# Patient Record
Sex: FEMALE | Race: WHITE | NOT HISPANIC OR LATINO
[De-identification: names, ages, dates, MRNs, and addresses within clinical notes are randomized per-mention and may not be internally consistent; named-entity substitution may affect disease eponyms.]

---

## 2020-02-17 PROBLEM — Z00.00 ENCOUNTER FOR PREVENTIVE HEALTH EXAMINATION: Status: ACTIVE | Noted: 2020-02-17

## 2020-02-18 ENCOUNTER — APPOINTMENT (OUTPATIENT)
Dept: CARDIOLOGY | Facility: CLINIC | Age: 60
End: 2020-02-18
Payer: COMMERCIAL

## 2020-02-18 ENCOUNTER — NON-APPOINTMENT (OUTPATIENT)
Age: 60
End: 2020-02-18

## 2020-02-18 VITALS
SYSTOLIC BLOOD PRESSURE: 128 MMHG | DIASTOLIC BLOOD PRESSURE: 82 MMHG | WEIGHT: 195 LBS | OXYGEN SATURATION: 99 % | BODY MASS INDEX: 34.55 KG/M2 | HEIGHT: 63 IN | RESPIRATION RATE: 17 BRPM | HEART RATE: 79 BPM

## 2020-02-18 DIAGNOSIS — R42 DIZZINESS AND GIDDINESS: ICD-10-CM

## 2020-02-18 DIAGNOSIS — Z82.49 FAMILY HISTORY OF ISCHEMIC HEART DISEASE AND OTHER DISEASES OF THE CIRCULATORY SYSTEM: ICD-10-CM

## 2020-02-18 DIAGNOSIS — Z78.9 OTHER SPECIFIED HEALTH STATUS: ICD-10-CM

## 2020-02-18 DIAGNOSIS — H93.19 TINNITUS, UNSPECIFIED EAR: ICD-10-CM

## 2020-02-18 PROCEDURE — 99204 OFFICE O/P NEW MOD 45 MIN: CPT

## 2020-02-18 NOTE — HISTORY OF PRESENT ILLNESS
[FreeTextEntry1] : Pleasant 60-year-old female seen because of vertigo episode with some complaints of shortness of breath and chest discomfort.\par \par The patient exercises fairly regularly for approximately 20 minutes. She somewhat distant with heavy exertion such as going up her stairs. There is no exercise-induced chest discomfort. The patient has occasional heartburn which she relates to big meals and spicy food and never related to exertion. The patient had an episode of vertigo many years ago. The patient also suffers from tinnitus. Recently the patient had a severe episode of vertigo. It lasted about 6 hours. The room was spinning around her. It was worse with head movement. There was nausea and vomiting. She did not seek medical care. She was advised to followup cardiologist. There is been no recurrence.

## 2020-02-18 NOTE — PHYSICAL EXAM
[General Appearance - Well Developed] : well developed [Normal Appearance] : normal appearance [Well Groomed] : well groomed [General Appearance - Well Nourished] : well nourished [No Deformities] : no deformities [Normal Conjunctiva] : the conjunctiva exhibited no abnormalities [General Appearance - In No Acute Distress] : no acute distress [Eyelids - No Xanthelasma] : the eyelids demonstrated no xanthelasmas [Normal Oral Mucosa] : normal oral mucosa [No Oral Pallor] : no oral pallor [No Oral Cyanosis] : no oral cyanosis [Normal Jugular Venous V Waves Present] : normal jugular venous V waves present [Normal Jugular Venous A Waves Present] : normal jugular venous A waves present [No Jugular Venous Becker A Waves] : no jugular venous becker A waves [Heart Rate And Rhythm] : heart rate and rhythm were normal [Heart Sounds] : normal S1 and S2 [Murmurs] : no murmurs present [Respiration, Rhythm And Depth] : normal respiratory rhythm and effort [Exaggerated Use Of Accessory Muscles For Inspiration] : no accessory muscle use [Auscultation Breath Sounds / Voice Sounds] : lungs were clear to auscultation bilaterally [Abdomen Soft] : soft [Abdomen Tenderness] : non-tender [Abdomen Mass (___ Cm)] : no abdominal mass palpated [Abnormal Walk] : normal gait [Gait - Sufficient For Exercise Testing] : the gait was sufficient for exercise testing [Nail Clubbing] : no clubbing of the fingernails [Cyanosis, Localized] : no localized cyanosis [Petechial Hemorrhages (___cm)] : no petechial hemorrhages [Skin Color & Pigmentation] : normal skin color and pigmentation [No Venous Stasis] : no venous stasis [Skin Lesions] : no skin lesions [] : no rash [No Skin Ulcers] : no skin ulcer [No Xanthoma] : no  xanthoma was observed [Affect] : the affect was normal [Oriented To Time, Place, And Person] : oriented to person, place, and time [No Anxiety] : not feeling anxious [Mood] : the mood was normal

## 2020-02-18 NOTE — ASSESSMENT
[FreeTextEntry1] : There is a high complexity to the medical decision making in this case.  There is an extensive number of diagnostic or management options regarding the chief complaint.  These include CT Brain, MRI Brain, Carotid Duplex, Tilt table . The chief complaint is an illness which causes threat to life.\par \par All of the data entered by staff today into the electronic health record  has been reviewed.  I am in agreement with all of the data.\par \par Vertigo:  b/l carotid duplex for carotid dz.\par \par SOB:  stress test for ischemic eval.\par \par CP:  echo to evaluate for pericardial dz.\par \par Neuro:  advised neuro consultation.\par

## 2020-02-18 NOTE — REASON FOR VISIT
Detail Level: Detailed Hemostasis: Electrocautery Consent: Written consent was obtained and risks were reviewed including but not limited to scarring, infection, bleeding, scabbing, incomplete removal, nerve damage and allergy to anesthesia. Number Of Curettages: 3 Bill As?: Malignant Destruction Notification Instructions: Patient will be notified of biopsy results. However, patient instructed to call the office if not contacted within 2 weeks. Bill For Surgical Tray: no Wound Care: Petrolatum Size Of Lesion In Cm (Optional): 0.7 Lab: [id number] Destruction Type: electrodesiccation Biopsy Type: H and E Billing Type: Third-Party Bill Anesthesia Type: 1% lidocaine with epinephrine Size Of Lesion After Curettage: 0.9 Lab Facility: [id number] Anesthesia Volume In Cc: 0.5 Post-Care Instructions: I reviewed with the patient in detail post-care instructions. Patient is to keep the biopsy site dry overnight, and then apply bacitracin twice daily until healed. Patient may apply hydrogen peroxide soaks to remove any crusting. [Initial Evaluation] : an initial evaluation of [Chest Pain] : chest pain [Dizziness] : dizziness [Dyspnea] : dyspnea

## 2020-05-13 ENCOUNTER — APPOINTMENT (OUTPATIENT)
Dept: CARDIOLOGY | Facility: CLINIC | Age: 60
End: 2020-05-13

## 2020-05-19 ENCOUNTER — APPOINTMENT (OUTPATIENT)
Dept: CARDIOLOGY | Facility: CLINIC | Age: 60
End: 2020-05-19

## 2020-08-05 ENCOUNTER — APPOINTMENT (OUTPATIENT)
Dept: CARDIOLOGY | Facility: CLINIC | Age: 60
End: 2020-08-05
Payer: COMMERCIAL

## 2020-08-05 PROCEDURE — 93306 TTE W/DOPPLER COMPLETE: CPT

## 2020-08-05 PROCEDURE — 93880 EXTRACRANIAL BILAT STUDY: CPT

## 2020-08-11 ENCOUNTER — APPOINTMENT (OUTPATIENT)
Dept: CARDIOLOGY | Facility: CLINIC | Age: 60
End: 2020-08-11
Payer: COMMERCIAL

## 2020-08-11 VITALS
SYSTOLIC BLOOD PRESSURE: 114 MMHG | OXYGEN SATURATION: 95 % | HEART RATE: 76 BPM | WEIGHT: 203 LBS | DIASTOLIC BLOOD PRESSURE: 76 MMHG | HEIGHT: 63 IN | BODY MASS INDEX: 35.97 KG/M2 | TEMPERATURE: 97.7 F

## 2020-08-11 DIAGNOSIS — I34.0 NONRHEUMATIC MITRAL (VALVE) INSUFFICIENCY: ICD-10-CM

## 2020-08-11 PROCEDURE — 99213 OFFICE O/P EST LOW 20 MIN: CPT

## 2020-08-11 NOTE — PHYSICAL EXAM
[General Appearance - Well Developed] : well developed [Normal Appearance] : normal appearance [General Appearance - Well Nourished] : well nourished [Well Groomed] : well groomed [General Appearance - In No Acute Distress] : no acute distress [No Deformities] : no deformities [Normal Oral Mucosa] : normal oral mucosa [Eyelids - No Xanthelasma] : the eyelids demonstrated no xanthelasmas [Normal Conjunctiva] : the conjunctiva exhibited no abnormalities [No Oral Cyanosis] : no oral cyanosis [No Oral Pallor] : no oral pallor [Normal Jugular Venous A Waves Present] : normal jugular venous A waves present [Normal Jugular Venous V Waves Present] : normal jugular venous V waves present [No Jugular Venous Becker A Waves] : no jugular venous becker A waves [] : no respiratory distress [Respiration, Rhythm And Depth] : normal respiratory rhythm and effort [Heart Rate And Rhythm] : heart rate and rhythm were normal [Exaggerated Use Of Accessory Muscles For Inspiration] : no accessory muscle use [Auscultation Breath Sounds / Voice Sounds] : lungs were clear to auscultation bilaterally [Heart Sounds] : normal S1 and S2 [Murmurs] : no murmurs present

## 2020-08-11 NOTE — HISTORY OF PRESENT ILLNESS
[FreeTextEntry1] : The patient is exercising on a recumbent bike for up to 30 minutes without exertional chest discomfort or shortness of breath.\par \par Shortness of breath: resolved\par \par Hypertension: Well-controlled.\par \par Mitral insufficiency: Asymptomatic.

## 2021-07-27 ENCOUNTER — APPOINTMENT (OUTPATIENT)
Dept: RADIOLOGY | Facility: CLINIC | Age: 61
End: 2021-07-27
Payer: COMMERCIAL

## 2021-07-27 ENCOUNTER — RESULT REVIEW (OUTPATIENT)
Age: 61
End: 2021-07-27

## 2021-07-27 PROCEDURE — 72114 X-RAY EXAM L-S SPINE BENDING: CPT

## 2021-08-12 ENCOUNTER — NON-APPOINTMENT (OUTPATIENT)
Age: 61
End: 2021-08-12

## 2021-08-12 ENCOUNTER — APPOINTMENT (OUTPATIENT)
Dept: CARDIOLOGY | Facility: CLINIC | Age: 61
End: 2021-08-12
Payer: COMMERCIAL

## 2021-08-12 VITALS
SYSTOLIC BLOOD PRESSURE: 114 MMHG | DIASTOLIC BLOOD PRESSURE: 72 MMHG | BODY MASS INDEX: 35.44 KG/M2 | TEMPERATURE: 97.5 F | WEIGHT: 200 LBS | HEIGHT: 63 IN | OXYGEN SATURATION: 96 % | HEART RATE: 84 BPM

## 2021-08-12 DIAGNOSIS — E78.00 PURE HYPERCHOLESTEROLEMIA, UNSPECIFIED: ICD-10-CM

## 2021-08-12 PROCEDURE — 93000 ELECTROCARDIOGRAM COMPLETE: CPT

## 2021-08-12 PROCEDURE — 99213 OFFICE O/P EST LOW 20 MIN: CPT

## 2021-08-12 NOTE — HISTORY OF PRESENT ILLNESS
[FreeTextEntry1] : Shortness of breath: None recently.  The patient exercises regularly.  She has a good functional status without exertional symptoms.\par \par Hyperlipidemia: Continue statin therapy.

## 2022-08-11 ENCOUNTER — NON-APPOINTMENT (OUTPATIENT)
Age: 62
End: 2022-08-11

## 2022-08-11 ENCOUNTER — APPOINTMENT (OUTPATIENT)
Dept: CARDIOLOGY | Facility: CLINIC | Age: 62
End: 2022-08-11

## 2022-08-11 VITALS
HEIGHT: 63 IN | SYSTOLIC BLOOD PRESSURE: 110 MMHG | BODY MASS INDEX: 37.56 KG/M2 | WEIGHT: 212 LBS | OXYGEN SATURATION: 98 % | HEART RATE: 75 BPM | TEMPERATURE: 97.3 F | DIASTOLIC BLOOD PRESSURE: 72 MMHG

## 2022-08-11 PROCEDURE — 99214 OFFICE O/P EST MOD 30 MIN: CPT | Mod: 25

## 2022-08-11 PROCEDURE — 93000 ELECTROCARDIOGRAM COMPLETE: CPT

## 2022-08-11 RX ORDER — BUPROPION HYDROCHLORIDE 150 MG/1
150 TABLET, FILM COATED, EXTENDED RELEASE ORAL DAILY
Refills: 0 | Status: DISCONTINUED | COMMUNITY
End: 2022-08-11

## 2022-08-11 NOTE — HISTORY OF PRESENT ILLNESS
[FreeTextEntry1] : Chest discomfort: Unfortunate the patient has developed chest discomfort syndrome.  For the last 3-4 nights she has had a substernal chest discomfort describes a heavy or tight like sensation.  It occurs when she is lying down.  Is unrelated to exertion.  She believes the maximum duration has been about 30 minutes.  It seems to be getting better.  The patient saw another doctor today and had her antacid regimen increased.  The patient's father had coronary disease at young age.  The patient's father was a cigarette smoker.  The patient is a former cigarette smoker.\par \par I have recommended immediate hospitalization to rule out myocardial infarction.  The patient does not wish to go to the hospital.\par \par Baby aspirin daily has been started.\par \par Exercise stress testing has been arranged to rule out ischemia.  Transthoracic echocardiography has been arranged to rule out pericardial disease.\par \par Shortness of breath: None recently.\par \par Hypertension: Well-controlled.

## 2022-08-16 ENCOUNTER — APPOINTMENT (OUTPATIENT)
Dept: CARDIOLOGY | Facility: CLINIC | Age: 62
End: 2022-08-16

## 2022-08-16 DIAGNOSIS — R06.00 DYSPNEA, UNSPECIFIED: ICD-10-CM

## 2022-08-16 PROCEDURE — 93015 CV STRESS TEST SUPVJ I&R: CPT

## 2022-09-02 ENCOUNTER — APPOINTMENT (OUTPATIENT)
Dept: CARDIOLOGY | Facility: CLINIC | Age: 62
End: 2022-09-02

## 2022-09-02 PROCEDURE — 93306 TTE W/DOPPLER COMPLETE: CPT

## 2022-09-07 ENCOUNTER — APPOINTMENT (OUTPATIENT)
Dept: CARDIOLOGY | Facility: CLINIC | Age: 62
End: 2022-09-07

## 2022-09-07 VITALS
SYSTOLIC BLOOD PRESSURE: 110 MMHG | HEART RATE: 81 BPM | DIASTOLIC BLOOD PRESSURE: 74 MMHG | BODY MASS INDEX: 37.21 KG/M2 | WEIGHT: 210 LBS | OXYGEN SATURATION: 97 % | TEMPERATURE: 97 F | HEIGHT: 63 IN

## 2022-09-07 DIAGNOSIS — I10 ESSENTIAL (PRIMARY) HYPERTENSION: ICD-10-CM

## 2022-09-07 DIAGNOSIS — R07.9 CHEST PAIN, UNSPECIFIED: ICD-10-CM

## 2022-09-07 PROCEDURE — 99214 OFFICE O/P EST MOD 30 MIN: CPT

## 2022-09-07 NOTE — HISTORY OF PRESENT ILLNESS
[FreeTextEntry1] : Chest discomfort: No recurrence.  The patient has a significantly abnormal family history.  Overall elected to treat with aspirin 81 mg daily.  The patient states she is taking statin therapy.  Overall feel that CT coronary angiography is the best option given her recent chest discomfort and abnormal family history of premature coronary artery disease.  Basic metabolic panel has been arranged for 1 week prior to CT coronary angiography.\par \par Hypertension: Well-controlled.\par \par Stress testing, echo, ECG reviewed.

## 2023-02-24 ENCOUNTER — INPATIENT (INPATIENT)
Facility: HOSPITAL | Age: 63
LOS: 19 days | Discharge: HOME | DRG: 834 | End: 2023-03-16
Attending: INTERNAL MEDICINE | Admitting: INTERNAL MEDICINE
Payer: COMMERCIAL

## 2023-02-24 VITALS
SYSTOLIC BLOOD PRESSURE: 185 MMHG | OXYGEN SATURATION: 97 % | HEART RATE: 74 BPM | RESPIRATION RATE: 20 BRPM | HEIGHT: 63 IN | WEIGHT: 198.42 LBS | TEMPERATURE: 98 F | DIASTOLIC BLOOD PRESSURE: 84 MMHG

## 2023-02-24 DIAGNOSIS — Z71.1 PERSON WITH FEARED HEALTH COMPLAINT IN WHOM NO DIAGNOSIS IS MADE: ICD-10-CM

## 2023-02-24 LAB
ALBUMIN SERPL ELPH-MCNC: 3.8 G/DL — SIGNIFICANT CHANGE UP (ref 3.3–5)
ALP SERPL-CCNC: 93 U/L — SIGNIFICANT CHANGE UP (ref 40–120)
ALT FLD-CCNC: 29 U/L — SIGNIFICANT CHANGE UP (ref 10–45)
ANION GAP SERPL CALC-SCNC: 11 MMOL/L — SIGNIFICANT CHANGE UP (ref 5–17)
APTT BLD: 24.4 SEC — LOW (ref 27.5–35.5)
AST SERPL-CCNC: 31 U/L — SIGNIFICANT CHANGE UP (ref 10–40)
BASOPHILS # BLD AUTO: 0.16 K/UL — SIGNIFICANT CHANGE UP (ref 0–0.2)
BASOPHILS NFR BLD AUTO: 1 % — SIGNIFICANT CHANGE UP (ref 0–2)
BILIRUB SERPL-MCNC: 0.3 MG/DL — SIGNIFICANT CHANGE UP (ref 0.2–1.2)
BLD GP AB SCN SERPL QL: NEGATIVE — SIGNIFICANT CHANGE UP
BUN SERPL-MCNC: 13 MG/DL — SIGNIFICANT CHANGE UP (ref 7–23)
CALCIUM SERPL-MCNC: 8.9 MG/DL — SIGNIFICANT CHANGE UP (ref 8.4–10.5)
CHLORIDE SERPL-SCNC: 107 MMOL/L — SIGNIFICANT CHANGE UP (ref 96–108)
CO2 SERPL-SCNC: 22 MMOL/L — SIGNIFICANT CHANGE UP (ref 22–31)
CREAT SERPL-MCNC: 0.85 MG/DL — SIGNIFICANT CHANGE UP (ref 0.5–1.3)
CRP SERPL-MCNC: 86 MG/L — HIGH (ref 0–4)
D DIMER BLD IA.RAPID-MCNC: 767 NG/ML DDU — HIGH
EGFR: 76 ML/MIN/1.73M2 — SIGNIFICANT CHANGE UP
EOSINOPHIL # BLD AUTO: 0 K/UL — SIGNIFICANT CHANGE UP (ref 0–0.5)
EOSINOPHIL NFR BLD AUTO: 0 % — SIGNIFICANT CHANGE UP (ref 0–6)
FIBRINOGEN PPP-MCNC: 495 MG/DL — HIGH (ref 200–445)
GLUCOSE SERPL-MCNC: 117 MG/DL — HIGH (ref 70–99)
HCT VFR BLD CALC: 40.9 % — SIGNIFICANT CHANGE UP (ref 34.5–45)
HGB BLD-MCNC: 14.1 G/DL — SIGNIFICANT CHANGE UP (ref 11.5–15.5)
INR BLD: 1.06 RATIO — SIGNIFICANT CHANGE UP (ref 0.88–1.16)
LDH SERPL L TO P-CCNC: 547 U/L — HIGH (ref 50–242)
LG PLATELETS BLD QL AUTO: SLIGHT — SIGNIFICANT CHANGE UP
LYMPHOCYTES # BLD AUTO: 50 % — HIGH (ref 13–44)
LYMPHOCYTES # BLD AUTO: 8.12 K/UL — HIGH (ref 1–3.3)
LYMPHOCYTES # SPEC AUTO: 32 % — HIGH (ref 0–0)
MAGNESIUM SERPL-MCNC: 2.1 MG/DL — SIGNIFICANT CHANGE UP (ref 1.6–2.6)
MANUAL SMEAR VERIFICATION: SIGNIFICANT CHANGE UP
MCHC RBC-ENTMCNC: 30.5 PG — SIGNIFICANT CHANGE UP (ref 27–34)
MCHC RBC-ENTMCNC: 34.5 GM/DL — SIGNIFICANT CHANGE UP (ref 32–36)
MCV RBC AUTO: 88.5 FL — SIGNIFICANT CHANGE UP (ref 80–100)
MONOCYTES # BLD AUTO: 0.97 K/UL — HIGH (ref 0–0.9)
MONOCYTES NFR BLD AUTO: 6 % — SIGNIFICANT CHANGE UP (ref 2–14)
NEUTROPHILS # BLD AUTO: 1.79 K/UL — LOW (ref 1.8–7.4)
NEUTROPHILS NFR BLD AUTO: 9 % — LOW (ref 43–77)
NEUTS BAND # BLD: 2 % — SIGNIFICANT CHANGE UP (ref 0–8)
NRBC # BLD: 0 /100 — SIGNIFICANT CHANGE UP (ref 0–0)
PHOSPHATE SERPL-MCNC: 2.9 MG/DL — SIGNIFICANT CHANGE UP (ref 2.5–4.5)
PLAT MORPH BLD: NORMAL — SIGNIFICANT CHANGE UP
PLATELET # BLD AUTO: 41 K/UL — LOW (ref 150–400)
POLYCHROMASIA BLD QL SMEAR: SLIGHT — SIGNIFICANT CHANGE UP
POTASSIUM SERPL-MCNC: 4 MMOL/L — SIGNIFICANT CHANGE UP (ref 3.5–5.3)
POTASSIUM SERPL-SCNC: 4 MMOL/L — SIGNIFICANT CHANGE UP (ref 3.5–5.3)
PROT SERPL-MCNC: 7.3 G/DL — SIGNIFICANT CHANGE UP (ref 6–8.3)
PROTHROM AB SERPL-ACNC: 12.2 SEC — SIGNIFICANT CHANGE UP (ref 10.5–13.4)
RBC # BLD: 4.62 M/UL — SIGNIFICANT CHANGE UP (ref 3.8–5.2)
RBC # BLD: 4.62 M/UL — SIGNIFICANT CHANGE UP (ref 3.8–5.2)
RBC # FLD: 14.3 % — SIGNIFICANT CHANGE UP (ref 10.3–14.5)
RBC BLD AUTO: SIGNIFICANT CHANGE UP
RETICS #: 32.3 K/UL — SIGNIFICANT CHANGE UP (ref 25–125)
RETICS/RBC NFR: 0.7 % — SIGNIFICANT CHANGE UP (ref 0.5–2.5)
RH IG SCN BLD-IMP: POSITIVE — SIGNIFICANT CHANGE UP
SODIUM SERPL-SCNC: 140 MMOL/L — SIGNIFICANT CHANGE UP (ref 135–145)
URATE SERPL-MCNC: 5.5 MG/DL — SIGNIFICANT CHANGE UP (ref 2.5–7)
WBC # BLD: 16.24 K/UL — HIGH (ref 3.8–10.5)
WBC # FLD AUTO: 16.24 K/UL — HIGH (ref 3.8–10.5)

## 2023-02-24 PROCEDURE — 99223 1ST HOSP IP/OBS HIGH 75: CPT

## 2023-02-24 PROCEDURE — 88189 FLOWCYTOMETRY/READ 16 & >: CPT

## 2023-02-24 PROCEDURE — 99285 EMERGENCY DEPT VISIT HI MDM: CPT

## 2023-02-24 PROCEDURE — 88291 CYTO/MOLECULAR REPORT: CPT | Mod: 59

## 2023-02-24 PROCEDURE — 71046 X-RAY EXAM CHEST 2 VIEWS: CPT | Mod: 26

## 2023-02-24 PROCEDURE — G0452: CPT | Mod: 26

## 2023-02-24 RX ORDER — ACETAMINOPHEN 500 MG
1000 TABLET ORAL ONCE
Refills: 0 | Status: COMPLETED | OUTPATIENT
Start: 2023-02-24 | End: 2023-02-24

## 2023-02-24 RX ORDER — ALLOPURINOL 300 MG
100 TABLET ORAL DAILY
Refills: 0 | Status: DISCONTINUED | OUTPATIENT
Start: 2023-02-24 | End: 2023-03-05

## 2023-02-24 RX ORDER — SIMVASTATIN 20 MG/1
40 TABLET, FILM COATED ORAL AT BEDTIME
Refills: 0 | Status: DISCONTINUED | OUTPATIENT
Start: 2023-02-24 | End: 2023-03-16

## 2023-02-24 RX ORDER — LOSARTAN POTASSIUM 100 MG/1
100 TABLET, FILM COATED ORAL DAILY
Refills: 0 | Status: DISCONTINUED | OUTPATIENT
Start: 2023-02-24 | End: 2023-03-16

## 2023-02-24 RX ORDER — BRIMONIDINE TARTRATE 2 MG/MG
1 SOLUTION/ DROPS OPHTHALMIC THREE TIMES A DAY
Refills: 0 | Status: DISCONTINUED | OUTPATIENT
Start: 2023-02-24 | End: 2023-03-04

## 2023-02-24 RX ORDER — ESCITALOPRAM OXALATE 10 MG/1
10 TABLET, FILM COATED ORAL DAILY
Refills: 0 | Status: DISCONTINUED | OUTPATIENT
Start: 2023-02-24 | End: 2023-03-16

## 2023-02-24 RX ORDER — SODIUM CHLORIDE 9 MG/ML
1000 INJECTION INTRAMUSCULAR; INTRAVENOUS; SUBCUTANEOUS
Refills: 0 | Status: DISCONTINUED | OUTPATIENT
Start: 2023-02-24 | End: 2023-03-16

## 2023-02-24 RX ORDER — PANTOPRAZOLE SODIUM 20 MG/1
40 TABLET, DELAYED RELEASE ORAL
Refills: 0 | Status: DISCONTINUED | OUTPATIENT
Start: 2023-02-24 | End: 2023-03-16

## 2023-02-24 RX ORDER — SODIUM CHLORIDE 9 MG/ML
1000 INJECTION INTRAMUSCULAR; INTRAVENOUS; SUBCUTANEOUS ONCE
Refills: 0 | Status: COMPLETED | OUTPATIENT
Start: 2023-02-24 | End: 2023-02-24

## 2023-02-24 RX ADMIN — SODIUM CHLORIDE 1000 MILLILITER(S): 9 INJECTION INTRAMUSCULAR; INTRAVENOUS; SUBCUTANEOUS at 16:00

## 2023-02-24 RX ADMIN — SODIUM CHLORIDE 75 MILLILITER(S): 9 INJECTION INTRAMUSCULAR; INTRAVENOUS; SUBCUTANEOUS at 21:12

## 2023-02-24 RX ADMIN — Medication 1000 MILLIGRAM(S): at 21:55

## 2023-02-24 RX ADMIN — Medication 400 MILLIGRAM(S): at 21:11

## 2023-02-24 NOTE — ED ADULT NURSE REASSESSMENT NOTE - NS ED NURSE REASSESS COMMENT FT1
Report received from JAYLYN Flores. Pt does not appear to be in any acute distress. Pt resting comfortably. Pt A&Ox4. VS documented. Pt reports pain 8/10. ACP Berry [id number] contacted. ACP to place order for pain medications.

## 2023-02-24 NOTE — H&P ADULT - NSHPLABSRESULTS_GEN_ALL_CORE
Lab Results:  CBC  CBC Full  -  ( 24 Feb 2023 16:16 )  WBC Count : 16.24 K/uL  RBC Count : 4.62 M/uL  Hemoglobin : 14.1 g/dL  Hematocrit : 40.9 %  Platelet Count - Automated : 41 K/uL  Mean Cell Volume : 88.5 fl  Mean Cell Hemoglobin : 30.5 pg  Mean Cell Hemoglobin Concentration : 34.5 gm/dL  Auto Neutrophil # : 1.79 K/uL  Auto Lymphocyte # : 8.12 K/uL  Auto Monocyte # : 0.97 K/uL  Auto Eosinophil # : 0.00 K/uL  Auto Basophil # : 0.16 K/uL  Auto Neutrophil % : 9.0 %  Auto Lymphocyte % : 50.0 %  Auto Monocyte % : 6.0 %  Auto Eosinophil % : 0.0 %  Auto Basophil % : 1.0 %    .		Differential:	[] Automated		[] Manual  Chemistry                        14.1   16.24 )-----------( 41       ( 24 Feb 2023 16:16 )             40.9     02-24    140  |  107  |  13  ----------------------------<  117<H>  4.0   |  22  |  0.85    Ca    8.9      24 Feb 2023 16:16  Phos  2.9     02-24  Mg     2.1     02-24    TPro  7.3  /  Alb  3.8  /  TBili  0.3  /  DBili  x   /  AST  31  /  ALT  29  /  AlkPhos  93  02-24    LIVER FUNCTIONS - ( 24 Feb 2023 16:16 )  Alb: 3.8 g/dL / Pro: 7.3 g/dL / ALK PHOS: 93 U/L / ALT: 29 U/L / AST: 31 U/L / GGT: x           PT/INR - ( 24 Feb 2023 16:16 )   PT: 12.2 sec;   INR: 1.06 ratio         PTT - ( 24 Feb 2023 16:16 )  PTT:24.4 sec          MICROBIOLOGY/CULTURES:      RADIOLOGY RESULTS: reviewed

## 2023-02-24 NOTE — ED ADULT NURSE NOTE - OBJECTIVE STATEMENT
62 yo female with pmh HTN, HLD presents to ED by EMS transfer from [place] for "abnormal labs and r/o Leukemia." Pt reports she woke up today with L sided CP/abdominal pain a/w SIMMONS, bringing her to [place]. Pt denies palpitations, h/a, dizziness, generalized weakness/fatigue, n/v/d, urinary symptoms, bowel changes, fevers, chills, body aches. Pt a&ox3, breathing spontaneous and unlabored, able to move all extremities and follow commands, skin warm dry and appropriate color. Pt safety measures in place and comfort provided.

## 2023-02-24 NOTE — CONSULT NOTE ADULT - ASSESSMENT
62 yo with HTN and HTN who was transferred from [facility] for concern of leukemia.     #r/o Leukemia  - Please send the following Stat Labs: CBC with differential, TLS (CMP, Magnesium, Phosphorus, Uric Acid), DIC Panel (PT/INR, PTT, d-dimer, Fibrinogen)  - Check an HIV, Acute Hepatitis Panel, Hepatitis B Core Antibody total and G6PD  - Flow cytometry/molecular sent  - Labs reviewed: White Count 16K with 32 lymphocytes No signs of tumor lysis  - Peripheral Blood Smear Reviewed: notable for blast cells, a few with granules in cytoplasm, overall appearance of myeloblasts  - Although white count elevated, no signs of leukostasis currently. Does not need emergent leukophoresis.  - No role for hydrea given WBC ~16K  - Patient will need a bone marrow biopsy tomorrow.  - Will need MUGA, line placement, and echo in anticipation of chemotherapy   - IVF hydration at 75 cc/hr,  - Trend tumor lysis labs q 12 hrs  - Start allopurinol (Please give Rasburicase 3 mg IV x1 if UA >7)  - Transfuse to keep Hg>7 and plts >10 or 15 if febrile    Case d/w Dr. Bowles.    Miya Winslow M.D.  Hematology and Medical Oncology Fellow  Pager: [phone]  For weekends and evenings (5 pm - 8 am), please page Heme/Onc fellow on call.   64 yo with HTN and HTN who was transferred from [facility] for concern of leukemia.     #r/o Leukemia  - Please send the following Stat Labs: CBC with differential, TLS (CMP, Magnesium, Phosphorus, Uric Acid), DIC Panel (PT/INR, PTT, d-dimer, Fibrinogen)  - Check an HIV, Acute Hepatitis Panel, Hepatitis B Core Antibody total and G6PD  - Flow cytometry/molecular sent  - Labs reviewed: White Count 16K with 32 lymphocytes No signs of tumor lysis  - Peripheral Blood Smear Reviewed: notable for blast cells, a few with granules in cytoplasm, overall appearance of myeloblasts  - Although white count elevated, no signs of leukostasis currently. Does not need emergent leukophoresis.  - No role for hydrea given WBC ~16K  - Patient will need a bone marrow biopsy.  - Will need MUGA, line placement, and echo in anticipation of chemotherapy   - IVF hydration at 75 cc/hr,  - Trend tumor lysis labs q 12 hrs  - Start allopurinol (Please give Rasburicase 3 mg IV x1 if UA >7)  - Transfuse to keep Hg>7 and plts >10 or 15 if febrile    Case d/w Dr. Bowles.    Miya Winslow M.D.  Hematology and Medical Oncology Fellow  Pager: [phone]  For weekends and evenings (5 pm - 8 am), please page Heme/Onc fellow on call.

## 2023-02-24 NOTE — ED PROVIDER NOTE - ATTENDING APP SHARED VISIT CONTRIBUTION OF CARE
I, Jose Burnham, performed a history and physical exam of the patient and discussed their management with the resident and/or advanced care provider. I reviewed the resident and/or advanced care provider's note and agree with the documented findings and plan of care. I was present and available for all procedures.    62 y/o with PMHx of HTN, HLD presents to the ED BIBA transferred from [facility] for new diagnosis of leukemia. Patient states that she woke up today with left sided chest pain and the feeling of shortness of breath. She states that she has never had these symptoms before. The shortness of breath was worse when she exerted herself. She states that she went to the emergency department and was told that her labs were consistent with leukemia. She has never had this diagnosis before. She denies any headache, fever, chills, cough, n/v/d.    Well appearing and in NAD, head normal appearing atraumatic, trachea midline, no respiratory distress, lungs cta bilaterally, rrr no murmurs, soft NT ND abdomen, no visible extremity deformities, Alert and oriented, non focal neuro exam, skin warm and dry, normal affect and mood,   No leg swelling or JVD    Concerning for chest pain with transfer for acute leukemia which is new otherwise no other symptoms of leukostasis or hyperviscosity syndrome possibly blast crisis, will work-up with chest x-ray EKG screening blood work troponin hematology evaluation and admission for further management of acute leukemia discussed with patient agreeable with plan, unlikely ACS PE pneumothorax dissection AAA pneumonia

## 2023-02-24 NOTE — ED ADULT NURSE NOTE - NS TRANSFER PATIENT BELONGINGS
Clothing Cimzia Counseling:  I discussed with the patient the risks of Cimzia including but not limited to immunosuppression, allergic reactions and infections.  The patient understands that monitoring is required including a PPD at baseline and must alert us or the primary physician if symptoms of infection or other concerning signs are noted.

## 2023-02-24 NOTE — ED PROVIDER NOTE - OBJECTIVE STATEMENT
64 y/o with PMHx of HTN, HLD presents to the ED BIBA transferred from [facility] for new diagnosis of leukemia. Patient states that she woke up today with left sided chest pain and the feeling of shortness of breath. She states that she has never had these symptoms before. The shortness of breath was worse when she exerted herself. She states that she went to the emergency department and was told that her labs were consistent with leukemia. She has never had this diagnosis before. She denies any headache, fever, chills, cough, n/v/d.

## 2023-02-24 NOTE — ED PROVIDER NOTE - PROGRESS NOTE DETAILS
ap- pt signed out to me pending results, of heme consult, recommending admission to medicine, pt w/ AML, currently no cp, w/ generalized weakness

## 2023-02-24 NOTE — ED PROVIDER NOTE - NS ED ATTENDING STATEMENT MOD
This was a shared visit with the SOLOMON. I reviewed and verified the documentation and independently performed the documented:

## 2023-02-24 NOTE — PATIENT PROFILE ADULT - NSTRANSFERBELONGINGSRESP_GEN_A_NUR
Billing Type: [employer] Lab Facility:  [facility] Notes (To The Dermatopathologist): Size: 0.4cm R/O: DN Detail Level: Detailed Lab: [id number]  [facility] Body Location Override (Optional - Billing Will Still Be Based On Selected Body Map Location If Applicable): Right Shoulder Hemostasis: Electrocautery Anesthesia Volume In Cc: 0.3 Consent was obtained from the patient. The risks and benefits to therapy were discussed in detail. Specifically, the risks of infection, scarring, bleeding, prolonged wound healing, incomplete removal, allergy to anesthesia, nerve injury and recurrence were addressed. Prior to the procedure, the treatment site was clearly identified and confirmed by the patient. All components of Universal Protocol/PAUSE Rule completed. Medical Necessity Information: It is in your best interest to select a reason for this procedure from the list below. All of these items fulfill various CMS LCD requirements except the new and changing color options. X Size Of Lesion In Cm (Optional): 0 Render Post-Care Instructions In Note?: no Was A Bandage Applied: Yes Notification Instructions: Patient will be notified of biopsy results. However, patient instructed to call the office if not contacted within 2 weeks. Size Of Margin In Cm (Margins Are Not Added To Billing Dimensions): - Biopsy Method: Personna blade Medical Necessity Clause: This procedure was medically necessary because the lesion that was treated was: Wound Care: Bacitracin Anesthesia Type: 1% lidocaine with epinephrine Size Of Lesion In Cm (Required): 0.4 Post-Care Instructions: I reviewed with the patient in detail post-care instructions. Patient is to keep the biopsy site dry overnight, and then apply bacitracin twice daily until healed. Patient may apply hydrogen peroxide soaks to remove any crusting. Billing Type: Third-Party Bill Body Location Override (Optional - Billing Will Still Be Based On Selected Body Map Location If Applicable): Right Posterior Shoulder Lab: [id number] Lab Facility: [id number] yes

## 2023-02-24 NOTE — H&P ADULT - HISTORY OF PRESENT ILLNESS
62 y/o with PMHx of HTN, HLD presents to the ED BIBA transferred from [facility] for new diagnosis of leukemia. Patient states that she woke up today with left sided chest pain and the feeling of shortness of breath. She states that she has never had these symptoms before. The shortness of breath was worse when she exerted herself. She states that she went to the emergency department and was told that her labs were consistent with leukemia. She has never had this diagnosis before. She denies any headache, fever, chills, cough, n/v/d.

## 2023-02-24 NOTE — H&P ADULT - NSHPPHYSICALEXAM_GEN_ALL_CORE
General: WN/WD NAD  PERRLA  Neurology: A&Ox3, nonfocal, JACKSON x 4  Respiratory: CTA B/L  CV: RRR, S1S2, no murmurs, rubs or gallops  Abdominal: Soft, NT, ND +BS, Last BM  Extremities: No edema, + peripheral pulses  Skin Normal

## 2023-02-24 NOTE — ED PROVIDER NOTE - PHYSICAL EXAMINATION
CONSTITUTIONAL: Patient is awake, alert and oriented x 3. Patient is well appearing and in no acute distress.  HEAD: NCAT  ENT: Airway patent, Nasal mucosa clear.   NECK: Supple,   LUNGS: CTA B/L, no wheezes, rhonci or rales  HEART: RRR.+S1S2   ABDOMEN: Soft, non-tender to palpation throughout all four quadrants,   MSK: , FROM upper and lower ext b/l,   SKIN: No rash or lesions  NEURO: No focal deficits,

## 2023-02-24 NOTE — CONSULT NOTE ADULT - ATTENDING COMMENTS
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  PMHx: HLD and HTN    ROS: notable for mild pain just under her left rib cage Primary: Not yet assigned    Peripheral blood smear c/w AML.  ROS: notable for mild pain just under her left rib cage.    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  PMHx: HLD and HTN    Plan:  Please admit to Leukemia service.  Stephanie is stable at this time.  I discussed with her and her  the Dx of AML but that she requires DNA analysis for a more informed diagnosis and treatment plan.    If her labs are stable overnight.  Discharge to the early discharge unit while molecular studies are pending is reasonable.      Over 60 minutes were spent in direct, non-resident teaching, patient care and care coordination.

## 2023-02-24 NOTE — H&P ADULT - ASSESSMENT
64 y/o with PMHx of HTN, HLD presents to the ED BIBA transferred from [facility] for new diagnosis of leukemia. Patient states that she woke up today with left sided chest pain and the feeling of shortness of breath. She states that she has never had these symptoms before. The shortness of breath was worse when she exerted herself. She states that she went to the emergency department and was told that her labs were consistent with leukemia. She has never had this diagnosis before. She denies any headache, fever, chills, cough, n/v/d.    1 ro Leukemia  - monitor cbc  - fu heme recs  - management as per heme   - cw allopurinol  - cw ivf     2 HTN  - cw losartan  - DASH diet    3 HLD  - cw statin     Venodynes

## 2023-02-25 DIAGNOSIS — I10 ESSENTIAL (PRIMARY) HYPERTENSION: ICD-10-CM

## 2023-02-25 DIAGNOSIS — C91.90 LYMPHOID LEUKEMIA, UNSPECIFIED NOT HAVING ACHIEVED REMISSION: ICD-10-CM

## 2023-02-25 DIAGNOSIS — E78.5 HYPERLIPIDEMIA, UNSPECIFIED: ICD-10-CM

## 2023-02-25 DIAGNOSIS — Z29.9 ENCOUNTER FOR PROPHYLACTIC MEASURES, UNSPECIFIED: ICD-10-CM

## 2023-02-25 DIAGNOSIS — B99.9 UNSPECIFIED INFECTIOUS DISEASE: ICD-10-CM

## 2023-02-25 DIAGNOSIS — C91.00 ACUTE LYMPHOBLASTIC LEUKEMIA NOT HAVING ACHIEVED REMISSION: ICD-10-CM

## 2023-02-25 LAB
ALBUMIN SERPL ELPH-MCNC: 3.3 G/DL — SIGNIFICANT CHANGE UP (ref 3.3–5)
ALP SERPL-CCNC: 75 U/L — SIGNIFICANT CHANGE UP (ref 40–120)
ALT FLD-CCNC: 22 U/L — SIGNIFICANT CHANGE UP (ref 10–45)
ANION GAP SERPL CALC-SCNC: 9 MMOL/L — SIGNIFICANT CHANGE UP (ref 5–17)
AST SERPL-CCNC: 22 U/L — SIGNIFICANT CHANGE UP (ref 10–40)
BILIRUB SERPL-MCNC: 0.4 MG/DL — SIGNIFICANT CHANGE UP (ref 0.2–1.2)
BUN SERPL-MCNC: 11 MG/DL — SIGNIFICANT CHANGE UP (ref 7–23)
CALCIUM SERPL-MCNC: 8.6 MG/DL — SIGNIFICANT CHANGE UP (ref 8.4–10.5)
CHLORIDE SERPL-SCNC: 109 MMOL/L — HIGH (ref 96–108)
CO2 SERPL-SCNC: 23 MMOL/L — SIGNIFICANT CHANGE UP (ref 22–31)
CREAT SERPL-MCNC: 0.8 MG/DL — SIGNIFICANT CHANGE UP (ref 0.5–1.3)
EGFR: 82 ML/MIN/1.73M2 — SIGNIFICANT CHANGE UP
ERYTHROCYTE [SEDIMENTATION RATE] IN BLOOD: 28 MM/HR — HIGH (ref 0–20)
GLUCOSE SERPL-MCNC: 99 MG/DL — SIGNIFICANT CHANGE UP (ref 70–99)
HAPTOGLOB SERPL-MCNC: 217 MG/DL — HIGH (ref 34–200)
HAV IGM SER-ACNC: SIGNIFICANT CHANGE UP
HBV CORE AB SER-ACNC: SIGNIFICANT CHANGE UP
HBV CORE IGM SER-ACNC: SIGNIFICANT CHANGE UP
HBV SURFACE AG SER-ACNC: SIGNIFICANT CHANGE UP
HCT VFR BLD CALC: 35.7 % — SIGNIFICANT CHANGE UP (ref 34.5–45)
HCV AB S/CO SERPL IA: 0.06 S/CO — SIGNIFICANT CHANGE UP (ref 0–0.99)
HCV AB SERPL-IMP: SIGNIFICANT CHANGE UP
HGB BLD-MCNC: 12.1 G/DL — SIGNIFICANT CHANGE UP (ref 11.5–15.5)
HIV 1+2 AB+HIV1 P24 AG SERPL QL IA: SIGNIFICANT CHANGE UP
MCHC RBC-ENTMCNC: 30.6 PG — SIGNIFICANT CHANGE UP (ref 27–34)
MCHC RBC-ENTMCNC: 33.9 GM/DL — SIGNIFICANT CHANGE UP (ref 32–36)
MCV RBC AUTO: 90.4 FL — SIGNIFICANT CHANGE UP (ref 80–100)
NRBC # BLD: 0 /100 WBCS — SIGNIFICANT CHANGE UP (ref 0–0)
PLATELET # BLD AUTO: 25 K/UL — LOW (ref 150–400)
POTASSIUM SERPL-MCNC: 4 MMOL/L — SIGNIFICANT CHANGE UP (ref 3.5–5.3)
POTASSIUM SERPL-SCNC: 4 MMOL/L — SIGNIFICANT CHANGE UP (ref 3.5–5.3)
PROT SERPL-MCNC: 6.3 G/DL — SIGNIFICANT CHANGE UP (ref 6–8.3)
RBC # BLD: 3.95 M/UL — SIGNIFICANT CHANGE UP (ref 3.8–5.2)
RBC # FLD: 14.4 % — SIGNIFICANT CHANGE UP (ref 10.3–14.5)
SODIUM SERPL-SCNC: 141 MMOL/L — SIGNIFICANT CHANGE UP (ref 135–145)
WBC # BLD: 12.09 K/UL — HIGH (ref 3.8–10.5)
WBC # FLD AUTO: 12.09 K/UL — HIGH (ref 3.8–10.5)

## 2023-02-25 PROCEDURE — 99223 1ST HOSP IP/OBS HIGH 75: CPT

## 2023-02-25 RX ORDER — GABAPENTIN 400 MG/1
300 CAPSULE ORAL
Refills: 0 | Status: DISCONTINUED | OUTPATIENT
Start: 2023-02-25 | End: 2023-03-02

## 2023-02-25 RX ADMIN — SODIUM CHLORIDE 75 MILLILITER(S): 9 INJECTION INTRAMUSCULAR; INTRAVENOUS; SUBCUTANEOUS at 22:37

## 2023-02-25 RX ADMIN — SIMVASTATIN 40 MILLIGRAM(S): 20 TABLET, FILM COATED ORAL at 20:02

## 2023-02-25 RX ADMIN — Medication 100 MILLIGRAM(S): at 09:49

## 2023-02-25 RX ADMIN — BRIMONIDINE TARTRATE 1 DROP(S): 2 SOLUTION/ DROPS OPHTHALMIC at 22:37

## 2023-02-25 RX ADMIN — BRIMONIDINE TARTRATE 1 DROP(S): 2 SOLUTION/ DROPS OPHTHALMIC at 09:13

## 2023-02-25 RX ADMIN — GABAPENTIN 300 MILLIGRAM(S): 400 CAPSULE ORAL at 17:56

## 2023-02-25 RX ADMIN — ESCITALOPRAM OXALATE 10 MILLIGRAM(S): 10 TABLET, FILM COATED ORAL at 09:50

## 2023-02-25 RX ADMIN — PANTOPRAZOLE SODIUM 40 MILLIGRAM(S): 20 TABLET, DELAYED RELEASE ORAL at 09:13

## 2023-02-25 RX ADMIN — LOSARTAN POTASSIUM 100 MILLIGRAM(S): 100 TABLET, FILM COATED ORAL at 09:14

## 2023-02-25 NOTE — PROGRESS NOTE ADULT - SUBJECTIVE AND OBJECTIVE BOX
Patient is a 63y old  Female who presents with a chief complaint of ro leukemia (25 Feb 2023 10:44)    Date of servie : 02-25-23 @ 12:14  INTERVAL HPI/OVERNIGHT EVENTS:  T(C): 36.8 (02-25-23 @ 09:12), Max: 36.9 (02-25-23 @ 01:22)  HR: 68 (02-25-23 @ 09:12) (65 - 74)  BP: 159/71 (02-25-23 @ 09:12) (141/73 - 185/84)  RR: 16 (02-25-23 @ 09:12) (16 - 20)  SpO2: 97% (02-25-23 @ 09:12) (94% - 97%)  Wt(kg): --  I&O's Summary    24 Feb 2023 07:01  -  25 Feb 2023 07:00  --------------------------------------------------------  IN: 855 mL / OUT: 450 mL / NET: 405 mL        LABS:                        12.1   12.09 )-----------( 25       ( 25 Feb 2023 07:19 )             35.7     02-25    141  |  109<H>  |  11  ----------------------------<  99  4.0   |  23  |  0.80    Ca    8.6      25 Feb 2023 07:17  Phos  2.9     02-24  Mg     2.1     02-24    TPro  6.3  /  Alb  3.3  /  TBili  0.4  /  DBili  x   /  AST  22  /  ALT  22  /  AlkPhos  75  02-25    PT/INR - ( 24 Feb 2023 16:16 )   PT: 12.2 sec;   INR: 1.06 ratio         PTT - ( 24 Feb 2023 16:16 )  PTT:24.4 sec    CAPILLARY BLOOD GLUCOSE                MEDICATIONS  (STANDING):  allopurinol 100 milliGRAM(s) Oral daily  brimonidine 0.2% Ophthalmic Solution 1 Drop(s) Both EYES three times a day  escitalopram 10 milliGRAM(s) Oral daily  losartan 100 milliGRAM(s) Oral daily  pantoprazole    Tablet 40 milliGRAM(s) Oral before breakfast  simvastatin 40 milliGRAM(s) Oral at bedtime  sodium chloride 0.9%. 1000 milliLiter(s) (75 mL/Hr) IV Continuous <Continuous>    MEDICATIONS  (PRN):          PHYSICAL EXAM:  GENERAL: NAD, well-groomed, well-developed  HEAD:  Atraumatic, Normocephalic  CHEST/LUNG: Clear to percussion bilaterally; No rales, rhonchi, wheezing, or rubs  HEART: Regular rate and rhythm; No murmurs, rubs, or gallops  ABDOMEN: Soft, Nontender, Nondistended; Bowel sounds present  EXTREMITIES:  2+ Peripheral Pulses, No clubbing, cyanosis, or edema  LYMPH: No lymphadenopathy noted  SKIN: No rashes or lesions    Care Discussed with Consultants/Other Providers [ ] YES  [ ] NO

## 2023-02-25 NOTE — PROGRESS NOTE ADULT - PROBLEM SELECTOR PLAN 1
R/o leukemia  Monitor CBC, CMP, TLS labs BID  On allopurinol  Hepatitis Panel Neg. Hep B Core neg. HIV negative.  Follow up G6PD.  Transfuse PRN. maintain Hgb >7, plts >15.  Gabapentin for splenic/side pain.  Plan for BMbx on 2/27.  2/25 - CTA from [facility] (-) for PE. Left sided pain likely due to splenomegaly.

## 2023-02-25 NOTE — PROGRESS NOTE ADULT - ASSESSMENT
62 y/o with PMHx of HTN, HLD presents to the ED BIBA transferred from [facility] for new diagnosis of leukemia. Patient is thrombocytopenic due to disease.

## 2023-02-25 NOTE — PROGRESS NOTE ADULT - SUBJECTIVE AND OBJECTIVE BOX
Diagnosis    Protocol/Chemo Regimen:  Day:      Pt endorsed:    Review of Systems:      Pain scale:                                        Location:    Diet:     Allergies    sulfa drugs (Unknown)    Intolerances        ANTIMICROBIALS      HEME/ONC MEDICATIONS      STANDING MEDICATIONS  allopurinol 100 milliGRAM(s) Oral daily  brimonidine 0.2% Ophthalmic Solution 1 Drop(s) Both EYES three times a day  escitalopram 10 milliGRAM(s) Oral daily  losartan 100 milliGRAM(s) Oral daily  pantoprazole    Tablet 40 milliGRAM(s) Oral before breakfast  simvastatin 40 milliGRAM(s) Oral at bedtime  sodium chloride 0.9%. 1000 milliLiter(s) IV Continuous <Continuous>      PRN MEDICATIONS        Vital Signs Last 24 Hrs  T(C): 36.8 (25 Feb 2023 09:12), Max: 36.9 (25 Feb 2023 01:22)  T(F): 98.3 (25 Feb 2023 09:12), Max: 98.4 (25 Feb 2023 01:22)  HR: 68 (25 Feb 2023 09:12) (65 - 74)  BP: 159/71 (25 Feb 2023 09:12) (141/73 - 185/84)  BP(mean): --  RR: 16 (25 Feb 2023 09:12) (16 - 20)  SpO2: 97% (25 Feb 2023 09:12) (94% - 97%)    Parameters below as of 25 Feb 2023 09:12  Patient On (Oxygen Delivery Method): room air        PHYSICAL EXAM  General: adult in NAD  HEENT: clear oropharynx, anicteric sclera, pink conjunctiva  Neck: supple  CV: normal S1/S2 RRR  Lungs: positive air movement b/l ant lungs,clear to auscultation, no wheezes, no rales  Abdomen: soft non-tender non-distended, no hepatosplenomegaly  Ext: no clubbing cyanosis or edema  Skin: no rashes and no petechiae  Neuro: alert and oriented X 3, no focal deficits  Central Line: normal    LABS:    Blood Cultures:                           12.1   12.09 )-----------( 25       ( 25 Feb 2023 07:19 )             35.7         Mean Cell Volume : 90.4 fl  Mean Cell Hemoglobin : 30.6 pg  Mean Cell Hemoglobin Concentration : 33.9 gm/dL  Auto Neutrophil # : x  Auto Lymphocyte # : x  Auto Monocyte # : x  Auto Eosinophil # : x  Auto Basophil # : x  Auto Neutrophil % : x  Auto Lymphocyte % : x  Auto Monocyte % : x  Auto Eosinophil % : x  Auto Basophil % : x      02-25    141  |  109<H>  |  11  ----------------------------<  99  4.0   |  23  |  0.80    Ca    8.6      25 Feb 2023 07:17  Phos  2.9     02-24  Mg     2.1     02-24    TPro  6.3  /  Alb  3.3  /  TBili  0.4  /  DBili  x   /  AST  22  /  ALT  22  /  AlkPhos  75  02-25      Mg 2.1  Phos 2.9      PT/INR - ( 24 Feb 2023 16:16 )   PT: 12.2 sec;   INR: 1.06 ratio         PTT - ( 24 Feb 2023 16:16 )  PTT:24.4 sec      Uric Acid 5.5        RADIOLOGY & ADDITIONAL STUDIES:         Diagnosis: r/o leukemia    Protocol/Chemo Regimen: TBD  Day: NA     Pt endorsed: left side pain when deep breathing    Review of Systems: denies headache, chest pain, nausea, vomiting      Pain scale: mild left side    Diet: regular    Allergies: sulfa drugs (Unknown)      ANTIMICROBIALS      HEME/ONC MEDICATIONS      STANDING MEDICATIONS  allopurinol 100 milliGRAM(s) Oral daily  brimonidine 0.2% Ophthalmic Solution 1 Drop(s) Both EYES three times a day  escitalopram 10 milliGRAM(s) Oral daily  losartan 100 milliGRAM(s) Oral daily  pantoprazole    Tablet 40 milliGRAM(s) Oral before breakfast  simvastatin 40 milliGRAM(s) Oral at bedtime  sodium chloride 0.9%. 1000 milliLiter(s) IV Continuous <Continuous>      PRN MEDICATIONS      Vital Signs Last 24 Hrs  T(C): 36.8 (25 Feb 2023 09:12), Max: 36.9 (25 Feb 2023 01:22)  T(F): 98.3 (25 Feb 2023 09:12), Max: 98.4 (25 Feb 2023 01:22)  HR: 68 (25 Feb 2023 09:12) (65 - 74)  BP: 159/71 (25 Feb 2023 09:12) (141/73 - 185/84)  RR: 16 (25 Feb 2023 09:12) (16 - 20)  SpO2: 97% (25 Feb 2023 09:12) (94% - 97%)    Parameters below as of 25 Feb 2023 09:12  Patient On (Oxygen Delivery Method): room air      PHYSICAL EXAM  General: adult in NAD  HEENT: clear oropharynx, anicteric sclera, pink conjunctiva  CV: normal S1/S2 RRR  Lungs: positive air movement b/l ant lungs,clear to auscultation, no wheezes, no rales  Abdomen: soft, tender on plaption of spleen, non-distended, splenomegaly  Ext: no clubbing cyanosis or edema  Skin: no rashes and no petechiae  Neuro: alert and oriented X 3, no focal deficits  PIV CDI      LABS:                      12.1   12.09 )-----------( 25       ( 25 Feb 2023 07:19 )             35.7     Complete Blood Count in AM (02.25.23 @ 07:19)    Nucleated RBC: 0 /100 WBCs    WBC Count: 12.09 K/uL    RBC Count: 3.95 M/uL    Hemoglobin: 12.1 g/dL    Hematocrit: 35.7 %    Mean Cell Volume: 90.4 fl    Mean Cell Hemoglobin: 30.6 pg    Mean Cell Hemoglobin Conc: 33.9 gm/dL    Red Cell Distrib Width: 14.4 %    Platelet Count - Automated: 25 K/uL      02-25    141  |  109<H>  |  11  ----------------------------<  99  4.0   |  23  |  0.80    Ca    8.6      25 Feb 2023 07:17  Phos  2.9     02-24  Mg     2.1     02-24    TPro  6.3  /  Alb  3.3  /  TBili  0.4  /  DBili  x   /  AST  22  /  ALT  22  /  AlkPhos  75  02-25      Mg 2.1  Phos 2.9      PT/INR - ( 24 Feb 2023 16:16 )   PT: 12.2 sec;   INR: 1.06 ratio         PTT - ( 24 Feb 2023 16:16 )  PTT:24.4 sec      Uric Acid 5.5    Acute Hepatitis Panel (02.25.23 @ 07:16)    Hepatitis C Virus Interpretation: Nonreact: Hepatitis C AB  S/CO Ratio                        Interpretation  < 1.00                                   Non-Reactive  1.00 - 4.99                         Weakly-Reactive  >= 5.00                                Reactive  Note: HCV antibody testing is performed on the Abbott  system.    Hepatitis C Virus S/CO Ratio: 0.06 S/CO    Hepatitis B Core IgM Antibody: Nonreact    Hepatitis B Surface Antigen: Nonreact    Hepatitis A IgM Antibody: Nonreact    Hepatitis B Core Antibody, Total (02.25.23 @ 07:16)    Hepatitis B Core Antibody, Total: Nonreact    HIV-1/2 Antigen/Antibody Screen by CMIA (02.25.23 @ 07:16)    HIV-1/2 Combo Result: Nonreact:    CULTURES:  None recent    RADIOLOGY & ADDITIONAL STUDIES:  None recent

## 2023-02-25 NOTE — PROGRESS NOTE ADULT - ASSESSMENT
64 y/o with PMHx of HTN, HLD presents to the ED BIBA transferred from [facility] for new diagnosis of leukemia. Patient states that she woke up today with left sided chest pain and the feeling of shortness of breath. She states that she has never had these symptoms before. The shortness of breath was worse when she exerted herself. She states that she went to the emergency department and was told that her labs were consistent with leukemia. She has never had this diagnosis before. She denies any headache, fever, chills, cough, n/v/d.    1 ro Leukemia  - monitor cbc  - fu heme recs  - management as per heme   - cw allopurinol  - cw ivf     2 HTN  - cw losartan  - DASH diet    3 HLD  - cw statin     Venodynes

## 2023-02-25 NOTE — PROGRESS NOTE ADULT - NS ATTEND AMEND GEN_ALL_CORE FT
Primary: Goldberg    Peripheral blood smear c/w AML.  ROS: notable for mild pain just under her left rib cage.    Assessment: 63 year old with circulating blasts consistent with acute leukemia .  PMHx: HLD and HTN    Plan:  Please admit to Leukemia service.  Stephanie is stable at this time.  I discussed with her and her  the possible diagnosis of ALL given findings on peripheral flow cytometry. Labs are very stable at this time. Await philadelphia chromosome for further plan.     Over 60 minutes were spent in direct, non-resident teaching, patient care and care coordination.

## 2023-02-26 LAB
ALBUMIN SERPL ELPH-MCNC: 3.4 G/DL — SIGNIFICANT CHANGE UP (ref 3.3–5)
ALP SERPL-CCNC: 76 U/L — SIGNIFICANT CHANGE UP (ref 40–120)
ALT FLD-CCNC: 22 U/L — SIGNIFICANT CHANGE UP (ref 10–45)
ANION GAP SERPL CALC-SCNC: 8 MMOL/L — SIGNIFICANT CHANGE UP (ref 5–17)
AST SERPL-CCNC: 22 U/L — SIGNIFICANT CHANGE UP (ref 10–40)
BASOPHILS # BLD AUTO: 0 K/UL — SIGNIFICANT CHANGE UP (ref 0–0.2)
BASOPHILS NFR BLD AUTO: 0 % — SIGNIFICANT CHANGE UP (ref 0–2)
BILIRUB SERPL-MCNC: 0.3 MG/DL — SIGNIFICANT CHANGE UP (ref 0.2–1.2)
BUN SERPL-MCNC: 11 MG/DL — SIGNIFICANT CHANGE UP (ref 7–23)
CALCIUM SERPL-MCNC: 8.3 MG/DL — LOW (ref 8.4–10.5)
CHLORIDE SERPL-SCNC: 107 MMOL/L — SIGNIFICANT CHANGE UP (ref 96–108)
CO2 SERPL-SCNC: 24 MMOL/L — SIGNIFICANT CHANGE UP (ref 22–31)
CREAT SERPL-MCNC: 0.83 MG/DL — SIGNIFICANT CHANGE UP (ref 0.5–1.3)
EGFR: 78 ML/MIN/1.73M2 — SIGNIFICANT CHANGE UP
EOSINOPHIL # BLD AUTO: 0.26 K/UL — SIGNIFICANT CHANGE UP (ref 0–0.5)
EOSINOPHIL NFR BLD AUTO: 2 % — SIGNIFICANT CHANGE UP (ref 0–6)
GLUCOSE SERPL-MCNC: 96 MG/DL — SIGNIFICANT CHANGE UP (ref 70–99)
HCT VFR BLD CALC: 34.8 % — SIGNIFICANT CHANGE UP (ref 34.5–45)
HGB BLD-MCNC: 11.2 G/DL — LOW (ref 11.5–15.5)
LDH SERPL L TO P-CCNC: 392 U/L — HIGH (ref 50–242)
LYMPHOCYTES # BLD AUTO: 45 % — HIGH (ref 13–44)
LYMPHOCYTES # BLD AUTO: 5.9 K/UL — HIGH (ref 1–3.3)
LYMPHOCYTES # SPEC AUTO: 45 % — HIGH (ref 0–0)
MAGNESIUM SERPL-MCNC: 2.1 MG/DL — SIGNIFICANT CHANGE UP (ref 1.6–2.6)
MANUAL SMEAR VERIFICATION: SIGNIFICANT CHANGE UP
MCHC RBC-ENTMCNC: 29.3 PG — SIGNIFICANT CHANGE UP (ref 27–34)
MCHC RBC-ENTMCNC: 32.2 GM/DL — SIGNIFICANT CHANGE UP (ref 32–36)
MCV RBC AUTO: 91.1 FL — SIGNIFICANT CHANGE UP (ref 80–100)
MONOCYTES # BLD AUTO: 0 K/UL — SIGNIFICANT CHANGE UP (ref 0–0.9)
MONOCYTES NFR BLD AUTO: 0 % — LOW (ref 2–14)
NEUTROPHILS # BLD AUTO: 1.05 K/UL — LOW (ref 1.8–7.4)
NEUTROPHILS NFR BLD AUTO: 8 % — LOW (ref 43–77)
NRBC # BLD: 0 /100 — SIGNIFICANT CHANGE UP (ref 0–0)
PHOSPHATE SERPL-MCNC: 2.6 MG/DL — SIGNIFICANT CHANGE UP (ref 2.5–4.5)
PLAT MORPH BLD: NORMAL — SIGNIFICANT CHANGE UP
PLATELET # BLD AUTO: 25 K/UL — LOW (ref 150–400)
POTASSIUM SERPL-MCNC: 4 MMOL/L — SIGNIFICANT CHANGE UP (ref 3.5–5.3)
POTASSIUM SERPL-SCNC: 4 MMOL/L — SIGNIFICANT CHANGE UP (ref 3.5–5.3)
PROT SERPL-MCNC: 6.2 G/DL — SIGNIFICANT CHANGE UP (ref 6–8.3)
RBC # BLD: 3.82 M/UL — SIGNIFICANT CHANGE UP (ref 3.8–5.2)
RBC # FLD: 14.4 % — SIGNIFICANT CHANGE UP (ref 10.3–14.5)
RBC BLD AUTO: SIGNIFICANT CHANGE UP
SODIUM SERPL-SCNC: 139 MMOL/L — SIGNIFICANT CHANGE UP (ref 135–145)
URATE SERPL-MCNC: 4.9 MG/DL — SIGNIFICANT CHANGE UP (ref 2.5–7)
WBC # BLD: 13.11 K/UL — HIGH (ref 3.8–10.5)
WBC # FLD AUTO: 13.11 K/UL — HIGH (ref 3.8–10.5)

## 2023-02-26 PROCEDURE — 99233 SBSQ HOSP IP/OBS HIGH 50: CPT

## 2023-02-26 RX ORDER — ACETAMINOPHEN 500 MG
650 TABLET ORAL EVERY 6 HOURS
Refills: 0 | Status: DISCONTINUED | OUTPATIENT
Start: 2023-02-26 | End: 2023-03-16

## 2023-02-26 RX ADMIN — SODIUM CHLORIDE 75 MILLILITER(S): 9 INJECTION INTRAMUSCULAR; INTRAVENOUS; SUBCUTANEOUS at 05:52

## 2023-02-26 RX ADMIN — GABAPENTIN 300 MILLIGRAM(S): 400 CAPSULE ORAL at 17:40

## 2023-02-26 RX ADMIN — Medication 100 MILLIGRAM(S): at 08:23

## 2023-02-26 RX ADMIN — Medication 650 MILLIGRAM(S): at 06:08

## 2023-02-26 RX ADMIN — PANTOPRAZOLE SODIUM 40 MILLIGRAM(S): 20 TABLET, DELAYED RELEASE ORAL at 06:08

## 2023-02-26 RX ADMIN — GABAPENTIN 300 MILLIGRAM(S): 400 CAPSULE ORAL at 05:48

## 2023-02-26 RX ADMIN — SIMVASTATIN 40 MILLIGRAM(S): 20 TABLET, FILM COATED ORAL at 20:56

## 2023-02-26 RX ADMIN — Medication 650 MILLIGRAM(S): at 07:55

## 2023-02-26 RX ADMIN — LOSARTAN POTASSIUM 100 MILLIGRAM(S): 100 TABLET, FILM COATED ORAL at 05:48

## 2023-02-26 RX ADMIN — BRIMONIDINE TARTRATE 1 DROP(S): 2 SOLUTION/ DROPS OPHTHALMIC at 05:51

## 2023-02-26 RX ADMIN — ESCITALOPRAM OXALATE 10 MILLIGRAM(S): 10 TABLET, FILM COATED ORAL at 08:23

## 2023-02-26 NOTE — PROGRESS NOTE ADULT - SUBJECTIVE AND OBJECTIVE BOX
Diagnosis: r/o leukemia    Protocol/Chemo Regimen: TBD  Day: NA     Pt endorsed: left side pain when deep breathing    Review of Systems: denies headache, chest pain, nausea, vomiting      Pain scale: mild left side    Diet: regular    Allergies: sulfa drugs (Unknown)      ANTIMICROBIALS      HEME/ONC MEDICATIONS      STANDING MEDICATIONS  allopurinol 100 milliGRAM(s) Oral daily  brimonidine 0.2% Ophthalmic Solution 1 Drop(s) Both EYES three times a day  escitalopram 10 milliGRAM(s) Oral daily  losartan 100 milliGRAM(s) Oral daily  pantoprazole    Tablet 40 milliGRAM(s) Oral before breakfast  simvastatin 40 milliGRAM(s) Oral at bedtime  sodium chloride 0.9%. 1000 milliLiter(s) IV Continuous <Continuous>      PRN MEDICATIONS      Vital Signs Last 24 Hrs  T(C): 36.8 (25 Feb 2023 09:12), Max: 36.9 (25 Feb 2023 01:22)  T(F): 98.3 (25 Feb 2023 09:12), Max: 98.4 (25 Feb 2023 01:22)  HR: 68 (25 Feb 2023 09:12) (65 - 74)  BP: 159/71 (25 Feb 2023 09:12) (141/73 - 185/84)  RR: 16 (25 Feb 2023 09:12) (16 - 20)  SpO2: 97% (25 Feb 2023 09:12) (94% - 97%)    Parameters below as of 25 Feb 2023 09:12  Patient On (Oxygen Delivery Method): room air      PHYSICAL EXAM  General: adult in NAD  HEENT: clear oropharynx, anicteric sclera, pink conjunctiva  CV: normal S1/S2 RRR  Lungs: positive air movement b/l ant lungs,clear to auscultation, no wheezes, no rales  Abdomen: soft, tender on plaption of spleen, non-distended, splenomegaly  Ext: no clubbing cyanosis or edema  Skin: no rashes and no petechiae  Neuro: alert and oriented X 3, no focal deficits  PIV CDI      LABS:                      12.1   12.09 )-----------( 25       ( 25 Feb 2023 07:19 )             35.7     Complete Blood Count in AM (02.25.23 @ 07:19)    Nucleated RBC: 0 /100 WBCs    WBC Count: 12.09 K/uL    RBC Count: 3.95 M/uL    Hemoglobin: 12.1 g/dL    Hematocrit: 35.7 %    Mean Cell Volume: 90.4 fl    Mean Cell Hemoglobin: 30.6 pg    Mean Cell Hemoglobin Conc: 33.9 gm/dL    Red Cell Distrib Width: 14.4 %    Platelet Count - Automated: 25 K/uL      02-25    141  |  109<H>  |  11  ----------------------------<  99  4.0   |  23  |  0.80    Ca    8.6      25 Feb 2023 07:17  Phos  2.9     02-24  Mg     2.1     02-24    TPro  6.3  /  Alb  3.3  /  TBili  0.4  /  DBili  x   /  AST  22  /  ALT  22  /  AlkPhos  75  02-25      Mg 2.1  Phos 2.9      PT/INR - ( 24 Feb 2023 16:16 )   PT: 12.2 sec;   INR: 1.06 ratio         PTT - ( 24 Feb 2023 16:16 )  PTT:24.4 sec      Uric Acid 5.5    Acute Hepatitis Panel (02.25.23 @ 07:16)    Hepatitis C Virus Interpretation: Nonreact: Hepatitis C AB  S/CO Ratio                        Interpretation  < 1.00                                   Non-Reactive  1.00 - 4.99                         Weakly-Reactive  >= 5.00                                Reactive  Note: HCV antibody testing is performed on the Abbott  system.    Hepatitis C Virus S/CO Ratio: 0.06 S/CO    Hepatitis B Core IgM Antibody: Nonreact    Hepatitis B Surface Antigen: Nonreact    Hepatitis A IgM Antibody: Nonreact    Hepatitis B Core Antibody, Total (02.25.23 @ 07:16)    Hepatitis B Core Antibody, Total: Nonreact    HIV-1/2 Antigen/Antibody Screen by CMIA (02.25.23 @ 07:16)    HIV-1/2 Combo Result: Nonreact:    CULTURES:  None recent    RADIOLOGY & ADDITIONAL STUDIES:  None recent       Diagnosis: r/o leukemia    Protocol/Chemo Regimen: TBD  Day: NA     Pt endorsed: "doing okay today." Left sided pain improved on gabapentin.    Review of Systems: denies headache, chest pain, nausea, vomiting    Pain scale: denies    Diet: regular    Allergies: sulfa drugs (Unknown)      ANTIMICROBIALS      HEME/ONC MEDICATIONS    MEDICATIONS  (STANDING):  allopurinol 100 milliGRAM(s) Oral daily  brimonidine 0.2% Ophthalmic Solution 1 Drop(s) Both EYES three times a day  escitalopram 10 milliGRAM(s) Oral daily  gabapentin 300 milliGRAM(s) Oral two times a day  losartan 100 milliGRAM(s) Oral daily  pantoprazole    Tablet 40 milliGRAM(s) Oral before breakfast  simvastatin 40 milliGRAM(s) Oral at bedtime  sodium chloride 0.9%. 1000 milliLiter(s) (20 mL/Hr) IV Continuous <Continuous>    MEDICATIONS  (PRN):  acetaminophen     Tablet .. 650 milliGRAM(s) Oral every 6 hours PRN Temp greater or equal to 38C (100.4F), Mild Pain (1 - 3)      Vital Signs Last 24 Hrs  T(C): 36.5 (26 Feb 2023 09:00), Max: 36.9 (25 Feb 2023 13:25)  T(F): 97.7 (26 Feb 2023 09:00), Max: 98.4 (25 Feb 2023 13:25)  HR: 60 (26 Feb 2023 09:00) (60 - 78)  BP: 144/73 (26 Feb 2023 09:00) (135/71 - 188/86)  BP(mean): --  RR: 18 (26 Feb 2023 09:00) (16 - 18)  SpO2: 93% (26 Feb 2023 09:00) (92% - 95%)    Parameters below as of 26 Feb 2023 04:55  Patient On (Oxygen Delivery Method): room air      PHYSICAL EXAM  General: adult in NAD  HEENT: clear oropharynx, anicteric sclera, pink conjunctiva  CV: normal S1/S2 RRR  Lungs: clear to auscultation bilaterally, no wheezes  Abdomen: soft, tender on plaption of spleen, non-distended, splenomegaly  Ext: no clubbing cyanosis or edema  Skin: no rashes and no petechiae  Neuro: alert and oriented X 3, no focal deficits  PIV CDI      LABS:                                   11.2   13.11 )-----------( 25       ( 26 Feb 2023 06:36 )             34.8     CBC Full  -  ( 26 Feb 2023 06:36 )  WBC Count : 13.11 K/uL  RBC Count : 3.82 M/uL  Hemoglobin : 11.2 g/dL  Hematocrit : 34.8 %  Platelet Count - Automated : 25 K/uL  Mean Cell Volume : 91.1 fl  Mean Cell Hemoglobin : 29.3 pg  Mean Cell Hemoglobin Concentration : 32.2 gm/dL  Auto Neutrophil # : 1.05 K/uL  Auto Lymphocyte # : 5.90 K/uL  Auto Monocyte # : 0.00 K/uL  Auto Eosinophil # : 0.26 K/uL  Auto Basophil # : 0.00 K/uL  Auto Neutrophil % : 8.0 %  Auto Lymphocyte % : 45.0 %  Auto Monocyte % : 0.0 %  Auto Eosinophil % : 2.0 %  Auto Basophil % : 0.0 %    02-26    139  |  107  |  11  ----------------------------<  96  4.0   |  24  |  0.83    Ca    8.3<L>      26 Feb 2023 06:36  Phos  2.6     02-26  Mg     2.1     02-26    TPro  6.2  /  Alb  3.4  /  TBili  0.3  /  DBili  x   /  AST  22  /  ALT  22  /  AlkPhos  76  02-26    Lactate Dehydrogenase, Serum: 392 U/L (02.26.23 @ 06:36)    Uric Acid, Serum: 4.9 mg/dL (02.26.23 @ 06:36)      Acute Hepatitis Panel (02.25.23 @ 07:16)    Hepatitis C Virus Interpretation: Nonreact: Hepatitis C AB  S/CO Ratio                        Interpretation  < 1.00                                   Non-Reactive  1.00 - 4.99                         Weakly-Reactive  >= 5.00                                Reactive  Note: HCV antibody testing is performed on the Abbott  system.    Hepatitis C Virus S/CO Ratio: 0.06 S/CO    Hepatitis B Core IgM Antibody: Nonreact    Hepatitis B Surface Antigen: Nonreact    Hepatitis A IgM Antibody: Nonreact    Hepatitis B Core Antibody, Total (02.25.23 @ 07:16)    Hepatitis B Core Antibody, Total: Nonreact    HIV-1/2 Antigen/Antibody Screen by CMIA (02.25.23 @ 07:16)    HIV-1/2 Combo Result: Nonreact:    CULTURES:  None recent    RADIOLOGY & ADDITIONAL STUDIES:  None recent

## 2023-02-26 NOTE — PROGRESS NOTE ADULT - ASSESSMENT
62 y/o with PMHx of HTN, HLD presents to the ED BIBA transferred from [facility] for new diagnosis of leukemia. Patient is thrombocytopenic due to disease.   62 y/o with PMHx of HTN, HLD presents to the ED BIBA transferred from [facility] for new diagnosis of leukemia. Patient is thrombocytopenic and anemic due to disease.

## 2023-02-26 NOTE — PROGRESS NOTE ADULT - NS ATTEND AMEND GEN_ALL_CORE FT
Primary: Goldberg    Peripheral blood smear c/w AML.  ROS: notable for mild pain just under her left rib cage.    Assessment: 63 year old with circulating blasts consistent with acute leukemia .  PMHx: HLD and HTN    Plan:  Please admit to Leukemia service.  Stephanie is stable at this time.  I discussed with her and her  the possible diagnosis of ALL given findings on peripheral flow cytometry. Labs are very stable at this time. Await philadelphia chromosome for further plan.     Over 60 minutes were spent in direct, non-resident teaching, patient care and care coordination. Primary: Goldberg    Peripheral blood smear c/w AML.  ROS: notable for mild pain just under her left rib cage.    Assessment: 63 year old with circulating blasts consistent with acute leukemia .  PMHx: HLD and HTN    Plan:  Please admit to Leukemia service.  Stephanie is stable at this time.  I discussed with her and her  the possible diagnosis of ALL given findings on peripheral flow cytometry. Labs are very stable at this time. Await philadelphia chromosome for further plan. Plan for bone marrow biopsy in AM 2/27 - possible early discharge? May be difficult with where she lives out in [place]    Over 60 minutes were spent in direct, non-resident teaching, patient care and care coordination.

## 2023-02-26 NOTE — PROGRESS NOTE ADULT - PROBLEM SELECTOR PLAN 1
R/o leukemia  Monitor CBC, CMP, TLS labs BID  On allopurinol  Hepatitis Panel Neg. Hep B Core neg. HIV negative.  Follow up G6PD.  Transfuse PRN. maintain Hgb >7, plts >15.  Gabapentin for splenic/side pain.  Plan for BMbx on 2/27.  2/25 - CTA from [facility] (-) for PE. Left sided pain likely due to splenomegaly. Possible diagnosis of ALL given findings on peripheral flow cytometry.  Monitor CBC, CMP, TLS labs BID  On allopurinol  Hepatitis Panel Neg. Hep B Core neg. HIV negative.  Follow up G6PD.  Transfuse PRN. maintain Hgb >7, plts >15.  Gabapentin for splenic/side pain.  Plan for BMbx on 2/27.   Follow up BCR-ABL.   2/25 - CTA from [facility] (-) for PE. Left sided pain likely due to splenomegaly.

## 2023-02-27 LAB
ALBUMIN SERPL ELPH-MCNC: 3.4 G/DL — SIGNIFICANT CHANGE UP (ref 3.3–5)
ALP SERPL-CCNC: 93 U/L — SIGNIFICANT CHANGE UP (ref 40–120)
ALT FLD-CCNC: 26 U/L — SIGNIFICANT CHANGE UP (ref 10–45)
ANION GAP SERPL CALC-SCNC: 11 MMOL/L — SIGNIFICANT CHANGE UP (ref 5–17)
APTT BLD: 28.4 SEC — SIGNIFICANT CHANGE UP (ref 27.5–35.5)
AST SERPL-CCNC: 29 U/L — SIGNIFICANT CHANGE UP (ref 10–40)
BILIRUB SERPL-MCNC: 0.4 MG/DL — SIGNIFICANT CHANGE UP (ref 0.2–1.2)
BUN SERPL-MCNC: 15 MG/DL — SIGNIFICANT CHANGE UP (ref 7–23)
CALCIUM SERPL-MCNC: 8.6 MG/DL — SIGNIFICANT CHANGE UP (ref 8.4–10.5)
CHLORIDE SERPL-SCNC: 106 MMOL/L — SIGNIFICANT CHANGE UP (ref 96–108)
CHROM ANALY INTERPHASE BLD FISH-IMP: SIGNIFICANT CHANGE UP
CO2 SERPL-SCNC: 21 MMOL/L — LOW (ref 22–31)
CREAT SERPL-MCNC: 0.89 MG/DL — SIGNIFICANT CHANGE UP (ref 0.5–1.3)
EGFR: 72 ML/MIN/1.73M2 — SIGNIFICANT CHANGE UP
GLUCOSE SERPL-MCNC: 95 MG/DL — SIGNIFICANT CHANGE UP (ref 70–99)
HCT VFR BLD CALC: 30.8 % — LOW (ref 34.5–45)
HGB BLD-MCNC: 10.6 G/DL — LOW (ref 11.5–15.5)
INR BLD: 1.17 RATIO — HIGH (ref 0.88–1.16)
LDH SERPL L TO P-CCNC: 377 U/L — HIGH (ref 50–242)
MAGNESIUM SERPL-MCNC: 2.2 MG/DL — SIGNIFICANT CHANGE UP (ref 1.6–2.6)
MANUAL DIF COMMENT BLD-IMP: SIGNIFICANT CHANGE UP
MCHC RBC-ENTMCNC: 30.4 PG — SIGNIFICANT CHANGE UP (ref 27–34)
MCHC RBC-ENTMCNC: 34.4 GM/DL — SIGNIFICANT CHANGE UP (ref 32–36)
MCV RBC AUTO: 88.3 FL — SIGNIFICANT CHANGE UP (ref 80–100)
PHOSPHATE SERPL-MCNC: 3.5 MG/DL — SIGNIFICANT CHANGE UP (ref 2.5–4.5)
PLATELET # BLD AUTO: 40 K/UL — LOW (ref 150–400)
POTASSIUM SERPL-MCNC: 4.2 MMOL/L — SIGNIFICANT CHANGE UP (ref 3.5–5.3)
POTASSIUM SERPL-SCNC: 4.2 MMOL/L — SIGNIFICANT CHANGE UP (ref 3.5–5.3)
PROT SERPL-MCNC: 6.6 G/DL — SIGNIFICANT CHANGE UP (ref 6–8.3)
PROTHROM AB SERPL-ACNC: 13.6 SEC — HIGH (ref 10.5–13.4)
RBC # BLD: 3.49 M/UL — LOW (ref 3.8–5.2)
RBC # FLD: 14 % — SIGNIFICANT CHANGE UP (ref 10.3–14.5)
SODIUM SERPL-SCNC: 138 MMOL/L — SIGNIFICANT CHANGE UP (ref 135–145)
TM INTERPRETATION: SIGNIFICANT CHANGE UP
URATE SERPL-MCNC: 5.1 MG/DL — SIGNIFICANT CHANGE UP (ref 2.5–7)
WBC # BLD: 14.91 K/UL — HIGH (ref 3.8–10.5)
WBC # FLD AUTO: 14.91 K/UL — HIGH (ref 3.8–10.5)

## 2023-02-27 PROCEDURE — 99233 SBSQ HOSP IP/OBS HIGH 50: CPT | Mod: GC

## 2023-02-27 PROCEDURE — 93306 TTE W/DOPPLER COMPLETE: CPT | Mod: 26

## 2023-02-27 RX ADMIN — PANTOPRAZOLE SODIUM 40 MILLIGRAM(S): 20 TABLET, DELAYED RELEASE ORAL at 06:02

## 2023-02-27 RX ADMIN — Medication 650 MILLIGRAM(S): at 02:36

## 2023-02-27 RX ADMIN — GABAPENTIN 300 MILLIGRAM(S): 400 CAPSULE ORAL at 06:02

## 2023-02-27 RX ADMIN — BRIMONIDINE TARTRATE 1 DROP(S): 2 SOLUTION/ DROPS OPHTHALMIC at 14:03

## 2023-02-27 RX ADMIN — SODIUM CHLORIDE 20 MILLILITER(S): 9 INJECTION INTRAMUSCULAR; INTRAVENOUS; SUBCUTANEOUS at 20:40

## 2023-02-27 RX ADMIN — LOSARTAN POTASSIUM 100 MILLIGRAM(S): 100 TABLET, FILM COATED ORAL at 06:02

## 2023-02-27 RX ADMIN — SIMVASTATIN 40 MILLIGRAM(S): 20 TABLET, FILM COATED ORAL at 20:33

## 2023-02-27 RX ADMIN — GABAPENTIN 300 MILLIGRAM(S): 400 CAPSULE ORAL at 17:35

## 2023-02-27 RX ADMIN — ESCITALOPRAM OXALATE 10 MILLIGRAM(S): 10 TABLET, FILM COATED ORAL at 14:02

## 2023-02-27 RX ADMIN — BRIMONIDINE TARTRATE 1 DROP(S): 2 SOLUTION/ DROPS OPHTHALMIC at 06:02

## 2023-02-27 RX ADMIN — Medication 100 MILLIGRAM(S): at 14:03

## 2023-02-27 RX ADMIN — Medication 650 MILLIGRAM(S): at 03:06

## 2023-02-27 NOTE — CONSULT NOTE ADULT - SUBJECTIVE AND OBJECTIVE BOX
HEMATOLOGY ONCOLOGY CONSULT     Patient is a 63y old  Female who presents with a chief complaint of     HPI:     62 yo female with a hx of HTN and HLD who was transferred from [place] for further evaluation of Leukemia. Patient initially presented for chest pain and shortness of breath. She was found to have on blasts on a differential.       ROS:  Negative except for:    PAST MEDICAL & SURGICAL HISTORY:  HTN (hypertension)      HLD (hyperlipidemia)          SOCIAL HISTORY:    FAMILY HISTORY:      MEDICATIONS  (STANDING):    MEDICATIONS  (PRN):      Allergies    sulfa drugs (Unknown)    Intolerances        Vital Signs Last 24 Hrs  T(C): 36.8 (24 Feb 2023 18:04), Max: 36.8 (24 Feb 2023 15:17)  T(F): 98.2 (24 Feb 2023 18:04), Max: 98.3 (24 Feb 2023 15:17)  HR: 72 (24 Feb 2023 18:04) (72 - 74)  BP: 154/72 (24 Feb 2023 18:04) (154/72 - 185/84)  BP(mean): --  RR: 16 (24 Feb 2023 18:04) (16 - 20)  SpO2: 97% (24 Feb 2023 18:04) (97% - 97%)    Parameters below as of 24 Feb 2023 18:04  Patient On (Oxygen Delivery Method): room air        PHYSICAL EXAM  General: adult in NAD  HEENT: clear oropharynx, anicteric sclera, pink conjunctiva  Neck: supple  CV: normal S1/S2 with no murmur rubs or gallops  Lungs: positive air movement b/l ant lungs,clear to auscultation, no wheezes, no rales  Abdomen: soft non-tender non-distended, no hepatosplenomegaly  Ext: no clubbing cyanosis or edema  Skin: no rashes and no petechiae  Neuro: alert and oriented X 4, no focal deficits      LABS:                          14.1   16.24 )-----------( 41       ( 24 Feb 2023 16:16 )             40.9         Mean Cell Volume : 88.5 fl  Mean Cell Hemoglobin : 30.5 pg  Mean Cell Hemoglobin Concentration : 34.5 gm/dL  Auto Neutrophil # : 1.79 K/uL  Auto Lymphocyte # : 8.12 K/uL  Auto Monocyte # : 0.97 K/uL  Auto Eosinophil # : 0.00 K/uL  Auto Basophil # : 0.16 K/uL  Auto Neutrophil % : 9.0 %  Auto Lymphocyte % : 50.0 %  Auto Monocyte % : 6.0 %  Auto Eosinophil % : 0.0 %  Auto Basophil % : 1.0 %      02-24    140  |  107  |  13  ----------------------------<  117<H>  4.0   |  22  |  0.85    Ca    8.9      24 Feb 2023 16:16  Phos  2.9     02-24  Mg     2.1     02-24    TPro  7.3  /  Alb  3.8  /  TBili  0.3  /  DBili  x   /  AST  31  /  ALT  29  /  AlkPhos  93  02-24      PT/INR - ( 24 Feb 2023 16:16 )   PT: 12.2 sec;   INR: 1.06 ratio         PTT - ( 24 Feb 2023 16:16 )  PTT:24.4 sec    Reticulocyte Percent: 0.7 % (02-24 @ 16:16)              BLOOD SMEAR INTERPRETATION:       RADIOLOGY & ADDITIONAL STUDIES:      
Interventional Radiology    Evaluate for Procedure:     HPI: 63y Female with PMHx of HTN and HLD is a transfer from [facility] for evaluation of leukemia. Patient woke up with left sided chest pain and the feeling of shortness of breath. Labs at OSH consistent with leukemia. Hematology/oncology attempted bone marrow biopsy but failed. IR is consulted for bone marrow biopsy.    Allergies: sulfa drugs (Unknown)    Medications (Abx/Cardiac/Anticoagulation/Blood Products)  losartan: 100 milliGRAM(s) Oral (02-27 @ 06:02)    Data:    T(C): 36.5  HR: 62  BP: 161/71  RR: 17  SpO2: 94%    -WBC 13.11 / HgB 11.2 / Hct 34.8 / Plt 25  -Na 138 / Cl 106 / BUN 15 / Glucose 95  -K 4.2 / CO2 21 / Cr 0.89  -ALT 26 / Alk Phos 93 / T.Bili 0.4  -INR 1.06 / PTT 24.4      Radiology:     Assessment/Plan: 63y Female with PMHx of HTN and HLD is a transfer from [facility] for evaluation of leukemia. Patient woke up with left sided chest pain and the feeling of shortness of breath. Labs at OSH consistent with leukemia. Hematology/oncology here attempted bone marrow biopsy but failed. IR is consulted for bone marrow biopsy.    -- IR will plan to perform bone marrow biopsy tomorrow, 2/28  -- NPO at midnight   -- hold a.m. anticoagulation  -- COVID test within 5 days of procedure  -- please complete IR pre-procedure note  -- please place IR procedure request order under Dr. Frost    --  Angel Crawley MD, PGY-2  Available on [employer] Teams    - Non-emergent consults: Place IR consult order in [place]  - Emergent issues (pager): [facility] [phone]; [facility] [phone]; [phone]  - Scheduling questions: [facility] [phone]; [facility] [phone]  - Clinic/outpatient booking: [facility] [phone]; [facility] [phone]

## 2023-02-27 NOTE — PROGRESS NOTE ADULT - NS ATTEND AMEND GEN_ALL_CORE FT
Primary: Goldberg    Peripheral blood smear c/w AML.  ROS: notable for mild pain just under her left rib cage.    Assessment: 63 year old with circulating blasts consistent with acute leukemia .  PMHx: HLD and HTN    Plan:  Please admit to Leukemia service.  Stephanie is stable at this time.  I discussed with her and her  the possible diagnosis of ALL given findings on peripheral flow cytometry. Labs are very stable at this time. Await philadelphia chromosome for further plan. Plan for bone marrow biopsy in AM 2/27 - possible early discharge? May be difficult with where she lives out in [place]    Over 60 minutes were spent in direct, non-resident teaching, patient care and care coordination. 63 year old with HLD and HTN, transferred b/c of circulating blasts consistent with acute leukemia.    PB flow cytometry showing B-ALL  FISH negative for Ph chromosome  Plan for BMbx  Echo done today  will need PICC line  Transfuse for plts <10, Hgb <7  supportive care

## 2023-02-27 NOTE — PROGRESS NOTE ADULT - PROBLEM SELECTOR PLAN 1
Possible diagnosis of ALL given findings on peripheral flow cytometry.  Monitor CBC, CMP, TLS labs BID  On allopurinol  Hepatitis Panel Neg. Hep B Core neg. HIV negative.  Follow up G6PD.  Transfuse PRN. maintain Hgb >7, plts >15.  Gabapentin for splenic/side pain.  Plan for BMbx on 2/27.   Follow up BCR-ABL.   2/25 - CTA from [facility] (-) for PE. Left sided pain likely due to splenomegaly. Possible diagnosis of ALL given findings on peripheral flow cytometry.  Monitor CBC, CMP, TLS labs BID, fu g6pd, echo done 2/27 EF 55%, HLA sent 2/28  On allopurinol  Hepatitis Panel Neg. Hep B Core neg. HIV negative.  Transfuse PRN. maintain Hgb >7, plts >15.  Gabapentin for splenic/side pain.  Plan for BMbx in IR due to body habitus. Consult placed.   Follow up BCR-ABL.   2/25 - CTA from [facility] (-) for PE. Left sided pain likely due to splenomegaly.  Plan for PICC line at bedside and LP 2/28 plt ordered.

## 2023-02-27 NOTE — PROGRESS NOTE ADULT - ASSESSMENT
62 y/o with PMHx of HTN, HLD presents to the ED BIBA transferred from [facility] for new diagnosis of leukemia. Patient is thrombocytopenic and anemic due to disease.

## 2023-02-27 NOTE — PROGRESS NOTE ADULT - SUBJECTIVE AND OBJECTIVE BOX
Diagnosis: r/o leukemia    Protocol/Chemo Regimen: TBD    Day: NA     Pt endorsed: left side pain when deep breathing    Review of Systems: denies headache, chest pain, nausea, vomiting      Pain scale: mild left side    Diet: regular    Allergies    sulfa drugs (Unknown)    Intolerances      STANDING MEDICATIONS  allopurinol 100 milliGRAM(s) Oral daily  brimonidine 0.2% Ophthalmic Solution 1 Drop(s) Both EYES three times a day  escitalopram 10 milliGRAM(s) Oral daily  gabapentin 300 milliGRAM(s) Oral two times a day  losartan 100 milliGRAM(s) Oral daily  pantoprazole    Tablet 40 milliGRAM(s) Oral before breakfast  simvastatin 40 milliGRAM(s) Oral at bedtime  sodium chloride 0.9%. 1000 milliLiter(s) IV Continuous <Continuous>      PRN MEDICATIONS  acetaminophen     Tablet .. 650 milliGRAM(s) Oral every 6 hours PRN        Vital Signs Last 24 Hrs  T(C): 36.8 (27 Feb 2023 05:35), Max: 37 (27 Feb 2023 01:05)  T(F): 98.3 (27 Feb 2023 05:35), Max: 98.6 (27 Feb 2023 01:05)  HR: 62 (27 Feb 2023 05:35) (57 - 75)  BP: 154/79 (27 Feb 2023 05:35) (144/73 - 184/85)  BP(mean): --  RR: 17 (27 Feb 2023 05:35) (16 - 18)  SpO2: 94% (27 Feb 2023 05:35) (92% - 94%)    Parameters below as of 27 Feb 2023 05:35  Patient On (Oxygen Delivery Method): room air      PHYSICAL EXAM  General: adult in NAD  HEENT: clear oropharynx, anicteric sclera, pink conjunctiva  CV: normal S1/S2 RRR  Lungs: positive air movement b/l ant lungs,clear to auscultation, no wheezes, no rales  Abdomen: soft, tender on plaption of spleen, non-distended, splenomegaly  Ext: no clubbing cyanosis or edema  Skin: no rashes and no petechiae  Neuro: alert and oriented X 3, no focal deficits  PIV CDI        LABS:                             Diagnosis: r/o leukemia    Protocol/Chemo Regimen: TBD    Day: NA     Pt endorsed: left side pain when deep breathing    Review of Systems: denies headache, chest pain, nausea, vomiting      Pain scale: mild left side    Diet: regular    Allergies    sulfa drugs (Unknown)    Intolerances      STANDING MEDICATIONS  allopurinol 100 milliGRAM(s) Oral daily  brimonidine 0.2% Ophthalmic Solution 1 Drop(s) Both EYES three times a day  escitalopram 10 milliGRAM(s) Oral daily  gabapentin 300 milliGRAM(s) Oral two times a day  losartan 100 milliGRAM(s) Oral daily  pantoprazole    Tablet 40 milliGRAM(s) Oral before breakfast  simvastatin 40 milliGRAM(s) Oral at bedtime  sodium chloride 0.9%. 1000 milliLiter(s) IV Continuous <Continuous>      PRN MEDICATIONS  acetaminophen     Tablet .. 650 milliGRAM(s) Oral every 6 hours PRN        Vital Signs Last 24 Hrs  T(C): 36.8 (27 Feb 2023 05:35), Max: 37 (27 Feb 2023 01:05)  T(F): 98.3 (27 Feb 2023 05:35), Max: 98.6 (27 Feb 2023 01:05)  HR: 62 (27 Feb 2023 05:35) (57 - 75)  BP: 154/79 (27 Feb 2023 05:35) (144/73 - 184/85)  BP(mean): --  RR: 17 (27 Feb 2023 05:35) (16 - 18)  SpO2: 94% (27 Feb 2023 05:35) (92% - 94%)    Parameters below as of 27 Feb 2023 05:35  Patient On (Oxygen Delivery Method): room air      PHYSICAL EXAM  General: adult in NAD  HEENT: clear oropharynx, anicteric sclera, pink conjunctiva  CV: normal S1/S2 RRR  Lungs: positive air movement b/l ant lungs,clear to auscultation, no wheezes, no rales  Abdomen: soft, tender on plaption of spleen, non-distended, splenomegaly  Ext: no clubbing cyanosis or edema  Skin: no rashes and no petechiae  Neuro: alert and oriented X 3, no focal deficits  PIV CDI    LABS:    Blood Cultures:                           11.2   13.11 )-----------( 25       ( 26 Feb 2023 06:36 )             34.8         Mean Cell Volume : 91.1 fl  Mean Cell Hemoglobin : 29.3 pg  Mean Cell Hemoglobin Concentration : 32.2 gm/dL  Auto Neutrophil # : 1.05 K/uL  Auto Lymphocyte # : 5.90 K/uL  Auto Monocyte # : 0.00 K/uL  Auto Eosinophil # : 0.26 K/uL  Auto Basophil # : 0.00 K/uL  Auto Neutrophil % : 8.0 %  Auto Lymphocyte % : 45.0 %  Auto Monocyte % : 0.0 %  Auto Eosinophil % : 2.0 %  Auto Basophil % : 0.0 %      02-27    138  |  106  |  15  ----------------------------<  95  4.2   |  21<L>  |  0.89    Ca    8.6      27 Feb 2023 07:42  Phos  3.5     02-27  Mg     2.2     02-27    TPro  6.6  /  Alb  3.4  /  TBili  0.4  /  DBili  x   /  AST  29  /  ALT  26  /  AlkPhos  93  02-27    Uric Acid 5.1

## 2023-02-28 ENCOUNTER — RESULT REVIEW (OUTPATIENT)
Age: 63
End: 2023-02-28

## 2023-02-28 ENCOUNTER — TRANSCRIPTION ENCOUNTER (OUTPATIENT)
Age: 63
End: 2023-02-28

## 2023-02-28 LAB
ALBUMIN SERPL ELPH-MCNC: 3.2 G/DL — LOW (ref 3.3–5)
ALP SERPL-CCNC: 115 U/L — SIGNIFICANT CHANGE UP (ref 40–120)
ALT FLD-CCNC: 31 U/L — SIGNIFICANT CHANGE UP (ref 10–45)
ANION GAP SERPL CALC-SCNC: 11 MMOL/L — SIGNIFICANT CHANGE UP (ref 5–17)
APPEARANCE CSF: CLEAR — SIGNIFICANT CHANGE UP
AST SERPL-CCNC: 35 U/L — SIGNIFICANT CHANGE UP (ref 10–40)
BASOPHILS # BLD AUTO: 0.13 K/UL — SIGNIFICANT CHANGE UP (ref 0–0.2)
BASOPHILS NFR BLD AUTO: 0.9 % — SIGNIFICANT CHANGE UP (ref 0–2)
BILIRUB SERPL-MCNC: 0.5 MG/DL — SIGNIFICANT CHANGE UP (ref 0.2–1.2)
BUN SERPL-MCNC: 15 MG/DL — SIGNIFICANT CHANGE UP (ref 7–23)
CALCIUM SERPL-MCNC: 8.3 MG/DL — LOW (ref 8.4–10.5)
CHLORIDE SERPL-SCNC: 107 MMOL/L — SIGNIFICANT CHANGE UP (ref 96–108)
CO2 SERPL-SCNC: 23 MMOL/L — SIGNIFICANT CHANGE UP (ref 22–31)
COLOR CSF: SIGNIFICANT CHANGE UP
CREAT SERPL-MCNC: 0.93 MG/DL — SIGNIFICANT CHANGE UP (ref 0.5–1.3)
EGFR: 68 ML/MIN/1.73M2 — SIGNIFICANT CHANGE UP
EOSINOPHIL # BLD AUTO: 0.13 K/UL — SIGNIFICANT CHANGE UP (ref 0–0.5)
EOSINOPHIL NFR BLD AUTO: 0.9 % — SIGNIFICANT CHANGE UP (ref 0–6)
GLUCOSE CSF-MCNC: 46 MG/DL — SIGNIFICANT CHANGE UP (ref 40–70)
GLUCOSE SERPL-MCNC: 96 MG/DL — SIGNIFICANT CHANGE UP (ref 70–99)
HCT VFR BLD CALC: 30.2 % — LOW (ref 34.5–45)
HGB BLD-MCNC: 10.4 G/DL — LOW (ref 11.5–15.5)
HLX FLT3 FINAL REPORT: SIGNIFICANT CHANGE UP
LDH CSF L TO P-CCNC: 23 U/L — SIGNIFICANT CHANGE UP
LDH FLD-CCNC: 23 U/L — SIGNIFICANT CHANGE UP
LDH SERPL L TO P-CCNC: 389 U/L — HIGH (ref 50–242)
LYMPHOCYTES # BLD AUTO: 18.3 % — SIGNIFICANT CHANGE UP (ref 13–44)
LYMPHOCYTES # BLD AUTO: 2.73 K/UL — SIGNIFICANT CHANGE UP (ref 1–3.3)
LYMPHOCYTES # CSF: 100 % — HIGH (ref 40–80)
LYMPHOCYTES # SPEC AUTO: 75.6 % — HIGH (ref 0–0)
MAGNESIUM SERPL-MCNC: 2 MG/DL — SIGNIFICANT CHANGE UP (ref 1.6–2.6)
MANUAL SMEAR VERIFICATION: SIGNIFICANT CHANGE UP
MCHC RBC-ENTMCNC: 29.9 PG — SIGNIFICANT CHANGE UP (ref 27–34)
MCHC RBC-ENTMCNC: 34.4 GM/DL — SIGNIFICANT CHANGE UP (ref 32–36)
MCV RBC AUTO: 86.8 FL — SIGNIFICANT CHANGE UP (ref 80–100)
MONOCYTES # BLD AUTO: 0.25 K/UL — SIGNIFICANT CHANGE UP (ref 0–0.9)
MONOCYTES NFR BLD AUTO: 1.7 % — LOW (ref 2–14)
NEUTROPHILS # BLD AUTO: 0.39 K/UL — LOW (ref 1.8–7.4)
NEUTROPHILS # CSF: 0 % — SIGNIFICANT CHANGE UP (ref 0–6)
NEUTROPHILS NFR BLD AUTO: 2.6 % — LOW (ref 43–77)
NRBC # BLD: 0 /100 WBCS — SIGNIFICANT CHANGE UP (ref 0–0)
NRBC NFR CSF: 1 /UL — SIGNIFICANT CHANGE UP (ref 0–5)
PHOSPHATE SERPL-MCNC: 3.5 MG/DL — SIGNIFICANT CHANGE UP (ref 2.5–4.5)
PLAT MORPH BLD: NORMAL — SIGNIFICANT CHANGE UP
PLATELET # BLD AUTO: 33 K/UL — LOW (ref 150–400)
PLATELET # BLD AUTO: 49 K/UL — LOW (ref 150–400)
POTASSIUM SERPL-MCNC: 3.9 MMOL/L — SIGNIFICANT CHANGE UP (ref 3.5–5.3)
POTASSIUM SERPL-SCNC: 3.9 MMOL/L — SIGNIFICANT CHANGE UP (ref 3.5–5.3)
PROT CSF-MCNC: 28 MG/DL — SIGNIFICANT CHANGE UP (ref 15–45)
PROT SERPL-MCNC: 6.3 G/DL — SIGNIFICANT CHANGE UP (ref 6–8.3)
RBC # BLD: 3.48 M/UL — LOW (ref 3.8–5.2)
RBC # CSF: 1 /UL — HIGH (ref 0–0)
RBC # FLD: 14.2 % — SIGNIFICANT CHANGE UP (ref 10.3–14.5)
RBC BLD AUTO: SIGNIFICANT CHANGE UP
SODIUM SERPL-SCNC: 141 MMOL/L — SIGNIFICANT CHANGE UP (ref 135–145)
TUBE TYPE: SIGNIFICANT CHANGE UP
URATE SERPL-MCNC: 5.2 MG/DL — SIGNIFICANT CHANGE UP (ref 2.5–7)
WBC # BLD: 11.28 K/UL — HIGH (ref 3.8–10.5)
WBC # FLD AUTO: 11.28 K/UL — HIGH (ref 3.8–10.5)

## 2023-02-28 PROCEDURE — 88305 TISSUE EXAM BY PATHOLOGIST: CPT | Mod: 26

## 2023-02-28 PROCEDURE — 38222 DX BONE MARROW BX & ASPIR: CPT | Mod: LT

## 2023-02-28 PROCEDURE — 71045 X-RAY EXAM CHEST 1 VIEW: CPT | Mod: 26

## 2023-02-28 PROCEDURE — G0452: CPT | Mod: 26

## 2023-02-28 PROCEDURE — 88291 CYTO/MOLECULAR REPORT: CPT | Mod: 59

## 2023-02-28 PROCEDURE — 88189 FLOWCYTOMETRY/READ 16 & >: CPT

## 2023-02-28 PROCEDURE — 85097 BONE MARROW INTERPRETATION: CPT

## 2023-02-28 PROCEDURE — 77012 CT SCAN FOR NEEDLE BIOPSY: CPT | Mod: 26

## 2023-02-28 PROCEDURE — 88313 SPECIAL STAINS GROUP 2: CPT | Mod: 26

## 2023-02-28 PROCEDURE — 99233 SBSQ HOSP IP/OBS HIGH 50: CPT

## 2023-02-28 PROCEDURE — 62329 THER SPI PNXR CSF FLUOR/CT: CPT

## 2023-02-28 PROCEDURE — 88108 CYTOPATH CONCENTRATE TECH: CPT | Mod: 26,59

## 2023-02-28 RX ORDER — CASPOFUNGIN ACETATE 7 MG/ML
INJECTION, POWDER, LYOPHILIZED, FOR SOLUTION INTRAVENOUS
Refills: 0 | Status: DISCONTINUED | OUTPATIENT
Start: 2023-02-28 | End: 2023-03-14

## 2023-02-28 RX ORDER — CASPOFUNGIN ACETATE 7 MG/ML
50 INJECTION, POWDER, LYOPHILIZED, FOR SOLUTION INTRAVENOUS EVERY 24 HOURS
Refills: 0 | Status: DISCONTINUED | OUTPATIENT
Start: 2023-03-01 | End: 2023-03-14

## 2023-02-28 RX ORDER — CASPOFUNGIN ACETATE 7 MG/ML
70 INJECTION, POWDER, LYOPHILIZED, FOR SOLUTION INTRAVENOUS ONCE
Refills: 0 | Status: COMPLETED | OUTPATIENT
Start: 2023-02-28 | End: 2023-02-28

## 2023-02-28 RX ORDER — MIDAZOLAM HYDROCHLORIDE 1 MG/ML
1 INJECTION, SOLUTION INTRAMUSCULAR; INTRAVENOUS ONCE
Refills: 0 | Status: DISCONTINUED | OUTPATIENT
Start: 2023-02-28 | End: 2023-02-28

## 2023-02-28 RX ORDER — ACYCLOVIR SODIUM 500 MG
400 VIAL (EA) INTRAVENOUS
Refills: 0 | Status: DISCONTINUED | OUTPATIENT
Start: 2023-02-28 | End: 2023-03-16

## 2023-02-28 RX ORDER — FENTANYL CITRATE 50 UG/ML
50 INJECTION INTRAVENOUS ONCE
Refills: 0 | Status: DISCONTINUED | OUTPATIENT
Start: 2023-02-28 | End: 2023-02-28

## 2023-02-28 RX ORDER — ONDANSETRON 8 MG/1
8 TABLET, FILM COATED ORAL EVERY 8 HOURS
Refills: 0 | Status: DISCONTINUED | OUTPATIENT
Start: 2023-02-28 | End: 2023-03-14

## 2023-02-28 RX ORDER — METHOTREXATE 2.5 MG/1
15 TABLET ORAL ONCE
Refills: 0 | Status: DISCONTINUED | OUTPATIENT
Start: 2023-02-28 | End: 2023-03-16

## 2023-02-28 RX ADMIN — PANTOPRAZOLE SODIUM 40 MILLIGRAM(S): 20 TABLET, DELAYED RELEASE ORAL at 15:26

## 2023-02-28 RX ADMIN — BRIMONIDINE TARTRATE 1 DROP(S): 2 SOLUTION/ DROPS OPHTHALMIC at 15:26

## 2023-02-28 RX ADMIN — GABAPENTIN 300 MILLIGRAM(S): 400 CAPSULE ORAL at 17:44

## 2023-02-28 RX ADMIN — Medication 100 MILLIGRAM(S): at 15:25

## 2023-02-28 RX ADMIN — ESCITALOPRAM OXALATE 10 MILLIGRAM(S): 10 TABLET, FILM COATED ORAL at 15:26

## 2023-02-28 RX ADMIN — LOSARTAN POTASSIUM 100 MILLIGRAM(S): 100 TABLET, FILM COATED ORAL at 15:26

## 2023-02-28 RX ADMIN — FENTANYL CITRATE 50 MICROGRAM(S): 50 INJECTION INTRAVENOUS at 13:20

## 2023-02-28 RX ADMIN — Medication 400 MILLIGRAM(S): at 17:43

## 2023-02-28 RX ADMIN — BRIMONIDINE TARTRATE 1 DROP(S): 2 SOLUTION/ DROPS OPHTHALMIC at 06:14

## 2023-02-28 RX ADMIN — Medication 650 MILLIGRAM(S): at 19:17

## 2023-02-28 RX ADMIN — MIDAZOLAM HYDROCHLORIDE 1 MILLIGRAM(S): 1 INJECTION, SOLUTION INTRAMUSCULAR; INTRAVENOUS at 13:20

## 2023-02-28 NOTE — PRE PROCEDURE NOTE - GENERAL PROCEDURE NAME
CT guided Bone marrow biopsy
bone marrow biopsy
Fluoroscopically guided lumbar puncture with intrathecal chemotherapy

## 2023-02-28 NOTE — PROGRESS NOTE ADULT - PROBLEM SELECTOR PLAN 1
Possible diagnosis of ALL given findings on peripheral flow cytometry.  Monitor CBC, CMP, TLS labs BID, fu g6pd, echo done 2/27 EF 55%, HLA sent 2/28  On allopurinol  Hepatitis Panel Neg. Hep B Core neg. HIV negative.  Transfuse PRN. maintain Hgb >7, plts >15.  Gabapentin for splenic/side pain.  Plan for BMbx in IR due to body habitus. Consult placed.   Follow up BCR-ABL.   2/25 - CTA from [facility] (-) for PE. Left sided pain likely due to splenomegaly.  Plan for PICC line at bedside and LP 2/28 plt ordered. New  diagnosis of ALL given findings on peripheral flow cytometry.  Monitor CBC, CMP, TLS labs BID, fu g6pd, echo done 2/27 EF 55%, HLA sent 2/28  On allopurinol  Hepatitis Panel Neg. Hep B Core neg. HIV negative.  Transfuse PRN. maintain Hgb >7, plts >15.  Gabapentin for splenic/side pain.  2/28 s/p BMbx in IR due to body habitus, fu result. Also sent to [facility]  2/28 s/p LP with IT MTX, fu flow   Follow up BCR-ABL.   2/25 - CTA from [facility] (-) for PE. Left sided pain likely due to splenomegaly.  Plan for PICC line at bedside and LP 2/28 plt ordered.

## 2023-02-28 NOTE — PRE PROCEDURE NOTE - PRE PROCEDURE EVALUATION
Interventional Radiology Pre-Procedure Note    Diagnosis/Indication: Patient is a 63y old  Female who presents with a chief complaint of ro leukemia (28 Feb 2023 08:04) here for bone marrow biopsy.      PAST MEDICAL & SURGICAL HISTORY:  HTN (hypertension)      HLD (hyperlipidemia)           Allergies: sulfa drugs (Unknown)      LABS:                        x      x     )-----------( 49       ( 28 Feb 2023 09:21 )             x        02-28    141  |  107  |  15  ----------------------------<  96  3.9   |  23  |  0.93    Ca    8.3<L>      28 Feb 2023 04:19  Phos  3.5     02-28  Mg     2.0     02-28    TPro  6.3  /  Alb  3.2<L>  /  TBili  0.5  /  DBili  x   /  AST  35  /  ALT  31  /  AlkPhos  115  02-28    PT/INR - ( 27 Feb 2023 22:32 )   PT: 13.6 sec;   INR: 1.17 ratio         PTT - ( 27 Feb 2023 22:32 )  PTT:28.4 sec    Procedure/ risks/ benefits were explained, informed consent obtained from patient, verbalizes understanding. 
Neuroradiology pre-op note    HPI: 63y Female with PMHx of HTN, HLD presents to the ED BIBA transferred from [facility] for new diagnosis of leukemia. Patient is thrombocytopenic and anemic due to disease. Pt with acute lymphocytic leukemia.       Diagnosis: acute lymphoblastic leukemia  Procedure: fluoroscopically guided lumbar puncture with intrathecal chemotherapy                              x      x     )-----------( 49       ( 28 Feb 2023 09:21 )             x        02-28    141  |  107  |  15  ----------------------------<  96  3.9   |  23  |  0.93    Ca    8.3<L>      28 Feb 2023 04:19  Phos  3.5     02-28  Mg     2.0     02-28    TPro  6.3  /  Alb  3.2<L>  /  TBili  0.5  /  DBili  x   /  AST  35  /  ALT  31  /  AlkPhos  115  02-28    PT/INR - ( 27 Feb 2023 22:32 )   PT: 13.6 sec;   INR: 1.17 ratio    PTT - ( 27 Feb 2023 22:32 )  PTT:28.4 sec    Complete Blood Count + Automated Diff (02.28.23 @ 04:19)    WBC Count: 11.28 K/uL    RBC Count: 3.48 M/uL    Hemoglobin: 10.4 g/dL    Hematocrit: 30.2 %    Mean Cell Volume: 86.8 fl    Mean Cell Hemoglobin: 29.9 pg    Mean Cell Hemoglobin Conc: 34.4 gm/dL    Red Cell Distrib Width: 14.2 %    Platelet Count - Automated: 33 K/uL    Nucleated RBC: 0 /100 WBCs        Assessment & Plan:  63yFemale with ALL    -Will plan for fluoroscopically guided lumbar puncture with IT chemo  -Informed consent obtained. After risks, benefits, alternatives discussion pt verbalized understanding and signed consent. Risks including bleeding, infection, nerve damage, and/or headaches were discussed.      MIHAELA Ma  Available on Microsoft Teams  Spectralink [id number] Ffk [id number]  
Interventional Radiology    HPI: 63 year old Female with a PMH of HTN and HLD is a transfer from [facility] for evaluation of leukemia. Patient woke up with left sided chest pain and the feeling of shortness of breath. Labs at OSH consistent with leukemia. Hematology/oncology here attempted bone marrow biopsy but failed. IR is consulted for bone marrow biopsy.    Allergies: sulfa drugs (Unknown)    Medications (Abx/Cardiac/Anticoagulation/Blood Products)    losartan: 100 milliGRAM(s) Oral (02-27 @ 06:02)    Data:  160  99.8  T(C): 36.4  HR: 61  BP: 166/76  RR: 18  SpO2: 96%    Exam  General: No acute distress  Chest: Non labored breathing  Abdomen: Non-distended  Extremities: No swelling, warm    -WBC -- / HgB -- / Hct -- / Plt 49  -Na 141 / Cl 107 / BUN 15 / Glucose 96  -K 3.9 / CO2 23 / Cr 0.93  -ALT 31 / Alk Phos 115 / T.Bili 0.5  -INR1.17    Plan: 63 year old Female presents for CT guided Bone marrow biopsy  -Risks/Benefits/alternatives explained with the patient and/or healthcare proxy and witnessed informed consent obtained.

## 2023-02-28 NOTE — DISCHARGE NOTE PROVIDER - NSDCFUSCHEDAPPT_GEN_ALL_CORE_FT
[facility]  [facility]  Scheduled Appointment: 03/17/2023    Maurice Brady  [facility]  [facility]  Scheduled Appointment: 03/17/2023    [facility]  [facility]  Scheduled Appointment: 03/17/2023    [facility]  [facility]  Scheduled Appointment: 03/20/2023    [facility]  [facility]  Scheduled Appointment: 03/20/2023

## 2023-02-28 NOTE — PROGRESS NOTE ADULT - ASSESSMENT
62 y/o with PMHx of HTN, HLD presents to the ED BIBA transferred from [facility] for new diagnosis of leukemia. Patient is thrombocytopenic and anemic due to disease.   64 y/o with PMHx of HTN, HLD presents to the ED BIBA transferred from [facility] for new diagnosis of leukemia, peripheral blood flow+ B ALL. Patient has pancytopenia  due to disease.

## 2023-02-28 NOTE — DISCHARGE NOTE PROVIDER - HOSPITAL COURSE
62 y/o with PMHx of HTN, HLD presents to the ED BIBA transferred from [facility] for new diagnosis of leukemia. Patient states that she woke up today with left sided chest pain and the feeling of shortness of breath. She states that she has never had these symptoms before. The shortness of breath was worse when she exerted herself. She states that she went to the emergency department and was told that her labs were consistent with leukemia. She has never had this diagnosis before. She denies any headache, fever, chills, cough, n/v/d.  Peripheral blood(PB) flow cytometry on 2/24 reveals B-lymphoblasts (65% of cells), positive for TdT, HLA-DR, CD38, CD34, CD19, CD10, CD20 (partial), CD22 (dim), CD58, CD9, cytoplasmic CD79a, dim to negative CD45, negative myeloperoxidase, CRLF2, CD2, CD3 (surfaceand cytoplasmic), CD7, CD15, CD33, CD56, , , consistent with B-lymphoblastic leukemia/lymphoma.  Please see interpretation.  PB FISH negative for Ph chromosome, FLT 3(-). Pt s/p IR  BMbx  on 2/28 (unable to get at bedside  on 2/27), reveals______  Pt is s/p bedside  PICC line on 23/28, CXR confirmed the PICC Placement_____  Pt had LP with IT chemo on 2/28, flow_______       62 y/o with PMHx of HTN, HLD presents to the ED BIBA transferred from [facility] for new diagnosis of leukemia. Patient states that she woke up today with left sided chest pain and the feeling of shortness of breath. She states that she has never had these symptoms before. The shortness of breath was worse when she exerted herself. She states that she went to the emergency department and was told that her labs were consistent with leukemia. She has never had this diagnosis before. She denies any headache, fever, chills, cough, n/v/d.  Peripheral blood(PB) flow cytometry on 2/24 reveals B-lymphoblasts (65% of cells), positive for TdT, HLA-DR, CD38, CD34, CD19, CD10, CD20 (partial), CD22 (dim), CD58, CD9, cytoplasmic CD79a, dim to negative CD45, negative myeloperoxidase, CRLF2, CD2, CD3 (surfaceand cytoplasmic), CD7, CD15, CD33, CD56, , , consistent with B-lymphoblastic leukemia/lymphoma.  Please see interpretation.  PB FISH negative for Ph chromosome, FLT 3(-). Pt s/p IR BMbx on 2/28 (unable at bedside on 2/27), reveals______. BCR/ABL PCR _____. Pt is s/p bedside PICC line on 23/28, CXR confirmed the PICC Placement.   Pt had LP with IT chemo on 2/28, flow_______   62 y/o with PMHx of HTN, HLD presents to the ED BIBA transferred from [facility] for new diagnosis of leukemia. Patient states that she woke up today with left sided chest pain and the feeling of shortness of breath. She states that she has never had these symptoms before. The shortness of breath was worse when she exerted herself. She states that she went to the emergency department and was told that her labs were consistent with leukemia. She has never had this diagnosis before. She denies any headache, fever, chills, cough, n/v/d.  Her PB flow cytometry showing B-ALL, FISH negative for Ph chromosome. PCR negative (although p190 positive 0.001% from marrow)  BMbx done 2/28 with IR (was unable to get at bedside) -B-lymphoblastic leukemia/lymphoma.  Echo done EF 55%, PICC line placed. Pt  started HyperCVAD  on 3/3(IV Cyclophosphamide (150 mg/m2 every 12 h on days 1–3), Dexamethasone 20 mg per day on days 1–4 and 11–14, Vincristine 2 mg IV flat dose on days 1 and 8, Daunorubicin 25 mg/m2/day IV continuous infusion over 48 hours, starting on day 4). Informed consent obtained   LP with IT MTX done on 2/28 reveals negative for malignant cells. Pt has transaminitis -US done at [place] with fatty liver.  CT A/P -Splenomegaly with a small age indeterminant splenic infarct. Normal liver, no abdominal or pelvic lymphadenopathy. CTA done at [place] w/ shotty LAD  Pt developed neutropenic fever, s/p pancx and CXR Ms. Kaur is a 62 y/o with PMHx of HTN, HLD presents to the ED BIBA transferred from [facility] for new diagnosis of leukemia, peripheral blood flow cytometry c/w B-ALL. Official bone marrow biopsy 2/28 c/w B-cell ALL, [place] chromosome (-).  Chemotherapy  with reduced dose HyperCVAD started on 3/3/23. Hospital course c/b neutropenic fever, transaminitis, rash 3/4 improved with topical steroid and atarax PRN,  and LUE cellulitis.    Her PB flow cytometry showing B-ALL, FISH negative for Ph chromosome. PCR negative (although p190 positive 0.001% from marrow). BMbx done 2/28 with IR (was unable to get at bedside) -B-lymphoblastic leukemia/lymphoma. Echo done EF 55%, PICC line placed. Pt  started reduced dose HyperCVAD  on 3/3/23: (IV Cyclophosphamide (150 mg/m2 every 12 h on days 1–3), Dexamethasone 20 mg per day on days 1–4 and 11–14, Vincristine 2 mg IV flat dose on days 1 and 8, Daunorubicin 25 mg/m2/day IV continuous infusion over 48 hours, starting on day 4). Informed consent obtained. LP with IT MTX done on 2/28 reveals negative for malignant cells. Pt has transaminitis -US done at [place] with fatty liver. CT A/P -Splenomegaly with a small age indeterminant splenic infarct. Normal liver, no abdominal or pelvic lymphadenopathy. CTA done at [place] w/ shotty LAD  Pt developed neutropenic fever, s/p pancx and CXR Ms. Kaur is a 64 y/o with PMHx of HTN, HLD presents to the ED BIBA transferred from [facility] for new diagnosis of leukemia, peripheral blood flow cytometry c/w B-ALL. Official bone marrow biopsy 2/28 c/w B-cell ALL, [place] chromosome (-).  Chemotherapy  with reduced dose HyperCVAD started on 3/3/23. Hospital course c/b neutropenic fever, transaminitis, rash 3/4 improved with topical steroid and atarax PRN,  and LUE cellulitis.    Her PB flow cytometry showing B-ALL, FISH negative for Ph chromosome. PCR negative (although p190 positive 0.001% from marrow). BMbx done 2/28 with IR (was unable to get at bedside) -B-lymphoblastic leukemia/lymphoma. Echo done EF 55%, PICC line placed. Pt  started reduced dose HyperCVAD  on 3/3/23: (IV Cyclophosphamide (150 mg/m2 every 12 h on days 1–3), Dexamethasone 20 mg per day on days 1–4 and 11–14, Vincristine 2 mg IV flat dose on days 1 and 8, Daunorubicin 25 mg/m2/day IV continuous infusion over 48 hours, starting on day 4). Informed consent obtained. LP with IT MTX done on 2/28 reveals negative for malignant cells. Pt has transaminitis -US done at [place] with fatty liver. CT A/P -Splenomegaly with a small age indeterminant splenic infarct. Normal liver, no abdominal or pelvic lymphadenopathy. CTA done at [place] w/ shotty LAD  Pt developed neutropenic fever, s/p pancx, NGTD, and CX, continue on cefepime, acyclovir and caspo Ms. Kaur is a 62 y/o with PMHx of HTN, HLD presents to the ED BIBA transferred from [facility] for new diagnosis of leukemia, peripheral blood flow cytometry c/w B-ALL. Official bone marrow biopsy 2/28 c/w B-cell ALL, [place] chromosome (-).  Chemotherapy  with reduced dose HyperCVAD started on 3/3/23. Hospital course c/b neutropenic fever, transaminitis, rash 3/4 improved with topical steroid and atarax PRN,  and LUE cellulitis.    Her PB flow cytometry showing B-ALL, FISH negative for Ph chromosome. PCR negative (although p190 positive 0.001% from marrow). BMbx done 2/28 with IR (was unable to get at bedside) -B-lymphoblastic leukemia/lymphoma. Echo done EF 55%, PICC line placed. Pt  started reduced dose HyperCVAD  on 3/3/23: (IV Cyclophosphamide (150 mg/m2 every 12 h on days 1–3), Dexamethasone 20 mg per day on days 1–4 and 11–14, Vincristine 2 mg IV flat dose on days 1 and 8, Daunorubicin 25 mg/m2/day IV continuous infusion over 48 hours, starting on day 4). Informed consent obtained. LP with IT MTX done on 2/28 reveals negative for malignant cells. Pt has transaminitis -US done at [place] with fatty liver. CT A/P -Splenomegaly with a small age indeterminant splenic infarct. Normal liver, no abdominal or pelvic lymphadenopathy. CTA done at [place] w/ shotty LAD  Pt developed neutropenic fever, s/p pancx revealed negative,  and  continue on cefepime, acyclovir and caspofungin.   Pt develop severe headache possible from Zofran used, HCT performed on 3/14 reveals___________   Plan to give 2A of chemo with MTX and  Cytarabine when count recovered and BM bx after 2A. Ms. Kaur is a 64 y/o with PMHx of HTN, HLD presents to the ED BIBA transferred from [facility] for new diagnosis of leukemia, peripheral blood flow cytometry c/w B-ALL. Official bone marrow biopsy 2/28 c/w B-cell ALL, [place] chromosome (-).  Chemotherapy  with reduced dose HyperCVAD started on 3/3/23. Hospital course c/b neutropenic fever, transaminitis, rash 3/4 improved with topical steroid and atarax PRN,  and LUE cellulitis.    Her PB flow cytometry showing B-ALL, FISH negative for Ph chromosome. PCR negative (although p190 positive 0.001% from marrow). BMbx done 2/28 with IR (was unable to get at bedside) -B-lymphoblastic leukemia/lymphoma. Echo done EF 55%, PICC line placed. Pt  started reduced dose HyperCVAD  on 3/3/23: (IV Cyclophosphamide (150 mg/m2 every 12 h on days 1–3), Dexamethasone 20 mg per day on days 1–4 and 11–14, Vincristine 2 mg IV flat dose on days 1 and 8, Daunorubicin 25 mg/m2/day IV continuous infusion over 48 hours, starting on day 4). Informed consent obtained. LP with IT MTX done on 2/28 reveals negative for malignant cells. Pt has transaminitis -US done at [place] with fatty liver. CT A/P -Splenomegaly with a small age indeterminant splenic infarct. Normal liver, no abdominal or pelvic lymphadenopathy. CTA done at [place] w/ shotty LAD  Pt developed neutropenic fever, s/p pancx revealed negative,  and  continue on cefepime, acyclovir and caspofungin.   Pt develop severe headache possible from Zofran used, HCT performed on 3/14 reveals No evidence of acute hemorrhage, mass or mass effect.   Plan to give 2A of chemo with MTX and  Cytarabine when count recovered and BM bx after 2A. Ms. Kaur is a 62 y/o with PMHx of HTN, HLD presents to the ED BIBA transferred from [facility] for new diagnosis of leukemia, peripheral blood flow cytometry c/w B-ALL. Official bone marrow biopsy 2/28 c/w B-cell ALL, [place] chromosome (-).  Chemotherapy  with reduced dose HyperCVAD started on 3/3/23. Hospital course c/b neutropenic fever, transaminitis, rash 3/4 improved with topical steroid and atarax PRN,  and LUE cellulitis.  Her PB flow cytometry showing B-ALL, FISH negative for Ph chromosome. PCR negative (although p190 positive 0.001% from marrow). BMbx done 2/28 with IR (was unable to get at bedside) -B-lymphoblastic leukemia/lymphoma. Echo done EF 55%, PICC line placed. Pt  started reduced dose HyperCVAD  on 3/3/23: (IV Cyclophosphamide (150 mg/m2 every 12 h on days 1–3), Dexamethasone 20 mg per day on days 1–4 and 11–14, Vincristine 2 mg IV flat dose on days 1 and 8, Daunorubicin 25 mg/m2/day IV continuous infusion over 48 hours, starting on day 4). Informed consent obtained. LP with IT MTX done on 2/28 reveals negative for malignant cells. Pt has transaminitis -US done at [place] with fatty liver. CT A/P -Splenomegaly with a small age indeterminant splenic infarct. Normal liver, no abdominal or pelvic lymphadenopathy. CTA done at [place] w/ shotty LAD  Pt developed neutropenic fever, s/p pancx revealed negative,  and  continue on cefepime, acyclovir and caspofungin.   Pt develop severe headache possible from Zofran used, HCT performed on 3/14 reveals No evidence of acute hemorrhage, mass or mass effect.   Plan to give 1B of chemo with MTX and  Cytarabine when count recovered and BM bx after !B chemo.    Pt is stable for discharge home with outpatient FU.

## 2023-02-28 NOTE — PROGRESS NOTE ADULT - PROBLEM SELECTOR PLAN 2
Patient is not neutropenic, afebrile  If spikes, pan culture and CXR. Patient is neutropenic, afebrile  2/28 added Levaquin, Caspofungin and acyclovir PPX  If fever, pan culture & CXR and switch Levaquin to Cefepime   2/28 mild cellulitis left elbow post PIV infiltration, warm compress and monitor closely

## 2023-02-28 NOTE — DISCHARGE NOTE PROVIDER - CARE PROVIDER_API CALL
Goldberg, Bradley H (MD)  Hematology; Internal Medicine; Medical Oncology  [address] [place] [address]  Phone: [phone]  Fax: [phone]  Follow Up Time:

## 2023-02-28 NOTE — PRE-ANESTHESIA EVALUATION ADULT - WEIGHT IN LBS
The ABCs of the Annual Wellness Visit  Subsequent Medicare Wellness Visit    Chief Complaint   Patient presents with   • Hypertension   • Medicare Wellness-subsequent      Subjective    History of Present Illness:  Kimberly Dennis is a 67 y.o. female who presents for a Subsequent Medicare Wellness Visit.    Left knee pain - patient would like to see Dr. Paz regarding possible knee replacement in the future.   hypertension- patient without Has, dizziness, chest pain. Patient feels wells.   afib- doing well on eliquis.     The following portions of the patient's history were reviewed and   updated as appropriate: allergies, current medications, past family history, past medical history, past social history, past surgical history and problem list.    Compared to one year ago, the patient feels her physical   health is worse.    Compared to one year ago, the patient feels her mental   health is the same.    Recent Hospitalizations:  This patient has had a [facility] admission record on file within the last 365 days.    Current Medical Providers:  Patient Care Team:  Milan Coppola Jr., MD as PCP - General (Internal Medicine)  Mae Layne MD as Consulting Physician (Obstetrics and Gynecology)  Mae Layne MD as Consulting Physician (Obstetrics and Gynecology)  Mae Layne MD as Consulting Physician (Obstetrics and Gynecology)    Outpatient Medications Prior to Visit   Medication Sig Dispense Refill   • apixaban (Eliquis) 5 MG tablet tablet Take 1 tablet by mouth 2 (two) times a day. 60 tablet 5   • cetirizine (zyrTEC) 10 MG tablet Take 10 mg by mouth Daily.     • Cholecalciferol 125 MCG (5000 UT) tablet Vitamin D3 5,000 unit oral tablet take 1 tablet by oral route daily   Active     • diphenhydrAMINE-APAP, sleep, (TYLENOL PM EXTRA STRENGTH PO) Take  by mouth As Needed.     • hydroCHLOROthiazide (HYDRODIURIL) 25 MG tablet Take 1 tablet by mouth Daily. 90 tablet 2   • levothyroxine (SYNTHROID,  "LEVOTHROID) 75 MCG tablet Take 1 tablet by mouth Daily. 90 tablet 2   • losartan (COZAAR) 25 MG tablet Take 1 tablet by mouth once daily for 90 days 90 tablet 2   • metoprolol succinate XL (TOPROL-XL) 50 MG 24 hr tablet Take 1 tablet by mouth Daily. 90 tablet 3   • montelukast (Singulair) 10 MG tablet Take 1 tablet by mouth Every Night. 90 tablet 3   • apixaban (ELIQUIS) 5 MG tablet tablet Take 1 tablet by mouth 2 (Two) Times a Day. [id number] X 3 42 tablet 0   • fluticasone (Flonase) 50 MCG/ACT nasal spray 2 sprays into the nostril(s) as directed by provider Daily. 16 g 3     No facility-administered medications prior to visit.       No opioid medication identified on active medication list. I have reviewed chart for other potential  high risk medication/s and harmful drug interactions in the elderly.        Aspirin is not on active medication list.  Aspirin use is contraindicated for this patient due to: current use of Eliquis.  .    Patient Active Problem List   Diagnosis   • Anemia   • Arthritis   • Paroxysmal atrial fibrillation (HCC)   • History of total knee arthroplasty, right   • Essential hypertension   • Mitral valve prolapse   • Seasonal allergic rhinitis   • Tear of medial cartilage or meniscus of knee, current   • Thoracic outlet syndrome     Advance Care Planning  Advance Directive is on file.           Objective    Vitals:    02/15/22 1131   BP: 131/76   Pulse: 61   Temp: 97.6 °F (36.4 °C)   TempSrc: Temporal   SpO2: 96%   Weight: 119 kg (261 lb 9.6 oz)   Height: 165.1 cm (65\")         BMI Readings from Last 1 Encounters:   02/15/22 43.53 kg/m²   BMI is above normal parameters. Recommendations include: none (medical contraindication)    Does the patient have evidence of cognitive impairment? No    Physical Exam    Appearance: No acute distress, well-nourished  Head: normocephalic, atraumatic  Eyes: extraocular movements intact, no scleral icterus, no conjunctival injection  Ears, Nose, and Throat: " external ears normal, nares patent, moist mucous membranes  Cardiovascular: regular rate and rhythm. no murmurs, rales, or rhonchi. no edema  Respiratory: breathing comfortably, symmetric chest rise, clear to auscultation bilaterally. No wheezes, rales, or rhonchi.  Neuro: alert and oriented to time, place, and person. Normal gait  Psych: normal mood and affect          HEALTH RISK ASSESSMENT    Smoking Status:  Social History     Tobacco Use   Smoking Status Never Smoker   Smokeless Tobacco Never Used     Alcohol Consumption:  Social History     Substance and Sexual Activity   Alcohol Use Never     Fall Risk Screen:    STEADI Fall Risk Assessment was completed, and patient is at LOW risk for falls.Assessment completed on:2/15/2022    Depression Screening:  PHQ-2/PHQ-9 Depression Screening 2/15/2022   Little interest or pleasure in doing things 0   Feeling down, depressed, or hopeless 0   Total Score 0       Health Habits and Functional and Cognitive Screening:  Functional & Cognitive Status 2/15/2022   Do you have difficulty preparing food and eating? No   Do you have difficulty bathing yourself, getting dressed or grooming yourself? No   Do you have difficulty using the toilet? No   Do you have difficulty moving around from place to place? No   Do you have trouble with steps or getting out of a bed or a chair? No   Current Diet Well Balanced Diet   Dental Exam Not up to date   Eye Exam Up to date   Exercise (times per week) 7 times per week   Current Exercises Include Walking   Do you need help using the phone?  No   Are you deaf or do you have serious difficulty hearing?  No   Do you need help with transportation? No   Do you need help shopping? No   Do you need help preparing meals?  No   Do you need help with housework?  No   Do you need help with laundry? No   Do you need help taking your medications? No   Do you need help managing money? No   Do you ever drive or ride in a car without wearing a seat belt? No    Have you felt unusual stress, anger or loneliness in the last month? No   Who do you live with? Spouse   If you need help, do you have trouble finding someone available to you? No   Have you been bothered in the last four weeks by sexual problems? No   Do you have difficulty concentrating, remembering or making decisions? No       Age-appropriate Screening Schedule:  Refer to the list below for future screening recommendations based on patient's age, sex and/or medical conditions. Orders for these recommended tests are listed in the plan section. The patient has been provided with a written plan.    Health Maintenance   Topic Date Due   • TDAP/TD VACCINES (1 - Tdap) Never done   • ZOSTER VACCINE (1 of 2) Never done   • LIPID PANEL  10/19/2022   • MAMMOGRAM  06/08/2023   • DXA SCAN  02/02/2024   • INFLUENZA VACCINE  Completed              Assessment/Plan   CMS Preventative Services Quick Reference  Risk Factors Identified During Encounter  Immunizations Discussed/Encouraged (specific Immunizations; none  Inactivity/Sedentary  Obesity/Overweight   The above risks/problems have been discussed with the patient.  Follow up actions/plans if indicated are seen below in the Assessment/Plan Section.  Pertinent information has been shared with the patient in the After Visit Summary.    Diagnoses and all orders for this visit:    1. Annual physical exam (Primary)    2. Paroxysmal atrial fibrillation (HCC)  Comments:  HR controlled. cont eliquis.     3. Essential hypertension  Comments:  well controlled. cont regimen.     4. Chronic pain of left knee  -     Ambulatory Referral to Orthopedic Surgery        Follow Up:   Return in about 3 months (around 5/15/2022) for Recheck.     An After Visit Summary and PPPS were made available to the patient.                    220

## 2023-02-28 NOTE — DISCHARGE NOTE PROVIDER - DETAILS OF MALNUTRITION DIAGNOSIS/DIAGNOSES
This patient has been assessed with a concern for Malnutrition and was treated during this hospitalization for the following Nutrition diagnosis/diagnoses:     -  03/07/2023: Morbid obesity (BMI > 40)

## 2023-02-28 NOTE — PROGRESS NOTE ADULT - NS ATTEND AMEND GEN_ALL_CORE FT
63 year old with HLD and HTN, transferred b/c of circulating blasts consistent with acute leukemia.    PB flow cytometry showing B-ALL  FISH negative for Ph chromosome  Plan for BMbx  Echo done today  will need PICC line  Transfuse for plts <10, Hgb <7  supportive care 63 year old with HLD and HTN, transferred b/c of circulating blasts consistent with acute leukemia.    PB flow cytometry showing B-ALL  FISH negative for Ph chromosome  Plan for BMbx today with IR -was unable to get at bedside yesterday  Echo done EF 55%  will need PICC line  Transfuse for plts <10, Hgb <7  supportive care

## 2023-02-28 NOTE — DISCHARGE NOTE PROVIDER - NSDCMRMEDTOKEN_GEN_ALL_CORE_FT
brimonidine 0.2% ophthalmic solution: 1 drop(s) to each affected eye 3 times a day  escitalopram 10 mg oral tablet: 1 tab(s) orally once a day  losartan 100 mg oral tablet: 1 tab(s) orally once a day  omeprazole 20 mg oral delayed release capsule: 1 cap(s) orally once a day  simvastatin 40 mg oral tablet: 1 tab(s) orally once a day (at bedtime)   acyclovir 400 mg oral tablet: 1 tab(s) orally 2 times a day, check with outpatient provider regarding when to stop  brimonidine 0.2% ophthalmic solution: 1 drop(s) to each affected eye 3 times a day  dexamethasone 4 mg oral tablet: 5 tab(s) orally once a day   escitalopram 10 mg oral tablet: 1 tab(s) orally once a day  fluconazole 200 mg oral tablet: 1 tab(s) orally once a day, check with outpatient provider regarding when to stop  gabapentin 300 mg oral capsule: 1 cap(s) orally every 8 hours  levoFLOXacin 500 mg oral tablet: 1 tab(s) orally every 24 hours, check with outpatient provider regarding when to stop  losartan 100 mg oral tablet: 1 tab(s) orally once a day  metoclopramide 10 mg oral tablet: 1 tab(s) orally every 6 hours, As Needed -for nausea   Nivestym 480 mcg/1.6 mL injectable solution: 480 microgram(s) subcutaneously once a day, check with outpatient provider when to stop   omeprazole 20 mg oral delayed release capsule: 1 cap(s) orally once a day  polyethylene glycol 3350 oral powder for reconstitution: 17 gram(s) orally 2 times a day, As needed, Constipation  simvastatin 40 mg oral tablet: 1 tab(s) orally once a day (at bedtime)  sucralfate 1 g/10 mL oral suspension: 10 milliliter(s) orally 4 times a day   acyclovir 400 mg oral tablet: 1 tab(s) orally 2 times a day, check with outpatient provider regarding when to stop  brimonidine 0.2% ophthalmic solution: 1 drop(s) to each affected eye 3 times a day  Carafate 1 g oral tablet: 1 tab(s) orally 4 times a day (before meals and at bedtime)   dexamethasone 4 mg oral tablet: 5 tab(s) orally once a day   escitalopram 10 mg oral tablet: 1 tab(s) orally once a day  fluconazole 200 mg oral tablet: 1 tab(s) orally once a day, check with outpatient provider regarding when to stop  gabapentin 300 mg oral capsule: 1 cap(s) orally every 8 hours  levoFLOXacin 500 mg oral tablet: 1 tab(s) orally every 24 hours, check with outpatient provider regarding when to stop  losartan 100 mg oral tablet: 1 tab(s) orally once a day  metoclopramide 10 mg oral tablet: 1 tab(s) orally every 6 hours, As Needed -for nausea   Nivestym 480 mcg/1.6 mL injectable solution: 480 microgram(s) subcutaneously once a day, await prior autherization from insurance, next dose on 3/17 at [facility]   omeprazole 20 mg oral delayed release capsule: 1 cap(s) orally once a day  polyethylene glycol 3350 oral powder for reconstitution: 17 gram(s) orally 2 times a day, As needed, Constipation  simvastatin 40 mg oral tablet: 1 tab(s) orally once a day (at bedtime)

## 2023-02-28 NOTE — PROCEDURE NOTE - PROCEDURE FINDINGS AND DETAILS
Limited CT images of the pelvis revealed good window for access of the left iliac bone for biopsy. Aspirate and core biopsy obtained and sent for analysis. No complications. Hemostasis with manual compression.

## 2023-02-28 NOTE — PROGRESS NOTE ADULT - SUBJECTIVE AND OBJECTIVE BOX
ANTIMICROBIALS      HEME/ONC MEDICATIONS      STANDING MEDICATIONS  allopurinol 100 milliGRAM(s) Oral daily  brimonidine 0.2% Ophthalmic Solution 1 Drop(s) Both EYES three times a day  escitalopram 10 milliGRAM(s) Oral daily  gabapentin 300 milliGRAM(s) Oral two times a day  losartan 100 milliGRAM(s) Oral daily  pantoprazole    Tablet 40 milliGRAM(s) Oral before breakfast  simvastatin 40 milliGRAM(s) Oral at bedtime  sodium chloride 0.9%. 1000 milliLiter(s) IV Continuous <Continuous>      PRN MEDICATIONS  acetaminophen     Tablet .. 650 milliGRAM(s) Oral every 6 hours PRN        Vital Signs Last 24 Hrs  T(C): 37.3 (28 Feb 2023 06:25), Max: 37.3 (28 Feb 2023 06:25)  T(F): 99.2 (28 Feb 2023 06:25), Max: 99.2 (28 Feb 2023 06:25)  HR: 60 (28 Feb 2023 06:25) (52 - 66)  BP: 164/71 (28 Feb 2023 06:25) (156/84 - 171/74)  BP(mean): --  RR: 20 (28 Feb 2023 06:25) (16 - 20)  SpO2: 95% (28 Feb 2023 06:25) (92% - 96%)    Parameters below as of 28 Feb 2023 06:25  Patient On (Oxygen Delivery Method): room air              LABS:                        10.4   11.28 )-----------( 33       ( 28 Feb 2023 04:19 )             30.2         Mean Cell Volume : 86.8 fl  Mean Cell Hemoglobin : 29.9 pg  Mean Cell Hemoglobin Concentration : 34.4 gm/dL  Auto Neutrophil # : x  Auto Lymphocyte # : x  Auto Monocyte # : x  Auto Eosinophil # : x  Auto Basophil # : x  Auto Neutrophil % : x  Auto Lymphocyte % : x  Auto Monocyte % : x  Auto Eosinophil % : x  Auto Basophil % : x      02-28    141  |  107  |  15  ----------------------------<  96  3.9   |  23  |  0.93    Ca    8.3<L>      28 Feb 2023 04:19  Phos  3.5     02-28  Mg     2.0     02-28    TPro  6.3  /  Alb  3.2<L>  /  TBili  0.5  /  DBili  x   /  AST  35  /  ALT  31  /  AlkPhos  115  02-28      Mg 2.0  Phos 3.5      PT/INR - ( 27 Feb 2023 22:32 )   PT: 13.6 sec;   INR: 1.17 ratio         PTT - ( 27 Feb 2023 22:32 )  PTT:28.4 sec      Uric Acid 5.2        RECENT CULTURES:      RADIOLOGY & ADDITIONAL STUDIES:         Diagnosis: B ALL     Protocol/Chemo Regimen: TBA    Day: NA     Pt endorsed: left elbow pain s/p PIV infiltration     Review of Systems: Patient denied nausea, vomiting, odynophagia, chest pain, cough, dyspnea, abdominal pain, constipation, diarrhea, rash, fatigue, headache      Pain scale: not graded  Left elbow    Diet: DASH/TLC     Allergies: sulfa drugs (Unknown)      STANDING MEDICATIONS  allopurinol 100 milliGRAM(s) Oral daily  brimonidine 0.2% Ophthalmic Solution 1 Drop(s) Both EYES three times a day  escitalopram 10 milliGRAM(s) Oral daily  gabapentin 300 milliGRAM(s) Oral two times a day  losartan 100 milliGRAM(s) Oral daily  pantoprazole    Tablet 40 milliGRAM(s) Oral before breakfast  simvastatin 40 milliGRAM(s) Oral at bedtime  sodium chloride 0.9%. 1000 milliLiter(s) IV Continuous <Continuous>      PRN MEDICATIONS  acetaminophen     Tablet .. 650 milliGRAM(s) Oral every 6 hours PRN      Vital Signs Last 24 Hrs  T(C): 37.3 (28 Feb 2023 06:25), Max: 37.3 (28 Feb 2023 06:25)  T(F): 99.2 (28 Feb 2023 06:25), Max: 99.2 (28 Feb 2023 06:25)  HR: 60 (28 Feb 2023 06:25) (52 - 66)  BP: 164/71 (28 Feb 2023 06:25) (156/84 - 171/74)  BP(mean): --  RR: 20 (28 Feb 2023 06:25) (16 - 20)  SpO2: 95% (28 Feb 2023 06:25) (92% - 96%)    Parameters below as of 28 Feb 2023 06:25  Patient On (Oxygen Delivery Method): room air      PHYSICAL EXAM  General: adult in NAD  HEENT: clear oropharynx, anicteric sclera. black dot left back of hard palate   CV: normal S1/S2 RRR  Lungs: positive air movement b/l ant lungs,clear to auscultation, no wheezes, no rales  Abdomen: soft, tender on plaption of spleen, non-distended, splenomegaly  Ext: no LE edema; left elbow with swelling erythema, mild tender to palpation   Skin: no rashes  Neuro: alert and oriented X 3, no focal deficits  PIV CDI      LABS:                          x      x     )-----------( 49       ( 28 Feb 2023 09:21 )             x            Mean Cell Volume : 86.8 fl  Mean Cell Hemoglobin : 29.9 pg  Mean Cell Hemoglobin Concentration : 34.4 gm/dL  Auto Neutrophil # : x  Auto Lymphocyte # : x  Auto Monocyte # : x  Auto Eosinophil # : x  Auto Basophil # : x  Auto Neutrophil % : x  Auto Lymphocyte % : x  Auto Monocyte % : x  Auto Eosinophil % : x  Auto Basophil % : x    Complete Blood Count + Automated Diff (02.28.23 @ 04:19)    WBC Count: 11.28 K/uL    RBC Count: 3.48 M/uL    Hemoglobin: 10.4 g/dL    Hematocrit: 30.2 %    Mean Cell Volume: 86.8 fl    Mean Cell Hemoglobin: 29.9 pg    Mean Cell Hemoglobin Conc: 34.4 gm/dL    Red Cell Distrib Width: 14.2 %    Platelet Count - Automated: 33 K/uL    Nucleated RBC: 0 /100 WBCs        02-28    141  |  107  |  15  ----------------------------<  96  3.9   |  23  |  0.93    Ca    8.3<L>      28 Feb 2023 04:19  Phos  3.5     02-28  Mg     2.0     02-28    TPro  6.3  /  Alb  3.2<L>  /  TBili  0.5  /  DBili  x   /  AST  35  /  ALT  31  /  AlkPhos  115  02-28      PT/INR - ( 27 Feb 2023 22:32 )   PT: 13.6 sec;   INR: 1.17 ratio         PTT - ( 27 Feb 2023 22:32 )  PTT:28.4 sec      Uric Acid 5.2      Flow Cytometry (02.24.23 @ 17:00)    TM Interpretation:   Flow Cytometry Final Report  ________________________________________________________________________  Specimen: Peripheral Blood  Date Collected: 02/24/2023 17:00  Date Received: 02/24/2023 18:26  Date Processed: 02/24/2023 18:43  Date Reported: 02/27/2023 10:18  Accession #: [id number]  ________________________________________________________________________  CLINICAL DATA: Clinical History of Breast Cancer; Rule out Leukemia.    ________________________________________________________________________  CD45/Side Scatter Differential  Granulocytes: 14 % ;    Lymphocytes: 21 % ;    Monocytes: 2 % ;    Dim CD45 and/or blast gate: 59 %  ;    Erythroid/debris: 0 %  ________________________________________________________________________  DIAGNOSIS:  Peripheral blood:       - B-lymphoblasts (65% of cells), positive for TdT, HLA-DR, CD38, CD34, CD19, CD10, CD20  (partial), CD22 (dim), CD58, CD9, cytoplasmic CD79a, dim to negative CD45, negative  myeloperoxidase, CRLF2, CD2, CD3 (surfaceand cytoplasmic), CD7, CD15, CD33, CD56, , ,  consistent with B-lymphoblastic leukemia/lymphoma.  Please see interpretation.    INTERPRETATION:  MORPHOLOGY: Increased blasts with high N/C ratio and cleaved nuclei. Pancytopenia.    IMMUNOPHENOTYPE: _  B-lymphoblasts (65% of cells), positive for TdT, HLA-DR, CD38, CD34, CD19, CD10, CD20 (partial),  CD22 (dim), CD58, CD9, cytoplasmic CD79a, dim to negative CD45, negative myeloperoxidase, CRLF2,  CD2, CD3 (surface and cytoplasmic), CD7, CD15, CD33, CD56, , .  Lymphocytes (21% of cells): Heterogeneous population of T-cells (with normal CD4 to CD8 ratio),  natural killer cells, and polytypic B-cells. The lymphocyte immunophenotypic findings show no  diagnostic abnormalities.    FLT3 ITD MUTATION ANALYSIS (02.24.23 @ 17:00)    HLX FLT3 Final Report:   FLT3 ITD and TKD Mutation Analysis Final Report    Accession Number: [id number]  Date Collected: 02/24/2023 17:00  Date Received: 02/27/2023 8:35  Date Reported: 02/28/2023 9:59    Specimen: .Blood  Indication: AML  Test performed: FLT3-ITD and TKD Mutation Analysis    _________________________________________________________    RESULTS:    FLT3-ITD mutation: NEGATIVE  FLT3-TKD mutation: NEGATIVE          RADIOLOGY & ADDITIONAL STUDIES:    < from: Xray Chest 2 Views PA/Lat (02.24.23 @ 16:38) >  IMPRESSION:  Unchanged trace left pleural effusion, with associated compressive   atelectasis of the left lung base.

## 2023-02-28 NOTE — DISCHARGE NOTE PROVIDER - NSDCFUADDAPPT_GEN_ALL_CORE_FT
To [facility] on Fridat 3/17 at 7:15am for blood work, at 8am to see NP Sruthi Keyes and Zarxio injection afterwards     The rest of the [facility] per DENNIS Keyes

## 2023-02-28 NOTE — PROCEDURE NOTE - ADDITIONAL PROCEDURE DETAILS
s/p fluoroscopically guided lumbar puncture at the L2-L3 level using 22 gauge X 5 inch spinal needle. drained 5 mL of clear CSF that was hand delivered to the laboratory for analysis. Methotrexate 15 mg was intrathecally administered through the spinal needle. hemostasis secure and pt tolerated the procedure well.    Pt was sent to IR for bone marrow biopsy immediately following the LP and case was d/w IR charge JAYLYN Valverde. Pt should remain flat X 1 hour following the lumbar puncture.

## 2023-02-28 NOTE — DISCHARGE NOTE PROVIDER - NSDCCPCAREPLAN_GEN_ALL_CORE_FT
PRINCIPAL DISCHARGE DIAGNOSIS  Diagnosis: ALL (acute lymphocytic leukemia)  Assessment and Plan of Treatment: Notify provider  or report to ER for fever greater or equal to 100.4, persistent nausea,  uncontoled vomiting, watery diarrhea, or bleeding.

## 2023-03-01 DIAGNOSIS — R74.01 ELEVATION OF LEVELS OF LIVER TRANSAMINASE LEVELS: ICD-10-CM

## 2023-03-01 LAB
ALBUMIN SERPL ELPH-MCNC: 3.4 G/DL — SIGNIFICANT CHANGE UP (ref 3.3–5)
ALP SERPL-CCNC: 236 U/L — HIGH (ref 40–120)
ALT FLD-CCNC: 106 U/L — HIGH (ref 10–45)
ANION GAP SERPL CALC-SCNC: 13 MMOL/L — SIGNIFICANT CHANGE UP (ref 5–17)
AST SERPL-CCNC: 124 U/L — HIGH (ref 10–40)
BASOPHILS # BLD AUTO: 0 K/UL — SIGNIFICANT CHANGE UP (ref 0–0.2)
BASOPHILS NFR BLD AUTO: 0 % — SIGNIFICANT CHANGE UP (ref 0–2)
BILIRUB SERPL-MCNC: 0.9 MG/DL — SIGNIFICANT CHANGE UP (ref 0.2–1.2)
BLASTS # FLD: 60.8 % — HIGH (ref 0–0)
BUN SERPL-MCNC: 14 MG/DL — SIGNIFICANT CHANGE UP (ref 7–23)
CALCIUM SERPL-MCNC: 8.8 MG/DL — SIGNIFICANT CHANGE UP (ref 8.4–10.5)
CHLORIDE SERPL-SCNC: 103 MMOL/L — SIGNIFICANT CHANGE UP (ref 96–108)
CO2 SERPL-SCNC: 23 MMOL/L — SIGNIFICANT CHANGE UP (ref 22–31)
CREAT SERPL-MCNC: 0.91 MG/DL — SIGNIFICANT CHANGE UP (ref 0.5–1.3)
EGFR: 70 ML/MIN/1.73M2 — SIGNIFICANT CHANGE UP
EOSINOPHIL # BLD AUTO: 0.05 K/UL — SIGNIFICANT CHANGE UP (ref 0–0.5)
EOSINOPHIL NFR BLD AUTO: 0.8 % — SIGNIFICANT CHANGE UP (ref 0–6)
G6PD RBC-CCNC: 11.3 U/G HGB — SIGNIFICANT CHANGE UP (ref 7–20.5)
G6PD RBC-CCNC: 11.4 U/G HGB — SIGNIFICANT CHANGE UP (ref 7–20.5)
GLUCOSE SERPL-MCNC: 94 MG/DL — SIGNIFICANT CHANGE UP (ref 70–99)
HCT VFR BLD CALC: 32.3 % — LOW (ref 34.5–45)
HEMATOPATHOLOGY REPORT: SIGNIFICANT CHANGE UP
HGB BLD-MCNC: 10.6 G/DL — LOW (ref 11.5–15.5)
LDH SERPL L TO P-CCNC: 472 U/L — HIGH (ref 50–242)
LYMPHOCYTES # BLD AUTO: 1.63 K/UL — SIGNIFICANT CHANGE UP (ref 1–3.3)
LYMPHOCYTES # BLD AUTO: 26.7 % — SIGNIFICANT CHANGE UP (ref 13–44)
MAGNESIUM SERPL-MCNC: 2.1 MG/DL — SIGNIFICANT CHANGE UP (ref 1.6–2.6)
MANUAL SMEAR VERIFICATION: SIGNIFICANT CHANGE UP
MCHC RBC-ENTMCNC: 29.3 PG — SIGNIFICANT CHANGE UP (ref 27–34)
MCHC RBC-ENTMCNC: 32.8 GM/DL — SIGNIFICANT CHANGE UP (ref 32–36)
MCV RBC AUTO: 89.2 FL — SIGNIFICANT CHANGE UP (ref 80–100)
MONOCYTES # BLD AUTO: 0.05 K/UL — SIGNIFICANT CHANGE UP (ref 0–0.9)
MONOCYTES NFR BLD AUTO: 0.8 % — LOW (ref 2–14)
MRSA PCR RESULT.: SIGNIFICANT CHANGE UP
NEUTROPHILS # BLD AUTO: 0.66 K/UL — LOW (ref 1.8–7.4)
NEUTROPHILS NFR BLD AUTO: 9.2 % — LOW (ref 43–77)
NEUTS BAND # BLD: 1.7 % — SIGNIFICANT CHANGE UP (ref 0–8)
NRBC # BLD: 2 /100 — HIGH (ref 0–0)
PHOSPHATE SERPL-MCNC: 3.5 MG/DL — SIGNIFICANT CHANGE UP (ref 2.5–4.5)
PLAT MORPH BLD: NORMAL — SIGNIFICANT CHANGE UP
PLATELET # BLD AUTO: 33 K/UL — LOW (ref 150–400)
POTASSIUM SERPL-MCNC: 4 MMOL/L — SIGNIFICANT CHANGE UP (ref 3.5–5.3)
POTASSIUM SERPL-SCNC: 4 MMOL/L — SIGNIFICANT CHANGE UP (ref 3.5–5.3)
PROT SERPL-MCNC: 6.6 G/DL — SIGNIFICANT CHANGE UP (ref 6–8.3)
RBC # BLD: 3.62 M/UL — LOW (ref 3.8–5.2)
RBC # FLD: 14 % — SIGNIFICANT CHANGE UP (ref 10.3–14.5)
RBC BLD AUTO: SIGNIFICANT CHANGE UP
S AUREUS DNA NOSE QL NAA+PROBE: SIGNIFICANT CHANGE UP
SARS-COV-2 RNA SPEC QL NAA+PROBE: SIGNIFICANT CHANGE UP
SODIUM SERPL-SCNC: 139 MMOL/L — SIGNIFICANT CHANGE UP (ref 135–145)
TM INTERPRETATION: SIGNIFICANT CHANGE UP
URATE SERPL-MCNC: 4.8 MG/DL — SIGNIFICANT CHANGE UP (ref 2.5–7)
WBC # BLD: 6.1 K/UL — SIGNIFICANT CHANGE UP (ref 3.8–10.5)
WBC # FLD AUTO: 6.1 K/UL — SIGNIFICANT CHANGE UP (ref 3.8–10.5)

## 2023-03-01 PROCEDURE — 99233 SBSQ HOSP IP/OBS HIGH 50: CPT

## 2023-03-01 PROCEDURE — 74177 CT ABD & PELVIS W/CONTRAST: CPT | Mod: 26

## 2023-03-01 PROCEDURE — 99231 SBSQ HOSP IP/OBS SF/LOW 25: CPT

## 2023-03-01 RX ORDER — CHLORHEXIDINE GLUCONATE 213 G/1000ML
1 SOLUTION TOPICAL
Refills: 0 | Status: DISCONTINUED | OUTPATIENT
Start: 2023-03-01 | End: 2023-03-14

## 2023-03-01 RX ADMIN — ESCITALOPRAM OXALATE 10 MILLIGRAM(S): 10 TABLET, FILM COATED ORAL at 11:57

## 2023-03-01 RX ADMIN — BRIMONIDINE TARTRATE 1 DROP(S): 2 SOLUTION/ DROPS OPHTHALMIC at 05:38

## 2023-03-01 RX ADMIN — GABAPENTIN 300 MILLIGRAM(S): 400 CAPSULE ORAL at 05:37

## 2023-03-01 RX ADMIN — Medication 100 MILLIGRAM(S): at 11:57

## 2023-03-01 RX ADMIN — Medication 400 MILLIGRAM(S): at 05:37

## 2023-03-01 RX ADMIN — CASPOFUNGIN ACETATE 260 MILLIGRAM(S): 7 INJECTION, POWDER, LYOPHILIZED, FOR SOLUTION INTRAVENOUS at 12:00

## 2023-03-01 RX ADMIN — SIMVASTATIN 40 MILLIGRAM(S): 20 TABLET, FILM COATED ORAL at 21:23

## 2023-03-01 RX ADMIN — Medication 650 MILLIGRAM(S): at 06:48

## 2023-03-01 RX ADMIN — ONDANSETRON 8 MILLIGRAM(S): 8 TABLET, FILM COATED ORAL at 12:44

## 2023-03-01 RX ADMIN — PANTOPRAZOLE SODIUM 40 MILLIGRAM(S): 20 TABLET, DELAYED RELEASE ORAL at 05:38

## 2023-03-01 RX ADMIN — GABAPENTIN 300 MILLIGRAM(S): 400 CAPSULE ORAL at 17:03

## 2023-03-01 RX ADMIN — Medication 650 MILLIGRAM(S): at 06:18

## 2023-03-01 RX ADMIN — LOSARTAN POTASSIUM 100 MILLIGRAM(S): 100 TABLET, FILM COATED ORAL at 05:37

## 2023-03-01 RX ADMIN — Medication 650 MILLIGRAM(S): at 18:27

## 2023-03-01 RX ADMIN — CHLORHEXIDINE GLUCONATE 1 APPLICATION(S): 213 SOLUTION TOPICAL at 13:01

## 2023-03-01 RX ADMIN — Medication 400 MILLIGRAM(S): at 17:03

## 2023-03-01 RX ADMIN — Medication 650 MILLIGRAM(S): at 19:02

## 2023-03-01 RX ADMIN — SODIUM CHLORIDE 20 MILLILITER(S): 9 INJECTION INTRAMUSCULAR; INTRAVENOUS; SUBCUTANEOUS at 21:23

## 2023-03-01 NOTE — PROGRESS NOTE ADULT - NS ATTEND AMEND GEN_ALL_CORE FT
63 year old with HLD and HTN, transferred b/c of circulating blasts consistent with acute leukemia.    PB flow cytometry showing B-ALL  FISH negative for Ph chromosome  Plan for BMbx today with IR -was unable to get at bedside yesterday  Echo done EF 55%  will need PICC line  Transfuse for plts <10, Hgb <7  supportive care 63 year old with HLD and HTN, transferred b/c of circulating blasts consistent with acute leukemia.    PB flow cytometry showing B-ALL  FISH negative for Ph chromosome  BMbx done 2/28 with IR (was unable to get at bedside) -f/u results  Echo done EF 55%  PICC line placed  Transaminitis today -US done at [facility]. Will check CT A/P -can also assess for LAD. CTA done at [facility] w/ shotty LAD  Transfuse for plts <10, Hgb <7  supportive care

## 2023-03-01 NOTE — PROGRESS NOTE ADULT - SUBJECTIVE AND OBJECTIVE BOX
pt seen 3/1/2023 at TIME- 11:19 AM     Neuroradiology Follow-Up Note    This is a 63y Female s/p fluoroscopically guided lumbar puncture with administration of chemotherapy (methotrexate 15 mg) on 2/28/2023 in Neuroradiology.     S: Patient seen and examined @ bedside. No complaints offered. Pt denies headaches or back pain at the LP procedure site.    Medication:   acyclovir   Oral Tab/Cap: (03-01)  levoFLOXacin  Tablet: (02-28)  losartan: (03-01)    Vitals:   T(F): 99, Max: 99 (00:29)  HR: 64  BP: 147/78  RR: 18  SpO2: 98%    Physical Exam:  General: Nontoxic, in NAD, Alert and awake  Lower Back (procedure site): no bleeding or hematoma, + scab. bandaid was already removed, site is non-tender      LABS:  WBC 6.10 / Hgb 10.6 / Hct 32.3 / Plt 33  Na -- / K -- / CO2 -- / Cl -- / BUN -- / Cr -- / Glucose --  ALT -- / AST -- / Alk Phos -- / Tbili --  Ptt -- / Pt -- / INR --                            10.6   6.10  )-----------( 33       ( 01 Mar 2023 07:05 )             32.3     03-01    139  |  103  |  14  ----------------------------<  94  4.0   |  23  |  0.91    Ca    8.8      01 Mar 2023 06:59  Phos  3.5     03-01  Mg     2.1     03-01    TPro  6.6  /  Alb  3.4  /  TBili  0.9  /  DBili  x   /  AST  124<H>  /  ALT  106<H>  /  AlkPhos  236<H>  03-01    PT/INR - ( 27 Feb 2023 22:32 )   PT: 13.6 sec;   INR: 1.17 ratio    PTT - ( 27 Feb 2023 22:32 )  PTT:28.4 sec    Assessment/Plan:  63y Female with acute lymphoblastic leukemia s/p fluoroscopically guided lumbar puncture with administration of chemotherapy (methotrexate 15 mg) on 2/28/2023 in Neuroradiology.    -continue global management per primary team, Hem/Onc team  -CSF results to be f/u by Hem/Onc team  -Neuroradiology will sign off. Please re-consult PRN.     Please call Neuroradiology at extension [phone] or [phone] and request the procedural Neuroradiology attending or PA with any questions, concerns, or issues regarding above.      MIHAELA Ma  Available on [employer] Teams  Spectralink [phone]  Ext [phone]

## 2023-03-01 NOTE — PROGRESS NOTE ADULT - PROBLEM SELECTOR PLAN 2
Patient is neutropenic, afebrile  2/28 added Levaquin, Caspofungin and acyclovir PPX  If fever, pan culture & CXR and switch Levaquin to Cefepime   2/28 mild cellulitis left elbow post PIV infiltration, warm compress and monitor closely

## 2023-03-01 NOTE — PROGRESS NOTE ADULT - ASSESSMENT
64 y/o with PMHx of HTN, HLD presents to the ED BIBA transferred from [facility] for new diagnosis of leukemia, peripheral blood flow+ B ALL. Patient has pancytopenia  due to disease.

## 2023-03-01 NOTE — PROGRESS NOTE ADULT - SUBJECTIVE AND OBJECTIVE BOX
Interventional Radiology Follow-Up Note.     Patient seen and examined @ bedside around 7am.    This is a 63y Female s/p left iliac bone marrow biopsy on 2/28/23 in Interventional Radiology with Dr. Frost.     No complaint offered.      Medication:   acyclovir   Oral Tab/Cap: (03-01)  levoFLOXacin  Tablet: (02-28)  losartan: (03-01)    Vitals:   T(F): 98.7, Max: 99 (07:50)  HR: 63  BP: 154/74  RR: 18  SpO2: 94%    Physical Exam:  General: Nontoxic, in NAD.  Back: biopsy dressing c/d/i. Soft with no evidence of hematoma noted. No ttp      Aspartate Aminotransferase (AST/SGOT): 35 U/L (02-28-23 @ 04:19)  Alanine Aminotransferase (ALT/SGPT): 31 U/L (02-28-23 @ 04:19)  Aspartate Aminotransferase (AST/SGOT): 29 U/L (02-27-23 @ 07:42)  Alanine Aminotransferase (ALT/SGPT): 26 U/L (02-27-23 @ 07:42)        LABS:  Na: 141 (02-28 @ 04:19), 138 (02-27 @ 07:42)  K: 3.9 (02-28 @ 04:19), 4.2 (02-27 @ 07:42)  Cl: 107 (02-28 @ 04:19), 106 (02-27 @ 07:42)  CO2: 23 (02-28 @ 04:19), 21 (02-27 @ 07:42)  BUN: 15 (02-28 @ 04:19), 15 (02-27 @ 07:42)  Cr: 0.93 (02-28 @ 04:19), 0.89 (02-27 @ 07:42)  Glu: 96(02-28 @ 04:19), 95(02-27 @ 07:42)    Hgb: 10.4 (02-28 @ 04:19), 10.6 (02-27 @ 22:31)  Hct: 30.2 (02-28 @ 04:19), 30.8 (02-27 @ 22:31)  WBC: 11.28 (02-28 @ 04:19), 14.91 (02-27 @ 22:31)  Plt: 49 (02-28 @ 09:21), 33 (02-28 @ 04:19), 40 (02-27 @ 22:31)    INR: 1.17 02-27-23 @ 22:32  PTT: 28.4 02-27-23 @ 22:32          LIVER FUNCTIONS - ( 28 Feb 2023 04:19 )  Alb: 3.2 g/dL / Pro: 6.3 g/dL / ALK PHOS: 115 U/L / ALT: 31 U/L / AST: 35 U/L / GGT: x                    Assessment/Plan:  63y Female with PMHx of HTN, HLD presents to the ED BIBA transferred from [facility] for new diagnosis of leukemia. Patient is thrombocytopenic and anemic due to disease. Pt with acute lymphocytic leukemia.   Pt most recently s/p left iliac bone marrow biopsy on 2/28/23 in Interventional Radiology.      - f/u cytopathology  - IR will sign off.     Please call IR at  [phone] with any questions, concerns, or issues regarding above.    Also available on Teams.

## 2023-03-01 NOTE — PROGRESS NOTE ADULT - PROBLEM SELECTOR PLAN 1
New  diagnosis of ALL given findings on peripheral flow cytometry.  Monitor CBC, CMP, TLS labs BID, fu g6pd, echo done 2/27 EF 55%, HLA sent 2/28  On allopurinol  Hepatitis Panel Neg. Hep B Core neg. HIV negative.  Transfuse PRN. maintain Hgb >7, plts >15.  Gabapentin for splenic/side pain.  2/28 s/p BMbx in IR due to body habitus, fu result. Also sent to [facility]  2/28 s/p LP with IT MTX, fu flow   Follow up BCR-ABL.   2/25 - CTA from [facility] (-) for PE. Left sided pain likely due to splenomegaly.  Plan for PICC line at bedside and LP 2/28 plt ordered. New  diagnosis of ALL given findings on peripheral flow cytometry.  Monitor CBC, CMP, TLS labs BID, fu g6pd, echo done 2/27 EF 55%, HLA sent 2/28  On allopurinol  Hepatitis Panel Neg. Hep B Core neg. HIV negative.  Transfuse PRN. maintain Hgb >7, plts >15.  Gabapentin for splenic/side pain.  2/28 s/p BMbx in IR due to body habitus, fu result. Also sent to [facility]  2/28 s/p LP with IT MTX, fu flow   Follow up BCR-ABL.   2/25 - CTA from [facility] (-) for PE. Left sided pain likely due to splenomegaly.   PICC line at bedside 2/28

## 2023-03-01 NOTE — PROGRESS NOTE ADULT - SUBJECTIVE AND OBJECTIVE BOX
Diagnosis: B ALL     Protocol/Chemo Regimen: TBA    Day: NA     Pt endorsed: left elbow pain s/p PIV infiltration     Review of Systems: Patient denied nausea, vomiting, odynophagia, chest pain, cough, dyspnea, abdominal pain, constipation, diarrhea, rash, fatigue, headache    Pain scale: not graded  Left elbow    Diet: DASH/TLC     Allergies    sulfa drugs (Unknown)      ANTIMICROBIALS  acyclovir   Oral Tab/Cap 400 milliGRAM(s) Oral two times a day  caspofungin IVPB      caspofungin IVPB 50 milliGRAM(s) IV Intermittent every 24 hours  levoFLOXacin  Tablet 500 milliGRAM(s) Oral every 24 hours      HEME/ONC MEDICATIONS  methotrexate PF IntraThecal (eMAR) 15 milliGRAM(s) IntraThecal once      STANDING MEDICATIONS  allopurinol 100 milliGRAM(s) Oral daily  brimonidine 0.2% Ophthalmic Solution 1 Drop(s) Both EYES three times a day  escitalopram 10 milliGRAM(s) Oral daily  gabapentin 300 milliGRAM(s) Oral two times a day  losartan 100 milliGRAM(s) Oral daily  pantoprazole    Tablet 40 milliGRAM(s) Oral before breakfast  simvastatin 40 milliGRAM(s) Oral at bedtime  sodium chloride 0.9%. 1000 milliLiter(s) IV Continuous <Continuous>      PRN MEDICATIONS  acetaminophen     Tablet .. 650 milliGRAM(s) Oral every 6 hours PRN  ondansetron Injectable 8 milliGRAM(s) IV Push every 8 hours PRN        Vital Signs Last 24 Hrs  T(C): 37.1 (01 Mar 2023 06:00), Max: 37.2 (01 Mar 2023 00:29)  T(F): 98.7 (01 Mar 2023 06:00), Max: 99 (01 Mar 2023 00:29)  HR: 63 (01 Mar 2023 06:00) (54 - 69)  BP: 154/74 (01 Mar 2023 06:00) (149/75 - 169/85)  BP(mean): 94 (28 Feb 2023 14:40) (86 - 95)  RR: 18 (01 Mar 2023 06:00) (16 - 20)  SpO2: 94% (01 Mar 2023 06:00) (93% - 100%)    Parameters below as of 01 Mar 2023 06:00  Patient On (Oxygen Delivery Method): room air    PHYSICAL EXAM  General: adult in NAD  HEENT: clear oropharynx, anicteric sclera. black dot left back of hard palate   CV: normal S1/S2 RRR  Lungs: positive air movement b/l ant lungs,clear to auscultation, no wheezes, no rales  Abdomen: soft, tender on plaption of spleen, non-distended, splenomegaly  Ext: no LE edema; left elbow with swelling erythema, mild tender to palpation   Skin: no rashes  Neuro: alert and oriented X 3, no focal deficits  PIV CDI      LABS:                        10.6   6.10  )-----------( 33       ( 01 Mar 2023 07:05 )             32.3         Mean Cell Volume : 89.2 fl  Mean Cell Hemoglobin : 29.3 pg  Mean Cell Hemoglobin Concentration : 32.8 gm/dL  Auto Neutrophil # : x  Auto Lymphocyte # : x  Auto Monocyte # : x  Auto Eosinophil # : x  Auto Basophil # : x  Auto Neutrophil % : x  Auto Lymphocyte % : x  Auto Monocyte % : x  Auto Eosinophil % : x  Auto Basophil % : x      03-01    139  |  103  |  14  ----------------------------<  94  4.0   |  23  |  0.91    Ca    8.8      01 Mar 2023 06:59  Phos  3.5     03-01  Mg     2.1     03-01    TPro  6.6  /  Alb  3.4  /  TBili  0.9  /  DBili  x   /  AST  124<H>  /  ALT  106<H>  /  AlkPhos  236<H>  03-01  PT/INR - ( 27 Feb 2023 22:32 )   PT: 13.6 sec;   INR: 1.17 ratio    PTT - ( 27 Feb 2023 22:32 )  PTT:28.4 sec    Uric Acid 4.8                 Diagnosis: B ALL     Protocol/Chemo Regimen: TBA    Day: NA     Pt endorsed: No complaints    Review of Systems: Patient denied nausea, vomiting, odynophagia, chest pain, cough, dyspnea, abdominal pain, constipation, diarrhea, rash, fatigue, headache    Pain scale: not graded  Left elbow    Diet: DASH/TLC     Allergies    sulfa drugs (Unknown)      ANTIMICROBIALS  acyclovir   Oral Tab/Cap 400 milliGRAM(s) Oral two times a day  caspofungin IVPB      caspofungin IVPB 50 milliGRAM(s) IV Intermittent every 24 hours  levoFLOXacin  Tablet 500 milliGRAM(s) Oral every 24 hours      HEME/ONC MEDICATIONS  methotrexate PF IntraThecal (eMAR) 15 milliGRAM(s) IntraThecal once      STANDING MEDICATIONS  allopurinol 100 milliGRAM(s) Oral daily  brimonidine 0.2% Ophthalmic Solution 1 Drop(s) Both EYES three times a day  escitalopram 10 milliGRAM(s) Oral daily  gabapentin 300 milliGRAM(s) Oral two times a day  losartan 100 milliGRAM(s) Oral daily  pantoprazole    Tablet 40 milliGRAM(s) Oral before breakfast  simvastatin 40 milliGRAM(s) Oral at bedtime  sodium chloride 0.9%. 1000 milliLiter(s) IV Continuous <Continuous>      PRN MEDICATIONS  acetaminophen     Tablet .. 650 milliGRAM(s) Oral every 6 hours PRN  ondansetron Injectable 8 milliGRAM(s) IV Push every 8 hours PRN        Vital Signs Last 24 Hrs  T(C): 37.1 (01 Mar 2023 06:00), Max: 37.2 (01 Mar 2023 00:29)  T(F): 98.7 (01 Mar 2023 06:00), Max: 99 (01 Mar 2023 00:29)  HR: 63 (01 Mar 2023 06:00) (54 - 69)  BP: 154/74 (01 Mar 2023 06:00) (149/75 - 169/85)  BP(mean): 94 (28 Feb 2023 14:40) (86 - 95)  RR: 18 (01 Mar 2023 06:00) (16 - 20)  SpO2: 94% (01 Mar 2023 06:00) (93% - 100%)    Parameters below as of 01 Mar 2023 06:00  Patient On (Oxygen Delivery Method): room air    PHYSICAL EXAM  General: adult in NAD  HEENT: clear oropharynx, anicteric sclera. black dot left back of hard palate   CV: normal S1/S2 RRR  Lungs: positive air movement b/l ant lungs,clear to auscultation, no wheezes, no rales  Abdomen: soft, tender on plaption of spleen, non-distended, splenomegaly  Ext: no LE edema; left elbow with swelling erythema, mild tender to palpation   Skin: no rashes  Neuro: alert and oriented X 3, no focal deficits  PIV CDI      LABS:                        10.6   6.10  )-----------( 33       ( 01 Mar 2023 07:05 )             32.3         Mean Cell Volume : 89.2 fl  Mean Cell Hemoglobin : 29.3 pg  Mean Cell Hemoglobin Concentration : 32.8 gm/dL  Auto Neutrophil # : x  Auto Lymphocyte # : x  Auto Monocyte # : x  Auto Eosinophil # : x  Auto Basophil # : x  Auto Neutrophil % : x  Auto Lymphocyte % : x  Auto Monocyte % : x  Auto Eosinophil % : x  Auto Basophil % : x      03-01    139  |  103  |  14  ----------------------------<  94  4.0   |  23  |  0.91    Ca    8.8      01 Mar 2023 06:59  Phos  3.5     03-01  Mg     2.1     03-01    TPro  6.6  /  Alb  3.4  /  TBili  0.9  /  DBili  x   /  AST  124<H>  /  ALT  106<H>  /  AlkPhos  236<H>  03-01  PT/INR - ( 27 Feb 2023 22:32 )   PT: 13.6 sec;   INR: 1.17 ratio    PTT - ( 27 Feb 2023 22:32 )  PTT:28.4 sec    Uric Acid 4.8

## 2023-03-02 LAB
ALBUMIN SERPL ELPH-MCNC: 3.3 G/DL — SIGNIFICANT CHANGE UP (ref 3.3–5)
ALP SERPL-CCNC: 248 U/L — HIGH (ref 40–120)
ALT FLD-CCNC: 134 U/L — HIGH (ref 10–45)
ANION GAP SERPL CALC-SCNC: 9 MMOL/L — SIGNIFICANT CHANGE UP (ref 5–17)
AST SERPL-CCNC: 115 U/L — HIGH (ref 10–40)
BASOPHILS # BLD AUTO: 0 K/UL — SIGNIFICANT CHANGE UP (ref 0–0.2)
BASOPHILS NFR BLD AUTO: 0 % — SIGNIFICANT CHANGE UP (ref 0–2)
BILIRUB SERPL-MCNC: 0.6 MG/DL — SIGNIFICANT CHANGE UP (ref 0.2–1.2)
BLASTS # FLD: 61.2 % — HIGH (ref 0–0)
BUN SERPL-MCNC: 16 MG/DL — SIGNIFICANT CHANGE UP (ref 7–23)
CALCIUM SERPL-MCNC: 9 MG/DL — SIGNIFICANT CHANGE UP (ref 8.4–10.5)
CHLORIDE SERPL-SCNC: 103 MMOL/L — SIGNIFICANT CHANGE UP (ref 96–108)
CO2 SERPL-SCNC: 26 MMOL/L — SIGNIFICANT CHANGE UP (ref 22–31)
CREAT SERPL-MCNC: 1.13 MG/DL — SIGNIFICANT CHANGE UP (ref 0.5–1.3)
EGFR: 54 ML/MIN/1.73M2 — LOW
EOSINOPHIL # BLD AUTO: 0.05 K/UL — SIGNIFICANT CHANGE UP (ref 0–0.5)
EOSINOPHIL NFR BLD AUTO: 0.9 % — SIGNIFICANT CHANGE UP (ref 0–6)
GIANT PLATELETS BLD QL SMEAR: PRESENT — SIGNIFICANT CHANGE UP
GLUCOSE SERPL-MCNC: 96 MG/DL — SIGNIFICANT CHANGE UP (ref 70–99)
HCT VFR BLD CALC: 30.4 % — LOW (ref 34.5–45)
HGB BLD-MCNC: 10.4 G/DL — LOW (ref 11.5–15.5)
LDH SERPL L TO P-CCNC: 426 U/L — HIGH (ref 50–242)
LYMPHOCYTES # BLD AUTO: 1.42 K/UL — SIGNIFICANT CHANGE UP (ref 1–3.3)
LYMPHOCYTES # BLD AUTO: 24.1 % — SIGNIFICANT CHANGE UP (ref 13–44)
MAGNESIUM SERPL-MCNC: 2 MG/DL — SIGNIFICANT CHANGE UP (ref 1.6–2.6)
MANUAL SMEAR VERIFICATION: SIGNIFICANT CHANGE UP
MCHC RBC-ENTMCNC: 30.7 PG — SIGNIFICANT CHANGE UP (ref 27–34)
MCHC RBC-ENTMCNC: 34.2 GM/DL — SIGNIFICANT CHANGE UP (ref 32–36)
MCV RBC AUTO: 89.7 FL — SIGNIFICANT CHANGE UP (ref 80–100)
METAMYELOCYTES # FLD: 0.9 % — HIGH (ref 0–0)
MONOCYTES # BLD AUTO: 0 K/UL — SIGNIFICANT CHANGE UP (ref 0–0.9)
MONOCYTES NFR BLD AUTO: 0 % — LOW (ref 2–14)
NEUTROPHILS # BLD AUTO: 0.76 K/UL — LOW (ref 1.8–7.4)
NEUTROPHILS NFR BLD AUTO: 12.9 % — LOW (ref 43–77)
NRBC # BLD: 2 /100 — HIGH (ref 0–0)
PHOSPHATE SERPL-MCNC: 3.9 MG/DL — SIGNIFICANT CHANGE UP (ref 2.5–4.5)
PLAT MORPH BLD: NORMAL — SIGNIFICANT CHANGE UP
PLATELET # BLD AUTO: 21 K/UL — LOW (ref 150–400)
POTASSIUM SERPL-MCNC: 4 MMOL/L — SIGNIFICANT CHANGE UP (ref 3.5–5.3)
POTASSIUM SERPL-SCNC: 4 MMOL/L — SIGNIFICANT CHANGE UP (ref 3.5–5.3)
PROT SERPL-MCNC: 6.5 G/DL — SIGNIFICANT CHANGE UP (ref 6–8.3)
RBC # BLD: 3.39 M/UL — LOW (ref 3.8–5.2)
RBC # FLD: 14.3 % — SIGNIFICANT CHANGE UP (ref 10.3–14.5)
RBC BLD AUTO: SIGNIFICANT CHANGE UP
SODIUM SERPL-SCNC: 138 MMOL/L — SIGNIFICANT CHANGE UP (ref 135–145)
URATE SERPL-MCNC: 5.8 MG/DL — SIGNIFICANT CHANGE UP (ref 2.5–7)
WBC # BLD: 5.89 K/UL — SIGNIFICANT CHANGE UP (ref 3.8–10.5)
WBC # FLD AUTO: 5.89 K/UL — SIGNIFICANT CHANGE UP (ref 3.8–10.5)

## 2023-03-02 PROCEDURE — G0452: CPT | Mod: 26

## 2023-03-02 PROCEDURE — 99233 SBSQ HOSP IP/OBS HIGH 50: CPT

## 2023-03-02 RX ORDER — GABAPENTIN 400 MG/1
300 CAPSULE ORAL EVERY 8 HOURS
Refills: 0 | Status: DISCONTINUED | OUTPATIENT
Start: 2023-03-02 | End: 2023-03-16

## 2023-03-02 RX ORDER — CEFAZOLIN SODIUM 1 G
2000 VIAL (EA) INJECTION EVERY 8 HOURS
Refills: 0 | Status: DISCONTINUED | OUTPATIENT
Start: 2023-03-02 | End: 2023-03-03

## 2023-03-02 RX ADMIN — CHLORHEXIDINE GLUCONATE 1 APPLICATION(S): 213 SOLUTION TOPICAL at 10:39

## 2023-03-02 RX ADMIN — SIMVASTATIN 40 MILLIGRAM(S): 20 TABLET, FILM COATED ORAL at 20:04

## 2023-03-02 RX ADMIN — ESCITALOPRAM OXALATE 10 MILLIGRAM(S): 10 TABLET, FILM COATED ORAL at 11:56

## 2023-03-02 RX ADMIN — Medication 400 MILLIGRAM(S): at 05:42

## 2023-03-02 RX ADMIN — SODIUM CHLORIDE 20 MILLILITER(S): 9 INJECTION INTRAMUSCULAR; INTRAVENOUS; SUBCUTANEOUS at 22:06

## 2023-03-02 RX ADMIN — ONDANSETRON 8 MILLIGRAM(S): 8 TABLET, FILM COATED ORAL at 16:49

## 2023-03-02 RX ADMIN — CASPOFUNGIN ACETATE 260 MILLIGRAM(S): 7 INJECTION, POWDER, LYOPHILIZED, FOR SOLUTION INTRAVENOUS at 11:55

## 2023-03-02 RX ADMIN — GABAPENTIN 300 MILLIGRAM(S): 400 CAPSULE ORAL at 05:42

## 2023-03-02 RX ADMIN — Medication 400 MILLIGRAM(S): at 16:58

## 2023-03-02 RX ADMIN — GABAPENTIN 300 MILLIGRAM(S): 400 CAPSULE ORAL at 22:05

## 2023-03-02 RX ADMIN — ONDANSETRON 8 MILLIGRAM(S): 8 TABLET, FILM COATED ORAL at 05:46

## 2023-03-02 RX ADMIN — LOSARTAN POTASSIUM 100 MILLIGRAM(S): 100 TABLET, FILM COATED ORAL at 05:42

## 2023-03-02 RX ADMIN — Medication 650 MILLIGRAM(S): at 17:30

## 2023-03-02 RX ADMIN — GABAPENTIN 300 MILLIGRAM(S): 400 CAPSULE ORAL at 13:59

## 2023-03-02 RX ADMIN — BRIMONIDINE TARTRATE 1 DROP(S): 2 SOLUTION/ DROPS OPHTHALMIC at 05:43

## 2023-03-02 RX ADMIN — Medication 650 MILLIGRAM(S): at 10:40

## 2023-03-02 RX ADMIN — Medication 100 MILLIGRAM(S): at 12:29

## 2023-03-02 RX ADMIN — PANTOPRAZOLE SODIUM 40 MILLIGRAM(S): 20 TABLET, DELAYED RELEASE ORAL at 05:42

## 2023-03-02 RX ADMIN — Medication 650 MILLIGRAM(S): at 16:51

## 2023-03-02 RX ADMIN — Medication 650 MILLIGRAM(S): at 11:40

## 2023-03-02 NOTE — PROGRESS NOTE ADULT - ASSESSMENT
64 y/o with PMHx of HTN, HLD presents to the ED BIBA transferred from [facility] for new diagnosis of leukemia, peripheral blood flow+ B ALL. Patient has pancytopenia  due to disease.

## 2023-03-02 NOTE — PROGRESS NOTE ADULT - PROBLEM SELECTOR PLAN 6
Monitor closely   FU CT abd/pelvis Monitor closely   3/1 CT abd/pelvis: Splenomegaly with a small age indeterminant splenic infarct. No abdominal or pelvic lymphadenopathy. Trace left pleural effusion

## 2023-03-02 NOTE — PROGRESS NOTE ADULT - NS ATTEND AMEND GEN_ALL_CORE FT
63 year old with HLD and HTN, transferred b/c of circulating blasts consistent with acute leukemia.    PB flow cytometry showing B-ALL  FISH negative for Ph chromosome  BMbx done 2/28 with IR (was unable to get at bedside) -f/u results  Echo done EF 55%  PICC line placed  Transaminitis today -US done at [place]. Will check CT A/P -can also assess for LAD. CTA done at [place] w/ shotty LAD  Transfuse for plts <10, Hgb <7  supportive care 63 year old with HLD and HTN, transferred b/c of circulating blasts consistent with acute leukemia.    PB flow cytometry showing B-ALL  FISH negative for Ph chromosome. Awaiting PCR  BMbx done 2/28 with IR (was unable to get at bedside) -f/u results  Echo done EF 55%  PICC line placed  Transaminitis -US done at [place] with fatty liver  CT A/P -Splenomegaly with a small age indeterminant splenic infarct. Normal liver, no abdominal or pelvic lymphadenopathy. CTA done at [place] w/ shotty LAD  Transfuse for plts <10, Hgb <7  supportive care

## 2023-03-02 NOTE — PROGRESS NOTE ADULT - SUBJECTIVE AND OBJECTIVE BOX
Diagnosis: B ALL     Protocol/Chemo Regimen: TBA    Day: NA     Pt endorsed: No complaints    Review of Systems: Denies nausea, vomiting, diarrhea, chest pain, SOB     Pain scale: not graded  Left elbow    Diet: DASH/TLC     Allergies: sulfa drugs (Unknown)      ----------------------           Diagnosis: B ALL     Protocol/Chemo Regimen: TBA    Day: N/A     Pt endorsed: No acute complaints    Review of Systems: Denies nausea, vomiting, diarrhea, chest pain, SOB     Pain scale: not graded  Left elbow    Diet: DASH/TLC     Allergies: sulfa drugs (Unknown)    ANTIMICROBIALS  acyclovir   Oral Tab/Cap 400 milliGRAM(s) Oral two times a day      caspofungin IVPB 50 milliGRAM(s) IV Intermittent every 24 hours  levoFLOXacin  Tablet 500 milliGRAM(s) Oral every 24 hours    HEME/ONC MEDICATIONS  methotrexate PF IntraThecal (eMAR) 15 milliGRAM(s) IntraThecal once    STANDING MEDICATIONS  allopurinol 100 milliGRAM(s) Oral daily  brimonidine 0.2% Ophthalmic Solution 1 Drop(s) Both EYES three times a day  chlorhexidine 4% Liquid 1 Application(s) Topical <User Schedule>  escitalopram 10 milliGRAM(s) Oral daily  gabapentin 300 milliGRAM(s) Oral every 8 hours  losartan 100 milliGRAM(s) Oral daily  pantoprazole    Tablet 40 milliGRAM(s) Oral before breakfast  simvastatin 40 milliGRAM(s) Oral at bedtime  sodium chloride 0.9%. 1000 milliLiter(s) IV Continuous <Continuous>    PRN MEDICATIONS  acetaminophen     Tablet .. 650 milliGRAM(s) Oral every 6 hours PRN  ondansetron Injectable 8 milliGRAM(s) IV Push every 8 hours PRN    Vital Signs Last 24 Hrs  T(C): 37.2 (02 Mar 2023 09:30), Max: 37.2 (02 Mar 2023 09:30)  T(F): 98.9 (02 Mar 2023 09:30), Max: 98.9 (02 Mar 2023 09:30)  HR: 79 (02 Mar 2023 09:30) (64 - 79)  BP: 105/66 (02 Mar 2023 09:30) (105/66 - 174/81)  BP(mean): --  RR: 19 (02 Mar 2023 09:30) (18 - 20)  SpO2: 92% (02 Mar 2023 09:30) (92% - 94%)    Parameters below as of 02 Mar 2023 09:30  Patient On (Oxygen Delivery Method): room air    PHYSICAL EXAM  General: adult in NAD  HEENT: clear oropharynx, no erythema, no ulcers  Neck: supple  CV: normal S1, S2, RRR  Lungs: clear to auscultation, no wheezes, no rales  Abdomen: soft, nontender, nondistended, normal BS  Ext: no edema  Skin: no rash  Neuro: alert and oriented x 3  Central line: normal     LABS:                        10.4   5.89  )-----------( 21       ( 02 Mar 2023 06:59 )             30.4     Mean Cell Volume : 89.7 fl  Mean Cell Hemoglobin : 30.7 pg  Mean Cell Hemoglobin Concentration : 34.2 gm/dL  Auto Neutrophil # : 0.76 K/uL  Auto Lymphocyte # : 1.42 K/uL  Auto Monocyte # : 0.00 K/uL  Auto Eosinophil # : 0.05 K/uL  Auto Basophil # : 0.00 K/uL  Auto Neutrophil % : 12.9 %  Auto Lymphocyte % : 24.1 %  Auto Monocyte % : 0.0 %  Auto Eosinophil % : 0.9 %  Auto Basophil % : 0.0 %    03-02    138  |  103  |  16  ----------------------------<  96  4.0   |  26  |  1.13    Ca    9.0      02 Mar 2023 06:59  Phos  3.9     03-02  Mg     2.0     03-02    TPro  6.5  /  Alb  3.3  /  TBili  0.6  /  DBili  x   /  AST  115<H>  /  ALT  134<H>  /  AlkPhos  248<H>  03-02    Mg 2.0  Phos 3.9      Uric Acid 5.8

## 2023-03-02 NOTE — CHART NOTE - NSCHARTNOTEFT_GEN_A_CORE
Notified by RN, Pt with increased pain, erythema and warmth to L antecubital fossa concerning for cellulitis. Pt seen and examined at bedside. Pt reports recent PIV that was placed ~ 2days ago and removed due to infiltration. Pt reports pain has not worsened however has been bothersome last 2 days with worsening erythema. No complaints in regards to range of motion. Pt otherwise denies systemic sx, denies headache, chest pain, sob, n/v/d, weakness, dizziness, fever.    Physical Exam:   General: NAD, appears comfortable  Skin: Warm, poorly demarcated area of erythema noted to LEFT antecubital fossa approx. 10cm length x 7cm width, appropriately tender to palpation. No open skin lesions or drainage noted    PLAN:  Concern for LUE Cellulitis  >VS hemodynamically stable  >Start Ancef 2gm q8 hours  >C/w PO Levquin for neutropenia ppx  >If Pt develops systemic sx, consider broadening to Vanco and Cefepime  >Area of erythema marked with pen to ensure not worsening  >Keep elevated, warm compresses PRN  >Consider imaging if pain considerably worsens or system sx develop to r/o abscess  >Will endorse to AM team, attending to follow    Shamika Mathews PA-C  Department of Medicine Notified by RN, Pt with increased pain, erythema and warmth to L antecubital fossa concerning for cellulitis. Pt seen and examined at bedside. Pt reports recent PIV that was placed ~ 2days ago and removed due to infiltration. Pt reports pain has not worsened however has been bothersome last 2 days with worsening erythema. No complaints in regards to range of motion. Pt otherwise denies systemic sx, denies headache, chest pain, sob, n/v/d, weakness, dizziness, fever.    Physical Exam:   General: NAD, appears comfortable  Skin: Warm, poorly demarcated area of erythema noted to LEFT antecubital fossa approx. 10cm length x 7cm width, appropriately tender to palpation. No open skin lesions or drainage noted    PLAN:  Concern for LUE Cellulitis  >VS hemodynamically stable  >Start Ancef 2gm q8 hours  >C/w PO Levquin for neutropenia   >MRSA PCR neg from 3/1  >If Pt develops systemic sx, consider broadening to Vanco and Cefepime  >Area of erythema marked with pen to ensure not worsening  >Keep elevated, warm compresses PRN  >Consider imaging if pain considerably worsens or system sx develop to r/o abscess  >Will endorse to AM team, attending to follow    Shamika Mathews PA-C  Department of Medicine

## 2023-03-02 NOTE — PROGRESS NOTE ADULT - PROBLEM SELECTOR PLAN 1
New  diagnosis of ALL given findings on peripheral flow cytometry.  Monitor CBC, CMP, TLS labs BID, fu g6pd, echo done 2/27 EF 55%, HLA sent 2/28  On allopurinol  Hepatitis Panel Neg. Hep B Core neg. HIV negative.  Transfuse PRN. maintain Hgb >7, plts >15.  Gabapentin for splenic/side pain.  2/28 s/p BMbx in IR due to body habitus, fu result. Also sent to [facility]  2/28 s/p LP with IT MTX, fu flow   Follow up BCR-ABL.   2/25 - CTA from [facility] (-) for PE. Left sided pain likely due to splenomegaly.  PICC line at bedside 2/28 New  diagnosis of ALL given findings on peripheral flow cytometry.  Monitor CBC, CMP, TLS labs BID, fu g6pd, echo done 2/27 EF 55%, HLA sent 2/28  On allopurinol  Hepatitis Panel Neg. Hep B Core neg. HIV negative.  Transfuse PRN. maintain Hgb >7, plts >15.  Gabapentin for splenic/side pain. Increased dose to 300mg PO TID on 3/2   2/28 s/p BMbx in IR due to body habitus, fu result. Also sent to [facility]  2/28 s/p LP with IT MTX, fu flow   Follow up BCR-ABL.   2/25 - CTA from [facility] (-) for PE. Left sided pain likely due to splenomegaly.  PICC line at bedside 2/28  BCR/ABL PCR sent 3/2

## 2023-03-03 LAB
ALBUMIN SERPL ELPH-MCNC: 3.1 G/DL — LOW (ref 3.3–5)
ALP SERPL-CCNC: 225 U/L — HIGH (ref 40–120)
ALT FLD-CCNC: 100 U/L — HIGH (ref 10–45)
ANION GAP SERPL CALC-SCNC: 12 MMOL/L — SIGNIFICANT CHANGE UP (ref 5–17)
APPEARANCE UR: CLEAR — SIGNIFICANT CHANGE UP
APTT BLD: 28.9 SEC — SIGNIFICANT CHANGE UP (ref 27.5–35.5)
AST SERPL-CCNC: 70 U/L — HIGH (ref 10–40)
BACTERIA # UR AUTO: NEGATIVE — SIGNIFICANT CHANGE UP
BASOPHILS # BLD AUTO: 0 K/UL — SIGNIFICANT CHANGE UP (ref 0–0.2)
BASOPHILS NFR BLD AUTO: 0 % — SIGNIFICANT CHANGE UP (ref 0–2)
BCR/ABL BY RT - PCR QUANTITATIVE: SIGNIFICANT CHANGE UP
BCR/ABL BY RT - PCR QUANTITATIVE: SIGNIFICANT CHANGE UP
BILIRUB SERPL-MCNC: 0.7 MG/DL — SIGNIFICANT CHANGE UP (ref 0.2–1.2)
BILIRUB UR-MCNC: NEGATIVE — SIGNIFICANT CHANGE UP
BLASTS # FLD: 54.2 % — HIGH (ref 0–0)
BLD GP AB SCN SERPL QL: NEGATIVE — SIGNIFICANT CHANGE UP
BUN SERPL-MCNC: 14 MG/DL — SIGNIFICANT CHANGE UP (ref 7–23)
CALCIUM SERPL-MCNC: 8.6 MG/DL — SIGNIFICANT CHANGE UP (ref 8.4–10.5)
CHLORIDE SERPL-SCNC: 101 MMOL/L — SIGNIFICANT CHANGE UP (ref 96–108)
CO2 SERPL-SCNC: 22 MMOL/L — SIGNIFICANT CHANGE UP (ref 22–31)
COLOR SPEC: YELLOW — SIGNIFICANT CHANGE UP
CREAT SERPL-MCNC: 1.12 MG/DL — SIGNIFICANT CHANGE UP (ref 0.5–1.3)
DIFF PNL FLD: NEGATIVE — SIGNIFICANT CHANGE UP
EGFR: 55 ML/MIN/1.73M2 — LOW
EOSINOPHIL # BLD AUTO: 0.04 K/UL — SIGNIFICANT CHANGE UP (ref 0–0.5)
EOSINOPHIL NFR BLD AUTO: 0.9 % — SIGNIFICANT CHANGE UP (ref 0–6)
EPI CELLS # UR: 2 /HPF — SIGNIFICANT CHANGE UP
GLUCOSE BLDC GLUCOMTR-MCNC: 145 MG/DL — HIGH (ref 70–99)
GLUCOSE SERPL-MCNC: 101 MG/DL — HIGH (ref 70–99)
GLUCOSE UR QL: NEGATIVE — SIGNIFICANT CHANGE UP
HCT VFR BLD CALC: 28.6 % — LOW (ref 34.5–45)
HGB BLD-MCNC: 9.6 G/DL — LOW (ref 11.5–15.5)
HYALINE CASTS # UR AUTO: 1 /LPF — SIGNIFICANT CHANGE UP (ref 0–2)
INR BLD: 1.34 RATIO — HIGH (ref 0.88–1.16)
KETONES UR-MCNC: NEGATIVE — SIGNIFICANT CHANGE UP
LDH SERPL L TO P-CCNC: 332 U/L — HIGH (ref 50–242)
LEUKOCYTE ESTERASE UR-ACNC: NEGATIVE — SIGNIFICANT CHANGE UP
LYMPHOCYTES # BLD AUTO: 1.67 K/UL — SIGNIFICANT CHANGE UP (ref 1–3.3)
LYMPHOCYTES # BLD AUTO: 33.9 % — SIGNIFICANT CHANGE UP (ref 13–44)
MAGNESIUM SERPL-MCNC: 1.9 MG/DL — SIGNIFICANT CHANGE UP (ref 1.6–2.6)
MANUAL SMEAR VERIFICATION: SIGNIFICANT CHANGE UP
MCHC RBC-ENTMCNC: 30 PG — SIGNIFICANT CHANGE UP (ref 27–34)
MCHC RBC-ENTMCNC: 33.6 GM/DL — SIGNIFICANT CHANGE UP (ref 32–36)
MCV RBC AUTO: 89.4 FL — SIGNIFICANT CHANGE UP (ref 80–100)
MONOCYTES # BLD AUTO: 0 K/UL — SIGNIFICANT CHANGE UP (ref 0–0.9)
MONOCYTES NFR BLD AUTO: 0 % — LOW (ref 2–14)
NEUTROPHILS # BLD AUTO: 0.54 K/UL — LOW (ref 1.8–7.4)
NEUTROPHILS NFR BLD AUTO: 11 % — LOW (ref 43–77)
NITRITE UR-MCNC: NEGATIVE — SIGNIFICANT CHANGE UP
PH UR: 6 — SIGNIFICANT CHANGE UP (ref 5–8)
PHOSPHATE SERPL-MCNC: 4 MG/DL — SIGNIFICANT CHANGE UP (ref 2.5–4.5)
PLAT MORPH BLD: NORMAL — SIGNIFICANT CHANGE UP
PLATELET # BLD AUTO: 15 K/UL — CRITICAL LOW (ref 150–400)
POTASSIUM SERPL-MCNC: 3.9 MMOL/L — SIGNIFICANT CHANGE UP (ref 3.5–5.3)
POTASSIUM SERPL-SCNC: 3.9 MMOL/L — SIGNIFICANT CHANGE UP (ref 3.5–5.3)
PROT SERPL-MCNC: 6.3 G/DL — SIGNIFICANT CHANGE UP (ref 6–8.3)
PROT UR-MCNC: ABNORMAL
PROTHROM AB SERPL-ACNC: 15.5 SEC — HIGH (ref 10.5–13.4)
RBC # BLD: 3.2 M/UL — LOW (ref 3.8–5.2)
RBC # FLD: 14.6 % — HIGH (ref 10.3–14.5)
RBC BLD AUTO: SIGNIFICANT CHANGE UP
RBC CASTS # UR COMP ASSIST: 8 /HPF — HIGH (ref 0–4)
RH IG SCN BLD-IMP: POSITIVE — SIGNIFICANT CHANGE UP
SODIUM SERPL-SCNC: 135 MMOL/L — SIGNIFICANT CHANGE UP (ref 135–145)
SP GR SPEC: 1.03 — HIGH (ref 1.01–1.02)
URATE SERPL-MCNC: 5.3 MG/DL — SIGNIFICANT CHANGE UP (ref 2.5–7)
UROBILINOGEN FLD QL: ABNORMAL
WBC # BLD: 4.94 K/UL — SIGNIFICANT CHANGE UP (ref 3.8–10.5)
WBC # FLD AUTO: 4.94 K/UL — SIGNIFICANT CHANGE UP (ref 3.8–10.5)
WBC UR QL: 4 /HPF — SIGNIFICANT CHANGE UP (ref 0–5)

## 2023-03-03 PROCEDURE — 99233 SBSQ HOSP IP/OBS HIGH 50: CPT | Mod: GC

## 2023-03-03 RX ORDER — CEFEPIME 1 G/1
2000 INJECTION, POWDER, FOR SOLUTION INTRAMUSCULAR; INTRAVENOUS EVERY 8 HOURS
Refills: 0 | Status: DISCONTINUED | OUTPATIENT
Start: 2023-03-03 | End: 2023-03-15

## 2023-03-03 RX ORDER — CYCLOPHOSPHAMIDE 100 MG
300 VIAL (EA) INTRAVENOUS EVERY 12 HOURS
Refills: 0 | Status: COMPLETED | OUTPATIENT
Start: 2023-03-03 | End: 2023-03-06

## 2023-03-03 RX ORDER — DEXAMETHASONE 0.5 MG/5ML
20 ELIXIR ORAL EVERY 24 HOURS
Refills: 0 | Status: COMPLETED | OUTPATIENT
Start: 2023-03-03 | End: 2023-03-06

## 2023-03-03 RX ORDER — SOD SULF/SODIUM/NAHCO3/KCL/PEG
250 SOLUTION, RECONSTITUTED, ORAL ORAL ONCE
Refills: 0 | Status: COMPLETED | OUTPATIENT
Start: 2023-03-03 | End: 2023-03-03

## 2023-03-03 RX ORDER — POLYETHYLENE GLYCOL 3350 17 G/17G
17 POWDER, FOR SOLUTION ORAL
Refills: 0 | Status: DISCONTINUED | OUTPATIENT
Start: 2023-03-03 | End: 2023-03-16

## 2023-03-03 RX ORDER — DIPHENHYDRAMINE HYDROCHLORIDE AND LIDOCAINE HYDROCHLORIDE AND ALUMINUM HYDROXIDE AND MAGNESIUM HYDRO
10 KIT
Refills: 0 | Status: DISCONTINUED | OUTPATIENT
Start: 2023-03-03 | End: 2023-03-16

## 2023-03-03 RX ORDER — VANCOMYCIN HCL 1 G
1000 VIAL (EA) INTRAVENOUS ONCE
Refills: 0 | Status: COMPLETED | OUTPATIENT
Start: 2023-03-03 | End: 2023-03-03

## 2023-03-03 RX ORDER — FOSAPREPITANT DIMEGLUMINE 150 MG/5ML
150 INJECTION, POWDER, LYOPHILIZED, FOR SOLUTION INTRAVENOUS ONCE
Refills: 0 | Status: COMPLETED | OUTPATIENT
Start: 2023-03-03 | End: 2023-03-03

## 2023-03-03 RX ORDER — VINCRISTINE SULFATE 1 MG/ML
2 VIAL (ML) INTRAVENOUS ONCE
Refills: 0 | Status: COMPLETED | OUTPATIENT
Start: 2023-03-03 | End: 2023-03-04

## 2023-03-03 RX ORDER — METOCLOPRAMIDE HCL 10 MG
10 TABLET ORAL EVERY 6 HOURS
Refills: 0 | Status: DISCONTINUED | OUTPATIENT
Start: 2023-03-03 | End: 2023-03-16

## 2023-03-03 RX ORDER — LACTULOSE 10 G/15ML
20 SOLUTION ORAL ONCE
Refills: 0 | Status: COMPLETED | OUTPATIENT
Start: 2023-03-03 | End: 2023-03-03

## 2023-03-03 RX ORDER — ONDANSETRON 8 MG/1
8 TABLET, FILM COATED ORAL EVERY 8 HOURS
Refills: 0 | Status: COMPLETED | OUTPATIENT
Start: 2023-03-03 | End: 2023-03-08

## 2023-03-03 RX ADMIN — Medication 650 MILLIGRAM(S): at 13:34

## 2023-03-03 RX ADMIN — Medication 100 MILLIGRAM(S): at 11:47

## 2023-03-03 RX ADMIN — Medication 400 MILLIGRAM(S): at 06:49

## 2023-03-03 RX ADMIN — Medication 20 MILLIGRAM(S): at 17:01

## 2023-03-03 RX ADMIN — CHLORHEXIDINE GLUCONATE 1 APPLICATION(S): 213 SOLUTION TOPICAL at 07:56

## 2023-03-03 RX ADMIN — LACTULOSE 20 GRAM(S): 10 SOLUTION ORAL at 17:48

## 2023-03-03 RX ADMIN — Medication 650 MILLIGRAM(S): at 00:50

## 2023-03-03 RX ADMIN — CASPOFUNGIN ACETATE 260 MILLIGRAM(S): 7 INJECTION, POWDER, LYOPHILIZED, FOR SOLUTION INTRAVENOUS at 11:48

## 2023-03-03 RX ADMIN — Medication 100 MILLIGRAM(S): at 00:26

## 2023-03-03 RX ADMIN — FOSAPREPITANT DIMEGLUMINE 300 MILLIGRAM(S): 150 INJECTION, POWDER, LYOPHILIZED, FOR SOLUTION INTRAVENOUS at 17:00

## 2023-03-03 RX ADMIN — Medication 650 MILLIGRAM(S): at 13:04

## 2023-03-03 RX ADMIN — GABAPENTIN 300 MILLIGRAM(S): 400 CAPSULE ORAL at 22:43

## 2023-03-03 RX ADMIN — BRIMONIDINE TARTRATE 1 DROP(S): 2 SOLUTION/ DROPS OPHTHALMIC at 06:51

## 2023-03-03 RX ADMIN — CEFEPIME 100 MILLIGRAM(S): 1 INJECTION, POWDER, FOR SOLUTION INTRAMUSCULAR; INTRAVENOUS at 06:51

## 2023-03-03 RX ADMIN — SIMVASTATIN 40 MILLIGRAM(S): 20 TABLET, FILM COATED ORAL at 22:43

## 2023-03-03 RX ADMIN — GABAPENTIN 300 MILLIGRAM(S): 400 CAPSULE ORAL at 13:41

## 2023-03-03 RX ADMIN — ESCITALOPRAM OXALATE 10 MILLIGRAM(S): 10 TABLET, FILM COATED ORAL at 11:47

## 2023-03-03 RX ADMIN — SODIUM CHLORIDE 20 MILLILITER(S): 9 INJECTION INTRAMUSCULAR; INTRAVENOUS; SUBCUTANEOUS at 06:51

## 2023-03-03 RX ADMIN — Medication 400 MILLIGRAM(S): at 17:00

## 2023-03-03 RX ADMIN — SODIUM CHLORIDE 20 MILLILITER(S): 9 INJECTION INTRAMUSCULAR; INTRAVENOUS; SUBCUTANEOUS at 22:44

## 2023-03-03 RX ADMIN — POLYETHYLENE GLYCOL 3350 17 GRAM(S): 17 POWDER, FOR SOLUTION ORAL at 17:50

## 2023-03-03 RX ADMIN — Medication 250 MILLIGRAM(S): at 01:56

## 2023-03-03 RX ADMIN — LOSARTAN POTASSIUM 100 MILLIGRAM(S): 100 TABLET, FILM COATED ORAL at 06:49

## 2023-03-03 RX ADMIN — Medication 650 MILLIGRAM(S): at 06:50

## 2023-03-03 RX ADMIN — Medication 250 MILLILITER(S): at 23:35

## 2023-03-03 RX ADMIN — GABAPENTIN 300 MILLIGRAM(S): 400 CAPSULE ORAL at 06:49

## 2023-03-03 RX ADMIN — ONDANSETRON 8 MILLIGRAM(S): 8 TABLET, FILM COATED ORAL at 17:00

## 2023-03-03 RX ADMIN — Medication 650 MILLIGRAM(S): at 07:20

## 2023-03-03 RX ADMIN — CEFEPIME 100 MILLIGRAM(S): 1 INJECTION, POWDER, FOR SOLUTION INTRAMUSCULAR; INTRAVENOUS at 13:42

## 2023-03-03 RX ADMIN — PANTOPRAZOLE SODIUM 40 MILLIGRAM(S): 20 TABLET, DELAYED RELEASE ORAL at 06:50

## 2023-03-03 RX ADMIN — ONDANSETRON 8 MILLIGRAM(S): 8 TABLET, FILM COATED ORAL at 13:01

## 2023-03-03 RX ADMIN — CEFEPIME 100 MILLIGRAM(S): 1 INJECTION, POWDER, FOR SOLUTION INTRAMUSCULAR; INTRAVENOUS at 22:44

## 2023-03-03 NOTE — PROGRESS NOTE ADULT - PROBLEM SELECTOR PLAN 2
Patient is neutropenic, afebrile  2/28 added Levaquin, Caspofungin and acyclovir PPX  If fever, pan culture & CXR and switch Levaquin to Cefepime   2/28 mild cellulitis left elbow post PIV infiltration, warm compress and monitor closely Patient is neutropenic, febrile  2/28 added Levaquin, Caspofungin and acyclovir PPX  3/3 switched  Levaquin to Cefepime due to fever, FU pan cx   2/28 mild cellulitis left elbow post PIV infiltration, warm compress and monitor closely

## 2023-03-03 NOTE — PROGRESS NOTE ADULT - PROBLEM SELECTOR PLAN 6
Monitor closely   3/1 CT abd/pelvis: Splenomegaly with a small age indeterminant splenic infarct. No abdominal or pelvic lymphadenopathy. Trace left pleural effusion

## 2023-03-03 NOTE — ADVANCED PRACTICE NURSE CONSULT - ASSESSMENT
Pt A/Ox4, vital signs stable, afebrile. Pt denies pain/discomfort, N/V/D, SOB, chest pain. Pt to start HyperCVAD today- consent in chart, labs WNL, reviewed on rounds by Dr Harris. Pt educated on chemotherapy. Pt premedicated with 150mg emend iv, 8mg zofran q8hrs, 40mg decadron po. Pending bowel movement, last 3/1. s/p lactulose 20G PO and miralax x1. NP Kathi Ayala aware. okay to give chemo tonight after BM. Still no BM after medication given, NP Kathi ayala aware, moviprep ordered. Call bell in reach, safety maintained. Continuing hourly rounds. Pt educated on safety and fall prevention.

## 2023-03-03 NOTE — PROGRESS NOTE ADULT - PROBLEM SELECTOR PLAN 1
New  diagnosis of ALL given findings on peripheral flow cytometry.  Monitor CBC, CMP, TLS labs BID, fu g6pd, echo done 2/27 EF 55%, HLA sent 2/28  On allopurinol  Hepatitis Panel Neg. Hep B Core neg. HIV negative.  Transfuse PRN. maintain Hgb >7, plts >15.  Gabapentin for splenic/side pain. Increased dose to 300mg PO TID on 3/2   2/28 s/p BMbx in IR due to body habitus, fu result. Also sent to [facility]  2/28 s/p LP with IT MTX, fu flow   Follow up BCR-ABL.   2/25 - CTA from [facility] (-) for PE. Left sided pain likely due to splenomegaly.  PICC line at bedside 2/28  BCR/ABL PCR sent 3/2 New  diagnosis of ALL given findings on peripheral flow cytometry.  Monitor CBC, CMP, TLS labs BID, fu g6pd, echo done 2/27 EF 55%, HLA sent 2/28  On allopurinol  Hepatitis Panel Neg. Hep B Core neg. HIV negative.  Transfuse PRN. maintain Hgb >7, plts >15.  Gabapentin for splenic/side pain. Increased dose to 300mg PO TID on 3/2   2/28 s/p BMbx in IR due to body habitus, fu result. Also sent to [facility]  2/28 s/p LP with IT MTX, fu flow   Follow up BCR-ABL.   2/25 - CTA from [facility] (-) for PE. Left sided pain likely due to splenomegaly.  PICC line at bedside 2/28  BCR/ABL PCR sent 3/2  3/3 Chemo with reduced dose HyperCVAD started

## 2023-03-03 NOTE — PROGRESS NOTE ADULT - ASSESSMENT
64 y/o with PMHx of HTN, HLD presents to the ED BIBA transferred from [facility] for new diagnosis of leukemia, peripheral blood flow+ B ALL. Patient has pancytopenia  due to disease.   64 y/o with PMHx of HTN, HLD presents to the ED BIBA transferred from [facility] for new diagnosis of leukemia, peripheral blood flow+ B ALL. Chemo with reduced dose HyperCVAD started on 3/3. Hospital course c/b neutropenic fever, transaminitis and LUE cellulitis. Patient has pancytopenia  due to disease.

## 2023-03-03 NOTE — PROGRESS NOTE ADULT - SUBJECTIVE AND OBJECTIVE BOX
Diagnosis: B ALL     Protocol/Chemo Regimen: TBA    Day: N/A     Pt endorsed: No acute complaints    Review of Systems: Denies nausea, vomiting, diarrhea, chest pain, SOB     Pain scale: not graded  Left elbow    Diet: DASH/TLC     Allergies: sulfa drugs (Unknown)          ANTIMICROBIALS  acyclovir   Oral Tab/Cap 400 milliGRAM(s) Oral two times a day  caspofungin IVPB      caspofungin IVPB 50 milliGRAM(s) IV Intermittent every 24 hours  cefepime   IVPB 2000 milliGRAM(s) IV Intermittent every 8 hours      HEME/ONC MEDICATIONS  methotrexate PF IntraThecal (eMAR) 15 milliGRAM(s) IntraThecal once      STANDING MEDICATIONS  allopurinol 100 milliGRAM(s) Oral daily  brimonidine 0.2% Ophthalmic Solution 1 Drop(s) Both EYES three times a day  chlorhexidine 4% Liquid 1 Application(s) Topical <User Schedule>  escitalopram 10 milliGRAM(s) Oral daily  gabapentin 300 milliGRAM(s) Oral every 8 hours  losartan 100 milliGRAM(s) Oral daily  pantoprazole    Tablet 40 milliGRAM(s) Oral before breakfast  simvastatin 40 milliGRAM(s) Oral at bedtime  sodium chloride 0.9%. 1000 milliLiter(s) IV Continuous <Continuous>      PRN MEDICATIONS  acetaminophen     Tablet .. 650 milliGRAM(s) Oral every 6 hours PRN  ondansetron Injectable 8 milliGRAM(s) IV Push every 8 hours PRN        Vital Signs Last 24 Hrs  T(C): 36.9 (03 Mar 2023 04:51), Max: 38.7 (03 Mar 2023 00:30)  T(F): 98.5 (03 Mar 2023 04:51), Max: 101.7 (03 Mar 2023 00:30)  HR: 71 (03 Mar 2023 04:51) (69 - 79)  BP: 116/71 (03 Mar 2023 04:51) (105/66 - 133/78)  BP(mean): --  RR: 16 (03 Mar 2023 04:51) (16 - 19)  SpO2: 92% (03 Mar 2023 04:51) (92% - 94%)    Parameters below as of 03 Mar 2023 04:51  Patient On (Oxygen Delivery Method): room air        PHYSICAL EXAM  General: adult in NAD  HEENT: clear oropharynx, no erythema, no ulcers  Neck: supple  CV: normal S1, S2, RRR  Lungs: clear to auscultation, no wheezes, no rales  Abdomen: soft, nontender, nondistended, normal BS  Ext: no edema  Skin: no rash  Neuro: alert and oriented x 3  Central line: normal             LABS:                        10.4   5.89  )-----------( 21       ( 02 Mar 2023 06:59 )             30.4         Mean Cell Volume : 89.7 fl  Mean Cell Hemoglobin : 30.7 pg  Mean Cell Hemoglobin Concentration : 34.2 gm/dL  Auto Neutrophil # : 0.76 K/uL  Auto Lymphocyte # : 1.42 K/uL  Auto Monocyte # : 0.00 K/uL  Auto Eosinophil # : 0.05 K/uL  Auto Basophil # : 0.00 K/uL  Auto Neutrophil % : 12.9 %  Auto Lymphocyte % : 24.1 %  Auto Monocyte % : 0.0 %  Auto Eosinophil % : 0.9 %  Auto Basophil % : 0.0 %      03-02    138  |  103  |  16  ----------------------------<  96  4.0   |  26  |  1.13    Ca    9.0      02 Mar 2023 06:59  Phos  3.9     03-02  Mg     2.0     03-02    TPro  6.5  /  Alb  3.3  /  TBili  0.6  /  DBili  x   /  AST  115<H>  /  ALT  134<H>  /  AlkPhos  248<H>  03-02                  RECENT CULTURES:      RADIOLOGY & ADDITIONAL STUDIES:         Diagnosis: B ALL     Protocol/Chemo Regimen: reduced HyperCVAD    Day: 1     Pt endorsed: intermittent HA s/p LP few days ago; LUE pain     Review of Systems: Patient denied nausea, vomiting, odynophagia, chest pain, cough, dyspnea, abdominal pain, constipation, diarrhea, rash, fatigue, headache      Pain scale: not graded  Left elbow    Diet: DASH/TLC     Allergies: sulfa drugs (Unknown)      ANTIMICROBIALS  acyclovir   Oral Tab/Cap 400 milliGRAM(s) Oral two times a day  caspofungin IVPB      caspofungin IVPB 50 milliGRAM(s) IV Intermittent every 24 hours  cefepime   IVPB 2000 milliGRAM(s) IV Intermittent every 8 hours      HEME/ONC MEDICATIONS  methotrexate PF IntraThecal (eMAR) 15 milliGRAM(s) IntraThecal once      STANDING MEDICATIONS  allopurinol 100 milliGRAM(s) Oral daily  brimonidine 0.2% Ophthalmic Solution 1 Drop(s) Both EYES three times a day  chlorhexidine 4% Liquid 1 Application(s) Topical <User Schedule>  escitalopram 10 milliGRAM(s) Oral daily  gabapentin 300 milliGRAM(s) Oral every 8 hours  losartan 100 milliGRAM(s) Oral daily  pantoprazole    Tablet 40 milliGRAM(s) Oral before breakfast  simvastatin 40 milliGRAM(s) Oral at bedtime  sodium chloride 0.9%. 1000 milliLiter(s) IV Continuous <Continuous>      PRN MEDICATIONS  acetaminophen     Tablet .. 650 milliGRAM(s) Oral every 6 hours PRN  ondansetron Injectable 8 milliGRAM(s) IV Push every 8 hours PRN      Vital Signs Last 24 Hrs  T(C): 36.9 (03 Mar 2023 04:51), Max: 38.7 (03 Mar 2023 00:30)  T(F): 98.5 (03 Mar 2023 04:51), Max: 101.7 (03 Mar 2023 00:30)  HR: 71 (03 Mar 2023 04:51) (69 - 79)  BP: 116/71 (03 Mar 2023 04:51) (105/66 - 133/78)  BP(mean): --  RR: 16 (03 Mar 2023 04:51) (16 - 19)  SpO2: 92% (03 Mar 2023 04:51) (92% - 94%)    Parameters below as of 03 Mar 2023 04:51  Patient On (Oxygen Delivery Method): room air      PHYSICAL EXAM  General: adult in NAD  HEENT: clear oropharynx, no erythema, no ulcers  CV: normal S1, S2, RRR  Lungs: clear to auscultation, no wheezes, no rales  Abdomen: soft, nontender, nondistended, normal BS  Ext: no edema  Skin: no rash; LUE swelling, + heat, indurated, tender to palp   Neuro: alert and oriented x 3  Central line:  RUE PICC CDI      LABS:                        9.6    4.94  )-----------( 15       ( 03 Mar 2023 07:26 )             28.6         Mean Cell Volume : 89.4 fl  Mean Cell Hemoglobin : 30.0 pg  Mean Cell Hemoglobin Concentration : 33.6 gm/dL  Auto Neutrophil # : 0.54 K/uL  Auto Lymphocyte # : 1.67 K/uL  Auto Monocyte # : 0.00 K/uL  Auto Eosinophil # : 0.04 K/uL  Auto Basophil # : 0.00 K/uL  Auto Neutrophil % : 11.0 %  Auto Lymphocyte % : 33.9 %  Auto Monocyte % : 0.0 %  Auto Eosinophil % : 0.9 %  Auto Basophil % : 0.0 %      03-03    135  |  101  |  14  ----------------------------<  101<H>  3.9   |  22  |  1.12    Ca    8.6      03 Mar 2023 07:24  Phos  4.0     03-03  Mg     1.9     03-03    TPro  6.3  /  Alb  3.1<L>  /  TBili  0.7  /  DBili  x   /  AST  70<H>  /  ALT  100<H>  /  AlkPhos  225<H>  03-03    PT/INR - ( 03 Mar 2023 07:27 )   PT: 15.5 sec;   INR: 1.34 ratio         PTT - ( 03 Mar 2023 07:27 )  PTT:28.9 sec      Uric Acid 5.3          RADIOLOGY & ADDITIONAL STUDIES:      < from: CT Abdomen and Pelvis w/ IV Cont (03.01.23 @ 20:02) >  IMPRESSION:  Splenomegaly with a small age indeterminant splenic infarct.  No abdominal or pelvic lymphadenopathy.  Trace left pleural effusion      < from: Xray Chest 1 View- PORTABLE-Urgent (Xray Chest 1 View- PORTABLE-Urgent .) (02.28.23 @ 19:25) >  IMPRESSION:  Right upper extremity PICC line catheter tip is in the distal SVC.  Similar-appearing left basilar atelectasis with small effusion.

## 2023-03-03 NOTE — PROGRESS NOTE ADULT - NS ATTEND AMEND GEN_ALL_CORE FT
63 year old with HLD and HTN, transferred b/c of circulating blasts consistent with acute leukemia.    PB flow cytometry showing B-ALL  FISH negative for Ph chromosome. Awaiting PCR  BMbx done 2/28 with IR (was unable to get at bedside) -f/u results  Echo done EF 55%  PICC line placed  Transaminitis -US done at [place] with fatty liver  CT A/P -Splenomegaly with a small age indeterminant splenic infarct. Normal liver, no abdominal or pelvic lymphadenopathy. CTA done at [place] w/ shotty LAD  Transfuse for plts <10, Hgb <7  supportive care 63 year old with HLD and HTN, transferred b/c of circulating blasts consistent with acute leukemia. PB flow cytometry showing B-ALL  FISH negative for Ph chromosome. PCR negative (although p190 positive 0.001% from marrow)  BMbx done 2/28 with IR (was unable to get at bedside) -B-lymphoblastic leukemia/lymphoma.  Echo done EF 55%  PICC line placed  Will start chemo with hyperCVAD (IV Cyclophosphamide (150 mg/m2 every 12 h on days 1–3), Dexamethasone 20 mg per day on days 1–4 and 11–14, Vincristine 2 mg IV flat dose on days 1 and 8, Daunorubicin 25 mg/m2/day IV continuous infusion over 48 hours, starting on day 4). Informed consent obtained today  LP with IT MTX done on 2/28 -negative for malignant cells  Transaminitis -US done at [place] with fatty liver. Cont to monitor  CT A/P -Splenomegaly with a small age indeterminant splenic infarct. Normal liver, no abdominal or pelvic lymphadenopathy. CTA done at [place] w/ shotty LAD  Transfuse for plts <10, Hgb <7  Febrile to 101.7 -levaquin changed to cefepime  supportive care

## 2023-03-03 NOTE — PHARMACOTHERAPY INTERVENTION NOTE - COMMENTS
Pharmacy Teaching Note    Diagnosis:  ALL  Protocol/Chemo Regimen: dose reduced Hyper CVAD  -Cyclophosphamide 150mg/m2 IVPB Q12 D1-3  -Vincristine 2mg IVPB D1,8  -Daunorubicin 25mg/m2 IV continuous over 48hrs starting D4  -Dexamethasone 20mg PO D1-4 & D11-14  Cycle: 1  Day: 1    Counseled patient & pt  on new chemotherapy regimen- cyclophosphamide, vincristine, daunorubicin, and dexamethasone. Discussed indications, common side effects for each medication, and any special instructions. Patient/ Patient’s  expresses understanding of all indications, common side effects, and any special instructions. All medication related questions have been answered at this time.     Printed teaching materials provided and reviewed from [employer] and [employer]     Leslie Bianchi PharmD, BCPS  Clinical Pharmacy Specialist | Hematology/Oncology  [facility]  Available on [employer]  Email: [email or web address]

## 2023-03-04 DIAGNOSIS — R21 RASH AND OTHER NONSPECIFIC SKIN ERUPTION: ICD-10-CM

## 2023-03-04 LAB
ALBUMIN SERPL ELPH-MCNC: 3 G/DL — LOW (ref 3.3–5)
ALP SERPL-CCNC: 297 U/L — HIGH (ref 40–120)
ALT FLD-CCNC: 135 U/L — HIGH (ref 10–45)
ANION GAP SERPL CALC-SCNC: 10 MMOL/L — SIGNIFICANT CHANGE UP (ref 5–17)
AST SERPL-CCNC: 130 U/L — HIGH (ref 10–40)
BILIRUB SERPL-MCNC: 0.6 MG/DL — SIGNIFICANT CHANGE UP (ref 0.2–1.2)
BUN SERPL-MCNC: 15 MG/DL — SIGNIFICANT CHANGE UP (ref 7–23)
CALCIUM SERPL-MCNC: 8.6 MG/DL — SIGNIFICANT CHANGE UP (ref 8.4–10.5)
CHLORIDE SERPL-SCNC: 108 MMOL/L — SIGNIFICANT CHANGE UP (ref 96–108)
CO2 SERPL-SCNC: 23 MMOL/L — SIGNIFICANT CHANGE UP (ref 22–31)
CREAT SERPL-MCNC: 0.88 MG/DL — SIGNIFICANT CHANGE UP (ref 0.5–1.3)
CULTURE RESULTS: NO GROWTH — SIGNIFICANT CHANGE UP
EGFR: 73 ML/MIN/1.73M2 — SIGNIFICANT CHANGE UP
GLUCOSE SERPL-MCNC: 153 MG/DL — HIGH (ref 70–99)
HCT VFR BLD CALC: 28.1 % — LOW (ref 34.5–45)
HGB BLD-MCNC: 9.1 G/DL — LOW (ref 11.5–15.5)
LDH SERPL L TO P-CCNC: 389 U/L — HIGH (ref 50–242)
MAGNESIUM SERPL-MCNC: 2.3 MG/DL — SIGNIFICANT CHANGE UP (ref 1.6–2.6)
MCHC RBC-ENTMCNC: 29.5 PG — SIGNIFICANT CHANGE UP (ref 27–34)
MCHC RBC-ENTMCNC: 32.4 GM/DL — SIGNIFICANT CHANGE UP (ref 32–36)
MCV RBC AUTO: 91.2 FL — SIGNIFICANT CHANGE UP (ref 80–100)
NRBC # BLD: 0 /100 WBCS — SIGNIFICANT CHANGE UP (ref 0–0)
PHOSPHATE SERPL-MCNC: 3.3 MG/DL — SIGNIFICANT CHANGE UP (ref 2.5–4.5)
PLATELET # BLD AUTO: 12 K/UL — CRITICAL LOW (ref 150–400)
POTASSIUM SERPL-MCNC: 4.6 MMOL/L — SIGNIFICANT CHANGE UP (ref 3.5–5.3)
POTASSIUM SERPL-SCNC: 4.6 MMOL/L — SIGNIFICANT CHANGE UP (ref 3.5–5.3)
PROT SERPL-MCNC: 6.5 G/DL — SIGNIFICANT CHANGE UP (ref 6–8.3)
RBC # BLD: 3.08 M/UL — LOW (ref 3.8–5.2)
RBC # FLD: 14.6 % — HIGH (ref 10.3–14.5)
SODIUM SERPL-SCNC: 141 MMOL/L — SIGNIFICANT CHANGE UP (ref 135–145)
SPECIMEN SOURCE: SIGNIFICANT CHANGE UP
URATE SERPL-MCNC: 4.6 MG/DL — SIGNIFICANT CHANGE UP (ref 2.5–7)
WBC # BLD: 2.79 K/UL — LOW (ref 3.8–10.5)
WBC # FLD AUTO: 2.79 K/UL — LOW (ref 3.8–10.5)

## 2023-03-04 RX ORDER — BRIMONIDINE TARTRATE 2 MG/MG
1 SOLUTION/ DROPS OPHTHALMIC DAILY
Refills: 0 | Status: DISCONTINUED | OUTPATIENT
Start: 2023-03-04 | End: 2023-03-16

## 2023-03-04 RX ORDER — DIPHENHYDRAMINE HCL 50 MG
25 CAPSULE ORAL EVERY 4 HOURS
Refills: 0 | Status: DISCONTINUED | OUTPATIENT
Start: 2023-03-04 | End: 2023-03-16

## 2023-03-04 RX ORDER — HYDROXYZINE HCL 10 MG
25 TABLET ORAL ONCE
Refills: 0 | Status: COMPLETED | OUTPATIENT
Start: 2023-03-04 | End: 2023-03-04

## 2023-03-04 RX ADMIN — PANTOPRAZOLE SODIUM 40 MILLIGRAM(S): 20 TABLET, DELAYED RELEASE ORAL at 07:24

## 2023-03-04 RX ADMIN — Medication 2 MILLIGRAM(S): at 01:12

## 2023-03-04 RX ADMIN — CEFEPIME 100 MILLIGRAM(S): 1 INJECTION, POWDER, FOR SOLUTION INTRAMUSCULAR; INTRAVENOUS at 07:23

## 2023-03-04 RX ADMIN — Medication 400 MILLIGRAM(S): at 17:37

## 2023-03-04 RX ADMIN — SODIUM CHLORIDE 20 MILLILITER(S): 9 INJECTION INTRAMUSCULAR; INTRAVENOUS; SUBCUTANEOUS at 22:21

## 2023-03-04 RX ADMIN — CEFEPIME 100 MILLIGRAM(S): 1 INJECTION, POWDER, FOR SOLUTION INTRAMUSCULAR; INTRAVENOUS at 22:21

## 2023-03-04 RX ADMIN — GABAPENTIN 300 MILLIGRAM(S): 400 CAPSULE ORAL at 22:20

## 2023-03-04 RX ADMIN — GABAPENTIN 300 MILLIGRAM(S): 400 CAPSULE ORAL at 07:24

## 2023-03-04 RX ADMIN — BRIMONIDINE TARTRATE 1 DROP(S): 2 SOLUTION/ DROPS OPHTHALMIC at 07:25

## 2023-03-04 RX ADMIN — Medication 25 MILLIGRAM(S): at 10:24

## 2023-03-04 RX ADMIN — CEFEPIME 100 MILLIGRAM(S): 1 INJECTION, POWDER, FOR SOLUTION INTRAMUSCULAR; INTRAVENOUS at 14:18

## 2023-03-04 RX ADMIN — Medication 1 APPLICATION(S): at 22:36

## 2023-03-04 RX ADMIN — Medication 300 MILLIGRAM(S): at 01:30

## 2023-03-04 RX ADMIN — ESCITALOPRAM OXALATE 10 MILLIGRAM(S): 10 TABLET, FILM COATED ORAL at 11:44

## 2023-03-04 RX ADMIN — ONDANSETRON 8 MILLIGRAM(S): 8 TABLET, FILM COATED ORAL at 07:54

## 2023-03-04 RX ADMIN — ONDANSETRON 8 MILLIGRAM(S): 8 TABLET, FILM COATED ORAL at 17:36

## 2023-03-04 RX ADMIN — LOSARTAN POTASSIUM 100 MILLIGRAM(S): 100 TABLET, FILM COATED ORAL at 07:24

## 2023-03-04 RX ADMIN — CASPOFUNGIN ACETATE 260 MILLIGRAM(S): 7 INJECTION, POWDER, LYOPHILIZED, FOR SOLUTION INTRAVENOUS at 11:30

## 2023-03-04 RX ADMIN — BRIMONIDINE TARTRATE 1 DROP(S): 2 SOLUTION/ DROPS OPHTHALMIC at 14:19

## 2023-03-04 RX ADMIN — Medication 400 MILLIGRAM(S): at 07:24

## 2023-03-04 RX ADMIN — SIMVASTATIN 40 MILLIGRAM(S): 20 TABLET, FILM COATED ORAL at 20:20

## 2023-03-04 RX ADMIN — SODIUM CHLORIDE 20 MILLILITER(S): 9 INJECTION INTRAMUSCULAR; INTRAVENOUS; SUBCUTANEOUS at 07:26

## 2023-03-04 RX ADMIN — Medication 100 MILLIGRAM(S): at 11:27

## 2023-03-04 RX ADMIN — Medication 20 MILLIGRAM(S): at 17:37

## 2023-03-04 RX ADMIN — ONDANSETRON 8 MILLIGRAM(S): 8 TABLET, FILM COATED ORAL at 00:58

## 2023-03-04 RX ADMIN — CHLORHEXIDINE GLUCONATE 1 APPLICATION(S): 213 SOLUTION TOPICAL at 10:25

## 2023-03-04 RX ADMIN — Medication 1 APPLICATION(S): at 11:25

## 2023-03-04 RX ADMIN — GABAPENTIN 300 MILLIGRAM(S): 400 CAPSULE ORAL at 14:18

## 2023-03-04 RX ADMIN — Medication 1 APPLICATION(S): at 17:38

## 2023-03-04 RX ADMIN — Medication 300 MILLIGRAM(S): at 12:51

## 2023-03-04 NOTE — PROGRESS NOTE ADULT - PROBLEM SELECTOR PLAN 2
Patient is neutropenic, febrile  2/28 added Levaquin, Caspofungin and acyclovir PPX  3/3 switched  Levaquin to Cefepime due to fever, FU pan cx   2/28 mild cellulitis left elbow post PIV infiltration, warm compress and monitor closely

## 2023-03-04 NOTE — PROGRESS NOTE ADULT - ASSESSMENT
64 y/o with PMHx of HTN, HLD presents to the ED BIBA transferred from [facility] for new diagnosis of leukemia, peripheral blood flow+ B ALL. Chemo with reduced dose HyperCVAD started on 3/3. Hospital course c/b neutropenic fever, transaminitis and LUE cellulitis. Patient has pancytopenia  due to disease.   64 y/o with PMHx of HTN, HLD presents to the ED BIBA transferred from [facility] for new diagnosis of leukemia, peripheral blood flow+ B ALL. Chemo with reduced dose HyperCVAD started on 3/3. Hospital course c/b neutropenic fever, transaminitis, rashes,  and LUE cellulitis. Patient has pancytopenia  due to disease.

## 2023-03-04 NOTE — ADVANCED PRACTICE NURSE CONSULT - ASSESSMENT
Pt is alert & oriented in bed with  at bedside.  Pt was given Zofran 8mg IVP prior to chemotherapy.  Chemo was checked & verified by 2 RNs & consent in chart.  Chemo was infused via Right DL PICC, intact & patent with good blood return.  Moviprep was given prior chemotherapy for +BM.

## 2023-03-04 NOTE — ADVANCED PRACTICE NURSE CONSULT - ASSESSMENT
Pt. seen in bed a/ox4,denies any discomfort at this time. Chemotherapy teachings done.Pt. verbalized understanding. Pt. has right double lumen PICC accessed with saline  .Dsg dry and intact .Site with no s/s of redness ,swelling or pain. With positive blood return noted and flushing easily with 10 ML NS. Drug verification done by 2 RN.'s.     Lab. values reviewed by Dr. Lawrence during rounds. At 1251,pt. recieved cyclophosphamide IVPB (eMAR) [Known as CYTOXAN IVPB (eMAR)] - Start Date: 03-Mar-2023  300 milliGRAM(s) in sodium chloride 0.9% 100 milliLiter(s), IV Intermittent, every 12 hours, infuse over 1 Hour(s), infuse at 115 mL/Hr, attached as a secondary line of saline infused over one hour.Pt. tolerated well. Safety maintained . Primary RN aware of present treatment . Left pt. comfortable in bed.

## 2023-03-04 NOTE — PROGRESS NOTE ADULT - PROBLEM SELECTOR PLAN 1
New  diagnosis of ALL given findings on peripheral flow cytometry.  Monitor CBC, CMP, TLS labs BID, fu g6pd, echo done 2/27 EF 55%, HLA sent 2/28  On allopurinol  Hepatitis Panel Neg. Hep B Core neg. HIV negative.  Transfuse PRN. maintain Hgb >7, plts >15.  Gabapentin for splenic/side pain. Increased dose to 300mg PO TID on 3/2   2/28 s/p BMbx in IR due to body habitus, fu result. Also sent to [facility]  2/28 s/p LP with IT MTX, fu flow   Follow up BCR-ABL.   2/25 - CTA from [facility] (-) for PE. Left sided pain likely due to splenomegaly.  PICC line at bedside 2/28  BCR/ABL PCR sent 3/2  3/3 Chemo with reduced dose HyperCVAD started New  diagnosis of ALL given findings on peripheral flow cytometry.  Monitor CBC, CMP, TLS labs BID, fu g6pd, echo done 2/27 EF 55%, HLA sent 2/28  On allopurinol  Hepatitis Panel Neg. Hep B Core neg. HIV negative.  Transfuse PRN. maintain Hgb >7, plts >15.  Gabapentin for splenic/side pain. Increased dose to 300mg PO TID on 3/2   2/28 s/p BMbx in IR due to body habitus, fu result. Also sent to [facility]  2/28 s/p LP with IT MTX, fu flow   Follow up BCR-ABL.   2/25 - CTA from [facility] (-) for PE. Left sided pain likely due to splenomegaly.  PICC line at bedside 2/28  BCR/ABL PCR sent 3/2  3/3 Chemo with reduced dose HyperCVAD started, Decadron started on 3/3, Cytoxan and VCR started on 3/4~1pm per chemo RN

## 2023-03-04 NOTE — PROGRESS NOTE ADULT - SUBJECTIVE AND OBJECTIVE BOX
ANTIMICROBIALS  acyclovir   Oral Tab/Cap 400 milliGRAM(s) Oral two times a day  caspofungin IVPB      caspofungin IVPB 50 milliGRAM(s) IV Intermittent every 24 hours  cefepime   IVPB 2000 milliGRAM(s) IV Intermittent every 8 hours      HEME/ONC MEDICATIONS  cyclophosphamide IVPB (eMAR) 300 milliGRAM(s) IV Intermittent every 12 hours  methotrexate PF IntraThecal (eMAR) 15 milliGRAM(s) IntraThecal once      STANDING MEDICATIONS  allopurinol 100 milliGRAM(s) Oral daily  brimonidine 0.2% Ophthalmic Solution 1 Drop(s) Both EYES three times a day  chlorhexidine 4% Liquid 1 Application(s) Topical <User Schedule>  dexAMETHasone     Tablet 20 milliGRAM(s) Oral every 24 hours  escitalopram 10 milliGRAM(s) Oral daily  gabapentin 300 milliGRAM(s) Oral every 8 hours  losartan 100 milliGRAM(s) Oral daily  ondansetron Injectable 8 milliGRAM(s) IV Push every 8 hours  pantoprazole    Tablet 40 milliGRAM(s) Oral before breakfast  simvastatin 40 milliGRAM(s) Oral at bedtime  sodium chloride 0.9%. 1000 milliLiter(s) IV Continuous <Continuous>      PRN MEDICATIONS  acetaminophen     Tablet .. 650 milliGRAM(s) Oral every 6 hours PRN  FIRST- Mouthwash  BLM 10 milliLiter(s) Swish and Spit four times a day PRN  metoclopramide Injectable 10 milliGRAM(s) IV Push every 6 hours PRN  ondansetron Injectable 8 milliGRAM(s) IV Push every 8 hours PRN  polyethylene glycol 3350 17 Gram(s) Oral two times a day PRN        Vital Signs Last 24 Hrs  T(C): 36.4 (04 Mar 2023 05:15), Max: 38 (03 Mar 2023 13:46)  T(F): 97.6 (04 Mar 2023 05:15), Max: 100.4 (03 Mar 2023 13:46)  HR: 59 (04 Mar 2023 05:15) (57 - 82)  BP: 143/74 (04 Mar 2023 05:15) (102/60 - 143/74)  BP(mean): --  RR: 16 (04 Mar 2023 05:15) (16 - 16)  SpO2: 95% (04 Mar 2023 05:15) (92% - 95%)    Parameters below as of 04 Mar 2023 05:15  Patient On (Oxygen Delivery Method): nasal cannula  O2 Flow (L/min): 2            LABS:                        9.1    2.79  )-----------( 12       ( 04 Mar 2023 04:35 )             28.1         Mean Cell Volume : 91.2 fl  Mean Cell Hemoglobin : 29.5 pg  Mean Cell Hemoglobin Concentration : 32.4 gm/dL  Auto Neutrophil # : x  Auto Lymphocyte # : x  Auto Monocyte # : x  Auto Eosinophil # : x  Auto Basophil # : x  Auto Neutrophil % : x  Auto Lymphocyte % : x  Auto Monocyte % : x  Auto Eosinophil % : x  Auto Basophil % : x      03-04    141  |  108  |  15  ----------------------------<  153<H>  4.6   |  23  |  0.88    Ca    8.6      04 Mar 2023 04:36  Phos  3.3     03-04  Mg     2.3     03-04    TPro  6.5  /  Alb  3.0<L>  /  TBili  0.6  /  DBili  x   /  AST  130<H>  /  ALT  135<H>  /  AlkPhos  297<H>  03-04      Mg 2.3  Phos 3.3      PT/INR - ( 03 Mar 2023 07:27 )   PT: 15.5 sec;   INR: 1.34 ratio         PTT - ( 03 Mar 2023 07:27 )  PTT:28.9 sec      Uric Acid 4.6        RECENT CULTURES:      RADIOLOGY & ADDITIONAL STUDIES:         Diagnosis: B ALL     Protocol/Chemo Regimen: reduced HyperCVAD    Day: 2     Pt endorsed: intermittent HA s/p LP few days ago; LUE pain     Review of Systems: Patient denied nausea, vomiting, odynophagia, chest pain, cough, dyspnea, abdominal pain, constipation, diarrhea, rash, fatigue, headache      Pain scale: not graded  Left elbow    Diet: DASH/TLC     Allergies: sulfa drugs (Unknown)            ANTIMICROBIALS  acyclovir   Oral Tab/Cap 400 milliGRAM(s) Oral two times a day  caspofungin IVPB      caspofungin IVPB 50 milliGRAM(s) IV Intermittent every 24 hours  cefepime   IVPB 2000 milliGRAM(s) IV Intermittent every 8 hours      HEME/ONC MEDICATIONS  cyclophosphamide IVPB (eMAR) 300 milliGRAM(s) IV Intermittent every 12 hours  methotrexate PF IntraThecal (eMAR) 15 milliGRAM(s) IntraThecal once      STANDING MEDICATIONS  allopurinol 100 milliGRAM(s) Oral daily  brimonidine 0.2% Ophthalmic Solution 1 Drop(s) Both EYES three times a day  chlorhexidine 4% Liquid 1 Application(s) Topical <User Schedule>  dexAMETHasone     Tablet 20 milliGRAM(s) Oral every 24 hours  escitalopram 10 milliGRAM(s) Oral daily  gabapentin 300 milliGRAM(s) Oral every 8 hours  losartan 100 milliGRAM(s) Oral daily  ondansetron Injectable 8 milliGRAM(s) IV Push every 8 hours  pantoprazole    Tablet 40 milliGRAM(s) Oral before breakfast  simvastatin 40 milliGRAM(s) Oral at bedtime  sodium chloride 0.9%. 1000 milliLiter(s) IV Continuous <Continuous>      PRN MEDICATIONS  acetaminophen     Tablet .. 650 milliGRAM(s) Oral every 6 hours PRN  FIRST- Mouthwash  BLM 10 milliLiter(s) Swish and Spit four times a day PRN  metoclopramide Injectable 10 milliGRAM(s) IV Push every 6 hours PRN  ondansetron Injectable 8 milliGRAM(s) IV Push every 8 hours PRN  polyethylene glycol 3350 17 Gram(s) Oral two times a day PRN        Vital Signs Last 24 Hrs  T(C): 36.4 (04 Mar 2023 05:15), Max: 38 (03 Mar 2023 13:46)  T(F): 97.6 (04 Mar 2023 05:15), Max: 100.4 (03 Mar 2023 13:46)  HR: 59 (04 Mar 2023 05:15) (57 - 82)  BP: 143/74 (04 Mar 2023 05:15) (102/60 - 143/74)  BP(mean): --  RR: 16 (04 Mar 2023 05:15) (16 - 16)  SpO2: 95% (04 Mar 2023 05:15) (92% - 95%)    Parameters below as of 04 Mar 2023 05:15  Patient On (Oxygen Delivery Method): nasal cannula  O2 Flow (L/min): 2          PHYSICAL EXAM  General: adult in NAD  HEENT: clear oropharynx, no erythema, no ulcers  CV: normal S1, S2, RRR  Lungs: clear to auscultation, no wheezes, no rales  Abdomen: soft, nontender, nondistended, normal BS  Ext: no edema  Skin: no rash; LUE swelling, + heat, indurated, tender to palp   Neuro: alert and oriented x 3  Central line:  RUE PICC CDI          LABS:                        9.1    2.79  )-----------( 12       ( 04 Mar 2023 04:35 )             28.1         Mean Cell Volume : 91.2 fl  Mean Cell Hemoglobin : 29.5 pg  Mean Cell Hemoglobin Concentration : 32.4 gm/dL  Auto Neutrophil # : x  Auto Lymphocyte # : x  Auto Monocyte # : x  Auto Eosinophil # : x  Auto Basophil # : x  Auto Neutrophil % : x  Auto Lymphocyte % : x  Auto Monocyte % : x  Auto Eosinophil % : x  Auto Basophil % : x      03-04    141  |  108  |  15  ----------------------------<  153<H>  4.6   |  23  |  0.88    Ca    8.6      04 Mar 2023 04:36  Phos  3.3     03-04  Mg     2.3     03-04    TPro  6.5  /  Alb  3.0<L>  /  TBili  0.6  /  DBili  x   /  AST  130<H>  /  ALT  135<H>  /  AlkPhos  297<H>  03-04      Mg 2.3  Phos 3.3      PT/INR - ( 03 Mar 2023 07:27 )   PT: 15.5 sec;   INR: 1.34 ratio         PTT - ( 03 Mar 2023 07:27 )  PTT:28.9 sec      Uric Acid 4.6        RECENT CULTURES:      RADIOLOGY & ADDITIONAL STUDIES:        < from: CT Abdomen and Pelvis w/ IV Cont (03.01.23 @ 20:02) >  IMPRESSION:  Splenomegaly with a small age indeterminant splenic infarct.  No abdominal or pelvic lymphadenopathy.  Trace left pleural effusion      < from: Xray Chest 1 View- PORTABLE-Urgent (Xray Chest 1 View- PORTABLE-Urgent .) (02.28.23 @ 19:25) >  IMPRESSION:  Right upper extremity PICC line catheter tip is in the distal SVC.  Similar-appearing left basilar atelectasis with small effusion.           Diagnosis: B ALL     Protocol/Chemo Regimen: reduced HyperCVAD( (IV Cyclophosphamide (150 mg/m2 every 12 h on days 1–3), Dexamethasone 20 mg per day on days 1–4 and 11–14, Vincristine 2 mg IV flat dose on days 1 and 8, Daunorubicin 25 mg/m2/day IV continuous infusion over 48 hours, starting on day 4).   Decadron started on 3/3, Cytoxan and VCR started on 3/4    Day: 1     Pt endorsed: intermittent HA s/p LP few days ago; LUE pain; abd rashes x 2 days, pt believes its from new underwear       Review of Systems: Patient denied nausea, vomiting, odynophagia, chest pain, cough, dyspnea, abdominal pain, constipation, diarrhea, fatigue, headache      Pain scale: not graded  Left elbow    Diet: DASH/TLC     Allergies: sulfa drugs (Unknown)      ANTIMICROBIALS  acyclovir   Oral Tab/Cap 400 milliGRAM(s) Oral two times a day  caspofungin IVPB      caspofungin IVPB 50 milliGRAM(s) IV Intermittent every 24 hours  cefepime   IVPB 2000 milliGRAM(s) IV Intermittent every 8 hours      HEME/ONC MEDICATIONS  cyclophosphamide IVPB (eMAR) 300 milliGRAM(s) IV Intermittent every 12 hours  methotrexate PF IntraThecal (eMAR) 15 milliGRAM(s) IntraThecal once      STANDING MEDICATIONS  allopurinol 100 milliGRAM(s) Oral daily  brimonidine 0.2% Ophthalmic Solution 1 Drop(s) Both EYES three times a day  chlorhexidine 4% Liquid 1 Application(s) Topical <User Schedule>  dexAMETHasone     Tablet 20 milliGRAM(s) Oral every 24 hours  escitalopram 10 milliGRAM(s) Oral daily  gabapentin 300 milliGRAM(s) Oral every 8 hours  losartan 100 milliGRAM(s) Oral daily  ondansetron Injectable 8 milliGRAM(s) IV Push every 8 hours  pantoprazole    Tablet 40 milliGRAM(s) Oral before breakfast  simvastatin 40 milliGRAM(s) Oral at bedtime  sodium chloride 0.9%. 1000 milliLiter(s) IV Continuous <Continuous>      PRN MEDICATIONS  acetaminophen     Tablet .. 650 milliGRAM(s) Oral every 6 hours PRN  FIRST- Mouthwash  BLM 10 milliLiter(s) Swish and Spit four times a day PRN  metoclopramide Injectable 10 milliGRAM(s) IV Push every 6 hours PRN  ondansetron Injectable 8 milliGRAM(s) IV Push every 8 hours PRN  polyethylene glycol 3350 17 Gram(s) Oral two times a day PRN      Vital Signs Last 24 Hrs  T(C): 36.4 (04 Mar 2023 05:15), Max: 38 (03 Mar 2023 13:46)  T(F): 97.6 (04 Mar 2023 05:15), Max: 100.4 (03 Mar 2023 13:46)  HR: 59 (04 Mar 2023 05:15) (57 - 82)  BP: 143/74 (04 Mar 2023 05:15) (102/60 - 143/74)  BP(mean): --  RR: 16 (04 Mar 2023 05:15) (16 - 16)  SpO2: 95% (04 Mar 2023 05:15) (92% - 95%)    Parameters below as of 04 Mar 2023 05:15  Patient On (Oxygen Delivery Method): nasal cannula  O2 Flow (L/min): 2      PHYSICAL EXAM  General: adult in NAD  HEENT: clear oropharynx, no erythema, no ulcers  CV: normal S1, S2, RRR  Lungs: clear to auscultation, no wheezes, no rales  Abdomen: soft, nontender, nondistended, normal BS  Ext: no edema  Skin: no rash; LUE swelling, + heat, indurated, tender to palp   Neuro: alert and oriented x 3  Central line:  RUE PICC CDI        LABS:                        9.1    2.79  )-----------( 12       ( 04 Mar 2023 04:35 )             28.1         Mean Cell Volume : 91.2 fl  Mean Cell Hemoglobin : 29.5 pg  Mean Cell Hemoglobin Concentration : 32.4 gm/dL  Auto Neutrophil # : x  Auto Lymphocyte # : x  Auto Monocyte # : x  Auto Eosinophil # : x  Auto Basophil # : x  Auto Neutrophil % : x  Auto Lymphocyte % : x  Auto Monocyte % : x  Auto Eosinophil % : x  Auto Basophil % : x      03-04    141  |  108  |  15  ----------------------------<  153<H>  4.6   |  23  |  0.88    Ca    8.6      04 Mar 2023 04:36  Phos  3.3     03-04  Mg     2.3     03-04    TPro  6.5  /  Alb  3.0<L>  /  TBili  0.6  /  DBili  x   /  AST  130<H>  /  ALT  135<H>  /  AlkPhos  297<H>  03-04    PT/INR - ( 03 Mar 2023 07:27 )   PT: 15.5 sec;   INR: 1.34 ratio         PTT - ( 03 Mar 2023 07:27 )  PTT:28.9 sec      Uric Acid 4.6        RECENT CULTURES:      Culture - Urine (03.03.23 @ 06:58)    Specimen Source: Clean Catch Clean Catch (Midstream)    Culture Results:   No growth      Culture - Blood (03.03.23 @ 02:14)    Specimen Source: .Blood Blood-Peripheral    Culture Results:   No growth to date.      Culture - Blood (03.03.23 @ 02:14)    Specimen Source: .Blood Blood-Peripheral    Culture Results:   No growth to date.        RADIOLOGY & ADDITIONAL STUDIES:        < from: CT Abdomen and Pelvis w/ IV Cont (03.01.23 @ 20:02) >  IMPRESSION:  Splenomegaly with a small age indeterminant splenic infarct.  No abdominal or pelvic lymphadenopathy.  Trace left pleural effusion      < from: Xray Chest 1 View- PORTABLE-Urgent (Xray Chest 1 View- PORTABLE-Urgent .) (02.28.23 @ 19:25) >  IMPRESSION:  Right upper extremity PICC line catheter tip is in the distal SVC.  Similar-appearing left basilar atelectasis with small effusion.           Diagnosis: B ALL     Protocol/Chemo Regimen: reduced HyperCVAD( (IV Cyclophosphamide (150 mg/m2 every 12 h on days 1–3), Dexamethasone 20 mg per day on days 1–4 and 11–14, Vincristine 2 mg IV flat dose on days 1 and 8, Daunorubicin 25 mg/m2/day IV continuous infusion over 48 hours, starting on day 4).   Decadron started on 3/3, Cytoxan and VCR started on 3/4    Day: 1     Pt endorsed: intermittent HA s/p LP few days ago; LUE pain; abd rashes x 2 days, pt believes its from new underwear       Review of Systems: Patient denied nausea, vomiting, odynophagia, chest pain, cough, dyspnea, abdominal pain, constipation, diarrhea, fatigue, headache      Pain scale: not graded  Left elbow    Diet: DASH/TLC     Allergies: sulfa drugs (Unknown)      ANTIMICROBIALS  acyclovir   Oral Tab/Cap 400 milliGRAM(s) Oral two times a day  caspofungin IVPB      caspofungin IVPB 50 milliGRAM(s) IV Intermittent every 24 hours  cefepime   IVPB 2000 milliGRAM(s) IV Intermittent every 8 hours      HEME/ONC MEDICATIONS  cyclophosphamide IVPB (eMAR) 300 milliGRAM(s) IV Intermittent every 12 hours  methotrexate PF IntraThecal (eMAR) 15 milliGRAM(s) IntraThecal once      STANDING MEDICATIONS  allopurinol 100 milliGRAM(s) Oral daily  brimonidine 0.2% Ophthalmic Solution 1 Drop(s) Both EYES three times a day  chlorhexidine 4% Liquid 1 Application(s) Topical <User Schedule>  dexAMETHasone     Tablet 20 milliGRAM(s) Oral every 24 hours  escitalopram 10 milliGRAM(s) Oral daily  gabapentin 300 milliGRAM(s) Oral every 8 hours  losartan 100 milliGRAM(s) Oral daily  ondansetron Injectable 8 milliGRAM(s) IV Push every 8 hours  pantoprazole    Tablet 40 milliGRAM(s) Oral before breakfast  simvastatin 40 milliGRAM(s) Oral at bedtime  sodium chloride 0.9%. 1000 milliLiter(s) IV Continuous <Continuous>      PRN MEDICATIONS  acetaminophen     Tablet .. 650 milliGRAM(s) Oral every 6 hours PRN  FIRST- Mouthwash  BLM 10 milliLiter(s) Swish and Spit four times a day PRN  metoclopramide Injectable 10 milliGRAM(s) IV Push every 6 hours PRN  ondansetron Injectable 8 milliGRAM(s) IV Push every 8 hours PRN  polyethylene glycol 3350 17 Gram(s) Oral two times a day PRN      Vital Signs Last 24 Hrs  T(C): 36.4 (04 Mar 2023 05:15), Max: 38 (03 Mar 2023 13:46)  T(F): 97.6 (04 Mar 2023 05:15), Max: 100.4 (03 Mar 2023 13:46)  HR: 59 (04 Mar 2023 05:15) (57 - 82)  BP: 143/74 (04 Mar 2023 05:15) (102/60 - 143/74)  BP(mean): --  RR: 16 (04 Mar 2023 05:15) (16 - 16)  SpO2: 95% (04 Mar 2023 05:15) (92% - 95%)    Parameters below as of 04 Mar 2023 05:15  Patient On (Oxygen Delivery Method): nasal cannula  O2 Flow (L/min): 2      PHYSICAL EXAM  General: adult in NAD  HEENT: clear oropharynx, no erythema, no ulcers  CV: normal S1, S2, RRR  Lungs: clear to auscultation, no wheezes, no rales  Abdomen: soft, nontender, nondistended, normal BS  Ext: no edema  Skin: diffuse MP rashes on abd, erythematous; LUE swelling, + heat, indurated, tender to palp   Neuro: alert and oriented x 3  Central line:  RUE PICC CDI        LABS:                        9.1    2.79  )-----------( 12       ( 04 Mar 2023 04:35 )             28.1         Mean Cell Volume : 91.2 fl  Mean Cell Hemoglobin : 29.5 pg  Mean Cell Hemoglobin Concentration : 32.4 gm/dL  Auto Neutrophil # : x  Auto Lymphocyte # : x  Auto Monocyte # : x  Auto Eosinophil # : x  Auto Basophil # : x  Auto Neutrophil % : x  Auto Lymphocyte % : x  Auto Monocyte % : x  Auto Eosinophil % : x  Auto Basophil % : x      03-04    141  |  108  |  15  ----------------------------<  153<H>  4.6   |  23  |  0.88    Ca    8.6      04 Mar 2023 04:36  Phos  3.3     03-04  Mg     2.3     03-04    TPro  6.5  /  Alb  3.0<L>  /  TBili  0.6  /  DBili  x   /  AST  130<H>  /  ALT  135<H>  /  AlkPhos  297<H>  03-04    PT/INR - ( 03 Mar 2023 07:27 )   PT: 15.5 sec;   INR: 1.34 ratio         PTT - ( 03 Mar 2023 07:27 )  PTT:28.9 sec      Uric Acid 4.6        RECENT CULTURES:      Culture - Urine (03.03.23 @ 06:58)    Specimen Source: Clean Catch Clean Catch (Midstream)    Culture Results:   No growth      Culture - Blood (03.03.23 @ 02:14)    Specimen Source: .Blood Blood-Peripheral    Culture Results:   No growth to date.      Culture - Blood (03.03.23 @ 02:14)    Specimen Source: .Blood Blood-Peripheral    Culture Results:   No growth to date.        RADIOLOGY & ADDITIONAL STUDIES:        < from: CT Abdomen and Pelvis w/ IV Cont (03.01.23 @ 20:02) >  IMPRESSION:  Splenomegaly with a small age indeterminant splenic infarct.  No abdominal or pelvic lymphadenopathy.  Trace left pleural effusion      < from: Xray Chest 1 View- PORTABLE-Urgent (Xray Chest 1 View- PORTABLE-Urgent .) (02.28.23 @ 19:25) >  IMPRESSION:  Right upper extremity PICC line catheter tip is in the distal SVC.  Similar-appearing left basilar atelectasis with small effusion.

## 2023-03-04 NOTE — PROGRESS NOTE ADULT - NS ATTEND AMEND GEN_ALL_CORE FT
63 year old with HLD and HTN, transferred b/c of circulating blasts consistent with acute leukemia. PB flow cytometry showing B-ALL  FISH negative for Ph chromosome. PCR negative (although p190 positive 0.001% from marrow)  BMbx done 2/28 with IR (was unable to get at bedside) -B-lymphoblastic leukemia/lymphoma.  Echo done EF 55%  PICC line placed  Will start chemo with hyperCVAD (IV Cyclophosphamide (150 mg/m2 every 12 h on days 1–3), Dexamethasone 20 mg per day on days 1–4 and 11–14, Vincristine 2 mg IV flat dose on days 1 and 8, Daunorubicin 25 mg/m2/day IV continuous infusion over 48 hours, starting on day 4). Informed consent obtained today  LP with IT MTX done on 2/28 -negative for malignant cells  Transaminitis -US done at [place] with fatty liver. Cont to monitor  CT A/P -Splenomegaly with a small age indeterminant splenic infarct. Normal liver, no abdominal or pelvic lymphadenopathy. CTA done at [place] w/ shotty LAD  Transfuse for plts <10, Hgb <7  Febrile to 101.7 -levaquin changed to cefepime  supportive care 63 year old with HLD and HTN, transferred b/c of circulating blasts consistent with acute leukemia. PB flow cytometry showing B-ALL  FISH negative for Ph chromosome. PCR negative (although p190 positive 0.001% from marrow)  BMbx done 2/28 with IR (was unable to get at bedside) -B-lymphoblastic leukemia/lymphoma.  Echo done EF 55%  PICC line placed  3/3: started HyperCVAD (IV Cyclophosphamide (150 mg/m2 every 12 h on days 1–3), Dexamethasone 20 mg per day on days 1–4 and 11–14, Vincristine 2 mg IV flat dose on days 1 and 8, Daunorubicin 25 mg/m2/day IV continuous infusion over 48 hours, starting on day 4). Informed consent obtained   Today is C1D2  LP with IT MTX done on 2/28 -negative for malignant cells  Transaminitis -US done at [place] with fatty liver. Cont to monitor  CT A/P -Splenomegaly with a small age indeterminant splenic infarct. Normal liver, no abdominal or pelvic lymphadenopathy. CTA done at [place] w/ shotty LAD  Transfuse for plts <10, Hgb <7  Febrile to 101.7 -levaquin changed to cefepime  3/4: Abd rash, improving, will continue to monitor  supportive care

## 2023-03-05 LAB
ALBUMIN SERPL ELPH-MCNC: 3 G/DL — LOW (ref 3.3–5)
ALBUMIN SERPL ELPH-MCNC: 3.1 G/DL — LOW (ref 3.3–5)
ALP SERPL-CCNC: 219 U/L — HIGH (ref 40–120)
ALP SERPL-CCNC: 224 U/L — HIGH (ref 40–120)
ALT FLD-CCNC: 74 U/L — HIGH (ref 10–45)
ALT FLD-CCNC: 87 U/L — HIGH (ref 10–45)
ANION GAP SERPL CALC-SCNC: 10 MMOL/L — SIGNIFICANT CHANGE UP (ref 5–17)
ANION GAP SERPL CALC-SCNC: 10 MMOL/L — SIGNIFICANT CHANGE UP (ref 5–17)
APTT BLD: 25 SEC — LOW (ref 27.5–35.5)
AST SERPL-CCNC: 38 U/L — SIGNIFICANT CHANGE UP (ref 10–40)
AST SERPL-CCNC: 51 U/L — HIGH (ref 10–40)
BILIRUB SERPL-MCNC: 0.4 MG/DL — SIGNIFICANT CHANGE UP (ref 0.2–1.2)
BILIRUB SERPL-MCNC: 0.4 MG/DL — SIGNIFICANT CHANGE UP (ref 0.2–1.2)
BUN SERPL-MCNC: 22 MG/DL — SIGNIFICANT CHANGE UP (ref 7–23)
BUN SERPL-MCNC: 26 MG/DL — HIGH (ref 7–23)
CALCIUM SERPL-MCNC: 8.3 MG/DL — LOW (ref 8.4–10.5)
CALCIUM SERPL-MCNC: 8.6 MG/DL — SIGNIFICANT CHANGE UP (ref 8.4–10.5)
CHLORIDE SERPL-SCNC: 104 MMOL/L — SIGNIFICANT CHANGE UP (ref 96–108)
CHLORIDE SERPL-SCNC: 106 MMOL/L — SIGNIFICANT CHANGE UP (ref 96–108)
CO2 SERPL-SCNC: 22 MMOL/L — SIGNIFICANT CHANGE UP (ref 22–31)
CO2 SERPL-SCNC: 23 MMOL/L — SIGNIFICANT CHANGE UP (ref 22–31)
CREAT SERPL-MCNC: 0.82 MG/DL — SIGNIFICANT CHANGE UP (ref 0.5–1.3)
CREAT SERPL-MCNC: 0.9 MG/DL — SIGNIFICANT CHANGE UP (ref 0.5–1.3)
D DIMER BLD IA.RAPID-MCNC: 2150 NG/ML DDU — HIGH
EGFR: 71 ML/MIN/1.73M2 — SIGNIFICANT CHANGE UP
EGFR: 79 ML/MIN/1.73M2 — SIGNIFICANT CHANGE UP
FIBRINOGEN PPP-MCNC: 530 MG/DL — HIGH (ref 200–445)
GLUCOSE SERPL-MCNC: 128 MG/DL — HIGH (ref 70–99)
GLUCOSE SERPL-MCNC: 135 MG/DL — HIGH (ref 70–99)
HCT VFR BLD CALC: 26.8 % — LOW (ref 34.5–45)
HCT VFR BLD CALC: 26.9 % — LOW (ref 34.5–45)
HGB BLD-MCNC: 9 G/DL — LOW (ref 11.5–15.5)
HGB BLD-MCNC: 9.1 G/DL — LOW (ref 11.5–15.5)
INR BLD: 1.09 RATIO — SIGNIFICANT CHANGE UP (ref 0.88–1.16)
LDH SERPL L TO P-CCNC: 463 U/L — HIGH (ref 50–242)
LDH SERPL L TO P-CCNC: 608 U/L — HIGH (ref 50–242)
MAGNESIUM SERPL-MCNC: 2.4 MG/DL — SIGNIFICANT CHANGE UP (ref 1.6–2.6)
MAGNESIUM SERPL-MCNC: 2.5 MG/DL — SIGNIFICANT CHANGE UP (ref 1.6–2.6)
MCHC RBC-ENTMCNC: 29.7 PG — SIGNIFICANT CHANGE UP (ref 27–34)
MCHC RBC-ENTMCNC: 30.3 PG — SIGNIFICANT CHANGE UP (ref 27–34)
MCHC RBC-ENTMCNC: 33.5 GM/DL — SIGNIFICANT CHANGE UP (ref 32–36)
MCHC RBC-ENTMCNC: 34 GM/DL — SIGNIFICANT CHANGE UP (ref 32–36)
MCV RBC AUTO: 88.8 FL — SIGNIFICANT CHANGE UP (ref 80–100)
MCV RBC AUTO: 89.3 FL — SIGNIFICANT CHANGE UP (ref 80–100)
NRBC # BLD: 0 /100 WBCS — SIGNIFICANT CHANGE UP (ref 0–0)
NRBC # BLD: 0 /100 WBCS — SIGNIFICANT CHANGE UP (ref 0–0)
PHOSPHATE SERPL-MCNC: 3.5 MG/DL — SIGNIFICANT CHANGE UP (ref 2.5–4.5)
PHOSPHATE SERPL-MCNC: 3.8 MG/DL — SIGNIFICANT CHANGE UP (ref 2.5–4.5)
PLATELET # BLD AUTO: 20 K/UL — CRITICAL LOW (ref 150–400)
PLATELET # BLD AUTO: 23 K/UL — LOW (ref 150–400)
POTASSIUM SERPL-MCNC: 4.3 MMOL/L — SIGNIFICANT CHANGE UP (ref 3.5–5.3)
POTASSIUM SERPL-MCNC: 4.5 MMOL/L — SIGNIFICANT CHANGE UP (ref 3.5–5.3)
POTASSIUM SERPL-SCNC: 4.3 MMOL/L — SIGNIFICANT CHANGE UP (ref 3.5–5.3)
POTASSIUM SERPL-SCNC: 4.5 MMOL/L — SIGNIFICANT CHANGE UP (ref 3.5–5.3)
PROT SERPL-MCNC: 6.1 G/DL — SIGNIFICANT CHANGE UP (ref 6–8.3)
PROT SERPL-MCNC: 6.3 G/DL — SIGNIFICANT CHANGE UP (ref 6–8.3)
PROTHROM AB SERPL-ACNC: 12.5 SEC — SIGNIFICANT CHANGE UP (ref 10.5–13.4)
RBC # BLD: 3 M/UL — LOW (ref 3.8–5.2)
RBC # BLD: 3.03 M/UL — LOW (ref 3.8–5.2)
RBC # FLD: 14.2 % — SIGNIFICANT CHANGE UP (ref 10.3–14.5)
RBC # FLD: 14.3 % — SIGNIFICANT CHANGE UP (ref 10.3–14.5)
SODIUM SERPL-SCNC: 136 MMOL/L — SIGNIFICANT CHANGE UP (ref 135–145)
SODIUM SERPL-SCNC: 139 MMOL/L — SIGNIFICANT CHANGE UP (ref 135–145)
URATE SERPL-MCNC: 5 MG/DL — SIGNIFICANT CHANGE UP (ref 2.5–7)
URATE SERPL-MCNC: 6.7 MG/DL — SIGNIFICANT CHANGE UP (ref 2.5–7)
WBC # BLD: 1.11 K/UL — LOW (ref 3.8–10.5)
WBC # BLD: 1.85 K/UL — LOW (ref 3.8–10.5)
WBC # FLD AUTO: 1.11 K/UL — LOW (ref 3.8–10.5)
WBC # FLD AUTO: 1.85 K/UL — LOW (ref 3.8–10.5)

## 2023-03-05 RX ORDER — ALLOPURINOL 300 MG
300 TABLET ORAL DAILY
Refills: 0 | Status: DISCONTINUED | OUTPATIENT
Start: 2023-03-05 | End: 2023-03-10

## 2023-03-05 RX ADMIN — Medication 30 MILLILITER(S): at 20:42

## 2023-03-05 RX ADMIN — GABAPENTIN 300 MILLIGRAM(S): 400 CAPSULE ORAL at 14:20

## 2023-03-05 RX ADMIN — SODIUM CHLORIDE 20 MILLILITER(S): 9 INJECTION INTRAMUSCULAR; INTRAVENOUS; SUBCUTANEOUS at 05:55

## 2023-03-05 RX ADMIN — GABAPENTIN 300 MILLIGRAM(S): 400 CAPSULE ORAL at 22:16

## 2023-03-05 RX ADMIN — ONDANSETRON 8 MILLIGRAM(S): 8 TABLET, FILM COATED ORAL at 15:01

## 2023-03-05 RX ADMIN — ESCITALOPRAM OXALATE 10 MILLIGRAM(S): 10 TABLET, FILM COATED ORAL at 12:29

## 2023-03-05 RX ADMIN — GABAPENTIN 300 MILLIGRAM(S): 400 CAPSULE ORAL at 05:54

## 2023-03-05 RX ADMIN — Medication 20 MILLIGRAM(S): at 16:49

## 2023-03-05 RX ADMIN — Medication 300 MILLIGRAM(S): at 01:01

## 2023-03-05 RX ADMIN — Medication 300 MILLIGRAM(S): at 12:32

## 2023-03-05 RX ADMIN — ONDANSETRON 8 MILLIGRAM(S): 8 TABLET, FILM COATED ORAL at 00:27

## 2023-03-05 RX ADMIN — Medication 400 MILLIGRAM(S): at 05:54

## 2023-03-05 RX ADMIN — CASPOFUNGIN ACETATE 260 MILLIGRAM(S): 7 INJECTION, POWDER, LYOPHILIZED, FOR SOLUTION INTRAVENOUS at 12:32

## 2023-03-05 RX ADMIN — LOSARTAN POTASSIUM 100 MILLIGRAM(S): 100 TABLET, FILM COATED ORAL at 05:54

## 2023-03-05 RX ADMIN — CEFEPIME 100 MILLIGRAM(S): 1 INJECTION, POWDER, FOR SOLUTION INTRAMUSCULAR; INTRAVENOUS at 14:20

## 2023-03-05 RX ADMIN — CEFEPIME 100 MILLIGRAM(S): 1 INJECTION, POWDER, FOR SOLUTION INTRAMUSCULAR; INTRAVENOUS at 22:15

## 2023-03-05 RX ADMIN — BRIMONIDINE TARTRATE 1 DROP(S): 2 SOLUTION/ DROPS OPHTHALMIC at 12:28

## 2023-03-05 RX ADMIN — Medication 400 MILLIGRAM(S): at 17:09

## 2023-03-05 RX ADMIN — CEFEPIME 100 MILLIGRAM(S): 1 INJECTION, POWDER, FOR SOLUTION INTRAMUSCULAR; INTRAVENOUS at 05:56

## 2023-03-05 RX ADMIN — SIMVASTATIN 40 MILLIGRAM(S): 20 TABLET, FILM COATED ORAL at 20:32

## 2023-03-05 RX ADMIN — ONDANSETRON 8 MILLIGRAM(S): 8 TABLET, FILM COATED ORAL at 09:07

## 2023-03-05 RX ADMIN — Medication 650 MILLIGRAM(S): at 01:20

## 2023-03-05 RX ADMIN — SODIUM CHLORIDE 20 MILLILITER(S): 9 INJECTION INTRAMUSCULAR; INTRAVENOUS; SUBCUTANEOUS at 20:35

## 2023-03-05 RX ADMIN — Medication 1 APPLICATION(S): at 17:10

## 2023-03-05 RX ADMIN — CHLORHEXIDINE GLUCONATE 1 APPLICATION(S): 213 SOLUTION TOPICAL at 09:07

## 2023-03-05 RX ADMIN — Medication 300 MILLIGRAM(S): at 12:51

## 2023-03-05 RX ADMIN — PANTOPRAZOLE SODIUM 40 MILLIGRAM(S): 20 TABLET, DELAYED RELEASE ORAL at 06:54

## 2023-03-05 NOTE — PROGRESS NOTE ADULT - NS ATTEND AMEND GEN_ALL_CORE FT
63 year old with HLD and HTN, transferred b/c of circulating blasts consistent with acute leukemia. PB flow cytometry showing B-ALL  FISH negative for Ph chromosome. PCR negative (although p190 positive 0.001% from marrow)  BMbx done 2/28 with IR (was unable to get at bedside) -B-lymphoblastic leukemia/lymphoma.  Echo done EF 55%  PICC line placed  3/3: started HyperCVAD (IV Cyclophosphamide (150 mg/m2 every 12 h on days 1–3), Dexamethasone 20 mg per day on days 1–4 and 11–14, Vincristine 2 mg IV flat dose on days 1 and 8, Daunorubicin 25 mg/m2/day IV continuous infusion over 48 hours, starting on day 4). Informed consent obtained   Today is C1D2  LP with IT MTX done on 2/28 -negative for malignant cells  Transaminitis -US done at [place] with fatty liver. Cont to monitor  CT A/P -Splenomegaly with a small age indeterminant splenic infarct. Normal liver, no abdominal or pelvic lymphadenopathy. CTA done at [place] w/ shotty LAD  Transfuse for plts <10, Hgb <7  Febrile to 101.7 -levaquin changed to cefepime  3/4: Abd rash, improving, will continue to monitor  supportive care 63 year old with HLD and HTN, transferred b/c of circulating blasts consistent with acute leukemia. PB flow cytometry showing B-ALL  FISH negative for Ph chromosome. PCR negative (although p190 positive 0.001% from marrow)  BMbx done 2/28 with IR (was unable to get at bedside) -B-lymphoblastic leukemia/lymphoma.  Echo done EF 55%  PICC line placed  3/3: started HyperCVAD (IV Cyclophosphamide (150 mg/m2 every 12 h on days 1–3), Dexamethasone 20 mg per day on days 1–4 and 11–14, Vincristine 2 mg IV flat dose on days 1 and 8, Daunorubicin 25 mg/m2/day IV continuous infusion over 48 hours, starting on day 4). Informed consent obtained   Today is C1D3  LP with IT MTX done on 2/28 -negative for malignant cells  Transaminitis -US done at [place] with fatty liver. Cont to monitor  CT A/P -Splenomegaly with a small age indeterminant splenic infarct. Normal liver, no abdominal or pelvic lymphadenopathy. CTA done at [place] w/ shotty LAD  Transfuse for plts <10, Hgb <7  Febrile to 101.7 -levaquin changed to cefepime  3/5: Abd rash improving, will continue to monitor  supportive care

## 2023-03-05 NOTE — PROGRESS NOTE ADULT - SUBJECTIVE AND OBJECTIVE BOX
Diagnosis: B ALL     Protocol/Chemo Regimen: reduced HyperCVAD( (IV Cyclophosphamide (150 mg/m2 every 12 h on days 1–3), Dexamethasone 20 mg per day on days 1–4 and 11–14, Vincristine 2 mg IV flat dose on days 1 and 8, Daunorubicin 25 mg/m2/day IV continuous infusion over 48 hours, starting on day 4).   Decadron started on 3/3, Cytoxan and VCR started on 3/4    Day: 2     Pt endorsed:       Review of Systems:       Pain scale: not graded  Left elbow    Diet: DASH/TLC     Allergies: sulfa drugs (Unknown)    MEDICATIONS  (STANDING):  acyclovir   Oral Tab/Cap 400 milliGRAM(s) Oral two times a day  allopurinol 300 milliGRAM(s) Oral daily  brimonidine 0.2% Ophthalmic Solution 1 Drop(s) Both EYES daily  caspofungin IVPB      caspofungin IVPB 50 milliGRAM(s) IV Intermittent every 24 hours  cefepime   IVPB 2000 milliGRAM(s) IV Intermittent every 8 hours  chlorhexidine 4% Liquid 1 Application(s) Topical <User Schedule>  cyclophosphamide IVPB (eMAR) 300 milliGRAM(s) IV Intermittent every 12 hours  dexAMETHasone     Tablet 20 milliGRAM(s) Oral every 24 hours  escitalopram 10 milliGRAM(s) Oral daily  gabapentin 300 milliGRAM(s) Oral every 8 hours  losartan 100 milliGRAM(s) Oral daily  methotrexate PF IntraThecal (eMAR) 15 milliGRAM(s) IntraThecal once  ondansetron Injectable 8 milliGRAM(s) IV Push every 8 hours  pantoprazole    Tablet 40 milliGRAM(s) Oral before breakfast  simvastatin 40 milliGRAM(s) Oral at bedtime  sodium chloride 0.9%. 1000 milliLiter(s) (20 mL/Hr) IV Continuous <Continuous>  triamcinolone 0.1% Ointment 1 Application(s) Topical two times a day    MEDICATIONS  (PRN):  acetaminophen     Tablet .. 650 milliGRAM(s) Oral every 6 hours PRN Temp greater or equal to 38C (100.4F), Mild Pain (1 - 3)  diphenhydrAMINE 25 milliGRAM(s) Oral every 4 hours PRN Rash and/or Itching  FIRST- Mouthwash  BLM 10 milliLiter(s) Swish and Spit four times a day PRN Mouth Care  metoclopramide Injectable 10 milliGRAM(s) IV Push every 6 hours PRN nausea/vomitting  ondansetron Injectable 8 milliGRAM(s) IV Push every 8 hours PRN Nausea and/or Vomiting  polyethylene glycol 3350 17 Gram(s) Oral two times a day PRN Constipation          Vital Signs Last 24 Hrs  T(C): 36.4 (05 Mar 2023 13:41), Max: 36.7 (04 Mar 2023 21:20)  T(F): 97.5 (05 Mar 2023 13:41), Max: 98.1 (04 Mar 2023 21:20)  HR: 52 (05 Mar 2023 13:41) (52 - 62)  BP: 143/73 (05 Mar 2023 13:41) (121/71 - 161/72)  BP(mean): --  RR: 18 (05 Mar 2023 13:41) (16 - 18)  SpO2: 96% (05 Mar 2023 13:41) (94% - 96%)    Parameters below as of 05 Mar 2023 13:41  Patient On (Oxygen Delivery Method): nasal cannula  O2 Flow (L/min): 2      PHYSICAL EXAM  General: adult in NAD  HEENT: clear oropharynx, no erythema, no ulcers  CV: normal S1, S2, RRR  Lungs: clear to auscultation, no wheezes, no rales  Abdomen: soft, nontender, nondistended, normal BS  Ext: no edema  Skin: diffuse MP rashes on abd, erythematous; LUE swelling, + heat, indurated, tender to palp   Neuro: alert and oriented x 3  Central line:  RUE PICC CDI        LABS:                                       9.1    1.85  )-----------( 23       ( 05 Mar 2023 06:37 )             26.8     05 Mar 2023 06:37    139    |  106    |  22     ----------------------------<  135    4.5     |  23     |  0.82     Ca    8.6        05 Mar 2023 06:37  Phos  3.8       05 Mar 2023 06:37  Mg     2.5       05 Mar 2023 06:37    TPro  6.1    /  Alb  3.0    /  TBili  0.4    /  DBili  x      /  AST  51     /  ALT  87     /  AlkPhos  224    05 Mar 2023 06:37    PT/INR - ( 05 Mar 2023 06:37 )   PT: 12.5 sec;   INR: 1.09 ratio         PTT - ( 05 Mar 2023 06:37 )  PTT:25.0 sec  CAPILLARY BLOOD GLUCOSE        LIVER FUNCTIONS - ( 05 Mar 2023 06:37 )  Alb: 3.0 g/dL / Pro: 6.1 g/dL / ALK PHOS: 224 U/L / ALT: 87 U/L / AST: 51 U/L / GGT: x                   RECENT CULTURES:      Culture - Urine (03.03.23 @ 06:58)    Specimen Source: Clean Catch Clean Catch (Midstream)    Culture Results:   No growth      Culture - Blood (03.03.23 @ 02:14)    Specimen Source: .Blood Blood-Peripheral    Culture Results:   No growth to date.      Culture - Blood (03.03.23 @ 02:14)    Specimen Source: .Blood Blood-Peripheral    Culture Results:   No growth to date.        RADIOLOGY & ADDITIONAL STUDIES:        < from: CT Abdomen and Pelvis w/ IV Cont (03.01.23 @ 20:02) >  IMPRESSION:  Splenomegaly with a small age indeterminant splenic infarct.  No abdominal or pelvic lymphadenopathy.  Trace left pleural effusion      < from: Xray Chest 1 View- PORTABLE-Urgent (Xray Chest 1 View- PORTABLE-Urgent .) (02.28.23 @ 19:25) >  IMPRESSION:  Right upper extremity PICC line catheter tip is in the distal SVC.  Similar-appearing left basilar atelectasis with small effusion.           Diagnosis: B ALL     Protocol/Chemo Regimen: reduced HyperCVAD( (IV Cyclophosphamide (150 mg/m2 every 12 h on days 1–3), Dexamethasone 20 mg per day on days 1–4 and 11–14, Vincristine 2 mg IV flat dose on days 1 and 8, Daunorubicin 25 mg/m2/day IV continuous infusion over 48 hours, starting on day 4).   Decadron started on 3/3, Cytoxan and VCR started on 3/4    Day: 2     Pt endorsed: No overnight events, afebrile, +truncal rash improving, +BM's today    Review of Systems: Denies cough, SOB, abdominal pain, HA or dizziness      Pain scale: not graded  Left elbow    Diet: DASH/TLC     Allergies: sulfa drugs (Unknown)    MEDICATIONS  (STANDING):  acyclovir   Oral Tab/Cap 400 milliGRAM(s) Oral two times a day  allopurinol 300 milliGRAM(s) Oral daily  brimonidine 0.2% Ophthalmic Solution 1 Drop(s) Both EYES daily  caspofungin IVPB      caspofungin IVPB 50 milliGRAM(s) IV Intermittent every 24 hours  cefepime   IVPB 2000 milliGRAM(s) IV Intermittent every 8 hours  chlorhexidine 4% Liquid 1 Application(s) Topical <User Schedule>  cyclophosphamide IVPB (eMAR) 300 milliGRAM(s) IV Intermittent every 12 hours  dexAMETHasone     Tablet 20 milliGRAM(s) Oral every 24 hours  escitalopram 10 milliGRAM(s) Oral daily  gabapentin 300 milliGRAM(s) Oral every 8 hours  losartan 100 milliGRAM(s) Oral daily  methotrexate PF IntraThecal (eMAR) 15 milliGRAM(s) IntraThecal once  ondansetron Injectable 8 milliGRAM(s) IV Push every 8 hours  pantoprazole    Tablet 40 milliGRAM(s) Oral before breakfast  simvastatin 40 milliGRAM(s) Oral at bedtime  sodium chloride 0.9%. 1000 milliLiter(s) (20 mL/Hr) IV Continuous <Continuous>  triamcinolone 0.1% Ointment 1 Application(s) Topical two times a day    MEDICATIONS  (PRN):  acetaminophen     Tablet .. 650 milliGRAM(s) Oral every 6 hours PRN Temp greater or equal to 38C (100.4F), Mild Pain (1 - 3)  diphenhydrAMINE 25 milliGRAM(s) Oral every 4 hours PRN Rash and/or Itching  FIRST- Mouthwash  BLM 10 milliLiter(s) Swish and Spit four times a day PRN Mouth Care  metoclopramide Injectable 10 milliGRAM(s) IV Push every 6 hours PRN nausea/vomitting  ondansetron Injectable 8 milliGRAM(s) IV Push every 8 hours PRN Nausea and/or Vomiting  polyethylene glycol 3350 17 Gram(s) Oral two times a day PRN Constipation      Vital Signs Last 24 Hrs  T(C): 36.4 (05 Mar 2023 13:41), Max: 36.7 (04 Mar 2023 21:20)  T(F): 97.5 (05 Mar 2023 13:41), Max: 98.1 (04 Mar 2023 21:20)  HR: 52 (05 Mar 2023 13:41) (52 - 62)  BP: 143/73 (05 Mar 2023 13:41) (121/71 - 161/72)  BP(mean): --  RR: 18 (05 Mar 2023 13:41) (16 - 18)  SpO2: 96% (05 Mar 2023 13:41) (94% - 96%)    Parameters below as of 05 Mar 2023 13:41  Patient On (Oxygen Delivery Method): nasal cannula  O2 Flow (L/min): 2      PHYSICAL EXAM  General: Sitting up in chair NAD  HEENT: Sclerae anicteric, clear oropharynx, no erythema, no ulcers  CV: normal S1, S2, reg  Lungs: clear to auscultation b/l, decreased at bases,  no wheezes, no rales  Abdomen: +BS, soft, nontender, nondistended  Ext: no edema  Skin: diffuse MP rashes on abd, erythematous (improving; LUE swelling, + heat, indurated, tender to palp (improving)   Neuro: alert and oriented x 3  Central line:  RUE PICC CDI        LABS:                        9.1    1.85  )-----------( 23       ( 05 Mar 2023 06:37 )             26.8     05 Mar 2023 06:37    139    |  106    |  22     ----------------------------<  135    4.5     |  23     |  0.82     Ca    8.6        05 Mar 2023 06:37  Phos  3.8       05 Mar 2023 06:37  Mg     2.5       05 Mar 2023 06:37    TPro  6.1    /  Alb  3.0    /  TBili  0.4    /  DBili  x      /  AST  51     /  ALT  87     /  AlkPhos  224    05 Mar 2023 06:37    PT/INR - ( 05 Mar 2023 06:37 )   PT: 12.5 sec;   INR: 1.09 ratio    PTT - ( 05 Mar 2023 06:37 )  PTT:25.0 sec      LIVER FUNCTIONS - ( 05 Mar 2023 06:37 )  Alb: 3.0 g/dL / Pro: 6.1 g/dL / ALK PHOS: 224 U/L / ALT: 87 U/L / AST: 51 U/L / GGT: x             RECENT CULTURES:      Culture - Urine (03.03.23 @ 06:58)    Specimen Source: Clean Catch Clean Catch (Midstream)    Culture Results:   No growth      Culture - Blood (03.03.23 @ 02:14)    Specimen Source: .Blood Blood-Peripheral    Culture Results:   No growth to date.      Culture - Blood (03.03.23 @ 02:14)    Specimen Source: .Blood Blood-Peripheral    Culture Results:   No growth to date.        RADIOLOGY & ADDITIONAL STUDIES:        < from: CT Abdomen and Pelvis w/ IV Cont (03.01.23 @ 20:02) >  IMPRESSION:  Splenomegaly with a small age indeterminant splenic infarct.  No abdominal or pelvic lymphadenopathy.  Trace left pleural effusion      < from: Xray Chest 1 View- PORTABLE-Urgent (Xray Chest 1 View- PORTABLE-Urgent .) (02.28.23 @ 19:25) >  IMPRESSION:  Right upper extremity PICC line catheter tip is in the distal SVC.  Similar-appearing left basilar atelectasis with small effusion.           Diagnosis: B ALL     Protocol/Chemo Regimen: reduced HyperCVAD( (IV Cyclophosphamide (150 mg/m2 every 12 h on days 1–3), Dexamethasone 20 mg per day on days 1–4 and 11–14, Vincristine 2 mg IV flat dose on days 1 and 8, Daunorubicin 25 mg/m2/day IV continuous infusion over 48 hours, starting on day 4).   Decadron started on 3/3, Cytoxan and VCR started on 3/4    Day: 3     Pt endorsed: No overnight events, afebrile, +truncal rash improving, +BM's today    Review of Systems: Denies cough, SOB, abdominal pain, HA or dizziness      Pain scale: not graded  Left elbow    Diet: DASH/TLC     Allergies: sulfa drugs (Unknown)    MEDICATIONS  (STANDING):  acyclovir   Oral Tab/Cap 400 milliGRAM(s) Oral two times a day  allopurinol 300 milliGRAM(s) Oral daily  brimonidine 0.2% Ophthalmic Solution 1 Drop(s) Both EYES daily  caspofungin IVPB      caspofungin IVPB 50 milliGRAM(s) IV Intermittent every 24 hours  cefepime   IVPB 2000 milliGRAM(s) IV Intermittent every 8 hours  chlorhexidine 4% Liquid 1 Application(s) Topical <User Schedule>  cyclophosphamide IVPB (eMAR) 300 milliGRAM(s) IV Intermittent every 12 hours  dexAMETHasone     Tablet 20 milliGRAM(s) Oral every 24 hours  escitalopram 10 milliGRAM(s) Oral daily  gabapentin 300 milliGRAM(s) Oral every 8 hours  losartan 100 milliGRAM(s) Oral daily  methotrexate PF IntraThecal (eMAR) 15 milliGRAM(s) IntraThecal once  ondansetron Injectable 8 milliGRAM(s) IV Push every 8 hours  pantoprazole    Tablet 40 milliGRAM(s) Oral before breakfast  simvastatin 40 milliGRAM(s) Oral at bedtime  sodium chloride 0.9%. 1000 milliLiter(s) (20 mL/Hr) IV Continuous <Continuous>  triamcinolone 0.1% Ointment 1 Application(s) Topical two times a day    MEDICATIONS  (PRN):  acetaminophen     Tablet .. 650 milliGRAM(s) Oral every 6 hours PRN Temp greater or equal to 38C (100.4F), Mild Pain (1 - 3)  diphenhydrAMINE 25 milliGRAM(s) Oral every 4 hours PRN Rash and/or Itching  FIRST- Mouthwash  BLM 10 milliLiter(s) Swish and Spit four times a day PRN Mouth Care  metoclopramide Injectable 10 milliGRAM(s) IV Push every 6 hours PRN nausea/vomitting  ondansetron Injectable 8 milliGRAM(s) IV Push every 8 hours PRN Nausea and/or Vomiting  polyethylene glycol 3350 17 Gram(s) Oral two times a day PRN Constipation      Vital Signs Last 24 Hrs  T(C): 36.4 (05 Mar 2023 13:41), Max: 36.7 (04 Mar 2023 21:20)  T(F): 97.5 (05 Mar 2023 13:41), Max: 98.1 (04 Mar 2023 21:20)  HR: 52 (05 Mar 2023 13:41) (52 - 62)  BP: 143/73 (05 Mar 2023 13:41) (121/71 - 161/72)  BP(mean): --  RR: 18 (05 Mar 2023 13:41) (16 - 18)  SpO2: 96% (05 Mar 2023 13:41) (94% - 96%)    Parameters below as of 05 Mar 2023 13:41  Patient On (Oxygen Delivery Method): nasal cannula  O2 Flow (L/min): 2      PHYSICAL EXAM  General: Sitting up in chair NAD  HEENT: Sclerae anicteric, clear oropharynx, no erythema, no ulcers  CV: normal S1, S2, reg  Lungs: clear to auscultation b/l, decreased at bases,  no wheezes, no rales  Abdomen: +BS, soft, nontender, nondistended  Ext: no edema  Skin: diffuse MP rashes on abd, erythematous (improving; LUE swelling, + heat, indurated, tender to palp (improving)   Neuro: alert and oriented x 3  Central line:  RUE PICC CDI        LABS:                        9.1    1.85  )-----------( 23       ( 05 Mar 2023 06:37 )             26.8     05 Mar 2023 06:37    139    |  106    |  22     ----------------------------<  135    4.5     |  23     |  0.82     Ca    8.6        05 Mar 2023 06:37  Phos  3.8       05 Mar 2023 06:37  Mg     2.5       05 Mar 2023 06:37    TPro  6.1    /  Alb  3.0    /  TBili  0.4    /  DBili  x      /  AST  51     /  ALT  87     /  AlkPhos  224    05 Mar 2023 06:37    PT/INR - ( 05 Mar 2023 06:37 )   PT: 12.5 sec;   INR: 1.09 ratio    PTT - ( 05 Mar 2023 06:37 )  PTT:25.0 sec      LIVER FUNCTIONS - ( 05 Mar 2023 06:37 )  Alb: 3.0 g/dL / Pro: 6.1 g/dL / ALK PHOS: 224 U/L / ALT: 87 U/L / AST: 51 U/L / GGT: x             RECENT CULTURES:      Culture - Urine (03.03.23 @ 06:58)    Specimen Source: Clean Catch Clean Catch (Midstream)    Culture Results:   No growth      Culture - Blood (03.03.23 @ 02:14)    Specimen Source: .Blood Blood-Peripheral    Culture Results:   No growth to date.      Culture - Blood (03.03.23 @ 02:14)    Specimen Source: .Blood Blood-Peripheral    Culture Results:   No growth to date.        RADIOLOGY & ADDITIONAL STUDIES:        < from: CT Abdomen and Pelvis w/ IV Cont (03.01.23 @ 20:02) >  IMPRESSION:  Splenomegaly with a small age indeterminant splenic infarct.  No abdominal or pelvic lymphadenopathy.  Trace left pleural effusion      < from: Xray Chest 1 View- PORTABLE-Urgent (Xray Chest 1 View- PORTABLE-Urgent .) (02.28.23 @ 19:25) >  IMPRESSION:  Right upper extremity PICC line catheter tip is in the distal SVC.  Similar-appearing left basilar atelectasis with small effusion.

## 2023-03-05 NOTE — PROGRESS NOTE ADULT - ASSESSMENT
64 y/o with PMHx of HTN, HLD presents to the ED BIBA transferred from [facility] for new diagnosis of leukemia, peripheral blood flow+ B ALL. Chemo with reduced dose HyperCVAD started on 3/3. Hospital course c/b neutropenic fever, transaminitis, rashes,  and LUE cellulitis. Patient has pancytopenia  due to disease.

## 2023-03-05 NOTE — PROGRESS NOTE ADULT - PROBLEM SELECTOR PLAN 1
New  diagnosis of ALL given findings on peripheral flow cytometry.  Monitor CBC, CMP, TLS labs BID, fu g6pd, echo done 2/27 EF 55%, HLA sent 2/28  On allopurinol  Hepatitis Panel Neg. Hep B Core neg. HIV negative.  Transfuse PRN. maintain Hgb >7, plts >15.  Gabapentin for splenic/side pain. Increased dose to 300mg PO TID on 3/2   2/28 s/p BMbx in IR due to body habitus, fu result. Also sent to [facility]  2/28 s/p LP with IT MTX, fu flow   Follow up BCR-ABL.   2/25 - CTA from [facility] (-) for PE. Left sided pain likely due to splenomegaly.  PICC line at bedside 2/28  BCR/ABL PCR sent 3/2  3/3 Chemo with reduced dose HyperCVAD started, Decadron started on 3/3, Cytoxan and VCR started on 3/4~1pm per chemo RN

## 2023-03-05 NOTE — ADVANCED PRACTICE NURSE CONSULT - ASSESSMENT
Pt A/Ox4, VSS, afebrile. Chemotherapy teaching provided, patient verbalized understanding. Labs reviewed by Dr Lawrence on rounds. R DL PICC easily flushed with positive blood return, dressing C/D/I changed on 2/28, site asymptomatic of bleeding, swelling, redness. EF=55%. 2 certified RN verification completed. S/p zofran 8mg IVP q8hrs as antiemetic. Day 2 at 1251, cyclophosphamide IVPB connected NS line as secondary infusion via alaris pump infusing over 1 hour. Pt tolerating well. Primary RN made aware. Safety maintained.

## 2023-03-06 LAB
ALBUMIN SERPL ELPH-MCNC: 2.9 G/DL — LOW (ref 3.3–5)
ALBUMIN SERPL ELPH-MCNC: 3 G/DL — LOW (ref 3.3–5)
ALP SERPL-CCNC: 173 U/L — HIGH (ref 40–120)
ALP SERPL-CCNC: 187 U/L — HIGH (ref 40–120)
ALT FLD-CCNC: 56 U/L — HIGH (ref 10–45)
ALT FLD-CCNC: 63 U/L — HIGH (ref 10–45)
ANION GAP SERPL CALC-SCNC: 9 MMOL/L — SIGNIFICANT CHANGE UP (ref 5–17)
ANION GAP SERPL CALC-SCNC: 9 MMOL/L — SIGNIFICANT CHANGE UP (ref 5–17)
APTT BLD: 24.7 SEC — LOW (ref 27.5–35.5)
AST SERPL-CCNC: 23 U/L — SIGNIFICANT CHANGE UP (ref 10–40)
AST SERPL-CCNC: 25 U/L — SIGNIFICANT CHANGE UP (ref 10–40)
BASOPHILS # BLD AUTO: 0 K/UL — SIGNIFICANT CHANGE UP (ref 0–0.2)
BASOPHILS NFR BLD AUTO: 0 % — SIGNIFICANT CHANGE UP (ref 0–2)
BILIRUB SERPL-MCNC: 0.4 MG/DL — SIGNIFICANT CHANGE UP (ref 0.2–1.2)
BILIRUB SERPL-MCNC: 0.4 MG/DL — SIGNIFICANT CHANGE UP (ref 0.2–1.2)
BLASTS # FLD: 1.4 % — HIGH (ref 0–0)
BLD GP AB SCN SERPL QL: NEGATIVE — SIGNIFICANT CHANGE UP
BUN SERPL-MCNC: 21 MG/DL — SIGNIFICANT CHANGE UP (ref 7–23)
BUN SERPL-MCNC: 23 MG/DL — SIGNIFICANT CHANGE UP (ref 7–23)
CALCIUM SERPL-MCNC: 8.4 MG/DL — SIGNIFICANT CHANGE UP (ref 8.4–10.5)
CALCIUM SERPL-MCNC: 8.4 MG/DL — SIGNIFICANT CHANGE UP (ref 8.4–10.5)
CHLORIDE SERPL-SCNC: 104 MMOL/L — SIGNIFICANT CHANGE UP (ref 96–108)
CHLORIDE SERPL-SCNC: 105 MMOL/L — SIGNIFICANT CHANGE UP (ref 96–108)
CO2 SERPL-SCNC: 24 MMOL/L — SIGNIFICANT CHANGE UP (ref 22–31)
CO2 SERPL-SCNC: 25 MMOL/L — SIGNIFICANT CHANGE UP (ref 22–31)
CREAT SERPL-MCNC: 0.74 MG/DL — SIGNIFICANT CHANGE UP (ref 0.5–1.3)
CREAT SERPL-MCNC: 0.77 MG/DL — SIGNIFICANT CHANGE UP (ref 0.5–1.3)
D DIMER BLD IA.RAPID-MCNC: 1180 NG/ML DDU — HIGH
EGFR: 86 ML/MIN/1.73M2 — SIGNIFICANT CHANGE UP
EGFR: 90 ML/MIN/1.73M2 — SIGNIFICANT CHANGE UP
EOSINOPHIL # BLD AUTO: 0 K/UL — SIGNIFICANT CHANGE UP (ref 0–0.5)
EOSINOPHIL NFR BLD AUTO: 0 % — SIGNIFICANT CHANGE UP (ref 0–6)
FIBRINOGEN PPP-MCNC: 437 MG/DL — SIGNIFICANT CHANGE UP (ref 200–445)
GLUCOSE SERPL-MCNC: 125 MG/DL — HIGH (ref 70–99)
GLUCOSE SERPL-MCNC: 97 MG/DL — SIGNIFICANT CHANGE UP (ref 70–99)
HCT VFR BLD CALC: 24.9 % — LOW (ref 34.5–45)
HCT VFR BLD CALC: 25.3 % — LOW (ref 34.5–45)
HGB BLD-MCNC: 8.6 G/DL — LOW (ref 11.5–15.5)
HGB BLD-MCNC: 8.6 G/DL — LOW (ref 11.5–15.5)
INR BLD: 1.05 RATIO — SIGNIFICANT CHANGE UP (ref 0.88–1.16)
LDH SERPL L TO P-CCNC: 299 U/L — HIGH (ref 50–242)
LDH SERPL L TO P-CCNC: 343 U/L — HIGH (ref 50–242)
LYMPHOCYTES # BLD AUTO: 0.58 K/UL — LOW (ref 1–3.3)
LYMPHOCYTES # BLD AUTO: 70.4 % — HIGH (ref 13–44)
MAGNESIUM SERPL-MCNC: 2.6 MG/DL — SIGNIFICANT CHANGE UP (ref 1.6–2.6)
MAGNESIUM SERPL-MCNC: 2.6 MG/DL — SIGNIFICANT CHANGE UP (ref 1.6–2.6)
MANUAL SMEAR VERIFICATION: SIGNIFICANT CHANGE UP
MCHC RBC-ENTMCNC: 29.6 PG — SIGNIFICANT CHANGE UP (ref 27–34)
MCHC RBC-ENTMCNC: 30.2 PG — SIGNIFICANT CHANGE UP (ref 27–34)
MCHC RBC-ENTMCNC: 34 GM/DL — SIGNIFICANT CHANGE UP (ref 32–36)
MCHC RBC-ENTMCNC: 34.5 GM/DL — SIGNIFICANT CHANGE UP (ref 32–36)
MCV RBC AUTO: 86.9 FL — SIGNIFICANT CHANGE UP (ref 80–100)
MCV RBC AUTO: 87.4 FL — SIGNIFICANT CHANGE UP (ref 80–100)
MONOCYTES # BLD AUTO: 0.01 K/UL — SIGNIFICANT CHANGE UP (ref 0–0.9)
MONOCYTES NFR BLD AUTO: 1.4 % — LOW (ref 2–14)
NEUTROPHILS # BLD AUTO: 0.22 K/UL — LOW (ref 1.8–7.4)
NEUTROPHILS NFR BLD AUTO: 26.8 % — LOW (ref 43–77)
NRBC # BLD: 0 /100 WBCS — SIGNIFICANT CHANGE UP (ref 0–0)
NRBC # BLD: 3 /100 — HIGH (ref 0–0)
ONKOSIGHT MYELOID SEQUENCE: (no result)
PHOSPHATE SERPL-MCNC: 3.1 MG/DL — SIGNIFICANT CHANGE UP (ref 2.5–4.5)
PHOSPHATE SERPL-MCNC: 4 MG/DL — SIGNIFICANT CHANGE UP (ref 2.5–4.5)
PLAT MORPH BLD: NORMAL — SIGNIFICANT CHANGE UP
PLATELET # BLD AUTO: 12 K/UL — CRITICAL LOW (ref 150–400)
PLATELET # BLD AUTO: 15 K/UL — CRITICAL LOW (ref 150–400)
POTASSIUM SERPL-MCNC: 4.3 MMOL/L — SIGNIFICANT CHANGE UP (ref 3.5–5.3)
POTASSIUM SERPL-MCNC: 4.5 MMOL/L — SIGNIFICANT CHANGE UP (ref 3.5–5.3)
POTASSIUM SERPL-SCNC: 4.3 MMOL/L — SIGNIFICANT CHANGE UP (ref 3.5–5.3)
POTASSIUM SERPL-SCNC: 4.5 MMOL/L — SIGNIFICANT CHANGE UP (ref 3.5–5.3)
PROT SERPL-MCNC: 5.6 G/DL — LOW (ref 6–8.3)
PROT SERPL-MCNC: 6 G/DL — SIGNIFICANT CHANGE UP (ref 6–8.3)
PROTHROM AB SERPL-ACNC: 12.1 SEC — SIGNIFICANT CHANGE UP (ref 10.5–13.4)
RBC # BLD: 2.85 M/UL — LOW (ref 3.8–5.2)
RBC # BLD: 2.91 M/UL — LOW (ref 3.8–5.2)
RBC # FLD: 14 % — SIGNIFICANT CHANGE UP (ref 10.3–14.5)
RBC # FLD: 14.1 % — SIGNIFICANT CHANGE UP (ref 10.3–14.5)
RBC BLD AUTO: SIGNIFICANT CHANGE UP
RH IG SCN BLD-IMP: POSITIVE — SIGNIFICANT CHANGE UP
SODIUM SERPL-SCNC: 138 MMOL/L — SIGNIFICANT CHANGE UP (ref 135–145)
SODIUM SERPL-SCNC: 138 MMOL/L — SIGNIFICANT CHANGE UP (ref 135–145)
URATE SERPL-MCNC: 3.7 MG/DL — SIGNIFICANT CHANGE UP (ref 2.5–7)
URATE SERPL-MCNC: 4.7 MG/DL — SIGNIFICANT CHANGE UP (ref 2.5–7)
WBC # BLD: 0.71 K/UL — CRITICAL LOW (ref 3.8–10.5)
WBC # BLD: 0.82 K/UL — CRITICAL LOW (ref 3.8–10.5)
WBC # FLD AUTO: 0.71 K/UL — CRITICAL LOW (ref 3.8–10.5)
WBC # FLD AUTO: 0.82 K/UL — CRITICAL LOW (ref 3.8–10.5)

## 2023-03-06 PROCEDURE — 99233 SBSQ HOSP IP/OBS HIGH 50: CPT | Mod: GC

## 2023-03-06 RX ADMIN — ONDANSETRON 8 MILLIGRAM(S): 8 TABLET, FILM COATED ORAL at 01:03

## 2023-03-06 RX ADMIN — CASPOFUNGIN ACETATE 260 MILLIGRAM(S): 7 INJECTION, POWDER, LYOPHILIZED, FOR SOLUTION INTRAVENOUS at 13:09

## 2023-03-06 RX ADMIN — CEFEPIME 100 MILLIGRAM(S): 1 INJECTION, POWDER, FOR SOLUTION INTRAMUSCULAR; INTRAVENOUS at 05:48

## 2023-03-06 RX ADMIN — ESCITALOPRAM OXALATE 10 MILLIGRAM(S): 10 TABLET, FILM COATED ORAL at 12:55

## 2023-03-06 RX ADMIN — Medication 400 MILLIGRAM(S): at 18:16

## 2023-03-06 RX ADMIN — Medication 300 MILLIGRAM(S): at 12:42

## 2023-03-06 RX ADMIN — LOSARTAN POTASSIUM 100 MILLIGRAM(S): 100 TABLET, FILM COATED ORAL at 05:50

## 2023-03-06 RX ADMIN — GABAPENTIN 300 MILLIGRAM(S): 400 CAPSULE ORAL at 21:00

## 2023-03-06 RX ADMIN — ONDANSETRON 8 MILLIGRAM(S): 8 TABLET, FILM COATED ORAL at 16:14

## 2023-03-06 RX ADMIN — Medication 300 MILLIGRAM(S): at 01:00

## 2023-03-06 RX ADMIN — Medication 1 APPLICATION(S): at 18:16

## 2023-03-06 RX ADMIN — Medication 1 APPLICATION(S): at 05:51

## 2023-03-06 RX ADMIN — BRIMONIDINE TARTRATE 1 DROP(S): 2 SOLUTION/ DROPS OPHTHALMIC at 12:56

## 2023-03-06 RX ADMIN — CEFEPIME 100 MILLIGRAM(S): 1 INJECTION, POWDER, FOR SOLUTION INTRAMUSCULAR; INTRAVENOUS at 21:01

## 2023-03-06 RX ADMIN — Medication 300 MILLIGRAM(S): at 12:54

## 2023-03-06 RX ADMIN — Medication 400 MILLIGRAM(S): at 05:50

## 2023-03-06 RX ADMIN — PANTOPRAZOLE SODIUM 40 MILLIGRAM(S): 20 TABLET, DELAYED RELEASE ORAL at 05:50

## 2023-03-06 RX ADMIN — CHLORHEXIDINE GLUCONATE 1 APPLICATION(S): 213 SOLUTION TOPICAL at 08:44

## 2023-03-06 RX ADMIN — SIMVASTATIN 40 MILLIGRAM(S): 20 TABLET, FILM COATED ORAL at 20:52

## 2023-03-06 RX ADMIN — SODIUM CHLORIDE 20 MILLILITER(S): 9 INJECTION INTRAMUSCULAR; INTRAVENOUS; SUBCUTANEOUS at 20:52

## 2023-03-06 RX ADMIN — GABAPENTIN 300 MILLIGRAM(S): 400 CAPSULE ORAL at 13:57

## 2023-03-06 RX ADMIN — Medication 10 MILLIGRAM(S): at 14:21

## 2023-03-06 RX ADMIN — ONDANSETRON 8 MILLIGRAM(S): 8 TABLET, FILM COATED ORAL at 08:42

## 2023-03-06 RX ADMIN — CEFEPIME 100 MILLIGRAM(S): 1 INJECTION, POWDER, FOR SOLUTION INTRAMUSCULAR; INTRAVENOUS at 13:55

## 2023-03-06 RX ADMIN — Medication 20 MILLIGRAM(S): at 18:16

## 2023-03-06 RX ADMIN — GABAPENTIN 300 MILLIGRAM(S): 400 CAPSULE ORAL at 05:48

## 2023-03-06 NOTE — PROGRESS NOTE ADULT - PROBLEM SELECTOR PLAN 1
New  diagnosis of ALL given findings on peripheral flow cytometry.  Monitor CBC, CMP, TLS labs BID, fu g6pd, echo done 2/27 EF 55%, HLA sent 2/28  On allopurinol  Hepatitis Panel Neg. Hep B Core neg. HIV negative.  Transfuse PRN. maintain Hgb >7, plts >15.  Gabapentin for splenic/side pain. Increased dose to 300mg PO TID on 3/2   2/28 s/p BMbx in IR due to body habitus, fu result. Also sent to [facility]  2/28 s/p LP with IT MTX, fu flow   Follow up BCR-ABL.   2/25 - CTA from [facility] (-) for PE. Left sided pain likely due to splenomegaly.  PICC line at bedside 2/28  BCR/ABL PCR sent 3/2  3/3 Chemo with reduced dose HyperCVAD started, Decadron started on 3/3, Cytoxan and VCR started on 3/4~1pm per chemo RN New  diagnosis of ALL given findings on peripheral flow cytometry. BCR/ABL - NEG  Monitor CBC, CMP, TLS labs BID, g6pd -11.3, echo done 2/27 EF 55%, HLA sent 2/28  On allopurinol  Hepatitis Panel Neg. Hep B Core neg. HIV negative.  Transfuse PRN. maintain Hgb >7, plts >15.  Gabapentin for splenic/side pain. Increased dose to 300mg PO TID on 3/2   2/28 s/p BMbx in IR due to body habitus, fu result. Also sent to [facility]  2/28 s/p LP with IT MTX, flow - NEG for malignant cells  2/25 - CTA from [facility] (-) for PE. Left sided pain likely due to splenomegaly.  PICC line at bedside 2/28  BCR/ABL PCR sent 3/2 - NEG  3/4 Chemo with reduced dose HyperCVAD started, Decadron started on 3/3, Cytoxan and VCR started on 3/4~1am per chemo RN

## 2023-03-06 NOTE — ADVANCED PRACTICE NURSE CONSULT - ASSESSMENT
Pt A/Ox4, VSS, afebrile. Chemotherapy teaching provided, patient verbalized understanding. Labs reviewed by Dr Harris on rounds. R DL PICC easily flushed with positive blood return, dressing C/D/I changed on 2/28, site asymptomatic of bleeding, swelling, redness. EF=55%, from TTE on 2/27. 2 certified RN verification completed. S/p zofran 8mg IVP q8hrs as antiemetic. Day 3 at 1242, cyclophosphamide IVPB connected to NS line via alaris pump infusing over 1 hour through purple lumen of R DL PICC. Pt tolerating well. Primary RN made aware. Safety maintained.

## 2023-03-06 NOTE — PROGRESS NOTE ADULT - ASSESSMENT
62 y/o with PMHx of HTN, HLD presents to the ED BIBA transferred from [facility] for new diagnosis of leukemia, peripheral blood flow+ B ALL. Chemo with reduced dose HyperCVAD started on 3/3. Hospital course c/b neutropenic fever, transaminitis, rashes,  and LUE cellulitis. Patient has pancytopenia  due to disease.

## 2023-03-06 NOTE — PROGRESS NOTE ADULT - NS ATTEND AMEND GEN_ALL_CORE FT
63 year old with HLD and HTN, transferred b/c of circulating blasts consistent with acute leukemia. PB flow cytometry showing B-ALL  FISH negative for Ph chromosome. PCR negative (although p190 positive 0.001% from marrow)  BMbx done 2/28 with IR (was unable to get at bedside) -B-lymphoblastic leukemia/lymphoma.  Echo done EF 55%  PICC line placed  3/3: started HyperCVAD (IV Cyclophosphamide (150 mg/m2 every 12 h on days 1–3), Dexamethasone 20 mg per day on days 1–4 and 11–14, Vincristine 2 mg IV flat dose on days 1 and 8, Daunorubicin 25 mg/m2/day IV continuous infusion over 48 hours, starting on day 4). Informed consent obtained   Today is C1D3  LP with IT MTX done on 2/28 -negative for malignant cells  Transaminitis -US done at [place] with fatty liver. Cont to monitor  CT A/P -Splenomegaly with a small age indeterminant splenic infarct. Normal liver, no abdominal or pelvic lymphadenopathy. CTA done at [place] w/ shotty LAD  Transfuse for plts <10, Hgb <7  Febrile to 101.7 -levaquin changed to cefepime  3/5: Abd rash improving, will continue to monitor  supportive care 63 year old with HLD and HTN, transferred b/c of circulating blasts consistent with acute leukemia. PB flow cytometry showing B-ALL  FISH negative for Ph chromosome. PCR negative (although p190 positive 0.001% from marrow)  BMbx done 2/28 with IR (was unable to get at bedside) -B-lymphoblastic leukemia/lymphoma.  Echo done EF 55%  PICC line placed  3/3: started mini HyperCVAD -Today is C1D3  LP with IT MTX done on 2/28 -negative for malignant cells  Transaminitis -US done at [place] with fatty liver. Cont to monitor  CT A/P -Splenomegaly with a small age indeterminant splenic infarct. Normal liver, no abdominal or pelvic lymphadenopathy. CTA done at [place] w/ shotty LAD  Transfuse for plts <10, Hgb <7  Febrile to 101.7 -levaquin changed to cefepime  supportive care 63 year old with HLD and HTN, transferred b/c of circulating blasts consistent with acute leukemia. PB flow cytometry showing B-ALL  FISH negative for Ph chromosome. PCR negative (although p190 positive 0.001% from marrow)  BMbx done 2/28 with IR (was unable to get at bedside) -B-lymphoblastic leukemia/lymphoma.  Echo done EF 55%  PICC line placed  3/4: started mini HyperCVAD -Today is C1D3  LP with IT MTX done on 2/28 -negative for malignant cells  Transaminitis -US done at [place] with fatty liver. Cont to monitor  CT A/P -Splenomegaly with a small age indeterminant splenic infarct. Normal liver, no abdominal or pelvic lymphadenopathy. CTA done at [place] w/ shotty LAD  Transfuse for plts <10, Hgb <7  Febrile to 101.7 -levaquin changed to cefepime  supportive care

## 2023-03-06 NOTE — ADVANCED PRACTICE NURSE CONSULT - ASSESSMENT
Pt A/Ox4, VSS, afebrile. Chemotherapy teaching provided, patient verbalized understanding. R DL PICC flushed with positive blood return. Chemo verification completed by 2 staff nurses. zofran 8mg IVP q8hrs. Cyclophosphamide 300mg infusing over 1 hour. Pt tolerating well at this time, no c/o discomfort noted. Safety maintained.

## 2023-03-06 NOTE — PROGRESS NOTE ADULT - SUBJECTIVE AND OBJECTIVE BOX
Diagnosis: B ALL     Protocol/Chemo Regimen: reduced HyperCVAD( (IV Cyclophosphamide (150 mg/m2 every 12 h on days 1–3), Dexamethasone 20 mg per day on days 1–4 and 11–14, Vincristine 2 mg IV flat dose on days 1 and 8, Daunorubicin 25 mg/m2/day IV continuous infusion over 48 hours, starting on day 4).   Decadron started on 3/3, Cytoxan and VCR started on 3/4    Day: 4     Pt endorsed:     Review of Systems:       Pain scale: not graded  Left elbow    Diet: DASH/TLC     Allergies: sulfa drugs (Unknown)    MEDICATIONS  (STANDING):  acyclovir   Oral Tab/Cap 400 milliGRAM(s) Oral two times a day  allopurinol 300 milliGRAM(s) Oral daily  brimonidine 0.2% Ophthalmic Solution 1 Drop(s) Both EYES daily  caspofungin IVPB      caspofungin IVPB 50 milliGRAM(s) IV Intermittent every 24 hours  cefepime   IVPB 2000 milliGRAM(s) IV Intermittent every 8 hours  chlorhexidine 4% Liquid 1 Application(s) Topical <User Schedule>  cyclophosphamide IVPB (eMAR) 300 milliGRAM(s) IV Intermittent every 12 hours  dexAMETHasone     Tablet 20 milliGRAM(s) Oral every 24 hours  escitalopram 10 milliGRAM(s) Oral daily  gabapentin 300 milliGRAM(s) Oral every 8 hours  losartan 100 milliGRAM(s) Oral daily  methotrexate PF IntraThecal (eMAR) 15 milliGRAM(s) IntraThecal once  ondansetron Injectable 8 milliGRAM(s) IV Push every 8 hours  pantoprazole    Tablet 40 milliGRAM(s) Oral before breakfast  simvastatin 40 milliGRAM(s) Oral at bedtime  sodium chloride 0.9%. 1000 milliLiter(s) (20 mL/Hr) IV Continuous <Continuous>  triamcinolone 0.1% Ointment 1 Application(s) Topical two times a day    MEDICATIONS  (PRN):  acetaminophen     Tablet .. 650 milliGRAM(s) Oral every 6 hours PRN Temp greater or equal to 38C (100.4F), Mild Pain (1 - 3)  diphenhydrAMINE 25 milliGRAM(s) Oral every 4 hours PRN Rash and/or Itching  FIRST- Mouthwash  BLM 10 milliLiter(s) Swish and Spit four times a day PRN Mouth Care  metoclopramide Injectable 10 milliGRAM(s) IV Push every 6 hours PRN nausea/vomitting  ondansetron Injectable 8 milliGRAM(s) IV Push every 8 hours PRN Nausea and/or Vomiting  polyethylene glycol 3350 17 Gram(s) Oral two times a day PRN Constipation      Vital Signs Last 24 Hrs  T(C): 36.4 (06 Mar 2023 05:30), Max: 36.4 (05 Mar 2023 13:41)  T(F): 97.6 (06 Mar 2023 05:30), Max: 97.6 (06 Mar 2023 05:30)  HR: 50 (06 Mar 2023 05:30) (50 - 56)  BP: 151/77 (06 Mar 2023 05:30) (121/71 - 154/71)  BP(mean): --  RR: 18 (06 Mar 2023 05:30) (18 - 18)  SpO2: 95% (06 Mar 2023 05:30) (94% - 96%)    Parameters below as of 06 Mar 2023 05:30  Patient On (Oxygen Delivery Method): nasal cannula  O2 Flow (L/min): 2    I&O's Summary    05 Mar 2023 07:01  -  06 Mar 2023 07:00  --------------------------------------------------------  IN: 2416 mL / OUT: 0 mL / NET: 2416 mL      PHYSICAL EXAM  General: Sitting up in chair NAD  HEENT: Sclerae anicteric, clear oropharynx, no erythema, no ulcers  CV: normal S1, S2, reg  Lungs: clear to auscultation b/l, decreased at bases,  no wheezes, no rales  Abdomen: +BS, soft, nontender, nondistended  Ext: no edema  Skin: diffuse MP rashes on abd, erythematous (improving; LUE swelling, + heat, indurated, tender to palp (improving)   Neuro: alert and oriented x 3  Central line:  RUE PICC CDI        LABS:             ----------      RECENT CULTURES:      Culture - Urine (03.03.23 @ 06:58)    Specimen Source: Clean Catch Clean Catch (Midstream)    Culture Results:   No growth      Culture - Blood (03.03.23 @ 02:14)    Specimen Source: .Blood Blood-Peripheral    Culture Results:   No growth to date.      Culture - Blood (03.03.23 @ 02:14)    Specimen Source: .Blood Blood-Peripheral    Culture Results:   No growth to date.        RADIOLOGY & ADDITIONAL STUDIES:          < from: CT Abdomen and Pelvis w/ IV Cont (03.01.23 @ 20:02) >  IMPRESSION:  Splenomegaly with a small age indeterminant splenic infarct.  No abdominal or pelvic lymphadenopathy.  Trace left pleural effusion      < from: Xray Chest 1 View- PORTABLE-Urgent (Xray Chest 1 View- PORTABLE-Urgent .) (02.28.23 @ 19:25) >  IMPRESSION:  Right upper extremity PICC line catheter tip is in the distal SVC.  Similar-appearing left basilar atelectasis with small effusion.           Diagnosis: B ALL     Protocol/Chemo Regimen: reduced HyperCVAD( (IV Cyclophosphamide (150 mg/m2 every 12 h on days 1–3), Dexamethasone 20 mg per day on days 0–4 and 11–14, Vincristine 2 mg IV flat dose on days 1 and 8, Daunorubicin 25 mg/m2/day IV continuous infusion over 24 hours, starting on day 4.   Decadron started on 3/3, Cytoxan and VCR started on 3/4    Day: 3     Pt endorsed: No overnight events, afebrile, +OOB walking    Review of Systems: Denies SIMMONS, abdominal pain, HA or dizziness      Pain scale: not graded  Left elbow    Diet: DASH/TLC     Allergies: sulfa drugs (Unknown)    MEDICATIONS  (STANDING):  acyclovir   Oral Tab/Cap 400 milliGRAM(s) Oral two times a day  allopurinol 300 milliGRAM(s) Oral daily  brimonidine 0.2% Ophthalmic Solution 1 Drop(s) Both EYES daily  caspofungin IVPB      caspofungin IVPB 50 milliGRAM(s) IV Intermittent every 24 hours  cefepime   IVPB 2000 milliGRAM(s) IV Intermittent every 8 hours  chlorhexidine 4% Liquid 1 Application(s) Topical <User Schedule>  cyclophosphamide IVPB (eMAR) 300 milliGRAM(s) IV Intermittent every 12 hours  dexAMETHasone     Tablet 20 milliGRAM(s) Oral every 24 hours  escitalopram 10 milliGRAM(s) Oral daily  gabapentin 300 milliGRAM(s) Oral every 8 hours  losartan 100 milliGRAM(s) Oral daily  methotrexate PF IntraThecal (eMAR) 15 milliGRAM(s) IntraThecal once  ondansetron Injectable 8 milliGRAM(s) IV Push every 8 hours  pantoprazole    Tablet 40 milliGRAM(s) Oral before breakfast  simvastatin 40 milliGRAM(s) Oral at bedtime  sodium chloride 0.9%. 1000 milliLiter(s) (20 mL/Hr) IV Continuous <Continuous>  triamcinolone 0.1% Ointment 1 Application(s) Topical two times a day    MEDICATIONS  (PRN):  acetaminophen     Tablet .. 650 milliGRAM(s) Oral every 6 hours PRN Temp greater or equal to 38C (100.4F), Mild Pain (1 - 3)  diphenhydrAMINE 25 milliGRAM(s) Oral every 4 hours PRN Rash and/or Itching  FIRST- Mouthwash  BLM 10 milliLiter(s) Swish and Spit four times a day PRN Mouth Care  metoclopramide Injectable 10 milliGRAM(s) IV Push every 6 hours PRN nausea/vomitting  ondansetron Injectable 8 milliGRAM(s) IV Push every 8 hours PRN Nausea and/or Vomiting  polyethylene glycol 3350 17 Gram(s) Oral two times a day PRN Constipation      Vital Signs Last 24 Hrs  T(C): 36.4 (06 Mar 2023 05:30), Max: 36.4 (05 Mar 2023 13:41)  T(F): 97.6 (06 Mar 2023 05:30), Max: 97.6 (06 Mar 2023 05:30)  HR: 50 (06 Mar 2023 05:30) (50 - 56)  BP: 151/77 (06 Mar 2023 05:30) (121/71 - 154/71)  BP(mean): --  RR: 18 (06 Mar 2023 05:30) (18 - 18)  SpO2: 95% (06 Mar 2023 05:30) (94% - 96%)    I&O's Summary    05 Mar 2023 07:01  -  06 Mar 2023 07:00  --------------------------------------------------------  IN: 2416 mL / OUT: 0 mL / NET: 2416 mL      PHYSICAL EXAM  General: Sitting up in chair NAD  HEENT: Sclerae anicteric, clear oropharynx, no erythema, no ulcers  CV: normal S1, S2, reg  Lungs: clear to auscultation b/l, decreased at bases,  no wheezes, no rales  Abdomen: +BS, soft, nontender, nondistended  Ext: no edema  Skin: diffuse MP rashes on abd, erythematous (improving; LUE swelling, + heat, indurated, tender to palp (improving)   Neuro: alert and oriented x 3  Central line:  RUE PICC CDI        LABS:                        8.6    0.82  )-----------( 15       ( 06 Mar 2023 06:55 )             25.3     06 Mar 2023 06:55    138    |  104    |  23     ----------------------------<  125    4.5     |  25     |  0.77     Ca    8.4        06 Mar 2023 06:55  Phos  4.0       06 Mar 2023 06:55  Mg     2.6       06 Mar 2023 06:55    TPro  6.0    /  Alb  3.0    /  TBili  0.4    /  DBili  x      /  AST  23     /  ALT  63     /  AlkPhos  187    06 Mar 2023 06:55    PT/INR - ( 06 Mar 2023 06:55 )   PT: 12.1 sec;   INR: 1.05 ratio    PTT - ( 06 Mar 2023 06:55 )  PTT:24.7 sec      LIVER FUNCTIONS - ( 06 Mar 2023 06:55 )  Alb: 3.0 g/dL / Pro: 6.0 g/dL / ALK PHOS: 187 U/L / ALT: 63 U/L / AST: 23 U/L / GGT: x               RECENT CULTURES:      Culture - Urine (03.03.23 @ 06:58)    Specimen Source: Clean Catch Clean Catch (Midstream)    Culture Results:   No growth      Culture - Blood (03.03.23 @ 02:14)    Specimen Source: .Blood Blood-Peripheral    Culture Results:   No growth to date.      Culture - Blood (03.03.23 @ 02:14)    Specimen Source: .Blood Blood-Peripheral    Culture Results:   No growth to date.        RADIOLOGY & ADDITIONAL STUDIES:          < from: CT Abdomen and Pelvis w/ IV Cont (03.01.23 @ 20:02) >  IMPRESSION:  Splenomegaly with a small age indeterminant splenic infarct.  No abdominal or pelvic lymphadenopathy.  Trace left pleural effusion      < from: Xray Chest 1 View- PORTABLE-Urgent (Xray Chest 1 View- PORTABLE-Urgent .) (02.28.23 @ 19:25) >  IMPRESSION:  Right upper extremity PICC line catheter tip is in the distal SVC.  Similar-appearing left basilar atelectasis with small effusion.

## 2023-03-06 NOTE — PROGRESS NOTE ADULT - PROBLEM SELECTOR PLAN 2
Patient is neutropenic, febrile  2/28 added Levaquin, Caspofungin and acyclovir PPX  3/3 switched  Levaquin to Cefepime due to fever, FU pan cx   2/28 mild cellulitis left elbow post PIV infiltration, warm compress and monitor closely Patient is neutropenic, febrile  2/28 added Levaquin, Caspofungin and acyclovir PPX  3/3 switched  Levaquin to Cefepime due to fever, FU pan cx   2/28 mild cellulitis left elbow post PIV infiltration, warm compress and monitor closely - improving

## 2023-03-07 LAB
ALBUMIN SERPL ELPH-MCNC: 3.1 G/DL — LOW (ref 3.3–5)
ALBUMIN SERPL ELPH-MCNC: 3.2 G/DL — LOW (ref 3.3–5)
ALP SERPL-CCNC: 160 U/L — HIGH (ref 40–120)
ALP SERPL-CCNC: 164 U/L — HIGH (ref 40–120)
ALT FLD-CCNC: 50 U/L — HIGH (ref 10–45)
ALT FLD-CCNC: 54 U/L — HIGH (ref 10–45)
ANION GAP SERPL CALC-SCNC: 8 MMOL/L — SIGNIFICANT CHANGE UP (ref 5–17)
ANION GAP SERPL CALC-SCNC: 8 MMOL/L — SIGNIFICANT CHANGE UP (ref 5–17)
APTT BLD: 24.5 SEC — LOW (ref 27.5–35.5)
AST SERPL-CCNC: 20 U/L — SIGNIFICANT CHANGE UP (ref 10–40)
AST SERPL-CCNC: 25 U/L — SIGNIFICANT CHANGE UP (ref 10–40)
BASOPHILS # BLD AUTO: 0 K/UL — SIGNIFICANT CHANGE UP (ref 0–0.2)
BASOPHILS NFR BLD AUTO: 0 % — SIGNIFICANT CHANGE UP (ref 0–2)
BILIRUB SERPL-MCNC: 0.5 MG/DL — SIGNIFICANT CHANGE UP (ref 0.2–1.2)
BILIRUB SERPL-MCNC: 0.6 MG/DL — SIGNIFICANT CHANGE UP (ref 0.2–1.2)
BLASTS # FLD: 1.8 % — HIGH (ref 0–0)
BUN SERPL-MCNC: 20 MG/DL — SIGNIFICANT CHANGE UP (ref 7–23)
BUN SERPL-MCNC: 20 MG/DL — SIGNIFICANT CHANGE UP (ref 7–23)
CALCIUM SERPL-MCNC: 8.6 MG/DL — SIGNIFICANT CHANGE UP (ref 8.4–10.5)
CALCIUM SERPL-MCNC: 9.2 MG/DL — SIGNIFICANT CHANGE UP (ref 8.4–10.5)
CHLORIDE SERPL-SCNC: 102 MMOL/L — SIGNIFICANT CHANGE UP (ref 96–108)
CHLORIDE SERPL-SCNC: 104 MMOL/L — SIGNIFICANT CHANGE UP (ref 96–108)
CO2 SERPL-SCNC: 26 MMOL/L — SIGNIFICANT CHANGE UP (ref 22–31)
CO2 SERPL-SCNC: 27 MMOL/L — SIGNIFICANT CHANGE UP (ref 22–31)
CREAT SERPL-MCNC: 0.7 MG/DL — SIGNIFICANT CHANGE UP (ref 0.5–1.3)
CREAT SERPL-MCNC: 0.79 MG/DL — SIGNIFICANT CHANGE UP (ref 0.5–1.3)
D DIMER BLD IA.RAPID-MCNC: 1007 NG/ML DDU — HIGH
EGFR: 83 ML/MIN/1.73M2 — SIGNIFICANT CHANGE UP
EGFR: 96 ML/MIN/1.73M2 — SIGNIFICANT CHANGE UP
EOSINOPHIL # BLD AUTO: 0 K/UL — SIGNIFICANT CHANGE UP (ref 0–0.5)
EOSINOPHIL NFR BLD AUTO: 0 % — SIGNIFICANT CHANGE UP (ref 0–6)
FIBRINOGEN PPP-MCNC: 328 MG/DL — SIGNIFICANT CHANGE UP (ref 200–445)
GLUCOSE SERPL-MCNC: 114 MG/DL — HIGH (ref 70–99)
GLUCOSE SERPL-MCNC: 95 MG/DL — SIGNIFICANT CHANGE UP (ref 70–99)
HCT VFR BLD CALC: 25.5 % — LOW (ref 34.5–45)
HCT VFR BLD CALC: 25.7 % — LOW (ref 34.5–45)
HGB BLD-MCNC: 8.6 G/DL — LOW (ref 11.5–15.5)
HGB BLD-MCNC: 8.8 G/DL — LOW (ref 11.5–15.5)
LDH SERPL L TO P-CCNC: 259 U/L — HIGH (ref 50–242)
LDH SERPL L TO P-CCNC: 263 U/L — HIGH (ref 50–242)
LYMPHOCYTES # BLD AUTO: 0.42 K/UL — LOW (ref 1–3.3)
LYMPHOCYTES # BLD AUTO: 76.8 % — HIGH (ref 13–44)
MAGNESIUM SERPL-MCNC: 2.3 MG/DL — SIGNIFICANT CHANGE UP (ref 1.6–2.6)
MAGNESIUM SERPL-MCNC: 2.4 MG/DL — SIGNIFICANT CHANGE UP (ref 1.6–2.6)
MANUAL SMEAR VERIFICATION: SIGNIFICANT CHANGE UP
MCHC RBC-ENTMCNC: 29.4 PG — SIGNIFICANT CHANGE UP (ref 27–34)
MCHC RBC-ENTMCNC: 29.9 PG — SIGNIFICANT CHANGE UP (ref 27–34)
MCHC RBC-ENTMCNC: 33.7 GM/DL — SIGNIFICANT CHANGE UP (ref 32–36)
MCHC RBC-ENTMCNC: 34.2 GM/DL — SIGNIFICANT CHANGE UP (ref 32–36)
MCV RBC AUTO: 87 FL — SIGNIFICANT CHANGE UP (ref 80–100)
MCV RBC AUTO: 87.4 FL — SIGNIFICANT CHANGE UP (ref 80–100)
MONOCYTES # BLD AUTO: 0 K/UL — SIGNIFICANT CHANGE UP (ref 0–0.9)
MONOCYTES NFR BLD AUTO: 0 % — LOW (ref 2–14)
NEUTROPHILS # BLD AUTO: 0.12 K/UL — LOW (ref 1.8–7.4)
NEUTROPHILS NFR BLD AUTO: 21.4 % — LOW (ref 43–77)
NRBC # BLD: 0 /100 WBCS — SIGNIFICANT CHANGE UP (ref 0–0)
PHOSPHATE SERPL-MCNC: 3.6 MG/DL — SIGNIFICANT CHANGE UP (ref 2.5–4.5)
PHOSPHATE SERPL-MCNC: 4.4 MG/DL — SIGNIFICANT CHANGE UP (ref 2.5–4.5)
PLAT MORPH BLD: NORMAL — SIGNIFICANT CHANGE UP
PLATELET # BLD AUTO: 38 K/UL — LOW (ref 150–400)
PLATELET # BLD AUTO: 9 K/UL — CRITICAL LOW (ref 150–400)
POTASSIUM SERPL-MCNC: 4.1 MMOL/L — SIGNIFICANT CHANGE UP (ref 3.5–5.3)
POTASSIUM SERPL-MCNC: 4.5 MMOL/L — SIGNIFICANT CHANGE UP (ref 3.5–5.3)
POTASSIUM SERPL-SCNC: 4.1 MMOL/L — SIGNIFICANT CHANGE UP (ref 3.5–5.3)
POTASSIUM SERPL-SCNC: 4.5 MMOL/L — SIGNIFICANT CHANGE UP (ref 3.5–5.3)
PROT SERPL-MCNC: 5.9 G/DL — LOW (ref 6–8.3)
PROT SERPL-MCNC: 6 G/DL — SIGNIFICANT CHANGE UP (ref 6–8.3)
RBC # BLD: 2.93 M/UL — LOW (ref 3.8–5.2)
RBC # BLD: 2.94 M/UL — LOW (ref 3.8–5.2)
RBC # FLD: 13.8 % — SIGNIFICANT CHANGE UP (ref 10.3–14.5)
RBC # FLD: 13.9 % — SIGNIFICANT CHANGE UP (ref 10.3–14.5)
RBC BLD AUTO: SIGNIFICANT CHANGE UP
SMUDGE CELLS # BLD: PRESENT — SIGNIFICANT CHANGE UP
SODIUM SERPL-SCNC: 137 MMOL/L — SIGNIFICANT CHANGE UP (ref 135–145)
SODIUM SERPL-SCNC: 138 MMOL/L — SIGNIFICANT CHANGE UP (ref 135–145)
URATE SERPL-MCNC: 3.6 MG/DL — SIGNIFICANT CHANGE UP (ref 2.5–7)
WBC # BLD: 0.52 K/UL — CRITICAL LOW (ref 3.8–10.5)
WBC # BLD: 0.55 K/UL — CRITICAL LOW (ref 3.8–10.5)
WBC # FLD AUTO: 0.52 K/UL — CRITICAL LOW (ref 3.8–10.5)
WBC # FLD AUTO: 0.55 K/UL — CRITICAL LOW (ref 3.8–10.5)

## 2023-03-07 PROCEDURE — 99233 SBSQ HOSP IP/OBS HIGH 50: CPT

## 2023-03-07 RX ORDER — DAUNORUBICIN HYDROCHLORIDE 5 MG/ML
50 INJECTION INTRAVENOUS ONCE
Refills: 0 | Status: COMPLETED | OUTPATIENT
Start: 2023-03-07 | End: 2023-03-07

## 2023-03-07 RX ORDER — SALIVA SUBSTITUTE COMB NO.11 351 MG
5 POWDER IN PACKET (EA) MUCOUS MEMBRANE
Refills: 0 | Status: DISCONTINUED | OUTPATIENT
Start: 2023-03-07 | End: 2023-03-16

## 2023-03-07 RX ADMIN — DAUNORUBICIN HYDROCHLORIDE 50 MILLIGRAM(S): 5 INJECTION INTRAVENOUS at 15:20

## 2023-03-07 RX ADMIN — CEFEPIME 100 MILLIGRAM(S): 1 INJECTION, POWDER, FOR SOLUTION INTRAMUSCULAR; INTRAVENOUS at 21:51

## 2023-03-07 RX ADMIN — Medication 400 MILLIGRAM(S): at 17:54

## 2023-03-07 RX ADMIN — Medication 400 MILLIGRAM(S): at 06:40

## 2023-03-07 RX ADMIN — GABAPENTIN 300 MILLIGRAM(S): 400 CAPSULE ORAL at 06:40

## 2023-03-07 RX ADMIN — Medication 1 APPLICATION(S): at 17:55

## 2023-03-07 RX ADMIN — Medication 5 MILLILITER(S): at 23:59

## 2023-03-07 RX ADMIN — ESCITALOPRAM OXALATE 10 MILLIGRAM(S): 10 TABLET, FILM COATED ORAL at 12:02

## 2023-03-07 RX ADMIN — CASPOFUNGIN ACETATE 260 MILLIGRAM(S): 7 INJECTION, POWDER, LYOPHILIZED, FOR SOLUTION INTRAVENOUS at 10:55

## 2023-03-07 RX ADMIN — CEFEPIME 100 MILLIGRAM(S): 1 INJECTION, POWDER, FOR SOLUTION INTRAMUSCULAR; INTRAVENOUS at 06:39

## 2023-03-07 RX ADMIN — ONDANSETRON 8 MILLIGRAM(S): 8 TABLET, FILM COATED ORAL at 00:11

## 2023-03-07 RX ADMIN — Medication 5 MILLILITER(S): at 21:51

## 2023-03-07 RX ADMIN — SIMVASTATIN 40 MILLIGRAM(S): 20 TABLET, FILM COATED ORAL at 21:51

## 2023-03-07 RX ADMIN — LOSARTAN POTASSIUM 100 MILLIGRAM(S): 100 TABLET, FILM COATED ORAL at 06:40

## 2023-03-07 RX ADMIN — ONDANSETRON 8 MILLIGRAM(S): 8 TABLET, FILM COATED ORAL at 08:32

## 2023-03-07 RX ADMIN — GABAPENTIN 300 MILLIGRAM(S): 400 CAPSULE ORAL at 23:58

## 2023-03-07 RX ADMIN — ONDANSETRON 8 MILLIGRAM(S): 8 TABLET, FILM COATED ORAL at 15:18

## 2023-03-07 RX ADMIN — GABAPENTIN 300 MILLIGRAM(S): 400 CAPSULE ORAL at 13:49

## 2023-03-07 RX ADMIN — Medication 300 MILLIGRAM(S): at 10:55

## 2023-03-07 RX ADMIN — Medication 10 MILLIGRAM(S): at 18:50

## 2023-03-07 RX ADMIN — PANTOPRAZOLE SODIUM 40 MILLIGRAM(S): 20 TABLET, DELAYED RELEASE ORAL at 06:40

## 2023-03-07 RX ADMIN — Medication 1 APPLICATION(S): at 06:40

## 2023-03-07 RX ADMIN — BRIMONIDINE TARTRATE 1 DROP(S): 2 SOLUTION/ DROPS OPHTHALMIC at 10:56

## 2023-03-07 RX ADMIN — ONDANSETRON 8 MILLIGRAM(S): 8 TABLET, FILM COATED ORAL at 23:59

## 2023-03-07 RX ADMIN — SODIUM CHLORIDE 20 MILLILITER(S): 9 INJECTION INTRAMUSCULAR; INTRAVENOUS; SUBCUTANEOUS at 22:05

## 2023-03-07 RX ADMIN — CHLORHEXIDINE GLUCONATE 1 APPLICATION(S): 213 SOLUTION TOPICAL at 08:34

## 2023-03-07 RX ADMIN — CEFEPIME 100 MILLIGRAM(S): 1 INJECTION, POWDER, FOR SOLUTION INTRAMUSCULAR; INTRAVENOUS at 13:49

## 2023-03-07 NOTE — DIETITIAN INITIAL EVALUATION ADULT - CALCULATED TO (CAL/KG)
Mom called service and message from service received and I called mom back.  Renny vomited once last night and once this morning.  Also with a few episodes of diarrhea.  Urine output is a little down, but is urinating - last urine output was ~ 3 hours ago.  No fever.  Is alert and active.  Brother with similar symptoms.  IMP:  Probable viral GE - hydration status appears OK  PLAN:  GE discussion.  Pedialyte over next several hours - advance back to formula / diet as tolerated.  Discussed signs of significant dehydration.  To call / to ER if deterioration!  
[phone]

## 2023-03-07 NOTE — PROGRESS NOTE ADULT - PROBLEM SELECTOR PLAN 1
New  diagnosis of ALL given findings on peripheral flow cytometry. BCR/ABL - NEG  Monitor CBC, CMP, TLS labs BID, g6pd -11.3, echo done 2/27 EF 55%, HLA sent 2/28  On allopurinol  Hepatitis Panel Neg. Hep B Core neg. HIV negative.  Transfuse PRN. maintain Hgb >7, plts >15.  Gabapentin for splenic/side pain. Increased dose to 300mg PO TID on 3/2   2/28 s/p BMbx in IR due to body habitus, fu result. [facility]  2/28 s/p LP with IT MTX, flow - NEG for malignant cells  2/25 - CTA from [facility] (-) for PE. Left sided pain likely due to splenomegaly.  PICC line at bedside 2/28  BCR/ABL PCR sent 3/2 - NEG  3/4 Chemo with reduced dose HyperCVAD started, Decadron started on 3/3, Cytoxan and VCR started on 3/4~1am per chemo RN

## 2023-03-07 NOTE — DIETITIAN INITIAL EVALUATION ADULT - PERTINENT LABORATORY DATA
03-07    138  |  104  |  20  ----------------------------<  114<H>  4.5   |  26  |  0.70    Ca    8.6      07 Mar 2023 07:03  Phos  4.4     03-07  Mg     2.4     03-07    TPro  5.9<L>  /  Alb  3.1<L>  /  TBili  0.5  /  DBili  x   /  AST  20  /  ALT  50<H>  /  AlkPhos  164<H>  03-07

## 2023-03-07 NOTE — PROGRESS NOTE ADULT - NS ATTEND AMEND GEN_ALL_CORE FT
63 year old with HLD and HTN, transferred b/c of circulating blasts consistent with acute leukemia. PB flow cytometry showing B-ALL  FISH negative for Ph chromosome. PCR negative (although p190 positive 0.001% from marrow)  BMbx done 2/28 with IR (was unable to get at bedside) -B-lymphoblastic leukemia/lymphoma.  Echo done EF 55%  PICC line placed  3/4: started mini HyperCVAD -Today is C1D3  LP with IT MTX done on 2/28 -negative for malignant cells  Transaminitis -US done at [place] with fatty liver. Cont to monitor  CT A/P -Splenomegaly with a small age indeterminant splenic infarct. Normal liver, no abdominal or pelvic lymphadenopathy. CTA done at [place] w/ shotty LAD  Transfuse for plts <10, Hgb <7  Febrile to 101.7 -levaquin changed to cefepime  supportive care 63 year old with HLD and HTN, transferred b/c of circulating blasts consistent with acute leukemia. PB flow cytometry showing B-ALL  FISH negative for Ph chromosome. PCR negative (although p190 positive 0.001% from marrow)  BMbx done 2/28 with IR (was unable to get at bedside) -B-lymphoblastic leukemia/lymphoma.  Echo done EF 55%  PICC line placed  3/4: started mini HyperCVAD -Today is C1D4  LP with IT MTX done on 2/28 -negative for malignant cells  Transaminitis -US done at [place] with fatty liver. Cont to monitor  CT A/P -Splenomegaly with a small age indeterminant splenic infarct. Normal liver, no abdominal or pelvic lymphadenopathy. CTA done at [place] w/ shotty LAD  Transfuse for plts <10, Hgb <7  Febrile to 101.7 -levaquin changed to cefepime  supportive care

## 2023-03-07 NOTE — DIETITIAN INITIAL EVALUATION ADULT - PERTINENT MEDS FT
MEDICATIONS  (STANDING):  acyclovir   Oral Tab/Cap 400 milliGRAM(s) Oral two times a day  allopurinol 300 milliGRAM(s) Oral daily  brimonidine 0.2% Ophthalmic Solution 1 Drop(s) Both EYES daily  caspofungin IVPB      caspofungin IVPB 50 milliGRAM(s) IV Intermittent every 24 hours  cefepime   IVPB 2000 milliGRAM(s) IV Intermittent every 8 hours  chlorhexidine 4% Liquid 1 Application(s) Topical <User Schedule>  DAUNOrubicin IVPB (eMAR) 50 milliGRAM(s) IV Intermittent once  escitalopram 10 milliGRAM(s) Oral daily  gabapentin 300 milliGRAM(s) Oral every 8 hours  losartan 100 milliGRAM(s) Oral daily  methotrexate PF IntraThecal (eMAR) 15 milliGRAM(s) IntraThecal once  ondansetron Injectable 8 milliGRAM(s) IV Push every 8 hours  pantoprazole    Tablet 40 milliGRAM(s) Oral before breakfast  simvastatin 40 milliGRAM(s) Oral at bedtime  sodium chloride 0.9%. 1000 milliLiter(s) (20 mL/Hr) IV Continuous <Continuous>  triamcinolone 0.1% Ointment 1 Application(s) Topical two times a day    MEDICATIONS  (PRN):  acetaminophen     Tablet .. 650 milliGRAM(s) Oral every 6 hours PRN Temp greater or equal to 38C (100.4F), Mild Pain (1 - 3)  diphenhydrAMINE 25 milliGRAM(s) Oral every 4 hours PRN Rash and/or Itching  FIRST- Mouthwash  BLM 10 milliLiter(s) Swish and Spit four times a day PRN Mouth Care  metoclopramide Injectable 10 milliGRAM(s) IV Push every 6 hours PRN nausea/vomitting  ondansetron Injectable 8 milliGRAM(s) IV Push every 8 hours PRN Nausea and/or Vomiting  polyethylene glycol 3350 17 Gram(s) Oral two times a day PRN Constipation

## 2023-03-07 NOTE — DIETITIAN INITIAL EVALUATION ADULT - REASON
Nutrition Focused Physical Exam deferred at this time, pt denies changes in weight. Pt appears well developed with no overt signs of muscle or fat depletion.

## 2023-03-07 NOTE — DIETITIAN INITIAL EVALUATION ADULT - ORAL INTAKE PTA/DIET HISTORY
Pt reports good appetite/PO intake PTA. Reports consuming 3 meals a day.   - NKFA/intolerances reported.   - No therapeutic restrictions reported.  - Micronutrient/Other supplementation: Vitamin D3  - Protein-energy supplementation: Ensure shakes prn   - No hx of chewing/swallowing difficulties.

## 2023-03-07 NOTE — DIETITIAN INITIAL EVALUATION ADULT - REASON FOR ADMISSION
Per chart, "64 y/o with PMHx of HTN, HLD presents to the ED BIBA transferred from [facility] for new diagnosis of leukemia, peripheral blood flow+ B ALL. Chemo with reduced dose HyperCVAD started on 3/3. Hospital course c/b neutropenic fever, transaminitis, rashes,  and LUE cellulitis. Patient has pancytopenia  due to disease."

## 2023-03-07 NOTE — DIETITIAN INITIAL EVALUATION ADULT - ADD RECOMMEND
1) New BMI>40 notification sent.   2) Recommend Ensure Shake 1x/day (350 edison, 20g protein) to optimize PO intake.   3) Encourage adequate intake of meals and supplements to optimize PO intake.   4) Monitor and encourage PO intake. Encourage use of daily menus. Honor dietary preferences as expressed as able.   5) Monitor PO intake/tolerance, weights, labs, hydration status, bowels, and skin integrity.

## 2023-03-07 NOTE — PROGRESS NOTE ADULT - SUBJECTIVE AND OBJECTIVE BOX
Diagnosis: B ALL     Protocol/Chemo Regimen: reduced HyperCVAD( (IV Cyclophosphamide (150 mg/m2 every 12 h on days 1–3), Dexamethasone 20 mg per day on days 0–4 and 11–14, Vincristine 2 mg IV flat dose on days 1 and 8, Daunorubicin 25 mg/m2/day IV continuous infusion over 24 hours, starting on day 4.   Decadron started on 3/3, Cytoxan and VCR started on 3/4    Day: 5     Pt endorsed: No overnight events, afebrile, +OOB walking    Review of Systems: Denies SIMMONS, abdominal pain, HA or dizziness      Pain scale: not graded  Left elbow    Diet: DASH/TLC     Allergies    sulfa drugs (Unknown)    Intolerances      ANTIMICROBIALS  acyclovir   Oral Tab/Cap 400 milliGRAM(s) Oral two times a day  caspofungin IVPB      caspofungin IVPB 50 milliGRAM(s) IV Intermittent every 24 hours  cefepime   IVPB 2000 milliGRAM(s) IV Intermittent every 8 hours      HEME/ONC MEDICATIONS  methotrexate PF IntraThecal (eMAR) 15 milliGRAM(s) IntraThecal once      STANDING MEDICATIONS  allopurinol 300 milliGRAM(s) Oral daily  brimonidine 0.2% Ophthalmic Solution 1 Drop(s) Both EYES daily  chlorhexidine 4% Liquid 1 Application(s) Topical <User Schedule>  escitalopram 10 milliGRAM(s) Oral daily  gabapentin 300 milliGRAM(s) Oral every 8 hours  losartan 100 milliGRAM(s) Oral daily  ondansetron Injectable 8 milliGRAM(s) IV Push every 8 hours  pantoprazole    Tablet 40 milliGRAM(s) Oral before breakfast  simvastatin 40 milliGRAM(s) Oral at bedtime  sodium chloride 0.9%. 1000 milliLiter(s) IV Continuous <Continuous>  triamcinolone 0.1% Ointment 1 Application(s) Topical two times a day      PRN MEDICATIONS  acetaminophen     Tablet .. 650 milliGRAM(s) Oral every 6 hours PRN  diphenhydrAMINE 25 milliGRAM(s) Oral every 4 hours PRN  FIRST- Mouthwash  BLM 10 milliLiter(s) Swish and Spit four times a day PRN  metoclopramide Injectable 10 milliGRAM(s) IV Push every 6 hours PRN  ondansetron Injectable 8 milliGRAM(s) IV Push every 8 hours PRN  polyethylene glycol 3350 17 Gram(s) Oral two times a day PRN        Vital Signs Last 24 Hrs  T(C): 36.5 (07 Mar 2023 05:40), Max: 36.5 (07 Mar 2023 05:40)  T(F): 97.7 (07 Mar 2023 05:40), Max: 97.7 (07 Mar 2023 05:40)  HR: 49 (07 Mar 2023 05:40) (48 - 54)  BP: 154/69 (07 Mar 2023 05:40) (124/70 - 154/69)  BP(mean): --  RR: 18 (07 Mar 2023 05:40) (18 - 18)  SpO2: 97% (07 Mar 2023 05:40) (95% - 97%)    Parameters below as of 07 Mar 2023 05:40  Patient On (Oxygen Delivery Method): room air    PHYSICAL EXAM  General: Sitting up in chair NAD  HEENT: Sclerae anicteric, clear oropharynx, no erythema, no ulcers  CV: normal S1, S2, reg  Lungs: clear to auscultation b/l, decreased at bases,  no wheezes, no rales  Abdomen: +BS, soft, nontender, nondistended  Ext: no edema  Skin: diffuse MP rashes on abd, erythematous (improving; LUE swelling, + heat, indurated, tender to palp (improving)   Neuro: alert and oriented x 3  Central line:  RUE PICC CDI      LABS:                        8.6    0.55  )-----------( 9        ( 07 Mar 2023 07:01 )             25.5         Mean Cell Volume : 87.0 fl  Mean Cell Hemoglobin : 29.4 pg  Mean Cell Hemoglobin Concentration : 33.7 gm/dL  Auto Neutrophil # : x  Auto Lymphocyte # : x  Auto Monocyte # : x  Auto Eosinophil # : x  Auto Basophil # : x  Auto Neutrophil % : x  Auto Lymphocyte % : x  Auto Monocyte % : x  Auto Eosinophil % : x  Auto Basophil % : x      03-07    138  |  104  |  20  ----------------------------<  114<H>  4.5   |  26  |  0.70    Ca    8.6      07 Mar 2023 07:03  Phos  4.4     03-07  Mg     2.4     03-07    TPro  5.9<L>  /  Alb  3.1<L>  /  TBili  0.5  /  DBili  x   /  AST  20  /  ALT  50<H>  /  AlkPhos  164<H>  03-07  PT/INR - ( 06 Mar 2023 06:55 )   PT: 12.1 sec;   INR: 1.05 ratio    PTT - ( 07 Mar 2023 07:03 )  PTT:24.5 sec    Uric Acid 3.6                 Diagnosis: B ALL     Protocol/Chemo Regimen: reduced HyperCVAD( (IV Cyclophosphamide (150 mg/m2 every 12 h on days 1–3), Dexamethasone 20 mg per day on days 0–4 and 11–14, Vincristine 2 mg IV flat dose on days 1 and 8, Daunorubicin 25 mg/m2/day IV continuous infusion over 24 hours, starting on day 4.   Decadron started on 3/3, Cytoxan and VCR started on 3/4    Day: 4     Pt endorsed: No overnight events, afebrile, +OOB walking    Review of Systems: Denies SIMMONS, abdominal pain, HA or dizziness      Pain scale: not graded  Left elbow    Diet: DASH/TLC     Allergies    sulfa drugs (Unknown)    Intolerances      ANTIMICROBIALS  acyclovir   Oral Tab/Cap 400 milliGRAM(s) Oral two times a day  caspofungin IVPB      caspofungin IVPB 50 milliGRAM(s) IV Intermittent every 24 hours  cefepime   IVPB 2000 milliGRAM(s) IV Intermittent every 8 hours      HEME/ONC MEDICATIONS  methotrexate PF IntraThecal (eMAR) 15 milliGRAM(s) IntraThecal once      STANDING MEDICATIONS  allopurinol 300 milliGRAM(s) Oral daily  brimonidine 0.2% Ophthalmic Solution 1 Drop(s) Both EYES daily  chlorhexidine 4% Liquid 1 Application(s) Topical <User Schedule>  escitalopram 10 milliGRAM(s) Oral daily  gabapentin 300 milliGRAM(s) Oral every 8 hours  losartan 100 milliGRAM(s) Oral daily  ondansetron Injectable 8 milliGRAM(s) IV Push every 8 hours  pantoprazole    Tablet 40 milliGRAM(s) Oral before breakfast  simvastatin 40 milliGRAM(s) Oral at bedtime  sodium chloride 0.9%. 1000 milliLiter(s) IV Continuous <Continuous>  triamcinolone 0.1% Ointment 1 Application(s) Topical two times a day      PRN MEDICATIONS  acetaminophen     Tablet .. 650 milliGRAM(s) Oral every 6 hours PRN  diphenhydrAMINE 25 milliGRAM(s) Oral every 4 hours PRN  FIRST- Mouthwash  BLM 10 milliLiter(s) Swish and Spit four times a day PRN  metoclopramide Injectable 10 milliGRAM(s) IV Push every 6 hours PRN  ondansetron Injectable 8 milliGRAM(s) IV Push every 8 hours PRN  polyethylene glycol 3350 17 Gram(s) Oral two times a day PRN        Vital Signs Last 24 Hrs  T(C): 36.5 (07 Mar 2023 05:40), Max: 36.5 (07 Mar 2023 05:40)  T(F): 97.7 (07 Mar 2023 05:40), Max: 97.7 (07 Mar 2023 05:40)  HR: 49 (07 Mar 2023 05:40) (48 - 54)  BP: 154/69 (07 Mar 2023 05:40) (124/70 - 154/69)  BP(mean): --  RR: 18 (07 Mar 2023 05:40) (18 - 18)  SpO2: 97% (07 Mar 2023 05:40) (95% - 97%)    Parameters below as of 07 Mar 2023 05:40  Patient On (Oxygen Delivery Method): room air    PHYSICAL EXAM  General: Sitting up in chair NAD  HEENT: Sclerae anicteric, clear oropharynx, no erythema, no ulcers  CV: normal S1, S2, reg  Lungs: clear to auscultation b/l, decreased at bases,  no wheezes, no rales  Abdomen: +BS, soft, nontender, nondistended  Ext: no edema  Skin: diffuse MP rashes on abd, erythematous (improving; LUE swelling, + heat, indurated, tender to palp (improving)   Neuro: alert and oriented x 3  Central line:  RUE PICC CDI      LABS:                        8.6    0.55  )-----------( 9        ( 07 Mar 2023 07:01 )             25.5         Mean Cell Volume : 87.0 fl  Mean Cell Hemoglobin : 29.4 pg  Mean Cell Hemoglobin Concentration : 33.7 gm/dL  Auto Neutrophil # : x  Auto Lymphocyte # : x  Auto Monocyte # : x  Auto Eosinophil # : x  Auto Basophil # : x  Auto Neutrophil % : x  Auto Lymphocyte % : x  Auto Monocyte % : x  Auto Eosinophil % : x  Auto Basophil % : x      03-07    138  |  104  |  20  ----------------------------<  114<H>  4.5   |  26  |  0.70    Ca    8.6      07 Mar 2023 07:03  Phos  4.4     03-07  Mg     2.4     03-07    TPro  5.9<L>  /  Alb  3.1<L>  /  TBili  0.5  /  DBili  x   /  AST  20  /  ALT  50<H>  /  AlkPhos  164<H>  03-07  PT/INR - ( 06 Mar 2023 06:55 )   PT: 12.1 sec;   INR: 1.05 ratio    PTT - ( 07 Mar 2023 07:03 )  PTT:24.5 sec    Uric Acid 3.6

## 2023-03-07 NOTE — DIETITIAN INITIAL EVALUATION ADULT - NSFNSGIIOFT_GEN_A_CORE
- Pt confirms intermittent nausea, ordered for Reglan. Denies vomiting, diarrhea, or constipation.   - Last BM: 3/4; ordered for Miralax

## 2023-03-07 NOTE — DIETITIAN INITIAL EVALUATION ADULT - ENERGY INTAKE
- Pt reports fair appetite/PO intake since admission, ~50-75% of meals consumed.   - Pt is amenable to receiving oral nutrition supplements, would like to trial Ensure shake in the vanilla flavor.   - Pt made aware of menu ordering procedures in house, no food preferences at this time.

## 2023-03-07 NOTE — DIETITIAN INITIAL EVALUATION ADULT - PHYSCIAL ASSESSMENT
Weight Hx Per:  - Source: patient   - UBW: 200-220 pounds   - Reported weight changes: Pt reports weight tends to fluctuate. No unintentional weight loss reported.     Weight Hx Per [facility]:  - 95.3 kg (9/17/2022)  - 100.1 kg (2/23/2023)    Current Admission Weights:  - Dosing weight: 220.1 pounds (100.2 kg)  - Daily weight: 101.3 kg 3/7/23    Weight Change:  - n/a    **  Will continue to monitor weight trends as available/able.   IBW: 110 pounds   %IBW: 200%/obese

## 2023-03-07 NOTE — ADVANCED PRACTICE NURSE CONSULT - ASSESSMENT
Patient A&Ox4, VSS within range of baseline, denies N/V/D, no signs of distress no reports of pain. Lab results reviewed with Dr Harris, AST 50 and Alkaline phos 164, ok to give chemotherapy. Patient with RDL PICC, no s/s of tenderness or redness at insertion site, dressing c/d/i, flushing easily with 10cc NS, with positive blood return. Transfused 1 unit of platelets. Patient educated on chemo regime, verbalized understanding. 2 RN verification completed at bedside prior to start. At 1520 started day 4 DAUNOrubicin IVPB (eMAR)  - 50 milliGRAM(s) in sodium chloride 0.9% 500 milliLiter(s), IV, CIV infuse over 24 Hour(s), infusing at 23mL/Hr,   Administration Instructions: protect from light * vesicant, in brown bag. Attached to lowest port of primary NSS line of RDL PICC into purple lumen. Primary RN aware of treatment plan. Call bell within reach. Safety maintained, nursing care on-going.   Patient A&Ox4, VSS within range of baseline, denies N/V/D, no signs of distress no reports of pain. Lab results reviewed with Dr Harris, AST 50 and Alkaline phos 164, ok to give chemotherapy. EF 55% 2/27/23. Patient with RDL PICC, no s/s of tenderness or redness at insertion site, dressing c/d/i, flushing easily with 10cc NS, with positive blood return. Transfused 1 unit of platelets. Patient educated on chemo regime, verbalized understanding. 2 RN verification completed at bedside prior to start. At 1520 started day 4 DAUNOrubicin IVPB (eMAR)  - 50 milliGRAM(s) in sodium chloride 0.9% 500 milliLiter(s), IV, CIV infuse over 24 Hour(s), infusing at 23mL/Hr,   Administration Instructions: protect from light * vesicant, in brown bag. Attached to lowest port of primary NSS line of RDL PICC into purple lumen. Primary RN aware of treatment plan. Call bell within reach. Safety maintained, nursing care on-going.

## 2023-03-07 NOTE — DIETITIAN INITIAL EVALUATION ADULT - PERSON TAUGHT/METHOD
Emphasized the importance of adequate kcal and protein intake, provided recommendations to optimize nutritional intake in case of decreased appetite, recommended small frequent meals by consuming nutrient-dense snacks between meals, to start with protein, and sips of supplement throughout the day./verbal instruction/teach back - (Patient repeats in own words)/patient instructed Emphasized the importance of adequate kcal and protein intake, provided recommendations to optimize nutritional intake in case of decreased appetite, recommended small frequent meals by consuming nutrient-dense snacks between meals, to start with protein, and sips of supplement throughout the day.  RD gave pt "Be Well Bag" and discussed items in bag including nutrient dense snacks as needed; discussed packet on "Eating Tips: Before, During and After Cancer Treatment" to address symptom management; nausea./verbal instruction/teach back - (Patient repeats in own words)/patient instructed

## 2023-03-07 NOTE — PROGRESS NOTE ADULT - PROBLEM SELECTOR PLAN 2
Patient is neutropenic, febrile  2/28 added Levaquin, Caspofungin and acyclovir PPX  3/3 switched  Levaquin to Cefepime due to fever, FU pan cx   2/28 mild cellulitis left elbow post PIV infiltration, warm compress and monitor closely - improving Patient is neutropenic, afebrile  3/3 switched  Levaquin to Cefepime due to fever, Caspofungin and acyclovir PPX  2/28 mild cellulitis left elbow post PIV infiltration, warm compress and monitor closely - improving  Pan cx (-) 3/3

## 2023-03-07 NOTE — DIETITIAN NUTRITION RISK NOTIFICATION - TREATMENT: THE FOLLOWING DIET HAS BEEN RECOMMENDED
Diet, Regular (02-28-23 @ 14:15) [Active]  Diet, Clear Liquid (02-28-23 @ 14:07) [Available for Activation]

## 2023-03-07 NOTE — DIETITIAN INITIAL EVALUATION ADULT - ETIOLOGY
decreased ability to consume adequate protein-energy in setting of increased physiological demand for nutrients  Inadequate PO intake in setting of intermittent nausea

## 2023-03-07 NOTE — PROGRESS NOTE ADULT - ASSESSMENT
64 y/o with PMHx of HTN, HLD presents to the ED BIBA transferred from [facility] for new diagnosis of leukemia, peripheral blood flow+ B ALL. Chemo with reduced dose HyperCVAD started on 3/3. Hospital course c/b neutropenic fever, transaminitis, rashes,  and LUE cellulitis. Patient has pancytopenia  due to disease.

## 2023-03-07 NOTE — DIETITIAN INITIAL EVALUATION ADULT - REASON INDICATOR FOR ASSESSMENT
Nutrition assessment warranted for: length of stay, New diagnosis of ALL  Information obtained from: electronic medical record and patient  Chart reviewed, events noted.

## 2023-03-08 LAB
ALBUMIN SERPL ELPH-MCNC: 2.7 G/DL — LOW (ref 3.3–5)
ALBUMIN SERPL ELPH-MCNC: 3 G/DL — LOW (ref 3.3–5)
ALP SERPL-CCNC: 122 U/L — HIGH (ref 40–120)
ALP SERPL-CCNC: 137 U/L — HIGH (ref 40–120)
ALT FLD-CCNC: 38 U/L — SIGNIFICANT CHANGE UP (ref 10–45)
ALT FLD-CCNC: 47 U/L — HIGH (ref 10–45)
ANION GAP SERPL CALC-SCNC: 7 MMOL/L — SIGNIFICANT CHANGE UP (ref 5–17)
ANION GAP SERPL CALC-SCNC: 7 MMOL/L — SIGNIFICANT CHANGE UP (ref 5–17)
APTT BLD: 25.8 SEC — LOW (ref 27.5–35.5)
AST SERPL-CCNC: 21 U/L — SIGNIFICANT CHANGE UP (ref 10–40)
AST SERPL-CCNC: 21 U/L — SIGNIFICANT CHANGE UP (ref 10–40)
BASOPHILS # BLD AUTO: 0 K/UL — SIGNIFICANT CHANGE UP (ref 0–0.2)
BASOPHILS NFR BLD AUTO: 0 % — SIGNIFICANT CHANGE UP (ref 0–2)
BILIRUB SERPL-MCNC: 0.4 MG/DL — SIGNIFICANT CHANGE UP (ref 0.2–1.2)
BILIRUB SERPL-MCNC: 0.5 MG/DL — SIGNIFICANT CHANGE UP (ref 0.2–1.2)
BUN SERPL-MCNC: 19 MG/DL — SIGNIFICANT CHANGE UP (ref 7–23)
BUN SERPL-MCNC: 21 MG/DL — SIGNIFICANT CHANGE UP (ref 7–23)
CALCIUM SERPL-MCNC: 8.4 MG/DL — SIGNIFICANT CHANGE UP (ref 8.4–10.5)
CALCIUM SERPL-MCNC: 8.5 MG/DL — SIGNIFICANT CHANGE UP (ref 8.4–10.5)
CHLORIDE SERPL-SCNC: 104 MMOL/L — SIGNIFICANT CHANGE UP (ref 96–108)
CHLORIDE SERPL-SCNC: 105 MMOL/L — SIGNIFICANT CHANGE UP (ref 96–108)
CO2 SERPL-SCNC: 27 MMOL/L — SIGNIFICANT CHANGE UP (ref 22–31)
CO2 SERPL-SCNC: 28 MMOL/L — SIGNIFICANT CHANGE UP (ref 22–31)
CREAT SERPL-MCNC: 0.82 MG/DL — SIGNIFICANT CHANGE UP (ref 0.5–1.3)
CREAT SERPL-MCNC: 0.91 MG/DL — SIGNIFICANT CHANGE UP (ref 0.5–1.3)
CULTURE RESULTS: SIGNIFICANT CHANGE UP
CULTURE RESULTS: SIGNIFICANT CHANGE UP
D DIMER BLD IA.RAPID-MCNC: 1035 NG/ML DDU — HIGH
EGFR: 70 ML/MIN/1.73M2 — SIGNIFICANT CHANGE UP
EGFR: 79 ML/MIN/1.73M2 — SIGNIFICANT CHANGE UP
EOSINOPHIL # BLD AUTO: 0.01 K/UL — SIGNIFICANT CHANGE UP (ref 0–0.5)
EOSINOPHIL NFR BLD AUTO: 1.2 % — SIGNIFICANT CHANGE UP (ref 0–6)
FIBRINOGEN PPP-MCNC: 272 MG/DL — SIGNIFICANT CHANGE UP (ref 200–445)
GLUCOSE SERPL-MCNC: 102 MG/DL — HIGH (ref 70–99)
GLUCOSE SERPL-MCNC: 80 MG/DL — SIGNIFICANT CHANGE UP (ref 70–99)
HCT VFR BLD CALC: 22.6 % — LOW (ref 34.5–45)
HCT VFR BLD CALC: 25 % — LOW (ref 34.5–45)
HGB BLD-MCNC: 7.5 G/DL — LOW (ref 11.5–15.5)
HGB BLD-MCNC: 8.3 G/DL — LOW (ref 11.5–15.5)
INR BLD: 1.08 RATIO — SIGNIFICANT CHANGE UP (ref 0.88–1.16)
LDH SERPL L TO P-CCNC: 185 U/L — SIGNIFICANT CHANGE UP (ref 50–242)
LDH SERPL L TO P-CCNC: 213 U/L — SIGNIFICANT CHANGE UP (ref 50–242)
LYMPHOCYTES # BLD AUTO: 0.48 K/UL — LOW (ref 1–3.3)
LYMPHOCYTES # BLD AUTO: 85.4 % — HIGH (ref 13–44)
MAGNESIUM SERPL-MCNC: 1.9 MG/DL — SIGNIFICANT CHANGE UP (ref 1.6–2.6)
MAGNESIUM SERPL-MCNC: 2.1 MG/DL — SIGNIFICANT CHANGE UP (ref 1.6–2.6)
MANUAL SMEAR VERIFICATION: SIGNIFICANT CHANGE UP
MCHC RBC-ENTMCNC: 29.7 PG — SIGNIFICANT CHANGE UP (ref 27–34)
MCHC RBC-ENTMCNC: 29.9 PG — SIGNIFICANT CHANGE UP (ref 27–34)
MCHC RBC-ENTMCNC: 33.2 GM/DL — SIGNIFICANT CHANGE UP (ref 32–36)
MCHC RBC-ENTMCNC: 33.2 GM/DL — SIGNIFICANT CHANGE UP (ref 32–36)
MCV RBC AUTO: 89.6 FL — SIGNIFICANT CHANGE UP (ref 80–100)
MCV RBC AUTO: 90 FL — SIGNIFICANT CHANGE UP (ref 80–100)
MONOCYTES # BLD AUTO: 0.03 K/UL — SIGNIFICANT CHANGE UP (ref 0–0.9)
MONOCYTES NFR BLD AUTO: 4.9 % — SIGNIFICANT CHANGE UP (ref 2–14)
NEUTROPHILS # BLD AUTO: 0.05 K/UL — LOW (ref 1.8–7.4)
NEUTROPHILS NFR BLD AUTO: 8.5 % — LOW (ref 43–77)
NRBC # BLD: 0 /100 WBCS — SIGNIFICANT CHANGE UP (ref 0–0)
PHOSPHATE SERPL-MCNC: 3.9 MG/DL — SIGNIFICANT CHANGE UP (ref 2.5–4.5)
PHOSPHATE SERPL-MCNC: 4.5 MG/DL — SIGNIFICANT CHANGE UP (ref 2.5–4.5)
PLAT MORPH BLD: NORMAL — SIGNIFICANT CHANGE UP
PLATELET # BLD AUTO: 22 K/UL — LOW (ref 150–400)
PLATELET # BLD AUTO: 26 K/UL — LOW (ref 150–400)
POTASSIUM SERPL-MCNC: 3.9 MMOL/L — SIGNIFICANT CHANGE UP (ref 3.5–5.3)
POTASSIUM SERPL-MCNC: 3.9 MMOL/L — SIGNIFICANT CHANGE UP (ref 3.5–5.3)
POTASSIUM SERPL-SCNC: 3.9 MMOL/L — SIGNIFICANT CHANGE UP (ref 3.5–5.3)
POTASSIUM SERPL-SCNC: 3.9 MMOL/L — SIGNIFICANT CHANGE UP (ref 3.5–5.3)
PROT SERPL-MCNC: 4.9 G/DL — LOW (ref 6–8.3)
PROT SERPL-MCNC: 5.5 G/DL — LOW (ref 6–8.3)
PROTHROM AB SERPL-ACNC: 12.5 SEC — SIGNIFICANT CHANGE UP (ref 10.5–13.4)
RBC # BLD: 2.51 M/UL — LOW (ref 3.8–5.2)
RBC # BLD: 2.79 M/UL — LOW (ref 3.8–5.2)
RBC # FLD: 13.8 % — SIGNIFICANT CHANGE UP (ref 10.3–14.5)
RBC # FLD: 13.9 % — SIGNIFICANT CHANGE UP (ref 10.3–14.5)
RBC BLD AUTO: SIGNIFICANT CHANGE UP
SODIUM SERPL-SCNC: 139 MMOL/L — SIGNIFICANT CHANGE UP (ref 135–145)
SODIUM SERPL-SCNC: 139 MMOL/L — SIGNIFICANT CHANGE UP (ref 135–145)
SPECIMEN SOURCE: SIGNIFICANT CHANGE UP
SPECIMEN SOURCE: SIGNIFICANT CHANGE UP
URATE SERPL-MCNC: 2.9 MG/DL — SIGNIFICANT CHANGE UP (ref 2.5–7)
URATE SERPL-MCNC: 3.4 MG/DL — SIGNIFICANT CHANGE UP (ref 2.5–7)
WBC # BLD: 0.4 K/UL — CRITICAL LOW (ref 3.8–10.5)
WBC # BLD: 0.56 K/UL — CRITICAL LOW (ref 3.8–10.5)
WBC # FLD AUTO: 0.4 K/UL — CRITICAL LOW (ref 3.8–10.5)
WBC # FLD AUTO: 0.56 K/UL — CRITICAL LOW (ref 3.8–10.5)

## 2023-03-08 PROCEDURE — 99233 SBSQ HOSP IP/OBS HIGH 50: CPT | Mod: GC

## 2023-03-08 RX ADMIN — Medication 400 MILLIGRAM(S): at 17:19

## 2023-03-08 RX ADMIN — Medication 400 MILLIGRAM(S): at 05:19

## 2023-03-08 RX ADMIN — Medication 5 MILLILITER(S): at 17:19

## 2023-03-08 RX ADMIN — Medication 1 APPLICATION(S): at 05:20

## 2023-03-08 RX ADMIN — CEFEPIME 100 MILLIGRAM(S): 1 INJECTION, POWDER, FOR SOLUTION INTRAMUSCULAR; INTRAVENOUS at 13:44

## 2023-03-08 RX ADMIN — CHLORHEXIDINE GLUCONATE 1 APPLICATION(S): 213 SOLUTION TOPICAL at 09:11

## 2023-03-08 RX ADMIN — GABAPENTIN 300 MILLIGRAM(S): 400 CAPSULE ORAL at 13:43

## 2023-03-08 RX ADMIN — GABAPENTIN 300 MILLIGRAM(S): 400 CAPSULE ORAL at 21:33

## 2023-03-08 RX ADMIN — CEFEPIME 100 MILLIGRAM(S): 1 INJECTION, POWDER, FOR SOLUTION INTRAMUSCULAR; INTRAVENOUS at 21:30

## 2023-03-08 RX ADMIN — Medication 10 MILLIGRAM(S): at 10:20

## 2023-03-08 RX ADMIN — PANTOPRAZOLE SODIUM 40 MILLIGRAM(S): 20 TABLET, DELAYED RELEASE ORAL at 05:19

## 2023-03-08 RX ADMIN — ONDANSETRON 8 MILLIGRAM(S): 8 TABLET, FILM COATED ORAL at 21:34

## 2023-03-08 RX ADMIN — CASPOFUNGIN ACETATE 260 MILLIGRAM(S): 7 INJECTION, POWDER, LYOPHILIZED, FOR SOLUTION INTRAVENOUS at 11:43

## 2023-03-08 RX ADMIN — ESCITALOPRAM OXALATE 10 MILLIGRAM(S): 10 TABLET, FILM COATED ORAL at 11:43

## 2023-03-08 RX ADMIN — ONDANSETRON 8 MILLIGRAM(S): 8 TABLET, FILM COATED ORAL at 09:09

## 2023-03-08 RX ADMIN — BRIMONIDINE TARTRATE 1 DROP(S): 2 SOLUTION/ DROPS OPHTHALMIC at 11:44

## 2023-03-08 RX ADMIN — SIMVASTATIN 40 MILLIGRAM(S): 20 TABLET, FILM COATED ORAL at 21:33

## 2023-03-08 RX ADMIN — CEFEPIME 100 MILLIGRAM(S): 1 INJECTION, POWDER, FOR SOLUTION INTRAMUSCULAR; INTRAVENOUS at 05:14

## 2023-03-08 RX ADMIN — Medication 5 MILLILITER(S): at 09:08

## 2023-03-08 RX ADMIN — Medication 650 MILLIGRAM(S): at 19:57

## 2023-03-08 RX ADMIN — Medication 650 MILLIGRAM(S): at 20:57

## 2023-03-08 RX ADMIN — Medication 5 MILLILITER(S): at 11:45

## 2023-03-08 RX ADMIN — LOSARTAN POTASSIUM 100 MILLIGRAM(S): 100 TABLET, FILM COATED ORAL at 05:20

## 2023-03-08 RX ADMIN — Medication 5 MILLILITER(S): at 20:39

## 2023-03-08 RX ADMIN — POLYETHYLENE GLYCOL 3350 17 GRAM(S): 17 POWDER, FOR SOLUTION ORAL at 13:43

## 2023-03-08 RX ADMIN — GABAPENTIN 300 MILLIGRAM(S): 400 CAPSULE ORAL at 05:14

## 2023-03-08 RX ADMIN — Medication 300 MILLIGRAM(S): at 11:43

## 2023-03-08 NOTE — PROGRESS NOTE ADULT - PROBLEM SELECTOR PLAN 2
Patient is neutropenic, afebrile  3/3 switched  Levaquin to Cefepime due to fever, Caspofungin and acyclovir PPX  2/28 mild cellulitis left elbow post PIV infiltration, warm compress and monitor closely - improving  Pan cx (-) 3/3

## 2023-03-08 NOTE — PROGRESS NOTE ADULT - PROBLEM SELECTOR PLAN 7
3/4: Abd rashx2 days( pt believes its from her underwear), mild itch , improving, will continue to monitor  Topical steroid and atarax PRN

## 2023-03-08 NOTE — PROGRESS NOTE ADULT - PROBLEM SELECTOR PLAN 1
New  diagnosis of ALL given findings on peripheral flow cytometry. BCR/ABL - NEG  Monitor CBC, CMP, TLS labs BID, g6pd -11.3, echo done 2/27 EF 55%, HLA sent 2/28  On allopurinol  Hepatitis Panel Neg. Hep B Core neg. HIV negative.  Transfuse PRN. maintain Hgb >7, plts >15.  Gabapentin for splenic/side pain. Increased dose to 300mg PO TID on 3/2   2/28 s/p BMbx in IR due to body habitus, fu result. [facility]  2/28 s/p LP with IT MTX, flow - NEG for malignant cells  2/25 - CTA from [facility] (-) for PE. Left sided pain likely due to splenomegaly.  PICC line at bedside 2/28  BCR/ABL PCR sent 3/2 - NEG  3/4 Chemo with reduced dose HyperCVAD started, Decadron started on 3/3, Cytoxan and VCR started on 3/4~1am per chemo RN

## 2023-03-08 NOTE — PROGRESS NOTE ADULT - ASSESSMENT
64 y/o with PMHx of HTN, HLD presents to the ED BIBA transferred from [facility] for new diagnosis of leukemia, peripheral blood flow+ B ALL. Chemo with reduced dose HyperCVAD started on 3/3. Hospital course c/b neutropenic fever, transaminitis, rashes,  and LUE cellulitis. Patient has pancytopenia  due to disease.   64 y/o with PMHx of HTN, HLD presents to the ED BIBA transferred from [facility] for new diagnosis of leukemia, peripheral blood flow+ B ALL. Chemo with reduced dose HyperCVAD started on 3/3. Hospital course c/b neutropenic fever, transaminitis, rash 3/4: mild itch , improed,   Topical steroid and atarax PRN,  and LUE cellulitis. Patient has pancytopenia  due to disease.

## 2023-03-08 NOTE — PROGRESS NOTE ADULT - SUBJECTIVE AND OBJECTIVE BOX
Diagnosis: B ALL     Protocol/Chemo Regimen: reduced HyperCVAD( (IV Cyclophosphamide (150 mg/m2 every 12 h on days 1–3), Dexamethasone 20 mg per day on days 0–4 and 11–14, Vincristine 2 mg IV flat dose on days 1 and 8, Daunorubicin 25 mg/m2/day IV continuous infusion over 24 hours, starting on day 4.   Decadron started on 3/3, Cytoxan and VCR started on 3/4    Day: 6     Pt endorsed: No overnight events, afebrile, +OOB walking    Review of Systems: Denies SIMMONS, abdominal pain, HA or dizziness      Pain scale: not graded  Left elbow    Diet: DASH/TLC    Allergies    sulfa drugs (Unknown)    Intolerances        ANTIMICROBIALS  acyclovir   Oral Tab/Cap 400 milliGRAM(s) Oral two times a day  caspofungin IVPB      caspofungin IVPB 50 milliGRAM(s) IV Intermittent every 24 hours  cefepime   IVPB 2000 milliGRAM(s) IV Intermittent every 8 hours      HEME/ONC MEDICATIONS  methotrexate PF IntraThecal (eMAR) 15 milliGRAM(s) IntraThecal once      STANDING MEDICATIONS  allopurinol 300 milliGRAM(s) Oral daily  Biotene Dry Mouth Oral Rinse 5 milliLiter(s) Swish and Spit five times a day  brimonidine 0.2% Ophthalmic Solution 1 Drop(s) Both EYES daily  chlorhexidine 4% Liquid 1 Application(s) Topical <User Schedule>  escitalopram 10 milliGRAM(s) Oral daily  gabapentin 300 milliGRAM(s) Oral every 8 hours  losartan 100 milliGRAM(s) Oral daily  pantoprazole    Tablet 40 milliGRAM(s) Oral before breakfast  simvastatin 40 milliGRAM(s) Oral at bedtime  sodium chloride 0.9%. 1000 milliLiter(s) IV Continuous <Continuous>  triamcinolone 0.1% Ointment 1 Application(s) Topical two times a day      PRN MEDICATIONS  acetaminophen     Tablet .. 650 milliGRAM(s) Oral every 6 hours PRN  diphenhydrAMINE 25 milliGRAM(s) Oral every 4 hours PRN  FIRST- Mouthwash  BLM 10 milliLiter(s) Swish and Spit four times a day PRN  metoclopramide Injectable 10 milliGRAM(s) IV Push every 6 hours PRN  ondansetron Injectable 8 milliGRAM(s) IV Push every 8 hours PRN  polyethylene glycol 3350 17 Gram(s) Oral two times a day PRN        Vital Signs Last 24 Hrs  T(C): 36.4 (08 Mar 2023 05:31), Max: 36.5 (07 Mar 2023 12:45)  T(F): 97.6 (08 Mar 2023 05:31), Max: 97.7 (07 Mar 2023 12:45)  HR: 55 (08 Mar 2023 05:31) (45 - 59)  BP: 120/74 (08 Mar 2023 05:31) (120/74 - 169/82)  BP(mean): --  RR: 16 (08 Mar 2023 05:31) (16 - 18)  SpO2: 94% (08 Mar 2023 05:31) (94% - 98%)    Parameters below as of 08 Mar 2023 05:31  Patient On (Oxygen Delivery Method): room air    PHYSICAL EXAM  General: Sitting up in chair NAD  HEENT: Sclerae anicteric, clear oropharynx, no erythema, no ulcers  CV: normal S1, S2, reg  Lungs: clear to auscultation b/l, decreased at bases,  no wheezes, no rales  Abdomen: +BS, soft, nontender, nondistended  Ext: no edema  Skin: diffuse MP rashes on abd, erythematous (improving; LUE swelling, + heat, indurated, tender to palp (improving)   Neuro: alert and oriented x 3  Central line:  RUE PICC CDI      LABS:    Blood Cultures:                           8.3    0.56  )-----------( 26       ( 08 Mar 2023 07:12 )             25.0         Mean Cell Volume : 89.6 fl  Mean Cell Hemoglobin : 29.7 pg  Mean Cell Hemoglobin Concentration : 33.2 gm/dL  Auto Neutrophil # : 0.05 K/uL  Auto Lymphocyte # : 0.48 K/uL  Auto Monocyte # : 0.03 K/uL  Auto Eosinophil # : 0.01 K/uL  Auto Basophil # : 0.00 K/uL  Auto Neutrophil % : 8.5 %  Auto Lymphocyte % : 85.4 %  Auto Monocyte % : 4.9 %  Auto Eosinophil % : 1.2 %  Auto Basophil % : 0.0 %      03-08    139  |  104  |  19  ----------------------------<  80  3.9   |  28  |  0.82    Ca    8.5      08 Mar 2023 07:11  Phos  3.9     03-08  Mg     2.1     03-08    TPro  5.5<L>  /  Alb  3.0<L>  /  TBili  0.5  /  DBili  x   /  AST  21  /  ALT  47<H>  /  AlkPhos  137<H>  03-08      Mg 2.1  Phos 3.9  Mg 2.3  Phos 3.6      PT/INR - ( 08 Mar 2023 07:12 )   PT: 12.5 sec;   INR: 1.08 ratio         PTT - ( 08 Mar 2023 07:12 )  PTT:25.8 sec      Uric Acid 3.4      Uric Acid --                   Diagnosis: B ALL     Protocol/Chemo Regimen: reduced HyperCVAD( (IV Cyclophosphamide (150 mg/m2 every 12 h on days 1–3), Dexamethasone 20 mg per day on days 0–4 and 11–14, Vincristine 2 mg IV flat dose on days 1 and 8, Daunorubicin 25 mg/m2/day IV continuous infusion over 24 hours, starting on day 4.   Decadron started on 3/3, Cytoxan and VCR started on 3/4    Day: 6     Pt endorsed: No overnight events, afebrile, +OOB walking    Review of Systems: Denies SIMMONS, abdominal pain, HA or dizziness      Pain scale: not graded  Left elbow    Diet: DASH/TLC    Allergies    sulfa drugs (Unknown)    Intolerances        ANTIMICROBIALS  acyclovir   Oral Tab/Cap 400 milliGRAM(s) Oral two times a day  caspofungin IVPB      caspofungin IVPB 50 milliGRAM(s) IV Intermittent every 24 hours  cefepime   IVPB 2000 milliGRAM(s) IV Intermittent every 8 hours      HEME/ONC MEDICATIONS  methotrexate PF IntraThecal (eMAR) 15 milliGRAM(s) IntraThecal once      STANDING MEDICATIONS  allopurinol 300 milliGRAM(s) Oral daily  Biotene Dry Mouth Oral Rinse 5 milliLiter(s) Swish and Spit five times a day  brimonidine 0.2% Ophthalmic Solution 1 Drop(s) Both EYES daily  chlorhexidine 4% Liquid 1 Application(s) Topical <User Schedule>  escitalopram 10 milliGRAM(s) Oral daily  gabapentin 300 milliGRAM(s) Oral every 8 hours  losartan 100 milliGRAM(s) Oral daily  pantoprazole    Tablet 40 milliGRAM(s) Oral before breakfast  simvastatin 40 milliGRAM(s) Oral at bedtime  sodium chloride 0.9%. 1000 milliLiter(s) IV Continuous <Continuous>  triamcinolone 0.1% Ointment 1 Application(s) Topical two times a day      PRN MEDICATIONS  acetaminophen     Tablet .. 650 milliGRAM(s) Oral every 6 hours PRN  diphenhydrAMINE 25 milliGRAM(s) Oral every 4 hours PRN  FIRST- Mouthwash  BLM 10 milliLiter(s) Swish and Spit four times a day PRN  metoclopramide Injectable 10 milliGRAM(s) IV Push every 6 hours PRN  ondansetron Injectable 8 milliGRAM(s) IV Push every 8 hours PRN  polyethylene glycol 3350 17 Gram(s) Oral two times a day PRN        Vital Signs Last 24 Hrs  T(C): 36.4 (08 Mar 2023 05:31), Max: 36.5 (07 Mar 2023 12:45)  T(F): 97.6 (08 Mar 2023 05:31), Max: 97.7 (07 Mar 2023 12:45)  HR: 55 (08 Mar 2023 05:31) (45 - 59)  BP: 120/74 (08 Mar 2023 05:31) (120/74 - 169/82)  BP(mean): --  RR: 16 (08 Mar 2023 05:31) (16 - 18)  SpO2: 94% (08 Mar 2023 05:31) (94% - 98%)    Parameters below as of 08 Mar 2023 05:31  Patient On (Oxygen Delivery Method): room air    PHYSICAL EXAM  General: Sitting up in chair NAD  HEENT: Sclerae anicteric, clear oropharynx, no erythema, no ulcers  CV: normal S1, S2, reg  Lungs: clear to auscultation b/l, decreased at bases,  no wheezes, no rales  Abdomen: +BS, soft, nontender, nondistended  Ext: no edema  Skin: diffuse MP rashes on abd, nearly resolved.   Neuro: alert and oriented x 3  Central line:  RUE PICC CDI      LABS:                            8.3    0.56  )-----------( 26       ( 08 Mar 2023 07:12 )             25.0         Mean Cell Volume : 89.6 fl  Mean Cell Hemoglobin : 29.7 pg  Mean Cell Hemoglobin Concentration : 33.2 gm/dL  Auto Neutrophil # : 0.05 K/uL  Auto Lymphocyte # : 0.48 K/uL  Auto Monocyte # : 0.03 K/uL  Auto Eosinophil # : 0.01 K/uL  Auto Basophil # : 0.00 K/uL  Auto Neutrophil % : 8.5 %  Auto Lymphocyte % : 85.4 %  Auto Monocyte % : 4.9 %  Auto Eosinophil % : 1.2 %  Auto Basophil % : 0.0 %      03-08    139  |  104  |  19  ----------------------------<  80  3.9   |  28  |  0.82    Ca    8.5      08 Mar 2023 07:11  Phos  3.9     03-08  Mg     2.1     03-08    TPro  5.5<L>  /  Alb  3.0<L>  /  TBili  0.5  /  DBili  x   /  AST  21  /  ALT  47<H>  /  AlkPhos  137<H>  03-08  PT/INR - ( 08 Mar 2023 07:12 )   PT: 12.5 sec;   INR: 1.08 ratio    PTT - ( 08 Mar 2023 07:12 )  PTT:25.8 sec    Uric Acid 3.4                       Diagnosis: B ALL     Protocol/Chemo Regimen: reduced HyperCVAD( (IV Cyclophosphamide (150 mg/m2 every 12 h on days 1–3), Dexamethasone 20 mg per day on days 0–4 and 11–14, Vincristine 2 mg IV flat dose on days 1 and 8, Daunorubicin 25 mg/m2/day IV continuous infusion over 24 hours, starting on day 4.   Decadron started on 3/3, Cytoxan and VCR started on 3/4    Day: 5     Pt endorsed: No overnight events, afebrile, +OOB walking    Review of Systems: Denies SIMMONS, abdominal pain, HA or dizziness      Pain scale: not graded  Left elbow    Diet: DASH/TLC    Allergies    sulfa drugs (Unknown)    Intolerances        ANTIMICROBIALS  acyclovir   Oral Tab/Cap 400 milliGRAM(s) Oral two times a day  caspofungin IVPB      caspofungin IVPB 50 milliGRAM(s) IV Intermittent every 24 hours  cefepime   IVPB 2000 milliGRAM(s) IV Intermittent every 8 hours      HEME/ONC MEDICATIONS  methotrexate PF IntraThecal (eMAR) 15 milliGRAM(s) IntraThecal once      STANDING MEDICATIONS  allopurinol 300 milliGRAM(s) Oral daily  Biotene Dry Mouth Oral Rinse 5 milliLiter(s) Swish and Spit five times a day  brimonidine 0.2% Ophthalmic Solution 1 Drop(s) Both EYES daily  chlorhexidine 4% Liquid 1 Application(s) Topical <User Schedule>  escitalopram 10 milliGRAM(s) Oral daily  gabapentin 300 milliGRAM(s) Oral every 8 hours  losartan 100 milliGRAM(s) Oral daily  pantoprazole    Tablet 40 milliGRAM(s) Oral before breakfast  simvastatin 40 milliGRAM(s) Oral at bedtime  sodium chloride 0.9%. 1000 milliLiter(s) IV Continuous <Continuous>  triamcinolone 0.1% Ointment 1 Application(s) Topical two times a day      PRN MEDICATIONS  acetaminophen     Tablet .. 650 milliGRAM(s) Oral every 6 hours PRN  diphenhydrAMINE 25 milliGRAM(s) Oral every 4 hours PRN  FIRST- Mouthwash  BLM 10 milliLiter(s) Swish and Spit four times a day PRN  metoclopramide Injectable 10 milliGRAM(s) IV Push every 6 hours PRN  ondansetron Injectable 8 milliGRAM(s) IV Push every 8 hours PRN  polyethylene glycol 3350 17 Gram(s) Oral two times a day PRN        Vital Signs Last 24 Hrs  T(C): 36.4 (08 Mar 2023 05:31), Max: 36.5 (07 Mar 2023 12:45)  T(F): 97.6 (08 Mar 2023 05:31), Max: 97.7 (07 Mar 2023 12:45)  HR: 55 (08 Mar 2023 05:31) (45 - 59)  BP: 120/74 (08 Mar 2023 05:31) (120/74 - 169/82)  BP(mean): --  RR: 16 (08 Mar 2023 05:31) (16 - 18)  SpO2: 94% (08 Mar 2023 05:31) (94% - 98%)    Parameters below as of 08 Mar 2023 05:31  Patient On (Oxygen Delivery Method): room air    PHYSICAL EXAM  General: Sitting up in chair NAD  HEENT: Sclerae anicteric, clear oropharynx, no erythema, no ulcers  CV: normal S1, S2, reg  Lungs: clear to auscultation b/l, decreased at bases,  no wheezes, no rales  Abdomen: +BS, soft, nontender, nondistended  Ext: no edema  Skin: diffuse MP rashes on abd, nearly resolved.   Neuro: alert and oriented x 3  Central line:  RUE PICC CDI      LABS:                            8.3    0.56  )-----------( 26       ( 08 Mar 2023 07:12 )             25.0         Mean Cell Volume : 89.6 fl  Mean Cell Hemoglobin : 29.7 pg  Mean Cell Hemoglobin Concentration : 33.2 gm/dL  Auto Neutrophil # : 0.05 K/uL  Auto Lymphocyte # : 0.48 K/uL  Auto Monocyte # : 0.03 K/uL  Auto Eosinophil # : 0.01 K/uL  Auto Basophil # : 0.00 K/uL  Auto Neutrophil % : 8.5 %  Auto Lymphocyte % : 85.4 %  Auto Monocyte % : 4.9 %  Auto Eosinophil % : 1.2 %  Auto Basophil % : 0.0 %      03-08    139  |  104  |  19  ----------------------------<  80  3.9   |  28  |  0.82    Ca    8.5      08 Mar 2023 07:11  Phos  3.9     03-08  Mg     2.1     03-08    TPro  5.5<L>  /  Alb  3.0<L>  /  TBili  0.5  /  DBili  x   /  AST  21  /  ALT  47<H>  /  AlkPhos  137<H>  03-08  PT/INR - ( 08 Mar 2023 07:12 )   PT: 12.5 sec;   INR: 1.08 ratio    PTT - ( 08 Mar 2023 07:12 )  PTT:25.8 sec    Uric Acid 3.4

## 2023-03-08 NOTE — PROGRESS NOTE ADULT - NS ATTEND AMEND GEN_ALL_CORE FT
63 year old with HLD and HTN, transferred b/c of circulating blasts consistent with acute leukemia. PB flow cytometry showing B-ALL  FISH negative for Ph chromosome. PCR negative (although p190 positive 0.001% from marrow)  BMbx done 2/28 with IR (was unable to get at bedside) -B-lymphoblastic leukemia/lymphoma.  Echo done EF 55%  PICC line placed  3/4: started mini HyperCVAD -Today is C1D4  LP with IT MTX done on 2/28 -negative for malignant cells  Transaminitis -US done at [place] with fatty liver. Cont to monitor  CT A/P -Splenomegaly with a small age indeterminant splenic infarct. Normal liver, no abdominal or pelvic lymphadenopathy. CTA done at [place] w/ shotty LAD  Transfuse for plts <10, Hgb <7  Febrile to 101.7 -levaquin changed to cefepime  supportive care 63 year old with HLD and HTN, transferred b/c of circulating blasts consistent with acute leukemia. PB flow cytometry showing B-ALL  FISH negative for Ph chromosome. PCR negative (although p190 positive 0.001% from marrow)  BMbx done 2/28 with IR (was unable to get at bedside) -B-lymphoblastic leukemia/lymphoma.  Echo done EF 55%  PICC line placed  3/4: started mini HyperCVAD -Today is C1D5  LP with IT MTX done on 2/28 -negative for malignant cells  Transaminitis -US done at [place] with fatty liver. Cont to monitor  CT A/P -Splenomegaly with a small age indeterminant splenic infarct. Normal liver, no abdominal or pelvic lymphadenopathy. CTA done at [place] w/ shotty LAD  Transfuse for plts <10, Hgb <7  Febrile to 101.7 -levaquin changed to cefepime  supportive care 63 year old with HLD and HTN, transferred b/c of circulating blasts consistent with acute leukemia. PB flow cytometry showing B-ALL  FISH negative for Ph chromosome. PCR negative (although p190 positive 0.001% from marrow)  BMbx done 2/28 with IR (was unable to get at bedside) -B-lymphoblastic leukemia/lymphoma.  Echo done EF 55%  PICC line placed  3/4: started mini HyperCVAD -Today is C1D5  LP with IT MTX done on 2/28 -negative for malignant cells  Transaminitis -US done at [place] with fatty liver. Cont to monitor  CT A/P -Splenomegaly with a small age indeterminant splenic infarct. Normal liver, no abdominal or pelvic lymphadenopathy. CTA done at [place] w/ shotty LAD  Transfuse for plts <10, Hgb <7  Febrile to 101.7 on 3/3 -levaquin changed to cefepime  supportive care

## 2023-03-09 LAB
ALBUMIN SERPL ELPH-MCNC: 2.9 G/DL — LOW (ref 3.3–5)
ALP SERPL-CCNC: 125 U/L — HIGH (ref 40–120)
ALT FLD-CCNC: 41 U/L — SIGNIFICANT CHANGE UP (ref 10–45)
ANION GAP SERPL CALC-SCNC: 8 MMOL/L — SIGNIFICANT CHANGE UP (ref 5–17)
AST SERPL-CCNC: 20 U/L — SIGNIFICANT CHANGE UP (ref 10–40)
BILIRUB SERPL-MCNC: 0.6 MG/DL — SIGNIFICANT CHANGE UP (ref 0.2–1.2)
BUN SERPL-MCNC: 19 MG/DL — SIGNIFICANT CHANGE UP (ref 7–23)
CALCIUM SERPL-MCNC: 8.3 MG/DL — LOW (ref 8.4–10.5)
CHLORIDE SERPL-SCNC: 104 MMOL/L — SIGNIFICANT CHANGE UP (ref 96–108)
CHROM ANALY INTERPHASE BLD FISH-IMP: SIGNIFICANT CHANGE UP
CO2 SERPL-SCNC: 27 MMOL/L — SIGNIFICANT CHANGE UP (ref 22–31)
CREAT SERPL-MCNC: 0.81 MG/DL — SIGNIFICANT CHANGE UP (ref 0.5–1.3)
D DIMER BLD IA.RAPID-MCNC: 1009 NG/ML DDU — HIGH
EGFR: 81 ML/MIN/1.73M2 — SIGNIFICANT CHANGE UP
FIBRINOGEN PPP-MCNC: 303 MG/DL — SIGNIFICANT CHANGE UP (ref 200–445)
GLUCOSE SERPL-MCNC: 100 MG/DL — HIGH (ref 70–99)
HCT VFR BLD CALC: 24.7 % — LOW (ref 34.5–45)
HGB BLD-MCNC: 8.2 G/DL — LOW (ref 11.5–15.5)
INR BLD: 1.06 RATIO — SIGNIFICANT CHANGE UP (ref 0.88–1.16)
LDH SERPL L TO P-CCNC: 187 U/L — SIGNIFICANT CHANGE UP (ref 50–242)
MAGNESIUM SERPL-MCNC: 2 MG/DL — SIGNIFICANT CHANGE UP (ref 1.6–2.6)
MCHC RBC-ENTMCNC: 29.7 PG — SIGNIFICANT CHANGE UP (ref 27–34)
MCHC RBC-ENTMCNC: 33.2 GM/DL — SIGNIFICANT CHANGE UP (ref 32–36)
MCV RBC AUTO: 89.5 FL — SIGNIFICANT CHANGE UP (ref 80–100)
NRBC # BLD: 0 /100 WBCS — SIGNIFICANT CHANGE UP (ref 0–0)
PHOSPHATE SERPL-MCNC: 4.8 MG/DL — HIGH (ref 2.5–4.5)
PLATELET # BLD AUTO: 19 K/UL — CRITICAL LOW (ref 150–400)
POTASSIUM SERPL-MCNC: 3.9 MMOL/L — SIGNIFICANT CHANGE UP (ref 3.5–5.3)
POTASSIUM SERPL-SCNC: 3.9 MMOL/L — SIGNIFICANT CHANGE UP (ref 3.5–5.3)
PROT SERPL-MCNC: 5.5 G/DL — LOW (ref 6–8.3)
PROTHROM AB SERPL-ACNC: 12.2 SEC — SIGNIFICANT CHANGE UP (ref 10.5–13.4)
RBC # BLD: 2.76 M/UL — LOW (ref 3.8–5.2)
RBC # FLD: 13.8 % — SIGNIFICANT CHANGE UP (ref 10.3–14.5)
SODIUM SERPL-SCNC: 139 MMOL/L — SIGNIFICANT CHANGE UP (ref 135–145)
URATE SERPL-MCNC: 3.2 MG/DL — SIGNIFICANT CHANGE UP (ref 2.5–7)
WBC # BLD: 0.32 K/UL — CRITICAL LOW (ref 3.8–10.5)
WBC # FLD AUTO: 0.32 K/UL — CRITICAL LOW (ref 3.8–10.5)

## 2023-03-09 PROCEDURE — 99233 SBSQ HOSP IP/OBS HIGH 50: CPT | Mod: GC

## 2023-03-09 RX ADMIN — Medication 650 MILLIGRAM(S): at 23:00

## 2023-03-09 RX ADMIN — Medication 30 MILLILITER(S): at 22:37

## 2023-03-09 RX ADMIN — GABAPENTIN 300 MILLIGRAM(S): 400 CAPSULE ORAL at 13:02

## 2023-03-09 RX ADMIN — GABAPENTIN 300 MILLIGRAM(S): 400 CAPSULE ORAL at 21:32

## 2023-03-09 RX ADMIN — Medication 1 APPLICATION(S): at 05:21

## 2023-03-09 RX ADMIN — GABAPENTIN 300 MILLIGRAM(S): 400 CAPSULE ORAL at 05:16

## 2023-03-09 RX ADMIN — Medication 650 MILLIGRAM(S): at 22:40

## 2023-03-09 RX ADMIN — CHLORHEXIDINE GLUCONATE 1 APPLICATION(S): 213 SOLUTION TOPICAL at 07:53

## 2023-03-09 RX ADMIN — Medication 650 MILLIGRAM(S): at 13:02

## 2023-03-09 RX ADMIN — PANTOPRAZOLE SODIUM 40 MILLIGRAM(S): 20 TABLET, DELAYED RELEASE ORAL at 05:18

## 2023-03-09 RX ADMIN — Medication 5 MILLILITER(S): at 17:57

## 2023-03-09 RX ADMIN — POLYETHYLENE GLYCOL 3350 17 GRAM(S): 17 POWDER, FOR SOLUTION ORAL at 18:00

## 2023-03-09 RX ADMIN — CASPOFUNGIN ACETATE 260 MILLIGRAM(S): 7 INJECTION, POWDER, LYOPHILIZED, FOR SOLUTION INTRAVENOUS at 13:01

## 2023-03-09 RX ADMIN — Medication 5 MILLILITER(S): at 07:53

## 2023-03-09 RX ADMIN — Medication 400 MILLIGRAM(S): at 05:16

## 2023-03-09 RX ADMIN — BRIMONIDINE TARTRATE 1 DROP(S): 2 SOLUTION/ DROPS OPHTHALMIC at 13:02

## 2023-03-09 RX ADMIN — ESCITALOPRAM OXALATE 10 MILLIGRAM(S): 10 TABLET, FILM COATED ORAL at 13:02

## 2023-03-09 RX ADMIN — Medication 300 MILLIGRAM(S): at 13:03

## 2023-03-09 RX ADMIN — SIMVASTATIN 40 MILLIGRAM(S): 20 TABLET, FILM COATED ORAL at 21:31

## 2023-03-09 RX ADMIN — CEFEPIME 100 MILLIGRAM(S): 1 INJECTION, POWDER, FOR SOLUTION INTRAMUSCULAR; INTRAVENOUS at 13:02

## 2023-03-09 RX ADMIN — Medication 5 MILLILITER(S): at 20:22

## 2023-03-09 RX ADMIN — CEFEPIME 100 MILLIGRAM(S): 1 INJECTION, POWDER, FOR SOLUTION INTRAMUSCULAR; INTRAVENOUS at 05:19

## 2023-03-09 RX ADMIN — Medication 400 MILLIGRAM(S): at 17:56

## 2023-03-09 RX ADMIN — Medication 650 MILLIGRAM(S): at 14:00

## 2023-03-09 RX ADMIN — LOSARTAN POTASSIUM 100 MILLIGRAM(S): 100 TABLET, FILM COATED ORAL at 05:16

## 2023-03-09 RX ADMIN — Medication 5 MILLILITER(S): at 13:04

## 2023-03-09 RX ADMIN — CEFEPIME 100 MILLIGRAM(S): 1 INJECTION, POWDER, FOR SOLUTION INTRAMUSCULAR; INTRAVENOUS at 21:32

## 2023-03-09 RX ADMIN — Medication 1 APPLICATION(S): at 17:58

## 2023-03-09 NOTE — PROGRESS NOTE ADULT - NS ATTEND AMEND GEN_ALL_CORE FT
63 year old with HLD and HTN, transferred b/c of circulating blasts consistent with acute leukemia. PB flow cytometry showing B-ALL  FISH negative for Ph chromosome. PCR negative (although p190 positive 0.001% from marrow)  BMbx done 2/28 with IR (was unable to get at bedside) -B-lymphoblastic leukemia/lymphoma.  Echo done EF 55%  PICC line placed  3/4: started mini HyperCVAD -Today is C1D5  LP with IT MTX done on 2/28 -negative for malignant cells  Transaminitis -US done at [place] with fatty liver. Cont to monitor  CT A/P -Splenomegaly with a small age indeterminant splenic infarct. Normal liver, no abdominal or pelvic lymphadenopathy. CTA done at [place] w/ shotty LAD  Transfuse for plts <10, Hgb <7  Febrile to 101.7 on 3/3 -levaquin changed to cefepime  supportive care 63 year old with HLD and HTN, transferred b/c of circulating blasts consistent with acute leukemia. PB flow cytometry showing B-ALL  FISH negative for Ph chromosome. PCR negative (although p190 positive 0.001% from marrow)  BMbx done 2/28 with IR (was unable to get at bedside) -B-lymphoblastic leukemia/lymphoma.  Echo done EF 55%  PICC line placed  3/4: started mini HyperCVAD -Today is C1D6  LP with IT MTX done on 2/28 -negative for malignant cells  Transaminitis -US done at [place] with fatty liver. Cont to monitor  CT A/P -Splenomegaly with a small age indeterminant splenic infarct. Normal liver, no abdominal or pelvic lymphadenopathy. CTA done at [place] w/ shotty LAD  Transfuse for plts <10, Hgb <7  Febrile to 101.7 on 3/3 -levaquin changed to cefepime  supportive care

## 2023-03-09 NOTE — PROGRESS NOTE ADULT - SUBJECTIVE AND OBJECTIVE BOX
Diagnosis: B ALL     Protocol/Chemo Regimen: reduced HyperCVAD( (IV Cyclophosphamide (150 mg/m2 every 12 h on days 1–3), Dexamethasone 20 mg per day on days 0–4 and 11–14, Vincristine 2 mg IV flat dose on days 1 and 8, Daunorubicin 25 mg/m2/day IV continuous infusion over 24 hours, starting on day 4.   Decadron started on 3/3, Cytoxan and VCR started on 3/4    Day: 7     Pt endorsed: No overnight events, afebrile, +OOB walking    Review of Systems: Denies SIMMONS, abdominal pain, HA or dizziness      Pain scale: not graded  Left elbow    Diet: DASH/TLC    Allergies    sulfa drugs (Unknown)    Intolerances    ----------------------------------                       Diagnosis: B ALL     Protocol/Chemo Regimen: reduced HyperCVAD( (IV Cyclophosphamide (150 mg/m2 every 12 h on days 1–3), Dexamethasone 20 mg per day on days 0–4 and 11–14, Vincristine 2 mg IV flat dose on days 1 and 8, Daunorubicin 25 mg/m2/day IV continuous infusion over 24 hours, starting on day 4.   Decadron started on 3/3, Cytoxan and VCR started on 3/4    Day: 7     Pt endorsed: No overnight events, afebrile, + generalized weakness     Review of Systems: Denies SIMMONS, abdominal pain, nausea, vomiting, diarrhea HA or dizziness      Pain scale: not graded  Left elbow    Diet: DASH/TLC    Allergies    sulfa drugs (Unknown)    Intolerances        ANTIMICROBIALS  acyclovir   Oral Tab/Cap 400 milliGRAM(s) Oral two times a day  caspofungin IVPB      caspofungin IVPB 50 milliGRAM(s) IV Intermittent every 24 hours  cefepime   IVPB 2000 milliGRAM(s) IV Intermittent every 8 hours      HEME/ONC MEDICATIONS  methotrexate PF IntraThecal (eMAR) 15 milliGRAM(s) IntraThecal once      STANDING MEDICATIONS  allopurinol 300 milliGRAM(s) Oral daily  Biotene Dry Mouth Oral Rinse 5 milliLiter(s) Swish and Spit five times a day  brimonidine 0.2% Ophthalmic Solution 1 Drop(s) Both EYES daily  chlorhexidine 4% Liquid 1 Application(s) Topical <User Schedule>  escitalopram 10 milliGRAM(s) Oral daily  gabapentin 300 milliGRAM(s) Oral every 8 hours  losartan 100 milliGRAM(s) Oral daily  pantoprazole    Tablet 40 milliGRAM(s) Oral before breakfast  simvastatin 40 milliGRAM(s) Oral at bedtime  sodium chloride 0.9%. 1000 milliLiter(s) IV Continuous <Continuous>  triamcinolone 0.1% Ointment 1 Application(s) Topical two times a day      PRN MEDICATIONS  acetaminophen     Tablet .. 650 milliGRAM(s) Oral every 6 hours PRN  diphenhydrAMINE 25 milliGRAM(s) Oral every 4 hours PRN  FIRST- Mouthwash  BLM 10 milliLiter(s) Swish and Spit four times a day PRN  metoclopramide Injectable 10 milliGRAM(s) IV Push every 6 hours PRN  ondansetron Injectable 8 milliGRAM(s) IV Push every 8 hours PRN  polyethylene glycol 3350 17 Gram(s) Oral two times a day PRN        Vital Signs Last 24 Hrs  T(C): 36.3 (09 Mar 2023 09:35), Max: 36.9 (08 Mar 2023 21:14)  T(F): 97.4 (09 Mar 2023 09:35), Max: 98.5 (08 Mar 2023 21:14)  HR: 76 (09 Mar 2023 09:35) (56 - 76)  BP: 103/62 (09 Mar 2023 09:35) (103/62 - 122/69)  BP(mean): --  RR: 18 (09 Mar 2023 09:35) (16 - 18)  SpO2: 97% (09 Mar 2023 09:35) (95% - 97%)    Parameters below as of 09 Mar 2023 09:35  Patient On (Oxygen Delivery Method): room air        PHYSICAL EXAM  General: adult in NAD  HEENT: clear oropharynx, anicteric sclera, pink conjunctiva  Neck: supple  CV: normal S1/S2 RRR  Lungs: positive air movement b/l ant lungs,clear to auscultation, no wheezes, no rales  Abdomen: soft non-tender non-distended, no hepatosplenomegaly  Ext: no clubbing cyanosis or edema  Skin: no rashes and no petechiae  Neuro: alert and oriented X 3, no focal deficits  Central Line: normal    LABS:                        8.2    0.32  )-----------( 19       ( 09 Mar 2023 06:49 )             24.7         Mean Cell Volume : 89.5 fl  Mean Cell Hemoglobin : 29.7 pg  Mean Cell Hemoglobin Concentration : 33.2 gm/dL  Auto Neutrophil # : x  Auto Lymphocyte # : x  Auto Monocyte # : x  Auto Eosinophil # : x  Auto Basophil # : x  Auto Neutrophil % : x  Auto Lymphocyte % : x  Auto Monocyte % : x  Auto Eosinophil % : x  Auto Basophil % : x      03-09    139  |  104  |  19  ----------------------------<  100<H>  3.9   |  27  |  0.81    Ca    8.3<L>      09 Mar 2023 06:49  Phos  4.8     03-09  Mg     2.0     03-09    TPro  5.5<L>  /  Alb  2.9<L>  /  TBili  0.6  /  DBili  x   /  AST  20  /  ALT  41  /  AlkPhos  125<H>  03-09      Mg 2.0  Phos 4.8  Mg 1.9  Phos 4.5      PT/INR - ( 09 Mar 2023 06:49 )   PT: 12.2 sec;   INR: 1.06 ratio         PTT - ( 08 Mar 2023 07:12 )  PTT:25.8 sec      Uric Acid 3.2      Uric Acid 2.9                         Diagnosis: B ALL     Protocol/Chemo Regimen: reduced HyperCVAD( (IV Cyclophosphamide (150 mg/m2 every 12 h on days 1–3), Dexamethasone 20 mg per day on days 0–4 and 11–14, Vincristine 2 mg IV flat dose on days 1 and 8, Daunorubicin 25 mg/m2/day IV continuous infusion over 24 hours, starting on day 4.   Decadron started on 3/3, Cytoxan and VCR started on 3/4    Day: 7     Pt endorsed: No overnight events, afebrile, + generalized weakness     Review of Systems: Denies SIMMONS, abdominal pain, nausea, vomiting, diarrhea HA or dizziness      Pain scale: not graded  Left elbow    Diet: DASH/TLC    Allergies    sulfa drugs (Unknown)    Intolerances        ANTIMICROBIALS  acyclovir   Oral Tab/Cap 400 milliGRAM(s) Oral two times a day  caspofungin IVPB      caspofungin IVPB 50 milliGRAM(s) IV Intermittent every 24 hours  cefepime   IVPB 2000 milliGRAM(s) IV Intermittent every 8 hours      HEME/ONC MEDICATIONS  methotrexate PF IntraThecal (eMAR) 15 milliGRAM(s) IntraThecal once      STANDING MEDICATIONS  allopurinol 300 milliGRAM(s) Oral daily  Biotene Dry Mouth Oral Rinse 5 milliLiter(s) Swish and Spit five times a day  brimonidine 0.2% Ophthalmic Solution 1 Drop(s) Both EYES daily  chlorhexidine 4% Liquid 1 Application(s) Topical <User Schedule>  escitalopram 10 milliGRAM(s) Oral daily  gabapentin 300 milliGRAM(s) Oral every 8 hours  losartan 100 milliGRAM(s) Oral daily  pantoprazole    Tablet 40 milliGRAM(s) Oral before breakfast  simvastatin 40 milliGRAM(s) Oral at bedtime  sodium chloride 0.9%. 1000 milliLiter(s) IV Continuous <Continuous>  triamcinolone 0.1% Ointment 1 Application(s) Topical two times a day      PRN MEDICATIONS  acetaminophen     Tablet .. 650 milliGRAM(s) Oral every 6 hours PRN  diphenhydrAMINE 25 milliGRAM(s) Oral every 4 hours PRN  FIRST- Mouthwash  BLM 10 milliLiter(s) Swish and Spit four times a day PRN  metoclopramide Injectable 10 milliGRAM(s) IV Push every 6 hours PRN  ondansetron Injectable 8 milliGRAM(s) IV Push every 8 hours PRN  polyethylene glycol 3350 17 Gram(s) Oral two times a day PRN        Vital Signs Last 24 Hrs  T(C): 36.3 (09 Mar 2023 09:35), Max: 36.9 (08 Mar 2023 21:14)  T(F): 97.4 (09 Mar 2023 09:35), Max: 98.5 (08 Mar 2023 21:14)  HR: 76 (09 Mar 2023 09:35) (56 - 76)  BP: 103/62 (09 Mar 2023 09:35) (103/62 - 122/69)  BP(mean): --  RR: 18 (09 Mar 2023 09:35) (16 - 18)  SpO2: 97% (09 Mar 2023 09:35) (95% - 97%)    Parameters below as of 09 Mar 2023 09:35  Patient On (Oxygen Delivery Method): room air        PHYSICAL EXAM  General:  NAD  HEENT: clear oropharynx, anicteric sclera  Neck: supple  CV: normal S1/S2 RRR  Lungs: positive air movement b/l ant lungs,clear to auscultation, no wheezes, no rales  Abdomen: soft non-tender non-distended, +BS  Ext: no  edema  Skin: no rashes   Neuro: alert and oriented X 3, no focal deficits  Central Line: normal    LABS:                        8.2    0.32  )-----------( 19       ( 09 Mar 2023 06:49 )             24.7         Mean Cell Volume : 89.5 fl  Mean Cell Hemoglobin : 29.7 pg  Mean Cell Hemoglobin Concentration : 33.2 gm/dL  Auto Neutrophil # : x  Auto Lymphocyte # : x  Auto Monocyte # : x  Auto Eosinophil # : x  Auto Basophil # : x  Auto Neutrophil % : x  Auto Lymphocyte % : x  Auto Monocyte % : x  Auto Eosinophil % : x  Auto Basophil % : x      03-09    139  |  104  |  19  ----------------------------<  100<H>  3.9   |  27  |  0.81    Ca    8.3<L>      09 Mar 2023 06:49  Phos  4.8     03-09  Mg     2.0     03-09    TPro  5.5<L>  /  Alb  2.9<L>  /  TBili  0.6  /  DBili  x   /  AST  20  /  ALT  41  /  AlkPhos  125<H>  03-09      Mg 2.0  Phos 4.8  Mg 1.9  Phos 4.5      PT/INR - ( 09 Mar 2023 06:49 )   PT: 12.2 sec;   INR: 1.06 ratio         PTT - ( 08 Mar 2023 07:12 )  PTT:25.8 sec      Uric Acid 3.2      Uric Acid 2.9                         Diagnosis: B ALL     Protocol/Chemo Regimen: reduced HyperCVAD( (IV Cyclophosphamide (150 mg/m2 every 12 h on days 1–3), Dexamethasone 20 mg per day on days 0–4 and 11–14, Vincristine 2 mg IV flat dose on days 1 and 8, Daunorubicin 25 mg/m2/day IV continuous infusion over 24 hours, starting on day 4.   Decadron started on 3/3, Cytoxan and VCR started on 3/4    Day: 6     Pt endorsed: No overnight events, afebrile, + generalized weakness     Review of Systems: Denies SIMMONS, abdominal pain, nausea, vomiting, diarrhea HA or dizziness      Pain scale: not graded  Left elbow    Diet: DASH/TLC    Allergies    sulfa drugs (Unknown)    Intolerances        ANTIMICROBIALS  acyclovir   Oral Tab/Cap 400 milliGRAM(s) Oral two times a day  caspofungin IVPB      caspofungin IVPB 50 milliGRAM(s) IV Intermittent every 24 hours  cefepime   IVPB 2000 milliGRAM(s) IV Intermittent every 8 hours      HEME/ONC MEDICATIONS  methotrexate PF IntraThecal (eMAR) 15 milliGRAM(s) IntraThecal once      STANDING MEDICATIONS  allopurinol 300 milliGRAM(s) Oral daily  Biotene Dry Mouth Oral Rinse 5 milliLiter(s) Swish and Spit five times a day  brimonidine 0.2% Ophthalmic Solution 1 Drop(s) Both EYES daily  chlorhexidine 4% Liquid 1 Application(s) Topical <User Schedule>  escitalopram 10 milliGRAM(s) Oral daily  gabapentin 300 milliGRAM(s) Oral every 8 hours  losartan 100 milliGRAM(s) Oral daily  pantoprazole    Tablet 40 milliGRAM(s) Oral before breakfast  simvastatin 40 milliGRAM(s) Oral at bedtime  sodium chloride 0.9%. 1000 milliLiter(s) IV Continuous <Continuous>  triamcinolone 0.1% Ointment 1 Application(s) Topical two times a day      PRN MEDICATIONS  acetaminophen     Tablet .. 650 milliGRAM(s) Oral every 6 hours PRN  diphenhydrAMINE 25 milliGRAM(s) Oral every 4 hours PRN  FIRST- Mouthwash  BLM 10 milliLiter(s) Swish and Spit four times a day PRN  metoclopramide Injectable 10 milliGRAM(s) IV Push every 6 hours PRN  ondansetron Injectable 8 milliGRAM(s) IV Push every 8 hours PRN  polyethylene glycol 3350 17 Gram(s) Oral two times a day PRN        Vital Signs Last 24 Hrs  T(C): 36.3 (09 Mar 2023 09:35), Max: 36.9 (08 Mar 2023 21:14)  T(F): 97.4 (09 Mar 2023 09:35), Max: 98.5 (08 Mar 2023 21:14)  HR: 76 (09 Mar 2023 09:35) (56 - 76)  BP: 103/62 (09 Mar 2023 09:35) (103/62 - 122/69)  BP(mean): --  RR: 18 (09 Mar 2023 09:35) (16 - 18)  SpO2: 97% (09 Mar 2023 09:35) (95% - 97%)    Parameters below as of 09 Mar 2023 09:35  Patient On (Oxygen Delivery Method): room air        PHYSICAL EXAM  General:  NAD  HEENT: clear oropharynx, anicteric sclera  Neck: supple  CV: normal S1/S2 RRR  Lungs: positive air movement b/l ant lungs,clear to auscultation, no wheezes, no rales  Abdomen: soft non-tender non-distended, +BS  Ext: no  edema  Skin: no rashes   Neuro: alert and oriented X 3, no focal deficits  Central Line: normal    LABS:                        8.2    0.32  )-----------( 19       ( 09 Mar 2023 06:49 )             24.7         Mean Cell Volume : 89.5 fl  Mean Cell Hemoglobin : 29.7 pg  Mean Cell Hemoglobin Concentration : 33.2 gm/dL  Auto Neutrophil # : x  Auto Lymphocyte # : x  Auto Monocyte # : x  Auto Eosinophil # : x  Auto Basophil # : x  Auto Neutrophil % : x  Auto Lymphocyte % : x  Auto Monocyte % : x  Auto Eosinophil % : x  Auto Basophil % : x      03-09    139  |  104  |  19  ----------------------------<  100<H>  3.9   |  27  |  0.81    Ca    8.3<L>      09 Mar 2023 06:49  Phos  4.8     03-09  Mg     2.0     03-09    TPro  5.5<L>  /  Alb  2.9<L>  /  TBili  0.6  /  DBili  x   /  AST  20  /  ALT  41  /  AlkPhos  125<H>  03-09      Mg 2.0  Phos 4.8  Mg 1.9  Phos 4.5      PT/INR - ( 09 Mar 2023 06:49 )   PT: 12.2 sec;   INR: 1.06 ratio         PTT - ( 08 Mar 2023 07:12 )  PTT:25.8 sec      Uric Acid 3.2      Uric Acid 2.9

## 2023-03-09 NOTE — PROGRESS NOTE ADULT - ASSESSMENT
64 y/o with PMHx of HTN, HLD presents to the ED BIBA transferred from [facility] for new diagnosis of leukemia, peripheral blood flow+ B ALL. Chemo with reduced dose HyperCVAD started on 3/3. Hospital course c/b neutropenic fever, transaminitis, rash 3/4: mild itch , improed,   Topical steroid and atarax PRN,  and LUE cellulitis. Patient has pancytopenia  due to disease.   64 y/o with PMHx of HTN, HLD presents to the ED BIBA transferred from [facility] for new diagnosis of leukemia, peripheral blood flow+ B ALL. Chemo with reduced dose HyperCVAD started on 3/3. Hospital course c/b neutropenic fever, transaminitis, rash 3/4: mild itch now improved with topical steroid and atarax PRN,  and LUE cellulitis. Patient has pancytopenia  due to disease.

## 2023-03-09 NOTE — PROGRESS NOTE ADULT - PROBLEM SELECTOR PLAN 1
New  diagnosis of ALL given findings on peripheral flow cytometry. BCR/ABL - NEG  Monitor CBC, CMP, TLS labs BID, g6pd -11.3, echo done 2/27 EF 55%, HLA sent 2/28  On allopurinol  Hepatitis Panel Neg. Hep B Core neg. HIV negative.  Transfuse PRN. maintain Hgb >7, plts >15.  Gabapentin for splenic/side pain. Increased dose to 300mg PO TID on 3/2   2/28 s/p BMbx in IR due to body habitus, fu result. [facility]  2/28 s/p LP with IT MTX, flow - NEG for malignant cells  2/25 - CTA from [facility] (-) for PE. Left sided pain likely due to splenomegaly.  PICC line at bedside 2/28  BCR/ABL PCR sent 3/2 - NEG  3/4 Chemo with reduced dose HyperCVAD started, Decadron started on 3/3, Cytoxan and VCR started on 3/4~1am per chemo RN

## 2023-03-09 NOTE — PROGRESS NOTE ADULT - PROBLEM SELECTOR PLAN 6
Monitor closely   3/1 CT abd/pelvis: Splenomegaly with a small age indeterminant splenic infarct. No abdominal or pelvic lymphadenopathy. Trace left pleural effusion Monitor closely   3/1 CT abd/pelvis: Splenomegaly with a small age indeterminant splenic infarct. No abdominal or pelvic lymphadenopathy. Trace left pleural effusion  3/9-Resolved

## 2023-03-10 LAB
ALBUMIN SERPL ELPH-MCNC: 2.8 G/DL — LOW (ref 3.3–5)
ALP SERPL-CCNC: 107 U/L — SIGNIFICANT CHANGE UP (ref 40–120)
ALT FLD-CCNC: 32 U/L — SIGNIFICANT CHANGE UP (ref 10–45)
ANION GAP SERPL CALC-SCNC: 5 MMOL/L — SIGNIFICANT CHANGE UP (ref 5–17)
APTT BLD: 26.1 SEC — LOW (ref 27.5–35.5)
AST SERPL-CCNC: 17 U/L — SIGNIFICANT CHANGE UP (ref 10–40)
BILIRUB SERPL-MCNC: 0.6 MG/DL — SIGNIFICANT CHANGE UP (ref 0.2–1.2)
BLD GP AB SCN SERPL QL: NEGATIVE — SIGNIFICANT CHANGE UP
BUN SERPL-MCNC: 12 MG/DL — SIGNIFICANT CHANGE UP (ref 7–23)
CALCIUM SERPL-MCNC: 8.1 MG/DL — LOW (ref 8.4–10.5)
CHLORIDE SERPL-SCNC: 105 MMOL/L — SIGNIFICANT CHANGE UP (ref 96–108)
CO2 SERPL-SCNC: 29 MMOL/L — SIGNIFICANT CHANGE UP (ref 22–31)
CREAT SERPL-MCNC: 0.71 MG/DL — SIGNIFICANT CHANGE UP (ref 0.5–1.3)
D DIMER BLD IA.RAPID-MCNC: 683 NG/ML DDU — HIGH
EGFR: 94 ML/MIN/1.73M2 — SIGNIFICANT CHANGE UP
FIBRINOGEN PPP-MCNC: 295 MG/DL — SIGNIFICANT CHANGE UP (ref 200–445)
GLUCOSE SERPL-MCNC: 96 MG/DL — SIGNIFICANT CHANGE UP (ref 70–99)
HCT VFR BLD CALC: 22.2 % — LOW (ref 34.5–45)
HGB BLD-MCNC: 7.3 G/DL — LOW (ref 11.5–15.5)
INR BLD: 1.09 RATIO — SIGNIFICANT CHANGE UP (ref 0.88–1.16)
LDH SERPL L TO P-CCNC: 175 U/L — SIGNIFICANT CHANGE UP (ref 50–242)
MAGNESIUM SERPL-MCNC: 2 MG/DL — SIGNIFICANT CHANGE UP (ref 1.6–2.6)
MCHC RBC-ENTMCNC: 29.7 PG — SIGNIFICANT CHANGE UP (ref 27–34)
MCHC RBC-ENTMCNC: 32.9 GM/DL — SIGNIFICANT CHANGE UP (ref 32–36)
MCV RBC AUTO: 90.2 FL — SIGNIFICANT CHANGE UP (ref 80–100)
NRBC # BLD: 0 /100 WBCS — SIGNIFICANT CHANGE UP (ref 0–0)
PHOSPHATE SERPL-MCNC: 3.2 MG/DL — SIGNIFICANT CHANGE UP (ref 2.5–4.5)
PLATELET # BLD AUTO: 12 K/UL — CRITICAL LOW (ref 150–400)
POTASSIUM SERPL-MCNC: 4.1 MMOL/L — SIGNIFICANT CHANGE UP (ref 3.5–5.3)
POTASSIUM SERPL-SCNC: 4.1 MMOL/L — SIGNIFICANT CHANGE UP (ref 3.5–5.3)
PROT SERPL-MCNC: 5.2 G/DL — LOW (ref 6–8.3)
PROTHROM AB SERPL-ACNC: 12.6 SEC — SIGNIFICANT CHANGE UP (ref 10.5–13.4)
RBC # BLD: 2.46 M/UL — LOW (ref 3.8–5.2)
RBC # FLD: 13.7 % — SIGNIFICANT CHANGE UP (ref 10.3–14.5)
RH IG SCN BLD-IMP: POSITIVE — SIGNIFICANT CHANGE UP
SODIUM SERPL-SCNC: 139 MMOL/L — SIGNIFICANT CHANGE UP (ref 135–145)
URATE SERPL-MCNC: 2.8 MG/DL — SIGNIFICANT CHANGE UP (ref 2.5–7)
WBC # BLD: 0.24 K/UL — CRITICAL LOW (ref 3.8–10.5)
WBC # FLD AUTO: 0.24 K/UL — CRITICAL LOW (ref 3.8–10.5)

## 2023-03-10 PROCEDURE — 99233 SBSQ HOSP IP/OBS HIGH 50: CPT | Mod: GC

## 2023-03-10 RX ADMIN — ESCITALOPRAM OXALATE 10 MILLIGRAM(S): 10 TABLET, FILM COATED ORAL at 13:26

## 2023-03-10 RX ADMIN — LOSARTAN POTASSIUM 100 MILLIGRAM(S): 100 TABLET, FILM COATED ORAL at 05:02

## 2023-03-10 RX ADMIN — Medication 650 MILLIGRAM(S): at 15:08

## 2023-03-10 RX ADMIN — SIMVASTATIN 40 MILLIGRAM(S): 20 TABLET, FILM COATED ORAL at 20:29

## 2023-03-10 RX ADMIN — CEFEPIME 100 MILLIGRAM(S): 1 INJECTION, POWDER, FOR SOLUTION INTRAMUSCULAR; INTRAVENOUS at 21:17

## 2023-03-10 RX ADMIN — GABAPENTIN 300 MILLIGRAM(S): 400 CAPSULE ORAL at 21:18

## 2023-03-10 RX ADMIN — CASPOFUNGIN ACETATE 260 MILLIGRAM(S): 7 INJECTION, POWDER, LYOPHILIZED, FOR SOLUTION INTRAVENOUS at 14:28

## 2023-03-10 RX ADMIN — Medication 30 MILLILITER(S): at 21:14

## 2023-03-10 RX ADMIN — Medication 5 MILLILITER(S): at 09:58

## 2023-03-10 RX ADMIN — PANTOPRAZOLE SODIUM 40 MILLIGRAM(S): 20 TABLET, DELAYED RELEASE ORAL at 05:02

## 2023-03-10 RX ADMIN — GABAPENTIN 300 MILLIGRAM(S): 400 CAPSULE ORAL at 13:26

## 2023-03-10 RX ADMIN — Medication 1 APPLICATION(S): at 17:11

## 2023-03-10 RX ADMIN — Medication 400 MILLIGRAM(S): at 05:02

## 2023-03-10 RX ADMIN — CHLORHEXIDINE GLUCONATE 1 APPLICATION(S): 213 SOLUTION TOPICAL at 09:58

## 2023-03-10 RX ADMIN — Medication 400 MILLIGRAM(S): at 17:11

## 2023-03-10 RX ADMIN — Medication 650 MILLIGRAM(S): at 14:38

## 2023-03-10 RX ADMIN — Medication 300 MILLIGRAM(S): at 13:25

## 2023-03-10 RX ADMIN — GABAPENTIN 300 MILLIGRAM(S): 400 CAPSULE ORAL at 05:02

## 2023-03-10 RX ADMIN — Medication 650 MILLIGRAM(S): at 05:27

## 2023-03-10 RX ADMIN — CEFEPIME 100 MILLIGRAM(S): 1 INJECTION, POWDER, FOR SOLUTION INTRAMUSCULAR; INTRAVENOUS at 13:27

## 2023-03-10 RX ADMIN — Medication 5 MILLILITER(S): at 14:45

## 2023-03-10 RX ADMIN — Medication 5 MILLILITER(S): at 23:39

## 2023-03-10 RX ADMIN — Medication 650 MILLIGRAM(S): at 22:40

## 2023-03-10 RX ADMIN — CEFEPIME 100 MILLIGRAM(S): 1 INJECTION, POWDER, FOR SOLUTION INTRAMUSCULAR; INTRAVENOUS at 05:03

## 2023-03-10 RX ADMIN — Medication 650 MILLIGRAM(S): at 23:38

## 2023-03-10 RX ADMIN — Medication 1 APPLICATION(S): at 05:05

## 2023-03-10 NOTE — PROGRESS NOTE ADULT - SUBJECTIVE AND OBJECTIVE BOX
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   Decadron started on 3/3, Cytoxan and VCR started on 3/4    Day: 7     Pt endorsed: No overnight events, afebrile, + generalized weakness     Review of Systems: Denies SIMMONS, abdominal pain, nausea, vomiting, diarrhea HA or dizziness    Pain scale: not graded  Left elbow    Diet: DASH/TLC    Allergies: sulfa drugs (Unknown)    ANTIMICROBIALS  acyclovir   Oral Tab/Cap 400 milliGRAM(s) Oral two times a day  caspofungin IVPB 50 milliGRAM(s) IV Intermittent every 24 hours  cefepime   IVPB 2000 milliGRAM(s) IV Intermittent every 8 hours      STANDING MEDICATIONS  allopurinol 300 milliGRAM(s) Oral daily  Biotene Dry Mouth Oral Rinse 5 milliLiter(s) Swish and Spit five times a day  brimonidine 0.2% Ophthalmic Solution 1 Drop(s) Both EYES daily  chlorhexidine 4% Liquid 1 Application(s) Topical <User Schedule>  escitalopram 10 milliGRAM(s) Oral daily  gabapentin 300 milliGRAM(s) Oral every 8 hours  losartan 100 milliGRAM(s) Oral daily  pantoprazole    Tablet 40 milliGRAM(s) Oral before breakfast  simvastatin 40 milliGRAM(s) Oral at bedtime  sodium chloride 0.9%. 1000 milliLiter(s) IV Continuous <Continuous>  triamcinolone 0.1% Ointment 1 Application(s) Topical two times a day      PRN MEDICATIONS  acetaminophen     Tablet .. 650 milliGRAM(s) Oral every 6 hours PRN  diphenhydrAMINE 25 milliGRAM(s) Oral every 4 hours PRN  FIRST- Mouthwash  BLM 10 milliLiter(s) Swish and Spit four times a day PRN  metoclopramide Injectable 10 milliGRAM(s) IV Push every 6 hours PRN  ondansetron Injectable 8 milliGRAM(s) IV Push every 8 hours PRN  polyethylene glycol 3350 17 Gram(s) Oral two times a day PRN      Vital Signs Last 24 Hrs  T(C): 36.5 (10 Mar 2023 04:40), Max: 37.1 (09 Mar 2023 17:07)  T(F): 97.7 (10 Mar 2023 04:40), Max: 98.8 (09 Mar 2023 17:07)  HR: 61 (10 Mar 2023 04:40) (61 - 76)  BP: 129/65 (10 Mar 2023 04:40) (103/62 - 146/81)  RR: 18 (10 Mar 2023 04:40) (16 - 18)  SpO2: 95% (10 Mar 2023 04:40) (95% - 97%)    Parameters below as of 10 Mar 2023 04:40  Patient On (Oxygen Delivery Method): room air    PHYSICAL EXAM  General:  NAD  HEENT: clear oropharynx, anicteric sclera  Neck: supple  CV: normal S1/S2 RRR  Lungs: positive air movement b/l ant lungs,clear to auscultation, no wheezes, no rales  Abdomen: soft non-tender non-distended, +BS  Ext: no  edema  Skin: no rashes   Neuro: alert and oriented X 3, no focal deficits  Central Line: PICC    Cultures:  Culture - Urine (03.03.23 @ 06:58)    Specimen Source: Clean Catch Clean Catch (Midstream)    Culture Results:   No growth    Culture - Blood (03.03.23 @ 02:14)    Specimen Source: .Blood Blood-Peripheral    Culture Results:   No Growth Final    Culture - Blood (03.03.23 @ 02:14)    Specimen Source: .Blood Blood-Peripheral    Culture Results:   No Growth Final    LABS:                  RADIOLOGY & ADDITIONAL STUDIES:  from: CT Abdomen and Pelvis w/ IV Cont (03.01.23 @ 20:02)   IMPRESSION:  Splenomegaly with a small age indeterminant splenic infarct.  No abdominal or pelvic lymphadenopathy.  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   Decadron started on 3/3, Cytoxan and VCR started on 3/4    Day: 7     Pt endorsed: no complaints. afebrile    Review of Systems: Denies SIMMONS, abdominal pain, nausea, vomiting, diarrhea HA or dizziness    Pain scale: not graded  Left elbow    Diet: DASH/TLC    Allergies: sulfa drugs (Unknown)    ANTIMICROBIALS  acyclovir   Oral Tab/Cap 400 milliGRAM(s) Oral two times a day  caspofungin IVPB 50 milliGRAM(s) IV Intermittent every 24 hours  cefepime   IVPB 2000 milliGRAM(s) IV Intermittent every 8 hours      STANDING MEDICATIONS  allopurinol 300 milliGRAM(s) Oral daily  Biotene Dry Mouth Oral Rinse 5 milliLiter(s) Swish and Spit five times a day  brimonidine 0.2% Ophthalmic Solution 1 Drop(s) Both EYES daily  chlorhexidine 4% Liquid 1 Application(s) Topical <User Schedule>  escitalopram 10 milliGRAM(s) Oral daily  gabapentin 300 milliGRAM(s) Oral every 8 hours  losartan 100 milliGRAM(s) Oral daily  pantoprazole    Tablet 40 milliGRAM(s) Oral before breakfast  simvastatin 40 milliGRAM(s) Oral at bedtime  sodium chloride 0.9%. 1000 milliLiter(s) IV Continuous <Continuous>  triamcinolone 0.1% Ointment 1 Application(s) Topical two times a day      PRN MEDICATIONS  acetaminophen     Tablet .. 650 milliGRAM(s) Oral every 6 hours PRN  diphenhydrAMINE 25 milliGRAM(s) Oral every 4 hours PRN  FIRST- Mouthwash  BLM 10 milliLiter(s) Swish and Spit four times a day PRN  metoclopramide Injectable 10 milliGRAM(s) IV Push every 6 hours PRN  ondansetron Injectable 8 milliGRAM(s) IV Push every 8 hours PRN  polyethylene glycol 3350 17 Gram(s) Oral two times a day PRN      Vital Signs Last 24 Hrs  T(C): 36.5 (10 Mar 2023 04:40), Max: 37.1 (09 Mar 2023 17:07)  T(F): 97.7 (10 Mar 2023 04:40), Max: 98.8 (09 Mar 2023 17:07)  HR: 61 (10 Mar 2023 04:40) (61 - 76)  BP: 129/65 (10 Mar 2023 04:40) (103/62 - 146/81)  RR: 18 (10 Mar 2023 04:40) (16 - 18)  SpO2: 95% (10 Mar 2023 04:40) (95% - 97%)    Parameters below as of 10 Mar 2023 04:40  Patient On (Oxygen Delivery Method): room air    PHYSICAL EXAM  General:  NAD  HEENT: clear oropharynx, anicteric sclera  Neck: supple  CV: normal S1/S2 RRR  Lungs: positive air movement b/l ant lungs,clear to auscultation, no wheezes, no rales  Abdomen: soft non-tender non-distended, +BS  Ext: no  edema  Skin: no rashes   Neuro: alert and oriented X 3, no focal deficits  Central Line: PICC    Cultures:  Culture - Urine (03.03.23 @ 06:58)    Specimen Source: Clean Catch Clean Catch (Midstream)    Culture Results:   No growth    Culture - Blood (03.03.23 @ 02:14)    Specimen Source: .Blood Blood-Peripheral    Culture Results:   No Growth Final    Culture - Blood (03.03.23 @ 02:14)    Specimen Source: .Blood Blood-Peripheral    Culture Results:   No Growth Final    LABS:                  RADIOLOGY & ADDITIONAL STUDIES:  from: CT Abdomen and Pelvis w/ IV Cont (03.01.23 @ 20:02)   IMPRESSION:  Splenomegaly with a small age indeterminant splenic infarct.  No abdominal or pelvic lymphadenopathy.  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   Decadron started on 3/3, Cytoxan and VCR started on 3/4    Day: 7     Pt endorsed: no complaints. afebrile    Review of Systems: Denies SIMMONS, abdominal pain, nausea, vomiting, diarrhea HA or dizziness    Pain scale: not graded  Left elbow    Diet: DASH/TLC    Allergies: sulfa drugs (Unknown)    ANTIMICROBIALS  acyclovir   Oral Tab/Cap 400 milliGRAM(s) Oral two times a day  caspofungin IVPB 50 milliGRAM(s) IV Intermittent every 24 hours  cefepime   IVPB 2000 milliGRAM(s) IV Intermittent every 8 hours      STANDING MEDICATIONS  allopurinol 300 milliGRAM(s) Oral daily  Biotene Dry Mouth Oral Rinse 5 milliLiter(s) Swish and Spit five times a day  brimonidine 0.2% Ophthalmic Solution 1 Drop(s) Both EYES daily  chlorhexidine 4% Liquid 1 Application(s) Topical <User Schedule>  escitalopram 10 milliGRAM(s) Oral daily  gabapentin 300 milliGRAM(s) Oral every 8 hours  losartan 100 milliGRAM(s) Oral daily  pantoprazole    Tablet 40 milliGRAM(s) Oral before breakfast  simvastatin 40 milliGRAM(s) Oral at bedtime  sodium chloride 0.9%. 1000 milliLiter(s) IV Continuous <Continuous>  triamcinolone 0.1% Ointment 1 Application(s) Topical two times a day      PRN MEDICATIONS  acetaminophen     Tablet .. 650 milliGRAM(s) Oral every 6 hours PRN  diphenhydrAMINE 25 milliGRAM(s) Oral every 4 hours PRN  FIRST- Mouthwash  BLM 10 milliLiter(s) Swish and Spit four times a day PRN  metoclopramide Injectable 10 milliGRAM(s) IV Push every 6 hours PRN  ondansetron Injectable 8 milliGRAM(s) IV Push every 8 hours PRN  polyethylene glycol 3350 17 Gram(s) Oral two times a day PRN      Vital Signs Last 24 Hrs  T(C): 36.5 (10 Mar 2023 04:40), Max: 37.1 (09 Mar 2023 17:07)  T(F): 97.7 (10 Mar 2023 04:40), Max: 98.8 (09 Mar 2023 17:07)  HR: 61 (10 Mar 2023 04:40) (61 - 76)  BP: 129/65 (10 Mar 2023 04:40) (103/62 - 146/81)  RR: 18 (10 Mar 2023 04:40) (16 - 18)  SpO2: 95% (10 Mar 2023 04:40) (95% - 97%)    Parameters below as of 10 Mar 2023 04:40  Patient On (Oxygen Delivery Method): room air    PHYSICAL EXAM  General:  NAD  HEENT: clear oropharynx, anicteric sclera  Neck: supple  CV: normal S1/S2 RRR  Lungs: positive air movement b/l ant lungs,clear to auscultation, no wheezes, no rales  Abdomen: soft non-tender non-distended, +BS  Ext: no  edema  Skin: no rashes   Neuro: alert and oriented X 3, no focal deficits  Central Line: PICC    Cultures:  Culture - Urine (03.03.23 @ 06:58)    Specimen Source: Clean Catch Clean Catch (Midstream)    Culture Results:   No growth    Culture - Blood (03.03.23 @ 02:14)    Specimen Source: .Blood Blood-Peripheral    Culture Results:   No Growth Final    Culture - Blood (03.03.23 @ 02:14)    Specimen Source: .Blood Blood-Peripheral    Culture Results:   No Growth Final    LABS:                      7.3    0.24  )-----------( 12       ( 10 Mar 2023 06:56 )             22.2     10 Mar 2023 06:56    139    |  105    |  12     ----------------------------<  96     4.1     |  29     |  0.71     Ca    8.1        10 Mar 2023 06:56  Phos  3.2       10 Mar 2023 06:56  Mg     2.0       10 Mar 2023 06:56    TPro  5.2    /  Alb  2.8    /  TBili  0.6    /  DBili  x      /  AST  17     /  ALT  32     /  AlkPhos  107    10 Mar 2023 06:56    PT/INR - ( 10 Mar 2023 06:56 )   PT: 12.6 sec;   INR: 1.09 ratio    PTT - ( 10 Mar 2023 06:56 )  PTT:26.1 sec    LIVER FUNCTIONS - ( 10 Mar 2023 06:56 )  Alb: 2.8 g/dL / Pro: 5.2 g/dL / ALK PHOS: 107 U/L / ALT: 32 U/L / AST: 17 U/L / GGT: x           RADIOLOGY & ADDITIONAL STUDIES:  from: CT Abdomen and Pelvis w/ IV Cont (03.01.23 @ 20:02)   IMPRESSION:  Splenomegaly with a small age indeterminant splenic infarct.  No abdominal or pelvic lymphadenopathy.  Trace left pleural effusion

## 2023-03-10 NOTE — PROGRESS NOTE ADULT - ASSESSMENT
64 y/o with PMHx of HTN, HLD presents to the ED BIBA transferred from [facility] for new diagnosis of leukemia, peripheral blood flow+ B ALL. Chemo with reduced dose HyperCVAD started on 3/3. Hospital course c/b neutropenic fever, transaminitis, rash 3/4: mild itch now improved with topical steroid and atarax PRN,  and LUE cellulitis. Patient has pancytopenia  due to disease.   Ms. Kaur is a 62 y/o with PMHx of HTN, HLD presents to the ED BIBA transferred from [facility] for new diagnosis of leukemia, peripheral blood flow+ B ALL. Chemo with reduced dose HyperCVAD started on 3/3. Hospital course c/b neutropenic fever, transaminitis, rash 3/4: mild itch now improved with topical steroid and atarax PRN,  and LUE cellulitis. Patient has pancytopenia  due to disease.   Ms. Kaur is a 62 y/o with PMHx of HTN, HLD presents to the ED BIBA transferred from [facility] for new diagnosis of leukemia, peripheral blood flow cytometry c/w B-ALL. Official bone marrow biopsy 2/28 c/w B-cell ALL, [place] chromosome (-).  Chemotherapy  with reduced dose HyperCVAD started on 3/3/23. Hospital course c/b neutropenic fever, transaminitis, rash 3/4 improved with topical steroid and atarax PRN,  and LUE cellulitis. Patient has pancytopenia  due to disease and treatment.

## 2023-03-10 NOTE — PROGRESS NOTE ADULT - PROBLEM SELECTOR PLAN 6
Monitor closely   3/1 CT abd/pelvis: Splenomegaly with a small age indeterminant splenic infarct. No abdominal or pelvic lymphadenopathy. Trace left pleural effusion  3/9-Resolved

## 2023-03-10 NOTE — PROGRESS NOTE ADULT - PROBLEM SELECTOR PLAN 1
New  diagnosis of ALL given findings on peripheral flow cytometry. BCR/ABL - NEG  Monitor CBC, CMP, TLS labs BID, g6pd -11.3, echo done 2/27 EF 55%, HLA sent 2/28  On allopurinol  Hepatitis Panel Neg. Hep B Core neg. HIV negative.  Transfuse PRN. maintain Hgb >7, plts >15.  Gabapentin for splenic/side pain. Increased dose to 300mg PO TID on 3/2   2/28 s/p BMbx in IR due to body habitus, fu result. [facility]  2/28 s/p LP with IT MTX, flow - NEG for malignant cells  2/25 - CTA from [facility] (-) for PE. Left sided pain likely due to splenomegaly.  PICC line at bedside 2/28  BCR/ABL PCR sent 3/2 - NEG  3/4 Chemo with reduced dose HyperCVAD started, Decadron started on 3/3, Cytoxan and VCR started on 3/4~1am per chemo RN 2/28 s/p BMbx in IR due to body habitus, c/w B-ALL, [place] chromosome (-)  BCR/ABL PCR sent 3/2 - NEG  Also sent to [facility]  Hepatitis Panel Neg. Hep B Core neg. HIV negative.  Transfuse PRN. maintain Hgb >7, plts >15.  Gabapentin for splenic/side pain. Increased dose to 300mg PO TID on 3/2   2/28 s/p LP with IT MTX, flow - NEG for malignant cells  2/25 - CTA from [facility] (-) for PE. Left sided pain likely due to splenomegaly.  PICC line at bedside 2/28  3/4 Chemo with reduced dose HyperCVAD started, Decadron started on 3/3, Cytoxan and VCR started on 3/4~1am per chemo RN  due for 2/28 s/p BMbx in IR due to body habitus, c/w B-ALL, [place] chromosome (-)  BCR/ABL PCR sent 3/2 - NEG  Also sent to [facility]  Hepatitis Panel Neg. Hep B Core neg. HIV negative.  Transfuse PRN. maintain Hgb >7, plts >15.  Gabapentin for splenic/side pain. Increased dose to 300mg PO TID on 3/2   2/28 s/p LP with IT MTX, flow - NEG for malignant cells  2/25 - CTA from [facility] (-) for PE. Left sided pain likely due to splenomegaly.  PICC line at bedside 2/28  3/4 Chemo with reduced dose HyperCVAD started, Decadron started on 3/3, Cytoxan and VCR started on 3/4~1am per chemo RN  due for Cycle 1B IV MTX/Cytarabine due 3/18??

## 2023-03-10 NOTE — PROGRESS NOTE ADULT - NS ATTEND AMEND GEN_ALL_CORE FT
63 year old with HLD and HTN, transferred b/c of circulating blasts consistent with acute leukemia. PB flow cytometry showing B-ALL  FISH negative for Ph chromosome. PCR negative (although p190 positive 0.001% from marrow)  BMbx done 2/28 with IR (was unable to get at bedside) -B-lymphoblastic leukemia/lymphoma.  Echo done EF 55%  PICC line placed  3/4: started mini HyperCVAD -Today is C1D6  LP with IT MTX done on 2/28 -negative for malignant cells  Transaminitis -US done at [place] with fatty liver. Cont to monitor  CT A/P -Splenomegaly with a small age indeterminant splenic infarct. Normal liver, no abdominal or pelvic lymphadenopathy. CTA done at [place] w/ shotty LAD  Transfuse for plts <10, Hgb <7  Febrile to 101.7 on 3/3 -levaquin changed to cefepime  supportive care 63 year old with HLD and HTN, transferred b/c of circulating blasts consistent with acute leukemia. PB flow cytometry showing B-ALL  FISH negative for Ph chromosome. PCR negative (although p190 positive 0.001% from marrow)  BMbx done 2/28 with IR (was unable to get at bedside) -B-lymphoblastic leukemia/lymphoma.  Echo done EF 55%  PICC line placed  3/4: started mini HyperCVAD -Today is C1D7  LP with IT MTX done on 2/28 -negative for malignant cells  Transaminitis -US done at [place] with fatty liver. Cont to monitor  CT A/P -Splenomegaly with a small age indeterminant splenic infarct. Normal liver, no abdominal or pelvic lymphadenopathy. CTA done at [place] w/ shotty LAD  Transfuse for plts <10, Hgb <7  Febrile to 101.7 on 3/3 -levaquin changed to cefepime  supportive care 63 year old with HLD and HTN, transferred b/c of circulating blasts consistent with acute leukemia. PB flow cytometry showing B-ALL  FISH negative for Ph chromosome. PCR negative (although p190 positive 0.001% from marrow)  BMbx done 2/28 with IR (was unable to get at bedside) -B-lymphoblastic leukemia/lymphoma.  Echo done EF 55%  PICC line placed  3/4: started mini HyperCVAD -Today is C1A D7  LP with IT MTX done on 2/28 -negative for malignant cells  Transaminitis -US done at [place] with fatty liver. Cont to monitor  CT A/P -Splenomegaly with a small age indeterminant splenic infarct. Normal liver, no abdominal or pelvic lymphadenopathy. CTA done at [place] w/ shotty LAD  Transfuse for plts <10, Hgb <7  Febrile to 101.7 on 3/3 -levaquin changed to cefepime  supportive care

## 2023-03-11 LAB
ALBUMIN SERPL ELPH-MCNC: 3.1 G/DL — LOW (ref 3.3–5)
ALP SERPL-CCNC: 109 U/L — SIGNIFICANT CHANGE UP (ref 40–120)
ALT FLD-CCNC: 30 U/L — SIGNIFICANT CHANGE UP (ref 10–45)
ANION GAP SERPL CALC-SCNC: 6 MMOL/L — SIGNIFICANT CHANGE UP (ref 5–17)
APTT BLD: 26.7 SEC — LOW (ref 27.5–35.5)
AST SERPL-CCNC: 17 U/L — SIGNIFICANT CHANGE UP (ref 10–40)
BILIRUB SERPL-MCNC: 0.5 MG/DL — SIGNIFICANT CHANGE UP (ref 0.2–1.2)
BUN SERPL-MCNC: 14 MG/DL — SIGNIFICANT CHANGE UP (ref 7–23)
CALCIUM SERPL-MCNC: 8.7 MG/DL — SIGNIFICANT CHANGE UP (ref 8.4–10.5)
CHLORIDE SERPL-SCNC: 102 MMOL/L — SIGNIFICANT CHANGE UP (ref 96–108)
CO2 SERPL-SCNC: 29 MMOL/L — SIGNIFICANT CHANGE UP (ref 22–31)
CREAT SERPL-MCNC: 0.77 MG/DL — SIGNIFICANT CHANGE UP (ref 0.5–1.3)
D DIMER BLD IA.RAPID-MCNC: 528 NG/ML DDU — HIGH
EGFR: 86 ML/MIN/1.73M2 — SIGNIFICANT CHANGE UP
FIBRINOGEN PPP-MCNC: 329 MG/DL — SIGNIFICANT CHANGE UP (ref 200–445)
GLUCOSE SERPL-MCNC: 101 MG/DL — HIGH (ref 70–99)
HCT VFR BLD CALC: 21.5 % — LOW (ref 34.5–45)
HGB BLD-MCNC: 7.2 G/DL — LOW (ref 11.5–15.5)
INR BLD: 1.05 RATIO — SIGNIFICANT CHANGE UP (ref 0.88–1.16)
LDH SERPL L TO P-CCNC: 187 U/L — SIGNIFICANT CHANGE UP (ref 50–242)
MAGNESIUM SERPL-MCNC: 2.1 MG/DL — SIGNIFICANT CHANGE UP (ref 1.6–2.6)
MCHC RBC-ENTMCNC: 30 PG — SIGNIFICANT CHANGE UP (ref 27–34)
MCHC RBC-ENTMCNC: 33.5 GM/DL — SIGNIFICANT CHANGE UP (ref 32–36)
MCV RBC AUTO: 89.6 FL — SIGNIFICANT CHANGE UP (ref 80–100)
NRBC # BLD: 0 /100 WBCS — SIGNIFICANT CHANGE UP (ref 0–0)
PHOSPHATE SERPL-MCNC: 2.9 MG/DL — SIGNIFICANT CHANGE UP (ref 2.5–4.5)
PLATELET # BLD AUTO: 11 K/UL — CRITICAL LOW (ref 150–400)
POTASSIUM SERPL-MCNC: 4.2 MMOL/L — SIGNIFICANT CHANGE UP (ref 3.5–5.3)
POTASSIUM SERPL-SCNC: 4.2 MMOL/L — SIGNIFICANT CHANGE UP (ref 3.5–5.3)
PROT SERPL-MCNC: 5.8 G/DL — LOW (ref 6–8.3)
PROTHROM AB SERPL-ACNC: 12.1 SEC — SIGNIFICANT CHANGE UP (ref 10.5–13.4)
RBC # BLD: 2.4 M/UL — LOW (ref 3.8–5.2)
RBC # FLD: 13.5 % — SIGNIFICANT CHANGE UP (ref 10.3–14.5)
SODIUM SERPL-SCNC: 137 MMOL/L — SIGNIFICANT CHANGE UP (ref 135–145)
URATE SERPL-MCNC: 2.3 MG/DL — LOW (ref 2.5–7)
WBC # BLD: 0.21 K/UL — CRITICAL LOW (ref 3.8–10.5)
WBC # FLD AUTO: 0.21 K/UL — CRITICAL LOW (ref 3.8–10.5)

## 2023-03-11 PROCEDURE — 99233 SBSQ HOSP IP/OBS HIGH 50: CPT

## 2023-03-11 RX ORDER — PANTOPRAZOLE SODIUM 20 MG/1
40 TABLET, DELAYED RELEASE ORAL ONCE
Refills: 0 | Status: COMPLETED | OUTPATIENT
Start: 2023-03-11 | End: 2023-03-11

## 2023-03-11 RX ORDER — VINCRISTINE SULFATE 1 MG/ML
2 VIAL (ML) INTRAVENOUS ONCE
Refills: 0 | Status: COMPLETED | OUTPATIENT
Start: 2023-03-11 | End: 2023-03-11

## 2023-03-11 RX ORDER — SUCRALFATE 1 G
1 TABLET ORAL EVERY 6 HOURS
Refills: 0 | Status: DISCONTINUED | OUTPATIENT
Start: 2023-03-11 | End: 2023-03-15

## 2023-03-11 RX ADMIN — Medication 5 MILLILITER(S): at 11:14

## 2023-03-11 RX ADMIN — Medication 1 GRAM(S): at 17:45

## 2023-03-11 RX ADMIN — GABAPENTIN 300 MILLIGRAM(S): 400 CAPSULE ORAL at 13:31

## 2023-03-11 RX ADMIN — Medication 650 MILLIGRAM(S): at 15:16

## 2023-03-11 RX ADMIN — GABAPENTIN 300 MILLIGRAM(S): 400 CAPSULE ORAL at 06:05

## 2023-03-11 RX ADMIN — LOSARTAN POTASSIUM 100 MILLIGRAM(S): 100 TABLET, FILM COATED ORAL at 06:04

## 2023-03-11 RX ADMIN — CEFEPIME 100 MILLIGRAM(S): 1 INJECTION, POWDER, FOR SOLUTION INTRAMUSCULAR; INTRAVENOUS at 06:03

## 2023-03-11 RX ADMIN — Medication 5 MILLILITER(S): at 20:19

## 2023-03-11 RX ADMIN — Medication 1 APPLICATION(S): at 06:03

## 2023-03-11 RX ADMIN — CHLORHEXIDINE GLUCONATE 1 APPLICATION(S): 213 SOLUTION TOPICAL at 08:35

## 2023-03-11 RX ADMIN — PANTOPRAZOLE SODIUM 40 MILLIGRAM(S): 20 TABLET, DELAYED RELEASE ORAL at 15:43

## 2023-03-11 RX ADMIN — Medication 400 MILLIGRAM(S): at 17:45

## 2023-03-11 RX ADMIN — CEFEPIME 100 MILLIGRAM(S): 1 INJECTION, POWDER, FOR SOLUTION INTRAMUSCULAR; INTRAVENOUS at 21:59

## 2023-03-11 RX ADMIN — GABAPENTIN 300 MILLIGRAM(S): 400 CAPSULE ORAL at 21:59

## 2023-03-11 RX ADMIN — Medication 5 MILLILITER(S): at 08:35

## 2023-03-11 RX ADMIN — Medication 2 MILLIGRAM(S): at 11:35

## 2023-03-11 RX ADMIN — SIMVASTATIN 40 MILLIGRAM(S): 20 TABLET, FILM COATED ORAL at 21:59

## 2023-03-11 RX ADMIN — Medication 650 MILLIGRAM(S): at 16:21

## 2023-03-11 RX ADMIN — ONDANSETRON 8 MILLIGRAM(S): 8 TABLET, FILM COATED ORAL at 00:27

## 2023-03-11 RX ADMIN — PANTOPRAZOLE SODIUM 40 MILLIGRAM(S): 20 TABLET, DELAYED RELEASE ORAL at 06:05

## 2023-03-11 RX ADMIN — CASPOFUNGIN ACETATE 260 MILLIGRAM(S): 7 INJECTION, POWDER, LYOPHILIZED, FOR SOLUTION INTRAVENOUS at 11:14

## 2023-03-11 RX ADMIN — SODIUM CHLORIDE 20 MILLILITER(S): 9 INJECTION INTRAMUSCULAR; INTRAVENOUS; SUBCUTANEOUS at 08:36

## 2023-03-11 RX ADMIN — ESCITALOPRAM OXALATE 10 MILLIGRAM(S): 10 TABLET, FILM COATED ORAL at 11:13

## 2023-03-11 RX ADMIN — Medication 30 MILLILITER(S): at 15:18

## 2023-03-11 RX ADMIN — Medication 5 MILLILITER(S): at 16:21

## 2023-03-11 RX ADMIN — BRIMONIDINE TARTRATE 1 DROP(S): 2 SOLUTION/ DROPS OPHTHALMIC at 11:13

## 2023-03-11 RX ADMIN — CEFEPIME 100 MILLIGRAM(S): 1 INJECTION, POWDER, FOR SOLUTION INTRAMUSCULAR; INTRAVENOUS at 13:31

## 2023-03-11 RX ADMIN — Medication 400 MILLIGRAM(S): at 06:04

## 2023-03-11 NOTE — ADVANCED PRACTICE NURSE CONSULT - RECOMMEDATIONS
Nursing care on-going. 
Vincristine 2mg IV was given at 0112 over 5mins, followed by Cytoxan 300mg IV ay 0130 over 1hr.  Pt tolerated well without reaction.
will continue to follow.
Nursing care on-going.

## 2023-03-11 NOTE — ADVANCED PRACTICE NURSE CONSULT - ASSESSMENT
Patient A&Ox4, VSS within range of baseline, denies N/V/D, no signs of distress no reports of pain. Lab results reviewed by Dr Curry and team during rounds. Patient with RDL PICC, no s/s of tenderness or redness at insertion site, dressing c/d/i, flushing easily with 10cc NS, with positive blood return. Last BM 3/10/23  Patient educated on chemo regime, verbalized understanding. 2 RN verification completed at bedside prior to start. Day 8 started at 1135 vinCRIStine IVPB (eMAR)  - 2 milliGRAM(s) in sodium chloride 0.9% 50 milliLiter(s), IV Intermittent, once, infuse over 5 Minute(s), infuse at 624 mL/Hr, Attached to primary NS line as a secondary infusion into red lumen of R PICC via Alaris pump. Patient tolerated well. Primary RN aware of treatment plan. Call bell within reach. Safety maintained, nursing care on-going.

## 2023-03-11 NOTE — PROGRESS NOTE ADULT - PROBLEM SELECTOR PLAN 1
2/28 s/p BMbx in IR due to body habitus, c/w B-ALL, [place] chromosome (-)  BCR/ABL PCR sent 3/2 - NEG  Also sent to [facility]  Hepatitis Panel Neg. Hep B Core neg. HIV negative.  Transfuse PRN. maintain Hgb >7, plts >15.  Gabapentin for splenic/side pain. Increased dose to 300mg PO TID on 3/2   2/28 s/p LP with IT MTX, flow - NEG for malignant cells  2/25 - CTA from [facility] (-) for PE. Left sided pain likely due to splenomegaly.  PICC line at bedside 2/28  3/4 Chemo with reduced dose HyperCVAD started, Decadron started on 3/3, Cytoxan and VCR started on 3/4~1am per chemo RN  due for Cycle 1B IV MTX/Cytarabine due 3/18?? 2/28 s/p BMbx in IR due to body habitus, c/w B-ALL, [place] chromosome (-)  BCR/ABL PCR sent 3/2 - NEG  Also sent to [facility]  Hepatitis Panel Neg. Hep B Core neg. HIV negative.  Transfuse PRN. maintain Hgb >7, plts >15.  Gabapentin for splenic/side pain. Increased dose to 300mg PO TID on 3/2   2/28 s/p LP with IT MTX, flow - NEG for malignant cells  2/25 - CTA from [facility] (-) for PE. Left sided pain likely due to splenomegaly.  PICC line at bedside 2/28  3/4 Chemo with reduced dose HyperCVAD started, Decadron started on 3/3, Cytoxan and VCR started on 3/4~1am per chemo RN  3/11 due for VCR today, normal bowel movements, no neuropathy  due for Cycle 1B IV MTX/Cytarabine due 3/18??

## 2023-03-11 NOTE — PROGRESS NOTE ADULT - SUBJECTIVE AND OBJECTIVE BOX
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   Decadron started on 3/3, Cytoxan and VCR started on 3/4    Day: 8     Pt endorsed: no complaints. afebrile    Review of Systems: Denies SIMMONS, abdominal pain, nausea, vomiting, diarrhea HA or dizziness    Pain scale: not graded  Left elbow    Diet: DASH/TLC    Allergies: sulfa drugs (Unknown)    ANTIMICROBIALS  acyclovir   Oral Tab/Cap 400 milliGRAM(s) Oral two times a day   caspofungin IVPB 50 milliGRAM(s) IV Intermittent every 24 hours  cefepime   IVPB 2000 milliGRAM(s) IV Intermittent every 8 hours      HEME/ONC MEDICATIONS  methotrexate PF IntraThecal (eMAR) 15 milliGRAM(s) IntraThecal once  vinCRIStine IVPB (eMAR) 2 milliGRAM(s) IV Intermittent once      STANDING MEDICATIONS  Biotene Dry Mouth Oral Rinse 5 milliLiter(s) Swish and Spit five times a day  brimonidine 0.2% Ophthalmic Solution 1 Drop(s) Both EYES daily  chlorhexidine 4% Liquid 1 Application(s) Topical <User Schedule>  escitalopram 10 milliGRAM(s) Oral daily  gabapentin 300 milliGRAM(s) Oral every 8 hours  losartan 100 milliGRAM(s) Oral daily  pantoprazole    Tablet 40 milliGRAM(s) Oral before breakfast  simvastatin 40 milliGRAM(s) Oral at bedtime  sodium chloride 0.9%. 1000 milliLiter(s) IV Continuous <Continuous>  triamcinolone 0.1% Ointment 1 Application(s) Topical two times a day      PRN MEDICATIONS  acetaminophen     Tablet .. 650 milliGRAM(s) Oral every 6 hours PRN  aluminum hydroxide/magnesium hydroxide/simethicone Suspension 30 milliLiter(s) Oral every 4 hours PRN  diphenhydrAMINE 25 milliGRAM(s) Oral every 4 hours PRN  FIRST- Mouthwash  BLM 10 milliLiter(s) Swish and Spit four times a day PRN  metoclopramide Injectable 10 milliGRAM(s) IV Push every 6 hours PRN  ondansetron Injectable 8 milliGRAM(s) IV Push every 8 hours PRN  polyethylene glycol 3350 17 Gram(s) Oral two times a day PRN        Vital Signs Last 24 Hrs  T(C): 36.6 (11 Mar 2023 04:56), Max: 36.7 (10 Mar 2023 13:30)  T(F): 97.9 (11 Mar 2023 04:56), Max: 98.1 (10 Mar 2023 13:30)  HR: 62 (11 Mar 2023 04:56) (62 - 75)  BP: 118/68 (11 Mar 2023 04:56) (113/70 - 156/75)  BP(mean): --  RR: 18 (11 Mar 2023 04:56) (18 - 18)  SpO2: 93% (11 Mar 2023 04:56) (93% - 98%)    Parameters below as of 11 Mar 2023 04:56  Patient On (Oxygen Delivery Method): room air        PHYSICAL EXAM  General: adult in NAD  HEENT: clear oropharynx, anicteric sclera, pink conjunctiva  Neck: supple  CV: normal S1/S2 RRR  Lungs: positive air movement b/l ant lungs,clear to auscultation, no wheezes, no rales  Abdomen: soft non-tender non-distended, no hepatosplenomegaly  Ext: no clubbing cyanosis or edema  Skin: no rashes and no petechiae  Neuro: alert and oriented X 4, no focal deficits  Central Line: normal    LABS:        Blood Cultures:         RADIOLOGY & ADDITIONAL STUDIES:         Diagnosis: B- ALL, [place]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   Decadron started on 3/3, Cytoxan and VCR started on 3/4    Day: 8     Pt endorsed: no complaints. afebrile    Review of Systems: Denies SIMMONS, abdominal pain, nausea, vomiting, diarrhea HA or dizziness    Pain scale: not graded  Left elbow    Diet: DASH/TLC    Allergies: sulfa drugs (Unknown)    ANTIMICROBIALS  acyclovir   Oral Tab/Cap 400 milliGRAM(s) Oral two times a day   caspofungin IVPB 50 milliGRAM(s) IV Intermittent every 24 hours  cefepime   IVPB 2000 milliGRAM(s) IV Intermittent every 8 hours      HEME/ONC MEDICATIONS  methotrexate PF IntraThecal (eMAR) 15 milliGRAM(s) IntraThecal once  vinCRIStine IVPB (eMAR) 2 milliGRAM(s) IV Intermittent once      STANDING MEDICATIONS  Biotene Dry Mouth Oral Rinse 5 milliLiter(s) Swish and Spit five times a day  brimonidine 0.2% Ophthalmic Solution 1 Drop(s) Both EYES daily  chlorhexidine 4% Liquid 1 Application(s) Topical <User Schedule>  escitalopram 10 milliGRAM(s) Oral daily  gabapentin 300 milliGRAM(s) Oral every 8 hours  losartan 100 milliGRAM(s) Oral daily  pantoprazole    Tablet 40 milliGRAM(s) Oral before breakfast  simvastatin 40 milliGRAM(s) Oral at bedtime  sodium chloride 0.9%. 1000 milliLiter(s) IV Continuous <Continuous>  triamcinolone 0.1% Ointment 1 Application(s) Topical two times a day      PRN MEDICATIONS  acetaminophen     Tablet .. 650 milliGRAM(s) Oral every 6 hours PRN  aluminum hydroxide/magnesium hydroxide/simethicone Suspension 30 milliLiter(s) Oral every 4 hours PRN  diphenhydrAMINE 25 milliGRAM(s) Oral every 4 hours PRN  FIRST- Mouthwash  BLM 10 milliLiter(s) Swish and Spit four times a day PRN  metoclopramide Injectable 10 milliGRAM(s) IV Push every 6 hours PRN  ondansetron Injectable 8 milliGRAM(s) IV Push every 8 hours PRN  polyethylene glycol 3350 17 Gram(s) Oral two times a day PRN      Vital Signs Last 24 Hrs  T(C): 36.6 (11 Mar 2023 04:56), Max: 36.7 (10 Mar 2023 13:30)  T(F): 97.9 (11 Mar 2023 04:56), Max: 98.1 (10 Mar 2023 13:30)  HR: 62 (11 Mar 2023 04:56) (62 - 75)  BP: 118/68 (11 Mar 2023 04:56) (113/70 - 156/75)  RR: 18 (11 Mar 2023 04:56) (18 - 18)  SpO2: 93% (11 Mar 2023 04:56) (93% - 98%)    Parameters below as of 11 Mar 2023 04:56  Patient On (Oxygen Delivery Method): room air    PHYSICAL EXAM  General:  NAD  HEENT: clear oropharynx, anicteric sclera  Neck: supple  CV: normal S1/S2 RRR  Lungs: positive air movement b/l ant lungs,clear to auscultation, no wheezes, no rales  Abdomen: soft non-tender non-distended, +BS  Ext: no  edema  Skin: no rashes   Neuro: alert and oriented X 3, no focal deficits  Central Line: PICC    Cultures:  Culture - Urine (03.03.23 @ 06:58)    Specimen Source: Clean Catch Clean Catch (Midstream)    Culture Results:   No growth    Culture - Blood (03.03.23 @ 02:14)    Specimen Source: .Blood Blood-Peripheral    Culture Results:   No Growth Final    Culture - Blood (03.03.23 @ 02:14)    Specimen Source: .Blood Blood-Peripheral    Culture Results:   No Growth Final    LABS:                               7.2    0.21  )-----------( 11       ( 11 Mar 2023 07:12 )             21.5     11 Mar 2023 07:12    137    |  102    |  14     ----------------------------<  101    4.2     |  29     |  0.77     Ca    8.7        11 Mar 2023 07:12  Phos  2.9       11 Mar 2023 07:12  Mg     2.1       11 Mar 2023 07:12    TPro  5.8    /  Alb  3.1    /  TBili  0.5    /  DBili  x      /  AST  17     /  ALT  30     /  AlkPhos  109    11 Mar 2023 07:12    PT/INR - ( 11 Mar 2023 07:12 )   PT: 12.1 sec;   INR: 1.05 ratio    PTT - ( 11 Mar 2023 07:12 )  PTT:26.7 sec    LIVER FUNCTIONS - ( 11 Mar 2023 07:12 )  Alb: 3.1 g/dL / Pro: 5.8 g/dL / ALK PHOS: 109 U/L / ALT: 30 U/L / AST: 17 U/L / GGT: x             RADIOLOGY & ADDITIONAL STUDIES:  from: CT Abdomen and Pelvis w/ IV Cont (03.01.23 @ 20:02)   FINDINGS:  LOWER CHEST: Trace left pleural effusion with left lower lobe and   lingular subsegmental atelectasis. Small fat-containing left [place]   hernia. Right lateral chest wall lymph nodes measure up to 7 mm in short   axis.    LIVER: Within normal limits.  BILE DUCTS: Normal caliber.  GALLBLADDER: Within normal limits.  SPLEEN: Splenomegaly with a craniocaudad length of 15.5 cm. Peripheral   linear hypoattenuation in the inferior spleen compatible with   age-indeterminate infarct..  PANCREAS: Within normal limits.  ADRENALS: Within normal limits.  KIDNEYS/URETERS: Within normal limits.    BLADDER: Within normal limits.  REPRODUCTIVE ORGANS: Uterus and adnexa within normal limits.    BOWEL: No bowel obstruction. Sigmoid diverticulosis.  PERITONEUM: No ascites.  VESSELS: Atherosclerotic changes.  RETROPERITONEUM/LYMPH NODES: No lymphadenopathy.  ABDOMINAL WALL: Tiny fat-containing umbilical hernia..  BONES: Degenerative changes.    IMPRESSION:  Splenomegaly with a small age indeterminant splenic infarct.  No abdominal or pelvic lymphadenopathy.  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   Decadron started on 3/3, Cytoxan and VCR started on 3/4    Day: 8     Pt endorsed: no complaints. afebrile    Review of Systems: Denies SIMMONS, abdominal pain, nausea, vomiting, diarrhea HA or dizziness    Pain scale: not graded  Left elbow    Diet: DASH/TLC    Allergies: sulfa drugs (Unknown)    ANTIMICROBIALS  acyclovir   Oral Tab/Cap 400 milliGRAM(s) Oral two times a day   caspofungin IVPB 50 milliGRAM(s) IV Intermittent every 24 hours  cefepime   IVPB 2000 milliGRAM(s) IV Intermittent every 8 hours      HEME/ONC MEDICATIONS  methotrexate PF IntraThecal (eMAR) 15 milliGRAM(s) IntraThecal once  vinCRIStine IVPB (eMAR) 2 milliGRAM(s) IV Intermittent once      STANDING MEDICATIONS  Biotene Dry Mouth Oral Rinse 5 milliLiter(s) Swish and Spit five times a day  brimonidine 0.2% Ophthalmic Solution 1 Drop(s) Both EYES daily  chlorhexidine 4% Liquid 1 Application(s) Topical <User Schedule>  escitalopram 10 milliGRAM(s) Oral daily  gabapentin 300 milliGRAM(s) Oral every 8 hours  losartan 100 milliGRAM(s) Oral daily  pantoprazole    Tablet 40 milliGRAM(s) Oral before breakfast  simvastatin 40 milliGRAM(s) Oral at bedtime  sodium chloride 0.9%. 1000 milliLiter(s) IV Continuous <Continuous>  triamcinolone 0.1% Ointment 1 Application(s) Topical two times a day      PRN MEDICATIONS  acetaminophen     Tablet .. 650 milliGRAM(s) Oral every 6 hours PRN  aluminum hydroxide/magnesium hydroxide/simethicone Suspension 30 milliLiter(s) Oral every 4 hours PRN  diphenhydrAMINE 25 milliGRAM(s) Oral every 4 hours PRN  FIRST- Mouthwash  BLM 10 milliLiter(s) Swish and Spit four times a day PRN  metoclopramide Injectable 10 milliGRAM(s) IV Push every 6 hours PRN  ondansetron Injectable 8 milliGRAM(s) IV Push every 8 hours PRN  polyethylene glycol 3350 17 Gram(s) Oral two times a day PRN      Vital Signs Last 24 Hrs  T(C): 36.6 (11 Mar 2023 04:56), Max: 36.7 (10 Mar 2023 13:30)  T(F): 97.9 (11 Mar 2023 04:56), Max: 98.1 (10 Mar 2023 13:30)  HR: 62 (11 Mar 2023 04:56) (62 - 75)  BP: 118/68 (11 Mar 2023 04:56) (113/70 - 156/75)  RR: 18 (11 Mar 2023 04:56) (18 - 18)  SpO2: 93% (11 Mar 2023 04:56) (93% - 98%)    Parameters below as of 11 Mar 2023 04:56  Patient On (Oxygen Delivery Method): room air    PHYSICAL EXAM  General:  NAD  HEENT: clear oropharynx, anicteric sclera  Neck: supple  CV: normal S1/S2 RRR  Lungs: positive air movement b/l ant lungs,clear to auscultation, no wheezes, no rales  Abdomen: soft non-tender non-distended, +BS  Ext: no  edema  Skin: no rashes   Neuro: alert and oriented X 3  Central Line: PICC    Cultures:  Culture - Urine (03.03.23 @ 06:58)    Specimen Source: Clean Catch Clean Catch (Midstream)    Culture Results:   No growth    Culture - Blood (03.03.23 @ 02:14)    Specimen Source: .Blood Blood-Peripheral    Culture Results:   No Growth Final    Culture - Blood (03.03.23 @ 02:14)    Specimen Source: .Blood Blood-Peripheral    Culture Results:   No Growth Final    LABS:                               7.2    0.21  )-----------( 11       ( 11 Mar 2023 07:12 )             21.5     11 Mar 2023 07:12    137    |  102    |  14     ----------------------------<  101    4.2     |  29     |  0.77     Ca    8.7        11 Mar 2023 07:12  Phos  2.9       11 Mar 2023 07:12  Mg     2.1       11 Mar 2023 07:12    TPro  5.8    /  Alb  3.1    /  TBili  0.5    /  DBili  x      /  AST  17     /  ALT  30     /  AlkPhos  109    11 Mar 2023 07:12    PT/INR - ( 11 Mar 2023 07:12 )   PT: 12.1 sec;   INR: 1.05 ratio    PTT - ( 11 Mar 2023 07:12 )  PTT:26.7 sec    LIVER FUNCTIONS - ( 11 Mar 2023 07:12 )  Alb: 3.1 g/dL / Pro: 5.8 g/dL / ALK PHOS: 109 U/L / ALT: 30 U/L / AST: 17 U/L / GGT: x             RADIOLOGY & ADDITIONAL STUDIES:  from: CT Abdomen and Pelvis w/ IV Cont (03.01.23 @ 20:02)   FINDINGS:  LOWER CHEST: Trace left pleural effusion with left lower lobe and   lingular subsegmental atelectasis. Small fat-containing left [place]   hernia. Right lateral chest wall lymph nodes measure up to 7 mm in short   axis.    LIVER: Within normal limits.  BILE DUCTS: Normal caliber.  GALLBLADDER: Within normal limits.  SPLEEN: Splenomegaly with a craniocaudad length of 15.5 cm. Peripheral   linear hypoattenuation in the inferior spleen compatible with   age-indeterminate infarct..  PANCREAS: Within normal limits.  ADRENALS: Within normal limits.  KIDNEYS/URETERS: Within normal limits.    BLADDER: Within normal limits.  REPRODUCTIVE ORGANS: Uterus and adnexa within normal limits.    BOWEL: No bowel obstruction. Sigmoid diverticulosis.  PERITONEUM: No ascites.  VESSELS: Atherosclerotic changes.  RETROPERITONEUM/LYMPH NODES: No lymphadenopathy.  ABDOMINAL WALL: Tiny fat-containing umbilical hernia..  BONES: Degenerative changes.    IMPRESSION:  Splenomegaly with a small age indeterminant splenic infarct.  No abdominal or pelvic lymphadenopathy.  Trace left pleural effusion

## 2023-03-11 NOTE — PROGRESS NOTE ADULT - NS ATTEND AMEND GEN_ALL_CORE FT
63 year old with HLD and HTN, transferred b/c of circulating blasts consistent with acute leukemia. PB flow cytometry showing B-ALL  FISH negative for Ph chromosome. PCR negative (although p190 positive 0.001% from marrow)  BMbx done 2/28 with IR (was unable to get at bedside) -B-lymphoblastic leukemia/lymphoma.  Echo done EF 55%  PICC line placed  3/4: started mini HyperCVAD -Today is C1A D7  LP with IT MTX done on 2/28 -negative for malignant cells  Transaminitis -US done at [place] with fatty liver. Cont to monitor  CT A/P -Splenomegaly with a small age indeterminant splenic infarct. Normal liver, no abdominal or pelvic lymphadenopathy. CTA done at [place] w/ shotty LAD  Transfuse for plts <10, Hgb <7  Febrile to 101.7 on 3/3 -levaquin changed to cefepime  supportive care 63 year old with HLD and HTN, transferred b/c of circulating blasts consistent with acute leukemia. PB flow cytometry showing B-ALL  FISH negative for Ph chromosome. PCR negative (although p190 positive 0.001% from marrow)  BMbx done 2/28 with IR (was unable to get at bedside) -B-lymphoblastic leukemia/lymphoma.  Echo done EF 55%  PICC line placed  3/4: started mini HyperCVAD -Today is C1A D8  LP with IT MTX done on 2/28 -negative for malignant cells  Transaminitis -US done at [place] with fatty liver. Cont to monitor  CT A/P -Splenomegaly with a small age indeterminant splenic infarct. Normal liver, no abdominal or pelvic lymphadenopathy. CTA done at [place] w/ shotty LAD  Receiving transfusions for plts <10, Hgb <7  Febrile to 101.7 on 3/3 -levaquin changed to cefepime; on caspofungin, acyclovir  supportive care

## 2023-03-11 NOTE — ADVANCED PRACTICE NURSE CONSULT - REASON FOR CONSULT
Cyclophosphamide 300mg
ALL, HyperCVAD Day 1
Chemo Notes : Day 1/3 Hyper CVAD ,cytoxan B ALL
Induction chemotherapy - Pt New B-cell ALL   Vincristine 2mg IV over 5 mins. & Cytoxan 300mg IV over 1 hr.
B-ALL, Day 8 Vincristine. Consent in chart 
Chemotherapy note: Day 2/3 Hyper CVAD, cytoxan for B-ALL
Chemotherapy note: HyperCVAD, cytoxan Day 3
Day 3, Daunorubicin CIV infusion, day 4, consent in chart.

## 2023-03-11 NOTE — PROGRESS NOTE ADULT - ASSESSMENT
Ms. Kaur is a 64 y/o with PMHx of HTN, HLD presents to the ED BIBA transferred from [facility] for new diagnosis of leukemia, peripheral blood flow cytometry c/w B-ALL. Official bone marrow biopsy 2/28 c/w B-cell ALL, [place] chromosome (-).  Chemotherapy  with reduced dose HyperCVAD started on 3/3/23. Hospital course c/b neutropenic fever, transaminitis, rash 3/4 improved with topical steroid and atarax PRN,  and LUE cellulitis. Patient has pancytopenia  due to disease and treatment.

## 2023-03-12 LAB
ALBUMIN SERPL ELPH-MCNC: 3.4 G/DL — SIGNIFICANT CHANGE UP (ref 3.3–5)
ALP SERPL-CCNC: 102 U/L — SIGNIFICANT CHANGE UP (ref 40–120)
ALT FLD-CCNC: 24 U/L — SIGNIFICANT CHANGE UP (ref 10–45)
ANION GAP SERPL CALC-SCNC: 10 MMOL/L — SIGNIFICANT CHANGE UP (ref 5–17)
APTT BLD: 27.1 SEC — LOW (ref 27.5–35.5)
AST SERPL-CCNC: 16 U/L — SIGNIFICANT CHANGE UP (ref 10–40)
BILIRUB SERPL-MCNC: 0.5 MG/DL — SIGNIFICANT CHANGE UP (ref 0.2–1.2)
BUN SERPL-MCNC: 13 MG/DL — SIGNIFICANT CHANGE UP (ref 7–23)
CALCIUM SERPL-MCNC: 9 MG/DL — SIGNIFICANT CHANGE UP (ref 8.4–10.5)
CHLORIDE SERPL-SCNC: 105 MMOL/L — SIGNIFICANT CHANGE UP (ref 96–108)
CO2 SERPL-SCNC: 26 MMOL/L — SIGNIFICANT CHANGE UP (ref 22–31)
CREAT SERPL-MCNC: 0.77 MG/DL — SIGNIFICANT CHANGE UP (ref 0.5–1.3)
D DIMER BLD IA.RAPID-MCNC: 473 NG/ML DDU — HIGH
EGFR: 86 ML/MIN/1.73M2 — SIGNIFICANT CHANGE UP
FIBRINOGEN PPP-MCNC: 366 MG/DL — SIGNIFICANT CHANGE UP (ref 200–445)
GLUCOSE SERPL-MCNC: 99 MG/DL — SIGNIFICANT CHANGE UP (ref 70–99)
HCT VFR BLD CALC: 20.8 % — CRITICAL LOW (ref 34.5–45)
HGB BLD-MCNC: 7 G/DL — CRITICAL LOW (ref 11.5–15.5)
INR BLD: 1.06 RATIO — SIGNIFICANT CHANGE UP (ref 0.88–1.16)
LDH SERPL L TO P-CCNC: 204 U/L — SIGNIFICANT CHANGE UP (ref 50–242)
MAGNESIUM SERPL-MCNC: 2.2 MG/DL — SIGNIFICANT CHANGE UP (ref 1.6–2.6)
MCHC RBC-ENTMCNC: 29.4 PG — SIGNIFICANT CHANGE UP (ref 27–34)
MCHC RBC-ENTMCNC: 33.7 GM/DL — SIGNIFICANT CHANGE UP (ref 32–36)
MCV RBC AUTO: 87.4 FL — SIGNIFICANT CHANGE UP (ref 80–100)
NRBC # BLD: 0 /100 WBCS — SIGNIFICANT CHANGE UP (ref 0–0)
PHOSPHATE SERPL-MCNC: 2.8 MG/DL — SIGNIFICANT CHANGE UP (ref 2.5–4.5)
PLATELET # BLD AUTO: 25 K/UL — LOW (ref 150–400)
PLATELET # BLD AUTO: 9 K/UL — CRITICAL LOW (ref 150–400)
POTASSIUM SERPL-MCNC: 4.2 MMOL/L — SIGNIFICANT CHANGE UP (ref 3.5–5.3)
POTASSIUM SERPL-SCNC: 4.2 MMOL/L — SIGNIFICANT CHANGE UP (ref 3.5–5.3)
PROT SERPL-MCNC: 5.9 G/DL — LOW (ref 6–8.3)
PROTHROM AB SERPL-ACNC: 12.2 SEC — SIGNIFICANT CHANGE UP (ref 10.5–13.4)
RBC # BLD: 2.38 M/UL — LOW (ref 3.8–5.2)
RBC # FLD: 13.3 % — SIGNIFICANT CHANGE UP (ref 10.3–14.5)
SODIUM SERPL-SCNC: 141 MMOL/L — SIGNIFICANT CHANGE UP (ref 135–145)
URATE SERPL-MCNC: 2.7 MG/DL — SIGNIFICANT CHANGE UP (ref 2.5–7)
WBC # BLD: 0.28 K/UL — CRITICAL LOW (ref 3.8–10.5)
WBC # FLD AUTO: 0.28 K/UL — CRITICAL LOW (ref 3.8–10.5)

## 2023-03-12 PROCEDURE — 99233 SBSQ HOSP IP/OBS HIGH 50: CPT

## 2023-03-12 RX ORDER — ACETAMINOPHEN 500 MG
650 TABLET ORAL ONCE
Refills: 0 | Status: COMPLETED | OUTPATIENT
Start: 2023-03-12 | End: 2023-03-12

## 2023-03-12 RX ORDER — OXYCODONE HYDROCHLORIDE 5 MG/1
5 TABLET ORAL ONCE
Refills: 0 | Status: DISCONTINUED | OUTPATIENT
Start: 2023-03-12 | End: 2023-03-12

## 2023-03-12 RX ADMIN — ESCITALOPRAM OXALATE 10 MILLIGRAM(S): 10 TABLET, FILM COATED ORAL at 12:21

## 2023-03-12 RX ADMIN — Medication 1 GRAM(S): at 05:53

## 2023-03-12 RX ADMIN — Medication 650 MILLIGRAM(S): at 14:05

## 2023-03-12 RX ADMIN — CHLORHEXIDINE GLUCONATE 1 APPLICATION(S): 213 SOLUTION TOPICAL at 09:40

## 2023-03-12 RX ADMIN — CASPOFUNGIN ACETATE 260 MILLIGRAM(S): 7 INJECTION, POWDER, LYOPHILIZED, FOR SOLUTION INTRAVENOUS at 12:22

## 2023-03-12 RX ADMIN — LOSARTAN POTASSIUM 100 MILLIGRAM(S): 100 TABLET, FILM COATED ORAL at 05:53

## 2023-03-12 RX ADMIN — Medication 650 MILLIGRAM(S): at 01:45

## 2023-03-12 RX ADMIN — Medication 1 GRAM(S): at 23:39

## 2023-03-12 RX ADMIN — CEFEPIME 100 MILLIGRAM(S): 1 INJECTION, POWDER, FOR SOLUTION INTRAMUSCULAR; INTRAVENOUS at 15:07

## 2023-03-12 RX ADMIN — Medication 5 MILLILITER(S): at 23:39

## 2023-03-12 RX ADMIN — Medication 5 MILLILITER(S): at 09:40

## 2023-03-12 RX ADMIN — OXYCODONE HYDROCHLORIDE 5 MILLIGRAM(S): 5 TABLET ORAL at 21:26

## 2023-03-12 RX ADMIN — Medication 1 GRAM(S): at 12:22

## 2023-03-12 RX ADMIN — GABAPENTIN 300 MILLIGRAM(S): 400 CAPSULE ORAL at 05:54

## 2023-03-12 RX ADMIN — Medication 650 MILLIGRAM(S): at 14:35

## 2023-03-12 RX ADMIN — Medication 1 GRAM(S): at 00:13

## 2023-03-12 RX ADMIN — Medication 400 MILLIGRAM(S): at 05:53

## 2023-03-12 RX ADMIN — PANTOPRAZOLE SODIUM 40 MILLIGRAM(S): 20 TABLET, DELAYED RELEASE ORAL at 06:03

## 2023-03-12 RX ADMIN — OXYCODONE HYDROCHLORIDE 5 MILLIGRAM(S): 5 TABLET ORAL at 22:20

## 2023-03-12 RX ADMIN — Medication 5 MILLILITER(S): at 15:08

## 2023-03-12 RX ADMIN — Medication 650 MILLIGRAM(S): at 19:00

## 2023-03-12 RX ADMIN — Medication 5 MILLILITER(S): at 20:58

## 2023-03-12 RX ADMIN — ONDANSETRON 8 MILLIGRAM(S): 8 TABLET, FILM COATED ORAL at 18:09

## 2023-03-12 RX ADMIN — GABAPENTIN 300 MILLIGRAM(S): 400 CAPSULE ORAL at 14:33

## 2023-03-12 RX ADMIN — Medication 400 MILLIGRAM(S): at 17:10

## 2023-03-12 RX ADMIN — GABAPENTIN 300 MILLIGRAM(S): 400 CAPSULE ORAL at 21:02

## 2023-03-12 RX ADMIN — Medication 650 MILLIGRAM(S): at 19:51

## 2023-03-12 RX ADMIN — Medication 1 GRAM(S): at 17:10

## 2023-03-12 RX ADMIN — CEFEPIME 100 MILLIGRAM(S): 1 INJECTION, POWDER, FOR SOLUTION INTRAMUSCULAR; INTRAVENOUS at 21:02

## 2023-03-12 RX ADMIN — Medication 5 MILLILITER(S): at 00:14

## 2023-03-12 RX ADMIN — Medication 650 MILLIGRAM(S): at 03:00

## 2023-03-12 RX ADMIN — SIMVASTATIN 40 MILLIGRAM(S): 20 TABLET, FILM COATED ORAL at 20:58

## 2023-03-12 RX ADMIN — CEFEPIME 100 MILLIGRAM(S): 1 INJECTION, POWDER, FOR SOLUTION INTRAMUSCULAR; INTRAVENOUS at 05:54

## 2023-03-12 NOTE — PROGRESS NOTE ADULT - SUBJECTIVE AND OBJECTIVE BOX
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   Decadron started on 3/3, Cytoxan and VCR started on 3/4    Day: 9     Pt endorsed:    Review of Systems:     Pain scale:     Diet: DASH/TLC    Allergies: sulfa drugs (Unknown)    ANTIMICROBIALS  acyclovir   Oral Tab/Cap 400 milliGRAM(s) Oral two times a day     caspofungin IVPB 50 milliGRAM(s) IV Intermittent every 24 hours  cefepime   IVPB 2000 milliGRAM(s) IV Intermittent every 8 hours      HEME/ONC MEDICATIONS  methotrexate PF IntraThecal (eMAR) 15 milliGRAM(s) IntraThecal once      STANDING MEDICATIONS  Biotene Dry Mouth Oral Rinse 5 milliLiter(s) Swish and Spit five times a day  brimonidine 0.2% Ophthalmic Solution 1 Drop(s) Both EYES daily  chlorhexidine 4% Liquid 1 Application(s) Topical <User Schedule>  escitalopram 10 milliGRAM(s) Oral daily  gabapentin 300 milliGRAM(s) Oral every 8 hours  losartan 100 milliGRAM(s) Oral daily  pantoprazole    Tablet 40 milliGRAM(s) Oral before breakfast  simvastatin 40 milliGRAM(s) Oral at bedtime  sodium chloride 0.9%. 1000 milliLiter(s) IV Continuous <Continuous>  sucralfate suspension 1 Gram(s) Oral every 6 hours      PRN MEDICATIONS  acetaminophen     Tablet .. 650 milliGRAM(s) Oral every 6 hours PRN  aluminum hydroxide/magnesium hydroxide/simethicone Suspension 30 milliLiter(s) Oral every 4 hours PRN  diphenhydrAMINE 25 milliGRAM(s) Oral every 4 hours PRN  FIRST- Mouthwash  BLM 10 milliLiter(s) Swish and Spit four times a day PRN  metoclopramide Injectable 10 milliGRAM(s) IV Push every 6 hours PRN  ondansetron Injectable 8 milliGRAM(s) IV Push every 8 hours PRN  polyethylene glycol 3350 17 Gram(s) Oral two times a day PRN      Vital Signs Last 24 Hrs  T(C): 36.4 (12 Mar 2023 05:46), Max: 37 (11 Mar 2023 21:04)  T(F): 97.6 (12 Mar 2023 05:46), Max: 98.6 (11 Mar 2023 21:04)  HR: 89 (12 Mar 2023 05:46) (63 - 89)  BP: 144/73 (12 Mar 2023 05:46) (108/67 - 144/73)  RR: 18 (12 Mar 2023 05:46) (18 - 18)  SpO2: 95% (12 Mar 2023 05:46) (94% - 98%)    Parameters below as of 12 Mar 2023 05:46  Patient On (Oxygen Delivery Method): room air        PHYSICAL EXAM  General:  NAD  HEENT: clear oropharynx, anicteric sclera  Neck: supple  CV: normal S1/S2 RRR  Lungs: positive air movement b/l ant lungs,clear to auscultation, no wheezes, no rales  Abdomen: soft non-tender non-distended, +BS  Ext: no  edema  Skin: no rashes   Neuro: alert and oriented X 3  Central Line: PICC    Cultures:  Culture - Urine (03.03.23 @ 06:58)    Specimen Source: Clean Catch Clean Catch (Midstream)    Culture Results:   No growth    Culture - Blood (03.03.23 @ 02:14)    Specimen Source: .Blood Blood-Peripheral    Culture Results:   No Growth Final    Culture - Blood (03.03.23 @ 02:14)    Specimen Source: .Blood Blood-Peripheral    Culture Results:   No Growth Final    LABS:                                         RADIOLOGY & ADDITIONAL STUDIES:  from: CT Abdomen and Pelvis w/ IV Cont (03.01.23 @ 20:02)   FINDINGS:  LOWER CHEST: Trace left pleural effusion with left lower lobe and   lingular subsegmental atelectasis. Small fat-containing left Bochdalek   hernia. Right lateral chest wall lymph nodes measure up to 7 mm in short   axis.    LIVER: Within normal limits.  BILE DUCTS: Normal caliber.  GALLBLADDER: Within normal limits.  SPLEEN: Splenomegaly with a craniocaudad length of 15.5 cm. Peripheral   linear hypoattenuation in the inferior spleen compatible with   age-indeterminate infarct..  PANCREAS: Within normal limits.  ADRENALS: Within normal limits.  KIDNEYS/URETERS: Within normal limits.    BLADDER: Within normal limits.  REPRODUCTIVE ORGANS: Uterus and adnexa within normal limits.    BOWEL: No bowel obstruction. Sigmoid diverticulosis.  PERITONEUM: No ascites.  VESSELS: Atherosclerotic changes.  RETROPERITONEUM/LYMPH NODES: No lymphadenopathy.  ABDOMINAL WALL: Tiny fat-containing umbilical hernia..  BONES: Degenerative changes.    IMPRESSION:  Splenomegaly with a small age indeterminant splenic infarct.  No abdominal or pelvic lymphadenopathy.  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   Decadron started on 3/3, Cytoxan and VCR started on 3/4    Day: 9     Pt endorsed: reflux burning resolved    Review of Systems: denies chest pain, sob, n/v/d    Pain scale: 0    Diet: DASH/TLC    Allergies: sulfa drugs (Unknown)    ANTIMICROBIALS  acyclovir   Oral Tab/Cap 400 milliGRAM(s) Oral two times a day     caspofungin IVPB 50 milliGRAM(s) IV Intermittent every 24 hours  cefepime   IVPB 2000 milliGRAM(s) IV Intermittent every 8 hours      HEME/ONC MEDICATIONS  methotrexate PF IntraThecal (eMAR) 15 milliGRAM(s) IntraThecal once      STANDING MEDICATIONS  Biotene Dry Mouth Oral Rinse 5 milliLiter(s) Swish and Spit five times a day  brimonidine 0.2% Ophthalmic Solution 1 Drop(s) Both EYES daily  chlorhexidine 4% Liquid 1 Application(s) Topical <User Schedule>  escitalopram 10 milliGRAM(s) Oral daily  gabapentin 300 milliGRAM(s) Oral every 8 hours  losartan 100 milliGRAM(s) Oral daily  pantoprazole    Tablet 40 milliGRAM(s) Oral before breakfast  simvastatin 40 milliGRAM(s) Oral at bedtime  sodium chloride 0.9%. 1000 milliLiter(s) IV Continuous <Continuous>  sucralfate suspension 1 Gram(s) Oral every 6 hours      PRN MEDICATIONS  acetaminophen     Tablet .. 650 milliGRAM(s) Oral every 6 hours PRN  aluminum hydroxide/magnesium hydroxide/simethicone Suspension 30 milliLiter(s) Oral every 4 hours PRN  diphenhydrAMINE 25 milliGRAM(s) Oral every 4 hours PRN  FIRST- Mouthwash  BLM 10 milliLiter(s) Swish and Spit four times a day PRN  metoclopramide Injectable 10 milliGRAM(s) IV Push every 6 hours PRN  ondansetron Injectable 8 milliGRAM(s) IV Push every 8 hours PRN  polyethylene glycol 3350 17 Gram(s) Oral two times a day PRN      Vital Signs Last 24 Hrs  T(C): 36.4 (12 Mar 2023 05:46), Max: 37 (11 Mar 2023 21:04)  T(F): 97.6 (12 Mar 2023 05:46), Max: 98.6 (11 Mar 2023 21:04)  HR: 89 (12 Mar 2023 05:46) (63 - 89)  BP: 144/73 (12 Mar 2023 05:46) (108/67 - 144/73)  RR: 18 (12 Mar 2023 05:46) (18 - 18)  SpO2: 95% (12 Mar 2023 05:46) (94% - 98%)    Parameters below as of 12 Mar 2023 05:46  Patient On (Oxygen Delivery Method): room air    PHYSICAL EXAM  General:  NAD  HEENT: clear oropharynx, anicteric sclera  Neck: supple  CV: normal S1/S2 RRR  Lungs: positive air movement b/l ant lungs,clear to auscultation, no wheezes, no rales  Abdomen: soft non-tender non-distended, +BS  Ext: no  edema  Skin: no rashes   Neuro: alert and oriented X 3  Central Line: PICC    Cultures:  Culture - Urine (03.03.23 @ 06:58)    Specimen Source: Clean Catch Clean Catch (Midstream)    Culture Results:   No growth    Culture - Blood (03.03.23 @ 02:14)    Specimen Source: .Blood Blood-Peripheral    Culture Results:   No Growth Final    Culture - Blood (03.03.23 @ 02:14)    Specimen Source: .Blood Blood-Peripheral    Culture Results:   No Growth Final    LABS:                      7.0    0.28  )-----------( 9        ( 12 Mar 2023 06:58 )             20.8     12 Mar 2023 07:00    141    |  105    |  13     ----------------------------<  99     4.2     |  26     |  0.77     Ca    9.0        12 Mar 2023 07:00  Phos  2.8       12 Mar 2023 07:00  Mg     2.2       12 Mar 2023 07:00    TPro  5.9    /  Alb  3.4    /  TBili  0.5    /  DBili  x      /  AST  16     /  ALT  24     /  AlkPhos  102    12 Mar 2023 07:00    PT/INR - ( 12 Mar 2023 07:00 )   PT: 12.2 sec;   INR: 1.06 ratio    PTT - ( 12 Mar 2023 07:00 )  PTT:27.1 sec    LIVER FUNCTIONS - ( 12 Mar 2023 07:00 )  Alb: 3.4 g/dL / Pro: 5.9 g/dL / ALK PHOS: 102 U/L / ALT: 24 U/L / AST: 16 U/L / GGT: x           RADIOLOGY & ADDITIONAL STUDIES:  from: CT Abdomen and Pelvis w/ IV Cont (03.01.23 @ 20:02)   FINDINGS:  LOWER CHEST: Trace left pleural effusion with left lower lobe and   lingular subsegmental atelectasis. Small fat-containing left Bochdalek   hernia. Right lateral chest wall lymph nodes measure up to 7 mm in short   axis.    LIVER: Within normal limits.  BILE DUCTS: Normal caliber.  GALLBLADDER: Within normal limits.  SPLEEN: Splenomegaly with a craniocaudad length of 15.5 cm. Peripheral   linear hypoattenuation in the inferior spleen compatible with   age-indeterminate infarct..  PANCREAS: Within normal limits.  ADRENALS: Within normal limits.  KIDNEYS/URETERS: Within normal limits.    BLADDER: Within normal limits.  REPRODUCTIVE ORGANS: Uterus and adnexa within normal limits.    BOWEL: No bowel obstruction. Sigmoid diverticulosis.  PERITONEUM: No ascites.  VESSELS: Atherosclerotic changes.  RETROPERITONEUM/LYMPH NODES: No lymphadenopathy.  ABDOMINAL WALL: Tiny fat-containing umbilical hernia..  BONES: Degenerative changes.    IMPRESSION:  Splenomegaly with a small age indeterminant splenic infarct.  No abdominal or pelvic lymphadenopathy.  Trace left pleural effusion

## 2023-03-12 NOTE — CHART NOTE - NSCHARTNOTEFT_GEN_A_CORE
Medicine PA Episodic Note    Patient is a 63y old  Female who presents with a chief complaint of ro leukemia (12 Mar 2023 07:24)    Notified by RN of pt. having HA. Pt. seen and examined at bedside, resting, AOx3, in NAD. Upon questioning, pt. states that she's been having the headache since her last Tylenol dose. Pt. states the headache hasn't been relieved w/ Tylenol. Pt. describes it as a throbbing headache occasionally radiating to the back of her neck. Pt. rates it as a 6/10 on pain scale. Pt. states she's likely had headaches like this in the past. Additionally c/o slight numbness/tingling in lower extremities which pt. states is not new and is attributing it to chemotherapy. Pt. denies lightheadedness, dizziness, blurry vision, vision changes, NVD or any other acute symptoms.     Vital Signs Last 24 Hrs  T(C): 36.6 (12 Mar 2023 18:16), Max: 36.7 (12 Mar 2023 01:25)  T(F): 97.9 (12 Mar 2023 18:16), Max: 98 (12 Mar 2023 01:25)  HR: 80 (12 Mar 2023 18:16) (71 - 89)  BP: 115/63 (12 Mar 2023 18:16) (106/64 - 144/73)  BP(mean): --  RR: 18 (12 Mar 2023 18:16) (18 - 18)  SpO2: 95% (12 Mar 2023 18:16) (95% - 97%)    Parameters below as of 12 Mar 2023 18:16  Patient On (Oxygen Delivery Method): room air          Labs:                          x      x     )-----------( 25       ( 12 Mar 2023 11:49 )             x        03-12    141  |  105  |  13  ----------------------------<  99  4.2   |  26  |  0.77    Ca    9.0      12 Mar 2023 07:00  Phos  2.8     03-12  Mg     2.2     03-12    TPro  5.9<L>  /  Alb  3.4  /  TBili  0.5  /  DBili  x   /  AST  16  /  ALT  24  /  AlkPhos  102  03-12        Radiology:    Physical Exam:  General: WN/WD NAD  Neurology: A&Ox3, nonfocal, JACKSON x 4, PERRLA, EOMI, cranial nerves intact  Head:  Normocephalic, atraumatic  MSK: No edema, + peripheral pulses, FROM all 4 extremity, + sensation in extremities, + b/l  strength    Assessment & Plan:  HPI:  64 y/o with PMHx of HTN, HLD presents to the ED BIBA transferred from [facility] for new diagnosis of leukemia. Patient states that she woke up today with left sided chest pain and the feeling of shortness of breath. She states that she has never had these symptoms before. The shortness of breath was worse when she exerted herself. She states that she went to the emergency department and was told that her labs were consistent with leukemia. She has never had this diagnosis before. She denies any headache, fever, chills, cough, n/v/d. (24 Feb 2023 21:16)    Now w/ headache.     Plan:  #Headache  - C/W Tylenol PRN  - Likely tension headache. No visual changes, no fever, no photophobia   - Consider CTH if pt.'s symptoms persist or worsen  - Oxycodone 5 mg IR x 1 dose  - Continue Tylenol PRN for headache  - Fall precaution      Follow up with Attending in AM.    Jason Finney PA-C  Department of Medicine  [facility] [id number] Notify of normal xray

## 2023-03-12 NOTE — PROGRESS NOTE ADULT - NS ATTEND AMEND GEN_ALL_CORE FT
63 year old with HLD and HTN, transferred b/c of circulating blasts consistent with acute leukemia. PB flow cytometry showing B-ALL  FISH negative for Ph chromosome. PCR negative (although p190 positive 0.001% from marrow)  BMbx done 2/28 with IR (was unable to get at bedside) -B-lymphoblastic leukemia/lymphoma.  Echo done EF 55%  PICC line placed  3/4: started mini HyperCVAD -Today is C1A D8  LP with IT MTX done on 2/28 -negative for malignant cells  Transaminitis -US done at [facility] with fatty liver. Cont to monitor  CT A/P -Splenomegaly with a small age indeterminant splenic infarct. Normal liver, no abdominal or pelvic lymphadenopathy. CTA done at [facility] w/ shotty LAD  Receiving transfusions for plts <10, Hgb <7  Febrile to 101.7 on 3/3 -levaquin changed to cefepime; on caspofungin, acyclovir  supportive care 63 year old with HLD and HTN, transferred b/c of circulating blasts consistent with acute leukemia. PB flow cytometry showing B-ALL  FISH negative for Ph chromosome. PCR negative (although p190 positive 0.001% from marrow)  BMbx done 2/28 with IR (was unable to get at bedside) -B-lymphoblastic leukemia/lymphoma.  Echo done EF 55%  PICC line placed  3/4: started mini HyperCVAD -Today is C1A D9  LP with IT MTX done on 2/28 -negative for malignant cells  Transaminitis -US done at [place] with fatty liver. Cont to monitor  CT A/P -Splenomegaly with a small age indeterminant splenic infarct. Normal liver, no abdominal or pelvic lymphadenopathy. CTA done at [place] w/ shotty LAD  Receiving transfusions for plts <10, Hgb <7  Febrile to 101.7 on 3/3 -levaquin changed to cefepime; on caspofungin, acyclovir  supportive care

## 2023-03-12 NOTE — PROGRESS NOTE ADULT - PROBLEM SELECTOR PLAN 6
Holding pharmacologic DVT ppx in the setting of thrombocytopenia  encourage OOB and ambulation  + carafate to GI regimen for GERD

## 2023-03-12 NOTE — PROGRESS NOTE ADULT - ASSESSMENT
Ms. Kaur is a 64 y/o with PMHx of HTN, HLD presents to the ED BIBA transferred from [facility] for new diagnosis of leukemia, peripheral blood flow cytometry c/w B-ALL. Official bone marrow biopsy 2/28 c/w B-cell ALL, [place] chromosome (-).  Chemotherapy  with reduced dose HyperCVAD started on 3/3/23. Hospital course c/b neutropenic fever, transaminitis, rash 3/4 improved with topical steroid and atarax PRN,  and LUE cellulitis. Patient has pancytopenia  due to disease and treatment.

## 2023-03-12 NOTE — PROGRESS NOTE ADULT - PROBLEM SELECTOR PLAN 1
2/28 s/p BMbx in IR due to body habitus, c/w B-ALL, [place] chromosome (-)  BCR/ABL PCR sent 3/2 - NEG  Also sent to [facility]  Hepatitis Panel Neg. Hep B Core neg. HIV negative.  Transfuse PRN. maintain Hgb >7, plts >15.  Gabapentin for splenic/side pain. Increased dose to 300mg PO TID on 3/2   2/28 s/p LP with IT MTX, flow - NEG for malignant cells  2/25 - CTA from [facility] (-) for PE. Left sided pain likely due to splenomegaly.  PICC line at bedside 2/28  3/4 Chemo with reduced dose HyperCVAD started, Decadron started on 3/3, Cytoxan and VCR started on 3/4~1am per chemo RN  3/11 s/p D8 VCR, normal bowel movements, no neuropathy  - need to discuss timing of next  bone marrow biopsy / cycle 1B IV methotrexate/cytarabine

## 2023-03-13 LAB
ALBUMIN SERPL ELPH-MCNC: 3.4 G/DL — SIGNIFICANT CHANGE UP (ref 3.3–5)
ALP SERPL-CCNC: 95 U/L — SIGNIFICANT CHANGE UP (ref 40–120)
ALT FLD-CCNC: 22 U/L — SIGNIFICANT CHANGE UP (ref 10–45)
ANION GAP SERPL CALC-SCNC: 8 MMOL/L — SIGNIFICANT CHANGE UP (ref 5–17)
APTT BLD: 26.5 SEC — LOW (ref 27.5–35.5)
AST SERPL-CCNC: 18 U/L — SIGNIFICANT CHANGE UP (ref 10–40)
BILIRUB SERPL-MCNC: 0.5 MG/DL — SIGNIFICANT CHANGE UP (ref 0.2–1.2)
BLD GP AB SCN SERPL QL: NEGATIVE — SIGNIFICANT CHANGE UP
BUN SERPL-MCNC: 14 MG/DL — SIGNIFICANT CHANGE UP (ref 7–23)
CALCIUM SERPL-MCNC: 8.7 MG/DL — SIGNIFICANT CHANGE UP (ref 8.4–10.5)
CHLORIDE SERPL-SCNC: 103 MMOL/L — SIGNIFICANT CHANGE UP (ref 96–108)
CHROM ANALY OVERALL INTERP SPEC-IMP: SIGNIFICANT CHANGE UP
CO2 SERPL-SCNC: 27 MMOL/L — SIGNIFICANT CHANGE UP (ref 22–31)
CREAT SERPL-MCNC: 0.74 MG/DL — SIGNIFICANT CHANGE UP (ref 0.5–1.3)
D DIMER BLD IA.RAPID-MCNC: 390 NG/ML DDU — HIGH
EGFR: 90 ML/MIN/1.73M2 — SIGNIFICANT CHANGE UP
FIBRINOGEN PPP-MCNC: 366 MG/DL — SIGNIFICANT CHANGE UP (ref 200–445)
GLUCOSE SERPL-MCNC: 108 MG/DL — HIGH (ref 70–99)
HCT VFR BLD CALC: 18.3 % — CRITICAL LOW (ref 34.5–45)
HGB BLD-MCNC: 6.1 G/DL — CRITICAL LOW (ref 11.5–15.5)
INR BLD: 1.07 RATIO — SIGNIFICANT CHANGE UP (ref 0.88–1.16)
LDH SERPL L TO P-CCNC: 205 U/L — SIGNIFICANT CHANGE UP (ref 50–242)
MAGNESIUM SERPL-MCNC: 2.1 MG/DL — SIGNIFICANT CHANGE UP (ref 1.6–2.6)
MCHC RBC-ENTMCNC: 29.6 PG — SIGNIFICANT CHANGE UP (ref 27–34)
MCHC RBC-ENTMCNC: 33.3 GM/DL — SIGNIFICANT CHANGE UP (ref 32–36)
MCV RBC AUTO: 88.8 FL — SIGNIFICANT CHANGE UP (ref 80–100)
NRBC # BLD: 0 /100 WBCS — SIGNIFICANT CHANGE UP (ref 0–0)
PHOSPHATE SERPL-MCNC: 2.9 MG/DL — SIGNIFICANT CHANGE UP (ref 2.5–4.5)
PLATELET # BLD AUTO: 20 K/UL — CRITICAL LOW (ref 150–400)
POTASSIUM SERPL-MCNC: 3.9 MMOL/L — SIGNIFICANT CHANGE UP (ref 3.5–5.3)
POTASSIUM SERPL-SCNC: 3.9 MMOL/L — SIGNIFICANT CHANGE UP (ref 3.5–5.3)
PROT SERPL-MCNC: 5.9 G/DL — LOW (ref 6–8.3)
PROTHROM AB SERPL-ACNC: 12.3 SEC — SIGNIFICANT CHANGE UP (ref 10.5–13.4)
RBC # BLD: 2.06 M/UL — LOW (ref 3.8–5.2)
RBC # FLD: 13.1 % — SIGNIFICANT CHANGE UP (ref 10.3–14.5)
RH IG SCN BLD-IMP: POSITIVE — SIGNIFICANT CHANGE UP
SODIUM SERPL-SCNC: 138 MMOL/L — SIGNIFICANT CHANGE UP (ref 135–145)
URATE SERPL-MCNC: 2.3 MG/DL — LOW (ref 2.5–7)
WBC # BLD: 0.21 K/UL — CRITICAL LOW (ref 3.8–10.5)
WBC # FLD AUTO: 0.21 K/UL — CRITICAL LOW (ref 3.8–10.5)

## 2023-03-13 PROCEDURE — 99233 SBSQ HOSP IP/OBS HIGH 50: CPT

## 2023-03-13 RX ORDER — SENNA PLUS 8.6 MG/1
2 TABLET ORAL ONCE
Refills: 0 | Status: COMPLETED | OUTPATIENT
Start: 2023-03-13 | End: 2023-03-13

## 2023-03-13 RX ORDER — DEXAMETHASONE 0.5 MG/5ML
20 ELIXIR ORAL EVERY 24 HOURS
Refills: 0 | Status: DISCONTINUED | OUTPATIENT
Start: 2023-03-14 | End: 2023-03-16

## 2023-03-13 RX ORDER — FILGRASTIM 480MCG/1.6
480 VIAL (ML) INJECTION DAILY
Refills: 0 | Status: DISCONTINUED | OUTPATIENT
Start: 2023-03-13 | End: 2023-03-16

## 2023-03-13 RX ADMIN — SIMVASTATIN 40 MILLIGRAM(S): 20 TABLET, FILM COATED ORAL at 20:50

## 2023-03-13 RX ADMIN — Medication 1 GRAM(S): at 11:08

## 2023-03-13 RX ADMIN — ONDANSETRON 8 MILLIGRAM(S): 8 TABLET, FILM COATED ORAL at 05:23

## 2023-03-13 RX ADMIN — Medication 5 MILLILITER(S): at 17:58

## 2023-03-13 RX ADMIN — CHLORHEXIDINE GLUCONATE 1 APPLICATION(S): 213 SOLUTION TOPICAL at 08:49

## 2023-03-13 RX ADMIN — Medication 1 GRAM(S): at 23:23

## 2023-03-13 RX ADMIN — GABAPENTIN 300 MILLIGRAM(S): 400 CAPSULE ORAL at 05:22

## 2023-03-13 RX ADMIN — Medication 400 MILLIGRAM(S): at 05:22

## 2023-03-13 RX ADMIN — ONDANSETRON 8 MILLIGRAM(S): 8 TABLET, FILM COATED ORAL at 18:56

## 2023-03-13 RX ADMIN — Medication 5 MILLILITER(S): at 20:50

## 2023-03-13 RX ADMIN — GABAPENTIN 300 MILLIGRAM(S): 400 CAPSULE ORAL at 13:14

## 2023-03-13 RX ADMIN — Medication 480 MICROGRAM(S): at 11:07

## 2023-03-13 RX ADMIN — Medication 650 MILLIGRAM(S): at 21:40

## 2023-03-13 RX ADMIN — Medication 5 MILLILITER(S): at 08:48

## 2023-03-13 RX ADMIN — SENNA PLUS 2 TABLET(S): 8.6 TABLET ORAL at 21:34

## 2023-03-13 RX ADMIN — Medication 1 GRAM(S): at 05:22

## 2023-03-13 RX ADMIN — CASPOFUNGIN ACETATE 260 MILLIGRAM(S): 7 INJECTION, POWDER, LYOPHILIZED, FOR SOLUTION INTRAVENOUS at 11:46

## 2023-03-13 RX ADMIN — Medication 650 MILLIGRAM(S): at 06:15

## 2023-03-13 RX ADMIN — ESCITALOPRAM OXALATE 10 MILLIGRAM(S): 10 TABLET, FILM COATED ORAL at 11:08

## 2023-03-13 RX ADMIN — CEFEPIME 100 MILLIGRAM(S): 1 INJECTION, POWDER, FOR SOLUTION INTRAMUSCULAR; INTRAVENOUS at 13:14

## 2023-03-13 RX ADMIN — LOSARTAN POTASSIUM 100 MILLIGRAM(S): 100 TABLET, FILM COATED ORAL at 05:22

## 2023-03-13 RX ADMIN — Medication 650 MILLIGRAM(S): at 05:21

## 2023-03-13 RX ADMIN — Medication 5 MILLILITER(S): at 11:06

## 2023-03-13 RX ADMIN — GABAPENTIN 300 MILLIGRAM(S): 400 CAPSULE ORAL at 21:36

## 2023-03-13 RX ADMIN — CEFEPIME 100 MILLIGRAM(S): 1 INJECTION, POWDER, FOR SOLUTION INTRAMUSCULAR; INTRAVENOUS at 05:22

## 2023-03-13 RX ADMIN — Medication 650 MILLIGRAM(S): at 13:45

## 2023-03-13 RX ADMIN — PANTOPRAZOLE SODIUM 40 MILLIGRAM(S): 20 TABLET, DELAYED RELEASE ORAL at 06:39

## 2023-03-13 RX ADMIN — BRIMONIDINE TARTRATE 1 DROP(S): 2 SOLUTION/ DROPS OPHTHALMIC at 11:07

## 2023-03-13 RX ADMIN — Medication 650 MILLIGRAM(S): at 13:14

## 2023-03-13 RX ADMIN — Medication 1 GRAM(S): at 17:58

## 2023-03-13 RX ADMIN — Medication 400 MILLIGRAM(S): at 17:58

## 2023-03-13 RX ADMIN — Medication 650 MILLIGRAM(S): at 20:54

## 2023-03-13 RX ADMIN — Medication 5 MILLILITER(S): at 23:24

## 2023-03-13 RX ADMIN — CEFEPIME 100 MILLIGRAM(S): 1 INJECTION, POWDER, FOR SOLUTION INTRAMUSCULAR; INTRAVENOUS at 21:36
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
## 2023-03-13 NOTE — PROGRESS NOTE ADULT - ASSESSMENT
Ms. Kaur is a 64 y/o with PMHx of HTN, HLD presents to the ED BIBA transferred from [facility] for new diagnosis of leukemia, peripheral blood flow cytometry c/w B-ALL. Official bone marrow biopsy 2/28 c/w B-cell ALL, [place] chromosome (-).  Chemotherapy  with reduced dose HyperCVAD started on 3/3/23. Hospital course c/b neutropenic fever, transaminitis, rash 3/4 improved with topical steroid and atarax PRN,  and LUE cellulitis. Patient has pancytopenia  due to disease and treatment.

## 2023-03-13 NOTE — PROGRESS NOTE ADULT - PROBLEM SELECTOR PLAN 5
Monitor closely   3/1 CT abd/pelvis: Splenomegaly with a small age indeterminant splenic infarct. No abdominal or pelvic lymphadenopathy. Trace left pleural effusion  3/9-Resolved Monitor closely-improving   3/1 CT abd/pelvis: Splenomegaly with a small age indeterminant splenic infarct. No abdominal or pelvic lymphadenopathy. Trace left pleural effusion  3/9-Resolved

## 2023-03-13 NOTE — PROGRESS NOTE ADULT - SUBJECTIVE AND OBJECTIVE BOX
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   Decadron started on 3/3, Cytoxan and VCR started on 3/4    Day: 10     Pt endorsed:     Review of Systems: denies chest pain, sob, n/v/d    Pain scale: 0    Diet: DASH/TLC  Allergies    sulfa drugs (Unknown)    Intolerances    ANTIMICROBIALS  acyclovir   Oral Tab/Cap 400 milliGRAM(s) Oral two times a day  caspofungin IVPB 50 milliGRAM(s) IV Intermittent every 24 hours  caspofungin IVPB      cefepime   IVPB 2000 milliGRAM(s) IV Intermittent every 8 hours      HEME/ONC MEDICATIONS  methotrexate PF IntraThecal (eMAR) 15 milliGRAM(s) IntraThecal once      STANDING MEDICATIONS  Biotene Dry Mouth Oral Rinse 5 milliLiter(s) Swish and Spit five times a day  brimonidine 0.2% Ophthalmic Solution 1 Drop(s) Both EYES daily  chlorhexidine 4% Liquid 1 Application(s) Topical <User Schedule>  escitalopram 10 milliGRAM(s) Oral daily  gabapentin 300 milliGRAM(s) Oral every 8 hours  losartan 100 milliGRAM(s) Oral daily  pantoprazole    Tablet 40 milliGRAM(s) Oral before breakfast  simvastatin 40 milliGRAM(s) Oral at bedtime  sodium chloride 0.9%. 1000 milliLiter(s) IV Continuous <Continuous>  sucralfate suspension 1 Gram(s) Oral every 6 hours      PRN MEDICATIONS  acetaminophen     Tablet .. 650 milliGRAM(s) Oral every 6 hours PRN  aluminum hydroxide/magnesium hydroxide/simethicone Suspension 30 milliLiter(s) Oral every 4 hours PRN  diphenhydrAMINE 25 milliGRAM(s) Oral every 4 hours PRN  FIRST- Mouthwash  BLM 10 milliLiter(s) Swish and Spit four times a day PRN  metoclopramide Injectable 10 milliGRAM(s) IV Push every 6 hours PRN  ondansetron Injectable 8 milliGRAM(s) IV Push every 8 hours PRN  polyethylene glycol 3350 17 Gram(s) Oral two times a day PRN        Vital Signs Last 24 Hrs  T(C): 36.7 (13 Mar 2023 05:08), Max: 36.9 (13 Mar 2023 01:06)  T(F): 98.1 (13 Mar 2023 05:08), Max: 98.4 (13 Mar 2023 01:06)  HR: 78 (13 Mar 2023 05:08) (71 - 80)  BP: 129/74 (13 Mar 2023 05:08) (106/64 - 129/75)  BP(mean): --  RR: 18 (13 Mar 2023 05:08) (18 - 18)  SpO2: 95% (13 Mar 2023 05:08) (95% - 97%)    Parameters below as of 13 Mar 2023 05:08  Patient On (Oxygen Delivery Method): room air        PHYSICAL EXAM  General: NAD  HEENT: PERRLA, EOMOI, clear oropharynx, anicteric sclera, pink conjunctiva  Neck: supple  CV: (+) S1/S2 RRR  Lungs: clear to auscultation, no wheezes or rales  Abdomen: soft, non-tender, non-distended (+) BS  Ext: no clubbing, cyanosis or edema  Skin: no rashes and no petechiae  Neuro: alert and oriented X 3, no focal deficits  Central Line: PICC c/d/i       LABS:                        6.1    0.21  )-----------( 20       ( 13 Mar 2023 06:43 )             18.3         Mean Cell Volume : 88.8 fl  Mean Cell Hemoglobin : 29.6 pg  Mean Cell Hemoglobin Concentration : 33.3 gm/dL  Auto Neutrophil # : x  Auto Lymphocyte # : x  Auto Monocyte # : x  Auto Eosinophil # : x  Auto Basophil # : x  Auto Neutrophil % : x  Auto Lymphocyte % : x  Auto Monocyte % : x  Auto Eosinophil % : x  Auto Basophil % : x      03-13    138  |  103  |  14  ----------------------------<  108<H>  3.9   |  27  |  0.74    Ca    8.7      13 Mar 2023 06:43  Phos  2.9     03-13  Mg     2.1     03-13    TPro  5.9<L>  /  Alb  3.4  /  TBili  0.5  /  DBili  x   /  AST  18  /  ALT  22  /  AlkPhos  95  03-13      Mg 2.1  Phos 2.9      PT/INR - ( 13 Mar 2023 06:44 )   PT: 12.3 sec;   INR: 1.07 ratio    PTT - ( 13 Mar 2023 06:44 )  PTT:26.5 sec      Uric Acid 2.3               Diagnosis: B- ALL, [place]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   Decadron started on 3/3, Cytoxan and VCR started on 3/4    Day: 10     Pt endorsed: headache and nausea overnight both relieved with meds cont to have mild headache this am     Review of Systems: denies chest pain, sob, v/d, no visual change     Pain scale: 2/10 headache     Diet: DASH/TLC  Allergies    sulfa drugs (Unknown)    Intolerances    ANTIMICROBIALS  acyclovir   Oral Tab/Cap 400 milliGRAM(s) Oral two times a day  caspofungin IVPB 50 milliGRAM(s) IV Intermittent every 24 hours  caspofungin IVPB      cefepime   IVPB 2000 milliGRAM(s) IV Intermittent every 8 hours      HEME/ONC MEDICATIONS  methotrexate PF IntraThecal (eMAR) 15 milliGRAM(s) IntraThecal once      STANDING MEDICATIONS  Biotene Dry Mouth Oral Rinse 5 milliLiter(s) Swish and Spit five times a day  brimonidine 0.2% Ophthalmic Solution 1 Drop(s) Both EYES daily  chlorhexidine 4% Liquid 1 Application(s) Topical <User Schedule>  escitalopram 10 milliGRAM(s) Oral daily  gabapentin 300 milliGRAM(s) Oral every 8 hours  losartan 100 milliGRAM(s) Oral daily  pantoprazole    Tablet 40 milliGRAM(s) Oral before breakfast  simvastatin 40 milliGRAM(s) Oral at bedtime  sodium chloride 0.9%. 1000 milliLiter(s) IV Continuous <Continuous>  sucralfate suspension 1 Gram(s) Oral every 6 hours      PRN MEDICATIONS  acetaminophen     Tablet .. 650 milliGRAM(s) Oral every 6 hours PRN  aluminum hydroxide/magnesium hydroxide/simethicone Suspension 30 milliLiter(s) Oral every 4 hours PRN  diphenhydrAMINE 25 milliGRAM(s) Oral every 4 hours PRN  FIRST- Mouthwash  BLM 10 milliLiter(s) Swish and Spit four times a day PRN  metoclopramide Injectable 10 milliGRAM(s) IV Push every 6 hours PRN  ondansetron Injectable 8 milliGRAM(s) IV Push every 8 hours PRN  polyethylene glycol 3350 17 Gram(s) Oral two times a day PRN        Vital Signs Last 24 Hrs  T(C): 36.7 (13 Mar 2023 05:08), Max: 36.9 (13 Mar 2023 01:06)  T(F): 98.1 (13 Mar 2023 05:08), Max: 98.4 (13 Mar 2023 01:06)  HR: 78 (13 Mar 2023 05:08) (71 - 80)  BP: 129/74 (13 Mar 2023 05:08) (106/64 - 129/75)  BP(mean): --  RR: 18 (13 Mar 2023 05:08) (18 - 18)  SpO2: 95% (13 Mar 2023 05:08) (95% - 97%)    Parameters below as of 13 Mar 2023 05:08  Patient On (Oxygen Delivery Method): room air        PHYSICAL EXAM  General: NAD  HEENT: PERRLA, EOMOI, clear oropharynx, anicteric sclera, pink conjunctiva  Neck: supple  CV: (+) S1/S2 RRR  Lungs: clear to auscultation, no wheezes or rales  Abdomen: soft, non-tender, non-distended (+) BS  Ext: no clubbing, cyanosis or edema  Skin: no rashes and no petechiae  Neuro: alert and oriented X 3, no focal deficits  Central Line: PICC c/d/i       LABS:                        6.1    0.21  )-----------( 20       ( 13 Mar 2023 06:43 )             18.3         Mean Cell Volume : 88.8 fl  Mean Cell Hemoglobin : 29.6 pg  Mean Cell Hemoglobin Concentration : 33.3 gm/dL  Auto Neutrophil # : x  Auto Lymphocyte # : x  Auto Monocyte # : x  Auto Eosinophil # : x  Auto Basophil # : x  Auto Neutrophil % : x  Auto Lymphocyte % : x  Auto Monocyte % : x  Auto Eosinophil % : x  Auto Basophil % : x      03-13    138  |  103  |  14  ----------------------------<  108<H>  3.9   |  27  |  0.74    Ca    8.7      13 Mar 2023 06:43  Phos  2.9     03-13  Mg     2.1     03-13    TPro  5.9<L>  /  Alb  3.4  /  TBili  0.5  /  DBili  x   /  AST  18  /  ALT  22  /  AlkPhos  95  03-13      Mg 2.1  Phos 2.9      PT/INR - ( 13 Mar 2023 06:44 )   PT: 12.3 sec;   INR: 1.07 ratio    PTT - ( 13 Mar 2023 06:44 )  PTT:26.5 sec      Uric Acid 2.3

## 2023-03-13 NOTE — PROVIDER CONTACT NOTE (CRITICAL VALUE NOTIFICATION) - RECOMMENDATIONS
1 unit Platelets
1 unit PRBC transfused over 3 hours
Notify NP. One unit platelet transfusion.
Transfuse pt if febrile

## 2023-03-13 NOTE — PROGRESS NOTE ADULT - NS ATTEND AMEND GEN_ALL_CORE FT
63 year old with HLD and HTN, transferred b/c of circulating blasts consistent with acute leukemia. PB flow cytometry showing B-ALL  FISH negative for Ph chromosome. PCR negative (although p190 positive 0.001% from marrow)  BMbx done 2/28 with IR (was unable to get at bedside) -B-lymphoblastic leukemia/lymphoma.  Echo done EF 55%  PICC line placed  3/4: started mini HyperCVAD -Today is C1A D9  LP with IT MTX done on 2/28 -negative for malignant cells  Transaminitis -US done at [place] with fatty liver. Cont to monitor  CT A/P -Splenomegaly with a small age indeterminant splenic infarct. Normal liver, no abdominal or pelvic lymphadenopathy. CTA done at [place] w/ shotty LAD  Receiving transfusions for plts <10, Hgb <7  Febrile to 101.7 on 3/3 -levaquin changed to cefepime; on caspofungin, acyclovir  supportive care 63 year old with HLD and HTN, transferred b/c of circulating blasts consistent with acute leukemia. PB flow cytometry showing B-ALL  FISH negative for Ph chromosome. PCR negative (although p190 positive 0.001% from marrow)  BMbx done 2/28 with IR (was unable to get at bedside) -B-lymphoblastic leukemia/lymphoma.    Heme:  Started mini HyperCVAD on 3/4. Today is cycle 1A D10.   LP with IT MTX done on 2/28 -negative for malignant cells  PICC in place  Receiving transfusions for plts <10, Hgb <7    ID:  Febrile to 101.7 on 3/3 -levaquin changed to cefepime;   On caspofungin and acyclovir for neutropenic ppx.     Cardio:  Echo done EF 55%.    GI:  Transaminitis -US done at [place] with fatty liver. Cont to monitor  CT A/P -Splenomegaly with a small age indeterminant splenic infarct. Normal liver, no abdominal or pelvic lymphadenopathy. CTA done at [place] w/ shotty LAD

## 2023-03-14 LAB
ALBUMIN SERPL ELPH-MCNC: 3.4 G/DL — SIGNIFICANT CHANGE UP (ref 3.3–5)
ALP SERPL-CCNC: 91 U/L — SIGNIFICANT CHANGE UP (ref 40–120)
ALT FLD-CCNC: 19 U/L — SIGNIFICANT CHANGE UP (ref 10–45)
ANION GAP SERPL CALC-SCNC: 8 MMOL/L — SIGNIFICANT CHANGE UP (ref 5–17)
APTT BLD: 27.1 SEC — LOW (ref 27.5–35.5)
AST SERPL-CCNC: 14 U/L — SIGNIFICANT CHANGE UP (ref 10–40)
BILIRUB SERPL-MCNC: 0.8 MG/DL — SIGNIFICANT CHANGE UP (ref 0.2–1.2)
BUN SERPL-MCNC: 12 MG/DL — SIGNIFICANT CHANGE UP (ref 7–23)
CALCIUM SERPL-MCNC: 8.6 MG/DL — SIGNIFICANT CHANGE UP (ref 8.4–10.5)
CHLORIDE SERPL-SCNC: 106 MMOL/L — SIGNIFICANT CHANGE UP (ref 96–108)
CO2 SERPL-SCNC: 26 MMOL/L — SIGNIFICANT CHANGE UP (ref 22–31)
CREAT SERPL-MCNC: 0.72 MG/DL — SIGNIFICANT CHANGE UP (ref 0.5–1.3)
D DIMER BLD IA.RAPID-MCNC: 314 NG/ML DDU — HIGH
EGFR: 93 ML/MIN/1.73M2 — SIGNIFICANT CHANGE UP
FIBRINOGEN PPP-MCNC: 366 MG/DL — SIGNIFICANT CHANGE UP (ref 200–445)
GLUCOSE SERPL-MCNC: 98 MG/DL — SIGNIFICANT CHANGE UP (ref 70–99)
HCT VFR BLD CALC: 18.4 % — CRITICAL LOW (ref 34.5–45)
HGB BLD-MCNC: 6.3 G/DL — CRITICAL LOW (ref 11.5–15.5)
INR BLD: 1.07 RATIO — SIGNIFICANT CHANGE UP (ref 0.88–1.16)
LDH SERPL L TO P-CCNC: 186 U/L — SIGNIFICANT CHANGE UP (ref 50–242)
MAGNESIUM SERPL-MCNC: 2 MG/DL — SIGNIFICANT CHANGE UP (ref 1.6–2.6)
MCHC RBC-ENTMCNC: 29.7 PG — SIGNIFICANT CHANGE UP (ref 27–34)
MCHC RBC-ENTMCNC: 34.2 GM/DL — SIGNIFICANT CHANGE UP (ref 32–36)
MCV RBC AUTO: 86.8 FL — SIGNIFICANT CHANGE UP (ref 80–100)
NRBC # BLD: 0 /100 WBCS — SIGNIFICANT CHANGE UP (ref 0–0)
PHOSPHATE SERPL-MCNC: 2.9 MG/DL — SIGNIFICANT CHANGE UP (ref 2.5–4.5)
PLATELET # BLD AUTO: 18 K/UL — CRITICAL LOW (ref 150–400)
POTASSIUM SERPL-MCNC: 3.8 MMOL/L — SIGNIFICANT CHANGE UP (ref 3.5–5.3)
POTASSIUM SERPL-SCNC: 3.8 MMOL/L — SIGNIFICANT CHANGE UP (ref 3.5–5.3)
PROT SERPL-MCNC: 5.8 G/DL — LOW (ref 6–8.3)
PROTHROM AB SERPL-ACNC: 12.3 SEC — SIGNIFICANT CHANGE UP (ref 10.5–13.4)
RBC # BLD: 2.12 M/UL — LOW (ref 3.8–5.2)
RBC # FLD: 13.2 % — SIGNIFICANT CHANGE UP (ref 10.3–14.5)
SODIUM SERPL-SCNC: 140 MMOL/L — SIGNIFICANT CHANGE UP (ref 135–145)
URATE SERPL-MCNC: 2.1 MG/DL — LOW (ref 2.5–7)
WBC # BLD: 0.16 K/UL — CRITICAL LOW (ref 3.8–10.5)
WBC # FLD AUTO: 0.16 K/UL — CRITICAL LOW (ref 3.8–10.5)

## 2023-03-14 PROCEDURE — 70450 CT HEAD/BRAIN W/O DYE: CPT | Mod: 26

## 2023-03-14 PROCEDURE — 99233 SBSQ HOSP IP/OBS HIGH 50: CPT

## 2023-03-14 RX ORDER — FLUCONAZOLE 150 MG/1
200 TABLET ORAL DAILY
Refills: 0 | Status: DISCONTINUED | OUTPATIENT
Start: 2023-03-14 | End: 2023-03-16

## 2023-03-14 RX ORDER — OXYCODONE HYDROCHLORIDE 5 MG/1
5 TABLET ORAL ONCE
Refills: 0 | Status: DISCONTINUED | OUTPATIENT
Start: 2023-03-14 | End: 2023-03-14

## 2023-03-14 RX ORDER — CHLORHEXIDINE GLUCONATE 213 G/1000ML
1 SOLUTION TOPICAL DAILY
Refills: 0 | Status: DISCONTINUED | OUTPATIENT
Start: 2023-03-14 | End: 2023-03-16

## 2023-03-14 RX ORDER — LACTULOSE 10 G/15ML
10 SOLUTION ORAL EVERY 4 HOURS
Refills: 0 | Status: DISCONTINUED | OUTPATIENT
Start: 2023-03-14 | End: 2023-03-15

## 2023-03-14 RX ADMIN — Medication 480 MICROGRAM(S): at 12:19

## 2023-03-14 RX ADMIN — ONDANSETRON 8 MILLIGRAM(S): 8 TABLET, FILM COATED ORAL at 05:45

## 2023-03-14 RX ADMIN — Medication 400 MILLIGRAM(S): at 05:45

## 2023-03-14 RX ADMIN — Medication 650 MILLIGRAM(S): at 08:58

## 2023-03-14 RX ADMIN — FLUCONAZOLE 200 MILLIGRAM(S): 150 TABLET ORAL at 12:17

## 2023-03-14 RX ADMIN — LOSARTAN POTASSIUM 100 MILLIGRAM(S): 100 TABLET, FILM COATED ORAL at 05:45

## 2023-03-14 RX ADMIN — Medication 400 MILLIGRAM(S): at 17:24

## 2023-03-14 RX ADMIN — LACTULOSE 10 GRAM(S): 10 SOLUTION ORAL at 20:08

## 2023-03-14 RX ADMIN — Medication 1 GRAM(S): at 05:45

## 2023-03-14 RX ADMIN — Medication 1 GRAM(S): at 12:28

## 2023-03-14 RX ADMIN — ESCITALOPRAM OXALATE 10 MILLIGRAM(S): 10 TABLET, FILM COATED ORAL at 12:28

## 2023-03-14 RX ADMIN — Medication 20 MILLIGRAM(S): at 12:17

## 2023-03-14 RX ADMIN — GABAPENTIN 300 MILLIGRAM(S): 400 CAPSULE ORAL at 22:19

## 2023-03-14 RX ADMIN — LACTULOSE 10 GRAM(S): 10 SOLUTION ORAL at 15:34

## 2023-03-14 RX ADMIN — PANTOPRAZOLE SODIUM 40 MILLIGRAM(S): 20 TABLET, DELAYED RELEASE ORAL at 06:47

## 2023-03-14 RX ADMIN — CEFEPIME 100 MILLIGRAM(S): 1 INJECTION, POWDER, FOR SOLUTION INTRAMUSCULAR; INTRAVENOUS at 05:46

## 2023-03-14 RX ADMIN — GABAPENTIN 300 MILLIGRAM(S): 400 CAPSULE ORAL at 15:35

## 2023-03-14 RX ADMIN — Medication 1 GRAM(S): at 22:19

## 2023-03-14 RX ADMIN — Medication 650 MILLIGRAM(S): at 01:56

## 2023-03-14 RX ADMIN — CEFEPIME 100 MILLIGRAM(S): 1 INJECTION, POWDER, FOR SOLUTION INTRAMUSCULAR; INTRAVENOUS at 22:20

## 2023-03-14 RX ADMIN — CHLORHEXIDINE GLUCONATE 1 APPLICATION(S): 213 SOLUTION TOPICAL at 15:36

## 2023-03-14 RX ADMIN — GABAPENTIN 300 MILLIGRAM(S): 400 CAPSULE ORAL at 05:45

## 2023-03-14 RX ADMIN — OXYCODONE HYDROCHLORIDE 5 MILLIGRAM(S): 5 TABLET ORAL at 06:46

## 2023-03-14 RX ADMIN — CEFEPIME 100 MILLIGRAM(S): 1 INJECTION, POWDER, FOR SOLUTION INTRAMUSCULAR; INTRAVENOUS at 15:35

## 2023-03-14 RX ADMIN — SIMVASTATIN 40 MILLIGRAM(S): 20 TABLET, FILM COATED ORAL at 20:13

## 2023-03-14 RX ADMIN — OXYCODONE HYDROCHLORIDE 5 MILLIGRAM(S): 5 TABLET ORAL at 07:46

## 2023-03-14 RX ADMIN — Medication 5 MILLILITER(S): at 08:42

## 2023-03-14 RX ADMIN — CHLORHEXIDINE GLUCONATE 1 APPLICATION(S): 213 SOLUTION TOPICAL at 12:19

## 2023-03-14 RX ADMIN — Medication 5 MILLILITER(S): at 12:17

## 2023-03-14 RX ADMIN — Medication 650 MILLIGRAM(S): at 09:40

## 2023-03-14 RX ADMIN — Medication 1 GRAM(S): at 17:24

## 2023-03-14 RX ADMIN — Medication 650 MILLIGRAM(S): at 03:00

## 2023-03-14 RX ADMIN — Medication 5 MILLILITER(S): at 15:35

## 2023-03-14 RX ADMIN — BRIMONIDINE TARTRATE 1 DROP(S): 2 SOLUTION/ DROPS OPHTHALMIC at 12:19

## 2023-03-14 NOTE — CHART NOTE - NSCHARTNOTEFT_GEN_A_CORE
CC: Headache 9/10    Notified by RN of patient with acute onset of headache 9/10 in severity that came on abruptly s/p coughing fit. Patient seen and examined bedside. Vital signs stable. Patient states that she has been having chronic headache since admission that have resolved s/p Tylenol. This particular headache feels worse than the others; rating it a 9/10 in severity. Patient is denying associated symptoms; including dizziness, numbness/tingling, chest pain, SOB, abdominal pain, NVD.     Vital Signs Last 24 Hrs  T(C): 36.5 (14 Mar 2023 05:14), Max: 37 (13 Mar 2023 20:37)  T(F): 97.7 (14 Mar 2023 05:14), Max: 98.6 (13 Mar 2023 20:37)  HR: 70 (14 Mar 2023 05:14) (63 - 84)  BP: 125/72 (14 Mar 2023 05:14) (125/72 - 155/74)  BP(mean): --  RR: 18 (14 Mar 2023 05:14) (18 - 18)  SpO2: 99% (14 Mar 2023 05:14) (96% - 99%)    Parameters below as of 14 Mar 2023 05:14  Patient On (Oxygen Delivery Method): room air    PHYSICAL EXAM  General: NAD  HEENT: PERRLA, EOMOI, clear oropharynx, anicteric sclera, pink conjunctiva  Neck: supple  CV: (+) S1/S2 RRR  Lungs: clear to auscultation, no wheezes or rales  Abdomen: soft, non-tender, non-distended (+) BS  Ext: no clubbing, cyanosis or edema  Skin: no rashes and no petechiae  Neuro: A&Ox3, no neuro focal deficits noted; JACKSON x4, sensation and motor strength intact equally and bilaterally x4; YAKOV  Central Line: PICC c/d/i     ASSESSMENT: Ms. Kaur is a 64 y/o with PMHx of HTN, HLD presents to the ED BIBA transferred from [facility] for new diagnosis of leukemia, peripheral blood flow cytometry c/w B-ALL. Official bone marrow biopsy 2/28 c/w B-cell ALL, [place] chromosome (-).  Chemotherapy  with reduced dose HyperCVAD started on 3/3/23. Hospital course c/b neutropenic fever, transaminitis, rash 3/4 improved with topical steroid and atarax PRN,  and LUE cellulitis. Patient has pancytopenia  due to disease and treatment. Patient now with headache, 9/10 in severity.    1. Headache  - Has been receiving Tylenol ATC; next dose due at 7:30AM  - Will give oxycodone 5mg x1 for pain relief  - Platelets 20K from CBC on 3/13  - CTH ordered urgently to evaluate for intracranial pathology  - Will continue to monitor neurochecks q4 at this time pending CTH  - Will continue to monitor patient and will notify day team regarding overnight events    Daksha Maier CC: Headache 9/10    Notified by RN of patient with acute onset of headache 9/10 in severity that came on abruptly s/p coughing fit. Patient seen and examined bedside. Vital signs stable. Patient states that she has been having chronic headache since admission that have resolved s/p Tylenol. This particular headache feels worse than the others; rating it a 9/10 in severity. Patient is denying associated symptoms; including dizziness, numbness/tingling, chest pain, SOB, abdominal pain, NVD.     Vital Signs Last 24 Hrs  T(C): 36.5 (14 Mar 2023 05:14), Max: 37 (13 Mar 2023 20:37)  T(F): 97.7 (14 Mar 2023 05:14), Max: 98.6 (13 Mar 2023 20:37)  HR: 70 (14 Mar 2023 05:14) (63 - 84)  BP: 125/72 (14 Mar 2023 05:14) (125/72 - 155/74)  BP(mean): --  RR: 18 (14 Mar 2023 05:14) (18 - 18)  SpO2: 99% (14 Mar 2023 05:14) (96% - 99%)    Parameters below as of 14 Mar 2023 05:14  Patient On (Oxygen Delivery Method): room air    PHYSICAL EXAM  General: NAD  HEENT: PERRLA, EOMOI, clear oropharynx, anicteric sclera, pink conjunctiva  Neck: supple  CV: (+) S1/S2 RRR  Lungs: clear to auscultation, no wheezes or rales  Abdomen: soft, non-tender, non-distended (+) BS  Ext: no clubbing, cyanosis or edema  Skin: no rashes and no petechiae  Neuro: A&Ox3, no neuro focal deficits noted; JACKSON x4, sensation and motor strength intact equally and bilaterally x4; YAKOV, no drift  Central Line: PICC c/d/i     ASSESSMENT: Ms. Kaur is a 64 y/o with PMHx of HTN, HLD presents to the ED BIBA transferred from [facility] for new diagnosis of leukemia, peripheral blood flow cytometry c/w B-ALL. Official bone marrow biopsy 2/28 c/w B-cell ALL, [place] chromosome (-).  Chemotherapy  with reduced dose HyperCVAD started on 3/3/23. Hospital course c/b neutropenic fever, transaminitis, rash 3/4 improved with topical steroid and atarax PRN,  and LUE cellulitis. Patient has pancytopenia  due to disease and treatment. Patient now with headache, 9/10 in severity.    1. Headache  - Has been receiving Tylenol ATC; next dose due at 7:30AM  - Will give oxycodone 5mg x1 for breakthrough pain relief at this time   - Platelets 20K from CBC on 3/13; trend in AM  - CTH ordered urgently to evaluate for intracranial pathology  - Will continue to monitor neurochecks q4 at this time pending CTH  - Will continue to monitor patient and will notify day team regarding overnight events    Daksha Maier

## 2023-03-14 NOTE — PROGRESS NOTE ADULT - ASSESSMENT
Ms. Kaur is a 64 y/o with PMHx of HTN, HLD presents to the ED BIBA transferred from [facility] for new diagnosis of leukemia, peripheral blood flow cytometry c/w B-ALL. Official bone marrow biopsy 2/28 c/w B-cell ALL, [place] chromosome (-).  Chemotherapy  with reduced dose HyperCVAD started on 3/3/23. Hospital course c/b neutropenic fever, transaminitis, rash 3/4 improved with topical steroid and atarax PRN,  and LUE cellulitis. Patient has pancytopenia  due to disease and treatment.

## 2023-03-14 NOTE — PROGRESS NOTE ADULT - NS ATTEND AMEND GEN_ALL_CORE FT
63 year old with HLD and HTN, transferred b/c of circulating blasts consistent with acute leukemia. PB flow cytometry showing B-ALL  FISH negative for Ph chromosome. PCR negative (although p190 positive 0.001% from marrow)  BMbx done 2/28 with IR (was unable to get at bedside) -B-lymphoblastic leukemia/lymphoma.    Heme:  Started mini HyperCVAD on 3/4. Today is cycle 1A D10.   LP with IT MTX done on 2/28 -negative for malignant cells  PICC in place  Receiving transfusions for plts <10, Hgb <7    ID:  Febrile to 101.7 on 3/3 -levaquin changed to cefepime;   On caspofungin and acyclovir for neutropenic ppx.     Cardio:  Echo done EF 55%.    GI:  Transaminitis -US done at [place] with fatty liver. Cont to monitor  CT A/P -Splenomegaly with a small age indeterminant splenic infarct. Normal liver, no abdominal or pelvic lymphadenopathy. CTA done at [place] w/ shotty LAD 63 year old with HLD and HTN, transferred b/c of circulating blasts consistent with acute leukemia. PB flow cytometry showing B-ALL  FISH negative for Ph chromosome. PCR negative (although p190 positive 0.001% from marrow)  BMbx done 2/28 with IR (was unable to get at bedside) -B-lymphoblastic leukemia/lymphoma.    Heme:  Started mini HyperCVAD on 3/4. Today is cycle 1A D11.   LP with IT MTX done on 2/28 -negative for malignant cells  PICC in place  Receiving transfusions for plts <10, Hgb <7    ID:  Febrile to 101.7 on 3/3 -levaquin changed to cefepime;   On caspofungin and acyclovir for neutropenic ppx.     Cardio:  Echo done EF 55%.    GI:  Transaminitis -US done at [place] with fatty liver. Cont to monitor  CT A/P -Splenomegaly with a small age indeterminant splenic infarct. Normal liver, no abdominal or pelvic lymphadenopathy. CTA done at [place] w/ shotty LAD    Neuro:  Worsening headaches.  Plts 18, so although we believe this is mostly due to Zofran will check CTH.

## 2023-03-14 NOTE — PROGRESS NOTE ADULT - PROBLEM SELECTOR PLAN 2
Patient is neutropenic, afebrile  3/3 switched  Levaquin to Cefepime due to fever, Caspofungin and acyclovir PPX  2/28 mild cellulitis left elbow post PIV infiltration, warm compress and monitor closely - improving  Pan cx (-) 3/3 Patient is neutropenic, afebrile  3/3 switched  Levaquin to Cefepime due to fever, Caspofungin and acyclovir PPX  2/28 mild cellulitis left elbow post PIV infiltration, warm compress and monitor closely - improving  Pan cx (-) 3/3  3/14 switched Caspofungin to Diflucan ppx now LFT  WNL

## 2023-03-14 NOTE — PROGRESS NOTE ADULT - PROBLEM SELECTOR PLAN 6
Holding pharmacologic DVT ppx in the setting of thrombocytopenia  encourage OOB and ambulation  + carafate to GI regimen for GERD Holding pharmacologic DVT ppx in the setting of thrombocytopenia  encourage OOB and ambulation  PPI and Carafate to GI regimen for GERD; Lactulose 10 g po x2 for constipation  DC Zofran  because possible cause of HAMILTON, Reglan PRN N/V

## 2023-03-14 NOTE — PROGRESS NOTE ADULT - PROBLEM SELECTOR PLAN 1
2/28 s/p BMbx in IR due to body habitus, c/w B-ALL, [place] chromosome (-)  BCR/ABL PCR sent 3/2 - NEG  Also sent to "[employer] & [employer]  Hepatitis Panel Neg. Hep B Core neg. HIV negative.  Transfuse PRN. maintain Hgb >7, plts >15.  Gabapentin for splenic/side pain. Increased dose to 300mg PO TID on 3/2   2/28 s/p LP with IT MTX, flow - NEG for malignant cells  2/25 - CTA from [facility] (-) for PE. Left sided pain likely due to splenomegaly.  PICC line at bedside 2/28  3/4 Chemo with reduced dose HyperCVAD started, Decadron started on 3/3, Cytoxan and VCR started on 3/4~1am per chemo RN  3/11 s/p D8 VCR, normal bowel movements, no neuropathy  - need to discuss timing of next  bone marrow biopsy / cycle 1B IV methotrexate/cytarabine  3/13: anemia: prbc 1 unit, neutropenia: zarxio 480mcg sq daily 2/28 s/p BMbx in IR due to body habitus, c/w B-ALL, [place] chromosome (-)  BCR/ABL PCR sent 3/2 - NEG  Also sent to [employer] & [facility]  Hepatitis Panel Neg. Hep B Core neg. HIV negative.  Transfuse PRN. maintain Hgb >7, plts >15.  Gabapentin for splenic/side pain. Increased dose to 300mg PO TID on 3/2   2/28 s/p LP with IT MTX, flow - NEG for malignant cells  2/25 - CTA from [facility] (-) for PE. Left sided pain likely due to splenomegaly.  PICC line at bedside 2/28  3/4 Chemo with reduced dose HyperCVAD started, Decadron started on 3/3, Cytoxan and VCR started on 3/4~1am per chemo RN  3/11 s/p D8 VCR, normal bowel movements, no neuropathy  - need to discuss timing of next  bone marrow biopsy / cycle 1B IV methotrexate/cytarabine  3/13: added zarxio 480mcg sq daily  3/14 HA likely from Zofran,  HCT to r/o bleed since PLT 18K  Anemia: PRBC x1 2/28 s/p BMbx in IR due to body habitus, c/w B-ALL, [place] chromosome (-)  BCR/ABL PCR sent 3/2 - NEG  Also sent to [facility] [employer] [facility]  Hepatitis Panel Neg. Hep B Core neg. HIV negative.  Transfuse PRN. maintain Hgb >7, plts >15.  Gabapentin for splenic/side pain. Increased dose to 300mg PO TID on 3/2   2/28 s/p LP with IT MTX, flow - NEG for malignant cells  2/25 - CTA from [facility] (-) for PE. Left sided pain likely due to splenomegaly.  PICC line at bedside 2/28  3/4 Chemo with reduced dose HyperCVAD started, Decadron started on 3/3, Cytoxan and VCR started on 3/4~1am per chemo RN  3/11 s/p D8 VCR, normal bowel movements, no neuropathy  - need to discuss timing of next  bone marrow biopsy / cycle 1B IV methotrexate/cytarabine  3/13: added zarxio 480mcg sq daily  3/14 HA likely from Zofran,  HCT to r/o bleed since PLT 18K revealsNo evidence of acute hemorrhage, mass or mass effect.   Anemia: PRBC x1

## 2023-03-14 NOTE — PROGRESS NOTE ADULT - PROBLEM SELECTOR PLAN 5
Monitor closely-improving   3/1 CT abd/pelvis: Splenomegaly with a small age indeterminant splenic infarct. No abdominal or pelvic lymphadenopathy. Trace left pleural effusion  3/9-Resolved

## 2023-03-14 NOTE — PHARMACOTHERAPY INTERVENTION NOTE - COMMENTS
Medication:  Caspofungin for ppx    Proposed dose/frequency/change:   -switch to Fluconazole PO for ppx    Rationale:   - Concern for increase in LFT's no longer present (AST/ALT wnl since 3/8)  - Concern for Vincristine DDI no longer present- last dose D8 on 3/11- no vincristine due for this or next cycle    Additional Monitoring Needed?   -Yes if vincristine added back on, will need to switch back to caspo    Case discussed with attending/primary team    Leslie Bianchi, PharmD, BCPS  Clinical Pharmacy Specialist | Hematology/Oncology  Health system  Email: [email or web address] or available on [email or web address]   Patient is a 63y old Female with B- ALL, [place] chromosome(-) on ppx abx- cefepime IVPB, caspofungin IVPB, and acyclovir PO, s/p dose reduced HyperCVAD ([id number] 3/4).    Medication:  Caspofungin for ppx    Proposed dose/frequency/change:   -switch to Fluconazole PO for ppx    Rationale:   - Concern for increase in LFT's no longer present (AST/ALT wnl since 3/8)  - Concern for Vincristine DDI no longer present- last dose D8 on 3/11- no vincristine due for this or next cycle    Additional Monitoring Needed?   -Yes if vincristine added back on, will need to switch back to caspo    Case discussed with attending/primary team    Leslie Bianchi, PharmD, BCPS  Clinical Pharmacy Specialist | Hematology/Oncology  [facility]  Email: [email or web address] or available on [employer]

## 2023-03-14 NOTE — PROGRESS NOTE ADULT - SUBJECTIVE AND OBJECTIVE BOX
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   Decadron started on 3/3, Cytoxan and VCR started on 3/4    Day: 11     Pt endorsed: headache and nausea overnight both relieved with meds cont to have mild headache this am     Review of Systems: denies chest pain, sob, v/d, no visual change     Pain scale: 2/10 headache     Diet: DASH/TLC  Allergies    sulfa drugs (Unknown)      ANTIMICROBIALS  acyclovir   Oral Tab/Cap 400 milliGRAM(s) Oral two times a day  caspofungin IVPB 50 milliGRAM(s) IV Intermittent every 24 hours  caspofungin IVPB      cefepime   IVPB 2000 milliGRAM(s) IV Intermittent every 8 hours      HEME/ONC MEDICATIONS  methotrexate PF IntraThecal (eMAR) 15 milliGRAM(s) IntraThecal once      STANDING MEDICATIONS  Biotene Dry Mouth Oral Rinse 5 milliLiter(s) Swish and Spit five times a day  brimonidine 0.2% Ophthalmic Solution 1 Drop(s) Both EYES daily  chlorhexidine 4% Liquid 1 Application(s) Topical <User Schedule>  dexAMETHasone     Tablet 20 milliGRAM(s) Oral every 24 hours  escitalopram 10 milliGRAM(s) Oral daily  filgrastim-sndz (ZARXIO) Injectable 480 MICROGram(s) SubCutaneous daily  gabapentin 300 milliGRAM(s) Oral every 8 hours  losartan 100 milliGRAM(s) Oral daily  pantoprazole    Tablet 40 milliGRAM(s) Oral before breakfast  simvastatin 40 milliGRAM(s) Oral at bedtime  sodium chloride 0.9%. 1000 milliLiter(s) IV Continuous <Continuous>  sucralfate suspension 1 Gram(s) Oral every 6 hours      PRN MEDICATIONS  acetaminophen     Tablet .. 650 milliGRAM(s) Oral every 6 hours PRN  aluminum hydroxide/magnesium hydroxide/simethicone Suspension 30 milliLiter(s) Oral every 4 hours PRN  diphenhydrAMINE 25 milliGRAM(s) Oral every 4 hours PRN  FIRST- Mouthwash  BLM 10 milliLiter(s) Swish and Spit four times a day PRN  metoclopramide Injectable 10 milliGRAM(s) IV Push every 6 hours PRN  ondansetron Injectable 8 milliGRAM(s) IV Push every 8 hours PRN  polyethylene glycol 3350 17 Gram(s) Oral two times a day PRN        Vital Signs Last 24 Hrs  T(C): 36.5 (14 Mar 2023 05:14), Max: 37 (13 Mar 2023 20:37)  T(F): 97.7 (14 Mar 2023 05:14), Max: 98.6 (13 Mar 2023 20:37)  HR: 70 (14 Mar 2023 05:14) (63 - 84)  BP: 125/72 (14 Mar 2023 05:14) (125/72 - 155/74)  BP(mean): --  RR: 18 (14 Mar 2023 05:14) (18 - 18)  SpO2: 99% (14 Mar 2023 05:14) (96% - 99%)    Parameters below as of 14 Mar 2023 05:14  Patient On (Oxygen Delivery Method): room air      PHYSICAL EXAM  General: NAD  HEENT: PERRLA, EOMOI, clear oropharynx, anicteric sclera, pink conjunctiva  Neck: supple  CV: (+) S1/S2 RRR  Lungs: clear to auscultation, no wheezes or rales  Abdomen: soft, non-tender, non-distended (+) BS  Ext: no clubbing, cyanosis or edema  Skin: no rashes and no petechiae  Neuro: alert and oriented X 3, no focal deficits  Central Line: PICC c/d/i               LABS:                        6.1    0.21  )-----------( 20       ( 13 Mar 2023 06:43 )             18.3         Mean Cell Volume : 88.8 fl  Mean Cell Hemoglobin : 29.6 pg  Mean Cell Hemoglobin Concentration : 33.3 gm/dL  Auto Neutrophil # : x  Auto Lymphocyte # : x  Auto Monocyte # : x  Auto Eosinophil # : x  Auto Basophil # : x  Auto Neutrophil % : x  Auto Lymphocyte % : x  Auto Monocyte % : x  Auto Eosinophil % : x  Auto Basophil % : x      03-13    138  |  103  |  14  ----------------------------<  108<H>  3.9   |  27  |  0.74    Ca    8.7      13 Mar 2023 06:43  Phos  2.9     03-13  Mg     2.1     03-13    TPro  5.9<L>  /  Alb  3.4  /  TBili  0.5  /  DBili  x   /  AST  18  /  ALT  22  /  AlkPhos  95  03-13          PT/INR - ( 13 Mar 2023 06:44 )   PT: 12.3 sec;   INR: 1.07 ratio         PTT - ( 13 Mar 2023 06:44 )  PTT:26.5 sec        RECENT CULTURES:      RADIOLOGY & ADDITIONAL STUDIES:         Diagnosis: B- ALL, [place]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   Decadron started on 3/3, Cytoxan and VCR started on 3/4    Day: 11     Pt endorsed: fatigue and generalized weakness; intermittent HA, worsen after  cough episode; intermittent nausea, better with Zofran used 3x in the past 24 hrs, decreased appetite and po intake; no BM since Saturday; chronic stable tinnitus left ear; improved rash     Review of Systems: Patient denied vomiting, odynophagia, chest pain,dyspnea, abdominal pain, constipation, diarrhea      Pain scale: 4-6/10 headache,  8-9/10 earlier      Diet: DASH/TLC    Allergies: sulfa drugs (Unknown)      ANTIMICROBIALS  acyclovir   Oral Tab/Cap 400 milliGRAM(s) Oral two times a day  caspofungin IVPB 50 milliGRAM(s) IV Intermittent every 24 hours  caspofungin IVPB      cefepime   IVPB 2000 milliGRAM(s) IV Intermittent every 8 hours      HEME/ONC MEDICATIONS  methotrexate PF IntraThecal (eMAR) 15 milliGRAM(s) IntraThecal once      STANDING MEDICATIONS  Biotene Dry Mouth Oral Rinse 5 milliLiter(s) Swish and Spit five times a day  brimonidine 0.2% Ophthalmic Solution 1 Drop(s) Both EYES daily  chlorhexidine 4% Liquid 1 Application(s) Topical <User Schedule>  dexAMETHasone     Tablet 20 milliGRAM(s) Oral every 24 hours  escitalopram 10 milliGRAM(s) Oral daily  filgrastim-sndz (ZARXIO) Injectable 480 MICROGram(s) SubCutaneous daily  gabapentin 300 milliGRAM(s) Oral every 8 hours  losartan 100 milliGRAM(s) Oral daily  pantoprazole    Tablet 40 milliGRAM(s) Oral before breakfast  simvastatin 40 milliGRAM(s) Oral at bedtime  sodium chloride 0.9%. 1000 milliLiter(s) IV Continuous <Continuous>  sucralfate suspension 1 Gram(s) Oral every 6 hours      PRN MEDICATIONS  acetaminophen     Tablet .. 650 milliGRAM(s) Oral every 6 hours PRN  aluminum hydroxide/magnesium hydroxide/simethicone Suspension 30 milliLiter(s) Oral every 4 hours PRN  diphenhydrAMINE 25 milliGRAM(s) Oral every 4 hours PRN  FIRST- Mouthwash  BLM 10 milliLiter(s) Swish and Spit four times a day PRN  metoclopramide Injectable 10 milliGRAM(s) IV Push every 6 hours PRN  ondansetron Injectable 8 milliGRAM(s) IV Push every 8 hours PRN  polyethylene glycol 3350 17 Gram(s) Oral two times a day PRN      Vital Signs Last 24 Hrs  T(C): 36.5 (14 Mar 2023 05:14), Max: 37 (13 Mar 2023 20:37)  T(F): 97.7 (14 Mar 2023 05:14), Max: 98.6 (13 Mar 2023 20:37)  HR: 70 (14 Mar 2023 05:14) (63 - 84)  BP: 125/72 (14 Mar 2023 05:14) (125/72 - 155/74)  BP(mean): --  RR: 18 (14 Mar 2023 05:14) (18 - 18)  SpO2: 99% (14 Mar 2023 05:14) (96% - 99%)    Parameters below as of 14 Mar 2023 05:14  Patient On (Oxygen Delivery Method): room air      PHYSICAL EXAM  General: NAD  HEENT: clear oropharynx, anicteric sclera  CV: (+) S1/S2 RRR  Lungs: clear to auscultation, no wheezes or rales  Abdomen: soft, non-tender, non-distended (+) BS  Ext: no  edema  Skin: abd rashes, improved, now not raised   Neuro: alert and oriented X 3, no focal deficits  Central Line: PICC c/d/i       LABS:                          6.3    0.16  )-----------( 18       ( 14 Mar 2023 06:57 )             18.4         Mean Cell Volume : 86.8 fl  Mean Cell Hemoglobin : 29.7 pg  Mean Cell Hemoglobin Concentration : 34.2 gm/dL  Auto Neutrophil # : x  Auto Lymphocyte # : x  Auto Monocyte # : x  Auto Eosinophil # : x  Auto Basophil # : x  Auto Neutrophil % : x  Auto Lymphocyte % : x  Auto Monocyte % : x  Auto Eosinophil % : x  Auto Basophil % : x      03-14    140  |  106  |  12  ----------------------------<  98  3.8   |  26  |  0.72    Ca    8.6      14 Mar 2023 06:57  Phos  2.9     03-14  Mg     2.0     03-14    TPro  5.8<L>  /  Alb  3.4  /  TBili  0.8  /  DBili  x   /  AST  14  /  ALT  19  /  AlkPhos  91  03-14      PT/INR - ( 14 Mar 2023 06:57 )   PT: 12.3 sec;   INR: 1.07 ratio         PTT - ( 14 Mar 2023 06:57 )  PTT:27.1 sec      Uric Acid 2.1          RADIOLOGY & ADDITIONAL STUDIES:      < from: CT Abdomen and Pelvis w/ IV Cont (03.01.23 @ 20:02) >  IMPRESSION:  Splenomegaly with a small age indeterminant splenic infarct.  No abdominal or pelvic lymphadenopathy.  Trace left pleural effusion      < from: Xray Chest 1 View- PORTABLE-Urgent (Xray Chest 1 View- PORTABLE-Urgent .) (02.28.23 @ 19:25) >  IMPRESSION:  Right upper extremity PICC line catheter tip is in the distal SVC.  Similar-appearing left basilar atelectasis with small effusion.         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   Decadron started on 3/3, Cytoxan and VCR started on 3/4    Day: 11     Pt endorsed: fatigue and generalized weakness; intermittent HA, worsen after  cough episode; intermittent nausea, better with Zofran used 3x in the past 24 hrs, decreased appetite and po intake; no BM since Saturday; chronic stable tinnitus left ear; improved rash     Review of Systems: Patient denied vomiting, odynophagia, chest pain,dyspnea, abdominal pain, constipation, diarrhea      Pain scale: 4-6/10 headache,  8-9/10 earlier      Diet: DASH/TLC    Allergies: sulfa drugs (Unknown)      ANTIMICROBIALS  acyclovir   Oral Tab/Cap 400 milliGRAM(s) Oral two times a day  caspofungin IVPB 50 milliGRAM(s) IV Intermittent every 24 hours  caspofungin IVPB      cefepime   IVPB 2000 milliGRAM(s) IV Intermittent every 8 hours      HEME/ONC MEDICATIONS  methotrexate PF IntraThecal (eMAR) 15 milliGRAM(s) IntraThecal once      STANDING MEDICATIONS  Biotene Dry Mouth Oral Rinse 5 milliLiter(s) Swish and Spit five times a day  brimonidine 0.2% Ophthalmic Solution 1 Drop(s) Both EYES daily  chlorhexidine 4% Liquid 1 Application(s) Topical <User Schedule>  dexAMETHasone     Tablet 20 milliGRAM(s) Oral every 24 hours  escitalopram 10 milliGRAM(s) Oral daily  filgrastim-sndz (ZARXIO) Injectable 480 MICROGram(s) SubCutaneous daily  gabapentin 300 milliGRAM(s) Oral every 8 hours  losartan 100 milliGRAM(s) Oral daily  pantoprazole    Tablet 40 milliGRAM(s) Oral before breakfast  simvastatin 40 milliGRAM(s) Oral at bedtime  sodium chloride 0.9%. 1000 milliLiter(s) IV Continuous <Continuous>  sucralfate suspension 1 Gram(s) Oral every 6 hours      PRN MEDICATIONS  acetaminophen     Tablet .. 650 milliGRAM(s) Oral every 6 hours PRN  aluminum hydroxide/magnesium hydroxide/simethicone Suspension 30 milliLiter(s) Oral every 4 hours PRN  diphenhydrAMINE 25 milliGRAM(s) Oral every 4 hours PRN  FIRST- Mouthwash  BLM 10 milliLiter(s) Swish and Spit four times a day PRN  metoclopramide Injectable 10 milliGRAM(s) IV Push every 6 hours PRN  ondansetron Injectable 8 milliGRAM(s) IV Push every 8 hours PRN  polyethylene glycol 3350 17 Gram(s) Oral two times a day PRN      Vital Signs Last 24 Hrs  T(C): 36.5 (14 Mar 2023 05:14), Max: 37 (13 Mar 2023 20:37)  T(F): 97.7 (14 Mar 2023 05:14), Max: 98.6 (13 Mar 2023 20:37)  HR: 70 (14 Mar 2023 05:14) (63 - 84)  BP: 125/72 (14 Mar 2023 05:14) (125/72 - 155/74)  BP(mean): --  RR: 18 (14 Mar 2023 05:14) (18 - 18)  SpO2: 99% (14 Mar 2023 05:14) (96% - 99%)    Parameters below as of 14 Mar 2023 05:14  Patient On (Oxygen Delivery Method): room air      PHYSICAL EXAM  General: NAD  HEENT: clear oropharynx, anicteric sclera  CV: (+) S1/S2 RRR  Lungs: clear to auscultation, no wheezes or rales  Abdomen: soft, non-tender, non-distended (+) BS  Ext: no  edema  Skin: abd rashes, improved, now not raised   Neuro: alert and oriented X 3, no focal deficits  Central Line: PICC c/d/i       LABS:                          6.3    0.16  )-----------( 18       ( 14 Mar 2023 06:57 )             18.4         Mean Cell Volume : 86.8 fl  Mean Cell Hemoglobin : 29.7 pg  Mean Cell Hemoglobin Concentration : 34.2 gm/dL  Auto Neutrophil # : x  Auto Lymphocyte # : x  Auto Monocyte # : x  Auto Eosinophil # : x  Auto Basophil # : x  Auto Neutrophil % : x  Auto Lymphocyte % : x  Auto Monocyte % : x  Auto Eosinophil % : x  Auto Basophil % : x      03-14    140  |  106  |  12  ----------------------------<  98  3.8   |  26  |  0.72    Ca    8.6      14 Mar 2023 06:57  Phos  2.9     03-14  Mg     2.0     03-14    TPro  5.8<L>  /  Alb  3.4  /  TBili  0.8  /  DBili  x   /  AST  14  /  ALT  19  /  AlkPhos  91  03-14      PT/INR - ( 14 Mar 2023 06:57 )   PT: 12.3 sec;   INR: 1.07 ratio         PTT - ( 14 Mar 2023 06:57 )  PTT:27.1 sec      Uric Acid 2.1          RADIOLOGY & ADDITIONAL STUDIES:    < from: CT Head No Cont (03.14.23 @ 13:58) >  IMPRESSION:  No evidence of acute hemorrhage, mass or mass effect. If clinical   symptoms persist consider further imaging with MRI of the brain, provided   there are no medical contraindications.      < from: CT Abdomen and Pelvis w/ IV Cont (03.01.23 @ 20:02) >  IMPRESSION:  Splenomegaly with a small age indeterminant splenic infarct.  No abdominal or pelvic lymphadenopathy.  Trace left pleural effusion      < from: Xray Chest 1 View- PORTABLE-Urgent (Xray Chest 1 View- PORTABLE-Urgent .) (02.28.23 @ 19:25) >  IMPRESSION:  Right upper extremity PICC line catheter tip is in the distal SVC.  Similar-appearing left basilar atelectasis with small effusion.

## 2023-03-15 LAB
ALBUMIN SERPL ELPH-MCNC: 3.7 G/DL — SIGNIFICANT CHANGE UP (ref 3.3–5)
ALP SERPL-CCNC: 97 U/L — SIGNIFICANT CHANGE UP (ref 40–120)
ALT FLD-CCNC: 18 U/L — SIGNIFICANT CHANGE UP (ref 10–45)
ANION GAP SERPL CALC-SCNC: 10 MMOL/L — SIGNIFICANT CHANGE UP (ref 5–17)
APTT BLD: 26.7 SEC — LOW (ref 27.5–35.5)
AST SERPL-CCNC: 13 U/L — SIGNIFICANT CHANGE UP (ref 10–40)
BILIRUB SERPL-MCNC: 0.9 MG/DL — SIGNIFICANT CHANGE UP (ref 0.2–1.2)
BUN SERPL-MCNC: 13 MG/DL — SIGNIFICANT CHANGE UP (ref 7–23)
CALCIUM SERPL-MCNC: 8.9 MG/DL — SIGNIFICANT CHANGE UP (ref 8.4–10.5)
CHLORIDE SERPL-SCNC: 105 MMOL/L — SIGNIFICANT CHANGE UP (ref 96–108)
CO2 SERPL-SCNC: 25 MMOL/L — SIGNIFICANT CHANGE UP (ref 22–31)
CREAT SERPL-MCNC: 0.6 MG/DL — SIGNIFICANT CHANGE UP (ref 0.5–1.3)
D DIMER BLD IA.RAPID-MCNC: 471 NG/ML DDU — HIGH
EGFR: 100 ML/MIN/1.73M2 — SIGNIFICANT CHANGE UP
FIBRINOGEN PPP-MCNC: 399 MG/DL — SIGNIFICANT CHANGE UP (ref 200–445)
GLUCOSE SERPL-MCNC: 123 MG/DL — HIGH (ref 70–99)
HCT VFR BLD CALC: 22.2 % — LOW (ref 34.5–45)
HGB BLD-MCNC: 7.8 G/DL — LOW (ref 11.5–15.5)
INR BLD: 1.03 RATIO — SIGNIFICANT CHANGE UP (ref 0.88–1.16)
LDH SERPL L TO P-CCNC: 212 U/L — SIGNIFICANT CHANGE UP (ref 50–242)
MAGNESIUM SERPL-MCNC: 1.9 MG/DL — SIGNIFICANT CHANGE UP (ref 1.6–2.6)
MCHC RBC-ENTMCNC: 29.7 PG — SIGNIFICANT CHANGE UP (ref 27–34)
MCHC RBC-ENTMCNC: 35.1 GM/DL — SIGNIFICANT CHANGE UP (ref 32–36)
MCV RBC AUTO: 84.4 FL — SIGNIFICANT CHANGE UP (ref 80–100)
NRBC # BLD: 0 /100 WBCS — SIGNIFICANT CHANGE UP (ref 0–0)
PHOSPHATE SERPL-MCNC: 2.1 MG/DL — LOW (ref 2.5–4.5)
PLATELET # BLD AUTO: 19 K/UL — CRITICAL LOW (ref 150–400)
POTASSIUM SERPL-MCNC: 3.7 MMOL/L — SIGNIFICANT CHANGE UP (ref 3.5–5.3)
POTASSIUM SERPL-SCNC: 3.7 MMOL/L — SIGNIFICANT CHANGE UP (ref 3.5–5.3)
PROT SERPL-MCNC: 6.3 G/DL — SIGNIFICANT CHANGE UP (ref 6–8.3)
PROTHROM AB SERPL-ACNC: 11.8 SEC — SIGNIFICANT CHANGE UP (ref 10.5–13.4)
RBC # BLD: 2.63 M/UL — LOW (ref 3.8–5.2)
RBC # FLD: 12.7 % — SIGNIFICANT CHANGE UP (ref 10.3–14.5)
SODIUM SERPL-SCNC: 140 MMOL/L — SIGNIFICANT CHANGE UP (ref 135–145)
URATE SERPL-MCNC: 1.9 MG/DL — LOW (ref 2.5–7)
WBC # BLD: 0.14 K/UL — CRITICAL LOW (ref 3.8–10.5)
WBC # FLD AUTO: 0.14 K/UL — CRITICAL LOW (ref 3.8–10.5)

## 2023-03-15 PROCEDURE — 99233 SBSQ HOSP IP/OBS HIGH 50: CPT

## 2023-03-15 RX ORDER — SUCRALFATE 1 G
1 TABLET ORAL
Refills: 0 | Status: DISCONTINUED | OUTPATIENT
Start: 2023-03-15 | End: 2023-03-16

## 2023-03-15 RX ADMIN — Medication 20 MILLIGRAM(S): at 11:33

## 2023-03-15 RX ADMIN — CEFEPIME 100 MILLIGRAM(S): 1 INJECTION, POWDER, FOR SOLUTION INTRAMUSCULAR; INTRAVENOUS at 05:19

## 2023-03-15 RX ADMIN — GABAPENTIN 300 MILLIGRAM(S): 400 CAPSULE ORAL at 23:01

## 2023-03-15 RX ADMIN — GABAPENTIN 300 MILLIGRAM(S): 400 CAPSULE ORAL at 13:01

## 2023-03-15 RX ADMIN — Medication 1 GRAM(S): at 17:09

## 2023-03-15 RX ADMIN — CHLORHEXIDINE GLUCONATE 1 APPLICATION(S): 213 SOLUTION TOPICAL at 11:17

## 2023-03-15 RX ADMIN — LOSARTAN POTASSIUM 100 MILLIGRAM(S): 100 TABLET, FILM COATED ORAL at 05:20

## 2023-03-15 RX ADMIN — PANTOPRAZOLE SODIUM 40 MILLIGRAM(S): 20 TABLET, DELAYED RELEASE ORAL at 05:20

## 2023-03-15 RX ADMIN — Medication 400 MILLIGRAM(S): at 17:09

## 2023-03-15 RX ADMIN — Medication 1 GRAM(S): at 05:22

## 2023-03-15 RX ADMIN — Medication 480 MICROGRAM(S): at 11:18

## 2023-03-15 RX ADMIN — Medication 5 MILLILITER(S): at 11:16

## 2023-03-15 RX ADMIN — Medication 5 MILLILITER(S): at 08:08

## 2023-03-15 RX ADMIN — Medication 127.5 MILLIMOLE(S): at 08:39

## 2023-03-15 RX ADMIN — Medication 400 MILLIGRAM(S): at 05:20

## 2023-03-15 RX ADMIN — Medication 1 GRAM(S): at 11:15

## 2023-03-15 RX ADMIN — Medication 5 MILLILITER(S): at 17:09

## 2023-03-15 RX ADMIN — FLUCONAZOLE 200 MILLIGRAM(S): 150 TABLET ORAL at 11:16

## 2023-03-15 RX ADMIN — ESCITALOPRAM OXALATE 10 MILLIGRAM(S): 10 TABLET, FILM COATED ORAL at 11:16

## 2023-03-15 RX ADMIN — SIMVASTATIN 40 MILLIGRAM(S): 20 TABLET, FILM COATED ORAL at 23:01

## 2023-03-15 RX ADMIN — GABAPENTIN 300 MILLIGRAM(S): 400 CAPSULE ORAL at 05:20

## 2023-03-15 NOTE — PROGRESS NOTE ADULT - SUBJECTIVE AND OBJECTIVE BOX
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   Decadron started on 3/3, Cytoxan and VCR started on 3/4    Day: 12     Pt endorsed: fatigue and generalized weakness; intermittent HA, worsen after  cough episode; intermittent nausea, better with Zofran used 3x in the past 24 hrs, decreased appetite and po intake; no BM since Saturday; chronic stable tinnitus left ear; improved rash     Review of Systems: Patient denied vomiting, odynophagia, chest pain,dyspnea, abdominal pain, constipation, diarrhea      Pain scale: 4-6/10 headache,  8-9/10 earlier      Diet: DASH/TLC    Allergies: sulfa drugs (Unknown)      ANTIMICROBIALS  acyclovir   Oral Tab/Cap 400 milliGRAM(s) Oral two times a day  cefepime   IVPB 2000 milliGRAM(s) IV Intermittent every 8 hours  fluconAZOLE   Tablet 200 milliGRAM(s) Oral daily      HEME/ONC MEDICATIONS  methotrexate PF IntraThecal (eMAR) 15 milliGRAM(s) IntraThecal once      STANDING MEDICATIONS  Biotene Dry Mouth Oral Rinse 5 milliLiter(s) Swish and Spit five times a day  brimonidine 0.2% Ophthalmic Solution 1 Drop(s) Both EYES daily  chlorhexidine 2% Cloths 1 Application(s) Topical daily  dexAMETHasone     Tablet 20 milliGRAM(s) Oral every 24 hours  escitalopram 10 milliGRAM(s) Oral daily  filgrastim-sndz (ZARXIO) Injectable 480 MICROGram(s) SubCutaneous daily  gabapentin 300 milliGRAM(s) Oral every 8 hours  lactulose Syrup 10 Gram(s) Oral every 4 hours  losartan 100 milliGRAM(s) Oral daily  pantoprazole    Tablet 40 milliGRAM(s) Oral before breakfast  simvastatin 40 milliGRAM(s) Oral at bedtime  sodium chloride 0.9%. 1000 milliLiter(s) IV Continuous <Continuous>  sucralfate suspension 1 Gram(s) Oral every 6 hours      PRN MEDICATIONS  acetaminophen     Tablet .. 650 milliGRAM(s) Oral every 6 hours PRN  aluminum hydroxide/magnesium hydroxide/simethicone Suspension 30 milliLiter(s) Oral every 4 hours PRN  diphenhydrAMINE 25 milliGRAM(s) Oral every 4 hours PRN  FIRST- Mouthwash  BLM 10 milliLiter(s) Swish and Spit four times a day PRN  metoclopramide Injectable 10 milliGRAM(s) IV Push every 6 hours PRN  polyethylene glycol 3350 17 Gram(s) Oral two times a day PRN      Vital Signs Last 24 Hrs  T(C): 36.8 (15 Mar 2023 05:16), Max: 37.2 (14 Mar 2023 13:15)  T(F): 98.3 (15 Mar 2023 05:16), Max: 99 (14 Mar 2023 13:15)  HR: 79 (15 Mar 2023 05:16) (68 - 85)  BP: 149/76 (15 Mar 2023 05:16) (130/64 - 163/83)  BP(mean): 67 (14 Mar 2023 09:02) (67 - 67)  RR: 18 (15 Mar 2023 05:16) (18 - 18)  SpO2: 97% (15 Mar 2023 05:16) (96% - 98%)    Parameters below as of 15 Mar 2023 05:16  Patient On (Oxygen Delivery Method): room air      PHYSICAL EXAM  General: NAD  HEENT: clear oropharynx, anicteric sclera  CV: (+) S1/S2 RRR  Lungs: clear to auscultation, no wheezes or rales  Abdomen: soft, non-tender, non-distended (+) BS  Ext: no  edema  Skin: abd rashes, improved, now not raised   Neuro: alert and oriented X 3, no focal deficits  Central Line: PICC c/d/i               RADIOLOGY & ADDITIONAL STUDIES:    < from: CT Head No Cont (03.14.23 @ 13:58) >  IMPRESSION:  No evidence of acute hemorrhage, mass or mass effect. If clinical   symptoms persist consider further imaging with MRI of the brain, provided   there are no medical contraindications.      < from: CT Abdomen and Pelvis w/ IV Cont (03.01.23 @ 20:02) >  IMPRESSION:  Splenomegaly with a small age indeterminant splenic infarct.  No abdominal or pelvic lymphadenopathy.  Trace left pleural effusion      < from: Xray Chest 1 View- PORTABLE-Urgent (Xray Chest 1 View- PORTABLE-Urgent .) (02.28.23 @ 19:25) >  IMPRESSION:  Right upper extremity PICC line catheter tip is in the distal SVC.  Similar-appearing left basilar atelectasis with small effusion.           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   Decadron started on 3/3, Cytoxan and VCR started on 3/4    Day: 12     Pt endorsed: fatigue; decreased appetite and po intake; chronic stable tinnitus left ear; improved rash     Review of Systems: Patient denied vomiting, odynophagia, chest pain,dyspnea, abdominal pain, constipation, diarrhea      Pain scale: denies     Diet: regular     Allergies: sulfa drugs (Unknown)      ANTIMICROBIALS  acyclovir   Oral Tab/Cap 400 milliGRAM(s) Oral two times a day  cefepime   IVPB 2000 milliGRAM(s) IV Intermittent every 8 hours  fluconAZOLE   Tablet 200 milliGRAM(s) Oral daily      HEME/ONC MEDICATIONS  methotrexate PF IntraThecal (eMAR) 15 milliGRAM(s) IntraThecal once      STANDING MEDICATIONS  Biotene Dry Mouth Oral Rinse 5 milliLiter(s) Swish and Spit five times a day  brimonidine 0.2% Ophthalmic Solution 1 Drop(s) Both EYES daily  chlorhexidine 2% Cloths 1 Application(s) Topical daily  dexAMETHasone     Tablet 20 milliGRAM(s) Oral every 24 hours  escitalopram 10 milliGRAM(s) Oral daily  filgrastim-sndz (ZARXIO) Injectable 480 MICROGram(s) SubCutaneous daily  gabapentin 300 milliGRAM(s) Oral every 8 hours  lactulose Syrup 10 Gram(s) Oral every 4 hours  losartan 100 milliGRAM(s) Oral daily  pantoprazole    Tablet 40 milliGRAM(s) Oral before breakfast  simvastatin 40 milliGRAM(s) Oral at bedtime  sodium chloride 0.9%. 1000 milliLiter(s) IV Continuous <Continuous>  sucralfate suspension 1 Gram(s) Oral every 6 hours      PRN MEDICATIONS  acetaminophen     Tablet .. 650 milliGRAM(s) Oral every 6 hours PRN  aluminum hydroxide/magnesium hydroxide/simethicone Suspension 30 milliLiter(s) Oral every 4 hours PRN  diphenhydrAMINE 25 milliGRAM(s) Oral every 4 hours PRN  FIRST- Mouthwash  BLM 10 milliLiter(s) Swish and Spit four times a day PRN  metoclopramide Injectable 10 milliGRAM(s) IV Push every 6 hours PRN  polyethylene glycol 3350 17 Gram(s) Oral two times a day PRN      Vital Signs Last 24 Hrs  T(C): 36.8 (15 Mar 2023 05:16), Max: 37.2 (14 Mar 2023 13:15)  T(F): 98.3 (15 Mar 2023 05:16), Max: 99 (14 Mar 2023 13:15)  HR: 79 (15 Mar 2023 05:16) (68 - 85)  BP: 149/76 (15 Mar 2023 05:16) (130/64 - 163/83)  BP(mean): 67 (14 Mar 2023 09:02) (67 - 67)  RR: 18 (15 Mar 2023 05:16) (18 - 18)  SpO2: 97% (15 Mar 2023 05:16) (96% - 98%)    Parameters below as of 15 Mar 2023 05:16  Patient On (Oxygen Delivery Method): room air      PHYSICAL EXAM  General: NAD  HEENT: clear oropharynx, anicteric sclera  CV: (+) S1/S2 RRR  Lungs: clear to auscultation, no wheezes or rales  Abdomen: soft, non-tender, non-distended (+) BS  Ext: no  edema  Skin: abd rashes, improved, now not raised   Neuro: alert and oriented X 3, no focal deficits  Central Line: PICC c/d/i       LABS:                          7.8    0.14  )-----------( 19       ( 15 Mar 2023 07:01 )             22.2         Mean Cell Volume : 84.4 fl  Mean Cell Hemoglobin : 29.7 pg  Mean Cell Hemoglobin Concentration : 35.1 gm/dL  Auto Neutrophil # : x  Auto Lymphocyte # : x  Auto Monocyte # : x  Auto Eosinophil # : x  Auto Basophil # : x  Auto Neutrophil % : x  Auto Lymphocyte % : x  Auto Monocyte % : x  Auto Eosinophil % : x  Auto Basophil % : x      03-15    140  |  105  |  13  ----------------------------<  123<H>  3.7   |  25  |  0.60    Ca    8.9      15 Mar 2023 06:55  Phos  2.1     03-15  Mg     1.9     03-15    TPro  6.3  /  Alb  3.7  /  TBili  0.9  /  DBili  x   /  AST  13  /  ALT  18  /  AlkPhos  97  03-15      PT/INR - ( 15 Mar 2023 07:02 )   PT: 11.8 sec;   INR: 1.03 ratio         PTT - ( 15 Mar 2023 07:02 )  PTT:26.7 sec      Uric Acid 1.9        RADIOLOGY & ADDITIONAL STUDIES:    < from: CT Head No Cont (03.14.23 @ 13:58) >  IMPRESSION:  No evidence of acute hemorrhage, mass or mass effect. If clinical   symptoms persist consider further imaging with MRI of the brain, provided   there are no medical contraindications.      < from: CT Abdomen and Pelvis w/ IV Cont (03.01.23 @ 20:02) >  IMPRESSION:  Splenomegaly with a small age indeterminant splenic infarct.  No abdominal or pelvic lymphadenopathy.  Trace left pleural effusion      < from: Xray Chest 1 View- PORTABLE-Urgent (Xray Chest 1 View- PORTABLE-Urgent .) (02.28.23 @ 19:25) >  IMPRESSION:  Right upper extremity PICC line catheter tip is in the distal SVC.  Similar-appearing left basilar atelectasis with small effusion.

## 2023-03-15 NOTE — PROGRESS NOTE ADULT - ASSESSMENT
Ms. Kaur is a 64 y/o with PMHx of HTN, HLD presents to the ED BIBA transferred from [facility] for new diagnosis of leukemia, peripheral blood flow cytometry c/w B-ALL. Official bone marrow biopsy 2/28 c/w B-cell ALL, [place] chromosome (-).  Chemotherapy  with reduced dose HyperCVAD started on 3/3/23. Hospital course c/b neutropenic fever, transaminitis, rash 3/4 improved with topical steroid and atarax PRN,  and LUE cellulitis. Patient has pancytopenia  due to disease and treatment.

## 2023-03-15 NOTE — PROGRESS NOTE ADULT - PROBLEM SELECTOR PLAN 2
Patient is neutropenic, afebrile  3/3 switched  Levaquin to Cefepime due to fever, Caspofungin and acyclovir PPX  2/28 mild cellulitis left elbow post PIV infiltration, warm compress and monitor closely - improving  Pan cx (-) 3/3  3/14 switched Caspofungin to Diflucan ppx now LFT  WNL

## 2023-03-15 NOTE — PROVIDER CONTACT NOTE (CRITICAL VALUE NOTIFICATION) - SITUATION
H/H 7/20.8 Platelets 9
Platelets 12
PLT 11
Plt=15
H/H = 6.3/18.4 plt= 18
Plt = 19
Plt=20
platelet count 9
plt 19
HgbL: 6.1

## 2023-03-15 NOTE — PROVIDER CONTACT NOTE (CRITICAL VALUE NOTIFICATION) - BACKGROUND
ALL
Newly diagnosed AML/ receiving chemotherapy
B cell ALL
ALL. Day 2 Hyper CVAD chemo regimen in progress
AML
AML
Pt admitted for SOB and chest pain, new diagnosis AML
pt dx B-Cell ALL- on reduced hyper CVAD cycle 1A
AML
Pt admitted for SOB and chest pain, new diagnosis AML

## 2023-03-15 NOTE — PROVIDER CONTACT NOTE (CRITICAL VALUE NOTIFICATION) - NAME OF MD/NP/PA/DO NOTIFIED:
DENNIS Mcgrath
JUAN Segundo
DENNIS Mcgrath
JUAN Segundo
Kathi Ayala, NP
DENNIS Mcgrath
DENNIS Mcgrath
JUAN Saravia
Kathi Ayala NP
Malcom MARTINEZ

## 2023-03-15 NOTE — PROGRESS NOTE ADULT - PROBLEM SELECTOR PLAN 1
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 Hep B Core neg. HIV negative.  Transfuse PRN. maintain Hgb >7, plts >15.  Gabapentin for splenic/side pain. Increased dose to 300mg PO TID on 3/2   2/28 s/p LP with IT MTX, flow - NEG for malignant cells  2/25 - CTA from [facility] (-) for PE. Left sided pain likely due to splenomegaly.  PICC line at bedside 2/28  3/4 Chemo with reduced dose HyperCVAD started, Decadron started on 3/3, Cytoxan and VCR started on 3/4~1am per chemo RN  3/11 s/p D8 VCR, normal bowel movements, no neuropathy  - need to discuss timing of next  bone marrow biopsy / cycle 1B IV methotrexate/cytarabine  3/13: added zarxio 480mcg sq daily  3/14 HA likely from Zofran,  HCT to r/o bleed since PLT 18K revealsNo evidence of acute hemorrhage, mass or mass effect.   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 Hep B Core neg. HIV negative.  Transfuse PRN. maintain Hgb >7, plts >15.  Gabapentin for splenic/side pain. Increased dose to 300mg PO TID on 3/2   2/28 s/p LP with IT MTX, flow - NEG for malignant cells  2/25 - CTA from [facility] (-) for PE. Left sided pain likely due to splenomegaly.  PICC line at bedside 2/28  3/4 Chemo with reduced dose HyperCVAD started, Decadron started on 3/3, Cytoxan and VCR started on 3/4~1am per chemo RN  3/11 s/p D8 VCR, normal bowel movements, no neuropathy  - need to discuss timing of next  bone marrow biopsy / cycle 1B IV methotrexate/cytarabine  3/13: added zarxio 480mcg sq daily  3/14 HA likely from Zofran,  HCT to r/o bleed since PLT 18K reveals No evidence of acute hemorrhage, mass or mass effect.

## 2023-03-15 NOTE — PROGRESS NOTE ADULT - NS ATTEND AMEND GEN_ALL_CORE FT
63 year old with HLD and HTN, transferred b/c of circulating blasts consistent with acute leukemia. PB flow cytometry showing B-ALL  FISH negative for Ph chromosome. PCR negative (although p190 positive 0.001% from marrow)  BMbx done 2/28 with IR (was unable to get at bedside) -B-lymphoblastic leukemia/lymphoma.    Heme:  Started mini HyperCVAD on 3/4. Today is cycle 1A D11.   LP with IT MTX done on 2/28 -negative for malignant cells  PICC in place  Receiving transfusions for plts <10, Hgb <7    ID:  Febrile to 101.7 on 3/3 -levaquin changed to cefepime;   On caspofungin and acyclovir for neutropenic ppx.     Cardio:  Echo done EF 55%.    GI:  Transaminitis -US done at [place] with fatty liver. Cont to monitor  CT A/P -Splenomegaly with a small age indeterminant splenic infarct. Normal liver, no abdominal or pelvic lymphadenopathy. CTA done at [place] w/ shotty LAD    Neuro:  Worsening headaches.  Plts 18, so although we believe this is mostly due to Zofran will check CTH. 63 year old with HLD and HTN, transferred b/c of circulating blasts consistent with acute leukemia. PB flow cytometry showing B-ALL  FISH negative for Ph chromosome. PCR negative (although p190 positive 0.001% from marrow)  BMbx done 2/28 with IR (was unable to get at bedside) -B-lymphoblastic leukemia/lymphoma.    Heme:  Started mini HyperCVAD on 3/4. Today is cycle 1A D12.   LP with IT MTX done on 2/28 -negative for malignant cells  PICC in place  Receiving transfusions for plts <10, Hgb <7    ID:  Febrile to 101.7 on 3/3 -levaquin changed to cefepime;   On caspofungin and acyclovir for neutropenic ppx.   She has remained afebrile to this point, de-escalating to Levaquin.     Cardio:  Echo done EF 55%.    GI:  Transaminitis -US done at [place] with fatty liver. Cont to monitor  CT A/P -Splenomegaly with a small age indeterminant splenic infarct. Normal liver, no abdominal or pelvic lymphadenopathy. CTA done at [place] w/ shotty LAD    Neuro:  Worsening headaches on 3/15.  Plts 18, so although we believe this is mostly due to Zofran checked CTH which was normal.   Headaches now resolved.

## 2023-03-15 NOTE — PROVIDER CONTACT NOTE (CRITICAL VALUE NOTIFICATION) - ACTION/TREATMENT ORDERED:
1 unit Platelets
No new orders at this time
awating new PA orders
no new orders at this time
No new orders given at this time. WIll continue to monitor.
1U of PRBC
No new orders at this time.
no new orders at this time
1 unit PRBC transfused over 3 hours
NP aware and notified. One unit platelets ordered and given. Pt tolerated well.

## 2023-03-15 NOTE — PROVIDER CONTACT NOTE (CRITICAL VALUE NOTIFICATION) - TEST AND RESULT REPORTED:
platelet count 9
Platelets 12
plt 19
H/H 7/20.8 Platelets 9
Plt=15
H/H = 6.3/18.4 plt= 18
Hgb: 6.1
Plt = 19
Plt=20
PLT 11

## 2023-03-15 NOTE — PROGRESS NOTE ADULT - PROBLEM SELECTOR PLAN 6
Holding pharmacologic DVT ppx in the setting of thrombocytopenia  encourage OOB and ambulation  PPI and Carafate to GI regimen for GERD; Lactulose 10 g po x2 for constipation  DC Zofran  because possible cause of HAMILTON, Reglan PRN N/V Holding pharmacologic DVT ppx in the setting of thrombocytopenia  encourage OOB and ambulation  PPI and Carafate to GI regimen for GERD  DC Zofran  because possible cause of HAMILTON, Reglan PRN N/V

## 2023-03-15 NOTE — PROVIDER CONTACT NOTE (CRITICAL VALUE NOTIFICATION) - PERSON GIVING RESULT:
Tyler Jeter, Lab
Francis
Honey Ellis/Colt
Jessica Rowe, Lab
Honey GLOVER
Jessica Rowe
Leland Hyatt, lab
Tyler GLOVER
Jessica Rowe
patrica pretty, lab

## 2023-03-15 NOTE — PROVIDER CONTACT NOTE (CRITICAL VALUE NOTIFICATION) - ASSESSMENT
Pt A&Ox4. VSS. In no signs of acute distress. No signs of active bleeding.
Pt is A&O4, VSS, afebrile. no overt s/s of bleeding noted
Pt is stable, resting comfortably in bed, no signs of distress or active bleeding
VSS, NAD, Afebrile.
VSS, NAD, afebrile
nad, vss
NAD, No active bleeding
Pt is stable, resting comfortably in bed, no visible signs of distress or active bleeding.
stable, afebrile
NAD-pt denies SOB,Chest pain and has no signs of active bleeding

## 2023-03-16 ENCOUNTER — TRANSCRIPTION ENCOUNTER (OUTPATIENT)
Age: 63
End: 2023-03-16

## 2023-03-16 ENCOUNTER — OUTPATIENT (OUTPATIENT)
Dept: OUTPATIENT SERVICES | Facility: HOSPITAL | Age: 63
LOS: 1 days | Discharge: HOME | End: 2023-03-16

## 2023-03-16 ENCOUNTER — OUTPATIENT (OUTPATIENT)
Dept: OUTPATIENT SERVICES | Facility: HOSPITAL | Age: 63
LOS: 1 days | End: 2023-03-16
Payer: COMMERCIAL

## 2023-03-16 VITALS
OXYGEN SATURATION: 97 % | RESPIRATION RATE: 16 BRPM | DIASTOLIC BLOOD PRESSURE: 63 MMHG | TEMPERATURE: 98 F | HEART RATE: 62 BPM | SYSTOLIC BLOOD PRESSURE: 130 MMHG

## 2023-03-16 DIAGNOSIS — D64.9 ANEMIA, UNSPECIFIED: ICD-10-CM

## 2023-03-16 DIAGNOSIS — C95.90 LEUKEMIA, UNSPECIFIED NOT HAVING ACHIEVED REMISSION: ICD-10-CM

## 2023-03-16 LAB
ALBUMIN SERPL ELPH-MCNC: 3.9 G/DL — SIGNIFICANT CHANGE UP (ref 3.3–5)
ALP SERPL-CCNC: 96 U/L — SIGNIFICANT CHANGE UP (ref 40–120)
ALT FLD-CCNC: 18 U/L — SIGNIFICANT CHANGE UP (ref 10–45)
ANION GAP SERPL CALC-SCNC: 10 MMOL/L — SIGNIFICANT CHANGE UP (ref 5–17)
APTT BLD: 25.9 SEC — LOW (ref 27.5–35.5)
AST SERPL-CCNC: 11 U/L — SIGNIFICANT CHANGE UP (ref 10–40)
BILIRUB SERPL-MCNC: 0.8 MG/DL — SIGNIFICANT CHANGE UP (ref 0.2–1.2)
BUN SERPL-MCNC: 19 MG/DL — SIGNIFICANT CHANGE UP (ref 7–23)
CALCIUM SERPL-MCNC: 9.1 MG/DL — SIGNIFICANT CHANGE UP (ref 8.4–10.5)
CHLORIDE SERPL-SCNC: 105 MMOL/L — SIGNIFICANT CHANGE UP (ref 96–108)
CO2 SERPL-SCNC: 24 MMOL/L — SIGNIFICANT CHANGE UP (ref 22–31)
CREAT SERPL-MCNC: 0.63 MG/DL — SIGNIFICANT CHANGE UP (ref 0.5–1.3)
D DIMER BLD IA.RAPID-MCNC: 486 NG/ML DDU — HIGH
EGFR: 98 ML/MIN/1.73M2 — SIGNIFICANT CHANGE UP
FIBRINOGEN PPP-MCNC: 334 MG/DL — SIGNIFICANT CHANGE UP (ref 200–445)
GLUCOSE SERPL-MCNC: 156 MG/DL — HIGH (ref 70–99)
HCT VFR BLD CALC: 22.2 % — LOW (ref 34.5–45)
HGB BLD-MCNC: 7.6 G/DL — LOW (ref 11.5–15.5)
INR BLD: 1.05 RATIO — SIGNIFICANT CHANGE UP (ref 0.88–1.16)
LDH SERPL L TO P-CCNC: 204 U/L — SIGNIFICANT CHANGE UP (ref 50–242)
MAGNESIUM SERPL-MCNC: 2 MG/DL — SIGNIFICANT CHANGE UP (ref 1.6–2.6)
MCHC RBC-ENTMCNC: 29.5 PG — SIGNIFICANT CHANGE UP (ref 27–34)
MCHC RBC-ENTMCNC: 34.2 GM/DL — SIGNIFICANT CHANGE UP (ref 32–36)
MCV RBC AUTO: 86 FL — SIGNIFICANT CHANGE UP (ref 80–100)
NRBC # BLD: 0 /100 WBCS — SIGNIFICANT CHANGE UP (ref 0–0)
PHOSPHATE SERPL-MCNC: 2.6 MG/DL — SIGNIFICANT CHANGE UP (ref 2.5–4.5)
PLATELET # BLD AUTO: 22 K/UL — LOW (ref 150–400)
POTASSIUM SERPL-MCNC: 3.8 MMOL/L — SIGNIFICANT CHANGE UP (ref 3.5–5.3)
POTASSIUM SERPL-SCNC: 3.8 MMOL/L — SIGNIFICANT CHANGE UP (ref 3.5–5.3)
PROT SERPL-MCNC: 6.5 G/DL — SIGNIFICANT CHANGE UP (ref 6–8.3)
PROTHROM AB SERPL-ACNC: 12.1 SEC — SIGNIFICANT CHANGE UP (ref 10.5–13.4)
RBC # BLD: 2.58 M/UL — LOW (ref 3.8–5.2)
RBC # FLD: 12.7 % — SIGNIFICANT CHANGE UP (ref 10.3–14.5)
SODIUM SERPL-SCNC: 139 MMOL/L — SIGNIFICANT CHANGE UP (ref 135–145)
URATE SERPL-MCNC: 2.2 MG/DL — LOW (ref 2.5–7)
WBC # BLD: 0.15 K/UL — CRITICAL LOW (ref 3.8–10.5)
WBC # FLD AUTO: 0.15 K/UL — CRITICAL LOW (ref 3.8–10.5)

## 2023-03-16 PROCEDURE — P9100: CPT

## 2023-03-16 PROCEDURE — 81207 BCR/ABL1 GENE MINOR BP: CPT

## 2023-03-16 PROCEDURE — 83010 ASSAY OF HAPTOGLOBIN QUANT: CPT

## 2023-03-16 PROCEDURE — 86850 RBC ANTIBODY SCREEN: CPT

## 2023-03-16 PROCEDURE — 36569 INSJ PICC 5 YR+ W/O IMAGING: CPT

## 2023-03-16 PROCEDURE — 99285 EMERGENCY DEPT VISIT HI MDM: CPT

## 2023-03-16 PROCEDURE — 81246 FLT3 GENE ANALYSIS: CPT

## 2023-03-16 PROCEDURE — 84484 ASSAY OF TROPONIN QUANT: CPT

## 2023-03-16 PROCEDURE — 80053 COMPREHEN METABOLIC PANEL: CPT

## 2023-03-16 PROCEDURE — 85027 COMPLETE CBC AUTOMATED: CPT

## 2023-03-16 PROCEDURE — P9040: CPT

## 2023-03-16 PROCEDURE — 83735 ASSAY OF MAGNESIUM: CPT

## 2023-03-16 PROCEDURE — 85730 THROMBOPLASTIN TIME PARTIAL: CPT

## 2023-03-16 PROCEDURE — 38222 DX BONE MARROW BX & ASPIR: CPT

## 2023-03-16 PROCEDURE — 85045 AUTOMATED RETICULOCYTE COUNT: CPT

## 2023-03-16 PROCEDURE — 71046 X-RAY EXAM CHEST 2 VIEWS: CPT

## 2023-03-16 PROCEDURE — 82330 ASSAY OF CALCIUM: CPT

## 2023-03-16 PROCEDURE — 87040 BLOOD CULTURE FOR BACTERIA: CPT

## 2023-03-16 PROCEDURE — 77012 CT SCAN FOR NEEDLE BIOPSY: CPT

## 2023-03-16 PROCEDURE — 62329 THER SPI PNXR CSF FLUOR/CT: CPT

## 2023-03-16 PROCEDURE — 82955 ASSAY OF G6PD ENZYME: CPT

## 2023-03-16 PROCEDURE — 86901 BLOOD TYPING SEROLOGIC RH(D): CPT

## 2023-03-16 PROCEDURE — 82947 ASSAY GLUCOSE BLOOD QUANT: CPT

## 2023-03-16 PROCEDURE — 36415 COLL VENOUS BLD VENIPUNCTURE: CPT

## 2023-03-16 PROCEDURE — 87389 HIV-1 AG W/HIV-1&-2 AB AG IA: CPT

## 2023-03-16 PROCEDURE — 88275 CYTOGENETICS 100-300: CPT

## 2023-03-16 PROCEDURE — 85014 HEMATOCRIT: CPT

## 2023-03-16 PROCEDURE — 84550 ASSAY OF BLOOD/URIC ACID: CPT

## 2023-03-16 PROCEDURE — 85049 AUTOMATED PLATELET COUNT: CPT

## 2023-03-16 PROCEDURE — 84157 ASSAY OF PROTEIN OTHER: CPT

## 2023-03-16 PROCEDURE — 85097 BONE MARROW INTERPRETATION: CPT

## 2023-03-16 PROCEDURE — 83605 ASSAY OF LACTIC ACID: CPT

## 2023-03-16 PROCEDURE — 86923 COMPATIBILITY TEST ELECTRIC: CPT

## 2023-03-16 PROCEDURE — 82945 GLUCOSE OTHER FLUID: CPT

## 2023-03-16 PROCEDURE — 82803 BLOOD GASES ANY COMBINATION: CPT

## 2023-03-16 PROCEDURE — U0003: CPT

## 2023-03-16 PROCEDURE — 81378 HLA I & II TYPING HR: CPT

## 2023-03-16 PROCEDURE — U0005: CPT

## 2023-03-16 PROCEDURE — 85384 FIBRINOGEN ACTIVITY: CPT

## 2023-03-16 PROCEDURE — 81001 URINALYSIS AUTO W/SCOPE: CPT

## 2023-03-16 PROCEDURE — 88264 CHROMOSOME ANALYSIS 20-25: CPT

## 2023-03-16 PROCEDURE — 84132 ASSAY OF SERUM POTASSIUM: CPT

## 2023-03-16 PROCEDURE — 81245 FLT3 GENE: CPT

## 2023-03-16 PROCEDURE — 85652 RBC SED RATE AUTOMATED: CPT

## 2023-03-16 PROCEDURE — C1751: CPT

## 2023-03-16 PROCEDURE — 88237 TISSUE CULTURE BONE MARROW: CPT

## 2023-03-16 PROCEDURE — 87205 SMEAR GRAM STAIN: CPT

## 2023-03-16 PROCEDURE — 83615 LACTATE (LD) (LDH) ENZYME: CPT

## 2023-03-16 PROCEDURE — 36430 TRANSFUSION BLD/BLD COMPNT: CPT

## 2023-03-16 PROCEDURE — 80074 ACUTE HEPATITIS PANEL: CPT

## 2023-03-16 PROCEDURE — 85018 HEMOGLOBIN: CPT

## 2023-03-16 PROCEDURE — C1830: CPT

## 2023-03-16 PROCEDURE — 87640 STAPH A DNA AMP PROBE: CPT

## 2023-03-16 PROCEDURE — 89051 BODY FLUID CELL COUNT: CPT

## 2023-03-16 PROCEDURE — 93306 TTE W/DOPPLER COMPLETE: CPT

## 2023-03-16 PROCEDURE — 86704 HEP B CORE ANTIBODY TOTAL: CPT

## 2023-03-16 PROCEDURE — 81450 HL NEO GSAP 5-50DNA/DNA&RNA: CPT

## 2023-03-16 PROCEDURE — 82962 GLUCOSE BLOOD TEST: CPT

## 2023-03-16 PROCEDURE — 88305 TISSUE EXAM BY PATHOLOGIST: CPT

## 2023-03-16 PROCEDURE — 86900 BLOOD TYPING SEROLOGIC ABO: CPT

## 2023-03-16 PROCEDURE — 99239 HOSP IP/OBS DSCHRG MGMT >30: CPT | Mod: GC

## 2023-03-16 PROCEDURE — 88185 FLOWCYTOMETRY/TC ADD-ON: CPT

## 2023-03-16 PROCEDURE — 87086 URINE CULTURE/COLONY COUNT: CPT

## 2023-03-16 PROCEDURE — 81206 BCR/ABL1 GENE MAJOR BP: CPT

## 2023-03-16 PROCEDURE — 88313 SPECIAL STAINS GROUP 2: CPT

## 2023-03-16 PROCEDURE — 84100 ASSAY OF PHOSPHORUS: CPT

## 2023-03-16 PROCEDURE — 85025 COMPLETE CBC W/AUTO DIFF WBC: CPT

## 2023-03-16 PROCEDURE — 84295 ASSAY OF SERUM SODIUM: CPT

## 2023-03-16 PROCEDURE — 88271 CYTOGENETICS DNA PROBE: CPT

## 2023-03-16 PROCEDURE — 70450 CT HEAD/BRAIN W/O DYE: CPT

## 2023-03-16 PROCEDURE — 82435 ASSAY OF BLOOD CHLORIDE: CPT

## 2023-03-16 PROCEDURE — 87641 MR-STAPH DNA AMP PROBE: CPT

## 2023-03-16 PROCEDURE — 81382 HLA II TYPING 1 LOC HR: CPT

## 2023-03-16 PROCEDURE — 88285 CHROMOSOME COUNT ADDITIONAL: CPT

## 2023-03-16 PROCEDURE — 71045 X-RAY EXAM CHEST 1 VIEW: CPT

## 2023-03-16 PROCEDURE — 85610 PROTHROMBIN TIME: CPT

## 2023-03-16 PROCEDURE — P9037: CPT

## 2023-03-16 PROCEDURE — 86140 C-REACTIVE PROTEIN: CPT

## 2023-03-16 PROCEDURE — 74177 CT ABD & PELVIS W/CONTRAST: CPT

## 2023-03-16 PROCEDURE — 85379 FIBRIN DEGRADATION QUANT: CPT

## 2023-03-16 RX ORDER — FILGRASTIM 480MCG/1.6
480 VIAL (ML) INJECTION
Qty: 8 | Refills: 0
Start: 2023-03-16 | End: 2023-03-20

## 2023-03-16 RX ORDER — FUROSEMIDE 40 MG
20 TABLET ORAL ONCE
Refills: 0 | Status: COMPLETED | OUTPATIENT
Start: 2023-03-16 | End: 2023-03-16

## 2023-03-16 RX ORDER — GABAPENTIN 400 MG/1
1 CAPSULE ORAL
Qty: 90 | Refills: 0
Start: 2023-03-16 | End: 2023-04-14

## 2023-03-16 RX ORDER — DEXAMETHASONE 0.5 MG/5ML
5 ELIXIR ORAL
Qty: 5 | Refills: 0
Start: 2023-03-16 | End: 2023-03-16

## 2023-03-16 RX ORDER — FLUCONAZOLE 150 MG/1
1 TABLET ORAL
Qty: 30 | Refills: 0
Start: 2023-03-16 | End: 2023-04-14

## 2023-03-16 RX ORDER — SUCRALFATE 1 G
10 TABLET ORAL
Qty: 1200 | Refills: 0
Start: 2023-03-16 | End: 2023-04-14

## 2023-03-16 RX ORDER — ACYCLOVIR SODIUM 500 MG
1 VIAL (EA) INTRAVENOUS
Qty: 60 | Refills: 0
Start: 2023-03-16 | End: 2023-04-14

## 2023-03-16 RX ORDER — METOCLOPRAMIDE HCL 10 MG
1 TABLET ORAL
Qty: 120 | Refills: 0
Start: 2023-03-16 | End: 2023-04-14

## 2023-03-16 RX ORDER — POLYETHYLENE GLYCOL 3350 17 G/17G
17 POWDER, FOR SOLUTION ORAL
Qty: 0 | Refills: 0 | DISCHARGE
Start: 2023-03-16

## 2023-03-16 RX ORDER — FILGRASTIM 480MCG/1.6
480 VIAL (ML) INJECTION
Qty: 4 | Refills: 0
Start: 2023-03-16 | End: 2023-03-20

## 2023-03-16 RX ORDER — FILGRASTIM 480MCG/1.6
480 VIAL (ML) INJECTION
Qty: 5 | Refills: 0
Start: 2023-03-16 | End: 2023-03-20

## 2023-03-16 RX ORDER — LEVOFLOXACIN 5 MG/ML
1 INJECTION, SOLUTION INTRAVENOUS
Qty: 30 | Refills: 0
Start: 2023-03-16 | End: 2023-04-14

## 2023-03-16 RX ORDER — SUCRALFATE 1 G
1 TABLET ORAL
Qty: 120 | Refills: 0
Start: 2023-03-16 | End: 2023-04-14

## 2023-03-16 RX ADMIN — PANTOPRAZOLE SODIUM 40 MILLIGRAM(S): 20 TABLET, DELAYED RELEASE ORAL at 06:04

## 2023-03-16 RX ADMIN — Medication 400 MILLIGRAM(S): at 06:04

## 2023-03-16 RX ADMIN — Medication 1 GRAM(S): at 12:33

## 2023-03-16 RX ADMIN — CHLORHEXIDINE GLUCONATE 1 APPLICATION(S): 213 SOLUTION TOPICAL at 12:36

## 2023-03-16 RX ADMIN — Medication 1 GRAM(S): at 17:37

## 2023-03-16 RX ADMIN — Medication 480 MICROGRAM(S): at 15:27

## 2023-03-16 RX ADMIN — Medication 5 MILLILITER(S): at 08:49

## 2023-03-16 RX ADMIN — Medication 20 MILLIGRAM(S): at 16:17

## 2023-03-16 RX ADMIN — SIMVASTATIN 40 MILLIGRAM(S): 20 TABLET, FILM COATED ORAL at 19:54

## 2023-03-16 RX ADMIN — ESCITALOPRAM OXALATE 10 MILLIGRAM(S): 10 TABLET, FILM COATED ORAL at 12:34

## 2023-03-16 RX ADMIN — Medication 650 MILLIGRAM(S): at 13:12

## 2023-03-16 RX ADMIN — Medication 5 MILLILITER(S): at 12:32

## 2023-03-16 RX ADMIN — GABAPENTIN 300 MILLIGRAM(S): 400 CAPSULE ORAL at 16:18

## 2023-03-16 RX ADMIN — Medication 650 MILLIGRAM(S): at 12:42

## 2023-03-16 RX ADMIN — GABAPENTIN 300 MILLIGRAM(S): 400 CAPSULE ORAL at 08:39

## 2023-03-16 RX ADMIN — GABAPENTIN 300 MILLIGRAM(S): 400 CAPSULE ORAL at 22:00

## 2023-03-16 RX ADMIN — Medication 20 MILLIGRAM(S): at 12:33

## 2023-03-16 RX ADMIN — Medication 5 MILLILITER(S): at 16:17

## 2023-03-16 RX ADMIN — Medication 5 MILLILITER(S): at 19:53

## 2023-03-16 RX ADMIN — LOSARTAN POTASSIUM 100 MILLIGRAM(S): 100 TABLET, FILM COATED ORAL at 06:04

## 2023-03-16 RX ADMIN — Medication 400 MILLIGRAM(S): at 17:37

## 2023-03-16 RX ADMIN — Medication 1 GRAM(S): at 06:04

## 2023-03-16 RX ADMIN — FLUCONAZOLE 200 MILLIGRAM(S): 150 TABLET ORAL at 12:34

## 2023-03-16 NOTE — PROGRESS NOTE ADULT - PROBLEM SELECTOR PROBLEM 5
Prophylactic measure
Transaminitis
Prophylactic measure
Transaminitis
Transaminitis
Prophylactic measure
Transaminitis
Transaminitis
Prophylactic measure

## 2023-03-16 NOTE — PROGRESS NOTE ADULT - PROBLEM SELECTOR PLAN 3
continue losartan

## 2023-03-16 NOTE — PROGRESS NOTE ADULT - PROBLEM SELECTOR PLAN 1
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 Hep B Core neg. HIV negative.  Transfuse PRN. maintain Hgb >7, plts >15.  Gabapentin for splenic/side pain. Increased dose to 300mg PO TID on 3/2   2/28 s/p LP with IT MTX, flow - NEG for malignant cells  2/25 - CTA from [facility] (-) for PE. Left sided pain likely due to splenomegaly.  PICC line at bedside 2/28  3/4 Chemo with reduced dose HyperCVAD started, Decadron started on 3/3, Cytoxan and VCR started on 3/4~1am per chemo RN  3/11 s/p D8 VCR, normal bowel movements, no neuropathy  - need to discuss timing of next  bone marrow biopsy / cycle 1B IV methotrexate/cytarabine  3/13: added zarxio 480mcg sq daily  3/14 HA likely from Zofran,  HCT to r/o bleed since PLT 18K reveals No evidence of acute hemorrhage, mass or mass effect. 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 Hep B Core neg. HIV negative.  Transfuse PRN. maintain Hgb >7, plts >15.  Gabapentin for splenic/side pain. Increased dose to 300mg PO TID on 3/2   2/28 s/p LP with IT MTX, flow - NEG for malignant cells  2/25 - CTA from [facility] (-) for PE. Left sided pain likely due to splenomegaly.  PICC line at bedside 2/28  3/4 Chemo with reduced dose HyperCVAD started, Decadron started on 3/3, Cytoxan and VCR started on 3/4~1am per chemo RN  3/11 s/p D8 VCR, normal bowel movements, no neuropathy  - need to discuss timing of next  bone marrow biopsy / cycle 1B IV methotrexate/cytarabine  3/13: added zarxio 480mcg sq daily  3/14 HA likely from Zofran,  HCT to r/o bleed since PLT 18K reveals No evidence of acute hemorrhage, mass or mass effect.  Anemia: PRBC x2 with Lasix 20 mg ivP prior to 2nd PRBC  Plan DC home today if [employer] is approved

## 2023-03-16 NOTE — PROGRESS NOTE ADULT - PROBLEM SELECTOR PLAN 4
continue simvastatin

## 2023-03-16 NOTE — PROGRESS NOTE ADULT - PROBLEM SELECTOR PROBLEM 6
Transaminitis
Prophylactic measure
Prophylactic measure
Transaminitis
Transaminitis
Prophylactic measure
Transaminitis
Prophylactic measure
Prophylactic measure
Transaminitis

## 2023-03-16 NOTE — PROGRESS NOTE ADULT - PROBLEM SELECTOR PLAN 6
Holding pharmacologic DVT ppx in the setting of thrombocytopenia  encourage OOB and ambulation  PPI and Carafate to GI regimen for GERD  DC Zofran  because possible cause of HAMILTON, Reglan PRN N/V

## 2023-03-16 NOTE — PROGRESS NOTE ADULT - SUBJECTIVE AND OBJECTIVE BOX
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   Decadron started on 3/3, Cytoxan and VCR started on 3/4    Day: 13     Pt endorsed: fatigue; decreased appetite and po intake; chronic stable tinnitus left ear; improved rash     Review of Systems: Patient denied vomiting, odynophagia, chest pain,dyspnea, abdominal pain, constipation, diarrhea      Pain scale: denies     Diet: regular     Allergies: sulfa drugs (Unknown)      ANTIMICROBIALS  acyclovir   Oral Tab/Cap 400 milliGRAM(s) Oral two times a day  fluconAZOLE   Tablet 200 milliGRAM(s) Oral daily  levoFLOXacin  Tablet 500 milliGRAM(s) Oral every 24 hours      HEME/ONC MEDICATIONS  methotrexate PF IntraThecal (eMAR) 15 milliGRAM(s) IntraThecal once      STANDING MEDICATIONS  Biotene Dry Mouth Oral Rinse 5 milliLiter(s) Swish and Spit five times a day  brimonidine 0.2% Ophthalmic Solution 1 Drop(s) Both EYES daily  chlorhexidine 2% Cloths 1 Application(s) Topical daily  dexAMETHasone     Tablet 20 milliGRAM(s) Oral every 24 hours  escitalopram 10 milliGRAM(s) Oral daily  filgrastim-sndz (ZARXIO) Injectable 480 MICROGram(s) SubCutaneous daily  gabapentin 300 milliGRAM(s) Oral every 8 hours  losartan 100 milliGRAM(s) Oral daily  pantoprazole    Tablet 40 milliGRAM(s) Oral before breakfast  simvastatin 40 milliGRAM(s) Oral at bedtime  sodium chloride 0.9%. 1000 milliLiter(s) IV Continuous <Continuous>  sucralfate suspension 1 Gram(s) Oral four times a day      PRN MEDICATIONS  acetaminophen     Tablet .. 650 milliGRAM(s) Oral every 6 hours PRN  aluminum hydroxide/magnesium hydroxide/simethicone Suspension 30 milliLiter(s) Oral every 4 hours PRN  diphenhydrAMINE 25 milliGRAM(s) Oral every 4 hours PRN  FIRST- Mouthwash  BLM 10 milliLiter(s) Swish and Spit four times a day PRN  metoclopramide Injectable 10 milliGRAM(s) IV Push every 6 hours PRN  polyethylene glycol 3350 17 Gram(s) Oral two times a day PRN      Vital Signs Last 24 Hrs  T(C): 36.5 (16 Mar 2023 05:04), Max: 37.1 (15 Mar 2023 21:03)  T(F): 97.7 (16 Mar 2023 05:04), Max: 98.7 (15 Mar 2023 21:03)  HR: 77 (16 Mar 2023 05:04) (60 - 86)  BP: 152/73 (16 Mar 2023 05:04) (95/60 - 152/73)  BP(mean): --  RR: 18 (16 Mar 2023 05:04) (18 - 18)  SpO2: 96% (16 Mar 2023 05:04) (96% - 98%)    Parameters below as of 16 Mar 2023 05:04  Patient On (Oxygen Delivery Method): room air      PHYSICAL EXAM  General: NAD  HEENT: clear oropharynx, anicteric sclera  CV: (+) S1/S2 RRR  Lungs: clear to auscultation, no wheezes or rales  Abdomen: soft, non-tender, non-distended (+) BS  Ext: no  edema  Skin: abd rashes, improved, now not raised   Neuro: alert and oriented X 3, no focal deficits  Central Line: PICC c/d/i               RADIOLOGY & ADDITIONAL STUDIES:    < from: CT Head No Cont (03.14.23 @ 13:58) >  IMPRESSION:  No evidence of acute hemorrhage, mass or mass effect. If clinical   symptoms persist consider further imaging with MRI of the brain, provided   there are no medical contraindications.      < from: CT Abdomen and Pelvis w/ IV Cont (03.01.23 @ 20:02) >  IMPRESSION:  Splenomegaly with a small age indeterminant splenic infarct.  No abdominal or pelvic lymphadenopathy.  Trace left pleural effusion      < from: Xray Chest 1 View- PORTABLE-Urgent (Xray Chest 1 View- PORTABLE-Urgent .) (02.28.23 @ 19:25) >  IMPRESSION:  Right upper extremity PICC line catheter tip is in the distal SVC.  Similar-appearing left basilar atelectasis with small effusion.     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   Decadron started on 3/3, Cytoxan and VCR started on 3/4    Day: 13     Pt endorsed:   last BM on 3/14(multiple BMs)    Review of Systems: Patient denied vomiting, odynophagia, chest pain, dyspnea, abdominal pain, constipation, diarrhea, fatigue, HA      Pain scale: denies     Diet: regular     Allergies: sulfa drugs (Unknown)      ANTIMICROBIALS  acyclovir   Oral Tab/Cap 400 milliGRAM(s) Oral two times a day  fluconAZOLE   Tablet 200 milliGRAM(s) Oral daily  levoFLOXacin  Tablet 500 milliGRAM(s) Oral every 24 hours      HEME/ONC MEDICATIONS  methotrexate PF IntraThecal (eMAR) 15 milliGRAM(s) IntraThecal once      STANDING MEDICATIONS  Biotene Dry Mouth Oral Rinse 5 milliLiter(s) Swish and Spit five times a day  brimonidine 0.2% Ophthalmic Solution 1 Drop(s) Both EYES daily  chlorhexidine 2% Cloths 1 Application(s) Topical daily  dexAMETHasone     Tablet 20 milliGRAM(s) Oral every 24 hours  escitalopram 10 milliGRAM(s) Oral daily  filgrastim-sndz (ZARXIO) Injectable 480 MICROGram(s) SubCutaneous daily  gabapentin 300 milliGRAM(s) Oral every 8 hours  losartan 100 milliGRAM(s) Oral daily  pantoprazole    Tablet 40 milliGRAM(s) Oral before breakfast  simvastatin 40 milliGRAM(s) Oral at bedtime  sodium chloride 0.9%. 1000 milliLiter(s) IV Continuous <Continuous>  sucralfate suspension 1 Gram(s) Oral four times a day      PRN MEDICATIONS  acetaminophen     Tablet .. 650 milliGRAM(s) Oral every 6 hours PRN  aluminum hydroxide/magnesium hydroxide/simethicone Suspension 30 milliLiter(s) Oral every 4 hours PRN  diphenhydrAMINE 25 milliGRAM(s) Oral every 4 hours PRN  FIRST- Mouthwash  BLM 10 milliLiter(s) Swish and Spit four times a day PRN  metoclopramide Injectable 10 milliGRAM(s) IV Push every 6 hours PRN  polyethylene glycol 3350 17 Gram(s) Oral two times a day PRN      Vital Signs Last 24 Hrs  T(C): 36.5 (16 Mar 2023 05:04), Max: 37.1 (15 Mar 2023 21:03)  T(F): 97.7 (16 Mar 2023 05:04), Max: 98.7 (15 Mar 2023 21:03)  HR: 77 (16 Mar 2023 05:04) (60 - 86)  BP: 152/73 (16 Mar 2023 05:04) (95/60 - 152/73)  BP(mean): --  RR: 18 (16 Mar 2023 05:04) (18 - 18)  SpO2: 96% (16 Mar 2023 05:04) (96% - 98%)    Parameters below as of 16 Mar 2023 05:04  Patient On (Oxygen Delivery Method): room air      PHYSICAL EXAM  General: NAD  HEENT: clear oropharynx, anicteric sclera  CV: (+) S1/S2 RRR  Lungs: clear to auscultation, no wheezes or rales  Abdomen: soft, non-tender, non-distended (+) BS  Ext: no  edema  Skin: abd rashes, improved, now not raised   Neuro: alert and oriented X 3, no focal deficits  Central Line: PICC c/d/i       LABS:                          7.6    0.15  )-----------( 22       ( 16 Mar 2023 06:56 )             22.2         Mean Cell Volume : 86.0 fl  Mean Cell Hemoglobin : 29.5 pg  Mean Cell Hemoglobin Concentration : 34.2 gm/dL  Auto Neutrophil # : x  Auto Lymphocyte # : x  Auto Monocyte # : x  Auto Eosinophil # : x  Auto Basophil # : x  Auto Neutrophil % : x  Auto Lymphocyte % : x  Auto Monocyte % : x  Auto Eosinophil % : x  Auto Basophil % : x      03-16    139  |  105  |  19  ----------------------------<  156<H>  3.8   |  24  |  0.63    Ca    9.1      16 Mar 2023 06:56  Phos  2.6     03-16  Mg     2.0     03-16    TPro  6.5  /  Alb  3.9  /  TBili  0.8  /  DBili  x   /  AST  11  /  ALT  18  /  AlkPhos  96  03-16      PT/INR - ( 16 Mar 2023 06:56 )   PT: 12.1 sec;   INR: 1.05 ratio         PTT - ( 16 Mar 2023 06:56 )  PTT:25.9 sec      Uric Acid 2.2          RADIOLOGY & ADDITIONAL STUDIES:    < from: CT Head No Cont (03.14.23 @ 13:58) >  IMPRESSION:  No evidence of acute hemorrhage, mass or mass effect. If clinical   symptoms persist consider further imaging with MRI of the brain, provided   there are no medical contraindications.      < from: CT Abdomen and Pelvis w/ IV Cont (03.01.23 @ 20:02) >  IMPRESSION:  Splenomegaly with a small age indeterminant splenic infarct.  No abdominal or pelvic lymphadenopathy.  Trace left pleural effusion      < from: Xray Chest 1 View- PORTABLE-Urgent (Xray Chest 1 View- PORTABLE-Urgent .) (02.28.23 @ 19:25) >  IMPRESSION:  Right upper extremity PICC line catheter tip is in the distal SVC.  Similar-appearing left basilar atelectasis with small effusion.     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   Decadron started on 3/3, Cytoxan and VCR started on 3/4    Day: 13     Pt endorsed:   last BM on 3/14(multiple BMs) after Lactulose    Review of Systems: Patient denied vomiting, odynophagia, chest pain, dyspnea, abdominal pain, constipation, diarrhea, fatigue, HA      Pain scale: denies     Diet: regular     Allergies: sulfa drugs (Unknown)      ANTIMICROBIALS  acyclovir   Oral Tab/Cap 400 milliGRAM(s) Oral two times a day  fluconAZOLE   Tablet 200 milliGRAM(s) Oral daily  levoFLOXacin  Tablet 500 milliGRAM(s) Oral every 24 hours      HEME/ONC MEDICATIONS  methotrexate PF IntraThecal (eMAR) 15 milliGRAM(s) IntraThecal once      STANDING MEDICATIONS  Biotene Dry Mouth Oral Rinse 5 milliLiter(s) Swish and Spit five times a day  brimonidine 0.2% Ophthalmic Solution 1 Drop(s) Both EYES daily  chlorhexidine 2% Cloths 1 Application(s) Topical daily  dexAMETHasone     Tablet 20 milliGRAM(s) Oral every 24 hours  escitalopram 10 milliGRAM(s) Oral daily  filgrastim-sndz (ZARXIO) Injectable 480 MICROGram(s) SubCutaneous daily  gabapentin 300 milliGRAM(s) Oral every 8 hours  losartan 100 milliGRAM(s) Oral daily  pantoprazole    Tablet 40 milliGRAM(s) Oral before breakfast  simvastatin 40 milliGRAM(s) Oral at bedtime  sodium chloride 0.9%. 1000 milliLiter(s) IV Continuous <Continuous>  sucralfate suspension 1 Gram(s) Oral four times a day      PRN MEDICATIONS  acetaminophen     Tablet .. 650 milliGRAM(s) Oral every 6 hours PRN  aluminum hydroxide/magnesium hydroxide/simethicone Suspension 30 milliLiter(s) Oral every 4 hours PRN  diphenhydrAMINE 25 milliGRAM(s) Oral every 4 hours PRN  FIRST- Mouthwash  BLM 10 milliLiter(s) Swish and Spit four times a day PRN  metoclopramide Injectable 10 milliGRAM(s) IV Push every 6 hours PRN  polyethylene glycol 3350 17 Gram(s) Oral two times a day PRN      Vital Signs Last 24 Hrs  T(C): 36.5 (16 Mar 2023 05:04), Max: 37.1 (15 Mar 2023 21:03)  T(F): 97.7 (16 Mar 2023 05:04), Max: 98.7 (15 Mar 2023 21:03)  HR: 77 (16 Mar 2023 05:04) (60 - 86)  BP: 152/73 (16 Mar 2023 05:04) (95/60 - 152/73)  BP(mean): --  RR: 18 (16 Mar 2023 05:04) (18 - 18)  SpO2: 96% (16 Mar 2023 05:04) (96% - 98%)    Parameters below as of 16 Mar 2023 05:04  Patient On (Oxygen Delivery Method): room air      PHYSICAL EXAM  General: NAD  HEENT: clear oropharynx, anicteric sclera  CV: (+) S1/S2 RRR  Lungs: clear to auscultation, no wheezes or rales  Abdomen: soft, non-tender, non-distended (+) BS  Ext: no  edema  Skin: abd rashes, improved, now not raised   Neuro: alert and oriented X 3, no focal deficits  Central Line: PICC c/d/i       LABS:                          7.6    0.15  )-----------( 22       ( 16 Mar 2023 06:56 )             22.2         Mean Cell Volume : 86.0 fl  Mean Cell Hemoglobin : 29.5 pg  Mean Cell Hemoglobin Concentration : 34.2 gm/dL  Auto Neutrophil # : x  Auto Lymphocyte # : x  Auto Monocyte # : x  Auto Eosinophil # : x  Auto Basophil # : x  Auto Neutrophil % : x  Auto Lymphocyte % : x  Auto Monocyte % : x  Auto Eosinophil % : x  Auto Basophil % : x      03-16    139  |  105  |  19  ----------------------------<  156<H>  3.8   |  24  |  0.63    Ca    9.1      16 Mar 2023 06:56  Phos  2.6     03-16  Mg     2.0     03-16    TPro  6.5  /  Alb  3.9  /  TBili  0.8  /  DBili  x   /  AST  11  /  ALT  18  /  AlkPhos  96  03-16      PT/INR - ( 16 Mar 2023 06:56 )   PT: 12.1 sec;   INR: 1.05 ratio         PTT - ( 16 Mar 2023 06:56 )  PTT:25.9 sec      Uric Acid 2.2          RADIOLOGY & ADDITIONAL STUDIES:    < from: CT Head No Cont (03.14.23 @ 13:58) >  IMPRESSION:  No evidence of acute hemorrhage, mass or mass effect. If clinical   symptoms persist consider further imaging with MRI of the brain, provided   there are no medical contraindications.      < from: CT Abdomen and Pelvis w/ IV Cont (03.01.23 @ 20:02) >  IMPRESSION:  Splenomegaly with a small age indeterminant splenic infarct.  No abdominal or pelvic lymphadenopathy.  Trace left pleural effusion      < from: Xray Chest 1 View- PORTABLE-Urgent (Xray Chest 1 View- PORTABLE-Urgent .) (02.28.23 @ 19:25) >  IMPRESSION:  Right upper extremity PICC line catheter tip is in the distal SVC.  Similar-appearing left basilar atelectasis with small effusion.

## 2023-03-16 NOTE — DISCHARGE NOTE NURSING/CASE MANAGEMENT/SOCIAL WORK - PATIENT PORTAL LINK FT
You can access the FollowMyHealth Patient Portal offered by [facility] by registering at the following website: [email or web address]. By joining [employer]’s FollowMyHealth portal, you will also be able to view your health information using other applications (apps) compatible with our system.

## 2023-03-16 NOTE — PROGRESS NOTE ADULT - NS ATTEND AMEND GEN_ALL_CORE FT
63 year old with HLD and HTN, transferred b/c of circulating blasts consistent with acute leukemia. PB flow cytometry showing B-ALL  FISH negative for Ph chromosome. PCR negative (although p190 positive 0.001% from marrow)  BMbx done 2/28 with IR (was unable to get at bedside) -B-lymphoblastic leukemia/lymphoma.    Heme:  Started mini HyperCVAD on 3/4. Today is cycle 1A D12.   LP with IT MTX done on 2/28 -negative for malignant cells  PICC in place  Receiving transfusions for plts <10, Hgb <7    ID:  Febrile to 101.7 on 3/3 -levaquin changed to cefepime;   On caspofungin and acyclovir for neutropenic ppx.   She has remained afebrile to this point, de-escalating to Levaquin.     Cardio:  Echo done EF 55%.    GI:  Transaminitis -US done at [place] with fatty liver. Cont to monitor  CT A/P -Splenomegaly with a small age indeterminant splenic infarct. Normal liver, no abdominal or pelvic lymphadenopathy. CTA done at [place] w/ shotty LAD    Neuro:  Worsening headaches on 3/15.  Plts 18, so although we believe this is mostly due to Zofran checked CTH which was normal.   Headaches now resolved. 63 year old with HLD and HTN, transferred b/c of circulating blasts consistent with acute leukemia. PB flow cytometry showing B-ALL  FISH negative for Ph chromosome. PCR negative (although p190 positive 0.001% from marrow)  BMbx done 2/28 with IR (was unable to get at bedside) -B-lymphoblastic leukemia/lymphoma.    Heme:  Started mini HyperCVAD on 3/4. Today is cycle 1A D13.   LP with IT MTX done on 2/28 -negative for malignant cells  PICC in place  Receiving transfusions for plts <10, Hgb <7    ID:  Febrile to 101.7 on 3/3 -levaquin changed to cefepime;   On caspofungin and acyclovir for neutropenic ppx.   She has remained afebrile to this point, de-escalated to Levaquin.     Cardio:  Echo done EF 55%.    GI:  Transaminitis -US done at [place] with fatty liver. Cont to monitor  CT A/P -Splenomegaly with a small age indeterminant splenic infarct. Normal liver, no abdominal or pelvic lymphadenopathy. CTA done at [place] w/ shotty LAD    Neuro:  Worsening headaches on 3/15.  Plts 18, so although we believe this is mostly due to Zofran checked CTH which was normal.   Headaches now resolved.    Were planning for possible early discharge. Will discuss further with outpatient team regarding availability.

## 2023-03-16 NOTE — PROGRESS NOTE ADULT - PROBLEM SELECTOR PROBLEM 4
HLD (hyperlipidemia)

## 2023-03-16 NOTE — PROGRESS NOTE ADULT - PROVIDER SPECIALTY LIST ADULT
Heme/Onc
Neuroradiology
Heme/Onc
Hospitalist
Heme/Onc
Intervent Radiology
Heme/Onc

## 2023-03-16 NOTE — PROGRESS NOTE ADULT - ASSESSMENT
Ms. Kaur is a 64 y/o with PMHx of HTN, HLD presents to the ED BIBA transferred from [facility] for new diagnosis of leukemia, peripheral blood flow cytometry c/w B-ALL. Official bone marrow biopsy 2/28 c/w B-cell ALL, [place] chromosome (-).  Chemotherapy  with reduced dose HyperCVAD started on 3/3/23. Hospital course c/b neutropenic fever, transaminitis, rash 3/4 improved with topical steroid and atarax PRN,  and LUE cellulitis. Patient has pancytopenia  due to disease and treatment.

## 2023-03-16 NOTE — PROGRESS NOTE ADULT - NUTRITIONAL ASSESSMENT
This patient has been assessed with a concern for Malnutrition and has been determined to have a diagnosis/diagnoses of Morbid obesity (BMI > 40).    This patient is being managed with:   Diet Regular-  Supplement Feeding Modality:  Oral  Ensure Enlive Cans or Servings Per Day:  1       Frequency:  Daily  Entered: Mar  7 2023  2:56PM    Diet Regular-  Entered: Feb 28 2023  2:15PM    The following pending diet order is being considered for treatment of Morbid obesity (BMI > 40):null

## 2023-03-16 NOTE — PROGRESS NOTE ADULT - PROBLEM SELECTOR PROBLEM 3
HTN (hypertension)

## 2023-03-17 ENCOUNTER — RESULT REVIEW (OUTPATIENT)
Age: 63
End: 2023-03-17

## 2023-03-17 ENCOUNTER — NON-APPOINTMENT (OUTPATIENT)
Age: 63
End: 2023-03-17

## 2023-03-17 ENCOUNTER — APPOINTMENT (OUTPATIENT)
Age: 63
End: 2023-03-17
Payer: COMMERCIAL

## 2023-03-17 ENCOUNTER — APPOINTMENT (OUTPATIENT)
Dept: INFUSION THERAPY | Facility: HOSPITAL | Age: 63
End: 2023-03-17

## 2023-03-17 ENCOUNTER — APPOINTMENT (OUTPATIENT)
Dept: HEMATOLOGY ONCOLOGY | Facility: CLINIC | Age: 63
End: 2023-03-17

## 2023-03-17 VITALS
OXYGEN SATURATION: 98 % | RESPIRATION RATE: 18 BRPM | DIASTOLIC BLOOD PRESSURE: 81 MMHG | TEMPERATURE: 97.8 F | HEART RATE: 55 BPM | SYSTOLIC BLOOD PRESSURE: 146 MMHG

## 2023-03-17 LAB
HCT VFR BLD CALC: 27.3 % — LOW (ref 34.5–45)
HGB BLD-MCNC: 10 G/DL — LOW (ref 11.5–15.5)
MCHC RBC-ENTMCNC: 29.9 PG — SIGNIFICANT CHANGE UP (ref 27–34)
MCHC RBC-ENTMCNC: 36.6 G/DL — HIGH (ref 32–36)
MCV RBC AUTO: 81.7 FL — SIGNIFICANT CHANGE UP (ref 80–100)
NRBC # BLD: 0 /100 WBCS — SIGNIFICANT CHANGE UP (ref 0–0)
PLATELET # BLD AUTO: 24 K/UL — LOW (ref 150–400)
RBC # BLD: 3.34 M/UL — LOW (ref 3.8–5.2)
RBC # FLD: 12.9 % — SIGNIFICANT CHANGE UP (ref 10.3–14.5)
SARS-COV-2 RNA SPEC QL NAA+PROBE: SIGNIFICANT CHANGE UP
WBC # BLD: 0.2 K/UL — CRITICAL LOW (ref 3.8–10.5)
WBC # FLD AUTO: 0.2 K/UL — CRITICAL LOW (ref 3.8–10.5)

## 2023-03-17 PROCEDURE — 99215 OFFICE O/P EST HI 40 MIN: CPT

## 2023-03-17 NOTE — RESULTS/DATA
[FreeTextEntry1] : Final Diagnosis\par 1, 2. Bone marrow biopsy and bone marrow aspirate\par - B-lymphoblastic leukemia/lymphoma.\par \par Ancillary studies\par Special stains:  Bone marrow aspirate iron stain:   No spicules are present\par to evaluate for iron stores and there are insufficient nRBC to evaluate\par for ring sideroblasts.\par Flow cytometry:  B-lymphoblasts (88% of cells), positive for Tdt, HLA-DR,\par CD38, CD34, CD19, CD10, dim CD22, CD20, CD58, CD9, cytoplasmic CD22,\par cytoplasmic CD79a; negative , CD20, CD33, CD13, CD15, CRLF2)\par consistent with B-lymphoblastic leukemia/lymphoma.\par \par Microscopic description:\par 1. Biopsy:  Sections of biopsy and clot show hypercellularity (90 to\par 95%) with diffuse immature infiltrate, diminished myeloid and erythroid\par elements, decreased megakaryocytes, and presence of iron stores.\par \par 2. Aspirate:  Aparticulate aspirate smear with predominantly blasts, rare\par mature myeloid and erythroid cells, and megakaryocytes nearly not seen.\par \par Bone Marrow Aspirate Differential: (100 Cells).\par Type  % Normal*\par Blast  91% 0-3\par Neutrophil and\par Precursors   1% 33-63\par Eosinophil  0% 1-5\par Basophil  0% 0-1\par Pronormoblast  0% 0-2\par Normoblast  2% 15-25\par Monocyte  0% 0-2\par Lymphocyte  6% 10-15\par Plasma cell  0% 0-1\par *Adult Range\par Comment\par Iron stain (examined to evaluate for iron stores; see microscopic\par description) and Giemsa stain (shows appropriate staining pattern) are\par performed and evaluated on block(s): 1A, B.\par

## 2023-03-17 NOTE — HISTORY OF PRESENT ILLNESS
[Day: ___] : Day: [unfilled] [Therapy: ___] : Therapy: [unfilled] [Cycle: ___] : Cycle: [unfilled] [de-identified] : Primary Oncologist: Brad Goldberg\par \par Ms Kaur is a 64yo F w/ PMHx of HTN, HLD and newly diagnosed Ph(-) B-ALL. She was noted to have leukocytosis (16.24), PB blasts, and thrombocytopenia (41k), Bmbx done 2/28 confirming Dx of B-ALL. She began induction with dose reduced  HYPER-CVAD. IT MTX given on 2/28 CSF negative for malignancy. Her hospital course was c/b febrile neutropenia pan culture negative tx w/ Cefepime. Transaminitis US w/ fatty liver and CT CT A/P w/ splenomegaly (15.5cm) with a small age indeterminant splenic infarct. Cellulites of the LUE from PIV, PICC line placed in RUE. PICC line removed prior to discharge due to insurance issues with home care, likely planned for port placement in the near furutue.  Headaches CT head done to r/o bleed negative, thought to be Zofran induced. Zofran was held and headaches resolved switched to Reglan for nausea. She is currently dose reduced HYPER-CVAD C1a D14. \par \par \par \par Review Of Systems:\par Reports she still have a rash on her abdomen, non itchy, not spreading or worsening. She has been putting hydrocortisone on it.  Denies headaches, dizziness, fevers, chills, night sweats, abd pain, n/v/d/c, chest pain, SOB, palpitations, edema, gum bleeding, epistaxis, hematuria, hematochezia, melena\par \par \par Physical Exam\par ECOG Performance Status: Status 0 - Fully active, able to carry on all pre-disease performance without restriction. \par Constitutional: well developed, well nourished, in no acute distress. \par ENT: pharynx is unremarkable, moist mucus membrane, no oral lesions. \par Neck: supple without JVD, no thyromegaly or masses appreciated. \par Pulmonary: clear to auscultation bilaterally, no dullness, no wheezing. \par Cardiac: RRR, normal S1S2, no murmurs, rubs, gallops. \par Vascular: no JVD, no calf tenderness, venous stasis changes, varices. \par Abdomen:. Mild tenderness in the LUQ. \par Skin:. Scattered morbillform rash on abdomen.\par \par \par Medications\par Acyclovir 400mg BID\par Fluconazole 200mg daily\par Levaquin 500mg daily\par Complete Dexamethasone 20mg on 3/17

## 2023-03-17 NOTE — ASSESSMENT
[FreeTextEntry1] : Ms. Kaur is a 62yo F w/ PMHx of HTN and HLD w/ newly diagnosed B-ALL, s/p induction with dose reduced HYPER-CVAD course complicated by febrile neutropenia, transaminitis, and LUE cellulites. She was discharged on 3/16/23, today she presents to the early discharge clinic HYPER-CVAD C1a D14. She remains neutropenic WBC 0.2  on prophylaxis and thrombocytopenic Plt 24k. \par \par PLAN\par \par B-ALL\par - Hyper-CVAD C1a D14 \par - Zarxio 480mg administered today \par     - C/w home Zarxio injections daily  starting tomorrow, teching done both IP and OP. \par - Dexamethasone 20mg today then no further Dex for this cycle \par - As per notes will plan to do C1b and then rpt Bmbx to assess for response \par - F/u w/ Dr. Goldberg on 3/27/23\par \par Heme\par - WBC 0.2, Hgb 10, Hct 27.3%, Platelet s 24k\par     - WIll transfuse 1unit of platelets today as patient is scheduled to RTC on 3/20\par - Goal: Platelets >20k, Hgb >8\par - Keep active T&S\par - Keep active Covid swab \par \par Prophylaxis \par - C/w Acyclovir 400mg BID, Levaquin 500mg daily, Fluconazole 200mg daily\par - Check temp at home 3x daily if fever >100.4 call clinic\par \par RTC 3x weekly (M,W,F) for labs, ACP, and possible platelets \par \par

## 2023-03-20 ENCOUNTER — RESULT REVIEW (OUTPATIENT)
Age: 63
End: 2023-03-20

## 2023-03-20 ENCOUNTER — APPOINTMENT (OUTPATIENT)
Dept: INFUSION THERAPY | Facility: HOSPITAL | Age: 63
End: 2023-03-20

## 2023-03-20 ENCOUNTER — APPOINTMENT (OUTPATIENT)
Dept: HEMATOLOGY ONCOLOGY | Facility: CLINIC | Age: 63
End: 2023-03-20

## 2023-03-20 DIAGNOSIS — Z51.89 ENCOUNTER FOR OTHER SPECIFIED AFTERCARE: ICD-10-CM

## 2023-03-20 DIAGNOSIS — C91.00 ACUTE LYMPHOBLASTIC LEUKEMIA NOT HAVING ACHIEVED REMISSION: ICD-10-CM

## 2023-03-20 LAB
ALBUMIN SERPL ELPH-MCNC: 3.8 G/DL
ALP BLD-CCNC: 92 U/L
ALT SERPL-CCNC: 17 U/L
ANION GAP SERPL CALC-SCNC: 11 MMOL/L
AST SERPL-CCNC: 9 U/L
BASOPHILS # BLD AUTO: 0.01 K/UL — SIGNIFICANT CHANGE UP (ref 0–0.2)
BASOPHILS NFR BLD AUTO: 1 % — SIGNIFICANT CHANGE UP (ref 0–2)
BILIRUB SERPL-MCNC: 1 MG/DL
BUN SERPL-MCNC: 19 MG/DL
CALCIUM SERPL-MCNC: 9.1 MG/DL
CHLORIDE SERPL-SCNC: 105 MMOL/L
CO2 SERPL-SCNC: 24 MMOL/L
CREAT SERPL-MCNC: 0.65 MG/DL
DOHLE BOD BLD QL SMEAR: PRESENT — SIGNIFICANT CHANGE UP
EGFR: 98 ML/MIN/1.73M2
EOSINOPHIL # BLD AUTO: 0 K/UL — SIGNIFICANT CHANGE UP (ref 0–0.5)
EOSINOPHIL NFR BLD AUTO: 0 % — SIGNIFICANT CHANGE UP (ref 0–6)
GLUCOSE SERPL-MCNC: 109 MG/DL
HCT VFR BLD CALC: 27.2 % — LOW (ref 34.5–45)
HGB BLD-MCNC: 9.7 G/DL — LOW (ref 11.5–15.5)
LDH SERPL-CCNC: 206 U/L
LYMPHOCYTES # BLD AUTO: 0.46 K/UL — LOW (ref 1–3.3)
LYMPHOCYTES # BLD AUTO: 40 % — SIGNIFICANT CHANGE UP (ref 13–44)
MCHC RBC-ENTMCNC: 29.8 PG — SIGNIFICANT CHANGE UP (ref 27–34)
MCHC RBC-ENTMCNC: 35.7 G/DL — SIGNIFICANT CHANGE UP (ref 32–36)
MCV RBC AUTO: 83.4 FL — SIGNIFICANT CHANGE UP (ref 80–100)
METAMYELOCYTES # FLD: 1 % — HIGH (ref 0–0)
MONOCYTES # BLD AUTO: 0 K/UL — SIGNIFICANT CHANGE UP (ref 0–0.9)
MONOCYTES NFR BLD AUTO: 0 % — LOW (ref 2–14)
NEUTROPHILS # BLD AUTO: 0.67 K/UL — LOW (ref 1.8–7.4)
NEUTROPHILS NFR BLD AUTO: 58 % — SIGNIFICANT CHANGE UP (ref 43–77)
NRBC # BLD: 3 /100 — HIGH (ref 0–0)
NRBC # BLD: SIGNIFICANT CHANGE UP /100 WBCS (ref 0–0)
PLAT MORPH BLD: NORMAL — SIGNIFICANT CHANGE UP
PLATELET # BLD AUTO: 56 K/UL — LOW (ref 150–400)
POTASSIUM SERPL-SCNC: 3.9 MMOL/L
PROT SERPL-MCNC: 6.3 G/DL
RBC # BLD: 3.26 M/UL — LOW (ref 3.8–5.2)
RBC # FLD: 12.8 % — SIGNIFICANT CHANGE UP (ref 10.3–14.5)
RBC BLD AUTO: SIGNIFICANT CHANGE UP
SODIUM SERPL-SCNC: 141 MMOL/L
TOXIC GRANULES BLD QL SMEAR: PRESENT — SIGNIFICANT CHANGE UP
WBC # BLD: 1.15 K/UL — LOW (ref 3.8–10.5)
WBC # FLD AUTO: 1.15 K/UL — LOW (ref 3.8–10.5)

## 2023-03-22 ENCOUNTER — RESULT REVIEW (OUTPATIENT)
Age: 63
End: 2023-03-22

## 2023-03-22 ENCOUNTER — APPOINTMENT (OUTPATIENT)
Dept: HEMATOLOGY ONCOLOGY | Facility: CLINIC | Age: 63
End: 2023-03-22
Payer: COMMERCIAL

## 2023-03-22 ENCOUNTER — APPOINTMENT (OUTPATIENT)
Dept: INFUSION THERAPY | Facility: HOSPITAL | Age: 63
End: 2023-03-22

## 2023-03-22 ENCOUNTER — LABORATORY RESULT (OUTPATIENT)
Age: 63
End: 2023-03-22

## 2023-03-22 VITALS
DIASTOLIC BLOOD PRESSURE: 75 MMHG | TEMPERATURE: 97.6 F | OXYGEN SATURATION: 98 % | SYSTOLIC BLOOD PRESSURE: 119 MMHG | RESPIRATION RATE: 18 BRPM | HEART RATE: 68 BPM

## 2023-03-22 PROBLEM — E78.5 HYPERLIPIDEMIA, UNSPECIFIED: Chronic | Status: ACTIVE | Noted: 2023-02-24

## 2023-03-22 PROBLEM — I10 ESSENTIAL (PRIMARY) HYPERTENSION: Chronic | Status: ACTIVE | Noted: 2023-02-24

## 2023-03-22 LAB
BASOPHILS # BLD AUTO: 0 K/UL — SIGNIFICANT CHANGE UP (ref 0–0.2)
BASOPHILS NFR BLD AUTO: 0 % — SIGNIFICANT CHANGE UP (ref 0–2)
DOHLE BOD BLD QL SMEAR: PRESENT — SIGNIFICANT CHANGE UP
EOSINOPHIL # BLD AUTO: 0 K/UL — SIGNIFICANT CHANGE UP (ref 0–0.5)
EOSINOPHIL NFR BLD AUTO: 0 % — SIGNIFICANT CHANGE UP (ref 0–6)
HCT VFR BLD CALC: 26.9 % — LOW (ref 34.5–45)
HGB BLD-MCNC: 9.2 G/DL — LOW (ref 11.5–15.5)
LYMPHOCYTES # BLD AUTO: 0.27 K/UL — LOW (ref 1–3.3)
LYMPHOCYTES # BLD AUTO: 17 % — SIGNIFICANT CHANGE UP (ref 13–44)
MCHC RBC-ENTMCNC: 30.1 PG — SIGNIFICANT CHANGE UP (ref 27–34)
MCHC RBC-ENTMCNC: 34.2 G/DL — SIGNIFICANT CHANGE UP (ref 32–36)
MCV RBC AUTO: 87.9 FL — SIGNIFICANT CHANGE UP (ref 80–100)
MONOCYTES # BLD AUTO: 0.03 K/UL — SIGNIFICANT CHANGE UP (ref 0–0.9)
MONOCYTES NFR BLD AUTO: 2 % — SIGNIFICANT CHANGE UP (ref 2–14)
NEUTROPHILS # BLD AUTO: 1.3 K/UL — LOW (ref 1.8–7.4)
NEUTROPHILS NFR BLD AUTO: 81 % — HIGH (ref 43–77)
NRBC # BLD: 0 /100 — SIGNIFICANT CHANGE UP (ref 0–0)
NRBC # BLD: SIGNIFICANT CHANGE UP /100 WBCS (ref 0–0)
PLAT MORPH BLD: NORMAL — SIGNIFICANT CHANGE UP
PLATELET # BLD AUTO: 87 K/UL — LOW (ref 150–400)
RBC # BLD: 3.06 M/UL — LOW (ref 3.8–5.2)
RBC # FLD: 13.1 % — SIGNIFICANT CHANGE UP (ref 10.3–14.5)
RBC BLD AUTO: SIGNIFICANT CHANGE UP
SARS-COV-2 RNA SPEC QL NAA+PROBE: SIGNIFICANT CHANGE UP
TOXIC GRANULES BLD QL SMEAR: PRESENT — SIGNIFICANT CHANGE UP
URATE SERPL-MCNC: 2.3 MG/DL
WBC # BLD: 1.61 K/UL — LOW (ref 3.8–10.5)
WBC # FLD AUTO: 1.61 K/UL — LOW (ref 3.8–10.5)

## 2023-03-22 PROCEDURE — 99214 OFFICE O/P EST MOD 30 MIN: CPT

## 2023-03-24 LAB
ALBUMIN SERPL ELPH-MCNC: 3.8 G/DL
ALP BLD-CCNC: 81 U/L
ALT SERPL-CCNC: 20 U/L
AST SERPL-CCNC: 14 U/L
BILIRUB DIRECT SERPL-MCNC: 0.2 MG/DL
BILIRUB INDIRECT SERPL-MCNC: 0.7 MG/DL
BILIRUB SERPL-MCNC: 0.9 MG/DL
LDH SERPL-CCNC: 192 U/L
PROT SERPL-MCNC: 6 G/DL

## 2023-03-27 ENCOUNTER — RESULT REVIEW (OUTPATIENT)
Age: 63
End: 2023-03-27

## 2023-03-27 ENCOUNTER — APPOINTMENT (OUTPATIENT)
Dept: HEMATOLOGY ONCOLOGY | Facility: CLINIC | Age: 63
End: 2023-03-27
Payer: COMMERCIAL

## 2023-03-27 ENCOUNTER — LABORATORY RESULT (OUTPATIENT)
Age: 63
End: 2023-03-27

## 2023-03-27 ENCOUNTER — APPOINTMENT (OUTPATIENT)
Dept: HEMATOLOGY ONCOLOGY | Facility: CLINIC | Age: 63
End: 2023-03-27

## 2023-03-27 ENCOUNTER — NON-APPOINTMENT (OUTPATIENT)
Age: 63
End: 2023-03-27

## 2023-03-27 VITALS
HEIGHT: 62.4 IN | SYSTOLIC BLOOD PRESSURE: 124 MMHG | HEART RATE: 78 BPM | BODY MASS INDEX: 38.57 KG/M2 | DIASTOLIC BLOOD PRESSURE: 76 MMHG | TEMPERATURE: 97.3 F | OXYGEN SATURATION: 99 % | WEIGHT: 212.28 LBS | RESPIRATION RATE: 16 BRPM

## 2023-03-27 LAB
BASOPHILS # BLD AUTO: 0 K/UL — SIGNIFICANT CHANGE UP (ref 0–0.2)
BASOPHILS NFR BLD AUTO: 0 % — SIGNIFICANT CHANGE UP (ref 0–2)
BUN SERPL-MCNC: 11 MG/DL — SIGNIFICANT CHANGE UP (ref 7–23)
CA-I BLDA-SCNC: 1.22 MMOL/L — SIGNIFICANT CHANGE UP (ref 1.12–1.3)
CHLORIDE SERPL-SCNC: 105 MMOL/L — SIGNIFICANT CHANGE UP (ref 96–108)
CO2 SERPL-SCNC: 27 MMOL/L — SIGNIFICANT CHANGE UP (ref 22–31)
CREAT SERPL-MCNC: 0.7 MG/DL — SIGNIFICANT CHANGE UP (ref 0.5–1.3)
EOSINOPHIL # BLD AUTO: 0.02 K/UL — SIGNIFICANT CHANGE UP (ref 0–0.5)
EOSINOPHIL NFR BLD AUTO: 1 % — SIGNIFICANT CHANGE UP (ref 0–6)
GLUCOSE SERPL-MCNC: 96 MG/DL — SIGNIFICANT CHANGE UP (ref 70–99)
HCT VFR BLD CALC: 26.2 % — LOW (ref 34.5–45)
HGB BLD-MCNC: 8.7 G/DL — LOW (ref 11.5–15.5)
LYMPHOCYTES # BLD AUTO: 0.16 K/UL — LOW (ref 1–3.3)
LYMPHOCYTES # BLD AUTO: 10 % — LOW (ref 13–44)
MCHC RBC-ENTMCNC: 30.2 PG — SIGNIFICANT CHANGE UP (ref 27–34)
MCHC RBC-ENTMCNC: 33.2 G/DL — SIGNIFICANT CHANGE UP (ref 32–36)
MCV RBC AUTO: 91 FL — SIGNIFICANT CHANGE UP (ref 80–100)
METAMYELOCYTES # FLD: 1 % — HIGH (ref 0–0)
MONOCYTES # BLD AUTO: 0.02 K/UL — SIGNIFICANT CHANGE UP (ref 0–0.9)
MONOCYTES NFR BLD AUTO: 1 % — LOW (ref 2–14)
NEUTROPHILS # BLD AUTO: 1.37 K/UL — LOW (ref 1.8–7.4)
NEUTROPHILS NFR BLD AUTO: 87 % — HIGH (ref 43–77)
NRBC # BLD: 0 /100 — SIGNIFICANT CHANGE UP (ref 0–0)
NRBC # BLD: SIGNIFICANT CHANGE UP /100 WBCS (ref 0–0)
PLAT MORPH BLD: NORMAL — SIGNIFICANT CHANGE UP
PLATELET # BLD AUTO: 237 K/UL — SIGNIFICANT CHANGE UP (ref 150–400)
POLYCHROMASIA BLD QL SMEAR: SLIGHT — SIGNIFICANT CHANGE UP
POTASSIUM SERPL-MCNC: 4.1 MMOL/L — SIGNIFICANT CHANGE UP (ref 3.5–5.3)
POTASSIUM SERPL-SCNC: 4.1 MMOL/L — SIGNIFICANT CHANGE UP (ref 3.5–5.3)
RBC # BLD: 2.88 M/UL — LOW (ref 3.8–5.2)
RBC # FLD: 13.9 % — SIGNIFICANT CHANGE UP (ref 10.3–14.5)
RBC BLD AUTO: SIGNIFICANT CHANGE UP
SODIUM SERPL-SCNC: 141 MMOL/L — SIGNIFICANT CHANGE UP (ref 135–145)
TOXIC GRANULES BLD QL SMEAR: PRESENT — SIGNIFICANT CHANGE UP
WBC # BLD: 1.57 K/UL — LOW (ref 3.8–10.5)
WBC # FLD AUTO: 1.57 K/UL — LOW (ref 3.8–10.5)

## 2023-03-27 PROCEDURE — 99215 OFFICE O/P EST HI 40 MIN: CPT

## 2023-03-27 RX ORDER — AMLODIPINE BESYLATE 5 MG/1
5 TABLET ORAL
Qty: 90 | Refills: 3 | Status: DISCONTINUED | COMMUNITY
End: 2023-03-27

## 2023-03-27 RX ORDER — HYDROCHLOROTHIAZIDE 25 MG/1
25 TABLET ORAL
Qty: 90 | Refills: 3 | Status: DISCONTINUED | COMMUNITY
End: 2023-03-27

## 2023-03-27 RX ORDER — OMEPRAZOLE 10 MG/1
10 CAPSULE, DELAYED RELEASE ORAL DAILY
Refills: 0 | Status: DISCONTINUED | COMMUNITY
End: 2023-03-27

## 2023-03-27 NOTE — REASON FOR VISIT
[Initial Consultation] : an initial consultation for [Acute Lymphoblastic Leukemia] : acute lymphoblastic leukemia

## 2023-03-28 VITALS
HEART RATE: 78 BPM | DIASTOLIC BLOOD PRESSURE: 76 MMHG | SYSTOLIC BLOOD PRESSURE: 124 MMHG | WEIGHT: 212.28 LBS | TEMPERATURE: 97.3 F | HEIGHT: 62.4 IN | OXYGEN SATURATION: 99 % | BODY MASS INDEX: 38.57 KG/M2 | RESPIRATION RATE: 16 BRPM

## 2023-03-28 NOTE — ASSESSMENT
[FreeTextEntry1] : 62 y/o F with Ph (-) ALL diagnosed in February 2023 and now s/p cycle 1A of miini Hyper-CVAD here for initial establishment of care as an outpatient. \par \par -Patient is doing well and counts recovering well. She is now day 24 and she is no longer neutropenic and platelets are >100k. Her hemoglobin is 8.7 today but doesn't require transfusion right now. \par -Instructed patient to stop levaquin and diflucan at this time as her counts have recovered. She is to continue with acyclovir. Will likely need to start PCP prophylaxis during next cycle. \par -Plan will be for admission for cycle 1B next week, but will treat with full dose (MTX 1g/m2 over 24 hour span on day 1, cytarabine 1g/m2 [id number] on day 2 and 3 for total 4 doses). Signed consent in the office today for cycle 1B. \par -Patient without access, had PICC line removed in the hospital. Will plan for mediport placement prior to admission to hospital. Checking PT/PTT/INR today. Also will have COVID testing prior to admission. \par -Educated patient extensively on the diagnosis of acute lymphoblastic leukemia (ALL). Instructed patient that that this is a cancer of the white blood cells, specifically the lymphocytes, which are made in the bone marrow and proliferate uncontrollably, leading to elevated lymphocyte counts, and lower normal blood cell counts. Due to this, she is at risk for infection, bleeding, symptoms and complications of anemia, and ultimately mortality.  \par -Instructed patient that therapy for this disease likely will result in prolonged neutropenia, causing transient increase in risk of infections. Also instructed can result in prolonged severe thrombocytopenia, which can transiently increase risk of bleeding as well. She is aware of the likely need for transfusional support and prophylactic antibiotics. \par -Instructed the patient that this disease is curable. \par -Educated patient as well on the Clonoseq testing as a tool to assess measurable residual disease (MRD) using NGS technology.\par -Will check repeat BMBx after counts recover after cycle 1B with Clonoseq testing. \par -Patient understands and agrees with plan. All information explained to the best of my ability. \par -RTC after cycle 1B.

## 2023-03-28 NOTE — HISTORY OF PRESENT ILLNESS
[Disease:__________________________] : Disease: [unfilled] [de-identified] : 64 y/o F transferred [place] [facility] [place] [facility] for new diagnosis of acute leukemia, s/p initial inpatient treatment and now here for establishment of outpatient care. 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 Official bone marrow biopsy 2/28/23 showed B-cell ALL, which was [place] chromosome (-).  Chemotherapy with reduced dose HyperCVAD was started on 3/3/23. Hospital course was c/b neutropenic fever, transaminitis, a rash on 3/4 which improved with topical steroid and atarax PRN,  and also LUE cellulitis.\par \par Of note, on peripheral blood BCR/ABL PCR was negative, although in her bone marrow she did have PCR for BCR/ABL p190 positive at an extremely low level of 0.001%. She had a pre-treatment echocardiogram done which showed an EF 55%, and PICC line was placed. Pt started reduced dose HyperCVAD  on 3/3/23: (IV Cyclophosphamide (150 mg/m2 every 12 h on days 1–3), Dexamethasone 20 mg per day on days 1–4 and 11–14, Vincristine 2 mg IV flat dose on days 1 and 8, Daunorubicin 25 mg/m2/day IV continuous infusion over 48 hours, starting on day 4). Informed consent obtained. LP with IT MTX done on 2/28, and CSF was subsequently found to be negative for malignant cells. Pt had a developing transaminitis, and US done at [facility] had shown a fatty liver. CT A/P done at [facility] showed splenomegaly with a small age indeterminant splenic infarct, but otherwise showed a normal liver and no abdominal or pelvic lymphadenopathy. Ultimately, during hospital stay she also developed neutropenic fever, s/p panculture which was negative,. She was treated with cefepime, acyclovir and caspofungin. Course also c/b severe headache as side effect from Zofran (CTH negative). Discharged home in stable condition. \par  [de-identified] : Since discharge patient reports just a little bit of fatigue but no nausea or vomiting and a "very good" appetite. She denied any shortness of breath. She is moving her bowels and urinating normally. She has very minimal residual rash on her abdomen, but much lighter than initial presentation. Still some mild itching in her neck area, doesn't notice any pattern with it but it just started yesterday. Denied any fevers or chills, but she has had "a lot of night sweats", but not enough to have to change the sheets or her clothes. Denied any pain symptoms at this point at all. \par \par Other than indicated in the HPI, ROS otherwise unremarkable.

## 2023-03-29 ENCOUNTER — NON-APPOINTMENT (OUTPATIENT)
Age: 63
End: 2023-03-29

## 2023-04-02 NOTE — PRE-ANESTHESIA EVALUATION ADULT - NSANTHOSAYNRD_GEN_A_CORE
No. JYOTSNA screening performed.  STOP BANG Legend: 0-2 = LOW Risk; 3-4 = INTERMEDIATE Risk; 5-8 = HIGH Risk

## 2023-04-03 ENCOUNTER — INPATIENT (INPATIENT)
Facility: HOSPITAL | Age: 63
LOS: 2 days | Discharge: HOME | DRG: 839 | End: 2023-04-06
Attending: INTERNAL MEDICINE | Admitting: INTERNAL MEDICINE
Payer: COMMERCIAL

## 2023-04-03 ENCOUNTER — RESULT REVIEW (OUTPATIENT)
Age: 63
End: 2023-04-03

## 2023-04-03 ENCOUNTER — TRANSCRIPTION ENCOUNTER (OUTPATIENT)
Age: 63
End: 2023-04-03

## 2023-04-03 VITALS
SYSTOLIC BLOOD PRESSURE: 114 MMHG | HEIGHT: 63 IN | DIASTOLIC BLOOD PRESSURE: 66 MMHG | WEIGHT: 210.1 LBS | TEMPERATURE: 99 F | OXYGEN SATURATION: 97 % | HEART RATE: 70 BPM | RESPIRATION RATE: 16 BRPM

## 2023-04-03 DIAGNOSIS — Z29.9 ENCOUNTER FOR PROPHYLACTIC MEASURES, UNSPECIFIED: ICD-10-CM

## 2023-04-03 DIAGNOSIS — C91.00 ACUTE LYMPHOBLASTIC LEUKEMIA NOT HAVING ACHIEVED REMISSION: ICD-10-CM

## 2023-04-03 DIAGNOSIS — B99.9 UNSPECIFIED INFECTIOUS DISEASE: ICD-10-CM

## 2023-04-03 DIAGNOSIS — E78.5 HYPERLIPIDEMIA, UNSPECIFIED: ICD-10-CM

## 2023-04-03 DIAGNOSIS — I10 ESSENTIAL (PRIMARY) HYPERTENSION: ICD-10-CM

## 2023-04-03 LAB
ALBUMIN SERPL ELPH-MCNC: 3.7 G/DL — SIGNIFICANT CHANGE UP (ref 3.3–5)
ALP SERPL-CCNC: 70 U/L — SIGNIFICANT CHANGE UP (ref 40–120)
ALT FLD-CCNC: 11 U/L — SIGNIFICANT CHANGE UP (ref 10–45)
ANION GAP SERPL CALC-SCNC: 9 MMOL/L — SIGNIFICANT CHANGE UP (ref 5–17)
ANISOCYTOSIS BLD QL: SLIGHT — SIGNIFICANT CHANGE UP
AST SERPL-CCNC: 13 U/L — SIGNIFICANT CHANGE UP (ref 10–40)
BASOPHILS # BLD AUTO: 0 K/UL — SIGNIFICANT CHANGE UP (ref 0–0.2)
BASOPHILS NFR BLD AUTO: 0 % — SIGNIFICANT CHANGE UP (ref 0–2)
BILIRUB SERPL-MCNC: 0.3 MG/DL — SIGNIFICANT CHANGE UP (ref 0.2–1.2)
BLD GP AB SCN SERPL QL: NEGATIVE — SIGNIFICANT CHANGE UP
BUN SERPL-MCNC: 12 MG/DL — SIGNIFICANT CHANGE UP (ref 7–23)
CALCIUM SERPL-MCNC: 9.1 MG/DL — SIGNIFICANT CHANGE UP (ref 8.4–10.5)
CHLORIDE SERPL-SCNC: 104 MMOL/L — SIGNIFICANT CHANGE UP (ref 96–108)
CO2 SERPL-SCNC: 28 MMOL/L — SIGNIFICANT CHANGE UP (ref 22–31)
CREAT SERPL-MCNC: 0.77 MG/DL — SIGNIFICANT CHANGE UP (ref 0.5–1.3)
DACRYOCYTES BLD QL SMEAR: SLIGHT — SIGNIFICANT CHANGE UP
EGFR: 86 ML/MIN/1.73M2 — SIGNIFICANT CHANGE UP
ELLIPTOCYTES BLD QL SMEAR: SLIGHT — SIGNIFICANT CHANGE UP
EOSINOPHIL # BLD AUTO: 0.02 K/UL — SIGNIFICANT CHANGE UP (ref 0–0.5)
EOSINOPHIL NFR BLD AUTO: 0.9 % — SIGNIFICANT CHANGE UP (ref 0–6)
GIANT PLATELETS BLD QL SMEAR: PRESENT — SIGNIFICANT CHANGE UP
GLUCOSE SERPL-MCNC: 97 MG/DL — SIGNIFICANT CHANGE UP (ref 70–99)
HCT VFR BLD CALC: 26.7 % — LOW (ref 34.5–45)
HGB BLD-MCNC: 8.4 G/DL — LOW (ref 11.5–15.5)
LDH SERPL L TO P-CCNC: 162 U/L — SIGNIFICANT CHANGE UP (ref 50–242)
LYMPHOCYTES # BLD AUTO: 0.32 K/UL — LOW (ref 1–3.3)
LYMPHOCYTES # BLD AUTO: 14.9 % — SIGNIFICANT CHANGE UP (ref 13–44)
MACROCYTES BLD QL: SLIGHT — SIGNIFICANT CHANGE UP
MAGNESIUM SERPL-MCNC: 2 MG/DL — SIGNIFICANT CHANGE UP (ref 1.6–2.6)
MANUAL SMEAR VERIFICATION: SIGNIFICANT CHANGE UP
MCHC RBC-ENTMCNC: 29.8 PG — SIGNIFICANT CHANGE UP (ref 27–34)
MCHC RBC-ENTMCNC: 31.5 GM/DL — LOW (ref 32–36)
MCV RBC AUTO: 94.7 FL — SIGNIFICANT CHANGE UP (ref 80–100)
MONOCYTES # BLD AUTO: 0.28 K/UL — SIGNIFICANT CHANGE UP (ref 0–0.9)
MONOCYTES NFR BLD AUTO: 13.2 % — SIGNIFICANT CHANGE UP (ref 2–14)
MYELOCYTES NFR BLD: 0.9 % — HIGH (ref 0–0)
NEUTROPHILS # BLD AUTO: 1.51 K/UL — LOW (ref 1.8–7.4)
NEUTROPHILS NFR BLD AUTO: 68.4 % — SIGNIFICANT CHANGE UP (ref 43–77)
NEUTS BAND # BLD: 1.7 % — SIGNIFICANT CHANGE UP (ref 0–8)
PH UR: 6.5 — SIGNIFICANT CHANGE UP (ref 5–8)
PH UR: 7 — SIGNIFICANT CHANGE UP (ref 5–8)
PH UR: 8 — SIGNIFICANT CHANGE UP (ref 5–8)
PH UR: 8 — SIGNIFICANT CHANGE UP (ref 5–8)
PHOSPHATE SERPL-MCNC: 4 MG/DL — SIGNIFICANT CHANGE UP (ref 2.5–4.5)
PLAT MORPH BLD: ABNORMAL
PLATELET # BLD AUTO: 231 K/UL — SIGNIFICANT CHANGE UP (ref 150–400)
POIKILOCYTOSIS BLD QL AUTO: SLIGHT — SIGNIFICANT CHANGE UP
POLYCHROMASIA BLD QL SMEAR: SLIGHT — SIGNIFICANT CHANGE UP
POTASSIUM SERPL-MCNC: 3.7 MMOL/L — SIGNIFICANT CHANGE UP (ref 3.5–5.3)
POTASSIUM SERPL-SCNC: 3.7 MMOL/L — SIGNIFICANT CHANGE UP (ref 3.5–5.3)
PROT SERPL-MCNC: 6.2 G/DL — SIGNIFICANT CHANGE UP (ref 6–8.3)
RBC # BLD: 2.82 M/UL — LOW (ref 3.8–5.2)
RBC # FLD: 19.2 % — HIGH (ref 10.3–14.5)
RBC BLD AUTO: ABNORMAL
RH IG SCN BLD-IMP: POSITIVE — SIGNIFICANT CHANGE UP
SODIUM SERPL-SCNC: 141 MMOL/L — SIGNIFICANT CHANGE UP (ref 135–145)
URATE SERPL-MCNC: 4.4 MG/DL — SIGNIFICANT CHANGE UP (ref 2.5–7)
WBC # BLD: 2.15 K/UL — LOW (ref 3.8–10.5)
WBC # FLD AUTO: 2.15 K/UL — LOW (ref 3.8–10.5)

## 2023-04-03 PROCEDURE — 36561 INSERT TUNNELED CV CATH: CPT

## 2023-04-03 PROCEDURE — 76937 US GUIDE VASCULAR ACCESS: CPT | Mod: 26

## 2023-04-03 PROCEDURE — 99221 1ST HOSP IP/OBS SF/LOW 40: CPT

## 2023-04-03 PROCEDURE — 77001 FLUOROGUIDE FOR VEIN DEVICE: CPT | Mod: 26

## 2023-04-03 RX ORDER — ENOXAPARIN SODIUM 100 MG/ML
40 INJECTION SUBCUTANEOUS EVERY 24 HOURS
Refills: 0 | Status: DISCONTINUED | OUTPATIENT
Start: 2023-04-04 | End: 2023-04-06

## 2023-04-03 RX ORDER — METOCLOPRAMIDE HCL 10 MG
1 TABLET ORAL
Qty: 28 | Refills: 0
Start: 2023-04-03 | End: 2023-04-09

## 2023-04-03 RX ORDER — SODIUM BICARBONATE 1 MEQ/ML
0.24 SYRINGE (ML) INTRAVENOUS
Qty: 150 | Refills: 0 | Status: DISCONTINUED | OUTPATIENT
Start: 2023-04-03 | End: 2023-04-06

## 2023-04-03 RX ORDER — CHLORHEXIDINE GLUCONATE 213 G/1000ML
1 SOLUTION TOPICAL
Refills: 0 | Status: DISCONTINUED | OUTPATIENT
Start: 2023-04-03 | End: 2023-04-06

## 2023-04-03 RX ORDER — LOSARTAN POTASSIUM 100 MG/1
100 TABLET, FILM COATED ORAL DAILY
Refills: 0 | Status: DISCONTINUED | OUTPATIENT
Start: 2023-04-03 | End: 2023-04-06

## 2023-04-03 RX ORDER — ESCITALOPRAM OXALATE 10 MG/1
10 TABLET, FILM COATED ORAL DAILY
Refills: 0 | Status: DISCONTINUED | OUTPATIENT
Start: 2023-04-03 | End: 2023-04-06

## 2023-04-03 RX ORDER — SUCRALFATE 1 G
1 TABLET ORAL EVERY 6 HOURS
Refills: 0 | Status: DISCONTINUED | OUTPATIENT
Start: 2023-04-03 | End: 2023-04-06

## 2023-04-03 RX ORDER — LEUCOVORIN CALCIUM 5 MG
1 TABLET ORAL
Qty: 28 | Refills: 0
Start: 2023-04-03 | End: 2023-04-09

## 2023-04-03 RX ORDER — METOCLOPRAMIDE HCL 10 MG
10 TABLET ORAL EVERY 6 HOURS
Refills: 0 | Status: DISCONTINUED | OUTPATIENT
Start: 2023-04-03 | End: 2023-04-05

## 2023-04-03 RX ORDER — SODIUM CHLORIDE 9 MG/ML
10 INJECTION INTRAMUSCULAR; INTRAVENOUS; SUBCUTANEOUS
Refills: 0 | Status: DISCONTINUED | OUTPATIENT
Start: 2023-04-03 | End: 2023-04-06

## 2023-04-03 RX ORDER — METHOTREXATE 2.5 MG/1
394 TABLET ORAL ONCE
Refills: 0 | Status: COMPLETED | OUTPATIENT
Start: 2023-04-03 | End: 2023-04-03

## 2023-04-03 RX ORDER — ACYCLOVIR SODIUM 500 MG
400 VIAL (EA) INTRAVENOUS
Refills: 0 | Status: DISCONTINUED | OUTPATIENT
Start: 2023-04-03 | End: 2023-04-06

## 2023-04-03 RX ORDER — FAMOTIDINE 10 MG/ML
20 INJECTION INTRAVENOUS
Refills: 0 | Status: DISCONTINUED | OUTPATIENT
Start: 2023-04-03 | End: 2023-04-06

## 2023-04-03 RX ORDER — METHOTREXATE 2.5 MG/1
1576 TABLET ORAL ONCE
Refills: 0 | Status: COMPLETED | OUTPATIENT
Start: 2023-04-03 | End: 2023-04-03

## 2023-04-03 RX ORDER — FOSAPREPITANT DIMEGLUMINE 150 MG/5ML
150 INJECTION, POWDER, LYOPHILIZED, FOR SOLUTION INTRAVENOUS ONCE
Refills: 0 | Status: COMPLETED | OUTPATIENT
Start: 2023-04-03 | End: 2023-04-03

## 2023-04-03 RX ADMIN — Medication 150 MEQ/KG/HR: at 11:37

## 2023-04-03 RX ADMIN — LOSARTAN POTASSIUM 100 MILLIGRAM(S): 100 TABLET, FILM COATED ORAL at 13:11

## 2023-04-03 RX ADMIN — METHOTREXATE 25.59 MILLIGRAM(S): 2.5 TABLET ORAL at 20:30

## 2023-04-03 RX ADMIN — ESCITALOPRAM OXALATE 10 MILLIGRAM(S): 10 TABLET, FILM COATED ORAL at 13:11

## 2023-04-03 RX ADMIN — CHLORHEXIDINE GLUCONATE 1 APPLICATION(S): 213 SOLUTION TOPICAL at 18:00

## 2023-04-03 RX ADMIN — Medication 1 GRAM(S): at 13:11

## 2023-04-03 RX ADMIN — FAMOTIDINE 20 MILLIGRAM(S): 10 INJECTION INTRAVENOUS at 17:58

## 2023-04-03 RX ADMIN — Medication 1 GRAM(S): at 17:57

## 2023-04-03 RX ADMIN — Medication 1 GRAM(S): at 23:43

## 2023-04-03 RX ADMIN — Medication 400 MILLIGRAM(S): at 17:58

## 2023-04-03 RX ADMIN — METHOTREXATE 57.88 MILLIGRAM(S): 2.5 TABLET ORAL at 17:50

## 2023-04-03 RX ADMIN — FOSAPREPITANT DIMEGLUMINE 300 MILLIGRAM(S): 150 INJECTION, POWDER, LYOPHILIZED, FOR SOLUTION INTRAVENOUS at 17:03

## 2023-04-03 RX ADMIN — Medication 150 MEQ/KG/HR: at 23:44

## 2023-04-03 NOTE — H&P ADULT - NSHPLABSRESULTS_GEN_ALL_CORE
CBC Full  -  ( 03 Apr 2023 12:20 )  WBC Count : 2.15 K/uL  Hemoglobin : 8.4 g/dL  Hematocrit : 26.7 %  Platelet Count - Automated : 231 K/uL  Mean Cell Volume : 94.7 fl  Mean Cell Hemoglobin : 29.8 pg  Mean Cell Hemoglobin Concentration : 31.5 gm/dL  Auto Neutrophil # : 1.51 K/uL  Auto Lymphocyte # : 0.32 K/uL  Auto Monocyte # : 0.28 K/uL  Auto Eosinophil # : 0.02 K/uL  Auto Basophil # : 0.00 K/uL  Auto Neutrophil % : 68.4 %  Auto Lymphocyte % : 14.9 %  Auto Monocyte % : 13.2 %  Auto Eosinophil % : 0.9 %  Auto Basophil % : 0.0 %      03 Apr 2023 12:20    141    |  104    |  12     ----------------------------<  97     3.7     |  28     |  0.77     Ca    9.1        03 Apr 2023 12:20  Phos  4.0       03 Apr 2023 12:20  Mg     2.0       03 Apr 2023 12:20    TPro  6.2    /  Alb  3.7    /  TBili  0.3    /  DBili  x      /  AST  13     /  ALT  11     /  AlkPhos  70     03 Apr 2023 12:20

## 2023-04-03 NOTE — H&P ADULT - PROBLEM SELECTOR PLAN 2
Patient is not neutropenic, afebrile  Continue home medication - acyclovir   Patient is currently OFF levaquin and fluconazole Patient is not neutropenic, afebrile  Continue home medication - acyclovir   Patient is currently OFF levaquin and fluconazole  If febrile, panculture

## 2023-04-03 NOTE — ADVANCED PRACTICE NURSE CONSULT - ASSESSMENT
A&Ox4, vitals stable, denies N/V/ SOB. Pt educated on chemo regimen, pt verbalizes understanding. Patient has right powerport placed and accessed today, + blood return noted, easily flushing with 10cc NS flush. Site is clean, dry, and  intact. No redness, warmth, or bleeding noted at the site. Dr. Lopez reviewed labs in the morning with the team. Urine pH: 8. Urine pH to be done q 6hrs. Patient premedicated with emend 150mg IVPB. 2 RN verification completed prior to start. Day 1, methotrexate IVPB (eMAR)  - 394 milliGRAM(s) in dextrose 5% 100 milliLiter(s) in NS attached to the lowest side arm of sodium bicarbonate attached to right mediport via alaris pump at 1750. Patient safety maintained, primary nurse aware.  IV Intermittent, once, infuse over 2 Hour(s), infuse at 57.88 mL/Hr, Stop After 1 Doses

## 2023-04-03 NOTE — H&P ADULT - PROBLEM SELECTOR PLAN 1
Admit to [address] [place] [address] accessed (placed in IR today)  Admission labs and then daily in am. Transfuse/replace lytes as needed   Plan for Cycle 1B Mini Hyper CVAD with Methotrexate and Cytarabine   Sodium bicarb infusion to alkalinize urine. Monitor urine pH  Hold simvastatin and omeprazole while receiving methotrexate Admit to [address] [place] [address] accessed (placed in IR today)  Admission labs and then daily in am. Transfuse/replace lytes as needed   Plan for Cycle 1B Mini Hyper CVAD with Methotrexate and Cytarabine   Sodium bicarb infusion to alkalinize urine. Monitor urine pH  Monitor methotrexate levels daily. Leucovorin rescue post methotrexate   Hold simvastatin and omeprazole while receiving methotrexate

## 2023-04-03 NOTE — DISCHARGE NOTE PROVIDER - CARE PROVIDER_API CALL
Goldberg, Bradley H (MD)  Hematology; Internal Medicine; Medical Oncology  [address] [place] [address]  Phone: [phone]  Fax: [phone]  Follow Up Time:

## 2023-04-03 NOTE — DISCHARGE NOTE PROVIDER - NSDCFUSCHEDAPPT_GEN_ALL_CORE_FT
[facility]  Kesha Mcleod  Scheduled Appointment: 04/08/2023     [facility]  Kesha RIVERA [facility]  Scheduled Appointment: 04/08/2023    Sruthi Keyes  [facility]  Kesha RIVERA [facility]ic  Scheduled Appointment: 04/12/2023     [facility]  Kesha RIVERA Practic  Scheduled Appointment: 04/14/2023    [facility]  Kesha RIVERA Practic  Scheduled Appointment: 04/28/2023    Nasima Jacobo  [facility]  Kesha RIVERA [facility]ic  Scheduled Appointment: 04/28/2023    Goldberg, Bradley  [facility]  Kesha RIVERA [facility]ic  Scheduled Appointment: 05/08/2023

## 2023-04-03 NOTE — DISCHARGE NOTE PROVIDER - NSDCCPCAREPLAN_GEN_ALL_CORE_FT
PRINCIPAL DISCHARGE DIAGNOSIS  Diagnosis: B-cell acute lymphoblastic leukemia  Assessment and Plan of Treatment: Notify MD or report to ER for fever greater or equal to 100.4, persistent nausea, vomiting, diarrhea, bleeding.

## 2023-04-03 NOTE — DISCHARGE NOTE PROVIDER - HOSPITAL COURSE
62 y/o F with PMHx of HTN, HLD with Ph (-) ALL diagnosed in February 2023 and now s/p cycle 1A of miini Hyper-CVAD admitted for cycle 1B mini hyperCVAD with methotrexate/cytarabine. A mediport was placed in IR prior to admission. Patient received hydration with sodium bicarbonate to alkalinize urine. She tolerated chemotherapy. Stable for discharge home with outpatient follow up.    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 A mediport was placed in IR prior to admission. Patient received hydration with sodium bicarbonate to alkalinize urine. She tolerated chemotherapy. Low dose Decadron (2mg IV x 2 days) was started for  +nausea /vomiting. Strict I/O's, daily weights were monitored. Daily CBC, CMP/electrolytes were monitored and repleted prn. Stable for discharge home with outpatient follow up.    62 y/o F with PMHx of HTN, HLD with Ph (-) ALL diagnosed in February 2023 and now s/p cycle 1A of miini Hyper-CVAD admitted for cycle 1B mini hyperCVAD with methotrexate/cytarabine. A mediport was placed in IR prior to admission. Patient received hydration with sodium bicarbonate to alkalinize urine. She tolerated chemotherapy. Low dose Decadron (2mg IV x 2 days) was started for +nausea /vomiting. Strict I/O's, daily weights were monitored. Daily CBC, CMP/electrolytes were monitored and repleted prn. Stable for discharge home with outpatient follow up.    62 y/o F with PMHx of HTN, HLD with Ph (-) ALL diagnosed in February 2023 and now s/p cycle 1A of mini Hyper-CVAD admitted for cycle 1B mini hyper-CVAD with methotrexate/cytarabine. A mediport was placed in IR prior to admission. Patient received hydration with sodium bicarbonate to alkalinize urine. She tolerated chemotherapy. Low dose Decadron (2mg IV x 2 days) was started for +nausea /vomiting. Strict I/O's, daily weights were monitored. Daily CBC, CMP/electrolytes were monitored and repleted prn. Stable for discharge home with outpatient follow up.    62 y/o F with PMHx of HTN, HLD with Ph (-) ALL diagnosed in February 2023 and now s/p cycle 1A of mini Hyper-CVAD admitted for cycle 1B mini hyper-CVAD with methotrexate/cytarabine. A mediport was placed in IR prior to admission. Patient received hydration with sodium bicarbonate to alkalinize urine. She tolerated chemotherapy. Low dose Decadron (2mg IV x 2 days) was started for +nausea /vomiting. Strict I/O's, daily weights were monitored. Daily CBC, CMP/electrolytes were monitored and repleted prn. Stable for discharge home with outpatient follow up.   At time of discharge, patient is not neutropenic however we anticipate her absolute neutrophil count will drop in the near future post chemo. She will remain on ppx in anticipation of this. Patient will be immunocompromised due to chemotherapy when her absolute neutrophil counts drop below 1000.

## 2023-04-03 NOTE — ASU PATIENT PROFILE, ADULT - ABILITY TO HEAR (WITH HEARING AID OR HEARING APPLIANCE IF NORMALLY USED):
does not wear hearing aid/Mildly to Moderately Impaired: difficulty hearing in some environments or speaker may need to increase volume or speak distinctly

## 2023-04-03 NOTE — PRE PROCEDURE NOTE - PRE PROCEDURE EVALUATION
Vascular & Interventional Radiology    HPI: 63y Female with ALL requiring chest port.    Allergies: sulfa drugs (Unknown)    Exam  Calm, resting comfortably, NAD, normal respirations.    Plan:   - 63y Female presents for chest port implantation.  - Informed consent obtained.

## 2023-04-03 NOTE — PROCEDURE NOTE - PLAN
admit to oncology Dr Goldberg  -monitor for bleeding  -head of bed >45 degrees to prevent oozing  - tip is at the SVC, catheter is OK to use.   - SQH may be resumed @ 6h post procedure if no sign of bleeding  - all other orders and diet may be resumed per primary team

## 2023-04-03 NOTE — H&P ADULT - HISTORY OF PRESENT ILLNESS
62 y/o F with PMHx of HTN, HLD with Ph (-) ALL diagnosed in February 2023 and now s/p cycle 1A of miini Hyper-CVAD admitted for cycle 1B mini hyperCVAD with methotrexate/cytarabine.      Initially transferred from [facility] for new diagnosis of leukemia. 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 Official bone marrow biopsy 2/28/23 showed B-cell ALL, which was [place] chromosome negative. Peripheral blood BCR/ABL PCR was negative, although in her bone marrow she did have PCR for BCR/ABL p190 positive at an extremely low level of 0.001%. She had a pre-treatment echocardiogram done which showed an EF 55%,     Chemotherapy with reduced dose HyperCVAD was started on 3/3/23. Hospital course was c/b neutropenic fever, transaminitis, a rash  which improved with topical steroid and atarax PRN, and also LUE cellulitis.

## 2023-04-03 NOTE — DISCHARGE NOTE PROVIDER - NSDCFUADDINST_GEN_ALL_CORE_FT
Follow up at [facility] for Fulphila on ____  Follow up at [facility] to see Dr. Goldberg on _____  [facility] at 1:40[facility].   Follow up at [facility] to see Dr. Goldberg on _____  Follow up at [facility] for Fulphila and lab check on Saturday 4/8 at 1:40PM  Follow up at [facility] to see DENNIS Keyes on Wednesday 4/12 at 8:30am Follow up at [facility] for Fulphila injection and lab check on Saturday 4/8 at 9AM. If platelets or blood are needed, they will be given.  Follow up at [facility] to see DENNIS Keyes on Wednesday 4/12 at 9:30am.

## 2023-04-03 NOTE — H&P ADULT - ASSESSMENT
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

## 2023-04-03 NOTE — DISCHARGE NOTE PROVIDER - NSDCMRMEDTOKEN_GEN_ALL_CORE_FT
acyclovir 400 mg oral tablet: 1 tab(s) orally 2 times a day, check with outpatient provider regarding when to stop  Carafate 1 g oral tablet: 1 tab(s) orally 4 times a day (before meals and at bedtime)   escitalopram 10 mg oral tablet: 1 tab(s) orally once a day  losartan 100 mg oral tablet: 1 tab(s) orally once a day  omeprazole 20 mg oral delayed release capsule: 1 cap(s) orally once a day  simvastatin 40 mg oral tablet: 1 tab(s) orally once a day (at bedtime)   acyclovir 400 mg oral tablet: 1 tab(s) orally 2 times a day, check with outpatient provider regarding when to stop  Carafate 1 g oral tablet: 1 tab(s) orally 4 times a day (before meals and at bedtime)   escitalopram 10 mg oral tablet: 1 tab(s) orally once a day  leucovorin 10 mg oral tablet: 1 tab(s) orally every 6 hours until you are told by your provider to stop  losartan 100 mg oral tablet: 1 tab(s) orally once a day  metoclopramide 10 mg oral tablet: 1 tab(s) orally every 6 hours as needed for  nausea  omeprazole 20 mg oral delayed release capsule: 1 cap(s) orally once a day  simvastatin 40 mg oral tablet: 1 tab(s) orally once a day (at bedtime)   acyclovir 400 mg oral tablet: 1 tab(s) orally 2 times a day, check with outpatient provider regarding when to stop  Carafate 1 g oral tablet: 1 tab(s) orally 4 times a day (before meals and at bedtime)   escitalopram 10 mg oral tablet: 1 tab(s) orally once a day  fluconazole 200 mg oral tablet: 1 tab(s) orally once a day  leucovorin 10 mg oral tablet: 1 tab(s) orally every 6 hours until you are told by your provider to stop  levoFLOXacin 500 mg oral tablet: 1 tab(s) orally every 24 hours  losartan 100 mg oral tablet: 1 tab(s) orally once a day  metoclopramide 10 mg oral tablet: 1 tab(s) orally every 6 hours as needed for  nausea  omeprazole 20 mg oral delayed release capsule: 1 cap(s) orally once a day  prednisoLONE acetate 1% ophthalmic suspension: 2 drop(s) to each affected eye every 6 hours  simvastatin 40 mg oral tablet: 1 tab(s) orally once a day (at bedtime)   acyclovir 400 mg oral tablet: 1 tab(s) orally 2 times a day, check with outpatient provider regarding when to stop  Carafate 1 g oral tablet: 1 tab(s) orally 4 times a day (before meals and at bedtime)   escitalopram 10 mg oral tablet: 1 tab(s) orally once a day  fluconazole 200 mg oral tablet: 1 tab(s) orally once a day  leucovorin 10 mg oral tablet: 1 tab(s) orally every 6 hours until you are told by your provider to stop  levoFLOXacin 500 mg oral tablet: 1 tab(s) orally every 24 hours  losartan 100 mg oral tablet: 1 tab(s) orally once a day  metoclopramide 10 mg oral tablet: 1 tab(s) orally every 6 hours as needed for  nausea  omeprazole 20 mg oral delayed release capsule: 1 cap(s) orally once a day  prednisoLONE acetate 1% ophthalmic suspension: 2 drop(s) to each affected eye every 6 hours please continue for 2 days after discharge   acyclovir 400 mg oral tablet: 1 tab(s) orally 2 times a day, check with outpatient provider regarding when to stop  Carafate 1 g oral tablet: 1 tab(s) orally 4 times a day (before meals and at bedtime)   escitalopram 10 mg oral tablet: 1 tab(s) orally once a day  fluconazole 200 mg oral tablet: 1 tab(s) orally once a day  leucovorin 10 mg oral tablet: 1 tab(s) orally every 6 hours until you are told by your provider to stop  levoFLOXacin 500 mg oral tablet: 1 tab(s) orally every 24 hours  losartan 100 mg oral tablet: 1 tab(s) orally once a day  metoclopramide 10 mg oral tablet: 1 tab(s) orally every 6 hours as needed for  nausea  omeprazole 20 mg oral delayed release capsule: 1 cap(s) orally once a day  prednisoLONE acetate 1% ophthalmic suspension: 2 drop(s) to each affected eye every 6 hours please continue through the morning of 4/7

## 2023-04-04 PROBLEM — C91.00 ACUTE LYMPHOBLASTIC LEUKEMIA NOT HAVING ACHIEVED REMISSION: Chronic | Status: ACTIVE | Noted: 2023-04-03

## 2023-04-04 LAB
ALBUMIN SERPL ELPH-MCNC: 3.3 G/DL — SIGNIFICANT CHANGE UP (ref 3.3–5)
ALP SERPL-CCNC: 63 U/L — SIGNIFICANT CHANGE UP (ref 40–120)
ALT FLD-CCNC: 9 U/L — LOW (ref 10–45)
ANION GAP SERPL CALC-SCNC: 8 MMOL/L — SIGNIFICANT CHANGE UP (ref 5–17)
AST SERPL-CCNC: 14 U/L — SIGNIFICANT CHANGE UP (ref 10–40)
BASOPHILS # BLD AUTO: 0.04 K/UL — SIGNIFICANT CHANGE UP (ref 0–0.2)
BASOPHILS NFR BLD AUTO: 2.6 % — HIGH (ref 0–2)
BILIRUB SERPL-MCNC: 0.5 MG/DL — SIGNIFICANT CHANGE UP (ref 0.2–1.2)
BLASTS # FLD: 1.7 % — HIGH (ref 0–0)
BUN SERPL-MCNC: 9 MG/DL — SIGNIFICANT CHANGE UP (ref 7–23)
CALCIUM SERPL-MCNC: 8.4 MG/DL — SIGNIFICANT CHANGE UP (ref 8.4–10.5)
CHLORIDE SERPL-SCNC: 101 MMOL/L — SIGNIFICANT CHANGE UP (ref 96–108)
CO2 SERPL-SCNC: 33 MMOL/L — HIGH (ref 22–31)
CREAT SERPL-MCNC: 0.81 MG/DL — SIGNIFICANT CHANGE UP (ref 0.5–1.3)
EGFR: 81 ML/MIN/1.73M2 — SIGNIFICANT CHANGE UP
EOSINOPHIL # BLD AUTO: 0.04 K/UL — SIGNIFICANT CHANGE UP (ref 0–0.5)
EOSINOPHIL NFR BLD AUTO: 2.6 % — SIGNIFICANT CHANGE UP (ref 0–6)
GIANT PLATELETS BLD QL SMEAR: PRESENT — SIGNIFICANT CHANGE UP
GLUCOSE SERPL-MCNC: 102 MG/DL — HIGH (ref 70–99)
HCT VFR BLD CALC: 25 % — LOW (ref 34.5–45)
HGB BLD-MCNC: 7.9 G/DL — LOW (ref 11.5–15.5)
LDH SERPL L TO P-CCNC: 162 U/L — SIGNIFICANT CHANGE UP (ref 50–242)
LYMPHOCYTES # BLD AUTO: 0.27 K/UL — LOW (ref 1–3.3)
LYMPHOCYTES # BLD AUTO: 18.3 % — SIGNIFICANT CHANGE UP (ref 13–44)
MAGNESIUM SERPL-MCNC: 2 MG/DL — SIGNIFICANT CHANGE UP (ref 1.6–2.6)
MANUAL SMEAR VERIFICATION: SIGNIFICANT CHANGE UP
MCHC RBC-ENTMCNC: 30 PG — SIGNIFICANT CHANGE UP (ref 27–34)
MCHC RBC-ENTMCNC: 31.6 GM/DL — LOW (ref 32–36)
MCV RBC AUTO: 95.1 FL — SIGNIFICANT CHANGE UP (ref 80–100)
MONOCYTES # BLD AUTO: 0.3 K/UL — SIGNIFICANT CHANGE UP (ref 0–0.9)
MONOCYTES NFR BLD AUTO: 20 % — HIGH (ref 2–14)
NEUTROPHILS # BLD AUTO: 0.82 K/UL — LOW (ref 1.8–7.4)
NEUTROPHILS NFR BLD AUTO: 53.1 % — SIGNIFICANT CHANGE UP (ref 43–77)
NEUTS BAND # BLD: 1.7 % — SIGNIFICANT CHANGE UP (ref 0–8)
PH UR: 8.5 — HIGH (ref 5–8)
PHOSPHATE SERPL-MCNC: 3.7 MG/DL — SIGNIFICANT CHANGE UP (ref 2.5–4.5)
PLAT MORPH BLD: ABNORMAL
PLATELET # BLD AUTO: 213 K/UL — SIGNIFICANT CHANGE UP (ref 150–400)
POTASSIUM SERPL-MCNC: 3.5 MMOL/L — SIGNIFICANT CHANGE UP (ref 3.5–5.3)
POTASSIUM SERPL-SCNC: 3.5 MMOL/L — SIGNIFICANT CHANGE UP (ref 3.5–5.3)
PROT SERPL-MCNC: 5.8 G/DL — LOW (ref 6–8.3)
RBC # BLD: 2.63 M/UL — LOW (ref 3.8–5.2)
RBC # FLD: 19.5 % — HIGH (ref 10.3–14.5)
RBC BLD AUTO: SIGNIFICANT CHANGE UP
SODIUM SERPL-SCNC: 142 MMOL/L — SIGNIFICANT CHANGE UP (ref 135–145)
URATE SERPL-MCNC: 4.6 MG/DL — SIGNIFICANT CHANGE UP (ref 2.5–7)
WBC # BLD: 1.5 K/UL — LOW (ref 3.8–10.5)
WBC # FLD AUTO: 1.5 K/UL — LOW (ref 3.8–10.5)

## 2023-04-04 PROCEDURE — 99233 SBSQ HOSP IP/OBS HIGH 50: CPT

## 2023-04-04 RX ORDER — LEUCOVORIN CALCIUM 5 MG
15 TABLET ORAL EVERY 6 HOURS
Refills: 0 | Status: DISCONTINUED | OUTPATIENT
Start: 2023-04-05 | End: 2023-04-06

## 2023-04-04 RX ORDER — METOCLOPRAMIDE HCL 10 MG
10 TABLET ORAL ONCE
Refills: 0 | Status: COMPLETED | OUTPATIENT
Start: 2023-04-04 | End: 2023-04-04

## 2023-04-04 RX ORDER — CYTARABINE 100 MG
1970 VIAL (EA) INJECTION EVERY 12 HOURS
Refills: 0 | Status: COMPLETED | OUTPATIENT
Start: 2023-04-04 | End: 2023-04-06

## 2023-04-04 RX ORDER — LEUCOVORIN CALCIUM 5 MG
50 TABLET ORAL ONCE
Refills: 0 | Status: COMPLETED | OUTPATIENT
Start: 2023-04-05 | End: 2023-04-05

## 2023-04-04 RX ORDER — PREDNISOLONE SODIUM PHOSPHATE 1 %
2 DROPS OPHTHALMIC (EYE) EVERY 6 HOURS
Refills: 0 | Status: DISCONTINUED | OUTPATIENT
Start: 2023-04-04 | End: 2023-04-06

## 2023-04-04 RX ORDER — ACETAMINOPHEN 500 MG
650 TABLET ORAL EVERY 6 HOURS
Refills: 0 | Status: DISCONTINUED | OUTPATIENT
Start: 2023-04-04 | End: 2023-04-06

## 2023-04-04 RX ORDER — LEUCOVORIN CALCIUM 5 MG
50 TABLET ORAL ONCE
Refills: 0 | Status: DISCONTINUED | OUTPATIENT
Start: 2023-04-04 | End: 2023-04-04

## 2023-04-04 RX ORDER — FLUCONAZOLE 150 MG/1
200 TABLET ORAL DAILY
Refills: 0 | Status: DISCONTINUED | OUTPATIENT
Start: 2023-04-04 | End: 2023-04-06

## 2023-04-04 RX ADMIN — Medication 1 GRAM(S): at 11:50

## 2023-04-04 RX ADMIN — Medication 650 MILLIGRAM(S): at 11:19

## 2023-04-04 RX ADMIN — ESCITALOPRAM OXALATE 10 MILLIGRAM(S): 10 TABLET, FILM COATED ORAL at 11:49

## 2023-04-04 RX ADMIN — CHLORHEXIDINE GLUCONATE 1 APPLICATION(S): 213 SOLUTION TOPICAL at 05:39

## 2023-04-04 RX ADMIN — LOSARTAN POTASSIUM 100 MILLIGRAM(S): 100 TABLET, FILM COATED ORAL at 05:38

## 2023-04-04 RX ADMIN — FAMOTIDINE 20 MILLIGRAM(S): 10 INJECTION INTRAVENOUS at 05:38

## 2023-04-04 RX ADMIN — Medication 400 MILLIGRAM(S): at 17:06

## 2023-04-04 RX ADMIN — ENOXAPARIN SODIUM 40 MILLIGRAM(S): 100 INJECTION SUBCUTANEOUS at 05:39

## 2023-04-04 RX ADMIN — Medication 650 MILLIGRAM(S): at 12:01

## 2023-04-04 RX ADMIN — Medication 150 MEQ/KG/HR: at 21:13

## 2023-04-04 RX ADMIN — FLUCONAZOLE 200 MILLIGRAM(S): 150 TABLET ORAL at 11:49

## 2023-04-04 RX ADMIN — FAMOTIDINE 20 MILLIGRAM(S): 10 INJECTION INTRAVENOUS at 17:07

## 2023-04-04 RX ADMIN — Medication 2 DROP(S): at 17:08

## 2023-04-04 RX ADMIN — Medication 1970 MILLIGRAM(S): at 19:42

## 2023-04-04 RX ADMIN — Medication 150 MEQ/KG/HR: at 01:46

## 2023-04-04 RX ADMIN — Medication 2 DROP(S): at 11:48

## 2023-04-04 RX ADMIN — Medication 10 MILLIGRAM(S): at 08:19

## 2023-04-04 RX ADMIN — Medication 400 MILLIGRAM(S): at 05:38

## 2023-04-04 RX ADMIN — Medication 10 MILLIGRAM(S): at 21:36

## 2023-04-04 RX ADMIN — Medication 150 MEQ/KG/HR: at 08:31

## 2023-04-04 RX ADMIN — Medication 10 MILLIGRAM(S): at 15:15

## 2023-04-04 RX ADMIN — Medication 1 GRAM(S): at 05:39

## 2023-04-04 RX ADMIN — Medication 1 GRAM(S): at 17:07

## 2023-04-04 NOTE — ADVANCED PRACTICE NURSE CONSULT - ASSESSMENT
Pt A&Ox4, vital signs stable, afebrile. Pt denies pain, N/V/D, SOB. Right ACW Mediport dressing clean, dry, intact. Lumens easily flushed, with positive blood return noted. No redness, swelling or pain noted at site. Pt received Reglan 10mg IVP PRN, for nausea. 2 certified RN verification completed. Pt educated on chemotherapy, verbalized understanding. Nystagmus check done, negative to both eyes. Pt able to provide his signature. Today is Day 1/4, at 1945. Cytarabine 1000mg/m2= 1970mg IV over 2hrs connected to lowest port of normal saline line via alaris pump at 136mL/hr. Call bell in reach, Pt safety maintained. Continuing hourly rounds. Pt educated on safety and fall prevention.

## 2023-04-04 NOTE — PROGRESS NOTE ADULT - PROBLEM SELECTOR PLAN 1
Admit to [address] [place] [address] accessed (placed in IR today)  Admission labs and then daily in am. Transfuse/replace lytes as needed   Plan for Cycle 1B Mini Hyper CVAD with Methotrexate and Cytarabine   Sodium bicarb infusion to alkalinize urine. Monitor urine pH  Monitor methotrexate levels daily. Leucovorin rescue post methotrexate   Hold simvastatin and omeprazole while receiving methotrexate Admit to [address] [place] [address] accessed (placed in IR today)  Admission labs and then daily in am. Transfuse/replace lytes as needed   Cycle 1B Mini Hyper CVAD with Methotrexate and Cytarabine   Sodium bicarb infusion to alkalinize urine. Monitor urine pH  Monitor methotrexate levels daily. Leucovorin rescue post methotrexate   Hold simvastatin and omeprazole while receiving methotrexate

## 2023-04-04 NOTE — PROGRESS NOTE ADULT - PROBLEM SELECTOR PLAN 2
Patient is not neutropenic, afebrile  Continue home medication - acyclovir   Patient is currently OFF levaquin and fluconazole  If febrile, panculture Patient is neutropenic, afebrile  Continue home medication - acyclovir   Add levaquin and fluconazole  If febrile, panculture and change levaquin to cefepime.

## 2023-04-04 NOTE — PROGRESS NOTE ADULT - SUBJECTIVE AND OBJECTIVE BOX
Diagnosis: B-ALL    Protocol/Chemo Regimen: Cycle 1B mini hyperCVAD with MTX/cytarabine    Day: 2    Pt endorsed: No complaints    Review of Systems:  Denies n/v/d chest pain, headache, weakness.       Pain scale: 0    Diet: regular    Allergies    sulfa drugs (Unknown)  Zofran (Headache)    Intolerances    ANTIMICROBIALS  acyclovir   Oral Tab/Cap 400 milliGRAM(s) Oral two times a day      HEME/ONC MEDICATIONS  cytarabine IVPB (eMAR) 1970 milliGRAM(s) IV Intermittent every 12 hours  enoxaparin Injectable 40 milliGRAM(s) SubCutaneous every 24 hours      STANDING MEDICATIONS  chlorhexidine 4% Liquid 1 Application(s) Topical <User Schedule>  escitalopram 10 milliGRAM(s) Oral daily  famotidine    Tablet 20 milliGRAM(s) Oral two times a day  leucovorin IVPB (eMAR) 50 milliGRAM(s) IV Intermittent once  losartan 100 milliGRAM(s) Oral daily  prednisoLONE acetate 1% Suspension 2 Drop(s) Both EYES every 6 hours  sodium bicarbonate  Infusion 0.236 mEq/kG/Hr IV Continuous <Continuous>  sucralfate suspension 1 Gram(s) Oral every 6 hours      PRN MEDICATIONS  metoclopramide Injectable 10 milliGRAM(s) IV Push every 6 hours PRN  sodium chloride 0.9% lock flush 10 milliLiter(s) IV Push every 1 hour PRN        Vital Signs Last 24 Hrs  T(C): 36.8 (04 Apr 2023 05:21), Max: 36.9 (03 Apr 2023 21:40)  T(F): 98.2 (04 Apr 2023 05:21), Max: 98.4 (03 Apr 2023 21:40)  HR: 60 (04 Apr 2023 05:21) (58 - 74)  BP: 126/64 (04 Apr 2023 05:21) (113/61 - 159/78)  BP(mean): 85 (03 Apr 2023 11:52) (85 - 85)  RR: 18 (04 Apr 2023 05:21) (17 - 18)  SpO2: 95% (04 Apr 2023 05:21) (93% - 97%)    Parameters below as of 04 Apr 2023 05:21  Patient On (Oxygen Delivery Method): room air        PHYSICAL EXAM  General: NAD  HEENT: clear oropharynx,   CV: (+) S1/S2 RRR  Lungs: positive air movement b/l ant lungs, clear to auscultation, no wheezes, no rales  Abdomen: soft, non-tender, non-distended  Ext: no edema  Skin: no rashes and no petechiae  Neuro: alert and oriented X 3, no focal deficits  Central Line: [facility]        LABS:                        7.9    1.50  )-----------( 213      ( 04 Apr 2023 07:13 )             25.0         Mean Cell Volume : 95.1 fl  Mean Cell Hemoglobin : 30.0 pg  Mean Cell Hemoglobin Concentration : 31.6 gm/dL  Auto Neutrophil # : x  Auto Lymphocyte # : x  Auto Monocyte # : x  Auto Eosinophil # : x  Auto Basophil # : x  Auto Neutrophil % : x  Auto Lymphocyte % : x  Auto Monocyte % : x  Auto Eosinophil % : x  Auto Basophil % : x      04-03    141  |  104  |  12  ----------------------------<  97  3.7   |  28  |  0.77    Ca    9.1      03 Apr 2023 12:20  Phos  4.0     04-03  Mg     2.0     04-03    TPro  6.2  /  Alb  3.7  /  TBili  0.3  /  DBili  x   /  AST  13  /  ALT  11  /  AlkPhos  70  04-03    Uric Acid 4.4           Diagnosis: B-ALL    Protocol/Chemo Regimen: Cycle 1B mini hyperCVAD with MTX/cytarabine    Day: 2    Pt endorsed: No complaints, nausea improved.     Review of Systems:  Denies n/v/d chest pain, headache, weakness.       Pain scale: 0    Diet: regular    Allergies    sulfa drugs (Unknown)  Zofran (Headache)    Intolerances    ANTIMICROBIALS  acyclovir   Oral Tab/Cap 400 milliGRAM(s) Oral two times a day      HEME/ONC MEDICATIONS  cytarabine IVPB (eMAR) 1970 milliGRAM(s) IV Intermittent every 12 hours  enoxaparin Injectable 40 milliGRAM(s) SubCutaneous every 24 hours      STANDING MEDICATIONS  chlorhexidine 4% Liquid 1 Application(s) Topical <User Schedule>  escitalopram 10 milliGRAM(s) Oral daily  famotidine    Tablet 20 milliGRAM(s) Oral two times a day  leucovorin IVPB (eMAR) 50 milliGRAM(s) IV Intermittent once  losartan 100 milliGRAM(s) Oral daily  prednisoLONE acetate 1% Suspension 2 Drop(s) Both EYES every 6 hours  sodium bicarbonate  Infusion 0.236 mEq/kG/Hr IV Continuous <Continuous>  sucralfate suspension 1 Gram(s) Oral every 6 hours      PRN MEDICATIONS  metoclopramide Injectable 10 milliGRAM(s) IV Push every 6 hours PRN  sodium chloride 0.9% lock flush 10 milliLiter(s) IV Push every 1 hour PRN        Vital Signs Last 24 Hrs  T(C): 36.8 (04 Apr 2023 05:21), Max: 36.9 (03 Apr 2023 21:40)  T(F): 98.2 (04 Apr 2023 05:21), Max: 98.4 (03 Apr 2023 21:40)  HR: 60 (04 Apr 2023 05:21) (58 - 74)  BP: 126/64 (04 Apr 2023 05:21) (113/61 - 159/78)  BP(mean): 85 (03 Apr 2023 11:52) (85 - 85)  RR: 18 (04 Apr 2023 05:21) (17 - 18)  SpO2: 95% (04 Apr 2023 05:21) (93% - 97%)    Parameters below as of 04 Apr 2023 05:21  Patient On (Oxygen Delivery Method): room air        PHYSICAL EXAM  General: NAD  HEENT: clear oropharynx,   CV: (+) S1/S2 RRR  Lungs: CTA  Ext: no edema  Skin: no rashes and no petechiae  Neuro: alert and oriented X 3, no focal deficits  Central Line: mediport    LABS:                              7.9    1.50  )-----------( 213      ( 04 Apr 2023 07:13 )             25.0         Mean Cell Volume : 95.1 fl  Mean Cell Hemoglobin : 30.0 pg  Mean Cell Hemoglobin Concentration : 31.6 gm/dL  Auto Neutrophil # : 0.82 K/uL  Auto Lymphocyte # : 0.27 K/uL  Auto Monocyte # : 0.30 K/uL  Auto Eosinophil # : 0.04 K/uL  Auto Basophil # : 0.04 K/uL  Auto Neutrophil % : 53.1 %  Auto Lymphocyte % : 18.3 %  Auto Monocyte % : 20.0 %  Auto Eosinophil % : 2.6 %  Auto Basophil % : 2.6 %      04-04    142  |  101  |  9   ----------------------------<  102<H>  3.5   |  33<H>  |  0.81    Ca    8.4      04 Apr 2023 07:13  Phos  3.7     04-04  Mg     2.0     04-04    TPro  5.8<L>  /  Alb  3.3  /  TBili  0.5  /  DBili  x   /  AST  14  /  ALT  9<L>  /  AlkPhos  63  04-04    Uric Acid 4.4

## 2023-04-04 NOTE — ADVANCED PRACTICE NURSE CONSULT - ASSESSMENT
A&Ox4, vitals stable, denies N/V/ SOB. Pt educated on chemo regimen, Pt verbalizes understanding. Patient has right powerport placed and accessed today, Positive blood return noted, easily flushing with 10cc NS flush. Site is clean, dry and  intact. No redness, warmth, or bleeding noted at the site. Urine pH: 8. Urine pH to be done q 6hrs.  2 RN's verification completed prior to start. Day 1, methotrexate IVPB (eMAR)  - 1576 milliGRAM(s) in dextrose 5% 560 milliLiter(s) in NS attached to the lowest side arm of sodium bicarbonate attached to right mediport via alaris pump at 2000. Infusing @ 26ml/Hr. To infuse over 22hrs. Patient safety maintained.

## 2023-04-04 NOTE — PROGRESS NOTE ADULT - NS ATTEND AMEND GEN_ALL_CORE FT
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     Heme:  Started mini HyperCVAD on 3/4. Today is cycle 1B D2.   Receiving transfusions for plts <10, Hgb <7    ID:  - Neutropenic ppx: levaquin and fluconazole  - VZV ppx: acyclovir for neutropenic ppx.     DVT ppx:  - On enoxaparin 40 mg subq once a day, monitor while on chemo due to possible plt drops

## 2023-04-05 LAB
ALBUMIN SERPL ELPH-MCNC: 3.1 G/DL — LOW (ref 3.3–5)
ALP SERPL-CCNC: 63 U/L — SIGNIFICANT CHANGE UP (ref 40–120)
ALT FLD-CCNC: 8 U/L — LOW (ref 10–45)
ANION GAP SERPL CALC-SCNC: 8 MMOL/L — SIGNIFICANT CHANGE UP (ref 5–17)
AST SERPL-CCNC: 15 U/L — SIGNIFICANT CHANGE UP (ref 10–40)
BASOPHILS # BLD AUTO: 0.02 K/UL — SIGNIFICANT CHANGE UP (ref 0–0.2)
BASOPHILS NFR BLD AUTO: 1.3 % — SIGNIFICANT CHANGE UP (ref 0–2)
BILIRUB SERPL-MCNC: 0.4 MG/DL — SIGNIFICANT CHANGE UP (ref 0.2–1.2)
BUN SERPL-MCNC: 5 MG/DL — LOW (ref 7–23)
CALCIUM SERPL-MCNC: 8.6 MG/DL — SIGNIFICANT CHANGE UP (ref 8.4–10.5)
CHLORIDE SERPL-SCNC: 101 MMOL/L — SIGNIFICANT CHANGE UP (ref 96–108)
CO2 SERPL-SCNC: 30 MMOL/L — SIGNIFICANT CHANGE UP (ref 22–31)
CREAT SERPL-MCNC: 0.7 MG/DL — SIGNIFICANT CHANGE UP (ref 0.5–1.3)
EGFR: 96 ML/MIN/1.73M2 — SIGNIFICANT CHANGE UP
EOSINOPHIL # BLD AUTO: 0 K/UL — SIGNIFICANT CHANGE UP (ref 0–0.5)
EOSINOPHIL NFR BLD AUTO: 0 % — SIGNIFICANT CHANGE UP (ref 0–6)
GLUCOSE SERPL-MCNC: 121 MG/DL — HIGH (ref 70–99)
HCT VFR BLD CALC: 25.3 % — LOW (ref 34.5–45)
HGB BLD-MCNC: 8 G/DL — LOW (ref 11.5–15.5)
IMM GRANULOCYTES NFR BLD AUTO: 1.3 % — HIGH (ref 0–0.9)
LDH SERPL L TO P-CCNC: 173 U/L — SIGNIFICANT CHANGE UP (ref 50–242)
LYMPHOCYTES # BLD AUTO: 0.24 K/UL — LOW (ref 1–3.3)
LYMPHOCYTES # BLD AUTO: 16 % — SIGNIFICANT CHANGE UP (ref 13–44)
MAGNESIUM SERPL-MCNC: 2 MG/DL — SIGNIFICANT CHANGE UP (ref 1.6–2.6)
MCHC RBC-ENTMCNC: 30.2 PG — SIGNIFICANT CHANGE UP (ref 27–34)
MCHC RBC-ENTMCNC: 31.6 GM/DL — LOW (ref 32–36)
MCV RBC AUTO: 95.5 FL — SIGNIFICANT CHANGE UP (ref 80–100)
MONOCYTES # BLD AUTO: 0.13 K/UL — SIGNIFICANT CHANGE UP (ref 0–0.9)
MONOCYTES NFR BLD AUTO: 8.7 % — SIGNIFICANT CHANGE UP (ref 2–14)
MTX SERPL-SCNC: 0.88 UMOL/L — SIGNIFICANT CHANGE UP (ref 0.5–5)
NEUTROPHILS # BLD AUTO: 1.09 K/UL — LOW (ref 1.8–7.4)
NEUTROPHILS NFR BLD AUTO: 72.7 % — SIGNIFICANT CHANGE UP (ref 43–77)
NRBC # BLD: 0 /100 WBCS — SIGNIFICANT CHANGE UP (ref 0–0)
PH UR: 8.5 — HIGH (ref 5–8)
PH UR: 9 — HIGH (ref 5–8)
PHOSPHATE SERPL-MCNC: 2.9 MG/DL — SIGNIFICANT CHANGE UP (ref 2.5–4.5)
PLATELET # BLD AUTO: 207 K/UL — SIGNIFICANT CHANGE UP (ref 150–400)
POTASSIUM SERPL-MCNC: 3.3 MMOL/L — LOW (ref 3.5–5.3)
POTASSIUM SERPL-SCNC: 3.3 MMOL/L — LOW (ref 3.5–5.3)
PROT SERPL-MCNC: 5.6 G/DL — LOW (ref 6–8.3)
RBC # BLD: 2.65 M/UL — LOW (ref 3.8–5.2)
RBC # FLD: 19 % — HIGH (ref 10.3–14.5)
SODIUM SERPL-SCNC: 139 MMOL/L — SIGNIFICANT CHANGE UP (ref 135–145)
URATE SERPL-MCNC: 5 MG/DL — SIGNIFICANT CHANGE UP (ref 2.5–7)
WBC # BLD: 1.5 K/UL — LOW (ref 3.8–10.5)
WBC # FLD AUTO: 1.5 K/UL — LOW (ref 3.8–10.5)

## 2023-04-05 PROCEDURE — 99233 SBSQ HOSP IP/OBS HIGH 50: CPT

## 2023-04-05 RX ORDER — POTASSIUM CHLORIDE 20 MEQ
20 PACKET (EA) ORAL
Refills: 0 | Status: COMPLETED | OUTPATIENT
Start: 2023-04-05 | End: 2023-04-05

## 2023-04-05 RX ORDER — POTASSIUM CHLORIDE 20 MEQ
40 PACKET (EA) ORAL EVERY 4 HOURS
Refills: 0 | Status: DISCONTINUED | OUTPATIENT
Start: 2023-04-05 | End: 2023-04-05

## 2023-04-05 RX ORDER — PROCHLORPERAZINE MALEATE 5 MG
10 TABLET ORAL EVERY 6 HOURS
Refills: 0 | Status: DISCONTINUED | OUTPATIENT
Start: 2023-04-05 | End: 2023-04-06

## 2023-04-05 RX ORDER — PREDNISOLONE SODIUM PHOSPHATE 1 %
2 DROPS OPHTHALMIC (EYE)
Qty: 0 | Refills: 0 | DISCHARGE
Start: 2023-04-05

## 2023-04-05 RX ORDER — LEVOFLOXACIN 5 MG/ML
1 INJECTION, SOLUTION INTRAVENOUS
Qty: 15 | Refills: 0
Start: 2023-04-05 | End: 2023-04-19

## 2023-04-05 RX ORDER — DEXAMETHASONE 0.5 MG/5ML
2 ELIXIR ORAL DAILY
Refills: 0 | Status: COMPLETED | OUTPATIENT
Start: 2023-04-05 | End: 2023-04-06

## 2023-04-05 RX ORDER — LEVOFLOXACIN 5 MG/ML
1 INJECTION, SOLUTION INTRAVENOUS
Qty: 0 | Refills: 0 | DISCHARGE
Start: 2023-04-05

## 2023-04-05 RX ORDER — FLUCONAZOLE 150 MG/1
1 TABLET ORAL
Qty: 30 | Refills: 0
Start: 2023-04-05 | End: 2023-05-04

## 2023-04-05 RX ORDER — FLUCONAZOLE 150 MG/1
1 TABLET ORAL
Qty: 0 | Refills: 0 | DISCHARGE
Start: 2023-04-05

## 2023-04-05 RX ADMIN — FAMOTIDINE 20 MILLIGRAM(S): 10 INJECTION INTRAVENOUS at 17:14

## 2023-04-05 RX ADMIN — CHLORHEXIDINE GLUCONATE 1 APPLICATION(S): 213 SOLUTION TOPICAL at 05:40

## 2023-04-05 RX ADMIN — Medication 0.25 MILLIGRAM(S): at 00:24

## 2023-04-05 RX ADMIN — Medication 2 DROP(S): at 12:54

## 2023-04-05 RX ADMIN — Medication 10 MILLIGRAM(S): at 11:13

## 2023-04-05 RX ADMIN — Medication 1970 MILLIGRAM(S): at 07:21

## 2023-04-05 RX ADMIN — Medication 50 MILLIEQUIVALENT(S): at 14:25

## 2023-04-05 RX ADMIN — LOSARTAN POTASSIUM 100 MILLIGRAM(S): 100 TABLET, FILM COATED ORAL at 05:39

## 2023-04-05 RX ADMIN — ESCITALOPRAM OXALATE 10 MILLIGRAM(S): 10 TABLET, FILM COATED ORAL at 16:18

## 2023-04-05 RX ADMIN — Medication 2 DROP(S): at 05:39

## 2023-04-05 RX ADMIN — Medication 400 MILLIGRAM(S): at 05:39

## 2023-04-05 RX ADMIN — Medication 1 GRAM(S): at 05:39

## 2023-04-05 RX ADMIN — Medication 1 GRAM(S): at 17:13

## 2023-04-05 RX ADMIN — Medication 1970 MILLIGRAM(S): at 18:49

## 2023-04-05 RX ADMIN — Medication 15 MILLIGRAM(S): at 18:48

## 2023-04-05 RX ADMIN — Medication 50 MILLIGRAM(S): at 06:37

## 2023-04-05 RX ADMIN — Medication 15 MILLIGRAM(S): at 11:42

## 2023-04-05 RX ADMIN — Medication 2 DROP(S): at 17:13

## 2023-04-05 RX ADMIN — FAMOTIDINE 20 MILLIGRAM(S): 10 INJECTION INTRAVENOUS at 05:39

## 2023-04-05 RX ADMIN — Medication 10 MILLIGRAM(S): at 14:24

## 2023-04-05 RX ADMIN — FLUCONAZOLE 200 MILLIGRAM(S): 150 TABLET ORAL at 16:18

## 2023-04-05 RX ADMIN — Medication 150 MEQ/KG/HR: at 05:38

## 2023-04-05 RX ADMIN — ENOXAPARIN SODIUM 40 MILLIGRAM(S): 100 INJECTION SUBCUTANEOUS at 05:39

## 2023-04-05 RX ADMIN — Medication 50 MILLIEQUIVALENT(S): at 21:05

## 2023-04-05 RX ADMIN — Medication 400 MILLIGRAM(S): at 17:13

## 2023-04-05 RX ADMIN — Medication 2 MILLIGRAM(S): at 11:37

## 2023-04-05 RX ADMIN — Medication 50 MILLIEQUIVALENT(S): at 16:18

## 2023-04-05 NOTE — ADVANCED PRACTICE NURSE CONSULT - ASSESSMENT
Pt A/Ox4, vital signs stable, afebrile. Labs reviewed on rounds by Dr Lopez. EF = 55% 2/27 echo. Right ACW mediport easily flushed with positive blood return, dressing C/D/I.  2 certified RN verification completed. Pt educated on chemotherapy, verbalized understanding. Completed pt nystagmus and writing check verification- negative. Day 2/3, at 1849 Pt received cytarabine 1000mg/m2= 1970 mg IV over 2hrs connected to lowest port of normal saline via alaris pump. Call bell in reach, safety maintained. Continuing hourly rounds. Pt educated on safety and fall prevention. Primary RN aware.

## 2023-04-05 NOTE — ADVANCED PRACTICE NURSE CONSULT - ASSESSMENT
Pt A/Ox4, vital signs stable, afebrile. Labs reviewed on rounds by Dr Lopez. EF = 55% 2/27 echo. Right ACW mediport easily flushed with positive blood return, dressing C/D/I.  2 certified RN verification completed. Pt educated on chemotherapy, verbalized understanding. Completed pt nystagmus and writing check verification- negative. Day 2/3, at 0721 Pt received cytarabine 1000mg/m2= 1970 mg IV over 2hrs connected to lowest port of normal saline via alaris pump. Call bell in reach, safety maintained. Continuing hourly rounds. Pt educated on safety and fall prevention. Primary RN aware.

## 2023-04-05 NOTE — PROGRESS NOTE ADULT - PROBLEM SELECTOR PLAN 1
Admit to [address] [place] [address] accessed (placed in IR today)  Admission labs and then daily in am. Transfuse/replace lytes as needed   Cycle 1B Mini Hyper CVAD with Methotrexate and Cytarabine   Sodium bicarb infusion to alkalinize urine. Monitor urine pH  Monitor methotrexate levels daily. Leucovorin rescue post methotrexate   Hold simvastatin and omeprazole while receiving methotrexate [place] accessed (placed in IR today)  Admission labs and then daily in am. Transfuse/replace lytes as needed   Cycle 1B Mini Hyper CVAD with Methotrexate and Cytarabine   Sodium bicarb infusion to alkalinize urine. Monitor urine pH  Monitor methotrexate levels daily. Leucovorin rescue post methotrexate   Hold simvastatin and omeprazole while receiving methotrexate  4/5 Replete K+ 20meq IV x 3doses (unable to currently take po's), Started Decadron 2mg IV daily x 2 days. Continue ativan prn for n/v, switched off Reglan to compazine prn

## 2023-04-05 NOTE — ADVANCED PRACTICE NURSE CONSULT - RECOMMEDATIONS
Next dose tonight 4/6 @ 6801
Will continue to monitor.
Next dose tonight 4/5 @ 0131
Next methotrexate to be hung at 1950 over 22 hours
Will continue to monitor.

## 2023-04-05 NOTE — PROGRESS NOTE ADULT - SUBJECTIVE AND OBJECTIVE BOX
Diagnosis: B-ALL    Protocol/Chemo Regimen: Cycle 1B mini hyperCVAD with MTX/cytarabine    Day: 3    Pt endorsed:     Review of Systems:     Pain scale: 0    Diet: regular    Allergies    sulfa drugs (Unknown)  Zofran (Headache)    Intolerances    MEDICATIONS  (STANDING):  acyclovir   Oral Tab/Cap 400 milliGRAM(s) Oral two times a day  chlorhexidine 4% Liquid 1 Application(s) Topical <User Schedule>  cytarabine IVPB (eMAR) 1970 milliGRAM(s) IV Intermittent every 12 hours  enoxaparin Injectable 40 milliGRAM(s) SubCutaneous every 24 hours  escitalopram 10 milliGRAM(s) Oral daily  famotidine    Tablet 20 milliGRAM(s) Oral two times a day  fluconAZOLE   Tablet 200 milliGRAM(s) Oral daily  leucovorin IVPB (eMAR) 15 milliGRAM(s) IV Intermittent every 6 hours  levoFLOXacin  Tablet 500 milliGRAM(s) Oral every 24 hours  losartan 100 milliGRAM(s) Oral daily  prednisoLONE acetate 1% Suspension 2 Drop(s) Both EYES every 6 hours  sodium bicarbonate  Infusion 0.236 mEq/kG/Hr (150 mL/Hr) IV Continuous <Continuous>  sucralfate suspension 1 Gram(s) Oral every 6 hours    MEDICATIONS  (PRN):  acetaminophen     Tablet .. 650 milliGRAM(s) Oral every 6 hours PRN Mild Pain (1 - 3)  metoclopramide Injectable 10 milliGRAM(s) IV Push every 6 hours PRN Nausea and/or Vomiting  sodium chloride 0.9% lock flush 10 milliLiter(s) IV Push every 1 hour PRN Pre/post blood products, medications, blood draw, and to maintain line patency      Vital Signs Last 24 Hrs  T(C): 36.5 (05 Apr 2023 05:30), Max: 37 (04 Apr 2023 09:50)  T(F): 97.7 (05 Apr 2023 05:30), Max: 98.6 (04 Apr 2023 09:50)  HR: 69 (05 Apr 2023 05:30) (60 - 73)  BP: 133/73 (05 Apr 2023 05:30) (113/70 - 152/80)  BP(mean): --  RR: 18 (05 Apr 2023 05:30) (18 - 18)  SpO2: 93% (05 Apr 2023 05:30) (92% - 96%)    Parameters below as of 05 Apr 2023 05:30  Patient On (Oxygen Delivery Method): room air    I&O's Summary    04 Apr 2023 07:01  -  05 Apr 2023 07:00  --------------------------------------------------------  IN: 5064 mL / OUT: 3100 mL / NET: 1964 mL      weight - (96.6kg)    PHYSICAL EXAM  General: NAD  HEENT: clear oropharynx,   CV: (+) S1/S2 RRR  Lungs: CTA  Ext: no edema  Skin: no rashes and no petechiae  Neuro: alert and oriented X 3, no focal deficits  Central Line: mediport    LABS:      --------             Diagnosis: B-ALL    Protocol/Chemo Regimen: Cycle 1B mini hyperCVAD with MTX/cytarabine    Day: 3    Pt endorsed: +N/V overnight and this am, feels fatigued, +more nausea with Reglan    Review of Systems: Denies HA or dizziness, abdominal pain, CP, SOB    Pain scale: 0    Diet: regular    Allergies    sulfa drugs (Unknown)  Zofran (Headache)    Intolerances    MEDICATIONS  (STANDING):  acyclovir   Oral Tab/Cap 400 milliGRAM(s) Oral two times a day  chlorhexidine 4% Liquid 1 Application(s) Topical <User Schedule>  cytarabine IVPB (eMAR) 1970 milliGRAM(s) IV Intermittent every 12 hours  dexAMETHasone  Injectable 2 milliGRAM(s) IV Push daily  enoxaparin Injectable 40 milliGRAM(s) SubCutaneous every 24 hours  escitalopram 10 milliGRAM(s) Oral daily  famotidine    Tablet 20 milliGRAM(s) Oral two times a day  fluconAZOLE   Tablet 200 milliGRAM(s) Oral daily  leucovorin IVPB (eMAR) 15 milliGRAM(s) IV Intermittent every 6 hours  levoFLOXacin  Tablet 500 milliGRAM(s) Oral every 24 hours  losartan 100 milliGRAM(s) Oral daily  potassium chloride  20 mEq/100 mL IVPB 20 milliEquivalent(s) IV Intermittent every 2 hours  prednisoLONE acetate 1% Suspension 2 Drop(s) Both EYES every 6 hours  sodium bicarbonate  Infusion 0.236 mEq/kG/Hr (150 mL/Hr) IV Continuous <Continuous>  sucralfate suspension 1 Gram(s) Oral every 6 hours    MEDICATIONS  (PRN):  acetaminophen     Tablet .. 650 milliGRAM(s) Oral every 6 hours PRN Mild Pain (1 - 3)  prochlorperazine   Injectable 10 milliGRAM(s) IV Push every 6 hours PRN nausea/vomiting  sodium chloride 0.9% lock flush 10 milliLiter(s) IV Push every 1 hour PRN Pre/post blood products, medications, blood draw, and to maintain line patency        Vital Signs Last 24 Hrs  T(C): 36.5 (05 Apr 2023 05:30), Max: 37 (04 Apr 2023 09:50)  T(F): 97.7 (05 Apr 2023 05:30), Max: 98.6 (04 Apr 2023 09:50)  HR: 69 (05 Apr 2023 05:30) (60 - 73)  BP: 133/73 (05 Apr 2023 05:30) (113/70 - 152/80)  BP(mean): --  RR: 18 (05 Apr 2023 05:30) (18 - 18)  SpO2: 93% (05 Apr 2023 05:30) (92% - 96%)    Parameters below as of 05 Apr 2023 05:30  Patient On (Oxygen Delivery Method): room air    I&O's Summary    04 Apr 2023 07:01  -  05 Apr 2023 07:00  --------------------------------------------------------  IN: 5064 mL / OUT: 3100 mL / NET: 1964 mL      weight - (96.6kg)    PHYSICAL EXAM  General: NAD  HEENT: clear oropharynx,   CV: (+) S1/S2 RRR  Lungs: CTA  Ext: no edema  Skin: no rashes and no petechiae  Neuro: alert and oriented X 3, no focal deficits  [facility]    LABS:                          8.0    1.50  )-----------( 207      ( 05 Apr 2023 06:53 )             25.3     05 Apr 2023 06:53    139    |  101    |  5      ----------------------------<  121    3.3     |  30     |  0.70     Ca    8.6        05 Apr 2023 06:53  Phos  2.9       05 Apr 2023 06:53  Mg     2.0       05 Apr 2023 06:53    TPro  5.6    /  Alb  3.1    /  TBili  0.4    /  DBili  x      /  AST  15     /  ALT  8      /  AlkPhos  63     05 Apr 2023 06:53    LIVER FUNCTIONS - ( 05 Apr 2023 06:53 )  Alb: 3.1 g/dL / Pro: 5.6 g/dL / ALK PHOS: 63 U/L / ALT: 8 U/L / AST: 15 U/L / GGT: x                          Diagnosis: B-ALL    Protocol/Chemo Regimen: Cycle 1B mini hyperCVAD with MTX/cytarabine    Day: 3    Pt endorsed: +N/V overnight and this am, feels fatigued, +more nausea with Reglan    Review of Systems: Denies HA or dizziness, abdominal pain, CP, SOB    Pain scale: 0    Diet: regular    Allergies    sulfa drugs (Unknown)  Zofran (Headache)    Intolerances    MEDICATIONS  (STANDING):  acyclovir   Oral Tab/Cap 400 milliGRAM(s) Oral two times a day  chlorhexidine 4% Liquid 1 Application(s) Topical <User Schedule>  cytarabine IVPB (eMAR) 1970 milliGRAM(s) IV Intermittent every 12 hours  dexAMETHasone  Injectable 2 milliGRAM(s) IV Push daily  enoxaparin Injectable 40 milliGRAM(s) SubCutaneous every 24 hours  escitalopram 10 milliGRAM(s) Oral daily  famotidine    Tablet 20 milliGRAM(s) Oral two times a day  fluconAZOLE   Tablet 200 milliGRAM(s) Oral daily  leucovorin IVPB (eMAR) 15 milliGRAM(s) IV Intermittent every 6 hours  levoFLOXacin  Tablet 500 milliGRAM(s) Oral every 24 hours  losartan 100 milliGRAM(s) Oral daily  potassium chloride  20 mEq/100 mL IVPB 20 milliEquivalent(s) IV Intermittent every 2 hours  prednisoLONE acetate 1% Suspension 2 Drop(s) Both EYES every 6 hours  sodium bicarbonate  Infusion 0.236 mEq/kG/Hr (150 mL/Hr) IV Continuous <Continuous>  sucralfate suspension 1 Gram(s) Oral every 6 hours    MEDICATIONS  (PRN):  acetaminophen     Tablet .. 650 milliGRAM(s) Oral every 6 hours PRN Mild Pain (1 - 3)  prochlorperazine   Injectable 10 milliGRAM(s) IV Push every 6 hours PRN nausea/vomiting  sodium chloride 0.9% lock flush 10 milliLiter(s) IV Push every 1 hour PRN Pre/post blood products, medications, blood draw, and to maintain line patency      Vital Signs Last 24 Hrs  T(C): 36.5 (05 Apr 2023 05:30), Max: 37 (04 Apr 2023 09:50)  T(F): 97.7 (05 Apr 2023 05:30), Max: 98.6 (04 Apr 2023 09:50)  HR: 69 (05 Apr 2023 05:30) (60 - 73)  BP: 133/73 (05 Apr 2023 05:30) (113/70 - 152/80)  BP(mean): --  RR: 18 (05 Apr 2023 05:30) (18 - 18)  SpO2: 93% (05 Apr 2023 05:30) (92% - 96%)    Parameters below as of 05 Apr 2023 05:30  Patient On (Oxygen Delivery Method): room air    I&O's Summary    04 Apr 2023 07:01  -  05 Apr 2023 07:00  --------------------------------------------------------  IN: 5064 mL / OUT: 3100 mL / NET: 1964 mL      weight - 97.7kg (96.6kg)    PHYSICAL EXAM  General: NAD  HEENT: clear oropharynx,   CV: (+) S1/S2 RRR  Lungs: CTA  Ext: no edema  Skin: no rashes and no petechiae  Neuro: alert and oriented X 3, no focal deficits  Central Line: mediport     LABS:               MTX - 0.88               8.0    1.50  )-----------( 207      ( 05 Apr 2023 06:53 )             25.3     05 Apr 2023 06:53    139    |  101    |  5      ----------------------------<  121    3.3     |  30     |  0.70     Ca    8.6        05 Apr 2023 06:53  Phos  2.9       05 Apr 2023 06:53  Mg     2.0       05 Apr 2023 06:53    TPro  5.6    /  Alb  3.1    /  TBili  0.4    /  DBili  x      /  AST  15     /  ALT  8      /  AlkPhos  63     05 Apr 2023 06:53    LIVER FUNCTIONS - ( 05 Apr 2023 06:53 )  Alb: 3.1 g/dL / Pro: 5.6 g/dL / ALK PHOS: 63 U/L / ALT: 8 U/L / AST: 15 U/L / GGT: x

## 2023-04-05 NOTE — PROGRESS NOTE ADULT - PROBLEM SELECTOR PLAN 2
Patient is neutropenic, afebrile  Continue home medication - acyclovir   Add levaquin and fluconazole  If febrile, panculture and change levaquin to cefepime.

## 2023-04-05 NOTE — PROGRESS NOTE ADULT - NS ATTEND AMEND GEN_ALL_CORE FT
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     Heme:  Started mini HyperCVAD on 3/4. Today is cycle 1B D2.   Receiving transfusions for plts <10, Hgb <7    ID:  - Neutropenic ppx: levaquin and fluconazole  - VZV ppx: acyclovir for neutropenic ppx.     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 Started mini HyperCVAD on 3/4. Today is cycle 1B D3.     Heme:  - Receiving transfusions for plts <10, Hgb <7  - Nausea, reglan as needed    ID:  - Neutropenic ppx: levaquin and fluconazole  - VZV ppx: acyclovir for neutropenic ppx.     DVT ppx:  - On enoxaparin 40 mg subq once a day, monitor while on chemo due to possible plt drops

## 2023-04-06 ENCOUNTER — TRANSCRIPTION ENCOUNTER (OUTPATIENT)
Age: 63
End: 2023-04-06

## 2023-04-06 VITALS
HEART RATE: 63 BPM | TEMPERATURE: 98 F | OXYGEN SATURATION: 96 % | SYSTOLIC BLOOD PRESSURE: 161 MMHG | DIASTOLIC BLOOD PRESSURE: 72 MMHG | RESPIRATION RATE: 18 BRPM

## 2023-04-06 LAB
ALBUMIN SERPL ELPH-MCNC: 3.4 G/DL — SIGNIFICANT CHANGE UP (ref 3.3–5)
ALP SERPL-CCNC: 65 U/L — SIGNIFICANT CHANGE UP (ref 40–120)
ALT FLD-CCNC: 14 U/L — SIGNIFICANT CHANGE UP (ref 10–45)
ANION GAP SERPL CALC-SCNC: 8 MMOL/L — SIGNIFICANT CHANGE UP (ref 5–17)
AST SERPL-CCNC: 19 U/L — SIGNIFICANT CHANGE UP (ref 10–40)
BASOPHILS # BLD AUTO: 0 K/UL — SIGNIFICANT CHANGE UP (ref 0–0.2)
BASOPHILS NFR BLD AUTO: 0 % — SIGNIFICANT CHANGE UP (ref 0–2)
BILIRUB SERPL-MCNC: 0.6 MG/DL — SIGNIFICANT CHANGE UP (ref 0.2–1.2)
BLD GP AB SCN SERPL QL: NEGATIVE — SIGNIFICANT CHANGE UP
BUN SERPL-MCNC: 8 MG/DL — SIGNIFICANT CHANGE UP (ref 7–23)
CALCIUM SERPL-MCNC: 9.1 MG/DL — SIGNIFICANT CHANGE UP (ref 8.4–10.5)
CHLORIDE SERPL-SCNC: 103 MMOL/L — SIGNIFICANT CHANGE UP (ref 96–108)
CO2 SERPL-SCNC: 29 MMOL/L — SIGNIFICANT CHANGE UP (ref 22–31)
CREAT SERPL-MCNC: 0.71 MG/DL — SIGNIFICANT CHANGE UP (ref 0.5–1.3)
EGFR: 94 ML/MIN/1.73M2 — SIGNIFICANT CHANGE UP
EOSINOPHIL # BLD AUTO: 0 K/UL — SIGNIFICANT CHANGE UP (ref 0–0.5)
EOSINOPHIL NFR BLD AUTO: 0 % — SIGNIFICANT CHANGE UP (ref 0–6)
GLUCOSE SERPL-MCNC: 113 MG/DL — HIGH (ref 70–99)
HCT VFR BLD CALC: 25.1 % — LOW (ref 34.5–45)
HGB BLD-MCNC: 7.9 G/DL — LOW (ref 11.5–15.5)
LDH SERPL L TO P-CCNC: 194 U/L — SIGNIFICANT CHANGE UP (ref 50–242)
LYMPHOCYTES # BLD AUTO: 0.21 K/UL — LOW (ref 1–3.3)
LYMPHOCYTES # BLD AUTO: 12.3 % — LOW (ref 13–44)
MAGNESIUM SERPL-MCNC: 2.2 MG/DL — SIGNIFICANT CHANGE UP (ref 1.6–2.6)
MCHC RBC-ENTMCNC: 29.7 PG — SIGNIFICANT CHANGE UP (ref 27–34)
MCHC RBC-ENTMCNC: 31.5 GM/DL — LOW (ref 32–36)
MCV RBC AUTO: 94.4 FL — SIGNIFICANT CHANGE UP (ref 80–100)
MONOCYTES # BLD AUTO: 0.02 K/UL — SIGNIFICANT CHANGE UP (ref 0–0.9)
MONOCYTES NFR BLD AUTO: 0.9 % — LOW (ref 2–14)
MTX SERPL-SCNC: 0.26 UMOL/L — LOW (ref 0.5–5)
NEUTROPHILS # BLD AUTO: 1.48 K/UL — LOW (ref 1.8–7.4)
NEUTROPHILS NFR BLD AUTO: 86.8 % — HIGH (ref 43–77)
PH UR: 8.5 — HIGH (ref 5–8)
PH UR: 8.5 — HIGH (ref 5–8)
PHOSPHATE SERPL-MCNC: 2.8 MG/DL — SIGNIFICANT CHANGE UP (ref 2.5–4.5)
PLAT MORPH BLD: NORMAL — SIGNIFICANT CHANGE UP
PLATELET # BLD AUTO: 231 K/UL — SIGNIFICANT CHANGE UP (ref 150–400)
POTASSIUM SERPL-MCNC: 3.8 MMOL/L — SIGNIFICANT CHANGE UP (ref 3.5–5.3)
POTASSIUM SERPL-SCNC: 3.8 MMOL/L — SIGNIFICANT CHANGE UP (ref 3.5–5.3)
PROT SERPL-MCNC: 6.1 G/DL — SIGNIFICANT CHANGE UP (ref 6–8.3)
RBC # BLD: 2.66 M/UL — LOW (ref 3.8–5.2)
RBC # FLD: 18.6 % — HIGH (ref 10.3–14.5)
RBC BLD AUTO: SIGNIFICANT CHANGE UP
RH IG SCN BLD-IMP: POSITIVE — SIGNIFICANT CHANGE UP
SODIUM SERPL-SCNC: 140 MMOL/L — SIGNIFICANT CHANGE UP (ref 135–145)
URATE SERPL-MCNC: 4.4 MG/DL — SIGNIFICANT CHANGE UP (ref 2.5–7)
WBC # BLD: 1.7 K/UL — LOW (ref 3.8–10.5)
WBC # FLD AUTO: 1.7 K/UL — LOW (ref 3.8–10.5)

## 2023-04-06 PROCEDURE — 86900 BLOOD TYPING SEROLOGIC ABO: CPT

## 2023-04-06 PROCEDURE — 36561 INSERT TUNNELED CV CATH: CPT

## 2023-04-06 PROCEDURE — 84550 ASSAY OF BLOOD/URIC ACID: CPT

## 2023-04-06 PROCEDURE — C1769: CPT

## 2023-04-06 PROCEDURE — 77001 FLUOROGUIDE FOR VEIN DEVICE: CPT

## 2023-04-06 PROCEDURE — 86850 RBC ANTIBODY SCREEN: CPT

## 2023-04-06 PROCEDURE — 80053 COMPREHEN METABOLIC PANEL: CPT

## 2023-04-06 PROCEDURE — C1894: CPT

## 2023-04-06 PROCEDURE — 86901 BLOOD TYPING SEROLOGIC RH(D): CPT

## 2023-04-06 PROCEDURE — 36415 COLL VENOUS BLD VENIPUNCTURE: CPT

## 2023-04-06 PROCEDURE — 83986 ASSAY PH BODY FLUID NOS: CPT

## 2023-04-06 PROCEDURE — 99238 HOSP IP/OBS DSCHRG MGMT 30/<: CPT

## 2023-04-06 PROCEDURE — 76937 US GUIDE VASCULAR ACCESS: CPT

## 2023-04-06 PROCEDURE — 36430 TRANSFUSION BLD/BLD COMPNT: CPT

## 2023-04-06 PROCEDURE — P9040: CPT

## 2023-04-06 PROCEDURE — 83735 ASSAY OF MAGNESIUM: CPT

## 2023-04-06 PROCEDURE — 80204 DRUG ASSAY METHOTREXATE: CPT

## 2023-04-06 PROCEDURE — 86923 COMPATIBILITY TEST ELECTRIC: CPT

## 2023-04-06 PROCEDURE — 85025 COMPLETE CBC W/AUTO DIFF WBC: CPT

## 2023-04-06 PROCEDURE — 83615 LACTATE (LD) (LDH) ENZYME: CPT

## 2023-04-06 PROCEDURE — 84100 ASSAY OF PHOSPHORUS: CPT

## 2023-04-06 PROCEDURE — C1887: CPT

## 2023-04-06 PROCEDURE — C1788: CPT

## 2023-04-06 RX ORDER — METOCLOPRAMIDE HCL 10 MG
1 TABLET ORAL
Qty: 120 | Refills: 0
Start: 2023-04-06 | End: 2023-05-05

## 2023-04-06 RX ORDER — SIMVASTATIN 20 MG/1
1 TABLET, FILM COATED ORAL
Qty: 0 | Refills: 0 | DISCHARGE

## 2023-04-06 RX ORDER — ACYCLOVIR SODIUM 500 MG
1 VIAL (EA) INTRAVENOUS
Qty: 60 | Refills: 1
Start: 2023-04-06 | End: 2023-06-04

## 2023-04-06 RX ORDER — LEVOFLOXACIN 5 MG/ML
1 INJECTION, SOLUTION INTRAVENOUS
Qty: 30 | Refills: 1
Start: 2023-04-06 | End: 2023-06-04

## 2023-04-06 RX ORDER — FLUCONAZOLE 150 MG/1
1 TABLET ORAL
Qty: 30 | Refills: 1
Start: 2023-04-06 | End: 2023-06-04

## 2023-04-06 RX ADMIN — Medication 15 MILLIGRAM(S): at 00:47

## 2023-04-06 RX ADMIN — Medication 15 MILLIGRAM(S): at 11:31

## 2023-04-06 RX ADMIN — Medication 400 MILLIGRAM(S): at 06:04

## 2023-04-06 RX ADMIN — ESCITALOPRAM OXALATE 10 MILLIGRAM(S): 10 TABLET, FILM COATED ORAL at 12:03

## 2023-04-06 RX ADMIN — Medication 1 GRAM(S): at 11:30

## 2023-04-06 RX ADMIN — FLUCONAZOLE 200 MILLIGRAM(S): 150 TABLET ORAL at 11:30

## 2023-04-06 RX ADMIN — FAMOTIDINE 20 MILLIGRAM(S): 10 INJECTION INTRAVENOUS at 06:04

## 2023-04-06 RX ADMIN — Medication 2 DROP(S): at 11:31

## 2023-04-06 RX ADMIN — Medication 10 MILLIGRAM(S): at 00:46

## 2023-04-06 RX ADMIN — Medication 10 MILLIGRAM(S): at 09:59

## 2023-04-06 RX ADMIN — Medication 1 GRAM(S): at 06:05

## 2023-04-06 RX ADMIN — Medication 2 DROP(S): at 00:47

## 2023-04-06 RX ADMIN — Medication 2 MILLIGRAM(S): at 06:20

## 2023-04-06 RX ADMIN — Medication 1970 MILLIGRAM(S): at 06:45

## 2023-04-06 RX ADMIN — CHLORHEXIDINE GLUCONATE 1 APPLICATION(S): 213 SOLUTION TOPICAL at 09:02

## 2023-04-06 RX ADMIN — Medication 15 MILLIGRAM(S): at 06:06

## 2023-04-06 RX ADMIN — LOSARTAN POTASSIUM 100 MILLIGRAM(S): 100 TABLET, FILM COATED ORAL at 06:04

## 2023-04-06 RX ADMIN — Medication 2 DROP(S): at 06:05

## 2023-04-06 RX ADMIN — ENOXAPARIN SODIUM 40 MILLIGRAM(S): 100 INJECTION SUBCUTANEOUS at 06:06

## 2023-04-06 RX ADMIN — Medication 150 MEQ/KG/HR: at 06:05

## 2023-04-06 NOTE — PROGRESS NOTE ADULT - PROBLEM SELECTOR PROBLEM 1
B-cell acute lymphoblastic leukemia

## 2023-04-06 NOTE — PROGRESS NOTE ADULT - NS ATTEND AMEND GEN_ALL_CORE FT
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 Started mini HyperCVAD on 3/4. Today is cycle 1B D3.     Heme:  - Receiving transfusions for plts <10, Hgb <7  - Nausea, reglan as needed    ID:  - Neutropenic ppx: levaquin and fluconazole  - VZV ppx: acyclovir for neutropenic ppx.     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 Started mini HyperCVAD on 3/4. Today is cycle 1B D4.     Heme:  - Monitor MTX level  - Keep on leucovorin until MTX clearance  - Receiving transfusions for plts <10, Hgb <7  - Nausea, reglan or prochlorperazine as needed    ID:  - Neutropenic ppx: levaquin and fluconazole  - VZV ppx: acyclovir for neutropenic ppx.     DVT ppx:  - On enoxaparin 40 mg subq once a day, monitor while on chemo due to possible plt drops

## 2023-04-06 NOTE — PROGRESS NOTE ADULT - PROBLEM SELECTOR PLAN 4
Hold simvastatin while receiving methotrexate  Restart as outpatient

## 2023-04-06 NOTE — DISCHARGE NOTE NURSING/CASE MANAGEMENT/SOCIAL WORK - PATIENT PORTAL LINK FT
You can access the FollowMyHealth Patient Portal offered by [facility] by registering at the following website: [email or web address]. By joining [facility]’s FollowMyHealth portal, you will also be able to view your health information using other applications (apps) compatible with our system.

## 2023-04-06 NOTE — ADVANCED PRACTICE NURSE CONSULT - REASON FOR CONSULT
B-ALL mini- hyper CVAD
B-ALL mini- hyper CVAD
To administer Cytarabine.
B-ALL cycle 1B mini-hyperCVAD; consent in chart
Chemotherapy noted: Methotrexate 1576mg
Chemo Notes : Today is Day 1/4.  Cytarabine 1970mg

## 2023-04-06 NOTE — PROGRESS NOTE ADULT - PROBLEM SELECTOR PLAN 3
Continue home medication - losartan

## 2023-04-06 NOTE — PROGRESS NOTE ADULT - REASON FOR ADMISSION
Chemotherapy: Mini Hyper CVAD Cycle 1B (Methotrexate and Cytarabine)

## 2023-04-06 NOTE — PROGRESS NOTE ADULT - NS ATTEST RISK PROBLEM GEN_ALL_CORE FT
B-ALL on chemotherapy with high risk complications, cytopenias, infection risk

## 2023-04-06 NOTE — PROGRESS NOTE ADULT - PROBLEM SELECTOR PLAN 2
Patient is neutropenic, afebrile  Continue home medication - acyclovir   Add levaquin and fluconazole  If febrile, panculture and change levaquin to cefepime. Patient is not neutropenic, afebrile  Continue levaquin, diflucan, and acyclovir for now in anticipation neutrophils will drop  If febrile, panculture and change levaquin to cefepime.

## 2023-04-06 NOTE — PROGRESS NOTE ADULT - PROBLEM SELECTOR PLAN 5
Lovenox for dvt prophylaxis     Contact information: [phone]
Lovenox for dvt prophylaxis     Contact information: [phone]
Lovenox for dvt prophylaxis     Contact information: [phone]

## 2023-04-06 NOTE — DISCHARGE NOTE NURSING/CASE MANAGEMENT/SOCIAL WORK - NSDCPEFALRISK_GEN_ALL_CORE
For information on Fall & Injury Prevention, visit: [email or web address] OR  [email or web address] OR  https://www.cdc.gov/steadi/patient.html

## 2023-04-06 NOTE — ADVANCED PRACTICE NURSE CONSULT - ASSESSMENT
Patient is alert,oriented x4.Educated patient about the chemotherapy.Patient verbalized understanding.Nystagmus check done- negative.Right chest wall mediport in place.Mediport site has no redness and swelling ,flushes without difficulty and has good blood return.Chemotherapy verified with second RN.Cytarabine 1970 mg IV started at via Right chest wall mediport at 0645 to be infused over 3 hours. Hand writing test done prior to administration. Patient is tolerating xhemotherapy. Patient is alert, oriented x4.Educated patient about the chemotherapy. Patient verbalized understanding. Nystagmus check done- negative.  Right chest wall mediport in place .Mediport site has no redness and swelling ,flushes without difficulty and has good blood return.  Chemotherapy verified with second RN. Cytarabine 1970 mg IV started at via Right chest wall mediport at 0645 to be infused over 3 hours. Hand writing test done prior to administration. Patient is  tolerating chemotherapy.

## 2023-04-06 NOTE — PROGRESS NOTE ADULT - SUBJECTIVE AND OBJECTIVE BOX
Diagnosis: B-ALL    Protocol/Chemo Regimen: Cycle 1B mini hyperCVAD with MTX/cytarabine    Day: 4    Pt endorsed: +N/V overnight and this am, feels fatigued, +more nausea with Reglan    Review of Systems: Denies HA or dizziness, abdominal pain, CP, SOB    Pain scale: 0    Diet: regular      Allergies  sulfa drugs (Unknown)  Zofran (Headache)        ANTIMICROBIALS  acyclovir   Oral Tab/Cap 400 milliGRAM(s) Oral two times a day  fluconAZOLE   Tablet 200 milliGRAM(s) Oral daily  levoFLOXacin  Tablet 500 milliGRAM(s) Oral every 24 hours    HEME/ONC MEDICATIONS  enoxaparin Injectable 40 milliGRAM(s) SubCutaneous every 24 hours    STANDING MEDICATIONS  chlorhexidine 4% Liquid 1 Application(s) Topical <User Schedule>  escitalopram 10 milliGRAM(s) Oral daily  famotidine    Tablet 20 milliGRAM(s) Oral two times a day  leucovorin IVPB (eMAR) 15 milliGRAM(s) IV Intermittent every 6 hours  losartan 100 milliGRAM(s) Oral daily  prednisoLONE acetate 1% Suspension 2 Drop(s) Both EYES every 6 hours  sodium bicarbonate  Infusion 0.236 mEq/kG/Hr IV Continuous <Continuous>  sucralfate suspension 1 Gram(s) Oral every 6 hours    PRN MEDICATIONS  acetaminophen     Tablet .. 650 milliGRAM(s) Oral every 6 hours PRN  prochlorperazine   Injectable 10 milliGRAM(s) IV Push every 6 hours PRN  sodium chloride 0.9% lock flush 10 milliLiter(s) IV Push every 1 hour PRN        Vital Signs Last 24 Hrs  T(C): 36.8 (06 Apr 2023 04:48), Max: 37 (05 Apr 2023 13:35)  T(F): 98.3 (06 Apr 2023 04:48), Max: 98.6 (05 Apr 2023 13:35)  HR: 67 (06 Apr 2023 04:48) (61 - 72)  BP: 155/78 (06 Apr 2023 04:48) (151/69 - 169/80)  BP(mean): --  RR: 18 (06 Apr 2023 04:48) (18 - 18)  SpO2: 96% (06 Apr 2023 04:48) (92% - 96%)    Parameters below as of 06 Apr 2023 04:48  Patient On (Oxygen Delivery Method): room air        PHYSICAL EXAM  General: NAD  HEENT: clear oropharynx,   CV: (+) S1/S2 RRR  Lungs: CTA  Ext: no edema  Skin: no rashes and no petechiae  Neuro: alert and oriented X 3, no focal deficits  Central Line: [facility]       LABS:                        8.0    1.50  )-----------( 207      ( 05 Apr 2023 06:53 )             25.3         Mean Cell Volume : 95.5 fl  Mean Cell Hemoglobin : 30.2 pg  Mean Cell Hemoglobin Concentration : 31.6 gm/dL  Auto Neutrophil # : 1.09 K/uL  Auto Lymphocyte # : 0.24 K/uL  Auto Monocyte # : 0.13 K/uL  Auto Eosinophil # : 0.00 K/uL  Auto Basophil # : 0.02 K/uL  Auto Neutrophil % : 72.7 %  Auto Lymphocyte % : 16.0 %  Auto Monocyte % : 8.7 %  Auto Eosinophil % : 0.0 %  Auto Basophil % : 1.3 %      04-05    139  |  101  |  5<L>  ----------------------------<  121<H>  3.3<L>   |  30  |  0.70    Ca    8.6      05 Apr 2023 06:53  Phos  2.9     04-05  Mg     2.0     04-05    TPro  5.6<L>  /  Alb  3.1<L>  /  TBili  0.4  /  DBili  x   /  AST  15  /  ALT  8<L>  /  AlkPhos  63  04-05             Diagnosis: B-ALL    Protocol/Chemo Regimen: Cycle 1B mini hyperCVAD with MTX/cytarabine    Day: 4    Pt endorsed: nausea still present much improved from prior    Review of Systems: Denies HA or dizziness, abdominal pain, CP, SOB, vomiting    Pain scale: 0    Diet: regular    Allergies  sulfa drugs (Unknown)  Zofran (Headache)      ANTIMICROBIALS  acyclovir   Oral Tab/Cap 400 milliGRAM(s) Oral two times a day  fluconAZOLE   Tablet 200 milliGRAM(s) Oral daily  levoFLOXacin  Tablet 500 milliGRAM(s) Oral every 24 hours    HEME/ONC MEDICATIONS  enoxaparin Injectable 40 milliGRAM(s) SubCutaneous every 24 hours    STANDING MEDICATIONS  chlorhexidine 4% Liquid 1 Application(s) Topical <User Schedule>  escitalopram 10 milliGRAM(s) Oral daily  famotidine    Tablet 20 milliGRAM(s) Oral two times a day  leucovorin IVPB (eMAR) 15 milliGRAM(s) IV Intermittent every 6 hours  losartan 100 milliGRAM(s) Oral daily  prednisoLONE acetate 1% Suspension 2 Drop(s) Both EYES every 6 hours  sodium bicarbonate  Infusion 0.236 mEq/kG/Hr IV Continuous <Continuous>  sucralfate suspension 1 Gram(s) Oral every 6 hours    PRN MEDICATIONS  acetaminophen     Tablet .. 650 milliGRAM(s) Oral every 6 hours PRN  prochlorperazine   Injectable 10 milliGRAM(s) IV Push every 6 hours PRN  sodium chloride 0.9% lock flush 10 milliLiter(s) IV Push every 1 hour PRN      Vital Signs Last 24 Hrs  T(C): 36.8 (06 Apr 2023 04:48), Max: 37 (05 Apr 2023 13:35)  T(F): 98.3 (06 Apr 2023 04:48), Max: 98.6 (05 Apr 2023 13:35)  HR: 67 (06 Apr 2023 04:48) (61 - 72)  BP: 155/78 (06 Apr 2023 04:48) (151/69 - 169/80)  BP(mean): --  RR: 18 (06 Apr 2023 04:48) (18 - 18)  SpO2: 96% (06 Apr 2023 04:48) (92% - 96%)    Parameters below as of 06 Apr 2023 04:48  Patient On (Oxygen Delivery Method): room air        PHYSICAL EXAM  General: NAD  HEENT: clear oropharynx,   CV: (+) S1/S2 RRR  Lungs: CTA bilaterally, no wheezes  Abd: BS +, non distended, non tender  Ext: no edema  Skin: no rashes and no petechiae  Neuro: alert and oriented X 3  Central Line: mediport CDI      LABS:                         7.9    1.70  )-----------( 231      ( 06 Apr 2023 07:12 )             25.1     CBC Full  -  ( 06 Apr 2023 07:12 )  WBC Count : 1.70 K/uL  RBC Count : 2.66 M/uL  Hemoglobin : 7.9 g/dL  Hematocrit : 25.1 %  Platelet Count - Automated : 231 K/uL  Mean Cell Volume : 94.4 fl  Mean Cell Hemoglobin : 29.7 pg  Mean Cell Hemoglobin Concentration : 31.5 gm/dL  Auto Neutrophil # : 1.48 K/uL  Auto Lymphocyte # : 0.21 K/uL  Auto Monocyte # : 0.02 K/uL  Auto Eosinophil # : 0.00 K/uL  Auto Basophil # : 0.00 K/uL  Auto Neutrophil % : 86.8 %  Auto Lymphocyte % : 12.3 %  Auto Monocyte % : 0.9 %  Auto Eosinophil % : 0.0 %  Auto Basophil % : 0.0 %    04-06    140  |  103  |  8   ----------------------------<  113<H>  3.8   |  29  |  0.71    Ca    9.1      06 Apr 2023 07:11  Phos  2.8     04-06  Mg     2.2     04-06    TPro  6.1  /  Alb  3.4  /  TBili  0.6  /  DBili  x   /  AST  19  /  ALT  14  /  AlkPhos  65  04-06    Methotrexate Level, Serum: 0.26    Uric Acid, Serum: 4.4 mg/dL  Lactate Dehydrogenase, Serum: 194 U/L

## 2023-04-06 NOTE — PROGRESS NOTE ADULT - PROBLEM SELECTOR PLAN 1
[facility] accessed (placed in IR today)  Admission labs and then daily in am. Transfuse/replace lytes as needed   Cycle 1B Mini Hyper CVAD with Methotrexate and Cytarabine   Sodium bicarb infusion to alkalinize urine. Monitor urine pH  Monitor methotrexate levels daily. Leucovorin rescue post methotrexate   Hold simvastatin and omeprazole while receiving methotrexate  4/5 Replete K+ 20meq IV x 3doses (unable to currently take po's), Started Decadron 2mg IV daily x 2 days. Continue ativan prn for n/v, switched off Reglan to compazine prn [facility] accessed (placed in IR today)  Admission labs and then daily in am. Transfuse/replace lytes as needed   Cycle 1B Mini Hyper CVAD with Methotrexate and Cytarabine   Sodium bicarb infusion to alkalinize urine. Monitor urine pH  Monitor methotrexate levels daily. Leucovorin rescue post methotrexate   Hold simvastatin and omeprazole while receiving methotrexate  4/5 Replete K+ 20meq IV x 3doses (unable to currently take po's), Started Decadron 2mg IV daily x 2 days. Continue ativan prn for n/v, switched off Reglan to compazine prn.  4/6 - 1 unit PRBCs to obtain hgb > 8.5 for discharge.   4/6 - Discharge today. MTX level  0.26, will be sent home on leucovorin.

## 2023-04-07 ENCOUNTER — TRANSCRIPTION ENCOUNTER (OUTPATIENT)
Age: 63
End: 2023-04-07

## 2023-04-08 ENCOUNTER — RESULT REVIEW (OUTPATIENT)
Age: 63
End: 2023-04-08

## 2023-04-08 ENCOUNTER — APPOINTMENT (OUTPATIENT)
Dept: INFUSION THERAPY | Facility: HOSPITAL | Age: 63
End: 2023-04-08

## 2023-04-08 LAB
ALBUMIN SERPL ELPH-MCNC: 3.6 G/DL — SIGNIFICANT CHANGE UP (ref 3.3–5)
ALP SERPL-CCNC: 68 U/L — SIGNIFICANT CHANGE UP (ref 40–120)
ALT FLD-CCNC: 199 U/L — HIGH (ref 10–45)
ANION GAP SERPL CALC-SCNC: 12 MMOL/L — SIGNIFICANT CHANGE UP (ref 5–17)
AST SERPL-CCNC: 188 U/L — HIGH (ref 10–40)
BILIRUB SERPL-MCNC: 0.7 MG/DL — SIGNIFICANT CHANGE UP (ref 0.2–1.2)
BUN SERPL-MCNC: 17 MG/DL — SIGNIFICANT CHANGE UP (ref 7–23)
CALCIUM SERPL-MCNC: 8.8 MG/DL — SIGNIFICANT CHANGE UP (ref 8.4–10.5)
CHLORIDE SERPL-SCNC: 105 MMOL/L — SIGNIFICANT CHANGE UP (ref 96–108)
CO2 SERPL-SCNC: 24 MMOL/L — SIGNIFICANT CHANGE UP (ref 22–31)
CREAT SERPL-MCNC: 0.84 MG/DL — SIGNIFICANT CHANGE UP (ref 0.5–1.3)
EGFR: 77 ML/MIN/1.73M2 — SIGNIFICANT CHANGE UP
GLUCOSE SERPL-MCNC: 78 MG/DL — SIGNIFICANT CHANGE UP (ref 70–99)
MTX SERPL-SCNC: <0.04 UMOL/L — LOW (ref 0.5–5)
POTASSIUM SERPL-MCNC: 3.7 MMOL/L — SIGNIFICANT CHANGE UP (ref 3.5–5.3)
POTASSIUM SERPL-SCNC: 3.7 MMOL/L — SIGNIFICANT CHANGE UP (ref 3.5–5.3)
PROT SERPL-MCNC: 5.8 G/DL — LOW (ref 6–8.3)
SODIUM SERPL-SCNC: 141 MMOL/L — SIGNIFICANT CHANGE UP (ref 135–145)

## 2023-04-12 ENCOUNTER — NON-APPOINTMENT (OUTPATIENT)
Age: 63
End: 2023-04-12

## 2023-04-12 ENCOUNTER — EMERGENCY (EMERGENCY)
Facility: HOSPITAL | Age: 63
LOS: 1 days | Discharge: HOME | End: 2023-04-12
Attending: STUDENT IN AN ORGANIZED HEALTH CARE EDUCATION/TRAINING PROGRAM
Payer: MEDICARE

## 2023-04-12 ENCOUNTER — RESULT REVIEW (OUTPATIENT)
Age: 63
End: 2023-04-12

## 2023-04-12 ENCOUNTER — APPOINTMENT (OUTPATIENT)
Dept: HEMATOLOGY ONCOLOGY | Facility: CLINIC | Age: 63
End: 2023-04-12

## 2023-04-12 ENCOUNTER — APPOINTMENT (OUTPATIENT)
Dept: HEMATOLOGY ONCOLOGY | Facility: CLINIC | Age: 63
End: 2023-04-12
Payer: MEDICARE

## 2023-04-12 VITALS
OXYGEN SATURATION: 100 % | RESPIRATION RATE: 18 BRPM | HEART RATE: 62 BPM | DIASTOLIC BLOOD PRESSURE: 68 MMHG | SYSTOLIC BLOOD PRESSURE: 129 MMHG

## 2023-04-12 VITALS
DIASTOLIC BLOOD PRESSURE: 73 MMHG | BODY MASS INDEX: 37.82 KG/M2 | HEART RATE: 79 BPM | RESPIRATION RATE: 16 BRPM | OXYGEN SATURATION: 99 % | SYSTOLIC BLOOD PRESSURE: 112 MMHG | WEIGHT: 209.44 LBS | TEMPERATURE: 97.1 F

## 2023-04-12 VITALS
TEMPERATURE: 97 F | SYSTOLIC BLOOD PRESSURE: 95 MMHG | HEIGHT: 62 IN | RESPIRATION RATE: 18 BRPM | HEART RATE: 60 BPM | OXYGEN SATURATION: 97 % | WEIGHT: 209 LBS | DIASTOLIC BLOOD PRESSURE: 60 MMHG

## 2023-04-12 LAB
ALBUMIN SERPL ELPH-MCNC: 3.7 G/DL — SIGNIFICANT CHANGE UP (ref 3.3–5)
ALP SERPL-CCNC: 70 U/L — SIGNIFICANT CHANGE UP (ref 40–120)
ALT FLD-CCNC: 61 U/L — HIGH (ref 10–45)
ANION GAP SERPL CALC-SCNC: 9 MMOL/L — SIGNIFICANT CHANGE UP (ref 5–17)
ANISOCYTOSIS BLD QL: SLIGHT — SIGNIFICANT CHANGE UP
APPEARANCE UR: CLEAR — SIGNIFICANT CHANGE UP
AST SERPL-CCNC: 21 U/L — SIGNIFICANT CHANGE UP (ref 10–40)
BASOPHILS # BLD AUTO: 0.01 K/UL — SIGNIFICANT CHANGE UP (ref 0–0.2)
BASOPHILS # BLD AUTO: 0.02 K/UL — SIGNIFICANT CHANGE UP (ref 0–0.2)
BASOPHILS NFR BLD AUTO: 1 % — SIGNIFICANT CHANGE UP (ref 0–2)
BASOPHILS NFR BLD AUTO: 4.8 % — HIGH (ref 0–2)
BILIRUB SERPL-MCNC: 0.6 MG/DL — SIGNIFICANT CHANGE UP (ref 0.2–1.2)
BILIRUB UR-MCNC: NEGATIVE — SIGNIFICANT CHANGE UP
BLD GP AB SCN SERPL QL: NEGATIVE — SIGNIFICANT CHANGE UP
BUN SERPL-MCNC: 17 MG/DL — SIGNIFICANT CHANGE UP (ref 7–23)
CALCIUM SERPL-MCNC: 9.1 MG/DL — SIGNIFICANT CHANGE UP (ref 8.4–10.5)
CHLORIDE SERPL-SCNC: 105 MMOL/L — SIGNIFICANT CHANGE UP (ref 96–108)
CO2 SERPL-SCNC: 24 MMOL/L — SIGNIFICANT CHANGE UP (ref 22–31)
COLOR SPEC: SIGNIFICANT CHANGE UP
CREAT SERPL-MCNC: 0.84 MG/DL — SIGNIFICANT CHANGE UP (ref 0.5–1.3)
DACRYOCYTES BLD QL SMEAR: SLIGHT — SIGNIFICANT CHANGE UP
DIFF PNL FLD: NEGATIVE — SIGNIFICANT CHANGE UP
EGFR: 77 ML/MIN/1.73M2 — SIGNIFICANT CHANGE UP
EOSINOPHIL # BLD AUTO: 0.02 K/UL — SIGNIFICANT CHANGE UP (ref 0–0.5)
EOSINOPHIL # BLD AUTO: 0.06 K/UL — SIGNIFICANT CHANGE UP (ref 0–0.5)
EOSINOPHIL NFR BLD AUTO: 11.3 % — HIGH (ref 0–6)
EOSINOPHIL NFR BLD AUTO: 4 % — SIGNIFICANT CHANGE UP (ref 0–6)
GAS PNL BLDV: SIGNIFICANT CHANGE UP
GLUCOSE SERPL-MCNC: 101 MG/DL — HIGH (ref 70–99)
GLUCOSE UR QL: NEGATIVE — SIGNIFICANT CHANGE UP
HCT VFR BLD CALC: 23.6 % — LOW (ref 34.5–45)
HCT VFR BLD CALC: 24.6 % — LOW (ref 34.5–45)
HGB BLD-MCNC: 7.6 G/DL — LOW (ref 11.5–15.5)
HGB BLD-MCNC: 8.1 G/DL — LOW (ref 11.5–15.5)
KETONES UR-MCNC: NEGATIVE — SIGNIFICANT CHANGE UP
LEUKOCYTE ESTERASE UR-ACNC: NEGATIVE — SIGNIFICANT CHANGE UP
LYMPHOCYTES # BLD AUTO: 0.3 K/UL — LOW (ref 1–3.3)
LYMPHOCYTES # BLD AUTO: 0.37 K/UL — LOW (ref 1–3.3)
LYMPHOCYTES # BLD AUTO: 61.3 % — HIGH (ref 13–44)
LYMPHOCYTES # BLD AUTO: 68 % — HIGH (ref 13–44)
MAGNESIUM SERPL-MCNC: 1.9 MG/DL — SIGNIFICANT CHANGE UP (ref 1.6–2.6)
MCHC RBC-ENTMCNC: 30.3 PG — SIGNIFICANT CHANGE UP (ref 27–34)
MCHC RBC-ENTMCNC: 30.6 PG — SIGNIFICANT CHANGE UP (ref 27–34)
MCHC RBC-ENTMCNC: 32.2 GM/DL — SIGNIFICANT CHANGE UP (ref 32–36)
MCHC RBC-ENTMCNC: 32.9 G/DL — SIGNIFICANT CHANGE UP (ref 32–36)
MCV RBC AUTO: 92.1 FL — SIGNIFICANT CHANGE UP (ref 80–100)
MCV RBC AUTO: 95.2 FL — SIGNIFICANT CHANGE UP (ref 80–100)
MONOCYTES # BLD AUTO: 0 K/UL — SIGNIFICANT CHANGE UP (ref 0–0.9)
MONOCYTES # BLD AUTO: 0.01 K/UL — SIGNIFICANT CHANGE UP (ref 0–0.9)
MONOCYTES NFR BLD AUTO: 0 % — LOW (ref 2–14)
MONOCYTES NFR BLD AUTO: 1 % — LOW (ref 2–14)
NEUTROPHILS # BLD AUTO: 0.11 K/UL — LOW (ref 1.8–7.4)
NEUTROPHILS # BLD AUTO: 0.14 K/UL — LOW (ref 1.8–7.4)
NEUTROPHILS NFR BLD AUTO: 22.6 % — LOW (ref 43–77)
NEUTROPHILS NFR BLD AUTO: 26 % — LOW (ref 43–77)
NITRITE UR-MCNC: NEGATIVE — SIGNIFICANT CHANGE UP
NRBC # BLD: 0 /100 — SIGNIFICANT CHANGE UP (ref 0–0)
NRBC # BLD: SIGNIFICANT CHANGE UP /100 WBCS (ref 0–0)
PH UR: 6.5 — SIGNIFICANT CHANGE UP (ref 5–8)
PHOSPHATE SERPL-MCNC: 3.9 MG/DL — SIGNIFICANT CHANGE UP (ref 2.5–4.5)
PLAT MORPH BLD: NORMAL — SIGNIFICANT CHANGE UP
PLATELET # BLD AUTO: 77 K/UL — LOW (ref 150–400)
PLATELET # BLD AUTO: 81 K/UL — LOW (ref 150–400)
POIKILOCYTOSIS BLD QL AUTO: SLIGHT — SIGNIFICANT CHANGE UP
POTASSIUM SERPL-MCNC: 4.8 MMOL/L — SIGNIFICANT CHANGE UP (ref 3.5–5.3)
POTASSIUM SERPL-SCNC: 4.8 MMOL/L — SIGNIFICANT CHANGE UP (ref 3.5–5.3)
PROT SERPL-MCNC: 6.3 G/DL — SIGNIFICANT CHANGE UP (ref 6–8.3)
PROT UR-MCNC: SIGNIFICANT CHANGE UP
RAPID RVP RESULT: SIGNIFICANT CHANGE UP
RBC # BLD: 2.48 M/UL — LOW (ref 3.8–5.2)
RBC # BLD: 2.67 M/UL — LOW (ref 3.8–5.2)
RBC # FLD: 16.5 % — HIGH (ref 10.3–14.5)
RBC # FLD: 16.5 % — HIGH (ref 10.3–14.5)
RBC BLD AUTO: ABNORMAL
RH IG SCN BLD-IMP: POSITIVE — SIGNIFICANT CHANGE UP
SARS-COV-2 RNA SPEC QL NAA+PROBE: SIGNIFICANT CHANGE UP
SODIUM SERPL-SCNC: 138 MMOL/L — SIGNIFICANT CHANGE UP (ref 135–145)
SP GR SPEC: 1.02 — SIGNIFICANT CHANGE UP (ref 1.01–1.02)
UROBILINOGEN FLD QL: NEGATIVE — SIGNIFICANT CHANGE UP
WBC # BLD: 0.49 K/UL — CRITICAL LOW (ref 3.8–10.5)
WBC # BLD: 0.54 K/UL — CRITICAL LOW (ref 3.8–10.5)
WBC # FLD AUTO: 0.49 K/UL — CRITICAL LOW (ref 3.8–10.5)
WBC # FLD AUTO: 0.54 K/UL — CRITICAL LOW (ref 3.8–10.5)

## 2023-04-12 PROCEDURE — 93005 ELECTROCARDIOGRAM TRACING: CPT

## 2023-04-12 PROCEDURE — 71045 X-RAY EXAM CHEST 1 VIEW: CPT

## 2023-04-12 PROCEDURE — 86900 BLOOD TYPING SEROLOGIC ABO: CPT

## 2023-04-12 PROCEDURE — 80053 COMPREHEN METABOLIC PANEL: CPT

## 2023-04-12 PROCEDURE — 83605 ASSAY OF LACTIC ACID: CPT

## 2023-04-12 PROCEDURE — 71045 X-RAY EXAM CHEST 1 VIEW: CPT | Mod: 26

## 2023-04-12 PROCEDURE — 0225U NFCT DS DNA&RNA 21 SARSCOV2: CPT

## 2023-04-12 PROCEDURE — 82947 ASSAY GLUCOSE BLOOD QUANT: CPT

## 2023-04-12 PROCEDURE — 84295 ASSAY OF SERUM SODIUM: CPT

## 2023-04-12 PROCEDURE — 82803 BLOOD GASES ANY COMBINATION: CPT

## 2023-04-12 PROCEDURE — 86901 BLOOD TYPING SEROLOGIC RH(D): CPT

## 2023-04-12 PROCEDURE — 81003 URINALYSIS AUTO W/O SCOPE: CPT

## 2023-04-12 PROCEDURE — 84100 ASSAY OF PHOSPHORUS: CPT

## 2023-04-12 PROCEDURE — 85025 COMPLETE CBC W/AUTO DIFF WBC: CPT

## 2023-04-12 PROCEDURE — 82435 ASSAY OF BLOOD CHLORIDE: CPT

## 2023-04-12 PROCEDURE — 87086 URINE CULTURE/COLONY COUNT: CPT

## 2023-04-12 PROCEDURE — 86850 RBC ANTIBODY SCREEN: CPT

## 2023-04-12 PROCEDURE — 82330 ASSAY OF CALCIUM: CPT

## 2023-04-12 PROCEDURE — 99285 EMERGENCY DEPT VISIT HI MDM: CPT | Mod: 25

## 2023-04-12 PROCEDURE — 99215 OFFICE O/P EST HI 40 MIN: CPT

## 2023-04-12 PROCEDURE — 85018 HEMOGLOBIN: CPT

## 2023-04-12 PROCEDURE — 83735 ASSAY OF MAGNESIUM: CPT

## 2023-04-12 PROCEDURE — 99285 EMERGENCY DEPT VISIT HI MDM: CPT | Mod: GC

## 2023-04-12 PROCEDURE — 85014 HEMATOCRIT: CPT

## 2023-04-12 PROCEDURE — 87040 BLOOD CULTURE FOR BACTERIA: CPT

## 2023-04-12 PROCEDURE — 84132 ASSAY OF SERUM POTASSIUM: CPT

## 2023-04-12 PROCEDURE — 99223 1ST HOSP IP/OBS HIGH 75: CPT

## 2023-04-12 PROCEDURE — 86923 COMPATIBILITY TEST ELECTRIC: CPT

## 2023-04-12 RX ORDER — GABAPENTIN 300 MG/1
300 CAPSULE ORAL TWICE DAILY
Qty: 60 | Refills: 3 | Status: DISCONTINUED | COMMUNITY
End: 2023-04-12

## 2023-04-12 RX ADMIN — Medication 100 UNIT(S): at 15:58

## 2023-04-12 NOTE — ED PROVIDER NOTE - ENMT, MLM
negative - no chest pain
Airway patent, Nasal mucosa clear. Mouth with normal mucosa. Throat has no vesicles, no oropharyngeal exudates and uvula is midline.

## 2023-04-12 NOTE — CONSULT NOTE ADULT - ASSESSMENT
MADDISON RAO is a 63y Female with a past medical history as described above who presented for evaluation of hypotension and presyncope. Presenting via EMS from the cancer center. Hematology consulted for pancytopenia.         # Chemotherapy-induced pancytopenia  # B-ALL  - Follows with Dr. Goldberg at [facility]. We spoke with him after rounds and he recommended transfusing 1 unit pRBC prior to discharge. Ordered this STAT.  - Can resume home prophylactic antimicrobial medications  - Can be dc from a heme/onc standpoint after blood  - Follow-up scheduled for this Friday    Johnnie Poole MD, PGY-4  Hematology/Medical Oncology Fellow  Pager: [phone]  Available on [employer] Teams  After 5pm or on weekends please contact  to page on-call fellow

## 2023-04-12 NOTE — ED PROVIDER NOTE - NSFOLLOWUPINSTRUCTIONS_ED_ALL_ED_FT
Activities as tolerated. Please encourage good oral and fluid intake.     Please see your primary care doctor within 24-48 hours for further management of your symptoms.  Please follow up with Hematology on Friday 4/14 for your routine appointment.    Please seek emergent medical management if you have any worsening signs or symptoms.

## 2023-04-12 NOTE — ED PROVIDER NOTE - CLINICAL SUMMARY MEDICAL DECISION MAKING FREE TEXT BOX
63-year-old female with past medical history of ALL on chemotherapy, presenting with presyncopal episode and hypotension. Presyncopal episode of unknown etiology, however patient currently at clinical and hemodynamic baseline.  Will evaluate for electrolyte disturbances versus hematologic changes versus infectious pathology with labs EKG, will continue to monitor vital signs, will discuss with hematology for further recommendations and disposition.  Will reassess.

## 2023-04-12 NOTE — ED PROVIDER NOTE - PATIENT PORTAL LINK FT
You can access the FollowMyHealth Patient Portal offered by [facility] by registering at the following website: [email or web address]. By joining [facility]’s FollowMyHealth portal, you will also be able to view your health information using other applications (apps) compatible with our system.

## 2023-04-12 NOTE — PHYSICAL EXAM
[Fully active, able to carry on all pre-disease performance without restriction] : Status 0 - Fully active, able to carry on all pre-disease performance without restriction [Normal] : affect appropriate [Ulcers] : no ulcers [Mucositis] : no mucositis [Thrush] : no thrush [Vesicles] : no vesicles [de-identified] : BMI 37.82 [de-identified] : dry  [de-identified] : supple [de-identified] : (+)S1S2 RRR [de-identified] : no edema [de-identified] : no pain [de-identified] : warm/dry

## 2023-04-12 NOTE — CONSULT NOTE ADULT - SUBJECTIVE AND OBJECTIVE BOX
HEMATOLOGY ONCOLOGY CONSULT     Patient is a 63y old  Female who presents with a chief complaint of lightheadedness. Was sent from [facility] for presyncope and hypotension. States this occurred after breathing deeply after a provider auscultated and asked her to take deep breaths. She is on treatment for B-ALL with Dr. Goldberg. She was sent to [facility] due to concern for infection. She received IVF and responded well. Symptoms have resolved.       ROS negative except as indicated in the HPI.    PAST MEDICAL & SURGICAL HISTORY:  HTN (hypertension)      HLD (hyperlipidemia)      B-cell acute lymphoblastic leukemia      MEDICATIONS  (STANDING):    MEDICATIONS  (PRN):      Allergies    sulfa drugs (Unknown)  Zofran (Headache)    Intolerances        Vital Signs Last 24 Hrs  T(C): 36.6 (12 Apr 2023 12:25), Max: 36.6 (12 Apr 2023 12:25)  T(F): 97.8 (12 Apr 2023 12:25), Max: 97.8 (12 Apr 2023 12:25)  HR: 56 (12 Apr 2023 12:25) (56 - 62)  BP: 138/82 (12 Apr 2023 12:25) (95/60 - 138/82)  BP(mean): 84 (12 Apr 2023 12:25) (79 - 84)  RR: 18 (12 Apr 2023 12:25) (18 - 18)  SpO2: 100% (12 Apr 2023 12:25) (97% - 100%)    Parameters below as of 12 Apr 2023 12:25  Patient On (Oxygen Delivery Method): room air        PHYSICAL EXAM  General: adult in NAD  HEENT: clear oropharynx, anicteric sclera, pink conjunctiva  Neck: supple  CV: normal S1/S2 with no murmur rubs or gallops  Lungs: positive air movement b/l ant lungs, clear to auscultation, no wheezes, no rales  Abdomen: soft non-tender non-distended, no hepatosplenomegaly  Ext: no clubbing cyanosis or edema  Skin: no rashes and no petechiae  Neuro: alert and oriented X 4, no focal deficits      LABS:                          7.6    0.49  )-----------( 77       ( 12 Apr 2023 11:20 )             23.6         Mean Cell Volume : 95.2 fl  Mean Cell Hemoglobin : 30.6 pg  Mean Cell Hemoglobin Concentration : 32.2 gm/dL  Auto Neutrophil # : 0.11 K/uL  Auto Lymphocyte # : 0.30 K/uL  Auto Monocyte # : 0.00 K/uL  Auto Eosinophil # : 0.06 K/uL  Auto Basophil # : 0.02 K/uL  Auto Neutrophil % : 22.6 %  Auto Lymphocyte % : 61.3 %  Auto Monocyte % : 0.0 %  Auto Eosinophil % : 11.3 %  Auto Basophil % : 4.8 %      04-12    138  |  105  |  17  ----------------------------<  101<H>  4.8   |  24  |  0.84    Ca    9.1      12 Apr 2023 11:20  Phos  3.9     04-12  Mg     1.9     04-12    TPro  6.3  /  Alb  3.7  /  TBili  0.6  /  DBili  x   /  AST  21  /  ALT  61<H>  /  AlkPhos  70  04-12

## 2023-04-12 NOTE — ED ADULT NURSE NOTE - OBJECTIVE STATEMENT
Pt presents to the ED via EMS due to near syncopal episode at Health system. Pt reports she started dry heaving, diaphoretic, and light headed. Pt was given 500ml of NS prior to arrival. Pt placed in room 10. PMH of HTN, HLD, and ALL. Chemo started on 3/3/23. Cardiac monitor on; SB- NSR noted at rate 62 bpm. Pulse ox- 100% on room air. 22g IV placed in RAC. Labs collected. Pt has R chest wall port that was accessed prior to arrival. Skin intact. EKG completed. Pt denies pain, no acute distress noted. Pt states "im feeling much better". Pt ambulated to the bathroom with x1 assistance. Pt denies chest pain, SOB, headache, dizziness. Chest xray pending. Support ongoing. Luann

## 2023-04-12 NOTE — ED PROVIDER NOTE - ATTENDING CONTRIBUTION TO CARE
64 y/o F with PMHx of HTN, HLD with Ph (-) ALL diagnosed in February 2023  Patient is not neutropenic, afebrile 62 y/o F with PMHx of HTN, HLD with Ph (-) ALL diagnosed in February 2023 here from [facility] for possible hypotension and presyncopal  upon arrival to the ER, pt reports she is feeling improved she states she needed  blood transfusion, pt accompanied by , pt denies any lightheadedness, no cp, no sob, no bleeding, reports she is compliant w/ her home meds,   On exam, pt is awake and alert oriented x3, has a port in the R upper chest, wall, pt w intact movement of arms/legs, plan for labs imaging and reassessment, dispo pending results. pt neutropenic, afebrile, ok to follow up outpt w/ oncology, per onc recs, no need for additional treatment at this time.

## 2023-04-12 NOTE — REASON FOR VISIT
[Acute Lymphoblastic Leukemia] : acute lymphoblastic leukemia [Follow-Up Visit] : a follow-up visit for

## 2023-04-12 NOTE — ED PROVIDER NOTE - PROGRESS NOTE DETAILS
ABIDA alanis consulted, dilia bynum pt ABIDA Storey MD  heme paged, pending eval/recs. ABIDA alanis paged, pending eval/recs. spoke to zeke, will round shortly ABIDA Storey MD  heme eval at bedside, rec dc home with f/u friday for blood tests. pt agrees with plan. hd and clinically stable for dc. case discussed w/ hem onc fellow ok to dc home w/ outpt follow up w oncology, no need for antibiotics no need to treat anemia/thrombocytopenia, pt to follow up outpt w/ cancer center later this week; pt  at desk multiple times asking for IV to be removed and port to be deaccessed, and they are requesting paper work to go home, they don't want to wait in the er any longer, pt to follow up outpt , to return for sx of anemia, or infection will continue home medications

## 2023-04-12 NOTE — REVIEW OF SYSTEMS
[Negative] : Neurological [Vision Problems] : no vision problems [Dysphagia] : no dysphagia [Nosebleeds] : no nosebleeds [Odynophagia] : no odynophagia [Chest Pain] : no chest pain [Palpitations] : no palpitations [Lower Ext Edema] : no lower extremity edema [Dysuria] : no dysuria [Insomnia] : no insomnia [Anxiety] : no anxiety [Hot Flashes] : no hot flashes

## 2023-04-12 NOTE — ED PROVIDER NOTE - OBJECTIVE STATEMENT
63-year-old female with past medical history of ALL on chemotherapy, presenting with presyncopal episode and hypotension.  Patient was at hematology office seeing NP after getting blood drawn 1 hour prior today, when she began to feel flushed and lightheaded during physical exam.  A rapid response was called, patient's blood pressure was noted to be 70s SBP, and 200 cc of IV fluid was administered via the port.  EMS noted blood pressure to improve to 90s / 60s and patient denies having any current symptoms.    Hematologist-Dr. Bradley Goldberg at monitor center    Denies LOC, chest pain, shortness of breath, fever chills.

## 2023-04-12 NOTE — ASSESSMENT
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  \par \par -She is status post  admission for cycle 1B next week, but treated with full dose (MTX 1g/m2 over 24 hour span on day 1, cytarabine 1g/m2 [id number] on day 2 and 3 for total 4 doses).\par -Patient has been doing well since discharge. CBC today showing pancytopenia. \par -Instructed patient to continue levaquin and diflucan at this time as well as acyclovir. \par -Patient developed Grade 2 transaminitis and is asymptomatic from GI perspective. Will repeat CMP today and if further increased will need to discuss continuation of Fluconazole.\par -During visit she became dizzy and diaphoretic. BP had been normal when patient initially came into exam room however on repeat she was 76/54 HR 61 Temp 97.3. At that time she began to feel nauseous and dry heave. Patient reports taking Losartan this am with breakfast. Rapid response was called and her port was accessed. 1L of NS was hung and shortly after patients BP was back up to baseline. Her symptoms resolved and EMS transported the patient to [facility] for monitoring and transfusion PRBC. Will set up for transfusion this Friday and recommend further transfusional support at that time. \par -Will check repeat BMBx after counts recover with Clonoseq testing. \par -Previously educated patient as well on the Clonoseq testing as a tool to assess measurable residual disease (MRD) using NGS technology.\par -Mediport was placed prior to most recent admission.\par -Educated patient extensively on the diagnosis of acute lymphoblastic leukemia (ALL). Instructed patient that that this is a cancer of the white blood cells, specifically the lymphocytes, which are made in the bone marrow and proliferate uncontrollably, leading to elevated lymphocyte counts, and lower normal blood cell counts. Due to this, she is at risk for infection, bleeding, symptoms and complications of anemia, and ultimately mortality.  \par -Instructed patient that therapy for this disease likely will result in prolonged neutropenia, causing transient increase in risk of infections. Also instructed can result in prolonged severe thrombocytopenia, which can transiently increase risk of bleeding as well. She is aware of the likely need for transfusional support and prophylactic antibiotics. \par -Instructed the patient that this disease is curable. \par -Will plan for readmission for cycle 2A after repeat BM bx around 5/1/23.\par -Patient understands and agrees with plan. All information explained to the best of my ability. \par -RTC after cycle 2A.

## 2023-04-12 NOTE — ED ADULT NURSE NOTE - NSICDXPASTMEDICALHX_GEN_ALL_CORE_FT
36.5 PAST MEDICAL HISTORY:  B-cell acute lymphoblastic leukemia     HLD (hyperlipidemia)     HTN (hypertension)

## 2023-04-12 NOTE — CONSULT NOTE ADULT - ATTENDING COMMENTS
63-yr-old woman with B-ALL on treatment sent from out patient clinic for presyncopal episode. Her BP was low. Patient seen in the ED. She admits that she was dehydrated. She appears well, stable and wants to leave the hospital. Her vitals aer normal; can be discharged from ER after a unit of transfusion. (Hgb 7.6). Patient will follow up as outpatient.

## 2023-04-12 NOTE — HISTORY OF PRESENT ILLNESS
[Disease:__________________________] : Disease: [unfilled] [Therapy: ___] : Therapy: [unfilled] [Cycle: ___] : Cycle: [unfilled] [Day: ___] : Day: [unfilled] [de-identified] : 64 y/o F transferred from [facility] to [facility] for new diagnosis of acute leukemia, s/p initial inpatient treatment and now here for establishment of outpatient care. 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 Official bone marrow biopsy 2/28/23 showed B-cell ALL, which was [place] chromosome (-).  Chemotherapy with reduced dose HyperCVAD was started on 3/3/23. Hospital course was c/b neutropenic fever, transaminitis, a rash on 3/4 which improved with topical steroid and atarax PRN,  and also LUE cellulitis.\par \par Of note, on peripheral blood BCR/ABL PCR was negative, although in her bone marrow she did have PCR for BCR/ABL p190 positive at an extremely low level of 0.001%. She had a pre-treatment echocardiogram done which showed an EF 55%, and PICC line was placed. Pt started reduced dose HyperCVAD  on 3/3/23: (IV Cyclophosphamide (150 mg/m2 every 12 h on days 1–3), Dexamethasone 20 mg per day on days 1–4 and 11–14, Vincristine 2 mg IV flat dose on days 1 and 8, Daunorubicin 25 mg/m2/day IV continuous infusion over 48 hours, starting on day 4). Informed consent obtained. LP with IT MTX done on 2/28, and CSF was subsequently found to be negative for malignant cells. Pt had a developing transaminitis, and US done at [facility] had shown a fatty liver. CT A/P done at [facility] showed splenomegaly with a small age indeterminant splenic infarct, but otherwise showed a normal liver and no abdominal or pelvic lymphadenopathy. Ultimately, during hospital stay she also developed neutropenic fever, s/p panculture which was negative,. She was treated with cefepime, acyclovir and caspofungin. Course also c/b severe headache as side effect from Zofran (CTH negative). Discharged home in stable condition. \par  [de-identified] : Patient presents today for follow up. She is s/p discharge from [facility] where she was admitted for cycle 1B of mini hyperCVAD, she is now D+10. Her hospital course was uncomplicated and MTX has cleared. Overall she has been feeling well and only complaint was that she thought she had B/L knee swelling around the time she was admitted. She had a poor appetite after discharge but this has improved and she is eating normally. No abdominal pain or n/v. Her fluid intake has "not been great." She is moving her bowels and urinating normally. No further rash noted. Denies any CP, palpitations or shortness of breath. Mediport site is slightly sore from placement last week but no s/s of infection. Denied any fevers or chills, as well as any pain symptoms at this point at all. \par \par

## 2023-04-12 NOTE — ED PROVIDER NOTE - NSICDXPASTMEDICALHX_GEN_ALL_CORE_FT
PAST MEDICAL HISTORY:  B-cell acute lymphoblastic leukemia     HLD (hyperlipidemia)     HTN (hypertension)

## 2023-04-13 ENCOUNTER — NON-APPOINTMENT (OUTPATIENT)
Age: 63
End: 2023-04-13

## 2023-04-13 LAB
CULTURE RESULTS: SIGNIFICANT CHANGE UP
SPECIMEN SOURCE: SIGNIFICANT CHANGE UP

## 2023-04-14 ENCOUNTER — NON-APPOINTMENT (OUTPATIENT)
Age: 63
End: 2023-04-14

## 2023-04-14 ENCOUNTER — RESULT REVIEW (OUTPATIENT)
Age: 63
End: 2023-04-14

## 2023-04-14 ENCOUNTER — APPOINTMENT (OUTPATIENT)
Dept: HEMATOLOGY ONCOLOGY | Facility: CLINIC | Age: 63
End: 2023-04-14

## 2023-04-14 ENCOUNTER — APPOINTMENT (OUTPATIENT)
Dept: INFUSION THERAPY | Facility: HOSPITAL | Age: 63
End: 2023-04-14

## 2023-04-14 LAB
BASOPHILS # BLD AUTO: 0.02 K/UL — SIGNIFICANT CHANGE UP (ref 0–0.2)
BASOPHILS NFR BLD AUTO: 3.7 % — HIGH (ref 0–2)
EOSINOPHIL # BLD AUTO: 0.03 K/UL — SIGNIFICANT CHANGE UP (ref 0–0.5)
EOSINOPHIL NFR BLD AUTO: 5.6 % — SIGNIFICANT CHANGE UP (ref 0–6)
HCT VFR BLD CALC: 21.2 % — LOW (ref 34.5–45)
HGB BLD-MCNC: 7.5 G/DL — LOW (ref 11.5–15.5)
IMM GRANULOCYTES NFR BLD AUTO: 1.9 % — HIGH (ref 0–0.9)
LYMPHOCYTES # BLD AUTO: 0.46 K/UL — LOW (ref 1–3.3)
LYMPHOCYTES # BLD AUTO: 85.2 % — HIGH (ref 13–44)
MCHC RBC-ENTMCNC: 31.1 PG — SIGNIFICANT CHANGE UP (ref 27–34)
MCHC RBC-ENTMCNC: 35.4 G/DL — SIGNIFICANT CHANGE UP (ref 32–36)
MCV RBC AUTO: 88 FL — SIGNIFICANT CHANGE UP (ref 80–100)
MONOCYTES # BLD AUTO: 0.01 K/UL — SIGNIFICANT CHANGE UP (ref 0–0.9)
MONOCYTES NFR BLD AUTO: 1.9 % — LOW (ref 2–14)
NEUTROPHILS # BLD AUTO: 0.01 K/UL — LOW (ref 1.8–7.4)
NEUTROPHILS NFR BLD AUTO: 1.7 % — LOW (ref 43–77)
NRBC # BLD: 0 /100 WBCS — SIGNIFICANT CHANGE UP (ref 0–0)
PLATELET # BLD AUTO: 28 K/UL — LOW (ref 150–400)
RBC # BLD: 2.41 M/UL — LOW (ref 3.8–5.2)
RBC # FLD: 15.7 % — HIGH (ref 10.3–14.5)
WBC # BLD: 0.54 K/UL — CRITICAL LOW (ref 3.8–10.5)
WBC # FLD AUTO: 0.54 K/UL — CRITICAL LOW (ref 3.8–10.5)

## 2023-04-15 ENCOUNTER — OUTPATIENT (OUTPATIENT)
Dept: OUTPATIENT SERVICES | Facility: HOSPITAL | Age: 63
LOS: 1 days | End: 2023-04-15

## 2023-04-15 DIAGNOSIS — C95.90 LEUKEMIA, UNSPECIFIED NOT HAVING ACHIEVED REMISSION: ICD-10-CM

## 2023-04-17 ENCOUNTER — NON-APPOINTMENT (OUTPATIENT)
Age: 63
End: 2023-04-17

## 2023-04-17 ENCOUNTER — APPOINTMENT (OUTPATIENT)
Dept: INFUSION THERAPY | Facility: HOSPITAL | Age: 63
End: 2023-04-17

## 2023-04-17 ENCOUNTER — RESULT REVIEW (OUTPATIENT)
Age: 63
End: 2023-04-17

## 2023-04-17 ENCOUNTER — APPOINTMENT (OUTPATIENT)
Dept: HEMATOLOGY ONCOLOGY | Facility: CLINIC | Age: 63
End: 2023-04-17

## 2023-04-17 LAB
CULTURE RESULTS: SIGNIFICANT CHANGE UP
CULTURE RESULTS: SIGNIFICANT CHANGE UP
HCT VFR BLD CALC: 24.1 % — LOW (ref 34.5–45)
HGB BLD-MCNC: 8.7 G/DL — LOW (ref 11.5–15.5)
MCHC RBC-ENTMCNC: 30.7 PG — SIGNIFICANT CHANGE UP (ref 27–34)
MCHC RBC-ENTMCNC: 36.1 G/DL — HIGH (ref 32–36)
MCV RBC AUTO: 85.2 FL — SIGNIFICANT CHANGE UP (ref 80–100)
NRBC # BLD: 0 /100 WBCS — SIGNIFICANT CHANGE UP (ref 0–0)
PLATELET # BLD AUTO: 33 K/UL — LOW (ref 150–400)
RBC # BLD: 2.83 M/UL — LOW (ref 3.8–5.2)
RBC # FLD: 14.3 % — SIGNIFICANT CHANGE UP (ref 10.3–14.5)
SPECIMEN SOURCE: SIGNIFICANT CHANGE UP
SPECIMEN SOURCE: SIGNIFICANT CHANGE UP
WBC # BLD: 0.34 K/UL — CRITICAL LOW (ref 3.8–10.5)
WBC # FLD AUTO: 0.34 K/UL — CRITICAL LOW (ref 3.8–10.5)

## 2023-04-19 ENCOUNTER — NON-APPOINTMENT (OUTPATIENT)
Age: 63
End: 2023-04-19

## 2023-04-19 ENCOUNTER — RESULT REVIEW (OUTPATIENT)
Age: 63
End: 2023-04-19

## 2023-04-19 ENCOUNTER — APPOINTMENT (OUTPATIENT)
Dept: HEMATOLOGY ONCOLOGY | Facility: CLINIC | Age: 63
End: 2023-04-19

## 2023-04-19 ENCOUNTER — APPOINTMENT (OUTPATIENT)
Dept: INFUSION THERAPY | Facility: HOSPITAL | Age: 63
End: 2023-04-19

## 2023-04-19 LAB
ALBUMIN SERPL ELPH-MCNC: 3.5 G/DL
ALBUMIN SERPL ELPH-MCNC: 3.9 G/DL
ALP BLD-CCNC: 70 U/L
ALP BLD-CCNC: 79 U/L
ALT SERPL-CCNC: 6 U/L
ALT SERPL-CCNC: 69 U/L
ANION GAP SERPL CALC-SCNC: 10 MMOL/L
ANION GAP SERPL CALC-SCNC: 12 MMOL/L
APTT BLD: 29.7 SEC
AST SERPL-CCNC: 10 U/L
AST SERPL-CCNC: 23 U/L
BASOPHILS # BLD AUTO: 0.01 K/UL — SIGNIFICANT CHANGE UP (ref 0–0.2)
BASOPHILS NFR BLD AUTO: 1 % — SIGNIFICANT CHANGE UP (ref 0–2)
BILIRUB SERPL-MCNC: 0.3 MG/DL
BILIRUB SERPL-MCNC: 0.6 MG/DL
BLASTS # FLD: 9 % — HIGH (ref 0–0)
BUN SERPL-MCNC: 12 MG/DL
BUN SERPL-MCNC: 16 MG/DL
CALCIUM SERPL-MCNC: 9 MG/DL
CALCIUM SERPL-MCNC: 9.5 MG/DL
CHLORIDE SERPL-SCNC: 100 MMOL/L
CHLORIDE SERPL-SCNC: 107 MMOL/L
CO2 SERPL-SCNC: 24 MMOL/L
CO2 SERPL-SCNC: 25 MMOL/L
CREAT SERPL-MCNC: 0.82 MG/DL
CREAT SERPL-MCNC: 0.85 MG/DL
EGFR: 76 ML/MIN/1.73M2
EGFR: 79 ML/MIN/1.73M2
EOSINOPHIL # BLD AUTO: 0.13 K/UL — SIGNIFICANT CHANGE UP (ref 0–0.5)
EOSINOPHIL NFR BLD AUTO: 22 % — HIGH (ref 0–6)
GLUCOSE SERPL-MCNC: 102 MG/DL
GLUCOSE SERPL-MCNC: 98 MG/DL
HCT VFR BLD CALC: 24.2 % — LOW (ref 34.5–45)
HGB BLD-MCNC: 8.7 G/DL — LOW (ref 11.5–15.5)
INR PPP: 1.14 RATIO
LDH SERPL-CCNC: 153 U/L
LDH SERPL-CCNC: 182 U/L
LYMPHOCYTES # BLD AUTO: 0.34 K/UL — LOW (ref 1–3.3)
LYMPHOCYTES # BLD AUTO: 56 % — HIGH (ref 13–44)
MCHC RBC-ENTMCNC: 30.6 PG — SIGNIFICANT CHANGE UP (ref 27–34)
MCHC RBC-ENTMCNC: 36 G/DL — SIGNIFICANT CHANGE UP (ref 32–36)
MCV RBC AUTO: 85.2 FL — SIGNIFICANT CHANGE UP (ref 80–100)
MONOCYTES # BLD AUTO: 0.06 K/UL — SIGNIFICANT CHANGE UP (ref 0–0.9)
MONOCYTES NFR BLD AUTO: 10 % — SIGNIFICANT CHANGE UP (ref 2–14)
MTX SERPL-SCNC: <0.04 UMOL/L
NEUTROPHILS # BLD AUTO: 0.01 K/UL — LOW (ref 1.8–7.4)
NEUTROPHILS NFR BLD AUTO: 2 % — LOW (ref 43–77)
NRBC # BLD: 2 /100 — HIGH (ref 0–0)
NRBC # BLD: SIGNIFICANT CHANGE UP /100 WBCS (ref 0–0)
PLAT MORPH BLD: NORMAL — SIGNIFICANT CHANGE UP
PLATELET # BLD AUTO: 279 K/UL — SIGNIFICANT CHANGE UP (ref 150–400)
POTASSIUM SERPL-SCNC: 4.2 MMOL/L
POTASSIUM SERPL-SCNC: 4.6 MMOL/L
PROT SERPL-MCNC: 6 G/DL
PROT SERPL-MCNC: 6.5 G/DL
PT BLD: 13.2 SEC
RBC # BLD: 2.84 M/UL — LOW (ref 3.8–5.2)
RBC # FLD: 14.6 % — HIGH (ref 10.3–14.5)
RBC BLD AUTO: SIGNIFICANT CHANGE UP
SODIUM SERPL-SCNC: 136 MMOL/L
SODIUM SERPL-SCNC: 143 MMOL/L
WBC # BLD: 0.6 K/UL — CRITICAL LOW (ref 3.8–10.5)
WBC # FLD AUTO: 0.6 K/UL — CRITICAL LOW (ref 3.8–10.5)

## 2023-04-21 ENCOUNTER — APPOINTMENT (OUTPATIENT)
Dept: INFUSION THERAPY | Facility: HOSPITAL | Age: 63
End: 2023-04-21

## 2023-04-24 ENCOUNTER — RESULT REVIEW (OUTPATIENT)
Age: 63
End: 2023-04-24

## 2023-04-24 ENCOUNTER — APPOINTMENT (OUTPATIENT)
Dept: HEMATOLOGY ONCOLOGY | Facility: CLINIC | Age: 63
End: 2023-04-24

## 2023-04-24 LAB
BASOPHILS # BLD AUTO: 0 K/UL — SIGNIFICANT CHANGE UP (ref 0–0.2)
BASOPHILS NFR BLD AUTO: 0 % — SIGNIFICANT CHANGE UP (ref 0–2)
BLASTS # FLD: 5 % — HIGH (ref 0–0)
EOSINOPHIL # BLD AUTO: 0 K/UL — SIGNIFICANT CHANGE UP (ref 0–0.5)
EOSINOPHIL NFR BLD AUTO: 0 % — SIGNIFICANT CHANGE UP (ref 0–6)
HCT VFR BLD CALC: 25.2 % — LOW (ref 34.5–45)
HGB BLD-MCNC: 8.4 G/DL — LOW (ref 11.5–15.5)
LYMPHOCYTES # BLD AUTO: 0.94 K/UL — LOW (ref 1–3.3)
LYMPHOCYTES # BLD AUTO: 22 % — SIGNIFICANT CHANGE UP (ref 13–44)
MCHC RBC-ENTMCNC: 30.3 PG — SIGNIFICANT CHANGE UP (ref 27–34)
MCHC RBC-ENTMCNC: 33.3 G/DL — SIGNIFICANT CHANGE UP (ref 32–36)
MCV RBC AUTO: 91 FL — SIGNIFICANT CHANGE UP (ref 80–100)
MONOCYTES # BLD AUTO: 2.22 K/UL — HIGH (ref 0–0.9)
MONOCYTES NFR BLD AUTO: 52 % — HIGH (ref 2–14)
MYELOCYTES NFR BLD: 8 % — HIGH (ref 0–0)
NEUTROPHILS # BLD AUTO: 0.55 K/UL — LOW (ref 1.8–7.4)
NEUTROPHILS NFR BLD AUTO: 13 % — LOW (ref 43–77)
NRBC # BLD: 4 /100 — HIGH (ref 0–0)
NRBC # BLD: SIGNIFICANT CHANGE UP /100 WBCS (ref 0–0)
PLAT MORPH BLD: NORMAL — SIGNIFICANT CHANGE UP
PLATELET # BLD AUTO: 559 K/UL — HIGH (ref 150–400)
RBC # BLD: 2.77 M/UL — LOW (ref 3.8–5.2)
RBC # FLD: 16 % — HIGH (ref 10.3–14.5)
RBC BLD AUTO: SIGNIFICANT CHANGE UP
WBC # BLD: 4.26 K/UL — SIGNIFICANT CHANGE UP (ref 3.8–10.5)
WBC # FLD AUTO: 4.26 K/UL — SIGNIFICANT CHANGE UP (ref 3.8–10.5)

## 2023-04-26 ENCOUNTER — NON-APPOINTMENT (OUTPATIENT)
Age: 63
End: 2023-04-26

## 2023-04-28 ENCOUNTER — APPOINTMENT (OUTPATIENT)
Dept: HEMATOLOGY ONCOLOGY | Facility: CLINIC | Age: 63
End: 2023-04-28
Payer: MEDICARE

## 2023-04-28 ENCOUNTER — RESULT REVIEW (OUTPATIENT)
Age: 63
End: 2023-04-28

## 2023-04-28 ENCOUNTER — LABORATORY RESULT (OUTPATIENT)
Age: 63
End: 2023-04-28

## 2023-04-28 ENCOUNTER — APPOINTMENT (OUTPATIENT)
Dept: HEMATOLOGY ONCOLOGY | Facility: CLINIC | Age: 63
End: 2023-04-28

## 2023-04-28 VITALS
RESPIRATION RATE: 16 BRPM | SYSTOLIC BLOOD PRESSURE: 129 MMHG | WEIGHT: 208.76 LBS | OXYGEN SATURATION: 98 % | DIASTOLIC BLOOD PRESSURE: 79 MMHG | TEMPERATURE: 97.3 F | BODY MASS INDEX: 37.69 KG/M2 | HEART RATE: 57 BPM

## 2023-04-28 LAB
ANISOCYTOSIS BLD QL: SLIGHT — SIGNIFICANT CHANGE UP
BASOPHILS # BLD AUTO: 0 K/UL — SIGNIFICANT CHANGE UP (ref 0–0.2)
BASOPHILS NFR BLD AUTO: 0 % — SIGNIFICANT CHANGE UP (ref 0–2)
EOSINOPHIL # BLD AUTO: 0 K/UL — SIGNIFICANT CHANGE UP (ref 0–0.5)
EOSINOPHIL NFR BLD AUTO: 0 % — SIGNIFICANT CHANGE UP (ref 0–6)
HCT VFR BLD CALC: 27 % — LOW (ref 34.5–45)
HGB BLD-MCNC: 8.6 G/DL — LOW (ref 11.5–15.5)
LYMPHOCYTES # BLD AUTO: 0.35 K/UL — LOW (ref 1–3.3)
LYMPHOCYTES # BLD AUTO: 6 % — LOW (ref 13–44)
MCHC RBC-ENTMCNC: 29.5 PG — SIGNIFICANT CHANGE UP (ref 27–34)
MCHC RBC-ENTMCNC: 31.9 G/DL — LOW (ref 32–36)
MCV RBC AUTO: 92.5 FL — SIGNIFICANT CHANGE UP (ref 80–100)
METAMYELOCYTES # FLD: 6 % — HIGH (ref 0–0)
MONOCYTES # BLD AUTO: 1.48 K/UL — HIGH (ref 0–0.9)
MONOCYTES NFR BLD AUTO: 25 % — HIGH (ref 2–14)
MYELOCYTES NFR BLD: 12 % — HIGH (ref 0–0)
NEUTROPHILS # BLD AUTO: 2.9 K/UL — SIGNIFICANT CHANGE UP (ref 1.8–7.4)
NEUTROPHILS NFR BLD AUTO: 48 % — SIGNIFICANT CHANGE UP (ref 43–77)
NEUTS BAND # BLD: 1 % — SIGNIFICANT CHANGE UP (ref 0–8)
NRBC # BLD: 2 /100 — HIGH (ref 0–0)
NRBC # BLD: SIGNIFICANT CHANGE UP /100 WBCS (ref 0–0)
PLAT MORPH BLD: NORMAL — SIGNIFICANT CHANGE UP
PLATELET # BLD AUTO: 579 K/UL — HIGH (ref 150–400)
POIKILOCYTOSIS BLD QL AUTO: SLIGHT — SIGNIFICANT CHANGE UP
POLYCHROMASIA BLD QL SMEAR: SLIGHT — SIGNIFICANT CHANGE UP
PROMYELOCYTES # FLD: 2 % — HIGH (ref 0–0)
RBC # BLD: 2.92 M/UL — LOW (ref 3.8–5.2)
RBC # FLD: 16.4 % — HIGH (ref 10.3–14.5)
RBC BLD AUTO: ABNORMAL
WBC # BLD: 5.91 K/UL — SIGNIFICANT CHANGE UP (ref 3.8–10.5)
WBC # FLD AUTO: 5.91 K/UL — SIGNIFICANT CHANGE UP (ref 3.8–10.5)

## 2023-04-28 PROCEDURE — 38222 DX BONE MARROW BX & ASPIR: CPT

## 2023-04-28 NOTE — PROCEDURE
[Bone Marrow Biopsy] : bone marrow biopsy [Bone Marrow Aspiration] : bone marrow aspiration  [Patient] : the patient [Patient identification verified] : patient identification verified [Prone] : prone [The left posterior iliac crest was prepped with betadine and draped, using sterile technique.] : The left posterior iliac crest was prepped with betadine and draped, using sterile technique. [Lidocaine was injected and into the periosteum overlying the site.] : Lidocaine was injected and into the periosteum overlying the site. [Aspirate] : aspirate [Cytogenetics] : cytogenetics [FISH] : FISH [Other ___] : [unfilled] [Biopsy] : biopsy [Flow Cytometry] : flow cytometry [] : The patient was instructed to remove the bandage the following AM. The patient may bathe. Acetaminophen may be taken for discomfort, as per package directions.If there are any other problems, the patient was instructed to call the office. The patient verbalized understanding, and is aware of the office contact numbers. [FreeTextEntry1] : B-ALL [FreeTextEntry2] : CBC prior to procedure.\par WBC 5.9\par Hgb 8.6\par Hct 27\par Plts 579\par BM biopsy and aspirate done.  Samples sent for Clonoseq.\par Lidocaine 1%-10 cc's given.  \par \par ANC 2900, pt advised to stop Levofloxacin and Fluconazole.  Care discussed with Dr Goldberg.

## 2023-04-28 NOTE — REASON FOR VISIT
[Bone Marrow Biopsy] : bone marrow biopsy [Bone Marrow Aspiration] : bone marrow aspiration [FreeTextEntry2] : B-ALL

## 2023-04-29 LAB
ALBUMIN SERPL ELPH-MCNC: 3.6 G/DL
ALP BLD-CCNC: 86 U/L
ALT SERPL-CCNC: 11 U/L
ANION GAP SERPL CALC-SCNC: 12 MMOL/L
AST SERPL-CCNC: 17 U/L
BILIRUB SERPL-MCNC: 0.2 MG/DL
BUN SERPL-MCNC: 9 MG/DL
CALCIUM SERPL-MCNC: 9.4 MG/DL
CHLORIDE SERPL-SCNC: 104 MMOL/L
CO2 SERPL-SCNC: 24 MMOL/L
CREAT SERPL-MCNC: 0.99 MG/DL
EGFR: 63 ML/MIN/1.73M2
GLUCOSE SERPL-MCNC: 98 MG/DL
LDH SERPL-CCNC: 268 U/L
POTASSIUM SERPL-SCNC: 4.6 MMOL/L
PROT SERPL-MCNC: 6.5 G/DL
SODIUM SERPL-SCNC: 141 MMOL/L

## 2023-05-01 ENCOUNTER — INPATIENT (INPATIENT)
Facility: HOSPITAL | Age: 63
LOS: 3 days | Discharge: HOME HEALTH | DRG: 839 | End: 2023-05-05
Attending: INTERNAL MEDICINE | Admitting: INTERNAL MEDICINE
Payer: MEDICARE

## 2023-05-01 ENCOUNTER — TRANSCRIPTION ENCOUNTER (OUTPATIENT)
Age: 63
End: 2023-05-01

## 2023-05-01 VITALS
RESPIRATION RATE: 16 BRPM | DIASTOLIC BLOOD PRESSURE: 83 MMHG | TEMPERATURE: 98 F | HEART RATE: 81 BPM | HEIGHT: 62.2 IN | OXYGEN SATURATION: 96 % | WEIGHT: 207.45 LBS | SYSTOLIC BLOOD PRESSURE: 164 MMHG

## 2023-05-01 DIAGNOSIS — C91.00 ACUTE LYMPHOBLASTIC LEUKEMIA NOT HAVING ACHIEVED REMISSION: ICD-10-CM

## 2023-05-01 DIAGNOSIS — Z29.9 ENCOUNTER FOR PROPHYLACTIC MEASURES, UNSPECIFIED: ICD-10-CM

## 2023-05-01 DIAGNOSIS — I10 ESSENTIAL (PRIMARY) HYPERTENSION: ICD-10-CM

## 2023-05-01 DIAGNOSIS — B99.9 UNSPECIFIED INFECTIOUS DISEASE: ICD-10-CM

## 2023-05-01 LAB
ALBUMIN SERPL ELPH-MCNC: 3.6 G/DL — SIGNIFICANT CHANGE UP (ref 3.3–5)
ALP SERPL-CCNC: 78 U/L — SIGNIFICANT CHANGE UP (ref 40–120)
ALT FLD-CCNC: 14 U/L — SIGNIFICANT CHANGE UP (ref 10–45)
ANION GAP SERPL CALC-SCNC: 12 MMOL/L — SIGNIFICANT CHANGE UP (ref 5–17)
ANISOCYTOSIS BLD QL: SLIGHT — SIGNIFICANT CHANGE UP
AST SERPL-CCNC: 22 U/L — SIGNIFICANT CHANGE UP (ref 10–40)
BASOPHILS # BLD AUTO: 0.06 K/UL — SIGNIFICANT CHANGE UP (ref 0–0.2)
BASOPHILS NFR BLD AUTO: 0.9 % — SIGNIFICANT CHANGE UP (ref 0–2)
BILIRUB SERPL-MCNC: 0.2 MG/DL — SIGNIFICANT CHANGE UP (ref 0.2–1.2)
BUN SERPL-MCNC: 10 MG/DL — SIGNIFICANT CHANGE UP (ref 7–23)
CALCIUM SERPL-MCNC: 9.3 MG/DL — SIGNIFICANT CHANGE UP (ref 8.4–10.5)
CHLORIDE SERPL-SCNC: 105 MMOL/L — SIGNIFICANT CHANGE UP (ref 96–108)
CO2 SERPL-SCNC: 24 MMOL/L — SIGNIFICANT CHANGE UP (ref 22–31)
CREAT SERPL-MCNC: 1.07 MG/DL — SIGNIFICANT CHANGE UP (ref 0.5–1.3)
DACRYOCYTES BLD QL SMEAR: SLIGHT — SIGNIFICANT CHANGE UP
EGFR: 58 ML/MIN/1.73M2 — LOW
ELLIPTOCYTES BLD QL SMEAR: SLIGHT — SIGNIFICANT CHANGE UP
EOSINOPHIL # BLD AUTO: 0.06 K/UL — SIGNIFICANT CHANGE UP (ref 0–0.5)
EOSINOPHIL NFR BLD AUTO: 0.9 % — SIGNIFICANT CHANGE UP (ref 0–6)
GLUCOSE SERPL-MCNC: 101 MG/DL — HIGH (ref 70–99)
HCT VFR BLD CALC: 27.6 % — LOW (ref 34.5–45)
HGB BLD-MCNC: 8.7 G/DL — LOW (ref 11.5–15.5)
INR BLD: 1.17 RATIO — HIGH (ref 0.88–1.16)
LDH SERPL L TO P-CCNC: 268 U/L — HIGH (ref 50–242)
LYMPHOCYTES # BLD AUTO: 0.47 K/UL — LOW (ref 1–3.3)
LYMPHOCYTES # BLD AUTO: 6.9 % — LOW (ref 13–44)
MAGNESIUM SERPL-MCNC: 2 MG/DL — SIGNIFICANT CHANGE UP (ref 1.6–2.6)
MANUAL SMEAR VERIFICATION: SIGNIFICANT CHANGE UP
MCHC RBC-ENTMCNC: 29.3 PG — SIGNIFICANT CHANGE UP (ref 27–34)
MCHC RBC-ENTMCNC: 31.5 GM/DL — LOW (ref 32–36)
MCV RBC AUTO: 92.9 FL — SIGNIFICANT CHANGE UP (ref 80–100)
METAMYELOCYTES # FLD: 3.5 % — HIGH (ref 0–0)
MICROCYTES BLD QL: SLIGHT — SIGNIFICANT CHANGE UP
MONOCYTES # BLD AUTO: 0.94 K/UL — HIGH (ref 0–0.9)
MONOCYTES NFR BLD AUTO: 13.9 % — SIGNIFICANT CHANGE UP (ref 2–14)
MYELOCYTES NFR BLD: 5.2 % — HIGH (ref 0–0)
NEUTROPHILS # BLD AUTO: 4.66 K/UL — SIGNIFICANT CHANGE UP (ref 1.8–7.4)
NEUTROPHILS NFR BLD AUTO: 67 % — SIGNIFICANT CHANGE UP (ref 43–77)
NEUTS BAND # BLD: 1.7 % — SIGNIFICANT CHANGE UP (ref 0–8)
NRBC # BLD: 4 /100 — HIGH (ref 0–0)
PHOSPHATE SERPL-MCNC: 3.1 MG/DL — SIGNIFICANT CHANGE UP (ref 2.5–4.5)
PLAT MORPH BLD: NORMAL — SIGNIFICANT CHANGE UP
PLATELET # BLD AUTO: 498 K/UL — HIGH (ref 150–400)
POIKILOCYTOSIS BLD QL AUTO: SLIGHT — SIGNIFICANT CHANGE UP
POLYCHROMASIA BLD QL SMEAR: SIGNIFICANT CHANGE UP
POTASSIUM SERPL-MCNC: 3.5 MMOL/L — SIGNIFICANT CHANGE UP (ref 3.5–5.3)
POTASSIUM SERPL-SCNC: 3.5 MMOL/L — SIGNIFICANT CHANGE UP (ref 3.5–5.3)
PROT SERPL-MCNC: 6.7 G/DL — SIGNIFICANT CHANGE UP (ref 6–8.3)
PROTHROM AB SERPL-ACNC: 13.6 SEC — HIGH (ref 10.5–13.4)
RBC # BLD: 2.97 M/UL — LOW (ref 3.8–5.2)
RBC # FLD: 17.8 % — HIGH (ref 10.3–14.5)
RBC BLD AUTO: ABNORMAL
SODIUM SERPL-SCNC: 141 MMOL/L — SIGNIFICANT CHANGE UP (ref 135–145)
URATE SERPL-MCNC: 6.6 MG/DL — SIGNIFICANT CHANGE UP (ref 2.5–7)
WBC # BLD: 6.78 K/UL — SIGNIFICANT CHANGE UP (ref 3.8–10.5)
WBC # FLD AUTO: 6.78 K/UL — SIGNIFICANT CHANGE UP (ref 3.8–10.5)

## 2023-05-01 PROCEDURE — 99221 1ST HOSP IP/OBS SF/LOW 40: CPT | Mod: FS

## 2023-05-01 RX ORDER — SUCRALFATE 1 G
1 TABLET ORAL
Qty: 120 | Refills: 0
Start: 2023-05-01 | End: 2023-05-30

## 2023-05-01 RX ORDER — DEXAMETHASONE 0.5 MG/5ML
40 ELIXIR ORAL EVERY 24 HOURS
Refills: 0 | Status: COMPLETED | OUTPATIENT
Start: 2023-05-01 | End: 2023-05-04

## 2023-05-01 RX ORDER — ESCITALOPRAM OXALATE 10 MG/1
10 TABLET, FILM COATED ORAL DAILY
Refills: 0 | Status: DISCONTINUED | OUTPATIENT
Start: 2023-05-01 | End: 2023-05-05

## 2023-05-01 RX ORDER — LOSARTAN POTASSIUM 100 MG/1
100 TABLET, FILM COATED ORAL DAILY
Refills: 0 | Status: DISCONTINUED | OUTPATIENT
Start: 2023-05-01 | End: 2023-05-05

## 2023-05-01 RX ORDER — SUCRALFATE 1 G
1 TABLET ORAL EVERY 6 HOURS
Refills: 0 | Status: DISCONTINUED | OUTPATIENT
Start: 2023-05-01 | End: 2023-05-05

## 2023-05-01 RX ORDER — METOCLOPRAMIDE HCL 10 MG
10 TABLET ORAL EVERY 6 HOURS
Refills: 0 | Status: DISCONTINUED | OUTPATIENT
Start: 2023-05-01 | End: 2023-05-05

## 2023-05-01 RX ORDER — PANTOPRAZOLE SODIUM 20 MG/1
40 TABLET, DELAYED RELEASE ORAL
Refills: 0 | Status: DISCONTINUED | OUTPATIENT
Start: 2023-05-01 | End: 2023-05-05

## 2023-05-01 RX ORDER — FOSAPREPITANT DIMEGLUMINE 150 MG/5ML
150 INJECTION, POWDER, LYOPHILIZED, FOR SOLUTION INTRAVENOUS ONCE
Refills: 0 | Status: COMPLETED | OUTPATIENT
Start: 2023-05-01 | End: 2023-05-01

## 2023-05-01 RX ORDER — CYCLOPHOSPHAMIDE 100 MG
585 VIAL (EA) INTRAVENOUS EVERY 12 HOURS
Refills: 0 | Status: COMPLETED | OUTPATIENT
Start: 2023-05-01 | End: 2023-05-04

## 2023-05-01 RX ORDER — MESNA 100 MG/ML
1170 INJECTION, SOLUTION INTRAVENOUS EVERY 24 HOURS
Refills: 0 | Status: COMPLETED | OUTPATIENT
Start: 2023-05-01 | End: 2023-05-03

## 2023-05-01 RX ORDER — ACYCLOVIR SODIUM 500 MG
400 VIAL (EA) INTRAVENOUS
Refills: 0 | Status: DISCONTINUED | OUTPATIENT
Start: 2023-05-01 | End: 2023-05-05

## 2023-05-01 RX ORDER — SODIUM CHLORIDE 9 MG/ML
1000 INJECTION INTRAMUSCULAR; INTRAVENOUS; SUBCUTANEOUS
Refills: 0 | Status: DISCONTINUED | OUTPATIENT
Start: 2023-05-01 | End: 2023-05-04

## 2023-05-01 RX ADMIN — FOSAPREPITANT DIMEGLUMINE 300 MILLIGRAM(S): 150 INJECTION, POWDER, LYOPHILIZED, FOR SOLUTION INTRAVENOUS at 16:40

## 2023-05-01 RX ADMIN — Medication 10 MILLIGRAM(S): at 16:07

## 2023-05-01 RX ADMIN — Medication 585 MILLIGRAM(S): at 17:13

## 2023-05-01 RX ADMIN — Medication 120 MILLIGRAM(S): at 15:57

## 2023-05-01 RX ADMIN — Medication 10 MILLIGRAM(S): at 22:16

## 2023-05-01 RX ADMIN — ESCITALOPRAM OXALATE 10 MILLIGRAM(S): 10 TABLET, FILM COATED ORAL at 11:25

## 2023-05-01 RX ADMIN — SODIUM CHLORIDE 50 MILLILITER(S): 9 INJECTION INTRAMUSCULAR; INTRAVENOUS; SUBCUTANEOUS at 09:57

## 2023-05-01 RX ADMIN — Medication 1 GRAM(S): at 11:25

## 2023-05-01 RX ADMIN — Medication 1 GRAM(S): at 17:19

## 2023-05-01 RX ADMIN — Medication 400 MILLIGRAM(S): at 17:19

## 2023-05-01 RX ADMIN — MESNA 1170 MILLIGRAM(S): 100 INJECTION, SOLUTION INTRAVENOUS at 15:57

## 2023-05-01 NOTE — H&P ADULT - ASSESSMENT
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 Now  admitted for cycle 2A (full dosing) with Cyclophosphamide, Dexamethasone, Vincristine, Doxorubicin.

## 2023-05-01 NOTE — DISCHARGE NOTE PROVIDER - NSDCMRMEDTOKEN_GEN_ALL_CORE_FT
acyclovir 400 mg oral tablet: 1 tab(s) orally 2 times a day, check with outpatient provider regarding when to stop  Carafate 1 g oral tablet: 1 tab(s) orally 4 times a day (before meals and at bedtime)  escitalopram 10 mg oral tablet: 1 tab(s) orally once a day  losartan 100 mg oral tablet: 1 tab(s) orally once a day  metoclopramide 10 mg oral tablet: 1 tab(s) orally every 6 hours as needed for  nausea  omeprazole 20 mg oral delayed release capsule: 1 cap(s) orally once a day   acyclovir 400 mg oral tablet: 1 tab(s) orally 2 times a day, check with outpatient provider regarding when to stop  Carafate 1 g oral tablet: 1 tab(s) orally 4 times a day (before meals and at bedtime)  dexAMETHasone 20 mg oral tablet: 2 tab(s) orally once a day take on 5/11-5/14  escitalopram 10 mg oral tablet: 1 tab(s) orally once a day  losartan 100 mg oral tablet: 1 tab(s) orally once a day  metoclopramide 10 mg oral tablet: 1 tab(s) orally every 6 hours as needed for  nausea  omeprazole 20 mg oral delayed release capsule: 1 cap(s) orally once a day   acyclovir 400 mg oral tablet: 1 tab(s) orally 2 times a day, check with outpatient provider regarding when to stop  Carafate 1 g oral tablet: 1 tab(s) orally 4 times a day (before meals and at bedtime)  dexAMETHasone 4 mg oral tablet: 10 tab(s) orally once a day Take from 5/11 through 5/14 with food  escitalopram 10 mg oral tablet: 1 tab(s) orally once a day  losartan 100 mg oral tablet: 1 tab(s) orally once a day  metoclopramide 10 mg oral tablet: 1 tab(s) orally every 6 hours as needed for  nausea  omeprazole 20 mg oral delayed release capsule: 1 cap(s) orally once a day   acyclovir 400 mg oral tablet: 1 tab(s) orally 2 times a day, check with outpatient provider regarding when to stop  dexAMETHasone 4 mg oral tablet: 10 tab(s) orally once a day Take from 5/11 through 5/14 with food  escitalopram 10 mg oral tablet: 1 tab(s) orally once a day  losartan 100 mg oral tablet: 1 tab(s) orally once a day  metoclopramide 10 mg oral tablet: 1 tab(s) orally every 6 hours as needed for  nausea  omeprazole 20 mg oral delayed release capsule: 1 cap(s) orally once a day

## 2023-05-01 NOTE — H&P ADULT - HISTORY OF PRESENT ILLNESS
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 (Cyclophosphamide days 1-3, Dexamethasone days 1-4 & 11-14, Vincristine day 1 and 8, Daunorubicin day 4).      Initially transferred from [facility] for new diagnosis of leukemia. 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 Official bone marrow biopsy 2/28/23 showed B-cell ALL, which was [place] chromosome negative. Peripheral blood BCR/ABL PCR was negative, although in her bone marrow she did have PCR for BCR/ABL p190 positive at an extremely low level of 0.001%. She had a pre-treatment echocardiogram done which showed an EF 55%,     Chemotherapy with reduced dose HyperCVAD was started on 3/3/23. Hospital course was c/b neutropenic fever, transaminitis, a rash which improved with topical steroid and atarax PRN, and also LUE cellulitis.     On admission, patient presents with resolving eczema rash above right cheek treated with hydrocortisone cream.  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 (Cyclophosphamide days 1-3, Dexamethasone days 1-4 & 11-14, Vincristine day 1 and 8, Daunorubicin day 4).      Initially transferred from [facility] for new diagnosis of leukemia. 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 Official bone marrow biopsy 2/28/23 showed B-cell ALL, which was [place] chromosome negative. Peripheral blood BCR/ABL PCR was negative, although in her bone marrow she did have PCR for BCR/ABL p190 positive at an extremely low level of 0.001%. She had a pre-treatment echocardiogram done which showed an EF 55%,     Chemotherapy with reduced dose HyperCVAD was started on 3/3/23. Hospital course was c/b neutropenic fever, transaminitis, a rash which improved with topical steroid and atarax PRN, and also LUE cellulitis.     On admission, patient presents with resolving eczema rash above right cheek treated with hydrocortisone cream.  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 (Cyclophosphamide days 1-3, Dexamethasone days 1-4 & 11-14, Vincristine day 1 and 8, Doxorubicin day 4).      Initially transferred from [facility] for new diagnosis of leukemia. 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 Official bone marrow biopsy 2/28/23 showed B-cell ALL, which was [place] chromosome negative. Peripheral blood BCR/ABL PCR was negative, although in her bone marrow she did have PCR for BCR/ABL p190 positive at an extremely low level of 0.001%. She had a pre-treatment echocardiogram done which showed an EF 55%,     Chemotherapy with reduced dose HyperCVAD was started on 3/3/23. Hospital course was c/b neutropenic fever, transaminitis, a rash which improved with topical steroid and atarax PRN, and also LUE cellulitis.     On admission, patient presents with resolving eczema rash above right cheek treated with hydrocortisone cream.

## 2023-05-01 NOTE — H&P ADULT - PROBLEM SELECTOR PLAN 2
Patient is not neutropenic, afebrile   Continue home medication - acyclovir   Currently off levaquin and fluconazole   If febrile, panculture

## 2023-05-01 NOTE — H&P ADULT - BP NONINVASIVE DIASTOLIC (MM HG)
PHYSICIAN NEXT STEPS:   Call the Patient      CHIEF COMPLAINT:   Chief Complaint/Protocol Used: Patient Declines Triage   Onset: A year ago         ASSESSMENT:   Â» Did not know what to do True   Â» Onset: A year ago   Â» Normal True   Â» Normal, no trouble breathing True   -------------------------------------------------------      DISPOSITION:   Disposition Recommendation: Unspecified   Patient Directed To: Unspecified   Patient Intended Action: Unspecified      CALL NOTES:   12/12/2017 at 5:07 PM by Magnolia SALAS» Patient declines triage. Patient requesting appointment. Tansferred to the agent ( MONTY )  for appointment.       DISPOSITION OVERRIDE/PROVIDER CONSULT:   Disposition Override: N/A   Override Source: Unspecified   Consulted with PCP: No   Consulted with On-Call Physician: No         CALLER CONTACT INFO:   Name: MARISA SALDANA (Self)   Phone 1: [phone] (Home) - Preferred   Phone 2: [phone] (Cell)   Phone 3: [phone] (Work)   Phone 4: [phone]         ENCOUNTER STARTED:   12/12/17 04:53:34 PM   ENCOUNTER ASSIGNED TO/CLOSED BY:   Magnolia Corona @ 12/12/17 05:13:17 PM         -------------------------------------------------------      UNDERSTANDS CARE ADVICE: Yes      AGREES WITH CARE ADVICE: Yes      WILL FOLLOW CARE ADVICE: No      -------------------------------------------------------  
83

## 2023-05-01 NOTE — DISCHARGE NOTE PROVIDER - NSDCFUSCHEDAPPT_GEN_ALL_CORE_FT
Goldberg, Bradley [facility]  Kesha Lucio  Scheduled Appointment: 05/08/2023     [facility]  Scheduled Appointment: 05/08/2023    Goldberg, Bradley  [facility]  [facility]  Scheduled Appointment: 05/08/2023    [facility]  Scheduled Appointment: 05/08/2023    [facility]  [facility]  Scheduled Appointment: 05/11/2023     [facility]  [address]  Scheduled Appointment: 05/08/2023    [facility]  [facility]  Scheduled Appointment: 05/08/2023    Goldberg, Bradley  [facility]  [facility]  Scheduled Appointment: 05/08/2023    [facility]  [facility] R[facility]  Scheduled Appointment: 05/08/2023    [facility]  [facility]  Scheduled Appointment: 05/11/2023

## 2023-05-01 NOTE — PATIENT PROFILE ADULT - NSFALLSECTIONLABEL_GEN_A_CORE
[FreeTextEntry1] : Karoline is now a 14 
.

## 2023-05-01 NOTE — H&P ADULT - PROBLEM SELECTOR PLAN 1
Admit to [address] [place] [address]   Admission labs / Daily labs   Transfuse/replace lytes as needed   Cyclophosphamide 150mg/m2 every 12 hours days 1-3  Dexamethasone 20mg per day on days 1-4 and 11-14  Vincristine 2gm IV days 1 and 8   Daunorubicin 25mg/m2/day IV continuous infusion over 24 hours on day 4   LP scheduled for Tuesday 5/2 Admit to [address] [place] [address]   Admission labs / Daily labs   Transfuse/replace lytes as needed   Cyclophosphamide 300 mg/m2 every 12 hours days 1-3 (Mesna 600mg/m2)  Dexamethasone 40mg per day on days 1-4 and 11-14   Vincristine 2gm IV days 1 and 8   Doxorubicin 50mg/m2/day IV continuous infusion over 24 hours on day 4   LP scheduled for Tuesday 5/2 Admit to [address] [place] [address]   Admission labs / Daily labs   Transfuse/replace lytes as needed   Cyclophosphamide 300 mg/m2 every 12 hours days 1-3 (Mesna 600mg/m2)  Dexamethasone 40mg per day on days 1-4 and 11-14   Vincristine 2gm IV days 4 and 11   Doxorubicin 50mg/m2/day IV continuous infusion over 24 hours on day 4   LP scheduled for Tuesday 5/2

## 2023-05-01 NOTE — DISCHARGE NOTE PROVIDER - HOSPITAL COURSE
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 (Cyclophosphamide days 1-3, Dexamethasone days 1-4 & 11-14, Vincristine day 1 and 8, Daunorubicin day 4). Patient tolerated chemotherapy without complications. Received antiemetics and IV hydration. Labs were monitored daily. Stable for discharge with outpatient follow up. Will need to receive Day 8 Lumbar puncture and IV Cytarabine. 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 (Cyclophosphamide days 1-3, Dexamethasone days 1-4 & 11-14, Vincristine day 1 and 8, Daunorubicin day 4). Patient tolerated chemotherapy without complications. Received antiemetics and IV hydration. Labs were monitored daily. Stable for discharge with outpatient follow up.   Pt had day 2 LP with IT MTX on 5/2, flow________. Pt will need to receive Day 7 or 8 Lumbar puncture and IV Cytarabine. 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 (Cyclophosphamide days 1-3, Dexamethasone days 1-4 & 11-14, Vincristine day 1 and 8, Daunorubicin day 4). Patient tolerated chemotherapy without complications. Received antiemetics and IV hydration. Labs were monitored daily. Stable for discharge with outpatient follow up.   Pt had day 2 LP with IT MTX on 5/2, flow was (-) for malignant cells. Pt will need to receive Day 7 or 8 Lumbar puncture and day 11 IV Vincristine (5/11)

## 2023-05-01 NOTE — ADVANCED PRACTICE NURSE CONSULT - ASSESSMENT
Patient A&Ox4, vitals signs stable, no complaints and denies pain, SOB, and N/V. Chemo regimen given with education, patient and spouse verbalizes understanding. Patient has right Mediport accessed from 5/1/23, clean, dry, and intact. No redness, swelling, warmth, or bleeding noted at site. Labs drawn and reviewed by Dr. Toledo and team during rounds. Patient premedicated with Emend 150mg, Reglan 10mg, and decadron 40mg. Mesna 1170 milliGRAM(s) in sodium chloride 0.9% 500 milliLiter(s) attached to split line of right double lumen mediport infusing at 23 mL/Hr, Started 1 hour prior to cyclophosphamide. Day 1, cyclophosphamide IVPB (eMAR) [Known as CYTOXAN IVPB (eMAR)] - 585 milliGRAM(s) in sodium chloride 0.9% 100 milliLiter(s) attached to lowest side arm of mesna attached to split lineof NS through right mediport, every 12 hours and infusing over 2 Hour(s), infuse at 64.63 mL/Hr. Patient safety maintained, primary RN aware of care.

## 2023-05-01 NOTE — PATIENT PROFILE ADULT - FALL HARM RISK - HARM RISK INTERVENTIONS

## 2023-05-01 NOTE — DISCHARGE NOTE PROVIDER - CARE PROVIDER_API CALL
Goldberg, Bradley H (MD)  Hematology; Internal Medicine; Medical Oncology  [address] [place] [address]  Phone: [phone]  Fax: [phone]  Follow Up Time:

## 2023-05-01 NOTE — DISCHARGE NOTE PROVIDER - NSDCFUADDINST_GEN_ALL_CORE_FT
Follow up with Dr. Goldberg at [facility] on Monday 5/8 at 3:40pm.   You are scheduled for Cytarabine in the treatment room at [facility] on Monday 5/8 at _____ and a Lumbar Puncture at [facility] at _____.  Follow up with Dr. Goldberg at [facility] on Monday 5/8 at 3:40pm.   You are scheduled for Cytarabine in the treatment room at [facility] on Monday 5/8 at 2 pm  and a Lumbar Puncture at [facility] at _____.   You are scheduled for Vincristine  in the treatment room at [facility] on Thursday 5/11 [facility] on Monday 5/8 at 3:40pm to see Dr Goldberg and for Fulphila injection   You are scheduled for Cytarabine in the treatment room at [facility] on Monday 5/8 at 2:15 pm; Dr Goldberg's office will schedule a Lumbar Puncture at [facility]   You are scheduled for Vincristine  in the treatment room at [facility] on Thursday 5/11 [facility] on Monday 5/8 at 2 pm  to see Dr Goldberg and for Fulphila injection    Dr Goldberg's office will schedule a Lumbar Puncture at [facility]   You are scheduled for Vincristine  in the treatment room at [facility] on Thursday 5/11 [facility] on Monday 5/8 at 7:30 am for lab work and go to [facility] at 9:30 for Lumbar puncture.    [facility] on Monday 5/8 at 2 pm  to see Dr Goldberg and for Fulphila injection   You are scheduled for Vincristine  in the treatment room at [facility] on Thursday 5/11

## 2023-05-01 NOTE — H&P ADULT - NSHPLABSRESULTS_GEN_ALL_CORE
CBC Full  -  ( 01 May 2023 09:49 )  WBC Count : 6.78 K/uL  Hemoglobin : 8.7 g/dL  Hematocrit : 27.6 %  Platelet Count - Automated : 498 K/uL  Mean Cell Volume : 92.9 fl  Mean Cell Hemoglobin : 29.3 pg  Mean Cell Hemoglobin Concentration : 31.5 gm/dL  Auto Neutrophil # : 4.66 K/uL  Auto Lymphocyte # : 0.47 K/uL  Auto Monocyte # : 0.94 K/uL  Auto Eosinophil # : 0.06 K/uL  Auto Basophil # : 0.06 K/uL  Auto Neutrophil % : 67.0 %  Auto Lymphocyte % : 6.9 %  Auto Monocyte % : 13.9 %  Auto Eosinophil % : 0.9 %  Auto Basophil % : 0.9 %    01 May 2023 09:49    141    |  105    |  10     ----------------------------<  101    3.5     |  24     |  1.07     Ca    9.3        01 May 2023 09:49  Phos  3.1       01 May 2023 09:49  Mg     2.0       01 May 2023 09:49    TPro  6.7    /  Alb  3.6    /  TBili  0.2    /  DBili  x      /  AST  22     /  ALT  14     /  AlkPhos  78     01 May 2023 09:49    PT/INR - ( 01 May 2023 09:49 )   PT: 13.6 sec;   INR: 1.17 ratio

## 2023-05-02 LAB
ALBUMIN SERPL ELPH-MCNC: 3.6 G/DL — SIGNIFICANT CHANGE UP (ref 3.3–5)
ALP SERPL-CCNC: 78 U/L — SIGNIFICANT CHANGE UP (ref 40–120)
ALT FLD-CCNC: 14 U/L — SIGNIFICANT CHANGE UP (ref 10–45)
ANION GAP SERPL CALC-SCNC: 11 MMOL/L — SIGNIFICANT CHANGE UP (ref 5–17)
APPEARANCE CSF: CLEAR — SIGNIFICANT CHANGE UP
AST SERPL-CCNC: 23 U/L — SIGNIFICANT CHANGE UP (ref 10–40)
BASOPHILS # BLD AUTO: 0 K/UL — SIGNIFICANT CHANGE UP (ref 0–0.2)
BASOPHILS NFR BLD AUTO: 0 % — SIGNIFICANT CHANGE UP (ref 0–2)
BILIRUB SERPL-MCNC: 0.2 MG/DL — SIGNIFICANT CHANGE UP (ref 0.2–1.2)
BUN SERPL-MCNC: 12 MG/DL — SIGNIFICANT CHANGE UP (ref 7–23)
CALCIUM SERPL-MCNC: 9.2 MG/DL — SIGNIFICANT CHANGE UP (ref 8.4–10.5)
CHLORIDE SERPL-SCNC: 108 MMOL/L — SIGNIFICANT CHANGE UP (ref 96–108)
CO2 SERPL-SCNC: 24 MMOL/L — SIGNIFICANT CHANGE UP (ref 22–31)
COLOR CSF: SIGNIFICANT CHANGE UP
CREAT SERPL-MCNC: 1.24 MG/DL — SIGNIFICANT CHANGE UP (ref 0.5–1.3)
EGFR: 48 ML/MIN/1.73M2 — LOW
EOSINOPHIL # BLD AUTO: 0 K/UL — SIGNIFICANT CHANGE UP (ref 0–0.5)
EOSINOPHIL NFR BLD AUTO: 0 % — SIGNIFICANT CHANGE UP (ref 0–6)
GIANT PLATELETS BLD QL SMEAR: PRESENT — SIGNIFICANT CHANGE UP
GLUCOSE CSF-MCNC: 82 MG/DL — HIGH (ref 40–70)
GLUCOSE SERPL-MCNC: 145 MG/DL — HIGH (ref 70–99)
HCT VFR BLD CALC: 29.1 % — LOW (ref 34.5–45)
HCV AB S/CO SERPL IA: 0.07 S/CO — SIGNIFICANT CHANGE UP (ref 0–0.99)
HCV AB SERPL-IMP: SIGNIFICANT CHANGE UP
HGB BLD-MCNC: 9.1 G/DL — LOW (ref 11.5–15.5)
LDH CSF L TO P-CCNC: 27 U/L — SIGNIFICANT CHANGE UP
LDH FLD-CCNC: 27 U/L — SIGNIFICANT CHANGE UP
LDH SERPL L TO P-CCNC: 270 U/L — HIGH (ref 50–242)
LYMPHOCYTES # BLD AUTO: 0.16 K/UL — LOW (ref 1–3.3)
LYMPHOCYTES # BLD AUTO: 1.8 % — LOW (ref 13–44)
LYMPHOCYTES # CSF: 50 % — SIGNIFICANT CHANGE UP (ref 40–80)
MAGNESIUM SERPL-MCNC: 2.2 MG/DL — SIGNIFICANT CHANGE UP (ref 1.6–2.6)
MANUAL SMEAR VERIFICATION: SIGNIFICANT CHANGE UP
MCHC RBC-ENTMCNC: 29.5 PG — SIGNIFICANT CHANGE UP (ref 27–34)
MCHC RBC-ENTMCNC: 31.3 GM/DL — LOW (ref 32–36)
MCV RBC AUTO: 94.5 FL — SIGNIFICANT CHANGE UP (ref 80–100)
METAMYELOCYTES # FLD: 2.6 % — HIGH (ref 0–0)
MONOCYTES # BLD AUTO: 0.72 K/UL — SIGNIFICANT CHANGE UP (ref 0–0.9)
MONOCYTES NFR BLD AUTO: 8 % — SIGNIFICANT CHANGE UP (ref 2–14)
MONOS+MACROS NFR CSF: 23 % — SIGNIFICANT CHANGE UP (ref 15–45)
MYELOCYTES NFR BLD: 0.9 % — HIGH (ref 0–0)
NEUTROPHILS # BLD AUTO: 7.77 K/UL — HIGH (ref 1.8–7.4)
NEUTROPHILS # CSF: 27 % — HIGH (ref 0–6)
NEUTROPHILS NFR BLD AUTO: 80.5 % — HIGH (ref 43–77)
NEUTS BAND # BLD: 6.2 % — SIGNIFICANT CHANGE UP (ref 0–8)
NRBC NFR CSF: <1 /UL — SIGNIFICANT CHANGE UP (ref 0–5)
PHOSPHATE SERPL-MCNC: 3.7 MG/DL — SIGNIFICANT CHANGE UP (ref 2.5–4.5)
PLAT MORPH BLD: ABNORMAL
PLATELET # BLD AUTO: 523 K/UL — HIGH (ref 150–400)
POTASSIUM SERPL-MCNC: 3.7 MMOL/L — SIGNIFICANT CHANGE UP (ref 3.5–5.3)
POTASSIUM SERPL-SCNC: 3.7 MMOL/L — SIGNIFICANT CHANGE UP (ref 3.5–5.3)
PROT CSF-MCNC: 37 MG/DL — SIGNIFICANT CHANGE UP (ref 15–45)
PROT SERPL-MCNC: 6.8 G/DL — SIGNIFICANT CHANGE UP (ref 6–8.3)
RBC # BLD: 3.08 M/UL — LOW (ref 3.8–5.2)
RBC # CSF: 8 /UL — HIGH (ref 0–0)
RBC # FLD: 17.8 % — HIGH (ref 10.3–14.5)
RBC BLD AUTO: SIGNIFICANT CHANGE UP
SODIUM SERPL-SCNC: 143 MMOL/L — SIGNIFICANT CHANGE UP (ref 135–145)
TUBE TYPE: SIGNIFICANT CHANGE UP
URATE SERPL-MCNC: 7.5 MG/DL — HIGH (ref 2.5–7)
WBC # BLD: 8.96 K/UL — SIGNIFICANT CHANGE UP (ref 3.8–10.5)
WBC # FLD AUTO: 8.96 K/UL — SIGNIFICANT CHANGE UP (ref 3.8–10.5)

## 2023-05-02 PROCEDURE — 62329 THER SPI PNXR CSF FLUOR/CT: CPT

## 2023-05-02 PROCEDURE — 99232 SBSQ HOSP IP/OBS MODERATE 35: CPT | Mod: FS

## 2023-05-02 RX ORDER — CHLORHEXIDINE GLUCONATE 213 G/1000ML
1 SOLUTION TOPICAL
Refills: 0 | Status: DISCONTINUED | OUTPATIENT
Start: 2023-05-02 | End: 2023-05-05

## 2023-05-02 RX ORDER — SALIVA SUBSTITUTE COMB NO.11 351 MG
15 POWDER IN PACKET (EA) MUCOUS MEMBRANE
Refills: 0 | Status: DISCONTINUED | OUTPATIENT
Start: 2023-05-02 | End: 2023-05-05

## 2023-05-02 RX ORDER — METHOTREXATE 2.5 MG/1
12 TABLET ORAL ONCE
Refills: 0 | Status: DISCONTINUED | OUTPATIENT
Start: 2023-05-02 | End: 2023-05-05

## 2023-05-02 RX ORDER — DEXAMETHASONE 0.5 MG/5ML
10 ELIXIR ORAL
Qty: 40 | Refills: 0
Start: 2023-05-02 | End: 2023-05-05

## 2023-05-02 RX ORDER — DEXAMETHASONE 0.5 MG/5ML
2 ELIXIR ORAL
Qty: 8 | Refills: 0
Start: 2023-05-02 | End: 2023-05-05

## 2023-05-02 RX ADMIN — Medication 10 MILLIGRAM(S): at 10:19

## 2023-05-02 RX ADMIN — Medication 15 MILLILITER(S): at 12:17

## 2023-05-02 RX ADMIN — LOSARTAN POTASSIUM 100 MILLIGRAM(S): 100 TABLET, FILM COATED ORAL at 06:04

## 2023-05-02 RX ADMIN — MESNA 1170 MILLIGRAM(S): 100 INJECTION, SOLUTION INTRAVENOUS at 14:38

## 2023-05-02 RX ADMIN — SODIUM CHLORIDE 50 MILLILITER(S): 9 INJECTION INTRAMUSCULAR; INTRAVENOUS; SUBCUTANEOUS at 21:05

## 2023-05-02 RX ADMIN — PANTOPRAZOLE SODIUM 40 MILLIGRAM(S): 20 TABLET, DELAYED RELEASE ORAL at 06:05

## 2023-05-02 RX ADMIN — ESCITALOPRAM OXALATE 10 MILLIGRAM(S): 10 TABLET, FILM COATED ORAL at 12:17

## 2023-05-02 RX ADMIN — Medication 1 GRAM(S): at 12:17

## 2023-05-02 RX ADMIN — Medication 120 MILLIGRAM(S): at 16:56

## 2023-05-02 RX ADMIN — Medication 10 MILLIGRAM(S): at 03:53

## 2023-05-02 RX ADMIN — Medication 1 GRAM(S): at 06:03

## 2023-05-02 RX ADMIN — Medication 400 MILLIGRAM(S): at 06:04

## 2023-05-02 RX ADMIN — Medication 585 MILLIGRAM(S): at 05:59

## 2023-05-02 RX ADMIN — Medication 15 MILLILITER(S): at 17:41

## 2023-05-02 RX ADMIN — SODIUM CHLORIDE 50 MILLILITER(S): 9 INJECTION INTRAMUSCULAR; INTRAVENOUS; SUBCUTANEOUS at 00:15

## 2023-05-02 RX ADMIN — Medication 10 MILLIGRAM(S): at 17:00

## 2023-05-02 RX ADMIN — CHLORHEXIDINE GLUCONATE 1 APPLICATION(S): 213 SOLUTION TOPICAL at 14:34

## 2023-05-02 RX ADMIN — Medication 1 GRAM(S): at 00:15

## 2023-05-02 RX ADMIN — Medication 1 GRAM(S): at 17:40

## 2023-05-02 RX ADMIN — Medication 10 MILLIGRAM(S): at 21:05

## 2023-05-02 RX ADMIN — Medication 400 MILLIGRAM(S): at 17:40

## 2023-05-02 RX ADMIN — Medication 585 MILLIGRAM(S): at 17:29

## 2023-05-02 NOTE — PROGRESS NOTE ADULT - ASSESSMENT
62 y/o F with PMHx of HTN, HLD with Ph (-) ALL diagnosed in February 2023 and now s/p cycle 1A (mini hyper CVAD) and 1B of Hyper-CVAD. Now  admitted for cycle 2A (full dosing) with Cyclophosphamide, Dexamethasone, Vincristine, Doxorubicin.  62 y/o F with PMHx of HTN, HLD with Ph (-) ALL diagnosed in February 2023 and now s/p cycle 1A (mini hyper CVAD) and 1B of Hyper-CVAD. Now  admitted for cycle 2A (full dosing) with Cyclophosphamide, Dexamethasone, Vincristine, Doxorubicin. 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

## 2023-05-02 NOTE — PROGRESS NOTE ADULT - PROBLEM SELECTOR PLAN 1
Admit to [address] [place] [address]   Admission labs / Daily labs   Transfuse/replace lytes as needed   Cyclophosphamide 300 mg/m2 every 12 hours days 1-3 (Mesna 600mg/m2)  Dexamethasone 40mg per day on days 1-4 and 11-14   Vincristine 2gm IV days 1 and 8   Doxorubicin 50mg/m2/day IV continuous infusion over 24 hours on day 4   LP scheduled for Tuesday 5/2

## 2023-05-02 NOTE — ADVANCED PRACTICE NURSE CONSULT - ASSESSMENT
Patient A&Ox4, vitals signs stable, no complaints and denies pain, SOB, and N/V.  Patient has right Mediport accessed from 5/1/23, clean, dry, and intact. No redness, swelling, warmth, or bleeding noted at site. Cyclophosphamide IVPB (eMAR) [Known as CYTOXAN IVPB (eMAR)] - 585 milligrams in sodium chloride 0.9% 100 milliLiter(s) attached to lowest side arm of mesna attached to split line of NS through right mediport, every 12 hours and infusing over 2 Hour(s), infuse at 64.63 mL/Hr. Patient safety maintained.

## 2023-05-02 NOTE — PROGRESS NOTE ADULT - PROBLEM SELECTOR PLAN 4
Lovenox on hold for LP scheduled for 5/2     Contact information: [phone] Lovenox on hold for LP scheduled for 5/2   Encourage OOB and ambulation     Contact information: [phone]

## 2023-05-02 NOTE — ADVANCED PRACTICE NURSE CONSULT - ASSESSMENT
Patient A&Ox4, vitals signs stable, no complaints and denies pain, SOB, and N/V. Chemo regimen given with education, patient and spouse verbalizes understanding. Patient has right Mediport accessed from 5/1/23, clean, dry, and intact. No redness, swelling, warmth, or bleeding noted at site. Labs drawn and reviewed by Dr. Toledo and team during rounds. Patient premedicated with Reglan 10mg and decadron 40mg. Mesna 1170 milliGRAM(s) in sodium chloride 0.9% 500 milliLiter(s) attached to split line of right double lumen mediport infusing at 23 mL/Hr, Started 1 hour prior to cyclophosphamide. Day 2 at 1729, cyclophosphamide IVPB (eMAR) [Known as CYTOXAN IVPB (eMAR)] - 585 milliGRAM(s) in sodium chloride 0.9% 100 milliLiter(s) attached to lowest side arm of mesna attached to split line of NS through right mediport, every 12 hours and infusing over 2 Hour(s), infuse at 64.63 mL/Hr. Patient safety maintained, primary RN aware of care. Patient A&Ox4, vitals signs stable, no complaints and denies pain, SOB, and N/V. Chemo regimen given with education, patient and spouse verbalizes understanding. Patient has right Mediport accessed from 5/1/23, clean, dry, and intact. No redness, swelling, warmth, or bleeding noted at site. Labs drawn and reviewed by Dr. Toledo and team during rounds. Patient premedicated with Reglan 10mg and decadron 40mg. Mesna 1170 milliGRAM(s) in sodium chloride 0.9% 500 milliLiter(s) attached to split line of right double lumen mediport infusing at 23 mL/Hr, Started 1 hour prior to cyclophosphamide. Day 2 at 1729, cyclophosphamide IVPB (eMAR) [Known as CYTOXAN IVPB (eMAR)] - 585 milliGRAM(s) in sodium chloride 0.9% 100 milliLiter(s) attached as secondary to split line of NS through right mediport, every 12 hours and infusing over 2 Hour(s), infuse at 64.63 mL/Hr. Patient safety maintained, primary RN aware of care. Patient A&Ox4, vitals signs stable, no complaints and denies pain, SOB, and N/V. Chemo regimen given with education, patient and spouse verbalizes understanding. Patient has right Mediport accessed from 5/1/23, clean, dry, and intact. No redness, swelling, warmth, or bleeding noted at site. Labs drawn and reviewed by Dr. Toledo and team during rounds. Patient premedicated with Reglan 10mg and decadron 40mg. Mesna 1170 milliGRAM(s) in sodium chloride 0.9% 500 milliLiter(s) attached to split line of right double lumen mediport infusing at 23 mL/Hr. Day 2 (3rd dose) at 1729, cyclophosphamide IVPB (eMAR) [Known as CYTOXAN IVPB (eMAR)] - 585 milliGRAM(s) in sodium chloride 0.9% 100 milliLiter(s) attached as secondary to split line of NS through right mediport, every 12 hours and infusing over 2 Hour(s), infuse at 64.63 mL/Hr. Patient safety maintained, primary RN aware of care.

## 2023-05-02 NOTE — PROGRESS NOTE ADULT - NSPROGADDITIONALINFOA_GEN_ALL_CORE
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 She is admitted for cycle IIA, with full doses.  Day 2 of cycle IIA  Feels well, no N/V/D  cont IVFs, MESNA with fractionated CTX  I and O  LP with IT MTX today  Review post cycle IB BMA/Bx  OOB as tolerated

## 2023-05-02 NOTE — PROGRESS NOTE ADULT - PROBLEM SELECTOR PLAN 2
Patient is not neutropenic, afebrile   Continue home medication - acyclovir   Currently off levaquin and fluconazole   If febrile, panculture Patient is not neutropenic, afebrile   Continue home medication - acyclovir   Currently off levaquin and fluconazole   If febrile, panculture and CXR

## 2023-05-02 NOTE — PROCEDURE NOTE - ADDITIONAL PROCEDURE DETAILS
s/p fluoroscopically guided lumbar puncture at the L2-L3 level using 22 G X 5 inch spinal needle. Drained 5 ml of clear CSF. Methotrexate 12 mg was intrathecally administered. Pt tolerated the procedure well. Hemostasis secure.     CSF specimens were hand delivered to the laboratory for analysis.

## 2023-05-02 NOTE — PHARMACOTHERAPY INTERVENTION NOTE - COMMENTS
Clinical Pharmacy Specialist- Hematology/Oncology- Progress Note    64 y/o F with PMH of Ph (-) ALL s/p cycle 1B of mini Hyper-CVAD, now admitted for HyperCVAD Cycle 2A    Antimicrobial Course:  -Acyclovir- 5/1    Last Neutropenic (ANC<1000): no occurrence   Last Febrile: no occurrence     Chemotherapy Course  dose reduced miniHyper CVAD: (Feb 2023)  -Cyclophosphamide 150mg/m2 IVPB Q12 D1-3  -Vincristine 2mg IVPB D1,8  -Daunorubicin 25mg/m2 IV continuous over 48hrs starting D4  -Dexamethasone 20mg PO D1-4 & D11-14  Cycle 2A: full dose HyperCVAD: 5/1/23  -Cyclophosphamide 300mg/m2 IVPB Q12 D1-3  -Mesna 600mg/m2 cont infusion D1-3  -Vincristine 2mg IVPB D4 & 11  -Doxorubicin 50mg/m2 IVP -D4  -Dexamethasone 40mg PO D1-4 & D11-14  -Day: 2 (5/2)    Relevant clinical information used in assessment:  -h/o of headaches with zofran    Assessment/Plan/Recommendation:  n/a    Additional Monitoring Needed?   -Yes- Continue to monitor renal function & daily counts  -DDI of vincristine- caspo or fluconazole if neutropenic for antifungal    Case discussed with attending/primary team    Leslie Bianchi, PharmD, BCPS  Clinical Pharmacy Specialist | Hematology/Oncology  [facility]  Email: [email or web address] or available on [employer]

## 2023-05-02 NOTE — PRE PROCEDURE NOTE - PRE PROCEDURE EVALUATION
Neuroradiology pre-op note    HPI: 63y Female with PMHx of HTN, HLD with Ph (-) ALL diagnosed in February 2023. Pt referred to Neuroradiology for LP with IT chemotherapy.     Diagnosis: B-cell acute lymphoblastic leukemia  Procedure: Fluoroscopically guided lumbar puncture with intrathecal chemotherapy    acyclovir   Oral Tab/Cap 400 milliGRAM(s)  losartan 100 milliGRAM(s)                            9.1    8.96  )-----------( 523      ( 02 May 2023 06:45 )             29.1     05-02    143  |  108  |  12  ----------------------------<  145<H>  3.7   |  24  |  1.24    Ca    9.2      02 May 2023 06:45  Phos  3.7     05-02  Mg     2.2     05-02    TPro  6.8  /  Alb  3.6  /  TBili  0.2  /  DBili  x   /  AST  23  /  ALT  14  /  AlkPhos  78  05-02    PT/INR - ( 01 May 2023 09:49 )   PT: 13.6 sec;   INR: 1.17 ratio        Assessment & Plan:  63yFemale with ALL    -Will plan for Fluoroscopically guided lumbar puncture with intrathecal chemotherapy  -Informed consent obtained. After risks, benefits, alternatives discussion pt verbalized understanding and signed consent. Risks including bleeding, infection, nerve damage, and/or headaches were discussed.    MIHAELA Ma  Available on [employer] Teams  Spectralink [phone]  Ext [phone]

## 2023-05-02 NOTE — PROGRESS NOTE ADULT - SUBJECTIVE AND OBJECTIVE BOX
Diagnosis    Protocol/Chemo Regimen:  Day:      Pt endorsed:    Review of Systems:      Pain scale:                                        Location:    Diet:     Allergies    sulfa drugs (Unknown)  Zofran (Headache)    Intolerances        ANTIMICROBIALS  acyclovir   Oral Tab/Cap 400 milliGRAM(s) Oral two times a day      HEME/ONC MEDICATIONS  cyclophosphamide IVPB (eMAR) 585 milliGRAM(s) IV Intermittent every 12 hours  mesna IVPB (eMAR) 1170 milliGRAM(s) IV Intermittent every 24 hours      STANDING MEDICATIONS  dexAMETHasone  IVPB 40 milliGRAM(s) IV Intermittent every 24 hours  escitalopram 10 milliGRAM(s) Oral daily  losartan 100 milliGRAM(s) Oral daily  metoclopramide Injectable 10 milliGRAM(s) IV Push every 6 hours  pantoprazole    Tablet 40 milliGRAM(s) Oral before breakfast  sodium chloride 0.9%. 1000 milliLiter(s) IV Continuous <Continuous>  sucralfate suspension 1 Gram(s) Oral every 6 hours      PRN MEDICATIONS        Vital Signs Last 24 Hrs  T(C): 36.7 (02 May 2023 06:22), Max: 36.9 (01 May 2023 13:35)  T(F): 98 (02 May 2023 06:22), Max: 98.4 (01 May 2023 13:35)  HR: 57 (02 May 2023 06:22) (55 - 81)  BP: 156/82 (02 May 2023 01:41) (153/69 - 176/85)  BP(mean): 110 (01 May 2023 09:24) (110 - 110)  RR: 18 (02 May 2023 06:22) (16 - 18)  SpO2: 95% (02 May 2023 06:22) (94% - 96%)    Parameters below as of 02 May 2023 06:22  Patient On (Oxygen Delivery Method): room air        PHYSICAL EXAM  General: adult in NAD  HEENT: clear oropharynx, anicteric sclera  Neck: supple  CV: normal S1/S2 RRR  Lungs: positive air movement b/l ant lungs,clear to auscultation, no wheezes, no rales  Abdomen: soft, + BS,  non-tender non-distended, no hepatosplenomegaly  Ext: no edema  Skin: no rash  Neuro: alert and oriented X 3, no focal deficits  Central Line: normal    LABS:                           8.7    6.78  )-----------( 498      ( 01 May 2023 09:49 )             27.6         Mean Cell Volume : 92.9 fl  Mean Cell Hemoglobin : 29.3 pg  Mean Cell Hemoglobin Concentration : 31.5 gm/dL  Auto Neutrophil # : 4.66 K/uL  Auto Lymphocyte # : 0.47 K/uL  Auto Monocyte # : 0.94 K/uL  Auto Eosinophil # : 0.06 K/uL  Auto Basophil # : 0.06 K/uL  Auto Neutrophil % : 67.0 %  Auto Lymphocyte % : 6.9 %  Auto Monocyte % : 13.9 %  Auto Eosinophil % : 0.9 %  Auto Basophil % : 0.9 %      05-01    141  |  105  |  10  ----------------------------<  101<H>  3.5   |  24  |  1.07    Ca    9.3      01 May 2023 09:49  Phos  3.1     05-01  Mg     2.0     05-01    TPro  6.7  /  Alb  3.6  /  TBili  0.2  /  DBili  x   /  AST  22  /  ALT  14  /  AlkPhos  78  05-01      Mg 2.0  Phos 3.1      PT/INR - ( 01 May 2023 09:49 )   PT: 13.6 sec;   INR: 1.17 ratio               Uric Acid 6.6        RADIOLOGY & ADDITIONAL STUDIES:         Diagnosis: B ALL ph(-)    Protocol/Chemo Regimen: 2A HYPERCVAD   Day: 2     Pt endorsed: chronic tingling of fingers     Review of Systems: Patient denied nausea, vomiting, odynophagia, chest pain, cough, dyspnea, abdominal pain, constipation, diarrhea, rash, fatigue, headache      Pain scale:       denies                                 Location: NA    Diet: regular     Allergies:     sulfa drugs (Unknown)  Zofran (Headache)      ANTIMICROBIALS  acyclovir   Oral Tab/Cap 400 milliGRAM(s) Oral two times a day      HEME/ONC MEDICATIONS  cyclophosphamide IVPB (eMAR) 585 milliGRAM(s) IV Intermittent every 12 hours  mesna IVPB (eMAR) 1170 milliGRAM(s) IV Intermittent every 24 hours      STANDING MEDICATIONS  dexAMETHasone  IVPB 40 milliGRAM(s) IV Intermittent every 24 hours  escitalopram 10 milliGRAM(s) Oral daily  losartan 100 milliGRAM(s) Oral daily  metoclopramide Injectable 10 milliGRAM(s) IV Push every 6 hours  pantoprazole    Tablet 40 milliGRAM(s) Oral before breakfast  sodium chloride 0.9%. 1000 milliLiter(s) IV Continuous <Continuous>  sucralfate suspension 1 Gram(s) Oral every 6 hours      Vital Signs Last 24 Hrs  T(C): 36.7 (02 May 2023 06:22), Max: 36.9 (01 May 2023 13:35)  T(F): 98 (02 May 2023 06:22), Max: 98.4 (01 May 2023 13:35)  HR: 57 (02 May 2023 06:22) (55 - 81)  BP: 156/82 (02 May 2023 01:41) (153/69 - 176/85)  BP(mean): 110 (01 May 2023 09:24) (110 - 110)  RR: 18 (02 May 2023 06:22) (16 - 18)  SpO2: 95% (02 May 2023 06:22) (94% - 96%)    Parameters below as of 02 May 2023 06:22  Patient On (Oxygen Delivery Method): room air      PHYSICAL EXAM  General: adult in NAD  HEENT: clear oropharynx, anicteric sclera  CV: normal S1/S2 RRR  Lungs: positive air movement b/l ant lungs,clear to auscultation, no wheezes, no rales  Abdomen: soft, + BS,  non-tender non-distended  Ext: no edema  Skin: no rash  Neuro: alert and oriented X 3, no focal deficits  Central Line: port CDI      LABS:                          9.1    8.96  )-----------( 523      ( 02 May 2023 06:45 )             29.1         Mean Cell Volume : 94.5 fl  Mean Cell Hemoglobin : 29.5 pg  Mean Cell Hemoglobin Concentration : 31.3 gm/dL  Auto Neutrophil # : 7.77 K/uL  Auto Lymphocyte # : 0.16 K/uL  Auto Monocyte # : 0.72 K/uL  Auto Eosinophil # : 0.00 K/uL  Auto Basophil # : 0.00 K/uL  Auto Neutrophil % : 80.5 %  Auto Lymphocyte % : 1.8 %  Auto Monocyte % : 8.0 %  Auto Eosinophil % : 0.0 %  Auto Basophil % : 0.0 %      05-02    143  |  108  |  12  ----------------------------<  145<H>  3.7   |  24  |  1.24    Ca    9.2      02 May 2023 06:45  Phos  3.7     05-02  Mg     2.2     05-02    TPro  6.8  /  Alb  3.6  /  TBili  0.2  /  DBili  x   /  AST  23  /  ALT  14  /  AlkPhos  78  05-02      PT/INR - ( 01 May 2023 09:49 )   PT: 13.6 sec;   INR: 1.17 ratio        Uric Acid 7.5     Diagnosis: B ALL ph(-)    Protocol/Chemo Regimen: 2A HYPERCVAD   Day: 2     Pt endorsed: chronic tingling of fingers     Review of Systems: Patient denied nausea, vomiting, odynophagia, chest pain, cough, dyspnea, abdominal pain, constipation, diarrhea, rash, fatigue, headache      Pain scale:       denies                                 Location: NA    Diet: regular     Allergies:     sulfa drugs (Unknown)  Zofran (Headache)      ANTIMICROBIALS  acyclovir   Oral Tab/Cap 400 milliGRAM(s) Oral two times a day      HEME/ONC MEDICATIONS  cyclophosphamide IVPB (eMAR) 585 milliGRAM(s) IV Intermittent every 12 hours  mesna IVPB (eMAR) 1170 milliGRAM(s) IV Intermittent every 24 hours      STANDING MEDICATIONS  dexAMETHasone  IVPB 40 milliGRAM(s) IV Intermittent every 24 hours  escitalopram 10 milliGRAM(s) Oral daily  losartan 100 milliGRAM(s) Oral daily  metoclopramide Injectable 10 milliGRAM(s) IV Push every 6 hours  pantoprazole    Tablet 40 milliGRAM(s) Oral before breakfast  sodium chloride 0.9%. 1000 milliLiter(s) IV Continuous <Continuous>  sucralfate suspension 1 Gram(s) Oral every 6 hours      Vital Signs Last 24 Hrs  T(C): 36.7 (02 May 2023 06:22), Max: 36.9 (01 May 2023 13:35)  T(F): 98 (02 May 2023 06:22), Max: 98.4 (01 May 2023 13:35)  HR: 57 (02 May 2023 06:22) (55 - 81)  BP: 156/82 (02 May 2023 01:41) (153/69 - 176/85)  BP(mean): 110 (01 May 2023 09:24) (110 - 110)  RR: 18 (02 May 2023 06:22) (16 - 18)  SpO2: 95% (02 May 2023 06:22) (94% - 96%)    Parameters below as of 02 May 2023 06:22  Patient On (Oxygen Delivery Method): room air      PHYSICAL EXAM  General: adult in NAD  HEENT: clear oropharynx, anicteric sclera  CV: normal S1/S2 RRR  Lungs: positive air movement b/l ant lungs,clear to auscultation, no wheezes, no rales  Abdomen: soft, + BS,  non-tender non-distended  Ext: no edema  Skin:  under right eye small rashes   Neuro: alert and oriented X 3, no focal deficits  Central Line: port CDI      LABS:                          9.1    8.96  )-----------( 523      ( 02 May 2023 06:45 )             29.1         Mean Cell Volume : 94.5 fl  Mean Cell Hemoglobin : 29.5 pg  Mean Cell Hemoglobin Concentration : 31.3 gm/dL  Auto Neutrophil # : 7.77 K/uL  Auto Lymphocyte # : 0.16 K/uL  Auto Monocyte # : 0.72 K/uL  Auto Eosinophil # : 0.00 K/uL  Auto Basophil # : 0.00 K/uL  Auto Neutrophil % : 80.5 %  Auto Lymphocyte % : 1.8 %  Auto Monocyte % : 8.0 %  Auto Eosinophil % : 0.0 %  Auto Basophil % : 0.0 %      05-02    143  |  108  |  12  ----------------------------<  145<H>  3.7   |  24  |  1.24    Ca    9.2      02 May 2023 06:45  Phos  3.7     05-02  Mg     2.2     05-02    TPro  6.8  /  Alb  3.6  /  TBili  0.2  /  DBili  x   /  AST  23  /  ALT  14  /  AlkPhos  78  05-02      PT/INR - ( 01 May 2023 09:49 )   PT: 13.6 sec;   INR: 1.17 ratio        Uric Acid 7.5

## 2023-05-03 LAB
ALBUMIN SERPL ELPH-MCNC: 3.3 G/DL — SIGNIFICANT CHANGE UP (ref 3.3–5)
ALP SERPL-CCNC: 71 U/L — SIGNIFICANT CHANGE UP (ref 40–120)
ALT FLD-CCNC: 18 U/L — SIGNIFICANT CHANGE UP (ref 10–45)
ANION GAP SERPL CALC-SCNC: 11 MMOL/L — SIGNIFICANT CHANGE UP (ref 5–17)
AST SERPL-CCNC: 18 U/L — SIGNIFICANT CHANGE UP (ref 10–40)
BASOPHILS # BLD AUTO: 0.03 K/UL — SIGNIFICANT CHANGE UP (ref 0–0.2)
BASOPHILS NFR BLD AUTO: 0.2 % — SIGNIFICANT CHANGE UP (ref 0–2)
BILIRUB SERPL-MCNC: 0.3 MG/DL — SIGNIFICANT CHANGE UP (ref 0.2–1.2)
BUN SERPL-MCNC: 16 MG/DL — SIGNIFICANT CHANGE UP (ref 7–23)
CALCIUM SERPL-MCNC: 9.1 MG/DL — SIGNIFICANT CHANGE UP (ref 8.4–10.5)
CHLORIDE SERPL-SCNC: 109 MMOL/L — HIGH (ref 96–108)
CO2 SERPL-SCNC: 22 MMOL/L — SIGNIFICANT CHANGE UP (ref 22–31)
CREAT SERPL-MCNC: 0.99 MG/DL — SIGNIFICANT CHANGE UP (ref 0.5–1.3)
EGFR: 63 ML/MIN/1.73M2 — SIGNIFICANT CHANGE UP
EOSINOPHIL # BLD AUTO: 0 K/UL — SIGNIFICANT CHANGE UP (ref 0–0.5)
EOSINOPHIL NFR BLD AUTO: 0 % — SIGNIFICANT CHANGE UP (ref 0–6)
GLUCOSE SERPL-MCNC: 136 MG/DL — HIGH (ref 70–99)
HCT VFR BLD CALC: 28 % — LOW (ref 34.5–45)
HGB BLD-MCNC: 8.4 G/DL — LOW (ref 11.5–15.5)
IMM GRANULOCYTES NFR BLD AUTO: 3.2 % — HIGH (ref 0–0.9)
LDH SERPL L TO P-CCNC: 254 U/L — HIGH (ref 50–242)
LYMPHOCYTES # BLD AUTO: 0.3 K/UL — LOW (ref 1–3.3)
LYMPHOCYTES # BLD AUTO: 1.8 % — LOW (ref 13–44)
MAGNESIUM SERPL-MCNC: 2.2 MG/DL — SIGNIFICANT CHANGE UP (ref 1.6–2.6)
MCHC RBC-ENTMCNC: 29.2 PG — SIGNIFICANT CHANGE UP (ref 27–34)
MCHC RBC-ENTMCNC: 30 GM/DL — LOW (ref 32–36)
MCV RBC AUTO: 97.2 FL — SIGNIFICANT CHANGE UP (ref 80–100)
MONOCYTES # BLD AUTO: 0.71 K/UL — SIGNIFICANT CHANGE UP (ref 0–0.9)
MONOCYTES NFR BLD AUTO: 4.3 % — SIGNIFICANT CHANGE UP (ref 2–14)
NEUTROPHILS # BLD AUTO: 14.97 K/UL — HIGH (ref 1.8–7.4)
NEUTROPHILS NFR BLD AUTO: 90.5 % — HIGH (ref 43–77)
NRBC # BLD: 0 /100 WBCS — SIGNIFICANT CHANGE UP (ref 0–0)
PHOSPHATE SERPL-MCNC: 3.4 MG/DL — SIGNIFICANT CHANGE UP (ref 2.5–4.5)
PLATELET # BLD AUTO: 496 K/UL — HIGH (ref 150–400)
POTASSIUM SERPL-MCNC: 3.6 MMOL/L — SIGNIFICANT CHANGE UP (ref 3.5–5.3)
POTASSIUM SERPL-SCNC: 3.6 MMOL/L — SIGNIFICANT CHANGE UP (ref 3.5–5.3)
PROT SERPL-MCNC: 6.3 G/DL — SIGNIFICANT CHANGE UP (ref 6–8.3)
RBC # BLD: 2.88 M/UL — LOW (ref 3.8–5.2)
RBC # FLD: 18.6 % — HIGH (ref 10.3–14.5)
SODIUM SERPL-SCNC: 142 MMOL/L — SIGNIFICANT CHANGE UP (ref 135–145)
TM INTERPRETATION: SIGNIFICANT CHANGE UP
URATE SERPL-MCNC: 6.5 MG/DL — SIGNIFICANT CHANGE UP (ref 2.5–7)
WBC # BLD: 16.54 K/UL — HIGH (ref 3.8–10.5)
WBC # FLD AUTO: 16.54 K/UL — HIGH (ref 3.8–10.5)

## 2023-05-03 PROCEDURE — 99231 SBSQ HOSP IP/OBS SF/LOW 25: CPT

## 2023-05-03 PROCEDURE — 99232 SBSQ HOSP IP/OBS MODERATE 35: CPT | Mod: FS

## 2023-05-03 RX ORDER — FUROSEMIDE 40 MG
20 TABLET ORAL ONCE
Refills: 0 | Status: COMPLETED | OUTPATIENT
Start: 2023-05-03 | End: 2023-05-03

## 2023-05-03 RX ADMIN — SODIUM CHLORIDE 50 MILLILITER(S): 9 INJECTION INTRAMUSCULAR; INTRAVENOUS; SUBCUTANEOUS at 22:16

## 2023-05-03 RX ADMIN — Medication 585 MILLIGRAM(S): at 05:50

## 2023-05-03 RX ADMIN — Medication 15 MILLILITER(S): at 05:54

## 2023-05-03 RX ADMIN — Medication 10 MILLIGRAM(S): at 09:02

## 2023-05-03 RX ADMIN — Medication 20 MILLIGRAM(S): at 11:23

## 2023-05-03 RX ADMIN — Medication 15 MILLILITER(S): at 11:24

## 2023-05-03 RX ADMIN — Medication 1 GRAM(S): at 17:43

## 2023-05-03 RX ADMIN — MESNA 1170 MILLIGRAM(S): 100 INJECTION, SOLUTION INTRAVENOUS at 16:24

## 2023-05-03 RX ADMIN — Medication 10 MILLIGRAM(S): at 04:11

## 2023-05-03 RX ADMIN — Medication 10 MILLIGRAM(S): at 22:16

## 2023-05-03 RX ADMIN — PANTOPRAZOLE SODIUM 40 MILLIGRAM(S): 20 TABLET, DELAYED RELEASE ORAL at 05:54

## 2023-05-03 RX ADMIN — Medication 585 MILLIGRAM(S): at 17:13

## 2023-05-03 RX ADMIN — CHLORHEXIDINE GLUCONATE 1 APPLICATION(S): 213 SOLUTION TOPICAL at 09:01

## 2023-05-03 RX ADMIN — Medication 1 GRAM(S): at 05:53

## 2023-05-03 RX ADMIN — LOSARTAN POTASSIUM 100 MILLIGRAM(S): 100 TABLET, FILM COATED ORAL at 05:54

## 2023-05-03 RX ADMIN — Medication 400 MILLIGRAM(S): at 05:54

## 2023-05-03 RX ADMIN — Medication 120 MILLIGRAM(S): at 15:17

## 2023-05-03 RX ADMIN — ESCITALOPRAM OXALATE 10 MILLIGRAM(S): 10 TABLET, FILM COATED ORAL at 11:24

## 2023-05-03 RX ADMIN — Medication 400 MILLIGRAM(S): at 17:42

## 2023-05-03 RX ADMIN — Medication 15 MILLILITER(S): at 17:43

## 2023-05-03 RX ADMIN — Medication 10 MILLIGRAM(S): at 16:08

## 2023-05-03 NOTE — PROGRESS NOTE ADULT - PROBLEM SELECTOR PLAN 4
Lovenox on hold for LP scheduled for 5/2   Encourage OOB and ambulation     Contact information: [phone]

## 2023-05-03 NOTE — PROGRESS NOTE ADULT - SUBJECTIVE AND OBJECTIVE BOX
pt seen 5/3/2023 at TIME- 9:16 AM    Neuroradiology Follow-Up Note    This is a 63y Female s/p fluoroscopically guided lumbar puncture with intrathecal chemotherapy on 5/2/2023 in Neuroradiology     < from: Xray Lumbar Puncture, Theraputic (05.02.23 @ 15:38) >  Procedure: Fluoroscopically guided lumbar puncture with intrathecal   chemotherapy.    Complications: No immediate complications.    Operators: JUAN Millard, Dr. Alisha Fontana    Procedure in detail:  The method and risks of lumbar puncture (included but not limited to   infection, bleeding, headache, and/or nerve damage) were discussed with   the patient with understanding.  Patient gave informed consent for   procedure.    Patient was placed in the prone position on the fluoroscopy table. The   lower lumbar spine was prepped and draped sterilely. The L2-L3 level was   localized fluoroscopically. Local 1 % lidocaine anesthesia was instilled   into the puncture site. Under aseptic conditions, the spinal canal was   entered at the L2-L3 level with a 22-gauge X 5 inch spinal needle using   intermittent fluoroscopic guidance. Once the stylet was removed   free-flowing CSF was seen in the needle hub. 2 tubes of 5 ml of clear CSF   was collected. Provided chemotherapy, 12 mg of methotrexate was injected.   The stylette was replaced and the needle was removed.    < end of copied text >    S: Patient seen and examined @ bedside. Denies headaches or lower back pain at procedure site.    Medication:     acyclovir   Oral Tab/Cap: (05-03)  furosemide   Injectable: (05-03)  losartan: (05-03)    Vitals:   T(F): 97.9, Max: 98.4 (10:00)  HR: 68  BP: 177/80  RR: 18  SpO2: 95%    Physical Exam:  General: Nontoxic, in NAD, awake and alert    Lower Back: Non-tender to palpation, no hematoma    LABS:  WBC 16.54 / Hgb 8.4 / Hct 28.0 / Plt 496  Na 142 / K 3.6 / CO2 22 / Cl 109 / BUN 16 / Cr 0.99 / Glucose 136  ALT 18 / AST 18 / Alk Phos 71 / Tbili 0.3  Ptt -- / Pt -- / INR --                        8.4    16.54 )-----------( 496      ( 03 May 2023 06:59 )             28.0     05-03    142  |  109<H>  |  16  ----------------------------<  136<H>  3.6   |  22  |  0.99    Ca    9.1      03 May 2023 06:59  Phos  3.4     05-03  Mg     2.2     05-03    TPro  6.3  /  Alb  3.3  /  TBili  0.3  /  DBili  x   /  AST  18  /  ALT  18  /  AlkPhos  71  05-03    Assessment/Plan:  63y Female admitted with Acute lymphoblastic leukemia (ALL) s/p fluoroscopically guided lumbar puncture with intrathecal chemotherapy on 5/2/2023 in Neuroradiology      -continue global management per primary team/ Hematology/Oncology team   -Neuroradiology will sign off. Please re-consult PRN. CSF orders to be f/u by primary team    Please call Neuroradiology at extension [phone] or [phone] and request the procedural Neuroradiology attending with any questions, concerns, or issues regarding above.      JAVIER MaC  Available on [employer] Teams  Spectralink [phone]  Ext [phone]

## 2023-05-03 NOTE — ADVANCED PRACTICE NURSE CONSULT - ASSESSMENT
Patient A&Ox4, vitals signs stable, no complaints and denies pain, SOB, and N/V. Chemo regimen given with education, patient and spouse verbalizes understanding. Patient has right Mediport accessed from 5/1/23, clean, dry, and intact. No redness, swelling, warmth, or bleeding noted at site. Labs drawn and reviewed by Dr. Toledo and team during rounds. Patient premedicated with Reglan 10mg and decadron 40mg. Mesna 1170 milliGRAM(s) in sodium chloride 0.9% 500 milliLiter(s) attached to split line of right double lumen mediport infusing at 23 mL/Hr. Day 3 (5th dose) at 1713, cyclophosphamide IVPB (eMAR) [Known as CYTOXAN IVPB (eMAR)] - 585 milliGRAM(s) in sodium chloride 0.9% 100 milliLiter(s) attached as secondary to split line of NS through right mediport, every 12 hours and infusing over 2 Hour(s), infuse at 64.63 mL/Hr. Patient safety maintained, primary RN aware of care.

## 2023-05-03 NOTE — PROGRESS NOTE ADULT - PROBLEM SELECTOR PLAN 1
Admit to [address] [place] [address]   Admission labs / Daily labs   Transfuse/replace lytes as needed   Cyclophosphamide 300 mg/m2 every 12 hours days 1-3 (Mesna 600mg/m2)  Dexamethasone 40mg per day on days 1-4 and 11-14   Vincristine 2gm IV days 1 and 8   Doxorubicin 50mg/m2/day IV continuous infusion over 24 hours on day 4   LP scheduled for Tuesday 5/2 Admit to [address] [place] [address]   Admission labs / Daily labs   Transfuse/replace lytes as needed   Cyclophosphamide 300 mg/m2 every 12 hours days 1-3 (Mesna 600mg/m2)  Dexamethasone 40mg per day on days 1-4 and 11-14   Vincristine 2gm IV days 1 and 11  Doxorubicin 50mg/m2/day IV continuous infusion over 24 hours on day 4   LP scheduled for Tuesday 5/2 Admit to [address] [place] [address]   Admission labs / Daily labs   Transfuse/replace lytes as needed   Cyclophosphamide 300 mg/m2 every 12 hours days 1-3 (Mesna 600mg/m2)  Dexamethasone 40mg per day on days 1-4 and 11-14   Vincristine 2gm IV days 1 and 11  Doxorubicin 50mg/m2/day IV continuous infusion over 24 hours on day 4   5/2 LP with MTX  insufficient cell  discharge planning on 5/5

## 2023-05-03 NOTE — PROGRESS NOTE ADULT - NSPROGADDITIONALINFOA_GEN_ALL_CORE
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 She is admitted for cycle IIA, with full doses.  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 She is admitted for cycle IIA, with full doses.  Day 3 of cycle IIA  Feels well, mild nausea, no vmiting  cont IVFs, MESNA with fractionated CTX  I and O  creat stable  LP with IT MTX today  Review post cycle IB BMA/Bx, still pending  OOB as tolerated

## 2023-05-03 NOTE — PROGRESS NOTE ADULT - SUBJECTIVE AND OBJECTIVE BOX
Diagnosis: B ALL ph(-)    Protocol/Chemo Regimen: 2A HYPERCVAD   Day: 3     Pt endorsed: chronic tingling of fingers     Review of Systems: Patient denied nausea, vomiting, odynophagia, chest pain, cough, dyspnea, abdominal pain, constipation, diarrhea, rash, fatigue, headache      Pain scale:       denies                                 Location: NA    Diet: regular     Allergies:     sulfa drugs (Unknown)  Zofran (Headache)      ANTIMICROBIALS  acyclovir   Oral Tab/Cap 400 milliGRAM(s) Oral two times a day      HEME/ONC MEDICATIONS  cyclophosphamide IVPB (eMAR) 585 milliGRAM(s) IV Intermittent every 12 hours  mesna IVPB (eMAR) 1170 milliGRAM(s) IV Intermittent every 24 hours      STANDING MEDICATIONS  dexAMETHasone  IVPB 40 milliGRAM(s) IV Intermittent every 24 hours  escitalopram 10 milliGRAM(s) Oral daily  losartan 100 milliGRAM(s) Oral daily  metoclopramide Injectable 10 milliGRAM(s) IV Push every 6 hours  pantoprazole    Tablet 40 milliGRAM(s) Oral before breakfast  sodium chloride 0.9%. 1000 milliLiter(s) IV Continuous <Continuous>  sucralfate suspension 1 Gram(s) Oral every 6 hours      Vital Signs Last 24 Hrs  T(C): 36.7 (02 May 2023 06:22), Max: 36.9 (01 May 2023 13:35)  T(F): 98 (02 May 2023 06:22), Max: 98.4 (01 May 2023 13:35)  HR: 57 (02 May 2023 06:22) (55 - 81)  BP: 156/82 (02 May 2023 01:41) (153/69 - 176/85)  BP(mean): 110 (01 May 2023 09:24) (110 - 110)  RR: 18 (02 May 2023 06:22) (16 - 18)  SpO2: 95% (02 May 2023 06:22) (94% - 96%)    Parameters below as of 02 May 2023 06:22  Patient On (Oxygen Delivery Method): room air      PHYSICAL EXAM  General: adult in NAD  HEENT: clear oropharynx, anicteric sclera  CV: normal S1/S2 RRR  Lungs: positive air movement b/l ant lungs,clear to auscultation, no wheezes, no rales  Abdomen: soft, + BS,  non-tender non-distended  Ext: no edema  Skin:  under right eye small rashes   Neuro: alert and oriented X 3, no focal deficits  Central Line: port CDI      LABS:                          9.1    8.96  )-----------( 523      ( 02 May 2023 06:45 )             29.1         Mean Cell Volume : 94.5 fl  Mean Cell Hemoglobin : 29.5 pg  Mean Cell Hemoglobin Concentration : 31.3 gm/dL  Auto Neutrophil # : 7.77 K/uL  Auto Lymphocyte # : 0.16 K/uL  Auto Monocyte # : 0.72 K/uL  Auto Eosinophil # : 0.00 K/uL  Auto Basophil # : 0.00 K/uL  Auto Neutrophil % : 80.5 %  Auto Lymphocyte % : 1.8 %  Auto Monocyte % : 8.0 %  Auto Eosinophil % : 0.0 %  Auto Basophil % : 0.0 %      05-02    143  |  108  |  12  ----------------------------<  145<H>  3.7   |  24  |  1.24    Ca    9.2      02 May 2023 06:45  Phos  3.7     05-02  Mg     2.2     05-02    TPro  6.8  /  Alb  3.6  /  TBili  0.2  /  DBili  x   /  AST  23  /  ALT  14  /  AlkPhos  78  05-02      PT/INR - ( 01 May 2023 09:49 )   PT: 13.6 sec;   INR: 1.17 ratio        Uric Acid 7.5     Diagnosis: B ALL ph(-)    Protocol/Chemo Regimen: 2A HYPERCVAD   Day: 3     Pt endorsed: chronic tingling of fingers, Intermittent nause    Review of Systems: Patient denied vomiting, odynophagia, chest pain, cough, dyspnea, abdominal pain, constipation, diarrhea, rash, fatigue, headache      Pain scale:       denies                                 Location: NA    Diet: regular     Allergies:     sulfa drugs (Unknown)  Zofran (Headache)      ANTIMICROBIALS  acyclovir   Oral Tab/Cap 400 milliGRAM(s) Oral two times a day      HEME/ONC MEDICATIONS  cyclophosphamide IVPB (eMAR) 585 milliGRAM(s) IV Intermittent every 12 hours  mesna IVPB (eMAR) 1170 milliGRAM(s) IV Intermittent every 24 hours      STANDING MEDICATIONS  dexAMETHasone  IVPB 40 milliGRAM(s) IV Intermittent every 24 hours  escitalopram 10 milliGRAM(s) Oral daily  losartan 100 milliGRAM(s) Oral daily  metoclopramide Injectable 10 milliGRAM(s) IV Push every 6 hours  pantoprazole    Tablet 40 milliGRAM(s) Oral before breakfast  sodium chloride 0.9%. 1000 milliLiter(s) IV Continuous <Continuous>  sucralfate suspension 1 Gram(s) Oral every 6 hours      Vital Signs Last 24 Hrs  T(C): 36.6 (03 May 2023 13:00), Max: 36.9 (03 May 2023 10:00)  T(F): 97.9 (03 May 2023 13:00), Max: 98.4 (03 May 2023 10:00)  HR: 68 (03 May 2023 13:00) (50 - 74)  BP: 177/80 (03 May 2023 13:00) (148/74 - 179/80)  BP(mean): --  RR: 18 (03 May 2023 13:00) (16 - 19)  SpO2: 95% (03 May 2023 13:00) (94% - 96%)    Parameters below as of 03 May 2023 13:00  Patient On (Oxygen Delivery Method): room air          PHYSICAL EXAM  General: adult in NAD  HEENT: clear oropharynx, anicteric sclera  CV: normal S1/S2 RRR  Lungs: positive air movement b/l ant lungs,clear to auscultation, no wheezes, no rales  Abdomen: soft, + BS,  non-tender non-distended  Ext: no edema  Skin:  under right eye small rashes   Neuro: alert and oriented X 3  Central Line: port CDI        LABS:                          8.4    16.54 )-----------( 496      ( 03 May 2023 06:59 )             28.0         Mean Cell Volume : 97.2 fl  Mean Cell Hemoglobin : 29.2 pg  Mean Cell Hemoglobin Concentration : 30.0 gm/dL  Auto Neutrophil # : 14.97 K/uL  Auto Lymphocyte # : 0.30 K/uL  Auto Monocyte # : 0.71 K/uL  Auto Eosinophil # : 0.00 K/uL  Auto Basophil # : 0.03 K/uL  Auto Neutrophil % : 90.5 %  Auto Lymphocyte % : 1.8 %  Auto Monocyte % : 4.3 %  Auto Eosinophil % : 0.0 %  Auto Basophil % : 0.2 %      05-03    142  |  109<H>  |  16  ----------------------------<  136<H>  3.6   |  22  |  0.99    Ca    9.1      03 May 2023 06:59  Phos  3.4     05-03  Mg     2.2     05-03    TPro  6.3  /  Alb  3.3  /  TBili  0.3  /  DBili  x   /  AST  18  /  ALT  18  /  AlkPhos  71  05-03          Uric Acid 6.5    Flow Cytometry (05.02.23 @ 16:17)   TM Interpretation:   Flow Cytometry Final Report   ________________________________________________________________________   Specimen: CSF   Date Collected: 05/02/2023 16:17   Date Received: 05/02/2023 16:44   Date Processed: 05/02/2023 16:50   Date Reported: 05/03/2023 10:45   Accession #: [id number]   ________________________________________________________________________   CLINICAL DATA: Rule Out ALL   Flow cytometric analysis attempted however insufficient cell to report.

## 2023-05-03 NOTE — ADVANCED PRACTICE NURSE CONSULT - RECOMMEDATIONS
Will continue to follow
Mesna continuous infusion to be given with cyclophosphamide and until 12 hours after the completion of the cyclophosphamide.
Will continue to monitor
Mesna continuous infusion to be given with cyclophosphamide and until 12 hours after the completion of the cyclophosphamide.
Mesna continuous infusion to be given with cyclophosphamide and until 12 hours after the completion of the cyclophosphamide.

## 2023-05-04 LAB
ALBUMIN SERPL ELPH-MCNC: 3.4 G/DL — SIGNIFICANT CHANGE UP (ref 3.3–5)
ALP SERPL-CCNC: 64 U/L — SIGNIFICANT CHANGE UP (ref 40–120)
ALT FLD-CCNC: 31 U/L — SIGNIFICANT CHANGE UP (ref 10–45)
ANION GAP SERPL CALC-SCNC: 13 MMOL/L — SIGNIFICANT CHANGE UP (ref 5–17)
APTT BLD: 24.5 SEC — LOW (ref 27.5–35.5)
AST SERPL-CCNC: 34 U/L — SIGNIFICANT CHANGE UP (ref 10–40)
BASOPHILS # BLD AUTO: 0.01 K/UL — SIGNIFICANT CHANGE UP (ref 0–0.2)
BASOPHILS NFR BLD AUTO: 0.1 % — SIGNIFICANT CHANGE UP (ref 0–2)
BILIRUB SERPL-MCNC: 0.3 MG/DL — SIGNIFICANT CHANGE UP (ref 0.2–1.2)
BLD GP AB SCN SERPL QL: NEGATIVE — SIGNIFICANT CHANGE UP
BUN SERPL-MCNC: 21 MG/DL — SIGNIFICANT CHANGE UP (ref 7–23)
CALCIUM SERPL-MCNC: 9.1 MG/DL — SIGNIFICANT CHANGE UP (ref 8.4–10.5)
CHLORIDE SERPL-SCNC: 109 MMOL/L — HIGH (ref 96–108)
CO2 SERPL-SCNC: 23 MMOL/L — SIGNIFICANT CHANGE UP (ref 22–31)
CREAT SERPL-MCNC: 0.92 MG/DL — SIGNIFICANT CHANGE UP (ref 0.5–1.3)
EGFR: 69 ML/MIN/1.73M2 — SIGNIFICANT CHANGE UP
EOSINOPHIL # BLD AUTO: 0 K/UL — SIGNIFICANT CHANGE UP (ref 0–0.5)
EOSINOPHIL NFR BLD AUTO: 0 % — SIGNIFICANT CHANGE UP (ref 0–6)
GLUCOSE SERPL-MCNC: 121 MG/DL — HIGH (ref 70–99)
HCT VFR BLD CALC: 27.6 % — LOW (ref 34.5–45)
HGB BLD-MCNC: 8.4 G/DL — LOW (ref 11.5–15.5)
IMM GRANULOCYTES NFR BLD AUTO: 1.3 % — HIGH (ref 0–0.9)
INR BLD: 1.09 RATIO — SIGNIFICANT CHANGE UP (ref 0.88–1.16)
LDH SERPL L TO P-CCNC: 265 U/L — HIGH (ref 50–242)
LYMPHOCYTES # BLD AUTO: 0.12 K/UL — LOW (ref 1–3.3)
LYMPHOCYTES # BLD AUTO: 1.1 % — LOW (ref 13–44)
MAGNESIUM SERPL-MCNC: 2.1 MG/DL — SIGNIFICANT CHANGE UP (ref 1.6–2.6)
MCHC RBC-ENTMCNC: 29.3 PG — SIGNIFICANT CHANGE UP (ref 27–34)
MCHC RBC-ENTMCNC: 30.4 GM/DL — LOW (ref 32–36)
MCV RBC AUTO: 96.2 FL — SIGNIFICANT CHANGE UP (ref 80–100)
MONOCYTES # BLD AUTO: 0.57 K/UL — SIGNIFICANT CHANGE UP (ref 0–0.9)
MONOCYTES NFR BLD AUTO: 5.1 % — SIGNIFICANT CHANGE UP (ref 2–14)
NEUTROPHILS # BLD AUTO: 10.41 K/UL — HIGH (ref 1.8–7.4)
NEUTROPHILS NFR BLD AUTO: 92.4 % — HIGH (ref 43–77)
NRBC # BLD: 0 /100 WBCS — SIGNIFICANT CHANGE UP (ref 0–0)
PHOSPHATE SERPL-MCNC: 3.6 MG/DL — SIGNIFICANT CHANGE UP (ref 2.5–4.5)
PLATELET # BLD AUTO: 418 K/UL — HIGH (ref 150–400)
POTASSIUM SERPL-MCNC: 3.5 MMOL/L — SIGNIFICANT CHANGE UP (ref 3.5–5.3)
POTASSIUM SERPL-SCNC: 3.5 MMOL/L — SIGNIFICANT CHANGE UP (ref 3.5–5.3)
PROT SERPL-MCNC: 5.9 G/DL — LOW (ref 6–8.3)
PROTHROM AB SERPL-ACNC: 12.7 SEC — SIGNIFICANT CHANGE UP (ref 10.5–13.4)
RBC # BLD: 2.87 M/UL — LOW (ref 3.8–5.2)
RBC # FLD: 18.6 % — HIGH (ref 10.3–14.5)
RH IG SCN BLD-IMP: POSITIVE — SIGNIFICANT CHANGE UP
SODIUM SERPL-SCNC: 145 MMOL/L — SIGNIFICANT CHANGE UP (ref 135–145)
URATE SERPL-MCNC: 6.9 MG/DL — SIGNIFICANT CHANGE UP (ref 2.5–7)
WBC # BLD: 11.26 K/UL — HIGH (ref 3.8–10.5)
WBC # FLD AUTO: 11.26 K/UL — HIGH (ref 3.8–10.5)

## 2023-05-04 PROCEDURE — 99232 SBSQ HOSP IP/OBS MODERATE 35: CPT | Mod: FS

## 2023-05-04 RX ORDER — DOXORUBICIN HYDROCHLORIDE 2 MG/ML
98 INJECTION, SOLUTION INTRAVENOUS ONCE
Refills: 0 | Status: DISCONTINUED | OUTPATIENT
Start: 2023-05-04 | End: 2023-05-04

## 2023-05-04 RX ORDER — ENOXAPARIN SODIUM 100 MG/ML
40 INJECTION SUBCUTANEOUS EVERY 24 HOURS
Refills: 0 | Status: DISCONTINUED | OUTPATIENT
Start: 2023-05-04 | End: 2023-05-05

## 2023-05-04 RX ORDER — HYDRALAZINE HCL 50 MG
2.5 TABLET ORAL ONCE
Refills: 0 | Status: COMPLETED | OUTPATIENT
Start: 2023-05-04 | End: 2023-05-04

## 2023-05-04 RX ORDER — VINCRISTINE SULFATE 1 MG/ML
2 VIAL (ML) INTRAVENOUS ONCE
Refills: 0 | Status: COMPLETED | OUTPATIENT
Start: 2023-05-04 | End: 2023-05-04

## 2023-05-04 RX ORDER — FUROSEMIDE 40 MG
40 TABLET ORAL ONCE
Refills: 0 | Status: COMPLETED | OUTPATIENT
Start: 2023-05-04 | End: 2023-05-04

## 2023-05-04 RX ORDER — DOXORUBICIN HYDROCHLORIDE 2 MG/ML
98 INJECTION, SOLUTION INTRAVENOUS ONCE
Refills: 0 | Status: COMPLETED | OUTPATIENT
Start: 2023-05-04 | End: 2023-05-04

## 2023-05-04 RX ORDER — SODIUM CHLORIDE 9 MG/ML
1000 INJECTION, SOLUTION INTRAVENOUS
Refills: 0 | Status: DISCONTINUED | OUTPATIENT
Start: 2023-05-04 | End: 2023-05-05

## 2023-05-04 RX ORDER — DEXAMETHASONE 0.5 MG/5ML
40 ELIXIR ORAL EVERY 24 HOURS
Refills: 0 | Status: CANCELLED | OUTPATIENT
Start: 2023-05-11 | End: 2023-05-05

## 2023-05-04 RX ADMIN — Medication 10 MILLIGRAM(S): at 15:53

## 2023-05-04 RX ADMIN — Medication 2 MILLIGRAM(S): at 15:52

## 2023-05-04 RX ADMIN — Medication 40 MILLIGRAM(S): at 10:06

## 2023-05-04 RX ADMIN — Medication 0.5 MILLIGRAM(S): at 15:40

## 2023-05-04 RX ADMIN — Medication 10 MILLIGRAM(S): at 03:47

## 2023-05-04 RX ADMIN — Medication 10 MILLIGRAM(S): at 22:56

## 2023-05-04 RX ADMIN — SODIUM CHLORIDE 50 MILLILITER(S): 9 INJECTION, SOLUTION INTRAVENOUS at 11:33

## 2023-05-04 RX ADMIN — Medication 15 MILLILITER(S): at 11:29

## 2023-05-04 RX ADMIN — PANTOPRAZOLE SODIUM 40 MILLIGRAM(S): 20 TABLET, DELAYED RELEASE ORAL at 05:37

## 2023-05-04 RX ADMIN — Medication 10 MILLIGRAM(S): at 11:30

## 2023-05-04 RX ADMIN — Medication 400 MILLIGRAM(S): at 18:00

## 2023-05-04 RX ADMIN — ENOXAPARIN SODIUM 40 MILLIGRAM(S): 100 INJECTION SUBCUTANEOUS at 17:59

## 2023-05-04 RX ADMIN — Medication 15 MILLILITER(S): at 23:03

## 2023-05-04 RX ADMIN — Medication 400 MILLIGRAM(S): at 05:37

## 2023-05-04 RX ADMIN — Medication 2.5 MILLIGRAM(S): at 08:39

## 2023-05-04 RX ADMIN — Medication 585 MILLIGRAM(S): at 05:47

## 2023-05-04 RX ADMIN — Medication 15 MILLILITER(S): at 18:00

## 2023-05-04 RX ADMIN — Medication 120 MILLIGRAM(S): at 15:05

## 2023-05-04 RX ADMIN — Medication 15 MILLILITER(S): at 05:38

## 2023-05-04 RX ADMIN — ESCITALOPRAM OXALATE 10 MILLIGRAM(S): 10 TABLET, FILM COATED ORAL at 11:30

## 2023-05-04 RX ADMIN — LOSARTAN POTASSIUM 100 MILLIGRAM(S): 100 TABLET, FILM COATED ORAL at 05:37

## 2023-05-04 RX ADMIN — CHLORHEXIDINE GLUCONATE 1 APPLICATION(S): 213 SOLUTION TOPICAL at 08:39

## 2023-05-04 RX ADMIN — DOXORUBICIN HYDROCHLORIDE 98 MILLIGRAM(S): 2 INJECTION, SOLUTION INTRAVENOUS at 16:48

## 2023-05-04 RX ADMIN — Medication 1 GRAM(S): at 23:03

## 2023-05-04 RX ADMIN — Medication 0.5 MILLIGRAM(S): at 21:47

## 2023-05-04 RX ADMIN — Medication 1 GRAM(S): at 05:37

## 2023-05-04 RX ADMIN — Medication 0.5 MILLIGRAM(S): at 10:43

## 2023-05-04 NOTE — PROGRESS NOTE ADULT - ASSESSMENT
62 y/o F with PMHx of HTN, HLD with Ph (-) ALL diagnosed in February 2023 and now s/p cycle 1A (mini hyper CVAD) and 1B of Hyper-CVAD. Now  admitted for cycle 2A (full dosing) with Cyclophosphamide, Dexamethasone, Vincristine, Doxorubicin. 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

## 2023-05-04 NOTE — PROGRESS NOTE ADULT - NSPROGADDITIONALINFOA_GEN_ALL_CORE
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 She is admitted for cycle IIA, with full doses.  Day 3 of cycle IIA  Feels well, mild nausea, no vmiting  cont IVFs, MESNA with fractionated CTX  I and O  creat stable  LP with IT MTX today  Review post cycle IB BMA/Bx, still pending  OOB as tolerated 64 yo with Ph(-) B lineage ALL post dose modified HyperCVAD. She is admitted for cycle IIA, with full doses.  Day 4 of cycle IIA  BP incr 189/91 syst range, received hydralazine  Feels well except mild nausea, no vomiting  intolerant of zofran, on dex 40 mg/d, reglan atc, ativan prn  cont IVFs, MESNA with fractionated CTX  I and O  creat stable  LP with IT MTX 5/2, flow (-), IT MTX given  Review post cycle IB BMA/Bx, still pending  monitor BP, may need incr in meds    OOB as tolerated

## 2023-05-04 NOTE — ADVANCED PRACTICE NURSE CONSULT - REASON FOR CONSULT
Chemo Note: cycle 2A for B-ALL; consent in chart
Chemo Note: cycle 2A for B-ALL; consent in chart
HyperCvad; Day 1 of Doxorubicin and Vincristine. Consent on chart
Chemo Note: cycle 2A for B-ALL; consent in chart
Cytoxan 585mg ivss
CYTOXAN IVPB 585 milligrams

## 2023-05-04 NOTE — PROGRESS NOTE ADULT - PROBLEM SELECTOR PLAN 2
Patient is not neutropenic, afebrile   Continue home medication - acyclovir   Currently off levaquin and fluconazole   If febrile, panculture and CXR

## 2023-05-04 NOTE — PROGRESS NOTE ADULT - SUBJECTIVE AND OBJECTIVE BOX
Diagnosis: B ALL ph(-)    Protocol/Chemo Regimen: 2A HYPERCVAD   Day: 4     Pt endorsed: chronic tingling of fingers, Intermittent nausea    Review of Systems: Patient denied vomiting, odynophagia, chest pain, cough, dyspnea, abdominal pain, constipation, diarrhea, rash, fatigue, headache      Pain scale:       denies                                 Location: NA    Diet: regular     Allergies:     sulfa drugs (Unknown)  Zofran (Headache)      ANTIMICROBIALS  acyclovir   Oral Tab/Cap 400 milliGRAM(s) Oral two times a day      HEME/ONC MEDICATIONS  cyclophosphamide IVPB (eMAR) 585 milliGRAM(s) IV Intermittent every 12 hours  mesna IVPB (eMAR) 1170 milliGRAM(s) IV Intermittent every 24 hours      STANDING MEDICATIONS  dexAMETHasone  IVPB 40 milliGRAM(s) IV Intermittent every 24 hours  escitalopram 10 milliGRAM(s) Oral daily  losartan 100 milliGRAM(s) Oral daily  metoclopramide Injectable 10 milliGRAM(s) IV Push every 6 hours  pantoprazole    Tablet 40 milliGRAM(s) Oral before breakfast  sodium chloride 0.9%. 1000 milliLiter(s) IV Continuous <Continuous>  sucralfate suspension 1 Gram(s) Oral every 6 hours      Vital Signs Last 24 Hrs  T(C): 36.7 (04 May 2023 09:13), Max: 36.8 (04 May 2023 01:03)  T(F): 98 (04 May 2023 09:13), Max: 98.3 (04 May 2023 01:03)  HR: 61 (04 May 2023 09:13) (59 - 69)  BP: 172/85 (04 May 2023 09:50) (172/85 - 190/84)  BP(mean): --  RR: 18 (04 May 2023 09:13) (18 - 18)  SpO2: 96% (04 May 2023 09:13) (93% - 96%)    Parameters below as of 04 May 2023 09:13  Patient On (Oxygen Delivery Method): room air            PHYSICAL EXAM  General: adult in NAD  HEENT: clear oropharynx, anicteric sclera  CV: normal S1/S2 RRR  Lungs: positive air movement b/l ant lungs,clear to auscultation, no wheezes, no rales  Abdomen: soft, + BS,  non-tender non-distended  Ext: no edema  Skin: No rash   Neuro: alert and oriented X 3  Central Line: port CDI    LABS:                          8.4    11.26 )-----------( 418      ( 04 May 2023 07:12 )             27.6         Mean Cell Volume : 96.2 fl  Mean Cell Hemoglobin : 29.3 pg  Mean Cell Hemoglobin Concentration : 30.4 gm/dL  Auto Neutrophil # : 10.41 K/uL  Auto Lymphocyte # : 0.12 K/uL  Auto Monocyte # : 0.57 K/uL  Auto Eosinophil # : 0.00 K/uL  Auto Basophil # : 0.01 K/uL  Auto Neutrophil % : 92.4 %  Auto Lymphocyte % : 1.1 %  Auto Monocyte % : 5.1 %  Auto Eosinophil % : 0.0 %  Auto Basophil % : 0.1 %      05-04    145  |  109<H>  |  21  ----------------------------<  121<H>  3.5   |  23  |  0.92    Ca    9.1      04 May 2023 07:12  Phos  3.6     05-04  Mg     2.1     05-04    TPro  5.9<L>  /  Alb  3.4  /  TBili  0.3  /  DBili  x   /  AST  34  /  ALT  31  /  AlkPhos  64  05-04            PT/INR - ( 04 May 2023 07:12 )   PT: 12.7 sec;   INR: 1.09 ratio         PTT - ( 04 May 2023 07:12 )  PTT:24.5 sec      Uric Acid 6.9        Flow Cytometry (05.02.23 @ 16:17)   TM Interpretation:   Flow Cytometry Final Report   ________________________________________________________________________   Specimen: CSF   Date Collected: 05/02/2023 16:17   Date Received: 05/02/2023 16:44   Date Processed: 05/02/2023 16:50   Date Reported: 05/03/2023 10:45   Accession #: [id number]   ________________________________________________________________________   CLINICAL DATA: Rule Out ALL   Flow cytometric analysis attempted however insufficient cell to report.

## 2023-05-04 NOTE — ADVANCED PRACTICE NURSE CONSULT - ASSESSMENT
Lab results reviewed by Dr. Toledo; Reinforced teachings to patient about her chemo regimen well understood. Patient with one right chestwall mediport with mesna x 24hours infusion inprogress. Patent. EF 55%. 2 RNs verification completed prior to start. Got her dexamethasone 40mg IV x 1 at 1500 prior to start,reglan 10mg IV as antiemetic. At 1552,Vincristine 2mg in 52ml NSS started as secondary set via NSS line into right chestwall mediport x 10minutes,tolerated. Doxorubicin 98mg in 500ml NSS started at 1648 x 24hours infusion at 22.9ml/hr via lowest port of NSS line through y-line tubing into right chestwall mediport through Alaris IV pump. Patent. primary RN aware of plan of care. Safety maintained.

## 2023-05-04 NOTE — PROGRESS NOTE ADULT - PROBLEM SELECTOR PLAN 1
Admit to [address] [place] [address]   Admission labs / Daily labs   Transfuse/replace lytes as needed   Cyclophosphamide 300 mg/m2 every 12 hours days 1-3 (Mesna 600mg/m2)  Dexamethasone 40mg per day on days 1-4 and 11-14   Vincristine 2gm IV days 1 and 11  Doxorubicin 50mg/m2/day IV continuous infusion over 24 hours on day 4   5/2 LP with MTX  insufficient cell  discharge planning on 5/5 Admit to [address] [place] [address]   Admission labs / Daily labs   Transfuse/replace lytes as needed   Cyclophosphamide 300 mg/m2 every 12 hours days 1-3 (Mesna 600mg/m2)  Dexamethasone 40mg per day on days 1-4 and 11-14   Vincristine 2gm IV days 1 and 11  Doxorubicin 50mg/m2/day IV continuous infusion over 24 hours on day 4   5/2 LP with MTX  insufficient cell (-)  discharge planning on 5/5

## 2023-05-04 NOTE — CHART NOTE - NSCHARTNOTEFT_GEN_A_CORE
Medicine PA Episodic Note    Notified by RN of pt. having SBP of 190. Pt. seen and examined at bedside, in NAD, AO x3, denies HA, lightheadedness, dizziness, blurry vision, vision changes, weakness, fatigue. VSS otherwise.      A/P:  #Elevated BP  - Patient with history of HTN on Losartan 100 mg QD  - Hydralazine PRN  - Monitor VS closely  - C/W current BP med regimen  - Will endorse to day team in AM    Jason Finney PA-C  Department of Medicine  [facility] [phone]

## 2023-05-04 NOTE — PROGRESS NOTE ADULT - PROBLEM SELECTOR PLAN 4
Lovenox on hold for LP scheduled for 5/2   Encourage OOB and ambulation     Contact information: [phone]

## 2023-05-05 ENCOUNTER — TRANSCRIPTION ENCOUNTER (OUTPATIENT)
Age: 63
End: 2023-05-05

## 2023-05-05 VITALS
HEART RATE: 72 BPM | SYSTOLIC BLOOD PRESSURE: 155 MMHG | OXYGEN SATURATION: 92 % | DIASTOLIC BLOOD PRESSURE: 86 MMHG | TEMPERATURE: 98 F | RESPIRATION RATE: 16 BRPM

## 2023-05-05 LAB
ALBUMIN SERPL ELPH-MCNC: 3.4 G/DL — SIGNIFICANT CHANGE UP (ref 3.3–5)
ALP SERPL-CCNC: 65 U/L — SIGNIFICANT CHANGE UP (ref 40–120)
ALT FLD-CCNC: 53 U/L — HIGH (ref 10–45)
ANION GAP SERPL CALC-SCNC: 11 MMOL/L — SIGNIFICANT CHANGE UP (ref 5–17)
AST SERPL-CCNC: 45 U/L — HIGH (ref 10–40)
BASOPHILS # BLD AUTO: 0 K/UL — SIGNIFICANT CHANGE UP (ref 0–0.2)
BASOPHILS NFR BLD AUTO: 0 % — SIGNIFICANT CHANGE UP (ref 0–2)
BILIRUB SERPL-MCNC: 0.5 MG/DL — SIGNIFICANT CHANGE UP (ref 0.2–1.2)
BUN SERPL-MCNC: 24 MG/DL — HIGH (ref 7–23)
CALCIUM SERPL-MCNC: 8.9 MG/DL — SIGNIFICANT CHANGE UP (ref 8.4–10.5)
CHLORIDE SERPL-SCNC: 106 MMOL/L — SIGNIFICANT CHANGE UP (ref 96–108)
CO2 SERPL-SCNC: 25 MMOL/L — SIGNIFICANT CHANGE UP (ref 22–31)
CREAT SERPL-MCNC: 0.84 MG/DL — SIGNIFICANT CHANGE UP (ref 0.5–1.3)
EGFR: 77 ML/MIN/1.73M2 — SIGNIFICANT CHANGE UP
EOSINOPHIL # BLD AUTO: 0 K/UL — SIGNIFICANT CHANGE UP (ref 0–0.5)
EOSINOPHIL NFR BLD AUTO: 0 % — SIGNIFICANT CHANGE UP (ref 0–6)
GLUCOSE SERPL-MCNC: 102 MG/DL — HIGH (ref 70–99)
HCT VFR BLD CALC: 26.8 % — LOW (ref 34.5–45)
HGB BLD-MCNC: 8.6 G/DL — LOW (ref 11.5–15.5)
IMM GRANULOCYTES NFR BLD AUTO: 1 % — HIGH (ref 0–0.9)
LDH SERPL L TO P-CCNC: 266 U/L — HIGH (ref 50–242)
LYMPHOCYTES # BLD AUTO: 0.07 K/UL — LOW (ref 1–3.3)
LYMPHOCYTES # BLD AUTO: 1.4 % — LOW (ref 13–44)
MAGNESIUM SERPL-MCNC: 2.3 MG/DL — SIGNIFICANT CHANGE UP (ref 1.6–2.6)
MCHC RBC-ENTMCNC: 30.1 PG — SIGNIFICANT CHANGE UP (ref 27–34)
MCHC RBC-ENTMCNC: 32.1 GM/DL — SIGNIFICANT CHANGE UP (ref 32–36)
MCV RBC AUTO: 93.7 FL — SIGNIFICANT CHANGE UP (ref 80–100)
MONOCYTES # BLD AUTO: 0.37 K/UL — SIGNIFICANT CHANGE UP (ref 0–0.9)
MONOCYTES NFR BLD AUTO: 7.1 % — SIGNIFICANT CHANGE UP (ref 2–14)
NEUTROPHILS # BLD AUTO: 4.69 K/UL — SIGNIFICANT CHANGE UP (ref 1.8–7.4)
NEUTROPHILS NFR BLD AUTO: 90.5 % — HIGH (ref 43–77)
NRBC # BLD: 0 /100 WBCS — SIGNIFICANT CHANGE UP (ref 0–0)
PHOSPHATE SERPL-MCNC: 4.2 MG/DL — SIGNIFICANT CHANGE UP (ref 2.5–4.5)
PLATELET # BLD AUTO: 342 K/UL — SIGNIFICANT CHANGE UP (ref 150–400)
POTASSIUM SERPL-MCNC: 3.2 MMOL/L — LOW (ref 3.5–5.3)
POTASSIUM SERPL-SCNC: 3.2 MMOL/L — LOW (ref 3.5–5.3)
PROT SERPL-MCNC: 5.9 G/DL — LOW (ref 6–8.3)
RBC # BLD: 2.86 M/UL — LOW (ref 3.8–5.2)
RBC # FLD: 17.2 % — HIGH (ref 10.3–14.5)
SODIUM SERPL-SCNC: 142 MMOL/L — SIGNIFICANT CHANGE UP (ref 135–145)
URATE SERPL-MCNC: 6.7 MG/DL — SIGNIFICANT CHANGE UP (ref 2.5–7)
WBC # BLD: 5.18 K/UL — SIGNIFICANT CHANGE UP (ref 3.8–10.5)
WBC # FLD AUTO: 5.18 K/UL — SIGNIFICANT CHANGE UP (ref 3.8–10.5)

## 2023-05-05 PROCEDURE — 88184 FLOWCYTOMETRY/ TC 1 MARKER: CPT

## 2023-05-05 PROCEDURE — 84100 ASSAY OF PHOSPHORUS: CPT

## 2023-05-05 PROCEDURE — 82945 GLUCOSE OTHER FLUID: CPT

## 2023-05-05 PROCEDURE — 88185 FLOWCYTOMETRY/TC ADD-ON: CPT

## 2023-05-05 PROCEDURE — 87205 SMEAR GRAM STAIN: CPT

## 2023-05-05 PROCEDURE — 84157 ASSAY OF PROTEIN OTHER: CPT

## 2023-05-05 PROCEDURE — 99238 HOSP IP/OBS DSCHRG MGMT 30/<: CPT

## 2023-05-05 PROCEDURE — 62329 THER SPI PNXR CSF FLUOR/CT: CPT

## 2023-05-05 PROCEDURE — 36415 COLL VENOUS BLD VENIPUNCTURE: CPT

## 2023-05-05 PROCEDURE — 85730 THROMBOPLASTIN TIME PARTIAL: CPT

## 2023-05-05 PROCEDURE — 80053 COMPREHEN METABOLIC PANEL: CPT

## 2023-05-05 PROCEDURE — 86803 HEPATITIS C AB TEST: CPT

## 2023-05-05 PROCEDURE — 83615 LACTATE (LD) (LDH) ENZYME: CPT

## 2023-05-05 PROCEDURE — 86850 RBC ANTIBODY SCREEN: CPT

## 2023-05-05 PROCEDURE — 85025 COMPLETE CBC W/AUTO DIFF WBC: CPT

## 2023-05-05 PROCEDURE — 86901 BLOOD TYPING SEROLOGIC RH(D): CPT

## 2023-05-05 PROCEDURE — 85610 PROTHROMBIN TIME: CPT

## 2023-05-05 PROCEDURE — 86900 BLOOD TYPING SEROLOGIC ABO: CPT

## 2023-05-05 PROCEDURE — 83735 ASSAY OF MAGNESIUM: CPT

## 2023-05-05 PROCEDURE — 84550 ASSAY OF BLOOD/URIC ACID: CPT

## 2023-05-05 PROCEDURE — 89051 BODY FLUID CELL COUNT: CPT

## 2023-05-05 RX ORDER — POTASSIUM CHLORIDE 20 MEQ
20 PACKET (EA) ORAL
Refills: 0 | Status: COMPLETED | OUTPATIENT
Start: 2023-05-05 | End: 2023-05-05

## 2023-05-05 RX ORDER — FUROSEMIDE 40 MG
20 TABLET ORAL EVERY 4 HOURS
Refills: 0 | Status: COMPLETED | OUTPATIENT
Start: 2023-05-05 | End: 2023-05-05

## 2023-05-05 RX ADMIN — Medication 20 MILLIGRAM(S): at 09:47

## 2023-05-05 RX ADMIN — Medication 0.5 MILLIGRAM(S): at 14:21

## 2023-05-05 RX ADMIN — Medication 15 MILLILITER(S): at 11:05

## 2023-05-05 RX ADMIN — Medication 50 MILLIEQUIVALENT(S): at 14:14

## 2023-05-05 RX ADMIN — Medication 400 MILLIGRAM(S): at 17:42

## 2023-05-05 RX ADMIN — ESCITALOPRAM OXALATE 10 MILLIGRAM(S): 10 TABLET, FILM COATED ORAL at 11:07

## 2023-05-05 RX ADMIN — Medication 10 MILLIGRAM(S): at 15:27

## 2023-05-05 RX ADMIN — Medication 20 MILLIGRAM(S): at 14:21

## 2023-05-05 RX ADMIN — Medication 15 MILLILITER(S): at 05:18

## 2023-05-05 RX ADMIN — Medication 50 MILLIEQUIVALENT(S): at 09:48

## 2023-05-05 RX ADMIN — PANTOPRAZOLE SODIUM 40 MILLIGRAM(S): 20 TABLET, DELAYED RELEASE ORAL at 05:19

## 2023-05-05 RX ADMIN — Medication 400 MILLIGRAM(S): at 05:19

## 2023-05-05 RX ADMIN — Medication 50 MILLIEQUIVALENT(S): at 11:26

## 2023-05-05 RX ADMIN — Medication 1 GRAM(S): at 11:07

## 2023-05-05 RX ADMIN — Medication 0.5 MILLIGRAM(S): at 09:46

## 2023-05-05 RX ADMIN — LOSARTAN POTASSIUM 100 MILLIGRAM(S): 100 TABLET, FILM COATED ORAL at 05:19

## 2023-05-05 RX ADMIN — CHLORHEXIDINE GLUCONATE 1 APPLICATION(S): 213 SOLUTION TOPICAL at 08:00

## 2023-05-05 RX ADMIN — Medication 10 MILLIGRAM(S): at 05:19

## 2023-05-05 RX ADMIN — SODIUM CHLORIDE 50 MILLILITER(S): 9 INJECTION, SOLUTION INTRAVENOUS at 05:26

## 2023-05-05 RX ADMIN — Medication 10 MILLIGRAM(S): at 10:30

## 2023-05-05 RX ADMIN — Medication 1 GRAM(S): at 05:18

## 2023-05-05 RX ADMIN — Medication 0.5 MILLIGRAM(S): at 03:19

## 2023-05-05 NOTE — PROGRESS NOTE ADULT - PROBLEM SELECTOR PLAN 3
Continue home medication - losartan

## 2023-05-05 NOTE — PROGRESS NOTE ADULT - PROBLEM SELECTOR PLAN 1
Admit to [address] [place] [address]   Admission labs / Daily labs   Transfuse/replace lytes as needed   Cyclophosphamide 300 mg/m2 every 12 hours days 1-3 (Mesna 600mg/m2)  Dexamethasone 40mg per day on days 1-4 and 11-14   Vincristine 2gm IV days 1 and 11  Doxorubicin 50mg/m2/day IV continuous infusion over 24 hours on day 4   5/2 LP with MTX  insufficient cell (-)  discharge planning on 5/5 [facility]   Admission labs / Daily labs   Transfuse/replace lytes as needed   Cyclophosphamide 300 mg/m2 every 12 hours days 1-3 (Mesna 600mg/m2)  Dexamethasone 40mg per day on days 1-4 and 11-14   Vincristine 2gm IV days 1 and 11  Doxorubicin 50mg/m2/day IV continuous infusion over 24 hours on day 4   5/2 LP with MTX  insufficient cell (-)  Post chemo Fulphila   Day 8 LP on Monday 5/8   discharge planning on 5/5

## 2023-05-05 NOTE — DISCHARGE NOTE NURSING/CASE MANAGEMENT/SOCIAL WORK - PATIENT PORTAL LINK FT
You can access the FollowMyHealth Patient Portal offered by [facility] by registering at the following website: [email or web address]. By joining [facility]’s FollowMyHealth portal, you will also be able to view your health information using other applications (apps) compatible with our system.

## 2023-05-05 NOTE — PROVIDER CONTACT NOTE (OTHER) - ACTION/TREATMENT ORDERED:
Discussed with JUAN Cyr. Continue to monitor and f/u with AM vitals.
Discussed with JUAN Cyr. Continue to monitor with next set of vitals

## 2023-05-05 NOTE — PROGRESS NOTE ADULT - SUBJECTIVE AND OBJECTIVE BOX
Diagnosis: B ALL ph(-)    Protocol/Chemo Regimen: 2A HYPERCVAD   Day: 5     Pt endorsed: chronic tingling of fingers, Intermittent nausea    Review of Systems: Patient denied vomiting, odynophagia, chest pain, cough, dyspnea, abdominal pain, constipation, diarrhea, rash, fatigue, headache      Pain scale:       denies                                 Location: NA    Diet: regular     Allergies:     sulfa drugs (Unknown)  Zofran (Headache)      ANTIMICROBIALS  acyclovir   Oral Tab/Cap 400 milliGRAM(s) Oral two times a day      HEME/ONC MEDICATIONS  cyclophosphamide IVPB (eMAR) 585 milliGRAM(s) IV Intermittent every 12 hours  mesna IVPB (eMAR) 1170 milliGRAM(s) IV Intermittent every 24 hours      STANDING MEDICATIONS  dexAMETHasone  IVPB 40 milliGRAM(s) IV Intermittent every 24 hours  escitalopram 10 milliGRAM(s) Oral daily  losartan 100 milliGRAM(s) Oral daily  metoclopramide Injectable 10 milliGRAM(s) IV Push every 6 hours  pantoprazole    Tablet 40 milliGRAM(s) Oral before breakfast  sodium chloride 0.9%. 1000 milliLiter(s) IV Continuous <Continuous>  sucralfate suspension 1 Gram(s) Oral every 6 hours      Vital Signs Last 24 Hrs  T(C): 37 (05 May 2023 04:55), Max: 37 (05 May 2023 04:55)  T(F): 98.6 (05 May 2023 04:55), Max: 98.6 (05 May 2023 04:55)  HR: 70 (05 May 2023 04:55) (59 - 70)  BP: 169/82 (05 May 2023 04:55) (169/82 - 190/84)  BP(mean): --  RR: 18 (05 May 2023 04:55) (18 - 18)  SpO2: 94% (05 May 2023 04:55) (94% - 96%)    Parameters below as of 05 May 2023 04:55  Patient On (Oxygen Delivery Method): room air                PHYSICAL EXAM  General: adult in NAD  HEENT: clear oropharynx, anicteric sclera  CV: normal S1/S2 RRR  Lungs: positive air movement b/l ant lungs,clear to auscultation, no wheezes, no rales  Abdomen: soft, + BS,  non-tender non-distended  Ext: no edema  Skin: No rash   Neuro: alert and oriented X 3  Central Line: port CDI    LABS:                          8.4    11.26 )-----------( 418      ( 04 May 2023 07:12 )             27.6         Mean Cell Volume : 96.2 fl  Mean Cell Hemoglobin : 29.3 pg  Mean Cell Hemoglobin Concentration : 30.4 gm/dL  Auto Neutrophil # : 10.41 K/uL  Auto Lymphocyte # : 0.12 K/uL  Auto Monocyte # : 0.57 K/uL  Auto Eosinophil # : 0.00 K/uL  Auto Basophil # : 0.01 K/uL  Auto Neutrophil % : 92.4 %  Auto Lymphocyte % : 1.1 %  Auto Monocyte % : 5.1 %  Auto Eosinophil % : 0.0 %  Auto Basophil % : 0.1 %      05-04    145  |  109<H>  |  21  ----------------------------<  121<H>  3.5   |  23  |  0.92    Ca    9.1      04 May 2023 07:12  Phos  3.6     05-04  Mg     2.1     05-04    TPro  5.9<L>  /  Alb  3.4  /  TBili  0.3  /  DBili  x   /  AST  34  /  ALT  31  /  AlkPhos  64  05-04            PT/INR - ( 04 May 2023 07:12 )   PT: 12.7 sec;   INR: 1.09 ratio         PTT - ( 04 May 2023 07:12 )  PTT:24.5 sec      Uric Acid 6.9        Flow Cytometry (05.02.23 @ 16:17)   TM Interpretation:   Flow Cytometry Final Report   ________________________________________________________________________   Specimen: CSF   Date Collected: 05/02/2023 16:17   Date Received: 05/02/2023 16:44   Date Processed: 05/02/2023 16:50   Date Reported: 05/03/2023 10:45   Accession #: [id number]   ________________________________________________________________________   CLINICAL DATA: Rule Out ALL   Flow cytometric analysis attempted however insufficient cell to report.                    Diagnosis: B ALL ph(-)    Protocol/Chemo Regimen: 2A HYPERCVAD     Day: 5     Pt endorsed: chronic tingling of fingers, Intermittent nausea    Review of Systems:  Patient denies vomiting, diarrhea, abdominal pain, SOB, chest pain and headache.    Pain scale:       denies                                 Location: NA    Diet: regular     Allergies:     sulfa drugs (Unknown)  Zofran (Headache)      ANTIMICROBIALS  acyclovir   Oral Tab/Cap 400 milliGRAM(s) Oral two times a day      HEME/ONC MEDICATIONS  cyclophosphamide IVPB (eMAR) 585 milliGRAM(s) IV Intermittent every 12 hours  mesna IVPB (eMAR) 1170 milliGRAM(s) IV Intermittent every 24 hours      STANDING MEDICATIONS  dexAMETHasone  IVPB 40 milliGRAM(s) IV Intermittent every 24 hours  escitalopram 10 milliGRAM(s) Oral daily  losartan 100 milliGRAM(s) Oral daily  metoclopramide Injectable 10 milliGRAM(s) IV Push every 6 hours  pantoprazole    Tablet 40 milliGRAM(s) Oral before breakfast  sodium chloride 0.9%. 1000 milliLiter(s) IV Continuous <Continuous>  sucralfate suspension 1 Gram(s) Oral every 6 hours      Vital Signs Last 24 Hrs  T(C): 37 (05 May 2023 04:55), Max: 37 (05 May 2023 04:55)  T(F): 98.6 (05 May 2023 04:55), Max: 98.6 (05 May 2023 04:55)  HR: 70 (05 May 2023 04:55) (59 - 70)  BP: 169/82 (05 May 2023 04:55) (169/82 - 190/84)  BP(mean): --  RR: 18 (05 May 2023 04:55) (18 - 18)  SpO2: 94% (05 May 2023 04:55) (94% - 96%)    Parameters below as of 05 May 2023 04:55  Patient On (Oxygen Delivery Method): room air                PHYSICAL EXAM  General: adult in NAD  HEENT: clear oropharynx, anicteric sclera  CV: normal S1/S2 RRR  Lungs: positive air movement b/l ant lungs,clear to auscultation, no wheezes, no rales  Abdomen: soft, + BS,  non-tender non-distended  Ext: no edema  Skin: No rash   Neuro: alert and oriented X 3  Central Line: port CDI    LABS:                          8.6    5.18  )-----------( 342      ( 05 May 2023 07:19 )             26.8         Mean Cell Volume : 93.7 fl  Mean Cell Hemoglobin : 30.1 pg  Mean Cell Hemoglobin Concentration : 32.1 gm/dL  Auto Neutrophil # : 4.69 K/uL  Auto Lymphocyte # : 0.07 K/uL  Auto Monocyte # : 0.37 K/uL  Auto Eosinophil # : 0.00 K/uL  Auto Basophil # : 0.00 K/uL  Auto Neutrophil % : 90.5 %  Auto Lymphocyte % : 1.4 %  Auto Monocyte % : 7.1 %  Auto Eosinophil % : 0.0 %  Auto Basophil % : 0.0 %      05-05    142  |  106  |  24<H>  ----------------------------<  102<H>  3.2<L>   |  25  |  0.84    Ca    8.9      05 May 2023 07:18  Phos  4.2     05-05  Mg     2.3     05-05    TPro  5.9<L>  /  Alb  3.4  /  TBili  0.5  /  DBili  x   /  AST  45<H>  /  ALT  53<H>  /  AlkPhos  65  05-05        PT/INR - ( 04 May 2023 07:12 )   PT: 12.7 sec;   INR: 1.09 ratio         PTT - ( 04 May 2023 07:12 )  PTT:24.5 sec      Uric Acid 6.7          Flow Cytometry (05.02.23 @ 16:17)   TM Interpretation:   Flow Cytometry Final Report   ________________________________________________________________________   Specimen: CSF   Date Collected: 05/02/2023 16:17   Date Received: 05/02/2023 16:44   Date Processed: 05/02/2023 16:50   Date Reported: 05/03/2023 10:45   Accession #: [id number]   ________________________________________________________________________   CLINICAL DATA: Rule Out ALL   Flow cytometric analysis attempted however insufficient cell to report.

## 2023-05-05 NOTE — PROGRESS NOTE ADULT - PROBLEM SELECTOR PROBLEM 1
B-cell acute lymphoblastic leukemia

## 2023-05-05 NOTE — PROGRESS NOTE ADULT - REASON FOR ADMISSION
Chemotherapy: HyperCVAD Cycle 2A

## 2023-05-05 NOTE — PROGRESS NOTE ADULT - NSPROGADDITIONALINFOA_GEN_ALL_CORE
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 She is admitted for cycle IIA, with full doses.  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 She is admitted for cycle IIA, with full doses.  Day 5 of cycle IIA  Syst BP  received hydralazine x 1,  also receiving lasix  Feels with better control of mild nausea, no vomiting  intolerant of zofran, on dex 40 mg/d, reglan atc, ativan prn  cont IVFs, MESNA with fractionated CTX  I and O  creat stable  LP with IT MTX 5/2, flow (-), IT MTX given  Review post cycle IB BMA/Bx, still pending  monitor BP, will need outpt monitoring and management    d/c home today for Neulasta in OPD  f/u jessica't with Dr. Goldberg 5/8  to receive decadron 40 mg po d11-14  VCR 2 mg iv in OPD d11  LP, IT aysha-c in OPD 5/8    OOB as tolerated

## 2023-05-05 NOTE — DISCHARGE NOTE NURSING/CASE MANAGEMENT/SOCIAL WORK - NSDCPEFALRISK_GEN_ALL_CORE
For information on Fall & Injury Prevention, visit: [email or web address] OR  [email or web address] OR  https://www.cdc.gov/steadi/patient.html

## 2023-05-05 NOTE — PROGRESS NOTE ADULT - ASSESSMENT
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 Now  admitted for cycle 2A (full dosing) with Cyclophosphamide, Dexamethasone, Vincristine, Doxorubicin. 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

## 2023-05-05 NOTE — PROGRESS NOTE ADULT - PROBLEM SELECTOR PLAN 4
Lovenox on hold for LP scheduled for 5/2   Encourage OOB and ambulation     Contact information: [phone] Lovenox 40 mg SQ daily   Encourage OOB and ambulation     Contact information: [phone]

## 2023-05-08 ENCOUNTER — RESULT REVIEW (OUTPATIENT)
Age: 63
End: 2023-05-08

## 2023-05-08 ENCOUNTER — APPOINTMENT (OUTPATIENT)
Dept: INFUSION THERAPY | Facility: HOSPITAL | Age: 63
End: 2023-05-08

## 2023-05-08 ENCOUNTER — APPOINTMENT (OUTPATIENT)
Dept: HEMATOLOGY ONCOLOGY | Facility: CLINIC | Age: 63
End: 2023-05-08
Payer: MEDICARE

## 2023-05-08 ENCOUNTER — APPOINTMENT (OUTPATIENT)
Dept: HEMATOLOGY ONCOLOGY | Facility: CLINIC | Age: 63
End: 2023-05-08

## 2023-05-08 ENCOUNTER — APPOINTMENT (OUTPATIENT)
Dept: RADIOLOGY | Facility: HOSPITAL | Age: 63
End: 2023-05-08

## 2023-05-08 VITALS
TEMPERATURE: 97 F | DIASTOLIC BLOOD PRESSURE: 65 MMHG | RESPIRATION RATE: 16 BRPM | HEART RATE: 96 BPM | HEIGHT: 62.4 IN | OXYGEN SATURATION: 99 % | SYSTOLIC BLOOD PRESSURE: 86 MMHG

## 2023-05-08 LAB
ANISOCYTOSIS BLD QL: SLIGHT — SIGNIFICANT CHANGE UP
BASOPHILS # BLD AUTO: 0.02 K/UL — SIGNIFICANT CHANGE UP (ref 0–0.2)
BASOPHILS NFR BLD AUTO: 1 % — SIGNIFICANT CHANGE UP (ref 0–2)
DACRYOCYTES BLD QL SMEAR: SLIGHT — SIGNIFICANT CHANGE UP
EOSINOPHIL # BLD AUTO: 0 K/UL — SIGNIFICANT CHANGE UP (ref 0–0.5)
EOSINOPHIL NFR BLD AUTO: 0 % — SIGNIFICANT CHANGE UP (ref 0–6)
HCT VFR BLD CALC: 25.4 % — LOW (ref 34.5–45)
HGB BLD-MCNC: 8.3 G/DL — LOW (ref 11.5–15.5)
LYMPHOCYTES # BLD AUTO: 0.28 K/UL — LOW (ref 1–3.3)
LYMPHOCYTES # BLD AUTO: 14 % — SIGNIFICANT CHANGE UP (ref 13–44)
MCHC RBC-ENTMCNC: 29.5 PG — SIGNIFICANT CHANGE UP (ref 27–34)
MCHC RBC-ENTMCNC: 32.7 G/DL — SIGNIFICANT CHANGE UP (ref 32–36)
MCV RBC AUTO: 90.4 FL — SIGNIFICANT CHANGE UP (ref 80–100)
MONOCYTES # BLD AUTO: 0.02 K/UL — SIGNIFICANT CHANGE UP (ref 0–0.9)
MONOCYTES NFR BLD AUTO: 1 % — LOW (ref 2–14)
NEUTROPHILS # BLD AUTO: 1.7 K/UL — LOW (ref 1.8–7.4)
NEUTROPHILS NFR BLD AUTO: 84 % — HIGH (ref 43–77)
NRBC # BLD: 0 /100 — SIGNIFICANT CHANGE UP (ref 0–0)
NRBC # BLD: SIGNIFICANT CHANGE UP /100 WBCS (ref 0–0)
PLAT MORPH BLD: NORMAL — SIGNIFICANT CHANGE UP
PLATELET # BLD AUTO: 220 K/UL — SIGNIFICANT CHANGE UP (ref 150–400)
POIKILOCYTOSIS BLD QL AUTO: SLIGHT — SIGNIFICANT CHANGE UP
POLYCHROMASIA BLD QL SMEAR: SLIGHT — SIGNIFICANT CHANGE UP
RBC # BLD: 2.81 M/UL — LOW (ref 3.8–5.2)
RBC # FLD: 16.2 % — HIGH (ref 10.3–14.5)
RBC BLD AUTO: ABNORMAL
WBC # BLD: 2.02 K/UL — LOW (ref 3.8–10.5)
WBC # FLD AUTO: 2.02 K/UL — LOW (ref 3.8–10.5)

## 2023-05-08 PROCEDURE — 99215 OFFICE O/P EST HI 40 MIN: CPT

## 2023-05-08 NOTE — HISTORY OF PRESENT ILLNESS
[Disease:__________________________] : Disease: [unfilled] [Therapy: ___] : Therapy: [unfilled] [Cycle: ___] : Cycle: [unfilled] [Day: ___] : Day: [unfilled] [de-identified] : 64 y/o F transferred from [facility] [place] [facility] to [facility] for new diagnosis of acute leukemia, s/p initial inpatient treatment and now here for establishment of outpatient care. 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  Chemotherapy with reduced dose HyperCVAD was started on 3/3/23. Hospital course was c/b neutropenic fever, transaminitis, a rash on 3/4 which improved with topical steroid and atarax PRN,  and also LUE cellulitis.\par \par Of note, on peripheral blood BCR/ABL PCR was negative, although in her bone marrow she did have PCR for BCR/ABL p190 positive at an extremely low level of 0.001%. She had a pre-treatment echocardiogram done which showed an EF 55%, and PICC line was placed. Pt started reduced dose HyperCVAD  on 3/3/23: (IV Cyclophosphamide (150 mg/m2 every 12 h on days 1–3), Dexamethasone 20 mg per day on days 1–4 and 11–14, Vincristine 2 mg IV flat dose on days 1 and 8, Daunorubicin 25 mg/m2/day IV continuous infusion over 48 hours, starting on day 4). Informed consent obtained. LP with IT MTX done on 2/28, and CSF was subsequently found to be negative for malignant cells. Pt had a developing transaminitis, and US done at [facility] had shown a fatty liver. CT A/P done at [facility] showed splenomegaly with a small age indeterminant splenic infarct, but otherwise showed a normal liver and no abdominal or pelvic lymphadenopathy. Ultimately, during hospital stay she also developed neutropenic fever, s/p panculture which was negative,. She was treated with cefepime, acyclovir and caspofungin. Course also c/b severe headache as side effect from Zofran (CTH negative). Discharged home in stable condition.\par \par Now s/p cycle 1B had BMBx on 4/28 which showed complete remission.  [de-identified] : Patient seen for follow up on 05/08/2023. When evaluating patient in the exam room patient had an RRT called overhead as she was presyncopal and was falling off the exam table. Held patient up and assistance arrived and patient stabilized on table and ultimately improved. Patient very dry appearing, she reported that she has not had much in the way of fluids over the weekend and spent most of her time in bed. She was taken to the treatment area and was started on IVF today. She has already received Fulphila injection today. She denied any fevers or cough, no diarrhea or vomiting (although she is nauseous at times). No dyspnea or chest pain / palpitations, and no new skin rash. Blood pressure was ~100/60.

## 2023-05-08 NOTE — REVIEW OF SYSTEMS
[Negative] : Allergic/Immunologic [Vision Problems] : no vision problems [Dysphagia] : no dysphagia [Nosebleeds] : no nosebleeds [Odynophagia] : no odynophagia [Chest Pain] : no chest pain [Palpitations] : no palpitations [Lower Ext Edema] : no lower extremity edema [Dysuria] : no dysuria [Insomnia] : no insomnia [Anxiety] : no anxiety [Hot Flashes] : no hot flashes

## 2023-05-08 NOTE — PHYSICAL EXAM
[Fully active, able to carry on all pre-disease performance without restriction] : Status 0 - Fully active, able to carry on all pre-disease performance without restriction [Normal] : affect appropriate [Ulcers] : no ulcers [Mucositis] : no mucositis [Thrush] : no thrush [Vesicles] : no vesicles [de-identified] : BMI 37.82 [de-identified] : Dry mucous membranes [de-identified] : supple [de-identified] : (+)S1S2 RRR [de-identified] : no edema [de-identified] : no pain [de-identified] : warm/dry

## 2023-05-08 NOTE — RESULTS/DATA
[FreeTextEntry1] : Accession:                             [id number]\par \par Collected Date/Time:                   4/28/2023 09:20 EDT\par Received Date/Time:                    4/28/2023 15:25 EDT\par \par Hematopathology Report - Auth (Verified)\par \par Specimen(s) Submitted\par 1  Bone marrow biopsy\par 2  Bone marrow aspirate\par \par Final Diagnosis\par Specimen(s) Submitted\par 1  Bone marrow biopsy\par 2  Bone marrow aspirate\par \par \par Final Diagnosis\par 1, 2. Bone marrow biopsy and bone marrow aspirate\par      - Focal hypercellularity with trilineage hematopoiesis, myeloid left\par  shift, mild megakaryocytosis, mild increase in interstitial mast cells,\par  and several lymphoid aggregates; iron stores increased (history of ALL,\par  s/p treatment)     -\par \par See note and description.\par \par Diagnostic note:\par As per chart review, the patient was diagnosed with Ph (-) ALL, in\par  February 2023, abnormal cytogenetics,  47,XX,+X,i(9)(q10)[7]/46,XX[2],\par  FISH with Three copies of ASS1 and ABL1 detected (81.5%), somatic\par  mutations of  NRAS p.[id number] (VAF: 29.63%),   NRAS p.Thm26Bpj (VAF:\par  14.54%), TP53 p.[id number] (VAF: 45.94%), now s/p cycle 1A (mini hyper\par  CVAD) and 1B of Hyper-CVAD, and admitted for cycle 2A (full dosing) with\par  Cyclophosphamide, Dexamethasone, Vincristine, Doxorubicin.\par His CBC shows normocytic anemia, thrombocytosis, myeloid left\par  shift, monocytosis, and few nRBCs.  Bone marrow shows focal\par  hypercellularity with trilineage hematopoiesis, myeloid left shift,\par  mild megakaryocytosis, mild increase in interstitial mast cells, and\par  several lymphoid aggregates; iron stores increased.  Flow cytometry and\par  immunohistochemistry do not show definitive persistent lymphoblasts.\par Comprehensive report with results of pending ancillary studies to follow.\par \par Ancillary studies\par Bone marrow aspirate iron stain: Iron stores are increased; no ring\par  sideroblasts are seen.\par \par Flow cytometry:   The immunophenotypic findings show a subset of\par  myeloblasts (1.25% of cells with normal immunophenotype), a small\par  population of probable mast cells, abnormal myeloid antigen pattern with\par  myeloid left shift, no increase in B-lymphoblasts (small subset, 0.2%\par  of cells, positive for dim CD45, CD19, CD10, bright CD38, negative CD20,\par  possible hematogones).\par \par Immunohistochemical stains   (block 1A: CD3, CD10, CD20, CD79a, PAX-5, Tdt,\par  CD34) show scattered Tdt positive cell, scattered CD34 positive dells\par  (less than 3%), scattered CD10 positive cells, scattered CD79a positive\par  cells (B cells and plasma cells), interstitial T cells (positive with\par  CD3), few interstitial B cells ((positive with CD20, PAX5) and 2 small\par  lymphoid aggregates composed of T cells and greater numbers than B-cells.\par   The findings are nondiagnostic.\par \par Cytogenetics:  Pending\par \par FISH:  Pending\par \par Microscopic description:\par 1. Biopsy:   Sections of bone marrow biopsy and bone marrow fragments\par  in clot show focal hypercellularity (60-85% cellularity), trilineage\par  hematopoiesis with maturation, myeloid left shift, normal M:E ratio,\par  increased megakaryocytes with normal morphology, mild increase in\par  interstitial mast cells, several small lymphoid aggregates (small cells),\par  and iron stores increased.\par \par \par 2. Aspirate:   Cellular spicules are present, adequate for interpretation.\par   Maturing and mature myeloid and erythroid elements are present with\par  normal M:E ratio (3.0:1). Mature myeloid elements are proportionately\par  decreased  Megakaryocytes appear increased in number with normal\par  morphology.\par

## 2023-05-08 NOTE — ASSESSMENT
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  \par \par Previously extensively educated patient on her disease process and explained rationale behind treating her with Hyper-CVAD for now. Treatment is hyper CVAD on A cycles (Cyclophosphamide 300 mg/m2 IV over 2 hours every 12 hours on days 1 to 3 (total dose per cycle: 1800 mg/m2), Vincristine (Oncovin) 2 mg IV once per day on days 4 & 11, and Doxorubicin 50 mg/m2 IV continuous infusion over 24 hours, starting on day 4). This had been dose reduced for cycle 1A, but for cycle 2A full dose, today is day 8. B cycles also full dose at this point (MTX 1g/m2 over 24 hour span on day 1, cytarabine 1g/m2 [id number] on day 2 and 3 for total 4 doses).\par \par Bone marrow biopsy results after cycle 1B noted, complete remission and awaiting Clonoseq for MRD assessment. Previously educated patient as well on the Clonoseq testing as a tool to assess measurable residual disease (MRD) using NGS technology. She has a mediport in place and doing well. \par \par Plan:\par -Presyncopal episode likely due to hypovolemia due to dehydration, giving IVF in the tx room. \par -Already improved as I was speaking to her. s/p Fulphila today. . \par -Instructed patient to continue levaquin and diflucan at this time as well as acyclovir given her impending neutropenia, until neutrophil counts recover. . \par -Hemoglobin is 8.3 today, will likely require transfusion end of this week. \par -Discussed with Dr. Lewis, given her residence is out in [place] he will see her to assist us in transfusional support and limited chemotherapy (vincristine). Due for Vincristine this week day 11. \par -Was planned for IT therapy today but can be delayed until day 11 given her overall malaise. \par -Discussed future plans extensively - after 2B will need to consider whether to pursue further Hper-CVAD given its toxicity on her, and consider transitioning to other consolidative measures (Blincyto vs. Royal vs. transplant). \par -Patient understands and agrees with plan. All information explained to the best of my ability. \par -RTC prior to cycle 2B.

## 2023-05-09 DIAGNOSIS — E86.0 DEHYDRATION: ICD-10-CM

## 2023-05-10 LAB
ALBUMIN SERPL ELPH-MCNC: 3.5 G/DL
ALP BLD-CCNC: 74 U/L
ALT SERPL-CCNC: 74 U/L
ANION GAP SERPL CALC-SCNC: 13 MMOL/L
AST SERPL-CCNC: 32 U/L
BILIRUB SERPL-MCNC: 0.9 MG/DL
BUN SERPL-MCNC: 22 MG/DL
CALCIUM SERPL-MCNC: 9.5 MG/DL
CHLORIDE SERPL-SCNC: 106 MMOL/L
CO2 SERPL-SCNC: 23 MMOL/L
CREAT SERPL-MCNC: 1.04 MG/DL
EGFR: 60 ML/MIN/1.73M2
GLUCOSE SERPL-MCNC: 143 MG/DL
LDH SERPL-CCNC: 205 U/L
POTASSIUM SERPL-SCNC: 3.2 MMOL/L
PROT SERPL-MCNC: 5.6 G/DL
SODIUM SERPL-SCNC: 143 MMOL/L

## 2023-05-11 ENCOUNTER — APPOINTMENT (OUTPATIENT)
Dept: INFUSION THERAPY | Facility: HOSPITAL | Age: 63
End: 2023-05-11

## 2023-05-11 ENCOUNTER — NON-APPOINTMENT (OUTPATIENT)
Age: 63
End: 2023-05-11

## 2023-05-11 ENCOUNTER — OUTPATIENT (OUTPATIENT)
Dept: OUTPATIENT SERVICES | Facility: HOSPITAL | Age: 63
LOS: 1 days | End: 2023-05-11
Payer: MEDICARE

## 2023-05-11 DIAGNOSIS — D64.9 ANEMIA, UNSPECIFIED: ICD-10-CM

## 2023-05-12 ENCOUNTER — APPOINTMENT (OUTPATIENT)
Age: 63
End: 2023-05-12

## 2023-05-12 ENCOUNTER — RESULT REVIEW (OUTPATIENT)
Age: 63
End: 2023-05-12

## 2023-05-12 DIAGNOSIS — Z51.11 ENCOUNTER FOR ANTINEOPLASTIC CHEMOTHERAPY: ICD-10-CM

## 2023-05-12 DIAGNOSIS — R11.2 NAUSEA WITH VOMITING, UNSPECIFIED: ICD-10-CM

## 2023-05-12 LAB
BASOPHILS # BLD AUTO: SIGNIFICANT CHANGE UP (ref 0–0.2)
BASOPHILS NFR BLD AUTO: SIGNIFICANT CHANGE UP (ref 0–2)
EOSINOPHIL # BLD AUTO: SIGNIFICANT CHANGE UP (ref 0–0.5)
EOSINOPHIL NFR BLD AUTO: SIGNIFICANT CHANGE UP (ref 0–6)
HCT VFR BLD CALC: 20.7 % — CRITICAL LOW (ref 34.5–45)
HGB BLD-MCNC: 6.9 G/DL — CRITICAL LOW (ref 11.5–15.5)
IMM GRANULOCYTES NFR BLD AUTO: SIGNIFICANT CHANGE UP (ref 0–0.9)
LYMPHOCYTES # BLD AUTO: SIGNIFICANT CHANGE UP (ref 13–44)
LYMPHOCYTES # BLD AUTO: SIGNIFICANT CHANGE UP (ref 1–3.3)
MCHC RBC-ENTMCNC: 30.1 PG — SIGNIFICANT CHANGE UP (ref 27–34)
MCHC RBC-ENTMCNC: 33.3 GM/DL — SIGNIFICANT CHANGE UP (ref 32–36)
MCV RBC AUTO: 90.4 FL — SIGNIFICANT CHANGE UP (ref 80–100)
MONOCYTES # BLD AUTO: SIGNIFICANT CHANGE UP (ref 0–0.9)
MONOCYTES NFR BLD AUTO: SIGNIFICANT CHANGE UP (ref 2–14)
NEUTROPHILS # BLD AUTO: SIGNIFICANT CHANGE UP (ref 1.8–7.4)
NEUTROPHILS NFR BLD AUTO: SIGNIFICANT CHANGE UP (ref 43–77)
NRBC # BLD: 0 /100 WBCS — SIGNIFICANT CHANGE UP (ref 0–0)
PLATELET # BLD AUTO: 24 K/UL — LOW (ref 150–400)
RBC # BLD: 2.29 M/UL — LOW (ref 3.8–5.2)
RBC # FLD: 15.1 % — HIGH (ref 10.3–14.5)
WBC # BLD: <0.1 K/UL — CRITICAL LOW (ref 3.8–10.5)
WBC # FLD AUTO: <0.1 K/UL — CRITICAL LOW (ref 3.8–10.5)

## 2023-05-15 ENCOUNTER — APPOINTMENT (OUTPATIENT)
Age: 63
End: 2023-05-15
Payer: MEDICARE

## 2023-05-15 ENCOUNTER — APPOINTMENT (OUTPATIENT)
Age: 63
End: 2023-05-15

## 2023-05-15 VITALS
RESPIRATION RATE: 16 BRPM | TEMPERATURE: 97.2 F | OXYGEN SATURATION: 98 % | HEART RATE: 85 BPM | DIASTOLIC BLOOD PRESSURE: 69 MMHG | SYSTOLIC BLOOD PRESSURE: 116 MMHG

## 2023-05-15 PROCEDURE — 99215 OFFICE O/P EST HI 40 MIN: CPT

## 2023-05-15 NOTE — HISTORY OF PRESENT ILLNESS
[Disease:__________________________] : Disease: [unfilled] [Therapy: ___] : Therapy: [unfilled] [Cycle: ___] : Cycle: [unfilled] [Day: ___] : Day: [unfilled] [de-identified] : 64 y/o F transferred from [facility] to [facility] for new diagnosis of acute leukemia, s/p initial inpatient treatment and now here for establishment of outpatient care. 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  Chemotherapy with reduced dose HyperCVAD was started on 3/3/23. Hospital course was c/b neutropenic fever, transaminitis, a rash on 3/4 which improved with topical steroid and atarax PRN,  and also LUE cellulitis.\par \par Of note, on peripheral blood BCR/ABL PCR was negative, although in her bone marrow she did have PCR for BCR/ABL p190 positive at an extremely low level of 0.001%. She had a pre-treatment echocardiogram done which showed an EF 55%, and PICC line was placed. Pt started reduced dose HyperCVAD  on 3/3/23: (IV Cyclophosphamide (150 mg/m2 every 12 h on days 1–3), Dexamethasone 20 mg per day on days 1–4 and 11–14, Vincristine 2 mg IV flat dose on days 1 and 8, Daunorubicin 25 mg/m2/day IV continuous infusion over 48 hours, starting on day 4). Informed consent obtained. LP with IT MTX done on 2/28, and CSF was subsequently found to be negative for malignant cells. Pt had a developing transaminitis, and US done at [facility] had shown a fatty liver. CT A/P done at [facility] showed splenomegaly with a small age indeterminant splenic infarct, but otherwise showed a normal liver and no abdominal or pelvic lymphadenopathy. Ultimately, during hospital stay she also developed neutropenic fever, s/p panculture which was negative,. She was treated with cefepime, acyclovir and caspofungin. Course also c/b severe headache as side effect from Zofran (CTH negative). Discharged home in stable condition.\par \par BMBx on 4/28 which showed complete remission.  [de-identified] : Accession:                             [id number]\par \par Collected Date/Time:                   4/28/2023 09:20 EDT\par Received Date/Time:                    4/28/2023 15:25 EDT\par \par Hematopathology Report - Auth (Verified)\par \par Specimen(s) Submitted\par 1  Bone marrow biopsy\par 2  Bone marrow aspirate\par \par Final Diagnosis\par Specimen(s) Submitted\par 1  Bone marrow biopsy\par 2  Bone marrow aspirate\par \par \par Final Diagnosis\par 1, 2. Bone marrow biopsy and bone marrow aspirate\par      - Focal hypercellularity with trilineage hematopoiesis, myeloid left\par  shift, mild megakaryocytosis, mild increase in interstitial mast cells,\par  and several lymphoid aggregates; iron stores increased (history of ALL,\par  s/p treatment)     -\par \par See note and description.\par \par Diagnostic note:\par As per chart review, the patient was diagnosed with Ph (-) ALL, in\par  February 2023, abnormal cytogenetics,  47,XX,+X,i(9)(q10)[7]/46,XX[2],\par  FISH with Three copies of ASS1 and ABL1 detected (81.5%), somatic\par  mutations of  NRAS p.[id number] (VAF: 29.63%),   NRAS p.[id number] (VAF:\par  14.54%), TP53 p.[id number] (VAF: 45.94%), now s/p cycle 1A (mini hyper\par  CVAD) and 1B of Hyper-CVAD, and admitted for cycle 2A (full dosing) with\par  Cyclophosphamide, Dexamethasone, Vincristine, Doxorubicin.\par His CBC shows normocytic anemia, thrombocytosis, myeloid left\par  shift, monocytosis, and few nRBCs.  Bone marrow shows focal\par  hypercellularity with trilineage hematopoiesis, myeloid left shift,\par  mild megakaryocytosis, mild increase in interstitial mast cells, and\par  several lymphoid aggregates; iron stores increased.  Flow cytometry and\par  immunohistochemistry do not show definitive persistent lymphoblasts.\par Comprehensive report with results of pending ancillary studies to follow.\par \par Ancillary studies\par Bone marrow aspirate iron stain: Iron stores are increased; no ring\par  sideroblasts are seen.\par \par Flow cytometry:   The immunophenotypic findings show a subset of\par  myeloblasts (1.25% of cells with normal immunophenotype), a small\par  population of probable mast cells, abnormal myeloid antigen pattern with\par  myeloid left shift, no increase in B-lymphoblasts (small subset, 0.2%\par  of cells, positive for dim CD45, CD19, CD10, bright CD38, negative CD20,\par  possible hematogones).\par \par Immunohistochemical stains   (block 1A: CD3, CD10, CD20, CD79a, PAX-5, Tdt,\par  CD34) show scattered Tdt positive cell, scattered CD34 positive dells\par  (less than 3%), scattered CD10 positive cells, scattered CD79a positive\par  cells (B cells and plasma cells), interstitial T cells (positive with\par  CD3), few interstitial B cells ((positive with CD20, PAX5) and 2 small\par  lymphoid aggregates composed of T cells and greater numbers than B-cells.\par   The findings are nondiagnostic.\par \par Cytogenetics:  Pending\par \par FISH:  Pending\par \par Microscopic description:\par 1. Biopsy:   Sections of bone marrow biopsy and bone marrow fragments\par  in clot show focal hypercellularity (60-85% cellularity), trilineage\par  hematopoiesis with maturation, myeloid left shift, normal M:E ratio,\par  increased megakaryocytes with normal morphology, mild increase in\par  interstitial mast cells, several small lymphoid aggregates (small cells),\par  and iron stores increased.\par \par \par 2. Aspirate:   Cellular spicules are present, adequate for interpretation.\par   Maturing and mature myeloid and erythroid elements are present with\par  normal M:E ratio (3.0:1). Mature myeloid elements are proportionately\par  decreased  Megakaryocytes appear increased in number with normal\par  morphology.\par \par \par  [de-identified] : Stephanie comes to our office today to establish care and receive 2 units PRBCs for a hgb of 6.9g found on Friday. Denies any major ongoing issues. Is somewhat unsteady on her feet, has not had any more (pre)syncopal episodes. On ppx abx. Afebrile. No bleeding or bruising.

## 2023-05-15 NOTE — RESULTS/DATA
[FreeTextEntry1] : Ms. Kaur is a pleasant 63 year-old woman with Ph- ALL s/p induction chemotherapy with hyperCVAD under the direction of Dr. Goldberg, here to establish care.

## 2023-05-15 NOTE — ASSESSMENT
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  \par \par Previously extensively educated patient on her disease process and explained rationale behind treating her with Hyper-CVAD for now. Treatment is hyper CVAD on A cycles (Cyclophosphamide 300 mg/m2 IV over 2 hours every 12 hours on days 1 to 3 (total dose per cycle: 1800 mg/m2), Vincristine (Oncovin) 2 mg IV once per day on days 4 & 11, and Doxorubicin 50 mg/m2 IV continuous infusion over 24 hours, starting on day 4). This had been dose reduced for cycle 1A, but for cycle 2A full dose, today is day 8. B cycles also full dose at this point (MTX 1g/m2 over 24 hour span on day 1, cytarabine 1g/m2 [id number] on day 2 and 3 for total 4 doses).\par \par Bone marrow biopsy results after cycle 1B noted, complete remission and awaiting Clonoseq for MRD assessment. Previously educated patient as well on the Clonoseq testing as a tool to assess measurable residual disease (MRD) using NGS technology. She has a mediport in place and doing well. \par \par Plan:\par -Presyncopal episode likely due to hypovolemia due to dehydration, giving IVF in the tx room. \par -Already improved as I was speaking to her. s/p Fulphila today. . \par -Instructed patient to continue levaquin and diflucan at this time as well as acyclovir given her impending neutropenia, until neutrophil counts recover. . \par -Hemoglobin is 8.3 today, will likely require transfusion end of this week. \par -Discussed with Dr. Lewis, given her residence is out in [place] he will see her to assist us in transfusional support and limited chemotherapy (vincristine). Due for Vincristine this week day 11. \par -Was planned for IT therapy today but can be delayed until day 11 given her overall malaise. \par -Discussed future plans extensively - after 2B will need to consider whether to \par -Patient understands and agrees with plan. All information explained to the best of my ability. \par -RTC prior to cycle 2B.

## 2023-05-15 NOTE — REVIEW OF SYSTEMS
[Vision Problems] : no vision problems [Dysphagia] : no dysphagia [Nosebleeds] : no nosebleeds [Odynophagia] : no odynophagia [Chest Pain] : no chest pain [Palpitations] : no palpitations [Lower Ext Edema] : no lower extremity edema [Dysuria] : no dysuria [Insomnia] : no insomnia [Anxiety] : no anxiety [Hot Flashes] : no hot flashes [Negative] : Psychiatric

## 2023-05-15 NOTE — PHYSICAL EXAM
[Fully active, able to carry on all pre-disease performance without restriction] : Status 0 - Fully active, able to carry on all pre-disease performance without restriction [Ulcers] : no ulcers [Mucositis] : no mucositis [Thrush] : no thrush [Vesicles] : no vesicles [Normal] : no JVD, no calf tenderness, venous stasis changes, varices [de-identified] : alopecia [de-identified] : anicteric [de-identified] : no edema

## 2023-05-15 NOTE — REASON FOR VISIT
[Follow-Up Visit] : a follow-up visit for [Acute Lymphoblastic Leukemia] : acute lymphoblastic leukemia [Spouse] : spouse

## 2023-05-16 DIAGNOSIS — C91.00 ACUTE LYMPHOBLASTIC LEUKEMIA NOT HAVING ACHIEVED REMISSION: ICD-10-CM

## 2023-05-16 DIAGNOSIS — Z51.89 ENCOUNTER FOR OTHER SPECIFIED AFTERCARE: ICD-10-CM

## 2023-05-17 ENCOUNTER — APPOINTMENT (OUTPATIENT)
Age: 63
End: 2023-05-17

## 2023-05-17 ENCOUNTER — RESULT REVIEW (OUTPATIENT)
Age: 63
End: 2023-05-17

## 2023-05-17 LAB
ANISOCYTOSIS BLD QL: SLIGHT — SIGNIFICANT CHANGE UP
BASOPHILS # BLD AUTO: 0 K/UL — SIGNIFICANT CHANGE UP (ref 0–0.2)
BASOPHILS NFR BLD AUTO: 0 % — SIGNIFICANT CHANGE UP (ref 0–2)
DACRYOCYTES BLD QL SMEAR: SLIGHT — SIGNIFICANT CHANGE UP
EOSINOPHIL # BLD AUTO: 0.15 K/UL — SIGNIFICANT CHANGE UP (ref 0–0.5)
EOSINOPHIL NFR BLD AUTO: 2 % — SIGNIFICANT CHANGE UP (ref 0–6)
HCT VFR BLD CALC: 27.4 % — LOW (ref 34.5–45)
HGB BLD-MCNC: 9.4 G/DL — LOW (ref 11.5–15.5)
LYMPHOCYTES # BLD AUTO: 0.52 K/UL — LOW (ref 1–3.3)
LYMPHOCYTES # BLD AUTO: 7 % — LOW (ref 13–44)
MCHC RBC-ENTMCNC: 28.9 PG — SIGNIFICANT CHANGE UP (ref 27–34)
MCHC RBC-ENTMCNC: 34.3 GM/DL — SIGNIFICANT CHANGE UP (ref 32–36)
MCV RBC AUTO: 84.3 FL — SIGNIFICANT CHANGE UP (ref 80–100)
MICROCYTES BLD QL: SLIGHT — SIGNIFICANT CHANGE UP
MONOCYTES # BLD AUTO: 0.82 K/UL — SIGNIFICANT CHANGE UP (ref 0–0.9)
MONOCYTES NFR BLD AUTO: 11 % — SIGNIFICANT CHANGE UP (ref 2–14)
MYELOCYTES NFR BLD: 4 % — HIGH (ref 0–0)
NEUTROPHILS # BLD AUTO: 5.68 K/UL — SIGNIFICANT CHANGE UP (ref 1.8–7.4)
NEUTROPHILS NFR BLD AUTO: 71 % — SIGNIFICANT CHANGE UP (ref 43–77)
NEUTS BAND # BLD: 5 % — SIGNIFICANT CHANGE UP (ref 0–8)
NRBC # BLD: 0 /100 — SIGNIFICANT CHANGE UP (ref 0–0)
NRBC # BLD: SIGNIFICANT CHANGE UP /100 WBCS (ref 0–0)
PLAT MORPH BLD: NORMAL — SIGNIFICANT CHANGE UP
PLATELET # BLD AUTO: 7 K/UL — CRITICAL LOW (ref 150–400)
POIKILOCYTOSIS BLD QL AUTO: SLIGHT — SIGNIFICANT CHANGE UP
RBC # BLD: 3.25 M/UL — LOW (ref 3.8–5.2)
RBC # FLD: 15.9 % — HIGH (ref 10.3–14.5)
RBC BLD AUTO: SIGNIFICANT CHANGE UP
WBC # BLD: 7.47 K/UL — SIGNIFICANT CHANGE UP (ref 3.8–10.5)
WBC # FLD AUTO: 7.47 K/UL — SIGNIFICANT CHANGE UP (ref 3.8–10.5)

## 2023-05-18 ENCOUNTER — APPOINTMENT (OUTPATIENT)
Age: 63
End: 2023-05-18

## 2023-05-22 ENCOUNTER — RESULT REVIEW (OUTPATIENT)
Age: 63
End: 2023-05-22

## 2023-05-22 ENCOUNTER — APPOINTMENT (OUTPATIENT)
Age: 63
End: 2023-05-22

## 2023-05-22 LAB
BASOPHILS # BLD AUTO: 0.03 K/UL — SIGNIFICANT CHANGE UP (ref 0–0.2)
BASOPHILS NFR BLD AUTO: 0.8 % — SIGNIFICANT CHANGE UP (ref 0–2)
EOSINOPHIL # BLD AUTO: 0 K/UL — SIGNIFICANT CHANGE UP (ref 0–0.5)
EOSINOPHIL NFR BLD AUTO: 0 % — SIGNIFICANT CHANGE UP (ref 0–6)
HCT VFR BLD CALC: 24.8 % — LOW (ref 34.5–45)
HGB BLD-MCNC: 8.2 G/DL — LOW (ref 11.5–15.5)
IMM GRANULOCYTES NFR BLD AUTO: 1.8 % — HIGH (ref 0–0.9)
LYMPHOCYTES # BLD AUTO: 0.21 K/UL — LOW (ref 1–3.3)
LYMPHOCYTES # BLD AUTO: 5.4 % — LOW (ref 13–44)
MCHC RBC-ENTMCNC: 29.6 PG — SIGNIFICANT CHANGE UP (ref 27–34)
MCHC RBC-ENTMCNC: 33.1 GM/DL — SIGNIFICANT CHANGE UP (ref 32–36)
MCV RBC AUTO: 89.5 FL — SIGNIFICANT CHANGE UP (ref 80–100)
MONOCYTES # BLD AUTO: 0.64 K/UL — SIGNIFICANT CHANGE UP (ref 0–0.9)
MONOCYTES NFR BLD AUTO: 16.3 % — HIGH (ref 2–14)
NEUTROPHILS # BLD AUTO: 2.97 K/UL — SIGNIFICANT CHANGE UP (ref 1.8–7.4)
NEUTROPHILS NFR BLD AUTO: 75.7 % — SIGNIFICANT CHANGE UP (ref 43–77)
NRBC # BLD: 0 /100 WBCS — SIGNIFICANT CHANGE UP (ref 0–0)
PLATELET # BLD AUTO: 48 K/UL — LOW (ref 150–400)
RBC # BLD: 2.77 M/UL — LOW (ref 3.8–5.2)
RBC # FLD: 17.2 % — HIGH (ref 10.3–14.5)
WBC # BLD: 3.92 K/UL — SIGNIFICANT CHANGE UP (ref 3.8–10.5)
WBC # FLD AUTO: 3.92 K/UL — SIGNIFICANT CHANGE UP (ref 3.8–10.5)

## 2023-05-23 ENCOUNTER — APPOINTMENT (OUTPATIENT)
Age: 63
End: 2023-05-23

## 2023-05-24 ENCOUNTER — NON-APPOINTMENT (OUTPATIENT)
Age: 63
End: 2023-05-24

## 2023-05-25 ENCOUNTER — APPOINTMENT (OUTPATIENT)
Age: 63
End: 2023-05-25

## 2023-05-25 ENCOUNTER — RESULT REVIEW (OUTPATIENT)
Age: 63
End: 2023-05-25

## 2023-05-25 LAB
BASOPHILS # BLD AUTO: 0.02 K/UL — SIGNIFICANT CHANGE UP (ref 0–0.2)
BASOPHILS NFR BLD AUTO: 0.8 % — SIGNIFICANT CHANGE UP (ref 0–2)
EOSINOPHIL # BLD AUTO: 0 K/UL — SIGNIFICANT CHANGE UP (ref 0–0.5)
EOSINOPHIL NFR BLD AUTO: 0 % — SIGNIFICANT CHANGE UP (ref 0–6)
HCT VFR BLD CALC: 23.4 % — LOW (ref 34.5–45)
HGB BLD-MCNC: 7.6 G/DL — LOW (ref 11.5–15.5)
IMM GRANULOCYTES NFR BLD AUTO: 1.2 % — HIGH (ref 0–0.9)
LYMPHOCYTES # BLD AUTO: 0.31 K/UL — LOW (ref 1–3.3)
LYMPHOCYTES # BLD AUTO: 12.2 % — LOW (ref 13–44)
MCHC RBC-ENTMCNC: 29.2 PG — SIGNIFICANT CHANGE UP (ref 27–34)
MCHC RBC-ENTMCNC: 32.5 GM/DL — SIGNIFICANT CHANGE UP (ref 32–36)
MCV RBC AUTO: 90 FL — SIGNIFICANT CHANGE UP (ref 80–100)
MONOCYTES # BLD AUTO: 0.36 K/UL — SIGNIFICANT CHANGE UP (ref 0–0.9)
MONOCYTES NFR BLD AUTO: 14.1 % — HIGH (ref 2–14)
NEUTROPHILS # BLD AUTO: 1.83 K/UL — SIGNIFICANT CHANGE UP (ref 1.8–7.4)
NEUTROPHILS NFR BLD AUTO: 71.7 % — SIGNIFICANT CHANGE UP (ref 43–77)
NRBC # BLD: 0 /100 WBCS — SIGNIFICANT CHANGE UP (ref 0–0)
PLATELET # BLD AUTO: 52 K/UL — LOW (ref 150–400)
RBC # BLD: 2.6 M/UL — LOW (ref 3.8–5.2)
RBC # FLD: 18.1 % — HIGH (ref 10.3–14.5)
WBC # BLD: 2.55 K/UL — LOW (ref 3.8–10.5)
WBC # FLD AUTO: 2.55 K/UL — LOW (ref 3.8–10.5)

## 2023-05-26 ENCOUNTER — APPOINTMENT (OUTPATIENT)
Age: 63
End: 2023-05-26

## 2023-05-30 ENCOUNTER — RESULT REVIEW (OUTPATIENT)
Age: 63
End: 2023-05-30

## 2023-05-30 ENCOUNTER — APPOINTMENT (OUTPATIENT)
Age: 63
End: 2023-05-30

## 2023-05-30 LAB
ANISOCYTOSIS BLD QL: SIGNIFICANT CHANGE UP
BASOPHILS # BLD AUTO: 0.02 K/UL — SIGNIFICANT CHANGE UP (ref 0–0.2)
BASOPHILS NFR BLD AUTO: 1 % — SIGNIFICANT CHANGE UP (ref 0–2)
DACRYOCYTES BLD QL SMEAR: SLIGHT — SIGNIFICANT CHANGE UP
EOSINOPHIL # BLD AUTO: 0.02 K/UL — SIGNIFICANT CHANGE UP (ref 0–0.5)
EOSINOPHIL NFR BLD AUTO: 1 % — SIGNIFICANT CHANGE UP (ref 0–6)
HCT VFR BLD CALC: 22.2 % — LOW (ref 34.5–45)
HGB BLD-MCNC: 7.3 G/DL — LOW (ref 11.5–15.5)
LYMPHOCYTES # BLD AUTO: 0.5 K/UL — LOW (ref 1–3.3)
LYMPHOCYTES # BLD AUTO: 21 % — SIGNIFICANT CHANGE UP (ref 13–44)
MACROCYTES BLD QL: SLIGHT — SIGNIFICANT CHANGE UP
MCHC RBC-ENTMCNC: 30.2 PG — SIGNIFICANT CHANGE UP (ref 27–34)
MCHC RBC-ENTMCNC: 32.9 GM/DL — SIGNIFICANT CHANGE UP (ref 32–36)
MCV RBC AUTO: 91.7 FL — SIGNIFICANT CHANGE UP (ref 80–100)
MICROCYTES BLD QL: SLIGHT — SIGNIFICANT CHANGE UP
MONOCYTES # BLD AUTO: 0.38 K/UL — SIGNIFICANT CHANGE UP (ref 0–0.9)
MONOCYTES NFR BLD AUTO: 16 % — HIGH (ref 2–14)
NEUTROPHILS # BLD AUTO: 1.46 K/UL — LOW (ref 1.8–7.4)
NEUTROPHILS NFR BLD AUTO: 61 % — SIGNIFICANT CHANGE UP (ref 43–77)
NRBC # BLD: 1 /100 — HIGH (ref 0–0)
NRBC # BLD: SIGNIFICANT CHANGE UP /100 WBCS (ref 0–0)
OVALOCYTES BLD QL SMEAR: SLIGHT — SIGNIFICANT CHANGE UP
PLAT MORPH BLD: NORMAL — SIGNIFICANT CHANGE UP
PLATELET # BLD AUTO: 48 K/UL — LOW (ref 150–400)
POIKILOCYTOSIS BLD QL AUTO: SLIGHT — SIGNIFICANT CHANGE UP
POLYCHROMASIA BLD QL SMEAR: SLIGHT — SIGNIFICANT CHANGE UP
RBC # BLD: 2.42 M/UL — LOW (ref 3.8–5.2)
RBC # FLD: 20.7 % — HIGH (ref 10.3–14.5)
RBC BLD AUTO: SIGNIFICANT CHANGE UP
WBC # BLD: 2.4 K/UL — LOW (ref 3.8–10.5)
WBC # FLD AUTO: 2.4 K/UL — LOW (ref 3.8–10.5)

## 2023-05-31 ENCOUNTER — APPOINTMENT (OUTPATIENT)
Age: 63
End: 2023-05-31

## 2023-06-05 ENCOUNTER — RESULT REVIEW (OUTPATIENT)
Age: 63
End: 2023-06-05

## 2023-06-05 ENCOUNTER — APPOINTMENT (OUTPATIENT)
Age: 63
End: 2023-06-05

## 2023-06-05 LAB
ANISOCYTOSIS BLD QL: SLIGHT — SIGNIFICANT CHANGE UP
BASOPHILS # BLD AUTO: 0.03 K/UL — SIGNIFICANT CHANGE UP (ref 0–0.2)
BASOPHILS NFR BLD AUTO: 1 % — SIGNIFICANT CHANGE UP (ref 0–2)
DACRYOCYTES BLD QL SMEAR: SLIGHT — SIGNIFICANT CHANGE UP
EOSINOPHIL # BLD AUTO: 0.08 K/UL — SIGNIFICANT CHANGE UP (ref 0–0.5)
EOSINOPHIL NFR BLD AUTO: 3 % — SIGNIFICANT CHANGE UP (ref 0–6)
HCT VFR BLD CALC: 26.6 % — LOW (ref 34.5–45)
HGB BLD-MCNC: 8.8 G/DL — LOW (ref 11.5–15.5)
LG PLATELETS BLD QL AUTO: SLIGHT — SIGNIFICANT CHANGE UP
LYMPHOCYTES # BLD AUTO: 0.48 K/UL — LOW (ref 1–3.3)
LYMPHOCYTES # BLD AUTO: 19 % — SIGNIFICANT CHANGE UP (ref 13–44)
MACROCYTES BLD QL: SLIGHT — SIGNIFICANT CHANGE UP
MCHC RBC-ENTMCNC: 31 PG — SIGNIFICANT CHANGE UP (ref 27–34)
MCHC RBC-ENTMCNC: 33.1 GM/DL — SIGNIFICANT CHANGE UP (ref 32–36)
MCV RBC AUTO: 93.7 FL — SIGNIFICANT CHANGE UP (ref 80–100)
MICROCYTES BLD QL: SLIGHT — SIGNIFICANT CHANGE UP
MONOCYTES # BLD AUTO: 0.82 K/UL — SIGNIFICANT CHANGE UP (ref 0–0.9)
MONOCYTES NFR BLD AUTO: 32 % — HIGH (ref 2–14)
NEUTROPHILS # BLD AUTO: 1.12 K/UL — LOW (ref 1.8–7.4)
NEUTROPHILS NFR BLD AUTO: 42 % — LOW (ref 43–77)
NEUTS BAND # BLD: 2 % — SIGNIFICANT CHANGE UP (ref 0–8)
NRBC # BLD: 0 /100 — SIGNIFICANT CHANGE UP (ref 0–0)
NRBC # BLD: SIGNIFICANT CHANGE UP /100 WBCS (ref 0–0)
OVALOCYTES BLD QL SMEAR: SLIGHT — SIGNIFICANT CHANGE UP
PLAT MORPH BLD: NORMAL — SIGNIFICANT CHANGE UP
PLATELET # BLD AUTO: 83 K/UL — LOW (ref 150–400)
POIKILOCYTOSIS BLD QL AUTO: SLIGHT — SIGNIFICANT CHANGE UP
POLYCHROMASIA BLD QL SMEAR: SLIGHT — SIGNIFICANT CHANGE UP
RBC # BLD: 2.84 M/UL — LOW (ref 3.8–5.2)
RBC # FLD: 23.1 % — HIGH (ref 10.3–14.5)
RBC BLD AUTO: SIGNIFICANT CHANGE UP
VARIANT LYMPHS # BLD: 1 % — SIGNIFICANT CHANGE UP (ref 0–6)
WBC # BLD: 2.55 K/UL — LOW (ref 3.8–10.5)
WBC # FLD AUTO: 2.55 K/UL — LOW (ref 3.8–10.5)

## 2023-06-05 NOTE — DIETITIAN INITIAL EVALUATION ADULT - OTHER CALCULATIONS
Pt notified of results.
The x-ray is done and does not show any fracture or please call Emiliano Mann regarding lab results and tell him  : Compression of the spine.  Go ahead and schedule the physical therapy that we ordered at your visit last week
Based on upper  pounds. Fluid needs deferred to team.

## 2023-06-07 ENCOUNTER — LABORATORY RESULT (OUTPATIENT)
Age: 63
End: 2023-06-07

## 2023-06-08 ENCOUNTER — RESULT REVIEW (OUTPATIENT)
Age: 63
End: 2023-06-08

## 2023-06-08 ENCOUNTER — APPOINTMENT (OUTPATIENT)
Age: 63
End: 2023-06-08

## 2023-06-08 LAB
BASOPHILS # BLD AUTO: 0.01 K/UL — SIGNIFICANT CHANGE UP (ref 0–0.2)
BASOPHILS NFR BLD AUTO: 0.4 % — SIGNIFICANT CHANGE UP (ref 0–2)
EOSINOPHIL # BLD AUTO: 0.1 K/UL — SIGNIFICANT CHANGE UP (ref 0–0.5)
EOSINOPHIL NFR BLD AUTO: 3.9 % — SIGNIFICANT CHANGE UP (ref 0–6)
HCT VFR BLD CALC: 27.6 % — LOW (ref 34.5–45)
HGB BLD-MCNC: 9.1 G/DL — LOW (ref 11.5–15.5)
IMM GRANULOCYTES NFR BLD AUTO: 0.8 % — SIGNIFICANT CHANGE UP (ref 0–0.9)
LYMPHOCYTES # BLD AUTO: 0.71 K/UL — LOW (ref 1–3.3)
LYMPHOCYTES # BLD AUTO: 27.7 % — SIGNIFICANT CHANGE UP (ref 13–44)
MCHC RBC-ENTMCNC: 31.7 PG — SIGNIFICANT CHANGE UP (ref 27–34)
MCHC RBC-ENTMCNC: 33 GM/DL — SIGNIFICANT CHANGE UP (ref 32–36)
MCV RBC AUTO: 96.2 FL — SIGNIFICANT CHANGE UP (ref 80–100)
MONOCYTES # BLD AUTO: 0.54 K/UL — SIGNIFICANT CHANGE UP (ref 0–0.9)
MONOCYTES NFR BLD AUTO: 21.1 % — HIGH (ref 2–14)
NEUTROPHILS # BLD AUTO: 1.18 K/UL — LOW (ref 1.8–7.4)
NEUTROPHILS NFR BLD AUTO: 46.1 % — SIGNIFICANT CHANGE UP (ref 43–77)
NRBC # BLD: 0 /100 WBCS — SIGNIFICANT CHANGE UP (ref 0–0)
PLATELET # BLD AUTO: 107 K/UL — LOW (ref 150–400)
RBC # BLD: 2.87 M/UL — LOW (ref 3.8–5.2)
RBC # FLD: 23.9 % — HIGH (ref 10.3–14.5)
WBC # BLD: 2.56 K/UL — LOW (ref 3.8–10.5)
WBC # FLD AUTO: 2.56 K/UL — LOW (ref 3.8–10.5)

## 2023-06-12 ENCOUNTER — INPATIENT (INPATIENT)
Facility: HOSPITAL | Age: 63
LOS: 3 days | Discharge: HOME | DRG: 837 | End: 2023-06-16
Attending: INTERNAL MEDICINE | Admitting: INTERNAL MEDICINE
Payer: MEDICARE

## 2023-06-12 ENCOUNTER — TRANSCRIPTION ENCOUNTER (OUTPATIENT)
Age: 63
End: 2023-06-12

## 2023-06-12 VITALS
SYSTOLIC BLOOD PRESSURE: 133 MMHG | TEMPERATURE: 98 F | HEART RATE: 86 BPM | RESPIRATION RATE: 17 BRPM | DIASTOLIC BLOOD PRESSURE: 86 MMHG | OXYGEN SATURATION: 97 %

## 2023-06-12 DIAGNOSIS — C91.00 ACUTE LYMPHOBLASTIC LEUKEMIA NOT HAVING ACHIEVED REMISSION: ICD-10-CM

## 2023-06-12 LAB
ALBUMIN SERPL ELPH-MCNC: 3.9 G/DL — SIGNIFICANT CHANGE UP (ref 3.3–5)
ALP SERPL-CCNC: 75 U/L — SIGNIFICANT CHANGE UP (ref 40–120)
ALT FLD-CCNC: 12 U/L — SIGNIFICANT CHANGE UP (ref 10–45)
ANION GAP SERPL CALC-SCNC: 13 MMOL/L — SIGNIFICANT CHANGE UP (ref 5–17)
ANISOCYTOSIS BLD QL: SLIGHT — SIGNIFICANT CHANGE UP
APTT BLD: 30.8 SEC — SIGNIFICANT CHANGE UP (ref 27.5–35.5)
AST SERPL-CCNC: 21 U/L — SIGNIFICANT CHANGE UP (ref 10–40)
BASOPHILS # BLD AUTO: 0.05 K/UL — SIGNIFICANT CHANGE UP (ref 0–0.2)
BASOPHILS NFR BLD AUTO: 1.7 % — SIGNIFICANT CHANGE UP (ref 0–2)
BILIRUB SERPL-MCNC: 0.4 MG/DL — SIGNIFICANT CHANGE UP (ref 0.2–1.2)
BUN SERPL-MCNC: 9 MG/DL — SIGNIFICANT CHANGE UP (ref 7–23)
CALCIUM SERPL-MCNC: 9.4 MG/DL — SIGNIFICANT CHANGE UP (ref 8.4–10.5)
CHLORIDE SERPL-SCNC: 102 MMOL/L — SIGNIFICANT CHANGE UP (ref 96–108)
CO2 SERPL-SCNC: 25 MMOL/L — SIGNIFICANT CHANGE UP (ref 22–31)
CREAT SERPL-MCNC: 0.91 MG/DL — SIGNIFICANT CHANGE UP (ref 0.5–1.3)
DACRYOCYTES BLD QL SMEAR: SLIGHT — SIGNIFICANT CHANGE UP
EGFR: 70 ML/MIN/1.73M2 — SIGNIFICANT CHANGE UP
EOSINOPHIL # BLD AUTO: 0.03 K/UL — SIGNIFICANT CHANGE UP (ref 0–0.5)
EOSINOPHIL NFR BLD AUTO: 0.9 % — SIGNIFICANT CHANGE UP (ref 0–6)
FIBRINOGEN PPP-MCNC: 536 MG/DL — HIGH (ref 200–445)
GLUCOSE SERPL-MCNC: 98 MG/DL — SIGNIFICANT CHANGE UP (ref 70–99)
HCT VFR BLD CALC: 27.3 % — LOW (ref 34.5–45)
HGB BLD-MCNC: 8.7 G/DL — LOW (ref 11.5–15.5)
INR BLD: 1.08 RATIO — SIGNIFICANT CHANGE UP (ref 0.88–1.16)
LDH SERPL L TO P-CCNC: 217 U/L — SIGNIFICANT CHANGE UP (ref 50–242)
LYMPHOCYTES # BLD AUTO: 0.51 K/UL — LOW (ref 1–3.3)
LYMPHOCYTES # BLD AUTO: 17.4 % — SIGNIFICANT CHANGE UP (ref 13–44)
MACROCYTES BLD QL: SLIGHT — SIGNIFICANT CHANGE UP
MAGNESIUM SERPL-MCNC: 2 MG/DL — SIGNIFICANT CHANGE UP (ref 1.6–2.6)
MANUAL SMEAR VERIFICATION: SIGNIFICANT CHANGE UP
MCHC RBC-ENTMCNC: 31.1 PG — SIGNIFICANT CHANGE UP (ref 27–34)
MCHC RBC-ENTMCNC: 31.9 GM/DL — LOW (ref 32–36)
MCV RBC AUTO: 97.5 FL — SIGNIFICANT CHANGE UP (ref 80–100)
MICROCYTES BLD QL: SLIGHT — SIGNIFICANT CHANGE UP
MONOCYTES # BLD AUTO: 0.31 K/UL — SIGNIFICANT CHANGE UP (ref 0–0.9)
MONOCYTES NFR BLD AUTO: 10.4 % — SIGNIFICANT CHANGE UP (ref 2–14)
NEUTROPHILS # BLD AUTO: 2.05 K/UL — SIGNIFICANT CHANGE UP (ref 1.8–7.4)
NEUTROPHILS NFR BLD AUTO: 69.6 % — SIGNIFICANT CHANGE UP (ref 43–77)
OVALOCYTES BLD QL SMEAR: SLIGHT — SIGNIFICANT CHANGE UP
PH UR: 6 — SIGNIFICANT CHANGE UP (ref 5–8)
PH UR: 8.5 — HIGH (ref 5–8)
PHOSPHATE SERPL-MCNC: 3.8 MG/DL — SIGNIFICANT CHANGE UP (ref 2.5–4.5)
PLAT MORPH BLD: NORMAL — SIGNIFICANT CHANGE UP
PLATELET # BLD AUTO: 111 K/UL — LOW (ref 150–400)
POIKILOCYTOSIS BLD QL AUTO: SLIGHT — SIGNIFICANT CHANGE UP
POLYCHROMASIA BLD QL SMEAR: SLIGHT — SIGNIFICANT CHANGE UP
POTASSIUM SERPL-MCNC: 3.8 MMOL/L — SIGNIFICANT CHANGE UP (ref 3.5–5.3)
POTASSIUM SERPL-SCNC: 3.8 MMOL/L — SIGNIFICANT CHANGE UP (ref 3.5–5.3)
PROT SERPL-MCNC: 6.2 G/DL — SIGNIFICANT CHANGE UP (ref 6–8.3)
PROTHROM AB SERPL-ACNC: 12.4 SEC — SIGNIFICANT CHANGE UP (ref 10.5–13.4)
RBC # BLD: 2.8 M/UL — LOW (ref 3.8–5.2)
RBC # FLD: 24.5 % — HIGH (ref 10.3–14.5)
RBC BLD AUTO: ABNORMAL
SODIUM SERPL-SCNC: 140 MMOL/L — SIGNIFICANT CHANGE UP (ref 135–145)
URATE SERPL-MCNC: 5 MG/DL — SIGNIFICANT CHANGE UP (ref 2.5–7)
WBC # BLD: 2.94 K/UL — LOW (ref 3.8–10.5)
WBC # FLD AUTO: 2.94 K/UL — LOW (ref 3.8–10.5)

## 2023-06-12 PROCEDURE — 99223 1ST HOSP IP/OBS HIGH 75: CPT | Mod: FS

## 2023-06-12 RX ORDER — CHLORHEXIDINE GLUCONATE 213 G/1000ML
1 SOLUTION TOPICAL
Refills: 0 | Status: DISCONTINUED | OUTPATIENT
Start: 2023-06-12 | End: 2023-06-16

## 2023-06-12 RX ORDER — ACETAMINOPHEN 500 MG
650 TABLET ORAL EVERY 6 HOURS
Refills: 0 | Status: DISCONTINUED | OUTPATIENT
Start: 2023-06-12 | End: 2023-06-16

## 2023-06-12 RX ORDER — METOCLOPRAMIDE HCL 10 MG
10 TABLET ORAL EVERY 6 HOURS
Refills: 0 | Status: DISCONTINUED | OUTPATIENT
Start: 2023-06-12 | End: 2023-06-14

## 2023-06-12 RX ORDER — FAMOTIDINE 10 MG/ML
20 INJECTION INTRAVENOUS DAILY
Refills: 0 | Status: DISCONTINUED | OUTPATIENT
Start: 2023-06-13 | End: 2023-06-16

## 2023-06-12 RX ORDER — FOSAPREPITANT DIMEGLUMINE 150 MG/5ML
150 INJECTION, POWDER, LYOPHILIZED, FOR SOLUTION INTRAVENOUS ONCE
Refills: 0 | Status: COMPLETED | OUTPATIENT
Start: 2023-06-12 | End: 2023-06-12

## 2023-06-12 RX ORDER — PANTOPRAZOLE SODIUM 20 MG/1
40 TABLET, DELAYED RELEASE ORAL
Refills: 0 | Status: DISCONTINUED | OUTPATIENT
Start: 2023-06-12 | End: 2023-06-12

## 2023-06-12 RX ORDER — SALIVA SUBSTITUTE COMB NO.11 351 MG
15 POWDER IN PACKET (EA) MUCOUS MEMBRANE
Refills: 0 | Status: DISCONTINUED | OUTPATIENT
Start: 2023-06-12 | End: 2023-06-16

## 2023-06-12 RX ORDER — ACYCLOVIR SODIUM 500 MG
1 VIAL (EA) INTRAVENOUS
Refills: 0 | DISCHARGE

## 2023-06-12 RX ORDER — METHOTREXATE 2.5 MG/1
1528 TABLET ORAL ONCE
Refills: 0 | Status: COMPLETED | OUTPATIENT
Start: 2023-06-12 | End: 2023-06-12

## 2023-06-12 RX ORDER — METHOTREXATE 2.5 MG/1
382 TABLET ORAL ONCE
Refills: 0 | Status: COMPLETED | OUTPATIENT
Start: 2023-06-12 | End: 2023-06-12

## 2023-06-12 RX ORDER — ESCITALOPRAM OXALATE 10 MG/1
10 TABLET, FILM COATED ORAL DAILY
Refills: 0 | Status: DISCONTINUED | OUTPATIENT
Start: 2023-06-12 | End: 2023-06-16

## 2023-06-12 RX ORDER — SODIUM CHLORIDE 9 MG/ML
10 INJECTION INTRAMUSCULAR; INTRAVENOUS; SUBCUTANEOUS
Refills: 0 | Status: DISCONTINUED | OUTPATIENT
Start: 2023-06-12 | End: 2023-06-16

## 2023-06-12 RX ORDER — SODIUM BICARBONATE 1 MEQ/ML
0.25 SYRINGE (ML) INTRAVENOUS
Qty: 150 | Refills: 0 | Status: DISCONTINUED | OUTPATIENT
Start: 2023-06-12 | End: 2023-06-14

## 2023-06-12 RX ORDER — FAMOTIDINE 10 MG/ML
1 INJECTION INTRAVENOUS
Qty: 0 | Refills: 0 | DISCHARGE
Start: 2023-06-12

## 2023-06-12 RX ORDER — ACYCLOVIR SODIUM 500 MG
400 VIAL (EA) INTRAVENOUS
Refills: 0 | Status: DISCONTINUED | OUTPATIENT
Start: 2023-06-12 | End: 2023-06-16

## 2023-06-12 RX ORDER — LOSARTAN POTASSIUM 100 MG/1
100 TABLET, FILM COATED ORAL DAILY
Refills: 0 | Status: DISCONTINUED | OUTPATIENT
Start: 2023-06-12 | End: 2023-06-16

## 2023-06-12 RX ADMIN — METHOTREXATE 382 MILLIGRAM(S): 2.5 TABLET ORAL at 17:23

## 2023-06-12 RX ADMIN — CHLORHEXIDINE GLUCONATE 1 APPLICATION(S): 213 SOLUTION TOPICAL at 12:06

## 2023-06-12 RX ADMIN — Medication 150 MEQ/KG/HR: at 21:00

## 2023-06-12 RX ADMIN — Medication 150 MEQ/KG/HR: at 11:47

## 2023-06-12 RX ADMIN — METHOTREXATE 1528 MILLIGRAM(S): 2.5 TABLET ORAL at 20:00

## 2023-06-12 RX ADMIN — Medication 15 MILLILITER(S): at 12:07

## 2023-06-12 RX ADMIN — FOSAPREPITANT DIMEGLUMINE 300 MILLIGRAM(S): 150 INJECTION, POWDER, LYOPHILIZED, FOR SOLUTION INTRAVENOUS at 16:16

## 2023-06-12 NOTE — DISCHARGE NOTE PROVIDER - NSDCMRMEDTOKEN_GEN_ALL_CORE_FT
acyclovir 400 mg oral tablet: 1 tab(s) orally 2 times a day, check with outpatient provider regarding when to stop  escitalopram 10 mg oral tablet: 1 tab(s) orally once a day  famotidine 20 mg oral tablet: 1 tab(s) orally once a day  losartan 100 mg oral tablet: 1 tab(s) orally once a day  metoclopramide 10 mg oral tablet: 1 tab(s) orally every 6 hours as needed for  nausea  omeprazole 20 mg oral delayed release capsule: 1 cap(s) orally once a day When methotrexate level is cleared   acyclovir 400 mg oral tablet: 1 tab(s) orally 2 times a day, check with outpatient provider regarding when to stop  escitalopram 10 mg oral tablet: 1 tab(s) orally once a day  famotidine 20 mg oral tablet: 1 tab(s) orally once a day When Omeprazole is on hold  fluconazole 200 mg oral tablet: 1 orally once a day stop when instruted by outpatient provider  leucovorin 10 mg oral tablet: 1 orally every 6 hours ask outpatient provider when to stop it  levoFLOXacin 500 mg oral tablet: 1 orally once a day stop when instructed by outpatient provider  losartan 100 mg oral tablet: 1 tab(s) orally once a day  metoclopramide 10 mg oral tablet: 1 tab(s) orally every 6 hours as needed for  nausea  omeprazole 20 mg oral delayed release capsule: 1 cap(s) orally once a day When methotrexate level is cleared  prednisoLONE acetate 1% ophthalmic suspension: 2 drop(s) to each affected eye every 6 hours x 2 days then stop   acyclovir 400 mg oral tablet: 1 tab(s) orally 2 times a day, check with outpatient provider regarding when to stop  escitalopram 10 mg oral tablet: 1 tab(s) orally once a day  famotidine 20 mg oral tablet: 1 tab(s) orally once a day When Omeprazole is on hold  fluconazole 200 mg oral tablet: 1 orally once a day stop when instruted by outpatient provider  leucovorin 10 mg oral tablet: 1 orally every 6 hours ask outpatient provider when to stop it  levoFLOXacin 500 mg oral tablet: 1 orally once a day stop when instructed by outpatient provider  losartan 100 mg oral tablet: 1 tab(s) orally once a day  metoclopramide 10 mg oral tablet: 1 tab(s) orally every 6 hours as needed for  nausea  omeprazole 20 mg oral delayed release capsule: 1 cap(s) orally once a day When methotrexate level is cleared  prednisoLONE acetate 1% ophthalmic suspension: 2 drop(s) to each affected eye every 6 hours x 2 days then stop  ZyPREXA 2.5 mg oral tablet: 1 tab(s) orally once a day   acyclovir 400 mg oral tablet: 1 tab(s) orally 2 times a day, check with outpatient provider regarding when to stop  escitalopram 10 mg oral tablet: 1 tab(s) orally once a day  famotidine 20 mg oral tablet: 1 tab(s) orally once a day When Omeprazole is on hold  fluconazole 200 mg oral tablet: 1 orally once a day stop when instruted by outpatient provider  leucovorin 10 mg oral tablet: 1 tab(s) orally every 6 hours ask outpatient provider when to stop it  levoFLOXacin 500 mg oral tablet: 1 orally once a day stop when instructed by outpatient provider  losartan 100 mg oral tablet: 1 tab(s) orally once a day  metoclopramide 10 mg oral tablet: 1 tab(s) orally every 6 hours as needed for  nausea  omeprazole 20 mg oral delayed release capsule: 1 cap(s) orally once a day When methotrexate level is cleared  prednisoLONE acetate 1% ophthalmic suspension: 2 drop(s) to each affected eye every 6 hours x 2 days then stop

## 2023-06-12 NOTE — DISCHARGE NOTE PROVIDER - NSDCFUADDAPPT_GEN_ALL_CORE_FT
To receive Fulphila on Saturday 6/17 at ____  To [facility] on _____ for blood work, possible Platelet transfusion and LP   To Dr. Lewis follow up on ____   To [facility] on ____ to see ____   You are scheduled to receive a  Fulphila injection on Saturday 6/17 at ____ at the [facility].  To [facility]  on _____ for blood work, and possible Platelet transfusion.   You are scheduled to receive an outpatin Lumbar Puncture at the hospital on _____   You are scheduled to see see Dr. Lewis for follow up on ____   You are scheduled to see Dr Goldberg on   You are scheduled to receive a Fulphila injection on Saturday 6/17 at 1:20pm at the [facility].  To [facility]  on Monday 6/19/23 at 12:20pm for blood work, and possible Platelet transfusion.   You are scheduled to receive an outpatient Lumbar Puncture at the hospital on 6/19/23 at   You are scheduled to see see Dr. Lewis for follow up on 6/27/23 at 1:20pm  You are scheduled to see Dr Goldberg on   You are scheduled to receive a Fulphila injection on Saturday 6/17 at 1:20pm at the [facility].  To [facility]  on Monday 6/19/23 at 12:20pm for blood work, and possible Platelet transfusion.   You are scheduled to receive an outpatient Lumbar Puncture at the hospital on 6/19/23 at   You are scheduled to see see Dr. Lewis for follow up on 6/27/23 at 1:20pm You are scheduled to receive a Fulphila injection on Saturday 6/17 at 1:20pm at the [facility].  You are scheduled for a follow up appointment at [facility] with Sruthi Keyes NP on 6/19/23 at 11:45am  To [facility]  on Monday 6/19/23 at 12:20pm for blood work, and possible Platelet transfusion.  You are scheduled to receive an outpatient Lumbar Puncture at the hospital on 6/19/23 at 3:30pm   You are scheduled to see see Dr. Lewis for follow up on 6/27/23 at 1:20pm You are scheduled to receive a Fulphila injection on Saturday 6/17 at 1:20pm at the [facility].  You are scheduled for a follow up appointment at [facility] for lab work at 11am, to see  Sruthi Keyes NP on 6/19/23 at 11:45am  To [facility]  on Monday 6/19/23 at 12:20pm for  possible Platelet transfusion.  You are scheduled to receive an outpatient Lumbar Puncture at the hospital on 6/19/23 at 3:30pm   You are scheduled to see see Dr. Lewis for follow up on 6/27/23 at 1:20pm

## 2023-06-12 NOTE — H&P ADULT - HISTORY OF PRESENT ILLNESS
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 Now admitted for cycle 2B  hyperCVAD (regular dosing) Methotrexate/Cytarabine      Initially transferred from [facility] for new diagnosis of leukemia. 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 Peripheral blood BCR/ABL PCR was negative, although in her bone marrow she did have PCR for BCR/ABL p190 positive at an extremely low level of 0.001%. She had a pre-treatment echocardiogram done which showed an EF 55%,       Chemotherapy with reduced dose HyperCVAD was started on 3/3/23. Hospital course was c/b neutropenic fever, transaminitis, a rash which improved with topical steroid and atarax PRN, and also LUE cellulitis.     On admission 64 y/o F with PMHx of HTN, HLD with Ph (-) ALL diagnosed in February 2023 and now s/p cycle 1A of miini Hyper-CVAD, 1B and 2A. Now admitted for cycle 2B  hyperCVAD (regular dosing) Methotrexate/Cytarabine      Initially transferred from [facility] for new diagnosis of leukemia. 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 Peripheral blood BCR/ABL PCR was negative, although in her bone marrow she did have PCR for BCR/ABL p190 positive at an extremely low level of 0.001%. She had a pre-treatment echocardiogram done which showed an EF 55%,       Chemotherapy with reduced dose HyperCVAD was started on 3/3/23. Hospital course was c/b neutropenic fever, transaminitis, a rash which improved with topical steroid and atarax PRN, and also LUE cellulitis.     Pt endorsed presyncopal episode at outpatient visit  due to hypovolemia/dehydration, required IVF.  Pt c/o severe fatigue after discharge home with decreased appetite/po intake and nausea x 4 days. Also c/o achiness of extremities x 3 weeks.  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 Now admitted for cycle 2B  hyperCVAD (regular dosing) Methotrexate/Cytarabine      Initially transferred from [facility] for new diagnosis of leukemia. 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 Peripheral blood BCR/ABL PCR was negative, although in her bone marrow she did have PCR for BCR/ABL p190 positive at an extremely low level of 0.001%. She had a pre-treatment echocardiogram done which showed an EF 55%,       Chemotherapy with reduced dose HyperCVAD was started on 3/3/23. Hospital course was c/b neutropenic fever, transaminitis, a rash which improved with topical steroid and atarax PRN, and also LUE cellulitis.     Pt endorsed presyncopal episode at outpatient visit  due to hypovolemia/dehydration, required IVF on 5/15.  Pt c/o severe fatigue after discharge home with decreased appetite/po intake and nausea x 4 days. Also c/o achiness of extremities x 3 weeks.

## 2023-06-12 NOTE — DISCHARGE NOTE PROVIDER - NSDCFUSCHEDAPPT_GEN_ALL_CORE_FT
[facility] CC Practic  Scheduled Appointment: 06/19/2023    [facility] CC Infusio  Scheduled Appointment: 06/20/2023    [facility]  Scheduled Appointment: 06/22/2023    [facility]  Scheduled Appointment: 06/23/2023    Collin Lewis  [facility] CC Practic  Scheduled Appointment: 06/27/2023     [facility]  [facility] CC Infusio  Scheduled Appointment: 06/17/2023    [facility] MIGUEL Practic  Scheduled Appointment: 06/19/2023    [facility]  [place] CC Infusio  Scheduled Appointment: 06/19/2023    [facility] CC Infusio  Scheduled Appointment: 06/20/2023    [facility] MIGUEL Practic  Scheduled Appointment: 06/22/2023    [facility] CC Infusio  Scheduled Appointment: 06/23/2023    Collin Lewis  [facility] MIGUEL Practic  Scheduled Appointment: 06/27/2023     [facility]  [place] CC Infusio  Scheduled Appointment: 06/17/2023    [facility] CC Practic  Scheduled Appointment: 06/19/2023    [facility]  [place] CC Infusio  Scheduled Appointment: 06/19/2023    [facility]  DIAGRAD [address] OP [address] Driv  Scheduled Appointment: 06/19/2023    [facility] CC Infusio  Scheduled Appointment: 06/20/2023    [facility] CC Practic  Scheduled Appointment: 06/22/2023    [facility] CC Infusio  Scheduled Appointment: 06/23/2023    Collin Lewis  [facility] CC Practic  Scheduled Appointment: 06/27/2023     [facility] CC Infusio  Scheduled Appointment: 06/17/2023    Goldberg, Bradley  [facility]  [facility]  Scheduled Appointment: 06/19/2023    [facility] MIGUEL Practic  Scheduled Appointment: 06/19/2023    Sruthi Keyes  [facility] CC Practic  Scheduled Appointment: 06/19/2023    [facility] CC Infusio  Scheduled Appointment: 06/19/2023    [facility]  DIAGRAD [address] OP Comm Driv  Scheduled Appointment: 06/19/2023    [facility] CC Infusio  Scheduled Appointment: 06/20/2023    [facility] CC Practic  Scheduled Appointment: 06/22/2023    [facility] CC Infusio  Scheduled Appointment: 06/23/2023    Collin Lewis  [facility] MIGUEL Practic  Scheduled Appointment: 06/27/2023     [facility] CC Infusio  Scheduled Appointment: 06/17/2023    Goldberg, Bradley  [facility]  [facility]  Scheduled Appointment: 06/19/2023    [facility] MIGUEL Practic  Scheduled Appointment: 06/19/2023    [facility] Pract[facility]  Scheduled Appointment: 06/19/2023    Sruthi Keyes  [facility] CC Practic  Scheduled Appointment: 06/19/2023    [facility] CC Infusio  Scheduled Appointment: 06/19/2023    [facility]  DIAGRAD [address] OP Comm Driv  Scheduled Appointment: 06/19/2023    [facility] CC Infusio  Scheduled Appointment: 06/20/2023    [facility] CC Practic  Scheduled Appointment: 06/22/2023    [facility] CC Infusio  Scheduled Appointment: 06/23/2023    Collin Lewis  [facility] MIGUEL Practic  Scheduled Appointment: 06/27/2023

## 2023-06-12 NOTE — DISCHARGE NOTE PROVIDER - NSDCCPCAREPLAN_GEN_ALL_CORE_FT
PRINCIPAL DISCHARGE DIAGNOSIS  Diagnosis: ALL (acute lymphocytic leukemia)  Assessment and Plan of Treatment: Notify provider  or report to ER for fever greater or equal to 100.4, persistent nausea,  uncontoled vomiting, watery diarrhea, or bleeding.       PRINCIPAL DISCHARGE DIAGNOSIS  Diagnosis: ALL (acute lymphocytic leukemia)  Assessment and Plan of Treatment: Notify provider  or report to ER for fever greater or equal to 100.4, persistent nausea,  uncontoled vomiting, watery diarrhea, or bleeding.  You will be scheduled for Fulphila injection as outpatient.  You will be scheduled for another lumbar puncture as outpatient with possible platelet transfusion

## 2023-06-12 NOTE — H&P ADULT - NSHPLABSRESULTS_GEN_ALL_CORE
LABS:                            8.7    2.94  )-----------( 111      ( 12 Jun 2023 11:07 )             27.3         Mean Cell Volume : 97.5 fl  Mean Cell Hemoglobin : 31.1 pg  Mean Cell Hemoglobin Concentration : 31.9 gm/dL  Auto Neutrophil # : x  Auto Lymphocyte # : x  Auto Monocyte # : x  Auto Eosinophil # : x  Auto Basophil # : x  Auto Neutrophil % : x  Auto Lymphocyte % : x  Auto Monocyte % : x  Auto Eosinophil % : x  Auto Basophil % : x      PT/INR - ( 12 Jun 2023 11:07 )   PT: 12.4 sec;   INR: 1.08 ratio         PTT - ( 12 Jun 2023 11:07 )  PTT:30.8 sec    6/7/23 Covid 19 PCR(-) LABS:                          8.7    2.94  )-----------( 111      ( 12 Jun 2023 11:07 )             27.3         Mean Cell Volume : 97.5 fl  Mean Cell Hemoglobin : 31.1 pg  Mean Cell Hemoglobin Concentration : 31.9 gm/dL  Auto Neutrophil # : x  Auto Lymphocyte # : x  Auto Monocyte # : x  Auto Eosinophil # : x  Auto Basophil # : x  Auto Neutrophil % : x  Auto Lymphocyte % : x  Auto Monocyte % : x  Auto Eosinophil % : x  Auto Basophil % : x      06-12    140  |  102  |  9   ----------------------------<  98  3.8   |  25  |  0.91    Ca    9.4      12 Jun 2023 11:07  Phos  3.8     06-12  Mg     2.0     06-12    TPro  6.2  /  Alb  3.9  /  TBili  0.4  /  DBili  x   /  AST  21  /  ALT  12  /  AlkPhos  75  06-12        PT/INR - ( 12 Jun 2023 11:07 )   PT: 12.4 sec;   INR: 1.08 ratio         PTT - ( 12 Jun 2023 11:07 )  PTT:30.8 sec      Uric Acid 5.0        6/7/23 Covid 19 PCR(-) LABS:                          8.7    2.94  )-----------( 111      ( 12 Jun 2023 11:07 )             27.3         Mean Cell Volume : 97.5 fl  Mean Cell Hemoglobin : 31.1 pg  Mean Cell Hemoglobin Concentration : 31.9 gm/dL  Auto Neutrophil # : x  Auto Lymphocyte # : x  Auto Monocyte # : x  Auto Eosinophil # : x  Auto Basophil # : x  Auto Neutrophil % : x  Auto Lymphocyte % : x  Auto Monocyte % : x  Auto Eosinophil % : x  Auto Basophil % : x  ANC 12.17      06-12    140  |  102  |  9   ----------------------------<  98  3.8   |  25  |  0.91    Ca    9.4      12 Jun 2023 11:07  Phos  3.8     06-12  Mg     2.0     06-12    TPro  6.2  /  Alb  3.9  /  TBili  0.4  /  DBili  x   /  AST  21  /  ALT  12  /  AlkPhos  75  06-12        PT/INR - ( 12 Jun 2023 11:07 )   PT: 12.4 sec;   INR: 1.08 ratio         PTT - ( 12 Jun 2023 11:07 )  PTT:30.8 sec      Uric Acid 5.0        6/7/23 Covid 19 PCR(-)

## 2023-06-12 NOTE — DISCHARGE NOTE PROVIDER - CARE PROVIDER_API CALL
Collin Lewis  Hematology  [address] [place] [address]  Phone: [phone]  Fax: [phone]  Follow Up Time:     Goldberg, Bradley Harris  [facility]  [address] [place] [address]  Phone: [phone]  Fax: [phone]  Follow Up Time:

## 2023-06-12 NOTE — DISCHARGE NOTE PROVIDER - HOSPITAL COURSE
64 y/o F with PMHx of HTN, HLD with Ph (-) ALL diagnosed in February 2023 and now s/p cycle 1A of miini Hyper-CVAD, 1B and 2A. Now admitted for cycle 2B  hyperCVAD (regular dosing) Methotrexate/Cytarabine. Pt has pancytopenia due to chemotherapy.    Pt's Mediport was accessed. She received chemo with MTX 1g/m2 iv on day1 and Cytarabine 1 g/m2 q12h on day2, &3  Pt underwent LP with IT chemo  on  6/13, flow reveals____  CBC with diff/lytes were monitored, transfuse/replace lytes as needed. Cerebellar toxicity/nystagmus  were monitored closely   Pre forte  eye drop was given to prevent chemical conjunctivitis. PPI to pepcid  until MTX cleared.       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 Now admitted for cycle 2B  hyperCVAD (regular dosing) Methotrexate/Cytarabine. Pt has pancytopenia due to chemotherapy.    Pt's Mediport was accessed. She received  IVF with Sodium bicarb drip to alkalize urine.    When urine PH>7, she started  chemo  with MTX  200 mg/m2 iv over 2 hrs, then 800 mg/m2 iv over 22 hours on day1; Cytarabine 1 g/m2b over 2 hours  q12h on day2, 3  Leucovorin 50 mg iv started  12 hours after completion of MTX and then 15 mg iv q6h until MTX level<0.05 um  Pt underwent LP with IT chemo  on  6/13, flow reveals____  CBC with diff/lytes were monitored, transfuse/replace lytes as needed. Cerebellar toxicity/nystagmus  were monitored closely   Pre forte  eye drop was given to prevent chemical conjunctivitis. PPI to pepcid  until MTX cleared.       64 y/o F with PMHx of HTN, HLD with Ph (-) ALL diagnosed in February 2023. Patient is s/p cycle 1A of mini Hyper-CVAD (dose reduced), and full dosing for cycles 1B and 2A.   Now admitted for cycle 2B  hyperCVAD (regular dosing) with Methotrexate/Cytarabine.  Pt's Mediport was accessed. She received  IVF with Sodium bicarb drip to alkalinize urine to receive  chemotherapy with MTX. On 6/12 patient started  chemo  with MTX  200 mg/m2 iv over 2 hrs, followed by 800 mg/m2 iv over 22 hours. Cytarabine 1 g/m2 over 2 hours q12h started on 6/13  for total 4 doses. Leucovorin 50 mg iv started  12 hours after completion of MTX and then 15 mg iv q6h. MTX levels monitored and leucovorin to continue until MTX level<0.05 um  Pt underwent LP with IT MTX on  6/13, with flow revealing _______. Will require a sumaya 7 or 8 LP as outpatient with cytarabine along with fulphila.  CBC with diff/lytes were monitored. Patient was monitored for Cerebellar toxicity/nystagmus. Predforte  eye drop was given to prevent chemical conjunctivitis.        64 y/o F with PMHx of HTN, HLD with Ph (-) ALL diagnosed in February 2023. Patient is s/p cycle 1A of mini Hyper-CVAD (dose reduced), and full dosing for cycles 1B and 2A.  Now admitted for cycle 2B  hyperCVAD (regular dosing) with Methotrexate/Cytarabine.  Pt's Mediport was accessed. She received  IVF with Sodium bicarb drip to alkalinize urine to receive chemotherapy with MTX. On 6/12 patient started  chemo  with MTX  200 mg/m2 iv over 2 hrs, followed by 800 mg/m2 iv over 22 hours. Cytarabine 1 g/m2 over 2 hours q12h started on 6/13 for total 4 doses. Leucovorin 50 mg iv started  12 hours after completion of MTX and then 15 mg iv q6h. MTX levels monitored and leucovorin to continue until MTX level<0.05 um. Pt underwent LP with IT MTX on  6/13, with flow revealing _______. Will require a sumaya 7 or 8 LP as outpatient with cytarabine along with fulphila.  CBC with diff/lytes were monitored. Patient was monitored for Cerebellar toxicity/nystagmus. Predforte  eye drop was given to prevent chemical conjunctivitis.        64 y/o F with PMHx of HTN, HLD with Ph (-) ALL diagnosed in February 2023. Patient is s/p cycle 1A of mini Hyper-CVAD (dose reduced), and full dosing for cycles 1B and 2A.  Now admitted for cycle 2B  hyperCVAD (regular dosing) with Methotrexate/Cytarabine.  Pt's Mediport was accessed. She received  IVF with Sodium bicarb drip to alkalinize urine to receive chemotherapy with MTX. On 6/12 patient started  chemo  with MTX  200 mg/m2 iv over 2 hrs, followed by 800 mg/m2 iv over 22 hours. Cytarabine 1 g/m2 over 2 hours q12h started on 6/13 for total 4 doses. Leucovorin 50 mg iv started  12 hours after completion of MTX and then 15 mg iv q6h. MTX levels monitored and leucovorin to continue until MTX level<0.05 um. Pt underwent LP with IT MTX on  6/13, with flow revealing _______. Will require a sumaya 7 or 8 LP as outpatient with cytarabine along with fulphila.  CBC with diff/lytes were monitored. Patient was monitored for Cerebellar toxicity/nystagmus. Predforte  eye drop was given to prevent chemical conjunctivitis. Course complicated by nausea and antiemetic regimen adjusted accordingly to include initiation of zyprexa.   64 y/o F with PMHx of HTN, HLD with Ph (-) ALL diagnosed in February 2023. Patient is s/p cycle 1A of mini Hyper-CVAD (dose reduced), and full dosing for cycles 1B and 2A.  Now admitted for cycle 2B  hyperCVAD (regular dosing) with Methotrexate/Cytarabine.  Pt's Mediport was accessed. She received  IVF with Sodium bicarb drip to alkalinize urine to receive chemotherapy with MTX. On 6/12 patient started  chemo  with MTX  200 mg/m2 iv over 2 hrs, followed by 800 mg/m2 iv over 22 hours. Cytarabine 1 g/m2 over 2 hours q12h started on 6/13 for total 4 doses. Leucovorin 50 mg iv started  12 hours after completion of MTX and then 15 mg iv q6h. MTX levels monitored and leucovorin to continue until MTX level<0.05 um. Pt underwent LP with IT MTX on 6/13, with flow revealing insufficient cells. Will require a sumaya 7 or 8 LP as outpatient with cytarabine along with fulphila.  CBC with diff/lytes were monitored. Patient was monitored for Cerebellar toxicity/nystagmus. Predforte  eye drop was given to prevent chemical conjunctivitis. Course complicated by nausea and antiemetic regimen adjusted accordingly to include initiation of zyprexa.   64 y/o F with PMHx of HTN, HLD with Ph (-) ALL diagnosed in February 2023. Patient is s/p cycle 1A of mini Hyper-CVAD (dose reduced), and full dosing for cycles 1B and 2A.  Now admitted for cycle 2B  hyperCVAD (regular dosing) with Methotrexate/Cytarabine.  Pt's Mediport was accessed. She received  IVF with Sodium bicarb drip to alkalinize urine to receive chemotherapy with MTX. On 6/12 patient started  chemo  with MTX  200 mg/m2 iv over 2 hrs, followed by 800 mg/m2 iv over 22 hours. Cytarabine 1 g/m2 over 2 hours q12h started on 6/13 for total 4 doses. Leucovorin 50 mg iv started  12 hours after completion of MTX and then 15 mg iv q6h. MTX levels monitored and leucovorin to continue until MTX level<0.05 um. Pt underwent LP with IT MTX on 6/13, with flow revealing insufficient cells. Will require a day 7 or 8 LP as outpatient with cytarabine along with fulphila.  CBC with diff/lytes were monitored. Patient was monitored for Cerebellar toxicity/nystagmus. Predforte  eye drop was given to prevent chemical conjunctivitis. Course complicated by nausea and antiemetic regimen adjusted accordingly to include initiation of zyprexa.

## 2023-06-12 NOTE — PATIENT PROFILE ADULT - FALL HARM RISK - HARM RISK INTERVENTIONS

## 2023-06-12 NOTE — H&P ADULT - NS ATTEND AMEND GEN_ALL_CORE FT
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 Now admitted for cycle 2B  hyperCVAD (regular dosing) Methotrexate/Cytarabine. Pt has pancytopenia due to chemotherapy.      Heme-  Today is day 1 of therapy.   IV sodium bicarbonate for urine pH >7.5  Leucovorin starting 24 hours post MTX.   Daily MTX level.   Is/Os, daily weights.   LP with IT MTX day 2.

## 2023-06-12 NOTE — DISCHARGE NOTE PROVIDER - NSDCFUADDINST_GEN_ALL_CORE_FT
You are scheduled to receive a Fulphila injection on Saturday 6/17 at 1:20pm at the [facility].  To [facility]  on Monday 6/19/23 at 12:20pm for blood work, and possible Platelet transfusion.   You are scheduled to receive an outpatient Lumbar Puncture at the hospital on 6/19/23 at   You are scheduled to see see Dr. Lewis for follow up on 6/27/23 at 1:20pm  You are scheduled to see Dr Goldberg on   You are scheduled to receive a Fulphila injection on Saturday 6/17 at 1:20pm at the [facility].  To [facility] on Monday 6/19/23 at 11:45am to see Sruthi Keyes NP, 12:20pm for blood work, and possible Platelet transfusion.   You are scheduled to receive an outpatient Lumbar Puncture at the hospital on 6/19/23 at 3:30pm.   You are scheduled to see see Dr. Lewis for follow up on 6/27/23 at 1:20pm You are scheduled to receive a Fulphila injection, possible PRBCs and platelets on Saturday 6/17 at 1:20pm at the [facility].    To [facility] on Monday 6/19/23 at 11am for labs, 11:45am to see Sruthi Keyes NP, 12:20pm for blood work, and possible Platelet transfusion.     You are scheduled to receive an outpatient Lumbar Puncture at the hospital on 6/19/23 at 3:30pm.     You are scheduled to see see Dr. Lewis for follow up on 6/27/23 at 1:20pm You are scheduled to receive a Fulphila injection, possible PRBCs and platelets on Saturday 6/17 at 1:20pm at the [facility].    To [facility] on Monday 6/19/23 at 11am for labs, 11:45am to see Sruthi Keyes NP, 12:20pm for blood work, and possible Platelet transfusion.     You are scheduled to receive an outpatient Lumbar Puncture at the hospital on 6/19/23 at 3:30pm   You are scheduled to see see Dr. Lewis for follow up on 6/27/23 at 1:20pm  You are scheduled to receive an outpatient Lumbar Puncture at the [facility] on 6/19/23 at 3:30pm.     You are scheduled to see see Dr. Lewis for follow up on 6/27/23 at 1:20pm You are scheduled to receive a lab work(to include methotrexate level), Fulphila injection, possible PRBCs and platelets on Saturday 6/17 at 1:20pm at the [facility].    To [facility] on Monday 6/19/23 at 11am for labs, 11:45am to see Sruthi Keyes NP, 12:20pm for blood work, and possible Platelet transfusion.     You are scheduled to receive an outpatient Lumbar Puncture at the hospital on 6/19/23 at 3:30pm   You are scheduled to see see Dr. Lewis for follow up on 6/27/23 at 1:20pm  You are scheduled to receive an outpatient Lumbar Puncture at the [facility] on 6/19/23 at 3:30pm.     You are scheduled to see see Dr. Lewis for follow up on 6/27/23 at 1:20pm

## 2023-06-12 NOTE — H&P ADULT - CARDIOVASCULAR
normal/regular rate and rhythm/S1 S2 present/no gallops/no rub/no murmur Labs/EKG/Imaging Studies/Medications negative

## 2023-06-12 NOTE — H&P ADULT - ASSESSMENT
64 y/o F with PMHx of HTN, HLD with Ph (-) ALL diagnosed in February 2023 and now s/p cycle 1A of miini Hyper-CVAD, 1B and 2A. Now admitted for cycle 2B  hyperCVAD (regular dosing) Methotrexate/Cytarabine    62 y/o F with PMHx of HTN, HLD with Ph (-) ALL diagnosed in February 2023 and now s/p cycle 1A of miini Hyper-CVAD, 1B and 2A. Now admitted for cycle 2B  hyperCVAD (regular dosing) Methotrexate/Cytarabine. Pt has pancytopenia due to chemotherapy.

## 2023-06-12 NOTE — H&P ADULT - PROBLEM SELECTOR PLAN 1
Admit to 7 [facility]   Admission labs / Daily labs   Monitor CBC with diff/lytes, transfuse/replace lytes as needed   Chemo with MTX 1g/m2 iv on day1  Cytarabine 1 g/m2 q12h on day2, &3  LP with IT chemo  scheduled for Tuesday 6/13 Admit to 7 [facility]   Monitor CBC with diff/lytes, transfuse/replace lytes as needed   Chemo with MTX 1g/m2 iv on day1  Cytarabine 1 g/m2 q12h on day2, &3  LP with IT chemo  scheduled  on  6/13  Monitor cerebellar toxicity/nystagmus   Pre forte to prevent chemical conjunctivitis Admit to 7 [facility]   Monitor CBC with diff/lytes, transfuse/replace lytes as needed   Chemo with MTX 1g/m2 iv on day1  Cytarabine 1 g/m2 q12h on day2, &3  LP with IT chemo  scheduled  on  6/13  Monitor cerebellar toxicity/nystagmus   Pre forte to prevent chemical conjunctivitis  PPI to pepcid  until MTX cleared

## 2023-06-13 LAB
ALBUMIN SERPL ELPH-MCNC: 3.4 G/DL — SIGNIFICANT CHANGE UP (ref 3.3–5)
ALP SERPL-CCNC: 68 U/L — SIGNIFICANT CHANGE UP (ref 40–120)
ALT FLD-CCNC: 13 U/L — SIGNIFICANT CHANGE UP (ref 10–45)
ANION GAP SERPL CALC-SCNC: 9 MMOL/L — SIGNIFICANT CHANGE UP (ref 5–17)
APPEARANCE CSF: CLEAR — SIGNIFICANT CHANGE UP
APPEARANCE SPUN FLD: COLORLESS — SIGNIFICANT CHANGE UP
AST SERPL-CCNC: 22 U/L — SIGNIFICANT CHANGE UP (ref 10–40)
BASOPHILS # BLD AUTO: 0 K/UL — SIGNIFICANT CHANGE UP (ref 0–0.2)
BASOPHILS NFR BLD AUTO: 0 % — SIGNIFICANT CHANGE UP (ref 0–2)
BILIRUB SERPL-MCNC: 0.4 MG/DL — SIGNIFICANT CHANGE UP (ref 0.2–1.2)
BLD GP AB SCN SERPL QL: NEGATIVE — SIGNIFICANT CHANGE UP
BUN SERPL-MCNC: 7 MG/DL — SIGNIFICANT CHANGE UP (ref 7–23)
CALCIUM SERPL-MCNC: 9.1 MG/DL — SIGNIFICANT CHANGE UP (ref 8.4–10.5)
CHLORIDE SERPL-SCNC: 99 MMOL/L — SIGNIFICANT CHANGE UP (ref 96–108)
CO2 SERPL-SCNC: 32 MMOL/L — HIGH (ref 22–31)
COLOR CSF: SIGNIFICANT CHANGE UP
CREAT SERPL-MCNC: 0.83 MG/DL — SIGNIFICANT CHANGE UP (ref 0.5–1.3)
EGFR: 78 ML/MIN/1.73M2 — SIGNIFICANT CHANGE UP
EOSINOPHIL # BLD AUTO: 0 K/UL — SIGNIFICANT CHANGE UP (ref 0–0.5)
EOSINOPHIL NFR BLD AUTO: 0 % — SIGNIFICANT CHANGE UP (ref 0–6)
GIANT PLATELETS BLD QL SMEAR: PRESENT — SIGNIFICANT CHANGE UP
GLUCOSE CSF-MCNC: 54 MG/DL — SIGNIFICANT CHANGE UP (ref 40–70)
GLUCOSE SERPL-MCNC: 94 MG/DL — SIGNIFICANT CHANGE UP (ref 70–99)
HCT VFR BLD CALC: 25.1 % — LOW (ref 34.5–45)
HGB BLD-MCNC: 8.3 G/DL — LOW (ref 11.5–15.5)
LDH CSF L TO P-CCNC: 25 U/L — SIGNIFICANT CHANGE UP
LDH FLD-CCNC: 25 U/L — SIGNIFICANT CHANGE UP
LYMPHOCYTES # BLD AUTO: 0.54 K/UL — LOW (ref 1–3.3)
LYMPHOCYTES # BLD AUTO: 23.5 % — SIGNIFICANT CHANGE UP (ref 13–44)
MAGNESIUM SERPL-MCNC: 2 MG/DL — SIGNIFICANT CHANGE UP (ref 1.6–2.6)
MANUAL SMEAR VERIFICATION: SIGNIFICANT CHANGE UP
MCHC RBC-ENTMCNC: 32 PG — SIGNIFICANT CHANGE UP (ref 27–34)
MCHC RBC-ENTMCNC: 33.1 GM/DL — SIGNIFICANT CHANGE UP (ref 32–36)
MCV RBC AUTO: 96.9 FL — SIGNIFICANT CHANGE UP (ref 80–100)
MONOCYTES # BLD AUTO: 0.36 K/UL — SIGNIFICANT CHANGE UP (ref 0–0.9)
MONOCYTES NFR BLD AUTO: 15.6 % — HIGH (ref 2–14)
MTX SERPL-SCNC: 24.09 UMOL/L — HIGH (ref 0.5–5)
NEUTROPHILS # BLD AUTO: 1.39 K/UL — LOW (ref 1.8–7.4)
NEUTROPHILS # CSF: SIGNIFICANT CHANGE UP (ref 0–6)
NEUTROPHILS NFR BLD AUTO: 60.9 % — SIGNIFICANT CHANGE UP (ref 43–77)
NRBC NFR CSF: <1 /UL — SIGNIFICANT CHANGE UP (ref 0–5)
PH UR: 8.5 — HIGH (ref 5–8)
PH UR: 8.5 — HIGH (ref 5–8)
PHOSPHATE SERPL-MCNC: 4.2 MG/DL — SIGNIFICANT CHANGE UP (ref 2.5–4.5)
PLAT MORPH BLD: ABNORMAL
PLATELET # BLD AUTO: 102 K/UL — LOW (ref 150–400)
POTASSIUM SERPL-MCNC: 3.4 MMOL/L — LOW (ref 3.5–5.3)
POTASSIUM SERPL-SCNC: 3.4 MMOL/L — LOW (ref 3.5–5.3)
PROT CSF-MCNC: 35 MG/DL — SIGNIFICANT CHANGE UP (ref 15–45)
PROT SERPL-MCNC: 5.5 G/DL — LOW (ref 6–8.3)
RBC # BLD: 2.59 M/UL — LOW (ref 3.8–5.2)
RBC # CSF: 7 /UL — HIGH (ref 0–0)
RBC # FLD: 23.9 % — HIGH (ref 10.3–14.5)
RBC BLD AUTO: SIGNIFICANT CHANGE UP
RH IG SCN BLD-IMP: POSITIVE — SIGNIFICANT CHANGE UP
SODIUM SERPL-SCNC: 140 MMOL/L — SIGNIFICANT CHANGE UP (ref 135–145)
TUBE TYPE: SIGNIFICANT CHANGE UP
WBC # BLD: 2.28 K/UL — LOW (ref 3.8–10.5)
WBC # FLD AUTO: 2.28 K/UL — LOW (ref 3.8–10.5)

## 2023-06-13 PROCEDURE — 99233 SBSQ HOSP IP/OBS HIGH 50: CPT | Mod: FS

## 2023-06-13 PROCEDURE — 62329 THER SPI PNXR CSF FLUOR/CT: CPT

## 2023-06-13 RX ORDER — LEUCOVORIN CALCIUM 5 MG
50 TABLET ORAL ONCE
Refills: 0 | Status: COMPLETED | OUTPATIENT
Start: 2023-06-14 | End: 2023-06-14

## 2023-06-13 RX ORDER — POTASSIUM CHLORIDE 20 MEQ
20 PACKET (EA) ORAL
Refills: 0 | Status: COMPLETED | OUTPATIENT
Start: 2023-06-13 | End: 2023-06-13

## 2023-06-13 RX ORDER — PREDNISOLONE SODIUM PHOSPHATE 1 %
2 DROPS OPHTHALMIC (EYE)
Qty: 0 | Refills: 0 | DISCHARGE
Start: 2023-06-13

## 2023-06-13 RX ORDER — PREDNISOLONE SODIUM PHOSPHATE 1 %
2 DROPS OPHTHALMIC (EYE) EVERY 6 HOURS
Refills: 0 | Status: DISCONTINUED | OUTPATIENT
Start: 2023-06-13 | End: 2023-06-16

## 2023-06-13 RX ORDER — METHOTREXATE 2.5 MG/1
12 TABLET ORAL ONCE
Refills: 0 | Status: DISCONTINUED | OUTPATIENT
Start: 2023-06-13 | End: 2023-06-16

## 2023-06-13 RX ORDER — CYTARABINE 100 MG
1910 VIAL (EA) INJECTION EVERY 12 HOURS
Refills: 0 | Status: COMPLETED | OUTPATIENT
Start: 2023-06-13 | End: 2023-06-15

## 2023-06-13 RX ORDER — LEUCOVORIN CALCIUM 5 MG
15 TABLET ORAL EVERY 6 HOURS
Refills: 0 | Status: DISCONTINUED | OUTPATIENT
Start: 2023-06-14 | End: 2023-06-16

## 2023-06-13 RX ORDER — ENOXAPARIN SODIUM 100 MG/ML
40 INJECTION SUBCUTANEOUS
Refills: 0 | Status: DISCONTINUED | OUTPATIENT
Start: 2023-06-14 | End: 2023-06-16

## 2023-06-13 RX ADMIN — Medication 10 MILLIGRAM(S): at 11:26

## 2023-06-13 RX ADMIN — FAMOTIDINE 20 MILLIGRAM(S): 10 INJECTION INTRAVENOUS at 12:29

## 2023-06-13 RX ADMIN — Medication 15 MILLILITER(S): at 23:30

## 2023-06-13 RX ADMIN — Medication 10 MILLIGRAM(S): at 05:43

## 2023-06-13 RX ADMIN — Medication 400 MILLIGRAM(S): at 05:44

## 2023-06-13 RX ADMIN — Medication 400 MILLIGRAM(S): at 17:48

## 2023-06-13 RX ADMIN — Medication 650 MILLIGRAM(S): at 01:30

## 2023-06-13 RX ADMIN — Medication 2 DROP(S): at 23:32

## 2023-06-13 RX ADMIN — CHLORHEXIDINE GLUCONATE 1 APPLICATION(S): 213 SOLUTION TOPICAL at 05:44

## 2023-06-13 RX ADMIN — Medication 15 MILLILITER(S): at 00:03

## 2023-06-13 RX ADMIN — LOSARTAN POTASSIUM 100 MILLIGRAM(S): 100 TABLET, FILM COATED ORAL at 05:44

## 2023-06-13 RX ADMIN — Medication 650 MILLIGRAM(S): at 02:15

## 2023-06-13 RX ADMIN — Medication 15 MILLILITER(S): at 12:30

## 2023-06-13 RX ADMIN — Medication 20 MILLIEQUIVALENT(S): at 11:29

## 2023-06-13 RX ADMIN — Medication 20 MILLIEQUIVALENT(S): at 08:24

## 2023-06-13 RX ADMIN — Medication 150 MEQ/KG/HR: at 20:06

## 2023-06-13 RX ADMIN — Medication 1910 MILLIGRAM(S): at 19:50

## 2023-06-13 RX ADMIN — Medication 10 MILLIGRAM(S): at 23:30

## 2023-06-13 RX ADMIN — Medication 150 MEQ/KG/HR: at 05:44

## 2023-06-13 RX ADMIN — Medication 2 DROP(S): at 17:47

## 2023-06-13 RX ADMIN — Medication 15 MILLILITER(S): at 05:43

## 2023-06-13 RX ADMIN — ESCITALOPRAM OXALATE 10 MILLIGRAM(S): 10 TABLET, FILM COATED ORAL at 12:29

## 2023-06-13 RX ADMIN — Medication 0.5 MILLIGRAM(S): at 17:52

## 2023-06-13 RX ADMIN — Medication 15 MILLILITER(S): at 17:48

## 2023-06-13 NOTE — PROGRESS NOTE ADULT - PROBLEM SELECTOR PLAN 4
Lovenox on hold for LP scheduled for 6/13  Encourage OOB and ambulation           Contact information: [phone] Lovenox on hold for LP scheduled for 6/13, resume 24 hrs post LP   Encourage OOB and ambulation           Contact information: [phone]

## 2023-06-13 NOTE — PROGRESS NOTE ADULT - NS ATTEND AMEND GEN_ALL_CORE FT
64 y/o F with PMHx of HTN, HLD with Ph (-) ALL diagnosed in February 2023 and now s/p cycle 1A of miini Hyper-CVAD, 1B and 2A. Now admitted for cycle 2B  hyperCVAD (regular dosing) Methotrexate/Cytarabine. Pt has pancytopenia due to chemotherapy.      Heme-  Today is day 2 of therapy.   IV sodium bicarbonate for urine pH >7.5  Leucovorin starting 24 hours post MTX.   Daily MTX level.   Is/Os, daily weights.   LP with IT MTX day 2.

## 2023-06-13 NOTE — PROGRESS NOTE ADULT - SUBJECTIVE AND OBJECTIVE BOX
Diagnosis: B ALL ph(-)    Protocol/Chemo Regimen: cycle 2B HyperCVAD MTX/cytarabine   Day: 2     Pt endorsed:    Review of Systems: Patient denied nausea, vomiting, odynophagia, chest pain, cough, dyspnea, abdominal pain, constipation, diarrhea, rash, fatigue, headache      Pain scale:                                        Location:    Diet:     Allergies    sulfa drugs (Unknown)  Zofran (Headache)    Intolerances        ANTIMICROBIALS  acyclovir   Oral Tab/Cap 400 milliGRAM(s) Oral two times a day      HEME/ONC MEDICATIONS      STANDING MEDICATIONS  Biotene Dry Mouth Oral Rinse 15 milliLiter(s) Swish and Spit four times a day  chlorhexidine 2% Cloths 1 Application(s) Topical <User Schedule>  escitalopram 10 milliGRAM(s) Oral daily  famotidine    Tablet 20 milliGRAM(s) Oral daily  losartan 100 milliGRAM(s) Oral daily  sodium bicarbonate  Infusion 0.248 mEq/kG/Hr IV Continuous <Continuous>      PRN MEDICATIONS  acetaminophen     Tablet .. 650 milliGRAM(s) Oral every 6 hours PRN  metoclopramide Injectable 10 milliGRAM(s) IV Push every 6 hours PRN  sodium chloride 0.9% lock flush 10 milliLiter(s) IV Push every 1 hour PRN        Vital Signs Last 24 Hrs  T(C): 36.7 (13 Jun 2023 05:34), Max: 36.7 (12 Jun 2023 13:31)  T(F): 98 (13 Jun 2023 05:34), Max: 98 (12 Jun 2023 13:31)  HR: 73 (13 Jun 2023 05:34) (60 - 86)  BP: 151/87 (13 Jun 2023 05:34) (132/66 - 154/79)  BP(mean): --  RR: 18 (13 Jun 2023 05:34) (17 - 18)  SpO2: 97% (13 Jun 2023 05:34) (97% - 99%)    Parameters below as of 13 Jun 2023 05:34  Patient On (Oxygen Delivery Method): room air        PHYSICAL EXAM  General: adult in NAD  HEENT: clear oropharynx, anicteric sclera  Neck: supple  CV: normal S1/S2 RRR  Lungs: positive air movement b/l ant lungs,clear to auscultation, no wheezes, no rales  Abdomen: soft, + BS,  non-tender non-distended  Ext: no edema  Skin: no rash  Neuro: alert and oriented X 3, no focal deficits  Central Line: port CDI    LABS:                           8.7    2.94  )-----------( 111      ( 12 Jun 2023 11:07 )             27.3         Mean Cell Volume : 97.5 fl  Mean Cell Hemoglobin : 31.1 pg  Mean Cell Hemoglobin Concentration : 31.9 gm/dL  Auto Neutrophil # : 2.05 K/uL  Auto Lymphocyte # : 0.51 K/uL  Auto Monocyte # : 0.31 K/uL  Auto Eosinophil # : 0.03 K/uL  Auto Basophil # : 0.05 K/uL  Auto Neutrophil % : 69.6 %  Auto Lymphocyte % : 17.4 %  Auto Monocyte % : 10.4 %  Auto Eosinophil % : 0.9 %  Auto Basophil % : 1.7 %      06-12    140  |  102  |  9   ----------------------------<  98  3.8   |  25  |  0.91    Ca    9.4      12 Jun 2023 11:07  Phos  3.8     06-12  Mg     2.0     06-12    TPro  6.2  /  Alb  3.9  /  TBili  0.4  /  DBili  x   /  AST  21  /  ALT  12  /  AlkPhos  75  06-12      Mg 2.0  Phos 3.8      PT/INR - ( 12 Jun 2023 11:07 )   PT: 12.4 sec;   INR: 1.08 ratio         PTT - ( 12 Jun 2023 11:07 )  PTT:30.8 sec      Uric Acid 5.0        RADIOLOGY & ADDITIONAL STUDIES:         Diagnosis: B ALL ph(-)    Protocol/Chemo Regimen: cycle 2B HyperCVAD MTX/cytarabine   Day: 2     Pt endorsed: intermittent HA, resolved after Tylenol; intermittent nausea    Review of Systems: Patient denied  vomiting, odynophagia, chest pain, cough, dyspnea, abdominal pain, constipation, diarrhea, rash, fatigue      Pain scale:    3-4/10                                     Location: head    Diet: regular     Allergies:     sulfa drugs (Unknown)  Zofran (Headache)      ANTIMICROBIALS  acyclovir   Oral Tab/Cap 400 milliGRAM(s) Oral two times a day      STANDING MEDICATIONS  Biotene Dry Mouth Oral Rinse 15 milliLiter(s) Swish and Spit four times a day  chlorhexidine 2% Cloths 1 Application(s) Topical <User Schedule>  escitalopram 10 milliGRAM(s) Oral daily  famotidine    Tablet 20 milliGRAM(s) Oral daily  losartan 100 milliGRAM(s) Oral daily  sodium bicarbonate  Infusion 0.248 mEq/kG/Hr IV Continuous <Continuous>      PRN MEDICATIONS  acetaminophen     Tablet .. 650 milliGRAM(s) Oral every 6 hours PRN  metoclopramide Injectable 10 milliGRAM(s) IV Push every 6 hours PRN  sodium chloride 0.9% lock flush 10 milliLiter(s) IV Push every 1 hour PRN      Vital Signs Last 24 Hrs  T(C): 36.7 (13 Jun 2023 05:34), Max: 36.7 (12 Jun 2023 13:31)  T(F): 98 (13 Jun 2023 05:34), Max: 98 (12 Jun 2023 13:31)  HR: 73 (13 Jun 2023 05:34) (60 - 86)  BP: 151/87 (13 Jun 2023 05:34) (132/66 - 154/79)  BP(mean): --  RR: 18 (13 Jun 2023 05:34) (17 - 18)  SpO2: 97% (13 Jun 2023 05:34) (97% - 99%)    Parameters below as of 13 Jun 2023 05:34  Patient On (Oxygen Delivery Method): room air      PHYSICAL EXAM  General: adult in NAD  HEENT: clear oropharynx, anicteric sclera  Neck: supple  CV: normal S1/S2 RRR  Lungs: positive air movement b/l ant lungs,clear to auscultation, no wheezes, no rales  Abdomen: soft, + BS,  non-tender non-distended  Ext: no edema  Skin: no rash  Neuro: alert and oriented X 3, no focal deficits  Central Line: port CDI      LABS:                        8.3    2.28  )-----------( 102      ( 13 Jun 2023 06:56 )             25.1         Mean Cell Volume : 96.9 fl  Mean Cell Hemoglobin : 32.0 pg  Mean Cell Hemoglobin Concentration : 33.1 gm/dL  Auto Neutrophil # : 1.39 K/uL  Auto Lymphocyte # : 0.54 K/uL  Auto Monocyte # : 0.36 K/uL  Auto Eosinophil # : 0.00 K/uL  Auto Basophil # : 0.00 K/uL  Auto Neutrophil % : 60.9 %  Auto Lymphocyte % : 23.5 %  Auto Monocyte % : 15.6 %  Auto Eosinophil % : 0.0 %  Auto Basophil % : 0.0 %      06-13    140  |  99  |  7   ----------------------------<  94  3.4<L>   |  32<H>  |  0.83    Ca    9.1      13 Jun 2023 06:55  Phos  4.2     06-13  Mg     2.0     06-13    TPro  5.5<L>  /  Alb  3.4  /  TBili  0.4  /  DBili  x   /  AST  22  /  ALT  13  /  AlkPhos  68  06-13      PT/INR - ( 12 Jun 2023 11:07 )   PT: 12.4 sec;   INR: 1.08 ratio         PTT - ( 12 Jun 2023 11:07 )  PTT:30.8 sec      pH Urine (06.13.23 @ 06:57)    pH Urine: 8.5    Methotrexate Level, Serum (06.13.23 @ 06:56)    Methotrexate Level, Serum: 24.09      RADIOLOGY & ADDITIONAL STUDIES:    < from: Xray Lumbar Puncture, Theraputic (06.13.23 @ 10:08) >  IMPRESSION: Successful fluoroscopically guided lumbar puncture and   intrathecal chemotherapy injection.  Fluoroscopic time  for the procedure was measured at 0.1 minutes.

## 2023-06-13 NOTE — PROCEDURE NOTE - ADDITIONAL PROCEDURE DETAILS
s/p fluoroscopically guided lumbar puncture with administration of IT chemotherapy (at the L2-L3 level using 22 gauge X 5 inch needle. drained 5 mL of clear CSF. Methotrexate 12 mg was intrathecally administered. pt tolerated the procedure well. hemostasis secure.

## 2023-06-13 NOTE — PRE PROCEDURE NOTE - PRE PROCEDURE EVALUATION
Neuroradiology pre-op note    HPI: 63y Female with ALL     Diagnosis: acute lymphoblastic leukemia  Procedure: Fluoroscopically guided lumbar puncture with administration of intrathecal chemotherapy    acyclovir   Oral Tab/Cap 400 milliGRAM(s)  losartan 100 milliGRAM(s)                            8.3    2.28  )-----------( 102      ( 13 Jun 2023 06:56 )             25.1     06-13    140  |  99  |  7   ----------------------------<  94  3.4<L>   |  32<H>  |  0.83    Ca    9.1      13 Jun 2023 06:55  Phos  4.2     06-13  Mg     2.0     06-13    TPro  5.5<L>  /  Alb  3.4  /  TBili  0.4  /  DBili  x   /  AST  22  /  ALT  13  /  AlkPhos  68  06-13    PT/INR - ( 12 Jun 2023 11:07 )   PT: 12.4 sec;   INR: 1.08 ratio    PTT - ( 12 Jun 2023 11:07 )  PTT:30.8 sec      Assessment & Plan:  63y Female with ALL    -Will plan for Fluoroscopically guided lumbar puncture with administration of intrathecal chemotherapy  -Informed consent obtained. After risks, benefits, alternatives discussion pt verbalized understanding and signed consent. Risks including bleeding, infection, nerve damage, and/or headaches were discussed.    MIHAELA Ma  Available on [employer] Teams  Spectralink [id number]  Ext [phone]

## 2023-06-13 NOTE — PROGRESS NOTE ADULT - PROBLEM SELECTOR PLAN 1
Admit to [facility]   Monitor CBC with diff/lytes, transfuse/replace lytes as needed   Chemo with MTX 1g/m2 iv on day1  Cytarabine 1 g/m2 q12h on day2, &3  LP with IT chemo  scheduled  on  6/13  Monitor cerebellar toxicity/nystagmus   Pre forte to prevent chemical conjunctivitis  PPI to pepcid  until MTX cleared Admit to [address] [place] [address]   Monitor CBC with diff/lytes, transfuse/replace lytes as needed  Sodium bicarb drip to alkalize urine.    When urine PH>7, started  chemo  with MTX  200 mg/m2 iv over 2 hrs, then 800 mg/m2 iv over 22 hours on day1  Cytarabine 1 g/m2b over 2 hours  q12h on day2, 3  Leucovorin 50 mg iv starting 12 hours after completion of MTX and then 15 mg iv q6h until MTX level<0.05 um  LP with IT chemo  scheduled  on  6/13  Monitor cerebellar toxicity/nystagmus   Pre forte to prevent chemical conjunctivitis day 2-5  PPI to Pepcid  until MTX cleared  6/13 added Ativan PRN N/V, pt has  intolerance  to Zofran

## 2023-06-13 NOTE — PROGRESS NOTE ADULT - ASSESSMENT
64 y/o F with PMHx of HTN, HLD with Ph (-) ALL diagnosed in February 2023 and now s/p cycle 1A of miini Hyper-CVAD, 1B and 2A. Now admitted for cycle 2B  hyperCVAD (regular dosing) Methotrexate/Cytarabine. Pt has pancytopenia due to chemotherapy.     64 y/o F with PMHx of HTN, HLD with Ph (-) ALL diagnosed in February 2023 and now s/p cycle 1A of miini Hyper-CVAD, 1B and 2A. Now admitted for cycle 2B  hyperCVAD (regular dosing) Methotrexate/Cytarabine. Pt had LP with chemo on 6/13. Pt has pancytopenia due to chemotherapy.

## 2023-06-14 ENCOUNTER — OUTPATIENT (OUTPATIENT)
Dept: OUTPATIENT SERVICES | Facility: HOSPITAL | Age: 63
LOS: 1 days | Discharge: HOME | End: 2023-06-14

## 2023-06-14 DIAGNOSIS — C95.90 LEUKEMIA, UNSPECIFIED NOT HAVING ACHIEVED REMISSION: ICD-10-CM

## 2023-06-14 LAB
ALBUMIN SERPL ELPH-MCNC: 3.3 G/DL — SIGNIFICANT CHANGE UP (ref 3.3–5)
ALP SERPL-CCNC: 67 U/L — SIGNIFICANT CHANGE UP (ref 40–120)
ALT FLD-CCNC: 16 U/L — SIGNIFICANT CHANGE UP (ref 10–45)
ANION GAP SERPL CALC-SCNC: 13 MMOL/L — SIGNIFICANT CHANGE UP (ref 5–17)
AST SERPL-CCNC: 26 U/L — SIGNIFICANT CHANGE UP (ref 10–40)
BASOPHILS # BLD AUTO: 0 K/UL — SIGNIFICANT CHANGE UP (ref 0–0.2)
BASOPHILS NFR BLD AUTO: 0 % — SIGNIFICANT CHANGE UP (ref 0–2)
BILIRUB SERPL-MCNC: 0.5 MG/DL — SIGNIFICANT CHANGE UP (ref 0.2–1.2)
BUN SERPL-MCNC: 5 MG/DL — LOW (ref 7–23)
CALCIUM SERPL-MCNC: 8.7 MG/DL — SIGNIFICANT CHANGE UP (ref 8.4–10.5)
CHLORIDE SERPL-SCNC: 97 MMOL/L — SIGNIFICANT CHANGE UP (ref 96–108)
CO2 SERPL-SCNC: 28 MMOL/L — SIGNIFICANT CHANGE UP (ref 22–31)
CREAT SERPL-MCNC: 0.74 MG/DL — SIGNIFICANT CHANGE UP (ref 0.5–1.3)
EGFR: 90 ML/MIN/1.73M2 — SIGNIFICANT CHANGE UP
EOSINOPHIL # BLD AUTO: 0.02 K/UL — SIGNIFICANT CHANGE UP (ref 0–0.5)
EOSINOPHIL NFR BLD AUTO: 0.9 % — SIGNIFICANT CHANGE UP (ref 0–6)
GLUCOSE SERPL-MCNC: 113 MG/DL — HIGH (ref 70–99)
HCT VFR BLD CALC: 24.7 % — LOW (ref 34.5–45)
HGB BLD-MCNC: 8.2 G/DL — LOW (ref 11.5–15.5)
LYMPHOCYTES # BLD AUTO: 0.2 K/UL — LOW (ref 1–3.3)
LYMPHOCYTES # BLD AUTO: 8.7 % — LOW (ref 13–44)
MAGNESIUM SERPL-MCNC: 1.8 MG/DL — SIGNIFICANT CHANGE UP (ref 1.6–2.6)
MANUAL SMEAR VERIFICATION: SIGNIFICANT CHANGE UP
MCHC RBC-ENTMCNC: 32.4 PG — SIGNIFICANT CHANGE UP (ref 27–34)
MCHC RBC-ENTMCNC: 33.2 GM/DL — SIGNIFICANT CHANGE UP (ref 32–36)
MCV RBC AUTO: 97.6 FL — SIGNIFICANT CHANGE UP (ref 80–100)
MONOCYTES # BLD AUTO: 0.14 K/UL — SIGNIFICANT CHANGE UP (ref 0–0.9)
MONOCYTES NFR BLD AUTO: 6.1 % — SIGNIFICANT CHANGE UP (ref 2–14)
MTX SERPL-SCNC: 2.16 UMOL/L — SIGNIFICANT CHANGE UP (ref 0.5–5)
NEUTROPHILS # BLD AUTO: 1.91 K/UL — SIGNIFICANT CHANGE UP (ref 1.8–7.4)
NEUTROPHILS NFR BLD AUTO: 84.3 % — HIGH (ref 43–77)
PH UR: 8.5 — HIGH (ref 5–8)
PH UR: 9 — HIGH (ref 5–8)
PHOSPHATE SERPL-MCNC: 3.6 MG/DL — SIGNIFICANT CHANGE UP (ref 2.5–4.5)
PLAT MORPH BLD: NORMAL — SIGNIFICANT CHANGE UP
PLATELET # BLD AUTO: 100 K/UL — LOW (ref 150–400)
POTASSIUM SERPL-MCNC: 3.3 MMOL/L — LOW (ref 3.5–5.3)
POTASSIUM SERPL-SCNC: 3.3 MMOL/L — LOW (ref 3.5–5.3)
PROT SERPL-MCNC: 5.3 G/DL — LOW (ref 6–8.3)
RBC # BLD: 2.53 M/UL — LOW (ref 3.8–5.2)
RBC # FLD: 23.9 % — HIGH (ref 10.3–14.5)
RBC BLD AUTO: SIGNIFICANT CHANGE UP
SODIUM SERPL-SCNC: 138 MMOL/L — SIGNIFICANT CHANGE UP (ref 135–145)
TM INTERPRETATION: SIGNIFICANT CHANGE UP
WBC # BLD: 2.27 K/UL — LOW (ref 3.8–10.5)
WBC # FLD AUTO: 2.27 K/UL — LOW (ref 3.8–10.5)

## 2023-06-14 PROCEDURE — 99233 SBSQ HOSP IP/OBS HIGH 50: CPT | Mod: FS

## 2023-06-14 PROCEDURE — 93010 ELECTROCARDIOGRAM REPORT: CPT

## 2023-06-14 RX ORDER — OLANZAPINE 15 MG/1
2.5 TABLET, FILM COATED ORAL DAILY
Refills: 0 | Status: DISCONTINUED | OUTPATIENT
Start: 2023-06-14 | End: 2023-06-16

## 2023-06-14 RX ORDER — METOCLOPRAMIDE HCL 10 MG
10 TABLET ORAL EVERY 6 HOURS
Refills: 0 | Status: DISCONTINUED | OUTPATIENT
Start: 2023-06-14 | End: 2023-06-16

## 2023-06-14 RX ORDER — OLANZAPINE 15 MG/1
1 TABLET, FILM COATED ORAL
Qty: 30 | Refills: 0
Start: 2023-06-14 | End: 2023-07-13

## 2023-06-14 RX ORDER — POTASSIUM CHLORIDE 20 MEQ
20 PACKET (EA) ORAL
Refills: 0 | Status: COMPLETED | OUTPATIENT
Start: 2023-06-14 | End: 2023-06-14

## 2023-06-14 RX ORDER — SODIUM BICARBONATE 1 MEQ/ML
0.17 SYRINGE (ML) INTRAVENOUS
Qty: 150 | Refills: 0 | Status: DISCONTINUED | OUTPATIENT
Start: 2023-06-14 | End: 2023-06-16

## 2023-06-14 RX ADMIN — Medication 1910 MILLIGRAM(S): at 07:55

## 2023-06-14 RX ADMIN — Medication 2 DROP(S): at 05:22

## 2023-06-14 RX ADMIN — Medication 15 MILLIGRAM(S): at 12:08

## 2023-06-14 RX ADMIN — Medication 10 MILLIGRAM(S): at 07:50

## 2023-06-14 RX ADMIN — ENOXAPARIN SODIUM 40 MILLIGRAM(S): 100 INJECTION SUBCUTANEOUS at 21:44

## 2023-06-14 RX ADMIN — OLANZAPINE 2.5 MILLIGRAM(S): 15 TABLET, FILM COATED ORAL at 12:07

## 2023-06-14 RX ADMIN — CHLORHEXIDINE GLUCONATE 1 APPLICATION(S): 213 SOLUTION TOPICAL at 05:22

## 2023-06-14 RX ADMIN — Medication 15 MILLIGRAM(S): at 17:54

## 2023-06-14 RX ADMIN — ESCITALOPRAM OXALATE 10 MILLIGRAM(S): 10 TABLET, FILM COATED ORAL at 14:24

## 2023-06-14 RX ADMIN — Medication 10 MILLIGRAM(S): at 12:07

## 2023-06-14 RX ADMIN — Medication 15 MILLILITER(S): at 05:22

## 2023-06-14 RX ADMIN — FAMOTIDINE 20 MILLIGRAM(S): 10 INJECTION INTRAVENOUS at 12:07

## 2023-06-14 RX ADMIN — Medication 150 MEQ/KG/HR: at 05:24

## 2023-06-14 RX ADMIN — Medication 0.5 MILLIGRAM(S): at 15:50

## 2023-06-14 RX ADMIN — Medication 50 MILLIGRAM(S): at 08:27

## 2023-06-14 RX ADMIN — Medication 1910 MILLIGRAM(S): at 20:20

## 2023-06-14 RX ADMIN — LOSARTAN POTASSIUM 100 MILLIGRAM(S): 100 TABLET, FILM COATED ORAL at 05:22

## 2023-06-14 RX ADMIN — Medication 2 DROP(S): at 12:07

## 2023-06-14 RX ADMIN — Medication 50 MILLIEQUIVALENT(S): at 12:08

## 2023-06-14 RX ADMIN — Medication 400 MILLIGRAM(S): at 05:22

## 2023-06-14 RX ADMIN — Medication 0.5 MILLIGRAM(S): at 00:35

## 2023-06-14 RX ADMIN — Medication 10 MILLIGRAM(S): at 17:54

## 2023-06-14 RX ADMIN — Medication 2 DROP(S): at 17:56

## 2023-06-14 RX ADMIN — Medication 50 MILLIEQUIVALENT(S): at 10:47

## 2023-06-14 RX ADMIN — Medication 15 MILLILITER(S): at 12:07

## 2023-06-14 RX ADMIN — Medication 400 MILLIGRAM(S): at 17:54

## 2023-06-14 NOTE — CHART NOTE - NSCHARTNOTEFT_GEN_A_CORE
Notified by staff, patient found in bathroom s/p fall by   on interview patient alert, denies LOC, denies head trauma  loss of balance leading to going to floor on all four limbs  denies pain    patient  brought back to bed safely  on exam no obvious deformities, cuts, slight contusion on left flank  otherwise no injuries                          8.2    2.27  )-----------( 100      ( 14 Jun 2023 07:14 )             24.7     06-14    138  |  97  |  5<L>  ----------------------------<  113<H>  3.3<L>   |  28  |  0.74    Ca    8.7      14 Jun 2023 07:14  Phos  3.6     06-14  Mg     1.8     06-14    TPro  5.3<L>  /  Alb  3.3  /  TBili  0.5  /  DBili  x   /  AST  26  /  ALT  16  /  AlkPhos  67  06-14    CAPILLARY BLOOD GLUCOSE      I&O's Summary    13 Jun 2023 07:01  -  14 Jun 2023 07:00  --------------------------------------------------------  IN: 5047 mL / OUT: 4800 mL / NET: 247 mL    14 Jun 2023 07:01  -  14 Jun 2023 19:09  --------------------------------------------------------  IN: 2018 mL / OUT: 1400 mL / NET: 618 mL      Vital Signs Last 24 Hrs  T(C): 37.1 (14 Jun 2023 17:20), Max: 37.4 (14 Jun 2023 17:15)  T(F): 98.8 (14 Jun 2023 17:20), Max: 99.4 (14 Jun 2023 17:15)  HR: 88 (14 Jun 2023 17:20) (73 - 88)  BP: 121/70 (14 Jun 2023 17:20) (121/70 - 160/79)  BP(mean): 87 (14 Jun 2023 17:20) (87 - 87)  RR: 18 (14 Jun 2023 17:20) (17 - 18)  SpO2: 94% (14 Jun 2023 17:20) (94% - 97%)    Parameters below as of 14 Jun 2023 17:20  Patient On (Oxygen Delivery Method): room air    A/P  s/p fall- no trauma   no need for CT head or radiographic imaging  check orthostatics  cont to monitor

## 2023-06-14 NOTE — PROGRESS NOTE ADULT - PROBLEM SELECTOR PLAN 1
Admitted to [address] [place] [address] accessed upon admission  Chemotherapy with hyperCVAD 2B with MTX/Cytarabine  Sodium bicarb drip to alkalinize urine prior to MTX for urine PH>7, and then initiate chemo  with MTX  200 mg/m2 IV over 2 hrs, then 800 mg/m2 iv over 22 hours on day1 (6/12)  Cytarabine 1 g/m2 over 2 hours  q12h on day 2, 3 for total 4 doses  Leucovorin 50 mg iv starting 12 hours after completion of MTX and then 15 mg iv q6h until MTX level<0.05 um  Follow MTX levels to start 6/14  LP with IT MTX completed on  6/13, follow up flow cytometry  Monitor cerebellar toxicity/nystagmus with cytarabine, handwriting checks   Pred forte eye gtts to prevent chemical conjunctivitis day 2-5 with cytarabine  PPI changed to Pepcid  until MTX cleared due to interaction  Monitor CBC with diff/lytes, transfuse/replace lytes as needed  6/13 added Ativan PRN N/V, as patient has an intolerance  to Zofran  Will need fulphila post chemotherapy as outpatient  Will need to be scheduled for a day 7 or 8 LP as outpatient with Cytarabine/ with possible platelet coverage. Admitted to [address] [place] [address] accessed upon admission  Chemotherapy with hyperCVAD 2B with MTX/Cytarabine  Sodium bicarb drip to alkalinize urine prior to MTX for urine PH>7, and then initiate chemo  with MTX  200 mg/m2 IV over 2 hrs, then 800 mg/m2 iv over 22 hours on day1 (6/12)  Cytarabine 1 g/m2 over 2 hours  q12h on day 2, 3 for total 4 doses  Leucovorin 50 mg iv starting 12 hours after completion of MTX and then 15 mg iv q6h until MTX level<0.05 um  Follow MTX levels to start 6/14  LP with IT MTX completed on  6/13, follow up flow cytometry  Monitor cerebellar toxicity/nystagmus with cytarabine, handwriting checks   Pred forte eye gtts to prevent chemical conjunctivitis day 2-5 with cytarabine  PPI changed to Pepcid  until MTX cleared due to interaction  Monitor CBC with diff/lytes, transfuse/replace lytes as needed  6/13 added Ativan PRN N/V, as patient has an intolerance  to Zofran  Will need fulphila post chemotherapy as outpatient  With intolerance to zofran, will change reglan to ATC for nausea  Will need to be scheduled for a day 7 or 8 LP as outpatient with Cytarabine/ with possible platelet coverage. Admitted to [address] [place] [address] accessed upon admission  Chemotherapy with hyperCVAD 2B with MTX/Cytarabine  Sodium bicarb drip to alkalinize urine prior to MTX for urine PH>7, and then initiate chemo  with MTX  200 mg/m2 IV over 2 hrs, then 800 mg/m2 iv over 22 hours on day1 (6/12)  Cytarabine 1 g/m2 over 2 hours  q12h on day 2, 3 for total 4 doses  Leucovorin 50 mg iv starting 12 hours after completion of MTX and then 15 mg iv q6h until MTX level<0.05 um  Follow MTX levels to start 6/14  LP with IT MTX completed on  6/13, follow up flow cytometry  Monitor cerebellar toxicity/nystagmus with cytarabine, handwriting checks   Pred forte eye gtts to prevent chemical conjunctivitis day 2-5 with cytarabine  PPI changed to Pepcid  until MTX cleared due to interaction  Monitor CBC with diff/lytes, transfuse/replace lytes as needed  6/13 added Ativan PRN N/V, as patient has an intolerance  to Zofran  6/14 zyprexa added for nausea  6/14 hypokalemia-supplemented K  Will need fulphila post chemotherapy as outpatient  6/14 With intolerance to zofran, will change reglan to ATC for nausea  Will need to be scheduled for a day 7 or 8 LP as outpatient with Cytarabine/ with possible platelet coverage.  Will transfuse 1u PRBC at 5am on 6/15 in anticipation of discharge home.

## 2023-06-14 NOTE — PROGRESS NOTE ADULT - SUBJECTIVE AND OBJECTIVE BOX
Diagnosis B-ALL pH(-)    Protocol/Chemo Regimen: cycle 2B HyperCVAD MTX/cytarabine   Day: 3     Pt endorsed:    Review of Systems:      Pain scale:                                        Location:    Diet: regular    Allergies:  sulfa drugs (Unknown)  Zofran (Headache)    ANTIMICROBIALS  acyclovir   Oral Tab/Cap 400 milliGRAM(s) Oral two times a day      HEME/ONC MEDICATIONS  cytarabine IVPB (eMAR) 1910 milliGRAM(s) IV Intermittent every 12 hours  enoxaparin Injectable 40 milliGRAM(s) SubCutaneous <User Schedule>  methotrexate PF IntraThecal (eMAR) 12 milliGRAM(s) IntraThecal once      STANDING MEDICATIONS  Biotene Dry Mouth Oral Rinse 15 milliLiter(s) Swish and Spit four times a day  chlorhexidine 2% Cloths 1 Application(s) Topical <User Schedule>  escitalopram 10 milliGRAM(s) Oral daily  famotidine    Tablet 20 milliGRAM(s) Oral daily  leucovorin IVPB (eMAR) 15 milliGRAM(s) IV Intermittent every 6 hours  leucovorin IVPB (eMAR) 50 milliGRAM(s) IV Intermittent once  losartan 100 milliGRAM(s) Oral daily  prednisoLONE acetate 1% Suspension 2 Drop(s) Both EYES every 6 hours  sodium bicarbonate  Infusion 0.248 mEq/kG/Hr IV Continuous <Continuous>      PRN MEDICATIONS  acetaminophen     Tablet .. 650 milliGRAM(s) Oral every 6 hours PRN  LORazepam   Injectable 0.5 milliGRAM(s) IV Push every 6 hours PRN  metoclopramide Injectable 10 milliGRAM(s) IV Push every 6 hours PRN  sodium chloride 0.9% lock flush 10 milliLiter(s) IV Push every 1 hour PRN        Vital Signs Last 24 Hrs  T(C): 36.8 (14 Jun 2023 06:00), Max: 36.9 (13 Jun 2023 21:12)  T(F): 98.2 (14 Jun 2023 06:00), Max: 98.5 (13 Jun 2023 21:12)  HR: 75 (14 Jun 2023 06:00) (62 - 78)  BP: 130/76 (14 Jun 2023 06:00) (126/74 - 167/76)  BP(mean): --  RR: 17 (14 Jun 2023 06:00) (17 - 18)  SpO2: 95% (14 Jun 2023 06:00) (95% - 97%)    Parameters below as of 14 Jun 2023 06:00  Patient On (Oxygen Delivery Method): room air        PHYSICAL EXAM  General: adult in NAD  HEENT: clear oropharynx, anicteric sclera, pink conjunctiva  Neck: supple  CV: normal S1/S2 RRR  Lungs: positive air movement b/l ant lungs,clear to auscultation, no wheezes, no rales  Abdomen: soft non-tender non-distended, no hepatosplenomegaly  Ext: no clubbing cyanosis or edema  Skin: no rashes and no petechiae  Neuro: alert and oriented X 3, no focal deficits  Central Line: normal    LABS:                   Diagnosis B-ALL pH(-)    Protocol/Chemo Regimen: cycle 2B HyperCVAD MTX/cytarabine   Day: 3     Pt endorsed: Some nausea without emesis.    Review of Systems:      Pain scale:                                        Location:    Diet: regular    Allergies:  sulfa drugs (Unknown)  Zofran (Headache)    ANTIMICROBIALS  acyclovir   Oral Tab/Cap 400 milliGRAM(s) Oral two times a day      HEME/ONC MEDICATIONS  cytarabine IVPB (eMAR) 1910 milliGRAM(s) IV Intermittent every 12 hours  enoxaparin Injectable 40 milliGRAM(s) SubCutaneous <User Schedule>  methotrexate PF IntraThecal (eMAR) 12 milliGRAM(s) IntraThecal once      STANDING MEDICATIONS  Biotene Dry Mouth Oral Rinse 15 milliLiter(s) Swish and Spit four times a day  chlorhexidine 2% Cloths 1 Application(s) Topical <User Schedule>  escitalopram 10 milliGRAM(s) Oral daily  famotidine    Tablet 20 milliGRAM(s) Oral daily  leucovorin IVPB (eMAR) 15 milliGRAM(s) IV Intermittent every 6 hours  leucovorin IVPB (eMAR) 50 milliGRAM(s) IV Intermittent once  losartan 100 milliGRAM(s) Oral daily  prednisoLONE acetate 1% Suspension 2 Drop(s) Both EYES every 6 hours  sodium bicarbonate  Infusion 0.248 mEq/kG/Hr IV Continuous <Continuous>      PRN MEDICATIONS  acetaminophen     Tablet .. 650 milliGRAM(s) Oral every 6 hours PRN  LORazepam   Injectable 0.5 milliGRAM(s) IV Push every 6 hours PRN  metoclopramide Injectable 10 milliGRAM(s) IV Push every 6 hours PRN  sodium chloride 0.9% lock flush 10 milliLiter(s) IV Push every 1 hour PRN        Vital Signs Last 24 Hrs  T(C): 36.8 (14 Jun 2023 06:00), Max: 36.9 (13 Jun 2023 21:12)  T(F): 98.2 (14 Jun 2023 06:00), Max: 98.5 (13 Jun 2023 21:12)  HR: 75 (14 Jun 2023 06:00) (62 - 78)  BP: 130/76 (14 Jun 2023 06:00) (126/74 - 167/76)  BP(mean): --  RR: 17 (14 Jun 2023 06:00) (17 - 18)  SpO2: 95% (14 Jun 2023 06:00) (95% - 97%)    Parameters below as of 14 Jun 2023 06:00  Patient On (Oxygen Delivery Method): room air        PHYSICAL EXAM  General: adult in NAD  HEENT: clear oropharynx, anicteric sclera, pink conjunctiva  Neck: supple  CV: normal S1/S2 RRR  Lungs: positive air movement b/l ant lungs,clear to auscultation, no wheezes, no rales  Abdomen: soft non-tender non-distended, no hepatosplenomegaly  Ext: no clubbing cyanosis or edema  Skin: no rashes and no petechiae  Neuro: alert and oriented X 3, no focal deficits  Central Line: normal    LABS:                   Diagnosis B-ALL pH(-)    Protocol/Chemo Regimen: cycle 2B HyperCVAD MTX/cytarabine   Day: 3     Pt endorsed: Some nausea without emesis. Tolerating small amounts PO    Review of Systems: Patient denied  odynophagia, chest pain, cough, dyspnea, abdominal pain, constipation, diarrhea, ella-rectal pain, rash, headache,     Pain scale:   N/A                                     Location:    Diet: regular    Allergies:  sulfa drugs (Unknown)  Zofran (Headache)    ANTIMICROBIALS  acyclovir   Oral Tab/Cap 400 milliGRAM(s) Oral two times a day      HEME/ONC MEDICATIONS  cytarabine IVPB (eMAR) 1910 milliGRAM(s) IV Intermittent every 12 hours  enoxaparin Injectable 40 milliGRAM(s) SubCutaneous <User Schedule>  methotrexate PF IntraThecal (eMAR) 12 milliGRAM(s) IntraThecal once      STANDING MEDICATIONS  Biotene Dry Mouth Oral Rinse 15 milliLiter(s) Swish and Spit four times a day  chlorhexidine 2% Cloths 1 Application(s) Topical <User Schedule>  escitalopram 10 milliGRAM(s) Oral daily  famotidine    Tablet 20 milliGRAM(s) Oral daily  leucovorin IVPB (eMAR) 15 milliGRAM(s) IV Intermittent every 6 hours  leucovorin IVPB (eMAR) 50 milliGRAM(s) IV Intermittent once  losartan 100 milliGRAM(s) Oral daily  prednisoLONE acetate 1% Suspension 2 Drop(s) Both EYES every 6 hours  sodium bicarbonate  Infusion 0.248 mEq/kG/Hr IV Continuous <Continuous>      PRN MEDICATIONS  acetaminophen     Tablet .. 650 milliGRAM(s) Oral every 6 hours PRN  LORazepam   Injectable 0.5 milliGRAM(s) IV Push every 6 hours PRN  metoclopramide Injectable 10 milliGRAM(s) IV Push every 6 hours PRN  sodium chloride 0.9% lock flush 10 milliLiter(s) IV Push every 1 hour PRN        Vital Signs Last 24 Hrs  T(C): 36.8 (14 Jun 2023 06:00), Max: 36.9 (13 Jun 2023 21:12)  T(F): 98.2 (14 Jun 2023 06:00), Max: 98.5 (13 Jun 2023 21:12)  HR: 75 (14 Jun 2023 06:00) (62 - 78)  BP: 130/76 (14 Jun 2023 06:00) (126/74 - 167/76)  BP(mean): --  RR: 17 (14 Jun 2023 06:00) (17 - 18)  SpO2: 95% (14 Jun 2023 06:00) (95% - 97%)    Parameters below as of 14 Jun 2023 06:00  Patient On (Oxygen Delivery Method): room air        PHYSICAL EXAM  General: adult in NAD  HEENT: clear oropharynx   Neck: supple  CV: normal S1/S2 RRR  Lungs: b/l ant lungs,clear to auscultation, no wheezes, no rales  Abdomen: soft non-tender non-distended   Ext: no  edema  Skin: no rashes and no petechiae  Neuro: alert and oriented   Central Line: mediport C/D/I    LABS:                   Diagnosis B-ALL pH(-)    Protocol/Chemo Regimen: cycle 2B HyperCVAD MTX/cytarabine   Day: 3     Pt endorsed: Some nausea without emesis. Tolerating small amounts PO    Review of Systems: Patient denied  odynophagia, chest pain, cough, dyspnea, abdominal pain, constipation, diarrhea, ella-rectal pain, rash, headache,     Pain scale:   N/A                                     Location:    Diet: regular    Allergies:  sulfa drugs (Unknown)  Zofran (Headache)    ANTIMICROBIALS  acyclovir   Oral Tab/Cap 400 milliGRAM(s) Oral two times a day      HEME/ONC MEDICATIONS  cytarabine IVPB (eMAR) 1910 milliGRAM(s) IV Intermittent every 12 hours  enoxaparin Injectable 40 milliGRAM(s) SubCutaneous <User Schedule>  methotrexate PF IntraThecal (eMAR) 12 milliGRAM(s) IntraThecal once      STANDING MEDICATIONS  Biotene Dry Mouth Oral Rinse 15 milliLiter(s) Swish and Spit four times a day  chlorhexidine 2% Cloths 1 Application(s) Topical <User Schedule>  escitalopram 10 milliGRAM(s) Oral daily  famotidine    Tablet 20 milliGRAM(s) Oral daily  leucovorin IVPB (eMAR) 15 milliGRAM(s) IV Intermittent every 6 hours  leucovorin IVPB (eMAR) 50 milliGRAM(s) IV Intermittent once  losartan 100 milliGRAM(s) Oral daily  prednisoLONE acetate 1% Suspension 2 Drop(s) Both EYES every 6 hours  sodium bicarbonate  Infusion 0.248 mEq/kG/Hr IV Continuous <Continuous>      PRN MEDICATIONS  acetaminophen     Tablet .. 650 milliGRAM(s) Oral every 6 hours PRN  LORazepam   Injectable 0.5 milliGRAM(s) IV Push every 6 hours PRN  metoclopramide Injectable 10 milliGRAM(s) IV Push every 6 hours PRN  sodium chloride 0.9% lock flush 10 milliLiter(s) IV Push every 1 hour PRN        Vital Signs Last 24 Hrs  T(C): 36.8 (14 Jun 2023 06:00), Max: 36.9 (13 Jun 2023 21:12)  T(F): 98.2 (14 Jun 2023 06:00), Max: 98.5 (13 Jun 2023 21:12)  HR: 75 (14 Jun 2023 06:00) (62 - 78)  BP: 130/76 (14 Jun 2023 06:00) (126/74 - 167/76)  BP(mean): --  RR: 17 (14 Jun 2023 06:00) (17 - 18)  SpO2: 95% (14 Jun 2023 06:00) (95% - 97%)    Parameters below as of 14 Jun 2023 06:00  Patient On (Oxygen Delivery Method): room air        PHYSICAL EXAM  General: adult in NAD  HEENT: clear oropharynx   Neck: supple  CV: normal S1/S2 RRR  Lungs: b/l ant lungs,clear to auscultation, no wheezes, no rales  Abdomen: soft non-tender non-distended   Ext: no  edema  Skin: no rashes and no petechiae  Neuro: alert and oriented   Central Line: mediport C/D/I    LABS:                          8.2    2.27  )-----------( 100      ( 14 Jun 2023 07:14 )             24.7         Mean Cell Volume : 97.6 fl  Mean Cell Hemoglobin : 32.4 pg  Mean Cell Hemoglobin Concentration : 33.2 gm/dL  Auto Neutrophil # : 1.91 K/uL  Auto Lymphocyte # : 0.20 K/uL  Auto Monocyte # : 0.14 K/uL  Auto Eosinophil # : 0.02 K/uL  Auto Basophil # : 0.00 K/uL  Auto Neutrophil % : 84.3 %  Auto Lymphocyte % : 8.7 %  Auto Monocyte % : 6.1 %  Auto Eosinophil % : 0.9 %  Auto Basophil % : 0.0 %      06-14    138  |  97  |  5<L>  ----------------------------<  113<H>  3.3<L>   |  28  |  0.74    Ca    8.7      14 Jun 2023 07:14  Phos  3.6     06-14  Mg     1.8     06-14    TPro  5.3<L>  /  Alb  3.3  /  TBili  0.5  /  DBili  x   /  AST  26  /  ALT  16  /  AlkPhos  67  06-14

## 2023-06-14 NOTE — PROGRESS NOTE ADULT - NS ATTEND AMEND GEN_ALL_CORE FT
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 Now admitted for cycle 2B  hyperCVAD (regular dosing) Methotrexate/Cytarabine. Pt has pancytopenia due to chemotherapy.      Heme-  Today is day 2 of therapy.   IV sodium bicarbonate for urine pH >7.5  Leucovorin starting 24 hours post MTX.   Daily MTX level.   Is/Os, daily weights.   LP with IT MTX day 2. 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 Now admitted for cycle 2B  hyperCVAD (regular dosing) Methotrexate/Cytarabine. Pt has pancytopenia due to chemotherapy.      Heme-  Today is day 3 of therapy.   IV sodium bicarbonate for urine pH >7.5  Leucovorin starting 24 hours post MTX.   Daily MTX level.   Is/Os, daily weights.   LP with IT MTX day 2.

## 2023-06-14 NOTE — PROGRESS NOTE ADULT - ASSESSMENT
64 y/o F with PMHx of HTN, HLD with Ph (-) ALL diagnosed in February 2023 and now s/p cycle 1A of mini Hyper-CVAD (dose reduced) and full dosing for coursed 1B and 2A. Now admitted for cycle 2B  hyperCVAD (regular dosing)with  Methotrexate/Cytarabine. Pt s/p LP with IT MTX on 6/13. Pt has pancytopenia due to chemotherapy.     62 y/o F with PMHx of HTN, HLD and now with  Ph (-) ALL diagnosed in February 2023. Patient is now s/p cycle 1A of mini Hyper-CVAD (dose reduced) and full dosing for courses 1B and 2A.  Now admitted for cycle 2B  hyperCVAD (regular dosing) with  Methotrexate/Cytarabine. Pt s/p LP with IT MTX on 6/13. Pt has pancytopenia due to chemotherapy.     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 Patient is now s/p cycle 1A of mini Hyper-CVAD (dose reduced) and full dosing for courses 1B and 2A.  Now admitted for cycle 2B  hyperCVAD (regular dosing) with  Methotrexate/Cytarabine. Pt s/p LP with IT MTX on 6/13. Pt has pancytopenia secondary  to chemotherapy.

## 2023-06-14 NOTE — PROGRESS NOTE ADULT - PROBLEM SELECTOR PLAN 4
Lovenox initially held for  LP scheduled for 6/13, and resumed 24 hrs post LP   Encourage OOB and ambulation           Contact information: [phone]

## 2023-06-15 LAB
ALBUMIN SERPL ELPH-MCNC: 3.1 G/DL — LOW (ref 3.3–5)
ALP SERPL-CCNC: 67 U/L — SIGNIFICANT CHANGE UP (ref 40–120)
ALT FLD-CCNC: 23 U/L — SIGNIFICANT CHANGE UP (ref 10–45)
ANION GAP SERPL CALC-SCNC: 7 MMOL/L — SIGNIFICANT CHANGE UP (ref 5–17)
AST SERPL-CCNC: 34 U/L — SIGNIFICANT CHANGE UP (ref 10–40)
BASOPHILS # BLD AUTO: 0.01 K/UL — SIGNIFICANT CHANGE UP (ref 0–0.2)
BASOPHILS NFR BLD AUTO: 0.9 % — SIGNIFICANT CHANGE UP (ref 0–2)
BILIRUB SERPL-MCNC: 0.5 MG/DL — SIGNIFICANT CHANGE UP (ref 0.2–1.2)
BLD GP AB SCN SERPL QL: NEGATIVE — SIGNIFICANT CHANGE UP
BUN SERPL-MCNC: 7 MG/DL — SIGNIFICANT CHANGE UP (ref 7–23)
CALCIUM SERPL-MCNC: 8.6 MG/DL — SIGNIFICANT CHANGE UP (ref 8.4–10.5)
CHLORIDE SERPL-SCNC: 102 MMOL/L — SIGNIFICANT CHANGE UP (ref 96–108)
CO2 SERPL-SCNC: 30 MMOL/L — SIGNIFICANT CHANGE UP (ref 22–31)
CREAT SERPL-MCNC: 0.78 MG/DL — SIGNIFICANT CHANGE UP (ref 0.5–1.3)
EGFR: 84 ML/MIN/1.73M2 — SIGNIFICANT CHANGE UP
EOSINOPHIL # BLD AUTO: 0.04 K/UL — SIGNIFICANT CHANGE UP (ref 0–0.5)
EOSINOPHIL NFR BLD AUTO: 2.6 % — SIGNIFICANT CHANGE UP (ref 0–6)
GIANT PLATELETS BLD QL SMEAR: PRESENT — SIGNIFICANT CHANGE UP
GLUCOSE SERPL-MCNC: 101 MG/DL — HIGH (ref 70–99)
HCT VFR BLD CALC: 25.3 % — LOW (ref 34.5–45)
HGB BLD-MCNC: 8.6 G/DL — LOW (ref 11.5–15.5)
LYMPHOCYTES # BLD AUTO: 0.05 K/UL — LOW (ref 1–3.3)
LYMPHOCYTES # BLD AUTO: 3.5 % — LOW (ref 13–44)
MAGNESIUM SERPL-MCNC: 2 MG/DL — SIGNIFICANT CHANGE UP (ref 1.6–2.6)
MANUAL SMEAR VERIFICATION: SIGNIFICANT CHANGE UP
MCHC RBC-ENTMCNC: 31.7 PG — SIGNIFICANT CHANGE UP (ref 27–34)
MCHC RBC-ENTMCNC: 34 GM/DL — SIGNIFICANT CHANGE UP (ref 32–36)
MCV RBC AUTO: 93.4 FL — SIGNIFICANT CHANGE UP (ref 80–100)
MONOCYTES # BLD AUTO: 0.04 K/UL — SIGNIFICANT CHANGE UP (ref 0–0.9)
MONOCYTES NFR BLD AUTO: 2.6 % — SIGNIFICANT CHANGE UP (ref 2–14)
MTX SERPL-SCNC: 0.89 UMOL/L — SIGNIFICANT CHANGE UP (ref 0.5–5)
NEUTROPHILS # BLD AUTO: 1.22 K/UL — LOW (ref 1.8–7.4)
NEUTROPHILS NFR BLD AUTO: 86 % — HIGH (ref 43–77)
NEUTS BAND # BLD: 4.4 % — SIGNIFICANT CHANGE UP (ref 0–8)
PH UR: 8.5 — HIGH (ref 5–8)
PHOSPHATE SERPL-MCNC: 3.1 MG/DL — SIGNIFICANT CHANGE UP (ref 2.5–4.5)
PLAT MORPH BLD: NORMAL — SIGNIFICANT CHANGE UP
PLATELET # BLD AUTO: 77 K/UL — LOW (ref 150–400)
POTASSIUM SERPL-MCNC: 3.4 MMOL/L — LOW (ref 3.5–5.3)
POTASSIUM SERPL-SCNC: 3.4 MMOL/L — LOW (ref 3.5–5.3)
PROT SERPL-MCNC: 5.1 G/DL — LOW (ref 6–8.3)
RBC # BLD: 2.71 M/UL — LOW (ref 3.8–5.2)
RBC # FLD: 22.1 % — HIGH (ref 10.3–14.5)
RBC BLD AUTO: SIGNIFICANT CHANGE UP
RH IG SCN BLD-IMP: POSITIVE — SIGNIFICANT CHANGE UP
SODIUM SERPL-SCNC: 139 MMOL/L — SIGNIFICANT CHANGE UP (ref 135–145)
WBC # BLD: 1.35 K/UL — LOW (ref 3.8–10.5)
WBC # FLD AUTO: 1.35 K/UL — LOW (ref 3.8–10.5)

## 2023-06-15 PROCEDURE — 99233 SBSQ HOSP IP/OBS HIGH 50: CPT | Mod: FS

## 2023-06-15 RX ORDER — LEUCOVORIN CALCIUM 5 MG
1 TABLET ORAL
Qty: 0 | Refills: 0 | DISCHARGE

## 2023-06-15 RX ORDER — LEUCOVORIN CALCIUM 5 MG
1 TABLET ORAL
Qty: 40 | Refills: 0
Start: 2023-06-15 | End: 2023-06-24

## 2023-06-15 RX ORDER — FUROSEMIDE 40 MG
20 TABLET ORAL ONCE
Refills: 0 | Status: COMPLETED | OUTPATIENT
Start: 2023-06-15 | End: 2023-06-15

## 2023-06-15 RX ORDER — POTASSIUM CHLORIDE 20 MEQ
20 PACKET (EA) ORAL
Refills: 0 | Status: COMPLETED | OUTPATIENT
Start: 2023-06-15 | End: 2023-06-15

## 2023-06-15 RX ADMIN — LOSARTAN POTASSIUM 100 MILLIGRAM(S): 100 TABLET, FILM COATED ORAL at 06:33

## 2023-06-15 RX ADMIN — Medication 20 MILLIEQUIVALENT(S): at 12:14

## 2023-06-15 RX ADMIN — Medication 2 DROP(S): at 12:21

## 2023-06-15 RX ADMIN — OLANZAPINE 2.5 MILLIGRAM(S): 15 TABLET, FILM COATED ORAL at 12:14

## 2023-06-15 RX ADMIN — Medication 15 MILLILITER(S): at 17:45

## 2023-06-15 RX ADMIN — Medication 2 DROP(S): at 17:44

## 2023-06-15 RX ADMIN — Medication 10 MILLIGRAM(S): at 07:57

## 2023-06-15 RX ADMIN — FAMOTIDINE 20 MILLIGRAM(S): 10 INJECTION INTRAVENOUS at 12:15

## 2023-06-15 RX ADMIN — Medication 10 MILLIGRAM(S): at 00:48

## 2023-06-15 RX ADMIN — Medication 2 DROP(S): at 00:47

## 2023-06-15 RX ADMIN — ENOXAPARIN SODIUM 40 MILLIGRAM(S): 100 INJECTION SUBCUTANEOUS at 22:02

## 2023-06-15 RX ADMIN — Medication 15 MILLIGRAM(S): at 18:20

## 2023-06-15 RX ADMIN — Medication 15 MILLIGRAM(S): at 00:47

## 2023-06-15 RX ADMIN — Medication 400 MILLIGRAM(S): at 17:45

## 2023-06-15 RX ADMIN — Medication 2 DROP(S): at 06:34

## 2023-06-15 RX ADMIN — Medication 15 MILLILITER(S): at 06:34

## 2023-06-15 RX ADMIN — Medication 15 MILLILITER(S): at 12:14

## 2023-06-15 RX ADMIN — Medication 20 MILLIEQUIVALENT(S): at 13:46

## 2023-06-15 RX ADMIN — Medication 15 MILLIGRAM(S): at 12:16

## 2023-06-15 RX ADMIN — Medication 10 MILLIGRAM(S): at 17:44

## 2023-06-15 RX ADMIN — Medication 400 MILLIGRAM(S): at 06:33

## 2023-06-15 RX ADMIN — Medication 10 MILLIGRAM(S): at 12:14

## 2023-06-15 RX ADMIN — CHLORHEXIDINE GLUCONATE 1 APPLICATION(S): 213 SOLUTION TOPICAL at 08:07

## 2023-06-15 RX ADMIN — Medication 15 MILLIGRAM(S): at 07:58

## 2023-06-15 RX ADMIN — ESCITALOPRAM OXALATE 10 MILLIGRAM(S): 10 TABLET, FILM COATED ORAL at 12:15

## 2023-06-15 RX ADMIN — Medication 1910 MILLIGRAM(S): at 08:33

## 2023-06-15 RX ADMIN — Medication 15 MILLILITER(S): at 00:48

## 2023-06-15 RX ADMIN — Medication 20 MILLIGRAM(S): at 12:14

## 2023-06-15 NOTE — PROGRESS NOTE ADULT - ASSESSMENT
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 Patient is now s/p cycle 1A of mini Hyper-CVAD (dose reduced) and full dosing for courses 1B and 2A.  Now admitted for cycle 2B  hyperCVAD (regular dosing) with  Methotrexate/Cytarabine. Pt s/p LP with IT MTX on 6/13. Pt has pancytopenia secondary  to chemotherapy.

## 2023-06-15 NOTE — PROGRESS NOTE ADULT - PROBLEM SELECTOR PLAN 4
Lovenox initially held for  LP scheduled for 6/13, and resumed 24 hrs post LP   Encourage OOB and ambulation           Contact information: [phone]

## 2023-06-15 NOTE — PROGRESS NOTE ADULT - SUBJECTIVE AND OBJECTIVE BOX
Diagnosis B-ALL pH(-)    Protocol/Chemo Regimen: cycle 2B HyperCVAD MTX/cytarabine     Day: 4     Pt endorsed: Some nausea without emesis. Tolerating small amounts PO    Review of Systems: Denies nausea, vomiting, diarrhea, chest pain, SOB     Pain scale:   N/A                                     Location:    Diet: regular    Allergies:  sulfa drugs (Unknown), Zofran (Headache)      -------------------------------------           Diagnosis B-ALL pH(-)    Protocol/Chemo Regimen: cycle 2B HyperCVAD MTX/cytarabine     Day: 4     Pt endorsed: no acute complaints     Review of Systems: Denies nausea, vomiting, diarrhea, chest pain, SOB     Pain scale:   N/A                                     Location:    Diet: regular    Allergies:  sulfa drugs (Unknown), Zofran (Headache)    ANTIMICROBIALS  acyclovir   Oral Tab/Cap 400 milliGRAM(s) Oral two times a day    HEME/ONC MEDICATIONS  enoxaparin Injectable 40 milliGRAM(s) SubCutaneous <User Schedule>  methotrexate PF IntraThecal (eMAR) 12 milliGRAM(s) IntraThecal once    STANDING MEDICATIONS  Biotene Dry Mouth Oral Rinse 15 milliLiter(s) Swish and Spit four times a day  chlorhexidine 2% Cloths 1 Application(s) Topical <User Schedule>  escitalopram 10 milliGRAM(s) Oral daily  famotidine    Tablet 20 milliGRAM(s) Oral daily  furosemide   Injectable 20 milliGRAM(s) IV Push once  leucovorin IVPB (eMAR) 15 milliGRAM(s) IV Intermittent every 6 hours  losartan 100 milliGRAM(s) Oral daily  metoclopramide Injectable 10 milliGRAM(s) IV Push every 6 hours  OLANZapine 2.5 milliGRAM(s) Oral daily  potassium chloride    Tablet ER 20 milliEquivalent(s) Oral every 2 hours  prednisoLONE acetate 1% Suspension 2 Drop(s) Both EYES every 6 hours  sodium bicarbonate  Infusion 0.165 mEq/kG/Hr IV Continuous <Continuous>    PRN MEDICATIONS  acetaminophen     Tablet .. 650 milliGRAM(s) Oral every 6 hours PRN  LORazepam   Injectable 0.5 milliGRAM(s) IV Push every 6 hours PRN  sodium chloride 0.9% lock flush 10 milliLiter(s) IV Push every 1 hour PRN    Vital Signs Last 24 Hrs  T(C): 37.1 (15 Bora 2023 09:06), Max: 37.4 (14 Jun 2023 17:15)  T(F): 98.8 (15 Bora 2023 09:06), Max: 99.4 (14 Jun 2023 17:15)  HR: 71 (15 Bora 2023 09:06) (71 - 88)  BP: 171/94 (15 Bora 2023 09:06) (121/70 - 171/94)  BP(mean): 87 (14 Jun 2023 17:20) (87 - 87)  RR: 18 (15 Bora 2023 09:06) (18 - 18)  SpO2: 95% (15 Bora 2023 09:06) (94% - 96%)    Parameters below as of 15 Bora 2023 09:06  Patient On (Oxygen Delivery Method): room air    PHYSICAL EXAM  General: adult in NAD  HEENT: clear oropharynx, no erythema, no ulcers  Neck: supple  CV: normal S1, S2, RRR  Lungs: clear to auscultation, no wheezes, no rales  Abdomen: soft, nontender, nondistended, normal BS  Ext: no edema  Skin: no rash  Neuro: alert and oriented x 3  Central line: normal     LABS:                        8.6    1.35  )-----------( 77       ( 15 Bora 2023 08:14 )             25.3     Mean Cell Volume : 93.4 fl  Mean Cell Hemoglobin : 31.7 pg  Mean Cell Hemoglobin Concentration : 34.0 gm/dL  Auto Neutrophil # : x  Auto Lymphocyte # : x  Auto Monocyte # : x  Auto Eosinophil # : x  Auto Basophil # : x  Auto Neutrophil % : x  Auto Lymphocyte % : x  Auto Monocyte % : x  Auto Eosinophil % : x  Auto Basophil % : x    06-15    139  |  102  |  7   ----------------------------<  101<H>  3.4<L>   |  30  |  0.78    Ca    8.6      15 Bora 2023 08:14  Phos  3.1     06-15  Mg     2.0     06-15    TPro  5.1<L>  /  Alb  3.1<L>  /  TBili  0.5  /  DBili  x   /  AST  34  /  ALT  23  /  AlkPhos  67  06-15    Mg 2.0  Phos 3.1

## 2023-06-15 NOTE — PROGRESS NOTE ADULT - PROBLEM SELECTOR PLAN 1
Admitted to [address] [place] [address] accessed upon admission  Chemotherapy with hyperCVAD 2B with MTX/Cytarabine  Sodium bicarb drip to alkalinize urine prior to MTX for urine PH>7, and then initiate chemo  with MTX  200 mg/m2 IV over 2 hrs, then 800 mg/m2 iv over 22 hours on day1 (6/12)  Cytarabine 1 g/m2 over 2 hours  q12h on day 2, 3 for total 4 doses  Leucovorin 50 mg iv starting 12 hours after completion of MTX and then 15 mg iv q6h until MTX level<0.05 um  Follow MTX levels to start 6/14  LP with IT MTX completed on  6/13, follow up flow cytometry  Monitor cerebellar toxicity/nystagmus with cytarabine, handwriting checks   Pred forte eye gtts to prevent chemical conjunctivitis day 2-5 with cytarabine  PPI changed to Pepcid  until MTX cleared due to interaction  Monitor CBC with diff/lytes, transfuse/replace lytes as needed  6/13 added Ativan PRN N/V, as patient has an intolerance  to Zofran  6/14 zyprexa added for nausea  6/14 hypokalemia-supplemented K  Will need fulphila post chemotherapy as outpatient  6/14 With intolerance to zofran, will change reglan to ATC for nausea  Will need to be scheduled for a day 7 or 8 LP as outpatient with Cytarabine/ with possible platelet coverage.  Will transfuse 1u PRBC at 5am on 6/15 in anticipation of discharge home. Admitted to [address] [place] [address] accessed upon admission  Chemotherapy with hyperCVAD 2B with MTX/Cytarabine  Sodium bicarb drip to alkalinize urine prior to MTX for urine PH>7, and then initiate chemo  with MTX  200 mg/m2 IV over 2 hrs, then 800 mg/m2 iv over 22 hours on day1 (6/12)  Cytarabine 1 g/m2 over 2 hours  q12h on day 2, 3 for total 4 doses  Leucovorin 50 mg iv starting 12 hours after completion of MTX and then 15 mg iv q6h until MTX level<0.05 um  Follow MTX levels to start 6/14  LP with IT MTX completed on  6/13, follow up flow cytometry  Monitor cerebellar toxicity/nystagmus with cytarabine, handwriting checks   Pred forte eye gtts to prevent chemical conjunctivitis day 2-5 with cytarabine  PPI changed to Pepcid  until MTX cleared due to interaction  Monitor CBC with diff/lytes, transfuse/replace lytes as needed  6/13 added Ativan PRN N/V, as patient has an intolerance  to Zofran  6/14 zyprexa added for nausea  6/14 hypokalemia-supplemented K  Will need fulphila post chemotherapy as outpatient  6/14 With intolerance to zofran, will change reglan to ATC for nausea  Will need to be scheduled for a day 7 or 8 LP as outpatient with Cytarabine/ with possible platelet coverage.  Will transfuse 1u PRBC at 5am on 6/15 in anticipation of discharge home.   6/15 Hypokalemia: replace kcl 20 meq po x 2. Lasix 20 mg IV x 1

## 2023-06-15 NOTE — ADVANCED PRACTICE NURSE CONSULT - ASSESSMENT
Called Dad and informed him of negative strep culture   Seng is beginning to feel better, Dad will plan to continue the antibiotic  No further questions   
Patient A&Ox4, VSS within range of baseline, denies N/V no signs of distress no reports of pain. Lab results reviewed by Dr Goldberg and team during round, ok to start Chemotherapy. Urine Ph 8.5. Patient with RCWP accessed by primary RN, no s/s of tenderness or redness at insertion site, dressing c/d/i, flushing easily with 10cc NS, with positive blood return. Patient educated on chemo regime, verbalized understanding. Patient premedicated with Emend 150mg IV as per order. 2 RN verification completed at bedside prior to start. At 1723 started day 1 methotrexate IVPB (eMAR)  - 382 milliGRAM(s) in dextrose 5% 100 milliLiter(s), IV Intermittent, infusing over 2 Hour(s), infusing at 57mL/Hr, Special Instructions: see chemo order: bag 1/2  Protect from light  in brown bag, attached as a secondary infusion into primary NS line into RCWP via Alaris pump. Primary RN aware of treatment plan. Call bell within reach. Safety maintained, nursing care on-going.  
Patient is alert, oriented x4.Educated patient about the chemotherapy. Patient verbalized understanding. Right chest wall Mediport in place. Mediport site has no redness abd swelling, flushes without difficulty and has good blood return. Nystagmus check done - negative. Hand writing test done prior to chemotherapy administration. Cytarabine 1910 mg IV started at 2020 on 6/14 /23 via Right chest wall Mediport and infused over 2 hours.  Patient tolerated well.
Pt A&Ox4, VSS within range of baseline, denies N/V, no signs of distress or any pain noted non reported. Patient with Right ACW mediport accessed. no s/s of tenderness or redness at insertion site, dressing c/d/i, flushing easily with 10cc NS, with positive blood return. Pt educated on chemo regime, verbalized understanding. 2 RNs verification completed at bedside prior to start. At 2000 Cytarabine 1910milliGRAM(s) in sodium chloride 250 milliLiter(s), IV. To infusing over 2 Hours, infusing at 135mL/Hr, Special Instructions: see chemo order: attached as a secondary infusion into primary NS line into Right ACW mediport  via Alaris pump. Call bell within reach. Pt's safety maintained, nursing care on-going.    
Pt A&Ox4, VSS within range of baseline, denies N/V, no signs of distress or any pain noted non reported. Patient with Right Anterior Chest wall mediport accessed 6/12. No s/s of tenderness or redness at insertion site, dressing c/d/i, flushing easily with 10cc NS, with positive blood return. Pt educated on chemo regime, verbalized understanding. Handwriting and nystagmus check completed. 2 RNs verification completed at bedside prior to start. At 0833, Cytarabine 1910milliGRAM(s) in sodium chloride 250 milliLiter(s) attached to lowest side arm of normal saline, running concurrently with 150cc sodium bicarb via right anterior chest wall port infusing at 135mL/Hr over 2 hours. Call bell within reach. Pt's safety maintained, nursing care on-going. Primary RN aware of care.  
Pt A&Ox4, VSS within range of baseline, denies N/V, no signs of distress or any pain noted non reported. Urine Ph 8.5. Patient with Right ACW mediport accessed by primary RN, no s/s of tenderness or redness at insertion site, dressing c/d/i, flushing easily with 10cc NS, with positive blood return. Pt educated on chemo regime, verbalized understanding. 2 RNs verification completed at bedside prior to start. At 2000 started day 1 methotrexate IVPB (eMAR)  -1528 milliGRAM(s) in dextrose 5% 1000 milliLiter(s), IV. To  infusing over 22 Hours, infusing at 26mL/Hr, Special Instructions: see chemo order: bag Protected from light  in brown bag, attached as a secondary infusion into primary NS line into Right ACW mediport  via Alaris pump. Call bell within reach. Pt's safety maintained, nursing care on-going.    
Pt A&Ox4, VSS within range of baseline, denies N/V, no signs of distress or any pain noted non reported. Patient with Right Anterior Chest wall mediport accessed. No s/s of tenderness or redness at insertion site, dressing c/d/i, flushing easily with 10cc NS, with positive blood return. Pt educated on chemo regime, verbalized understanding. Handwriting and nystagmus check completed. 2 RNs verification completed at bedside prior to start. At 0755, Cytarabine 1910milliGRAM(s) in sodium chloride 250 milliLiter(s) attached to lowest side arm of sodium bicarb via right anterior chest wall port infusing at 135mL/Hr over 2 hours. Call bell within reach. Pt's safety maintained, nursing care on-going. Primary RN aware of care.

## 2023-06-15 NOTE — PROGRESS NOTE ADULT - NS ATTEND AMEND GEN_ALL_CORE FT
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 Now admitted for cycle 2B  hyperCVAD (regular dosing) Methotrexate/Cytarabine. Pt has pancytopenia due to chemotherapy.      Heme-  Today is day 3 of therapy.   IV sodium bicarbonate for urine pH >7.5  Leucovorin starting 24 hours post MTX.   Daily MTX level.   Is/Os, daily weights.   LP with IT MTX day 2. 64 y/o F with PMHx of HTN, HLD with Ph (-) ALL diagnosed in February 2023 and now s/p cycle 1A of miini Hyper-CVAD, 1B and 2A. Now admitted for cycle 2B  hyperCVAD (regular dosing) Methotrexate/Cytarabine. Pt has pancytopenia due to chemotherapy.      Heme-  Today is day 4 of therapy.   IV sodium bicarbonate for urine pH >7.5  Leucovorin starting 24 hours post MTX.   Daily MTX level.   Is/Os, daily weights.   LP with IT MTX day 2.

## 2023-06-15 NOTE — ADVANCED PRACTICE NURSE CONSULT - REASON FOR CONSULT
Chemotherapy note: Cytarabine 1910mg over 2hrs.  
Chemotherapy note: Day 3 hyperCVAD
Chemotherapy note: MTX 1528mg   
To administer Cytarabine.
Chemotherapy note: Cycle 2B Cytarabine; consent in chart  
Cycle 2B HyperCVAD, MTX bag 1/2. Consent in chart.

## 2023-06-16 ENCOUNTER — OUTPATIENT (OUTPATIENT)
Dept: OUTPATIENT SERVICES | Facility: HOSPITAL | Age: 63
LOS: 1 days | End: 2023-06-16
Payer: COMMERCIAL

## 2023-06-16 ENCOUNTER — TRANSCRIPTION ENCOUNTER (OUTPATIENT)
Age: 63
End: 2023-06-16

## 2023-06-16 VITALS
SYSTOLIC BLOOD PRESSURE: 153 MMHG | OXYGEN SATURATION: 96 % | TEMPERATURE: 98 F | RESPIRATION RATE: 17 BRPM | HEART RATE: 50 BPM | DIASTOLIC BLOOD PRESSURE: 77 MMHG

## 2023-06-16 DIAGNOSIS — C95.90 LEUKEMIA, UNSPECIFIED NOT HAVING ACHIEVED REMISSION: ICD-10-CM

## 2023-06-16 LAB
ALBUMIN SERPL ELPH-MCNC: 3 G/DL — LOW (ref 3.3–5)
ALP SERPL-CCNC: 68 U/L — SIGNIFICANT CHANGE UP (ref 40–120)
ALT FLD-CCNC: 24 U/L — SIGNIFICANT CHANGE UP (ref 10–45)
ANION GAP SERPL CALC-SCNC: 6 MMOL/L — SIGNIFICANT CHANGE UP (ref 5–17)
AST SERPL-CCNC: 35 U/L — SIGNIFICANT CHANGE UP (ref 10–40)
BASOPHILS # BLD AUTO: 0.03 K/UL — SIGNIFICANT CHANGE UP (ref 0–0.2)
BASOPHILS NFR BLD AUTO: 2.6 % — HIGH (ref 0–2)
BILIRUB SERPL-MCNC: 0.7 MG/DL — SIGNIFICANT CHANGE UP (ref 0.2–1.2)
BUN SERPL-MCNC: 8 MG/DL — SIGNIFICANT CHANGE UP (ref 7–23)
CALCIUM SERPL-MCNC: 8.6 MG/DL — SIGNIFICANT CHANGE UP (ref 8.4–10.5)
CHLORIDE SERPL-SCNC: 101 MMOL/L — SIGNIFICANT CHANGE UP (ref 96–108)
CO2 SERPL-SCNC: 31 MMOL/L — SIGNIFICANT CHANGE UP (ref 22–31)
CREAT SERPL-MCNC: 0.77 MG/DL — SIGNIFICANT CHANGE UP (ref 0.5–1.3)
EGFR: 86 ML/MIN/1.73M2 — SIGNIFICANT CHANGE UP
EOSINOPHIL # BLD AUTO: 0.06 K/UL — SIGNIFICANT CHANGE UP (ref 0–0.5)
EOSINOPHIL NFR BLD AUTO: 5.2 % — SIGNIFICANT CHANGE UP (ref 0–6)
GLUCOSE SERPL-MCNC: 102 MG/DL — HIGH (ref 70–99)
HCT VFR BLD CALC: 24.9 % — LOW (ref 34.5–45)
HGB BLD-MCNC: 8.2 G/DL — LOW (ref 11.5–15.5)
LYMPHOCYTES # BLD AUTO: 0.04 K/UL — LOW (ref 1–3.3)
LYMPHOCYTES # BLD AUTO: 3.5 % — LOW (ref 13–44)
MAGNESIUM SERPL-MCNC: 1.9 MG/DL — SIGNIFICANT CHANGE UP (ref 1.6–2.6)
MANUAL SMEAR VERIFICATION: SIGNIFICANT CHANGE UP
MCHC RBC-ENTMCNC: 30.8 PG — SIGNIFICANT CHANGE UP (ref 27–34)
MCHC RBC-ENTMCNC: 32.9 GM/DL — SIGNIFICANT CHANGE UP (ref 32–36)
MCV RBC AUTO: 93.6 FL — SIGNIFICANT CHANGE UP (ref 80–100)
MONOCYTES # BLD AUTO: 0 K/UL — SIGNIFICANT CHANGE UP (ref 0–0.9)
MONOCYTES NFR BLD AUTO: 0 % — LOW (ref 2–14)
MTX SERPL-SCNC: 0.14 UMOL/L — LOW (ref 0.5–5)
NEUTROPHILS # BLD AUTO: 1.06 K/UL — LOW (ref 1.8–7.4)
NEUTROPHILS NFR BLD AUTO: 88.7 % — HIGH (ref 43–77)
PH UR: 8 — SIGNIFICANT CHANGE UP (ref 5–8)
PH UR: 9 — HIGH (ref 5–8)
PH UR: 9 — HIGH (ref 5–8)
PHOSPHATE SERPL-MCNC: 4 MG/DL — SIGNIFICANT CHANGE UP (ref 2.5–4.5)
PLAT MORPH BLD: NORMAL — SIGNIFICANT CHANGE UP
PLATELET # BLD AUTO: 73 K/UL — LOW (ref 150–400)
POTASSIUM SERPL-MCNC: 3.5 MMOL/L — SIGNIFICANT CHANGE UP (ref 3.5–5.3)
POTASSIUM SERPL-SCNC: 3.5 MMOL/L — SIGNIFICANT CHANGE UP (ref 3.5–5.3)
PROT SERPL-MCNC: 5.1 G/DL — LOW (ref 6–8.3)
RBC # BLD: 2.66 M/UL — LOW (ref 3.8–5.2)
RBC # FLD: 21.4 % — HIGH (ref 10.3–14.5)
RBC BLD AUTO: SIGNIFICANT CHANGE UP
SODIUM SERPL-SCNC: 138 MMOL/L — SIGNIFICANT CHANGE UP (ref 135–145)
WBC # BLD: 1.19 K/UL — LOW (ref 3.8–10.5)
WBC # FLD AUTO: 1.19 K/UL — LOW (ref 3.8–10.5)

## 2023-06-16 PROCEDURE — 84100 ASSAY OF PHOSPHORUS: CPT

## 2023-06-16 PROCEDURE — 80204 DRUG ASSAY METHOTREXATE: CPT

## 2023-06-16 PROCEDURE — 88185 FLOWCYTOMETRY/TC ADD-ON: CPT

## 2023-06-16 PROCEDURE — 86923 COMPATIBILITY TEST ELECTRIC: CPT

## 2023-06-16 PROCEDURE — P9040: CPT

## 2023-06-16 PROCEDURE — 82945 GLUCOSE OTHER FLUID: CPT

## 2023-06-16 PROCEDURE — 84550 ASSAY OF BLOOD/URIC ACID: CPT

## 2023-06-16 PROCEDURE — 87205 SMEAR GRAM STAIN: CPT

## 2023-06-16 PROCEDURE — 85384 FIBRINOGEN ACTIVITY: CPT

## 2023-06-16 PROCEDURE — 93005 ELECTROCARDIOGRAM TRACING: CPT

## 2023-06-16 PROCEDURE — 36415 COLL VENOUS BLD VENIPUNCTURE: CPT

## 2023-06-16 PROCEDURE — 83615 LACTATE (LD) (LDH) ENZYME: CPT

## 2023-06-16 PROCEDURE — 86850 RBC ANTIBODY SCREEN: CPT

## 2023-06-16 PROCEDURE — 85025 COMPLETE CBC W/AUTO DIFF WBC: CPT

## 2023-06-16 PROCEDURE — 99239 HOSP IP/OBS DSCHRG MGMT >30: CPT

## 2023-06-16 PROCEDURE — 85610 PROTHROMBIN TIME: CPT

## 2023-06-16 PROCEDURE — 62329 THER SPI PNXR CSF FLUOR/CT: CPT

## 2023-06-16 PROCEDURE — 88184 FLOWCYTOMETRY/ TC 1 MARKER: CPT

## 2023-06-16 PROCEDURE — 89051 BODY FLUID CELL COUNT: CPT

## 2023-06-16 PROCEDURE — 36430 TRANSFUSION BLD/BLD COMPNT: CPT

## 2023-06-16 PROCEDURE — 84157 ASSAY OF PROTEIN OTHER: CPT

## 2023-06-16 PROCEDURE — 80053 COMPREHEN METABOLIC PANEL: CPT

## 2023-06-16 PROCEDURE — 86900 BLOOD TYPING SEROLOGIC ABO: CPT

## 2023-06-16 PROCEDURE — 86901 BLOOD TYPING SEROLOGIC RH(D): CPT

## 2023-06-16 PROCEDURE — 85730 THROMBOPLASTIN TIME PARTIAL: CPT

## 2023-06-16 PROCEDURE — 83735 ASSAY OF MAGNESIUM: CPT

## 2023-06-16 PROCEDURE — 83986 ASSAY PH BODY FLUID NOS: CPT

## 2023-06-16 RX ORDER — FUROSEMIDE 40 MG
20 TABLET ORAL ONCE
Refills: 0 | Status: COMPLETED | OUTPATIENT
Start: 2023-06-16 | End: 2023-06-16

## 2023-06-16 RX ORDER — OLANZAPINE 15 MG/1
1 TABLET, FILM COATED ORAL
Qty: 30 | Refills: 0
Start: 2023-06-16 | End: 2023-07-15

## 2023-06-16 RX ORDER — POTASSIUM CHLORIDE 20 MEQ
20 PACKET (EA) ORAL ONCE
Refills: 0 | Status: COMPLETED | OUTPATIENT
Start: 2023-06-16 | End: 2023-06-16

## 2023-06-16 RX ORDER — SODIUM BICARBONATE 1 MEQ/ML
0.12 SYRINGE (ML) INTRAVENOUS
Qty: 150 | Refills: 0 | Status: DISCONTINUED | OUTPATIENT
Start: 2023-06-16 | End: 2023-06-16

## 2023-06-16 RX ADMIN — Medication 100 MEQ/KG/HR: at 05:03

## 2023-06-16 RX ADMIN — ESCITALOPRAM OXALATE 10 MILLIGRAM(S): 10 TABLET, FILM COATED ORAL at 11:40

## 2023-06-16 RX ADMIN — Medication 20 MILLIGRAM(S): at 09:53

## 2023-06-16 RX ADMIN — Medication 15 MILLIGRAM(S): at 00:30

## 2023-06-16 RX ADMIN — Medication 400 MILLIGRAM(S): at 05:02

## 2023-06-16 RX ADMIN — Medication 20 MILLIEQUIVALENT(S): at 13:14

## 2023-06-16 RX ADMIN — Medication 10 MILLIGRAM(S): at 11:40

## 2023-06-16 RX ADMIN — Medication 15 MILLILITER(S): at 00:30

## 2023-06-16 RX ADMIN — FAMOTIDINE 20 MILLIGRAM(S): 10 INJECTION INTRAVENOUS at 11:40

## 2023-06-16 RX ADMIN — Medication 10 MILLIGRAM(S): at 05:03

## 2023-06-16 RX ADMIN — Medication 2 DROP(S): at 11:41

## 2023-06-16 RX ADMIN — Medication 15 MILLILITER(S): at 05:03

## 2023-06-16 RX ADMIN — Medication 15 MILLIGRAM(S): at 13:15

## 2023-06-16 RX ADMIN — Medication 2 DROP(S): at 05:02

## 2023-06-16 RX ADMIN — Medication 15 MILLIGRAM(S): at 05:03

## 2023-06-16 RX ADMIN — OLANZAPINE 2.5 MILLIGRAM(S): 15 TABLET, FILM COATED ORAL at 11:40

## 2023-06-16 RX ADMIN — Medication 300 UNIT(S): at 15:10

## 2023-06-16 RX ADMIN — LOSARTAN POTASSIUM 100 MILLIGRAM(S): 100 TABLET, FILM COATED ORAL at 05:02

## 2023-06-16 RX ADMIN — Medication 10 MILLIGRAM(S): at 00:30

## 2023-06-16 RX ADMIN — Medication 2 DROP(S): at 00:30

## 2023-06-16 NOTE — PROGRESS NOTE ADULT - ASSESSMENT
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 Patient is now s/p cycle 1A of mini Hyper-CVAD (dose reduced) and full dosing for courses 1B and 2A.  Now admitted for cycle 2B  hyperCVAD (regular dosing) with  Methotrexate/Cytarabine. Pt s/p LP with IT MTX on 6/13. Pt has pancytopenia secondary  to chemotherapy.     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 Patient is now s/p cycle 1A of mini Hyper-CVAD (dose reduced) and full dosing for courses 1B and 2A.  Now admitted for cycle 2B  hyperCVAD (regular dosing) with  Methotrexate/Cytarabine. Pt s/p LP with IT MTX on 6/13, flow  had insufficient cell yield . Pt has pancytopenia secondary  to chemotherapy.

## 2023-06-16 NOTE — PROGRESS NOTE ADULT - PROBLEM SELECTOR PLAN 4
Lovenox initially held for  LP scheduled for 6/13, and resumed 24 hrs post LP   Encourage OOB and ambulation           Contact information: [phone]

## 2023-06-16 NOTE — PROGRESS NOTE ADULT - PROBLEM SELECTOR PLAN 2
Patient is not neutropenic, afebrile   Continue home medication - acyclovir   Currently off levaquin and fluconazole   If febrile, panculture and CXR
Patient is not neutropenic, afebrile   Continue home medication  with prophylactic acyclovir   Currently off levaquin and fluconazole. Will need to resume with discharge   If febrile, panculture and CXR

## 2023-06-16 NOTE — DISCHARGE NOTE NURSING/CASE MANAGEMENT/SOCIAL WORK - NSDCFUADDAPPT_GEN_ALL_CORE_FT
You are scheduled to receive a Fulphila injection on Saturday 6/17 at 1:20pm at the [facility].  You are scheduled for a follow up appointment at [facility] for lab work at 11am, to see  Sruthi Keyes NP on 6/19/23 at 11:45am  To [facility]  on Monday 6/19/23 at 12:20pm for  possible Platelet transfusion.  You are scheduled to receive an outpatient Lumbar Puncture at the hospital on 6/19/23 at 3:30pm   You are scheduled to see see Dr. Lewis for follow up on 6/27/23 at 1:20pm

## 2023-06-16 NOTE — PROGRESS NOTE ADULT - NS ATTEND AMEND GEN_ALL_CORE FT
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 Now admitted for cycle 2B  hyperCVAD (regular dosing) Methotrexate/Cytarabine. Pt has pancytopenia due to chemotherapy.      Heme-  Today is day 4 of therapy.   IV sodium bicarbonate for urine pH >7.5  Leucovorin starting 24 hours post MTX.   Daily MTX level.   Is/Os, daily weights.   LP with IT MTX day 2. 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 Now admitted for cycle 2B  hyperCVAD (regular dosing) Methotrexate/Cytarabine. Pt has pancytopenia due to chemotherapy.      Heme-  Today is day 5 of therapy.   IV sodium bicarbonate for urine pH >7.5  Leucovorin starting 24 hours post MTX.   Daily MTX level.   Is/Os, daily weights.   LP with IT MTX day 2.  Discharge today.

## 2023-06-16 NOTE — PROGRESS NOTE ADULT - SUBJECTIVE AND OBJECTIVE BOX
ANTIMICROBIALS  acyclovir   Oral Tab/Cap 400 milliGRAM(s) Oral two times a day      HEME/ONC MEDICATIONS  enoxaparin Injectable 40 milliGRAM(s) SubCutaneous <User Schedule>  methotrexate PF IntraThecal (eMAR) 12 milliGRAM(s) IntraThecal once      STANDING MEDICATIONS  Biotene Dry Mouth Oral Rinse 15 milliLiter(s) Swish and Spit four times a day  chlorhexidine 2% Cloths 1 Application(s) Topical <User Schedule>  escitalopram 10 milliGRAM(s) Oral daily  famotidine    Tablet 20 milliGRAM(s) Oral daily  leucovorin IVPB (eMAR) 15 milliGRAM(s) IV Intermittent every 6 hours  losartan 100 milliGRAM(s) Oral daily  metoclopramide Injectable 10 milliGRAM(s) IV Push every 6 hours  OLANZapine 2.5 milliGRAM(s) Oral daily  prednisoLONE acetate 1% Suspension 2 Drop(s) Both EYES every 6 hours  sodium bicarbonate  Infusion 0.165 mEq/kG/Hr IV Continuous <Continuous>      PRN MEDICATIONS  acetaminophen     Tablet .. 650 milliGRAM(s) Oral every 6 hours PRN  LORazepam   Injectable 0.5 milliGRAM(s) IV Push every 6 hours PRN  sodium chloride 0.9% lock flush 10 milliLiter(s) IV Push every 1 hour PRN        Vital Signs Last 24 Hrs  T(C): 36.7 (16 Jun 2023 05:22), Max: 37.3 (15 Bora 2023 13:43)  T(F): 98 (16 Jun 2023 05:22), Max: 99.1 (15 Bora 2023 13:43)  HR: 60 (16 Jun 2023 05:22) (60 - 71)  BP: 154/84 (16 Jun 2023 05:22) (123/76 - 171/94)  BP(mean): --  RR: 18 (16 Jun 2023 05:22) (18 - 18)  SpO2: 96% (16 Jun 2023 05:22) (94% - 96%)    Parameters below as of 16 Jun 2023 05:22  Patient On (Oxygen Delivery Method): room air              LABS:                        8.2    1.19  )-----------( 73       ( 16 Jun 2023 05:24 )             24.9         Mean Cell Volume : 93.6 fl  Mean Cell Hemoglobin : 30.8 pg  Mean Cell Hemoglobin Concentration : 32.9 gm/dL  Auto Neutrophil # : x  Auto Lymphocyte # : x  Auto Monocyte # : x  Auto Eosinophil # : x  Auto Basophil # : x  Auto Neutrophil % : x  Auto Lymphocyte % : x  Auto Monocyte % : x  Auto Eosinophil % : x  Auto Basophil % : x      06-16    138  |  101  |  8   ----------------------------<  102<H>  3.5   |  31  |  0.77    Ca    8.6      16 Jun 2023 05:24  Phos  4.0     06-16  Mg     1.9     06-16    TPro  5.1<L>  /  Alb  3.0<L>  /  TBili  0.7  /  DBili  x   /  AST  35  /  ALT  24  /  AlkPhos  68  06-16      Mg 1.9  Phos 4.0  Mg 2.0  Phos 3.1              RECENT CULTURES:      RADIOLOGY & ADDITIONAL STUDIES:         Diagnosis B-ALL pH(-)    Protocol/Chemo Regimen: cycle 2B HyperCVAD MTX/cytarabine     Day: 5     Pt endorsed: no acute complaints     Review of Systems: Denies nausea, vomiting, diarrhea, chest pain, SOB     Pain scale:   N/A                                     Location:    Diet: regular    Allergies:  sulfa drugs (Unknown), Zofran (Headache)      ANTIMICROBIALS  acyclovir   Oral Tab/Cap 400 milliGRAM(s) Oral two times a day      HEME/ONC MEDICATIONS  enoxaparin Injectable 40 milliGRAM(s) SubCutaneous <User Schedule>  methotrexate PF IntraThecal (eMAR) 12 milliGRAM(s) IntraThecal once      STANDING MEDICATIONS  Biotene Dry Mouth Oral Rinse 15 milliLiter(s) Swish and Spit four times a day  chlorhexidine 2% Cloths 1 Application(s) Topical <User Schedule>  escitalopram 10 milliGRAM(s) Oral daily  famotidine    Tablet 20 milliGRAM(s) Oral daily  leucovorin IVPB (eMAR) 15 milliGRAM(s) IV Intermittent every 6 hours  losartan 100 milliGRAM(s) Oral daily  metoclopramide Injectable 10 milliGRAM(s) IV Push every 6 hours  OLANZapine 2.5 milliGRAM(s) Oral daily  prednisoLONE acetate 1% Suspension 2 Drop(s) Both EYES every 6 hours  sodium bicarbonate  Infusion 0.165 mEq/kG/Hr IV Continuous <Continuous>      PRN MEDICATIONS  acetaminophen     Tablet .. 650 milliGRAM(s) Oral every 6 hours PRN  LORazepam   Injectable 0.5 milliGRAM(s) IV Push every 6 hours PRN  sodium chloride 0.9% lock flush 10 milliLiter(s) IV Push every 1 hour PRN        Vital Signs Last 24 Hrs  T(C): 36.7 (16 Jun 2023 05:22), Max: 37.3 (15 Bora 2023 13:43)  T(F): 98 (16 Jun 2023 05:22), Max: 99.1 (15 Bora 2023 13:43)  HR: 60 (16 Jun 2023 05:22) (60 - 71)  BP: 154/84 (16 Jun 2023 05:22) (123/76 - 171/94)  BP(mean): --  RR: 18 (16 Jun 2023 05:22) (18 - 18)  SpO2: 96% (16 Jun 2023 05:22) (94% - 96%)    Parameters below as of 16 Jun 2023 05:22  Patient On (Oxygen Delivery Method): room air        PHYSICAL EXAM  General: adult in NAD  HEENT: clear oropharynx, no erythema, no ulcers  Neck: supple  CV: normal S1, S2, RRR  Lungs: clear to auscultation, no wheezes, no rales  Abdomen: soft, nontender, nondistended, normal BS  Ext: no edema  Skin: no rash  Neuro: alert and oriented x 3  Central line: normal           LABS:                        8.2    1.19  )-----------( 73       ( 16 Jun 2023 05:24 )             24.9         Mean Cell Volume : 93.6 fl  Mean Cell Hemoglobin : 30.8 pg  Mean Cell Hemoglobin Concentration : 32.9 gm/dL  Auto Neutrophil # : x  Auto Lymphocyte # : x  Auto Monocyte # : x  Auto Eosinophil # : x  Auto Basophil # : x  Auto Neutrophil % : x  Auto Lymphocyte % : x  Auto Monocyte % : x  Auto Eosinophil % : x  Auto Basophil % : x      06-16    138  |  101  |  8   ----------------------------<  102<H>  3.5   |  31  |  0.77    Ca    8.6      16 Jun 2023 05:24  Phos  4.0     06-16  Mg     1.9     06-16    TPro  5.1<L>  /  Alb  3.0<L>  /  TBili  0.7  /  DBili  x   /  AST  35  /  ALT  24  /  AlkPhos  68  06-16      Mg 1.9  Phos 4.0  Mg 2.0  Phos 3.1              RECENT CULTURES:      RADIOLOGY & ADDITIONAL STUDIES:    < from: Xray Lumbar Puncture, Theraputic (06.13.23 @ 10:08) >  IMPRESSION: Successful fluoroscopically guided lumbar puncture and   intrathecal chemotherapy injection.  Fluoroscopic time  for the procedure was measured at 0.1 minutes.           Diagnosis B-ALL pH(-)    Protocol/Chemo Regimen: cycle 2B HyperCVAD MTX/cytarabine     Day: 5     Pt endorsed:  fatigue     Review of Systems: Patient denied nausea, vomiting, odynophagia, chest pain, cough, dyspnea, abdominal pain, constipation, diarrhea, rash,  headache    Pain scale:   N/A                                     Location:    Diet: regular    Allergies:  sulfa drugs (Unknown), Zofran (Headache)      ANTIMICROBIALS  acyclovir   Oral Tab/Cap 400 milliGRAM(s) Oral two times a day      HEME/ONC MEDICATIONS  enoxaparin Injectable 40 milliGRAM(s) SubCutaneous <User Schedule>  methotrexate PF IntraThecal (eMAR) 12 milliGRAM(s) IntraThecal once      STANDING MEDICATIONS  Biotene Dry Mouth Oral Rinse 15 milliLiter(s) Swish and Spit four times a day  chlorhexidine 2% Cloths 1 Application(s) Topical <User Schedule>  escitalopram 10 milliGRAM(s) Oral daily  famotidine    Tablet 20 milliGRAM(s) Oral daily  leucovorin IVPB (eMAR) 15 milliGRAM(s) IV Intermittent every 6 hours  losartan 100 milliGRAM(s) Oral daily  metoclopramide Injectable 10 milliGRAM(s) IV Push every 6 hours  OLANZapine 2.5 milliGRAM(s) Oral daily  prednisoLONE acetate 1% Suspension 2 Drop(s) Both EYES every 6 hours  sodium bicarbonate  Infusion 0.165 mEq/kG/Hr IV Continuous <Continuous>      PRN MEDICATIONS  acetaminophen     Tablet .. 650 milliGRAM(s) Oral every 6 hours PRN  LORazepam   Injectable 0.5 milliGRAM(s) IV Push every 6 hours PRN  sodium chloride 0.9% lock flush 10 milliLiter(s) IV Push every 1 hour PRN      Vital Signs Last 24 Hrs  T(C): 36.7 (16 Jun 2023 05:22), Max: 37.3 (15 Bora 2023 13:43)  T(F): 98 (16 Jun 2023 05:22), Max: 99.1 (15 Bora 2023 13:43)  HR: 60 (16 Jun 2023 05:22) (60 - 71)  BP: 154/84 (16 Jun 2023 05:22) (123/76 - 171/94)  BP(mean): --  RR: 18 (16 Jun 2023 05:22) (18 - 18)  SpO2: 96% (16 Jun 2023 05:22) (94% - 96%)    Parameters below as of 16 Jun 2023 05:22  Patient On (Oxygen Delivery Method): room air      PHYSICAL EXAM  General: adult in NAD  HEENT: clear oropharynx, no erythema, no ulcers  Neck: supple  CV: normal S1, S2, RRR  Lungs: clear to auscultation, no wheezes, no rales  Abdomen: soft, nontender, nondistended, normal BS  Ext: no edema  Skin: no rash  Neuro: alert and oriented x 3  Central line: port CDI      LABS:                        8.2    1.19  )-----------( 73       ( 16 Jun 2023 05:24 )             24.9         Mean Cell Volume : 93.6 fl  Mean Cell Hemoglobin : 30.8 pg  Mean Cell Hemoglobin Concentration : 32.9 gm/dL  Auto Neutrophil # : 1.06 K/uL  Auto Lymphocyte # : 0.04 K/uL  Auto Monocyte # : 0.00 K/uL  Auto Eosinophil # : 0.06 K/uL  Auto Basophil # : 0.03 K/uL  Auto Neutrophil % : 88.7 %  Auto Lymphocyte % : 3.5 %  pH Urine (06.16.23 @ 06:43)    pH Urine: 9.0    Auto Monocyte % : 0.0 %  Auto Eosinophil % : 5.2 %  Auto Basophil % : 2.6 %      06-16    138  |  101  |  8   ----------------------------<  102<H>  3.5   |  31  |  0.77    Ca    8.6      16 Jun 2023 05:24  Phos  4.0     06-16  Mg     1.9     06-16    TPro  5.1<L>  /  Alb  3.0<L>  /  TBili  0.7  /  DBili  x   /  AST  35  /  ALT  24  /  AlkPhos  68  06-16    Methotrexate Level, Serum (06.15.23 @ 08:14)    Methotrexate Level, Serum: 0.89      Flow Cytometry (06.13.23 @ 10:14)    TM Interpretation:   Flow Cytometry Final Report  ________________________________________________________________________  Specimen: CSF  Date Collected: 06/13/2023 10:14  Date Received: 06/13/2023 12:47  Date Processed: 06/13/2023 13:30  Date Reported: 06/14/2023 16:30  Accession #: [id number]  ________________________________________________________________________  CLINICAL DATA: Rule out Acute Lymphocytic Leukemia    ________________________________________________________________________  ________________________________________________________________________      The specimen had insufficient cell yield to report immunophenotyping by flow cytometry.        RADIOLOGY & ADDITIONAL STUDIES:    < from: Xray Lumbar Puncture, Theraputic (06.13.23 @ 10:08) >  IMPRESSION: Successful fluoroscopically guided lumbar puncture and   intrathecal chemotherapy injection.  Fluoroscopic time  for the procedure was measured at 0.1 minutes.           Diagnosis B-ALL pH(-)    Protocol/Chemo Regimen: cycle 2B HyperCVAD MTX/cytarabine     Day: 5     Pt endorsed:  fatigue     Review of Systems: Patient denied nausea, vomiting, odynophagia, chest pain, cough, dyspnea, abdominal pain, constipation, diarrhea, rash,  headache    Pain scale:   N/A                                     Location:    Diet: regular    Allergies:  sulfa drugs (Unknown), Zofran (Headache)      ANTIMICROBIALS  acyclovir   Oral Tab/Cap 400 milliGRAM(s) Oral two times a day      HEME/ONC MEDICATIONS  enoxaparin Injectable 40 milliGRAM(s) SubCutaneous <User Schedule>  methotrexate PF IntraThecal (eMAR) 12 milliGRAM(s) IntraThecal once      STANDING MEDICATIONS  Biotene Dry Mouth Oral Rinse 15 milliLiter(s) Swish and Spit four times a day  chlorhexidine 2% Cloths 1 Application(s) Topical <User Schedule>  escitalopram 10 milliGRAM(s) Oral daily  famotidine    Tablet 20 milliGRAM(s) Oral daily  leucovorin IVPB (eMAR) 15 milliGRAM(s) IV Intermittent every 6 hours  losartan 100 milliGRAM(s) Oral daily  metoclopramide Injectable 10 milliGRAM(s) IV Push every 6 hours  OLANZapine 2.5 milliGRAM(s) Oral daily  prednisoLONE acetate 1% Suspension 2 Drop(s) Both EYES every 6 hours  sodium bicarbonate  Infusion 0.165 mEq/kG/Hr IV Continuous <Continuous>      PRN MEDICATIONS  acetaminophen     Tablet .. 650 milliGRAM(s) Oral every 6 hours PRN  LORazepam   Injectable 0.5 milliGRAM(s) IV Push every 6 hours PRN  sodium chloride 0.9% lock flush 10 milliLiter(s) IV Push every 1 hour PRN      Vital Signs Last 24 Hrs  T(C): 36.7 (16 Jun 2023 05:22), Max: 37.3 (15 Bora 2023 13:43)  T(F): 98 (16 Jun 2023 05:22), Max: 99.1 (15 Bora 2023 13:43)  HR: 60 (16 Jun 2023 05:22) (60 - 71)  BP: 154/84 (16 Jun 2023 05:22) (123/76 - 171/94)  BP(mean): --  RR: 18 (16 Jun 2023 05:22) (18 - 18)  SpO2: 96% (16 Jun 2023 05:22) (94% - 96%)    Parameters below as of 16 Jun 2023 05:22  Patient On (Oxygen Delivery Method): room air      PHYSICAL EXAM  General: adult in NAD  HEENT: clear oropharynx, no erythema, no ulcers  Neck: supple  CV: normal S1, S2, RRR  Lungs: clear to auscultation, no wheezes, no rales  Abdomen: soft, nontender, nondistended, normal BS  Ext: no edema  Skin: no rash  Neuro: alert and oriented x 3  Central line: port CDI      LABS:                        8.2    1.19  )-----------( 73       ( 16 Jun 2023 05:24 )             24.9         Mean Cell Volume : 93.6 fl  Mean Cell Hemoglobin : 30.8 pg  Mean Cell Hemoglobin Concentration : 32.9 gm/dL  Auto Neutrophil # : 1.06 K/uL  Auto Lymphocyte # : 0.04 K/uL  Auto Monocyte # : 0.00 K/uL  Auto Eosinophil # : 0.06 K/uL  Auto Basophil # : 0.03 K/uL  Auto Neutrophil % : 88.7 %  Auto Lymphocyte % : 3.5 %  pH Urine (06.16.23 @ 06:43)    pH Urine: 9.0    Auto Monocyte % : 0.0 %  Auto Eosinophil % : 5.2 %  Auto Basophil % : 2.6 %      06-16    138  |  101  |  8   ----------------------------<  102<H>  3.5   |  31  |  0.77    Ca    8.6      16 Jun 2023 05:24  Phos  4.0     06-16  Mg     1.9     06-16    TPro  5.1<L>  /  Alb  3.0<L>  /  TBili  0.7  /  DBili  x   /  AST  35  /  ALT  24  /  AlkPhos  68  06-16      Methotrexate Level, Serum (06.16.23 @ 05:24)    Methotrexate Level, Serum: 0.14      Flow Cytometry (06.13.23 @ 10:14)    TM Interpretation:   Flow Cytometry Final Report  ________________________________________________________________________  Specimen: CSF  Date Collected: 06/13/2023 10:14  Date Received: 06/13/2023 12:47  Date Processed: 06/13/2023 13:30  Date Reported: 06/14/2023 16:30  Accession #: [id number]  ________________________________________________________________________  CLINICAL DATA: Rule out Acute Lymphocytic Leukemia    ________________________________________________________________________  ________________________________________________________________________      The specimen had insufficient cell yield to report immunophenotyping by flow cytometry.        RADIOLOGY & ADDITIONAL STUDIES:    < from: Xray Lumbar Puncture, Theraputic (06.13.23 @ 10:08) >  IMPRESSION: Successful fluoroscopically guided lumbar puncture and   intrathecal chemotherapy injection.  Fluoroscopic time  for the procedure was measured at 0.1 minutes.

## 2023-06-16 NOTE — DISCHARGE NOTE NURSING/CASE MANAGEMENT/SOCIAL WORK - PATIENT PORTAL LINK FT
You can access the FollowMyHealth Patient Portal offered by [facility] by registering at the following website: [email or web address]. By joining [facility]’s FollowMyHealth portal, you will also be able to view your health information using other applications (apps) compatible with our system.

## 2023-06-16 NOTE — PROGRESS NOTE ADULT - PROBLEM SELECTOR PLAN 1
Admitted to [address] [place] [address] accessed upon admission  Chemotherapy with hyperCVAD 2B with MTX/Cytarabine  Sodium bicarb drip to alkalinize urine prior to MTX for urine PH>7, and then initiate chemo  with MTX  200 mg/m2 IV over 2 hrs, then 800 mg/m2 iv over 22 hours on day1 (6/12)  Cytarabine 1 g/m2 over 2 hours  q12h on day 2, 3 for total 4 doses  Leucovorin 50 mg iv starting 12 hours after completion of MTX and then 15 mg iv q6h until MTX level<0.05 um  Follow MTX levels to start 6/14  LP with IT MTX completed on  6/13, follow up flow cytometry  Monitor cerebellar toxicity/nystagmus with cytarabine, handwriting checks   Pred forte eye gtts to prevent chemical conjunctivitis day 2-5 with cytarabine  PPI changed to Pepcid  until MTX cleared due to interaction  Monitor CBC with diff/lytes, transfuse/replace lytes as needed  6/13 added Ativan PRN N/V, as patient has an intolerance  to Zofran  6/14 zyprexa added for nausea  6/14 hypokalemia-supplemented K  Will need fulphila post chemotherapy as outpatient  6/14 With intolerance to zofran, will change reglan to ATC for nausea  Will need to be scheduled for a day 7 or 8 LP as outpatient with Cytarabine/ with possible platelet coverage.  Will transfuse 1u PRBC at 5am on 6/15 in anticipation of discharge home.   6/15 Hypokalemia: replace kcl 20 meq po x 2. Lasix 20 mg IV x 1 Admitted to [address] [place] [address] accessed upon admission  Chemotherapy with hyperCVAD 2B with MTX/Cytarabine  Sodium bicarb drip to alkalinize urine prior to MTX for urine PH>7, and then initiate chemo  with MTX  200 mg/m2 IV over 2 hrs, then 800 mg/m2 iv over 22 hours on day1 (6/12)  Cytarabine 1 g/m2 over 2 hours  q12h on day 2, 3 for total 4 doses  Leucovorin 50 mg iv starting 12 hours after completion of MTX and then 15 mg iv q6h until MTX level<0.05 um  Follow MTX levels to start 6/14  LP with IT MTX completed on  6/13, flow had insufficient cell yield   Monitor cerebellar toxicity/nystagmus with cytarabine, handwriting checks   Pred forte eye gtts to prevent chemical conjunctivitis day 2-5 with cytarabine  PPI changed to Pepcid  until MTX cleared due to interaction  Monitor CBC with diff/lytes, transfuse/replace lytes as needed  6/13 added Ativan PRN N/V, as patient has an intolerance  to Zofran  6/14 zyprexa added for nausea  6/14 hypokalemia-supplemented K  Will need fulphila post chemotherapy as outpatient  6/14 With intolerance to zofran, will change reglan to ATC for nausea  Will need to be scheduled for a day 7 or 8 LP as outpatient with Cytarabine/ with possible platelet coverage.  6/16 anemia, Hb 8.2-1u PRBC  in anticipation of discharge home.   6/16 Hypokalemia: replace kcl 20 meq po x 1. Lasix 20 mg IV x 1 for HTN & weight gain

## 2023-06-16 NOTE — PROGRESS NOTE ADULT - PROBLEM SELECTOR PLAN 3
Continue home medication - losartan Continue home medication - losartan  6/16 lasix 20 mg ivp x1 for HTN

## 2023-06-17 ENCOUNTER — RESULT REVIEW (OUTPATIENT)
Age: 63
End: 2023-06-17

## 2023-06-17 ENCOUNTER — APPOINTMENT (OUTPATIENT)
Dept: INFUSION THERAPY | Facility: HOSPITAL | Age: 63
End: 2023-06-17

## 2023-06-17 ENCOUNTER — APPOINTMENT (OUTPATIENT)
Dept: HEMATOLOGY ONCOLOGY | Facility: CLINIC | Age: 63
End: 2023-06-17

## 2023-06-17 LAB
ANISOCYTOSIS BLD QL: SLIGHT — SIGNIFICANT CHANGE UP
BASOPHILS # BLD AUTO: 0 K/UL — SIGNIFICANT CHANGE UP (ref 0–0.2)
BASOPHILS NFR BLD AUTO: 0 % — SIGNIFICANT CHANGE UP (ref 0–2)
DACRYOCYTES BLD QL SMEAR: SLIGHT — SIGNIFICANT CHANGE UP
ELLIPTOCYTES BLD QL SMEAR: SLIGHT — SIGNIFICANT CHANGE UP
EOSINOPHIL # BLD AUTO: 0.03 K/UL — SIGNIFICANT CHANGE UP (ref 0–0.5)
EOSINOPHIL NFR BLD AUTO: 2 % — SIGNIFICANT CHANGE UP (ref 0–6)
HCT VFR BLD CALC: 26.9 % — LOW (ref 34.5–45)
HGB BLD-MCNC: 9.1 G/DL — LOW (ref 11.5–15.5)
LYMPHOCYTES # BLD AUTO: 0.12 K/UL — LOW (ref 1–3.3)
LYMPHOCYTES # BLD AUTO: 9 % — LOW (ref 13–44)
MCHC RBC-ENTMCNC: 30.6 PG — SIGNIFICANT CHANGE UP (ref 27–34)
MCHC RBC-ENTMCNC: 33.8 G/DL — SIGNIFICANT CHANGE UP (ref 32–36)
MCV RBC AUTO: 90.6 FL — SIGNIFICANT CHANGE UP (ref 80–100)
MONOCYTES # BLD AUTO: 0.01 K/UL — SIGNIFICANT CHANGE UP (ref 0–0.9)
MONOCYTES NFR BLD AUTO: 1 % — LOW (ref 2–14)
MTX SERPL-SCNC: 0.12 UMOL/L — LOW (ref 0.5–5)
NEUTROPHILS # BLD AUTO: 1.16 K/UL — LOW (ref 1.8–7.4)
NEUTROPHILS NFR BLD AUTO: 88 % — HIGH (ref 43–77)
NRBC # BLD: 0 /100 — SIGNIFICANT CHANGE UP (ref 0–0)
NRBC # BLD: SIGNIFICANT CHANGE UP /100 WBCS (ref 0–0)
PLAT MORPH BLD: NORMAL — SIGNIFICANT CHANGE UP
PLATELET # BLD AUTO: 64 K/UL — LOW (ref 150–400)
POIKILOCYTOSIS BLD QL AUTO: SLIGHT — SIGNIFICANT CHANGE UP
RBC # BLD: 2.97 M/UL — LOW (ref 3.8–5.2)
RBC # FLD: 19.4 % — HIGH (ref 10.3–14.5)
RBC BLD AUTO: ABNORMAL
WBC # BLD: 1.32 K/UL — LOW (ref 3.8–10.5)
WBC # FLD AUTO: 1.32 K/UL — LOW (ref 3.8–10.5)

## 2023-06-19 ENCOUNTER — OUTPATIENT (OUTPATIENT)
Dept: OUTPATIENT SERVICES | Facility: HOSPITAL | Age: 63
LOS: 1 days | End: 2023-06-19
Payer: MEDICARE

## 2023-06-19 ENCOUNTER — RESULT REVIEW (OUTPATIENT)
Age: 63
End: 2023-06-19

## 2023-06-19 ENCOUNTER — APPOINTMENT (OUTPATIENT)
Dept: HEMATOLOGY ONCOLOGY | Facility: CLINIC | Age: 63
End: 2023-06-19
Payer: MEDICARE

## 2023-06-19 ENCOUNTER — APPOINTMENT (OUTPATIENT)
Dept: INFUSION THERAPY | Facility: HOSPITAL | Age: 63
End: 2023-06-19

## 2023-06-19 ENCOUNTER — APPOINTMENT (OUTPATIENT)
Dept: RADIOLOGY | Facility: HOSPITAL | Age: 63
End: 2023-06-19
Payer: MEDICARE

## 2023-06-19 ENCOUNTER — APPOINTMENT (OUTPATIENT)
Age: 63
End: 2023-06-19

## 2023-06-19 ENCOUNTER — APPOINTMENT (OUTPATIENT)
Dept: HEMATOLOGY ONCOLOGY | Facility: CLINIC | Age: 63
End: 2023-06-19

## 2023-06-19 VITALS
DIASTOLIC BLOOD PRESSURE: 81 MMHG | SYSTOLIC BLOOD PRESSURE: 138 MMHG | RESPIRATION RATE: 15 BRPM | HEART RATE: 71 BPM | BODY MASS INDEX: 35.82 KG/M2 | TEMPERATURE: 97 F | OXYGEN SATURATION: 99 % | WEIGHT: 198.41 LBS

## 2023-06-19 DIAGNOSIS — C91.00 ACUTE LYMPHOBLASTIC LEUKEMIA NOT HAVING ACHIEVED REMISSION: ICD-10-CM

## 2023-06-19 DIAGNOSIS — Z51.89 ENCOUNTER FOR OTHER SPECIFIED AFTERCARE: ICD-10-CM

## 2023-06-19 LAB
ALBUMIN SERPL ELPH-MCNC: 3.7 G/DL — SIGNIFICANT CHANGE UP (ref 3.3–5)
ALP SERPL-CCNC: 86 U/L — SIGNIFICANT CHANGE UP (ref 40–120)
ALT FLD-CCNC: 70 U/L — HIGH (ref 10–45)
ANION GAP SERPL CALC-SCNC: 13 MMOL/L — SIGNIFICANT CHANGE UP (ref 5–17)
ANISOCYTOSIS BLD QL: SLIGHT — SIGNIFICANT CHANGE UP
AST SERPL-CCNC: 67 U/L — HIGH (ref 10–40)
BASOPHILS # BLD AUTO: 0 K/UL — SIGNIFICANT CHANGE UP (ref 0–0.2)
BASOPHILS NFR BLD AUTO: 0 % — SIGNIFICANT CHANGE UP (ref 0–2)
BILIRUB SERPL-MCNC: 1 MG/DL — SIGNIFICANT CHANGE UP (ref 0.2–1.2)
BUN SERPL-MCNC: 15 MG/DL — SIGNIFICANT CHANGE UP (ref 7–23)
CALCIUM SERPL-MCNC: 9 MG/DL — SIGNIFICANT CHANGE UP (ref 8.4–10.5)
CHLORIDE SERPL-SCNC: 104 MMOL/L — SIGNIFICANT CHANGE UP (ref 96–108)
CO2 SERPL-SCNC: 22 MMOL/L — SIGNIFICANT CHANGE UP (ref 22–31)
CREAT SERPL-MCNC: 0.79 MG/DL — SIGNIFICANT CHANGE UP (ref 0.5–1.3)
DACRYOCYTES BLD QL SMEAR: SLIGHT — SIGNIFICANT CHANGE UP
EGFR: 83 ML/MIN/1.73M2 — SIGNIFICANT CHANGE UP
EOSINOPHIL # BLD AUTO: 0 K/UL — SIGNIFICANT CHANGE UP (ref 0–0.5)
EOSINOPHIL NFR BLD AUTO: 0 % — SIGNIFICANT CHANGE UP (ref 0–6)
GLUCOSE CSF-MCNC: 44 MG/DL — SIGNIFICANT CHANGE UP (ref 40–70)
GLUCOSE SERPL-MCNC: 91 MG/DL — SIGNIFICANT CHANGE UP (ref 70–99)
HCT VFR BLD CALC: 25.8 % — LOW (ref 34.5–45)
HGB BLD-MCNC: 8.9 G/DL — LOW (ref 11.5–15.5)
LDH CSF L TO P-CCNC: 38 U/L — SIGNIFICANT CHANGE UP
LDH FLD-CCNC: 38 U/L — SIGNIFICANT CHANGE UP
LYMPHOCYTES # BLD AUTO: 0.23 K/UL — LOW (ref 1–3.3)
LYMPHOCYTES # BLD AUTO: 11 % — LOW (ref 13–44)
MCHC RBC-ENTMCNC: 30.8 PG — SIGNIFICANT CHANGE UP (ref 27–34)
MCHC RBC-ENTMCNC: 34.5 G/DL — SIGNIFICANT CHANGE UP (ref 32–36)
MCV RBC AUTO: 89.3 FL — SIGNIFICANT CHANGE UP (ref 80–100)
MONOCYTES # BLD AUTO: 0.02 K/UL — SIGNIFICANT CHANGE UP (ref 0–0.9)
MONOCYTES NFR BLD AUTO: 1 % — LOW (ref 2–14)
MTX SERPL-SCNC: <0.04 UMOL/L — LOW (ref 0.5–5)
NEUTROPHILS # BLD AUTO: 1.84 K/UL — SIGNIFICANT CHANGE UP (ref 1.8–7.4)
NEUTROPHILS NFR BLD AUTO: 88 % — HIGH (ref 43–77)
NRBC # BLD: 0 /100 — SIGNIFICANT CHANGE UP (ref 0–0)
NRBC # BLD: SIGNIFICANT CHANGE UP /100 WBCS (ref 0–0)
PLAT MORPH BLD: NORMAL — SIGNIFICANT CHANGE UP
PLATELET # BLD AUTO: 29 K/UL — LOW (ref 150–400)
PLATELET # BLD MANUAL: 39 K/UL — LOW (ref 150–400)
POIKILOCYTOSIS BLD QL AUTO: SLIGHT — SIGNIFICANT CHANGE UP
POTASSIUM SERPL-MCNC: 3.9 MMOL/L — SIGNIFICANT CHANGE UP (ref 3.5–5.3)
POTASSIUM SERPL-SCNC: 3.9 MMOL/L — SIGNIFICANT CHANGE UP (ref 3.5–5.3)
PROT CSF-MCNC: 38 MG/DL — SIGNIFICANT CHANGE UP (ref 15–45)
PROT SERPL-MCNC: 5.8 G/DL — LOW (ref 6–8.3)
RBC # BLD: 2.89 M/UL — LOW (ref 3.8–5.2)
RBC # FLD: 18.4 % — HIGH (ref 10.3–14.5)
RBC BLD AUTO: ABNORMAL
SODIUM SERPL-SCNC: 139 MMOL/L — SIGNIFICANT CHANGE UP (ref 135–145)
WBC # BLD: 2.09 K/UL — LOW (ref 3.8–10.5)
WBC # FLD AUTO: 2.09 K/UL — LOW (ref 3.8–10.5)

## 2023-06-19 PROCEDURE — 83615 LACTATE (LD) (LDH) ENZYME: CPT

## 2023-06-19 PROCEDURE — 88108 CYTOPATH CONCENTRATE TECH: CPT | Mod: 26

## 2023-06-19 PROCEDURE — 99214 OFFICE O/P EST MOD 30 MIN: CPT

## 2023-06-19 PROCEDURE — 87205 SMEAR GRAM STAIN: CPT

## 2023-06-19 PROCEDURE — 84157 ASSAY OF PROTEIN OTHER: CPT

## 2023-06-19 PROCEDURE — 82945 GLUCOSE OTHER FLUID: CPT

## 2023-06-19 PROCEDURE — 62329 THER SPI PNXR CSF FLUOR/CT: CPT

## 2023-06-19 PROCEDURE — 88108 CYTOPATH CONCENTRATE TECH: CPT

## 2023-06-19 PROCEDURE — 88108 CYTOPATH CONCENTRATE TECH: CPT | Mod: 26,59

## 2023-06-19 PROCEDURE — 88184 FLOWCYTOMETRY/ TC 1 MARKER: CPT

## 2023-06-19 PROCEDURE — 88185 FLOWCYTOMETRY/TC ADD-ON: CPT

## 2023-06-19 RX ORDER — CYTARABINE 100 MG
100 VIAL (EA) INJECTION ONCE
Refills: 0 | Status: DISCONTINUED | OUTPATIENT
Start: 2023-06-19 | End: 2023-07-04

## 2023-06-19 NOTE — PHYSICAL EXAM
[Fully active, able to carry on all pre-disease performance without restriction] : Status 0 - Fully active, able to carry on all pre-disease performance without restriction [Normal] : pharynx is unremarkable, moist mucus membrane, no oral lesions [de-identified] : BMI 35.82 [de-identified] : supple [de-identified] : (+)S1S2 RRR [de-identified] : no edema [de-identified] : no pain [de-identified] : warm (+) red fungal rash under L breast area with notable moisture. (+) Ecchymosis above L hip s/p fall during hospitalization non-tender 2.5in x 2in

## 2023-06-19 NOTE — REVIEW OF SYSTEMS
[Muscle Pain] : muscle pain [Negative] : Constitutional [Vision Problems] : no vision problems [Dysphagia] : no dysphagia [Nosebleeds] : no nosebleeds [Odynophagia] : no odynophagia [Chest Pain] : no chest pain [Palpitations] : no palpitations [Lower Ext Edema] : no lower extremity edema [Dysuria] : no dysuria [Joint Pain] : no joint pain [Joint Stiffness] : no joint stiffness [Muscle Weakness] : no muscle weakness [Skin Rash] : skin rash [Skin Wound] : no skin wound [Insomnia] : no insomnia [Anxiety] : no anxiety [Hot Flashes] : no hot flashes [FreeTextEntry9] : per HPI [de-identified] : (+) bruise above left hip and red rash under L breast

## 2023-06-19 NOTE — RESULTS/DATA
[FreeTextEntry1] : Accession:                             [id number]\par \par Collected Date/Time:                   4/28/2023 09:20 EDT\par Received Date/Time:                    4/28/2023 15:25 EDT\par \par Hematopathology Report - Auth (Verified)\par \par Specimen(s) Submitted\par 1  Bone marrow biopsy\par 2  Bone marrow aspirate\par \par Final Diagnosis\par Specimen(s) Submitted\par 1  Bone marrow biopsy\par 2  Bone marrow aspirate\par \par \par Final Diagnosis\par 1, 2. Bone marrow biopsy and bone marrow aspirate\par      - Focal hypercellularity with trilineage hematopoiesis, myeloid left\par  shift, mild megakaryocytosis, mild increase in interstitial mast cells,\par  and several lymphoid aggregates; iron stores increased (history of ALL,\par  s/p treatment)     -\par \par See note and description.\par \par Diagnostic note:\par As per chart review, the patient was diagnosed with Ph (-) ALL, in\par  February 2023, abnormal cytogenetics,  47,XX,+X,i(9)(q10)[7]/46,XX[2],\par  FISH with Three copies of ASS1 and ABL1 detected (81.5%), somatic\par  mutations of  NRAS p.[id number] (VAF: 29.63%),   NRAS p.[id number] (VAF:\par  14.54%), TP53 p.[id number] (VAF: 45.94%), now s/p cycle 1A (mini hyper\par  CVAD) and 1B of Hyper-CVAD, and admitted for cycle 2A (full dosing) with\par  Cyclophosphamide, Dexamethasone, Vincristine, Doxorubicin.\par His CBC shows normocytic anemia, thrombocytosis, myeloid left\par  shift, monocytosis, and few nRBCs.  Bone marrow shows focal\par  hypercellularity with trilineage hematopoiesis, myeloid left shift,\par  mild megakaryocytosis, mild increase in interstitial mast cells, and\par  several lymphoid aggregates; iron stores increased.  Flow cytometry and\par  immunohistochemistry do not show definitive persistent lymphoblasts.\par Comprehensive report with results of pending ancillary studies to follow.\par \par Ancillary studies\par Bone marrow aspirate iron stain: Iron stores are increased; no ring\par  sideroblasts are seen.\par \par Flow cytometry:   The immunophenotypic findings show a subset of\par  myeloblasts (1.25% of cells with normal immunophenotype), a small\par  population of probable mast cells, abnormal myeloid antigen pattern with\par  myeloid left shift, no increase in B-lymphoblasts (small subset, 0.2%\par  of cells, positive for dim CD45, CD19, CD10, bright CD38, negative CD20,\par  possible hematogones).\par \par Immunohistochemical stains   (block 1A: CD3, CD10, CD20, CD79a, PAX-5, Tdt,\par  CD34) show scattered Tdt positive cell, scattered CD34 positive dells\par  (less than 3%), scattered CD10 positive cells, scattered CD79a positive\par  cells (B cells and plasma cells), interstitial T cells (positive with\par  CD3), few interstitial B cells ((positive with CD20, PAX5) and 2 small\par  lymphoid aggregates composed of T cells and greater numbers than B-cells.\par   The findings are nondiagnostic.\par \par Cytogenetics:  Pending\par \par FISH:  Pending\par \par Microscopic description:\par 1. Biopsy:   Sections of bone marrow biopsy and bone marrow fragments\par  in clot show focal hypercellularity (60-85% cellularity), trilineage\par  hematopoiesis with maturation, myeloid left shift, normal M:E ratio,\par  increased megakaryocytes with normal morphology, mild increase in\par  interstitial mast cells, several small lymphoid aggregates (small cells),\par  and iron stores increased.\par \par \par 2. Aspirate:   Cellular spicules are present, adequate for interpretation.\par   Maturing and mature myeloid and erythroid elements are present with\par  normal M:E ratio (3.0:1). Mature myeloid elements are proportionately\par  decreased  Megakaryocytes appear increased in number with normal\par  morphology.\par

## 2023-06-19 NOTE — ASSESSMENT
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  \par \par Previously extensively educated patient on her disease process and explained rationale behind treating her with Hyper-CVAD for now. Treatment is hyper CVAD on A cycles (Cyclophosphamide 300 mg/m2 IV over 2 hours every 12 hours on days 1 to 3 (total dose per cycle: 1800 mg/m2), Vincristine (Oncovin) 2 mg IV once per day on days 4 & 11, and Doxorubicin 50 mg/m2 IV continuous infusion over 24 hours, starting on day 4). This had been dose reduced for cycle 1A, but for cycle 2A full dose, today is day 8. B cycles also full dose at this point (MTX 1g/m2 over 24 hour span on day 1, cytarabine 1g/m2 [id number] on day 2 and 3 for total 4 doses).\par \par Bone marrow biopsy results after cycle 1B noted, complete remission and negative for MRD by Clonoseq. Previously educated patient as well on the Clonoseq testing as a tool to assess measurable residual disease (MRD) using NGS technology. She has a mediport in place and doing well. \par \par Plan:\par -CBC today shows WBC 2.09 ANC 1.84.  \par -Patient is s/p Fulphila on 6/17. Instructed patient to continue levaquin and diflucan at this time as well as acyclovir given her impending neutropenia, until neutrophil counts recover. . \par -Hemoglobin is 8.9 today, will likely require transfusion end of this week she has this set up at [facility]. \par -PLT 29K. Will give PLT today with post count for LP later today with [facility]. PLT goal >50K. \par -Discussed with Dr. Lewis, given her residence is out in [place] he will see her to assist us in transfusional support and limited chemotherapy (vincristine). \par -Discussed future plans extensively - after this cycle will need to consider whether to pursue further Hyper-CVAD given its toxicity on her, and use lower dosages or consider transitioning to other consolidative measures (Blincyto vs. Royal vs. transplant). MRD testing has been negative up until this point precluding the use of Blincyto. Patient has tolerated this latest cycle of chemo and at this time will repeat BM bx here once counts recover and repeat Clonoseq at that time. Will likely continue Hyper-CVAD with dose reduction to decrease toxicity.\par -Nystatin  powder sent to [facility] for fungal rash.  \par -Patient understands and agrees with plan. All information explained to the best of my ability. \par -RTC for BM bx after counts recover and then after cycle 3A.

## 2023-06-19 NOTE — HISTORY OF PRESENT ILLNESS
[Disease:__________________________] : Disease: [unfilled] [Therapy: ___] : Therapy: [unfilled] [Cycle: ___] : Cycle: [unfilled] [Day: ___] : Day: [unfilled] [de-identified] : 64 y/o F [place] [facility] [place] [facility] for new diagnosis of acute leukemia, s/p initial inpatient treatment and now here for establishment of outpatient care. 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  Chemotherapy with reduced dose HyperCVAD was started on 3/3/23. Hospital course was c/b neutropenic fever, transaminitis, a rash on 3/4 which improved with topical steroid and atarax PRN,  and also LUE cellulitis.\par \par Of note, on peripheral blood BCR/ABL PCR was negative, although in her bone marrow she did have PCR for BCR/ABL p190 positive at an extremely low level of 0.001%. She had a pre-treatment echocardiogram done which showed an EF 55%, and PICC line was placed. Pt started reduced dose HyperCVAD  on 3/3/23: (IV Cyclophosphamide (150 mg/m2 every 12 h on days 1–3), Dexamethasone 20 mg per day on days 1–4 and 11–14, Vincristine 2 mg IV flat dose on days 1 and 8, Daunorubicin 25 mg/m2/day IV continuous infusion over 48 hours, starting on day 4). Informed consent obtained. LP with IT MTX done on 2/28, and CSF was subsequently found to be negative for malignant cells. Pt had a developing transaminitis, and US done at [facility] had shown a fatty liver. CT A/P done at [facility] showed splenomegaly with a small age indeterminant splenic infarct, but otherwise showed a normal liver and no abdominal or pelvic lymphadenopathy. Ultimately, during hospital stay she also developed neutropenic fever, s/p panculture which was negative,. She was treated with cefepime, acyclovir and caspofungin. Course also c/b severe headache as side effect from Zofran (CTH negative). Discharged home in stable condition.\par \par Now s/p cycle 1B had BMBx on 4/28 which showed complete remission.  [de-identified] : Patient seen for follow up. Overall she is feeling better than she did after previous cycles. She states she feels like her muscles are weak almost like she has been exercising a lot. In the office today she is using a wheelchair but she does ambulate just slower than she did before she was diagnosed. while in the hospital on Thursday she had a questionable mechanical fall and has a bruise on her left lower left back above her hip. The area is not tender on palpation and appears to be healing. Her appetite improved after leaving the hospital and she has no n/v/d/c. She was here over the weekend for Fulphila, possible infusions and and MTX level. No infusions were required and her MTX level was 0.14. She is still taking Leucovorin. Repeat level to be sent today. Has a red rash under left breast  that she has been putting hydrocortisone cream on, she has had it for a little over a week. She denied any fevers or cough, no  dyspnea or chest pain / palpitations.

## 2023-06-20 DIAGNOSIS — C91.00 ACUTE LYMPHOBLASTIC LEUKEMIA NOT HAVING ACHIEVED REMISSION: ICD-10-CM

## 2023-06-20 LAB
NON-GYNECOLOGICAL CYTOLOGY STUDY: SIGNIFICANT CHANGE UP
TM INTERPRETATION: SIGNIFICANT CHANGE UP

## 2023-06-21 ENCOUNTER — APPOINTMENT (OUTPATIENT)
Age: 63
End: 2023-06-21

## 2023-06-21 ENCOUNTER — RESULT REVIEW (OUTPATIENT)
Age: 63
End: 2023-06-21

## 2023-06-21 ENCOUNTER — NON-APPOINTMENT (OUTPATIENT)
Age: 63
End: 2023-06-21

## 2023-06-21 LAB
BASOPHILS # BLD AUTO: SIGNIFICANT CHANGE UP (ref 0–0.2)
BASOPHILS NFR BLD AUTO: SIGNIFICANT CHANGE UP (ref 0–2)
EOSINOPHIL # BLD AUTO: SIGNIFICANT CHANGE UP (ref 0–0.5)
EOSINOPHIL NFR BLD AUTO: SIGNIFICANT CHANGE UP (ref 0–6)
HCT VFR BLD CALC: 23.8 % — LOW (ref 34.5–45)
HGB BLD-MCNC: 7.9 G/DL — LOW (ref 11.5–15.5)
IMM GRANULOCYTES NFR BLD AUTO: SIGNIFICANT CHANGE UP (ref 0–0.9)
LYMPHOCYTES # BLD AUTO: SIGNIFICANT CHANGE UP (ref 13–44)
LYMPHOCYTES # BLD AUTO: SIGNIFICANT CHANGE UP (ref 1–3.3)
MCHC RBC-ENTMCNC: 30.7 PG — SIGNIFICANT CHANGE UP (ref 27–34)
MCHC RBC-ENTMCNC: 33.2 GM/DL — SIGNIFICANT CHANGE UP (ref 32–36)
MCV RBC AUTO: 92.6 FL — SIGNIFICANT CHANGE UP (ref 80–100)
MONOCYTES # BLD AUTO: SIGNIFICANT CHANGE UP (ref 0–0.9)
MONOCYTES NFR BLD AUTO: SIGNIFICANT CHANGE UP (ref 2–14)
NEUTROPHILS # BLD AUTO: SIGNIFICANT CHANGE UP (ref 1.8–7.4)
NEUTROPHILS NFR BLD AUTO: SIGNIFICANT CHANGE UP (ref 43–77)
NRBC # BLD: 0 /100 WBCS — SIGNIFICANT CHANGE UP (ref 0–0)
PLATELET # BLD AUTO: 15 K/UL — CRITICAL LOW (ref 150–400)
RBC # BLD: 2.57 M/UL — LOW (ref 3.8–5.2)
RBC # FLD: 17.6 % — HIGH (ref 10.3–14.5)
WBC # BLD: 0.35 K/UL — CRITICAL LOW (ref 3.8–10.5)
WBC # FLD AUTO: 0.35 K/UL — CRITICAL LOW (ref 3.8–10.5)

## 2023-06-22 ENCOUNTER — APPOINTMENT (OUTPATIENT)
Age: 63
End: 2023-06-22

## 2023-06-23 ENCOUNTER — APPOINTMENT (OUTPATIENT)
Age: 63
End: 2023-06-23

## 2023-06-26 ENCOUNTER — RESULT REVIEW (OUTPATIENT)
Age: 63
End: 2023-06-26

## 2023-06-26 ENCOUNTER — APPOINTMENT (OUTPATIENT)
Age: 63
End: 2023-06-26

## 2023-06-26 LAB
BASOPHILS # BLD AUTO: SIGNIFICANT CHANGE UP (ref 0–0.2)
BASOPHILS NFR BLD AUTO: SIGNIFICANT CHANGE UP (ref 0–2)
EOSINOPHIL # BLD AUTO: SIGNIFICANT CHANGE UP (ref 0–0.5)
EOSINOPHIL NFR BLD AUTO: SIGNIFICANT CHANGE UP (ref 0–6)
HCT VFR BLD CALC: 24.6 % — LOW (ref 34.5–45)
HGB BLD-MCNC: 8.4 G/DL — LOW (ref 11.5–15.5)
IMM GRANULOCYTES NFR BLD AUTO: SIGNIFICANT CHANGE UP (ref 0–0.9)
LYMPHOCYTES # BLD AUTO: SIGNIFICANT CHANGE UP (ref 13–44)
LYMPHOCYTES # BLD AUTO: SIGNIFICANT CHANGE UP (ref 1–3.3)
MCHC RBC-ENTMCNC: 30.8 PG — SIGNIFICANT CHANGE UP (ref 27–34)
MCHC RBC-ENTMCNC: 34.1 GM/DL — SIGNIFICANT CHANGE UP (ref 32–36)
MCV RBC AUTO: 90.1 FL — SIGNIFICANT CHANGE UP (ref 80–100)
MONOCYTES # BLD AUTO: SIGNIFICANT CHANGE UP (ref 0–0.9)
MONOCYTES NFR BLD AUTO: SIGNIFICANT CHANGE UP (ref 2–14)
NEUTROPHILS # BLD AUTO: SIGNIFICANT CHANGE UP (ref 1.8–7.4)
NEUTROPHILS NFR BLD AUTO: SIGNIFICANT CHANGE UP (ref 43–77)
NRBC # BLD: 0 /100 WBCS — SIGNIFICANT CHANGE UP (ref 0–0)
PLATELET # BLD AUTO: 14 K/UL — CRITICAL LOW (ref 150–400)
RBC # BLD: 2.73 M/UL — LOW (ref 3.8–5.2)
RBC # FLD: 15 % — HIGH (ref 10.3–14.5)
WBC # BLD: 0.48 K/UL — CRITICAL LOW (ref 3.8–10.5)
WBC # FLD AUTO: 0.48 K/UL — CRITICAL LOW (ref 3.8–10.5)

## 2023-06-28 ENCOUNTER — APPOINTMENT (OUTPATIENT)
Age: 63
End: 2023-06-28
Payer: MEDICARE

## 2023-06-28 ENCOUNTER — APPOINTMENT (OUTPATIENT)
Age: 63
End: 2023-06-28

## 2023-06-28 VITALS
TEMPERATURE: 97.5 F | RESPIRATION RATE: 17 BRPM | SYSTOLIC BLOOD PRESSURE: 131 MMHG | HEART RATE: 96 BPM | DIASTOLIC BLOOD PRESSURE: 70 MMHG | OXYGEN SATURATION: 98 %

## 2023-06-28 VITALS — BODY MASS INDEX: 36.54 KG/M2 | WEIGHT: 202.4 LBS

## 2023-06-28 PROCEDURE — 99213 OFFICE O/P EST LOW 20 MIN: CPT

## 2023-06-29 NOTE — REVIEW OF SYSTEMS
[Vision Problems] : no vision problems [Dysphagia] : no dysphagia [Nosebleeds] : no nosebleeds [Odynophagia] : no odynophagia [Chest Pain] : no chest pain [Palpitations] : no palpitations [Lower Ext Edema] : no lower extremity edema [Dysuria] : no dysuria [Insomnia] : no insomnia [Anxiety] : no anxiety [Hot Flashes] : no hot flashes [Negative] : Allergic/Immunologic

## 2023-06-29 NOTE — ASSESSMENT
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  \par \par Previously extensively educated patient on her disease process and explained rationale behind treating her with Hyper-CVAD for now. Treatment is hyper CVAD on A cycles (Cyclophosphamide 300 mg/m2 IV over 2 hours every 12 hours on days 1 to 3 (total dose per cycle: 1800 mg/m2), Vincristine (Oncovin) 2 mg IV once per day on days 4 & 11, and Doxorubicin 50 mg/m2 IV continuous infusion over 24 hours, starting on day 4). This had been dose reduced for cycle 1A, but for cycle 2A full dose, today is day 8. B cycles also full dose at this point (MTX 1g/m2 over 24 hour span on day 1, cytarabine 1g/m2 [id number] on day 2 and 3 for total 4 doses).\par \par Bone marrow biopsy results after cycle 1B noted, complete remission and awaiting Clonoseq for MRD assessment. Previously educated patient as well on the Clonoseq testing as a tool to assess measurable residual disease (MRD) using NGS technology. She has a mediport in place and doing well. \par \par Plan:\par -Presyncopal episode likely due to hypovolemia due to dehydration, giving IVF in the tx room. \par -Already improved as I was speaking to her. s/p Fulphila today. . \par -Instructed patient to continue levaquin and diflucan at this time as well as acyclovir given her impending neutropenia, until neutrophil counts recover. . \par -Hemoglobin is 8.3 today, will likely require transfusion end of this week. \par -Discussed with Dr. Lewis, given her residence is out in [place] he will see her to assist us in transfusional support and limited chemotherapy (vincristine). Due for Vincristine this week day 11. \par -Was planned for IT therapy today but can be delayed until day 11 given her overall malaise. \par -Discussed future plans extensively - after 2B will need to consider whether to \par -Patient understands and agrees with plan. All information explained to the best of my ability. \par -RTC prior to cycle 2B.

## 2023-06-29 NOTE — PHYSICAL EXAM
[Fully active, able to carry on all pre-disease performance without restriction] : Status 0 - Fully active, able to carry on all pre-disease performance without restriction [Ulcers] : no ulcers [Mucositis] : no mucositis [Thrush] : no thrush [Vesicles] : no vesicles [Normal] : affect appropriate [de-identified] : alopecia [de-identified] : anicteric [de-identified] : no edema

## 2023-06-29 NOTE — HISTORY OF PRESENT ILLNESS
[Disease:__________________________] : Disease: [unfilled] [de-identified] : 62 y/o F [place] [facility] [place] [facility] for new diagnosis of acute leukemia, s/p initial inpatient treatment and now here for establishment of outpatient care. 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  Chemotherapy with reduced dose HyperCVAD was started on 3/3/23. Hospital course was c/b neutropenic fever, transaminitis, a rash on 3/4 which improved with topical steroid and atarax PRN,  and also LUE cellulitis.\par \par Of note, on peripheral blood BCR/ABL PCR was negative, although in her bone marrow she did have PCR for BCR/ABL p190 positive at an extremely low level of 0.001%. She had a pre-treatment echocardiogram done which showed an EF 55%, and PICC line was placed. Pt started reduced dose HyperCVAD  on 3/3/23: (IV Cyclophosphamide (150 mg/m2 every 12 h on days 1–3), Dexamethasone 20 mg per day on days 1–4 and 11–14, Vincristine 2 mg IV flat dose on days 1 and 8, Daunorubicin 25 mg/m2/day IV continuous infusion over 48 hours, starting on day 4). Informed consent obtained. LP with IT MTX done on 2/28, and CSF was subsequently found to be negative for malignant cells. Pt had a developing transaminitis, and [place] [facility] [place] a fatty [place]. CT A/P done at [facility] showed splenomegaly with a small age indeterminant splenic infarct, but otherwise showed a normal liver and no abdominal or pelvic lymphadenopathy. Ultimately, during hospital stay she also developed neutropenic fever, s/p panculture which was negative,. She was treated with cefepime, acyclovir and caspofungin. Course also c/b severe headache as side effect from Zofran (CTH negative). Discharged home in stable condition.\par \par BMBx on 4/28 which showed complete remission.  [de-identified] : Accession:                             [id number]\par \par Collected Date/Time:                   4/28/2023 09:20 EDT\par Received Date/Time:                    4/28/2023 15:25 EDT\par \par Hematopathology Report - Auth (Verified)\par \par Specimen(s) Submitted\par 1  Bone marrow biopsy\par 2  Bone marrow aspirate\par \par Final Diagnosis\par Specimen(s) Submitted\par 1  Bone marrow biopsy\par 2  Bone marrow aspirate\par \par \par Final Diagnosis\par 1, 2. Bone marrow biopsy and bone marrow aspirate\par      - Focal hypercellularity with trilineage hematopoiesis, myeloid left\par  shift, mild megakaryocytosis, mild increase in interstitial mast cells,\par  and several lymphoid aggregates; iron stores increased (history of ALL,\par  s/p treatment)     -\par \par See note and description.\par \par Diagnostic note:\par As per chart review, the patient was diagnosed with Ph (-) ALL, in\par  February 2023, abnormal cytogenetics,  47,XX,+X,i(9)(q10)[7]/46,XX[2],\par  FISH with Three copies of ASS1 and ABL1 detected (81.5%), somatic\par  mutations of  NRAS p.Thh24Ukq (VAF: 29.63%),   NRAS p.Qcd24Tbj (VAF:\par  14.54%), TP53 p.[id number] (VAF: 45.94%), now s/p cycle 1A (mini hyper\par  CVAD) and 1B of Hyper-CVAD, and admitted for cycle 2A (full dosing) with\par  Cyclophosphamide, Dexamethasone, Vincristine, Doxorubicin.\par His CBC shows normocytic anemia, thrombocytosis, myeloid left\par  shift, monocytosis, and few nRBCs.  Bone marrow shows focal\par  hypercellularity with trilineage hematopoiesis, myeloid left shift,\par  mild megakaryocytosis, mild increase in interstitial mast cells, and\par  several lymphoid aggregates; iron stores increased.  Flow cytometry and\par  immunohistochemistry do not show definitive persistent lymphoblasts.\par Comprehensive report with results of pending ancillary studies to follow.\par \par Ancillary studies\par Bone marrow aspirate iron stain: Iron stores are increased; no ring\par  sideroblasts are seen.\par \par Flow cytometry:   The immunophenotypic findings show a subset of\par  myeloblasts (1.25% of cells with normal immunophenotype), a small\par  population of probable mast cells, abnormal myeloid antigen pattern with\par  myeloid left shift, no increase in B-lymphoblasts (small subset, 0.2%\par  of cells, positive for dim CD45, CD19, CD10, bright CD38, negative CD20,\par  possible hematogones).\par \par Immunohistochemical stains   (block 1A: CD3, CD10, CD20, CD79a, PAX-5, Tdt,\par  CD34) show scattered Tdt positive cell, scattered CD34 positive dells\par  (less than 3%), scattered CD10 positive cells, scattered CD79a positive\par  cells (B cells and plasma cells), interstitial T cells (positive with\par  CD3), few interstitial B cells ((positive with CD20, PAX5) and 2 small\par  lymphoid aggregates composed of T cells and greater numbers than B-cells.\par   The findings are nondiagnostic.\par \par Cytogenetics:  Pending\par \par FISH:  Pending\par \par Microscopic description:\par 1. Biopsy:   Sections of bone marrow biopsy and bone marrow fragments\par  in clot show focal hypercellularity (60-85% cellularity), trilineage\par  hematopoiesis with maturation, myeloid left shift, normal M:E ratio,\par  increased megakaryocytes with normal morphology, mild increase in\par  interstitial mast cells, several small lymphoid aggregates (small cells),\par  and iron stores increased.\par \par \par 2. Aspirate:   Cellular spicules are present, adequate for interpretation.\par   Maturing and mature myeloid and erythroid elements are present with\par  normal M:E ratio (3.0:1). Mature myeloid elements are proportionately\par  decreased  Megakaryocytes appear increased in number with normal\par  morphology.\par \par \par  [de-identified] : Stephanie continues Hyper-CVAD with Dr. Goldberg and team. Pending repeat marrow in early July. Requiring transfusional support occasionally, otherwise tolerating treatment with expected toxicities. On prophylactic antibiotics.

## 2023-06-30 ENCOUNTER — RESULT REVIEW (OUTPATIENT)
Age: 63
End: 2023-06-30

## 2023-06-30 ENCOUNTER — APPOINTMENT (OUTPATIENT)
Age: 63
End: 2023-06-30

## 2023-06-30 LAB
ANISOCYTOSIS BLD QL: SLIGHT — SIGNIFICANT CHANGE UP
BASOPHILS # BLD AUTO: 0 K/UL — SIGNIFICANT CHANGE UP (ref 0–0.2)
BASOPHILS NFR BLD AUTO: 0 % — SIGNIFICANT CHANGE UP (ref 0–2)
DACRYOCYTES BLD QL SMEAR: SLIGHT — SIGNIFICANT CHANGE UP
EOSINOPHIL # BLD AUTO: 0 K/UL — SIGNIFICANT CHANGE UP (ref 0–0.5)
EOSINOPHIL NFR BLD AUTO: 0 % — SIGNIFICANT CHANGE UP (ref 0–6)
HCT VFR BLD CALC: 21.1 % — LOW (ref 34.5–45)
HGB BLD-MCNC: 7.2 G/DL — LOW (ref 11.5–15.5)
LYMPHOCYTES # BLD AUTO: 0.68 K/UL — LOW (ref 1–3.3)
LYMPHOCYTES # BLD AUTO: 21 % — SIGNIFICANT CHANGE UP (ref 13–44)
MACROCYTES BLD QL: SLIGHT — SIGNIFICANT CHANGE UP
MCHC RBC-ENTMCNC: 31 PG — SIGNIFICANT CHANGE UP (ref 27–34)
MCHC RBC-ENTMCNC: 34.1 GM/DL — SIGNIFICANT CHANGE UP (ref 32–36)
MCV RBC AUTO: 90.9 FL — SIGNIFICANT CHANGE UP (ref 80–100)
MICROCYTES BLD QL: SLIGHT — SIGNIFICANT CHANGE UP
MONOCYTES # BLD AUTO: 0.39 K/UL — SIGNIFICANT CHANGE UP (ref 0–0.9)
MONOCYTES NFR BLD AUTO: 12 % — SIGNIFICANT CHANGE UP (ref 2–14)
MYELOCYTES NFR BLD: 5 % — HIGH (ref 0–0)
NEUTROPHILS # BLD AUTO: 2 K/UL — SIGNIFICANT CHANGE UP (ref 1.8–7.4)
NEUTROPHILS NFR BLD AUTO: 56 % — SIGNIFICANT CHANGE UP (ref 43–77)
NEUTS BAND # BLD: 6 % — SIGNIFICANT CHANGE UP (ref 0–8)
NRBC # BLD: 0 /100 — SIGNIFICANT CHANGE UP (ref 0–0)
NRBC # BLD: SIGNIFICANT CHANGE UP /100 WBCS (ref 0–0)
PLAT MORPH BLD: NORMAL — SIGNIFICANT CHANGE UP
PLATELET # BLD AUTO: 71 K/UL — LOW (ref 150–400)
POIKILOCYTOSIS BLD QL AUTO: SLIGHT — SIGNIFICANT CHANGE UP
RBC # BLD: 2.32 M/UL — LOW (ref 3.8–5.2)
RBC # FLD: 15.8 % — HIGH (ref 10.3–14.5)
RBC BLD AUTO: SIGNIFICANT CHANGE UP
WBC # BLD: 3.22 K/UL — LOW (ref 3.8–10.5)
WBC # FLD AUTO: 3.22 K/UL — LOW (ref 3.8–10.5)

## 2023-07-03 ENCOUNTER — APPOINTMENT (OUTPATIENT)
Age: 63
End: 2023-07-03

## 2023-07-03 PROCEDURE — P9037: CPT

## 2023-07-03 PROCEDURE — 36430 TRANSFUSION BLD/BLD COMPNT: CPT

## 2023-07-03 PROCEDURE — 85027 COMPLETE CBC AUTOMATED: CPT

## 2023-07-03 PROCEDURE — P9040: CPT

## 2023-07-07 ENCOUNTER — RESULT REVIEW (OUTPATIENT)
Age: 63
End: 2023-07-07

## 2023-07-07 ENCOUNTER — LABORATORY RESULT (OUTPATIENT)
Age: 63
End: 2023-07-07

## 2023-07-07 ENCOUNTER — APPOINTMENT (OUTPATIENT)
Dept: HEMATOLOGY ONCOLOGY | Facility: CLINIC | Age: 63
End: 2023-07-07
Payer: MEDICARE

## 2023-07-07 ENCOUNTER — APPOINTMENT (OUTPATIENT)
Dept: HEMATOLOGY ONCOLOGY | Facility: CLINIC | Age: 63
End: 2023-07-07

## 2023-07-07 VITALS
HEART RATE: 85 BPM | SYSTOLIC BLOOD PRESSURE: 126 MMHG | TEMPERATURE: 97.2 F | DIASTOLIC BLOOD PRESSURE: 79 MMHG | RESPIRATION RATE: 16 BRPM | BODY MASS INDEX: 35.49 KG/M2 | WEIGHT: 195.33 LBS | HEIGHT: 62.2 IN | OXYGEN SATURATION: 98 %

## 2023-07-07 LAB
ANISOCYTOSIS BLD QL: SLIGHT — SIGNIFICANT CHANGE UP
BASOPHILS # BLD AUTO: 0 K/UL — SIGNIFICANT CHANGE UP (ref 0–0.2)
BASOPHILS NFR BLD AUTO: 0 % — SIGNIFICANT CHANGE UP (ref 0–2)
DACRYOCYTES BLD QL SMEAR: SLIGHT — SIGNIFICANT CHANGE UP
ELLIPTOCYTES BLD QL SMEAR: SLIGHT — SIGNIFICANT CHANGE UP
EOSINOPHIL # BLD AUTO: 0 K/UL — SIGNIFICANT CHANGE UP (ref 0–0.5)
EOSINOPHIL NFR BLD AUTO: 0 % — SIGNIFICANT CHANGE UP (ref 0–6)
HCT VFR BLD CALC: 26.6 % — LOW (ref 34.5–45)
HGB BLD-MCNC: 9.2 G/DL — LOW (ref 11.5–15.5)
LYMPHOCYTES # BLD AUTO: 0.87 K/UL — LOW (ref 1–3.3)
LYMPHOCYTES # BLD AUTO: 16 % — SIGNIFICANT CHANGE UP (ref 13–44)
MACROCYTES BLD QL: SLIGHT — SIGNIFICANT CHANGE UP
MCHC RBC-ENTMCNC: 30.6 PG — SIGNIFICANT CHANGE UP (ref 27–34)
MCHC RBC-ENTMCNC: 34.6 G/DL — SIGNIFICANT CHANGE UP (ref 32–36)
MCV RBC AUTO: 88.4 FL — SIGNIFICANT CHANGE UP (ref 80–100)
METAMYELOCYTES # FLD: 1 % — HIGH (ref 0–0)
MONOCYTES # BLD AUTO: 0.81 K/UL — SIGNIFICANT CHANGE UP (ref 0–0.9)
MONOCYTES NFR BLD AUTO: 15 % — HIGH (ref 2–14)
MYELOCYTES NFR BLD: 2 % — HIGH (ref 0–0)
NEUTROPHILS # BLD AUTO: 3.58 K/UL — SIGNIFICANT CHANGE UP (ref 1.8–7.4)
NEUTROPHILS NFR BLD AUTO: 65 % — SIGNIFICANT CHANGE UP (ref 43–77)
NEUTS BAND # BLD: 1 % — SIGNIFICANT CHANGE UP (ref 0–8)
NRBC # BLD: 2 /100 — HIGH (ref 0–0)
NRBC # BLD: SIGNIFICANT CHANGE UP /100 WBCS (ref 0–0)
PLAT MORPH BLD: NORMAL — SIGNIFICANT CHANGE UP
PLATELET # BLD AUTO: 180 K/UL — SIGNIFICANT CHANGE UP (ref 150–400)
POIKILOCYTOSIS BLD QL AUTO: SLIGHT — SIGNIFICANT CHANGE UP
POLYCHROMASIA BLD QL SMEAR: SLIGHT — SIGNIFICANT CHANGE UP
RBC # BLD: 3.01 M/UL — LOW (ref 3.8–5.2)
RBC # FLD: 17.9 % — HIGH (ref 10.3–14.5)
RBC BLD AUTO: ABNORMAL
WBC # BLD: 5.43 K/UL — SIGNIFICANT CHANGE UP (ref 3.8–10.5)
WBC # FLD AUTO: 5.43 K/UL — SIGNIFICANT CHANGE UP (ref 3.8–10.5)

## 2023-07-07 PROCEDURE — 38221 DX BONE MARROW BIOPSIES: CPT | Mod: LT

## 2023-07-07 NOTE — REASON FOR VISIT
[Bone Marrow Biopsy] : bone marrow biopsy [Bone Marrow Aspiration] : bone marrow aspiration [Spouse] : spouse [FreeTextEntry2] : ALL ph - s/p cycle 2A of hyperCVAD

## 2023-07-07 NOTE — PROCEDURE
[Bone Marrow Biopsy] : bone marrow biopsy [Bone Marrow Aspiration] : bone marrow aspiration  [Patient] : the patient [Verbal Consent Obtained] : verbal consent was obtained prior to the procedure [Patient identification verified] : patient identification verified [Procedure verified and consent obtained] : procedure verified and consent obtained [Laterality verified and correct site marked] : laterality verified and correct site marked [Left] : site: left [Correct positioning] : correct positioning [Prone] : prone [Superior iliac spine was identified] : the superior iliac spine was identified. [The left posterior iliac crest was prepped with betadine and draped, using sterile technique.] : The left posterior iliac crest was prepped with betadine and draped, using sterile technique. [Lidocaine was injected and into the periosteum overlying the site.] : Lidocaine was injected and into the periosteum overlying the site. [Aspirate] : aspirate [Cytogenetics] : cytogenetics [FISH] : FISH [Biopsy] : biopsy [Flow Cytometry] : flow cytometry [] : The patient was instructed to remove the bandage the following AM. The patient may bathe. Acetaminophen may be taken for discomfort, as per package directions.If there are any other problems, the patient was instructed to call the office. The patient verbalized understanding, and is aware of the office contact numbers. 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 ALL panel sent. [place] tracking #: [id number]

## 2023-07-13 ENCOUNTER — APPOINTMENT (OUTPATIENT)
Dept: HEMATOLOGY ONCOLOGY | Facility: CLINIC | Age: 63
End: 2023-07-13
Payer: MEDICARE

## 2023-07-13 ENCOUNTER — RESULT REVIEW (OUTPATIENT)
Age: 63
End: 2023-07-13

## 2023-07-13 VITALS
HEART RATE: 92 BPM | WEIGHT: 198.86 LBS | SYSTOLIC BLOOD PRESSURE: 127 MMHG | BODY MASS INDEX: 36.13 KG/M2 | OXYGEN SATURATION: 98 % | RESPIRATION RATE: 17 BRPM | TEMPERATURE: 97 F | DIASTOLIC BLOOD PRESSURE: 82 MMHG

## 2023-07-13 LAB
BASOPHILS # BLD AUTO: 0.04 K/UL — SIGNIFICANT CHANGE UP (ref 0–0.2)
BASOPHILS NFR BLD AUTO: 1 % — SIGNIFICANT CHANGE UP (ref 0–2)
EOSINOPHIL # BLD AUTO: 0.01 K/UL — SIGNIFICANT CHANGE UP (ref 0–0.5)
EOSINOPHIL NFR BLD AUTO: 0.2 % — SIGNIFICANT CHANGE UP (ref 0–6)
HCT VFR BLD CALC: 33.3 % — LOW (ref 34.5–45)
HGB BLD-MCNC: 10.6 G/DL — LOW (ref 11.5–15.5)
IMM GRANULOCYTES NFR BLD AUTO: 2.6 % — HIGH (ref 0–0.9)
LYMPHOCYTES # BLD AUTO: 1.05 K/UL — SIGNIFICANT CHANGE UP (ref 1–3.3)
LYMPHOCYTES # BLD AUTO: 25.1 % — SIGNIFICANT CHANGE UP (ref 13–44)
MCHC RBC-ENTMCNC: 30.9 PG — SIGNIFICANT CHANGE UP (ref 27–34)
MCHC RBC-ENTMCNC: 31.8 G/DL — LOW (ref 32–36)
MCV RBC AUTO: 97.1 FL — SIGNIFICANT CHANGE UP (ref 80–100)
MONOCYTES # BLD AUTO: 0.95 K/UL — HIGH (ref 0–0.9)
MONOCYTES NFR BLD AUTO: 22.7 % — HIGH (ref 2–14)
NEUTROPHILS # BLD AUTO: 2.02 K/UL — SIGNIFICANT CHANGE UP (ref 1.8–7.4)
NEUTROPHILS NFR BLD AUTO: 48.4 % — SIGNIFICANT CHANGE UP (ref 43–77)
NRBC # BLD: 0 /100 WBCS — SIGNIFICANT CHANGE UP (ref 0–0)
PLATELET # BLD AUTO: 194 K/UL — SIGNIFICANT CHANGE UP (ref 150–400)
RBC # BLD: 3.43 M/UL — LOW (ref 3.8–5.2)
RBC # FLD: 23.3 % — HIGH (ref 10.3–14.5)
WBC # BLD: 4.18 K/UL — SIGNIFICANT CHANGE UP (ref 3.8–10.5)
WBC # FLD AUTO: 4.18 K/UL — SIGNIFICANT CHANGE UP (ref 3.8–10.5)

## 2023-07-13 PROCEDURE — 99214 OFFICE O/P EST MOD 30 MIN: CPT

## 2023-07-13 RX ORDER — FLUCONAZOLE 200 MG/1
200 TABLET ORAL DAILY
Qty: 30 | Refills: 3 | Status: DISCONTINUED | COMMUNITY
Start: 2023-04-12 | End: 2023-07-13

## 2023-07-13 RX ORDER — LEVOFLOXACIN 500 MG/1
500 TABLET, FILM COATED ORAL
Qty: 30 | Refills: 3 | Status: DISCONTINUED | COMMUNITY
Start: 2023-04-12 | End: 2023-07-13

## 2023-07-14 NOTE — PHYSICAL EXAM
[Fully active, able to carry on all pre-disease performance without restriction] : Status 0 - Fully active, able to carry on all pre-disease performance without restriction [Normal] : affect appropriate [de-identified] : BMI 35.82 [de-identified] : supple [de-identified] : (+)S1S2 RRR [de-identified] : no edema [de-identified] : no pain [de-identified] : warm (+) red fungal rash under L breast area with notable moisture. (+) Ecchymosis above L hip s/p fall during hospitalization non-tender 2.5in x 2in

## 2023-07-14 NOTE — HISTORY OF PRESENT ILLNESS
[Disease:__________________________] : Disease: [unfilled] [Therapy: ___] : Therapy: [unfilled] [Cycle: ___] : Cycle: [unfilled] [Day: ___] : Day: [unfilled] [de-identified] : 62 y/o F transferred from [facility] to [facility] for new diagnosis of acute leukemia, s/p initial inpatient treatment and now here for establishment of outpatient care. 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  Chemotherapy with reduced dose HyperCVAD was started on 3/3/23. Hospital course was c/b neutropenic fever, transaminitis, a rash on 3/4 which improved with topical steroid and atarax PRN,  and also LUE cellulitis.\par \par Of note, on peripheral blood BCR/ABL PCR was negative, although in her bone marrow she did have PCR for BCR/ABL p190 positive at an extremely low level of 0.001%. She had a pre-treatment echocardiogram done which showed an EF 55%, and PICC line was placed. Pt started reduced dose HyperCVAD  on 3/3/23: (IV Cyclophosphamide (150 mg/m2 every 12 h on days 1–3), Dexamethasone 20 mg per day on days 1–4 and 11–14, Vincristine 2 mg IV flat dose on days 1 and 8, Daunorubicin 25 mg/m2/day IV continuous infusion over 48 hours, starting on day 4). Informed consent obtained. LP with IT MTX done on 2/28, and CSF was subsequently found to be negative for malignant cells. Pt had a developing transaminitis, and US done at [facility] had shown a fatty liver. CT A/P done at [facility] showed splenomegaly with a small age indeterminant splenic infarct, but otherwise showed a normal liver and no abdominal or pelvic lymphadenopathy. Ultimately, during hospital stay she also developed neutropenic fever, s/p panculture which was negative,. She was treated with cefepime, acyclovir and caspofungin. Course also c/b severe headache as side effect from Zofran (CTH negative). Discharged home in stable condition.\par \par Now s/p cycle 1B had BMBx on 4/28 which showed complete remission and Clonoseq showed MRD-.  After cycle 1A doses were escalated to full HyperCVAD. \par Now s/p cycle 2B and had BMBx - also with continued complete remission.  Awaiting Clonoseq.  [de-identified] : Patient seen for follow up on 07/13/2023. Patient is feeling really well outside of some mild muscle aches which are worse on utilization. They are mainly in her bicep/tricep area and quadriceps, and she doesn't report them as debilitating at all. Otherwise her appetite is normal and she denies any N/V/D. She denied any fevers or chills, no night sweats and no weight loss. Reviewed CBC today, counts are recovered.

## 2023-07-14 NOTE — REVIEW OF SYSTEMS
[Muscle Pain] : muscle pain [Skin Rash] : skin rash [Negative] : Allergic/Immunologic [Vision Problems] : no vision problems [Dysphagia] : no dysphagia [Nosebleeds] : no nosebleeds [Odynophagia] : no odynophagia [Chest Pain] : no chest pain [Palpitations] : no palpitations [Lower Ext Edema] : no lower extremity edema [Dysuria] : no dysuria [Joint Pain] : no joint pain [Joint Stiffness] : no joint stiffness [Muscle Weakness] : no muscle weakness [Skin Wound] : no skin wound [Insomnia] : no insomnia [Anxiety] : no anxiety [Hot Flashes] : no hot flashes [FreeTextEntry9] : per HPI [de-identified] : (+) bruise above left hip and red rash under L breast

## 2023-07-14 NOTE — RESULTS/DATA
[FreeTextEntry1] : Accession:                             [id number]\par \par Collected Date/Time:                   7/7/2023 09:00 EDT\par Received Date/Time:                    7/7/2023 14:47 EDT\par \par Hematopathology Report - Auth (Verified)\par \par Specimen(s) Submitted\par 1  Bone marrow biopsy\par 2  Bone marrow aspirate\par \par Final Diagnosis\par 1, 2. Bone marrow biopsy and bone marrow aspirate\par - Trilineage hematopoiesis with maturation, erythroid hyperplasia,\par mild increase in interstitial mast cells, and increased iron stores\par \par (history of B-ALL, s/p treatment)\par \par See note and description.\par \par Diagnostic note:\par As per chart review, the patient was diagnosed with Ph (-) ALL, in\par February 2023, abnormal cytogenetics,  47,XX,+X,i(9)(q10)[7]/46,XX[2],\par FISH with Three copies of ASS1 and ABL1 detected (81.5%), somatic\par mutations of  NRAS p.Dij19Xqt (VAF: 29.63%),   NRAS p.Rby06Wcj (VAF:\par 14.54%), TP53 p.[id number] (VAF: 45.94%), s/p cycle 1A (mini hyper CVAD)\par and 1B of Hyper-CVAD, with Clonoseq report considered negative, now s/p\par cycle 2A of Hyper-CVAD (was dose reduced with cycle 1A).\par CBC/differential shows mild normocytic anemia with mild myeloid left\par shift.  The bone marrow shows trilineage hematopoiesis with maturation,\par erythroid hyperplasia, mild increase in interstitial mast cells,\par and increased iron stores.  Flow cytometry shows no increase in B\par lymphoblasts and immunohistochemistry does not demonstrate definitive\par persistent disease.  Correlation with MRD study is necessary.\par \par Comprehensive report with results of pending ancillary studies to follow.\par \par Ancillary studies\par Bone marrow aspirate iron stain:   Iron stores are increased; no ring\par sideroblasts are seen.\par \par Flow cytometry:   CD45/side scatter shows no significant blast population\par with lymphopenia. There is no increase in CD34 or CD10/CD19 positive\par cells, and absent B-cells. Myeloid antigen pattern is normal with CD13,\par CD16 and CD11b (no significant hemodilution).  There is no increase in\par myeloblasts (0.97% myeloblasts with normal immunophenotype).\par \par Immunohistochemical stains   (block 1A: CD10, CD20, CD79a, PAX-5, Tdt, CD34)\par show scattered Tdt positive cell, scattered CD34 positive dells (less\par than 3%), scattered CD10 positive cells, scattered CD79a positive cells\par (B cells and plasma cells, few interstitial B cells ((positive with CD20,\par PAX5).\par \par Cytogenetics:   Pending\par \par Molecular: Pending\par \par Microscopic description:\par 1. Biopsy:   Sections of bone marrow biopsy and scant bone marrow fragments\par in clot show normocellularity (50-70% cellularity with less cellular\par foci), trilineage hematopoiesis with maturation, erythroid predominance,\par megakaryocytes normal in number and morphology, mild increase in\par interstitial mast cells, and iron stores increased.\par \par 2. Aspirate:   Cellular spicules are present, adequate for interpretation.\par Maturing and mature myeloid and erythroid elements are present with\par erythroid predominance (M:E ratio 1.06:1).  Megakaryocytes appear normal\par in number and morphology.\par \par

## 2023-07-14 NOTE — ASSESSMENT
[FreeTextEntry1] : 62 y/o F with Ph (-) ALL diagnosed in February 2023 and now s/p cycle 2B of Hyper-CVAD (was dose reduced with cycle 1A) here today for follow up.  \par \par Previously extensively educated patient on her disease process and explained rationale behind treating her with Hyper-CVAD for now. Treatment is hyper CVAD on A cycles (Cyclophosphamide 300 mg/m2 IV over 2 hours every 12 hours on days 1 to 3 (total dose per cycle: 1800 mg/m2), Vincristine (Oncovin) 2 mg IV once per day on days 4 & 11, and Doxorubicin 50 mg/m2 IV continuous infusion over 24 hours, starting on day 4). This had been dose reduced for cycle 1A, but for cycle 2A full dose. B cycles also full dose at this point (MTX 1g/m2 over 24 hour span on day 1, cytarabine 1g/m2 [id number] on day 2 and 3 for total 4 doses).\par \par Bone marrow biopsy results after cycle 1B noted, complete remission and negative for MRD by Clonoseq. Previously educated patient as well on the Clonoseq testing as a tool to assess measurable residual disease (MRD) using NGS technology. She has a mediport in place and doing well. \par \par Now s/p cycle 2B with BMBx showing continued CR, awaiting MRD testing. \par \par Plan:\par -CBC today showing count recovery.\par -Patient can discontinue antibacterial and antifungal ppx, but to continue PCP ppx and antiviral ppx.. \par -Discussed with Dr. Lewis, given her residence is out in [place] he will continue to see her to assist us in transfusional support and limited chemotherapy (vincristine). \par -Discussed future plans extensively - given the fact that she had clearance of MRD after cycle 1A and 1B, will attempt for cure with chemotherapy alone. Will plan for cycle 3-4 with 50% dose reduction to decrease toxicity. MRD testing has been negative up until this point precluding the use of Blincyto, but after treatment is complete will monitor Clonoseq closely and initiate Blina if any evidence of recurrence molecularly. \par -Patient understands and agrees with plan. All information explained to the best of my ability. \par -RTC after cycle 3A.

## 2023-07-17 ENCOUNTER — TRANSCRIPTION ENCOUNTER (OUTPATIENT)
Age: 63
End: 2023-07-17

## 2023-07-17 ENCOUNTER — INPATIENT (INPATIENT)
Facility: HOSPITAL | Age: 63
LOS: 3 days | Discharge: HOME | DRG: 839 | End: 2023-07-21
Attending: INTERNAL MEDICINE | Admitting: INTERNAL MEDICINE
Payer: MEDICARE

## 2023-07-17 VITALS
HEART RATE: 79 BPM | OXYGEN SATURATION: 97 % | RESPIRATION RATE: 18 BRPM | DIASTOLIC BLOOD PRESSURE: 74 MMHG | SYSTOLIC BLOOD PRESSURE: 137 MMHG | WEIGHT: 197.98 LBS | TEMPERATURE: 98 F | HEIGHT: 62.6 IN

## 2023-07-17 DIAGNOSIS — B99.9 UNSPECIFIED INFECTIOUS DISEASE: ICD-10-CM

## 2023-07-17 DIAGNOSIS — C91.00 ACUTE LYMPHOBLASTIC LEUKEMIA NOT HAVING ACHIEVED REMISSION: ICD-10-CM

## 2023-07-17 DIAGNOSIS — I10 ESSENTIAL (PRIMARY) HYPERTENSION: ICD-10-CM

## 2023-07-17 DIAGNOSIS — Z29.9 ENCOUNTER FOR PROPHYLACTIC MEASURES, UNSPECIFIED: ICD-10-CM

## 2023-07-17 LAB
ALBUMIN SERPL ELPH-MCNC: 4 G/DL — SIGNIFICANT CHANGE UP (ref 3.3–5)
ALP SERPL-CCNC: 79 U/L — SIGNIFICANT CHANGE UP (ref 40–120)
ALT FLD-CCNC: 17 U/L — SIGNIFICANT CHANGE UP (ref 10–45)
ANION GAP SERPL CALC-SCNC: 12 MMOL/L — SIGNIFICANT CHANGE UP (ref 5–17)
ANISOCYTOSIS BLD QL: SIGNIFICANT CHANGE UP
APTT BLD: 32.4 SEC — SIGNIFICANT CHANGE UP (ref 27.5–35.5)
AST SERPL-CCNC: 22 U/L — SIGNIFICANT CHANGE UP (ref 10–40)
BASOPHILS # BLD AUTO: 0.06 K/UL — SIGNIFICANT CHANGE UP (ref 0–0.2)
BASOPHILS NFR BLD AUTO: 1.7 % — SIGNIFICANT CHANGE UP (ref 0–2)
BILIRUB SERPL-MCNC: 0.3 MG/DL — SIGNIFICANT CHANGE UP (ref 0.2–1.2)
BUN SERPL-MCNC: 13 MG/DL — SIGNIFICANT CHANGE UP (ref 7–23)
CALCIUM SERPL-MCNC: 9.5 MG/DL — SIGNIFICANT CHANGE UP (ref 8.4–10.5)
CHLORIDE SERPL-SCNC: 103 MMOL/L — SIGNIFICANT CHANGE UP (ref 96–108)
CO2 SERPL-SCNC: 24 MMOL/L — SIGNIFICANT CHANGE UP (ref 22–31)
CREAT SERPL-MCNC: 0.92 MG/DL — SIGNIFICANT CHANGE UP (ref 0.5–1.3)
DACRYOCYTES BLD QL SMEAR: SLIGHT — SIGNIFICANT CHANGE UP
EGFR: 69 ML/MIN/1.73M2 — SIGNIFICANT CHANGE UP
ELLIPTOCYTES BLD QL SMEAR: SLIGHT — SIGNIFICANT CHANGE UP
EOSINOPHIL # BLD AUTO: 0 K/UL — SIGNIFICANT CHANGE UP (ref 0–0.5)
EOSINOPHIL NFR BLD AUTO: 0 % — SIGNIFICANT CHANGE UP (ref 0–6)
GLUCOSE SERPL-MCNC: 94 MG/DL — SIGNIFICANT CHANGE UP (ref 70–99)
HCT VFR BLD CALC: 32.6 % — LOW (ref 34.5–45)
HGB BLD-MCNC: 10.5 G/DL — LOW (ref 11.5–15.5)
INR BLD: 1 RATIO — SIGNIFICANT CHANGE UP (ref 0.88–1.16)
LYMPHOCYTES # BLD AUTO: 0.68 K/UL — LOW (ref 1–3.3)
LYMPHOCYTES # BLD AUTO: 19.8 % — SIGNIFICANT CHANGE UP (ref 13–44)
MAGNESIUM SERPL-MCNC: 2 MG/DL — SIGNIFICANT CHANGE UP (ref 1.6–2.6)
MANUAL SMEAR VERIFICATION: SIGNIFICANT CHANGE UP
MCHC RBC-ENTMCNC: 31.5 PG — SIGNIFICANT CHANGE UP (ref 27–34)
MCHC RBC-ENTMCNC: 32.2 GM/DL — SIGNIFICANT CHANGE UP (ref 32–36)
MCV RBC AUTO: 97.9 FL — SIGNIFICANT CHANGE UP (ref 80–100)
MICROCYTES BLD QL: SIGNIFICANT CHANGE UP
MONOCYTES # BLD AUTO: 0.95 K/UL — HIGH (ref 0–0.9)
MONOCYTES NFR BLD AUTO: 27.6 % — HIGH (ref 2–14)
NEUTROPHILS # BLD AUTO: 1.72 K/UL — LOW (ref 1.8–7.4)
NEUTROPHILS NFR BLD AUTO: 50 % — SIGNIFICANT CHANGE UP (ref 43–77)
PHOSPHATE SERPL-MCNC: 4.1 MG/DL — SIGNIFICANT CHANGE UP (ref 2.5–4.5)
PLAT MORPH BLD: NORMAL — SIGNIFICANT CHANGE UP
PLATELET # BLD AUTO: 197 K/UL — SIGNIFICANT CHANGE UP (ref 150–400)
POIKILOCYTOSIS BLD QL AUTO: SLIGHT — SIGNIFICANT CHANGE UP
POLYCHROMASIA BLD QL SMEAR: SLIGHT — SIGNIFICANT CHANGE UP
POTASSIUM SERPL-MCNC: 4.4 MMOL/L — SIGNIFICANT CHANGE UP (ref 3.5–5.3)
POTASSIUM SERPL-SCNC: 4.4 MMOL/L — SIGNIFICANT CHANGE UP (ref 3.5–5.3)
PROT SERPL-MCNC: 6.7 G/DL — SIGNIFICANT CHANGE UP (ref 6–8.3)
PROTHROM AB SERPL-ACNC: 11.6 SEC — SIGNIFICANT CHANGE UP (ref 10.5–13.4)
RBC # BLD: 3.33 M/UL — LOW (ref 3.8–5.2)
RBC # FLD: 23 % — HIGH (ref 10.3–14.5)
RBC BLD AUTO: ABNORMAL
SARS-COV-2 RNA SPEC QL NAA+PROBE: SIGNIFICANT CHANGE UP
SODIUM SERPL-SCNC: 139 MMOL/L — SIGNIFICANT CHANGE UP (ref 135–145)
VARIANT LYMPHS # BLD: 0.9 % — SIGNIFICANT CHANGE UP (ref 0–6)
WBC # BLD: 3.44 K/UL — LOW (ref 3.8–10.5)
WBC # FLD AUTO: 3.44 K/UL — LOW (ref 3.8–10.5)

## 2023-07-17 PROCEDURE — 99221 1ST HOSP IP/OBS SF/LOW 40: CPT

## 2023-07-17 RX ORDER — FOSAPREPITANT DIMEGLUMINE 150 MG/5ML
150 INJECTION, POWDER, LYOPHILIZED, FOR SOLUTION INTRAVENOUS ONCE
Refills: 0 | Status: COMPLETED | OUTPATIENT
Start: 2023-07-17 | End: 2023-07-17

## 2023-07-17 RX ORDER — DEXAMETHASONE 0.5 MG/5ML
20 ELIXIR ORAL DAILY
Refills: 0 | Status: CANCELLED | OUTPATIENT
Start: 2023-07-27 | End: 2023-07-21

## 2023-07-17 RX ORDER — ACETAMINOPHEN 500 MG
650 TABLET ORAL EVERY 6 HOURS
Refills: 0 | Status: DISCONTINUED | OUTPATIENT
Start: 2023-07-17 | End: 2023-07-21

## 2023-07-17 RX ORDER — LOSARTAN POTASSIUM 100 MG/1
100 TABLET, FILM COATED ORAL DAILY
Refills: 0 | Status: DISCONTINUED | OUTPATIENT
Start: 2023-07-17 | End: 2023-07-21

## 2023-07-17 RX ORDER — VINCRISTINE SULFATE 1 MG/ML
1 VIAL (ML) INTRAVENOUS ONCE
Refills: 0 | Status: COMPLETED | OUTPATIENT
Start: 2023-07-17 | End: 2023-07-17

## 2023-07-17 RX ORDER — ACYCLOVIR SODIUM 500 MG
400 VIAL (EA) INTRAVENOUS
Refills: 0 | Status: DISCONTINUED | OUTPATIENT
Start: 2023-07-17 | End: 2023-07-21

## 2023-07-17 RX ORDER — CYCLOPHOSPHAMIDE 100 MG
288 VIAL (EA) INTRAVENOUS EVERY 12 HOURS
Refills: 0 | Status: COMPLETED | OUTPATIENT
Start: 2023-07-17 | End: 2023-07-20

## 2023-07-17 RX ORDER — DEXAMETHASONE 0.5 MG/5ML
5 ELIXIR ORAL
Qty: 20 | Refills: 0
Start: 2023-07-17 | End: 2023-07-20

## 2023-07-17 RX ORDER — ESCITALOPRAM OXALATE 10 MG/1
10 TABLET, FILM COATED ORAL DAILY
Refills: 0 | Status: DISCONTINUED | OUTPATIENT
Start: 2023-07-17 | End: 2023-07-21

## 2023-07-17 RX ORDER — LEVOFLOXACIN 5 MG/ML
1 INJECTION, SOLUTION INTRAVENOUS
Qty: 0 | Refills: 0 | DISCHARGE

## 2023-07-17 RX ORDER — DEXAMETHASONE 0.5 MG/5ML
20 ELIXIR ORAL EVERY 24 HOURS
Refills: 0 | Status: COMPLETED | OUTPATIENT
Start: 2023-07-17 | End: 2023-07-20

## 2023-07-17 RX ORDER — SODIUM CHLORIDE 9 MG/ML
1000 INJECTION INTRAMUSCULAR; INTRAVENOUS; SUBCUTANEOUS
Refills: 0 | Status: DISCONTINUED | OUTPATIENT
Start: 2023-07-17 | End: 2023-07-21

## 2023-07-17 RX ORDER — PANTOPRAZOLE SODIUM 20 MG/1
40 TABLET, DELAYED RELEASE ORAL
Refills: 0 | Status: DISCONTINUED | OUTPATIENT
Start: 2023-07-17 | End: 2023-07-21

## 2023-07-17 RX ORDER — FLUCONAZOLE 150 MG/1
1 TABLET ORAL
Qty: 0 | Refills: 0 | DISCHARGE

## 2023-07-17 RX ADMIN — Medication 288 MILLIGRAM(S): at 14:32

## 2023-07-17 RX ADMIN — Medication 400 MILLIGRAM(S): at 17:33

## 2023-07-17 RX ADMIN — FOSAPREPITANT DIMEGLUMINE 300 MILLIGRAM(S): 150 INJECTION, POWDER, LYOPHILIZED, FOR SOLUTION INTRAVENOUS at 12:14

## 2023-07-17 RX ADMIN — Medication 1 MILLIGRAM(S): at 14:23

## 2023-07-17 RX ADMIN — SODIUM CHLORIDE 50 MILLILITER(S): 9 INJECTION INTRAMUSCULAR; INTRAVENOUS; SUBCUTANEOUS at 12:36

## 2023-07-17 RX ADMIN — Medication 110 MILLIGRAM(S): at 11:54

## 2023-07-17 NOTE — H&P ADULT - HISTORY OF PRESENT ILLNESS
62 y/o F with PMHx of HTN, HLD with Ph (-) ALL diagnosed in February 2023 and now s/p cycle 1A of miini Hyper-CVAD, 1B ,2A. and 2B. Dose reduced for cycle 1A but full dose for 2A and full dose for B cycles. Bone marrow after cycle 1B with CR and negative MRD as well as post cycle 2B.  Now   admitted for cycle 3A  hyperCVAD with 50% dose reduction to reduce toxicity.      Initially transferred from [facility] for new diagnosis of leukemia. 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 Official bone marrow biopsy 2/28/23 showed B-cell ALL, which was [place] chromosome negative. Peripheral blood BCR/ABL PCR was negative, although in her bone marrow she did have PCR for BCR/ABL p190 positive at an extremely low level of 0.001%. She had a pre-treatment echocardiogram done which showed an EF 55%,     Chemotherapy with reduced dose HyperCVAD was started on 3/3/23. Hospital course was c/b neutropenic fever, transaminitis, a rash  which improved with topical steroids. Patient had a bone marrow biopsy performed on 4/28 post cycle 1B with results showing a complete remission. Patient received cycle 1B on 4/3 and cycle 2A on 5/1. 2B on 6/12.

## 2023-07-17 NOTE — DISCHARGE NOTE PROVIDER - NSDCFUADDINST_GEN_ALL_CORE_FT
[facility]23 at 10am for fulphila, vincristine and possible platelets   LP scheduled for 7/24/23 at 2:30pm at the hospital  Follow up at [facility] on 7/26/23 at 8:30am with Sruthi Keyes NP and  Dr. Goldberg on 8/12 at 9:40am  Follow up with Dr. Lewis on 7/31, labs to be drawn on 7/27, 7/31, and 8/3

## 2023-07-17 NOTE — DISCHARGE NOTE PROVIDER - HOSPITAL COURSE
64 y/o F with PMHx of HTN, HLD with Ph (-) ALL diagnosed in February 2023 and now s/p cycle 1A of miini Hyper-CVAD, 1B ,2A. and 2B. Dose reduced for cycle 1A but full dose for 2A and full dose for B cycles. Bone marrow after cycle 1B with CR and negative MRD as well as post cycle 2B.  Now   admitted for cycle 3A  hyperCVAD with 50% dose reduction to reduce toxicity. Mediport accessed and iniated IV hydration. 62 y/o F with PMHx of HTN, HLD with Ph (-) ALL diagnosed in February 2023 and now s/p cycle 1A of miini Hyper-CVAD, 1B ,2A. and 2B. Dose reduced for cycle 1A but full dose for 2A and full dose for B cycles. Bone marrow after cycle 1B with CR and negative MRD as well as post cycle 2B.  Now   admitted for cycle 3A  hyperCVAD with 50% dose reduction to reduce toxicity. [facility] accessed and initiated IV hydration. 62 y/o F with PMHx of HTN, HLD with Ph (-) ALL diagnosed in February 2023 and now s/p cycle 1A of miini Hyper-CVAD, 1B ,2A. and 2B. Dose reduced for cycle 1A but full dose for 2A and full dose for B cycles. Bone marrow after cycle 1B with CR and negative MRD as well as post cycle 2B.  Now   admitted for cycle 3A  hyperCVAD with 50% dose reduction to reduce toxicity. Mediport accessed and initiated.  IV hydration. Hospital course complicated by asymptomatic sinus bradycardia, ventricular rate in the 40's, no intervention needed. Pt broke her right upper tooth while chewing bagel, asymptomatic. Pt to see dentist when count is stable. 64 y/o F with PMHx of HTN, HLD with Ph (-) ALL diagnosed in February 2023 and now s/p cycle 1A of miini Hyper-CVAD, 1B ,2A. and 2B. Dose reduced for cycle 1A but full dose for 2A and full dose for B cycles. Bone marrow after cycle 1B with CR and negative MRD as well as post cycle 2B.  Now   admitted for cycle 3A  hyperCVAD with 50% dose reduction to reduce toxicity. Mediport accessed and initiated.  IV hydration. Hospital course complicated by asymptomatic sinus bradycardia, ventricular rate in the 40's, TFT reveals WNL except low total T3 of 63, no intervention needed. Pt broke her right upper tooth while chewing bagel, asymptomatic. Pt to see dentist when count is stable.   Pt tolerated chemo well and stable for discharge home

## 2023-07-17 NOTE — H&P ADULT - NSHPLABSRESULTS_GEN_ALL_CORE
LABS:                          10.5   3.44  )-----------( 197      ( 17 Jul 2023 11:03 )             32.6         Mean Cell Volume : 97.9 fl  Mean Cell Hemoglobin : 31.5 pg  Mean Cell Hemoglobin Concentration : 32.2 gm/dL  Auto Neutrophil # : 1.72 K/uL  Auto Lymphocyte # : 0.68 K/uL  Auto Monocyte # : 0.95 K/uL  Auto Eosinophil # : 0.00 K/uL  Auto Basophil # : 0.06 K/uL  Auto Neutrophil % : 50.0 %  Auto Lymphocyte % : 19.8 %  Auto Monocyte % : 27.6 %  Auto Eosinophil % : 0.0 %  Auto Basophil % : 1.7 %      07-17    139  |  103  |  13  ----------------------------<  94  4.4   |  24  |  0.92    Ca    9.5      17 Jul 2023 11:03  Phos  4.1     07-17  Mg     2.0     07-17    TPro  6.7  /  Alb  4.0  /  TBili  0.3  /  DBili  x   /  AST  22  /  ALT  17  /  AlkPhos  79  07-17      PT/INR - ( 17 Jul 2023 11:03 )   PT: 11.6 sec;   INR: 1.00 ratio         PTT - ( 17 Jul 2023 11:03 )  PTT:32.4 sec

## 2023-07-17 NOTE — DISCHARGE NOTE PROVIDER - NSDCCPCAREPLAN_GEN_ALL_CORE_FT
PRINCIPAL DISCHARGE DIAGNOSIS  Diagnosis: B-cell acute lymphoblastic leukemia  Assessment and Plan of Treatment: Notify MD or report to ER for fever greater or equal to 100.4, persistent nausea, vomiting, diarrhea, bleeding.  You will require additional chemotherapy as an outpatient.  You are due for additional dosing of dexamethasone as instructed.        SECONDARY DISCHARGE DIAGNOSES  Diagnosis: HTN (hypertension)  Assessment and Plan of Treatment: Continue home medication

## 2023-07-17 NOTE — DISCHARGE NOTE PROVIDER - NSDCFUADDAPPT_GEN_ALL_CORE_FT
Follow up at [facility] on Monday 7/24/23 at 10am  for fulphila, vincristine and possible platelets   LP scheduled for 7/24/23 at 2:30pm   Follow up at [facility] on 7/26/23 at 8:30am with Christina Malilo NP Dr. Goldberg on 8/12 at 9:40am  Follow up with Dr. WATTS on 7/31, labs to be drawn on 7/27, 7/31, and 8/3 Follow up at [facility] on Monday 7/24/23 at 10am for fulphila, vincristine and possible platelets   LP scheduled for 7/24/23 at 2:30pm at the hospital  Follow up at [facility] on 7/26/23 at 8:30am with Sruthi Keyes NP and  Dr. Goldberg on 8/12 at 9:40am  Follow up with Dr. Lewis on 7/31, labs to be drawn on 7/27, 7/31, and 8/3

## 2023-07-17 NOTE — DISCHARGE NOTE PROVIDER - CARE PROVIDER_API CALL
Goldberg, Bradley Harris  Hematology  [address] [place] [address]  Phone: [phone]  Fax: [phone]  Follow Up Time:     Collin Lewis  Hematology  [address] [place] [address]  Phone: [phone]  Fax: [phone]  Follow Up Time:

## 2023-07-17 NOTE — H&P ADULT - NSHPSOCIALHISTORY_GEN_ALL_CORE
lives with   and lives with   2 sons  Retired as an asst teacher in a   (-) Tobacco for 15 years  Social ETOH

## 2023-07-17 NOTE — DISCHARGE NOTE PROVIDER - NSDCMRMEDTOKEN_GEN_ALL_CORE_FT
acyclovir 400 mg oral tablet: 1 tab(s) orally 2 times a day, check with outpatient provider regarding when to stop  escitalopram 10 mg oral tablet: 1 tab(s) orally once a day  famotidine 20 mg oral tablet: 1 tab(s) orally once a day When Omeprazole is on hold  fluconazole 200 mg oral tablet: 1 orally once a day stop when instruted by outpatient provider  leucovorin 10 mg oral tablet: 1 tab(s) orally every 6 hours ask outpatient provider when to stop it  levoFLOXacin 500 mg oral tablet: 1 orally once a day stop when instructed by outpatient provider  losartan 100 mg oral tablet: 1 tab(s) orally once a day  metoclopramide 10 mg oral tablet: 1 tab(s) orally every 6 hours as needed for  nausea  omeprazole 20 mg oral delayed release capsule: 1 cap(s) orally once a day When methotrexate level is cleared  prednisoLONE acetate 1% ophthalmic suspension: 2 drop(s) to each affected eye every 6 hours x 2 days then stop   acyclovir 400 mg oral tablet: 1 tab(s) orally 2 times a day, check with outpatient provider regarding when to stop  dexAMETHasone 4 mg oral tablet: 5 tab(s) orally once a day to be taken on 7/27/23-7/30/23  escitalopram 10 mg oral tablet: 1 tab(s) orally once a day  losartan 100 mg oral tablet: 1 tab(s) orally once a day  metoclopramide 10 mg oral tablet: 1 tab(s) orally every 6 hours as needed for  nausea  omeprazole 20 mg oral delayed release capsule: 1 cap(s) orally once a day When methotrexate level is cleared

## 2023-07-17 NOTE — ADVANCED PRACTICE NURSE CONSULT - ASSESSMENT
Pt A/Ox4, vital signs stable, afebrile. Pt denies pain/discomfort, N/V/D, SOB, chest pain. Right ACW power-port easily flushed with positive blood return, dressing C/D/I. Site had no sings of redness, swelling, warmth or drainage. Pt had a bowel movement on the morning of 7/17. Chemotherapy education provided. Pt verbalized understanding. Labs reviewed by Dr. thomas and team during rounds. OK to administer. Pt pre medicated with 20mg Dexamethosone IVPB and 150mg Emend IVPB. On Day 1 at 14:23, vinCRIStine IVPB (eMAR) 1 milliGRAM(s) in sodium chloride 0.9% 50 milliLiter(s), IV Intermittent, once, infuse over 5 Minute(s), infuse at 612 mL/Hr, was given through the single lumen mediport as a secondary infusion connected to a normal saline line via alaris pump. On day 1 at 14:32, cyclophosphamide IVPB (eMAR) [Known as CYTOXAN IVPB (eMAR)] - 288 milliGRAM(s) in sodium chloride 0.9% 100 milliLiter(s), IV Intermittent, every 12 hours, infuse over 2 Hour(s), infuse at 57.2 mL/Hr, was given through R ACW single lumen power port via secondary infusion connected to normal saline line via alaris pump. Pt tolerated well. Primary RN aware. Pt left comfortably in bed.

## 2023-07-17 NOTE — DISCHARGE NOTE PROVIDER - NSDCFUSCHEDAPPT_GEN_ALL_CORE_FT
Goldberg, Bradley  [facility] PreAdmits  Scheduled Appointment: 07/17/2023     [facility]  DIAGRAD [address] OP [address]  Scheduled Appointment: 07/24/2023    [facility]uth[facility] Keyes  [facility]  Scheduled Appointment: 07/26/2023    [facility] MIGUEL [facility]  Scheduled Appointment: 07/27/2023    Collin Lewis  [facility]  Scheduled Appointment: 07/31/2023    [facility]  Scheduled Appointment: 08/03/2023    Goldberg, Bradley  [facility]  Scheduled Appointment: 08/10/2023     [facility]  Scheduled Appointment: 07/24/2023    [facility]  DIAGRAD [address] OP [address]riv  Scheduled Appointment: 07/24/2023    Sruthi Keyes  [facility]  Scheduled Appointment: 07/26/2023    [facility]  Scheduled Appointment: 07/27/2023    Collin Lewis  [facility]  Scheduled Appointment: 07/31/2023    [facility]  Scheduled Appointment: 08/03/2023    Goldberg, Bradley  [facility]  Scheduled Appointment: 08/10/2023     [facility]  Scheduled Appointment: 07/24/2023    [facility]  DIAGRAD [address] OP Comm Driv  Scheduled Appointment: 07/24/2023    [facility]  Scheduled Appointment: 07/26/2023    Sruthi Keyes  [facility]EL [facility]  Scheduled Appointment: 07/26/2023    [facility]  Scheduled Appointment: 07/27/2023    Collin Lewis  [facility]  Scheduled Appointment: 07/31/2023    [facility]  Scheduled Appointment: 08/03/2023    Goldberg, Bradley  [facility] M[facility]EL [facility]ic  Scheduled Appointment: 08/10/2023

## 2023-07-17 NOTE — H&P ADULT - PROBLEM SELECTOR PLAN 1
Admit to 7  [facility]  Access Mediport   IV hydration, strict I/O, antiemetics   Transfuse blood products/replace lytes as needed   Chemotherapy with hyperCVAD with dose reduction as follows:  Cyclophosphamide 150 mg/m2 every 12 hours days 1-3  Dexamethasone 20mg per day on days 1-4 and 11-14   Vincristine 1mg IV days 4 and 11 (flat dose)  Doxorubicin 25mg/m2/day IV continuous infusion over 24 hours on day 4   LP scheduled for Tuesday 7/18 with IT MTX and will require another LP on day 7 or 8 with IT cytarabine which can be set up as an outpatient  Fulphila post chemotherapy as outpatient Admit to 7  [facility]  Access Mediport   IV hydration, strict I/O, antiemetics   Transfuse blood products/replace lytes as needed   Chemotherapy with mini hyperCVAD with dose reduction as follows:  Cyclophosphamide 150 mg/m2 every 12 hours days 1-3  Dexamethasone 20mg per day on days 1-4 and 11-14   Vincristine 1mg IV days 1 and 8 (flat dose)  Doxorubicin 25mg/m2/day IV continuous infusion over 24 hours on day 4   LP scheduled for Tuesday 7/18 with IT MTX and will require another LP on day 7 or 8 with IT cytarabine which can be set up as an outpatient  Fulphila post chemotherapy as outpatient

## 2023-07-17 NOTE — H&P ADULT - PROBLEM SELECTOR PLAN 2
Patient is not neutropenic, afebrile   Continue home medication - acyclovir   Currently off levaquin and fluconazole   If febrile, panculture. Patient is not neutropenic, afebrile   Continue home medication - acyclovir prophylaxis  Currently off levaquin and fluconazole   If febrile, panculture.

## 2023-07-17 NOTE — PATIENT PROFILE ADULT - FUNCTIONAL ASSESSMENT - BASIC MOBILITY 6.
4-calculated by average/Not able to assess (calculate score using [facility] averaging method)

## 2023-07-17 NOTE — H&P ADULT - ASSESSMENT
62 y/o F with PMHx of HTN, HLD with Ph (-) ALL diagnosed in February 2023 and now s/p cycle 1A of miini Hyper-CVAD, 1B ,2A. and 2B. Dose reduced for cycle 1A but full dose for 2A and full dose for B cycles. Bone marrow after cycle 1B with CR and negative MRD as well as post cycle 2B.  Now   admitted for cycle 3A  hyperCVAD with 50% dose reduction to reduce toxicity.

## 2023-07-18 LAB
ALBUMIN SERPL ELPH-MCNC: 4 G/DL — SIGNIFICANT CHANGE UP (ref 3.3–5)
ALP SERPL-CCNC: 78 U/L — SIGNIFICANT CHANGE UP (ref 40–120)
ALT FLD-CCNC: 19 U/L — SIGNIFICANT CHANGE UP (ref 10–45)
ANION GAP SERPL CALC-SCNC: 14 MMOL/L — SIGNIFICANT CHANGE UP (ref 5–17)
APPEARANCE CSF: CLEAR — SIGNIFICANT CHANGE UP
AST SERPL-CCNC: 22 U/L — SIGNIFICANT CHANGE UP (ref 10–40)
BASOPHILS # BLD AUTO: 0 K/UL — SIGNIFICANT CHANGE UP (ref 0–0.2)
BASOPHILS NFR BLD AUTO: 0 % — SIGNIFICANT CHANGE UP (ref 0–2)
BILIRUB SERPL-MCNC: 0.3 MG/DL — SIGNIFICANT CHANGE UP (ref 0.2–1.2)
BUN SERPL-MCNC: 11 MG/DL — SIGNIFICANT CHANGE UP (ref 7–23)
CALCIUM SERPL-MCNC: 10 MG/DL — SIGNIFICANT CHANGE UP (ref 8.4–10.5)
CHLORIDE SERPL-SCNC: 102 MMOL/L — SIGNIFICANT CHANGE UP (ref 96–108)
CO2 SERPL-SCNC: 21 MMOL/L — LOW (ref 22–31)
COLOR CSF: SIGNIFICANT CHANGE UP
CREAT SERPL-MCNC: 0.7 MG/DL — SIGNIFICANT CHANGE UP (ref 0.5–1.3)
EGFR: 96 ML/MIN/1.73M2 — SIGNIFICANT CHANGE UP
EOSINOPHIL # BLD AUTO: 0 K/UL — SIGNIFICANT CHANGE UP (ref 0–0.5)
EOSINOPHIL NFR BLD AUTO: 0 % — SIGNIFICANT CHANGE UP (ref 0–6)
GLUCOSE CSF-MCNC: 75 MG/DL — HIGH (ref 40–70)
GLUCOSE SERPL-MCNC: 119 MG/DL — HIGH (ref 70–99)
HCT VFR BLD CALC: 34 % — LOW (ref 34.5–45)
HGB BLD-MCNC: 11 G/DL — LOW (ref 11.5–15.5)
IMM GRANULOCYTES NFR BLD AUTO: 0.9 % — SIGNIFICANT CHANGE UP (ref 0–0.9)
LDH CSF L TO P-CCNC: 23 U/L — SIGNIFICANT CHANGE UP
LDH FLD-CCNC: 23 U/L — SIGNIFICANT CHANGE UP
LYMPHOCYTES # BLD AUTO: 0.51 K/UL — LOW (ref 1–3.3)
LYMPHOCYTES # BLD AUTO: 15.6 % — SIGNIFICANT CHANGE UP (ref 13–44)
MAGNESIUM SERPL-MCNC: 2.1 MG/DL — SIGNIFICANT CHANGE UP (ref 1.6–2.6)
MCHC RBC-ENTMCNC: 31.3 PG — SIGNIFICANT CHANGE UP (ref 27–34)
MCHC RBC-ENTMCNC: 32.4 GM/DL — SIGNIFICANT CHANGE UP (ref 32–36)
MCV RBC AUTO: 96.6 FL — SIGNIFICANT CHANGE UP (ref 80–100)
MONOCYTES # BLD AUTO: 0.16 K/UL — SIGNIFICANT CHANGE UP (ref 0–0.9)
MONOCYTES NFR BLD AUTO: 4.9 % — SIGNIFICANT CHANGE UP (ref 2–14)
NEUTROPHILS # BLD AUTO: 2.56 K/UL — SIGNIFICANT CHANGE UP (ref 1.8–7.4)
NEUTROPHILS # CSF: SIGNIFICANT CHANGE UP (ref 0–6)
NEUTROPHILS NFR BLD AUTO: 78.6 % — HIGH (ref 43–77)
NRBC # BLD: 0 /100 WBCS — SIGNIFICANT CHANGE UP (ref 0–0)
NRBC NFR CSF: <1 /UL — SIGNIFICANT CHANGE UP (ref 0–5)
PHOSPHATE SERPL-MCNC: 3.9 MG/DL — SIGNIFICANT CHANGE UP (ref 2.5–4.5)
PLATELET # BLD AUTO: 218 K/UL — SIGNIFICANT CHANGE UP (ref 150–400)
POTASSIUM SERPL-MCNC: 3.9 MMOL/L — SIGNIFICANT CHANGE UP (ref 3.5–5.3)
POTASSIUM SERPL-SCNC: 3.9 MMOL/L — SIGNIFICANT CHANGE UP (ref 3.5–5.3)
PROT CSF-MCNC: 42 MG/DL — SIGNIFICANT CHANGE UP (ref 15–45)
PROT SERPL-MCNC: 6.6 G/DL — SIGNIFICANT CHANGE UP (ref 6–8.3)
RBC # BLD: 3.52 M/UL — LOW (ref 3.8–5.2)
RBC # CSF: 0 /UL — SIGNIFICANT CHANGE UP (ref 0–0)
RBC # FLD: 21.7 % — HIGH (ref 10.3–14.5)
SODIUM SERPL-SCNC: 137 MMOL/L — SIGNIFICANT CHANGE UP (ref 135–145)
TUBE TYPE: SIGNIFICANT CHANGE UP
WBC # BLD: 3.26 K/UL — LOW (ref 3.8–10.5)
WBC # FLD AUTO: 3.26 K/UL — LOW (ref 3.8–10.5)

## 2023-07-18 PROCEDURE — 62328 DX LMBR SPI PNXR W/FLUOR/CT: CPT

## 2023-07-18 PROCEDURE — 99232 SBSQ HOSP IP/OBS MODERATE 35: CPT | Mod: FS,GC

## 2023-07-18 PROCEDURE — 62329 THER SPI PNXR CSF FLUOR/CT: CPT

## 2023-07-18 RX ORDER — METHOTREXATE 2.5 MG/1
12 TABLET ORAL ONCE
Refills: 0 | Status: DISCONTINUED | OUTPATIENT
Start: 2023-07-18 | End: 2023-07-21

## 2023-07-18 RX ORDER — CHLORHEXIDINE GLUCONATE 213 G/1000ML
1 SOLUTION TOPICAL DAILY
Refills: 0 | Status: DISCONTINUED | OUTPATIENT
Start: 2023-07-18 | End: 2023-07-21

## 2023-07-18 RX ADMIN — LOSARTAN POTASSIUM 100 MILLIGRAM(S): 100 TABLET, FILM COATED ORAL at 05:58

## 2023-07-18 RX ADMIN — Medication 400 MILLIGRAM(S): at 17:21

## 2023-07-18 RX ADMIN — ESCITALOPRAM OXALATE 10 MILLIGRAM(S): 10 TABLET, FILM COATED ORAL at 13:04

## 2023-07-18 RX ADMIN — PANTOPRAZOLE SODIUM 40 MILLIGRAM(S): 20 TABLET, DELAYED RELEASE ORAL at 05:58

## 2023-07-18 RX ADMIN — Medication 400 MILLIGRAM(S): at 05:58

## 2023-07-18 RX ADMIN — Medication 288 MILLIGRAM(S): at 14:19

## 2023-07-18 RX ADMIN — CHLORHEXIDINE GLUCONATE 1 APPLICATION(S): 213 SOLUTION TOPICAL at 17:22

## 2023-07-18 RX ADMIN — Medication 110 MILLIGRAM(S): at 13:03

## 2023-07-18 RX ADMIN — SODIUM CHLORIDE 50 MILLILITER(S): 9 INJECTION INTRAMUSCULAR; INTRAVENOUS; SUBCUTANEOUS at 05:57

## 2023-07-18 RX ADMIN — Medication 288 MILLIGRAM(S): at 02:21

## 2023-07-18 NOTE — ADVANCED PRACTICE NURSE CONSULT - ASSESSMENT
Patient alert and oriented x 4. IV Lumen flushed with normal saline with good blood return noted. Cytoxan checked by 2RN's. Cyclophosphamide 150mg/m2 =288mg IV over 2hours x 6 doses on Day 1-3 ,starting 7/17/23.Infusion in progress.

## 2023-07-18 NOTE — PROGRESS NOTE ADULT - PROBLEM SELECTOR PLAN 1
Admit to 7  [facility]  Access Mediport   IV hydration, strict I/O, antiemetics   Transfuse blood products/replace lytes as needed   Chemotherapy with hyperCVAD with dose reduction as follows:  Cyclophosphamide 150 mg/m2 every 12 hours days 1-3  Dexamethasone 20mg per day on days 1-4 and 11-14   Vincristine 1mg IV days 4 and 11 (flat dose)  Doxorubicin 25mg/m2/day IV continuous infusion over 24 hours on day 4   LP scheduled for Tuesday 7/18 with IT MTX and will require another LP on day 7 or 8 with IT cytarabine which can be set up as an outpatient  Fulphila post chemotherapy as outpatient

## 2023-07-18 NOTE — PROGRESS NOTE ADULT - NS ATTEND AMEND GEN_ALL_CORE FT
62 y/o F with PMHx of HTN, HLD with Ph (-) ALL diagnosed in February 2023 and now s/p cycle 2 of mini Hyper-CVAD. Now admitted for cycle 3A hyperCVAD (50% dose reduction)     Heme-  Today is day 2 of therapy.   cont IVF  LP with IT MTX day 2  Is/Os, daily weights  Antiemetics

## 2023-07-18 NOTE — PRE PROCEDURE NOTE - PRE PROCEDURE EVALUATION
Neuroradiology pre-op note    HPI: 63y Female with PMHx of HTN, HLD with Ph (-) ALL diagnosed in February 2023     Diagnosis: B-cell acute lymphoblastic leukemia.   Procedure: Fluoroscopically guided lumbar puncture with intrathecal chemotherapy    acyclovir   Oral Tab/Cap 400 milliGRAM(s)  losartan 100 milliGRAM(s)                          11.0   3.26  )-----------( 218      ( 18 Jul 2023 07:10 )             34.0     07-18    137  |  102  |  11  ----------------------------<  119<H>  3.9   |  21<L>  |  0.70    Ca    10.0      18 Jul 2023 07:04  Phos  3.9     07-18  Mg     2.1     07-18    TPro  6.6  /  Alb  4.0  /  TBili  0.3  /  DBili  x   /  AST  22  /  ALT  19  /  AlkPhos  78  07-18    PT/INR - ( 17 Jul 2023 11:03 )   PT: 11.6 sec;   INR: 1.00 ratio    PTT - ( 17 Jul 2023 11:03 )  PTT:32.4 sec    COVID-19 PCR: NotDetec (07-17-23 @ 10:46)      Assessment & Plan:  63yFemale with ALL    -Will plan for fluoroscopically guided lumbar puncture with intrathecal chemotherapy  -Informed consent obtained. After risks, benefits, alternatives discussion pt verbalized understanding and signed consent. Risks including bleeding, infection, nerve damage, and/or headaches were discussed.      MIHAELA Ma  Available on [employer] Teams  Spectralink [phone]  Ext [phone]

## 2023-07-18 NOTE — PROGRESS NOTE ADULT - PROBLEM SELECTOR PLAN 2
Patient is not neutropenic, afebrile   Continue home medication - acyclovir prophylaxis  Currently off levaquin and fluconazole   If febrile, panculture.

## 2023-07-18 NOTE — ADVANCED PRACTICE NURSE CONSULT - ASSESSMENT
Pt A/Ox4, . Pt denies pain/discomfort, N/V/D, SOB, chest pain. Right ACW power-port easily flushed with positive blood return, dressing C/D/I. Site had no sings of redness, swelling, warmth or drainage. Pt had a bowel movement on the morning of 7/17. Chemotherapy education provided. Pt verbalized understanding. Labs reviewed by Dr. thomas and team during rounds. OK to administer. Pt pre medicated with 20mg Dexamethosone IVPB and 150mg Emend IVPB. On Day 1 at 14:23, vinCRIStine IVPB (eMAR) 1 milliGRAM(s) in sodium chloride 0.9% 50 milliLiter(s), IV Intermittent, once, infuse over 5 Minute(s), infuse at 612 mL/Hr, was given through the single lumen mediport as a secondary infusion connected to a normal saline line via alaris pump. On day 1 at 14:32, cyclophosphamide IVPB (eMAR) [Known as CYTOXAN IVPB (eMAR)] - 288 milliGRAM(s) in sodium chloride 0.9% 100 milliLiter(s), IV Intermittent, every 12 hours, infuse over 2 Hour(s), infuse at 57.2 mL/Hr, was given through R ACW single lumen power port via secondary infusion connected to normal saline line via alaris pump. Pt tolerated well. Primary RN aware. Pt left comfortably in bed.   Pt A/Ox4, . Pt denies pain/discomfort, N/V/D, SOB, chest pain. Right ACW power-port easily flushed with positive blood return, dressing C/D/I. Site had no sign of redness, swelling, warmth or drainage. Pt had a bowel movement on the morning of 7/17. Chemotherapy education provided. Pt verbalized understanding. Labs reviewed by Dr. thomas and team during rounds. OK to administer.  On Day 2  at 14:23, vinCRIStine IVPB (eMAR) 1 milliGRAM(s) in sodium chloride 0.9% 50 milliLiter(s), IV Intermittent, once, infuse over 5 Minute(s), infuse at 612 mL/Hr, was given through the single lumen mediport as a secondary infusion connected to a normal saline line via alaris pump. On day 1 at 14:19 , cyclophosphamide IVPB (eMAR) [Known as CYTOXAN IVPB (eMAR)] - 288 milliGRAM(s) in sodium chloride 0.9% 100 milliLiter(s), IV Intermittent, every 12 hours, infuse over 2 Hour(s), infuse at 57. mL/Hr, was given through R ACW single lumen power port via secondary infusion connected to normal saline line via alaris pump. Pt tolerated well. Primary RN aware. Pt left comfortably in bed.

## 2023-07-18 NOTE — PROGRESS NOTE ADULT - SUBJECTIVE AND OBJECTIVE BOX
Diagnosis: B-ALL Ph (-)     Protocol/Chemo Regimen: [id number] HyperCVAD (50% reduction)     Day: 2    Pt endorsed:    Review of Systems: denies chest pain, shortness of breath, nausea, vomiting, diarrhea     Pain scale:     Diet:     Allergies    sulfa drugs (Unknown)  Zofran (Headache)    Intolerances        ANTIMICROBIALS  acyclovir   Oral Tab/Cap 400 milliGRAM(s) Oral two times a day      HEME/ONC MEDICATIONS  cyclophosphamide IVPB (eMAR) 288 milliGRAM(s) IV Intermittent every 12 hours      STANDING MEDICATIONS  dexAMETHasone  IVPB 20 milliGRAM(s) IV Intermittent every 24 hours  escitalopram 10 milliGRAM(s) Oral daily  losartan 100 milliGRAM(s) Oral daily  pantoprazole    Tablet 40 milliGRAM(s) Oral before breakfast  sodium chloride 0.9%. 1000 milliLiter(s) IV Continuous <Continuous>      PRN MEDICATIONS  acetaminophen     Tablet .. 650 milliGRAM(s) Oral every 6 hours PRN  LORazepam   Injectable 0.5 milliGRAM(s) IV Push every 6 hours PRN        Vital Signs Last 24 Hrs  T(C): 36.6 (18 Jul 2023 05:01), Max: 36.9 (18 Jul 2023 01:00)  T(F): 97.8 (18 Jul 2023 05:01), Max: 98.4 (18 Jul 2023 01:00)  HR: 74 (18 Jul 2023 05:01) (62 - 79)  BP: 121/70 (18 Jul 2023 05:01) (121/70 - 172/80)  BP(mean): --  RR: 18 (18 Jul 2023 05:01) (18 - 18)  SpO2: 98% (18 Jul 2023 05:01) (96% - 98%)    Parameters below as of 18 Jul 2023 05:01  Patient On (Oxygen Delivery Method): room air        PHYSICAL EXAM  General: NAD  HEENT: PERRLA, EOMOI, clear oropharynx, anicteric sclera, pink conjunctiva  Neck: supple  CV: (+) S1/S2 RRR  Lungs: clear to auscultation, no wheezes or rales  Abdomen: soft, non-tender, non-distended (+) BS  Ext: no clubbing, cyanosis or edema  Skin: no rashes and no petechiae  Neuro: alert and oriented X 3, no focal deficits  Central Line: mediport c/d/i              Diagnosis: B-ALL Ph (-)     Protocol/Chemo Regimen: [id number] HyperCVAD (50% reduction)     Day: 2    Pt endorsed: no complaints, sitting comfortable in bed    Review of Systems: denies chest pain, shortness of breath, nausea, vomiting, diarrhea     Pain scale: 0     Diet: regular     Allergies    sulfa drugs (Unknown)  Zofran (Headache)    Intolerances        ANTIMICROBIALS  acyclovir   Oral Tab/Cap 400 milliGRAM(s) Oral two times a day      HEME/ONC MEDICATIONS  cyclophosphamide IVPB (eMAR) 288 milliGRAM(s) IV Intermittent every 12 hours      STANDING MEDICATIONS  dexAMETHasone  IVPB 20 milliGRAM(s) IV Intermittent every 24 hours  escitalopram 10 milliGRAM(s) Oral daily  losartan 100 milliGRAM(s) Oral daily  pantoprazole    Tablet 40 milliGRAM(s) Oral before breakfast  sodium chloride 0.9%. 1000 milliLiter(s) IV Continuous <Continuous>      PRN MEDICATIONS  acetaminophen     Tablet .. 650 milliGRAM(s) Oral every 6 hours PRN  LORazepam   Injectable 0.5 milliGRAM(s) IV Push every 6 hours PRN        Vital Signs Last 24 Hrs  T(C): 36.6 (18 Jul 2023 05:01), Max: 36.9 (18 Jul 2023 01:00)  T(F): 97.8 (18 Jul 2023 05:01), Max: 98.4 (18 Jul 2023 01:00)  HR: 74 (18 Jul 2023 05:01) (62 - 79)  BP: 121/70 (18 Jul 2023 05:01) (121/70 - 172/80)  BP(mean): --  RR: 18 (18 Jul 2023 05:01) (18 - 18)  SpO2: 98% (18 Jul 2023 05:01) (96% - 98%)    Parameters below as of 18 Jul 2023 05:01  Patient On (Oxygen Delivery Method): room air        PHYSICAL EXAM  General: NAD  HEENT: PERRLA, EOMOI, clear oropharynx, anicteric sclera, pink conjunctiva  Neck: supple  CV: (+) S1/S2 RRR  Lungs: clear to auscultation, no wheezes or rales  Abdomen: soft, non-tender, non-distended (+) BS  Ext: no clubbing, cyanosis or edema  Skin: no rashes and no petechiae  Neuro: alert and oriented X 3, no focal deficits  Central Line: [facility] c/d/i     LABS:                          11.0   3.26  )-----------( 218      ( 18 Jul 2023 07:10 )             34.0         Mean Cell Volume : 96.6 fl  Mean Cell Hemoglobin : 31.3 pg  Mean Cell Hemoglobin Concentration : 32.4 gm/dL  Auto Neutrophil # : 2.56 K/uL  Auto Lymphocyte # : 0.51 K/uL  Auto Monocyte # : 0.16 K/uL  Auto Eosinophil # : 0.00 K/uL  Auto Basophil # : 0.00 K/uL  Auto Neutrophil % : 78.6 %  Auto Lymphocyte % : 15.6 %  Auto Monocyte % : 4.9 %  Auto Eosinophil % : 0.0 %  Auto Basophil % : 0.0 %      07-18    137  |  102  |  11  ----------------------------<  119<H>  3.9   |  21<L>  |  0.70    Ca    10.0      18 Jul 2023 07:04  Phos  3.9     07-18  Mg     2.1     07-18    TPro  6.6  /  Alb  4.0  /  TBili  0.3  /  DBili  x   /  AST  22  /  ALT  19  /  AlkPhos  78  07-18      Mg 2.1  Phos 3.9  Mg 2.0  Phos 4.1      PT/INR - ( 17 Jul 2023 11:03 )   PT: 11.6 sec;   INR: 1.00 ratio    PTT - ( 17 Jul 2023 11:03 )  PTT:32.4 sec

## 2023-07-18 NOTE — PROGRESS NOTE ADULT - ASSESSMENT
64 y/o F with PMHx of HTN, HLD with Ph (-) ALL diagnosed in February 2023 and now s/p cycle 1A of miini Hyper-CVAD, 1B ,2A. and 2B. Dose reduced for cycle 1A but full dose for 2A and full dose for B cycles. Bone marrow after cycle 1B with CR and negative MRD as well as post cycle 2B.  Now   admitted for cycle 3A  hyperCVAD with 50% dose reduction to reduce toxicity.

## 2023-07-18 NOTE — PROCEDURE NOTE - ADDITIONAL PROCEDURE DETAILS
s/p fluoroscopically guided lumbar puncture at the L2-L3 level using 22 G X 5 inch spinal needle. 5 ml of clear CSF drained and hand delivered to the lab for analysis following the procedure. methotrexate 12 mg was intrathecally administered without difficulty. Hemostasis secure.

## 2023-07-19 LAB
ALBUMIN SERPL ELPH-MCNC: 3.7 G/DL — SIGNIFICANT CHANGE UP (ref 3.3–5)
ALP SERPL-CCNC: 68 U/L — SIGNIFICANT CHANGE UP (ref 40–120)
ALT FLD-CCNC: 22 U/L — SIGNIFICANT CHANGE UP (ref 10–45)
ANION GAP SERPL CALC-SCNC: 12 MMOL/L — SIGNIFICANT CHANGE UP (ref 5–17)
AST SERPL-CCNC: 25 U/L — SIGNIFICANT CHANGE UP (ref 10–40)
BASOPHILS # BLD AUTO: 0 K/UL — SIGNIFICANT CHANGE UP (ref 0–0.2)
BASOPHILS NFR BLD AUTO: 0 % — SIGNIFICANT CHANGE UP (ref 0–2)
BILIRUB SERPL-MCNC: 0.4 MG/DL — SIGNIFICANT CHANGE UP (ref 0.2–1.2)
BUN SERPL-MCNC: 14 MG/DL — SIGNIFICANT CHANGE UP (ref 7–23)
CALCIUM SERPL-MCNC: 9.4 MG/DL — SIGNIFICANT CHANGE UP (ref 8.4–10.5)
CHLORIDE SERPL-SCNC: 105 MMOL/L — SIGNIFICANT CHANGE UP (ref 96–108)
CO2 SERPL-SCNC: 24 MMOL/L — SIGNIFICANT CHANGE UP (ref 22–31)
CREAT SERPL-MCNC: 0.7 MG/DL — SIGNIFICANT CHANGE UP (ref 0.5–1.3)
EGFR: 96 ML/MIN/1.73M2 — SIGNIFICANT CHANGE UP
EOSINOPHIL # BLD AUTO: 0 K/UL — SIGNIFICANT CHANGE UP (ref 0–0.5)
EOSINOPHIL NFR BLD AUTO: 0 % — SIGNIFICANT CHANGE UP (ref 0–6)
GLUCOSE SERPL-MCNC: 111 MG/DL — HIGH (ref 70–99)
HCT VFR BLD CALC: 32.3 % — LOW (ref 34.5–45)
HGB BLD-MCNC: 10.6 G/DL — LOW (ref 11.5–15.5)
LYMPHOCYTES # BLD AUTO: 0.05 K/UL — LOW (ref 1–3.3)
LYMPHOCYTES # BLD AUTO: 0.9 % — LOW (ref 13–44)
MAGNESIUM SERPL-MCNC: 2.1 MG/DL — SIGNIFICANT CHANGE UP (ref 1.6–2.6)
MANUAL SMEAR VERIFICATION: SIGNIFICANT CHANGE UP
MCHC RBC-ENTMCNC: 32 PG — SIGNIFICANT CHANGE UP (ref 27–34)
MCHC RBC-ENTMCNC: 32.8 GM/DL — SIGNIFICANT CHANGE UP (ref 32–36)
MCV RBC AUTO: 97.6 FL — SIGNIFICANT CHANGE UP (ref 80–100)
MONOCYTES # BLD AUTO: 0.4 K/UL — SIGNIFICANT CHANGE UP (ref 0–0.9)
MONOCYTES NFR BLD AUTO: 7.8 % — SIGNIFICANT CHANGE UP (ref 2–14)
MRSA PCR RESULT.: SIGNIFICANT CHANGE UP
NEUTROPHILS # BLD AUTO: 4.63 K/UL — SIGNIFICANT CHANGE UP (ref 1.8–7.4)
NEUTROPHILS NFR BLD AUTO: 88.7 % — HIGH (ref 43–77)
NEUTS BAND # BLD: 1.7 % — SIGNIFICANT CHANGE UP (ref 0–8)
PHOSPHATE SERPL-MCNC: 3.6 MG/DL — SIGNIFICANT CHANGE UP (ref 2.5–4.5)
PLAT MORPH BLD: NORMAL — SIGNIFICANT CHANGE UP
PLATELET # BLD AUTO: 211 K/UL — SIGNIFICANT CHANGE UP (ref 150–400)
POTASSIUM SERPL-MCNC: 3.8 MMOL/L — SIGNIFICANT CHANGE UP (ref 3.5–5.3)
POTASSIUM SERPL-SCNC: 3.8 MMOL/L — SIGNIFICANT CHANGE UP (ref 3.5–5.3)
PROT SERPL-MCNC: 6.1 G/DL — SIGNIFICANT CHANGE UP (ref 6–8.3)
RBC # BLD: 3.31 M/UL — LOW (ref 3.8–5.2)
RBC # FLD: 22 % — HIGH (ref 10.3–14.5)
RBC BLD AUTO: SIGNIFICANT CHANGE UP
S AUREUS DNA NOSE QL NAA+PROBE: SIGNIFICANT CHANGE UP
SODIUM SERPL-SCNC: 141 MMOL/L — SIGNIFICANT CHANGE UP (ref 135–145)
VARIANT LYMPHS # BLD: 0.9 % — SIGNIFICANT CHANGE UP (ref 0–6)
WBC # BLD: 5.12 K/UL — SIGNIFICANT CHANGE UP (ref 3.8–10.5)
WBC # FLD AUTO: 5.12 K/UL — SIGNIFICANT CHANGE UP (ref 3.8–10.5)

## 2023-07-19 PROCEDURE — 93010 ELECTROCARDIOGRAM REPORT: CPT

## 2023-07-19 PROCEDURE — 99232 SBSQ HOSP IP/OBS MODERATE 35: CPT | Mod: FS,GC

## 2023-07-19 RX ADMIN — Medication 400 MILLIGRAM(S): at 17:12

## 2023-07-19 RX ADMIN — Medication 110 MILLIGRAM(S): at 12:05

## 2023-07-19 RX ADMIN — Medication 288 MILLIGRAM(S): at 14:19

## 2023-07-19 RX ADMIN — Medication 288 MILLIGRAM(S): at 02:11

## 2023-07-19 RX ADMIN — Medication 400 MILLIGRAM(S): at 05:26

## 2023-07-19 RX ADMIN — ESCITALOPRAM OXALATE 10 MILLIGRAM(S): 10 TABLET, FILM COATED ORAL at 12:05

## 2023-07-19 RX ADMIN — SODIUM CHLORIDE 50 MILLILITER(S): 9 INJECTION INTRAMUSCULAR; INTRAVENOUS; SUBCUTANEOUS at 05:26

## 2023-07-19 RX ADMIN — CHLORHEXIDINE GLUCONATE 1 APPLICATION(S): 213 SOLUTION TOPICAL at 12:08

## 2023-07-19 RX ADMIN — LOSARTAN POTASSIUM 100 MILLIGRAM(S): 100 TABLET, FILM COATED ORAL at 05:26

## 2023-07-19 RX ADMIN — PANTOPRAZOLE SODIUM 40 MILLIGRAM(S): 20 TABLET, DELAYED RELEASE ORAL at 05:26

## 2023-07-19 NOTE — ADVANCED PRACTICE NURSE CONSULT - ASSESSMENT
Pt A/Ox4, . Pt denies pain/discomfort, N/V/D, SOB, chest pain. Right ACW power-port easily flushed with positive blood return, dressing C/D/I. Site had no sign of redness, swelling, warmth or drainage. Pt had a bowel movement on the morning of 7/17. Chemotherapy education provided. Pt verbalized understanding. Labs reviewed by Dr. thomas and team during rounds. OK to administer.  On Day 3  at 1419 , cyclophosphamide IVPB (eMAR) [Known as CYTOXAN IVPB (eMAR)] - 288 milliGRAM(s) in sodium chloride 0.9% 100 milliLiter(s), IV Intermittent, every 12 hours, infuse over 2 Hour(s), infuse at 57. mL/Hr, was given through R ACW single lumen power port via secondary infusion connected to normal saline line via alaris pump. Pt tolerated well. Primary RN aware. Pt left comfortably in bed.

## 2023-07-19 NOTE — PROGRESS NOTE ADULT - SUBJECTIVE AND OBJECTIVE BOX
Diagnosis: B-ALL Ph (-)     Protocol/Chemo Regimen: [id number] HyperCVAD (50% reduction)     Day: 3  Pt endorsed:   Review of Systems: denies chest pain, shortness of breath, nausea, vomiting, diarrhea     Pain scale: 0     Diet: regular     Allergies    sulfa drugs (Unknown)  Zofran (Headache)    Intolerances        ANTIMICROBIALS  acyclovir   Oral Tab/Cap 400 milliGRAM(s) Oral two times a day      HEME/ONC MEDICATIONS  cyclophosphamide IVPB (eMAR) 288 milliGRAM(s) IV Intermittent every 12 hours  methotrexate PF IntraThecal (eMAR) 12 milliGRAM(s) IntraThecal once      STANDING MEDICATIONS  chlorhexidine 4% Liquid 1 Application(s) Topical daily  dexAMETHasone  IVPB 20 milliGRAM(s) IV Intermittent every 24 hours  escitalopram 10 milliGRAM(s) Oral daily  losartan 100 milliGRAM(s) Oral daily  pantoprazole    Tablet 40 milliGRAM(s) Oral before breakfast  sodium chloride 0.9%. 1000 milliLiter(s) IV Continuous <Continuous>      PRN MEDICATIONS  acetaminophen     Tablet .. 650 milliGRAM(s) Oral every 6 hours PRN  LORazepam   Injectable 0.5 milliGRAM(s) IV Push every 6 hours PRN        Vital Signs Last 24 Hrs  T(C): 36.6 (19 Jul 2023 05:36), Max: 36.7 (19 Jul 2023 00:47)  T(F): 97.9 (19 Jul 2023 05:36), Max: 98.1 (19 Jul 2023 00:47)  HR: 75 (19 Jul 2023 05:36) (53 - 75)  BP: 154/64 (19 Jul 2023 05:36) (149/75 - 183/84)  BP(mean): --  RR: 18 (19 Jul 2023 05:36) (16 - 18)  SpO2: 98% (19 Jul 2023 05:36) (97% - 99%)    Parameters below as of 19 Jul 2023 05:36  Patient On (Oxygen Delivery Method): room air        PHYSICAL EXAM  General: NAD  HEENT: PERRLA, EOMOI, clear oropharynx, anicteric sclera, pink conjunctiva  Neck: supple  CV: (+) S1/S2 RRR  Lungs: clear to auscultation, no wheezes or rales  Abdomen: soft, non-tender, non-distended (+) BS  Ext: no clubbing, cyanosis or edema  Skin: no rashes and no petechiae  Neuro: alert and oriented X 3, no focal deficits  Central Line: mediport c/d/i            Diagnosis: B-ALL Ph (-)     Protocol/Chemo Regimen: [id number] HyperCVAD (50% reduction)     Day: 3  Pt endorsed: no complaints, no issues over night   Review of Systems: denies chest pain, shortness of breath, nausea, vomiting, diarrhea     Pain scale: 0     Diet: regular     Allergies    sulfa drugs (Unknown)  Zofran (Headache)    Intolerances        ANTIMICROBIALS  acyclovir   Oral Tab/Cap 400 milliGRAM(s) Oral two times a day      HEME/ONC MEDICATIONS  cyclophosphamide IVPB (eMAR) 288 milliGRAM(s) IV Intermittent every 12 hours  methotrexate PF IntraThecal (eMAR) 12 milliGRAM(s) IntraThecal once      STANDING MEDICATIONS  chlorhexidine 4% Liquid 1 Application(s) Topical daily  dexAMETHasone  IVPB 20 milliGRAM(s) IV Intermittent every 24 hours  escitalopram 10 milliGRAM(s) Oral daily  losartan 100 milliGRAM(s) Oral daily  pantoprazole    Tablet 40 milliGRAM(s) Oral before breakfast  sodium chloride 0.9%. 1000 milliLiter(s) IV Continuous <Continuous>      PRN MEDICATIONS  acetaminophen     Tablet .. 650 milliGRAM(s) Oral every 6 hours PRN  LORazepam   Injectable 0.5 milliGRAM(s) IV Push every 6 hours PRN        Vital Signs Last 24 Hrs  T(C): 36.6 (19 Jul 2023 05:36), Max: 36.7 (19 Jul 2023 00:47)  T(F): 97.9 (19 Jul 2023 05:36), Max: 98.1 (19 Jul 2023 00:47)  HR: 75 (19 Jul 2023 05:36) (53 - 75)  BP: 154/64 (19 Jul 2023 05:36) (149/75 - 183/84)  BP(mean): --  RR: 18 (19 Jul 2023 05:36) (16 - 18)  SpO2: 98% (19 Jul 2023 05:36) (97% - 99%)    Parameters below as of 19 Jul 2023 05:36  Patient On (Oxygen Delivery Method): room air        PHYSICAL EXAM  General: NAD  HEENT: PERRLA, EOMOI, clear oropharynx, anicteric sclera, pink conjunctiva  Neck: supple  CV: (+) S1/S2 RRR  Lungs: clear to auscultation, no wheezes or rales  Abdomen: soft, non-tender, non-distended (+) BS  Ext: no clubbing, cyanosis or edema  Skin: no rashes and no petechiae  Neuro: alert and oriented X 3, no focal deficits  Central Line: mediport c/d/i     LABS:                            10.6   5.12  )-----------( 211      ( 19 Jul 2023 08:52 )             32.3         Mean Cell Volume : 97.6 fl  Mean Cell Hemoglobin : 32.0 pg  Mean Cell Hemoglobin Concentration : 32.8 gm/dL  Auto Neutrophil # : 4.63 K/uL  Auto Lymphocyte # : 0.05 K/uL  Auto Monocyte # : 0.40 K/uL  Auto Eosinophil # : 0.00 K/uL  Auto Basophil # : 0.00 K/uL  Auto Neutrophil % : 88.7 %  Auto Lymphocyte % : 0.9 %  Auto Monocyte % : 7.8 %  Auto Eosinophil % : 0.0 %  Auto Basophil % : 0.0 %      07-19    141  |  105  |  14  ----------------------------<  111<H>  3.8   |  24  |  0.70    Ca    9.4      19 Jul 2023 08:52  Phos  3.6     07-19  Mg     2.1     07-19    TPro  6.1  /  Alb  3.7  /  TBili  0.4  /  DBili  x   /  AST  25  /  ALT  22  /  AlkPhos  68  07-19      Mg 2.1  Phos 3.6      PT/INR - ( 17 Jul 2023 11:03 )   PT: 11.6 sec;   INR: 1.00 ratio    PTT - ( 17 Jul 2023 11:03 )  PTT:32.4 sec

## 2023-07-20 LAB
ALBUMIN SERPL ELPH-MCNC: 3.7 G/DL — SIGNIFICANT CHANGE UP (ref 3.3–5)
ALP SERPL-CCNC: 61 U/L — SIGNIFICANT CHANGE UP (ref 40–120)
ALT FLD-CCNC: 30 U/L — SIGNIFICANT CHANGE UP (ref 10–45)
ANION GAP SERPL CALC-SCNC: 11 MMOL/L — SIGNIFICANT CHANGE UP (ref 5–17)
AST SERPL-CCNC: 24 U/L — SIGNIFICANT CHANGE UP (ref 10–40)
BASOPHILS # BLD AUTO: 0 K/UL — SIGNIFICANT CHANGE UP (ref 0–0.2)
BASOPHILS NFR BLD AUTO: 0 % — SIGNIFICANT CHANGE UP (ref 0–2)
BILIRUB SERPL-MCNC: 0.5 MG/DL — SIGNIFICANT CHANGE UP (ref 0.2–1.2)
BUN SERPL-MCNC: 14 MG/DL — SIGNIFICANT CHANGE UP (ref 7–23)
CALCIUM SERPL-MCNC: 9.2 MG/DL — SIGNIFICANT CHANGE UP (ref 8.4–10.5)
CHLORIDE SERPL-SCNC: 104 MMOL/L — SIGNIFICANT CHANGE UP (ref 96–108)
CO2 SERPL-SCNC: 23 MMOL/L — SIGNIFICANT CHANGE UP (ref 22–31)
CREAT SERPL-MCNC: 0.63 MG/DL — SIGNIFICANT CHANGE UP (ref 0.5–1.3)
EGFR: 98 ML/MIN/1.73M2 — SIGNIFICANT CHANGE UP
EOSINOPHIL # BLD AUTO: 0 K/UL — SIGNIFICANT CHANGE UP (ref 0–0.5)
EOSINOPHIL NFR BLD AUTO: 0 % — SIGNIFICANT CHANGE UP (ref 0–6)
GLUCOSE SERPL-MCNC: 121 MG/DL — HIGH (ref 70–99)
HCT VFR BLD CALC: 31 % — LOW (ref 34.5–45)
HGB BLD-MCNC: 10.1 G/DL — LOW (ref 11.5–15.5)
IMM GRANULOCYTES NFR BLD AUTO: 0.6 % — SIGNIFICANT CHANGE UP (ref 0–0.9)
LYMPHOCYTES # BLD AUTO: 0.31 K/UL — LOW (ref 1–3.3)
LYMPHOCYTES # BLD AUTO: 6.4 % — LOW (ref 13–44)
MAGNESIUM SERPL-MCNC: 2 MG/DL — SIGNIFICANT CHANGE UP (ref 1.6–2.6)
MCHC RBC-ENTMCNC: 31.8 PG — SIGNIFICANT CHANGE UP (ref 27–34)
MCHC RBC-ENTMCNC: 32.6 GM/DL — SIGNIFICANT CHANGE UP (ref 32–36)
MCV RBC AUTO: 97.5 FL — SIGNIFICANT CHANGE UP (ref 80–100)
MONOCYTES # BLD AUTO: 0.56 K/UL — SIGNIFICANT CHANGE UP (ref 0–0.9)
MONOCYTES NFR BLD AUTO: 11.5 % — SIGNIFICANT CHANGE UP (ref 2–14)
NEUTROPHILS # BLD AUTO: 3.98 K/UL — SIGNIFICANT CHANGE UP (ref 1.8–7.4)
NEUTROPHILS NFR BLD AUTO: 81.5 % — HIGH (ref 43–77)
NRBC # BLD: 0 /100 WBCS — SIGNIFICANT CHANGE UP (ref 0–0)
PHOSPHATE SERPL-MCNC: 2.8 MG/DL — SIGNIFICANT CHANGE UP (ref 2.5–4.5)
PLATELET # BLD AUTO: 180 K/UL — SIGNIFICANT CHANGE UP (ref 150–400)
POTASSIUM SERPL-MCNC: 3.6 MMOL/L — SIGNIFICANT CHANGE UP (ref 3.5–5.3)
POTASSIUM SERPL-SCNC: 3.6 MMOL/L — SIGNIFICANT CHANGE UP (ref 3.5–5.3)
PROT SERPL-MCNC: 5.6 G/DL — LOW (ref 6–8.3)
RBC # BLD: 3.18 M/UL — LOW (ref 3.8–5.2)
RBC # FLD: 21 % — HIGH (ref 10.3–14.5)
SODIUM SERPL-SCNC: 138 MMOL/L — SIGNIFICANT CHANGE UP (ref 135–145)
TM INTERPRETATION: SIGNIFICANT CHANGE UP
WBC # BLD: 4.88 K/UL — SIGNIFICANT CHANGE UP (ref 3.8–10.5)
WBC # FLD AUTO: 4.88 K/UL — SIGNIFICANT CHANGE UP (ref 3.8–10.5)

## 2023-07-20 PROCEDURE — 99232 SBSQ HOSP IP/OBS MODERATE 35: CPT | Mod: FS,GC

## 2023-07-20 RX ORDER — DEXAMETHASONE 0.5 MG/5ML
5 ELIXIR ORAL
Qty: 20 | Refills: 0
Start: 2023-07-20 | End: 2023-07-23

## 2023-07-20 RX ORDER — ENOXAPARIN SODIUM 100 MG/ML
40 INJECTION SUBCUTANEOUS
Refills: 0 | Status: DISCONTINUED | OUTPATIENT
Start: 2023-07-20 | End: 2023-07-21

## 2023-07-20 RX ORDER — DOXORUBICIN HYDROCHLORIDE 2 MG/ML
48 INJECTION, SOLUTION INTRAVENOUS ONCE
Refills: 0 | Status: COMPLETED | OUTPATIENT
Start: 2023-07-20 | End: 2023-07-20

## 2023-07-20 RX ORDER — POTASSIUM CHLORIDE 20 MEQ
20 PACKET (EA) ORAL ONCE
Refills: 0 | Status: COMPLETED | OUTPATIENT
Start: 2023-07-20 | End: 2023-07-20

## 2023-07-20 RX ADMIN — SODIUM CHLORIDE 50 MILLILITER(S): 9 INJECTION INTRAMUSCULAR; INTRAVENOUS; SUBCUTANEOUS at 21:41

## 2023-07-20 RX ADMIN — CHLORHEXIDINE GLUCONATE 1 APPLICATION(S): 213 SOLUTION TOPICAL at 11:07

## 2023-07-20 RX ADMIN — Medication 400 MILLIGRAM(S): at 17:20

## 2023-07-20 RX ADMIN — ENOXAPARIN SODIUM 40 MILLIGRAM(S): 100 INJECTION SUBCUTANEOUS at 21:40

## 2023-07-20 RX ADMIN — Medication 288 MILLIGRAM(S): at 02:04

## 2023-07-20 RX ADMIN — Medication 20 MILLIEQUIVALENT(S): at 11:49

## 2023-07-20 RX ADMIN — Medication 400 MILLIGRAM(S): at 06:50

## 2023-07-20 RX ADMIN — Medication 110 MILLIGRAM(S): at 11:12

## 2023-07-20 RX ADMIN — ESCITALOPRAM OXALATE 10 MILLIGRAM(S): 10 TABLET, FILM COATED ORAL at 11:45

## 2023-07-20 RX ADMIN — SODIUM CHLORIDE 50 MILLILITER(S): 9 INJECTION INTRAMUSCULAR; INTRAVENOUS; SUBCUTANEOUS at 06:50

## 2023-07-20 RX ADMIN — PANTOPRAZOLE SODIUM 40 MILLIGRAM(S): 20 TABLET, DELAYED RELEASE ORAL at 06:51

## 2023-07-20 RX ADMIN — LOSARTAN POTASSIUM 100 MILLIGRAM(S): 100 TABLET, FILM COATED ORAL at 09:03

## 2023-07-20 RX ADMIN — DOXORUBICIN HYDROCHLORIDE 48 MILLIGRAM(S): 2 INJECTION, SOLUTION INTRAVENOUS at 14:46

## 2023-07-20 NOTE — PROGRESS NOTE ADULT - ASSESSMENT
62 y/o F with PMHx of HTN, HLD with Ph (-) ALL diagnosed in February 2023 and now s/p cycle 1A of miini Hyper-CVAD, 1B ,2A. and 2B. Dose reduced for cycle 1A but full dose for 2A and full dose for B cycles. Bone marrow after cycle 1B with CR and negative MRD as well as post cycle 2B.  Now   admitted for cycle 3A  hyperCVAD with 50% dose reduction to reduce toxicity.    64 y/o F with PMHx of HTN, HLD with Ph (-) ALL diagnosed in February 2023 and now s/p cycle 1A of miini Hyper-CVAD, 1B ,2A. and 2B. Dose reduced for cycle 1A but full dose for 2A and full dose for B cycles. Bone marrow after cycle 1B with CR and negative MRD as well as post cycle 2B.  Now   admitted for cycle 3A  hyperCVAD with 50% dose reduction to reduce toxicity. anemia and leucopenia due to chemo and or disease

## 2023-07-20 NOTE — PROGRESS NOTE ADULT - PROBLEM SELECTOR PLAN 4
Lovenox on hold for LP scheduled on 7/18  Resume ppx lovenox post LP      [phone] Lovenox DVT ppx  encourage OOB and ambulation

## 2023-07-20 NOTE — ADVANCED PRACTICE NURSE CONSULT - RECOMMEDATIONS
Monitoring is on going.  
Pt left comfortably in bed.
Due for 12 hour dosing of cytoxan at 0230 tomorrow. Day 4 Doxorubicin 24hours infusion which is thursday.

## 2023-07-20 NOTE — PROGRESS NOTE ADULT - PROBLEM SELECTOR PLAN 2
Patient is not neutropenic, afebrile   Continue home medication - acyclovir prophylaxis  Currently off levaquin and fluconazole   If febrile, panculture. Patient is not neutropenic, afebrile   Continue home medication - acyclovir prophylaxis  Currently off levaquin and fluconazole   If febrile, panculture and CXR   7/20 broke right upper tooth

## 2023-07-20 NOTE — PROGRESS NOTE ADULT - NS ATTEND AMEND GEN_ALL_CORE FT
62 y/o F with PMHx of HTN, HLD with Ph (-) ALL diagnosed in February 2023 and now s/p cycle 2 of mini Hyper-CVAD. Now admitted for cycle 3A hyperCVAD (50% dose reduction)     Heme-  Today is day 3 of therapy.   cont IVF  LP with IT MTX day 2  Is/Os, daily weights  Antiemetics  d/c home at end of chemo 62 y/o F with PMHx of HTN, HLD with Ph (-) ALL diagnosed in February 2023 and now s/p cycle 2 of mini Hyper-CVAD. Now admitted for cycle 3A hyperCVAD (50% dose reduction)     Heme-  Today is day 4 of therapy.   cont IVF  LP with IT MTX day 2  Is/Os, daily weights  Antiemetics  d/c home at end of chemo

## 2023-07-20 NOTE — ADVANCED PRACTICE NURSE CONSULT - ASSESSMENT
Pt A/Ox4, . Pt denies pain/discomfort, N/V/D, SOB, chest pain. Right ACW power-port easily flushed with positive blood return, dressing C/D/I. Site had no sign of redness, swelling, warmth or drainage. Chemotherapy education provided. Pt verbalized understanding. Labs reviewed in the day by day  team.   On Day 3  at 0206 , cyclophosphamide 288mg  IVPB (eMAR) [Known as CYTOXAN IVPB (eMAR)] -  in sodium chloride 0.9% 100 milliLiter(s), IV Intermittent, every 12 hours, last dose was infuse over 2 Hour(s), infuse at 57. mL/Hr, was given through (R) ACW single lumen power port via secondary infusion connected to normal saline line via alaris pump. Pt tolerated well.

## 2023-07-20 NOTE — PROGRESS NOTE ADULT - PROBLEM SELECTOR PLAN 1
Admit to 7  [facility]  Access Mediport   IV hydration, strict I/O, antiemetics   Transfuse blood products/replace lytes as needed   Chemotherapy with mini hyperCVAD with dose reduction as follows:  Cyclophosphamide 150 mg/m2 every 12 hours days 1-3  Dexamethasone 20mg per day on days 1-4 and 11-14   Vincristine 1mg IV days 1 and 8 (flat dose)  Doxorubicin 25mg/m2/day IV continuous infusion over 24 hours on day 4   LP scheduled for Tuesday 7/18 with IT MTX and will require another LP on day 7 or 8 with IT cytarabine which can be set up as an outpatient  Fulphila post chemotherapy as outpatient Admit to 7  [facility]  Access Mediport   IV hydration, strict I/O, antiemetics   Transfuse blood products/replace lytes as needed   Chemotherapy with mini hyperCVAD with dose reduction as follows:  Cyclophosphamide 150 mg/m2 every 12 hours days 1-3  Dexamethasone 20mg per day on days 1-4 and 11-14   Vincristine 1mg IV days 1 and 8 (flat dose)  Doxorubicin 25mg/m2/day IV continuous infusion over 24 hours on day 4   LP scheduled for Tuesday 7/18 with IT MTX and will require another LP on day 7 or 8 with IT cytarabine which can be set up as an outpatient  Fulphila post chemotherapy as outpatient  Hypokalemia: KCL 20 meq po x1

## 2023-07-20 NOTE — PROGRESS NOTE ADULT - PROBLEM SELECTOR PLAN 3
Continue with home medication - losartan. Continue with home medication - losartan.  7/20 noted sinus bradycardia since yesterday, asymptomatic, check TFT

## 2023-07-20 NOTE — ADVANCED PRACTICE NURSE CONSULT - ASSESSMENT
Pt A/Ox4, vital signs stable, afebrile. Pt denies pain/discomfort, N/V/D, SOB, chest pain. Right ACW mediport easily flushed with positive blood return, dressing C/D/I. Site has no signs of redness, swelling, warmth or drainage. Chemotherapy education provided and pt verbalized understanding. TTE done on 2/27/2023 with EF=55%. Pt labs reviewed by Dr. Harris and team during rounds. OK to administer. On Day 4 at 14:46, DOXOrubicin IVPB (eMAR) [Known as ADRIAMYCIN IVPB (eMAR)] 48 milliGRAM(s) in sodium chloride 0.9% 500 milliLiter(s), IV Intermittent, once, infuse over 24 Hour(s), infuse at 23 mL/Hr, connected to lowest port of NS line infusing through single lumen mediport via alaris pump. Pt tolerating well. Primary RN aware. Pt left comfortably in bed.

## 2023-07-20 NOTE — PROGRESS NOTE ADULT - SUBJECTIVE AND OBJECTIVE BOX
Diagnosis: B-ALL Ph (-)     Protocol/Chemo Regimen: [id number] HyperCVAD (50% reduction)     Day: 4  Pt endorsed: no complaints, no issues over night   Review of Systems: denies chest pain, shortness of breath, nausea, vomiting, diarrhea     Pain scale: 0     Diet: regular     Allergies    sulfa drugs (Unknown)  Zofran (Headache)          ANTIMICROBIALS  acyclovir   Oral Tab/Cap 400 milliGRAM(s) Oral two times a day      HEME/ONC MEDICATIONS  methotrexate PF IntraThecal (eMAR) 12 milliGRAM(s) IntraThecal once      STANDING MEDICATIONS  chlorhexidine 4% Liquid 1 Application(s) Topical daily  dexAMETHasone  IVPB 20 milliGRAM(s) IV Intermittent every 24 hours  escitalopram 10 milliGRAM(s) Oral daily  losartan 100 milliGRAM(s) Oral daily  pantoprazole    Tablet 40 milliGRAM(s) Oral before breakfast  sodium chloride 0.9%. 1000 milliLiter(s) IV Continuous <Continuous>      PRN MEDICATIONS  acetaminophen     Tablet .. 650 milliGRAM(s) Oral every 6 hours PRN  LORazepam   Injectable 0.5 milliGRAM(s) IV Push every 6 hours PRN        Vital Signs Last 24 Hrs  T(C): 36.9 (20 Jul 2023 05:34), Max: 36.9 (19 Jul 2023 17:07)  T(F): 98.5 (20 Jul 2023 05:34), Max: 98.5 (20 Jul 2023 05:34)  HR: 57 (20 Jul 2023 05:34) (49 - 59)  BP: 159/78 (20 Jul 2023 05:34) (128/73 - 167/80)  BP(mean): --  RR: 18 (20 Jul 2023 05:34) (18 - 18)  SpO2: 97% (20 Jul 2023 05:34) (97% - 98%)    Parameters below as of 20 Jul 2023 05:34  Patient On (Oxygen Delivery Method): room air          PHYSICAL EXAM  General: NAD  HEENT: PERRLA, EOMOI, clear oropharynx, anicteric sclera, pink conjunctiva  Neck: supple  CV: (+) S1/S2 RRR  Lungs: clear to auscultation, no wheezes or rales  Abdomen: soft, non-tender, non-distended (+) BS  Ext: no clubbing, cyanosis or edema  Skin: no rashes and no petechiae  Neuro: alert and oriented X 3, no focal deficits  [facility]             LABS:                        10.6   5.12  )-----------( 211      ( 19 Jul 2023 08:52 )             32.3         Mean Cell Volume : 97.6 fl  Mean Cell Hemoglobin : 32.0 pg  Mean Cell Hemoglobin Concentration : 32.8 gm/dL  Auto Neutrophil # : 4.63 K/uL  Auto Lymphocyte # : 0.05 K/uL  Auto Monocyte # : 0.40 K/uL  Auto Eosinophil # : 0.00 K/uL  Auto Basophil # : 0.00 K/uL  Auto Neutrophil % : 88.7 %  Auto Lymphocyte % : 0.9 %  Auto Monocyte % : 7.8 %  Auto Eosinophil % : 0.0 %  Auto Basophil % : 0.0 %      07-19    141  |  105  |  14  ----------------------------<  111<H>  3.8   |  24  |  0.70    Ca    9.4      19 Jul 2023 08:52  Phos  3.6     07-19  Mg     2.1     07-19    TPro  6.1  /  Alb  3.7  /  TBili  0.4  /  DBili  x   /  AST  25  /  ALT  22  /  AlkPhos  68  07-19      Mg 2.1  Phos 3.6              RECENT CULTURES:      RADIOLOGY & ADDITIONAL STUDIES:         Diagnosis: B-ALL Ph (-)     Protocol/Chemo Regimen: [id number] HyperCVAD (50% reduction)     Day: 4  Pt endorsed: no complaints. broke right upper tooth while chewing on bagel this am    Review of Systems: Patient denied nausea, vomiting, odynophagia, chest pain, cough, dyspnea, abdominal pain, constipation, diarrhea, rash, fatigue, headache    Pain scale: 0     Diet: regular     Allergies:     sulfa drugs (Unknown)  Zofran (Headache)      ANTIMICROBIALS  acyclovir   Oral Tab/Cap 400 milliGRAM(s) Oral two times a day      HEME/ONC MEDICATIONS  methotrexate PF IntraThecal (eMAR) 12 milliGRAM(s) IntraThecal once      STANDING MEDICATIONS  chlorhexidine 4% Liquid 1 Application(s) Topical daily  dexAMETHasone  IVPB 20 milliGRAM(s) IV Intermittent every 24 hours  escitalopram 10 milliGRAM(s) Oral daily  losartan 100 milliGRAM(s) Oral daily  pantoprazole    Tablet 40 milliGRAM(s) Oral before breakfast  sodium chloride 0.9%. 1000 milliLiter(s) IV Continuous <Continuous>      PRN MEDICATIONS  acetaminophen     Tablet .. 650 milliGRAM(s) Oral every 6 hours PRN  LORazepam   Injectable 0.5 milliGRAM(s) IV Push every 6 hours PRN      Vital Signs Last 24 Hrs  T(C): 36.9 (20 Jul 2023 05:34), Max: 36.9 (19 Jul 2023 17:07)  T(F): 98.5 (20 Jul 2023 05:34), Max: 98.5 (20 Jul 2023 05:34)  HR: 57 (20 Jul 2023 05:34) (49 - 59)  BP: 159/78 (20 Jul 2023 05:34) (128/73 - 167/80)  BP(mean): --  RR: 18 (20 Jul 2023 05:34) (18 - 18)  SpO2: 97% (20 Jul 2023 05:34) (97% - 98%)    Parameters below as of 20 Jul 2023 05:34  Patient On (Oxygen Delivery Method): room air      PHYSICAL EXAM  General: NAD  HEENT: PERRLA, EOMOI, clear oropharynx, anicteric sclera  Neck: supple  CV: (+) S1/S2 RRR  Lungs: clear to auscultation, no wheezes or rales  Abdomen: soft, +BS, non-tender, non-distended  Ext: no  edema  Skin: no rashes   Neuro: alert and oriented X 3, no focal deficits  Central Line: mediport c/d/i       LABS:                        10.1   4.88  )-----------( 180      ( 20 Jul 2023 07:02 )             31.0         Mean Cell Volume : 97.5 fl  Mean Cell Hemoglobin : 31.8 pg  Mean Cell Hemoglobin Concentration : 32.6 gm/dL  Auto Neutrophil # : 3.98 K/uL  Auto Lymphocyte # : 0.31 K/uL  Auto Monocyte # : 0.56 K/uL  Auto Eosinophil # : 0.00 K/uL  Auto Basophil # : 0.00 K/uL  Auto Neutrophil % : 81.5 %  Auto Lymphocyte % : 6.4 %  Auto Monocyte % : 11.5 %  Auto Eosinophil % : 0.0 %  Auto Basophil % : 0.0 %      07-20    138  |  104  |  14  ----------------------------<  121<H>  3.6   |  23  |  0.63    Ca    9.2      20 Jul 2023 07:03  Phos  2.8     07-20  Mg     2.0     07-20    TPro  5.6<L>  /  Alb  3.7  /  TBili  0.5  /  DBili  x   /  AST  24  /  ALT  30  /  AlkPhos  61  07-20        RADIOLOGY & ADDITIONAL STUDIES:    < from: Xray Lumbar Puncture, Theraputic (07.18.23 @ 12:31) >  IMPRESSION:   Successful fluoroscopically guided lumbar puncture with   intrathecal chemotherapy as described above.

## 2023-07-20 NOTE — ADVANCED PRACTICE NURSE CONSULT - REASON FOR CONSULT
Chemo note: Cycle 3A, day 2/3. Vincristine/Cyclophosphamide. Consent in chart. 
Chemo note: Cycle 3A: Day 4, hyperCVAD. Consent in chart
Chemo note: Cycle 3A, day 1/3. Vincristine/Cyclophosphamide. Consent in chart. 
Chemo note: Cycle 3A, day 3/3. /Cyclophosphamide. Consent in chart. 
Chemo note: Cycle 3A, day 3/3. /Cyclophosphamide. Consent in chart. 
Cyclophosphamide Infusion.

## 2023-07-21 ENCOUNTER — TRANSCRIPTION ENCOUNTER (OUTPATIENT)
Age: 63
End: 2023-07-21

## 2023-07-21 VITALS
RESPIRATION RATE: 18 BRPM | SYSTOLIC BLOOD PRESSURE: 136 MMHG | DIASTOLIC BLOOD PRESSURE: 88 MMHG | OXYGEN SATURATION: 99 % | HEART RATE: 60 BPM | TEMPERATURE: 98 F

## 2023-07-21 LAB
ALBUMIN SERPL ELPH-MCNC: 3.6 G/DL — SIGNIFICANT CHANGE UP (ref 3.3–5)
ALP SERPL-CCNC: 58 U/L — SIGNIFICANT CHANGE UP (ref 40–120)
ALT FLD-CCNC: 33 U/L — SIGNIFICANT CHANGE UP (ref 10–45)
ANION GAP SERPL CALC-SCNC: 11 MMOL/L — SIGNIFICANT CHANGE UP (ref 5–17)
AST SERPL-CCNC: 25 U/L — SIGNIFICANT CHANGE UP (ref 10–40)
BASOPHILS # BLD AUTO: 0 K/UL — SIGNIFICANT CHANGE UP (ref 0–0.2)
BASOPHILS NFR BLD AUTO: 0 % — SIGNIFICANT CHANGE UP (ref 0–2)
BILIRUB SERPL-MCNC: 0.6 MG/DL — SIGNIFICANT CHANGE UP (ref 0.2–1.2)
BLD GP AB SCN SERPL QL: NEGATIVE — SIGNIFICANT CHANGE UP
BUN SERPL-MCNC: 14 MG/DL — SIGNIFICANT CHANGE UP (ref 7–23)
CALCIUM SERPL-MCNC: 9.5 MG/DL — SIGNIFICANT CHANGE UP (ref 8.4–10.5)
CHLORIDE SERPL-SCNC: 103 MMOL/L — SIGNIFICANT CHANGE UP (ref 96–108)
CO2 SERPL-SCNC: 25 MMOL/L — SIGNIFICANT CHANGE UP (ref 22–31)
CREAT SERPL-MCNC: 0.62 MG/DL — SIGNIFICANT CHANGE UP (ref 0.5–1.3)
EGFR: 99 ML/MIN/1.73M2 — SIGNIFICANT CHANGE UP
EOSINOPHIL # BLD AUTO: 0.06 K/UL — SIGNIFICANT CHANGE UP (ref 0–0.5)
EOSINOPHIL NFR BLD AUTO: 1.7 % — SIGNIFICANT CHANGE UP (ref 0–6)
GLUCOSE SERPL-MCNC: 109 MG/DL — HIGH (ref 70–99)
HCT VFR BLD CALC: 30.1 % — LOW (ref 34.5–45)
HGB BLD-MCNC: 10.1 G/DL — LOW (ref 11.5–15.5)
LYMPHOCYTES # BLD AUTO: 0.14 K/UL — LOW (ref 1–3.3)
LYMPHOCYTES # BLD AUTO: 4.3 % — LOW (ref 13–44)
MAGNESIUM SERPL-MCNC: 1.9 MG/DL — SIGNIFICANT CHANGE UP (ref 1.6–2.6)
MANUAL SMEAR VERIFICATION: SIGNIFICANT CHANGE UP
MCHC RBC-ENTMCNC: 31.9 PG — SIGNIFICANT CHANGE UP (ref 27–34)
MCHC RBC-ENTMCNC: 33.6 GM/DL — SIGNIFICANT CHANGE UP (ref 32–36)
MCV RBC AUTO: 95 FL — SIGNIFICANT CHANGE UP (ref 80–100)
MONOCYTES # BLD AUTO: 0.23 K/UL — SIGNIFICANT CHANGE UP (ref 0–0.9)
MONOCYTES NFR BLD AUTO: 6.9 % — SIGNIFICANT CHANGE UP (ref 2–14)
NEUTROPHILS # BLD AUTO: 2.89 K/UL — SIGNIFICANT CHANGE UP (ref 1.8–7.4)
NEUTROPHILS NFR BLD AUTO: 87.1 % — HIGH (ref 43–77)
PHOSPHATE SERPL-MCNC: 2.9 MG/DL — SIGNIFICANT CHANGE UP (ref 2.5–4.5)
PLAT MORPH BLD: NORMAL — SIGNIFICANT CHANGE UP
PLATELET # BLD AUTO: 177 K/UL — SIGNIFICANT CHANGE UP (ref 150–400)
POTASSIUM SERPL-MCNC: 3.5 MMOL/L — SIGNIFICANT CHANGE UP (ref 3.5–5.3)
POTASSIUM SERPL-SCNC: 3.5 MMOL/L — SIGNIFICANT CHANGE UP (ref 3.5–5.3)
PROT SERPL-MCNC: 5.5 G/DL — LOW (ref 6–8.3)
RBC # BLD: 3.17 M/UL — LOW (ref 3.8–5.2)
RBC # FLD: 19.6 % — HIGH (ref 10.3–14.5)
RBC BLD AUTO: SIGNIFICANT CHANGE UP
RH IG SCN BLD-IMP: POSITIVE — SIGNIFICANT CHANGE UP
SODIUM SERPL-SCNC: 139 MMOL/L — SIGNIFICANT CHANGE UP (ref 135–145)
T3 SERPL-MCNC: 63 NG/DL — LOW (ref 80–200)
T4 AB SER-ACNC: 6.2 UG/DL — SIGNIFICANT CHANGE UP (ref 4.6–12)
T4 FREE SERPL-MCNC: 1.1 NG/DL — SIGNIFICANT CHANGE UP (ref 0.9–1.8)
TSH SERPL-MCNC: 0.83 UIU/ML — SIGNIFICANT CHANGE UP (ref 0.27–4.2)
WBC # BLD: 3.32 K/UL — LOW (ref 3.8–10.5)
WBC # FLD AUTO: 3.32 K/UL — LOW (ref 3.8–10.5)

## 2023-07-21 PROCEDURE — 82945 GLUCOSE OTHER FLUID: CPT

## 2023-07-21 PROCEDURE — 88184 FLOWCYTOMETRY/ TC 1 MARKER: CPT

## 2023-07-21 PROCEDURE — 87205 SMEAR GRAM STAIN: CPT

## 2023-07-21 PROCEDURE — 84480 ASSAY TRIIODOTHYRONINE (T3): CPT

## 2023-07-21 PROCEDURE — 83735 ASSAY OF MAGNESIUM: CPT

## 2023-07-21 PROCEDURE — 84100 ASSAY OF PHOSPHORUS: CPT

## 2023-07-21 PROCEDURE — 86900 BLOOD TYPING SEROLOGIC ABO: CPT

## 2023-07-21 PROCEDURE — 84439 ASSAY OF FREE THYROXINE: CPT

## 2023-07-21 PROCEDURE — 84157 ASSAY OF PROTEIN OTHER: CPT

## 2023-07-21 PROCEDURE — 85730 THROMBOPLASTIN TIME PARTIAL: CPT

## 2023-07-21 PROCEDURE — 84436 ASSAY OF TOTAL THYROXINE: CPT

## 2023-07-21 PROCEDURE — 85025 COMPLETE CBC W/AUTO DIFF WBC: CPT

## 2023-07-21 PROCEDURE — 87641 MR-STAPH DNA AMP PROBE: CPT

## 2023-07-21 PROCEDURE — 99239 HOSP IP/OBS DSCHRG MGMT >30: CPT

## 2023-07-21 PROCEDURE — 62329 THER SPI PNXR CSF FLUOR/CT: CPT

## 2023-07-21 PROCEDURE — 80053 COMPREHEN METABOLIC PANEL: CPT

## 2023-07-21 PROCEDURE — 86850 RBC ANTIBODY SCREEN: CPT

## 2023-07-21 PROCEDURE — 85610 PROTHROMBIN TIME: CPT

## 2023-07-21 PROCEDURE — 86901 BLOOD TYPING SEROLOGIC RH(D): CPT

## 2023-07-21 PROCEDURE — 93005 ELECTROCARDIOGRAM TRACING: CPT

## 2023-07-21 PROCEDURE — 88185 FLOWCYTOMETRY/TC ADD-ON: CPT

## 2023-07-21 PROCEDURE — 83615 LACTATE (LD) (LDH) ENZYME: CPT

## 2023-07-21 PROCEDURE — 87635 SARS-COV-2 COVID-19 AMP PRB: CPT

## 2023-07-21 PROCEDURE — 89051 BODY FLUID CELL COUNT: CPT

## 2023-07-21 PROCEDURE — 84443 ASSAY THYROID STIM HORMONE: CPT

## 2023-07-21 PROCEDURE — 87640 STAPH A DNA AMP PROBE: CPT

## 2023-07-21 PROCEDURE — 36415 COLL VENOUS BLD VENIPUNCTURE: CPT

## 2023-07-21 RX ORDER — POTASSIUM CHLORIDE 20 MEQ
20 PACKET (EA) ORAL ONCE
Refills: 0 | Status: COMPLETED | OUTPATIENT
Start: 2023-07-21 | End: 2023-07-21

## 2023-07-21 RX ADMIN — CHLORHEXIDINE GLUCONATE 1 APPLICATION(S): 213 SOLUTION TOPICAL at 11:45

## 2023-07-21 RX ADMIN — SODIUM CHLORIDE 50 MILLILITER(S): 9 INJECTION INTRAMUSCULAR; INTRAVENOUS; SUBCUTANEOUS at 06:48

## 2023-07-21 RX ADMIN — LOSARTAN POTASSIUM 100 MILLIGRAM(S): 100 TABLET, FILM COATED ORAL at 06:47

## 2023-07-21 RX ADMIN — Medication 300 UNIT(S): at 14:14

## 2023-07-21 RX ADMIN — ESCITALOPRAM OXALATE 10 MILLIGRAM(S): 10 TABLET, FILM COATED ORAL at 11:44

## 2023-07-21 RX ADMIN — PANTOPRAZOLE SODIUM 40 MILLIGRAM(S): 20 TABLET, DELAYED RELEASE ORAL at 06:47

## 2023-07-21 RX ADMIN — Medication 20 MILLIEQUIVALENT(S): at 09:22

## 2023-07-21 RX ADMIN — Medication 400 MILLIGRAM(S): at 06:47

## 2023-07-21 NOTE — PROGRESS NOTE ADULT - PROBLEM SELECTOR PLAN 3
Continue with home medication - losartan.  7/20 noted sinus bradycardia since yesterday, asymptomatic, check TFT Continue with home medication - losartan.  7/20 noted sinus bradycardia since yesterday, asymptomatic, TFT sent, TSH WNL,

## 2023-07-21 NOTE — PROGRESS NOTE ADULT - NS ATTEND AMEND GEN_ALL_CORE FT
64 y/o F with PMHx of HTN, HLD with Ph (-) ALL diagnosed in February 2023 and now s/p cycle 2 of mini Hyper-CVAD. Now admitted for cycle 3A hyperCVAD (50% dose reduction)     Heme-  Today is day 4 of therapy.   cont IVF  LP with IT MTX day 2  Is/Os, daily weights  Antiemetics  d/c home at end of chemo 62 y/o F with PMHx of HTN, HLD with Ph (-) ALL diagnosed in February 2023 and now s/p cycle 2 of mini Hyper-CVAD. Now admitted for cycle 3A hyperCVAD (50% dose reduction)     Heme-  Today is day 5 of therapy.   cont IVF  LP with IT MTX day 2  Is/Os, daily weights  Antiemetics  d/c home at end of chemo

## 2023-07-21 NOTE — PROGRESS NOTE ADULT - ASSESSMENT
64 y/o F with PMHx of HTN, HLD with Ph (-) ALL diagnosed in February 2023 and now s/p cycle 1A of miini Hyper-CVAD, 1B ,2A. and 2B. Dose reduced for cycle 1A but full dose for 2A and full dose for B cycles. Bone marrow after cycle 1B with CR and negative MRD as well as post cycle 2B.  Now   admitted for cycle 3A  hyperCVAD with 50% dose reduction to reduce toxicity. anemia and leucopenia due to chemo and or disease

## 2023-07-21 NOTE — PROGRESS NOTE ADULT - PROBLEM SELECTOR PLAN 2
Patient is not neutropenic, afebrile   Continue home medication - acyclovir prophylaxis  Currently off levaquin and fluconazole   If febrile, panculture and CXR   7/20 broke right upper tooth Patient is not neutropenic, afebrile   Continue home medication - acyclovir prophylaxis  Currently off levaquin and fluconazole   If febrile, panculture and CXR   7/20 broke right upper tooth, FU with dentist as outpatient once cleared by Hem Onc provider

## 2023-07-21 NOTE — DISCHARGE NOTE NURSING/CASE MANAGEMENT/SOCIAL WORK - NSDCPEFALRISK_GEN_ALL_CORE
For information on Fall & Injury Prevention, visit: [email or web address] OR  [email or web address] OR  https://www.cdc.gov/steadi/patient.html

## 2023-07-21 NOTE — DISCHARGE NOTE NURSING/CASE MANAGEMENT/SOCIAL WORK - PATIENT PORTAL LINK FT
You can access the FollowMyHealth Patient Portal offered by [facility] by registering at the following website: [email or web address]. By joining [facility]’s FollowMyHealth portal, you will also be able to view your health information using other applications (apps) compatible with our system.

## 2023-07-21 NOTE — PROGRESS NOTE ADULT - PROBLEM SELECTOR PLAN 1
Admit to 7  [facility]  Access Mediport   IV hydration, strict I/O, antiemetics   Transfuse blood products/replace lytes as needed   Chemotherapy with mini hyperCVAD with dose reduction as follows:  Cyclophosphamide 150 mg/m2 every 12 hours days 1-3  Dexamethasone 20mg per day on days 1-4 and 11-14   Vincristine 1mg IV days 1 and 8 (flat dose)  Doxorubicin 25mg/m2/day IV continuous infusion over 24 hours on day 4   LP scheduled for Tuesday 7/18 with IT MTX and will require another LP on day 7 or 8 with IT cytarabine which can be set up as an outpatient  Fulphila post chemotherapy as outpatient  Hypokalemia: KCL 20 meq po x1

## 2023-07-21 NOTE — PROGRESS NOTE ADULT - SUBJECTIVE AND OBJECTIVE BOX
Diagnosis: B-ALL Ph (-)     Protocol/Chemo Regimen: [id number] HyperCVAD (50% reduction)     Day: 5  Pt endorsed: no complaints. broke right upper tooth while chewing on bagel this am    Review of Systems: Patient denied nausea, vomiting, odynophagia, chest pain, cough, dyspnea, abdominal pain, constipation, diarrhea, rash, fatigue, headache    Pain scale: 0     Diet: regular     Allergies:     sulfa drugs (Unknown)  Zofran (Headache)          ANTIMICROBIALS  acyclovir   Oral Tab/Cap 400 milliGRAM(s) Oral two times a day      HEME/ONC MEDICATIONS  enoxaparin Injectable 40 milliGRAM(s) SubCutaneous <User Schedule>  methotrexate PF IntraThecal (eMAR) 12 milliGRAM(s) IntraThecal once      STANDING MEDICATIONS  chlorhexidine 4% Liquid 1 Application(s) Topical daily  escitalopram 10 milliGRAM(s) Oral daily  losartan 100 milliGRAM(s) Oral daily  pantoprazole    Tablet 40 milliGRAM(s) Oral before breakfast  sodium chloride 0.9%. 1000 milliLiter(s) IV Continuous <Continuous>      PRN MEDICATIONS  acetaminophen     Tablet .. 650 milliGRAM(s) Oral every 6 hours PRN  LORazepam   Injectable 0.5 milliGRAM(s) IV Push every 6 hours PRN        Vital Signs Last 24 Hrs  T(C): 36.7 (21 Jul 2023 05:37), Max: 36.7 (20 Jul 2023 13:45)  T(F): 98.1 (21 Jul 2023 05:37), Max: 98.1 (21 Jul 2023 05:37)  HR: 53 (21 Jul 2023 05:37) (49 - 58)  BP: 157/83 (21 Jul 2023 05:37) (133/78 - 168/81)  BP(mean): --  RR: 18 (21 Jul 2023 05:37) (16 - 18)  SpO2: 97% (21 Jul 2023 05:37) (97% - 100%)    Parameters below as of 21 Jul 2023 05:37  Patient On (Oxygen Delivery Method): room air      PHYSICAL EXAM  General: NAD  HEENT: PERRLA, EOMOI, clear oropharynx, anicteric sclera  Neck: supple  CV: (+) S1/S2 RRR  Lungs: clear to auscultation, no wheezes or rales  Abdomen: soft, +BS, non-tender, non-distended  Ext: no  edema  Skin: no rashes   Neuro: alert and oriented X 3, no focal deficits  [facility]           LABS:                        10.1   3.32  )-----------( 177      ( 21 Jul 2023 07:05 )             30.1         Mean Cell Volume : 95.0 fl  Mean Cell Hemoglobin : 31.9 pg  Mean Cell Hemoglobin Concentration : 33.6 gm/dL  Auto Neutrophil # : x  Auto Lymphocyte # : x  Auto Monocyte # : x  Auto Eosinophil # : x  Auto Basophil # : x  Auto Neutrophil % : x  Auto Lymphocyte % : x  Auto Monocyte % : x  Auto Eosinophil % : x  Auto Basophil % : x      07-20    138  |  104  |  14  ----------------------------<  121<H>  3.6   |  23  |  0.63    Ca    9.2      20 Jul 2023 07:03  Phos  2.8     07-20  Mg     2.0     07-20    TPro  5.6<L>  /  Alb  3.7  /  TBili  0.5  /  DBili  x   /  AST  24  /  ALT  30  /  AlkPhos  61  07-20                  RECENT CULTURES:      RADIOLOGY & ADDITIONAL STUDIES:      < from: Xray Lumbar Puncture, Theraputic (07.18.23 @ 12:31) >  IMPRESSION:   Successful fluoroscopically guided lumbar puncture with   intrathecal chemotherapy as described above.         Diagnosis: B-ALL Ph (-)     Protocol/Chemo Regimen: [id number] HyperCVAD (50% reduction)     Day: 5  Pt endorsed: no complaints. broke right upper tooth while chewing on bagel yesterday    Review of Systems: Patient denied nausea, vomiting, odynophagia, chest pain, cough, dyspnea, abdominal pain, constipation, diarrhea, rash, fatigue, headache    Pain scale: 0     Diet: regular     Allergies:     sulfa drugs (Unknown)  Zofran (Headache)      ANTIMICROBIALS  acyclovir   Oral Tab/Cap 400 milliGRAM(s) Oral two times a day      HEME/ONC MEDICATIONS  enoxaparin Injectable 40 milliGRAM(s) SubCutaneous <User Schedule>  methotrexate PF IntraThecal (eMAR) 12 milliGRAM(s) IntraThecal once      STANDING MEDICATIONS  chlorhexidine 4% Liquid 1 Application(s) Topical daily  escitalopram 10 milliGRAM(s) Oral daily  losartan 100 milliGRAM(s) Oral daily  pantoprazole    Tablet 40 milliGRAM(s) Oral before breakfast  sodium chloride 0.9%. 1000 milliLiter(s) IV Continuous <Continuous>      PRN MEDICATIONS  acetaminophen     Tablet .. 650 milliGRAM(s) Oral every 6 hours PRN  LORazepam   Injectable 0.5 milliGRAM(s) IV Push every 6 hours PRN      Vital Signs Last 24 Hrs  T(C): 36.7 (21 Jul 2023 05:37), Max: 36.7 (20 Jul 2023 13:45)  T(F): 98.1 (21 Jul 2023 05:37), Max: 98.1 (21 Jul 2023 05:37)  HR: 53 (21 Jul 2023 05:37) (49 - 58)  BP: 157/83 (21 Jul 2023 05:37) (133/78 - 168/81)  BP(mean): --  RR: 18 (21 Jul 2023 05:37) (16 - 18)  SpO2: 97% (21 Jul 2023 05:37) (97% - 100%)    Parameters below as of 21 Jul 2023 05:37  Patient On (Oxygen Delivery Method): room air      PHYSICAL EXAM  General: NAD  HEENT: PERRLA, EOMOI, clear oropharynx, anicteric sclera  Neck: supple  CV: (+) S1/S2 RRR  Lungs: clear to auscultation, no wheezes or rales  Abdomen: soft, +BS, non-tender, non-distended  Ext: no  edema  Skin: no rashes   Neuro: alert and oriented X 3, no focal deficits  Central Line: mediport c/d/i       LABS:                        10.1   3.32  )-----------( 177      ( 21 Jul 2023 07:05 )             30.1         Mean Cell Volume : 95.0 fl  Mean Cell Hemoglobin : 31.9 pg  Mean Cell Hemoglobin Concentration : 33.6 gm/dL  Auto Neutrophil # : 2.89 K/uL  Auto Lymphocyte # : 0.14 K/uL  Auto Monocyte # : 0.23 K/uL  Auto Eosinophil # : 0.06 K/uL  Auto Basophil # : 0.00 K/uL  Auto Neutrophil % : 87.1 %  Auto Lymphocyte % : 4.3 %  Auto Monocyte % : 6.9 %  Auto Eosinophil % : 1.7 %  Auto Basophil % : 0.0 %      07-21    139  |  103  |  14  ----------------------------<  109<H>  3.5   |  25  |  0.62    Ca    9.5      21 Jul 2023 07:05  Phos  2.9     07-21  Mg     1.9     07-21    TPro  5.5<L>  /  Alb  3.6  /  TBili  0.6  /  DBili  x   /  AST  25  /  ALT  33  /  AlkPhos  58  07-21    Thyroid Stimulating Hormone, Serum (07.20.23 @ 15:44)    Thyroid Stimulating Hormone, Serum: 0.83 uIU/mL    Free Thyroxine, Serum (07.20.23 @ 15:44)    Free Thyroxine, Serum: 1.1 ng/dL    T4, Serum (07.20.23 @ 15:44)    T4, Serum: 6.2 ug/dL    Triiodothyronine, Total (T3 Total) (07.20.23 @ 15:44)    Triiodothyronine, Total (T3 Total): 63 ng/dL      RADIOLOGY & ADDITIONAL STUDIES:    < from: Xray Lumbar Puncture, Theraputic (07.18.23 @ 12:31) >  IMPRESSION:   Successful fluoroscopically guided lumbar puncture with   intrathecal chemotherapy as described above.

## 2023-07-21 NOTE — DISCHARGE NOTE NURSING/CASE MANAGEMENT/SOCIAL WORK - NSDCFUADDAPPT_GEN_ALL_CORE_FT
Follow up at [facility] on Monday 7/24/23 at 10am for fulphila, vincristine and possible platelets   LP scheduled for 7/24/23 at 2:30pm at the hospital  Follow up at [facility] on 7/26/23 at 8:30am with Sruthi Keyes NP and  Dr. Goldberg on 8/12 at 9:40am  Follow up with Dr. Lewis on 7/31, labs to be drawn on 7/27, 7/31, and 8/3

## 2023-07-24 ENCOUNTER — RESULT REVIEW (OUTPATIENT)
Age: 63
End: 2023-07-24

## 2023-07-24 ENCOUNTER — OUTPATIENT (OUTPATIENT)
Dept: OUTPATIENT SERVICES | Facility: HOSPITAL | Age: 63
LOS: 1 days | End: 2023-07-24
Payer: MEDICARE

## 2023-07-24 ENCOUNTER — APPOINTMENT (OUTPATIENT)
Dept: INFUSION THERAPY | Facility: HOSPITAL | Age: 63
End: 2023-07-24

## 2023-07-24 ENCOUNTER — APPOINTMENT (OUTPATIENT)
Dept: RADIOLOGY | Facility: HOSPITAL | Age: 63
End: 2023-07-24
Payer: MEDICARE

## 2023-07-24 DIAGNOSIS — Z51.89 ENCOUNTER FOR OTHER SPECIFIED AFTERCARE: ICD-10-CM

## 2023-07-24 DIAGNOSIS — C91.00 ACUTE LYMPHOBLASTIC LEUKEMIA NOT HAVING ACHIEVED REMISSION: ICD-10-CM

## 2023-07-24 DIAGNOSIS — C95.90 LEUKEMIA, UNSPECIFIED NOT HAVING ACHIEVED REMISSION: ICD-10-CM

## 2023-07-24 DIAGNOSIS — Z00.00 ENCOUNTER FOR GENERAL ADULT MEDICAL EXAMINATION WITHOUT ABNORMAL FINDINGS: ICD-10-CM

## 2023-07-24 LAB
BASOPHILS # BLD AUTO: 0 K/UL — SIGNIFICANT CHANGE UP (ref 0–0.2)
BASOPHILS NFR BLD AUTO: 0 % — SIGNIFICANT CHANGE UP (ref 0–2)
DACRYOCYTES BLD QL SMEAR: SLIGHT — SIGNIFICANT CHANGE UP
EOSINOPHIL # BLD AUTO: 0.23 K/UL — SIGNIFICANT CHANGE UP (ref 0–0.5)
EOSINOPHIL NFR BLD AUTO: 10.5 % — HIGH (ref 0–6)
HCT VFR BLD CALC: 28.5 % — LOW (ref 34.5–45)
HGB BLD-MCNC: 9.8 G/DL — LOW (ref 11.5–15.5)
LYMPHOCYTES # BLD AUTO: 0.16 K/UL — LOW (ref 1–3.3)
LYMPHOCYTES # BLD AUTO: 7.5 % — LOW (ref 13–44)
MCHC RBC-ENTMCNC: 32 PG — SIGNIFICANT CHANGE UP (ref 27–34)
MCHC RBC-ENTMCNC: 34.4 G/DL — SIGNIFICANT CHANGE UP (ref 32–36)
MCV RBC AUTO: 93.1 FL — SIGNIFICANT CHANGE UP (ref 80–100)
MONOCYTES # BLD AUTO: 0.14 K/UL — SIGNIFICANT CHANGE UP (ref 0–0.9)
MONOCYTES NFR BLD AUTO: 6.5 % — SIGNIFICANT CHANGE UP (ref 2–14)
NEUTROPHILS # BLD AUTO: 1.64 K/UL — LOW (ref 1.8–7.4)
NEUTROPHILS NFR BLD AUTO: 75.5 % — SIGNIFICANT CHANGE UP (ref 43–77)
NRBC # BLD: 1 /100 — HIGH (ref 0–0)
NRBC # BLD: SIGNIFICANT CHANGE UP /100 WBCS (ref 0–0)
PLAT MORPH BLD: NORMAL — SIGNIFICANT CHANGE UP
PLATELET # BLD AUTO: 145 K/UL — LOW (ref 150–400)
POIKILOCYTOSIS BLD QL AUTO: SLIGHT — SIGNIFICANT CHANGE UP
RBC # BLD: 3.06 M/UL — LOW (ref 3.8–5.2)
RBC # FLD: 18.3 % — HIGH (ref 10.3–14.5)
RBC BLD AUTO: ABNORMAL
WBC # BLD: 2.17 K/UL — LOW (ref 3.8–10.5)
WBC # FLD AUTO: 2.17 K/UL — LOW (ref 3.8–10.5)

## 2023-07-24 PROCEDURE — 88108 CYTOPATH CONCENTRATE TECH: CPT

## 2023-07-24 PROCEDURE — 87205 SMEAR GRAM STAIN: CPT

## 2023-07-24 PROCEDURE — 88108 CYTOPATH CONCENTRATE TECH: CPT | Mod: 26

## 2023-07-24 PROCEDURE — 88184 FLOWCYTOMETRY/ TC 1 MARKER: CPT

## 2023-07-24 PROCEDURE — 88185 FLOWCYTOMETRY/TC ADD-ON: CPT

## 2023-07-24 PROCEDURE — 62329 THER SPI PNXR CSF FLUOR/CT: CPT

## 2023-07-24 RX ORDER — CYTARABINE 100 MG
100 VIAL (EA) INJECTION ONCE
Refills: 0 | Status: DISCONTINUED | OUTPATIENT
Start: 2023-07-24 | End: 2023-08-07

## 2023-07-25 DIAGNOSIS — Z51.11 ENCOUNTER FOR ANTINEOPLASTIC CHEMOTHERAPY: ICD-10-CM

## 2023-07-25 DIAGNOSIS — R11.2 NAUSEA WITH VOMITING, UNSPECIFIED: ICD-10-CM

## 2023-07-25 LAB
NON-GYNECOLOGICAL CYTOLOGY STUDY: SIGNIFICANT CHANGE UP
TM INTERPRETATION: SIGNIFICANT CHANGE UP

## 2023-07-26 ENCOUNTER — RESULT REVIEW (OUTPATIENT)
Age: 63
End: 2023-07-26

## 2023-07-26 ENCOUNTER — APPOINTMENT (OUTPATIENT)
Dept: HEMATOLOGY ONCOLOGY | Facility: CLINIC | Age: 63
End: 2023-07-26
Payer: MEDICARE

## 2023-07-26 VITALS
RESPIRATION RATE: 16 BRPM | SYSTOLIC BLOOD PRESSURE: 92 MMHG | TEMPERATURE: 96.4 F | DIASTOLIC BLOOD PRESSURE: 57 MMHG | WEIGHT: 197.09 LBS | BODY MASS INDEX: 35.81 KG/M2 | HEART RATE: 91 BPM | OXYGEN SATURATION: 99 %

## 2023-07-26 LAB
BASOPHILS # BLD AUTO: 0.04 K/UL — SIGNIFICANT CHANGE UP (ref 0–0.2)
BASOPHILS NFR BLD AUTO: 1 % — SIGNIFICANT CHANGE UP (ref 0–2)
DACRYOCYTES BLD QL SMEAR: SLIGHT — SIGNIFICANT CHANGE UP
EOSINOPHIL # BLD AUTO: 0.21 K/UL — SIGNIFICANT CHANGE UP (ref 0–0.5)
EOSINOPHIL NFR BLD AUTO: 5 % — SIGNIFICANT CHANGE UP (ref 0–6)
HCT VFR BLD CALC: 28.2 % — LOW (ref 34.5–45)
HGB BLD-MCNC: 9.7 G/DL — LOW (ref 11.5–15.5)
LYMPHOCYTES # BLD AUTO: 0.17 K/UL — LOW (ref 1–3.3)
LYMPHOCYTES # BLD AUTO: 4 % — LOW (ref 13–44)
MCHC RBC-ENTMCNC: 32.7 PG — SIGNIFICANT CHANGE UP (ref 27–34)
MCHC RBC-ENTMCNC: 34.4 G/DL — SIGNIFICANT CHANGE UP (ref 32–36)
MCV RBC AUTO: 94.9 FL — SIGNIFICANT CHANGE UP (ref 80–100)
MONOCYTES # BLD AUTO: 0.17 K/UL — SIGNIFICANT CHANGE UP (ref 0–0.9)
MONOCYTES NFR BLD AUTO: 4 % — SIGNIFICANT CHANGE UP (ref 2–14)
NEUTROPHILS # BLD AUTO: 3.59 K/UL — SIGNIFICANT CHANGE UP (ref 1.8–7.4)
NEUTROPHILS NFR BLD AUTO: 86 % — HIGH (ref 43–77)
NRBC # BLD: 0 /100 — SIGNIFICANT CHANGE UP (ref 0–0)
NRBC # BLD: SIGNIFICANT CHANGE UP /100 WBCS (ref 0–0)
PLAT MORPH BLD: NORMAL — SIGNIFICANT CHANGE UP
PLATELET # BLD AUTO: 117 K/UL — LOW (ref 150–400)
POIKILOCYTOSIS BLD QL AUTO: SLIGHT — SIGNIFICANT CHANGE UP
RBC # BLD: 2.97 M/UL — LOW (ref 3.8–5.2)
RBC # FLD: 17.8 % — HIGH (ref 10.3–14.5)
RBC BLD AUTO: ABNORMAL
WBC # BLD: 4.18 K/UL — SIGNIFICANT CHANGE UP (ref 3.8–10.5)
WBC # FLD AUTO: 4.18 K/UL — SIGNIFICANT CHANGE UP (ref 3.8–10.5)

## 2023-07-26 PROCEDURE — 99214 OFFICE O/P EST MOD 30 MIN: CPT

## 2023-07-26 RX ORDER — SUCRALFATE 1 G/1
1 TABLET ORAL 4 TIMES DAILY
Refills: 0 | Status: DISCONTINUED | COMMUNITY
Start: 2023-04-12 | End: 2023-07-26

## 2023-07-26 NOTE — HISTORY OF PRESENT ILLNESS
[Disease:__________________________] : Disease: [unfilled] [Therapy: ___] : Therapy: [unfilled] [Cycle: ___] : Cycle: [unfilled] [Day: ___] : Day: [unfilled] [de-identified] : 62 y/o F transferred from [facility] to [facility] for new diagnosis of acute leukemia, s/p initial inpatient treatment and now here for establishment of outpatient care. 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  Chemotherapy with reduced dose HyperCVAD was started on 3/3/23. Hospital course was c/b neutropenic fever, transaminitis, a rash on 3/4 which improved with topical steroid and atarax PRN,  and also LUE cellulitis.\par \par Of note, on peripheral blood BCR/ABL PCR was negative, although in her bone marrow she did have PCR for BCR/ABL p190 positive at an extremely low level of 0.001%. She had a pre-treatment echocardiogram done which showed an EF 55%, and PICC line was placed. Pt started reduced dose HyperCVAD  on 3/3/23: (IV Cyclophosphamide (150 mg/m2 every 12 h on days 1–3), Dexamethasone 20 mg per day on days 1–4 and 11–14, Vincristine 2 mg IV flat dose on days 1 and 8, Daunorubicin 25 mg/m2/day IV continuous infusion over 48 hours, starting on day 4). Informed consent obtained. LP with IT MTX done on 2/28, and CSF was subsequently found to be negative for malignant cells. Pt had a developing transaminitis, and US done at [facility] had shown a fatty liver. CT A/P done at [facility] showed splenomegaly with a small age indeterminant splenic infarct, but otherwise showed a normal liver and no abdominal or pelvic lymphadenopathy. Ultimately, during hospital stay she also developed neutropenic fever, s/p panculture which was negative,. She was treated with cefepime, acyclovir and caspofungin. Course also c/b severe headache as side effect from Zofran (CTH negative). Discharged home in stable condition.\par \par Now s/p cycle 1B had BMBx on 4/28 which showed complete remission and Clonoseq showed MRD-.  After cycle 1A doses were escalated to full HyperCVAD. \par Now s/p cycle 2B and had BMBx - also with continued complete remission.  Awaiting Clonoseq.  [de-identified] : Patient seen today for follow up. Overall patient is feeling well. On Monday she received VCR and LP. She states after she left the hospital for the LP she became extremely nauseous. This has since resolved and although her appetite is not fully back to baseline she is eating and drinking well. Her BP today was lower than usual and she had taken her antihypertensive prior to coming in. She denies any dizziness, visual changes, CP, or palpitations. While she was in the hospital she cracked a tooth on a bagel and she has been rinsing with warm water and salt daily. There is no pain, redness or inflammation. She denied any fevers or chills, no night sweats. Weight appears stable.

## 2023-07-26 NOTE — ASSESSMENT
[FreeTextEntry1] : 64 y/o F with Ph (-) ALL diagnosed in February 2023 and now s/p cycle 3A of Hyper-CVAD (was dose reduced with cycle 1A) here today for follow up.  \par \par Previously extensively educated patient on her disease process and explained rationale behind treating her with Hyper-CVAD for now. Treatment is hyper CVAD on A cycles (Cyclophosphamide 300 mg/m2 IV over 2 hours every 12 hours on days 1 to 3 (total dose per cycle: 1800 mg/m2), Vincristine (Oncovin) 2 mg IV once per day on days 4 & 11, and Doxorubicin 50 mg/m2 IV continuous infusion over 24 hours, starting on day 4). This had been dose reduced for cycle 1A, but for cycle 2A full dose. B cycles also full dose at this point (MTX 1g/m2 over 24 hour span on day 1, cytarabine 1g/m2 [id number] on day 2 and 3 for total 4 doses).\par \par Bone marrow biopsy results after cycle 1B noted, complete remission and negative for MRD by Clonoseq. Previously educated patient as well on the Clonoseq testing as a tool to assess measurable residual disease (MRD) using NGS technology. She has a mediport in place and doing well. \par \par Now s/p cycle 2B with BMBx showing continued CR, awaiting MRD testing. \par \par Plan:\par -Patient tolerated cycle 3A well.\par -CBC today showing counts are relatively stable she is not neutropenic and Hgb is 9.7 and PLT 117K.\par -Patient can continue off antibacterial and antifungal ppx at this time, but to continue PCP ppx and antiviral ppx.\par -Lab work to be completed on Friday to assess counts for any impending transfusional needs and WBC count.\par -Discussed with Dr. Lewis, given her residence is out in [place] he will continue to see her to assist us in transfusional support and limited chemotherapy (vincristine). \par -Discussed future plans extensively - given the fact that she had clearance of MRD after cycle 1A and 1B, will attempt for cure with chemotherapy alone. Will plan for cycle 3-4 with 50% dose reduction to decrease toxicity. MRD testing has been negative up until this point precluding the use of Blincyto, but after treatment is complete will monitor Clonoseq closely and initiate Blina if any evidence of recurrence molecularly. \par -Given current BP and past instances with dehydration with treatment patient will check her BP and if SBP <110mmHg she will hold Cozaar for the day.\par -Patient understands and agrees with plan. All information explained to the best of my ability. \par -RTC in 2 weeks.

## 2023-07-26 NOTE — REVIEW OF SYSTEMS
[Negative] : Musculoskeletal [Vision Problems] : no vision problems [Dysphagia] : no dysphagia [Nosebleeds] : no nosebleeds [Odynophagia] : no odynophagia [Chest Pain] : no chest pain [Palpitations] : no palpitations [Lower Ext Edema] : no lower extremity edema [Dysuria] : no dysuria [Skin Rash] : no skin rash [Skin Wound] : no skin wound [Insomnia] : no insomnia [Anxiety] : no anxiety [Hot Flashes] : no hot flashes [FreeTextEntry4] : (+) cracked upper tooth [de-identified] : small area of bruising on right forearm

## 2023-07-26 NOTE — PHYSICAL EXAM
[Fully active, able to carry on all pre-disease performance without restriction] : Status 0 - Fully active, able to carry on all pre-disease performance without restriction [Normal] : affect appropriate [Ulcers] : no ulcers [Mucositis] : no mucositis [Thrush] : no thrush [Vesicles] : no vesicles [de-identified] : BMI 35.81 [de-identified] : (+) cracked upper right tooth no signs of infection [de-identified] : supple [de-identified] : (+)S1S2 RRR [de-identified] : no edema [de-identified] : no pain [de-identified] : (+) 3 small bruises on right forearm approx 0.5cm

## 2023-07-26 NOTE — RESULTS/DATA
[FreeTextEntry1] : Accession:                             [id number]\par \par Collected Date/Time:                   7/7/2023 09:00 EDT\par Received Date/Time:                    7/7/2023 14:47 EDT\par \par Hematopathology Report - Auth (Verified)\par \par Specimen(s) Submitted\par 1  Bone marrow biopsy\par 2  Bone marrow aspirate\par \par Final Diagnosis\par 1, 2. Bone marrow biopsy and bone marrow aspirate\par - Trilineage hematopoiesis with maturation, erythroid hyperplasia,\par mild increase in interstitial mast cells, and increased iron stores\par \par (history of B-ALL, s/p treatment)\par \par See note and description.\par \par Diagnostic note:\par As per chart review, the patient was diagnosed with Ph (-) ALL, in\par February 2023, abnormal cytogenetics,  47,XX,+X,i(9)(q10)[7]/46,XX[2],\par FISH with Three copies of ASS1 and ABL1 detected (81.5%), somatic\par mutations of  NRAS p.Oeo50Amy (VAF: 29.63%),   NRAS p.Epc18Jxx (VAF:\par 14.54%), TP53 p.[id number] (VAF: 45.94%), s/p cycle 1A (mini hyper CVAD)\par and 1B of Hyper-CVAD, with Clonoseq report considered negative, now s/p\par cycle 2A of Hyper-CVAD (was dose reduced with cycle 1A).\par CBC/differential shows mild normocytic anemia with mild myeloid left\par shift.  The bone marrow shows trilineage hematopoiesis with maturation,\par erythroid hyperplasia, mild increase in interstitial mast cells,\par and increased iron stores.  Flow cytometry shows no increase in B\par lymphoblasts and immunohistochemistry does not demonstrate definitive\par persistent disease.  Correlation with MRD study is necessary.\par \par Comprehensive report with results of pending ancillary studies to follow.\par \par Ancillary studies\par Bone marrow aspirate iron stain:   Iron stores are increased; no ring\par sideroblasts are seen.\par \par Flow cytometry:   CD45/side scatter shows no significant blast population\par with lymphopenia. There is no increase in CD34 or CD10/CD19 positive\par cells, and absent B-cells. Myeloid antigen pattern is normal with CD13,\par CD16 and CD11b (no significant hemodilution).  There is no increase in\par myeloblasts (0.97% myeloblasts with normal immunophenotype).\par \par Immunohistochemical stains   (block 1A: CD10, CD20, CD79a, PAX-5, Tdt, CD34)\par show scattered Tdt positive cell, scattered CD34 positive dells (less\par than 3%), scattered CD10 positive cells, scattered CD79a positive cells\par (B cells and plasma cells, few interstitial B cells ((positive with CD20,\par PAX5).\par \par Cytogenetics:   Pending\par \par Molecular: Pending\par \par Microscopic description:\par 1. Biopsy:   Sections of bone marrow biopsy and scant bone marrow fragments\par in clot show normocellularity (50-70% cellularity with less cellular\par foci), trilineage hematopoiesis with maturation, erythroid predominance,\par megakaryocytes normal in number and morphology, mild increase in\par interstitial mast cells, and iron stores increased.\par \par 2. Aspirate:   Cellular spicules are present, adequate for interpretation.\par Maturing and mature myeloid and erythroid elements are present with\par erythroid predominance (M:E ratio 1.06:1).  Megakaryocytes appear normal\par in number and morphology.\par \par

## 2023-07-28 ENCOUNTER — APPOINTMENT (OUTPATIENT)
Age: 63
End: 2023-07-28

## 2023-07-28 ENCOUNTER — RESULT REVIEW (OUTPATIENT)
Age: 63
End: 2023-07-28

## 2023-07-28 LAB
ALBUMIN SERPL ELPH-MCNC: 3.8 G/DL
ALBUMIN SERPL ELPH-MCNC: 4 G/DL — SIGNIFICANT CHANGE UP (ref 3.3–5)
ALBUMIN SERPL ELPH-MCNC: 4.2 G/DL
ALP BLD-CCNC: 70 U/L
ALP BLD-CCNC: 82 U/L
ALP SERPL-CCNC: 81 U/L — SIGNIFICANT CHANGE UP (ref 40–120)
ALT FLD-CCNC: 27 U/L — SIGNIFICANT CHANGE UP (ref 10–45)
ALT SERPL-CCNC: 16 U/L
ALT SERPL-CCNC: 29 U/L
ANION GAP SERPL CALC-SCNC: 12 MMOL/L
ANION GAP SERPL CALC-SCNC: 12 MMOL/L
ANION GAP SERPL CALC-SCNC: 16 MMOL/L — SIGNIFICANT CHANGE UP (ref 5–17)
ANISOCYTOSIS BLD QL: SLIGHT — SIGNIFICANT CHANGE UP
AST SERPL-CCNC: 14 U/L — SIGNIFICANT CHANGE UP (ref 10–40)
AST SERPL-CCNC: 21 U/L
AST SERPL-CCNC: 29 U/L
BASOPHILS # BLD AUTO: 0 K/UL — SIGNIFICANT CHANGE UP (ref 0–0.2)
BASOPHILS NFR BLD AUTO: 0 % — SIGNIFICANT CHANGE UP (ref 0–2)
BILIRUB SERPL-MCNC: 0.2 MG/DL
BILIRUB SERPL-MCNC: 0.6 MG/DL — SIGNIFICANT CHANGE UP (ref 0.2–1.2)
BILIRUB SERPL-MCNC: 0.7 MG/DL
BUN SERPL-MCNC: 11 MG/DL
BUN SERPL-MCNC: 12 MG/DL — SIGNIFICANT CHANGE UP (ref 7–23)
BUN SERPL-MCNC: 14 MG/DL
CALCIUM SERPL-MCNC: 10 MG/DL
CALCIUM SERPL-MCNC: 9.2 MG/DL
CALCIUM SERPL-MCNC: 9.5 MG/DL — SIGNIFICANT CHANGE UP (ref 8.4–10.5)
CHLORIDE SERPL-SCNC: 102 MMOL/L
CHLORIDE SERPL-SCNC: 103 MMOL/L
CHLORIDE SERPL-SCNC: 103 MMOL/L — SIGNIFICANT CHANGE UP (ref 96–108)
CO2 SERPL-SCNC: 19 MMOL/L — LOW (ref 22–31)
CO2 SERPL-SCNC: 25 MMOL/L
CO2 SERPL-SCNC: 25 MMOL/L
CREAT SERPL-MCNC: 0.71 MG/DL — SIGNIFICANT CHANGE UP (ref 0.5–1.3)
CREAT SERPL-MCNC: 0.86 MG/DL
CREAT SERPL-MCNC: 0.98 MG/DL
DACRYOCYTES BLD QL SMEAR: SLIGHT — SIGNIFICANT CHANGE UP
EGFR: 64 ML/MIN/1.73M2
EGFR: 75 ML/MIN/1.73M2
EGFR: 94 ML/MIN/1.73M2 — SIGNIFICANT CHANGE UP
EOSINOPHIL # BLD AUTO: 0 K/UL — SIGNIFICANT CHANGE UP (ref 0–0.5)
EOSINOPHIL NFR BLD AUTO: 0 % — SIGNIFICANT CHANGE UP (ref 0–6)
GLUCOSE SERPL-MCNC: 102 MG/DL
GLUCOSE SERPL-MCNC: 109 MG/DL — HIGH (ref 70–99)
GLUCOSE SERPL-MCNC: 93 MG/DL
HCT VFR BLD CALC: 27.5 % — LOW (ref 34.5–45)
HGB BLD-MCNC: 9.2 G/DL — LOW (ref 11.5–15.5)
LDH SERPL-CCNC: 141 U/L
LDH SERPL-CCNC: 230 U/L
LYMPHOCYTES # BLD AUTO: 0.11 K/UL — LOW (ref 1–3.3)
LYMPHOCYTES # BLD AUTO: 2 % — LOW (ref 13–44)
MACROCYTES BLD QL: SLIGHT — SIGNIFICANT CHANGE UP
MCHC RBC-ENTMCNC: 32.2 PG — SIGNIFICANT CHANGE UP (ref 27–34)
MCHC RBC-ENTMCNC: 33.5 GM/DL — SIGNIFICANT CHANGE UP (ref 32–36)
MCV RBC AUTO: 96.2 FL — SIGNIFICANT CHANGE UP (ref 80–100)
MICROCYTES BLD QL: SLIGHT — SIGNIFICANT CHANGE UP
MONOCYTES # BLD AUTO: 0.56 K/UL — SIGNIFICANT CHANGE UP (ref 0–0.9)
MONOCYTES NFR BLD AUTO: 10 % — SIGNIFICANT CHANGE UP (ref 2–14)
MYELOCYTES NFR BLD: 1 % — HIGH (ref 0–0)
NEUTROPHILS # BLD AUTO: 4.86 K/UL — SIGNIFICANT CHANGE UP (ref 1.8–7.4)
NEUTROPHILS NFR BLD AUTO: 80 % — HIGH (ref 43–77)
NEUTS BAND # BLD: 7 % — SIGNIFICANT CHANGE UP (ref 0–8)
NRBC # BLD: 0 /100 — SIGNIFICANT CHANGE UP (ref 0–0)
NRBC # BLD: SIGNIFICANT CHANGE UP /100 WBCS (ref 0–0)
PLAT MORPH BLD: NORMAL — SIGNIFICANT CHANGE UP
PLATELET # BLD AUTO: 77 K/UL — LOW (ref 150–400)
POIKILOCYTOSIS BLD QL AUTO: SLIGHT — SIGNIFICANT CHANGE UP
POTASSIUM SERPL-MCNC: 4.1 MMOL/L — SIGNIFICANT CHANGE UP (ref 3.5–5.3)
POTASSIUM SERPL-SCNC: 3.1 MMOL/L
POTASSIUM SERPL-SCNC: 4.1 MMOL/L — SIGNIFICANT CHANGE UP (ref 3.5–5.3)
POTASSIUM SERPL-SCNC: 4.2 MMOL/L
PROT SERPL-MCNC: 5.4 G/DL
PROT SERPL-MCNC: 5.8 G/DL — LOW (ref 6–8.3)
PROT SERPL-MCNC: 6.4 G/DL
RBC # BLD: 2.86 M/UL — LOW (ref 3.8–5.2)
RBC # FLD: 17.2 % — HIGH (ref 10.3–14.5)
RBC BLD AUTO: SIGNIFICANT CHANGE UP
SODIUM SERPL-SCNC: 138 MMOL/L
SODIUM SERPL-SCNC: 138 MMOL/L — SIGNIFICANT CHANGE UP (ref 135–145)
SODIUM SERPL-SCNC: 139 MMOL/L
WBC # BLD: 5.59 K/UL — SIGNIFICANT CHANGE UP (ref 3.8–10.5)
WBC # FLD AUTO: 5.59 K/UL — SIGNIFICANT CHANGE UP (ref 3.8–10.5)

## 2023-07-28 NOTE — PROGRESS NOTE ADULT - NS ATTEND AMEND GEN_ALL_CORE FT
Reason for Visit: 7 year WCE    Omayra Mcneil is a 7 year old male who presents for well child exam. Patient presents with Grandma who is his legal guardian.     Concerns raised today include:   1. ADHD. He was followed by Dr. Raymond for ADHD. He was on Vyvanse 30 mg but they stopped that a year ago and he is doing well off of it. He takes melatonin before bed to help fall asleep.     2. Learning delay. He has a reading and math delay and has an IEP. He also gets ST at school for help with speech enunciation. He goes to [employer] in [place] and will be in 4th grade.     Interval History:  Birth history, medical history, surgical history, family history, medications and allergies reviewed and updated.     Diet: well balanced. Drinks milk and water. Does not like many veggies.     Sleeping: sleeping well.     Elimination: Still likes to wear pull ups but no longer has accidents.     Activity: regular swimming, riding bike     Vision/Hearing: No concerns     Dental: Brushing teeth BID, sees dentist ([place]).     Safety: discussed car and bike safety     SOCIAL:  Social History     Social History Narrative    Primary caretakers: Grandma and grandpa     Lives with: Grandparents, aunts, uncles, cousins and brother    School: [employer]     Exposure to tobacco smoke: no      DEVELOPMENT/BEHAVIOR CONCERNS:  No developmental or behavioral concerns other than in HPI    PHYSICAL EXAM:  Blood pressure 104/68, pulse 84, temperature 99 °F (37.2 °C), temperature source Oral, resp. rate 20, height 4' 6.33\" (1.38 m), weight 38.8 kg (85 lb 8.6 oz).  GEN:  Well appearing male child, nontoxic, no acute distress.  Alert and     interactive.  SKIN: Warm, normal turgor.  No cyanosis.  No bruises or lesions.  HEAD:  Normocephalic, atraumatic.   EYES:  Conjunctiva appear normal, non-injected, non-icteric.  NOSE:  Appears normal, no flaring.  EARS:  Normal pinnae, no preauricular skin tags or pit.  TMs are transparent with good     landmarks.  THROAT:  Oropharynx with moist mucus membranes and no lesions. Some caps, but significant dental caries, teeth erosion and decay   NECK:  Supple, no lymphadenopathy or masses.  HEART:  Regular rate and rhythm.  Quiet precordium.  Normal S1, S2.  No murmurs, rubs, gallops. Equal femoral pulses.  LUNGS:  Clear to auscultation bilaterally.  No wheezes, rales, rhonchi.  Normal work of breathing.  ABD:  Soft, nontender.  No organomegaly or masses.  :  Clive 1 male, Testes descended bilaterally.  and No hernia present  MSK:  Spine straight.  Normal sacrum.  EXT:  Warm, dry, without abnormalities.  NEURO:  Normal tone, bulk, strength.    ASSESSMENT:  7 year old male well child.  Normal growth  ADHD -doing well off medication   Difficulty falling asleep  Leaning delay  Dental caries     PLAN:   1. All parental concerns and questions discussed.  2. Anticipatory guidance provided, including nutrition, sleep, safety and development.  3. Immunizations up to date   4. Continue melatonin for sleep.  5. Continue IEP and speech therapy at school.  6. Advised calling his dentist and getting caries/dental decay addressed    Follow-up:  RTC in 1 year for WCE or sooner prn illness/concerns.    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 Now admitted for cycle 3A hyperCVAD (50% dose reduction)     Heme-  Today is day 2 of therapy.   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 Now admitted for cycle 3A hyperCVAD (50% dose reduction)     Heme-  Today is day 3 of therapy.   cont IVF  LP with IT MTX day 2  Is/Os, daily weights  Antiemetics  d/c home at end of chemo

## 2023-07-30 NOTE — PHYSICAL EXAM
[Fully active, able to carry on all pre-disease performance without restriction] : Status 0 - Fully active, able to carry on all pre-disease performance without restriction [Ulcers] : no ulcers [Mucositis] : no mucositis [Thrush] : no thrush [Vesicles] : no vesicles [Normal] : affect appropriate [de-identified] : alopecia [de-identified] : anicteric [de-identified] : no edema

## 2023-07-30 NOTE — RESULTS/DATA
Mychart sent.    [FreeTextEntry1] : Ms. Kaur is a pleasant 63 year-old woman with Ph- ALL s/p induction chemotherapy with hyperCVAD under the direction of Dr. Goldberg, here to establish care.

## 2023-07-30 NOTE — HISTORY OF PRESENT ILLNESS
[Disease:__________________________] : Disease: [unfilled] [de-identified] : 62 y/o F transferred from [facility] to [facility] for new diagnosis of acute leukemia, s/p initial inpatient treatment and now here for establishment of outpatient care. 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 Official bone marrow biopsy 2/28/23 showed B-cell ALL, which was [place] chromosome (-).  Chemotherapy with reduced dose HyperCVAD was started on 3/3/23. Hospital course was c/b neutropenic fever, transaminitis, a rash on 3/4 which improved with topical steroid and atarax PRN,  and also LUE cellulitis.\par  \par  Of note, on peripheral blood BCR/ABL PCR was negative, although in her bone marrow she did have PCR for BCR/ABL p190 positive at an extremely low level of 0.001%. She had a pre-treatment echocardiogram done which showed an EF 55%, and PICC line was placed. Pt started reduced dose HyperCVAD  on 3/3/23: (IV Cyclophosphamide (150 mg/m2 every 12 h on days 1-3), Dexamethasone 20 mg per day on days 1-4 and 11-14, Vincristine 2 mg IV flat dose on days 1 and 8, Daunorubicin 25 mg/m2/day IV continuous infusion over 48 hours, starting on day 4). Informed consent obtained. LP with IT MTX done on 2/28, and CSF was subsequently found to be negative for malignant cells. Pt had a developing transaminitis, and US done at [facility] had shown a fatty liver. CT A/P done at [facility] showed splenomegaly with a small age indeterminant splenic infarct, but otherwise showed a normal liver and no abdominal or pelvic lymphadenopathy. Ultimately, during hospital stay she also developed neutropenic fever, s/p panculture which was negative,. She was treated with cefepime, acyclovir and caspofungin. Course also c/b severe headache as side effect from Zofran (CTH negative). Discharged home in stable condition.\par  \par  BMBx on 4/28 which showed complete remission.  [de-identified] : Accession:                             [id number]\par  \par  Collected Date/Time:                   4/28/2023 09:20 EDT\par  Received Date/Time:                    4/28/2023 15:25 EDT\par  \par  Hematopathology Report - Auth (Verified)\par  \par  Specimen(s) Submitted\par  1  Bone marrow biopsy\par  2  Bone marrow aspirate\par  \par  Final Diagnosis\par  Specimen(s) Submitted\par  1  Bone marrow biopsy\par  2  Bone marrow aspirate\par  \par  \par  Final Diagnosis\par  1, 2. Bone marrow biopsy and bone marrow aspirate\par       - Focal hypercellularity with trilineage hematopoiesis, myeloid left\par   shift, mild megakaryocytosis, mild increase in interstitial mast cells,\par   and several lymphoid aggregates; iron stores increased (history of ALL,\par   s/p treatment)     -\par  \par  See note and description.\par  \par  Diagnostic note:\par  As per chart review, the patient was diagnosed with Ph (-) ALL, in\par   February 2023, abnormal cytogenetics,  47,XX,+X,i(9)(q10)[7]/46,XX[2],\par   FISH with Three copies of ASS1 and ABL1 detected (81.5%), somatic\par   mutations of  NRAS p.Nyv23Yfq (VAF: 29.63%),   NRAS p.[id number] (VAF:\par   14.54%), TP53 p.Lvl676Udx (VAF: 45.94%), now s/p cycle 1A (mini hyper\par   CVAD) and 1B of Hyper-CVAD, and admitted for cycle 2A (full dosing) with\par   Cyclophosphamide, Dexamethasone, Vincristine, Doxorubicin.\par  His CBC shows normocytic anemia, thrombocytosis, myeloid left\par   shift, monocytosis, and few nRBCs.  Bone marrow shows focal\par   hypercellularity with trilineage hematopoiesis, myeloid left shift,\par   mild megakaryocytosis, mild increase in interstitial mast cells, and\par   several lymphoid aggregates; iron stores increased.  Flow cytometry and\par   immunohistochemistry do not show definitive persistent lymphoblasts.\par  Comprehensive report with results of pending ancillary studies to follow.\par  \par  Ancillary studies\par  Bone marrow aspirate iron stain: Iron stores are increased; no ring\par   sideroblasts are seen.\par  \par  Flow cytometry:   The immunophenotypic findings show a subset of\par   myeloblasts (1.25% of cells with normal immunophenotype), a small\par   population of probable mast cells, abnormal myeloid antigen pattern with\par   myeloid left shift, no increase in B-lymphoblasts (small subset, 0.2%\par   of cells, positive for dim CD45, CD19, CD10, bright CD38, negative CD20,\par   possible hematogones).\par  \par  Immunohistochemical stains   (block 1A: CD3, CD10, CD20, CD79a, PAX-5, Tdt,\par   CD34) show scattered Tdt positive cell, scattered CD34 positive dells\par   (less than 3%), scattered CD10 positive cells, scattered CD79a positive\par   cells (B cells and plasma cells), interstitial T cells (positive with\par   CD3), few interstitial B cells ((positive with CD20, PAX5) and 2 small\par   lymphoid aggregates composed of T cells and greater numbers than B-cells.\par    The findings are nondiagnostic.\par  \par  Cytogenetics:  Pending\par  \par  FISH:  Pending\par  \par  Microscopic description:\par  1. Biopsy:   Sections of bone marrow biopsy and bone marrow fragments\par   in clot show focal hypercellularity (60-85% cellularity), trilineage\par   hematopoiesis with maturation, myeloid left shift, normal M:E ratio,\par   increased megakaryocytes with normal morphology, mild increase in\par   interstitial mast cells, several small lymphoid aggregates (small cells),\par   and iron stores increased.\par  \par  \par  2. Aspirate:   Cellular spicules are present, adequate for interpretation.\par    Maturing and mature myeloid and erythroid elements are present with\par   normal M:E ratio (3.0:1). Mature myeloid elements are proportionately\par   decreased  Megakaryocytes appear increased in number with normal\par   morphology.\par  \par  \par   [de-identified] : Stephanie continues Hyper-CVAD with Dr. Goldberg and team. Pending repeat marrow in early July. Requiring transfusional support occasionally, otherwise tolerating treatment with expected toxicities. On prophylactic antibiotics.

## 2023-07-31 ENCOUNTER — APPOINTMENT (OUTPATIENT)
Age: 63
End: 2023-07-31

## 2023-07-31 ENCOUNTER — APPOINTMENT (OUTPATIENT)
Age: 63
End: 2023-07-31
Payer: MEDICARE

## 2023-07-31 VITALS
OXYGEN SATURATION: 98 % | HEART RATE: 67 BPM | SYSTOLIC BLOOD PRESSURE: 143 MMHG | BODY MASS INDEX: 35.8 KG/M2 | DIASTOLIC BLOOD PRESSURE: 84 MMHG | TEMPERATURE: 96.7 F | WEIGHT: 197 LBS

## 2023-07-31 PROCEDURE — 99213 OFFICE O/P EST LOW 20 MIN: CPT

## 2023-08-03 ENCOUNTER — RESULT REVIEW (OUTPATIENT)
Age: 63
End: 2023-08-03

## 2023-08-03 ENCOUNTER — APPOINTMENT (OUTPATIENT)
Age: 63
End: 2023-08-03

## 2023-08-03 LAB
BASOPHILS # BLD AUTO: 0.07 K/UL — SIGNIFICANT CHANGE UP (ref 0–0.2)
BASOPHILS NFR BLD AUTO: 0.6 % — SIGNIFICANT CHANGE UP (ref 0–2)
EOSINOPHIL # BLD AUTO: 0.2 K/UL — SIGNIFICANT CHANGE UP (ref 0–0.5)
EOSINOPHIL NFR BLD AUTO: 1.8 % — SIGNIFICANT CHANGE UP (ref 0–6)
HCT VFR BLD CALC: 28.8 % — LOW (ref 34.5–45)
HGB BLD-MCNC: 9.2 G/DL — LOW (ref 11.5–15.5)
IMM GRANULOCYTES NFR BLD AUTO: 5.7 % — HIGH (ref 0–0.9)
LYMPHOCYTES # BLD AUTO: 0.5 K/UL — LOW (ref 1–3.3)
LYMPHOCYTES # BLD AUTO: 4.6 % — LOW (ref 13–44)
MCHC RBC-ENTMCNC: 31.7 PG — SIGNIFICANT CHANGE UP (ref 27–34)
MCHC RBC-ENTMCNC: 31.9 GM/DL — LOW (ref 32–36)
MCV RBC AUTO: 99.3 FL — SIGNIFICANT CHANGE UP (ref 80–100)
MONOCYTES # BLD AUTO: 0.6 K/UL — SIGNIFICANT CHANGE UP (ref 0–0.9)
MONOCYTES NFR BLD AUTO: 5.5 % — SIGNIFICANT CHANGE UP (ref 2–14)
NEUTROPHILS # BLD AUTO: 8.98 K/UL — HIGH (ref 1.8–7.4)
NEUTROPHILS NFR BLD AUTO: 81.8 % — HIGH (ref 43–77)
NRBC # BLD: 0 /100 WBCS — SIGNIFICANT CHANGE UP (ref 0–0)
PLATELET # BLD AUTO: 45 K/UL — LOW (ref 150–400)
RBC # BLD: 2.9 M/UL — LOW (ref 3.8–5.2)
RBC # FLD: 18.4 % — HIGH (ref 10.3–14.5)
WBC # BLD: 10.98 K/UL — HIGH (ref 3.8–10.5)
WBC # FLD AUTO: 10.98 K/UL — HIGH (ref 3.8–10.5)

## 2023-08-07 ENCOUNTER — RESULT REVIEW (OUTPATIENT)
Age: 63
End: 2023-08-07

## 2023-08-07 ENCOUNTER — APPOINTMENT (OUTPATIENT)
Age: 63
End: 2023-08-07

## 2023-08-07 LAB
ABO + RH PNL BLD: NORMAL
BASOPHILS # BLD AUTO: 0.05 K/UL — SIGNIFICANT CHANGE UP (ref 0–0.2)
BASOPHILS NFR BLD AUTO: 0.7 % — SIGNIFICANT CHANGE UP (ref 0–2)
BLD GP AB SCN SERPL QL: NORMAL
EOSINOPHIL # BLD AUTO: 0.16 K/UL — SIGNIFICANT CHANGE UP (ref 0–0.5)
EOSINOPHIL NFR BLD AUTO: 2.3 % — SIGNIFICANT CHANGE UP (ref 0–6)
HCT VFR BLD CALC: 30.3 % — LOW (ref 34.5–45)
HGB BLD-MCNC: 9.6 G/DL — LOW (ref 11.5–15.5)
IMM GRANULOCYTES NFR BLD AUTO: 1.6 % — HIGH (ref 0–0.9)
LYMPHOCYTES # BLD AUTO: 0.69 K/UL — LOW (ref 1–3.3)
LYMPHOCYTES # BLD AUTO: 9.8 % — LOW (ref 13–44)
MCHC RBC-ENTMCNC: 31.7 GM/DL — LOW (ref 32–36)
MCHC RBC-ENTMCNC: 32.1 PG — SIGNIFICANT CHANGE UP (ref 27–34)
MCV RBC AUTO: 101.3 FL — HIGH (ref 80–100)
MONOCYTES # BLD AUTO: 0.46 K/UL — SIGNIFICANT CHANGE UP (ref 0–0.9)
MONOCYTES NFR BLD AUTO: 6.6 % — SIGNIFICANT CHANGE UP (ref 2–14)
NEUTROPHILS # BLD AUTO: 5.54 K/UL — SIGNIFICANT CHANGE UP (ref 1.8–7.4)
NEUTROPHILS NFR BLD AUTO: 79 % — HIGH (ref 43–77)
NRBC # BLD: 0 /100 WBCS — SIGNIFICANT CHANGE UP (ref 0–0)
PLATELET # BLD AUTO: 73 K/UL — LOW (ref 150–400)
RBC # BLD: 2.99 M/UL — LOW (ref 3.8–5.2)
RBC # FLD: 19.5 % — HIGH (ref 10.3–14.5)
WBC # BLD: 7.01 K/UL — SIGNIFICANT CHANGE UP (ref 3.8–10.5)
WBC # FLD AUTO: 7.01 K/UL — SIGNIFICANT CHANGE UP (ref 3.8–10.5)

## 2023-08-10 ENCOUNTER — RESULT REVIEW (OUTPATIENT)
Age: 63
End: 2023-08-10

## 2023-08-10 ENCOUNTER — APPOINTMENT (OUTPATIENT)
Dept: HEMATOLOGY ONCOLOGY | Facility: CLINIC | Age: 63
End: 2023-08-10
Payer: MEDICARE

## 2023-08-10 VITALS
BODY MASS INDEX: 35.49 KG/M2 | HEART RATE: 91 BPM | OXYGEN SATURATION: 97 % | DIASTOLIC BLOOD PRESSURE: 84 MMHG | SYSTOLIC BLOOD PRESSURE: 148 MMHG | TEMPERATURE: 96.4 F | WEIGHT: 195.33 LBS | RESPIRATION RATE: 16 BRPM

## 2023-08-10 LAB
BASOPHILS # BLD AUTO: 0.04 K/UL — SIGNIFICANT CHANGE UP (ref 0–0.2)
BASOPHILS NFR BLD AUTO: 1.1 % — SIGNIFICANT CHANGE UP (ref 0–2)
EOSINOPHIL # BLD AUTO: 0.09 K/UL — SIGNIFICANT CHANGE UP (ref 0–0.5)
EOSINOPHIL NFR BLD AUTO: 2.4 % — SIGNIFICANT CHANGE UP (ref 0–6)
HCT VFR BLD CALC: 28.5 % — LOW (ref 34.5–45)
HGB BLD-MCNC: 9.3 G/DL — LOW (ref 11.5–15.5)
IMM GRANULOCYTES NFR BLD AUTO: 1.1 % — HIGH (ref 0–0.9)
LYMPHOCYTES # BLD AUTO: 0.45 K/UL — LOW (ref 1–3.3)
LYMPHOCYTES # BLD AUTO: 11.9 % — LOW (ref 13–44)
MCHC RBC-ENTMCNC: 32.6 G/DL — SIGNIFICANT CHANGE UP (ref 32–36)
MCHC RBC-ENTMCNC: 33.1 PG — SIGNIFICANT CHANGE UP (ref 27–34)
MCV RBC AUTO: 101.4 FL — HIGH (ref 80–100)
MONOCYTES # BLD AUTO: 0.61 K/UL — SIGNIFICANT CHANGE UP (ref 0–0.9)
MONOCYTES NFR BLD AUTO: 16.2 % — HIGH (ref 2–14)
NEUTROPHILS # BLD AUTO: 2.54 K/UL — SIGNIFICANT CHANGE UP (ref 1.8–7.4)
NEUTROPHILS NFR BLD AUTO: 67.3 % — SIGNIFICANT CHANGE UP (ref 43–77)
NRBC # BLD: 0 /100 WBCS — SIGNIFICANT CHANGE UP (ref 0–0)
PLATELET # BLD AUTO: 71 K/UL — LOW (ref 150–400)
RBC # BLD: 2.81 M/UL — LOW (ref 3.8–5.2)
RBC # FLD: 20.3 % — HIGH (ref 10.3–14.5)
WBC # BLD: 3.77 K/UL — LOW (ref 3.8–10.5)
WBC # FLD AUTO: 3.77 K/UL — LOW (ref 3.8–10.5)

## 2023-08-10 PROCEDURE — 99214 OFFICE O/P EST MOD 30 MIN: CPT

## 2023-08-10 NOTE — ASSESSMENT
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    Previously extensively educated patient on her disease process and explained rationale behind treating her with Hyper-CVAD for now. s/p cycle 2B patient with another BMBx which showed continued CR with continued negative MRD on Clonoseq testing. (had this result after cycle 1B as well). Previously educated patient as well on the Clonoseq testing as a tool to assess measurable residual disease (MRD) using NGS technology. She has a mediport in place and doing well.   Now s/p cycle 3A which has been dose reduced by 50%. Treatment now is: A Cycles: Cyclophosphamide 150 mg/m2 IV [id number] on days 1-3 (w/ mesna) Vincristine 1 mg flat dose IV on days 4 and 11 (we have been giving on day 8) Doxorubicin 25 mg/m2 IV CIVI over 48 hours starting on day 4 Dexamethasone 20 mg PO daily days 1-4, days 11-14.   B Cycles: Methotrexate 100 mg/m2 IV on day 1, then 400 mg/m2 over 22 hours.(w/ leucovorin rescue)  Liana-C 1000 mg/m2 x09efjmn on days 2-3 For both A and B cycles will receive IT MTX 12 mg on day 2 and IT Cytarabine 100 mg on day 7 or 8.   Plan: -Patient tolerated cycle 3A well due to reduced dose. Today is day 25.  -CBC today showing counts are relatively stable she is not neutropenic, however neutrophil counts are dropping at this point and platelets are stable in the 70s - plan is to repeat CBC out in [place] with Dr. Lewis Monday 8/14 prior to planning any admission for cycle 3B -Patient can continue off antibacterial and antifungal ppx at this time, but to continue PCP ppx and antiviral ppx. -Patient without any transfusional needs this cycle.  -Discussed with Dr. Lewis, given her residence is out in [place] he will continue to see her to assist us in transfusional support and limited chemotherapy. -Discussed future plans extensively - given the fact that she had clearance of MRD after cycle 1A and 1B, will attempt for cure with chemotherapy alone. Will plan for cycle 3-4 with 50% dose reduction to decrease toxicity. MRD testing has been negative up until this point precluding the use of Blincyto, but after treatment is complete will  -Patient understands and agrees with plan. All information explained to the best of my ability.  -RTC after cycle 3B.

## 2023-08-10 NOTE — HISTORY OF PRESENT ILLNESS
[Disease:__________________________] : Disease: [unfilled] [Therapy: ___] : Therapy: [unfilled] [Cycle: ___] : Cycle: [unfilled] [Day: ___] : Day: [unfilled] [de-identified] : 64 y/o F transferred from [facility] to [facility] for new diagnosis of acute leukemia, s/p initial inpatient treatment and now here for establishment of outpatient care. 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 Official bone marrow biopsy 2/28/23 showed B-cell ALL, which was [place] chromosome (-).  Chemotherapy with reduced dose HyperCVAD was started on 3/3/23. Hospital course was c/b neutropenic fever, transaminitis, a rash on 3/4 which improved with topical steroid and atarax PRN,  and also LUE cellulitis.  Of note, on peripheral blood BCR/ABL PCR was negative, although in her bone marrow she did have PCR for BCR/ABL p190 positive at an extremely low level of 0.001%. She had a pre-treatment echocardiogram done which showed an EF 55%, and PICC line was placed. Pt started reduced dose HyperCVAD  on 3/3/23: (IV Cyclophosphamide (150 mg/m2 every 12 h on days 1-3), Dexamethasone 20 mg per day on days 1-4 and 11-14, Vincristine 2 mg IV flat dose on days 1 and 8, Daunorubicin 25 mg/m2/day IV continuous infusion over 48 hours, starting on day 4). Informed consent obtained. LP with IT MTX done on 2/28, and CSF was subsequently found to be negative for malignant cells. Pt had a developing transaminitis, and US done at [facility] had shown a fatty liver. CT A/P done at [facility] showed splenomegaly with a small age indeterminant splenic infarct, but otherwise showed a normal liver and no abdominal or pelvic lymphadenopathy. Ultimately, during hospital stay she also developed neutropenic fever, s/p panculture which was negative,. She was treated with cefepime, acyclovir and caspofungin. Course also c/b severe headache as side effect from Zofran (CTH negative). Discharged home in stable condition.  Now s/p cycle 1B had BMBx on 4/28 which showed complete remission and Clonoseq showed MRD-.  After cycle 1A doses were escalated to full HyperCVAD.  Now s/p cycle 2B and had BMBx - also with continued complete remission.  Awaiting Clonoseq.  [de-identified] : Patient seen for follow up on 08/10/2023. Patient reporting a very small scotoma since Sunday 8/6 in the R eye on the periphery, very small. No pain, vision otherwise normal. She otherwise feels very well. She reported that she had bad nausea after day 8 IT cytarabine. Got Reglan as premedication for vincristine prior to the IT treatment. No fevers or chills, no pain symptoms. Appetite is normal and she feels full strength.

## 2023-08-10 NOTE — RESULTS/DATA
[FreeTextEntry1] : Accession:                             [id number]\par  \par  Collected Date/Time:                   7/7/2023 09:00 EDT\par  Received Date/Time:                    7/7/2023 14:47 EDT\par  \par  Hematopathology Report - Auth (Verified)\par  \par  Specimen(s) Submitted\par  1  Bone marrow biopsy\par  2  Bone marrow aspirate\par  \par  Final Diagnosis\par  1, 2. Bone marrow biopsy and bone marrow aspirate\par  - Trilineage hematopoiesis with maturation, erythroid hyperplasia,\par  mild increase in interstitial mast cells, and increased iron stores\par  \par  (history of B-ALL, s/p treatment)\par  \par  See note and description.\par  \par  Diagnostic note:\par  As per chart review, the patient was diagnosed with Ph (-) ALL, in\par  February 2023, abnormal cytogenetics,  47,XX,+X,i(9)(q10)[7]/46,XX[2],\par  FISH with Three copies of ASS1 and ABL1 detected (81.5%), somatic\par  mutations of  NRAS p.Ohq74Pwl (VAF: 29.63%),   NRAS p.Cgl98Jqr (VAF:\par  14.54%), TP53 p.Ysf360Vae (VAF: 45.94%), s/p cycle 1A (mini hyper CVAD)\par  and 1B of Hyper-CVAD, with Clonoseq report considered negative, now s/p\par  cycle 2A of Hyper-CVAD (was dose reduced with cycle 1A).\par  CBC/differential shows mild normocytic anemia with mild myeloid left\par  shift.  The bone marrow shows trilineage hematopoiesis with maturation,\par  erythroid hyperplasia, mild increase in interstitial mast cells,\par  and increased iron stores.  Flow cytometry shows no increase in B\par  lymphoblasts and immunohistochemistry does not demonstrate definitive\par  persistent disease.  Correlation with MRD study is necessary.\par  \par  Comprehensive report with results of pending ancillary studies to follow.\par  \par  Ancillary studies\par  Bone marrow aspirate iron stain:   Iron stores are increased; no ring\par  sideroblasts are seen.\par  \par  Flow cytometry:   CD45/side scatter shows no significant blast population\par  with lymphopenia. There is no increase in CD34 or CD10/CD19 positive\par  cells, and absent B-cells. Myeloid antigen pattern is normal with CD13,\par  CD16 and CD11b (no significant hemodilution).  There is no increase in\par  myeloblasts (0.97% myeloblasts with normal immunophenotype).\par  \par  Immunohistochemical stains   (block 1A: CD10, CD20, CD79a, PAX-5, Tdt, CD34)\par  show scattered Tdt positive cell, scattered CD34 positive dells (less\par  than 3%), scattered CD10 positive cells, scattered CD79a positive cells\par  (B cells and plasma cells, few interstitial B cells ((positive with CD20,\par  PAX5).\par  \par  Cytogenetics:   Pending\par  \par  Molecular: Pending\par  \par  Microscopic description:\par  1. Biopsy:   Sections of bone marrow biopsy and scant bone marrow fragments\par  in clot show normocellularity (50-70% cellularity with less cellular\par  foci), trilineage hematopoiesis with maturation, erythroid predominance,\par  megakaryocytes normal in number and morphology, mild increase in\par  interstitial mast cells, and iron stores increased.\par  \par  2. Aspirate:   Cellular spicules are present, adequate for interpretation.\par  Maturing and mature myeloid and erythroid elements are present with\par  erythroid predominance (M:E ratio 1.06:1).  Megakaryocytes appear normal\par  in number and morphology.\par  \par

## 2023-08-10 NOTE — PHYSICAL EXAM
[Fully active, able to carry on all pre-disease performance without restriction] : Status 0 - Fully active, able to carry on all pre-disease performance without restriction [Normal] : affect appropriate [Ulcers] : no ulcers [Mucositis] : no mucositis [Thrush] : no thrush [Vesicles] : no vesicles [de-identified] : BMI 35.81 [de-identified] : (+) cracked upper right tooth no signs of infection [de-identified] : supple [de-identified] : (+)S1S2 RRR [de-identified] : no edema [de-identified] : no pain [de-identified] : (+) 3 small bruises on right forearm approx 0.5cm

## 2023-08-10 NOTE — REVIEW OF SYSTEMS
[Negative] : Allergic/Immunologic [Vision Problems] : no vision problems [Dysphagia] : no dysphagia [Nosebleeds] : no nosebleeds [Odynophagia] : no odynophagia [Chest Pain] : no chest pain [Palpitations] : no palpitations [Lower Ext Edema] : no lower extremity edema [Dysuria] : no dysuria [Skin Rash] : no skin rash [Skin Wound] : no skin wound [Insomnia] : no insomnia [Anxiety] : no anxiety [Hot Flashes] : no hot flashes [FreeTextEntry4] : (+) cracked upper tooth [de-identified] : small area of bruising on right forearm

## 2023-08-11 ENCOUNTER — NON-APPOINTMENT (OUTPATIENT)
Age: 63
End: 2023-08-11

## 2023-08-11 LAB
ALBUMIN SERPL ELPH-MCNC: 3.8 G/DL
ALP BLD-CCNC: 83 U/L
ALT SERPL-CCNC: 25 U/L
ANION GAP SERPL CALC-SCNC: 9 MMOL/L
AST SERPL-CCNC: 23 U/L
BILIRUB SERPL-MCNC: 0.4 MG/DL
BUN SERPL-MCNC: 12 MG/DL
CALCIUM SERPL-MCNC: 9.2 MG/DL
CHLORIDE SERPL-SCNC: 105 MMOL/L
CO2 SERPL-SCNC: 27 MMOL/L
CREAT SERPL-MCNC: 0.97 MG/DL
EGFR: 65 ML/MIN/1.73M2
GLUCOSE SERPL-MCNC: 88 MG/DL
LDH SERPL-CCNC: 185 U/L
POTASSIUM SERPL-SCNC: 4.7 MMOL/L
PROT SERPL-MCNC: 6 G/DL
SODIUM SERPL-SCNC: 141 MMOL/L

## 2023-08-14 ENCOUNTER — RESULT REVIEW (OUTPATIENT)
Age: 63
End: 2023-08-14

## 2023-08-14 ENCOUNTER — APPOINTMENT (OUTPATIENT)
Age: 63
End: 2023-08-14

## 2023-08-14 LAB
BASOPHILS # BLD AUTO: 0.03 K/UL — SIGNIFICANT CHANGE UP (ref 0–0.2)
BASOPHILS NFR BLD AUTO: 0.9 % — SIGNIFICANT CHANGE UP (ref 0–2)
EOSINOPHIL # BLD AUTO: 0.06 K/UL — SIGNIFICANT CHANGE UP (ref 0–0.5)
EOSINOPHIL NFR BLD AUTO: 1.7 % — SIGNIFICANT CHANGE UP (ref 0–6)
HCT VFR BLD CALC: 29.1 % — LOW (ref 34.5–45)
HGB BLD-MCNC: 9.7 G/DL — LOW (ref 11.5–15.5)
IMM GRANULOCYTES NFR BLD AUTO: 0.9 % — SIGNIFICANT CHANGE UP (ref 0–0.9)
LYMPHOCYTES # BLD AUTO: 0.67 K/UL — LOW (ref 1–3.3)
LYMPHOCYTES # BLD AUTO: 19.5 % — SIGNIFICANT CHANGE UP (ref 13–44)
MCHC RBC-ENTMCNC: 33.3 GM/DL — SIGNIFICANT CHANGE UP (ref 32–36)
MCHC RBC-ENTMCNC: 33.8 PG — SIGNIFICANT CHANGE UP (ref 27–34)
MCV RBC AUTO: 101.4 FL — HIGH (ref 80–100)
MONOCYTES # BLD AUTO: 0.43 K/UL — SIGNIFICANT CHANGE UP (ref 0–0.9)
MONOCYTES NFR BLD AUTO: 12.5 % — SIGNIFICANT CHANGE UP (ref 2–14)
NEUTROPHILS # BLD AUTO: 2.22 K/UL — SIGNIFICANT CHANGE UP (ref 1.8–7.4)
NEUTROPHILS NFR BLD AUTO: 64.5 % — SIGNIFICANT CHANGE UP (ref 43–77)
NRBC # BLD: 0 /100 WBCS — SIGNIFICANT CHANGE UP (ref 0–0)
PLATELET # BLD AUTO: 64 K/UL — LOW (ref 150–400)
RBC # BLD: 2.87 M/UL — LOW (ref 3.8–5.2)
RBC # FLD: 20.5 % — HIGH (ref 10.3–14.5)
WBC # BLD: 3.44 K/UL — LOW (ref 3.8–10.5)
WBC # FLD AUTO: 3.44 K/UL — LOW (ref 3.8–10.5)

## 2023-08-18 ENCOUNTER — NON-APPOINTMENT (OUTPATIENT)
Age: 63
End: 2023-08-18

## 2023-08-22 ENCOUNTER — RESULT REVIEW (OUTPATIENT)
Age: 63
End: 2023-08-22

## 2023-08-22 ENCOUNTER — APPOINTMENT (OUTPATIENT)
Age: 63
End: 2023-08-22

## 2023-08-22 LAB
BASOPHILS # BLD AUTO: 0.02 K/UL — SIGNIFICANT CHANGE UP (ref 0–0.2)
BASOPHILS NFR BLD AUTO: 0.8 % — SIGNIFICANT CHANGE UP (ref 0–2)
EOSINOPHIL # BLD AUTO: 0.15 K/UL — SIGNIFICANT CHANGE UP (ref 0–0.5)
EOSINOPHIL NFR BLD AUTO: 5.8 % — SIGNIFICANT CHANGE UP (ref 0–6)
HCT VFR BLD CALC: 33 % — LOW (ref 34.5–45)
HGB BLD-MCNC: 10.7 G/DL — LOW (ref 11.5–15.5)
IMM GRANULOCYTES NFR BLD AUTO: 0.4 % — SIGNIFICANT CHANGE UP (ref 0–0.9)
LYMPHOCYTES # BLD AUTO: 0.99 K/UL — LOW (ref 1–3.3)
LYMPHOCYTES # BLD AUTO: 38.1 % — SIGNIFICANT CHANGE UP (ref 13–44)
MCHC RBC-ENTMCNC: 32.4 GM/DL — SIGNIFICANT CHANGE UP (ref 32–36)
MCHC RBC-ENTMCNC: 33.6 PG — SIGNIFICANT CHANGE UP (ref 27–34)
MCV RBC AUTO: 103.8 FL — HIGH (ref 80–100)
MONOCYTES # BLD AUTO: 0.43 K/UL — SIGNIFICANT CHANGE UP (ref 0–0.9)
MONOCYTES NFR BLD AUTO: 16.5 % — HIGH (ref 2–14)
NEUTROPHILS # BLD AUTO: 1 K/UL — LOW (ref 1.8–7.4)
NEUTROPHILS NFR BLD AUTO: 38.4 % — LOW (ref 43–77)
NRBC # BLD: 0 /100 WBCS — SIGNIFICANT CHANGE UP (ref 0–0)
PLATELET # BLD AUTO: 56 K/UL — LOW (ref 150–400)
RBC # BLD: 3.18 M/UL — LOW (ref 3.8–5.2)
RBC # FLD: 19.8 % — HIGH (ref 10.3–14.5)
WBC # BLD: 2.6 K/UL — LOW (ref 3.8–10.5)
WBC # FLD AUTO: 2.6 K/UL — LOW (ref 3.8–10.5)

## 2023-08-23 ENCOUNTER — NON-APPOINTMENT (OUTPATIENT)
Age: 63
End: 2023-08-23

## 2023-08-25 ENCOUNTER — RESULT REVIEW (OUTPATIENT)
Age: 63
End: 2023-08-25

## 2023-08-25 ENCOUNTER — NON-APPOINTMENT (OUTPATIENT)
Age: 63
End: 2023-08-25

## 2023-08-25 ENCOUNTER — APPOINTMENT (OUTPATIENT)
Age: 63
End: 2023-08-25

## 2023-08-25 LAB
ALBUMIN SERPL ELPH-MCNC: 4 G/DL — SIGNIFICANT CHANGE UP (ref 3.3–5)
ALP SERPL-CCNC: 89 U/L — SIGNIFICANT CHANGE UP (ref 40–120)
ALT FLD-CCNC: 25 U/L — SIGNIFICANT CHANGE UP (ref 10–45)
ANION GAP SERPL CALC-SCNC: 12 MMOL/L — SIGNIFICANT CHANGE UP (ref 5–17)
AST SERPL-CCNC: 27 U/L — SIGNIFICANT CHANGE UP (ref 10–40)
BASOPHILS # BLD AUTO: 0.02 K/UL — SIGNIFICANT CHANGE UP (ref 0–0.2)
BASOPHILS NFR BLD AUTO: 0.7 % — SIGNIFICANT CHANGE UP (ref 0–2)
BILIRUB SERPL-MCNC: 0.5 MG/DL — SIGNIFICANT CHANGE UP (ref 0.2–1.2)
BUN SERPL-MCNC: 10 MG/DL — SIGNIFICANT CHANGE UP (ref 7–23)
CALCIUM SERPL-MCNC: 9.3 MG/DL — SIGNIFICANT CHANGE UP (ref 8.4–10.5)
CHLORIDE SERPL-SCNC: 105 MMOL/L — SIGNIFICANT CHANGE UP (ref 96–108)
CO2 SERPL-SCNC: 22 MMOL/L — SIGNIFICANT CHANGE UP (ref 22–31)
CREAT SERPL-MCNC: 0.95 MG/DL — SIGNIFICANT CHANGE UP (ref 0.5–1.3)
EGFR: 66 ML/MIN/1.73M2 — SIGNIFICANT CHANGE UP
EOSINOPHIL # BLD AUTO: 0.13 K/UL — SIGNIFICANT CHANGE UP (ref 0–0.5)
EOSINOPHIL NFR BLD AUTO: 4.7 % — SIGNIFICANT CHANGE UP (ref 0–6)
GLUCOSE SERPL-MCNC: 98 MG/DL — SIGNIFICANT CHANGE UP (ref 70–99)
HCT VFR BLD CALC: 32.5 % — LOW (ref 34.5–45)
HGB BLD-MCNC: 10.9 G/DL — LOW (ref 11.5–15.5)
IMM GRANULOCYTES NFR BLD AUTO: 0.4 % — SIGNIFICANT CHANGE UP (ref 0–0.9)
LYMPHOCYTES # BLD AUTO: 0.92 K/UL — LOW (ref 1–3.3)
LYMPHOCYTES # BLD AUTO: 33 % — SIGNIFICANT CHANGE UP (ref 13–44)
MCHC RBC-ENTMCNC: 33.5 GM/DL — SIGNIFICANT CHANGE UP (ref 32–36)
MCHC RBC-ENTMCNC: 34.4 PG — HIGH (ref 27–34)
MCV RBC AUTO: 102.5 FL — HIGH (ref 80–100)
MONOCYTES # BLD AUTO: 0.41 K/UL — SIGNIFICANT CHANGE UP (ref 0–0.9)
MONOCYTES NFR BLD AUTO: 14.7 % — HIGH (ref 2–14)
NEUTROPHILS # BLD AUTO: 1.3 K/UL — LOW (ref 1.8–7.4)
NEUTROPHILS NFR BLD AUTO: 46.5 % — SIGNIFICANT CHANGE UP (ref 43–77)
NRBC # BLD: 0 /100 WBCS — SIGNIFICANT CHANGE UP (ref 0–0)
PLATELET # BLD AUTO: 57 K/UL — LOW (ref 150–400)
POTASSIUM SERPL-MCNC: 4.6 MMOL/L — SIGNIFICANT CHANGE UP (ref 3.5–5.3)
POTASSIUM SERPL-SCNC: 4.6 MMOL/L — SIGNIFICANT CHANGE UP (ref 3.5–5.3)
PROT SERPL-MCNC: 6 G/DL — SIGNIFICANT CHANGE UP (ref 6–8.3)
RBC # BLD: 3.17 M/UL — LOW (ref 3.8–5.2)
RBC # FLD: 18.9 % — HIGH (ref 10.3–14.5)
SODIUM SERPL-SCNC: 138 MMOL/L — SIGNIFICANT CHANGE UP (ref 135–145)
WBC # BLD: 2.79 K/UL — LOW (ref 3.8–10.5)
WBC # FLD AUTO: 2.79 K/UL — LOW (ref 3.8–10.5)

## 2023-08-28 ENCOUNTER — APPOINTMENT (OUTPATIENT)
Age: 63
End: 2023-08-28

## 2023-08-28 ENCOUNTER — RESULT REVIEW (OUTPATIENT)
Age: 63
End: 2023-08-28

## 2023-08-28 LAB
BASOPHILS # BLD AUTO: 0.02 K/UL — SIGNIFICANT CHANGE UP (ref 0–0.2)
BASOPHILS NFR BLD AUTO: 0.6 % — SIGNIFICANT CHANGE UP (ref 0–2)
EOSINOPHIL # BLD AUTO: 0.09 K/UL — SIGNIFICANT CHANGE UP (ref 0–0.5)
EOSINOPHIL NFR BLD AUTO: 2.5 % — SIGNIFICANT CHANGE UP (ref 0–6)
HCT VFR BLD CALC: 33.3 % — LOW (ref 34.5–45)
HGB BLD-MCNC: 11.1 G/DL — LOW (ref 11.5–15.5)
IMM GRANULOCYTES NFR BLD AUTO: 0.3 % — SIGNIFICANT CHANGE UP (ref 0–0.9)
LYMPHOCYTES # BLD AUTO: 0.95 K/UL — LOW (ref 1–3.3)
LYMPHOCYTES # BLD AUTO: 26.2 % — SIGNIFICANT CHANGE UP (ref 13–44)
MCHC RBC-ENTMCNC: 33.3 GM/DL — SIGNIFICANT CHANGE UP (ref 32–36)
MCHC RBC-ENTMCNC: 34 PG — SIGNIFICANT CHANGE UP (ref 27–34)
MCV RBC AUTO: 102.1 FL — HIGH (ref 80–100)
MONOCYTES # BLD AUTO: 0.51 K/UL — SIGNIFICANT CHANGE UP (ref 0–0.9)
MONOCYTES NFR BLD AUTO: 14.1 % — HIGH (ref 2–14)
NEUTROPHILS # BLD AUTO: 2.04 K/UL — SIGNIFICANT CHANGE UP (ref 1.8–7.4)
NEUTROPHILS NFR BLD AUTO: 56.3 % — SIGNIFICANT CHANGE UP (ref 43–77)
NRBC # BLD: 0 /100 WBCS — SIGNIFICANT CHANGE UP (ref 0–0)
PLATELET # BLD AUTO: 54 K/UL — LOW (ref 150–400)
RBC # BLD: 3.26 M/UL — LOW (ref 3.8–5.2)
RBC # FLD: 18.3 % — HIGH (ref 10.3–14.5)
WBC # BLD: 3.62 K/UL — LOW (ref 3.8–10.5)
WBC # FLD AUTO: 3.62 K/UL — LOW (ref 3.8–10.5)

## 2023-09-01 ENCOUNTER — APPOINTMENT (OUTPATIENT)
Age: 63
End: 2023-09-01

## 2023-09-01 ENCOUNTER — NON-APPOINTMENT (OUTPATIENT)
Age: 63
End: 2023-09-01

## 2023-09-01 ENCOUNTER — RESULT REVIEW (OUTPATIENT)
Age: 63
End: 2023-09-01

## 2023-09-01 LAB
BASOPHILS # BLD AUTO: 0.02 K/UL — SIGNIFICANT CHANGE UP (ref 0–0.2)
BASOPHILS NFR BLD AUTO: 0.6 % — SIGNIFICANT CHANGE UP (ref 0–2)
EOSINOPHIL # BLD AUTO: 0.08 K/UL — SIGNIFICANT CHANGE UP (ref 0–0.5)
EOSINOPHIL NFR BLD AUTO: 2.3 % — SIGNIFICANT CHANGE UP (ref 0–6)
HCT VFR BLD CALC: 34.7 % — SIGNIFICANT CHANGE UP (ref 34.5–45)
HGB BLD-MCNC: 11.6 G/DL — SIGNIFICANT CHANGE UP (ref 11.5–15.5)
IMM GRANULOCYTES NFR BLD AUTO: 0.3 % — SIGNIFICANT CHANGE UP (ref 0–0.9)
LYMPHOCYTES # BLD AUTO: 0.73 K/UL — LOW (ref 1–3.3)
LYMPHOCYTES # BLD AUTO: 21.2 % — SIGNIFICANT CHANGE UP (ref 13–44)
MCHC RBC-ENTMCNC: 33.4 GM/DL — SIGNIFICANT CHANGE UP (ref 32–36)
MCHC RBC-ENTMCNC: 34.1 PG — HIGH (ref 27–34)
MCV RBC AUTO: 102.1 FL — HIGH (ref 80–100)
MONOCYTES # BLD AUTO: 0.52 K/UL — SIGNIFICANT CHANGE UP (ref 0–0.9)
MONOCYTES NFR BLD AUTO: 15.1 % — HIGH (ref 2–14)
NEUTROPHILS # BLD AUTO: 2.09 K/UL — SIGNIFICANT CHANGE UP (ref 1.8–7.4)
NEUTROPHILS NFR BLD AUTO: 60.5 % — SIGNIFICANT CHANGE UP (ref 43–77)
NRBC # BLD: 0 /100 WBCS — SIGNIFICANT CHANGE UP (ref 0–0)
PLATELET # BLD AUTO: 49 K/UL — LOW (ref 150–400)
RBC # BLD: 3.4 M/UL — LOW (ref 3.8–5.2)
RBC # FLD: 17.4 % — HIGH (ref 10.3–14.5)
WBC # BLD: 3.45 K/UL — LOW (ref 3.8–10.5)
WBC # FLD AUTO: 3.45 K/UL — LOW (ref 3.8–10.5)

## 2023-09-07 ENCOUNTER — OUTPATIENT (OUTPATIENT)
Dept: OUTPATIENT SERVICES | Facility: HOSPITAL | Age: 63
LOS: 1 days | Discharge: HOME | End: 2023-09-07

## 2023-09-07 DIAGNOSIS — C95.90 LEUKEMIA, UNSPECIFIED NOT HAVING ACHIEVED REMISSION: ICD-10-CM

## 2023-09-08 ENCOUNTER — RESULT REVIEW (OUTPATIENT)
Age: 63
End: 2023-09-08

## 2023-09-08 ENCOUNTER — APPOINTMENT (OUTPATIENT)
Dept: HEMATOLOGY ONCOLOGY | Facility: CLINIC | Age: 63
End: 2023-09-08
Payer: MEDICARE

## 2023-09-08 ENCOUNTER — LABORATORY RESULT (OUTPATIENT)
Age: 63
End: 2023-09-08

## 2023-09-08 VITALS
HEART RATE: 62 BPM | HEIGHT: 62.2 IN | WEIGHT: 196.21 LBS | OXYGEN SATURATION: 98 % | DIASTOLIC BLOOD PRESSURE: 81 MMHG | TEMPERATURE: 98 F | RESPIRATION RATE: 16 BRPM | BODY MASS INDEX: 35.65 KG/M2 | SYSTOLIC BLOOD PRESSURE: 144 MMHG

## 2023-09-08 LAB
ALBUMIN SERPL ELPH-MCNC: 4.4 G/DL
ALP BLD-CCNC: 88 U/L
ALT SERPL-CCNC: 27 U/L
ANION GAP SERPL CALC-SCNC: 10 MMOL/L
AST SERPL-CCNC: 25 U/L
BASOPHILS # BLD AUTO: 0.01 K/UL — SIGNIFICANT CHANGE UP (ref 0–0.2)
BASOPHILS NFR BLD AUTO: 0.3 % — SIGNIFICANT CHANGE UP (ref 0–2)
BILIRUB SERPL-MCNC: 0.5 MG/DL
BUN SERPL-MCNC: 10 MG/DL
CALCIUM SERPL-MCNC: 9.5 MG/DL
CHLORIDE SERPL-SCNC: 105 MMOL/L
CO2 SERPL-SCNC: 24 MMOL/L
CREAT SERPL-MCNC: 0.85 MG/DL
EGFR: 76 ML/MIN/1.73M2
EOSINOPHIL # BLD AUTO: 0.16 K/UL — SIGNIFICANT CHANGE UP (ref 0–0.5)
EOSINOPHIL NFR BLD AUTO: 5.2 % — SIGNIFICANT CHANGE UP (ref 0–6)
GLUCOSE SERPL-MCNC: 89 MG/DL
HCT VFR BLD CALC: 33.5 % — LOW (ref 34.5–45)
HGB BLD-MCNC: 11.3 G/DL — LOW (ref 11.5–15.5)
IMM GRANULOCYTES NFR BLD AUTO: 0 % — SIGNIFICANT CHANGE UP (ref 0–0.9)
LDH SERPL-CCNC: 159 U/L
LYMPHOCYTES # BLD AUTO: 0.67 K/UL — LOW (ref 1–3.3)
LYMPHOCYTES # BLD AUTO: 22 % — SIGNIFICANT CHANGE UP (ref 13–44)
MCHC RBC-ENTMCNC: 33.7 G/DL — SIGNIFICANT CHANGE UP (ref 32–36)
MCHC RBC-ENTMCNC: 34.7 PG — HIGH (ref 27–34)
MCV RBC AUTO: 102.8 FL — HIGH (ref 80–100)
MONOCYTES # BLD AUTO: 0.47 K/UL — SIGNIFICANT CHANGE UP (ref 0–0.9)
MONOCYTES NFR BLD AUTO: 15.4 % — HIGH (ref 2–14)
NEUTROPHILS # BLD AUTO: 1.74 K/UL — LOW (ref 1.8–7.4)
NEUTROPHILS NFR BLD AUTO: 57.1 % — SIGNIFICANT CHANGE UP (ref 43–77)
NRBC # BLD: 0 /100 WBCS — SIGNIFICANT CHANGE UP (ref 0–0)
PLATELET # BLD AUTO: 41 K/UL — LOW (ref 150–400)
POTASSIUM SERPL-SCNC: 4.3 MMOL/L
PROT SERPL-MCNC: 6.1 G/DL
RBC # BLD: 3.26 M/UL — LOW (ref 3.8–5.2)
RBC # FLD: 16.4 % — HIGH (ref 10.3–14.5)
SODIUM SERPL-SCNC: 139 MMOL/L
WBC # BLD: 3.05 K/UL — LOW (ref 3.8–10.5)
WBC # FLD AUTO: 3.05 K/UL — LOW (ref 3.8–10.5)

## 2023-09-08 PROCEDURE — 38222 DX BONE MARROW BX & ASPIR: CPT | Mod: LT

## 2023-09-08 NOTE — PROCEDURE
[Bone Marrow Biopsy] : bone marrow biopsy [Bone Marrow Aspiration] : bone marrow aspiration  [Patient] : the patient [Verbal Consent Obtained] : verbal consent was obtained prior to the procedure [Patient identification verified] : patient identification verified [Procedure verified and consent obtained] : procedure verified and consent obtained [Laterality verified and correct site marked] : laterality verified and correct site marked [Left] : site: left [Correct positioning] : correct positioning [Prone] : prone [Superior iliac spine was identified] : the superior iliac spine was identified. [The left posterior iliac crest was prepped with betadine and draped, using sterile technique.] : The left posterior iliac crest was prepped with betadine and draped, using sterile technique. [Lidocaine was injected and into the periosteum overlying the site.] : Lidocaine was injected and into the periosteum overlying the site. [Aspirate] : aspirate [Cytogenetics] : cytogenetics [FISH] : FISH [Biopsy] : biopsy [Flow Cytometry] : flow cytometry [] : The patient was instructed to remove the bandage the following AM. The patient may bathe. Acetaminophen may be taken for discomfort, as per package directions.If there are any other problems, the patient was instructed to call the office. The patient verbalized understanding, and is aware of the office contact numbers. 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 Reassess disease status  [FreeTextEntry2] : 9 cc of 1% Lidocaine was used for the procedure  WBC: 3.05 K/ul Hgb: 11.3 g/dL Hct: 33.5 % Plts: 41 K/uL  Bone marrow aspiration and biopsy were done. ALL panel and Clonoseq requested. Pressure applied > 15 mins, no S&S of bleeding Tracking #  [id number]

## 2023-09-08 NOTE — REASON FOR VISIT
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 Reassess disease status

## 2023-09-18 ENCOUNTER — RESULT REVIEW (OUTPATIENT)
Age: 63
End: 2023-09-18

## 2023-09-18 ENCOUNTER — APPOINTMENT (OUTPATIENT)
Age: 63
End: 2023-09-18

## 2023-09-18 LAB
BASOPHILS # BLD AUTO: 0.01 K/UL — SIGNIFICANT CHANGE UP (ref 0–0.2)
BASOPHILS NFR BLD AUTO: 0.3 % — SIGNIFICANT CHANGE UP (ref 0–2)
EOSINOPHIL # BLD AUTO: 0.11 K/UL — SIGNIFICANT CHANGE UP (ref 0–0.5)
EOSINOPHIL NFR BLD AUTO: 3.6 % — SIGNIFICANT CHANGE UP (ref 0–6)
HCT VFR BLD CALC: 35 % — SIGNIFICANT CHANGE UP (ref 34.5–45)
HGB BLD-MCNC: 11.9 G/DL — SIGNIFICANT CHANGE UP (ref 11.5–15.5)
IMM GRANULOCYTES NFR BLD AUTO: 0.3 % — SIGNIFICANT CHANGE UP (ref 0–0.9)
LYMPHOCYTES # BLD AUTO: 0.82 K/UL — LOW (ref 1–3.3)
LYMPHOCYTES # BLD AUTO: 26.6 % — SIGNIFICANT CHANGE UP (ref 13–44)
MCHC RBC-ENTMCNC: 34 GM/DL — SIGNIFICANT CHANGE UP (ref 32–36)
MCHC RBC-ENTMCNC: 34.9 PG — HIGH (ref 27–34)
MCV RBC AUTO: 102.6 FL — HIGH (ref 80–100)
MONOCYTES # BLD AUTO: 0.54 K/UL — SIGNIFICANT CHANGE UP (ref 0–0.9)
MONOCYTES NFR BLD AUTO: 17.5 % — HIGH (ref 2–14)
NEUTROPHILS # BLD AUTO: 1.59 K/UL — LOW (ref 1.8–7.4)
NEUTROPHILS NFR BLD AUTO: 51.7 % — SIGNIFICANT CHANGE UP (ref 43–77)
NRBC # BLD: 0 /100 WBCS — SIGNIFICANT CHANGE UP (ref 0–0)
PLATELET # BLD AUTO: 67 K/UL — LOW (ref 150–400)
RBC # BLD: 3.41 M/UL — LOW (ref 3.8–5.2)
RBC # FLD: 15.1 % — HIGH (ref 10.3–14.5)
WBC # BLD: 3.08 K/UL — LOW (ref 3.8–10.5)
WBC # FLD AUTO: 3.08 K/UL — LOW (ref 3.8–10.5)

## 2023-09-18 PROCEDURE — 85027 COMPLETE CBC AUTOMATED: CPT

## 2023-09-18 PROCEDURE — 80053 COMPREHEN METABOLIC PANEL: CPT

## 2023-09-19 ENCOUNTER — OUTPATIENT (OUTPATIENT)
Dept: OUTPATIENT SERVICES | Facility: HOSPITAL | Age: 63
LOS: 1 days | End: 2023-09-19
Payer: MEDICARE

## 2023-09-19 DIAGNOSIS — Z51.89 ENCOUNTER FOR OTHER SPECIFIED AFTERCARE: ICD-10-CM

## 2023-09-19 DIAGNOSIS — C91.00 ACUTE LYMPHOBLASTIC LEUKEMIA NOT HAVING ACHIEVED REMISSION: ICD-10-CM

## 2023-09-25 ENCOUNTER — RESULT REVIEW (OUTPATIENT)
Age: 63
End: 2023-09-25

## 2023-09-25 ENCOUNTER — APPOINTMENT (OUTPATIENT)
Age: 63
End: 2023-09-25

## 2023-09-25 LAB
BASOPHILS # BLD AUTO: 0.01 K/UL — SIGNIFICANT CHANGE UP (ref 0–0.2)
BASOPHILS NFR BLD AUTO: 0.3 % — SIGNIFICANT CHANGE UP (ref 0–2)
EOSINOPHIL # BLD AUTO: 0.16 K/UL — SIGNIFICANT CHANGE UP (ref 0–0.5)
EOSINOPHIL NFR BLD AUTO: 4.7 % — SIGNIFICANT CHANGE UP (ref 0–6)
HCT VFR BLD CALC: 34.8 % — SIGNIFICANT CHANGE UP (ref 34.5–45)
HGB BLD-MCNC: 11.9 G/DL — SIGNIFICANT CHANGE UP (ref 11.5–15.5)
IMM GRANULOCYTES NFR BLD AUTO: 0.3 % — SIGNIFICANT CHANGE UP (ref 0–0.9)
LYMPHOCYTES # BLD AUTO: 0.8 K/UL — LOW (ref 1–3.3)
LYMPHOCYTES # BLD AUTO: 23.5 % — SIGNIFICANT CHANGE UP (ref 13–44)
MCHC RBC-ENTMCNC: 34.2 GM/DL — SIGNIFICANT CHANGE UP (ref 32–36)
MCHC RBC-ENTMCNC: 35.5 PG — HIGH (ref 27–34)
MCV RBC AUTO: 103.9 FL — HIGH (ref 80–100)
MONOCYTES # BLD AUTO: 0.58 K/UL — SIGNIFICANT CHANGE UP (ref 0–0.9)
MONOCYTES NFR BLD AUTO: 17 % — HIGH (ref 2–14)
NEUTROPHILS # BLD AUTO: 1.85 K/UL — SIGNIFICANT CHANGE UP (ref 1.8–7.4)
NEUTROPHILS NFR BLD AUTO: 54.2 % — SIGNIFICANT CHANGE UP (ref 43–77)
NRBC # BLD: 0 /100 WBCS — SIGNIFICANT CHANGE UP (ref 0–0)
PLATELET # BLD AUTO: 83 K/UL — LOW (ref 150–400)
RBC # BLD: 3.35 M/UL — LOW (ref 3.8–5.2)
RBC # FLD: 14.3 % — SIGNIFICANT CHANGE UP (ref 10.3–14.5)
WBC # BLD: 3.41 K/UL — LOW (ref 3.8–10.5)
WBC # FLD AUTO: 3.41 K/UL — LOW (ref 3.8–10.5)

## 2023-09-26 NOTE — ED ADULT NURSE NOTE - NSFALLRSKINDICATORS_ED_ALL_ED
PA has been approved through 9/21/2024 and will be filled through [facility]. Called the pharmacy and they have the order ready and have called and left a V/M for patient to schedule a delivery.    Called and left a v/m with patient to give an update.   no

## 2023-09-29 ENCOUNTER — RESULT REVIEW (OUTPATIENT)
Age: 63
End: 2023-09-29

## 2023-09-29 ENCOUNTER — APPOINTMENT (OUTPATIENT)
Dept: HEMATOLOGY ONCOLOGY | Facility: CLINIC | Age: 63
End: 2023-09-29
Payer: MEDICARE

## 2023-09-29 VITALS
HEART RATE: 68 BPM | WEIGHT: 197.98 LBS | BODY MASS INDEX: 35.97 KG/M2 | DIASTOLIC BLOOD PRESSURE: 83 MMHG | RESPIRATION RATE: 16 BRPM | TEMPERATURE: 97.2 F | SYSTOLIC BLOOD PRESSURE: 134 MMHG | OXYGEN SATURATION: 98 %

## 2023-09-29 LAB
ANISOCYTOSIS BLD QL: SLIGHT — SIGNIFICANT CHANGE UP
BASOPHILS # BLD AUTO: 0 K/UL — SIGNIFICANT CHANGE UP (ref 0–0.2)
BASOPHILS NFR BLD AUTO: 0 % — SIGNIFICANT CHANGE UP (ref 0–2)
DACRYOCYTES BLD QL SMEAR: SLIGHT — SIGNIFICANT CHANGE UP
ELLIPTOCYTES BLD QL SMEAR: SLIGHT — SIGNIFICANT CHANGE UP
EOSINOPHIL # BLD AUTO: 0.12 K/UL — SIGNIFICANT CHANGE UP (ref 0–0.5)
EOSINOPHIL NFR BLD AUTO: 4 % — SIGNIFICANT CHANGE UP (ref 0–6)
HCT VFR BLD CALC: 35.5 % — SIGNIFICANT CHANGE UP (ref 34.5–45)
HGB BLD-MCNC: 11.9 G/DL — SIGNIFICANT CHANGE UP (ref 11.5–15.5)
LYMPHOCYTES # BLD AUTO: 0.6 K/UL — LOW (ref 1–3.3)
LYMPHOCYTES # BLD AUTO: 20 % — SIGNIFICANT CHANGE UP (ref 13–44)
MACROCYTES BLD QL: SLIGHT — SIGNIFICANT CHANGE UP
MCHC RBC-ENTMCNC: 33.5 G/DL — SIGNIFICANT CHANGE UP (ref 32–36)
MCHC RBC-ENTMCNC: 34.7 PG — HIGH (ref 27–34)
MCV RBC AUTO: 103.5 FL — HIGH (ref 80–100)
MONOCYTES # BLD AUTO: 0.48 K/UL — SIGNIFICANT CHANGE UP (ref 0–0.9)
MONOCYTES NFR BLD AUTO: 16 % — HIGH (ref 2–14)
MYELOCYTES NFR BLD: 1 % — HIGH (ref 0–0)
NEUTROPHILS # BLD AUTO: 1.76 K/UL — LOW (ref 1.8–7.4)
NEUTROPHILS NFR BLD AUTO: 59 % — SIGNIFICANT CHANGE UP (ref 43–77)
NRBC # BLD: 0 /100 — SIGNIFICANT CHANGE UP (ref 0–0)
NRBC # BLD: SIGNIFICANT CHANGE UP /100 WBCS (ref 0–0)
PLAT MORPH BLD: NORMAL — SIGNIFICANT CHANGE UP
PLATELET # BLD AUTO: 82 K/UL — LOW (ref 150–400)
POIKILOCYTOSIS BLD QL AUTO: SLIGHT — SIGNIFICANT CHANGE UP
RBC # BLD: 3.43 M/UL — LOW (ref 3.8–5.2)
RBC # FLD: 14.5 % — SIGNIFICANT CHANGE UP (ref 10.3–14.5)
RBC BLD AUTO: ABNORMAL
WBC # BLD: 2.99 K/UL — LOW (ref 3.8–10.5)
WBC # FLD AUTO: 2.99 K/UL — LOW (ref 3.8–10.5)

## 2023-09-29 PROCEDURE — 99215 OFFICE O/P EST HI 40 MIN: CPT

## 2023-10-02 ENCOUNTER — RESULT REVIEW (OUTPATIENT)
Age: 63
End: 2023-10-02

## 2023-10-02 ENCOUNTER — APPOINTMENT (OUTPATIENT)
Age: 63
End: 2023-10-02

## 2023-10-02 LAB
BASOPHILS # BLD AUTO: 0.02 K/UL — SIGNIFICANT CHANGE UP (ref 0–0.2)
BASOPHILS NFR BLD AUTO: 0.6 % — SIGNIFICANT CHANGE UP (ref 0–2)
EOSINOPHIL # BLD AUTO: 0.13 K/UL — SIGNIFICANT CHANGE UP (ref 0–0.5)
EOSINOPHIL NFR BLD AUTO: 4 % — SIGNIFICANT CHANGE UP (ref 0–6)
HCT VFR BLD CALC: 35.3 % — SIGNIFICANT CHANGE UP (ref 34.5–45)
HGB BLD-MCNC: 12 G/DL — SIGNIFICANT CHANGE UP (ref 11.5–15.5)
IMM GRANULOCYTES NFR BLD AUTO: 0.3 % — SIGNIFICANT CHANGE UP (ref 0–0.9)
LYMPHOCYTES # BLD AUTO: 0.81 K/UL — LOW (ref 1–3.3)
LYMPHOCYTES # BLD AUTO: 24.7 % — SIGNIFICANT CHANGE UP (ref 13–44)
MCHC RBC-ENTMCNC: 34 GM/DL — SIGNIFICANT CHANGE UP (ref 32–36)
MCHC RBC-ENTMCNC: 34.9 PG — HIGH (ref 27–34)
MCV RBC AUTO: 102.6 FL — HIGH (ref 80–100)
MONOCYTES # BLD AUTO: 0.47 K/UL — SIGNIFICANT CHANGE UP (ref 0–0.9)
MONOCYTES NFR BLD AUTO: 14.3 % — HIGH (ref 2–14)
NEUTROPHILS # BLD AUTO: 1.84 K/UL — SIGNIFICANT CHANGE UP (ref 1.8–7.4)
NEUTROPHILS NFR BLD AUTO: 56.1 % — SIGNIFICANT CHANGE UP (ref 43–77)
NRBC # BLD: 0 /100 WBCS — SIGNIFICANT CHANGE UP (ref 0–0)
PLATELET # BLD AUTO: 92 K/UL — LOW (ref 150–400)
RBC # BLD: 3.44 M/UL — LOW (ref 3.8–5.2)
RBC # FLD: 13.9 % — SIGNIFICANT CHANGE UP (ref 10.3–14.5)
WBC # BLD: 3.28 K/UL — LOW (ref 3.8–10.5)
WBC # FLD AUTO: 3.28 K/UL — LOW (ref 3.8–10.5)

## 2023-10-04 LAB
ALBUMIN SERPL ELPH-MCNC: 4.3 G/DL
ALP BLD-CCNC: 97 U/L
ALT SERPL-CCNC: 25 U/L
ANION GAP SERPL CALC-SCNC: 11 MMOL/L
AST SERPL-CCNC: 27 U/L
BILIRUB SERPL-MCNC: 0.4 MG/DL
BUN SERPL-MCNC: 13 MG/DL
CALCIUM SERPL-MCNC: 9.7 MG/DL
CHLORIDE SERPL-SCNC: 105 MMOL/L
CO2 SERPL-SCNC: 25 MMOL/L
CREAT SERPL-MCNC: 0.87 MG/DL
EGFR: 74 ML/MIN/1.73M2
GLUCOSE SERPL-MCNC: 95 MG/DL
LDH SERPL-CCNC: 161 U/L
POTASSIUM SERPL-SCNC: 4.5 MMOL/L
PROT SERPL-MCNC: 6.6 G/DL
SODIUM SERPL-SCNC: 141 MMOL/L

## 2023-10-09 ENCOUNTER — RESULT REVIEW (OUTPATIENT)
Age: 63
End: 2023-10-09

## 2023-10-09 ENCOUNTER — APPOINTMENT (OUTPATIENT)
Age: 63
End: 2023-10-09

## 2023-10-09 LAB
BASOPHILS # BLD AUTO: 0.02 K/UL — SIGNIFICANT CHANGE UP (ref 0–0.2)
BASOPHILS NFR BLD AUTO: 0.6 % — SIGNIFICANT CHANGE UP (ref 0–2)
EOSINOPHIL # BLD AUTO: 0.15 K/UL — SIGNIFICANT CHANGE UP (ref 0–0.5)
EOSINOPHIL NFR BLD AUTO: 4.2 % — SIGNIFICANT CHANGE UP (ref 0–6)
HCT VFR BLD CALC: 37 % — SIGNIFICANT CHANGE UP (ref 34.5–45)
HGB BLD-MCNC: 12.5 G/DL — SIGNIFICANT CHANGE UP (ref 11.5–15.5)
IMM GRANULOCYTES NFR BLD AUTO: 0.3 % — SIGNIFICANT CHANGE UP (ref 0–0.9)
LYMPHOCYTES # BLD AUTO: 0.82 K/UL — LOW (ref 1–3.3)
LYMPHOCYTES # BLD AUTO: 23 % — SIGNIFICANT CHANGE UP (ref 13–44)
MCHC RBC-ENTMCNC: 33.8 GM/DL — SIGNIFICANT CHANGE UP (ref 32–36)
MCHC RBC-ENTMCNC: 35.3 PG — HIGH (ref 27–34)
MCV RBC AUTO: 104.5 FL — HIGH (ref 80–100)
MONOCYTES # BLD AUTO: 0.6 K/UL — SIGNIFICANT CHANGE UP (ref 0–0.9)
MONOCYTES NFR BLD AUTO: 16.8 % — HIGH (ref 2–14)
NEUTROPHILS # BLD AUTO: 1.97 K/UL — SIGNIFICANT CHANGE UP (ref 1.8–7.4)
NEUTROPHILS NFR BLD AUTO: 55.1 % — SIGNIFICANT CHANGE UP (ref 43–77)
NRBC # BLD: 0 /100 WBCS — SIGNIFICANT CHANGE UP (ref 0–0)
PLATELET # BLD AUTO: 106 K/UL — LOW (ref 150–400)
RBC # BLD: 3.54 M/UL — LOW (ref 3.8–5.2)
RBC # FLD: 13.5 % — SIGNIFICANT CHANGE UP (ref 10.3–14.5)
WBC # BLD: 3.57 K/UL — LOW (ref 3.8–10.5)
WBC # FLD AUTO: 3.57 K/UL — LOW (ref 3.8–10.5)

## 2023-10-11 LAB — SARS-COV-2 N GENE NPH QL NAA+PROBE: NOT DETECTED

## 2023-10-12 ENCOUNTER — TRANSCRIPTION ENCOUNTER (OUTPATIENT)
Age: 63
End: 2023-10-12

## 2023-10-12 ENCOUNTER — INPATIENT (INPATIENT)
Facility: HOSPITAL | Age: 63
LOS: 2 days | Discharge: HOME | DRG: 838 | End: 2023-10-15
Attending: INTERNAL MEDICINE | Admitting: INTERNAL MEDICINE
Payer: MEDICARE

## 2023-10-12 VITALS
HEIGHT: 62.2 IN | OXYGEN SATURATION: 99 % | WEIGHT: 199.08 LBS | DIASTOLIC BLOOD PRESSURE: 88 MMHG | RESPIRATION RATE: 18 BRPM | HEART RATE: 74 BPM | SYSTOLIC BLOOD PRESSURE: 160 MMHG | TEMPERATURE: 98 F

## 2023-10-12 DIAGNOSIS — C91.00 ACUTE LYMPHOBLASTIC LEUKEMIA NOT HAVING ACHIEVED REMISSION: ICD-10-CM

## 2023-10-12 DIAGNOSIS — I10 ESSENTIAL (PRIMARY) HYPERTENSION: ICD-10-CM

## 2023-10-12 DIAGNOSIS — Z98.891 HISTORY OF UTERINE SCAR FROM PREVIOUS SURGERY: Chronic | ICD-10-CM

## 2023-10-12 DIAGNOSIS — K21.9 GASTRO-ESOPHAGEAL REFLUX DISEASE WITHOUT ESOPHAGITIS: ICD-10-CM

## 2023-10-12 DIAGNOSIS — E78.5 HYPERLIPIDEMIA, UNSPECIFIED: ICD-10-CM

## 2023-10-12 DIAGNOSIS — Z29.9 ENCOUNTER FOR PROPHYLACTIC MEASURES, UNSPECIFIED: ICD-10-CM

## 2023-10-12 DIAGNOSIS — B99.9 UNSPECIFIED INFECTIOUS DISEASE: ICD-10-CM

## 2023-10-12 DIAGNOSIS — F41.9 ANXIETY DISORDER, UNSPECIFIED: ICD-10-CM

## 2023-10-12 LAB
ALBUMIN SERPL ELPH-MCNC: 3.9 G/DL — SIGNIFICANT CHANGE UP (ref 3.3–5)
ALBUMIN SERPL ELPH-MCNC: 3.9 G/DL — SIGNIFICANT CHANGE UP (ref 3.3–5)
ALP SERPL-CCNC: 100 U/L — SIGNIFICANT CHANGE UP (ref 40–120)
ALP SERPL-CCNC: 100 U/L — SIGNIFICANT CHANGE UP (ref 40–120)
ALT FLD-CCNC: 24 U/L — SIGNIFICANT CHANGE UP (ref 10–45)
ALT FLD-CCNC: 24 U/L — SIGNIFICANT CHANGE UP (ref 10–45)
ANION GAP SERPL CALC-SCNC: 11 MMOL/L — SIGNIFICANT CHANGE UP (ref 5–17)
ANION GAP SERPL CALC-SCNC: 11 MMOL/L — SIGNIFICANT CHANGE UP (ref 5–17)
ANISOCYTOSIS BLD QL: SLIGHT — SIGNIFICANT CHANGE UP
ANISOCYTOSIS BLD QL: SLIGHT — SIGNIFICANT CHANGE UP
APTT BLD: 24.9 SEC — SIGNIFICANT CHANGE UP (ref 24.5–35.6)
APTT BLD: 24.9 SEC — SIGNIFICANT CHANGE UP (ref 24.5–35.6)
AST SERPL-CCNC: 22 U/L — SIGNIFICANT CHANGE UP (ref 10–40)
AST SERPL-CCNC: 22 U/L — SIGNIFICANT CHANGE UP (ref 10–40)
BASOPHILS # BLD AUTO: 0 K/UL — SIGNIFICANT CHANGE UP (ref 0–0.2)
BASOPHILS # BLD AUTO: 0 K/UL — SIGNIFICANT CHANGE UP (ref 0–0.2)
BASOPHILS NFR BLD AUTO: 0 % — SIGNIFICANT CHANGE UP (ref 0–2)
BASOPHILS NFR BLD AUTO: 0 % — SIGNIFICANT CHANGE UP (ref 0–2)
BILIRUB SERPL-MCNC: 0.5 MG/DL — SIGNIFICANT CHANGE UP (ref 0.2–1.2)
BILIRUB SERPL-MCNC: 0.5 MG/DL — SIGNIFICANT CHANGE UP (ref 0.2–1.2)
BLD GP AB SCN SERPL QL: NEGATIVE — SIGNIFICANT CHANGE UP
BLD GP AB SCN SERPL QL: NEGATIVE — SIGNIFICANT CHANGE UP
BUN SERPL-MCNC: 11 MG/DL — SIGNIFICANT CHANGE UP (ref 7–23)
BUN SERPL-MCNC: 11 MG/DL — SIGNIFICANT CHANGE UP (ref 7–23)
CALCIUM SERPL-MCNC: 9.5 MG/DL — SIGNIFICANT CHANGE UP (ref 8.4–10.5)
CALCIUM SERPL-MCNC: 9.5 MG/DL — SIGNIFICANT CHANGE UP (ref 8.4–10.5)
CHLORIDE SERPL-SCNC: 104 MMOL/L — SIGNIFICANT CHANGE UP (ref 96–108)
CHLORIDE SERPL-SCNC: 104 MMOL/L — SIGNIFICANT CHANGE UP (ref 96–108)
CO2 SERPL-SCNC: 23 MMOL/L — SIGNIFICANT CHANGE UP (ref 22–31)
CO2 SERPL-SCNC: 23 MMOL/L — SIGNIFICANT CHANGE UP (ref 22–31)
CREAT SERPL-MCNC: 0.82 MG/DL — SIGNIFICANT CHANGE UP (ref 0.5–1.3)
CREAT SERPL-MCNC: 0.82 MG/DL — SIGNIFICANT CHANGE UP (ref 0.5–1.3)
DACRYOCYTES BLD QL SMEAR: SLIGHT — SIGNIFICANT CHANGE UP
DACRYOCYTES BLD QL SMEAR: SLIGHT — SIGNIFICANT CHANGE UP
EGFR: 79 ML/MIN/1.73M2 — SIGNIFICANT CHANGE UP
EGFR: 79 ML/MIN/1.73M2 — SIGNIFICANT CHANGE UP
ELLIPTOCYTES BLD QL SMEAR: SLIGHT — SIGNIFICANT CHANGE UP
ELLIPTOCYTES BLD QL SMEAR: SLIGHT — SIGNIFICANT CHANGE UP
EOSINOPHIL # BLD AUTO: 0.09 K/UL — SIGNIFICANT CHANGE UP (ref 0–0.5)
EOSINOPHIL # BLD AUTO: 0.09 K/UL — SIGNIFICANT CHANGE UP (ref 0–0.5)
EOSINOPHIL NFR BLD AUTO: 2.6 % — SIGNIFICANT CHANGE UP (ref 0–6)
EOSINOPHIL NFR BLD AUTO: 2.6 % — SIGNIFICANT CHANGE UP (ref 0–6)
FIBRINOGEN PPP-MCNC: 371 MG/DL — SIGNIFICANT CHANGE UP (ref 200–445)
FIBRINOGEN PPP-MCNC: 371 MG/DL — SIGNIFICANT CHANGE UP (ref 200–445)
GIANT PLATELETS BLD QL SMEAR: PRESENT — SIGNIFICANT CHANGE UP
GIANT PLATELETS BLD QL SMEAR: PRESENT — SIGNIFICANT CHANGE UP
GLUCOSE SERPL-MCNC: 104 MG/DL — HIGH (ref 70–99)
GLUCOSE SERPL-MCNC: 104 MG/DL — HIGH (ref 70–99)
HCT VFR BLD CALC: 34.2 % — LOW (ref 34.5–45)
HCT VFR BLD CALC: 34.2 % — LOW (ref 34.5–45)
HGB BLD-MCNC: 11.4 G/DL — LOW (ref 11.5–15.5)
HGB BLD-MCNC: 11.4 G/DL — LOW (ref 11.5–15.5)
INR BLD: 1.01 RATIO — SIGNIFICANT CHANGE UP (ref 0.85–1.18)
INR BLD: 1.01 RATIO — SIGNIFICANT CHANGE UP (ref 0.85–1.18)
LDH SERPL L TO P-CCNC: 171 U/L — SIGNIFICANT CHANGE UP (ref 50–242)
LDH SERPL L TO P-CCNC: 171 U/L — SIGNIFICANT CHANGE UP (ref 50–242)
LYMPHOCYTES # BLD AUTO: 0.56 K/UL — LOW (ref 1–3.3)
LYMPHOCYTES # BLD AUTO: 0.56 K/UL — LOW (ref 1–3.3)
LYMPHOCYTES # BLD AUTO: 16.4 % — SIGNIFICANT CHANGE UP (ref 13–44)
LYMPHOCYTES # BLD AUTO: 16.4 % — SIGNIFICANT CHANGE UP (ref 13–44)
MACROCYTES BLD QL: SLIGHT — SIGNIFICANT CHANGE UP
MACROCYTES BLD QL: SLIGHT — SIGNIFICANT CHANGE UP
MAGNESIUM SERPL-MCNC: 1.9 MG/DL — SIGNIFICANT CHANGE UP (ref 1.6–2.6)
MAGNESIUM SERPL-MCNC: 1.9 MG/DL — SIGNIFICANT CHANGE UP (ref 1.6–2.6)
MANUAL SMEAR VERIFICATION: SIGNIFICANT CHANGE UP
MANUAL SMEAR VERIFICATION: SIGNIFICANT CHANGE UP
MCHC RBC-ENTMCNC: 33.3 GM/DL — SIGNIFICANT CHANGE UP (ref 32–36)
MCHC RBC-ENTMCNC: 33.3 GM/DL — SIGNIFICANT CHANGE UP (ref 32–36)
MCHC RBC-ENTMCNC: 34.5 PG — HIGH (ref 27–34)
MCHC RBC-ENTMCNC: 34.5 PG — HIGH (ref 27–34)
MCV RBC AUTO: 103.6 FL — HIGH (ref 80–100)
MCV RBC AUTO: 103.6 FL — HIGH (ref 80–100)
MONOCYTES # BLD AUTO: 0.26 K/UL — SIGNIFICANT CHANGE UP (ref 0–0.9)
MONOCYTES # BLD AUTO: 0.26 K/UL — SIGNIFICANT CHANGE UP (ref 0–0.9)
MONOCYTES NFR BLD AUTO: 7.7 % — SIGNIFICANT CHANGE UP (ref 2–14)
MONOCYTES NFR BLD AUTO: 7.7 % — SIGNIFICANT CHANGE UP (ref 2–14)
NEUTROPHILS # BLD AUTO: 2.5 K/UL — SIGNIFICANT CHANGE UP (ref 1.8–7.4)
NEUTROPHILS # BLD AUTO: 2.5 K/UL — SIGNIFICANT CHANGE UP (ref 1.8–7.4)
NEUTROPHILS NFR BLD AUTO: 73.3 % — SIGNIFICANT CHANGE UP (ref 43–77)
NEUTROPHILS NFR BLD AUTO: 73.3 % — SIGNIFICANT CHANGE UP (ref 43–77)
PHOSPHATE SERPL-MCNC: 3.2 MG/DL — SIGNIFICANT CHANGE UP (ref 2.5–4.5)
PHOSPHATE SERPL-MCNC: 3.2 MG/DL — SIGNIFICANT CHANGE UP (ref 2.5–4.5)
PLAT MORPH BLD: NORMAL — SIGNIFICANT CHANGE UP
PLAT MORPH BLD: NORMAL — SIGNIFICANT CHANGE UP
PLATELET # BLD AUTO: 111 K/UL — LOW (ref 150–400)
PLATELET # BLD AUTO: 111 K/UL — LOW (ref 150–400)
POIKILOCYTOSIS BLD QL AUTO: SLIGHT — SIGNIFICANT CHANGE UP
POIKILOCYTOSIS BLD QL AUTO: SLIGHT — SIGNIFICANT CHANGE UP
POLYCHROMASIA BLD QL SMEAR: SLIGHT — SIGNIFICANT CHANGE UP
POLYCHROMASIA BLD QL SMEAR: SLIGHT — SIGNIFICANT CHANGE UP
POTASSIUM SERPL-MCNC: 3.8 MMOL/L — SIGNIFICANT CHANGE UP (ref 3.5–5.3)
POTASSIUM SERPL-MCNC: 3.8 MMOL/L — SIGNIFICANT CHANGE UP (ref 3.5–5.3)
POTASSIUM SERPL-SCNC: 3.8 MMOL/L — SIGNIFICANT CHANGE UP (ref 3.5–5.3)
POTASSIUM SERPL-SCNC: 3.8 MMOL/L — SIGNIFICANT CHANGE UP (ref 3.5–5.3)
PROT SERPL-MCNC: 6.4 G/DL — SIGNIFICANT CHANGE UP (ref 6–8.3)
PROT SERPL-MCNC: 6.4 G/DL — SIGNIFICANT CHANGE UP (ref 6–8.3)
PROTHROM AB SERPL-ACNC: 10.6 SEC — SIGNIFICANT CHANGE UP (ref 9.5–13)
PROTHROM AB SERPL-ACNC: 10.6 SEC — SIGNIFICANT CHANGE UP (ref 9.5–13)
RBC # BLD: 3.3 M/UL — LOW (ref 3.8–5.2)
RBC # BLD: 3.3 M/UL — LOW (ref 3.8–5.2)
RBC # FLD: 13.6 % — SIGNIFICANT CHANGE UP (ref 10.3–14.5)
RBC # FLD: 13.6 % — SIGNIFICANT CHANGE UP (ref 10.3–14.5)
RBC BLD AUTO: ABNORMAL
RBC BLD AUTO: ABNORMAL
RH IG SCN BLD-IMP: POSITIVE — SIGNIFICANT CHANGE UP
RH IG SCN BLD-IMP: POSITIVE — SIGNIFICANT CHANGE UP
SODIUM SERPL-SCNC: 138 MMOL/L — SIGNIFICANT CHANGE UP (ref 135–145)
SODIUM SERPL-SCNC: 138 MMOL/L — SIGNIFICANT CHANGE UP (ref 135–145)
URATE SERPL-MCNC: 4.8 MG/DL — SIGNIFICANT CHANGE UP (ref 2.5–7)
URATE SERPL-MCNC: 4.8 MG/DL — SIGNIFICANT CHANGE UP (ref 2.5–7)
WBC # BLD: 3.41 K/UL — LOW (ref 3.8–10.5)
WBC # BLD: 3.41 K/UL — LOW (ref 3.8–10.5)
WBC # FLD AUTO: 3.41 K/UL — LOW (ref 3.8–10.5)
WBC # FLD AUTO: 3.41 K/UL — LOW (ref 3.8–10.5)

## 2023-10-12 PROCEDURE — 99221 1ST HOSP IP/OBS SF/LOW 40: CPT

## 2023-10-12 RX ORDER — ACETAMINOPHEN 500 MG
650 TABLET ORAL EVERY 6 HOURS
Refills: 0 | Status: DISCONTINUED | OUTPATIENT
Start: 2023-10-12 | End: 2023-10-15

## 2023-10-12 RX ORDER — BRIMONIDINE TARTRATE 2 MG/MG
1 SOLUTION/ DROPS OPHTHALMIC
Qty: 0 | Refills: 0 | DISCHARGE

## 2023-10-12 RX ORDER — CHLORHEXIDINE GLUCONATE 213 G/1000ML
15 SOLUTION TOPICAL
Refills: 0 | Status: DISCONTINUED | OUTPATIENT
Start: 2023-10-12 | End: 2023-10-15

## 2023-10-12 RX ORDER — DIPHENHYDRAMINE HCL 50 MG
50 CAPSULE ORAL ONCE
Refills: 0 | Status: COMPLETED | OUTPATIENT
Start: 2023-10-12 | End: 2023-10-12

## 2023-10-12 RX ORDER — SALIVA SUBSTITUTE COMB NO.11 351 MG
15 POWDER IN PACKET (EA) MUCOUS MEMBRANE THREE TIMES A DAY
Refills: 0 | Status: DISCONTINUED | OUTPATIENT
Start: 2023-10-12 | End: 2023-10-15

## 2023-10-12 RX ORDER — SODIUM CHLORIDE 9 MG/ML
1000 INJECTION INTRAMUSCULAR; INTRAVENOUS; SUBCUTANEOUS
Refills: 0 | Status: DISCONTINUED | OUTPATIENT
Start: 2023-10-12 | End: 2023-10-12

## 2023-10-12 RX ORDER — ATOVAQUONE 750 MG/5ML
1500 SUSPENSION ORAL DAILY
Refills: 0 | Status: DISCONTINUED | OUTPATIENT
Start: 2023-10-12 | End: 2023-10-15

## 2023-10-12 RX ORDER — ACETAMINOPHEN 500 MG
650 TABLET ORAL ONCE
Refills: 0 | Status: COMPLETED | OUTPATIENT
Start: 2023-10-12 | End: 2023-10-12

## 2023-10-12 RX ORDER — FAMOTIDINE 10 MG/ML
20 INJECTION INTRAVENOUS ONCE
Refills: 0 | Status: COMPLETED | OUTPATIENT
Start: 2023-10-12 | End: 2023-10-12

## 2023-10-12 RX ORDER — METOCLOPRAMIDE HCL 10 MG
10 TABLET ORAL EVERY 6 HOURS
Refills: 0 | Status: DISCONTINUED | OUTPATIENT
Start: 2023-10-12 | End: 2023-10-15

## 2023-10-12 RX ORDER — METOCLOPRAMIDE HCL 10 MG
10 TABLET ORAL EVERY 6 HOURS
Refills: 0 | Status: DISCONTINUED | OUTPATIENT
Start: 2023-10-12 | End: 2023-10-12

## 2023-10-12 RX ORDER — LOSARTAN POTASSIUM 100 MG/1
100 TABLET, FILM COATED ORAL DAILY
Refills: 0 | Status: DISCONTINUED | OUTPATIENT
Start: 2023-10-12 | End: 2023-10-15

## 2023-10-12 RX ORDER — BLINATUMOMAB 35 MCG
28 KIT INTRAVENOUS
Qty: 0 | Refills: 0 | DISCHARGE
Start: 2023-10-12

## 2023-10-12 RX ORDER — PANTOPRAZOLE SODIUM 20 MG/1
40 TABLET, DELAYED RELEASE ORAL
Refills: 0 | Status: DISCONTINUED | OUTPATIENT
Start: 2023-10-12 | End: 2023-10-15

## 2023-10-12 RX ORDER — ACYCLOVIR SODIUM 500 MG
400 VIAL (EA) INTRAVENOUS
Refills: 0 | Status: DISCONTINUED | OUTPATIENT
Start: 2023-10-12 | End: 2023-10-15

## 2023-10-12 RX ORDER — DEXAMETHASONE 0.5 MG/5ML
20 ELIXIR ORAL ONCE
Refills: 0 | Status: COMPLETED | OUTPATIENT
Start: 2023-10-12 | End: 2023-10-12

## 2023-10-12 RX ORDER — ESCITALOPRAM OXALATE 10 MG/1
10 TABLET, FILM COATED ORAL DAILY
Refills: 0 | Status: DISCONTINUED | OUTPATIENT
Start: 2023-10-12 | End: 2023-10-15

## 2023-10-12 RX ORDER — BLINATUMOMAB 35 MCG
32.5 KIT INTRAVENOUS
Refills: 0 | Status: DISCONTINUED | OUTPATIENT
Start: 2023-10-12 | End: 2023-10-15

## 2023-10-12 RX ADMIN — Medication 650 MILLIGRAM(S): at 12:31

## 2023-10-12 RX ADMIN — Medication 650 MILLIGRAM(S): at 13:00

## 2023-10-12 RX ADMIN — CHLORHEXIDINE GLUCONATE 15 MILLILITER(S): 213 SOLUTION TOPICAL at 17:46

## 2023-10-12 RX ADMIN — Medication 15 MILLILITER(S): at 13:48

## 2023-10-12 RX ADMIN — BLINATUMOMAB 32.5 MICROGRAM(S): KIT INTRAVENOUS at 15:55

## 2023-10-12 RX ADMIN — Medication 50 MILLIGRAM(S): at 14:53

## 2023-10-12 RX ADMIN — Medication 400 MILLIGRAM(S): at 17:46

## 2023-10-12 RX ADMIN — Medication 104 MILLIGRAM(S): at 14:53

## 2023-10-12 RX ADMIN — ATOVAQUONE 1500 MILLIGRAM(S): 750 SUSPENSION ORAL at 12:32

## 2023-10-12 RX ADMIN — Medication 650 MILLIGRAM(S): at 14:53

## 2023-10-12 RX ADMIN — FAMOTIDINE 20 MILLIGRAM(S): 10 INJECTION INTRAVENOUS at 14:53

## 2023-10-12 NOTE — DISCHARGE NOTE PROVIDER - NSDCFUADDINST_GEN_ALL_CORE_FT
You have the following appointments at [facility]:  You have appointment with Dr. Goldberg on Monday, 10/16 at 3:20 PM.  10/16 at 3PM for blincyto bag change, new medi port needle and dressing.

## 2023-10-12 NOTE — DISCHARGE NOTE PROVIDER - HOSPITAL COURSE
62 y/o F with PMHx of HTN, HLD with Ph (-) ALL diagnosed in February 2023 and now s/p cycle 1A of miini Hyper-CVAD, 1B ,2, 2B, and 3A. Dose reduced for cycle 1A but full dose for 2A and full dose for B cycles. Bone marrow after cycle 1B with CR and negative MRD as well as post cycle 2B. S/p cycle 3A hyperCVAD with 50% dose reduction to reduce toxicity started on 7/17. Patient with delayed count recovery after cycle 3A and s/p bone marrow biopsy on 9/8 which showed CR but with same low level of detection of residual signal below the limit of detection. At day 74, platelets trending up but still not fully recovered, consistent with a CRi, but now with possible evidence of very low level MRD via clonoseq. Now admitted for blinatumomab 28 mcg per day x 28 day infusion to clear MRD.     While admitted Is/Os, daily weights, CBC/lytes monitored daily. Blood products transfused and lytes repleted PRN. Monitored for CRS, handwriting checks daily.     Patient now stable for discharge home with outpatient follow up. 64 y/o F with PMHx of HTN, HLD with Ph (-) ALL diagnosed in February 2023 and now s/p cycle 1A of miini Hyper-CVAD, 1B ,2, 2B, and 3A. Dose reduced for cycle 1A but full dose for 2A and full dose for B cycles. Bone marrow after cycle 1B with CR and negative MRD as well as post cycle 2B. S/p cycle 3A hyperCVAD with 50% dose reduction to reduce toxicity started on 7/17. Patient with delayed count recovery after cycle 3A and s/p bone marrow biopsy on 9/8 which showed CR but with same low level of detection of residual signal below the limit of detection. At day 74, platelets trending up but still not fully recovered, consistent with a CRi, but now with possible evidence of very low level MRD via clonoseq. Now admitted for blinatumomab 28 mcg per day x 28 day infusion to clear MRD.   While admitted Is/Os, daily weights, CBC/lytes monitored daily. Blood products transfused and lytes repleted PRN. Monitored for CRS, handwriting checks daily.     Patient now stable for discharge home with outpatient follow up. 64 y/o F with PMHx of HTN, HLD with Ph (-) ALL diagnosed in February 2023 and now s/p cycle 1A of miini Hyper-CVAD, 1B ,2, 2B, and 3A. Dose reduced for cycle 1A but full dose for 2A and full dose for B cycles. Bone marrow after cycle 1B with CR and negative MRD as well as post cycle 2B. S/p cycle 3A hyperCVAD with 50% dose reduction to reduce toxicity started on 7/17. Patient with delayed count recovery after cycle 3A and s/p bone marrow biopsy on 9/8 which showed CR but with same low level of detection of residual signal below the limit of detection. At day 74, platelets trending up but still not fully recovered, consistent with a CRi, but now with possible evidence of very low level MRD via clonoseq. Now admitted for blinatumomab 28 mcg per day x 28 day infusion to clear MRD.   While admitted Is/Os, daily weights, CBC/lytes monitored daily. Blood products transfused and lytes repleted PRN. Monitored for CRS, handwriting checks daily, tolerating well.  Patient now stable for discharge home with outpatient follow up.

## 2023-10-12 NOTE — DISCHARGE NOTE PROVIDER - NSDCFUSCHEDAPPT_GEN_ALL_CORE_FT
[facility]  [facility]  Scheduled Appointment: 10/16/2023    [facility]  Scheduled Appointment: 10/23/2023    [facility]  [facility]  Scheduled Appointment: 10/23/2023    [facility]  Scheduled Appointment: 10/30/2023    [facility]  [facility]  Scheduled Appointment: 10/30/2023    [facility]  Scheduled Appointment: 11/06/2023    [facility]  [facility]  Scheduled Appointment: 11/06/2023    [facility]  [facility]  Scheduled Appointment: 11/07/2023    [facility]  [facility]  Scheduled Appointment: 11/09/2023    [facility]  Scheduled Appointment: 11/13/2023     [facility]  [facility]  Scheduled Appointment: 10/16/2023    Goldberg, Bradley  [facility]  [facility]  Scheduled Appointment: 10/16/2023    [facility]  Scheduled Appointment: 10/23/2023    [facility]  [facility]  Scheduled Appointment: 10/23/2023    [facility]  Scheduled Appointment: 10/30/2023    [facility]  [facility]  Scheduled Appointment: 10/30/2023    [facility]  Scheduled Appointment: 11/06/2023    [facility]  [facility]  Scheduled Appointment: 11/06/2023    [facility]  [facility]  Scheduled Appointment: 11/07/2023    [facility]  [facility]  Scheduled Appointment: 11/09/2023    [facility]  Scheduled Appointment: 11/13/2023

## 2023-10-12 NOTE — H&P ADULT - NSICDXPASTMEDICALHX_GEN_ALL_CORE_FT
4
PAST MEDICAL HISTORY:  B-cell acute lymphoblastic leukemia     HLD (hyperlipidemia)     HTN (hypertension)

## 2023-10-12 NOTE — H&P ADULT - NSHPSOCIALHISTORY_GEN_ALL_CORE
Lives with  in a house, 2 steps into the house without railing, staircase up to 2nd floor within the home, Patient currently has her bedroom located on the first floor.  No illicit/recreation drug or alcohol use.  Former smoker: 25 years at 1.5 packs/day = 37.5 pack years. Quit 10 years ago.

## 2023-10-12 NOTE — PATIENT PROFILE ADULT - FUNCTIONAL ASSESSMENT - BASIC MOBILITY 2.
One brief episode of 2-3 apnea without color change. Dr. Frandy Cline notified. Oxygen saturation 88-93% on room air while asleep. No oxygen applied.  Mom requested tylenol for the baby last night due to feeling that the baby was uncomfortable and possibly havin 4 = No assist / stand by assistance

## 2023-10-12 NOTE — H&P ADULT - NEGATIVE OPHTHALMOLOGIC SYMPTOMS
no diplopia/no photophobia/no blurred vision L/no blurred vision R/no irritation L/no irritation R/no loss of vision L/no loss of vision R Adjustment disorder with mixed disturbance of emotions and conduct

## 2023-10-12 NOTE — H&P ADULT - HISTORY OF PRESENT ILLNESS
64 y/o F with PMHx of HTN, HLD with Ph (-) ALL diagnosed in February 2023 and now s/p cycle 1A of miini Hyper-CVAD, 1B ,2, 2B, and 3A. Dose reduced for cycle 1A but full dose for 2A and full dose for B cycles. Bone marrow after cycle 1B with CR and negative MRD as well as post cycle 2B. S/p cycle 3A hyperCVAD with 50% dose reduction to reduce toxicity started on 7/17. Patient with delayed count recovery after cycle 3A and s/p bone marrow biopsy on 9/8 which showed CR but with same low level of detection of residual signal below the limit of detection. At day 74, platelets trending up but still not fully recovered, consistent with a CRi, but now with possible evidence of very low level MRD via clonoseq. Now admitted for blinatumomab 28 mcg per day x 28 day infusion to clear MRD.     Initially transferred from [facility] for new diagnosis of leukemia. 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 Official bone marrow biopsy 2/28/23 showed B-cell ALL, which was [place] chromosome negative. Peripheral blood BCR/ABL PCR was negative, although in her bone marrow she did have PCR for BCR/ABL p190 positive at an extremely low level of 0.001%. She had a pre-treatment echocardiogram done which showed an EF 55%, Chemotherapy with reduced dose HyperCVAD was started on 3/3/23. Hospital course was c/b neutropenic fever, transaminitis, a rash  which improved with topical steroids. Patient had a bone marrow biopsy performed on 4/28 post cycle 1B with results showing a complete remission. Patient received cycle 1B on 4/3 and cycle 2A on 5/1. 2B on 6/12, and 3A on 7/17.    Now admitted for Cycle 1 Blincyto 28 mcg/day x 28 days.

## 2023-10-12 NOTE — DISCHARGE NOTE PROVIDER - NSDCCPCAREPLAN_GEN_ALL_CORE_FT
PRINCIPAL DISCHARGE DIAGNOSIS  Diagnosis: B-cell acute lymphoblastic leukemia  Assessment and Plan of Treatment: Notify your MD or go to nearest ER if yoyu develope fever greater than 100.4, nausea,vomiting not relieved with medications, diarrhea, chest pain, difficulty breathing or bleeding.  If the infusion pump malfunction please call the 800 number provided and if you continue to experience any issues with the infusion GO TO THE NEAREST ER.     PRINCIPAL DISCHARGE DIAGNOSIS  Diagnosis: B-cell acute lymphoblastic leukemia  Assessment and Plan of Treatment: Notify your MD or go to nearest ER if yoyu develope fever greater than 100.4, nausea,vomiting not relieved with medications, diarrhea, chest pain, difficulty breathing or bleeding.  If the infusion pump malfunction please call the 800 number provided and if you continue to experience further issues with the infusion GO TO THE NEAREST ER.  If the needle dislodges use spill kit as instructed.

## 2023-10-12 NOTE — H&P ADULT - NSHPLABSRESULTS_GEN_ALL_CORE
LABS:                        11.4   3.41  )-----------( 111      ( 12 Oct 2023 12:54 )             34.2     CBC Full  -  ( 12 Oct 2023 12:54 )  WBC Count : 3.41 K/uL  RBC Count : 3.30 M/uL  Hemoglobin : 11.4 g/dL  Hematocrit : 34.2 %  Platelet Count - Automated : 111 K/uL  Mean Cell Volume : 103.6 fl  Mean Cell Hemoglobin : 34.5 pg  Mean Cell Hemoglobin Concentration : 33.3 gm/dL  Auto Neutrophil # : 2.50 K/uL  Auto Lymphocyte # : 0.56 K/uL  Auto Monocyte # : 0.26 K/uL  Auto Eosinophil # : 0.09 K/uL  Auto Basophil # : 0.00 K/uL  Auto Neutrophil % : 73.3 %  Auto Lymphocyte % : 16.4 %  Auto Monocyte % : 7.7 %  Auto Eosinophil % : 2.6 %  Auto Basophil % : 0.0 %    10-12    138  |  104  |  11  ----------------------------<  104<H>  3.8   |  23  |  0.82    Ca    9.5      12 Oct 2023 12:54  Phos  3.2     10-12  Mg     1.9     10-12    TPro  6.4  /  Alb  3.9  /  TBili  0.5  /  DBili  x   /  AST  22  /  ALT  24  /  AlkPhos  100  10-12

## 2023-10-12 NOTE — H&P ADULT - ASSESSMENT
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 BMBx following cycle 2B showed continued CR with continued negative MRD on Clonoseq testing, although with a low level residual sequence below the limit of detection. 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 Patient with pancytopenia secondary to disease.

## 2023-10-12 NOTE — H&P ADULT - PROBLEM SELECTOR PLAN 1
Admit to [address] [place] [address] for Cycle 1 Blincyto 28 mcg/day continuous infusion for 28 days  Premedications on Day 1 with dexamethasone 20 mg Iv x 1, Tylenol 650 mg PO x1, Pepcid 20 mg IV x 1, Benadryl 50 mg IV x1  S/p Hyper CVAD cycle 1A feb 2023 (reduces dose), 1B (full dose) on 4/3, cycle 2A (full dose) on 5/1, cycle 2B (full dose) on 6/12, and cycle 3A (50% dose reduced) on 7/17  S/p After cycle 2B, BMBx showed continued CR with continued negative MRD on Clonoseq testing, although with a low level residual sequence below the limit of detection. The very low level of residual sequence detected creates reasonable concern for a very low level chemoresistant clone at the maturation level of the stem cell.   Access Mediport , encourage PO fluid intake, strict I/O, daily weights, antiemetics   Monitor CBC/CMP daily, transfuse blood products/replace lytes as needed   Monitor for CRS/neurotoxicity, monitor for dysgraphia with handwriting checks daily.    10/12 - Started Cycle 1 Blincyto  Discharge planning for 10/15

## 2023-10-12 NOTE — H&P ADULT - PROBLEM SELECTOR PLAN 2
Patient is not neutropenic, afebrile    Continue acyclovir and mepron   Currently off levaquin and fluconazole    If febrile, panculture.

## 2023-10-12 NOTE — DISCHARGE NOTE PROVIDER - NSDCMRMEDTOKEN_GEN_ALL_CORE_FT
acyclovir 400 mg oral tablet: 1 tab(s) orally 2 times a day, check with outpatient provider regarding when to stop  atovaquone 750 mg/5 mL oral suspension: 10 milliliter(s) orally once a day  blinatumomab 35 mcg intravenous injection: 28 mcg/day intravenous once a day continuous infusion x 28 days (10/12-11/8)  escitalopram 10 mg oral tablet: 1 tab(s) orally once a day  losartan 100 mg oral tablet: 1 tab(s) orally once a day  metoclopramide 10 mg oral tablet: 1 tab(s) orally every 6 hours as needed for  nausea  omeprazole 20 mg oral delayed release capsule: 1 cap(s) orally once a day When methotrexate level is cleared  Peridex 0.12% mucous membrane liquid: 15 milliliter(s) orally 2 times a day swish and spit

## 2023-10-12 NOTE — H&P ADULT - GASTROINTESTINAL
Behavioral Health Psychotherapy Progress Note    Psychotherapy Provided: Individual Psychotherapy     No diagnosis found  DATA: Therapist met w/pt for individual session  Pt stated she was very anxious during her dental procedure but was able to work through it with the support from her significant other  She stated that her time in [place] was pretty good but it reminded her that she can't live with her mother  Pt identified some conflict but being able to work through it  Pt stated that she got a dog with her significant other which has helped her tremendously  She stated that it has gotten her out of the house more  She stated that she hasn't had long periods of sadness  She continues to feel fulfilled from her job  During this session, this clinician used the following therapeutic modalities: Cognitive Behavioral Therapy and Solution-Focused Therapy    Substance Abuse was addressed during this session  If the client is diagnosed with a co-occurring substance use disorder, please indicate any changes in the frequency or amount of use: n/a  Stage of change for addressing substance use diagnoses: No substance use/Not applicable    ASSESSMENT:  Andressa Gutierrez presents with a Euthymic/ normal mood  her affect is Normal range and intensity, which is congruent, with her mood and the content of the session  The client has made progress on their goals  Andressa Gutierrez presents with a none risk of suicide, none risk of self-harm, and none risk of harm to others  For any risk assessment that surpasses a "low" rating, a safety plan must be developed  A safety plan was indicated: If yes, describe in detail n/a    PLAN: Between sessions, Andressa Gutierrez will continue to utilize her supports and implement positive coping skills  At the next session, the therapist will use Cognitive Behavioral Therapy to address anxiety and depression      Behavioral Health Treatment Plan and Discharge Planning: Sharmin Saldaña is aware of and agrees to continue to work on their treatment plan  They have identified and are working toward their discharge goals       Visit start and stop times:    02/22/23  Start Time: 1005  Stop Time: 1050  Total Visit Time: 45 minutes negative soft/nontender/nondistended/normal active bowel sounds

## 2023-10-12 NOTE — DISCHARGE NOTE PROVIDER - NSDCFUADDAPPT_GEN_ALL_CORE_FT
You have the following appointmetns at [facility]:    10/16 at 3PM for blincyto bag change, new mediport needle and dressing You have the following appointments at [facility]:    10/16 at 3PM for blincyto bag change, new medi port needle and dressing.   You have the following appointments at [facility]:    You have appointment with Dr. Goldberg on Monday, 10/16 at 3:20 PM.    10/16 at 3PM for blincyto bag change, new medi port needle and dressing.   You have the following appointments at [facility]:  You have appointment with Dr. Goldberg on Monday, 10/16 at 3:20 PM.  10/16 at 3PM for blincyto bag change, new medi port needle and dressing.

## 2023-10-13 LAB
ALBUMIN SERPL ELPH-MCNC: 4.5 G/DL — SIGNIFICANT CHANGE UP (ref 3.3–5)
ALP SERPL-CCNC: 111 U/L — SIGNIFICANT CHANGE UP (ref 40–120)
ALT FLD-CCNC: 24 U/L — SIGNIFICANT CHANGE UP (ref 10–45)
ANION GAP SERPL CALC-SCNC: 16 MMOL/L — SIGNIFICANT CHANGE UP (ref 5–17)
AST SERPL-CCNC: 24 U/L — SIGNIFICANT CHANGE UP (ref 10–40)
BASOPHILS # BLD AUTO: 0.01 K/UL — SIGNIFICANT CHANGE UP (ref 0–0.2)
BASOPHILS NFR BLD AUTO: 0.2 % — SIGNIFICANT CHANGE UP (ref 0–2)
BILIRUB SERPL-MCNC: 0.5 MG/DL — SIGNIFICANT CHANGE UP (ref 0.2–1.2)
BUN SERPL-MCNC: 11 MG/DL — SIGNIFICANT CHANGE UP (ref 7–23)
CALCIUM SERPL-MCNC: 10.3 MG/DL — SIGNIFICANT CHANGE UP (ref 8.4–10.5)
CHLORIDE SERPL-SCNC: 103 MMOL/L — SIGNIFICANT CHANGE UP (ref 96–108)
CO2 SERPL-SCNC: 20 MMOL/L — LOW (ref 22–31)
CREAT SERPL-MCNC: 0.74 MG/DL — SIGNIFICANT CHANGE UP (ref 0.5–1.3)
EGFR: 90 ML/MIN/1.73M2 — SIGNIFICANT CHANGE UP
EOSINOPHIL # BLD AUTO: 0 K/UL — SIGNIFICANT CHANGE UP (ref 0–0.5)
EOSINOPHIL NFR BLD AUTO: 0 % — SIGNIFICANT CHANGE UP (ref 0–6)
GLUCOSE SERPL-MCNC: 148 MG/DL — HIGH (ref 70–99)
HCT VFR BLD CALC: 37.9 % — SIGNIFICANT CHANGE UP (ref 34.5–45)
HGB BLD-MCNC: 13.1 G/DL — SIGNIFICANT CHANGE UP (ref 11.5–15.5)
IMM GRANULOCYTES NFR BLD AUTO: 0.6 % — SIGNIFICANT CHANGE UP (ref 0–0.9)
LYMPHOCYTES # BLD AUTO: 0.1 K/UL — LOW (ref 1–3.3)
LYMPHOCYTES # BLD AUTO: 1.6 % — LOW (ref 13–44)
MAGNESIUM SERPL-MCNC: 2.1 MG/DL — SIGNIFICANT CHANGE UP (ref 1.6–2.6)
MCHC RBC-ENTMCNC: 34.6 GM/DL — SIGNIFICANT CHANGE UP (ref 32–36)
MCHC RBC-ENTMCNC: 34.8 PG — HIGH (ref 27–34)
MCV RBC AUTO: 100.8 FL — HIGH (ref 80–100)
MONOCYTES # BLD AUTO: 0.08 K/UL — SIGNIFICANT CHANGE UP (ref 0–0.9)
MONOCYTES NFR BLD AUTO: 1.3 % — LOW (ref 2–14)
NEUTROPHILS # BLD AUTO: 6.02 K/UL — SIGNIFICANT CHANGE UP (ref 1.8–7.4)
NEUTROPHILS NFR BLD AUTO: 96.3 % — HIGH (ref 43–77)
NRBC # BLD: 0 /100 WBCS — SIGNIFICANT CHANGE UP (ref 0–0)
PHOSPHATE SERPL-MCNC: 2.8 MG/DL — SIGNIFICANT CHANGE UP (ref 2.5–4.5)
PLATELET # BLD AUTO: 126 K/UL — LOW (ref 150–400)
POTASSIUM SERPL-MCNC: 3.8 MMOL/L — SIGNIFICANT CHANGE UP (ref 3.5–5.3)
POTASSIUM SERPL-SCNC: 3.8 MMOL/L — SIGNIFICANT CHANGE UP (ref 3.5–5.3)
PROT SERPL-MCNC: 7.3 G/DL — SIGNIFICANT CHANGE UP (ref 6–8.3)
RBC # BLD: 3.76 M/UL — LOW (ref 3.8–5.2)
RBC # FLD: 13.4 % — SIGNIFICANT CHANGE UP (ref 10.3–14.5)
SODIUM SERPL-SCNC: 139 MMOL/L — SIGNIFICANT CHANGE UP (ref 135–145)
WBC # BLD: 6.25 K/UL — SIGNIFICANT CHANGE UP (ref 3.8–10.5)
WBC # FLD AUTO: 6.25 K/UL — SIGNIFICANT CHANGE UP (ref 3.8–10.5)

## 2023-10-13 PROCEDURE — 99233 SBSQ HOSP IP/OBS HIGH 50: CPT | Mod: FS

## 2023-10-13 RX ORDER — CHLORHEXIDINE GLUCONATE 213 G/1000ML
1 SOLUTION TOPICAL
Refills: 0 | Status: DISCONTINUED | OUTPATIENT
Start: 2023-10-13 | End: 2023-10-15

## 2023-10-13 RX ADMIN — Medication 400 MILLIGRAM(S): at 17:08

## 2023-10-13 RX ADMIN — BLINATUMOMAB 32.5 MICROGRAM(S): KIT INTRAVENOUS at 15:54

## 2023-10-13 RX ADMIN — Medication 15 MILLILITER(S): at 06:03

## 2023-10-13 RX ADMIN — Medication 400 MILLIGRAM(S): at 06:03

## 2023-10-13 RX ADMIN — CHLORHEXIDINE GLUCONATE 15 MILLILITER(S): 213 SOLUTION TOPICAL at 06:05

## 2023-10-13 RX ADMIN — ATOVAQUONE 1500 MILLIGRAM(S): 750 SUSPENSION ORAL at 12:31

## 2023-10-13 RX ADMIN — Medication 15 MILLILITER(S): at 21:41

## 2023-10-13 RX ADMIN — LOSARTAN POTASSIUM 100 MILLIGRAM(S): 100 TABLET, FILM COATED ORAL at 06:04

## 2023-10-13 RX ADMIN — ESCITALOPRAM OXALATE 10 MILLIGRAM(S): 10 TABLET, FILM COATED ORAL at 12:31

## 2023-10-13 RX ADMIN — PANTOPRAZOLE SODIUM 40 MILLIGRAM(S): 20 TABLET, DELAYED RELEASE ORAL at 06:04

## 2023-10-13 RX ADMIN — Medication 650 MILLIGRAM(S): at 06:04

## 2023-10-13 RX ADMIN — Medication 15 MILLILITER(S): at 12:32

## 2023-10-13 RX ADMIN — CHLORHEXIDINE GLUCONATE 15 MILLILITER(S): 213 SOLUTION TOPICAL at 17:08

## 2023-10-13 RX ADMIN — Medication 650 MILLIGRAM(S): at 07:04

## 2023-10-13 NOTE — PROGRESS NOTE ADULT - NS ATTEND AMEND GEN_ALL_CORE FT
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 BMBx following cycle 2B showed continued CR with continued negative MRD on Clonoseq testing, although with a low level residual sequence below the limit of detection. 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 Patient with pancytopenia secondary to disease.     B-cell acute lymphoblastic leukemia.   Admitted  for Cycle 1 Blincyto 28 mcg/day continuous infusion for 28 days  Premedications on Day 1 with dexamethasone 20 mg Iv x 1, Tylenol 650 mg PO x1, Pepcid 20 mg IV x 1, Benadryl 50 mg IV x1  S/p Hyper CVAD cycle 1A feb 2023 (reduces dose), 1B (full dose) on 4/3, cycle 2A (full dose) on 5/1, cycle 2B (full dose) on 6/12, and cycle 3A (50% dose reduced) on 7/17  S/p After cycle 2B, BMBx showed continued CR with continued negative MRD on Clonoseq testing, although with a low level residual sequence below the limit of detection. The very low level of residual sequence detected creates reasonable concern for a very low level chemoresistant clone at the maturation level of the stem cell.   Access Mediport , encourage PO fluid intake, strict I/O, daily weights, antiemetics   Monitor CBC/CMP daily, transfuse blood products/replace lytes as needed   Monitor for CRS/neurotoxicity, monitor for dysgraphia with handwriting checks daily.    10/12 - Started Cycle 1 Blincyto  Discharge planning for 10/15.     Infectious disease.   Patient is not neutropenic, afebrile    Continue acyclovir and mepron   Currently off levaquin and fluconazole    If febrile, panculture.    HTN (hypertension).   Continue home losartan.    GERD (gastroesophageal reflux disease).   On omeprazole at home.  Continue pantoprazole therapeutic interchange while inpatient.    Anxiety and depression.   Continue home lexapro.    Prophylactic measure.    VTE prophylaxis with out of bed and ambulation. 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 BMBx following cycle 2B showed continued CR with continued negative MRD on Clonoseq testing, although with a low level residual sequence below the limit of detection. 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 Today is day 2.    Heme:  Admitted  for Cycle 1 Blincyto 28 mcg/day continuous infusion for 28 days  Premedications on Day 1 with dexamethasone 20 mg Iv x 1, Tylenol 650 mg PO x1, Pepcid 20 mg IV x 1, Benadryl 50 mg IV x1  S/p Hyper CVAD cycle 1A feb 2023 (reduces dose), 1B (full dose) on 4/3, cycle 2A (full dose) on 5/1, cycle 2B (full dose) on 6/12, and cycle 3A (50% dose reduced) on 7/17  Monitor for CRS/neurotoxicity, monitor for dysgraphia with handwriting checks daily.    Discharge planning for 10/15.    Infectious disease.   Patient is not neutropenic, afebrile    Continue acyclovir and mepron   Currently off levaquin and fluconazole    If febrile, panculture.    HTN (hypertension).   Continue home losartan.    GERD (gastroesophageal reflux disease).   On omeprazole at home.  Continue pantoprazole therapeutic interchange while inpatient.    Anxiety and depression.   Continue home lexapro.    Prophylactic measure.   VTE prophylaxis: ambulation alone 64 y/o F with PMHx of HTN, HLD with Ph (-) ALL diagnosed in February 2023 and now s/p Hyper CVAD cycle 1A (reduced dose) inf Feb 2023, cycle 1B (full dose) on 4/3, cycle 2A (full dose) on 5/1, cycle 2B (full dose) on 6/12, and cycle 3A (50% dose reduced) on 7/17. BMBx following cycle 2B showed continued CR with continued negative MRD on Clonoseq testing, although with a low level residual sequence below the limit of detection. 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 Today is day 2.    Heme:  Admitted  for Cycle 1 Blincyto 28 mcg/day continuous infusion for 28 days  Premedications on Day 1 with dexamethasone 20 mg Iv x 1, Tylenol 650 mg PO x1, Pepcid 20 mg IV x 1, Benadryl 50 mg IV x1  S/p Hyper CVAD cycle 1A feb 2023 (reduces dose), 1B (full dose) on 4/3, cycle 2A (full dose) on 5/1, cycle 2B (full dose) on 6/12, and cycle 3A (50% dose reduced) on 7/17  Monitor for CRS/neurotoxicity, monitor for dysgraphia with handwriting checks daily.    Discharge planning for 10/15 / day 4.    Infectious disease.   Patient is not neutropenic, afebrile    Continue acyclovir and mepron   Currently off levaquin and fluconazole    If febrile, panculture.    HTN (hypertension).   Continue home losartan.    GERD (gastroesophageal reflux disease).   On omeprazole at home.  Continue pantoprazole therapeutic interchange while inpatient.    Anxiety and depression.   Continue home lexapro.    Prophylactic measure.   VTE prophylaxis: ambulation alone

## 2023-10-13 NOTE — PROGRESS NOTE ADULT - ASSESSMENT
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 BMBx following cycle 2B showed continued CR with continued negative MRD on Clonoseq testing, although with a low level residual sequence below the limit of detection. 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 Patient with pancytopenia secondary to disease. 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 BMBx following cycle 2B showed continued CR with continued negative MRD on Clonoseq testing, although with a low level residual sequence below the limit of detection. 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

## 2023-10-13 NOTE — PROGRESS NOTE ADULT - PROBLEM SELECTOR PLAN 1
Premedications on Day 1 with dexamethasone 20 mg Iv x 1, Tylenol 650 mg PO x1, Pepcid 20 mg IV x 1, Benadryl 50 mg IV x1  S/p Hyper CVAD cycle 1A feb 2023 (reduces dose), 1B (full dose) on 4/3, cycle 2A (full dose) on 5/1, cycle 2B (full dose) on 6/12, and cycle 3A (50% dose reduced) on 7/17  S/p After cycle 2B, BMBx showed continued CR with continued negative MRD on Clonoseq testing, although with a low level residual sequence below the limit of detection. The very low level of residual sequence detected creates reasonable concern for a very low level chemoresistant clone at the maturation level of the stem cell.   Access Mediport , encourage PO fluid intake, strict I/O, daily weights, antiemetics   Monitor CBC/CMP daily, transfuse blood products/replace lytes as needed   Monitor for CRS/neurotoxicity, monitor for dysgraphia with handwriting checks daily.    10/12 - Started Cycle 1 Blincyto  Discharge planning for 10/15. S/p Hyper CVAD cycle 1A feb 2023 (reduces dose), 1B (full dose) on 4/3, cycle 2A (full dose) on 5/1, cycle 2B (full dose) on 6/12, and cycle 3A (50% dose reduced) on 7/17  Monitor CBC/CMP daily,  Monitor electrolytes, replete as needed.  Monitor for CRS/neurotoxicity, monitor for dysgraphia with handwriting checks daily.    Continue  incyto  Discharge planning for 10/15 with outpatient follow up.

## 2023-10-13 NOTE — PHARMACOTHERAPY INTERVENTION NOTE - COMMENTS
Clinical Pharmacy Specialist- Hematology/Oncology- Progress Note    64 y/o F with PMH of Ph (-) ALL s/p cycle 1A/B, cycle 2A/B, & cycle 3A of Hyper-CVAD, now admitted for Cycle 1 Blinatumomab for MRD+ (28mcg/day D1-28)    Antimicrobial Course:  -Acyclovir- 10/12  -Atovaquone- 10/12    Last Neutropenic (ANC<1000): no occurrence   Last Febrile: no occurrence   Days no longer Neutropenic: 1  Days afebrile: 1    Chemotherapy Course  dose reduced miniHyper CVAD: (Feb 2023)  -Cyclophosphamide 150mg/m2 IVPB Q12 D1-3  -Vincristine 2mg IVPB D1,8  -Daunorubicin 25mg/m2 IV continuous over 48hrs starting D4  -Dexamethasone 20mg PO D1-4 & D11-14  Cycle 2A: full dose HyperCVAD: 5/1/23  -Cyclophosphamide 300mg/m2 IVPB Q12 D1-3  -[place] 600mg/m2 cont infusion D1-3  -Vincristine 2mg IVPB D4 & 11  -Doxorubicin 50mg/m2 IVP -D4  -Dexamethasone 40mg PO D1-4 & D11-14  Cycle 2B then 3A  (10/12)- Cycle 1 Blinatumomab 28mcg/day D1-28  -Day: 1 (10/12)    Relevant clinical information used in assessment:  -h/o of headaches with zofran    Assessment/Plan/Recommendation:  -Of note Cylcle 1 requires 3 day hospitalization, but C2 onward requires 2 (can leave on Day 3)    -Blincyto discharge planning (C1 Blinatumomab 28mcg/day D1-28)  Dates              Days         Dose              Bag Type  10/12-14         D1-3        28mcg/day      Inpt (3 days)  10/15              D4           28mcg/day      Discharge Bag- 24hrs (~4p hang time)  10/16              D5           28mcg/day      Outpt 7-Day Bag  10/23              D12         28mcg/day      Outpt 7-Day Bag  10/30              D19         28mcg/day      Outpt 7-Day Bag  11/6                D26         28mcg/day      Outpt 1-Day Bag  11/7               D27          28mcg/day      Outpt 2-Day Bag  11/9               D29           n/a                 Pump Disconnect    -Infusystem Pump Education will be provided (pump/line care, chemo spill, emergency contact) on Sunday discharge day  -Confirmed outpt appointment for 10/16 @ 3pm    Additional Monitoring Needed?   -No    Case discussed with attending/primary team    Leslie Bianchi, PharmD, BCPS  Clinical Pharmacy Specialist | Hematology/Oncology  [facility]  Email: [email or web address] or available on [employer]
Clinical Pharmacy Specialist- Hematology/Oncology- Progress Note    62 y/o F with PMH of Ph (-) ALL s/p cycle 1A/B, cycle 2A/B, & cycle 3A of Hyper-CVAD, now admitted for Cycle 1 Blinatumomab for MRD+ (28mcg/day D1-28)    Antimicrobial Course:  -Acyclovir- 10/12  -Atovaquone- 10/12    Last Neutropenic (ANC<1000): no occurrence   Last Febrile: no occurrence   Days no longer Neutropenic: 1  Days afebrile: 1    Chemotherapy Course  dose reduced miniHyper CVAD: (Feb 2023)  -Cyclophosphamide 150mg/m2 IVPB Q12 D1-3  -Vincristine 2mg IVPB D1,8  -Daunorubicin 25mg/m2 IV continuous over 48hrs starting D4  -Dexamethasone 20mg PO D1-4 & D11-14  Cycle 2A: full dose HyperCVAD: 5/1/23  -Cyclophosphamide 300mg/m2 IVPB Q12 D1-3  -Mesna 600mg/m2 cont infusion D1-3  -Vincristine 2mg IVPB D4 & 11  -Doxorubicin 50mg/m2 IVP -D4  -Dexamethasone 40mg PO D1-4 & D11-14  Cycle 2B then 3A  (10/12)- Cycle 1 Blinatumomab 28mcg/day D1-28  -Day: 1 (10/12)    Relevant clinical information used in assessment:  -h/o of headaches with zofran    Assessment/Plan/Recommendation:  -Pt to go home on Yun[place] 10/15 (D4)- discharge bag on sunday w/ 1st outpt bag hook-up Monday 10/13 - will need to do outpt blincyto planning if not already scheduled     Additional Monitoring Needed?   -Yes- Continue to monitor renal function & daily counts    Case discussed with attending/primary team    Leslie Bianchi, PharmD, BCPS  Clinical Pharmacy Specialist | Hematology/Oncology  [facility]  Email: [email or web address] or available on [employer]

## 2023-10-13 NOTE — PROGRESS NOTE ADULT - SUBJECTIVE AND OBJECTIVE BOX
Diagnosis:    Protocol/Chemo Regimen:    Day:     Pt endorsed:    Review of Systems:     Pain scale:     Diet:     Allergies    Zofran (Headache)  sulfa drugs (Unknown)    Intolerances        ANTIMICROBIALS  acyclovir   Oral Tab/Cap 400 milliGRAM(s) Oral two times a day  atovaquone  Suspension 1500 milliGRAM(s) Oral daily      HEME/ONC MEDICATIONS  blinatumomab IVPB (eMAR) 32.5 MICROGram(s) IV Continuous <Continuous>      STANDING MEDICATIONS  Biotene Dry Mouth Oral Rinse 15 milliLiter(s) Swish and Spit three times a day  chlorhexidine 0.12% Liquid 15 milliLiter(s) Swish and Spit two times a day  escitalopram 10 milliGRAM(s) Oral daily  losartan 100 milliGRAM(s) Oral daily  pantoprazole    Tablet 40 milliGRAM(s) Oral before breakfast      PRN MEDICATIONS  acetaminophen     Tablet .. 650 milliGRAM(s) Oral every 6 hours PRN  metoclopramide 10 milliGRAM(s) Oral every 6 hours PRN        Vital Signs Last 24 Hrs  T(C): 37.2 (13 Oct 2023 01:17), Max: 37.2 (13 Oct 2023 01:17)  T(F): 98.9 (13 Oct 2023 01:17), Max: 98.9 (13 Oct 2023 01:17)  HR: 75 (13 Oct 2023 01:17) (62 - 75)  BP: 146/86 (13 Oct 2023 01:17) (146/86 - 160/88)  BP(mean): --  RR: 18 (13 Oct 2023 01:17) (18 - 18)  SpO2: 96% (13 Oct 2023 01:17) (96% - 99%)    Parameters below as of 13 Oct 2023 01:17  Patient On (Oxygen Delivery Method): room air        PHYSICAL EXAM  General: NAD  HEENT: PERRLA, EOMOI, clear oropharynx, anicteric sclera, pink conjunctiva  Neck: supple  CV: (+) S1/S2 RRR  Lungs: clear to auscultation, no wheezes or rales  Abdomen: soft, non-tender, non-distended (+) BS  Ext: no clubbing, cyanosis or edema  Skin: no rashes and no petechiae  Neuro: alert and oriented X 3, no focal deficits  Central Line:     RECENT CULTURES:        LABS:                        11.4   3.41  )-----------( 111      ( 12 Oct 2023 12:54 )             34.2         Mean Cell Volume : 103.6 fl  Mean Cell Hemoglobin : 34.5 pg  Mean Cell Hemoglobin Concentration : 33.3 gm/dL  Auto Neutrophil # : 2.50 K/uL  Auto Lymphocyte # : 0.56 K/uL  Auto Monocyte # : 0.26 K/uL  Auto Eosinophil # : 0.09 K/uL  Auto Basophil # : 0.00 K/uL  Auto Neutrophil % : 73.3 %  Auto Lymphocyte % : 16.4 %  Auto Monocyte % : 7.7 %  Auto Eosinophil % : 2.6 %  Auto Basophil % : 0.0 %      10-12    138  |  104  |  11  ----------------------------<  104<H>  3.8   |  23  |  0.82    Ca    9.5      12 Oct 2023 12:54  Phos  3.2     10-12  Mg     1.9     10-12    TPro  6.4  /  Alb  3.9  /  TBili  0.5  /  DBili  x   /  AST  22  /  ALT  24  /  AlkPhos  100  10-12      Mg 1.9  Phos 3.2      PT/INR - ( 12 Oct 2023 12:54 )   PT: 10.6 sec;   INR: 1.01 ratio         PTT - ( 12 Oct 2023 12:54 )  PTT:24.9 sec      Uric Acid 4.8        RADIOLOGY & ADDITIONAL STUDIES:         Diagnosis: B ALL Ph (-)    Protocol/Chemo Regimen: Cycle #1 Blincyto    Day: 2    Pt endorsed: "doing well"    Review of Systems: Denies any nausea, vomiting diarrhea, chest pain, palpitation, SOB, abdominal pain, fever or chills.    Pain scale: Denies    Diet: Regular    Allergies: Zofran (Headache)  sulfa drugs (Unknown)    ANTIMICROBIALS  acyclovir   Oral Tab/Cap 400 milliGRAM(s) Oral two times a day  atovaquone  Suspension 1500 milliGRAM(s) Oral daily    HEME/ONC MEDICATIONS  blinatumomab IVPB (eMAR) 32.5 MICROGram(s) IV Continuous <Continuous>    STANDING MEDICATIONS  Biotene Dry Mouth Oral Rinse 15 milliLiter(s) Swish and Spit three times a day  chlorhexidine 0.12% Liquid 15 milliLiter(s) Swish and Spit two times a day  escitalopram 10 milliGRAM(s) Oral daily  losartan 100 milliGRAM(s) Oral daily  pantoprazole    Tablet 40 milliGRAM(s) Oral before breakfast    PRN MEDICATIONS  acetaminophen     Tablet .. 650 milliGRAM(s) Oral every 6 hours PRN  metoclopramide 10 milliGRAM(s) Oral every 6 hours PRN    Vital Signs Last 24 Hrs  T(C): 37.2 (13 Oct 2023 01:17), Max: 37.2 (13 Oct 2023 01:17)  T(F): 98.9 (13 Oct 2023 01:17), Max: 98.9 (13 Oct 2023 01:17)  HR: 75 (13 Oct 2023 01:17) (62 - 75)  BP: 146/86 (13 Oct 2023 01:17) (146/86 - 160/88)  BP(mean): --  RR: 18 (13 Oct 2023 01:17) (18 - 18)  SpO2: 96% (13 Oct 2023 01:17) (96% - 99%)    Parameters below as of 13 Oct 2023 01:17  Patient On (Oxygen Delivery Method): room air    PHYSICAL EXAM  General: NAD  HEENT: PERRLA, EOMOI, clear oropharynx  CV: (+) S1/S2 RRR  Lungs: clear to auscultation, no wheezes or rales  Abdomen: soft, non-tender, non-distended (+) BS  Ext: no clubbing, cyanosis or edema  Skin: no rashes and no petechiae  Neuro: alert and oriented X 3, no focal deficits  Central Line: RCW Mediport CDI    RECENT CULTURES:  NA    LABS:                                     13.1   6.25  )-----------( 126      ( 13 Oct 2023 07:05 )             37.9     Mean Cell Volume : 100.8 fl  Mean Cell Hemoglobin : 34.8 pg  Mean Cell Hemoglobin Concentration : 34.6 gm/dL  Auto Neutrophil # : 6.02 K/uL  Auto Lymphocyte # : 0.10 K/uL  Auto Monocyte # : 0.08 K/uL  Auto Eosinophil # : 0.00 K/uL  Auto Basophil # : 0.01 K/uL  Auto Neutrophil % : 96.3 %  Auto Lymphocyte % : 1.6 %  Auto Monocyte % : 1.3 %  Auto Eosinophil % : 0.0 %  Auto Basophil % : 0.2 %      10-13    139  |  103  |  11  ----------------------------<  148<H>  3.8   |  20<L>  |  0.74    Ca    10.3      13 Oct 2023 07:05  Phos  2.8     10-13  Mg     2.1     10-13    TPro  7.3  /  Alb  4.5  /  TBili  0.5  /  DBili  x   /  AST  24  /  ALT  24  /  AlkPhos  111  10-13  Mg 2.1  Phos 2.8  PT/INR - ( 12 Oct 2023 12:54 )   PT: 10.6 sec;   INR: 1.01 ratio    PTT - ( 12 Oct 2023 12:54 )  PTT:24.9 sec      Radiology:  NA

## 2023-10-14 LAB
ALBUMIN SERPL ELPH-MCNC: 3.9 G/DL — SIGNIFICANT CHANGE UP (ref 3.3–5)
ALP SERPL-CCNC: 84 U/L — SIGNIFICANT CHANGE UP (ref 40–120)
ALT FLD-CCNC: 23 U/L — SIGNIFICANT CHANGE UP (ref 10–45)
ANION GAP SERPL CALC-SCNC: 13 MMOL/L — SIGNIFICANT CHANGE UP (ref 5–17)
AST SERPL-CCNC: 20 U/L — SIGNIFICANT CHANGE UP (ref 10–40)
BASOPHILS # BLD AUTO: 0.01 K/UL — SIGNIFICANT CHANGE UP (ref 0–0.2)
BASOPHILS NFR BLD AUTO: 0.2 % — SIGNIFICANT CHANGE UP (ref 0–2)
BILIRUB SERPL-MCNC: 0.4 MG/DL — SIGNIFICANT CHANGE UP (ref 0.2–1.2)
BUN SERPL-MCNC: 16 MG/DL — SIGNIFICANT CHANGE UP (ref 7–23)
CALCIUM SERPL-MCNC: 9.8 MG/DL — SIGNIFICANT CHANGE UP (ref 8.4–10.5)
CHLORIDE SERPL-SCNC: 106 MMOL/L — SIGNIFICANT CHANGE UP (ref 96–108)
CO2 SERPL-SCNC: 24 MMOL/L — SIGNIFICANT CHANGE UP (ref 22–31)
CREAT SERPL-MCNC: 0.89 MG/DL — SIGNIFICANT CHANGE UP (ref 0.5–1.3)
EGFR: 72 ML/MIN/1.73M2 — SIGNIFICANT CHANGE UP
EOSINOPHIL # BLD AUTO: 0.04 K/UL — SIGNIFICANT CHANGE UP (ref 0–0.5)
EOSINOPHIL NFR BLD AUTO: 0.7 % — SIGNIFICANT CHANGE UP (ref 0–6)
GLUCOSE SERPL-MCNC: 90 MG/DL — SIGNIFICANT CHANGE UP (ref 70–99)
HCT VFR BLD CALC: 36 % — SIGNIFICANT CHANGE UP (ref 34.5–45)
HGB BLD-MCNC: 11.8 G/DL — SIGNIFICANT CHANGE UP (ref 11.5–15.5)
IMM GRANULOCYTES NFR BLD AUTO: 0.7 % — SIGNIFICANT CHANGE UP (ref 0–0.9)
LYMPHOCYTES # BLD AUTO: 0.4 K/UL — LOW (ref 1–3.3)
LYMPHOCYTES # BLD AUTO: 7.4 % — LOW (ref 13–44)
MAGNESIUM SERPL-MCNC: 2.1 MG/DL — SIGNIFICANT CHANGE UP (ref 1.6–2.6)
MCHC RBC-ENTMCNC: 32.8 GM/DL — SIGNIFICANT CHANGE UP (ref 32–36)
MCHC RBC-ENTMCNC: 34.4 PG — HIGH (ref 27–34)
MCV RBC AUTO: 105 FL — HIGH (ref 80–100)
MONOCYTES # BLD AUTO: 0.66 K/UL — SIGNIFICANT CHANGE UP (ref 0–0.9)
MONOCYTES NFR BLD AUTO: 12.3 % — SIGNIFICANT CHANGE UP (ref 2–14)
MRSA PCR RESULT.: SIGNIFICANT CHANGE UP
NEUTROPHILS # BLD AUTO: 4.22 K/UL — SIGNIFICANT CHANGE UP (ref 1.8–7.4)
NEUTROPHILS NFR BLD AUTO: 78.7 % — HIGH (ref 43–77)
NRBC # BLD: 0 /100 WBCS — SIGNIFICANT CHANGE UP (ref 0–0)
PHOSPHATE SERPL-MCNC: 3.1 MG/DL — SIGNIFICANT CHANGE UP (ref 2.5–4.5)
PLATELET # BLD AUTO: 112 K/UL — LOW (ref 150–400)
POTASSIUM SERPL-MCNC: 3.7 MMOL/L — SIGNIFICANT CHANGE UP (ref 3.5–5.3)
POTASSIUM SERPL-SCNC: 3.7 MMOL/L — SIGNIFICANT CHANGE UP (ref 3.5–5.3)
PROT SERPL-MCNC: 6.4 G/DL — SIGNIFICANT CHANGE UP (ref 6–8.3)
RBC # BLD: 3.43 M/UL — LOW (ref 3.8–5.2)
RBC # FLD: 13.8 % — SIGNIFICANT CHANGE UP (ref 10.3–14.5)
S AUREUS DNA NOSE QL NAA+PROBE: SIGNIFICANT CHANGE UP
SODIUM SERPL-SCNC: 143 MMOL/L — SIGNIFICANT CHANGE UP (ref 135–145)
WBC # BLD: 5.37 K/UL — SIGNIFICANT CHANGE UP (ref 3.8–10.5)
WBC # FLD AUTO: 5.37 K/UL — SIGNIFICANT CHANGE UP (ref 3.8–10.5)

## 2023-10-14 PROCEDURE — 99232 SBSQ HOSP IP/OBS MODERATE 35: CPT | Mod: FS

## 2023-10-14 RX ADMIN — ATOVAQUONE 1500 MILLIGRAM(S): 750 SUSPENSION ORAL at 11:41

## 2023-10-14 RX ADMIN — ESCITALOPRAM OXALATE 10 MILLIGRAM(S): 10 TABLET, FILM COATED ORAL at 11:41

## 2023-10-14 RX ADMIN — CHLORHEXIDINE GLUCONATE 15 MILLILITER(S): 213 SOLUTION TOPICAL at 17:35

## 2023-10-14 RX ADMIN — Medication 15 MILLILITER(S): at 14:20

## 2023-10-14 RX ADMIN — Medication 400 MILLIGRAM(S): at 17:35

## 2023-10-14 RX ADMIN — BLINATUMOMAB 32.5 MICROGRAM(S): KIT INTRAVENOUS at 15:58

## 2023-10-14 RX ADMIN — PANTOPRAZOLE SODIUM 40 MILLIGRAM(S): 20 TABLET, DELAYED RELEASE ORAL at 05:14

## 2023-10-14 RX ADMIN — Medication 15 MILLILITER(S): at 05:14

## 2023-10-14 RX ADMIN — CHLORHEXIDINE GLUCONATE 1 APPLICATION(S): 213 SOLUTION TOPICAL at 11:41

## 2023-10-14 RX ADMIN — Medication 400 MILLIGRAM(S): at 05:14

## 2023-10-14 RX ADMIN — LOSARTAN POTASSIUM 100 MILLIGRAM(S): 100 TABLET, FILM COATED ORAL at 05:14

## 2023-10-14 RX ADMIN — CHLORHEXIDINE GLUCONATE 15 MILLILITER(S): 213 SOLUTION TOPICAL at 05:15

## 2023-10-14 NOTE — PROGRESS NOTE ADULT - ASSESSMENT
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 BMBx following cycle 2B showed continued CR with continued negative MRD on Clonoseq testing, although with a low level residual sequence below the limit of detection. 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

## 2023-10-14 NOTE — PROGRESS NOTE ADULT - NS ATTEND AMEND GEN_ALL_CORE FT
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 BMBx following cycle 2B showed continued CR with continued negative MRD on Clonoseq testing, although with a low level residual sequence below the limit of detection. 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 Today is day 2.    Heme:  Admitted  for Cycle 1 Blincyto 28 mcg/day continuous infusion for 28 days  Premedications on Day 1 with dexamethasone 20 mg Iv x 1, Tylenol 650 mg PO x1, Pepcid 20 mg IV x 1, Benadryl 50 mg IV x1  S/p Hyper CVAD cycle 1A feb 2023 (reduces dose), 1B (full dose) on 4/3, cycle 2A (full dose) on 5/1, cycle 2B (full dose) on 6/12, and cycle 3A (50% dose reduced) on 7/17  Monitor for CRS/neurotoxicity, monitor for dysgraphia with handwriting checks daily.    Discharge planning for 10/15 / day 4.    Infectious disease.   Patient is not neutropenic, afebrile    Continue acyclovir and mepron   Currently off levaquin and fluconazole    If febrile, panculture.    HTN (hypertension).   Continue home losartan.    GERD (gastroesophageal reflux disease).   On omeprazole at home.  Continue pantoprazole therapeutic interchange while inpatient.    Anxiety and depression.   Continue home lexapro.    Prophylactic measure.   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 BMBx following cycle 2B showed continued CR with continued negative MRD on Clonoseq testing, although with a low level residual sequence below the limit of detection. 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 Today is day 3.    Heme:  Admitted  for Cycle 1 Blincyto 28 mcg/day continuous infusion for 28 days  Premedications on Day 1 with dexamethasone 20 mg Iv x 1, Tylenol 650 mg PO x1, Pepcid 20 mg IV x 1, Benadryl 50 mg IV x1  S/p Hyper CVAD cycle 1A feb 2023 (reduces dose), 1B (full dose) on 4/3, cycle 2A (full dose) on 5/1, cycle 2B (full dose) on 6/12, and cycle 3A (50% dose reduced) on 7/17  Monitor for CRS/neurotoxicity, monitor for dysgraphia with handwriting checks daily.    Discharge planning for 10/15 / day 4.    Infectious disease.   Patient is not neutropenic, afebrile    Continue acyclovir and mepron   Currently off levaquin and fluconazole    If febrile, panculture.    HTN (hypertension).   Continue home losartan.    GERD (gastroesophageal reflux disease).   On omeprazole at home.  Continue pantoprazole therapeutic interchange while inpatient.    Anxiety and depression.   Continue home lexapro.    Prophylactic measure.   VTE prophylaxis: ambulation alone

## 2023-10-14 NOTE — PROGRESS NOTE ADULT - PROBLEM SELECTOR PLAN 1
S/p Hyper CVAD cycle 1A feb 2023 (reduces dose), 1B (full dose) on 4/3, cycle 2A (full dose) on 5/1, cycle 2B (full dose) on 6/12, and cycle 3A (50% dose reduced) on 7/17  Monitor CBC/CMP daily,  Monitor electrolytes, replete as needed.  Monitor for CRS/neurotoxicity, monitor for dysgraphia with handwriting checks daily.    Continue  incyto  Discharge planning for 10/15 with outpatient follow up.

## 2023-10-14 NOTE — ADVANCED PRACTICE NURSE CONSULT - RECOMMEDATIONS
Sign posted:No flushing,No bloodrawing no disconnection. Patient aware. Safety maintained.    

## 2023-10-14 NOTE — PROGRESS NOTE ADULT - SUBJECTIVE AND OBJECTIVE BOX
Diagnosis:    Protocol/Chemo Regimen:    Day:     Pt endorsed:    Review of Systems:     Pain scale:     Diet:     Allergies    Zofran (Headache)  sulfa drugs (Unknown)    Intolerances        ANTIMICROBIALS  acyclovir   Oral Tab/Cap 400 milliGRAM(s) Oral two times a day  atovaquone  Suspension 1500 milliGRAM(s) Oral daily      HEME/ONC MEDICATIONS  blinatumomab IVPB (eMAR) 32.5 MICROGram(s) IV Continuous <Continuous>      STANDING MEDICATIONS  Biotene Dry Mouth Oral Rinse 15 milliLiter(s) Swish and Spit three times a day  chlorhexidine 0.12% Liquid 15 milliLiter(s) Swish and Spit two times a day  chlorhexidine 2% Cloths 1 Application(s) Topical <User Schedule>  escitalopram 10 milliGRAM(s) Oral daily  losartan 100 milliGRAM(s) Oral daily  pantoprazole    Tablet 40 milliGRAM(s) Oral before breakfast      PRN MEDICATIONS  acetaminophen     Tablet .. 650 milliGRAM(s) Oral every 6 hours PRN  metoclopramide 10 milliGRAM(s) Oral every 6 hours PRN        Vital Signs Last 24 Hrs  T(C): 36.9 (14 Oct 2023 05:00), Max: 36.9 (14 Oct 2023 05:00)  T(F): 98.5 (14 Oct 2023 05:00), Max: 98.5 (14 Oct 2023 05:00)  HR: 63 (14 Oct 2023 05:00) (63 - 82)  BP: 137/82 (14 Oct 2023 05:00) (137/82 - 166/93)  BP(mean): --  RR: 18 (14 Oct 2023 05:00) (16 - 18)  SpO2: 98% (14 Oct 2023 05:00) (96% - 98%)    Parameters below as of 14 Oct 2023 05:00  Patient On (Oxygen Delivery Method): room air        PHYSICAL EXAM  General: NAD  HEENT: PERRLA, EOMOI, clear oropharynx, anicteric sclera, pink conjunctiva  Neck: supple  CV: (+) S1/S2 RRR  Lungs: clear to auscultation, no wheezes or rales  Abdomen: soft, non-tender, non-distended (+) BS  Ext: no clubbing, cyanosis or edema  Skin: no rashes and no petechiae  Neuro: alert and oriented X 3, no focal deficits  Central Line:     RECENT CULTURES:        LABS:                        11.8   5.37  )-----------( 112      ( 14 Oct 2023 06:41 )             36.0         Mean Cell Volume : 105.0 fl  Mean Cell Hemoglobin : 34.4 pg  Mean Cell Hemoglobin Concentration : 32.8 gm/dL  Auto Neutrophil # : 4.22 K/uL  Auto Lymphocyte # : 0.40 K/uL  Auto Monocyte # : 0.66 K/uL  Auto Eosinophil # : 0.04 K/uL  Auto Basophil # : 0.01 K/uL  Auto Neutrophil % : 78.7 %  Auto Lymphocyte % : 7.4 %  Auto Monocyte % : 12.3 %  Auto Eosinophil % : 0.7 %  Auto Basophil % : 0.2 %      10-14    143  |  106  |  16  ----------------------------<  90  3.7   |  24  |  0.89    Ca    9.8      14 Oct 2023 06:43  Phos  3.1     10-14  Mg     2.1     10-14    TPro  6.4  /  Alb  3.9  /  TBili  0.4  /  DBili  x   /  AST  20  /  ALT  23  /  AlkPhos  84  10-14      Mg 2.1  Phos 3.1      PT/INR - ( 12 Oct 2023 12:54 )   PT: 10.6 sec;   INR: 1.01 ratio         PTT - ( 12 Oct 2023 12:54 )  PTT:24.9 sec        RADIOLOGY & ADDITIONAL STUDIES:           Diagnosis: B ALL Ph (-)    Protocol/Chemo Regimen: Cycle #1 Blincyto    Day: 3    Pt endorsed: no complaints reported this am.    Review of Systems: Denies any nausea, vomiting diarrhea, chest pain, palpitation, SOB, abdominal pain, fever or chills.    Pain scale: Denies    Diet: Regular    Allergies: Zofran (Headache)  sulfa drugs (Unknown)    ANTIMICROBIALS  acyclovir   Oral Tab/Cap 400 milliGRAM(s) Oral two times a day  atovaquone  Suspension 1500 milliGRAM(s) Oral daily    HEME/ONC MEDICATIONS  blinatumomab IVPB (eMAR) 32.5 MICROGram(s) IV Continuous <Continuous>    STANDING MEDICATIONS  Biotene Dry Mouth Oral Rinse 15 milliLiter(s) Swish and Spit three times a day  chlorhexidine 0.12% Liquid 15 milliLiter(s) Swish and Spit two times a day  chlorhexidine 2% Cloths 1 Application(s) Topical <User Schedule>  escitalopram 10 milliGRAM(s) Oral daily  losartan 100 milliGRAM(s) Oral daily  pantoprazole    Tablet 40 milliGRAM(s) Oral before breakfast    PRN MEDICATIONS  acetaminophen     Tablet .. 650 milliGRAM(s) Oral every 6 hours PRN  metoclopramide 10 milliGRAM(s) Oral every 6 hours PRN    Vital Signs Last 24 Hrs  T(C): 36.9 (14 Oct 2023 05:00), Max: 36.9 (14 Oct 2023 05:00)  T(F): 98.5 (14 Oct 2023 05:00), Max: 98.5 (14 Oct 2023 05:00)  HR: 63 (14 Oct 2023 05:00) (63 - 82)  BP: 137/82 (14 Oct 2023 05:00) (137/82 - 166/93)  BP(mean): --  RR: 18 (14 Oct 2023 05:00) (16 - 18)  SpO2: 98% (14 Oct 2023 05:00) (96% - 98%)    Parameters below as of 14 Oct 2023 05:00  Patient On (Oxygen Delivery Method): room air    PHYSICAL EXAM  General: NAD  HEENT: PERRLA, EOMOI, clear oropharynx  CV: (+) S1/S2 RRR  Lungs: clear to auscultation, no wheezes or rales  Abdomen: soft, non-tender, non-distended (+) BS  Ext: no clubbing, cyanosis or edema  Skin: no rashes and no petechiae  Neuro: alert and oriented X 3, no focal deficits  Central Line: RCW Mediport CDI    RECENT CULTURES:  NA    LABS:                        11.8   5.37  )-----------( 112      ( 14 Oct 2023 06:41 )             36.0     Mean Cell Volume : 105.0 fl  Mean Cell Hemoglobin : 34.4 pg  Mean Cell Hemoglobin Concentration : 32.8 gm/dL  Auto Neutrophil # : 4.22 K/uL  Auto Lymphocyte # : 0.40 K/uL  Auto Monocyte # : 0.66 K/uL  Auto Eosinophil # : 0.04 K/uL  Auto Basophil # : 0.01 K/uL  Auto Neutrophil % : 78.7 %  Auto Lymphocyte % : 7.4 %  Auto Monocyte % : 12.3 %  Auto Eosinophil % : 0.7 %  Auto Basophil % : 0.2 %      10-14    143  |  106  |  16  ----------------------------<  90  3.7   |  24  |  0.89    Ca    9.8      14 Oct 2023 06:43  Phos  3.1     10-14  Mg     2.1     10-14    TPro  6.4  /  Alb  3.9  /  TBili  0.4  /  DBili  x   /  AST  20  /  ALT  23  /  AlkPhos  84  10-14  Mg 2.1  Phos 3.1  PT/INR - ( 12 Oct 2023 12:54 )   PT: 10.6 sec;   INR: 1.01 ratio    PTT - ( 12 Oct 2023 12:54 )  PTT:24.9 sec    RADIOLOGY & ADDITIONAL STUDIES:  NA

## 2023-10-15 ENCOUNTER — TRANSCRIPTION ENCOUNTER (OUTPATIENT)
Age: 63
End: 2023-10-15

## 2023-10-15 VITALS
TEMPERATURE: 98 F | OXYGEN SATURATION: 97 % | DIASTOLIC BLOOD PRESSURE: 78 MMHG | HEART RATE: 68 BPM | RESPIRATION RATE: 18 BRPM | SYSTOLIC BLOOD PRESSURE: 134 MMHG

## 2023-10-15 LAB
ALBUMIN SERPL ELPH-MCNC: 3.9 G/DL — SIGNIFICANT CHANGE UP (ref 3.3–5)
ALP SERPL-CCNC: 84 U/L — SIGNIFICANT CHANGE UP (ref 40–120)
ALT FLD-CCNC: 31 U/L — SIGNIFICANT CHANGE UP (ref 10–45)
ANION GAP SERPL CALC-SCNC: 11 MMOL/L — SIGNIFICANT CHANGE UP (ref 5–17)
ANISOCYTOSIS BLD QL: SLIGHT — SIGNIFICANT CHANGE UP
AST SERPL-CCNC: 29 U/L — SIGNIFICANT CHANGE UP (ref 10–40)
BASOPHILS # BLD AUTO: 0 K/UL — SIGNIFICANT CHANGE UP (ref 0–0.2)
BASOPHILS NFR BLD AUTO: 0 % — SIGNIFICANT CHANGE UP (ref 0–2)
BILIRUB SERPL-MCNC: 0.4 MG/DL — SIGNIFICANT CHANGE UP (ref 0.2–1.2)
BUN SERPL-MCNC: 15 MG/DL — SIGNIFICANT CHANGE UP (ref 7–23)
CALCIUM SERPL-MCNC: 9.2 MG/DL — SIGNIFICANT CHANGE UP (ref 8.4–10.5)
CHLORIDE SERPL-SCNC: 106 MMOL/L — SIGNIFICANT CHANGE UP (ref 96–108)
CO2 SERPL-SCNC: 22 MMOL/L — SIGNIFICANT CHANGE UP (ref 22–31)
CREAT SERPL-MCNC: 0.93 MG/DL — SIGNIFICANT CHANGE UP (ref 0.5–1.3)
EGFR: 68 ML/MIN/1.73M2 — SIGNIFICANT CHANGE UP
EOSINOPHIL # BLD AUTO: 0.24 K/UL — SIGNIFICANT CHANGE UP (ref 0–0.5)
EOSINOPHIL NFR BLD AUTO: 5.2 % — SIGNIFICANT CHANGE UP (ref 0–6)
GIANT PLATELETS BLD QL SMEAR: PRESENT — SIGNIFICANT CHANGE UP
GLUCOSE SERPL-MCNC: 84 MG/DL — SIGNIFICANT CHANGE UP (ref 70–99)
HCT VFR BLD CALC: 34 % — LOW (ref 34.5–45)
HGB BLD-MCNC: 11.5 G/DL — SIGNIFICANT CHANGE UP (ref 11.5–15.5)
LYMPHOCYTES # BLD AUTO: 0.28 K/UL — LOW (ref 1–3.3)
LYMPHOCYTES # BLD AUTO: 6.1 % — LOW (ref 13–44)
MACROCYTES BLD QL: SLIGHT — SIGNIFICANT CHANGE UP
MAGNESIUM SERPL-MCNC: 1.9 MG/DL — SIGNIFICANT CHANGE UP (ref 1.6–2.6)
MANUAL SMEAR VERIFICATION: SIGNIFICANT CHANGE UP
MCHC RBC-ENTMCNC: 33.8 GM/DL — SIGNIFICANT CHANGE UP (ref 32–36)
MCHC RBC-ENTMCNC: 35.2 PG — HIGH (ref 27–34)
MCV RBC AUTO: 104 FL — HIGH (ref 80–100)
MONOCYTES # BLD AUTO: 0.87 K/UL — SIGNIFICANT CHANGE UP (ref 0–0.9)
MONOCYTES NFR BLD AUTO: 19.1 % — HIGH (ref 2–14)
NEUTROPHILS # BLD AUTO: 3.13 K/UL — SIGNIFICANT CHANGE UP (ref 1.8–7.4)
NEUTROPHILS NFR BLD AUTO: 68.7 % — SIGNIFICANT CHANGE UP (ref 43–77)
OVALOCYTES BLD QL SMEAR: SLIGHT — SIGNIFICANT CHANGE UP
PHOSPHATE SERPL-MCNC: 3.2 MG/DL — SIGNIFICANT CHANGE UP (ref 2.5–4.5)
PLAT MORPH BLD: NORMAL — SIGNIFICANT CHANGE UP
PLATELET # BLD AUTO: 114 K/UL — LOW (ref 150–400)
POTASSIUM SERPL-MCNC: 3.6 MMOL/L — SIGNIFICANT CHANGE UP (ref 3.5–5.3)
POTASSIUM SERPL-SCNC: 3.6 MMOL/L — SIGNIFICANT CHANGE UP (ref 3.5–5.3)
PROT SERPL-MCNC: 6 G/DL — SIGNIFICANT CHANGE UP (ref 6–8.3)
RBC # BLD: 3.27 M/UL — LOW (ref 3.8–5.2)
RBC # FLD: 13.7 % — SIGNIFICANT CHANGE UP (ref 10.3–14.5)
RBC BLD AUTO: ABNORMAL
SODIUM SERPL-SCNC: 139 MMOL/L — SIGNIFICANT CHANGE UP (ref 135–145)
VARIANT LYMPHS # BLD: 0.9 % — SIGNIFICANT CHANGE UP (ref 0–6)
WBC # BLD: 4.55 K/UL — SIGNIFICANT CHANGE UP (ref 3.8–10.5)
WBC # FLD AUTO: 4.55 K/UL — SIGNIFICANT CHANGE UP (ref 3.8–10.5)

## 2023-10-15 PROCEDURE — 99238 HOSP IP/OBS DSCHRG MGMT 30/<: CPT

## 2023-10-15 RX ADMIN — ATOVAQUONE 1500 MILLIGRAM(S): 750 SUSPENSION ORAL at 11:06

## 2023-10-15 RX ADMIN — Medication 650 MILLIGRAM(S): at 05:28

## 2023-10-15 RX ADMIN — ESCITALOPRAM OXALATE 10 MILLIGRAM(S): 10 TABLET, FILM COATED ORAL at 11:06

## 2023-10-15 RX ADMIN — Medication 400 MILLIGRAM(S): at 05:28

## 2023-10-15 RX ADMIN — LOSARTAN POTASSIUM 100 MILLIGRAM(S): 100 TABLET, FILM COATED ORAL at 05:28

## 2023-10-15 RX ADMIN — PANTOPRAZOLE SODIUM 40 MILLIGRAM(S): 20 TABLET, DELAYED RELEASE ORAL at 05:28

## 2023-10-15 RX ADMIN — CHLORHEXIDINE GLUCONATE 1 APPLICATION(S): 213 SOLUTION TOPICAL at 11:06

## 2023-10-15 RX ADMIN — BLINATUMOMAB 32.5 MICROGRAM(S): KIT INTRAVENOUS at 15:17

## 2023-10-15 RX ADMIN — Medication 650 MILLIGRAM(S): at 06:25

## 2023-10-15 RX ADMIN — Medication 15 MILLILITER(S): at 14:14

## 2023-10-15 NOTE — ADVANCED PRACTICE NURSE CONSULT - ASSESSMENT
Patient A&Ox4, VSS within range of baseline,  no signs of distress no reports of pain. Lab results reviewed by Dr. Lopez and team during rounds, ok to continue treatment. Patient with RCW Port, no s/s of tenderness or redness at insertion site, dressing c/d/i. Patient educated on chemo regime, verbalized understanding. 2 RN verification completed at bedside prior to start. At 1517 bag changed, blinatumomab IVPB (eMAR) [Ordered as BLINCYTO IVPB (eMAR)] - 32.5 MICROGram(s) in Excel bag sodium chloride 0.9% 270 milliLiter(s), IV Continuous, infuse at 10 mL/Hr, 240 mL = 28 mCG , discard remainder, tip attached directly to tip of RCW port via CADD pump for discharge. Pt verbalizzed understanding of follow up appointment tomorrow at cancer center at 1500 for next bag change. Pt educated via ipad video on infusion pump and troubleshooting, card with 24/7 hotline given to patient prior to discharge.  Primary RN aware of treatment plan. Call bell within reach. Safety maintained, nursing care on-going.      
Patient A&Ox4, VSS within range of baseline,  no signs of distress no reports of pain. Lab results reviewed by Dr. Lopez and team during rounds, ok to continue treatment. Patient with RCW Port, no s/s of tenderness or redness at insertion site, dressing c/d/i. Patient educated on chemo regime, verbalized understanding. 2 RN verification completed at bedside prior to start. At 1556 bag changed, blinatumomab IVPB (eMAR) [Ordered as BLINCYTO IVPB (eMAR)] - 32.5 MICROGram(s) in Excel bag sodium chloride 0.9% 270 milliLiter(s), IV Continuous, infuse at 10 mL/Hr, 240 mL = 28 mCG , discard remainder, tip attached directly to tip of RCW port via Alaris pump. Primary RN aware of treatment plan. Call bell within reach. Safety maintained, nursing care on-going.      
Patient A&Ox4, VSS within range of baseline,  no signs of distress no reports of pain. Lab results reviewed by Dr. Lopez and team during rounds, ok to continue treatment. Patient with RCW Port, no s/s of tenderness or redness at insertion site, dressing c/d/i. Patient educated on chemo regime, verbalized understanding. 2 RN verification completed at bedside prior to start. At 1558 bag changed, blinatumomab IVPB (eMAR) [Ordered as BLINCYTO IVPB (eMAR)] - 32.5 MICROGram(s) in Excel bag sodium chloride 0.9% 270 milliLiter(s), IV Continuous, infuse at 10 mL/Hr, 240 mL = 28 mCG , discard remainder, tip attached directly to tip of RCW port via Alaris pump. Primary RN aware of treatment plan. Call bell within reach. Safety maintained, nursing care on-going.      
Pt A&O4. VSS, afebrile. Chemotherapy teaching provided, patient verbalized understanding. Written material given to patient. Lab results reviewed by Dr Lopez on rounds. Patient with RCW powerport. Accessed today, 10/12/23 site remains intact, no s/s of bleeding, swelling or redness. Patient premedicated with 650mg Tylenol POx1, Benadryl 50mg IVP x1, 20mg Pepcid IVPx1 and Dexamethasone 20mg IVPBx1 prior to chemo start. 2 RNs verification completed.  At 1555  Blinatumomab 28mcg/day CIV started  x 24hours infusion at 10ml/hr attached to RCW powerport through Alaris IV pump. Primary RN aware of plan of care.

## 2023-10-15 NOTE — PROGRESS NOTE ADULT - NS ATTEND AMEND GEN_ALL_CORE FT
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 BMBx following cycle 2B showed continued CR with continued negative MRD on Clonoseq testing, although with a low level residual sequence below the limit of detection. 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 Today is day 3.    Heme:  Admitted  for Cycle 1 Blincyto 28 mcg/day continuous infusion for 28 days  Premedications on Day 1 with dexamethasone 20 mg Iv x 1, Tylenol 650 mg PO x1, Pepcid 20 mg IV x 1, Benadryl 50 mg IV x1  S/p Hyper CVAD cycle 1A feb 2023 (reduces dose), 1B (full dose) on 4/3, cycle 2A (full dose) on 5/1, cycle 2B (full dose) on 6/12, and cycle 3A (50% dose reduced) on 7/17  Monitor for CRS/neurotoxicity, monitor for dysgraphia with handwriting checks daily.    Discharge planning for 10/15 / day 4.    Infectious disease.   Patient is not neutropenic, afebrile    Continue acyclovir and mepron   Currently off levaquin and fluconazole    If febrile, panculture.    HTN (hypertension).   Continue home losartan.    GERD (gastroesophageal reflux disease).   On omeprazole at home.  Continue pantoprazole therapeutic interchange while inpatient.    Anxiety and depression.   Continue home lexapro.    Prophylactic measure.   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 BMBx following cycle 2B showed continued CR with continued negative MRD on Clonoseq testing, although with a low level residual sequence below the limit of detection. 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 Today is day 4.    Heme:  Admitted  for Cycle 1 Blincyto 28 mcg/day continuous infusion for 28 days  Premedications on Day 1 with dexamethasone 20 mg Iv x 1, Tylenol 650 mg PO x1, Pepcid 20 mg IV x 1, Benadryl 50 mg IV x1  S/p Hyper CVAD cycle 1A feb 2023 (reduces dose), 1B (full dose) on 4/3, cycle 2A (full dose) on 5/1, cycle 2B (full dose) on 6/12, and cycle 3A (50% dose reduced) on 7/17  Monitor for CRS/neurotoxicity, monitor for dysgraphia with handwriting checks daily.    Discharge planning for 10/15 / day 4.    Infectious disease.   Patient is not neutropenic, afebrile    Continue acyclovir and mepron   Currently off levaquin and fluconazole    If febrile, panculture.    HTN (hypertension).   Continue home losartan.    GERD (gastroesophageal reflux disease).   On omeprazole at home.  Continue pantoprazole therapeutic interchange while inpatient.    Anxiety and depression.   Continue home lexapro.    Prophylactic measure.   VTE prophylaxis: ambulation alone    Dispo:  Discharge home today with bag change tomorrow at 3 pm

## 2023-10-15 NOTE — DISCHARGE NOTE NURSING/CASE MANAGEMENT/SOCIAL WORK - NSDCPEFALRISK_GEN_ALL_CORE
For information on Fall & Injury Prevention, visit: [email or web address] OR  [email or web address] OR  https://www.cdc.gov/steadi/patient.html

## 2023-10-15 NOTE — ADVANCED PRACTICE NURSE CONSULT - REASON FOR CONSULT
ALL; Cycle 1 blincyto Day 1; consent in chart
ALL; Cycle 1 blincyto Day 4; consent in chart
ALL; Cycle 1 blincyto Day 3; consent in chart
ALL; Cycle 1 blincyto Day 2; consent in chart

## 2023-10-15 NOTE — PROGRESS NOTE ADULT - SUBJECTIVE AND OBJECTIVE BOX
Diagnosis:    Protocol/Chemo Regimen:    Day:     Pt endorsed:    Review of Systems:     Pain scale:     Diet:     Allergies    Zofran (Headache)  sulfa drugs (Unknown)    Intolerances        ANTIMICROBIALS  acyclovir   Oral Tab/Cap 400 milliGRAM(s) Oral two times a day  atovaquone  Suspension 1500 milliGRAM(s) Oral daily      HEME/ONC MEDICATIONS  blinatumomab IVPB (eMAR) 32.5 MICROGram(s) IV Continuous <Continuous>      STANDING MEDICATIONS  Biotene Dry Mouth Oral Rinse 15 milliLiter(s) Swish and Spit three times a day  chlorhexidine 0.12% Liquid 15 milliLiter(s) Swish and Spit two times a day  chlorhexidine 2% Cloths 1 Application(s) Topical <User Schedule>  escitalopram 10 milliGRAM(s) Oral daily  losartan 100 milliGRAM(s) Oral daily  pantoprazole    Tablet 40 milliGRAM(s) Oral before breakfast      PRN MEDICATIONS  acetaminophen     Tablet .. 650 milliGRAM(s) Oral every 6 hours PRN  metoclopramide 10 milliGRAM(s) Oral every 6 hours PRN        Vital Signs Last 24 Hrs  T(C): 36.8 (15 Oct 2023 05:20), Max: 36.9 (14 Oct 2023 21:12)  T(F): 98.2 (15 Oct 2023 05:20), Max: 98.5 (14 Oct 2023 21:12)  HR: 73 (15 Oct 2023 05:20) (57 - 73)  BP: 118/66 (15 Oct 2023 05:20) (118/66 - 147/78)  BP(mean): --  RR: 17 (15 Oct 2023 05:20) (16 - 18)  SpO2: 95% (15 Oct 2023 05:20) (95% - 97%)    Parameters below as of 15 Oct 2023 05:20  Patient On (Oxygen Delivery Method): room air        PHYSICAL EXAM  General: NAD  HEENT: PERRLA, EOMOI, clear oropharynx, anicteric sclera, pink conjunctiva  Neck: supple  CV: (+) S1/S2 RRR  Lungs: clear to auscultation, no wheezes or rales  Abdomen: soft, non-tender, non-distended (+) BS  Ext: no clubbing, cyanosis or edema  Skin: no rashes and no petechiae  Neuro: alert and oriented X 3, no focal deficits  Central Line:     RECENT CULTURES:        LABS:                        11.8   5.37  )-----------( 112      ( 14 Oct 2023 06:41 )             36.0         Mean Cell Volume : 105.0 fl  Mean Cell Hemoglobin : 34.4 pg  Mean Cell Hemoglobin Concentration : 32.8 gm/dL  Auto Neutrophil # : 4.22 K/uL  Auto Lymphocyte # : 0.40 K/uL  Auto Monocyte # : 0.66 K/uL  Auto Eosinophil # : 0.04 K/uL  Auto Basophil # : 0.01 K/uL  Auto Neutrophil % : 78.7 %  Auto Lymphocyte % : 7.4 %  Auto Monocyte % : 12.3 %  Auto Eosinophil % : 0.7 %  Auto Basophil % : 0.2 %      10-14    143  |  106  |  16  ----------------------------<  90  3.7   |  24  |  0.89    Ca    9.8      14 Oct 2023 06:43  Phos  3.1     10-14  Mg     2.1     10-14    TPro  6.4  /  Alb  3.9  /  TBili  0.4  /  DBili  x   /  AST  20  /  ALT  23  /  AlkPhos  84  10-14                  RADIOLOGY & ADDITIONAL STUDIES:         Diagnosis: B ALL Ph (-)    Protocol/Chemo Regimen: Cycle #1 Blincyto    Day: 4    Pt endorsed: no complaints reported this am.    Review of Systems: Denies any nausea, vomiting diarrhea, chest pain, palpitation, SOB, abdominal pain, fever or chills.    Pain scale: Denies    Diet: Regular    Allergies: Zofran (Headache)  sulfa drugs (Unknown)    ANTIMICROBIALS  acyclovir   Oral Tab/Cap 400 milliGRAM(s) Oral two times a day  atovaquone  Suspension 1500 milliGRAM(s) Oral daily    HEME/ONC MEDICATIONS  blinatumomab IVPB (eMAR) 32.5 MICROGram(s) IV Continuous <Continuous>    STANDING MEDICATIONS  Biotene Dry Mouth Oral Rinse 15 milliLiter(s) Swish and Spit three times a day  chlorhexidine 0.12% Liquid 15 milliLiter(s) Swish and Spit two times a day  chlorhexidine 2% Cloths 1 Application(s) Topical <User Schedule>  escitalopram 10 milliGRAM(s) Oral daily  losartan 100 milliGRAM(s) Oral daily  pantoprazole    Tablet 40 milliGRAM(s) Oral before breakfast    PRN MEDICATIONS  acetaminophen     Tablet .. 650 milliGRAM(s) Oral every 6 hours PRN  metoclopramide 10 milliGRAM(s) Oral every 6 hours PRN    Vital Signs Last 24 Hrs  T(C): 36.8 (15 Oct 2023 05:20), Max: 36.9 (14 Oct 2023 21:12)  T(F): 98.2 (15 Oct 2023 05:20), Max: 98.5 (14 Oct 2023 21:12)  HR: 73 (15 Oct 2023 05:20) (57 - 73)  BP: 118/66 (15 Oct 2023 05:20) (118/66 - 147/78)  BP(mean): --  RR: 17 (15 Oct 2023 05:20) (16 - 18)  SpO2: 95% (15 Oct 2023 05:20) (95% - 97%)    Parameters below as of 15 Oct 2023 05:20  Patient On (Oxygen Delivery Method): room air    PHYSICAL EXAM  General: NAD  HEENT: PERRLA, EOMOI, clear oropharynx  CV: (+) S1/S2 RRR  Lungs: clear to auscultation, no wheezes or rales  Abdomen: soft, non-tender, non-distended (+) BS  Ext: no clubbing, cyanosis or edema  Skin: no rashes and no petechiae  Neuro: alert and oriented X 3, no focal deficits  Central Line: RCW Mediport CDI    RECENT CULTURES:  NA    LABS:                         11.5   4.55  )-----------( 114      ( 15 Oct 2023 08:52 )             34.0     Mean Cell Volume : 104.0 fl  Mean Cell Hemoglobin : 35.2 pg  Mean Cell Hemoglobin Concentration : 33.8 gm/dL  Auto Neutrophil # : 3.13 K/uL  Auto Lymphocyte # : 0.28 K/uL  Auto Monocyte # : 0.87 K/uL  Auto Eosinophil # : 0.24 K/uL  Auto Basophil # : 0.00 K/uL  Auto Neutrophil % : 68.7 %  Auto Lymphocyte % : 6.1 %  Auto Monocyte % : 19.1 %  Auto Eosinophil % : 5.2 %  Auto Basophil % : 0.0 %    10-15    139  |  106  |  15  ----------------------------<  84  3.6   |  22  |  0.93    Ca    9.2      15 Oct 2023 08:51  Phos  3.2     10-15  Mg     1.9     10-15    TPro  6.0  /  Alb  3.9  /  TBili  0.4  /  DBili  x   /  AST  29  /  ALT  31  /  AlkPhos  84  10-15  Mg 1.9  Phos 3.2    RADIOLOGY & ADDITIONAL STUDIES:  NA

## 2023-10-15 NOTE — PROGRESS NOTE ADULT - PROBLEM SELECTOR PLAN 4
On omeprazole at home.  Continue pantoprazole therapeutic interchange while inpatient.

## 2023-10-15 NOTE — DISCHARGE NOTE NURSING/CASE MANAGEMENT/SOCIAL WORK - NSDCFUADDAPPT_GEN_ALL_CORE_FT
You have the following appointments at [facility]:    You have appointment with Dr. Goldberg on Monday, 10/16 at 3:20 PM.    10/16 at 3PM for blincyto bag change, new medi port needle and dressing.

## 2023-10-15 NOTE — DISCHARGE NOTE NURSING/CASE MANAGEMENT/SOCIAL WORK - PATIENT PORTAL LINK FT
You can access the FollowMyHealth Patient Portal offered by [facility] by registering at the following website: [email or web address]. By joining [facility]’s FollowMyHealth portal, you will also be able to view your health information using other applications (apps) compatible with our system.

## 2023-10-15 NOTE — PROGRESS NOTE ADULT - ASSESSMENT
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 BMBx following cycle 2B showed continued CR with continued negative MRD on Clonoseq testing, although with a low level residual sequence below the limit of detection. 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

## 2023-10-15 NOTE — PROGRESS NOTE ADULT - PROBLEM SELECTOR PLAN 1
S/p Hyper CVAD cycle 1A feb 2023 (reduces dose), 1B (full dose) on 4/3, cycle 2A (full dose) on 5/1, cycle 2B (full dose) on 6/12, and cycle 3A (50% dose reduced) on 7/17  Monitor CBC/CMP daily,  Monitor electrolytes, replete as needed.  Monitor for CRS/neurotoxicity, monitor for dysgraphia with handwriting checks daily.    Continue  incyto  Discharge planning for 10/15 with outpatient follow up. S/p Hyper CVAD cycle 1A feb 2023 (reduces dose), 1B (full dose) on 4/3, cycle 2A (full dose) on 5/1, cycle 2B (full dose) on 6/12, and cycle 3A (50% dose reduced) on 7/17  Monitor CBC/CMP daily,  Monitor electrolytes, replete as needed.  Monitor for CRS/neurotoxicity, monitor for dysgraphia with handwriting checks daily.    Continue  Blincyto  Discharge planning for today outpatient follow up.

## 2023-10-16 ENCOUNTER — RESULT REVIEW (OUTPATIENT)
Age: 63
End: 2023-10-16

## 2023-10-16 ENCOUNTER — APPOINTMENT (OUTPATIENT)
Age: 63
End: 2023-10-16

## 2023-10-16 ENCOUNTER — APPOINTMENT (OUTPATIENT)
Dept: INFUSION THERAPY | Facility: HOSPITAL | Age: 63
End: 2023-10-16

## 2023-10-16 ENCOUNTER — APPOINTMENT (OUTPATIENT)
Dept: HEMATOLOGY ONCOLOGY | Facility: CLINIC | Age: 63
End: 2023-10-16
Payer: MEDICARE

## 2023-10-16 VITALS
SYSTOLIC BLOOD PRESSURE: 141 MMHG | WEIGHT: 201.94 LBS | HEIGHT: 61.42 IN | OXYGEN SATURATION: 99 % | BODY MASS INDEX: 37.64 KG/M2 | TEMPERATURE: 97.52 F | DIASTOLIC BLOOD PRESSURE: 78 MMHG | RESPIRATION RATE: 18 BRPM | HEART RATE: 61 BPM

## 2023-10-16 LAB
BASOPHILS # BLD AUTO: 0.03 K/UL — SIGNIFICANT CHANGE UP (ref 0–0.2)
BASOPHILS # BLD AUTO: 0.03 K/UL — SIGNIFICANT CHANGE UP (ref 0–0.2)
BASOPHILS NFR BLD AUTO: 0.6 % — SIGNIFICANT CHANGE UP (ref 0–2)
BASOPHILS NFR BLD AUTO: 0.6 % — SIGNIFICANT CHANGE UP (ref 0–2)
EOSINOPHIL # BLD AUTO: 0.22 K/UL — SIGNIFICANT CHANGE UP (ref 0–0.5)
EOSINOPHIL # BLD AUTO: 0.22 K/UL — SIGNIFICANT CHANGE UP (ref 0–0.5)
EOSINOPHIL NFR BLD AUTO: 4.8 % — SIGNIFICANT CHANGE UP (ref 0–6)
EOSINOPHIL NFR BLD AUTO: 4.8 % — SIGNIFICANT CHANGE UP (ref 0–6)
HCT VFR BLD CALC: 34.4 % — LOW (ref 34.5–45)
HCT VFR BLD CALC: 34.4 % — LOW (ref 34.5–45)
HGB BLD-MCNC: 11.7 G/DL — SIGNIFICANT CHANGE UP (ref 11.5–15.5)
HGB BLD-MCNC: 11.7 G/DL — SIGNIFICANT CHANGE UP (ref 11.5–15.5)
IMM GRANULOCYTES NFR BLD AUTO: 1.1 % — HIGH (ref 0–0.9)
IMM GRANULOCYTES NFR BLD AUTO: 1.1 % — HIGH (ref 0–0.9)
LYMPHOCYTES # BLD AUTO: 0.8 K/UL — LOW (ref 1–3.3)
LYMPHOCYTES # BLD AUTO: 0.8 K/UL — LOW (ref 1–3.3)
LYMPHOCYTES # BLD AUTO: 17.3 % — SIGNIFICANT CHANGE UP (ref 13–44)
LYMPHOCYTES # BLD AUTO: 17.3 % — SIGNIFICANT CHANGE UP (ref 13–44)
MCHC RBC-ENTMCNC: 34 G/DL — SIGNIFICANT CHANGE UP (ref 32–36)
MCHC RBC-ENTMCNC: 34 G/DL — SIGNIFICANT CHANGE UP (ref 32–36)
MCHC RBC-ENTMCNC: 34.7 PG — HIGH (ref 27–34)
MCHC RBC-ENTMCNC: 34.7 PG — HIGH (ref 27–34)
MCV RBC AUTO: 102.1 FL — HIGH (ref 80–100)
MCV RBC AUTO: 102.1 FL — HIGH (ref 80–100)
MONOCYTES # BLD AUTO: 0.61 K/UL — SIGNIFICANT CHANGE UP (ref 0–0.9)
MONOCYTES # BLD AUTO: 0.61 K/UL — SIGNIFICANT CHANGE UP (ref 0–0.9)
MONOCYTES NFR BLD AUTO: 13.2 % — SIGNIFICANT CHANGE UP (ref 2–14)
MONOCYTES NFR BLD AUTO: 13.2 % — SIGNIFICANT CHANGE UP (ref 2–14)
NEUTROPHILS # BLD AUTO: 2.92 K/UL — SIGNIFICANT CHANGE UP (ref 1.8–7.4)
NEUTROPHILS # BLD AUTO: 2.92 K/UL — SIGNIFICANT CHANGE UP (ref 1.8–7.4)
NEUTROPHILS NFR BLD AUTO: 63 % — SIGNIFICANT CHANGE UP (ref 43–77)
NEUTROPHILS NFR BLD AUTO: 63 % — SIGNIFICANT CHANGE UP (ref 43–77)
NRBC # BLD: 0 /100 WBCS — SIGNIFICANT CHANGE UP (ref 0–0)
NRBC # BLD: 0 /100 WBCS — SIGNIFICANT CHANGE UP (ref 0–0)
PLATELET # BLD AUTO: 112 K/UL — LOW (ref 150–400)
PLATELET # BLD AUTO: 112 K/UL — LOW (ref 150–400)
RBC # BLD: 3.37 M/UL — LOW (ref 3.8–5.2)
RBC # BLD: 3.37 M/UL — LOW (ref 3.8–5.2)
RBC # FLD: 13.2 % — SIGNIFICANT CHANGE UP (ref 10.3–14.5)
RBC # FLD: 13.2 % — SIGNIFICANT CHANGE UP (ref 10.3–14.5)
WBC # BLD: 4.63 K/UL — SIGNIFICANT CHANGE UP (ref 3.8–10.5)
WBC # BLD: 4.63 K/UL — SIGNIFICANT CHANGE UP (ref 3.8–10.5)
WBC # FLD AUTO: 4.63 K/UL — SIGNIFICANT CHANGE UP (ref 3.8–10.5)
WBC # FLD AUTO: 4.63 K/UL — SIGNIFICANT CHANGE UP (ref 3.8–10.5)

## 2023-10-16 PROCEDURE — 99215 OFFICE O/P EST HI 40 MIN: CPT

## 2023-10-17 ENCOUNTER — RX RENEWAL (OUTPATIENT)
Age: 63
End: 2023-10-17

## 2023-10-17 DIAGNOSIS — C91.00 ACUTE LYMPHOBLASTIC LEUKEMIA NOT HAVING ACHIEVED REMISSION: ICD-10-CM

## 2023-10-17 LAB
ALBUMIN SERPL ELPH-MCNC: 3.9 G/DL — SIGNIFICANT CHANGE UP (ref 3.3–5)
ALBUMIN SERPL ELPH-MCNC: 3.9 G/DL — SIGNIFICANT CHANGE UP (ref 3.3–5)
ALP SERPL-CCNC: 79 U/L — SIGNIFICANT CHANGE UP (ref 40–120)
ALP SERPL-CCNC: 79 U/L — SIGNIFICANT CHANGE UP (ref 40–120)
ALT FLD-CCNC: 26 U/L — SIGNIFICANT CHANGE UP (ref 10–45)
ALT FLD-CCNC: 26 U/L — SIGNIFICANT CHANGE UP (ref 10–45)
ANION GAP SERPL CALC-SCNC: 10 MMOL/L — SIGNIFICANT CHANGE UP (ref 5–17)
ANION GAP SERPL CALC-SCNC: 10 MMOL/L — SIGNIFICANT CHANGE UP (ref 5–17)
AST SERPL-CCNC: 27 U/L — SIGNIFICANT CHANGE UP (ref 10–40)
AST SERPL-CCNC: 27 U/L — SIGNIFICANT CHANGE UP (ref 10–40)
BILIRUB SERPL-MCNC: 0.3 MG/DL — SIGNIFICANT CHANGE UP (ref 0.2–1.2)
BILIRUB SERPL-MCNC: 0.3 MG/DL — SIGNIFICANT CHANGE UP (ref 0.2–1.2)
BUN SERPL-MCNC: 15 MG/DL — SIGNIFICANT CHANGE UP (ref 7–23)
BUN SERPL-MCNC: 15 MG/DL — SIGNIFICANT CHANGE UP (ref 7–23)
CALCIUM SERPL-MCNC: 9.7 MG/DL — SIGNIFICANT CHANGE UP (ref 8.4–10.5)
CALCIUM SERPL-MCNC: 9.7 MG/DL — SIGNIFICANT CHANGE UP (ref 8.4–10.5)
CHLORIDE SERPL-SCNC: 108 MMOL/L — SIGNIFICANT CHANGE UP (ref 96–108)
CHLORIDE SERPL-SCNC: 108 MMOL/L — SIGNIFICANT CHANGE UP (ref 96–108)
CO2 SERPL-SCNC: 25 MMOL/L — SIGNIFICANT CHANGE UP (ref 22–31)
CO2 SERPL-SCNC: 25 MMOL/L — SIGNIFICANT CHANGE UP (ref 22–31)
CREAT SERPL-MCNC: 0.79 MG/DL — SIGNIFICANT CHANGE UP (ref 0.5–1.3)
CREAT SERPL-MCNC: 0.79 MG/DL — SIGNIFICANT CHANGE UP (ref 0.5–1.3)
EGFR: 83 ML/MIN/1.73M2 — SIGNIFICANT CHANGE UP
EGFR: 83 ML/MIN/1.73M2 — SIGNIFICANT CHANGE UP
GLUCOSE SERPL-MCNC: 77 MG/DL — SIGNIFICANT CHANGE UP (ref 70–99)
GLUCOSE SERPL-MCNC: 77 MG/DL — SIGNIFICANT CHANGE UP (ref 70–99)
LDH SERPL L TO P-CCNC: 208 U/L — SIGNIFICANT CHANGE UP (ref 50–242)
LDH SERPL L TO P-CCNC: 208 U/L — SIGNIFICANT CHANGE UP (ref 50–242)
PHOSPHATE SERPL-MCNC: 3.3 MG/DL — SIGNIFICANT CHANGE UP (ref 2.5–4.5)
PHOSPHATE SERPL-MCNC: 3.3 MG/DL — SIGNIFICANT CHANGE UP (ref 2.5–4.5)
POTASSIUM SERPL-MCNC: 4.2 MMOL/L — SIGNIFICANT CHANGE UP (ref 3.5–5.3)
POTASSIUM SERPL-MCNC: 4.2 MMOL/L — SIGNIFICANT CHANGE UP (ref 3.5–5.3)
POTASSIUM SERPL-SCNC: 4.2 MMOL/L — SIGNIFICANT CHANGE UP (ref 3.5–5.3)
POTASSIUM SERPL-SCNC: 4.2 MMOL/L — SIGNIFICANT CHANGE UP (ref 3.5–5.3)
PROT SERPL-MCNC: 6.1 G/DL — SIGNIFICANT CHANGE UP (ref 6–8.3)
PROT SERPL-MCNC: 6.1 G/DL — SIGNIFICANT CHANGE UP (ref 6–8.3)
SODIUM SERPL-SCNC: 143 MMOL/L — SIGNIFICANT CHANGE UP (ref 135–145)
SODIUM SERPL-SCNC: 143 MMOL/L — SIGNIFICANT CHANGE UP (ref 135–145)
URATE SERPL-MCNC: 4.4 MG/DL — SIGNIFICANT CHANGE UP (ref 2.5–7)
URATE SERPL-MCNC: 4.4 MG/DL — SIGNIFICANT CHANGE UP (ref 2.5–7)

## 2023-10-23 ENCOUNTER — APPOINTMENT (OUTPATIENT)
Age: 63
End: 2023-10-23

## 2023-10-23 ENCOUNTER — RESULT REVIEW (OUTPATIENT)
Age: 63
End: 2023-10-23

## 2023-10-23 ENCOUNTER — APPOINTMENT (OUTPATIENT)
Dept: INFUSION THERAPY | Facility: HOSPITAL | Age: 63
End: 2023-10-23

## 2023-10-23 LAB
ALBUMIN SERPL ELPH-MCNC: 3.9 G/DL — SIGNIFICANT CHANGE UP (ref 3.3–5)
ALBUMIN SERPL ELPH-MCNC: 3.9 G/DL — SIGNIFICANT CHANGE UP (ref 3.3–5)
ALP SERPL-CCNC: 89 U/L — SIGNIFICANT CHANGE UP (ref 40–120)
ALP SERPL-CCNC: 89 U/L — SIGNIFICANT CHANGE UP (ref 40–120)
ALT FLD-CCNC: 23 U/L — SIGNIFICANT CHANGE UP (ref 10–45)
ALT FLD-CCNC: 23 U/L — SIGNIFICANT CHANGE UP (ref 10–45)
ANION GAP SERPL CALC-SCNC: 9 MMOL/L — SIGNIFICANT CHANGE UP (ref 5–17)
ANION GAP SERPL CALC-SCNC: 9 MMOL/L — SIGNIFICANT CHANGE UP (ref 5–17)
AST SERPL-CCNC: 25 U/L — SIGNIFICANT CHANGE UP (ref 10–40)
AST SERPL-CCNC: 25 U/L — SIGNIFICANT CHANGE UP (ref 10–40)
BASOPHILS # BLD AUTO: 0.03 K/UL — SIGNIFICANT CHANGE UP (ref 0–0.2)
BASOPHILS # BLD AUTO: 0.03 K/UL — SIGNIFICANT CHANGE UP (ref 0–0.2)
BASOPHILS NFR BLD AUTO: 0.6 % — SIGNIFICANT CHANGE UP (ref 0–2)
BASOPHILS NFR BLD AUTO: 0.6 % — SIGNIFICANT CHANGE UP (ref 0–2)
BILIRUB SERPL-MCNC: 0.3 MG/DL — SIGNIFICANT CHANGE UP (ref 0.2–1.2)
BILIRUB SERPL-MCNC: 0.3 MG/DL — SIGNIFICANT CHANGE UP (ref 0.2–1.2)
BUN SERPL-MCNC: 16 MG/DL — SIGNIFICANT CHANGE UP (ref 7–23)
BUN SERPL-MCNC: 16 MG/DL — SIGNIFICANT CHANGE UP (ref 7–23)
CALCIUM SERPL-MCNC: 9.6 MG/DL — SIGNIFICANT CHANGE UP (ref 8.4–10.5)
CALCIUM SERPL-MCNC: 9.6 MG/DL — SIGNIFICANT CHANGE UP (ref 8.4–10.5)
CHLORIDE SERPL-SCNC: 104 MMOL/L — SIGNIFICANT CHANGE UP (ref 96–108)
CHLORIDE SERPL-SCNC: 104 MMOL/L — SIGNIFICANT CHANGE UP (ref 96–108)
CO2 SERPL-SCNC: 25 MMOL/L — SIGNIFICANT CHANGE UP (ref 22–31)
CO2 SERPL-SCNC: 25 MMOL/L — SIGNIFICANT CHANGE UP (ref 22–31)
CREAT SERPL-MCNC: 0.89 MG/DL — SIGNIFICANT CHANGE UP (ref 0.5–1.3)
CREAT SERPL-MCNC: 0.89 MG/DL — SIGNIFICANT CHANGE UP (ref 0.5–1.3)
EGFR: 72 ML/MIN/1.73M2 — SIGNIFICANT CHANGE UP
EGFR: 72 ML/MIN/1.73M2 — SIGNIFICANT CHANGE UP
EOSINOPHIL # BLD AUTO: 0.19 K/UL — SIGNIFICANT CHANGE UP (ref 0–0.5)
EOSINOPHIL # BLD AUTO: 0.19 K/UL — SIGNIFICANT CHANGE UP (ref 0–0.5)
EOSINOPHIL NFR BLD AUTO: 3.7 % — SIGNIFICANT CHANGE UP (ref 0–6)
EOSINOPHIL NFR BLD AUTO: 3.7 % — SIGNIFICANT CHANGE UP (ref 0–6)
GLUCOSE SERPL-MCNC: 86 MG/DL — SIGNIFICANT CHANGE UP (ref 70–99)
GLUCOSE SERPL-MCNC: 86 MG/DL — SIGNIFICANT CHANGE UP (ref 70–99)
HCT VFR BLD CALC: 35.4 % — SIGNIFICANT CHANGE UP (ref 34.5–45)
HCT VFR BLD CALC: 35.4 % — SIGNIFICANT CHANGE UP (ref 34.5–45)
HGB BLD-MCNC: 12 G/DL — SIGNIFICANT CHANGE UP (ref 11.5–15.5)
HGB BLD-MCNC: 12 G/DL — SIGNIFICANT CHANGE UP (ref 11.5–15.5)
IMM GRANULOCYTES NFR BLD AUTO: 0.6 % — SIGNIFICANT CHANGE UP (ref 0–0.9)
IMM GRANULOCYTES NFR BLD AUTO: 0.6 % — SIGNIFICANT CHANGE UP (ref 0–0.9)
LDH SERPL L TO P-CCNC: 206 U/L — SIGNIFICANT CHANGE UP (ref 50–242)
LDH SERPL L TO P-CCNC: 206 U/L — SIGNIFICANT CHANGE UP (ref 50–242)
LYMPHOCYTES # BLD AUTO: 0.82 K/UL — LOW (ref 1–3.3)
LYMPHOCYTES # BLD AUTO: 0.82 K/UL — LOW (ref 1–3.3)
LYMPHOCYTES # BLD AUTO: 16.1 % — SIGNIFICANT CHANGE UP (ref 13–44)
LYMPHOCYTES # BLD AUTO: 16.1 % — SIGNIFICANT CHANGE UP (ref 13–44)
MCHC RBC-ENTMCNC: 33.9 G/DL — SIGNIFICANT CHANGE UP (ref 32–36)
MCHC RBC-ENTMCNC: 33.9 G/DL — SIGNIFICANT CHANGE UP (ref 32–36)
MCHC RBC-ENTMCNC: 35 PG — HIGH (ref 27–34)
MCHC RBC-ENTMCNC: 35 PG — HIGH (ref 27–34)
MCV RBC AUTO: 103.2 FL — HIGH (ref 80–100)
MCV RBC AUTO: 103.2 FL — HIGH (ref 80–100)
MONOCYTES # BLD AUTO: 0.72 K/UL — SIGNIFICANT CHANGE UP (ref 0–0.9)
MONOCYTES # BLD AUTO: 0.72 K/UL — SIGNIFICANT CHANGE UP (ref 0–0.9)
MONOCYTES NFR BLD AUTO: 14.2 % — HIGH (ref 2–14)
MONOCYTES NFR BLD AUTO: 14.2 % — HIGH (ref 2–14)
NEUTROPHILS # BLD AUTO: 3.29 K/UL — SIGNIFICANT CHANGE UP (ref 1.8–7.4)
NEUTROPHILS # BLD AUTO: 3.29 K/UL — SIGNIFICANT CHANGE UP (ref 1.8–7.4)
NEUTROPHILS NFR BLD AUTO: 64.8 % — SIGNIFICANT CHANGE UP (ref 43–77)
NEUTROPHILS NFR BLD AUTO: 64.8 % — SIGNIFICANT CHANGE UP (ref 43–77)
NRBC # BLD: 0 /100 WBCS — SIGNIFICANT CHANGE UP (ref 0–0)
NRBC # BLD: 0 /100 WBCS — SIGNIFICANT CHANGE UP (ref 0–0)
PHOSPHATE SERPL-MCNC: 3.2 MG/DL — SIGNIFICANT CHANGE UP (ref 2.5–4.5)
PHOSPHATE SERPL-MCNC: 3.2 MG/DL — SIGNIFICANT CHANGE UP (ref 2.5–4.5)
PLATELET # BLD AUTO: 123 K/UL — LOW (ref 150–400)
PLATELET # BLD AUTO: 123 K/UL — LOW (ref 150–400)
POTASSIUM SERPL-MCNC: 4 MMOL/L — SIGNIFICANT CHANGE UP (ref 3.5–5.3)
POTASSIUM SERPL-MCNC: 4 MMOL/L — SIGNIFICANT CHANGE UP (ref 3.5–5.3)
POTASSIUM SERPL-SCNC: 4 MMOL/L — SIGNIFICANT CHANGE UP (ref 3.5–5.3)
POTASSIUM SERPL-SCNC: 4 MMOL/L — SIGNIFICANT CHANGE UP (ref 3.5–5.3)
PROT SERPL-MCNC: 6.2 G/DL — SIGNIFICANT CHANGE UP (ref 6–8.3)
PROT SERPL-MCNC: 6.2 G/DL — SIGNIFICANT CHANGE UP (ref 6–8.3)
RBC # BLD: 3.43 M/UL — LOW (ref 3.8–5.2)
RBC # BLD: 3.43 M/UL — LOW (ref 3.8–5.2)
RBC # FLD: 13.2 % — SIGNIFICANT CHANGE UP (ref 10.3–14.5)
RBC # FLD: 13.2 % — SIGNIFICANT CHANGE UP (ref 10.3–14.5)
SODIUM SERPL-SCNC: 138 MMOL/L — SIGNIFICANT CHANGE UP (ref 135–145)
SODIUM SERPL-SCNC: 138 MMOL/L — SIGNIFICANT CHANGE UP (ref 135–145)
URATE SERPL-MCNC: 5.3 MG/DL — SIGNIFICANT CHANGE UP (ref 2.5–7)
URATE SERPL-MCNC: 5.3 MG/DL — SIGNIFICANT CHANGE UP (ref 2.5–7)
WBC # BLD: 5.08 K/UL — SIGNIFICANT CHANGE UP (ref 3.8–10.5)
WBC # BLD: 5.08 K/UL — SIGNIFICANT CHANGE UP (ref 3.8–10.5)
WBC # FLD AUTO: 5.08 K/UL — SIGNIFICANT CHANGE UP (ref 3.8–10.5)
WBC # FLD AUTO: 5.08 K/UL — SIGNIFICANT CHANGE UP (ref 3.8–10.5)

## 2023-10-26 ENCOUNTER — OUTPATIENT (OUTPATIENT)
Dept: OUTPATIENT SERVICES | Facility: HOSPITAL | Age: 63
LOS: 1 days | End: 2023-10-26
Payer: COMMERCIAL

## 2023-10-26 ENCOUNTER — RESULT REVIEW (OUTPATIENT)
Age: 63
End: 2023-10-26

## 2023-10-26 ENCOUNTER — APPOINTMENT (OUTPATIENT)
Dept: HEMATOLOGY ONCOLOGY | Facility: CLINIC | Age: 63
End: 2023-10-26

## 2023-10-26 ENCOUNTER — APPOINTMENT (OUTPATIENT)
Dept: HEMATOLOGY ONCOLOGY | Facility: CLINIC | Age: 63
End: 2023-10-26
Payer: MEDICARE

## 2023-10-26 VITALS
TEMPERATURE: 96.2 F | DIASTOLIC BLOOD PRESSURE: 78 MMHG | HEART RATE: 66 BPM | BODY MASS INDEX: 36.17 KG/M2 | RESPIRATION RATE: 16 BRPM | SYSTOLIC BLOOD PRESSURE: 120 MMHG | HEIGHT: 62.2 IN | OXYGEN SATURATION: 97 % | WEIGHT: 199.06 LBS

## 2023-10-26 DIAGNOSIS — Z98.891 HISTORY OF UTERINE SCAR FROM PREVIOUS SURGERY: Chronic | ICD-10-CM

## 2023-10-26 LAB
BASOPHILS # BLD AUTO: 0.03 K/UL — SIGNIFICANT CHANGE UP (ref 0–0.2)
BASOPHILS # BLD AUTO: 0.03 K/UL — SIGNIFICANT CHANGE UP (ref 0–0.2)
BASOPHILS NFR BLD AUTO: 0.6 % — SIGNIFICANT CHANGE UP (ref 0–2)
BASOPHILS NFR BLD AUTO: 0.6 % — SIGNIFICANT CHANGE UP (ref 0–2)
EOSINOPHIL # BLD AUTO: 0.17 K/UL — SIGNIFICANT CHANGE UP (ref 0–0.5)
EOSINOPHIL # BLD AUTO: 0.17 K/UL — SIGNIFICANT CHANGE UP (ref 0–0.5)
EOSINOPHIL NFR BLD AUTO: 3.4 % — SIGNIFICANT CHANGE UP (ref 0–6)
EOSINOPHIL NFR BLD AUTO: 3.4 % — SIGNIFICANT CHANGE UP (ref 0–6)
HCT VFR BLD CALC: 36 % — SIGNIFICANT CHANGE UP (ref 34.5–45)
HCT VFR BLD CALC: 36 % — SIGNIFICANT CHANGE UP (ref 34.5–45)
HGB BLD-MCNC: 12.6 G/DL — SIGNIFICANT CHANGE UP (ref 11.5–15.5)
HGB BLD-MCNC: 12.6 G/DL — SIGNIFICANT CHANGE UP (ref 11.5–15.5)
IMM GRANULOCYTES NFR BLD AUTO: 0.6 % — SIGNIFICANT CHANGE UP (ref 0–0.9)
IMM GRANULOCYTES NFR BLD AUTO: 0.6 % — SIGNIFICANT CHANGE UP (ref 0–0.9)
LYMPHOCYTES # BLD AUTO: 0.77 K/UL — LOW (ref 1–3.3)
LYMPHOCYTES # BLD AUTO: 0.77 K/UL — LOW (ref 1–3.3)
LYMPHOCYTES # BLD AUTO: 15.2 % — SIGNIFICANT CHANGE UP (ref 13–44)
LYMPHOCYTES # BLD AUTO: 15.2 % — SIGNIFICANT CHANGE UP (ref 13–44)
MCHC RBC-ENTMCNC: 35 G/DL — SIGNIFICANT CHANGE UP (ref 32–36)
MCHC RBC-ENTMCNC: 35 G/DL — SIGNIFICANT CHANGE UP (ref 32–36)
MCHC RBC-ENTMCNC: 35.5 PG — HIGH (ref 27–34)
MCHC RBC-ENTMCNC: 35.5 PG — HIGH (ref 27–34)
MCV RBC AUTO: 101.4 FL — HIGH (ref 80–100)
MCV RBC AUTO: 101.4 FL — HIGH (ref 80–100)
MONOCYTES # BLD AUTO: 0.66 K/UL — SIGNIFICANT CHANGE UP (ref 0–0.9)
MONOCYTES # BLD AUTO: 0.66 K/UL — SIGNIFICANT CHANGE UP (ref 0–0.9)
MONOCYTES NFR BLD AUTO: 13 % — SIGNIFICANT CHANGE UP (ref 2–14)
MONOCYTES NFR BLD AUTO: 13 % — SIGNIFICANT CHANGE UP (ref 2–14)
NEUTROPHILS # BLD AUTO: 3.4 K/UL — SIGNIFICANT CHANGE UP (ref 1.8–7.4)
NEUTROPHILS # BLD AUTO: 3.4 K/UL — SIGNIFICANT CHANGE UP (ref 1.8–7.4)
NEUTROPHILS NFR BLD AUTO: 67.2 % — SIGNIFICANT CHANGE UP (ref 43–77)
NEUTROPHILS NFR BLD AUTO: 67.2 % — SIGNIFICANT CHANGE UP (ref 43–77)
NRBC # BLD: 0 /100 WBCS — SIGNIFICANT CHANGE UP (ref 0–0)
NRBC # BLD: 0 /100 WBCS — SIGNIFICANT CHANGE UP (ref 0–0)
PLATELET # BLD AUTO: 129 K/UL — LOW (ref 150–400)
PLATELET # BLD AUTO: 129 K/UL — LOW (ref 150–400)
RBC # BLD: 3.55 M/UL — LOW (ref 3.8–5.2)
RBC # BLD: 3.55 M/UL — LOW (ref 3.8–5.2)
RBC # FLD: 13 % — SIGNIFICANT CHANGE UP (ref 10.3–14.5)
RBC # FLD: 13 % — SIGNIFICANT CHANGE UP (ref 10.3–14.5)
WBC # BLD: 5.06 K/UL — SIGNIFICANT CHANGE UP (ref 3.8–10.5)
WBC # BLD: 5.06 K/UL — SIGNIFICANT CHANGE UP (ref 3.8–10.5)
WBC # FLD AUTO: 5.06 K/UL — SIGNIFICANT CHANGE UP (ref 3.8–10.5)
WBC # FLD AUTO: 5.06 K/UL — SIGNIFICANT CHANGE UP (ref 3.8–10.5)

## 2023-10-26 PROCEDURE — 86832 HLA CLASS I HIGH DEFIN QUAL: CPT

## 2023-10-26 PROCEDURE — 86833 HLA CLASS II HIGH DEFIN QUAL: CPT

## 2023-10-26 PROCEDURE — 99205 OFFICE O/P NEW HI 60 MIN: CPT

## 2023-10-27 DIAGNOSIS — C95.90 LEUKEMIA, UNSPECIFIED NOT HAVING ACHIEVED REMISSION: ICD-10-CM

## 2023-10-30 ENCOUNTER — RESULT REVIEW (OUTPATIENT)
Age: 63
End: 2023-10-30

## 2023-10-30 ENCOUNTER — APPOINTMENT (OUTPATIENT)
Age: 63
End: 2023-10-30

## 2023-10-30 ENCOUNTER — APPOINTMENT (OUTPATIENT)
Dept: INFUSION THERAPY | Facility: HOSPITAL | Age: 63
End: 2023-10-30

## 2023-10-30 LAB
ALBUMIN SERPL ELPH-MCNC: 4.1 G/DL — SIGNIFICANT CHANGE UP (ref 3.3–5)
ALBUMIN SERPL ELPH-MCNC: 4.1 G/DL — SIGNIFICANT CHANGE UP (ref 3.3–5)
ALP SERPL-CCNC: 92 U/L — SIGNIFICANT CHANGE UP (ref 40–120)
ALP SERPL-CCNC: 92 U/L — SIGNIFICANT CHANGE UP (ref 40–120)
ALT FLD-CCNC: 20 U/L — SIGNIFICANT CHANGE UP (ref 10–45)
ALT FLD-CCNC: 20 U/L — SIGNIFICANT CHANGE UP (ref 10–45)
ANION GAP SERPL CALC-SCNC: 8 MMOL/L — SIGNIFICANT CHANGE UP (ref 5–17)
ANION GAP SERPL CALC-SCNC: 8 MMOL/L — SIGNIFICANT CHANGE UP (ref 5–17)
AST SERPL-CCNC: 28 U/L — SIGNIFICANT CHANGE UP (ref 10–40)
AST SERPL-CCNC: 28 U/L — SIGNIFICANT CHANGE UP (ref 10–40)
BASOPHILS # BLD AUTO: 0.02 K/UL — SIGNIFICANT CHANGE UP (ref 0–0.2)
BASOPHILS # BLD AUTO: 0.02 K/UL — SIGNIFICANT CHANGE UP (ref 0–0.2)
BASOPHILS NFR BLD AUTO: 0.5 % — SIGNIFICANT CHANGE UP (ref 0–2)
BASOPHILS NFR BLD AUTO: 0.5 % — SIGNIFICANT CHANGE UP (ref 0–2)
BILIRUB SERPL-MCNC: 0.3 MG/DL — SIGNIFICANT CHANGE UP (ref 0.2–1.2)
BILIRUB SERPL-MCNC: 0.3 MG/DL — SIGNIFICANT CHANGE UP (ref 0.2–1.2)
BUN SERPL-MCNC: 13 MG/DL — SIGNIFICANT CHANGE UP (ref 7–23)
BUN SERPL-MCNC: 13 MG/DL — SIGNIFICANT CHANGE UP (ref 7–23)
CALCIUM SERPL-MCNC: 9.7 MG/DL — SIGNIFICANT CHANGE UP (ref 8.4–10.5)
CALCIUM SERPL-MCNC: 9.7 MG/DL — SIGNIFICANT CHANGE UP (ref 8.4–10.5)
CHLORIDE SERPL-SCNC: 104 MMOL/L — SIGNIFICANT CHANGE UP (ref 96–108)
CHLORIDE SERPL-SCNC: 104 MMOL/L — SIGNIFICANT CHANGE UP (ref 96–108)
CO2 SERPL-SCNC: 28 MMOL/L — SIGNIFICANT CHANGE UP (ref 22–31)
CO2 SERPL-SCNC: 28 MMOL/L — SIGNIFICANT CHANGE UP (ref 22–31)
CREAT SERPL-MCNC: 0.83 MG/DL — SIGNIFICANT CHANGE UP (ref 0.5–1.3)
CREAT SERPL-MCNC: 0.83 MG/DL — SIGNIFICANT CHANGE UP (ref 0.5–1.3)
EGFR: 78 ML/MIN/1.73M2 — SIGNIFICANT CHANGE UP
EGFR: 78 ML/MIN/1.73M2 — SIGNIFICANT CHANGE UP
EOSINOPHIL # BLD AUTO: 0.23 K/UL — SIGNIFICANT CHANGE UP (ref 0–0.5)
EOSINOPHIL # BLD AUTO: 0.23 K/UL — SIGNIFICANT CHANGE UP (ref 0–0.5)
EOSINOPHIL NFR BLD AUTO: 5.2 % — SIGNIFICANT CHANGE UP (ref 0–6)
EOSINOPHIL NFR BLD AUTO: 5.2 % — SIGNIFICANT CHANGE UP (ref 0–6)
GLUCOSE SERPL-MCNC: 86 MG/DL — SIGNIFICANT CHANGE UP (ref 70–99)
GLUCOSE SERPL-MCNC: 86 MG/DL — SIGNIFICANT CHANGE UP (ref 70–99)
HCT VFR BLD CALC: 35.9 % — SIGNIFICANT CHANGE UP (ref 34.5–45)
HCT VFR BLD CALC: 35.9 % — SIGNIFICANT CHANGE UP (ref 34.5–45)
HGB BLD-MCNC: 12.2 G/DL — SIGNIFICANT CHANGE UP (ref 11.5–15.5)
HGB BLD-MCNC: 12.2 G/DL — SIGNIFICANT CHANGE UP (ref 11.5–15.5)
IMM GRANULOCYTES NFR BLD AUTO: 0.5 % — SIGNIFICANT CHANGE UP (ref 0–0.9)
IMM GRANULOCYTES NFR BLD AUTO: 0.5 % — SIGNIFICANT CHANGE UP (ref 0–0.9)
LDH SERPL L TO P-CCNC: 171 U/L — SIGNIFICANT CHANGE UP (ref 50–242)
LDH SERPL L TO P-CCNC: 171 U/L — SIGNIFICANT CHANGE UP (ref 50–242)
LYMPHOCYTES # BLD AUTO: 0.81 K/UL — LOW (ref 1–3.3)
LYMPHOCYTES # BLD AUTO: 0.81 K/UL — LOW (ref 1–3.3)
LYMPHOCYTES # BLD AUTO: 18.4 % — SIGNIFICANT CHANGE UP (ref 13–44)
LYMPHOCYTES # BLD AUTO: 18.4 % — SIGNIFICANT CHANGE UP (ref 13–44)
MCHC RBC-ENTMCNC: 34 G/DL — SIGNIFICANT CHANGE UP (ref 32–36)
MCHC RBC-ENTMCNC: 34 G/DL — SIGNIFICANT CHANGE UP (ref 32–36)
MCHC RBC-ENTMCNC: 35.1 PG — HIGH (ref 27–34)
MCHC RBC-ENTMCNC: 35.1 PG — HIGH (ref 27–34)
MCV RBC AUTO: 103.2 FL — HIGH (ref 80–100)
MCV RBC AUTO: 103.2 FL — HIGH (ref 80–100)
MONOCYTES # BLD AUTO: 0.59 K/UL — SIGNIFICANT CHANGE UP (ref 0–0.9)
MONOCYTES # BLD AUTO: 0.59 K/UL — SIGNIFICANT CHANGE UP (ref 0–0.9)
MONOCYTES NFR BLD AUTO: 13.4 % — SIGNIFICANT CHANGE UP (ref 2–14)
MONOCYTES NFR BLD AUTO: 13.4 % — SIGNIFICANT CHANGE UP (ref 2–14)
NEUTROPHILS # BLD AUTO: 2.74 K/UL — SIGNIFICANT CHANGE UP (ref 1.8–7.4)
NEUTROPHILS # BLD AUTO: 2.74 K/UL — SIGNIFICANT CHANGE UP (ref 1.8–7.4)
NEUTROPHILS NFR BLD AUTO: 62 % — SIGNIFICANT CHANGE UP (ref 43–77)
NEUTROPHILS NFR BLD AUTO: 62 % — SIGNIFICANT CHANGE UP (ref 43–77)
NRBC # BLD: 0 /100 WBCS — SIGNIFICANT CHANGE UP (ref 0–0)
NRBC # BLD: 0 /100 WBCS — SIGNIFICANT CHANGE UP (ref 0–0)
PHOSPHATE SERPL-MCNC: 3.3 MG/DL — SIGNIFICANT CHANGE UP (ref 2.5–4.5)
PHOSPHATE SERPL-MCNC: 3.3 MG/DL — SIGNIFICANT CHANGE UP (ref 2.5–4.5)
PLATELET # BLD AUTO: 141 K/UL — LOW (ref 150–400)
PLATELET # BLD AUTO: 141 K/UL — LOW (ref 150–400)
POTASSIUM SERPL-MCNC: 4.5 MMOL/L — SIGNIFICANT CHANGE UP (ref 3.5–5.3)
POTASSIUM SERPL-MCNC: 4.5 MMOL/L — SIGNIFICANT CHANGE UP (ref 3.5–5.3)
POTASSIUM SERPL-SCNC: 4.5 MMOL/L — SIGNIFICANT CHANGE UP (ref 3.5–5.3)
POTASSIUM SERPL-SCNC: 4.5 MMOL/L — SIGNIFICANT CHANGE UP (ref 3.5–5.3)
PROT SERPL-MCNC: 6.3 G/DL — SIGNIFICANT CHANGE UP (ref 6–8.3)
PROT SERPL-MCNC: 6.3 G/DL — SIGNIFICANT CHANGE UP (ref 6–8.3)
RBC # BLD: 3.48 M/UL — LOW (ref 3.8–5.2)
RBC # BLD: 3.48 M/UL — LOW (ref 3.8–5.2)
RBC # FLD: 12.8 % — SIGNIFICANT CHANGE UP (ref 10.3–14.5)
RBC # FLD: 12.8 % — SIGNIFICANT CHANGE UP (ref 10.3–14.5)
SODIUM SERPL-SCNC: 140 MMOL/L — SIGNIFICANT CHANGE UP (ref 135–145)
SODIUM SERPL-SCNC: 140 MMOL/L — SIGNIFICANT CHANGE UP (ref 135–145)
URATE SERPL-MCNC: 4.8 MG/DL — SIGNIFICANT CHANGE UP (ref 2.5–7)
URATE SERPL-MCNC: 4.8 MG/DL — SIGNIFICANT CHANGE UP (ref 2.5–7)
WBC # BLD: 4.41 K/UL — SIGNIFICANT CHANGE UP (ref 3.8–10.5)
WBC # BLD: 4.41 K/UL — SIGNIFICANT CHANGE UP (ref 3.8–10.5)
WBC # FLD AUTO: 4.41 K/UL — SIGNIFICANT CHANGE UP (ref 3.8–10.5)
WBC # FLD AUTO: 4.41 K/UL — SIGNIFICANT CHANGE UP (ref 3.8–10.5)

## 2023-10-31 ENCOUNTER — OUTPATIENT (OUTPATIENT)
Dept: OUTPATIENT SERVICES | Facility: HOSPITAL | Age: 63
LOS: 1 days | Discharge: HOME | End: 2023-10-31

## 2023-10-31 DIAGNOSIS — Z98.891 HISTORY OF UTERINE SCAR FROM PREVIOUS SURGERY: Chronic | ICD-10-CM

## 2023-10-31 DIAGNOSIS — C95.90 LEUKEMIA, UNSPECIFIED NOT HAVING ACHIEVED REMISSION: ICD-10-CM

## 2023-11-06 ENCOUNTER — APPOINTMENT (OUTPATIENT)
Dept: INFUSION THERAPY | Facility: HOSPITAL | Age: 63
End: 2023-11-06

## 2023-11-06 ENCOUNTER — RESULT REVIEW (OUTPATIENT)
Age: 63
End: 2023-11-06

## 2023-11-06 ENCOUNTER — APPOINTMENT (OUTPATIENT)
Age: 63
End: 2023-11-06

## 2023-11-06 LAB
ALBUMIN SERPL ELPH-MCNC: 4.1 G/DL — SIGNIFICANT CHANGE UP (ref 3.3–5)
ALBUMIN SERPL ELPH-MCNC: 4.1 G/DL — SIGNIFICANT CHANGE UP (ref 3.3–5)
ALP SERPL-CCNC: 98 U/L — SIGNIFICANT CHANGE UP (ref 40–120)
ALP SERPL-CCNC: 98 U/L — SIGNIFICANT CHANGE UP (ref 40–120)
ALT FLD-CCNC: 22 U/L — SIGNIFICANT CHANGE UP (ref 10–45)
ALT FLD-CCNC: 22 U/L — SIGNIFICANT CHANGE UP (ref 10–45)
ANION GAP SERPL CALC-SCNC: 10 MMOL/L — SIGNIFICANT CHANGE UP (ref 5–17)
ANION GAP SERPL CALC-SCNC: 10 MMOL/L — SIGNIFICANT CHANGE UP (ref 5–17)
AST SERPL-CCNC: 30 U/L — SIGNIFICANT CHANGE UP (ref 10–40)
AST SERPL-CCNC: 30 U/L — SIGNIFICANT CHANGE UP (ref 10–40)
BASOPHILS # BLD AUTO: 0.02 K/UL — SIGNIFICANT CHANGE UP (ref 0–0.2)
BASOPHILS # BLD AUTO: 0.02 K/UL — SIGNIFICANT CHANGE UP (ref 0–0.2)
BASOPHILS NFR BLD AUTO: 0.4 % — SIGNIFICANT CHANGE UP (ref 0–2)
BASOPHILS NFR BLD AUTO: 0.4 % — SIGNIFICANT CHANGE UP (ref 0–2)
BILIRUB SERPL-MCNC: 0.4 MG/DL — SIGNIFICANT CHANGE UP (ref 0.2–1.2)
BILIRUB SERPL-MCNC: 0.4 MG/DL — SIGNIFICANT CHANGE UP (ref 0.2–1.2)
BUN SERPL-MCNC: 16 MG/DL — SIGNIFICANT CHANGE UP (ref 7–23)
BUN SERPL-MCNC: 16 MG/DL — SIGNIFICANT CHANGE UP (ref 7–23)
CALCIUM SERPL-MCNC: 9.7 MG/DL — SIGNIFICANT CHANGE UP (ref 8.4–10.5)
CALCIUM SERPL-MCNC: 9.7 MG/DL — SIGNIFICANT CHANGE UP (ref 8.4–10.5)
CHLORIDE SERPL-SCNC: 103 MMOL/L — SIGNIFICANT CHANGE UP (ref 96–108)
CHLORIDE SERPL-SCNC: 103 MMOL/L — SIGNIFICANT CHANGE UP (ref 96–108)
CO2 SERPL-SCNC: 27 MMOL/L — SIGNIFICANT CHANGE UP (ref 22–31)
CO2 SERPL-SCNC: 27 MMOL/L — SIGNIFICANT CHANGE UP (ref 22–31)
CREAT SERPL-MCNC: 0.9 MG/DL — SIGNIFICANT CHANGE UP (ref 0.5–1.3)
CREAT SERPL-MCNC: 0.9 MG/DL — SIGNIFICANT CHANGE UP (ref 0.5–1.3)
EGFR: 71 ML/MIN/1.73M2 — SIGNIFICANT CHANGE UP
EGFR: 71 ML/MIN/1.73M2 — SIGNIFICANT CHANGE UP
EOSINOPHIL # BLD AUTO: 0.23 K/UL — SIGNIFICANT CHANGE UP (ref 0–0.5)
EOSINOPHIL # BLD AUTO: 0.23 K/UL — SIGNIFICANT CHANGE UP (ref 0–0.5)
EOSINOPHIL NFR BLD AUTO: 4.9 % — SIGNIFICANT CHANGE UP (ref 0–6)
EOSINOPHIL NFR BLD AUTO: 4.9 % — SIGNIFICANT CHANGE UP (ref 0–6)
GLUCOSE SERPL-MCNC: 90 MG/DL — SIGNIFICANT CHANGE UP (ref 70–99)
GLUCOSE SERPL-MCNC: 90 MG/DL — SIGNIFICANT CHANGE UP (ref 70–99)
HCT VFR BLD CALC: 38.5 % — SIGNIFICANT CHANGE UP (ref 34.5–45)
HCT VFR BLD CALC: 38.5 % — SIGNIFICANT CHANGE UP (ref 34.5–45)
HGB BLD-MCNC: 13 G/DL — SIGNIFICANT CHANGE UP (ref 11.5–15.5)
HGB BLD-MCNC: 13 G/DL — SIGNIFICANT CHANGE UP (ref 11.5–15.5)
IMM GRANULOCYTES NFR BLD AUTO: 0.4 % — SIGNIFICANT CHANGE UP (ref 0–0.9)
IMM GRANULOCYTES NFR BLD AUTO: 0.4 % — SIGNIFICANT CHANGE UP (ref 0–0.9)
LDH SERPL L TO P-CCNC: 197 U/L — SIGNIFICANT CHANGE UP (ref 50–242)
LDH SERPL L TO P-CCNC: 197 U/L — SIGNIFICANT CHANGE UP (ref 50–242)
LYMPHOCYTES # BLD AUTO: 0.79 K/UL — LOW (ref 1–3.3)
LYMPHOCYTES # BLD AUTO: 0.79 K/UL — LOW (ref 1–3.3)
LYMPHOCYTES # BLD AUTO: 17 % — SIGNIFICANT CHANGE UP (ref 13–44)
LYMPHOCYTES # BLD AUTO: 17 % — SIGNIFICANT CHANGE UP (ref 13–44)
MCHC RBC-ENTMCNC: 33.8 G/DL — SIGNIFICANT CHANGE UP (ref 32–36)
MCHC RBC-ENTMCNC: 33.8 G/DL — SIGNIFICANT CHANGE UP (ref 32–36)
MCHC RBC-ENTMCNC: 34.6 PG — HIGH (ref 27–34)
MCHC RBC-ENTMCNC: 34.6 PG — HIGH (ref 27–34)
MCV RBC AUTO: 102.4 FL — HIGH (ref 80–100)
MCV RBC AUTO: 102.4 FL — HIGH (ref 80–100)
MONOCYTES # BLD AUTO: 0.76 K/UL — SIGNIFICANT CHANGE UP (ref 0–0.9)
MONOCYTES # BLD AUTO: 0.76 K/UL — SIGNIFICANT CHANGE UP (ref 0–0.9)
MONOCYTES NFR BLD AUTO: 16.3 % — HIGH (ref 2–14)
MONOCYTES NFR BLD AUTO: 16.3 % — HIGH (ref 2–14)
NEUTROPHILS # BLD AUTO: 2.84 K/UL — SIGNIFICANT CHANGE UP (ref 1.8–7.4)
NEUTROPHILS # BLD AUTO: 2.84 K/UL — SIGNIFICANT CHANGE UP (ref 1.8–7.4)
NEUTROPHILS NFR BLD AUTO: 61 % — SIGNIFICANT CHANGE UP (ref 43–77)
NEUTROPHILS NFR BLD AUTO: 61 % — SIGNIFICANT CHANGE UP (ref 43–77)
NRBC # BLD: 0 /100 WBCS — SIGNIFICANT CHANGE UP (ref 0–0)
NRBC # BLD: 0 /100 WBCS — SIGNIFICANT CHANGE UP (ref 0–0)
PHOSPHATE SERPL-MCNC: 3.6 MG/DL — SIGNIFICANT CHANGE UP (ref 2.5–4.5)
PHOSPHATE SERPL-MCNC: 3.6 MG/DL — SIGNIFICANT CHANGE UP (ref 2.5–4.5)
PLATELET # BLD AUTO: 143 K/UL — LOW (ref 150–400)
PLATELET # BLD AUTO: 143 K/UL — LOW (ref 150–400)
POTASSIUM SERPL-MCNC: 4.8 MMOL/L — SIGNIFICANT CHANGE UP (ref 3.5–5.3)
POTASSIUM SERPL-MCNC: 4.8 MMOL/L — SIGNIFICANT CHANGE UP (ref 3.5–5.3)
POTASSIUM SERPL-SCNC: 4.8 MMOL/L — SIGNIFICANT CHANGE UP (ref 3.5–5.3)
POTASSIUM SERPL-SCNC: 4.8 MMOL/L — SIGNIFICANT CHANGE UP (ref 3.5–5.3)
PROT SERPL-MCNC: 6.5 G/DL — SIGNIFICANT CHANGE UP (ref 6–8.3)
PROT SERPL-MCNC: 6.5 G/DL — SIGNIFICANT CHANGE UP (ref 6–8.3)
RBC # BLD: 3.76 M/UL — LOW (ref 3.8–5.2)
RBC # BLD: 3.76 M/UL — LOW (ref 3.8–5.2)
RBC # FLD: 12.5 % — SIGNIFICANT CHANGE UP (ref 10.3–14.5)
RBC # FLD: 12.5 % — SIGNIFICANT CHANGE UP (ref 10.3–14.5)
SODIUM SERPL-SCNC: 140 MMOL/L — SIGNIFICANT CHANGE UP (ref 135–145)
SODIUM SERPL-SCNC: 140 MMOL/L — SIGNIFICANT CHANGE UP (ref 135–145)
URATE SERPL-MCNC: 5.2 MG/DL — SIGNIFICANT CHANGE UP (ref 2.5–7)
URATE SERPL-MCNC: 5.2 MG/DL — SIGNIFICANT CHANGE UP (ref 2.5–7)
WBC # BLD: 4.66 K/UL — SIGNIFICANT CHANGE UP (ref 3.8–10.5)
WBC # BLD: 4.66 K/UL — SIGNIFICANT CHANGE UP (ref 3.8–10.5)
WBC # FLD AUTO: 4.66 K/UL — SIGNIFICANT CHANGE UP (ref 3.8–10.5)
WBC # FLD AUTO: 4.66 K/UL — SIGNIFICANT CHANGE UP (ref 3.8–10.5)

## 2023-11-07 ENCOUNTER — APPOINTMENT (OUTPATIENT)
Dept: INFUSION THERAPY | Facility: HOSPITAL | Age: 63
End: 2023-11-07

## 2023-11-07 DIAGNOSIS — C91.00 ACUTE LYMPHOBLASTIC LEUKEMIA NOT HAVING ACHIEVED REMISSION: ICD-10-CM

## 2023-11-09 ENCOUNTER — RESULT REVIEW (OUTPATIENT)
Age: 63
End: 2023-11-09

## 2023-11-09 ENCOUNTER — APPOINTMENT (OUTPATIENT)
Dept: INFUSION THERAPY | Facility: HOSPITAL | Age: 63
End: 2023-11-09

## 2023-11-09 ENCOUNTER — APPOINTMENT (OUTPATIENT)
Dept: HEMATOLOGY ONCOLOGY | Facility: CLINIC | Age: 63
End: 2023-11-09
Payer: MEDICARE

## 2023-11-09 VITALS
SYSTOLIC BLOOD PRESSURE: 134 MMHG | WEIGHT: 209.44 LBS | RESPIRATION RATE: 16 BRPM | BODY MASS INDEX: 38.06 KG/M2 | OXYGEN SATURATION: 98 % | DIASTOLIC BLOOD PRESSURE: 83 MMHG | TEMPERATURE: 97.1 F | HEART RATE: 53 BPM

## 2023-11-09 LAB
BASOPHILS # BLD AUTO: 0.02 K/UL — SIGNIFICANT CHANGE UP (ref 0–0.2)
BASOPHILS # BLD AUTO: 0.02 K/UL — SIGNIFICANT CHANGE UP (ref 0–0.2)
BASOPHILS NFR BLD AUTO: 0.6 % — SIGNIFICANT CHANGE UP (ref 0–2)
BASOPHILS NFR BLD AUTO: 0.6 % — SIGNIFICANT CHANGE UP (ref 0–2)
EOSINOPHIL # BLD AUTO: 0.19 K/UL — SIGNIFICANT CHANGE UP (ref 0–0.5)
EOSINOPHIL # BLD AUTO: 0.19 K/UL — SIGNIFICANT CHANGE UP (ref 0–0.5)
EOSINOPHIL NFR BLD AUTO: 6.1 % — HIGH (ref 0–6)
EOSINOPHIL NFR BLD AUTO: 6.1 % — HIGH (ref 0–6)
HCT VFR BLD CALC: 37.3 % — SIGNIFICANT CHANGE UP (ref 34.5–45)
HCT VFR BLD CALC: 37.3 % — SIGNIFICANT CHANGE UP (ref 34.5–45)
HGB BLD-MCNC: 13 G/DL — SIGNIFICANT CHANGE UP (ref 11.5–15.5)
HGB BLD-MCNC: 13 G/DL — SIGNIFICANT CHANGE UP (ref 11.5–15.5)
IMM GRANULOCYTES NFR BLD AUTO: 0.6 % — SIGNIFICANT CHANGE UP (ref 0–0.9)
IMM GRANULOCYTES NFR BLD AUTO: 0.6 % — SIGNIFICANT CHANGE UP (ref 0–0.9)
LYMPHOCYTES # BLD AUTO: 0.62 K/UL — LOW (ref 1–3.3)
LYMPHOCYTES # BLD AUTO: 0.62 K/UL — LOW (ref 1–3.3)
LYMPHOCYTES # BLD AUTO: 19.8 % — SIGNIFICANT CHANGE UP (ref 13–44)
LYMPHOCYTES # BLD AUTO: 19.8 % — SIGNIFICANT CHANGE UP (ref 13–44)
MCHC RBC-ENTMCNC: 34.9 G/DL — SIGNIFICANT CHANGE UP (ref 32–36)
MCHC RBC-ENTMCNC: 34.9 G/DL — SIGNIFICANT CHANGE UP (ref 32–36)
MCHC RBC-ENTMCNC: 35.4 PG — HIGH (ref 27–34)
MCHC RBC-ENTMCNC: 35.4 PG — HIGH (ref 27–34)
MCV RBC AUTO: 101.6 FL — HIGH (ref 80–100)
MCV RBC AUTO: 101.6 FL — HIGH (ref 80–100)
MONOCYTES # BLD AUTO: 0.51 K/UL — SIGNIFICANT CHANGE UP (ref 0–0.9)
MONOCYTES # BLD AUTO: 0.51 K/UL — SIGNIFICANT CHANGE UP (ref 0–0.9)
MONOCYTES NFR BLD AUTO: 16.3 % — HIGH (ref 2–14)
MONOCYTES NFR BLD AUTO: 16.3 % — HIGH (ref 2–14)
NEUTROPHILS # BLD AUTO: 1.77 K/UL — LOW (ref 1.8–7.4)
NEUTROPHILS # BLD AUTO: 1.77 K/UL — LOW (ref 1.8–7.4)
NEUTROPHILS NFR BLD AUTO: 56.6 % — SIGNIFICANT CHANGE UP (ref 43–77)
NEUTROPHILS NFR BLD AUTO: 56.6 % — SIGNIFICANT CHANGE UP (ref 43–77)
NRBC # BLD: 0 /100 WBCS — SIGNIFICANT CHANGE UP (ref 0–0)
NRBC # BLD: 0 /100 WBCS — SIGNIFICANT CHANGE UP (ref 0–0)
PLATELET # BLD AUTO: 123 K/UL — LOW (ref 150–400)
PLATELET # BLD AUTO: 123 K/UL — LOW (ref 150–400)
RBC # BLD: 3.67 M/UL — LOW (ref 3.8–5.2)
RBC # BLD: 3.67 M/UL — LOW (ref 3.8–5.2)
RBC # FLD: 12.2 % — SIGNIFICANT CHANGE UP (ref 10.3–14.5)
RBC # FLD: 12.2 % — SIGNIFICANT CHANGE UP (ref 10.3–14.5)
WBC # BLD: 3.13 K/UL — LOW (ref 3.8–10.5)
WBC # BLD: 3.13 K/UL — LOW (ref 3.8–10.5)
WBC # FLD AUTO: 3.13 K/UL — LOW (ref 3.8–10.5)
WBC # FLD AUTO: 3.13 K/UL — LOW (ref 3.8–10.5)

## 2023-11-09 PROCEDURE — 99214 OFFICE O/P EST MOD 30 MIN: CPT

## 2023-11-10 LAB
ALBUMIN SERPL ELPH-MCNC: 4.3 G/DL
ALBUMIN SERPL ELPH-MCNC: 4.6 G/DL
ALP BLD-CCNC: 103 U/L
ALP BLD-CCNC: 104 U/L
ALT SERPL-CCNC: 24 U/L
ALT SERPL-CCNC: 29 U/L
ANION GAP SERPL CALC-SCNC: 12 MMOL/L
ANION GAP SERPL CALC-SCNC: 12 MMOL/L
AST SERPL-CCNC: 23 U/L
AST SERPL-CCNC: 29 U/L
BILIRUB SERPL-MCNC: 0.4 MG/DL
BILIRUB SERPL-MCNC: 0.5 MG/DL
BUN SERPL-MCNC: 12 MG/DL
BUN SERPL-MCNC: 17 MG/DL
CALCIUM SERPL-MCNC: 9.4 MG/DL
CALCIUM SERPL-MCNC: 9.9 MG/DL
CHLORIDE SERPL-SCNC: 103 MMOL/L
CHLORIDE SERPL-SCNC: 105 MMOL/L
CO2 SERPL-SCNC: 25 MMOL/L
CO2 SERPL-SCNC: 25 MMOL/L
CREAT SERPL-MCNC: 0.87 MG/DL
CREAT SERPL-MCNC: 0.94 MG/DL
EGFR: 67 ML/MIN/1.73M2
EGFR: 74 ML/MIN/1.73M2
GLUCOSE SERPL-MCNC: 92 MG/DL
GLUCOSE SERPL-MCNC: 95 MG/DL
LDH SERPL-CCNC: 165 U/L
LDH SERPL-CCNC: 173 U/L
POTASSIUM SERPL-SCNC: 4.2 MMOL/L
POTASSIUM SERPL-SCNC: 4.2 MMOL/L
PROT SERPL-MCNC: 6.1 G/DL
PROT SERPL-MCNC: 6.7 G/DL
SODIUM SERPL-SCNC: 140 MMOL/L
SODIUM SERPL-SCNC: 142 MMOL/L

## 2023-11-13 ENCOUNTER — APPOINTMENT (OUTPATIENT)
Age: 63
End: 2023-11-13

## 2023-11-13 ENCOUNTER — RESULT REVIEW (OUTPATIENT)
Age: 63
End: 2023-11-13

## 2023-11-13 LAB
BASOPHILS # BLD AUTO: 0.04 K/UL — SIGNIFICANT CHANGE UP (ref 0–0.2)
BASOPHILS # BLD AUTO: 0.04 K/UL — SIGNIFICANT CHANGE UP (ref 0–0.2)
BASOPHILS NFR BLD AUTO: 0.9 % — SIGNIFICANT CHANGE UP (ref 0–2)
BASOPHILS NFR BLD AUTO: 0.9 % — SIGNIFICANT CHANGE UP (ref 0–2)
EOSINOPHIL # BLD AUTO: 0.25 K/UL — SIGNIFICANT CHANGE UP (ref 0–0.5)
EOSINOPHIL # BLD AUTO: 0.25 K/UL — SIGNIFICANT CHANGE UP (ref 0–0.5)
EOSINOPHIL NFR BLD AUTO: 5.7 % — SIGNIFICANT CHANGE UP (ref 0–6)
EOSINOPHIL NFR BLD AUTO: 5.7 % — SIGNIFICANT CHANGE UP (ref 0–6)
HCT VFR BLD CALC: 38.9 % — SIGNIFICANT CHANGE UP (ref 34.5–45)
HCT VFR BLD CALC: 38.9 % — SIGNIFICANT CHANGE UP (ref 34.5–45)
HGB BLD-MCNC: 13.1 G/DL — SIGNIFICANT CHANGE UP (ref 11.5–15.5)
HGB BLD-MCNC: 13.1 G/DL — SIGNIFICANT CHANGE UP (ref 11.5–15.5)
IMM GRANULOCYTES NFR BLD AUTO: 0.9 % — SIGNIFICANT CHANGE UP (ref 0–0.9)
IMM GRANULOCYTES NFR BLD AUTO: 0.9 % — SIGNIFICANT CHANGE UP (ref 0–0.9)
LYMPHOCYTES # BLD AUTO: 1.01 K/UL — SIGNIFICANT CHANGE UP (ref 1–3.3)
LYMPHOCYTES # BLD AUTO: 1.01 K/UL — SIGNIFICANT CHANGE UP (ref 1–3.3)
LYMPHOCYTES # BLD AUTO: 22.9 % — SIGNIFICANT CHANGE UP (ref 13–44)
LYMPHOCYTES # BLD AUTO: 22.9 % — SIGNIFICANT CHANGE UP (ref 13–44)
MCHC RBC-ENTMCNC: 33.7 GM/DL — SIGNIFICANT CHANGE UP (ref 32–36)
MCHC RBC-ENTMCNC: 33.7 GM/DL — SIGNIFICANT CHANGE UP (ref 32–36)
MCHC RBC-ENTMCNC: 34.7 PG — HIGH (ref 27–34)
MCHC RBC-ENTMCNC: 34.7 PG — HIGH (ref 27–34)
MCV RBC AUTO: 103.2 FL — HIGH (ref 80–100)
MCV RBC AUTO: 103.2 FL — HIGH (ref 80–100)
MONOCYTES # BLD AUTO: 0.73 K/UL — SIGNIFICANT CHANGE UP (ref 0–0.9)
MONOCYTES # BLD AUTO: 0.73 K/UL — SIGNIFICANT CHANGE UP (ref 0–0.9)
MONOCYTES NFR BLD AUTO: 16.5 % — HIGH (ref 2–14)
MONOCYTES NFR BLD AUTO: 16.5 % — HIGH (ref 2–14)
NEUTROPHILS # BLD AUTO: 2.35 K/UL — SIGNIFICANT CHANGE UP (ref 1.8–7.4)
NEUTROPHILS # BLD AUTO: 2.35 K/UL — SIGNIFICANT CHANGE UP (ref 1.8–7.4)
NEUTROPHILS NFR BLD AUTO: 53.1 % — SIGNIFICANT CHANGE UP (ref 43–77)
NEUTROPHILS NFR BLD AUTO: 53.1 % — SIGNIFICANT CHANGE UP (ref 43–77)
NRBC # BLD: 0 /100 WBCS — SIGNIFICANT CHANGE UP (ref 0–0)
NRBC # BLD: 0 /100 WBCS — SIGNIFICANT CHANGE UP (ref 0–0)
PLATELET # BLD AUTO: 149 K/UL — LOW (ref 150–400)
PLATELET # BLD AUTO: 149 K/UL — LOW (ref 150–400)
RBC # BLD: 3.77 M/UL — LOW (ref 3.8–5.2)
RBC # BLD: 3.77 M/UL — LOW (ref 3.8–5.2)
RBC # FLD: 11.9 % — SIGNIFICANT CHANGE UP (ref 10.3–14.5)
RBC # FLD: 11.9 % — SIGNIFICANT CHANGE UP (ref 10.3–14.5)
WBC # BLD: 4.42 K/UL — SIGNIFICANT CHANGE UP (ref 3.8–10.5)
WBC # BLD: 4.42 K/UL — SIGNIFICANT CHANGE UP (ref 3.8–10.5)
WBC # FLD AUTO: 4.42 K/UL — SIGNIFICANT CHANGE UP (ref 3.8–10.5)
WBC # FLD AUTO: 4.42 K/UL — SIGNIFICANT CHANGE UP (ref 3.8–10.5)

## 2023-11-13 PROCEDURE — 85610 PROTHROMBIN TIME: CPT

## 2023-11-13 PROCEDURE — 86900 BLOOD TYPING SEROLOGIC ABO: CPT

## 2023-11-13 PROCEDURE — 87641 MR-STAPH DNA AMP PROBE: CPT

## 2023-11-13 PROCEDURE — 84550 ASSAY OF BLOOD/URIC ACID: CPT

## 2023-11-13 PROCEDURE — 83615 LACTATE (LD) (LDH) ENZYME: CPT

## 2023-11-13 PROCEDURE — 80053 COMPREHEN METABOLIC PANEL: CPT

## 2023-11-13 PROCEDURE — 83735 ASSAY OF MAGNESIUM: CPT

## 2023-11-13 PROCEDURE — 84100 ASSAY OF PHOSPHORUS: CPT

## 2023-11-13 PROCEDURE — 87640 STAPH A DNA AMP PROBE: CPT

## 2023-11-13 PROCEDURE — 85730 THROMBOPLASTIN TIME PARTIAL: CPT

## 2023-11-13 PROCEDURE — 85384 FIBRINOGEN ACTIVITY: CPT

## 2023-11-13 PROCEDURE — 86901 BLOOD TYPING SEROLOGIC RH(D): CPT

## 2023-11-13 PROCEDURE — 86850 RBC ANTIBODY SCREEN: CPT

## 2023-11-13 PROCEDURE — 85025 COMPLETE CBC W/AUTO DIFF WBC: CPT

## 2023-11-14 ENCOUNTER — NON-APPOINTMENT (OUTPATIENT)
Age: 63
End: 2023-11-14

## 2023-11-14 ENCOUNTER — APPOINTMENT (OUTPATIENT)
Dept: OPHTHALMOLOGY | Facility: CLINIC | Age: 63
End: 2023-11-14
Payer: MEDICARE

## 2023-11-14 PROCEDURE — 92134 CPTRZ OPH DX IMG PST SGM RTA: CPT

## 2023-11-14 PROCEDURE — 92014 COMPRE OPH EXAM EST PT 1/>: CPT

## 2023-11-14 PROCEDURE — 92250 FUNDUS PHOTOGRAPHY W/I&R: CPT

## 2023-11-21 ENCOUNTER — APPOINTMENT (OUTPATIENT)
Dept: HEMATOLOGY ONCOLOGY | Facility: CLINIC | Age: 63
End: 2023-11-21

## 2023-11-28 ENCOUNTER — LABORATORY RESULT (OUTPATIENT)
Age: 63
End: 2023-11-28

## 2023-11-29 ENCOUNTER — APPOINTMENT (OUTPATIENT)
Age: 63
End: 2023-11-29

## 2023-11-29 ENCOUNTER — RESULT REVIEW (OUTPATIENT)
Age: 63
End: 2023-11-29

## 2023-11-29 ENCOUNTER — APPOINTMENT (OUTPATIENT)
Dept: HEMATOLOGY ONCOLOGY | Facility: CLINIC | Age: 63
End: 2023-11-29

## 2023-11-29 ENCOUNTER — NON-APPOINTMENT (OUTPATIENT)
Age: 63
End: 2023-11-29

## 2023-11-29 LAB
BASOPHILS # BLD AUTO: 0.03 K/UL — SIGNIFICANT CHANGE UP (ref 0–0.2)
BASOPHILS # BLD AUTO: 0.03 K/UL — SIGNIFICANT CHANGE UP (ref 0–0.2)
BASOPHILS NFR BLD AUTO: 0.6 % — SIGNIFICANT CHANGE UP (ref 0–2)
BASOPHILS NFR BLD AUTO: 0.6 % — SIGNIFICANT CHANGE UP (ref 0–2)
EOSINOPHIL # BLD AUTO: 0.29 K/UL — SIGNIFICANT CHANGE UP (ref 0–0.5)
EOSINOPHIL # BLD AUTO: 0.29 K/UL — SIGNIFICANT CHANGE UP (ref 0–0.5)
EOSINOPHIL NFR BLD AUTO: 6.1 % — HIGH (ref 0–6)
EOSINOPHIL NFR BLD AUTO: 6.1 % — HIGH (ref 0–6)
HCT VFR BLD CALC: 39.1 % — SIGNIFICANT CHANGE UP (ref 34.5–45)
HCT VFR BLD CALC: 39.1 % — SIGNIFICANT CHANGE UP (ref 34.5–45)
HGB BLD-MCNC: 13.5 G/DL — SIGNIFICANT CHANGE UP (ref 11.5–15.5)
HGB BLD-MCNC: 13.5 G/DL — SIGNIFICANT CHANGE UP (ref 11.5–15.5)
IMM GRANULOCYTES NFR BLD AUTO: 0.6 % — SIGNIFICANT CHANGE UP (ref 0–0.9)
IMM GRANULOCYTES NFR BLD AUTO: 0.6 % — SIGNIFICANT CHANGE UP (ref 0–0.9)
LYMPHOCYTES # BLD AUTO: 1.1 K/UL — SIGNIFICANT CHANGE UP (ref 1–3.3)
LYMPHOCYTES # BLD AUTO: 1.1 K/UL — SIGNIFICANT CHANGE UP (ref 1–3.3)
LYMPHOCYTES # BLD AUTO: 23.2 % — SIGNIFICANT CHANGE UP (ref 13–44)
LYMPHOCYTES # BLD AUTO: 23.2 % — SIGNIFICANT CHANGE UP (ref 13–44)
MCHC RBC-ENTMCNC: 34.5 GM/DL — SIGNIFICANT CHANGE UP (ref 32–36)
MCHC RBC-ENTMCNC: 34.5 GM/DL — SIGNIFICANT CHANGE UP (ref 32–36)
MCHC RBC-ENTMCNC: 35 PG — HIGH (ref 27–34)
MCHC RBC-ENTMCNC: 35 PG — HIGH (ref 27–34)
MCV RBC AUTO: 101.3 FL — HIGH (ref 80–100)
MCV RBC AUTO: 101.3 FL — HIGH (ref 80–100)
MONOCYTES # BLD AUTO: 0.81 K/UL — SIGNIFICANT CHANGE UP (ref 0–0.9)
MONOCYTES # BLD AUTO: 0.81 K/UL — SIGNIFICANT CHANGE UP (ref 0–0.9)
MONOCYTES NFR BLD AUTO: 17.1 % — HIGH (ref 2–14)
MONOCYTES NFR BLD AUTO: 17.1 % — HIGH (ref 2–14)
NEUTROPHILS # BLD AUTO: 2.49 K/UL — SIGNIFICANT CHANGE UP (ref 1.8–7.4)
NEUTROPHILS # BLD AUTO: 2.49 K/UL — SIGNIFICANT CHANGE UP (ref 1.8–7.4)
NEUTROPHILS NFR BLD AUTO: 52.4 % — SIGNIFICANT CHANGE UP (ref 43–77)
NEUTROPHILS NFR BLD AUTO: 52.4 % — SIGNIFICANT CHANGE UP (ref 43–77)
NRBC # BLD: 0 /100 WBCS — SIGNIFICANT CHANGE UP (ref 0–0)
NRBC # BLD: 0 /100 WBCS — SIGNIFICANT CHANGE UP (ref 0–0)
PLATELET # BLD AUTO: 134 K/UL — LOW (ref 150–400)
PLATELET # BLD AUTO: 134 K/UL — LOW (ref 150–400)
RBC # BLD: 3.86 M/UL — SIGNIFICANT CHANGE UP (ref 3.8–5.2)
RBC # BLD: 3.86 M/UL — SIGNIFICANT CHANGE UP (ref 3.8–5.2)
RBC # FLD: 11.8 % — SIGNIFICANT CHANGE UP (ref 10.3–14.5)
RBC # FLD: 11.8 % — SIGNIFICANT CHANGE UP (ref 10.3–14.5)
WBC # BLD: 4.75 K/UL — SIGNIFICANT CHANGE UP (ref 3.8–10.5)
WBC # BLD: 4.75 K/UL — SIGNIFICANT CHANGE UP (ref 3.8–10.5)
WBC # FLD AUTO: 4.75 K/UL — SIGNIFICANT CHANGE UP (ref 3.8–10.5)
WBC # FLD AUTO: 4.75 K/UL — SIGNIFICANT CHANGE UP (ref 3.8–10.5)

## 2023-11-30 LAB
ALBUMIN SERPL ELPH-MCNC: 3.9 G/DL
ALP BLD-CCNC: 108 U/L
ALT SERPL-CCNC: 38 U/L
ANION GAP SERPL CALC-SCNC: 9 MMOL/L
AST SERPL-CCNC: 34 U/L
BILIRUB SERPL-MCNC: 0.4 MG/DL
BUN SERPL-MCNC: 14 MG/DL
CALCIUM SERPL-MCNC: 9.6 MG/DL
CHLORIDE SERPL-SCNC: 105 MMOL/L
CO2 SERPL-SCNC: 24 MMOL/L
CREAT SERPL-MCNC: 0.98 MG/DL
EGFR: 64 ML/MIN/1.73M2
GLUCOSE SERPL-MCNC: 89 MG/DL
LDH SERPL-CCNC: 226 U/L
POTASSIUM SERPL-SCNC: 4.3 MMOL/L
PROT SERPL-MCNC: 6 G/DL
SODIUM SERPL-SCNC: 138 MMOL/L

## 2023-12-01 ENCOUNTER — INPATIENT (INPATIENT)
Facility: HOSPITAL | Age: 63
LOS: 1 days | Discharge: HOME | DRG: 836 | End: 2023-12-03
Attending: INTERNAL MEDICINE | Admitting: INTERNAL MEDICINE
Payer: MEDICARE

## 2023-12-01 ENCOUNTER — TRANSCRIPTION ENCOUNTER (OUTPATIENT)
Age: 63
End: 2023-12-01

## 2023-12-01 VITALS
RESPIRATION RATE: 18 BRPM | OXYGEN SATURATION: 95 % | HEART RATE: 70 BPM | SYSTOLIC BLOOD PRESSURE: 150 MMHG | WEIGHT: 204.37 LBS | HEIGHT: 62.2 IN | TEMPERATURE: 98 F | DIASTOLIC BLOOD PRESSURE: 83 MMHG

## 2023-12-01 DIAGNOSIS — Z29.9 ENCOUNTER FOR PROPHYLACTIC MEASURES, UNSPECIFIED: ICD-10-CM

## 2023-12-01 DIAGNOSIS — Z98.891 HISTORY OF UTERINE SCAR FROM PREVIOUS SURGERY: Chronic | ICD-10-CM

## 2023-12-01 DIAGNOSIS — F41.1 GENERALIZED ANXIETY DISORDER: ICD-10-CM

## 2023-12-01 DIAGNOSIS — B99.9 UNSPECIFIED INFECTIOUS DISEASE: ICD-10-CM

## 2023-12-01 DIAGNOSIS — C91.00 ACUTE LYMPHOBLASTIC LEUKEMIA NOT HAVING ACHIEVED REMISSION: ICD-10-CM

## 2023-12-01 DIAGNOSIS — I10 ESSENTIAL (PRIMARY) HYPERTENSION: ICD-10-CM

## 2023-12-01 LAB
ALBUMIN SERPL ELPH-MCNC: 3.9 G/DL — SIGNIFICANT CHANGE UP (ref 3.3–5)
ALBUMIN SERPL ELPH-MCNC: 3.9 G/DL — SIGNIFICANT CHANGE UP (ref 3.3–5)
ALP SERPL-CCNC: 101 U/L — SIGNIFICANT CHANGE UP (ref 40–120)
ALP SERPL-CCNC: 101 U/L — SIGNIFICANT CHANGE UP (ref 40–120)
ALT FLD-CCNC: 35 U/L — SIGNIFICANT CHANGE UP (ref 10–45)
ALT FLD-CCNC: 35 U/L — SIGNIFICANT CHANGE UP (ref 10–45)
ANION GAP SERPL CALC-SCNC: 10 MMOL/L — SIGNIFICANT CHANGE UP (ref 5–17)
ANION GAP SERPL CALC-SCNC: 10 MMOL/L — SIGNIFICANT CHANGE UP (ref 5–17)
AST SERPL-CCNC: 31 U/L — SIGNIFICANT CHANGE UP (ref 10–40)
AST SERPL-CCNC: 31 U/L — SIGNIFICANT CHANGE UP (ref 10–40)
BASOPHILS # BLD AUTO: 0.02 K/UL — SIGNIFICANT CHANGE UP (ref 0–0.2)
BASOPHILS # BLD AUTO: 0.02 K/UL — SIGNIFICANT CHANGE UP (ref 0–0.2)
BASOPHILS NFR BLD AUTO: 0.5 % — SIGNIFICANT CHANGE UP (ref 0–2)
BASOPHILS NFR BLD AUTO: 0.5 % — SIGNIFICANT CHANGE UP (ref 0–2)
BILIRUB SERPL-MCNC: 0.4 MG/DL — SIGNIFICANT CHANGE UP (ref 0.2–1.2)
BILIRUB SERPL-MCNC: 0.4 MG/DL — SIGNIFICANT CHANGE UP (ref 0.2–1.2)
BLD GP AB SCN SERPL QL: NEGATIVE — SIGNIFICANT CHANGE UP
BLD GP AB SCN SERPL QL: NEGATIVE — SIGNIFICANT CHANGE UP
BUN SERPL-MCNC: 15 MG/DL — SIGNIFICANT CHANGE UP (ref 7–23)
BUN SERPL-MCNC: 15 MG/DL — SIGNIFICANT CHANGE UP (ref 7–23)
CALCIUM SERPL-MCNC: 9.1 MG/DL — SIGNIFICANT CHANGE UP (ref 8.4–10.5)
CALCIUM SERPL-MCNC: 9.1 MG/DL — SIGNIFICANT CHANGE UP (ref 8.4–10.5)
CHLORIDE SERPL-SCNC: 105 MMOL/L — SIGNIFICANT CHANGE UP (ref 96–108)
CHLORIDE SERPL-SCNC: 105 MMOL/L — SIGNIFICANT CHANGE UP (ref 96–108)
CO2 SERPL-SCNC: 26 MMOL/L — SIGNIFICANT CHANGE UP (ref 22–31)
CO2 SERPL-SCNC: 26 MMOL/L — SIGNIFICANT CHANGE UP (ref 22–31)
CREAT SERPL-MCNC: 0.89 MG/DL — SIGNIFICANT CHANGE UP (ref 0.5–1.3)
CREAT SERPL-MCNC: 0.89 MG/DL — SIGNIFICANT CHANGE UP (ref 0.5–1.3)
EGFR: 72 ML/MIN/1.73M2 — SIGNIFICANT CHANGE UP
EGFR: 72 ML/MIN/1.73M2 — SIGNIFICANT CHANGE UP
EOSINOPHIL # BLD AUTO: 0.16 K/UL — SIGNIFICANT CHANGE UP (ref 0–0.5)
EOSINOPHIL # BLD AUTO: 0.16 K/UL — SIGNIFICANT CHANGE UP (ref 0–0.5)
EOSINOPHIL NFR BLD AUTO: 4.1 % — SIGNIFICANT CHANGE UP (ref 0–6)
EOSINOPHIL NFR BLD AUTO: 4.1 % — SIGNIFICANT CHANGE UP (ref 0–6)
GLUCOSE SERPL-MCNC: 95 MG/DL — SIGNIFICANT CHANGE UP (ref 70–99)
GLUCOSE SERPL-MCNC: 95 MG/DL — SIGNIFICANT CHANGE UP (ref 70–99)
HCT VFR BLD CALC: 34.5 % — SIGNIFICANT CHANGE UP (ref 34.5–45)
HCT VFR BLD CALC: 34.5 % — SIGNIFICANT CHANGE UP (ref 34.5–45)
HGB BLD-MCNC: 12 G/DL — SIGNIFICANT CHANGE UP (ref 11.5–15.5)
HGB BLD-MCNC: 12 G/DL — SIGNIFICANT CHANGE UP (ref 11.5–15.5)
INR BLD: 0.98 RATIO — SIGNIFICANT CHANGE UP (ref 0.85–1.18)
INR BLD: 0.98 RATIO — SIGNIFICANT CHANGE UP (ref 0.85–1.18)
LYMPHOCYTES # BLD AUTO: 0.77 K/UL — LOW (ref 1–3.3)
LYMPHOCYTES # BLD AUTO: 0.77 K/UL — LOW (ref 1–3.3)
LYMPHOCYTES # BLD AUTO: 19.7 % — SIGNIFICANT CHANGE UP (ref 13–44)
LYMPHOCYTES # BLD AUTO: 19.7 % — SIGNIFICANT CHANGE UP (ref 13–44)
MAGNESIUM SERPL-MCNC: 2 MG/DL — SIGNIFICANT CHANGE UP (ref 1.6–2.6)
MAGNESIUM SERPL-MCNC: 2 MG/DL — SIGNIFICANT CHANGE UP (ref 1.6–2.6)
MCHC RBC-ENTMCNC: 34.7 PG — HIGH (ref 27–34)
MCHC RBC-ENTMCNC: 34.7 PG — HIGH (ref 27–34)
MCHC RBC-ENTMCNC: 34.8 GM/DL — SIGNIFICANT CHANGE UP (ref 32–36)
MCHC RBC-ENTMCNC: 34.8 GM/DL — SIGNIFICANT CHANGE UP (ref 32–36)
MCV RBC AUTO: 99.7 FL — SIGNIFICANT CHANGE UP (ref 80–100)
MCV RBC AUTO: 99.7 FL — SIGNIFICANT CHANGE UP (ref 80–100)
MONOCYTES # BLD AUTO: 0.56 K/UL — SIGNIFICANT CHANGE UP (ref 0–0.9)
MONOCYTES # BLD AUTO: 0.56 K/UL — SIGNIFICANT CHANGE UP (ref 0–0.9)
MONOCYTES NFR BLD AUTO: 14.4 % — HIGH (ref 2–14)
MONOCYTES NFR BLD AUTO: 14.4 % — HIGH (ref 2–14)
NEUTROPHILS # BLD AUTO: 2.39 K/UL — SIGNIFICANT CHANGE UP (ref 1.8–7.4)
NEUTROPHILS # BLD AUTO: 2.39 K/UL — SIGNIFICANT CHANGE UP (ref 1.8–7.4)
NEUTROPHILS NFR BLD AUTO: 61.3 % — SIGNIFICANT CHANGE UP (ref 43–77)
NEUTROPHILS NFR BLD AUTO: 61.3 % — SIGNIFICANT CHANGE UP (ref 43–77)
NRBC # BLD: 0 /100 WBCS — SIGNIFICANT CHANGE UP (ref 0–0)
NRBC # BLD: 0 /100 WBCS — SIGNIFICANT CHANGE UP (ref 0–0)
PHOSPHATE SERPL-MCNC: 3.5 MG/DL — SIGNIFICANT CHANGE UP (ref 2.5–4.5)
PHOSPHATE SERPL-MCNC: 3.5 MG/DL — SIGNIFICANT CHANGE UP (ref 2.5–4.5)
PLATELET # BLD AUTO: 115 K/UL — LOW (ref 150–400)
PLATELET # BLD AUTO: 115 K/UL — LOW (ref 150–400)
POTASSIUM SERPL-MCNC: 3.8 MMOL/L — SIGNIFICANT CHANGE UP (ref 3.5–5.3)
POTASSIUM SERPL-MCNC: 3.8 MMOL/L — SIGNIFICANT CHANGE UP (ref 3.5–5.3)
POTASSIUM SERPL-SCNC: 3.8 MMOL/L — SIGNIFICANT CHANGE UP (ref 3.5–5.3)
POTASSIUM SERPL-SCNC: 3.8 MMOL/L — SIGNIFICANT CHANGE UP (ref 3.5–5.3)
PROT SERPL-MCNC: 5.8 G/DL — LOW (ref 6–8.3)
PROT SERPL-MCNC: 5.8 G/DL — LOW (ref 6–8.3)
PROTHROM AB SERPL-ACNC: 10.8 SEC — SIGNIFICANT CHANGE UP (ref 9.5–13)
PROTHROM AB SERPL-ACNC: 10.8 SEC — SIGNIFICANT CHANGE UP (ref 9.5–13)
RBC # BLD: 3.46 M/UL — LOW (ref 3.8–5.2)
RBC # BLD: 3.46 M/UL — LOW (ref 3.8–5.2)
RBC # FLD: 12 % — SIGNIFICANT CHANGE UP (ref 10.3–14.5)
RBC # FLD: 12 % — SIGNIFICANT CHANGE UP (ref 10.3–14.5)
RH IG SCN BLD-IMP: POSITIVE — SIGNIFICANT CHANGE UP
RH IG SCN BLD-IMP: POSITIVE — SIGNIFICANT CHANGE UP
SODIUM SERPL-SCNC: 141 MMOL/L — SIGNIFICANT CHANGE UP (ref 135–145)
SODIUM SERPL-SCNC: 141 MMOL/L — SIGNIFICANT CHANGE UP (ref 135–145)
URATE SERPL-MCNC: 5.2 MG/DL — SIGNIFICANT CHANGE UP (ref 2.5–7)
URATE SERPL-MCNC: 5.2 MG/DL — SIGNIFICANT CHANGE UP (ref 2.5–7)
WBC # BLD: 3.9 K/UL — SIGNIFICANT CHANGE UP (ref 3.8–10.5)
WBC # BLD: 3.9 K/UL — SIGNIFICANT CHANGE UP (ref 3.8–10.5)
WBC # FLD AUTO: 3.9 K/UL — SIGNIFICANT CHANGE UP (ref 3.8–10.5)
WBC # FLD AUTO: 3.9 K/UL — SIGNIFICANT CHANGE UP (ref 3.8–10.5)

## 2023-12-01 PROCEDURE — 99222 1ST HOSP IP/OBS MODERATE 55: CPT

## 2023-12-01 RX ORDER — ESCITALOPRAM OXALATE 10 MG/1
10 TABLET, FILM COATED ORAL DAILY
Refills: 0 | Status: DISCONTINUED | OUTPATIENT
Start: 2023-12-02 | End: 2023-12-03

## 2023-12-01 RX ORDER — CHLORHEXIDINE GLUCONATE 213 G/1000ML
1 SOLUTION TOPICAL
Refills: 0 | Status: DISCONTINUED | OUTPATIENT
Start: 2023-12-01 | End: 2023-12-03

## 2023-12-01 RX ORDER — BLINATUMOMAB 35 MCG
32.5 KIT INTRAVENOUS
Refills: 0 | Status: DISCONTINUED | OUTPATIENT
Start: 2023-12-01 | End: 2023-12-03

## 2023-12-01 RX ORDER — SODIUM CHLORIDE 9 MG/ML
1000 INJECTION INTRAMUSCULAR; INTRAVENOUS; SUBCUTANEOUS
Refills: 0 | Status: DISCONTINUED | OUTPATIENT
Start: 2023-12-01 | End: 2023-12-03

## 2023-12-01 RX ORDER — DEXAMETHASONE 0.5 MG/5ML
20 ELIXIR ORAL ONCE
Refills: 0 | Status: COMPLETED | OUTPATIENT
Start: 2023-12-01 | End: 2023-12-01

## 2023-12-01 RX ORDER — PANTOPRAZOLE SODIUM 20 MG/1
40 TABLET, DELAYED RELEASE ORAL
Refills: 0 | Status: DISCONTINUED | OUTPATIENT
Start: 2023-12-02 | End: 2023-12-03

## 2023-12-01 RX ORDER — ATOVAQUONE 750 MG/5ML
1500 SUSPENSION ORAL
Refills: 0 | Status: DISCONTINUED | OUTPATIENT
Start: 2023-12-01 | End: 2023-12-03

## 2023-12-01 RX ORDER — LOSARTAN POTASSIUM 100 MG/1
100 TABLET, FILM COATED ORAL DAILY
Refills: 0 | Status: DISCONTINUED | OUTPATIENT
Start: 2023-12-02 | End: 2023-12-03

## 2023-12-01 RX ORDER — ACYCLOVIR SODIUM 500 MG
400 VIAL (EA) INTRAVENOUS
Refills: 0 | Status: DISCONTINUED | OUTPATIENT
Start: 2023-12-01 | End: 2023-12-03

## 2023-12-01 RX ORDER — ACETAMINOPHEN 500 MG
650 TABLET ORAL ONCE
Refills: 0 | Status: COMPLETED | OUTPATIENT
Start: 2023-12-01 | End: 2023-12-01

## 2023-12-01 RX ORDER — ENOXAPARIN SODIUM 100 MG/ML
40 INJECTION SUBCUTANEOUS
Refills: 0 | Status: DISCONTINUED | OUTPATIENT
Start: 2023-12-01 | End: 2023-12-03

## 2023-12-01 RX ORDER — FAMOTIDINE 10 MG/ML
20 INJECTION INTRAVENOUS ONCE
Refills: 0 | Status: COMPLETED | OUTPATIENT
Start: 2023-12-01 | End: 2023-12-01

## 2023-12-01 RX ORDER — DIPHENHYDRAMINE HCL 50 MG
50 CAPSULE ORAL ONCE
Refills: 0 | Status: COMPLETED | OUTPATIENT
Start: 2023-12-01 | End: 2023-12-01

## 2023-12-01 RX ORDER — CHLORHEXIDINE GLUCONATE 213 G/1000ML
15 SOLUTION TOPICAL
Refills: 0 | Status: DISCONTINUED | OUTPATIENT
Start: 2023-12-01 | End: 2023-12-03

## 2023-12-01 RX ADMIN — Medication 50 MILLIGRAM(S): at 15:45

## 2023-12-01 RX ADMIN — ATOVAQUONE 1500 MILLIGRAM(S): 750 SUSPENSION ORAL at 11:48

## 2023-12-01 RX ADMIN — Medication 400 MILLIGRAM(S): at 17:09

## 2023-12-01 RX ADMIN — ENOXAPARIN SODIUM 40 MILLIGRAM(S): 100 INJECTION SUBCUTANEOUS at 21:48

## 2023-12-01 RX ADMIN — Medication 650 MILLIGRAM(S): at 15:41

## 2023-12-01 RX ADMIN — BLINATUMOMAB 32.5 MICROGRAM(S): KIT INTRAVENOUS at 16:39

## 2023-12-01 RX ADMIN — CHLORHEXIDINE GLUCONATE 1 APPLICATION(S): 213 SOLUTION TOPICAL at 11:50

## 2023-12-01 RX ADMIN — Medication 104 MILLIGRAM(S): at 16:09

## 2023-12-01 RX ADMIN — CHLORHEXIDINE GLUCONATE 15 MILLILITER(S): 213 SOLUTION TOPICAL at 17:10

## 2023-12-01 RX ADMIN — FAMOTIDINE 20 MILLIGRAM(S): 10 INJECTION INTRAVENOUS at 15:45

## 2023-12-01 NOTE — PATIENT PROFILE ADULT - INTERNATIONAL TRAVEL
[facility] - Outpatient Follow-up Note  Ochoa Ronquillo 60 y o  male MRN: [id number]  Encounter: [id number]          ASSESSMENT AND PLAN:      1  Cyclical vomiting  2  Irritable bowel syndrome with diarrhea    He continues to reports episodes of abdominal pain and vomiting  These can occur twice per month or once every 6 moths  He admits stress or anxiety seem to induce them    He has Dicyclomine and Zofran at home but admits he does not take them regularly and reserves them only for very severe symptoms    He has been to the ER on a multitude of occasions as the symptoms would not resolve and he became dehydrated    He has had extensive testing to include EGD, Colonoscopy, VCE, a multitude of CT scans ( 7 just in the past year)  There was a question of a small bowel mass in the fall of 2021 however multiple f/u studies disproved this    He needs to start Zofran and Dicyclomine sooner - he will start this  Can trial Imitrex as some reports have shown this to be a helpful abortive medications      For his diarrhea he prefers to not take something daily  He had been diagnosed with microscopic colitis at some point in the past although it is unclear as to when or where - his last colonoscopy here was in 2019 with negative biopsies  He will let us know if this becomes worse  ______________________________________________________________________    SUBJECTIVE:  40-EBEF-NME male with cyclical vomiting syndrome and chronic diarrhea presents for routine follow-up  After his last appointment he went to the emergency room 3 times with abdominal pain  Each time he had a CT scan of the abdomen and pelvis which was noted to be negative  He has dicyclomine and Zofran at home to take as needed for the symptoms but admits that he does not take them early on at symptom onset  He reports that when symptoms become severe his only option is go to the emergency room as nothing else helps    He reports episodes that occur anywhere from 2 times a month every 6 months  Between the episodes he really feels okay  He does have chronic diarrhea which can be mild or severe  At 7 point the past he reports he was diagnosed with microscopic colitis although it is unclear when or where this was  His last colonoscopy performed by our office in 2019 was found to be normal with biopsies negative for microscopic colitis  Regardless at some point he was treated with budesonide and noted improvement in this symptom  He is trying to come off of many of his medications and does not want be on other daily medication  REVIEW OF SYSTEMS IS OTHERWISE NEGATIVE  Historical Information   Past Medical History:   Diagnosis Date    Diaz's esophagus     Colon polyp     Coronary artery disease     Depression     Diverticulitis     GERD (gastroesophageal reflux disease)     Hemorrhoid     History of heart artery stent     Hyperlipidemia     Hypertension     Microscopic colitis     Ulcerative colitis ([address] )      Past Surgical History:   Procedure Laterality Date    ABDOMINAL SURGERY      radio frequency ablation    BONE GRAFT Left 2018    CARPAL TUNNEL RELEASE Right     COLONOSCOPY      CORONARY ANGIOPLASTY WITH STENT PLACEMENT      x2    EGD AND COLONOSCOPY      ESOPHAGOGASTRODUODENOSCOPY N/A 2/15/2018    Procedure: ESOPHAGOGASTRODUODENOSCOPY (EGD); Surgeon: Javid Costello MD;  Location: [facility];  Service: Gastroenterology    ESOPHAGOGASTRODUODENOSCOPY N/A 12/10/2018    Procedure: ESOPHAGOGASTRODUODENOSCOPY (EGD); Surgeon: Mary Kessler MD;  Location: [facility];   Service: Gastroenterology    HAND SURGERY Left     NM SHLDR ARTHROSCOP,SURG,W/ROTAT CUFF REPR Left 4/14/2021    Procedure: REPAIR ROTATOR CUFF  ARTHROSCOPIC; POSSIBLE BICEPS TENDONESIS, ACROMIOPLASTY;  Surgeon: Grant Hart DO;  Location: [facility];  Service: Orthopedics    ROTATOR CUFF REPAIR Left     SHOULDER ARTHROSCOPY Right 3/14/2022    Procedure: ARTHROSCOPY SHOULDER- Right shoulder arthroscopic rotator cuff repair, open subpectoral biceps tenodesis, subsacpular repair and extensive debridement;  Surgeon: Elias Pierre DO;  Location[facility];  Service: Orthopedics    TONSILLECTOMY       Social History   Social History     Substance and Sexual Activity   Alcohol Use Not Currently    Comment: quit 5 years     Social History     Substance and Sexual Activity   Drug Use Yes    Frequency: 3 0 times per week    Types: Marijuana    Comment: advised not to smoke     Social History     Tobacco Use   Smoking Status Former Smoker    Packs/day: 0 50    Quit date: 1/16/2007    Years since quitting: 15 3   Smokeless Tobacco Former User    Quit date: 1/16/1998   Tobacco Comment    quit 10 yrs ago     Family History   Problem Relation Age of Onset    Heart disease Father     Melanoma Father     Cancer Sister     Skin cancer Sister     Skin cancer Mother        Meds/Allergies       Current Outpatient Medications:     atorvastatin (LIPITOR) 80 mg tablet    bacitracin ointment    buPROPion (WELLBUTRIN SR) 150 mg 12 hr tablet    clonazePAM (KLONOPIN) 0 5 mg tablet    dicyclomine (BENTYL) 20 mg tablet    Homeopathic Products (ARNICARE ARTHRITIS PO)    metoclopramide (Reglan) 10 mg tablet    NON FORMULARY    omeprazole (PriLOSEC) 20 mg delayed release capsule    ondansetron (ZOFRAN-ODT) 4 mg disintegrating tablet    oxyCODONE-acetaminophen (Percocet) 5-325 mg per tablet    oxyCODONE-acetaminophen (PERCOCET) 5-325 mg per tablet    pantoprazole (PROTONIX) 20 mg tablet    traMADol (Ultram) 50 mg tablet    aspirin (ECOTRIN LOW STRENGTH) 81 mg EC tablet    SUMAtriptan (Imitrex) 50 mg tablet    Allergies   Allergen Reactions    Rosuvastatin Myalgia           Objective     Blood pressure 120/64, pulse 78, height 5' 10" (1 778 m), weight 90 7 kg (200 lb), SpO2 99 %  Body mass index is 28 7 kg/m²        PHYSICAL EXAM: General Appearance:   Alert, cooperative, no distress   HEENT:   Normocephalic, atraumatic, anicteric      Neck:  Supple, symmetrical, trachea midline   Lungs:   Clear to auscultation bilaterally; no rales, rhonchi or wheezing; respirations unlabored    Heart[de-identified]   Regular rate and rhythm; no murmur, rub, or gallop  Abdomen:   Soft, non-tender, non-distended; normal bowel sounds; no masses, no organomegaly    Genitalia:   Deferred    Rectal:   Deferred    Extremities:  No cyanosis, clubbing or edema    Pulses:  2+ and symmetric    Skin:  No jaundice, rashes, or lesions    Lymph nodes:  No palpable cervical lymphadenopathy        Lab Results:   No visits with results within 1 Day(s) from this visit     Latest known visit with results is:   Admission on 04/23/2022, Discharged on 04/23/2022   Component Date Value    hs TnI 0hr 04/23/2022 <2     WBC 04/23/2022 9 65     RBC 04/23/2022 4 80     Hemoglobin 04/23/2022 14 0     Hematocrit 04/23/2022 42 9     MCV 04/23/2022 89     MCH 04/23/2022 29 2     MCHC 04/23/2022 32 6     RDW 04/23/2022 13 2     MPV 04/23/2022 9 6     Platelets 36/02/4221 359     nRBC 04/23/2022 0     Neutrophils Relative 04/23/2022 82*    Immat GRANS % 04/23/2022 0     Lymphocytes Relative 04/23/2022 11*    Monocytes Relative 04/23/2022 6     Eosinophils Relative 04/23/2022 1     Basophils Relative 04/23/2022 0     Neutrophils Absolute 04/23/2022 7 87*    Immature Grans Absolute 04/23/2022 0 03     Lymphocytes Absolute 04/23/2022 1 04     Monocytes Absolute 04/23/2022 0 54     Eosinophils Absolute 04/23/2022 0 13     Basophils Absolute 04/23/2022 0 04     Sodium 04/23/2022 138     Potassium 04/23/2022 3 8     Chloride 04/23/2022 101     CO2 04/23/2022 28     ANION GAP 04/23/2022 9     BUN 04/23/2022 22     Creatinine 04/23/2022 1 20     Glucose 04/23/2022 153*    Calcium 04/23/2022 9 5     eGFR 04/23/2022 66     Total Bilirubin 04/23/2022 0 29     Bilirubin, Direct 04/23/2022 0 08     Alkaline Phosphatase 04/23/2022 85     AST 04/23/2022 9     ALT 04/23/2022 13     Total Protein 04/23/2022 7 6     Albumin 04/23/2022 3 9     Lipase 04/23/2022 56*    Ventricular Rate 04/23/2022 69     Atrial Rate 04/23/2022 69     NM Interval 04/23/2022 180     QRSD Interval 04/23/2022 86     QT Interval 04/23/2022 384     QTC Interval 04/23/2022 411     P Axis 04/23/2022 69     QRS Axis 04/23/2022 -24     T Wave Axis 04/23/2022 44          Radiology Results:   CT abdomen pelvis with contrast    Result Date: 4/23/2022  Narrative: CT ABDOMEN AND PELVIS WITH IV CONTRAST INDICATION:   Abdominal pain, acute, nonlocalized Recurrent diffuse abdominal [facility]  COMPARISON:  CT 4/21/2022 TECHNIQUE:  CT examination of the abdomen and pelvis was performed  Axial, sagittal, and coronal 2D reformatted images were created from the source data and submitted for interpretation  Radiation dose length product (DLP) for this visit:  739 mGy-cm   This examination, like all CT scans performed in the [facility], was performed utilizing techniques to minimize radiation dose exposure, including the use of iterative reconstruction and automated exposure control  IV Contrast:  100 mL of iohexol (OMNIPAQUE) Enteric Contrast:  Enteric contrast was not administered  FINDINGS: ABDOMEN LOWER CHEST:  No clinically significant abnormality identified in the visualized lower chest  LIVER/BILIARY TREE:  Unremarkable  GALLBLADDER:  No calcified gallstones  No pericholecystic inflammatory change  SPLEEN:  Unremarkable  PANCREAS:  Unremarkable  ADRENAL GLANDS:  Unremarkable  KIDNEYS/URETERS:  Unremarkable  No hydronephrosis  STOMACH AND BOWEL:  There is colonic diverticulosis without evidence of acute diverticulitis  APPENDIX:  A normal appendix was visualized  ABDOMINOPELVIC CAVITY:  No ascites  No pneumoperitoneum  No lymphadenopathy   VESSELS: Unremarkable for patient's age  PELVIS REPRODUCTIVE ORGANS:  Stable  URINARY BLADDER:  Unremarkable  ABDOMINAL WALL/INGUINAL REGIONS:  Unremarkable  OSSEOUS STRUCTURES:  No acute fracture or destructive osseous lesion  Spine degenerative change  Impression: 1  Stable exam   No acute intra-abdominal pathology  Workstation performed: [id number]     CT abdomen pelvis with contrast    Result Date: 4/21/2022  Narrative: CT ABDOMEN AND PELVIS WITH IV CONTRAST INDICATION:   Abdominal pain, acute, nonlocalized Abdominal pain and vomiting since Sunday, tender in upper abdomen and right lower quadrant    "abdominal pain and vomiting since saturday evening, notes sharp pain to area right about umbillicus  " COMPARISON:  CT abdomen pelvis 3/1/2022  TECHNIQUE:  CT examination of the abdomen and pelvis was performed  Axial, sagittal, and coronal 2D reformatted images were created from the source data and submitted for interpretation  Radiation dose length product (DLP) for this visit:  797 mGy-cm   This examination, like all CT scans performed in the [facility], was performed utilizing techniques to minimize radiation dose exposure, including the use of iterative reconstruction and automated exposure control  IV Contrast:  100 mL of iohexol (OMNIPAQUE) Enteric Contrast:  Enteric contrast was not administered  FINDINGS: ABDOMEN LOWER CHEST:  Mild hypoventilatory changes  LIVER/BILIARY TREE:  Unremarkable  GALLBLADDER:  No calcified gallstones  No pericholecystic inflammatory change  SPLEEN:  Unremarkable  PANCREAS:  Unremarkable  ADRENAL GLANDS:  Unremarkable  KIDNEYS/URETERS:  Unremarkable  No hydronephrosis  STOMACH AND BOWEL:  There is colonic diverticulosis without evidence of acute diverticulitis  APPENDIX:  Noninflamed ABDOMINOPELVIC CAVITY:  No ascites  No pneumoperitoneum  No lymphadenopathy  VESSELS:  Unremarkable for patient's age   PELVIS REPRODUCTIVE ORGANS:  Prostamegaly URINARY BLADDER: Unremarkable  ABDOMINAL WALL/INGUINAL REGIONS:  Unremarkable  OSSEOUS STRUCTURES:  No acute fracture or destructive osseous lesion  Impression: No acute inflammatory changes in the abdomen or pelvis   Workstation performed: [id number] No

## 2023-12-01 NOTE — DISCHARGE NOTE PROVIDER - NSDCCPCAREPLAN_GEN_ALL_CORE_FT
PRINCIPAL DISCHARGE DIAGNOSIS  Diagnosis: ALL (acute lymphocytic leukemia)  Assessment and Plan of Treatment: Notify your physician or go to nearest ER if you develop fever greater than 100.4, nausea, vomiting not relieved with medications, profuse diarrhea, chest pain, palpitation or SOB.  If you deveop PUMP MALFUCTIUON please call [phone], if any spills use spill kit and go to nearest ER.     PRINCIPAL DISCHARGE DIAGNOSIS  Diagnosis: ALL (acute lymphocytic leukemia)  Assessment and Plan of Treatment: Notify your physician or go to nearest ER if you develop fever greater than 100.4, nausea, vomiting not relieved with medications, profuse diarrhea, chest pain, palpitation or SOB.  If you deveop PUMP MALFUCTIUON please call [phone], if any spills use spill kit and go to nearest ER.     PRINCIPAL DISCHARGE DIAGNOSIS  Diagnosis: ALL (acute lymphocytic leukemia)  Assessment and Plan of Treatment: Notify your physician or go to nearest ER if you develop fever greater than 100.4, nausea, vomiting not relieved with medications, profuse diarrhea, chest pain, palpitation or SOB.  If you deveop PUMP MALFUCTIUON please call [phone], if any spills use spill kit and go to nearest ER.

## 2023-12-01 NOTE — DISCHARGE NOTE PROVIDER - HOSPITAL COURSE
62 y/o F with PMHx of HTN, HLD with Ph (-) ALL diagnosed in February 2023 and now s/p cycle 1A of miini Hyper-CVAD, 1B ,2A. and 2B. Dose reduced for cycle 1A but full dose for   2A and full dose for B cycles. Bone marrow after cycle 1B with CR and negative MRD as well as post cycle 2B. S/p cycle 3A hyperCVAD with 50% dose reduction to reduce toxicity started on 7/17. Patient had a   delayed count recovery after cycle 3A and a bone marrow biopsy on 9/8/23 showed a CR with same low level of detection of residual   signal below the limit of detection. On day 74, platelets were trending up and not fully recovered, which was consistent with a CRi,. With possible evidence of very low level MRD, patient started on blinatumumab 28 mcg per day x 28 day infusion to clear MRD.  Admitted for cycle 2 Blinanutumab on 12/1, monitored her blood counts daily, transfused, repleted as needed, monitored daily weights maintained strict I/O, tolerating Blina. Patient was discharged home with Blinatumumab with outpatient follow up. 64 y/o F with PMHx of HTN, HLD with Ph (-) ALL diagnosed in February 2023 and now s/p cycle 1A of miini Hyper-CVAD, 1B ,2A. and 2B. Dose reduced for cycle 1A but full dose for   2A and full dose for B cycles. Bone marrow after cycle 1B with CR and negative MRD as well as post cycle 2B. S/p cycle 3A hyperCVAD with 50% dose reduction to reduce toxicity started on 7/17. Patient had a   delayed count recovery after cycle 3A and a bone marrow biopsy on 9/8/23 showed a CR with same low level of detection of residual   signal below the limit of detection. On day 74, platelets were trending up and not fully recovered, which was consistent with a CRi,. With possible evidence of very low level MRD, patient started on blinatumumab 28 mcg per day x 28 day infusion to clear MRD.  Admitted for cycle 2 Blinanutumab on 12/1, monitored her blood counts daily, transfused, repleted as needed, monitored daily weights maintained strict I/O, tolerating Blina. Patient was discharged home with Blinatumumab with outpatient follow up. 64 y/o F with PMHx of HTN, HLD with Ph (-) ALL diagnosed in February 2023 and now s/p cycle 1A of miini Hyper-CVAD, 1B ,2A. and 2B. Dose reduced for cycle 1A but full dose for   2A and full dose for B cycles. Bone marrow after cycle 1B with CR and negative MRD as well as post cycle 2B. S/p cycle 3A hyperCVAD with 50% dose reduction to reduce toxicity started on 7/17. Patient had a   delayed count recovery after cycle 3A and a bone marrow biopsy on 9/8/23 showed a CR with same low level of detection of residual   signal below the limit of detection. On day 74, platelets were trending up and not fully recovered, which was consistent with a CRi,. With possible evidence of very low level MRD, patient started on blinatumumab 28 mcg per day x 28 day infusion to clear MRD.  Admitted for cycle 2 Blinanutumab on 12/1, monitored her blood counts daily, transfused, repleted as needed, monitored daily weights maintained strict I/O, tolerating Blina. Patient developed a faint rash in the anterior chest and left arm on 12/3, she was discharged home with Blinatumumab with outpatient follow up. 62 y/o F with PMHx of HTN, HLD with Ph (-) ALL diagnosed in February 2023 and now s/p cycle 1A of miini Hyper-CVAD, 1B ,2A. and 2B. Dose reduced for cycle 1A but full dose for   2A and full dose for B cycles. Bone marrow after cycle 1B with CR and negative MRD as well as post cycle 2B. S/p cycle 3A hyperCVAD with 50% dose reduction to reduce toxicity started on 7/17. Patient had a   delayed count recovery after cycle 3A and a bone marrow biopsy on 9/8/23 showed a CR with same low level of detection of residual   signal below the limit of detection. On day 74, platelets were trending up and not fully recovered, which was consistent with a CRi,. With possible evidence of very low level MRD, patient started on blinatumumab 28 mcg per day x 28 day infusion to clear MRD.  Admitted for cycle 2 Blinanutumab on 12/1, monitored her blood counts daily, transfused, repleted as needed, monitored daily weights maintained strict I/O, tolerating Blina. Patient developed a faint rash in the anterior chest and left arm on 12/3, she was discharged home with Blinatumumab with outpatient follow up.

## 2023-12-01 NOTE — DISCHARGE NOTE PROVIDER - CARE PROVIDER_API CALL
Goldberg, [place] [address]  Phone: [phone]  Fax: [phone]  Follow Up Time:    Goldberg, [place] [address]  Phone: [phone]  Fax: [phone]  Follow Up Time:    Goldberg, [place] [address]  Phone: [phone]  Fax: [phone]  Follow Up Time:

## 2023-12-01 NOTE — H&P ADULT - HISTORY OF PRESENT ILLNESS
62 y/o F with PMHx of HTN, HLD with Ph (-)   ALL diagnosed in February 2023 and now s/p   cycle 1A of miini Hyper-CVAD, 1B ,2A. and   2B. Dose reduced for cycle 1A but full dose for   2A and full dose for B cycles. Bone marrow  fter cycle 1B with CR and negative MRD as   well as post cycle 2B. S/p cycle 3A   hyperCVAD with 50% dose reduction to   reduce toxicity started on 7/17. Patient had a   delayed count recovery after cycle 3A and   a  bone marrow biopsy on 9/8/23 showed a CR   with same low level of detection of residual   signal below the limit of detection. On day 74,   platelets were trending up and not fully   recovered, which was consistent with a CRi,. With possible evidence of very low level   MRD, patient started on blinatumumab 28   mcg per day x 28 day infusion to clear MRD.  Now admitted for cycle 2   64 y/o F with PMHx of HTN, HLD with Ph (-)   ALL diagnosed in February 2023 and now s/p   cycle 1A of miini Hyper-CVAD, 1B ,2A. and   2B. Dose reduced for cycle 1A but full dose for   2A and full dose for B cycles. Bone marrow  fter cycle 1B with CR and negative MRD as   well as post cycle 2B. S/p cycle 3A   hyperCVAD with 50% dose reduction to   reduce toxicity started on 7/17. Patient had a   delayed count recovery after cycle 3A and   a  bone marrow biopsy on 9/8/23 showed a CR   with same low level of detection of residual   signal below the limit of detection. On day 74,   platelets were trending up and not fully   recovered, which was consistent with a CRi,. With possible evidence of very low level   MRD, patient started on blinatumumab 28   mcg per day x 28 day infusion to clear MRD.  Now admitted for cycle 2 62 y/o F with PMHx of HTN, HLD with Ph (-) ALL diagnosed in February 2023 and now s/p cycle 1A of miini Hyper-CVAD, 1B ,2A. and 2B. Dose reduced for cycle 1A but full dose for   2A and full dose for B cycles. Bone marrow after cycle 1B with CR and negative MRD as well as post cycle 2B. S/p cycle 3A hyperCVAD with 50% dose reduction to reduce toxicity started on 7/17. Patient had a   delayed count recovery after cycle 3A and a bone marrow biopsy on 9/8/23 showed a CR with same low level of detection of residual   signal below the limit of detection. On day 74, platelets were trending up and not fully recovered, which was consistent with a CRi,. With possible evidence of very low level MRD, patient started on blinatumumab 28 mcg per day x 28 day infusion to clear MRD now admitted  for cycle 2 Blinanutumab on 12/1

## 2023-12-01 NOTE — PATIENT PROFILE ADULT - FALL HARM RISK - HARM RISK INTERVENTIONS

## 2023-12-01 NOTE — DISCHARGE NOTE PROVIDER - PROVIDER TOKENS
PROVIDER:[TOKEN:[[id number]]] PROVIDER:[TOKEN:[[id number]]] PROVIDER:[TOKEN:[[id number]]]

## 2023-12-01 NOTE — H&P ADULT - PROBLEM SELECTOR PLAN 1
Admit to [address] [place] [address] for Cycle 2 Blincyto 28 mcg/day continuous infusion for 28 days  Premedications on Day 1 with dexamethasone 20 mg Iv x 1, Tylenol 650 mg PO x1, Pepcid 20 mg IV x 1, Benadryl 50 mg IV x1  S/p Hyper CVAD cycle 1A feb 2023 (reduces dose), 1B (full dose) on 4/3, cycle 2A (full dose) on 5/1, cycle 2B (full dose) on 6/12, and cycle 3A (50% dose reduced) on 7/17    [facility].  Monitor CBC with diff, transfuse as needed.  Monitor electrolytes, replete as needed.  Daily weights, strict I/O.  Anti emetics PRN.  Monitor for CRS/neurotoxicity, monitor for dysgraphia with handwriting checks daily.    Discharge planning for Admit to [address] [place] [address] for Cycle 2 Blincyto 28 mcg/day continuous infusion for 28 days  Premedications on Day 1 with dexamethasone 20 mg Iv x 1, Tylenol 650 mg PO x1, Pepcid 20 mg IV x 1, Benadryl 50 mg IV x1  S/p Hyper CVAD cycle 1A feb 2023 (reduces dose), 1B (full dose) on 4/3, cycle 2A (full dose) on 5/1, cycle 2B (full dose) on 6/12, and cycle 3A (50% dose reduced) on 7/17    [facility].  Monitor CBC with diff, transfuse as needed.  Monitor electrolytes, replete as needed.  Daily weights, strict I/O.  Anti emetics PRN.  Monitor for CRS/neurotoxicity, monitor for dysgraphia with handwriting checks daily.    Discharge planning for Admit to [address] [place] [address] for Cycle 2 Blincyto 28 mcg/day continuous infusion for 28 days  Premedications on Day 1 with dexamethasone 20 mg Iv x 1, Tylenol 650 mg PO x1, Pepcid 20 mg IV x 1, Benadryl 50 mg IV x1  S/p Hyper CVAD cycle 1A feb 2023 (reduces dose), 1B (full dose) on 4/3, cycle 2A (full dose) on 5/1, cycle 2B (full dose) on 6/12, and cycle 3A (50% dose reduced) on 7/17    [facility].  Monitor CBC with diff, transfuse as needed.  Monitor electrolytes, replete as needed.  Daily weights, strict I/O.  Anti emetics PRN.  Monitor for CRS/neurotoxicity, monitor for dysgraphia with handwriting checks daily.    Discharge planning for Admit to [address] [place] [address] for Cycle 2 Blincyto 28 mcg/day continuous infusion for 28 days  Premedications on Day 1 with dexamethasone 20 mg Iv x 1, Tylenol 650 mg PO x1, Pepcid 20 mg IV x 1, Benadryl 50 mg IV x1  S/p Hyper CVAD cycle 1A feb 2023 (reduces dose), 1B (full dose) on 4/3, cycle 2A (full dose) on 5/1, cycle 2B (full dose) on 6/12, and cycle 3A (50% dose reduced) on 7/17  [facility].  Monitor CBC with diff, transfuse as needed.  Monitor electrolytes, replete as needed.  Daily weights, strict I/O.  Anti emetics PRN.  Monitor for CRS/neurotoxicity, monitor for dysgraphia with handwriting checks daily.    Discharge planning for Sunday 12/4 with outpatient follow up. Admit to [address] [place] [address] for Cycle 2 Blincyto 28 mcg/day continuous infusion for 28 days  Premedications on Day 1 with dexamethasone 20 mg Iv x 1, Tylenol 650 mg PO x1, Pepcid 20 mg IV x 1, Benadryl 50 mg IV x1  S/p Hyper CVAD cycle 1A feb 2023 (reduces dose), 1B (full dose) on 4/3, cycle 2A (full dose) on 5/1, cycle 2B (full dose) on 6/12, and cycle 3A (50% dose reduced) on 7/17  [facility].  Monitor CBC with diff, transfuse as needed.  Monitor electrolytes, replete as needed.  Daily weights, strict I/O.  Anti emetics PRN.  Monitor for CRS/neurotoxicity, monitor for dysgraphia with handwriting checks daily.    Discharge planning for Sunday 12/4 with outpatient follow up. Admit to [address] [place] [address] for Cycle 2 Blincyto 28 mcg/day continuous infusion for 28 days  Premedications on Day 1 with dexamethasone 20 mg Iv x 1, Tylenol 650 mg PO x1, Pepcid 20 mg IV x 1, Benadryl 50 mg IV x1  S/p Hyper CVAD cycle 1A feb 2023 (reduces dose), 1B (full dose) on 4/3, cycle 2A (full dose) on 5/1, cycle 2B (full dose) on 6/12, and cycle 3A (50% dose reduced) on 7/17  Access [facility].  Monitor CBC with diff, transfuse as needed.  Monitor electrolytes, replete as needed.  Daily weights, strict I/O.  Anti emetics PRN.  Monitor for CRS/neurotoxicity, monitor for dysgraphia with handwriting checks daily.    Discharge planning for Sunday 12/4 with outpatient follow up.

## 2023-12-01 NOTE — DISCHARGE NOTE PROVIDER - NSDCMRMEDTOKEN_GEN_ALL_CORE_FT
acyclovir 400 mg oral tablet: 1 tab(s) orally 2 times a day, check with outpatient provider regarding when to stop  atovaquone 750 mg/5 mL oral suspension: 10 milliliter(s) orally once a day  blinatumomab 35 mcg intravenous injection: 28 mcg/day intravenous once a day continuous infusion x 28 days (10/12-11/8)  escitalopram 10 mg oral tablet: 1 tab(s) orally once a day  losartan 100 mg oral tablet: 1 tab(s) orally once a day  omeprazole 20 mg oral delayed release capsule: 1 cap(s) orally once a day When methotrexate level is cleared  Peridex 0.12% mucous membrane liquid: 15 milliliter(s) orally 2 times a day swish and spit

## 2023-12-01 NOTE — DISCHARGE NOTE PROVIDER - NSDCFUADDINST_GEN_ALL_CORE_FT
you have appointment for Blincyto bag change, Mediport needle and dressing change on Monday, 12/14 at 4 PM.  You have f/u appointment with Dr. Goldberg on Monday, 12/4 at 3:40 PM. you have appointment for Blincyto bag change, Mediport needle and dressing change on Monday, 12/14 at 4 PM.  Please call on Monday, 12/4 for f/u appointment with Dr. Goldberg.

## 2023-12-01 NOTE — DISCHARGE NOTE PROVIDER - NSDCFUSCHEDAPPT_GEN_ALL_CORE_FT
[facility]  [facility]  Scheduled Appointment: 12/04/2023    [facility]  [facility]  Scheduled Appointment: 12/11/2023    [facility]  [facility]  Scheduled Appointment: 12/18/2023    [facility]  [facility]  Scheduled Appointment: 12/19/2023    [facility]  [facility]  Scheduled Appointment: 12/26/2023    [facility]  [facility]  Scheduled Appointment: 12/27/2023    [facility]  [facility]  Scheduled Appointment: 12/29/2023     [facility]  [facility]  Scheduled Appointment: 12/04/2023    [facility]  [facility]  Scheduled Appointment: 12/11/2023    [facility]  [facility]  Scheduled Appointment: 12/18/2023    [facility]  [facility]  Scheduled Appointment: 12/19/2023    [facility]  [facility]  Scheduled Appointment: 12/26/2023    [facility]  [facility]  Scheduled Appointment: 12/27/2023    [facility]  [facility]  Scheduled Appointment: 12/29/2023     [facility]  [facility]  Scheduled Appointment: 12/04/2023    [facility]  [facility]  Scheduled Appointment: 12/11/2023    [facility]  [facility]  Scheduled Appointment: 12/18/2023    [facility]  [facility]  Scheduled Appointment: 12/19/2023    [facility]  [facility]  Scheduled Appointment: 12/26/2023    [facility]  [facility]  Scheduled Appointment: 12/27/2023    [facility]  [facility]  Scheduled Appointment: 12/29/2023     Goldberg, Bradley  [facility]  [facility]  Scheduled Appointment: 12/04/2023    [facility]  [facility]  Scheduled Appointment: 12/04/2023    [facility]  Scheduled Appointment: 12/11/2023    [facility]  [facility]  Scheduled Appointment: 12/18/2023    [facility]  Scheduled Appointment: 12/19/2023    [facility]  [facility]  Scheduled Appointment: 12/26/2023    [facility]  Scheduled Appointment: 12/27/2023    [facility]  Scheduled Appointment: 12/29/2023     Goldberg, Bradley  [facility]  [facility]  Scheduled Appointment: 12/04/2023    [facility]  [facility]  Scheduled Appointment: 12/04/2023    [facility]  Scheduled Appointment: 12/11/2023    [facility]  [facility]  Scheduled Appointment: 12/18/2023    [facility]  Scheduled Appointment: 12/19/2023    [facility]  [facility]  Scheduled Appointment: 12/26/2023    [facility]  Scheduled Appointment: 12/27/2023    [facility]  Scheduled Appointment: 12/29/2023     Goldberg, Bradley  [facility]  [facility]  Scheduled Appointment: 12/04/2023    [facility]  [facility]  Scheduled Appointment: 12/04/2023    [facility]  Scheduled Appointment: 12/11/2023    [facility]  [facility]  Scheduled Appointment: 12/18/2023    [facility]  Scheduled Appointment: 12/19/2023    [facility]  [facility]  Scheduled Appointment: 12/26/2023    [facility]  Scheduled Appointment: 12/27/2023    [facility]  Scheduled Appointment: 12/29/2023

## 2023-12-01 NOTE — H&P ADULT - ASSESSMENT
64 y/o F with PMHx of HTN, HLD with Ph (-)   ALL diagnosed in February 2023 and now s/p   cycle 1A of miini Hyper-CVAD, 1B ,2A. and   2B. Dose reduced for cycle 1A but full dose for   2A and full dose for B cycles. Bone marrow  fter cycle 1B with CR and negative MRD as   well as post cycle 2B. S/p cycle 3A   hyperCVAD with 50% dose reduction to   reduce toxicity started on 7/17. Patient had a   delayed count recovery after cycle 3A and   a  bone marrow biopsy on 9/8/23 showed a CR   with same low level of detection of residual   signal below the limit of detection. On day 74,   platelets were trending up and not fully    62 y/o F with PMHx of HTN, HLD with Ph (-)   ALL diagnosed in February 2023 and now s/p   cycle 1A of miini Hyper-CVAD, 1B ,2A. and   2B. Dose reduced for cycle 1A but full dose for   2A and full dose for B cycles. Bone marrow  fter cycle 1B with CR and negative MRD as   well as post cycle 2B. S/p cycle 3A   hyperCVAD with 50% dose reduction to   reduce toxicity started on 7/17. Patient had a   delayed count recovery after cycle 3A and   a  bone marrow biopsy on 9/8/23 showed a CR   with same low level of detection of residual   signal below the limit of detection. On day 74,   platelets were trending up and not fully  62 y/o F with PMHx of HTN, HLD with Ph (-) ALL diagnosed in February 2023 and now s/p cycle 1A of miini Hyper-CVAD, 1B ,2A. and 2B. Dose reduced for cycle 1A but full dose for   2A and full dose for B cycles. Bone marrow after cycle 1B with CR and negative MRD as well as post cycle 2B. S/p cycle 3A hyperCVAD with 50% dose reduction to reduce toxicity started on 7/17. Patient had a   delayed count recovery after cycle 3A and a bone marrow biopsy on 9/8/23 showed a CR with same low level of detection of residual   signal below the limit of detection. On day 74, platelets were trending up and not fully recovered, which was consistent with a CRi,. With possible evidence of very low level MRD, patient started on blinatumumab 28 mcg per day x 28 day infusion to clear MRD now admitted  for cycle 2 Blinanutumab on 12/1  64 y/o F with PMHx of HTN, HLD with Ph (-) ALL diagnosed in February 2023 and now s/p cycle 1A of miini Hyper-CVAD, 1B ,2A. and 2B. Dose reduced for cycle 1A but full dose for   2A and full dose for B cycles. Bone marrow after cycle 1B with CR and negative MRD as well as post cycle 2B. S/p cycle 3A hyperCVAD with 50% dose reduction to reduce toxicity started on 7/17. Patient had a   delayed count recovery after cycle 3A and a bone marrow biopsy on 9/8/23 showed a CR with same low level of detection of residual   signal below the limit of detection. On day 74, platelets were trending up and not fully recovered, which was consistent with a CRi,. With possible evidence of very low level MRD, patient started on blinatumumab 28 mcg per day x 28 day infusion to clear MRD now admitted  for cycle 2 Blinanutumab on 12/1

## 2023-12-01 NOTE — DISCHARGE NOTE PROVIDER - NSDCFUADDAPPT_GEN_ALL_CORE_FT
you have appointment for Blincyto bag change on Monday, 12/14 at 4 PM.  You have f/u appointment with  _________. you have appointment for Blincyto bag change, Mediport needle and dressing change on Monday, 12/14 at 4 PM.  You have f/u appointment with Dr. Goldberg on Monday, 12/4 at 3:40 PM. you have appointment for Blincyto bag change, Mediport needle and dressing change on Monday, 12/14 at 4 PM.  Please call on Monday, 12/4 for f/u appointment with Dr. Goldberg.

## 2023-12-01 NOTE — PATIENT PROFILE ADULT - FUNCTIONAL ASSESSMENT - BASIC MOBILITY 6.
4-calculated by average/Not able to assess (calculate score using [facility] averaging method)

## 2023-12-02 LAB
ALBUMIN SERPL ELPH-MCNC: 4.4 G/DL — SIGNIFICANT CHANGE UP (ref 3.3–5)
ALBUMIN SERPL ELPH-MCNC: 4.4 G/DL — SIGNIFICANT CHANGE UP (ref 3.3–5)
ALP SERPL-CCNC: 115 U/L — SIGNIFICANT CHANGE UP (ref 40–120)
ALP SERPL-CCNC: 115 U/L — SIGNIFICANT CHANGE UP (ref 40–120)
ALT FLD-CCNC: 37 U/L — SIGNIFICANT CHANGE UP (ref 10–45)
ALT FLD-CCNC: 37 U/L — SIGNIFICANT CHANGE UP (ref 10–45)
ANION GAP SERPL CALC-SCNC: 15 MMOL/L — SIGNIFICANT CHANGE UP (ref 5–17)
ANION GAP SERPL CALC-SCNC: 15 MMOL/L — SIGNIFICANT CHANGE UP (ref 5–17)
AST SERPL-CCNC: 37 U/L — SIGNIFICANT CHANGE UP (ref 10–40)
AST SERPL-CCNC: 37 U/L — SIGNIFICANT CHANGE UP (ref 10–40)
BASOPHILS # BLD AUTO: 0 K/UL — SIGNIFICANT CHANGE UP (ref 0–0.2)
BASOPHILS # BLD AUTO: 0 K/UL — SIGNIFICANT CHANGE UP (ref 0–0.2)
BASOPHILS NFR BLD AUTO: 0 % — SIGNIFICANT CHANGE UP (ref 0–2)
BASOPHILS NFR BLD AUTO: 0 % — SIGNIFICANT CHANGE UP (ref 0–2)
BILIRUB SERPL-MCNC: 0.3 MG/DL — SIGNIFICANT CHANGE UP (ref 0.2–1.2)
BILIRUB SERPL-MCNC: 0.3 MG/DL — SIGNIFICANT CHANGE UP (ref 0.2–1.2)
BUN SERPL-MCNC: 13 MG/DL — SIGNIFICANT CHANGE UP (ref 7–23)
BUN SERPL-MCNC: 13 MG/DL — SIGNIFICANT CHANGE UP (ref 7–23)
CALCIUM SERPL-MCNC: 9.8 MG/DL — SIGNIFICANT CHANGE UP (ref 8.4–10.5)
CALCIUM SERPL-MCNC: 9.8 MG/DL — SIGNIFICANT CHANGE UP (ref 8.4–10.5)
CHLORIDE SERPL-SCNC: 103 MMOL/L — SIGNIFICANT CHANGE UP (ref 96–108)
CHLORIDE SERPL-SCNC: 103 MMOL/L — SIGNIFICANT CHANGE UP (ref 96–108)
CO2 SERPL-SCNC: 20 MMOL/L — LOW (ref 22–31)
CO2 SERPL-SCNC: 20 MMOL/L — LOW (ref 22–31)
CREAT SERPL-MCNC: 0.73 MG/DL — SIGNIFICANT CHANGE UP (ref 0.5–1.3)
CREAT SERPL-MCNC: 0.73 MG/DL — SIGNIFICANT CHANGE UP (ref 0.5–1.3)
EGFR: 91 ML/MIN/1.73M2 — SIGNIFICANT CHANGE UP
EGFR: 91 ML/MIN/1.73M2 — SIGNIFICANT CHANGE UP
EOSINOPHIL # BLD AUTO: 0 K/UL — SIGNIFICANT CHANGE UP (ref 0–0.5)
EOSINOPHIL # BLD AUTO: 0 K/UL — SIGNIFICANT CHANGE UP (ref 0–0.5)
EOSINOPHIL NFR BLD AUTO: 0 % — SIGNIFICANT CHANGE UP (ref 0–6)
EOSINOPHIL NFR BLD AUTO: 0 % — SIGNIFICANT CHANGE UP (ref 0–6)
GIANT PLATELETS BLD QL SMEAR: PRESENT — SIGNIFICANT CHANGE UP
GIANT PLATELETS BLD QL SMEAR: PRESENT — SIGNIFICANT CHANGE UP
GLUCOSE SERPL-MCNC: 210 MG/DL — HIGH (ref 70–99)
GLUCOSE SERPL-MCNC: 210 MG/DL — HIGH (ref 70–99)
HCT VFR BLD CALC: 40.5 % — SIGNIFICANT CHANGE UP (ref 34.5–45)
HCT VFR BLD CALC: 40.5 % — SIGNIFICANT CHANGE UP (ref 34.5–45)
HGB BLD-MCNC: 13.7 G/DL — SIGNIFICANT CHANGE UP (ref 11.5–15.5)
HGB BLD-MCNC: 13.7 G/DL — SIGNIFICANT CHANGE UP (ref 11.5–15.5)
INR BLD: 1.04 RATIO — SIGNIFICANT CHANGE UP (ref 0.85–1.18)
INR BLD: 1.04 RATIO — SIGNIFICANT CHANGE UP (ref 0.85–1.18)
LDH SERPL L TO P-CCNC: 356 U/L — HIGH (ref 50–242)
LDH SERPL L TO P-CCNC: 356 U/L — HIGH (ref 50–242)
LYMPHOCYTES # BLD AUTO: 0.46 K/UL — LOW (ref 1–3.3)
LYMPHOCYTES # BLD AUTO: 0.46 K/UL — LOW (ref 1–3.3)
LYMPHOCYTES # BLD AUTO: 7 % — LOW (ref 13–44)
LYMPHOCYTES # BLD AUTO: 7 % — LOW (ref 13–44)
MAGNESIUM SERPL-MCNC: 1.9 MG/DL — SIGNIFICANT CHANGE UP (ref 1.6–2.6)
MAGNESIUM SERPL-MCNC: 1.9 MG/DL — SIGNIFICANT CHANGE UP (ref 1.6–2.6)
MANUAL SMEAR VERIFICATION: SIGNIFICANT CHANGE UP
MANUAL SMEAR VERIFICATION: SIGNIFICANT CHANGE UP
MCHC RBC-ENTMCNC: 33.7 PG — SIGNIFICANT CHANGE UP (ref 27–34)
MCHC RBC-ENTMCNC: 33.7 PG — SIGNIFICANT CHANGE UP (ref 27–34)
MCHC RBC-ENTMCNC: 33.8 GM/DL — SIGNIFICANT CHANGE UP (ref 32–36)
MCHC RBC-ENTMCNC: 33.8 GM/DL — SIGNIFICANT CHANGE UP (ref 32–36)
MCV RBC AUTO: 99.5 FL — SIGNIFICANT CHANGE UP (ref 80–100)
MCV RBC AUTO: 99.5 FL — SIGNIFICANT CHANGE UP (ref 80–100)
MONOCYTES # BLD AUTO: 0 K/UL — SIGNIFICANT CHANGE UP (ref 0–0.9)
MONOCYTES # BLD AUTO: 0 K/UL — SIGNIFICANT CHANGE UP (ref 0–0.9)
MONOCYTES NFR BLD AUTO: 0 % — LOW (ref 2–14)
MONOCYTES NFR BLD AUTO: 0 % — LOW (ref 2–14)
NEUTROPHILS # BLD AUTO: 6.09 K/UL — SIGNIFICANT CHANGE UP (ref 1.8–7.4)
NEUTROPHILS # BLD AUTO: 6.09 K/UL — SIGNIFICANT CHANGE UP (ref 1.8–7.4)
NEUTROPHILS NFR BLD AUTO: 90.4 % — HIGH (ref 43–77)
NEUTROPHILS NFR BLD AUTO: 90.4 % — HIGH (ref 43–77)
NEUTS BAND # BLD: 1.7 % — SIGNIFICANT CHANGE UP (ref 0–8)
NEUTS BAND # BLD: 1.7 % — SIGNIFICANT CHANGE UP (ref 0–8)
PHOSPHATE SERPL-MCNC: 2.5 MG/DL — SIGNIFICANT CHANGE UP (ref 2.5–4.5)
PHOSPHATE SERPL-MCNC: 2.5 MG/DL — SIGNIFICANT CHANGE UP (ref 2.5–4.5)
PLAT MORPH BLD: NORMAL — SIGNIFICANT CHANGE UP
PLAT MORPH BLD: NORMAL — SIGNIFICANT CHANGE UP
PLATELET # BLD AUTO: 151 K/UL — SIGNIFICANT CHANGE UP (ref 150–400)
PLATELET # BLD AUTO: 151 K/UL — SIGNIFICANT CHANGE UP (ref 150–400)
POTASSIUM SERPL-MCNC: 3.7 MMOL/L — SIGNIFICANT CHANGE UP (ref 3.5–5.3)
POTASSIUM SERPL-MCNC: 3.7 MMOL/L — SIGNIFICANT CHANGE UP (ref 3.5–5.3)
POTASSIUM SERPL-SCNC: 3.7 MMOL/L — SIGNIFICANT CHANGE UP (ref 3.5–5.3)
POTASSIUM SERPL-SCNC: 3.7 MMOL/L — SIGNIFICANT CHANGE UP (ref 3.5–5.3)
PROT SERPL-MCNC: 6.5 G/DL — SIGNIFICANT CHANGE UP (ref 6–8.3)
PROT SERPL-MCNC: 6.5 G/DL — SIGNIFICANT CHANGE UP (ref 6–8.3)
PROTHROM AB SERPL-ACNC: 10.9 SEC — SIGNIFICANT CHANGE UP (ref 9.5–13)
PROTHROM AB SERPL-ACNC: 10.9 SEC — SIGNIFICANT CHANGE UP (ref 9.5–13)
RBC # BLD: 4.07 M/UL — SIGNIFICANT CHANGE UP (ref 3.8–5.2)
RBC # BLD: 4.07 M/UL — SIGNIFICANT CHANGE UP (ref 3.8–5.2)
RBC # FLD: 12.1 % — SIGNIFICANT CHANGE UP (ref 10.3–14.5)
RBC # FLD: 12.1 % — SIGNIFICANT CHANGE UP (ref 10.3–14.5)
RBC BLD AUTO: SIGNIFICANT CHANGE UP
RBC BLD AUTO: SIGNIFICANT CHANGE UP
SODIUM SERPL-SCNC: 138 MMOL/L — SIGNIFICANT CHANGE UP (ref 135–145)
SODIUM SERPL-SCNC: 138 MMOL/L — SIGNIFICANT CHANGE UP (ref 135–145)
URATE SERPL-MCNC: 5.3 MG/DL — SIGNIFICANT CHANGE UP (ref 2.5–7)
URATE SERPL-MCNC: 5.3 MG/DL — SIGNIFICANT CHANGE UP (ref 2.5–7)
VARIANT LYMPHS # BLD: 0.9 % — SIGNIFICANT CHANGE UP (ref 0–6)
VARIANT LYMPHS # BLD: 0.9 % — SIGNIFICANT CHANGE UP (ref 0–6)
WBC # BLD: 6.61 K/UL — SIGNIFICANT CHANGE UP (ref 3.8–10.5)
WBC # BLD: 6.61 K/UL — SIGNIFICANT CHANGE UP (ref 3.8–10.5)
WBC # FLD AUTO: 6.61 K/UL — SIGNIFICANT CHANGE UP (ref 3.8–10.5)
WBC # FLD AUTO: 6.61 K/UL — SIGNIFICANT CHANGE UP (ref 3.8–10.5)

## 2023-12-02 PROCEDURE — 99232 SBSQ HOSP IP/OBS MODERATE 35: CPT | Mod: FS

## 2023-12-02 RX ORDER — ACETAMINOPHEN 500 MG
650 TABLET ORAL EVERY 6 HOURS
Refills: 0 | Status: DISCONTINUED | OUTPATIENT
Start: 2023-12-02 | End: 2023-12-03

## 2023-12-02 RX ADMIN — PANTOPRAZOLE SODIUM 40 MILLIGRAM(S): 20 TABLET, DELAYED RELEASE ORAL at 08:15

## 2023-12-02 RX ADMIN — Medication 650 MILLIGRAM(S): at 18:01

## 2023-12-02 RX ADMIN — CHLORHEXIDINE GLUCONATE 15 MILLILITER(S): 213 SOLUTION TOPICAL at 05:46

## 2023-12-02 RX ADMIN — Medication 400 MILLIGRAM(S): at 17:16

## 2023-12-02 RX ADMIN — BLINATUMOMAB 32.5 MICROGRAM(S): KIT INTRAVENOUS at 16:36

## 2023-12-02 RX ADMIN — SODIUM CHLORIDE 20 MILLILITER(S): 9 INJECTION INTRAMUSCULAR; INTRAVENOUS; SUBCUTANEOUS at 20:48

## 2023-12-02 RX ADMIN — ESCITALOPRAM OXALATE 10 MILLIGRAM(S): 10 TABLET, FILM COATED ORAL at 11:01

## 2023-12-02 RX ADMIN — ATOVAQUONE 1500 MILLIGRAM(S): 750 SUSPENSION ORAL at 11:01

## 2023-12-02 RX ADMIN — CHLORHEXIDINE GLUCONATE 1 APPLICATION(S): 213 SOLUTION TOPICAL at 11:02

## 2023-12-02 RX ADMIN — Medication 650 MILLIGRAM(S): at 16:47

## 2023-12-02 RX ADMIN — LOSARTAN POTASSIUM 100 MILLIGRAM(S): 100 TABLET, FILM COATED ORAL at 05:46

## 2023-12-02 RX ADMIN — CHLORHEXIDINE GLUCONATE 15 MILLILITER(S): 213 SOLUTION TOPICAL at 17:16

## 2023-12-02 RX ADMIN — Medication 400 MILLIGRAM(S): at 05:46

## 2023-12-02 RX ADMIN — ENOXAPARIN SODIUM 40 MILLIGRAM(S): 100 INJECTION SUBCUTANEOUS at 20:49

## 2023-12-02 NOTE — ADVANCED PRACTICE NURSE CONSULT - RECOMMEDATIONS
Sign posted:No flushing,No bloodrawing no disconnection. Patient aware. Safety maintained.    
 Sign posted:No flushing,No bloodrawing no disconnection. Patient aware. Safety maintained.

## 2023-12-02 NOTE — PROGRESS NOTE ADULT - NS ATTEND AMEND GEN_ALL_CORE FT
.    Primary:    Vital Signs Last 24 Hrs  T(C): 36.7 (02 Dec 2023 09:00), Max: 36.8 (01 Dec 2023 17:36)  T(F): 98 (02 Dec 2023 09:00), Max: 98.2 (01 Dec 2023 17:36)  HR: 90 (02 Dec 2023 09:00) (64 - 92)  BP: 177/83 (02 Dec 2023 09:00) (135/77 - 177/83)  BP(mean): --  RR: 18 (02 Dec 2023 09:00) (17 - 18)  SpO2: 97% (02 Dec 2023 09:00) (93% - 100%)    Parameters below as of 02 Dec 2023 09:00  Patient On (Oxygen Delivery Method): room air    MEDICATIONS  (STANDING):  acyclovir   Oral Tab/Cap 400 milliGRAM(s) Oral two times a day  atovaquone  Suspension 1500 milliGRAM(s) Oral <User Schedule>  blinatumomab IVPB (eMAR) 32.5 MICROGram(s) (10 mL/Hr) IV Continuous <Continuous>  chlorhexidine 0.12% Liquid 15 milliLiter(s) Oral Mucosa two times a day  chlorhexidine 4% Liquid 1 Application(s) Topical <User Schedule>  enoxaparin Injectable 40 milliGRAM(s) SubCutaneous <User Schedule>  escitalopram 10 milliGRAM(s) Oral daily  losartan 100 milliGRAM(s) Oral daily  pantoprazole    Tablet 40 milliGRAM(s) Oral before breakfast  sodium chloride 0.9%. 1000 milliLiter(s) (20 mL/Hr) IV Continuous <Continuous>    Assessment: 63 year old day 5 cycle 1 blincyto for Ph- ALL.      PMHx: HTN, HLD    Heme:  Admitted  for Cycle 1 Blincyto 28 mcg/day continuous infusion for 28 days  Premedications on Day 1 with dexamethasone 20 mg Iv x 1, Tylenol 650 mg PO x1, Pepcid 20 mg IV x 1, Benadryl 50 mg IV x1  S/p Hyper CVAD cycle 1A feb 2023 (reduces dose), 1B (full dose) on 4/3, cycle 2A (full dose) on 5/1, cycle 2B (full dose) on 6/12, and cycle 3A (50% dose reduced) on 7/17  Monitor for CRS/neurotoxicity, monitor for dysgraphia with handwriting checks daily.    Discharge planning for 10/15 / day 4.    Infectious disease.   Patient is not neutropenic, afebrile    Continue acyclovir and mepron   Currently off levaquin and fluconazole    If febrile, panculture.    HTN (hypertension).   Continue home losartan.    GERD (gastroesophageal reflux disease).   On omeprazole at home.  Continue pantoprazole therapeutic interchange while inpatient.    Anxiety and depression.   Continue home lexapro.    Prophylactic measure.   VTE prophylaxis: ambulation alone    Dispo:  Discharge home today with bag change tomorrow at 3 pm .    Primary:    Vital Signs Last 24 Hrs  T(C): 36.7 (02 Dec 2023 09:00), Max: 36.8 (01 Dec 2023 17:36)  T(F): 98 (02 Dec 2023 09:00), Max: 98.2 (01 Dec 2023 17:36)  HR: 90 (02 Dec 2023 09:00) (64 - 92)  BP: 177/83 (02 Dec 2023 09:00) (135/77 - 177/83)  BP(mean): --  RR: 18 (02 Dec 2023 09:00) (17 - 18)  SpO2: 97% (02 Dec 2023 09:00) (93% - 100%)    Parameters below as of 02 Dec 2023 09:00  Patient On (Oxygen Delivery Method): room air    MEDICATIONS  (STANDING):  acyclovir   Oral Tab/Cap 400 milliGRAM(s) Oral two times a day  atovaquone  Suspension 1500 milliGRAM(s) Oral <User Schedule>  blinatumomab IVPB (eMAR) 32.5 MICROGram(s) (10 mL/Hr) IV Continuous <Continuous>  chlorhexidine 0.12% Liquid 15 milliLiter(s) Oral Mucosa two times a day  chlorhexidine 4% Liquid 1 Application(s) Topical <User Schedule>  enoxaparin Injectable 40 milliGRAM(s) SubCutaneous <User Schedule>  escitalopram 10 milliGRAM(s) Oral daily  losartan 100 milliGRAM(s) Oral daily  pantoprazole    Tablet 40 milliGRAM(s) Oral before breakfast  sodium chloride 0.9%. 1000 milliLiter(s) (20 mL/Hr) IV Continuous <Continuous>    Assessment: 63 year old day 3 cycle 1 blincyto for Ph- ALL.      PMHx: HTN, HLD    Heme:  Continue immuno therapy    ID: [email or web address]    DVT prophylaxis: LWHx    Over 25 minutes were spent in direct patient care and care coordination.  Anticipated discharge tomorrow.

## 2023-12-02 NOTE — PROGRESS NOTE ADULT - PROBLEM SELECTOR PLAN 1
Cycle 2 Blincyto 28 mcg/day continuous infusion for 28 days  Premedications on Day 1 with dexamethasone 20 mg Iv x 1, Tylenol 650 mg PO x1, Pepcid 20 mg IV x 1, Benadryl 50 mg IV x1  S/p Hyper CVAD cycle 1A feb 2023 (reduces dose), 1B (full dose) on 4/3, cycle 2A (full dose) on 5/1, cycle 2B (full dose) on 6/12, and cycle 3A (50% dose reduced) on 7/17  Access [facility].  Monitor CBC with diff, transfuse as needed.  Monitor electrolytes, replete as needed.  Daily weights, strict I/O.  Anti emetics PRN.  Monitor for CRS/neurotoxicity, monitor for dysgraphia with handwriting checks daily.    Discharge planning for Sunday 12/4 with outpatient follow up.

## 2023-12-02 NOTE — PROGRESS NOTE ADULT - SUBJECTIVE AND OBJECTIVE BOX
Diagnosis:    Protocol/Chemo Regimen:    Day:     Pt endorsed:    Review of Systems:     Pain scale:     Diet:     Allergies    sulfa drugs (Unknown)  Zofran (Headache)    Intolerances        ANTIMICROBIALS  acyclovir   Oral Tab/Cap 400 milliGRAM(s) Oral two times a day  atovaquone  Suspension 1500 milliGRAM(s) Oral <User Schedule>      HEME/ONC MEDICATIONS  blinatumomab IVPB (eMAR) 32.5 MICROGram(s) IV Continuous <Continuous>  enoxaparin Injectable 40 milliGRAM(s) SubCutaneous <User Schedule>      STANDING MEDICATIONS  chlorhexidine 0.12% Liquid 15 milliLiter(s) Oral Mucosa two times a day  chlorhexidine 4% Liquid 1 Application(s) Topical <User Schedule>  escitalopram 10 milliGRAM(s) Oral daily  losartan 100 milliGRAM(s) Oral daily  pantoprazole    Tablet 40 milliGRAM(s) Oral before breakfast  sodium chloride 0.9%. 1000 milliLiter(s) IV Continuous <Continuous>      PRN MEDICATIONS  aluminum hydroxide/magnesium hydroxide/simethicone Suspension 30 milliLiter(s) Oral every 4 hours PRN        Vital Signs Last 24 Hrs  T(C): 36.7 (02 Dec 2023 09:00), Max: 36.8 (01 Dec 2023 17:36)  T(F): 98 (02 Dec 2023 09:00), Max: 98.2 (01 Dec 2023 17:36)  HR: 90 (02 Dec 2023 09:00) (64 - 92)  BP: 177/83 (02 Dec 2023 09:00) (144/85 - 177/83)  BP(mean): --  RR: 18 (02 Dec 2023 09:00) (17 - 18)  SpO2: 97% (02 Dec 2023 09:00) (93% - 100%)    Parameters below as of 02 Dec 2023 09:00  Patient On (Oxygen Delivery Method): room air        PHYSICAL EXAM  General: NAD  HEENT: PERRLA, EOMOI, clear oropharynx, anicteric sclera, pink conjunctiva  Neck: supple  CV: (+) S1/S2 RRR  Lungs: clear to auscultation, no wheezes or rales  Abdomen: soft, non-tender, non-distended (+) BS  Ext: no clubbing, cyanosis or edema  Skin: no rashes and no petechiae  Neuro: alert and oriented X 3, no focal deficits  Central Line:     RECENT CULTURES:        LABS:                        13.7   6.61  )-----------( 151      ( 02 Dec 2023 07:28 )             40.5         Mean Cell Volume : 99.5 fl  Mean Cell Hemoglobin : 33.7 pg  Mean Cell Hemoglobin Concentration : 33.8 gm/dL  Auto Neutrophil # : 6.09 K/uL  Auto Lymphocyte # : 0.46 K/uL  Auto Monocyte # : 0.00 K/uL  Auto Eosinophil # : 0.00 K/uL  Auto Basophil # : 0.00 K/uL  Auto Neutrophil % : 90.4 %  Auto Lymphocyte % : 7.0 %  Auto Monocyte % : 0.0 %  Auto Eosinophil % : 0.0 %  Auto Basophil % : 0.0 %      12-02    138  |  103  |  13  ----------------------------<  210<H>  3.7   |  20<L>  |  0.73    Ca    9.8      02 Dec 2023 07:26  Phos  2.5     12-02  Mg     1.9     12-02    TPro  6.5  /  Alb  4.4  /  TBili  0.3  /  DBili  x   /  AST  37  /  ALT  37  /  AlkPhos  115  12-02      Mg 1.9  Phos 2.5      PT/INR - ( 02 Dec 2023 07:28 )   PT: 10.9 sec;   INR: 1.04 ratio               Uric Acid 5.3        RADIOLOGY & ADDITIONAL STUDIES:         Diagnosis: B ALL pH(-)    Protocol/Chemo Regimen: Cycle #2 Blinanutumumab    Day: 2    Pt endorsed: Feels well    Review of Systems: Denies nausea, vomiting, diarrhea, chest pain, palpitation, SOB or abdominal pain.    Pain scale: Denies    Diet: Regular    Allergies: sulfa drugs (Unknown)  Zofran (Headache)    ANTIMICROBIALS  acyclovir   Oral Tab/Cap 400 milliGRAM(s) Oral two times a day  atovaquone  Suspension 1500 milliGRAM(s) Oral <User Schedule>    HEME/ONC MEDICATIONS  blinatumomab IVPB (eMAR) 32.5 MICROGram(s) IV Continuous <Continuous>  enoxaparin Injectable 40 milliGRAM(s) SubCutaneous <User Schedule>    STANDING MEDICATIONS  chlorhexidine 0.12% Liquid 15 milliLiter(s) Oral Mucosa two times a day  chlorhexidine 4% Liquid 1 Application(s) Topical <User Schedule>  escitalopram 10 milliGRAM(s) Oral daily  losartan 100 milliGRAM(s) Oral daily  pantoprazole    Tablet 40 milliGRAM(s) Oral before breakfast  sodium chloride 0.9%. 1000 milliLiter(s) IV Continuous <Continuous>    PRN MEDICATIONS  aluminum hydroxide/magnesium hydroxide/simethicone Suspension 30 milliLiter(s) Oral every 4 hours PRN    Vital Signs Last 24 Hrs  T(C): 36.7 (02 Dec 2023 09:00), Max: 36.8 (01 Dec 2023 17:36)  T(F): 98 (02 Dec 2023 09:00), Max: 98.2 (01 Dec 2023 17:36)  HR: 90 (02 Dec 2023 09:00) (64 - 92)  BP: 177/83 (02 Dec 2023 09:00) (144/85 - 177/83)  BP(mean): --  RR: 18 (02 Dec 2023 09:00) (17 - 18)  SpO2: 97% (02 Dec 2023 09:00) (93% - 100%)    Parameters below as of 02 Dec 2023 09:00  Patient On (Oxygen Delivery Method): room air    PHYSICAL EXAM  General: NAD  HEENT: PERRLA, EOMOI, clear oropharynx  CV: (+) S1/S2 RRR  Lungs: clear to auscultation, no wheezes or rales  Abdomen: soft, non-tender, non-distended (+) BS  Ext: no clubbing, cyanosis or edema  Skin: no rashes and no petechiae  Neuro: alert and oriented X 3, no focal deficits  Central Line: RCW mediport, CDI    RECENT CULTURES:  NA    LABS:                        13.7   6.61  )-----------( 151      ( 02 Dec 2023 07:28 )             40.5     Mean Cell Volume : 99.5 fl  Mean Cell Hemoglobin : 33.7 pg  Mean Cell Hemoglobin Concentration : 33.8 gm/dL  Auto Neutrophil # : 6.09 K/uL  Auto Lymphocyte # : 0.46 K/uL  Auto Monocyte # : 0.00 K/uL  Auto Eosinophil # : 0.00 K/uL  Auto Basophil # : 0.00 K/uL  Auto Neutrophil % : 90.4 %  Auto Lymphocyte % : 7.0 %  Auto Monocyte % : 0.0 %  Auto Eosinophil % : 0.0 %  Auto Basophil % : 0.0 %      12-02    138  |  103  |  13  ----------------------------<  210<H>  3.7   |  20<L>  |  0.73    Ca    9.8      02 Dec 2023 07:26  Phos  2.5     12-02  Mg     1.9     12-02    TPro  6.5  /  Alb  4.4  /  TBili  0.3  /  DBili  x   /  AST  37  /  ALT  37  /  AlkPhos  115  12-02    Mg 1.9  Phos 2.5    PT/INR - ( 02 Dec 2023 07:28 )   PT: 10.9 sec;   INR: 1.04 ratio      Uric Acid 5.3    RADIOLOGY & ADDITIONAL STUDIES:  NA

## 2023-12-03 ENCOUNTER — TRANSCRIPTION ENCOUNTER (OUTPATIENT)
Age: 63
End: 2023-12-03

## 2023-12-03 VITALS
WEIGHT: 205.03 LBS | SYSTOLIC BLOOD PRESSURE: 153 MMHG | DIASTOLIC BLOOD PRESSURE: 72 MMHG | HEART RATE: 62 BPM | TEMPERATURE: 97 F | OXYGEN SATURATION: 99 % | RESPIRATION RATE: 18 BRPM

## 2023-12-03 LAB
ALBUMIN SERPL ELPH-MCNC: 4.1 G/DL — SIGNIFICANT CHANGE UP (ref 3.3–5)
ALBUMIN SERPL ELPH-MCNC: 4.1 G/DL — SIGNIFICANT CHANGE UP (ref 3.3–5)
ALP SERPL-CCNC: 99 U/L — SIGNIFICANT CHANGE UP (ref 40–120)
ALP SERPL-CCNC: 99 U/L — SIGNIFICANT CHANGE UP (ref 40–120)
ALT FLD-CCNC: 39 U/L — SIGNIFICANT CHANGE UP (ref 10–45)
ALT FLD-CCNC: 39 U/L — SIGNIFICANT CHANGE UP (ref 10–45)
ANION GAP SERPL CALC-SCNC: 10 MMOL/L — SIGNIFICANT CHANGE UP (ref 5–17)
ANION GAP SERPL CALC-SCNC: 10 MMOL/L — SIGNIFICANT CHANGE UP (ref 5–17)
AST SERPL-CCNC: 39 U/L — SIGNIFICANT CHANGE UP (ref 10–40)
AST SERPL-CCNC: 39 U/L — SIGNIFICANT CHANGE UP (ref 10–40)
BASOPHILS # BLD AUTO: 0.02 K/UL — SIGNIFICANT CHANGE UP (ref 0–0.2)
BASOPHILS # BLD AUTO: 0.02 K/UL — SIGNIFICANT CHANGE UP (ref 0–0.2)
BASOPHILS NFR BLD AUTO: 0.3 % — SIGNIFICANT CHANGE UP (ref 0–2)
BASOPHILS NFR BLD AUTO: 0.3 % — SIGNIFICANT CHANGE UP (ref 0–2)
BILIRUB SERPL-MCNC: 0.4 MG/DL — SIGNIFICANT CHANGE UP (ref 0.2–1.2)
BILIRUB SERPL-MCNC: 0.4 MG/DL — SIGNIFICANT CHANGE UP (ref 0.2–1.2)
BUN SERPL-MCNC: 14 MG/DL — SIGNIFICANT CHANGE UP (ref 7–23)
BUN SERPL-MCNC: 14 MG/DL — SIGNIFICANT CHANGE UP (ref 7–23)
CALCIUM SERPL-MCNC: 9.6 MG/DL — SIGNIFICANT CHANGE UP (ref 8.4–10.5)
CALCIUM SERPL-MCNC: 9.6 MG/DL — SIGNIFICANT CHANGE UP (ref 8.4–10.5)
CHLORIDE SERPL-SCNC: 108 MMOL/L — SIGNIFICANT CHANGE UP (ref 96–108)
CHLORIDE SERPL-SCNC: 108 MMOL/L — SIGNIFICANT CHANGE UP (ref 96–108)
CO2 SERPL-SCNC: 23 MMOL/L — SIGNIFICANT CHANGE UP (ref 22–31)
CO2 SERPL-SCNC: 23 MMOL/L — SIGNIFICANT CHANGE UP (ref 22–31)
CREAT SERPL-MCNC: 0.84 MG/DL — SIGNIFICANT CHANGE UP (ref 0.5–1.3)
CREAT SERPL-MCNC: 0.84 MG/DL — SIGNIFICANT CHANGE UP (ref 0.5–1.3)
EGFR: 77 ML/MIN/1.73M2 — SIGNIFICANT CHANGE UP
EGFR: 77 ML/MIN/1.73M2 — SIGNIFICANT CHANGE UP
EOSINOPHIL # BLD AUTO: 0.01 K/UL — SIGNIFICANT CHANGE UP (ref 0–0.5)
EOSINOPHIL # BLD AUTO: 0.01 K/UL — SIGNIFICANT CHANGE UP (ref 0–0.5)
EOSINOPHIL NFR BLD AUTO: 0.1 % — SIGNIFICANT CHANGE UP (ref 0–6)
EOSINOPHIL NFR BLD AUTO: 0.1 % — SIGNIFICANT CHANGE UP (ref 0–6)
GLUCOSE SERPL-MCNC: 102 MG/DL — HIGH (ref 70–99)
GLUCOSE SERPL-MCNC: 102 MG/DL — HIGH (ref 70–99)
HCT VFR BLD CALC: 39.8 % — SIGNIFICANT CHANGE UP (ref 34.5–45)
HCT VFR BLD CALC: 39.8 % — SIGNIFICANT CHANGE UP (ref 34.5–45)
HGB BLD-MCNC: 13.2 G/DL — SIGNIFICANT CHANGE UP (ref 11.5–15.5)
HGB BLD-MCNC: 13.2 G/DL — SIGNIFICANT CHANGE UP (ref 11.5–15.5)
INR BLD: 0.94 RATIO — SIGNIFICANT CHANGE UP (ref 0.85–1.18)
INR BLD: 0.94 RATIO — SIGNIFICANT CHANGE UP (ref 0.85–1.18)
LDH SERPL L TO P-CCNC: 243 U/L — HIGH (ref 50–242)
LDH SERPL L TO P-CCNC: 243 U/L — HIGH (ref 50–242)
LYMPHOCYTES # BLD AUTO: 0.62 K/UL — LOW (ref 1–3.3)
LYMPHOCYTES # BLD AUTO: 0.62 K/UL — LOW (ref 1–3.3)
LYMPHOCYTES # BLD AUTO: 9.1 % — LOW (ref 13–44)
LYMPHOCYTES # BLD AUTO: 9.1 % — LOW (ref 13–44)
MAGNESIUM SERPL-MCNC: 2.2 MG/DL — SIGNIFICANT CHANGE UP (ref 1.6–2.6)
MAGNESIUM SERPL-MCNC: 2.2 MG/DL — SIGNIFICANT CHANGE UP (ref 1.6–2.6)
MCHC RBC-ENTMCNC: 33.2 GM/DL — SIGNIFICANT CHANGE UP (ref 32–36)
MCHC RBC-ENTMCNC: 33.2 GM/DL — SIGNIFICANT CHANGE UP (ref 32–36)
MCHC RBC-ENTMCNC: 34 PG — SIGNIFICANT CHANGE UP (ref 27–34)
MCHC RBC-ENTMCNC: 34 PG — SIGNIFICANT CHANGE UP (ref 27–34)
MCV RBC AUTO: 102.6 FL — HIGH (ref 80–100)
MCV RBC AUTO: 102.6 FL — HIGH (ref 80–100)
MONOCYTES # BLD AUTO: 1.13 K/UL — HIGH (ref 0–0.9)
MONOCYTES # BLD AUTO: 1.13 K/UL — HIGH (ref 0–0.9)
MONOCYTES NFR BLD AUTO: 16.6 % — HIGH (ref 2–14)
MONOCYTES NFR BLD AUTO: 16.6 % — HIGH (ref 2–14)
NEUTROPHILS # BLD AUTO: 5.02 K/UL — SIGNIFICANT CHANGE UP (ref 1.8–7.4)
NEUTROPHILS # BLD AUTO: 5.02 K/UL — SIGNIFICANT CHANGE UP (ref 1.8–7.4)
NEUTROPHILS NFR BLD AUTO: 73.9 % — SIGNIFICANT CHANGE UP (ref 43–77)
NEUTROPHILS NFR BLD AUTO: 73.9 % — SIGNIFICANT CHANGE UP (ref 43–77)
NRBC # BLD: 0 /100 WBCS — SIGNIFICANT CHANGE UP (ref 0–0)
NRBC # BLD: 0 /100 WBCS — SIGNIFICANT CHANGE UP (ref 0–0)
PHOSPHATE SERPL-MCNC: 3 MG/DL — SIGNIFICANT CHANGE UP (ref 2.5–4.5)
PHOSPHATE SERPL-MCNC: 3 MG/DL — SIGNIFICANT CHANGE UP (ref 2.5–4.5)
PLATELET # BLD AUTO: 142 K/UL — LOW (ref 150–400)
PLATELET # BLD AUTO: 142 K/UL — LOW (ref 150–400)
POTASSIUM SERPL-MCNC: 3.6 MMOL/L — SIGNIFICANT CHANGE UP (ref 3.5–5.3)
POTASSIUM SERPL-MCNC: 3.6 MMOL/L — SIGNIFICANT CHANGE UP (ref 3.5–5.3)
POTASSIUM SERPL-SCNC: 3.6 MMOL/L — SIGNIFICANT CHANGE UP (ref 3.5–5.3)
POTASSIUM SERPL-SCNC: 3.6 MMOL/L — SIGNIFICANT CHANGE UP (ref 3.5–5.3)
PROT SERPL-MCNC: 6.2 G/DL — SIGNIFICANT CHANGE UP (ref 6–8.3)
PROT SERPL-MCNC: 6.2 G/DL — SIGNIFICANT CHANGE UP (ref 6–8.3)
PROTHROM AB SERPL-ACNC: 10.4 SEC — SIGNIFICANT CHANGE UP (ref 9.5–13)
PROTHROM AB SERPL-ACNC: 10.4 SEC — SIGNIFICANT CHANGE UP (ref 9.5–13)
RBC # BLD: 3.88 M/UL — SIGNIFICANT CHANGE UP (ref 3.8–5.2)
RBC # BLD: 3.88 M/UL — SIGNIFICANT CHANGE UP (ref 3.8–5.2)
RBC # FLD: 12.4 % — SIGNIFICANT CHANGE UP (ref 10.3–14.5)
RBC # FLD: 12.4 % — SIGNIFICANT CHANGE UP (ref 10.3–14.5)
SODIUM SERPL-SCNC: 141 MMOL/L — SIGNIFICANT CHANGE UP (ref 135–145)
SODIUM SERPL-SCNC: 141 MMOL/L — SIGNIFICANT CHANGE UP (ref 135–145)
URATE SERPL-MCNC: 4.6 MG/DL — SIGNIFICANT CHANGE UP (ref 2.5–7)
URATE SERPL-MCNC: 4.6 MG/DL — SIGNIFICANT CHANGE UP (ref 2.5–7)
WBC # BLD: 6.8 K/UL — SIGNIFICANT CHANGE UP (ref 3.8–10.5)
WBC # BLD: 6.8 K/UL — SIGNIFICANT CHANGE UP (ref 3.8–10.5)
WBC # FLD AUTO: 6.8 K/UL — SIGNIFICANT CHANGE UP (ref 3.8–10.5)
WBC # FLD AUTO: 6.8 K/UL — SIGNIFICANT CHANGE UP (ref 3.8–10.5)

## 2023-12-03 PROCEDURE — 99239 HOSP IP/OBS DSCHRG MGMT >30: CPT

## 2023-12-03 RX ADMIN — Medication 400 MILLIGRAM(S): at 05:42

## 2023-12-03 RX ADMIN — ATOVAQUONE 1500 MILLIGRAM(S): 750 SUSPENSION ORAL at 11:06

## 2023-12-03 RX ADMIN — PANTOPRAZOLE SODIUM 40 MILLIGRAM(S): 20 TABLET, DELAYED RELEASE ORAL at 05:44

## 2023-12-03 RX ADMIN — ESCITALOPRAM OXALATE 10 MILLIGRAM(S): 10 TABLET, FILM COATED ORAL at 11:06

## 2023-12-03 RX ADMIN — CHLORHEXIDINE GLUCONATE 1 APPLICATION(S): 213 SOLUTION TOPICAL at 05:44

## 2023-12-03 RX ADMIN — BLINATUMOMAB 32.5 MICROGRAM(S): KIT INTRAVENOUS at 15:32

## 2023-12-03 RX ADMIN — LOSARTAN POTASSIUM 100 MILLIGRAM(S): 100 TABLET, FILM COATED ORAL at 05:42

## 2023-12-03 RX ADMIN — CHLORHEXIDINE GLUCONATE 15 MILLILITER(S): 213 SOLUTION TOPICAL at 05:42

## 2023-12-03 NOTE — DISCHARGE NOTE NURSING/CASE MANAGEMENT/SOCIAL WORK - NSDCPEFALRISK_GEN_ALL_CORE
For information on Fall & Injury Prevention, visit: [email or web address] OR  [email or web address] OR  https://www.cdc.gov/steadi/patient.html For information on Fall & Injury Prevention, visit: [email or web address] OR  [email or web address] OR  https://www.cdc.gov/steadi/patient.html

## 2023-12-03 NOTE — ADVANCED PRACTICE NURSE CONSULT - REASON FOR CONSULT
ALL; Cycle 2 blincyto Day 3; consent in chart
ALL; Cycle 2 blincyto Day 1; consent in chart
ALL; Cycle 2 blincyto Day 2; consent in chart

## 2023-12-03 NOTE — ADVANCED PRACTICE NURSE CONSULT - ASSESSMENT
Pt A&O4. VSS, afebrile. Chemotherapy teaching provided, patient verbalized understanding. Lab results reviewed by Dr Bowles during rounds. Patient with RCW powerport. Accessed 12/1/23 site remains intact, no s/s of bleeding, swelling or redness. 2 RNs verification completed at bedside prior to start. At 1532 bag changed  Blinatumomab 28mcg/day CIV x 24hours, infusing at 10ml/hr, total of 240ml, attached directly to tip of RCW powerport via CADD pump for discharge. Primary RN aware of plan of care. Safety maintained, nursing care on-going.   
Pt A&O4. VSS, afebrile. Chemotherapy teaching provided, patient verbalized understanding. Written material given to patient. Lab results reviewed by Dr Toledo on rounds. Patient with RCW powerport. Accessed today, 23 site remains intact, no s/s of bleeding, swelling or redness. Patient premedicated with 650mg Tylenol POx1, Benadryl 50mg IVP x1, 20mg Pepcid IVPx1 and Dexamethasone 20mg IVPBx1 prior to chemo start. 4 RNs verification complete due to bag not scanning. Patient, MR, , dosage, rate, filter, all correct..  At 1639  Blinatumomab 28mcg/day CIV started  x 24hours infusion at 10ml/hr attached to RCW powerport through Alaris IV pump. Primary RN aware of plan of care.  
Pt A&O4. VSS, afebrile. Chemotherapy teaching provided, patient verbalized understanding. Lab results reviewed by Dr Bowles during rounds. Patient with RCW powerport. Accessed 12/1/23 site remains intact, no s/s of bleeding, swelling or redness. 2 RNs verification completed at bedside prior to start. At 1636 bag changed  Blinatumomab 28mcg/day CIV x 24hours, infusing at 10ml/hr, total of 240ml, attached directly to tip of RCW powerport via Alaris pump. Primary RN aware of plan of care. Safety maintained, nursing care on-going.

## 2023-12-03 NOTE — PROGRESS NOTE ADULT - PROBLEM SELECTOR PLAN 2
Not neutropenic, if febrile pan culture, f/u culture results.   Continue Acyclovir, Mepron.
Not neutropenic, if febrile pan culture, f/u culture results.   Continue Acyclovir, Mepron.

## 2023-12-03 NOTE — PROGRESS NOTE ADULT - SUBJECTIVE AND OBJECTIVE BOX
Diagnosis: B ALL pH(-)    Protocol/Chemo Regimen: Cycle #2 Blinanutumumab    Day: 3    Pt endorsed:   Faint rash on chest and left arm    Review of Systems: Denies nausea, vomiting, diarrhea, chest pain, palpitation, SOB or abdominal pain.    Pain scale: Denies    Diet: Regular    Allergies: sulfa drugs (Unknown)  Zofran (Headache)    ANTIMICROBIALS  acyclovir   Oral Tab/Cap 400 milliGRAM(s) Oral two times a day  atovaquone  Suspension 1500 milliGRAM(s) Oral <User Schedule>    HEME/ONC MEDICATIONS  blinatumomab IVPB (eMAR) 32.5 MICROGram(s) IV Continuous <Continuous>  enoxaparin Injectable 40 milliGRAM(s) SubCutaneous <User Schedule>    STANDING MEDICATIONS  chlorhexidine 0.12% Liquid 15 milliLiter(s) Oral Mucosa two times a day  chlorhexidine 4% Liquid 1 Application(s) Topical <User Schedule>  escitalopram 10 milliGRAM(s) Oral daily  losartan 100 milliGRAM(s) Oral daily  pantoprazole    Tablet 40 milliGRAM(s) Oral before breakfast  sodium chloride 0.9%. 1000 milliLiter(s) IV Continuous <Continuous>    PRN MEDICATIONS  acetaminophen     Tablet .. 650 milliGRAM(s) Oral every 6 hours PRN  aluminum hydroxide/magnesium hydroxide/simethicone Suspension 30 milliLiter(s) Oral every 4 hours PRN    Vital Signs Last 24 Hrs  T(C): 36.3 (03 Dec 2023 09:00), Max: 36.8 (02 Dec 2023 17:21)  T(F): 97.4 (03 Dec 2023 09:00), Max: 98.2 (02 Dec 2023 17:21)  HR: 62 (03 Dec 2023 09:00) (62 - 71)  BP: 153/72 (03 Dec 2023 09:00) (150/75 - 171/83)  BP(mean): --  RR: 18 (03 Dec 2023 09:00) (18 - 18)  SpO2: 99% (03 Dec 2023 09:00) (93% - 99%)    Parameters below as of 03 Dec 2023 09:00  Patient On (Oxygen Delivery Method): room air    PHYSICAL EXAM  General: NAD  HEENT: PERRLA, EOMOI, clear oropharynx  CV: (+) S1/S2 RRR  Lungs: clear to auscultation, no wheezes or rales  Abdomen: soft, non-tender, non-distended (+) BS  Ext: no clubbing, cyanosis or edema  Skin: no rashes and no petechiae  Neuro: alert and oriented X 3, no focal deficits  Central Line: RCW mediport, CDI    RECENT CULTURES:  NA    LABS:                        13.2   6.80  )-----------( 142      ( 03 Dec 2023 07:08 )             39.8     Mean Cell Volume : 102.6 fl  Mean Cell Hemoglobin : 34.0 pg  Mean Cell Hemoglobin Concentration : 33.2 gm/dL  Auto Neutrophil # : 5.02 K/uL  Auto Lymphocyte # : 0.62 K/uL  Auto Monocyte # : 1.13 K/uL  Auto Eosinophil # : 0.01 K/uL  Auto Basophil # : 0.02 K/uL  Auto Neutrophil % : 73.9 %  Auto Lymphocyte % : 9.1 %  Auto Monocyte % : 16.6 %  Auto Eosinophil % : 0.1 %  Auto Basophil % : 0.3 %    12-03    141  |  108  |  14  ----------------------------<  102<H>  3.6   |  23  |  0.84    Ca    9.6      03 Dec 2023 06:58  Phos  3.0     12-03  Mg     2.2     12-03    TPro  6.2  /  Alb  4.1  /  TBili  0.4  /  DBili  x   /  AST  39  /  ALT  39  /  AlkPhos  99  12-03  Mg 2.2  Phos 3.0  PT/INR - ( 03 Dec 2023 07:04 )   PT: 10.4 sec;   INR: 0.94 ratio      Uric Acid 4.6    RADIOLOGY & ADDITIONAL STUDIES:  NA

## 2023-12-03 NOTE — PROGRESS NOTE ADULT - ASSESSMENT
62 y/o F with PMHx of HTN, HLD with Ph (-) ALL diagnosed in February 2023 and now s/p cycle 1A of miini Hyper-CVAD, 1B ,2A. and 2B. Dose reduced for cycle 1A but full dose for   2A and full dose for B cycles. Bone marrow after cycle 1B with CR and negative MRD as well as post cycle 2B. S/p cycle 3A hyperCVAD with 50% dose reduction to reduce toxicity started on 7/17. Patient had a   delayed count recovery after cycle 3A and a bone marrow biopsy on 9/8/23 showed a CR with same low level of detection of residual   signal below the limit of detection. On day 74, platelets were trending up and not fully recovered, which was consistent with a CRi,. With possible evidence of very low level MRD, patient started on blinatumumab 28 mcg per day x 28 day infusion to clear MRD now admitted  for cycle 2 Blinanutumab on 12/1 
64 y/o F with PMHx of HTN, HLD with Ph (-) ALL diagnosed in February 2023 and now s/p cycle 1A of miini Hyper-CVAD, 1B ,2A. and 2B. Dose reduced for cycle 1A but full dose for   2A and full dose for B cycles. Bone marrow after cycle 1B with CR and negative MRD as well as post cycle 2B. S/p cycle 3A hyperCVAD with 50% dose reduction to reduce toxicity started on 7/17. Patient had a   delayed count recovery after cycle 3A and a bone marrow biopsy on 9/8/23 showed a CR with same low level of detection of residual   signal below the limit of detection. On day 74, platelets were trending up and not fully recovered, which was consistent with a CRi,. With possible evidence of very low level MRD, patient started on blinatumumab 28 mcg per day x 28 day infusion to clear MRD now admitted  for cycle 2 Blinanutumab on 12/1

## 2023-12-03 NOTE — DISCHARGE NOTE NURSING/CASE MANAGEMENT/SOCIAL WORK - NSDCFUADDAPPT_GEN_ALL_CORE_FT
you have appointment for Blincyto bag change, Mediport needle and dressing change on Monday, 12/14 at 4 PM.  Please call on Monday, 12/4 for f/u appointment with Dr. Goldberg.

## 2023-12-03 NOTE — PROGRESS NOTE ADULT - PROBLEM SELECTOR PLAN 1
Cycle 2 Blincyto 28 mcg/day continuous infusion for 28 days  Premedications on Day 1 with dexamethasone 20 mg Iv x 1, Tylenol 650 mg PO x1, Pepcid 20 mg IV x 1, Benadryl 50 mg IV x1  S/p Hyper CVAD cycle 1A feb 2023 (reduces dose), 1B (full dose) on 4/3, cycle 2A (full dose) on 5/1, cycle 2B (full dose) on 6/12, and cycle 3A (50% dose reduced) on 7/17  Access [facility].  Monitor CBC with diff, transfuse as needed.  Monitor electrolytes, replete as needed.  Daily weights, strict I/O.  Anti emetics PRN.  Monitor for CRS/neurotoxicity, monitor for dysgraphia with handwriting checks daily.    Discharge home today with outpatient follow up. Cycle 2 Blincyto 28 mcg/day continuous infusion for 28 days  Premedications on Day 1 with dexamethasone 20 mg Iv x 1, Tylenol 650 mg PO x1, Pepcid 20 mg IV x 1, Benadryl 50 mg IV x1  S/p Hyper CVAD cycle 1A feb 2023 (reduces dose), 1B (full dose) on 4/3, cycle 2A (full dose) on 5/1, cycle 2B (full dose) on 6/12, and cycle 3A (50% dose reduced) on 7/17  Access [facility].  Monitor CBC with diff, transfuse as needed.  Monitor electrolytes, replete as needed.  Daily weights, strict I/O.  Anti emetics PRN.  Monitor for CRS/neurotoxicity, monitor for dysgraphia with handwriting checks daily.    Discharge home today with outpatient follow up. Cycle 2 Blincyto 28 mcg/day continuous infusion for 28 days  Premedications on Day 1 with dexamethasone 20 mg Iv x 1, Tylenol 650 mg PO x1, Pepcid 20 mg IV x 1, Benadryl 50 mg IV x1  S/p Hyper CVAD cycle 1A feb 2023 (reduces dose), 1B (full dose) on 4/3, cycle 2A (full dose) on 5/1, cycle 2B (full dose) on 6/12, and cycle 3A (50% dose reduced) on 7/17  Access [facility].  Monitor CBC with diff, transfuse as needed.  Monitor electrolytes, replete as needed.  Daily weights, strict I/O.  Anti emetics PRN.  Monitor for CRS/neurotoxicity, monitor for dysgraphia with handwriting checks daily.    Discharge home today with outpatient follow up.

## 2023-12-03 NOTE — PROGRESS NOTE ADULT - PROBLEM SELECTOR PLAN 5
DVT prophylaxis, Lovenox 40 s/c daily.  Encourage ambulation.
DVT prophylaxis, Lovenox 40 s/c daily.  Encourage ambulation.

## 2023-12-03 NOTE — DISCHARGE NOTE NURSING/CASE MANAGEMENT/SOCIAL WORK - NSDPACMPNY_GEN_ALL_CORE
[facility]   NEURO INTENSIVE CARE UNIT  PROGRESS NOTE    Admission date: 3/23/2021     Consulting teams:   Neurocritical Care  Neurology  Neurosurgery  Heme/Onc  ID  Internal Medicine      SUBJECTIVE     24hr Significant Events:     No acute events overnight. Planned for trach and PEG tube placement today - was postponed from yesterday by surgical team due to surgical emergency      Current Facility-Administered Medications   Medication   • LACTATED RINGERS IV SOLN Pyxis Override   • ipratropium-albuterol (DUONEB) 0.5-2.5 (3) MG/3ML nebulizer solution 3 mL   • lactated ringers infusion   • famotidine (PEPCID) tablet 20 mg   • levETIRAcetam ORAL (KepPRA) 100 MG/ML oral solution 500 mg   • polyethylene glycol (MIRALAX) packet 17 g   • enoxaparin (LOVENOX) injection 40 mg   • cefTRIAXone (ROCEPHIN) syringe 2,000 mg   • insulin regular (human) (HumuLIN R, NovoLIN R) correction dose   • dextrose 50 % injection 25 g   • dextrose 50 % injection 12.5 g   • chlorhexidine gluconate (PERIDEX) 0.12 % solution 15 mL    And   • chlorhexidine gluconate (PERIDEX) 0.12 % solution 15 mL   • Potassium Standard Replacement Protocol   • Magnesium Standard Replacement Protocol   • Phosphorus Standard Replacement Protocol   • amLODIPine (NORVASC) tablet 10 mg   • acetaminophen (TYLENOL) suppository 325 mg   • collagenase (SANTYL) ointment   • hydrALAZINE (APRESOLINE) injection 10 mg   • metoPROLOL (LOPRESSOR) injection 5 mg   • docusate sodium-sennosides (SENOKOT S) 50-8.6 MG 1 tablet       OBJECTIVE       VITAL SIGNS:   Vital Last Value 24 Hour Range   Temperature 97 °F (36.1 °C) (04/02/21 0800) Temp  Min: 96.8 °F (36 °C)  Max: 99.5 °F (37.5 °C)   Pulse 75 (04/02/21 1100) Pulse  Min: 64  Max: 89   Respiratory 19 (04/02/21 1100) Resp  Min: 5  Max: 19   Non-Invasive  Blood Pressure 132/69 (04/02/21 1100) BP  Min: 105/60  Max: 136/53   Pulse Oximetry 100 % (04/02/21 1100) SpO2  Min: 96 %  Max: 100 %          VENT STATUS:    Vent Settings Last Value   FiO2 30 % (04/02/21 1100)   Mode Pressure Support (04/02/21 1100)   Rate 14 (04/02/21 0600)   Tidal Volume 400 mL (04/02/21 0600)   Pressure Support 7 cm H20 (04/02/21 1100)   PEEP/CPAC/EPAP 5 cm H20 (04/02/21 1100)       INTAKE/OUTPUT:    Intake/Output Summary (Last 24 hours) at 4/2/2021 1220  Last data filed at 4/2/2021 1159  Gross per 24 hour   Intake 967.94 ml   Output 730 ml   Net 237.94 ml       Date 04/01/21 0700 - 04/02/21 0659 04/02/21 0700 - 04/03/21 0659   Shift 9508-6355 8996-8435 5344-5630 24 Hour Total 8089-3650 4566-3381 5717-1152 24 Hour Total   INTAKE   I.V.   467.7 467.7 300.2   300.2   NG/GT  200  200       Shift Total  200 467.7 667.7 300.2   300.2   OUTPUT   Urine 275 355 150 780 225   225   Shift Total 275 355 150 780 225   225   Weight (kg) 69.9 69.9 69.9 69.9 69.9 69.9 69.9 69.9       Last Stool Occurrence: 1 (04/01/21 2359)    PHYSICAL EXAM:    GENERAL APPEARANCE: Patient resting comfortably in bed, intubated  HEENT: Right craniectomy, flap appears sunken, atraumatic.   NECK: Supple. No lymphadenopathy or tenderness.  LUNGS: Breath sounds are mechanical bilaterally. RR low 20's.   HEART: Irregular, sinus  ABDOMEN: Soft. No tenderness, guarding, or rebound.   EXTREMITIES: No cyanosis, clubbing. + 3 edema in BUE  SKIN: Warm and dry, sacral decubitus ulcer with eschar present on admission, multiple bruises and skin tears in bilateral upper extremities. Radial and DP pulses palpable bilaterally    NEURO:  Conscious Exam    Mental Status: Opens eyes to verbal stim. Wiggles right foot to commands, sticks out tongue, and thumbs up in RUE to commands  Speech/Language:  Intubated  Muscle Strength/Motor:  LUE 4/5 (Distal > Proximal), RUE 1/5,        LLE, 2/5, RLE 2/5  Sensation: W/d to noxious stim in BLE & RUE.   Tone: Increased tone in RUE  Extraneous movements:  None   Gait: Exam deferred    CRANIAL NERVES  CN I: Deferred  CN II: + BTT b/L  CN III, IV, VI: No gaze  preference or deviation, no nystagmus  CN V:  Positive corneal reflex  CN VII: Face is grossly symmetric, no nasolabial fold flattening  CN VIII: Heading grossly intact to speech   CN IX/X: + Corneal reflex bilaterally  CN XI/XII: Unable to assess    GCS: 10  Eyes:3 - Eyes Open to Sound  Verbal:1 None  Motor: 6 Obeys command ([place]net commands)      LABORATORY    Recent Labs   Lab 04/02/21 0441 04/01/21  0651 03/31/21  0651   SODIUM 141 141 142   POTASSIUM 4.1 3.9 3.7   CHLORIDE 107 108* 109*   CO2 33* 30 31   ANIONGAP 5* 7* 6*   BUN 20 21* 21*   CREATININE 0.75 0.73 0.81   CALCIUM 8.6 8.9 8.3*   GLUCOSE 82 108* 108*     Recent Labs   Lab 03/31/21  0651   BILIRUBIN 0.3   ALBUMIN 2.1*   ALKPT 260*   GPT 34   AST 30     Recent Labs   Lab 04/02/21 0441 04/01/21  0651 03/31/21  0651   MG 2.4 2.3 2.4   PHOS 2.5 3.3  3.4 3.2     Recent Labs   Lab 04/02/21  0441   WBC 10.4   RBC 3.14*   HGB 8.9*   HCT 28.2*        Recent Labs   Lab 03/30/21  0327   PTT 28   PT 12.0*   INR 1.2       IMAGING      ASSESSMENT/PLAN     85M PMHx SDH w/p R craniotomy for evacuation (1/2021) now with s/p I&D of epidural and subdural empyema.    Neurological:  Epidural and subdural empyema with brain compression  Hx of SDH s/p R craniotomy for evacuation (1/2/2021)         - CT head showing 16mm hypodense subdural fluid collection with cerebral compression         - s/p craniectomy for I&D 3/23         - Repeat CT head 3/24 with expected surgical changes, small IPH/SAH in crani evacuation bed         - CT head 3/26 shows right frontal new ICH, surrounding edema, no mass effect; repeat CT head stable on 3/27         - MRI 3/30 without stroke or abscess         - OR cultures with Propionibacterium Acnes         - ID following, antibiotics as below         - Will likely need PICC line prior to discharge         - Additional imaging per neurosurgery         - SBP < 160         - Keppra 500mg Q12, Continue until outpatient follow up per  neuro         - PT/OT ordered      Encephalopathy         - Likely infectious given empyema         - rEEG per neurology recs with generalized slowing         - Minimize sedation, per family patient very sensitive to even small doses of sedative medications         - cEEG discontinued 3/29     Cardiovascular:  Hypertension         - SBP goal <160          - Holding home medications: lisinopril, hydralazine, clonidine          - Continue home amlodipine         - PRN metoprolol and hydralazine           Sinus Arrhythmia   Atrial Fibrillation, rate controlled         - 12 lead EKG with irregular sinus rhythm, PVCs         - Repeat EKG with irregular sinus rhythm         - No signs of ischemia, RBBB unchanged from prior studies         - Metoprolol PRN tachycaradia     Pulmonary:  Acute Respiratory Failure, intubated for airway protection          - CXR showing bibasilar atelectasis         - Extubated and re-intubated on 3/25         - s/p bronchoscopy Left main bronchus mucus plug removed         - Continue Q4H nebs          - H2 blocker while intubated         - Tolerating SBT in AM, AC rate overnight         - Planned for tracheostomy/PEG tube placement today       Renal:   Hypernatremia, (Resolved)  - Continue free water flushes   - Cr wnl, good UOP  - Replete electrolytes per protocol      Gastrointestinal:  Dysphagia         - OG tube in place, tube feeding on hold for procedure today         - PEG tube placement today     Protein Calorie Malnutrition  - Albumin 2.6  - NPO for surgery     Atonic Constipation         - Bowel regimen w/ Senokot, Miralax     Heme:  Acute Blood Loss Anemia        - H/H stable, transfuse Hb < 7     Hx of CLL  - No acute ICU issues  - HANSA without intrinsic platelet dysfunction    Infectious Disease:   Leukocytosis, (Stable)  Epidural and Subdural Empyema s/p I&D         - Afebrile         - CXR with bibasilar opacities         - U/A unremarkable         - Blood Cx (3/23) pending          - OR cultures with Propionibacterium Acnes (3/23)         - Abx: Ceftriaxone 2g q 12 hours (planned 6 week course)         - Appreciate ID recs      Endocrine:   Hyperglycemia        - Accuchecks q 6 hours        - Low dose SSI PRN glcuose > 150    Elevated Alk Phos         - Vit D WNL         - trend serial LFTs    Integumentary:  Unstageable sacral decubitus ulcer present on admission         - RN wound care recommending enzymatic debridement and PT wound evaluation         - Sharp debridement per PT wound care         - Turn and reposition Q2 to relieve pressure     FEN/Prophylaxis:               - Lines: Midline, ETT               - DVT Prophylaxis: SCDs, Holding DVT ppx, resume when okay with general surgery               - GI Prophylaxis: Famotidine               - Diet: TF               - IVF: none               - Electrolytes: Replete per protocol               - Code Status: Full Code           Disposition:  NCCU    I have spent greater than 45 minutes reviewing patient's diagnostic studies, examining patient, reviewing pertinent consultant's notes, collaborating with physicians/APNs/PAs involved in patient's care, determining management plan with patient and family at bedside. Patient is critically ill as documented above and requires high complexity decision-making and active titration of therapies to preserve life.    Patient discussed with Dr. Arnold who was my collaborating physician during this encounter.    Becky Aranda NP  Neurocritical Care   Spouse

## 2023-12-03 NOTE — PROGRESS NOTE ADULT - NS ATTEND AMEND GEN_ALL_CORE FT
.    Primary: Goldberg    Vital Signs Last 24 Hrs  T(C): 36.8 (03 Dec 2023 05:41), Max: 36.9 (02 Dec 2023 13:00)  T(F): 98.2 (03 Dec 2023 05:41), Max: 98.4 (02 Dec 2023 13:00)  HR: 63 (03 Dec 2023 05:41) (63 - 90)  BP: 167/90 (03 Dec 2023 05:41) (150/75 - 177/83)  BP(mean): --  RR: 18 (03 Dec 2023 05:41) (18 - 18)  SpO2: 94% (03 Dec 2023 05:41) (93% - 97%)    Parameters below as of 02 Dec 2023 21:41  Patient On (Oxygen Delivery Method): room air    MEDICATIONS  (STANDING):  acyclovir   Oral Tab/Cap 400 milliGRAM(s) Oral two times a day  atovaquone  Suspension 1500 milliGRAM(s) Oral <User Schedule>  blinatumomab IVPB (eMAR) 32.5 MICROGram(s) (10 mL/Hr) IV Continuous <Continuous>  chlorhexidine 0.12% Liquid 15 milliLiter(s) Oral Mucosa two times a day  chlorhexidine 4% Liquid 1 Application(s) Topical <User Schedule>  enoxaparin Injectable 40 milliGRAM(s) SubCutaneous <User Schedule>  escitalopram 10 milliGRAM(s) Oral daily  losartan 100 milliGRAM(s) Oral daily  pantoprazole    Tablet 40 milliGRAM(s) Oral before breakfast  sodium chloride 0.9%. 1000 milliLiter(s) (20 mL/Hr) IV Continuous <Continuous>      Assessment: 63 year old day 3 cycle 1 blincyto for Ph- ALL.      PMHx: HTN, HLD    Heme:  Continue immuno therapy  Discussed alloBMT - will also discuss with Dr. Goldberg.    ID: acyclvoir    DVT prophylaxis: ambulation    Over 35 minutes were spent in discharge management. .    Primary: Goldberg    Vital Signs Last 24 Hrs  T(C): 36.8 (03 Dec 2023 05:41), Max: 36.9 (02 Dec 2023 13:00)  T(F): 98.2 (03 Dec 2023 05:41), Max: 98.4 (02 Dec 2023 13:00)  HR: 63 (03 Dec 2023 05:41) (63 - 90)  BP: 167/90 (03 Dec 2023 05:41) (150/75 - 177/83)  BP(mean): --  RR: 18 (03 Dec 2023 05:41) (18 - 18)  SpO2: 94% (03 Dec 2023 05:41) (93% - 97%)    Parameters below as of 02 Dec 2023 21:41  Patient On (Oxygen Delivery Method): room air    MEDICATIONS  (STANDING):  acyclovir   Oral Tab/Cap 400 milliGRAM(s) Oral two times a day  atovaquone  Suspension 1500 milliGRAM(s) Oral <User Schedule>  blinatumomab IVPB (eMAR) 32.5 MICROGram(s) (10 mL/Hr) IV Continuous <Continuous>  chlorhexidine 0.12% Liquid 15 milliLiter(s) Oral Mucosa two times a day  chlorhexidine 4% Liquid 1 Application(s) Topical <User Schedule>  enoxaparin Injectable 40 milliGRAM(s) SubCutaneous <User Schedule>  escitalopram 10 milliGRAM(s) Oral daily  losartan 100 milliGRAM(s) Oral daily  pantoprazole    Tablet 40 milliGRAM(s) Oral before breakfast  sodium chloride 0.9%. 1000 milliLiter(s) (20 mL/Hr) IV Continuous <Continuous>      Assessment: 63 year old day 3 cycle 1 blincyto for Ph- ALL.      PMHx: HTN, HLD    Heme:  Continue immuno therapy  Discussed alloBMT - will also discuss with Dr. Goldberg.    ID: acyclovir, atovaquone (allergic to Sulfa but 1980 with small hand rash) - would change to bactrim as an outpatient.     DVT prophylaxis: ambulation    Over 35 minutes were spent in discharge management.

## 2023-12-04 ENCOUNTER — RESULT REVIEW (OUTPATIENT)
Age: 63
End: 2023-12-04

## 2023-12-04 ENCOUNTER — APPOINTMENT (OUTPATIENT)
Dept: INFUSION THERAPY | Facility: HOSPITAL | Age: 63
End: 2023-12-04

## 2023-12-04 ENCOUNTER — APPOINTMENT (OUTPATIENT)
Dept: HEMATOLOGY ONCOLOGY | Facility: CLINIC | Age: 63
End: 2023-12-04
Payer: MEDICARE

## 2023-12-04 ENCOUNTER — NON-APPOINTMENT (OUTPATIENT)
Age: 63
End: 2023-12-04

## 2023-12-04 VITALS
SYSTOLIC BLOOD PRESSURE: 157 MMHG | RESPIRATION RATE: 16 BRPM | DIASTOLIC BLOOD PRESSURE: 80 MMHG | OXYGEN SATURATION: 99 % | HEART RATE: 75 BPM | TEMPERATURE: 96.8 F

## 2023-12-04 LAB
ALBUMIN SERPL ELPH-MCNC: 4 G/DL — SIGNIFICANT CHANGE UP (ref 3.3–5)
ALBUMIN SERPL ELPH-MCNC: 4 G/DL — SIGNIFICANT CHANGE UP (ref 3.3–5)
ALP SERPL-CCNC: 90 U/L — SIGNIFICANT CHANGE UP (ref 40–120)
ALP SERPL-CCNC: 90 U/L — SIGNIFICANT CHANGE UP (ref 40–120)
ALT FLD-CCNC: 39 U/L — SIGNIFICANT CHANGE UP (ref 10–45)
ALT FLD-CCNC: 39 U/L — SIGNIFICANT CHANGE UP (ref 10–45)
ANION GAP SERPL CALC-SCNC: 10 MMOL/L — SIGNIFICANT CHANGE UP (ref 5–17)
ANION GAP SERPL CALC-SCNC: 10 MMOL/L — SIGNIFICANT CHANGE UP (ref 5–17)
ANISOCYTOSIS BLD QL: SLIGHT — SIGNIFICANT CHANGE UP
ANISOCYTOSIS BLD QL: SLIGHT — SIGNIFICANT CHANGE UP
AST SERPL-CCNC: 41 U/L — HIGH (ref 10–40)
AST SERPL-CCNC: 41 U/L — HIGH (ref 10–40)
BASOPHILS # BLD AUTO: 0 K/UL — SIGNIFICANT CHANGE UP (ref 0–0.2)
BASOPHILS # BLD AUTO: 0 K/UL — SIGNIFICANT CHANGE UP (ref 0–0.2)
BASOPHILS NFR BLD AUTO: 0 % — SIGNIFICANT CHANGE UP (ref 0–2)
BASOPHILS NFR BLD AUTO: 0 % — SIGNIFICANT CHANGE UP (ref 0–2)
BILIRUB SERPL-MCNC: 0.4 MG/DL — SIGNIFICANT CHANGE UP (ref 0.2–1.2)
BILIRUB SERPL-MCNC: 0.4 MG/DL — SIGNIFICANT CHANGE UP (ref 0.2–1.2)
BUN SERPL-MCNC: 15 MG/DL — SIGNIFICANT CHANGE UP (ref 7–23)
BUN SERPL-MCNC: 15 MG/DL — SIGNIFICANT CHANGE UP (ref 7–23)
CALCIUM SERPL-MCNC: 9.2 MG/DL — SIGNIFICANT CHANGE UP (ref 8.4–10.5)
CALCIUM SERPL-MCNC: 9.2 MG/DL — SIGNIFICANT CHANGE UP (ref 8.4–10.5)
CHLORIDE SERPL-SCNC: 106 MMOL/L — SIGNIFICANT CHANGE UP (ref 96–108)
CHLORIDE SERPL-SCNC: 106 MMOL/L — SIGNIFICANT CHANGE UP (ref 96–108)
CO2 SERPL-SCNC: 23 MMOL/L — SIGNIFICANT CHANGE UP (ref 22–31)
CO2 SERPL-SCNC: 23 MMOL/L — SIGNIFICANT CHANGE UP (ref 22–31)
CREAT SERPL-MCNC: 0.83 MG/DL — SIGNIFICANT CHANGE UP (ref 0.5–1.3)
CREAT SERPL-MCNC: 0.83 MG/DL — SIGNIFICANT CHANGE UP (ref 0.5–1.3)
DACRYOCYTES BLD QL SMEAR: SLIGHT — SIGNIFICANT CHANGE UP
DACRYOCYTES BLD QL SMEAR: SLIGHT — SIGNIFICANT CHANGE UP
EGFR: 78 ML/MIN/1.73M2 — SIGNIFICANT CHANGE UP
EGFR: 78 ML/MIN/1.73M2 — SIGNIFICANT CHANGE UP
ELLIPTOCYTES BLD QL SMEAR: SLIGHT — SIGNIFICANT CHANGE UP
ELLIPTOCYTES BLD QL SMEAR: SLIGHT — SIGNIFICANT CHANGE UP
EOSINOPHIL # BLD AUTO: 0.04 K/UL — SIGNIFICANT CHANGE UP (ref 0–0.5)
EOSINOPHIL # BLD AUTO: 0.04 K/UL — SIGNIFICANT CHANGE UP (ref 0–0.5)
EOSINOPHIL NFR BLD AUTO: 1 % — SIGNIFICANT CHANGE UP (ref 0–6)
EOSINOPHIL NFR BLD AUTO: 1 % — SIGNIFICANT CHANGE UP (ref 0–6)
GLUCOSE SERPL-MCNC: 83 MG/DL — SIGNIFICANT CHANGE UP (ref 70–99)
GLUCOSE SERPL-MCNC: 83 MG/DL — SIGNIFICANT CHANGE UP (ref 70–99)
HCT VFR BLD CALC: 36.4 % — SIGNIFICANT CHANGE UP (ref 34.5–45)
HCT VFR BLD CALC: 36.4 % — SIGNIFICANT CHANGE UP (ref 34.5–45)
HGB BLD-MCNC: 12.7 G/DL — SIGNIFICANT CHANGE UP (ref 11.5–15.5)
HGB BLD-MCNC: 12.7 G/DL — SIGNIFICANT CHANGE UP (ref 11.5–15.5)
LDH SERPL L TO P-CCNC: 205 U/L — SIGNIFICANT CHANGE UP (ref 50–242)
LDH SERPL L TO P-CCNC: 205 U/L — SIGNIFICANT CHANGE UP (ref 50–242)
LYMPHOCYTES # BLD AUTO: 0.67 K/UL — LOW (ref 1–3.3)
LYMPHOCYTES # BLD AUTO: 0.67 K/UL — LOW (ref 1–3.3)
LYMPHOCYTES # BLD AUTO: 15 % — SIGNIFICANT CHANGE UP (ref 13–44)
LYMPHOCYTES # BLD AUTO: 15 % — SIGNIFICANT CHANGE UP (ref 13–44)
MACROCYTES BLD QL: SLIGHT — SIGNIFICANT CHANGE UP
MACROCYTES BLD QL: SLIGHT — SIGNIFICANT CHANGE UP
MCHC RBC-ENTMCNC: 34.9 G/DL — SIGNIFICANT CHANGE UP (ref 32–36)
MCHC RBC-ENTMCNC: 34.9 G/DL — SIGNIFICANT CHANGE UP (ref 32–36)
MCHC RBC-ENTMCNC: 35.2 PG — HIGH (ref 27–34)
MCHC RBC-ENTMCNC: 35.2 PG — HIGH (ref 27–34)
MCV RBC AUTO: 100.8 FL — HIGH (ref 80–100)
MCV RBC AUTO: 100.8 FL — HIGH (ref 80–100)
MONOCYTES # BLD AUTO: 0.8 K/UL — SIGNIFICANT CHANGE UP (ref 0–0.9)
MONOCYTES # BLD AUTO: 0.8 K/UL — SIGNIFICANT CHANGE UP (ref 0–0.9)
MONOCYTES NFR BLD AUTO: 18 % — HIGH (ref 2–14)
MONOCYTES NFR BLD AUTO: 18 % — HIGH (ref 2–14)
NEUTROPHILS # BLD AUTO: 2.89 K/UL — SIGNIFICANT CHANGE UP (ref 1.8–7.4)
NEUTROPHILS # BLD AUTO: 2.89 K/UL — SIGNIFICANT CHANGE UP (ref 1.8–7.4)
NEUTROPHILS NFR BLD AUTO: 65 % — SIGNIFICANT CHANGE UP (ref 43–77)
NEUTROPHILS NFR BLD AUTO: 65 % — SIGNIFICANT CHANGE UP (ref 43–77)
NRBC # BLD: 0 /100 — SIGNIFICANT CHANGE UP (ref 0–0)
NRBC # BLD: 0 /100 — SIGNIFICANT CHANGE UP (ref 0–0)
NRBC # BLD: SIGNIFICANT CHANGE UP /100 WBCS (ref 0–0)
NRBC # BLD: SIGNIFICANT CHANGE UP /100 WBCS (ref 0–0)
PHOSPHATE SERPL-MCNC: 3.6 MG/DL — SIGNIFICANT CHANGE UP (ref 2.5–4.5)
PHOSPHATE SERPL-MCNC: 3.6 MG/DL — SIGNIFICANT CHANGE UP (ref 2.5–4.5)
PLAT MORPH BLD: NORMAL — SIGNIFICANT CHANGE UP
PLAT MORPH BLD: NORMAL — SIGNIFICANT CHANGE UP
PLATELET # BLD AUTO: 134 K/UL — LOW (ref 150–400)
PLATELET # BLD AUTO: 134 K/UL — LOW (ref 150–400)
POIKILOCYTOSIS BLD QL AUTO: SLIGHT — SIGNIFICANT CHANGE UP
POIKILOCYTOSIS BLD QL AUTO: SLIGHT — SIGNIFICANT CHANGE UP
POTASSIUM SERPL-MCNC: 3.6 MMOL/L — SIGNIFICANT CHANGE UP (ref 3.5–5.3)
POTASSIUM SERPL-MCNC: 3.6 MMOL/L — SIGNIFICANT CHANGE UP (ref 3.5–5.3)
POTASSIUM SERPL-SCNC: 3.6 MMOL/L — SIGNIFICANT CHANGE UP (ref 3.5–5.3)
POTASSIUM SERPL-SCNC: 3.6 MMOL/L — SIGNIFICANT CHANGE UP (ref 3.5–5.3)
PROT SERPL-MCNC: 6 G/DL — SIGNIFICANT CHANGE UP (ref 6–8.3)
PROT SERPL-MCNC: 6 G/DL — SIGNIFICANT CHANGE UP (ref 6–8.3)
RBC # BLD: 3.61 M/UL — LOW (ref 3.8–5.2)
RBC # BLD: 3.61 M/UL — LOW (ref 3.8–5.2)
RBC # FLD: 12.3 % — SIGNIFICANT CHANGE UP (ref 10.3–14.5)
RBC # FLD: 12.3 % — SIGNIFICANT CHANGE UP (ref 10.3–14.5)
RBC BLD AUTO: ABNORMAL
RBC BLD AUTO: ABNORMAL
SODIUM SERPL-SCNC: 140 MMOL/L — SIGNIFICANT CHANGE UP (ref 135–145)
SODIUM SERPL-SCNC: 140 MMOL/L — SIGNIFICANT CHANGE UP (ref 135–145)
URATE SERPL-MCNC: 4.8 MG/DL — SIGNIFICANT CHANGE UP (ref 2.5–7)
URATE SERPL-MCNC: 4.8 MG/DL — SIGNIFICANT CHANGE UP (ref 2.5–7)
VARIANT LYMPHS # BLD: 1 % — SIGNIFICANT CHANGE UP (ref 0–6)
VARIANT LYMPHS # BLD: 1 % — SIGNIFICANT CHANGE UP (ref 0–6)
WBC # BLD: 4.45 K/UL — SIGNIFICANT CHANGE UP (ref 3.8–10.5)
WBC # BLD: 4.45 K/UL — SIGNIFICANT CHANGE UP (ref 3.8–10.5)
WBC # FLD AUTO: 4.45 K/UL — SIGNIFICANT CHANGE UP (ref 3.8–10.5)
WBC # FLD AUTO: 4.45 K/UL — SIGNIFICANT CHANGE UP (ref 3.8–10.5)

## 2023-12-04 PROCEDURE — 99214 OFFICE O/P EST MOD 30 MIN: CPT

## 2023-12-11 ENCOUNTER — RESULT REVIEW (OUTPATIENT)
Age: 63
End: 2023-12-11

## 2023-12-11 ENCOUNTER — APPOINTMENT (OUTPATIENT)
Dept: INFUSION THERAPY | Facility: HOSPITAL | Age: 63
End: 2023-12-11

## 2023-12-11 LAB
ALBUMIN SERPL ELPH-MCNC: 4 G/DL — SIGNIFICANT CHANGE UP (ref 3.3–5)
ALBUMIN SERPL ELPH-MCNC: 4 G/DL — SIGNIFICANT CHANGE UP (ref 3.3–5)
ALP SERPL-CCNC: 92 U/L — SIGNIFICANT CHANGE UP (ref 40–120)
ALP SERPL-CCNC: 92 U/L — SIGNIFICANT CHANGE UP (ref 40–120)
ALT FLD-CCNC: 27 U/L — SIGNIFICANT CHANGE UP (ref 10–45)
ALT FLD-CCNC: 27 U/L — SIGNIFICANT CHANGE UP (ref 10–45)
ANION GAP SERPL CALC-SCNC: 9 MMOL/L — SIGNIFICANT CHANGE UP (ref 5–17)
ANION GAP SERPL CALC-SCNC: 9 MMOL/L — SIGNIFICANT CHANGE UP (ref 5–17)
ANISOCYTOSIS BLD QL: SLIGHT — SIGNIFICANT CHANGE UP
ANISOCYTOSIS BLD QL: SLIGHT — SIGNIFICANT CHANGE UP
AST SERPL-CCNC: 33 U/L — SIGNIFICANT CHANGE UP (ref 10–40)
AST SERPL-CCNC: 33 U/L — SIGNIFICANT CHANGE UP (ref 10–40)
BASOPHILS # BLD AUTO: 0 K/UL — SIGNIFICANT CHANGE UP (ref 0–0.2)
BASOPHILS # BLD AUTO: 0 K/UL — SIGNIFICANT CHANGE UP (ref 0–0.2)
BASOPHILS NFR BLD AUTO: 0 % — SIGNIFICANT CHANGE UP (ref 0–2)
BASOPHILS NFR BLD AUTO: 0 % — SIGNIFICANT CHANGE UP (ref 0–2)
BILIRUB SERPL-MCNC: 0.4 MG/DL — SIGNIFICANT CHANGE UP (ref 0.2–1.2)
BILIRUB SERPL-MCNC: 0.4 MG/DL — SIGNIFICANT CHANGE UP (ref 0.2–1.2)
BUN SERPL-MCNC: 12 MG/DL — SIGNIFICANT CHANGE UP (ref 7–23)
BUN SERPL-MCNC: 12 MG/DL — SIGNIFICANT CHANGE UP (ref 7–23)
CALCIUM SERPL-MCNC: 9.5 MG/DL — SIGNIFICANT CHANGE UP (ref 8.4–10.5)
CALCIUM SERPL-MCNC: 9.5 MG/DL — SIGNIFICANT CHANGE UP (ref 8.4–10.5)
CHLORIDE SERPL-SCNC: 105 MMOL/L — SIGNIFICANT CHANGE UP (ref 96–108)
CHLORIDE SERPL-SCNC: 105 MMOL/L — SIGNIFICANT CHANGE UP (ref 96–108)
CO2 SERPL-SCNC: 27 MMOL/L — SIGNIFICANT CHANGE UP (ref 22–31)
CO2 SERPL-SCNC: 27 MMOL/L — SIGNIFICANT CHANGE UP (ref 22–31)
CREAT SERPL-MCNC: 0.8 MG/DL — SIGNIFICANT CHANGE UP (ref 0.5–1.3)
CREAT SERPL-MCNC: 0.8 MG/DL — SIGNIFICANT CHANGE UP (ref 0.5–1.3)
DACRYOCYTES BLD QL SMEAR: SLIGHT — SIGNIFICANT CHANGE UP
DACRYOCYTES BLD QL SMEAR: SLIGHT — SIGNIFICANT CHANGE UP
EGFR: 82 ML/MIN/1.73M2 — SIGNIFICANT CHANGE UP
EGFR: 82 ML/MIN/1.73M2 — SIGNIFICANT CHANGE UP
ELLIPTOCYTES BLD QL SMEAR: SLIGHT — SIGNIFICANT CHANGE UP
ELLIPTOCYTES BLD QL SMEAR: SLIGHT — SIGNIFICANT CHANGE UP
EOSINOPHIL # BLD AUTO: 0.2 K/UL — SIGNIFICANT CHANGE UP (ref 0–0.5)
EOSINOPHIL # BLD AUTO: 0.2 K/UL — SIGNIFICANT CHANGE UP (ref 0–0.5)
EOSINOPHIL NFR BLD AUTO: 6 % — SIGNIFICANT CHANGE UP (ref 0–6)
EOSINOPHIL NFR BLD AUTO: 6 % — SIGNIFICANT CHANGE UP (ref 0–6)
GLUCOSE SERPL-MCNC: 91 MG/DL — SIGNIFICANT CHANGE UP (ref 70–99)
GLUCOSE SERPL-MCNC: 91 MG/DL — SIGNIFICANT CHANGE UP (ref 70–99)
HCT VFR BLD CALC: 38.1 % — SIGNIFICANT CHANGE UP (ref 34.5–45)
HCT VFR BLD CALC: 38.1 % — SIGNIFICANT CHANGE UP (ref 34.5–45)
HGB BLD-MCNC: 12.8 G/DL — SIGNIFICANT CHANGE UP (ref 11.5–15.5)
HGB BLD-MCNC: 12.8 G/DL — SIGNIFICANT CHANGE UP (ref 11.5–15.5)
LDH SERPL L TO P-CCNC: 236 U/L — SIGNIFICANT CHANGE UP (ref 50–242)
LDH SERPL L TO P-CCNC: 236 U/L — SIGNIFICANT CHANGE UP (ref 50–242)
LYMPHOCYTES # BLD AUTO: 0.77 K/UL — LOW (ref 1–3.3)
LYMPHOCYTES # BLD AUTO: 0.77 K/UL — LOW (ref 1–3.3)
LYMPHOCYTES # BLD AUTO: 23 % — SIGNIFICANT CHANGE UP (ref 13–44)
LYMPHOCYTES # BLD AUTO: 23 % — SIGNIFICANT CHANGE UP (ref 13–44)
MACROCYTES BLD QL: SLIGHT — SIGNIFICANT CHANGE UP
MACROCYTES BLD QL: SLIGHT — SIGNIFICANT CHANGE UP
MCHC RBC-ENTMCNC: 33.6 G/DL — SIGNIFICANT CHANGE UP (ref 32–36)
MCHC RBC-ENTMCNC: 33.6 G/DL — SIGNIFICANT CHANGE UP (ref 32–36)
MCHC RBC-ENTMCNC: 34.2 PG — HIGH (ref 27–34)
MCHC RBC-ENTMCNC: 34.2 PG — HIGH (ref 27–34)
MCV RBC AUTO: 101.9 FL — HIGH (ref 80–100)
MCV RBC AUTO: 101.9 FL — HIGH (ref 80–100)
MONOCYTES # BLD AUTO: 0.84 K/UL — SIGNIFICANT CHANGE UP (ref 0–0.9)
MONOCYTES # BLD AUTO: 0.84 K/UL — SIGNIFICANT CHANGE UP (ref 0–0.9)
MONOCYTES NFR BLD AUTO: 25 % — HIGH (ref 2–14)
MONOCYTES NFR BLD AUTO: 25 % — HIGH (ref 2–14)
NEUTROPHILS # BLD AUTO: 1.55 K/UL — LOW (ref 1.8–7.4)
NEUTROPHILS # BLD AUTO: 1.55 K/UL — LOW (ref 1.8–7.4)
NEUTROPHILS NFR BLD AUTO: 46 % — SIGNIFICANT CHANGE UP (ref 43–77)
NEUTROPHILS NFR BLD AUTO: 46 % — SIGNIFICANT CHANGE UP (ref 43–77)
NRBC # BLD: 0 /100 — SIGNIFICANT CHANGE UP (ref 0–0)
NRBC # BLD: 0 /100 — SIGNIFICANT CHANGE UP (ref 0–0)
NRBC # BLD: SIGNIFICANT CHANGE UP /100 WBCS (ref 0–0)
NRBC # BLD: SIGNIFICANT CHANGE UP /100 WBCS (ref 0–0)
PHOSPHATE SERPL-MCNC: 3.8 MG/DL — SIGNIFICANT CHANGE UP (ref 2.5–4.5)
PHOSPHATE SERPL-MCNC: 3.8 MG/DL — SIGNIFICANT CHANGE UP (ref 2.5–4.5)
PLAT MORPH BLD: NORMAL — SIGNIFICANT CHANGE UP
PLAT MORPH BLD: NORMAL — SIGNIFICANT CHANGE UP
PLATELET # BLD AUTO: 124 K/UL — LOW (ref 150–400)
PLATELET # BLD AUTO: 124 K/UL — LOW (ref 150–400)
POIKILOCYTOSIS BLD QL AUTO: SLIGHT — SIGNIFICANT CHANGE UP
POIKILOCYTOSIS BLD QL AUTO: SLIGHT — SIGNIFICANT CHANGE UP
POTASSIUM SERPL-MCNC: 4.7 MMOL/L — SIGNIFICANT CHANGE UP (ref 3.5–5.3)
POTASSIUM SERPL-MCNC: 4.7 MMOL/L — SIGNIFICANT CHANGE UP (ref 3.5–5.3)
POTASSIUM SERPL-SCNC: 4.7 MMOL/L — SIGNIFICANT CHANGE UP (ref 3.5–5.3)
POTASSIUM SERPL-SCNC: 4.7 MMOL/L — SIGNIFICANT CHANGE UP (ref 3.5–5.3)
PROT SERPL-MCNC: 6.1 G/DL — SIGNIFICANT CHANGE UP (ref 6–8.3)
PROT SERPL-MCNC: 6.1 G/DL — SIGNIFICANT CHANGE UP (ref 6–8.3)
RBC # BLD: 3.74 M/UL — LOW (ref 3.8–5.2)
RBC # BLD: 3.74 M/UL — LOW (ref 3.8–5.2)
RBC # FLD: 12.2 % — SIGNIFICANT CHANGE UP (ref 10.3–14.5)
RBC # FLD: 12.2 % — SIGNIFICANT CHANGE UP (ref 10.3–14.5)
RBC BLD AUTO: ABNORMAL
RBC BLD AUTO: ABNORMAL
SODIUM SERPL-SCNC: 141 MMOL/L — SIGNIFICANT CHANGE UP (ref 135–145)
SODIUM SERPL-SCNC: 141 MMOL/L — SIGNIFICANT CHANGE UP (ref 135–145)
URATE SERPL-MCNC: 4.3 MG/DL — SIGNIFICANT CHANGE UP (ref 2.5–7)
URATE SERPL-MCNC: 4.3 MG/DL — SIGNIFICANT CHANGE UP (ref 2.5–7)
WBC # BLD: 3.36 K/UL — LOW (ref 3.8–10.5)
WBC # BLD: 3.36 K/UL — LOW (ref 3.8–10.5)
WBC # FLD AUTO: 3.36 K/UL — LOW (ref 3.8–10.5)
WBC # FLD AUTO: 3.36 K/UL — LOW (ref 3.8–10.5)

## 2023-12-13 PROCEDURE — 85025 COMPLETE CBC W/AUTO DIFF WBC: CPT

## 2023-12-13 PROCEDURE — 84550 ASSAY OF BLOOD/URIC ACID: CPT

## 2023-12-13 PROCEDURE — 36415 COLL VENOUS BLD VENIPUNCTURE: CPT

## 2023-12-13 PROCEDURE — 86850 RBC ANTIBODY SCREEN: CPT

## 2023-12-13 PROCEDURE — 80053 COMPREHEN METABOLIC PANEL: CPT

## 2023-12-13 PROCEDURE — 83615 LACTATE (LD) (LDH) ENZYME: CPT

## 2023-12-13 PROCEDURE — 83735 ASSAY OF MAGNESIUM: CPT

## 2023-12-13 PROCEDURE — 84100 ASSAY OF PHOSPHORUS: CPT

## 2023-12-13 PROCEDURE — 86901 BLOOD TYPING SEROLOGIC RH(D): CPT

## 2023-12-13 PROCEDURE — 85610 PROTHROMBIN TIME: CPT

## 2023-12-13 PROCEDURE — 86900 BLOOD TYPING SEROLOGIC ABO: CPT

## 2023-12-15 ENCOUNTER — TRANSCRIPTION ENCOUNTER (OUTPATIENT)
Age: 63
End: 2023-12-15

## 2023-12-18 ENCOUNTER — APPOINTMENT (OUTPATIENT)
Dept: INFUSION THERAPY | Facility: HOSPITAL | Age: 63
End: 2023-12-18

## 2023-12-18 ENCOUNTER — RESULT REVIEW (OUTPATIENT)
Age: 63
End: 2023-12-18

## 2023-12-18 LAB
BASOPHILS # BLD AUTO: 0.03 K/UL — SIGNIFICANT CHANGE UP (ref 0–0.2)
BASOPHILS # BLD AUTO: 0.03 K/UL — SIGNIFICANT CHANGE UP (ref 0–0.2)
BASOPHILS NFR BLD AUTO: 1 % — SIGNIFICANT CHANGE UP (ref 0–2)
BASOPHILS NFR BLD AUTO: 1 % — SIGNIFICANT CHANGE UP (ref 0–2)
EOSINOPHIL # BLD AUTO: 0.13 K/UL — SIGNIFICANT CHANGE UP (ref 0–0.5)
EOSINOPHIL # BLD AUTO: 0.13 K/UL — SIGNIFICANT CHANGE UP (ref 0–0.5)
EOSINOPHIL NFR BLD AUTO: 4.3 % — SIGNIFICANT CHANGE UP (ref 0–6)
EOSINOPHIL NFR BLD AUTO: 4.3 % — SIGNIFICANT CHANGE UP (ref 0–6)
HCT VFR BLD CALC: 40.6 % — SIGNIFICANT CHANGE UP (ref 34.5–45)
HCT VFR BLD CALC: 40.6 % — SIGNIFICANT CHANGE UP (ref 34.5–45)
HGB BLD-MCNC: 13.3 G/DL — SIGNIFICANT CHANGE UP (ref 11.5–15.5)
HGB BLD-MCNC: 13.3 G/DL — SIGNIFICANT CHANGE UP (ref 11.5–15.5)
IMM GRANULOCYTES NFR BLD AUTO: 0.3 % — SIGNIFICANT CHANGE UP (ref 0–0.9)
IMM GRANULOCYTES NFR BLD AUTO: 0.3 % — SIGNIFICANT CHANGE UP (ref 0–0.9)
LYMPHOCYTES # BLD AUTO: 1.08 K/UL — SIGNIFICANT CHANGE UP (ref 1–3.3)
LYMPHOCYTES # BLD AUTO: 1.08 K/UL — SIGNIFICANT CHANGE UP (ref 1–3.3)
LYMPHOCYTES # BLD AUTO: 35.6 % — SIGNIFICANT CHANGE UP (ref 13–44)
LYMPHOCYTES # BLD AUTO: 35.6 % — SIGNIFICANT CHANGE UP (ref 13–44)
MCHC RBC-ENTMCNC: 32.8 G/DL — SIGNIFICANT CHANGE UP (ref 32–36)
MCHC RBC-ENTMCNC: 32.8 G/DL — SIGNIFICANT CHANGE UP (ref 32–36)
MCHC RBC-ENTMCNC: 33.6 PG — SIGNIFICANT CHANGE UP (ref 27–34)
MCHC RBC-ENTMCNC: 33.6 PG — SIGNIFICANT CHANGE UP (ref 27–34)
MCV RBC AUTO: 102.5 FL — HIGH (ref 80–100)
MCV RBC AUTO: 102.5 FL — HIGH (ref 80–100)
MONOCYTES # BLD AUTO: 0.66 K/UL — SIGNIFICANT CHANGE UP (ref 0–0.9)
MONOCYTES # BLD AUTO: 0.66 K/UL — SIGNIFICANT CHANGE UP (ref 0–0.9)
MONOCYTES NFR BLD AUTO: 21.8 % — HIGH (ref 2–14)
MONOCYTES NFR BLD AUTO: 21.8 % — HIGH (ref 2–14)
NEUTROPHILS # BLD AUTO: 1.12 K/UL — LOW (ref 1.8–7.4)
NEUTROPHILS # BLD AUTO: 1.12 K/UL — LOW (ref 1.8–7.4)
NEUTROPHILS NFR BLD AUTO: 37 % — LOW (ref 43–77)
NEUTROPHILS NFR BLD AUTO: 37 % — LOW (ref 43–77)
NRBC # BLD: 0 /100 WBCS — SIGNIFICANT CHANGE UP (ref 0–0)
NRBC # BLD: 0 /100 WBCS — SIGNIFICANT CHANGE UP (ref 0–0)
PLATELET # BLD AUTO: 149 K/UL — LOW (ref 150–400)
PLATELET # BLD AUTO: 149 K/UL — LOW (ref 150–400)
RBC # BLD: 3.96 M/UL — SIGNIFICANT CHANGE UP (ref 3.8–5.2)
RBC # BLD: 3.96 M/UL — SIGNIFICANT CHANGE UP (ref 3.8–5.2)
RBC # FLD: 12.4 % — SIGNIFICANT CHANGE UP (ref 10.3–14.5)
RBC # FLD: 12.4 % — SIGNIFICANT CHANGE UP (ref 10.3–14.5)
WBC # BLD: 3.03 K/UL — LOW (ref 3.8–10.5)
WBC # BLD: 3.03 K/UL — LOW (ref 3.8–10.5)
WBC # FLD AUTO: 3.03 K/UL — LOW (ref 3.8–10.5)
WBC # FLD AUTO: 3.03 K/UL — LOW (ref 3.8–10.5)

## 2023-12-19 ENCOUNTER — APPOINTMENT (OUTPATIENT)
Dept: INFUSION THERAPY | Facility: HOSPITAL | Age: 63
End: 2023-12-19

## 2023-12-19 LAB
ALBUMIN SERPL ELPH-MCNC: 4.1 G/DL — SIGNIFICANT CHANGE UP (ref 3.3–5)
ALBUMIN SERPL ELPH-MCNC: 4.1 G/DL — SIGNIFICANT CHANGE UP (ref 3.3–5)
ALP SERPL-CCNC: 103 U/L — SIGNIFICANT CHANGE UP (ref 40–120)
ALP SERPL-CCNC: 103 U/L — SIGNIFICANT CHANGE UP (ref 40–120)
ALT FLD-CCNC: 28 U/L — SIGNIFICANT CHANGE UP (ref 10–45)
ALT FLD-CCNC: 28 U/L — SIGNIFICANT CHANGE UP (ref 10–45)
ANION GAP SERPL CALC-SCNC: 14 MMOL/L — SIGNIFICANT CHANGE UP (ref 5–17)
ANION GAP SERPL CALC-SCNC: 14 MMOL/L — SIGNIFICANT CHANGE UP (ref 5–17)
AST SERPL-CCNC: 30 U/L — SIGNIFICANT CHANGE UP (ref 10–40)
AST SERPL-CCNC: 30 U/L — SIGNIFICANT CHANGE UP (ref 10–40)
BILIRUB SERPL-MCNC: 0.3 MG/DL — SIGNIFICANT CHANGE UP (ref 0.2–1.2)
BILIRUB SERPL-MCNC: 0.3 MG/DL — SIGNIFICANT CHANGE UP (ref 0.2–1.2)
BUN SERPL-MCNC: 18 MG/DL — SIGNIFICANT CHANGE UP (ref 7–23)
BUN SERPL-MCNC: 18 MG/DL — SIGNIFICANT CHANGE UP (ref 7–23)
CALCIUM SERPL-MCNC: 9.4 MG/DL — SIGNIFICANT CHANGE UP (ref 8.4–10.5)
CALCIUM SERPL-MCNC: 9.4 MG/DL — SIGNIFICANT CHANGE UP (ref 8.4–10.5)
CHLORIDE SERPL-SCNC: 107 MMOL/L — SIGNIFICANT CHANGE UP (ref 96–108)
CHLORIDE SERPL-SCNC: 107 MMOL/L — SIGNIFICANT CHANGE UP (ref 96–108)
CO2 SERPL-SCNC: 24 MMOL/L — SIGNIFICANT CHANGE UP (ref 22–31)
CO2 SERPL-SCNC: 24 MMOL/L — SIGNIFICANT CHANGE UP (ref 22–31)
CREAT SERPL-MCNC: 0.79 MG/DL — SIGNIFICANT CHANGE UP (ref 0.5–1.3)
CREAT SERPL-MCNC: 0.79 MG/DL — SIGNIFICANT CHANGE UP (ref 0.5–1.3)
EGFR: 83 ML/MIN/1.73M2 — SIGNIFICANT CHANGE UP
EGFR: 83 ML/MIN/1.73M2 — SIGNIFICANT CHANGE UP
GLUCOSE SERPL-MCNC: 74 MG/DL — SIGNIFICANT CHANGE UP (ref 70–99)
GLUCOSE SERPL-MCNC: 74 MG/DL — SIGNIFICANT CHANGE UP (ref 70–99)
LDH SERPL L TO P-CCNC: 205 U/L — SIGNIFICANT CHANGE UP (ref 50–242)
LDH SERPL L TO P-CCNC: 205 U/L — SIGNIFICANT CHANGE UP (ref 50–242)
PHOSPHATE SERPL-MCNC: 3 MG/DL — SIGNIFICANT CHANGE UP (ref 2.5–4.5)
PHOSPHATE SERPL-MCNC: 3 MG/DL — SIGNIFICANT CHANGE UP (ref 2.5–4.5)
POTASSIUM SERPL-MCNC: 4.7 MMOL/L — SIGNIFICANT CHANGE UP (ref 3.5–5.3)
POTASSIUM SERPL-MCNC: 4.7 MMOL/L — SIGNIFICANT CHANGE UP (ref 3.5–5.3)
POTASSIUM SERPL-SCNC: 4.7 MMOL/L — SIGNIFICANT CHANGE UP (ref 3.5–5.3)
POTASSIUM SERPL-SCNC: 4.7 MMOL/L — SIGNIFICANT CHANGE UP (ref 3.5–5.3)
PROT SERPL-MCNC: 6.6 G/DL — SIGNIFICANT CHANGE UP (ref 6–8.3)
PROT SERPL-MCNC: 6.6 G/DL — SIGNIFICANT CHANGE UP (ref 6–8.3)
SODIUM SERPL-SCNC: 144 MMOL/L — SIGNIFICANT CHANGE UP (ref 135–145)
SODIUM SERPL-SCNC: 144 MMOL/L — SIGNIFICANT CHANGE UP (ref 135–145)
URATE SERPL-MCNC: 4.4 MG/DL — SIGNIFICANT CHANGE UP (ref 2.5–7)
URATE SERPL-MCNC: 4.4 MG/DL — SIGNIFICANT CHANGE UP (ref 2.5–7)

## 2023-12-20 ENCOUNTER — TRANSCRIPTION ENCOUNTER (OUTPATIENT)
Age: 63
End: 2023-12-20

## 2023-12-26 ENCOUNTER — RESULT REVIEW (OUTPATIENT)
Age: 63
End: 2023-12-26

## 2023-12-26 ENCOUNTER — APPOINTMENT (OUTPATIENT)
Dept: INFUSION THERAPY | Facility: HOSPITAL | Age: 63
End: 2023-12-26

## 2023-12-26 LAB
ALBUMIN SERPL ELPH-MCNC: 3.9 G/DL — SIGNIFICANT CHANGE UP (ref 3.3–5)
ALBUMIN SERPL ELPH-MCNC: 3.9 G/DL — SIGNIFICANT CHANGE UP (ref 3.3–5)
ALP SERPL-CCNC: 105 U/L — SIGNIFICANT CHANGE UP (ref 40–120)
ALP SERPL-CCNC: 105 U/L — SIGNIFICANT CHANGE UP (ref 40–120)
ALT FLD-CCNC: 21 U/L — SIGNIFICANT CHANGE UP (ref 10–45)
ALT FLD-CCNC: 21 U/L — SIGNIFICANT CHANGE UP (ref 10–45)
ANION GAP SERPL CALC-SCNC: 9 MMOL/L — SIGNIFICANT CHANGE UP (ref 5–17)
ANION GAP SERPL CALC-SCNC: 9 MMOL/L — SIGNIFICANT CHANGE UP (ref 5–17)
AST SERPL-CCNC: 25 U/L — SIGNIFICANT CHANGE UP (ref 10–40)
AST SERPL-CCNC: 25 U/L — SIGNIFICANT CHANGE UP (ref 10–40)
BASOPHILS # BLD AUTO: 0.03 K/UL — SIGNIFICANT CHANGE UP (ref 0–0.2)
BASOPHILS # BLD AUTO: 0.03 K/UL — SIGNIFICANT CHANGE UP (ref 0–0.2)
BASOPHILS NFR BLD AUTO: 0.9 % — SIGNIFICANT CHANGE UP (ref 0–2)
BASOPHILS NFR BLD AUTO: 0.9 % — SIGNIFICANT CHANGE UP (ref 0–2)
BILIRUB SERPL-MCNC: 0.3 MG/DL — SIGNIFICANT CHANGE UP (ref 0.2–1.2)
BILIRUB SERPL-MCNC: 0.3 MG/DL — SIGNIFICANT CHANGE UP (ref 0.2–1.2)
BUN SERPL-MCNC: 14 MG/DL — SIGNIFICANT CHANGE UP (ref 7–23)
BUN SERPL-MCNC: 14 MG/DL — SIGNIFICANT CHANGE UP (ref 7–23)
CALCIUM SERPL-MCNC: 9.4 MG/DL — SIGNIFICANT CHANGE UP (ref 8.4–10.5)
CALCIUM SERPL-MCNC: 9.4 MG/DL — SIGNIFICANT CHANGE UP (ref 8.4–10.5)
CHLORIDE SERPL-SCNC: 106 MMOL/L — SIGNIFICANT CHANGE UP (ref 96–108)
CHLORIDE SERPL-SCNC: 106 MMOL/L — SIGNIFICANT CHANGE UP (ref 96–108)
CO2 SERPL-SCNC: 26 MMOL/L — SIGNIFICANT CHANGE UP (ref 22–31)
CO2 SERPL-SCNC: 26 MMOL/L — SIGNIFICANT CHANGE UP (ref 22–31)
CREAT SERPL-MCNC: 0.86 MG/DL — SIGNIFICANT CHANGE UP (ref 0.5–1.3)
CREAT SERPL-MCNC: 0.86 MG/DL — SIGNIFICANT CHANGE UP (ref 0.5–1.3)
EGFR: 75 ML/MIN/1.73M2 — SIGNIFICANT CHANGE UP
EGFR: 75 ML/MIN/1.73M2 — SIGNIFICANT CHANGE UP
EOSINOPHIL # BLD AUTO: 0.21 K/UL — SIGNIFICANT CHANGE UP (ref 0–0.5)
EOSINOPHIL # BLD AUTO: 0.21 K/UL — SIGNIFICANT CHANGE UP (ref 0–0.5)
EOSINOPHIL NFR BLD AUTO: 6.5 % — HIGH (ref 0–6)
EOSINOPHIL NFR BLD AUTO: 6.5 % — HIGH (ref 0–6)
GLUCOSE SERPL-MCNC: 105 MG/DL — HIGH (ref 70–99)
GLUCOSE SERPL-MCNC: 105 MG/DL — HIGH (ref 70–99)
HCT VFR BLD CALC: 37.7 % — SIGNIFICANT CHANGE UP (ref 34.5–45)
HCT VFR BLD CALC: 37.7 % — SIGNIFICANT CHANGE UP (ref 34.5–45)
HGB BLD-MCNC: 12.7 G/DL — SIGNIFICANT CHANGE UP (ref 11.5–15.5)
HGB BLD-MCNC: 12.7 G/DL — SIGNIFICANT CHANGE UP (ref 11.5–15.5)
IMM GRANULOCYTES NFR BLD AUTO: 0.9 % — SIGNIFICANT CHANGE UP (ref 0–0.9)
IMM GRANULOCYTES NFR BLD AUTO: 0.9 % — SIGNIFICANT CHANGE UP (ref 0–0.9)
LDH SERPL L TO P-CCNC: 166 U/L — SIGNIFICANT CHANGE UP (ref 50–242)
LDH SERPL L TO P-CCNC: 166 U/L — SIGNIFICANT CHANGE UP (ref 50–242)
LYMPHOCYTES # BLD AUTO: 0.92 K/UL — LOW (ref 1–3.3)
LYMPHOCYTES # BLD AUTO: 0.92 K/UL — LOW (ref 1–3.3)
LYMPHOCYTES # BLD AUTO: 28.7 % — SIGNIFICANT CHANGE UP (ref 13–44)
LYMPHOCYTES # BLD AUTO: 28.7 % — SIGNIFICANT CHANGE UP (ref 13–44)
MCHC RBC-ENTMCNC: 33.7 G/DL — SIGNIFICANT CHANGE UP (ref 32–36)
MCHC RBC-ENTMCNC: 33.7 G/DL — SIGNIFICANT CHANGE UP (ref 32–36)
MCHC RBC-ENTMCNC: 33.7 PG — SIGNIFICANT CHANGE UP (ref 27–34)
MCHC RBC-ENTMCNC: 33.7 PG — SIGNIFICANT CHANGE UP (ref 27–34)
MCV RBC AUTO: 100 FL — SIGNIFICANT CHANGE UP (ref 80–100)
MCV RBC AUTO: 100 FL — SIGNIFICANT CHANGE UP (ref 80–100)
MONOCYTES # BLD AUTO: 0.59 K/UL — SIGNIFICANT CHANGE UP (ref 0–0.9)
MONOCYTES # BLD AUTO: 0.59 K/UL — SIGNIFICANT CHANGE UP (ref 0–0.9)
MONOCYTES NFR BLD AUTO: 18.4 % — HIGH (ref 2–14)
MONOCYTES NFR BLD AUTO: 18.4 % — HIGH (ref 2–14)
NEUTROPHILS # BLD AUTO: 1.43 K/UL — LOW (ref 1.8–7.4)
NEUTROPHILS # BLD AUTO: 1.43 K/UL — LOW (ref 1.8–7.4)
NEUTROPHILS NFR BLD AUTO: 44.6 % — SIGNIFICANT CHANGE UP (ref 43–77)
NEUTROPHILS NFR BLD AUTO: 44.6 % — SIGNIFICANT CHANGE UP (ref 43–77)
NRBC # BLD: 0 /100 WBCS — SIGNIFICANT CHANGE UP (ref 0–0)
NRBC # BLD: 0 /100 WBCS — SIGNIFICANT CHANGE UP (ref 0–0)
PHOSPHATE SERPL-MCNC: 3.6 MG/DL — SIGNIFICANT CHANGE UP (ref 2.5–4.5)
PHOSPHATE SERPL-MCNC: 3.6 MG/DL — SIGNIFICANT CHANGE UP (ref 2.5–4.5)
PLATELET # BLD AUTO: 134 K/UL — LOW (ref 150–400)
PLATELET # BLD AUTO: 134 K/UL — LOW (ref 150–400)
POTASSIUM SERPL-MCNC: 4.2 MMOL/L — SIGNIFICANT CHANGE UP (ref 3.5–5.3)
POTASSIUM SERPL-MCNC: 4.2 MMOL/L — SIGNIFICANT CHANGE UP (ref 3.5–5.3)
POTASSIUM SERPL-SCNC: 4.2 MMOL/L — SIGNIFICANT CHANGE UP (ref 3.5–5.3)
POTASSIUM SERPL-SCNC: 4.2 MMOL/L — SIGNIFICANT CHANGE UP (ref 3.5–5.3)
PROT SERPL-MCNC: 5.9 G/DL — LOW (ref 6–8.3)
PROT SERPL-MCNC: 5.9 G/DL — LOW (ref 6–8.3)
RBC # BLD: 3.77 M/UL — LOW (ref 3.8–5.2)
RBC # BLD: 3.77 M/UL — LOW (ref 3.8–5.2)
RBC # FLD: 12.2 % — SIGNIFICANT CHANGE UP (ref 10.3–14.5)
RBC # FLD: 12.2 % — SIGNIFICANT CHANGE UP (ref 10.3–14.5)
SODIUM SERPL-SCNC: 141 MMOL/L — SIGNIFICANT CHANGE UP (ref 135–145)
SODIUM SERPL-SCNC: 141 MMOL/L — SIGNIFICANT CHANGE UP (ref 135–145)
URATE SERPL-MCNC: 4.5 MG/DL — SIGNIFICANT CHANGE UP (ref 2.5–7)
URATE SERPL-MCNC: 4.5 MG/DL — SIGNIFICANT CHANGE UP (ref 2.5–7)
WBC # BLD: 3.21 K/UL — LOW (ref 3.8–10.5)
WBC # BLD: 3.21 K/UL — LOW (ref 3.8–10.5)
WBC # FLD AUTO: 3.21 K/UL — LOW (ref 3.8–10.5)
WBC # FLD AUTO: 3.21 K/UL — LOW (ref 3.8–10.5)

## 2023-12-27 ENCOUNTER — APPOINTMENT (OUTPATIENT)
Dept: INFUSION THERAPY | Facility: HOSPITAL | Age: 63
End: 2023-12-27

## 2023-12-29 ENCOUNTER — APPOINTMENT (OUTPATIENT)
Dept: INFUSION THERAPY | Facility: HOSPITAL | Age: 63
End: 2023-12-29

## 2024-01-02 ENCOUNTER — TRANSCRIPTION ENCOUNTER (OUTPATIENT)
Age: 64
End: 2024-01-02

## 2024-01-02 ENCOUNTER — OUTPATIENT (OUTPATIENT)
Dept: OUTPATIENT SERVICES | Facility: HOSPITAL | Age: 64
LOS: 1 days | Discharge: HOME | End: 2024-01-02

## 2024-01-02 DIAGNOSIS — Z98.891 HISTORY OF UTERINE SCAR FROM PREVIOUS SURGERY: Chronic | ICD-10-CM

## 2024-01-02 DIAGNOSIS — C95.90 LEUKEMIA, UNSPECIFIED NOT HAVING ACHIEVED REMISSION: ICD-10-CM

## 2024-01-03 ENCOUNTER — TRANSCRIPTION ENCOUNTER (OUTPATIENT)
Age: 64
End: 2024-01-03

## 2024-01-04 NOTE — PROGRESS NOTE ADULT - REASON FOR ADMISSION
Neelima  and her daughter   were  here for chemotherapy teaching. Spent 30  minutes with them.  Teaching sheets from [facility] and verbal information given on chemotherapy agents, action, administration and side effects.    Chemotherapy medications discussed: trazimera taxol     Pregnancy warnings reviewed: not indicated     Chemotherapy consent form signed by patient.    Provided new patient binder, Chemotherapy and You booklet, Eating Hints booklet    Port placement: no port     Reviewed take home medication: zofran compazine     Questions answered and support given.    [facility] rehab referral assessment form, distress tool form and PG-SGA form completed by patient.    Needs that were identified during teaching visit: teach appointment not complete, pts daughter had seizue like activity about 30 mins into appointment [facility] called and pts daughter transported to the ER     Discussed community resources such as [employer], [facility], [facility], [employer], [employer], etc.    Pt will return on 1/16 for C1D1 and f/u with Dr. Oden   on 1/16.     Jessy Alves RN   
ro leukemia

## 2024-01-05 ENCOUNTER — APPOINTMENT (OUTPATIENT)
Dept: HEMATOLOGY ONCOLOGY | Facility: CLINIC | Age: 64
End: 2024-01-05
Payer: MEDICARE

## 2024-01-05 ENCOUNTER — RESULT REVIEW (OUTPATIENT)
Age: 64
End: 2024-01-05

## 2024-01-05 ENCOUNTER — LABORATORY RESULT (OUTPATIENT)
Age: 64
End: 2024-01-05

## 2024-01-05 ENCOUNTER — OUTPATIENT (OUTPATIENT)
Dept: OUTPATIENT SERVICES | Facility: HOSPITAL | Age: 64
LOS: 1 days | End: 2024-01-05
Payer: MEDICARE

## 2024-01-05 ENCOUNTER — APPOINTMENT (OUTPATIENT)
Dept: INFUSION THERAPY | Facility: HOSPITAL | Age: 64
End: 2024-01-05

## 2024-01-05 VITALS
HEART RATE: 68 BPM | HEIGHT: 62.17 IN | DIASTOLIC BLOOD PRESSURE: 82 MMHG | TEMPERATURE: 98 F | SYSTOLIC BLOOD PRESSURE: 152 MMHG | RESPIRATION RATE: 16 BRPM | BODY MASS INDEX: 38.46 KG/M2 | WEIGHT: 211.64 LBS | OXYGEN SATURATION: 99 %

## 2024-01-05 DIAGNOSIS — Z98.891 HISTORY OF UTERINE SCAR FROM PREVIOUS SURGERY: Chronic | ICD-10-CM

## 2024-01-05 LAB
BASOPHILS # BLD AUTO: 0.03 K/UL — SIGNIFICANT CHANGE UP (ref 0–0.2)
BASOPHILS # BLD AUTO: 0.03 K/UL — SIGNIFICANT CHANGE UP (ref 0–0.2)
BASOPHILS NFR BLD AUTO: 0.6 % — SIGNIFICANT CHANGE UP (ref 0–2)
BASOPHILS NFR BLD AUTO: 0.6 % — SIGNIFICANT CHANGE UP (ref 0–2)
EOSINOPHIL # BLD AUTO: 0.2 K/UL — SIGNIFICANT CHANGE UP (ref 0–0.5)
EOSINOPHIL # BLD AUTO: 0.2 K/UL — SIGNIFICANT CHANGE UP (ref 0–0.5)
EOSINOPHIL NFR BLD AUTO: 4.2 % — SIGNIFICANT CHANGE UP (ref 0–6)
EOSINOPHIL NFR BLD AUTO: 4.2 % — SIGNIFICANT CHANGE UP (ref 0–6)
HCT VFR BLD CALC: 39.8 % — SIGNIFICANT CHANGE UP (ref 34.5–45)
HCT VFR BLD CALC: 39.8 % — SIGNIFICANT CHANGE UP (ref 34.5–45)
HGB BLD-MCNC: 14 G/DL — SIGNIFICANT CHANGE UP (ref 11.5–15.5)
HGB BLD-MCNC: 14 G/DL — SIGNIFICANT CHANGE UP (ref 11.5–15.5)
IMM GRANULOCYTES NFR BLD AUTO: 0.4 % — SIGNIFICANT CHANGE UP (ref 0–0.9)
IMM GRANULOCYTES NFR BLD AUTO: 0.4 % — SIGNIFICANT CHANGE UP (ref 0–0.9)
INR BLD: 0.91 RATIO — SIGNIFICANT CHANGE UP (ref 0.85–1.18)
INR BLD: 0.91 RATIO — SIGNIFICANT CHANGE UP (ref 0.85–1.18)
LYMPHOCYTES # BLD AUTO: 0.84 K/UL — LOW (ref 1–3.3)
LYMPHOCYTES # BLD AUTO: 0.84 K/UL — LOW (ref 1–3.3)
LYMPHOCYTES # BLD AUTO: 17.6 % — SIGNIFICANT CHANGE UP (ref 13–44)
LYMPHOCYTES # BLD AUTO: 17.6 % — SIGNIFICANT CHANGE UP (ref 13–44)
MCHC RBC-ENTMCNC: 34.3 PG — HIGH (ref 27–34)
MCHC RBC-ENTMCNC: 34.3 PG — HIGH (ref 27–34)
MCHC RBC-ENTMCNC: 35.2 G/DL — SIGNIFICANT CHANGE UP (ref 32–36)
MCHC RBC-ENTMCNC: 35.2 G/DL — SIGNIFICANT CHANGE UP (ref 32–36)
MCV RBC AUTO: 97.5 FL — SIGNIFICANT CHANGE UP (ref 80–100)
MCV RBC AUTO: 97.5 FL — SIGNIFICANT CHANGE UP (ref 80–100)
MONOCYTES # BLD AUTO: 0.57 K/UL — SIGNIFICANT CHANGE UP (ref 0–0.9)
MONOCYTES # BLD AUTO: 0.57 K/UL — SIGNIFICANT CHANGE UP (ref 0–0.9)
MONOCYTES NFR BLD AUTO: 12 % — SIGNIFICANT CHANGE UP (ref 2–14)
MONOCYTES NFR BLD AUTO: 12 % — SIGNIFICANT CHANGE UP (ref 2–14)
NEUTROPHILS # BLD AUTO: 3.1 K/UL — SIGNIFICANT CHANGE UP (ref 1.8–7.4)
NEUTROPHILS # BLD AUTO: 3.1 K/UL — SIGNIFICANT CHANGE UP (ref 1.8–7.4)
NEUTROPHILS NFR BLD AUTO: 65.2 % — SIGNIFICANT CHANGE UP (ref 43–77)
NEUTROPHILS NFR BLD AUTO: 65.2 % — SIGNIFICANT CHANGE UP (ref 43–77)
NRBC # BLD: 0 /100 WBCS — SIGNIFICANT CHANGE UP (ref 0–0)
NRBC # BLD: 0 /100 WBCS — SIGNIFICANT CHANGE UP (ref 0–0)
PLATELET # BLD AUTO: 138 K/UL — LOW (ref 150–400)
PLATELET # BLD AUTO: 138 K/UL — LOW (ref 150–400)
PROTHROM AB SERPL-ACNC: 10.4 SEC — SIGNIFICANT CHANGE UP (ref 9.5–13)
PROTHROM AB SERPL-ACNC: 10.4 SEC — SIGNIFICANT CHANGE UP (ref 9.5–13)
RBC # BLD: 4.08 M/UL — SIGNIFICANT CHANGE UP (ref 3.8–5.2)
RBC # BLD: 4.08 M/UL — SIGNIFICANT CHANGE UP (ref 3.8–5.2)
RBC # FLD: 11.9 % — SIGNIFICANT CHANGE UP (ref 10.3–14.5)
RBC # FLD: 11.9 % — SIGNIFICANT CHANGE UP (ref 10.3–14.5)
WBC # BLD: 4.76 K/UL — SIGNIFICANT CHANGE UP (ref 3.8–10.5)
WBC # BLD: 4.76 K/UL — SIGNIFICANT CHANGE UP (ref 3.8–10.5)
WBC # FLD AUTO: 4.76 K/UL — SIGNIFICANT CHANGE UP (ref 3.8–10.5)
WBC # FLD AUTO: 4.76 K/UL — SIGNIFICANT CHANGE UP (ref 3.8–10.5)

## 2024-01-05 PROCEDURE — 38221 DX BONE MARROW BIOPSIES: CPT | Mod: LT

## 2024-01-05 NOTE — REASON FOR VISIT
[Bone Marrow Biopsy] : bone marrow biopsy [Bone Marrow Aspiration] : bone marrow aspiration [Spouse] : spouse [FreeTextEntry2] : ALL Ph (-) s/p 2 cycles of blinatumomab

## 2024-01-05 NOTE — PROCEDURE
[Bone Marrow Biopsy] : bone marrow biopsy [Bone Marrow Aspiration] : bone marrow aspiration  [Patient] : the patient [Verbal Consent Obtained] : verbal consent was obtained prior to the procedure [Patient identification verified] : patient identification verified [Procedure verified and consent obtained] : procedure verified and consent obtained [Laterality verified and correct site marked] : laterality verified and correct site marked [Left] : site: left [Correct positioning] : correct positioning [Prone] : prone [Superior iliac spine was identified] : the superior iliac spine was identified. [The left posterior iliac crest was prepped with betadine and draped, using sterile technique.] : The left posterior iliac crest was prepped with betadine and draped, using sterile technique. [Lidocaine was injected and into the periosteum overlying the site.] : Lidocaine was injected and into the periosteum overlying the site. [Aspirate] : aspirate [Cytogenetics] : cytogenetics [FISH] : FISH [Biopsy] : biopsy [Flow Cytometry] : flow cytometry [] : The patient was instructed to remove the bandage the following AM. The patient may bathe. Acetaminophen may be taken for discomfort, as per package directions.If there are any other problems, the patient was instructed to call the office. The patient verbalized understanding, and is aware of the office contact numbers. [FreeTextEntry1] : ALL Ph (-) s/p 2 cycles of blinatumomab [FreeTextEntry2] : 10 cc of 1% lidocaine was injected into the L PIC site.  WBC: 4.76 Hgb: 14.0 Hct: 39.8 Plts: 138  Bone marrow aspiration and biopsy were done. ALL panel requested. [employer] MRD sent, tracking #: [id number]

## 2024-01-08 ENCOUNTER — APPOINTMENT (OUTPATIENT)
Dept: HEMATOLOGY ONCOLOGY | Facility: CLINIC | Age: 64
End: 2024-01-08
Payer: MEDICARE

## 2024-01-08 ENCOUNTER — RX RENEWAL (OUTPATIENT)
Age: 64
End: 2024-01-08

## 2024-01-08 VITALS
OXYGEN SATURATION: 97 % | BODY MASS INDEX: 37.82 KG/M2 | WEIGHT: 207.87 LBS | RESPIRATION RATE: 16 BRPM | TEMPERATURE: 97.9 F | DIASTOLIC BLOOD PRESSURE: 79 MMHG | HEART RATE: 90 BPM | SYSTOLIC BLOOD PRESSURE: 137 MMHG

## 2024-01-08 PROCEDURE — 99215 OFFICE O/P EST HI 40 MIN: CPT

## 2024-01-08 RX ORDER — ATOVAQUONE 750 MG/5ML
750 SUSPENSION ORAL
Qty: 210 | Refills: 3 | Status: ACTIVE | COMMUNITY
Start: 2023-07-27 | End: 1900-01-01

## 2024-01-11 DIAGNOSIS — C95.90 LEUKEMIA, UNSPECIFIED NOT HAVING ACHIEVED REMISSION: ICD-10-CM

## 2024-01-12 ENCOUNTER — RESULT REVIEW (OUTPATIENT)
Age: 64
End: 2024-01-12

## 2024-01-12 ENCOUNTER — APPOINTMENT (OUTPATIENT)
Dept: INFUSION THERAPY | Facility: HOSPITAL | Age: 64
End: 2024-01-12

## 2024-01-12 LAB
ALBUMIN SERPL ELPH-MCNC: 4.4 G/DL — SIGNIFICANT CHANGE UP (ref 3.3–5)
ALBUMIN SERPL ELPH-MCNC: 4.4 G/DL — SIGNIFICANT CHANGE UP (ref 3.3–5)
ALP SERPL-CCNC: 97 U/L — SIGNIFICANT CHANGE UP (ref 40–120)
ALP SERPL-CCNC: 97 U/L — SIGNIFICANT CHANGE UP (ref 40–120)
ALT FLD-CCNC: 31 U/L — SIGNIFICANT CHANGE UP (ref 10–45)
ALT FLD-CCNC: 31 U/L — SIGNIFICANT CHANGE UP (ref 10–45)
ANION GAP SERPL CALC-SCNC: 10 MMOL/L — SIGNIFICANT CHANGE UP (ref 5–17)
ANION GAP SERPL CALC-SCNC: 10 MMOL/L — SIGNIFICANT CHANGE UP (ref 5–17)
AST SERPL-CCNC: 33 U/L — SIGNIFICANT CHANGE UP (ref 10–40)
AST SERPL-CCNC: 33 U/L — SIGNIFICANT CHANGE UP (ref 10–40)
BASOPHILS # BLD AUTO: 0.04 K/UL — SIGNIFICANT CHANGE UP (ref 0–0.2)
BASOPHILS # BLD AUTO: 0.04 K/UL — SIGNIFICANT CHANGE UP (ref 0–0.2)
BASOPHILS NFR BLD AUTO: 0.8 % — SIGNIFICANT CHANGE UP (ref 0–2)
BASOPHILS NFR BLD AUTO: 0.8 % — SIGNIFICANT CHANGE UP (ref 0–2)
BILIRUB SERPL-MCNC: 0.5 MG/DL — SIGNIFICANT CHANGE UP (ref 0.2–1.2)
BILIRUB SERPL-MCNC: 0.5 MG/DL — SIGNIFICANT CHANGE UP (ref 0.2–1.2)
BUN SERPL-MCNC: 15 MG/DL — SIGNIFICANT CHANGE UP (ref 7–23)
BUN SERPL-MCNC: 15 MG/DL — SIGNIFICANT CHANGE UP (ref 7–23)
CALCIUM SERPL-MCNC: 9.6 MG/DL — SIGNIFICANT CHANGE UP (ref 8.4–10.5)
CALCIUM SERPL-MCNC: 9.6 MG/DL — SIGNIFICANT CHANGE UP (ref 8.4–10.5)
CHLORIDE SERPL-SCNC: 106 MMOL/L — SIGNIFICANT CHANGE UP (ref 96–108)
CHLORIDE SERPL-SCNC: 106 MMOL/L — SIGNIFICANT CHANGE UP (ref 96–108)
CO2 SERPL-SCNC: 26 MMOL/L — SIGNIFICANT CHANGE UP (ref 22–31)
CO2 SERPL-SCNC: 26 MMOL/L — SIGNIFICANT CHANGE UP (ref 22–31)
CREAT SERPL-MCNC: 0.8 MG/DL — SIGNIFICANT CHANGE UP (ref 0.5–1.3)
CREAT SERPL-MCNC: 0.8 MG/DL — SIGNIFICANT CHANGE UP (ref 0.5–1.3)
EGFR: 81 ML/MIN/1.73M2 — SIGNIFICANT CHANGE UP
EGFR: 81 ML/MIN/1.73M2 — SIGNIFICANT CHANGE UP
EOSINOPHIL # BLD AUTO: 0.18 K/UL — SIGNIFICANT CHANGE UP (ref 0–0.5)
EOSINOPHIL # BLD AUTO: 0.18 K/UL — SIGNIFICANT CHANGE UP (ref 0–0.5)
EOSINOPHIL NFR BLD AUTO: 3.7 % — SIGNIFICANT CHANGE UP (ref 0–6)
EOSINOPHIL NFR BLD AUTO: 3.7 % — SIGNIFICANT CHANGE UP (ref 0–6)
GLUCOSE SERPL-MCNC: 89 MG/DL — SIGNIFICANT CHANGE UP (ref 70–99)
GLUCOSE SERPL-MCNC: 89 MG/DL — SIGNIFICANT CHANGE UP (ref 70–99)
HCT VFR BLD CALC: 39.3 % — SIGNIFICANT CHANGE UP (ref 34.5–45)
HCT VFR BLD CALC: 39.3 % — SIGNIFICANT CHANGE UP (ref 34.5–45)
HGB BLD-MCNC: 13.6 G/DL — SIGNIFICANT CHANGE UP (ref 11.5–15.5)
HGB BLD-MCNC: 13.6 G/DL — SIGNIFICANT CHANGE UP (ref 11.5–15.5)
IMM GRANULOCYTES NFR BLD AUTO: 0.4 % — SIGNIFICANT CHANGE UP (ref 0–0.9)
IMM GRANULOCYTES NFR BLD AUTO: 0.4 % — SIGNIFICANT CHANGE UP (ref 0–0.9)
LDH SERPL L TO P-CCNC: 188 U/L — SIGNIFICANT CHANGE UP (ref 50–242)
LDH SERPL L TO P-CCNC: 188 U/L — SIGNIFICANT CHANGE UP (ref 50–242)
LYMPHOCYTES # BLD AUTO: 1.14 K/UL — SIGNIFICANT CHANGE UP (ref 1–3.3)
LYMPHOCYTES # BLD AUTO: 1.14 K/UL — SIGNIFICANT CHANGE UP (ref 1–3.3)
LYMPHOCYTES # BLD AUTO: 23.3 % — SIGNIFICANT CHANGE UP (ref 13–44)
LYMPHOCYTES # BLD AUTO: 23.3 % — SIGNIFICANT CHANGE UP (ref 13–44)
MCHC RBC-ENTMCNC: 34.3 PG — HIGH (ref 27–34)
MCHC RBC-ENTMCNC: 34.3 PG — HIGH (ref 27–34)
MCHC RBC-ENTMCNC: 34.6 G/DL — SIGNIFICANT CHANGE UP (ref 32–36)
MCHC RBC-ENTMCNC: 34.6 G/DL — SIGNIFICANT CHANGE UP (ref 32–36)
MCV RBC AUTO: 99.2 FL — SIGNIFICANT CHANGE UP (ref 80–100)
MCV RBC AUTO: 99.2 FL — SIGNIFICANT CHANGE UP (ref 80–100)
MONOCYTES # BLD AUTO: 0.68 K/UL — SIGNIFICANT CHANGE UP (ref 0–0.9)
MONOCYTES # BLD AUTO: 0.68 K/UL — SIGNIFICANT CHANGE UP (ref 0–0.9)
MONOCYTES NFR BLD AUTO: 13.9 % — SIGNIFICANT CHANGE UP (ref 2–14)
MONOCYTES NFR BLD AUTO: 13.9 % — SIGNIFICANT CHANGE UP (ref 2–14)
NEUTROPHILS # BLD AUTO: 2.83 K/UL — SIGNIFICANT CHANGE UP (ref 1.8–7.4)
NEUTROPHILS # BLD AUTO: 2.83 K/UL — SIGNIFICANT CHANGE UP (ref 1.8–7.4)
NEUTROPHILS NFR BLD AUTO: 57.9 % — SIGNIFICANT CHANGE UP (ref 43–77)
NEUTROPHILS NFR BLD AUTO: 57.9 % — SIGNIFICANT CHANGE UP (ref 43–77)
NRBC # BLD: 0 /100 WBCS — SIGNIFICANT CHANGE UP (ref 0–0)
NRBC # BLD: 0 /100 WBCS — SIGNIFICANT CHANGE UP (ref 0–0)
PHOSPHATE SERPL-MCNC: 3.3 MG/DL — SIGNIFICANT CHANGE UP (ref 2.5–4.5)
PHOSPHATE SERPL-MCNC: 3.3 MG/DL — SIGNIFICANT CHANGE UP (ref 2.5–4.5)
PLATELET # BLD AUTO: 132 K/UL — LOW (ref 150–400)
PLATELET # BLD AUTO: 132 K/UL — LOW (ref 150–400)
POTASSIUM SERPL-MCNC: 4.2 MMOL/L — SIGNIFICANT CHANGE UP (ref 3.5–5.3)
POTASSIUM SERPL-MCNC: 4.2 MMOL/L — SIGNIFICANT CHANGE UP (ref 3.5–5.3)
POTASSIUM SERPL-SCNC: 4.2 MMOL/L — SIGNIFICANT CHANGE UP (ref 3.5–5.3)
POTASSIUM SERPL-SCNC: 4.2 MMOL/L — SIGNIFICANT CHANGE UP (ref 3.5–5.3)
PROT SERPL-MCNC: 6.4 G/DL — SIGNIFICANT CHANGE UP (ref 6–8.3)
PROT SERPL-MCNC: 6.4 G/DL — SIGNIFICANT CHANGE UP (ref 6–8.3)
RBC # BLD: 3.96 M/UL — SIGNIFICANT CHANGE UP (ref 3.8–5.2)
RBC # BLD: 3.96 M/UL — SIGNIFICANT CHANGE UP (ref 3.8–5.2)
RBC # FLD: 12 % — SIGNIFICANT CHANGE UP (ref 10.3–14.5)
RBC # FLD: 12 % — SIGNIFICANT CHANGE UP (ref 10.3–14.5)
SODIUM SERPL-SCNC: 141 MMOL/L — SIGNIFICANT CHANGE UP (ref 135–145)
SODIUM SERPL-SCNC: 141 MMOL/L — SIGNIFICANT CHANGE UP (ref 135–145)
URATE SERPL-MCNC: 4 MG/DL — SIGNIFICANT CHANGE UP (ref 2.5–7)
URATE SERPL-MCNC: 4 MG/DL — SIGNIFICANT CHANGE UP (ref 2.5–7)
WBC # BLD: 4.89 K/UL — SIGNIFICANT CHANGE UP (ref 3.8–10.5)
WBC # BLD: 4.89 K/UL — SIGNIFICANT CHANGE UP (ref 3.8–10.5)
WBC # FLD AUTO: 4.89 K/UL — SIGNIFICANT CHANGE UP (ref 3.8–10.5)
WBC # FLD AUTO: 4.89 K/UL — SIGNIFICANT CHANGE UP (ref 3.8–10.5)

## 2024-01-16 ENCOUNTER — APPOINTMENT (OUTPATIENT)
Dept: HEMATOLOGY ONCOLOGY | Facility: CLINIC | Age: 64
End: 2024-01-16
Payer: MEDICARE

## 2024-01-16 VITALS
BODY MASS INDEX: 38.3 KG/M2 | OXYGEN SATURATION: 97 % | SYSTOLIC BLOOD PRESSURE: 143 MMHG | DIASTOLIC BLOOD PRESSURE: 70 MMHG | TEMPERATURE: 96.6 F | HEART RATE: 67 BPM | RESPIRATION RATE: 16 BRPM | WEIGHT: 210.54 LBS

## 2024-01-16 DIAGNOSIS — C91.00 ACUTE LYMPHOBLASTIC LEUKEMIA NOT HAVING ACHIEVED REMISSION: ICD-10-CM

## 2024-01-16 DIAGNOSIS — R11.2 NAUSEA WITH VOMITING, UNSPECIFIED: ICD-10-CM

## 2024-01-16 DIAGNOSIS — Z79.899 OTHER LONG TERM (CURRENT) DRUG THERAPY: ICD-10-CM

## 2024-01-16 PROCEDURE — 99214 OFFICE O/P EST MOD 30 MIN: CPT

## 2024-01-16 RX ORDER — ESCITALOPRAM OXALATE 20 MG/1
20 TABLET ORAL DAILY
Qty: 30 | Refills: 0 | Status: ACTIVE | COMMUNITY

## 2024-01-16 NOTE — HISTORY OF PRESENT ILLNESS
[Disease:__________________________] : Disease: [unfilled] [Therapy: ___] : Therapy: [unfilled] [Cycle: ___] : Cycle: [unfilled] [Day: ___] : Day: [unfilled] [de-identified] : 64 y/o F transferred [place] [facility] [place] [facility] for new diagnosis of acute leukemia, s/p initial inpatient treatment and now here for establishment of outpatient care. 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  Chemotherapy with reduced dose HyperCVAD was started on 3/3/23. Hospital course was c/b neutropenic fever, transaminitis, a rash on 3/4 which improved with topical steroid and atarax PRN,  and also LUE cellulitis.  Of note, on peripheral blood BCR/ABL PCR was negative, although in her bone marrow she did have PCR for BCR/ABL p190 positive at an extremely low level of 0.001%. She had a pre-treatment echocardiogram done which showed an EF 55%, and PICC line was placed. Pt started reduced dose HyperCVAD  on 3/3/23: (IV Cyclophosphamide (150 mg/m2 every 12 h on days 1-3), Dexamethasone 20 mg per day on days 1-4 and 11-14, Vincristine 2 mg IV flat dose on days 1 and 8, Daunorubicin 25 mg/m2/day IV continuous infusion over 48 hours, starting on day 4). Informed consent obtained. LP with IT MTX done on 2/28, and CSF was subsequently found to be negative for malignant cells. Pt had a developing transaminitis, and US done at [facility] had shown a fatty liver. CT A/P done at [facility] showed splenomegaly with a small age indeterminant splenic infarct, but otherwise showed a normal liver and no abdominal or pelvic lymphadenopathy. Ultimately, during hospital stay she also developed neutropenic fever, s/p panculture which was negative,. She was treated with cefepime, acyclovir and caspofungin. Course also c/b severe headache as side effect from Zofran (CTH negative). Discharged home in stable condition.  Now s/p cycle 1B had BMBx on 4/28 which showed complete remission and Clonoseq showed MRD- but did show lower signal for possible residual sequences.  After cycle 1A doses were escalated to full HyperCVAD.  Now s/p cycle 2B and had BMBx - also with continued complete remission.  Clonoseq was negative for MRD to the limit of the test, but there were residual sequences below the limit of detection which can be considered reliable, She was continued on a dose reduced cycle 3A in order to limit toxocity, and she has had significant delay in count recovery. Subsequently due to this recovery delay, she is s/p repeat BMBx on 9/8/2023 which showed that she is still in complete morphologic remission, and on clonoseq testing she still has residual sequence detected, but it is detected below the limit of detection, at a range of >0 - <1 residual clonal cells per million nucleated cells. This frequency is too low to enable consistent MRD determination across samples. Total nucleated cells evaluated from this sample: 3,012,[id number].  Now starting blinatumomab and possible plan for consolidative allogeneic SCT.  [FreeTextEntry1] : s/p cycle 1A, 1B, 2A, 2B, and 3A (dose reduced) of hyperCVAD.  [de-identified] : Patient seen for follow up on 01/16/2024. Do not have bone marrow results from 1/5/24 yet. She is feeling well at this point. She gets tired every once in a while, but her appetite is "too good". She had 3 nights at the end of December where she had night sweats, but no measured fevers and was very mild. Not to the point of changing clothes or sheets. Denied any chest pains or dyspnea at this point.

## 2024-01-16 NOTE — PHYSICAL EXAM
[Fully active, able to carry on all pre-disease performance without restriction] : Status 0 - Fully active, able to carry on all pre-disease performance without restriction [Normal] : affect appropriate [Ulcers] : no ulcers [Mucositis] : no mucositis [Thrush] : no thrush [Vesicles] : no vesicles [de-identified] : BMI 35.81 [de-identified] : supple [de-identified] : (+)S1S2 RRR [de-identified] : no edema [de-identified] : no pain

## 2024-01-16 NOTE — ASSESSMENT
[FreeTextEntry1] : 64 y/o F with Ph (-) ALL diagnosed in February 2023 and now s/p cycle 3A of Hyper-CVAD (was dose reduced with cycle 1A and also 3A). After cycle 2B the patient had another BMBx which showed continued CR with continued negative MRD on Clonoseq testing, although with a low level residual sequence below the limit of detection. Unclear how to interpret this data. (She had this result after cycle 1B as well). Previously educated patient as well on the Clonoseq testing as a tool to assess measurable residual disease (MRD) using NGS technology. She has a mediport in place and doing well.  She is now s/p cycle 3A which had been dose reduced by 50%.  However, subsequently with delayed count recovery after cycle 3A and s/p bone marrow biopsy on 9/8 which showed CR but with same low level of detection of residual signal below the limit of detection. Counts didn't recover fully after around 2 months, consistent with a CRi, but  with possible evidence of very low level MRD.  Now receiving blinatumomab, cycle 3 day 5. Tolerated cycle 1-2 extremely well,  BMBx after cycle 2 showing CR, but pending Clonoseq testing.  Plan: -No longer neutropenic, off prophylaxis at this point. -Patient without any transfusional needs right now.  -Tolerating blinatumomab well. -Cycle 3 is with blinatumumab 28 mcg per day x 28 day infusion all done as an outpatient. -In addition, discussed the plan for allogeneic stem cell transplant once she clears her MRD. Has two sisters (youngest is 60 years old) and two sons in their 40s. Has been evaluated by the BMT team and pending matching. Will likely plan for transplant after this cycle of blinatumomab.  -Continue preBMT workup. OK to go to dentist and have cleaning etc. She is not neutropenic and has adequate platelet levels.  -Patient understands and agrees with plan. All information explained to the best of my ability.

## 2024-01-16 NOTE — REVIEW OF SYSTEMS
[Negative] : Allergic/Immunologic [Vision Problems] : no vision problems [Dysphagia] : no dysphagia [Nosebleeds] : no nosebleeds [Odynophagia] : no odynophagia [Chest Pain] : no chest pain [Palpitations] : no palpitations [Lower Ext Edema] : no lower extremity edema [Dysuria] : no dysuria [Skin Rash] : no skin rash [Skin Wound] : no skin wound [Insomnia] : no insomnia [Anxiety] : no anxiety [Hot Flashes] : no hot flashes

## 2024-01-16 NOTE — RESULTS/DATA
[FreeTextEntry1] : Bone marrow biopsy and bone marrow aspirate (9/8/2023)      - Normocellular marrow with erythroid predominant trilineage  hematopoiesis and mildly decreased megakaryocytes (history of ALL).  See note and description.  Diagnostic Note: Please note findings of normal karyotype.    FISH detected BELOW CUT-OFF  VALUE FOR three copies of ASS1 and ABL1 (0.5%).     ClonoSeq B-CELL TRACKING  (MRD): Residual Sequence Detected.    Estimated MRD  Value: Detected below LOD:  Range >0 - <1 residual clonal cells per million nucleated cells.      Suggest  clinical correlation and follow up.  Morphology: Microscopic description: 1. Biopsy:  Sections of clot and biopsy show normocellularity (30 to 50%)  with erythroid predominance, myeloid and erythroid maturation, mildly  decreased megakaryocytes, and increased iron stores.   2. Aspirate:  Aparticulate aspirate smear with normal M:E ratio  (2.4:1), myeloid and erythroid maturation, few megakaryocytes, and few  lymphocytes.

## 2024-01-18 NOTE — ASSESSMENT
[FreeTextEntry1] : Addendum: Her older son Ari is 8 out of 12 HLA matched.  And her sister is preliminarily a haplo match.

## 2024-01-18 NOTE — HISTORY OF PRESENT ILLNESS
[de-identified] : Reason for visit: Discuss alloBMT  Stephanie was last seen by Dr. Goldberg in the outpatient setting on December 4, 2023.  Stephanie is a 64-year-old receiving blinatumomab for detectable [place] negative ALL after induction therapy and consolidative therapy for her disease.  The start of her third cycle was on January 12, 2024  She has a sister who is born in 1964 and 2 sons Ari and Alvarado, 47 and 44 years old respectively.  Each of whom are reported alive and well.  For over 80 minutes, in the presence of the transplant team, we discussed that cellular therapy can be of curative intent for individuals who have persistent disease despite chemotherapy. We discussed that the greatest risk of allogeneic transplantation is that of donor qteit-asfslv-lxzn disease: this is when donor cells become overexuberant and attack normal healthy structures such as the skin, colon, and liver. Ms. Kaur is aware that pozys-kllexn-stif disease can be fatal but to help reduce the incidence and severity of xonot-sxfatb-tehy disease we will be utilizing posttransplant cyclophosphamide along with oral medications.  She is aware that she will need to be within 1 hour of the hospital at all times and that she will require 24-hour caregiver.  She is aware that she will receive low doses of chemotherapy and radiation and then on day 0 received by peripheral blood progenitors and importantly CD3 cells from her donor. We discussed bone marrow harvest as a way of procuring the progenitor and CD3 cells.

## 2024-01-19 ENCOUNTER — APPOINTMENT (OUTPATIENT)
Dept: INFUSION THERAPY | Facility: HOSPITAL | Age: 64
End: 2024-01-19

## 2024-01-19 ENCOUNTER — RESULT REVIEW (OUTPATIENT)
Age: 64
End: 2024-01-19

## 2024-01-19 LAB
ALBUMIN SERPL ELPH-MCNC: 4.1 G/DL — SIGNIFICANT CHANGE UP (ref 3.3–5)
ALP SERPL-CCNC: 90 U/L — SIGNIFICANT CHANGE UP (ref 40–120)
ALT FLD-CCNC: 28 U/L — SIGNIFICANT CHANGE UP (ref 10–45)
ANION GAP SERPL CALC-SCNC: 9 MMOL/L — SIGNIFICANT CHANGE UP (ref 5–17)
AST SERPL-CCNC: 31 U/L — SIGNIFICANT CHANGE UP (ref 10–40)
BASOPHILS # BLD AUTO: 0.03 K/UL — SIGNIFICANT CHANGE UP (ref 0–0.2)
BASOPHILS NFR BLD AUTO: 0.7 % — SIGNIFICANT CHANGE UP (ref 0–2)
BILIRUB SERPL-MCNC: 0.5 MG/DL — SIGNIFICANT CHANGE UP (ref 0.2–1.2)
BUN SERPL-MCNC: 13 MG/DL — SIGNIFICANT CHANGE UP (ref 7–23)
CALCIUM SERPL-MCNC: 9.5 MG/DL — SIGNIFICANT CHANGE UP (ref 8.4–10.5)
CHLORIDE SERPL-SCNC: 105 MMOL/L — SIGNIFICANT CHANGE UP (ref 96–108)
CO2 SERPL-SCNC: 24 MMOL/L — SIGNIFICANT CHANGE UP (ref 22–31)
CREAT SERPL-MCNC: 0.85 MG/DL — SIGNIFICANT CHANGE UP (ref 0.5–1.3)
EGFR: 76 ML/MIN/1.73M2 — SIGNIFICANT CHANGE UP
EOSINOPHIL # BLD AUTO: 0.25 K/UL — SIGNIFICANT CHANGE UP (ref 0–0.5)
EOSINOPHIL NFR BLD AUTO: 5.5 % — SIGNIFICANT CHANGE UP (ref 0–6)
GLUCOSE SERPL-MCNC: 86 MG/DL — SIGNIFICANT CHANGE UP (ref 70–99)
HCT VFR BLD CALC: 38.1 % — SIGNIFICANT CHANGE UP (ref 34.5–45)
HGB BLD-MCNC: 12.9 G/DL — SIGNIFICANT CHANGE UP (ref 11.5–15.5)
IMM GRANULOCYTES NFR BLD AUTO: 0.7 % — SIGNIFICANT CHANGE UP (ref 0–0.9)
LDH SERPL L TO P-CCNC: 217 U/L — SIGNIFICANT CHANGE UP (ref 50–242)
LYMPHOCYTES # BLD AUTO: 0.91 K/UL — LOW (ref 1–3.3)
LYMPHOCYTES # BLD AUTO: 20.1 % — SIGNIFICANT CHANGE UP (ref 13–44)
MCHC RBC-ENTMCNC: 33.8 PG — SIGNIFICANT CHANGE UP (ref 27–34)
MCHC RBC-ENTMCNC: 33.9 G/DL — SIGNIFICANT CHANGE UP (ref 32–36)
MCV RBC AUTO: 99.7 FL — SIGNIFICANT CHANGE UP (ref 80–100)
MONOCYTES # BLD AUTO: 0.61 K/UL — SIGNIFICANT CHANGE UP (ref 0–0.9)
MONOCYTES NFR BLD AUTO: 13.5 % — SIGNIFICANT CHANGE UP (ref 2–14)
NEUTROPHILS # BLD AUTO: 2.7 K/UL — SIGNIFICANT CHANGE UP (ref 1.8–7.4)
NEUTROPHILS NFR BLD AUTO: 59.5 % — SIGNIFICANT CHANGE UP (ref 43–77)
NRBC # BLD: 0 /100 WBCS — SIGNIFICANT CHANGE UP (ref 0–0)
PHOSPHATE SERPL-MCNC: 3.7 MG/DL — SIGNIFICANT CHANGE UP (ref 2.5–4.5)
PLATELET # BLD AUTO: 134 K/UL — LOW (ref 150–400)
POTASSIUM SERPL-MCNC: 4.1 MMOL/L — SIGNIFICANT CHANGE UP (ref 3.5–5.3)
POTASSIUM SERPL-SCNC: 4.1 MMOL/L — SIGNIFICANT CHANGE UP (ref 3.5–5.3)
PROT SERPL-MCNC: 6.1 G/DL — SIGNIFICANT CHANGE UP (ref 6–8.3)
RBC # BLD: 3.82 M/UL — SIGNIFICANT CHANGE UP (ref 3.8–5.2)
RBC # FLD: 12.2 % — SIGNIFICANT CHANGE UP (ref 10.3–14.5)
SODIUM SERPL-SCNC: 139 MMOL/L — SIGNIFICANT CHANGE UP (ref 135–145)
URATE SERPL-MCNC: 4.3 MG/DL — SIGNIFICANT CHANGE UP (ref 2.5–7)
WBC # BLD: 4.53 K/UL — SIGNIFICANT CHANGE UP (ref 3.8–10.5)
WBC # FLD AUTO: 4.53 K/UL — SIGNIFICANT CHANGE UP (ref 3.8–10.5)

## 2024-01-24 ENCOUNTER — APPOINTMENT (OUTPATIENT)
Dept: CT IMAGING | Facility: CLINIC | Age: 64
End: 2024-01-24
Payer: MEDICARE

## 2024-01-24 ENCOUNTER — OUTPATIENT (OUTPATIENT)
Dept: OUTPATIENT SERVICES | Facility: HOSPITAL | Age: 64
LOS: 1 days | End: 2024-01-24
Payer: MEDICARE

## 2024-01-24 DIAGNOSIS — Z01.818 ENCOUNTER FOR OTHER PREPROCEDURAL EXAMINATION: ICD-10-CM

## 2024-01-24 DIAGNOSIS — Z98.891 HISTORY OF UTERINE SCAR FROM PREVIOUS SURGERY: Chronic | ICD-10-CM

## 2024-01-24 PROCEDURE — 70486 CT MAXILLOFACIAL W/O DYE: CPT | Mod: 26,MH

## 2024-01-24 PROCEDURE — 70486 CT MAXILLOFACIAL W/O DYE: CPT

## 2024-01-26 ENCOUNTER — RESULT REVIEW (OUTPATIENT)
Age: 64
End: 2024-01-26

## 2024-01-26 ENCOUNTER — APPOINTMENT (OUTPATIENT)
Dept: INFUSION THERAPY | Facility: HOSPITAL | Age: 64
End: 2024-01-26

## 2024-01-26 LAB
ALBUMIN SERPL ELPH-MCNC: 4.1 G/DL — SIGNIFICANT CHANGE UP (ref 3.3–5)
ALP SERPL-CCNC: 91 U/L — SIGNIFICANT CHANGE UP (ref 40–120)
ALT FLD-CCNC: 26 U/L — SIGNIFICANT CHANGE UP (ref 10–45)
ANION GAP SERPL CALC-SCNC: 9 MMOL/L — SIGNIFICANT CHANGE UP (ref 5–17)
AST SERPL-CCNC: 27 U/L — SIGNIFICANT CHANGE UP (ref 10–40)
BASOPHILS # BLD AUTO: 0.03 K/UL — SIGNIFICANT CHANGE UP (ref 0–0.2)
BASOPHILS NFR BLD AUTO: 0.6 % — SIGNIFICANT CHANGE UP (ref 0–2)
BILIRUB SERPL-MCNC: 0.5 MG/DL — SIGNIFICANT CHANGE UP (ref 0.2–1.2)
BUN SERPL-MCNC: 13 MG/DL — SIGNIFICANT CHANGE UP (ref 7–23)
CALCIUM SERPL-MCNC: 9.7 MG/DL — SIGNIFICANT CHANGE UP (ref 8.4–10.5)
CHLORIDE SERPL-SCNC: 106 MMOL/L — SIGNIFICANT CHANGE UP (ref 96–108)
CO2 SERPL-SCNC: 25 MMOL/L — SIGNIFICANT CHANGE UP (ref 22–31)
CREAT SERPL-MCNC: 0.75 MG/DL — SIGNIFICANT CHANGE UP (ref 0.5–1.3)
EGFR: 88 ML/MIN/1.73M2 — SIGNIFICANT CHANGE UP
EOSINOPHIL # BLD AUTO: 0.22 K/UL — SIGNIFICANT CHANGE UP (ref 0–0.5)
EOSINOPHIL NFR BLD AUTO: 4.7 % — SIGNIFICANT CHANGE UP (ref 0–6)
GLUCOSE SERPL-MCNC: 89 MG/DL — SIGNIFICANT CHANGE UP (ref 70–99)
HCT VFR BLD CALC: 38.4 % — SIGNIFICANT CHANGE UP (ref 34.5–45)
HGB BLD-MCNC: 13.1 G/DL — SIGNIFICANT CHANGE UP (ref 11.5–15.5)
IMM GRANULOCYTES NFR BLD AUTO: 0.4 % — SIGNIFICANT CHANGE UP (ref 0–0.9)
LDH SERPL L TO P-CCNC: 188 U/L — SIGNIFICANT CHANGE UP (ref 50–242)
LYMPHOCYTES # BLD AUTO: 0.9 K/UL — LOW (ref 1–3.3)
LYMPHOCYTES # BLD AUTO: 19 % — SIGNIFICANT CHANGE UP (ref 13–44)
MCHC RBC-ENTMCNC: 33.7 PG — SIGNIFICANT CHANGE UP (ref 27–34)
MCHC RBC-ENTMCNC: 34.1 G/DL — SIGNIFICANT CHANGE UP (ref 32–36)
MCV RBC AUTO: 98.7 FL — SIGNIFICANT CHANGE UP (ref 80–100)
MONOCYTES # BLD AUTO: 0.43 K/UL — SIGNIFICANT CHANGE UP (ref 0–0.9)
MONOCYTES NFR BLD AUTO: 9.1 % — SIGNIFICANT CHANGE UP (ref 2–14)
NEUTROPHILS # BLD AUTO: 3.13 K/UL — SIGNIFICANT CHANGE UP (ref 1.8–7.4)
NEUTROPHILS NFR BLD AUTO: 66.2 % — SIGNIFICANT CHANGE UP (ref 43–77)
NRBC # BLD: 0 /100 WBCS — SIGNIFICANT CHANGE UP (ref 0–0)
PHOSPHATE SERPL-MCNC: 3.4 MG/DL — SIGNIFICANT CHANGE UP (ref 2.5–4.5)
PLATELET # BLD AUTO: 128 K/UL — LOW (ref 150–400)
POTASSIUM SERPL-MCNC: 4.1 MMOL/L — SIGNIFICANT CHANGE UP (ref 3.5–5.3)
POTASSIUM SERPL-SCNC: 4.1 MMOL/L — SIGNIFICANT CHANGE UP (ref 3.5–5.3)
PROT SERPL-MCNC: 6.1 G/DL — SIGNIFICANT CHANGE UP (ref 6–8.3)
RBC # BLD: 3.89 M/UL — SIGNIFICANT CHANGE UP (ref 3.8–5.2)
RBC # FLD: 12.1 % — SIGNIFICANT CHANGE UP (ref 10.3–14.5)
SODIUM SERPL-SCNC: 140 MMOL/L — SIGNIFICANT CHANGE UP (ref 135–145)
URATE SERPL-MCNC: 3.9 MG/DL — SIGNIFICANT CHANGE UP (ref 2.5–7)
WBC # BLD: 4.73 K/UL — SIGNIFICANT CHANGE UP (ref 3.8–10.5)
WBC # FLD AUTO: 4.73 K/UL — SIGNIFICANT CHANGE UP (ref 3.8–10.5)

## 2024-01-30 ENCOUNTER — LABORATORY RESULT (OUTPATIENT)
Age: 64
End: 2024-01-30

## 2024-01-30 ENCOUNTER — APPOINTMENT (OUTPATIENT)
Dept: HEMATOLOGY ONCOLOGY | Facility: CLINIC | Age: 64
End: 2024-01-30

## 2024-01-30 ENCOUNTER — APPOINTMENT (OUTPATIENT)
Dept: CV DIAGNOSITCS | Facility: HOSPITAL | Age: 64
End: 2024-01-30

## 2024-01-30 ENCOUNTER — RESULT REVIEW (OUTPATIENT)
Age: 64
End: 2024-01-30

## 2024-01-30 ENCOUNTER — OUTPATIENT (OUTPATIENT)
Dept: OUTPATIENT SERVICES | Facility: HOSPITAL | Age: 64
LOS: 1 days | End: 2024-01-30
Payer: MEDICARE

## 2024-01-30 ENCOUNTER — APPOINTMENT (OUTPATIENT)
Dept: PULMONOLOGY | Facility: CLINIC | Age: 64
End: 2024-01-30
Payer: MEDICARE

## 2024-01-30 DIAGNOSIS — Z01.818 ENCOUNTER FOR OTHER PREPROCEDURAL EXAMINATION: ICD-10-CM

## 2024-01-30 DIAGNOSIS — Z98.891 HISTORY OF UTERINE SCAR FROM PREVIOUS SURGERY: Chronic | ICD-10-CM

## 2024-01-30 LAB
BASOPHILS # BLD AUTO: 0.04 K/UL — SIGNIFICANT CHANGE UP (ref 0–0.2)
BASOPHILS NFR BLD AUTO: 0.8 % — SIGNIFICANT CHANGE UP (ref 0–2)
EOSINOPHIL # BLD AUTO: 0.18 K/UL — SIGNIFICANT CHANGE UP (ref 0–0.5)
EOSINOPHIL NFR BLD AUTO: 3.7 % — SIGNIFICANT CHANGE UP (ref 0–6)
HCT VFR BLD CALC: 40.4 % — SIGNIFICANT CHANGE UP (ref 34.5–45)
HGB BLD-MCNC: 13.8 G/DL — SIGNIFICANT CHANGE UP (ref 11.5–15.5)
IMM GRANULOCYTES NFR BLD AUTO: 0.2 % — SIGNIFICANT CHANGE UP (ref 0–0.9)
LYMPHOCYTES # BLD AUTO: 0.79 K/UL — LOW (ref 1–3.3)
LYMPHOCYTES # BLD AUTO: 16.2 % — SIGNIFICANT CHANGE UP (ref 13–44)
MCHC RBC-ENTMCNC: 33.7 PG — SIGNIFICANT CHANGE UP (ref 27–34)
MCHC RBC-ENTMCNC: 34.2 G/DL — SIGNIFICANT CHANGE UP (ref 32–36)
MCV RBC AUTO: 98.5 FL — SIGNIFICANT CHANGE UP (ref 80–100)
MONOCYTES # BLD AUTO: 0.53 K/UL — SIGNIFICANT CHANGE UP (ref 0–0.9)
MONOCYTES NFR BLD AUTO: 10.8 % — SIGNIFICANT CHANGE UP (ref 2–14)
NEUTROPHILS # BLD AUTO: 3.34 K/UL — SIGNIFICANT CHANGE UP (ref 1.8–7.4)
NEUTROPHILS NFR BLD AUTO: 68.3 % — SIGNIFICANT CHANGE UP (ref 43–77)
NRBC # BLD: 0 /100 WBCS — SIGNIFICANT CHANGE UP (ref 0–0)
PLATELET # BLD AUTO: 144 K/UL — LOW (ref 150–400)
RBC # BLD: 4.1 M/UL — SIGNIFICANT CHANGE UP (ref 3.8–5.2)
RBC # FLD: 12.2 % — SIGNIFICANT CHANGE UP (ref 10.3–14.5)
WBC # BLD: 4.89 K/UL — SIGNIFICANT CHANGE UP (ref 3.8–10.5)
WBC # FLD AUTO: 4.89 K/UL — SIGNIFICANT CHANGE UP (ref 3.8–10.5)

## 2024-01-30 PROCEDURE — 81373 HLA I TYPING 1 LOCUS LR: CPT

## 2024-01-30 PROCEDURE — 81378 HLA I & II TYPING HR: CPT

## 2024-01-30 PROCEDURE — 76376 3D RENDER W/INTRP POSTPROCES: CPT | Mod: 26

## 2024-01-30 PROCEDURE — 93356 MYOCRD STRAIN IMG SPCKL TRCK: CPT

## 2024-01-30 PROCEDURE — 94726 PLETHYSMOGRAPHY LUNG VOLUMES: CPT

## 2024-01-30 PROCEDURE — 94729 DIFFUSING CAPACITY: CPT

## 2024-01-30 PROCEDURE — 76376 3D RENDER W/INTRP POSTPROCES: CPT

## 2024-01-30 PROCEDURE — 94010 BREATHING CAPACITY TEST: CPT

## 2024-01-30 PROCEDURE — 81382 HLA II TYPING 1 LOC HR: CPT

## 2024-01-30 PROCEDURE — 93306 TTE W/DOPPLER COMPLETE: CPT | Mod: 26

## 2024-01-30 PROCEDURE — 81376 HLA II TYPING 1 LOCUS LR: CPT

## 2024-01-30 PROCEDURE — 93306 TTE W/DOPPLER COMPLETE: CPT

## 2024-01-31 LAB
ALBUMIN SERPL ELPH-MCNC: 4.4 G/DL
ALP BLD-CCNC: 106 U/L
ALT SERPL-CCNC: 26 U/L
ANION GAP SERPL CALC-SCNC: 12 MMOL/L
AST SERPL-CCNC: 24 U/L
BILIRUB SERPL-MCNC: 0.5 MG/DL
BUN SERPL-MCNC: 13 MG/DL
CALCIUM SERPL-MCNC: 9.4 MG/DL
CHLORIDE SERPL-SCNC: 105 MMOL/L
CO2 SERPL-SCNC: 24 MMOL/L
CREAT SERPL-MCNC: 0.77 MG/DL
EGFR: 85 ML/MIN/1.73M2
GLUCOSE SERPL-MCNC: 94 MG/DL
LDH SERPL-CCNC: 149 U/L
POTASSIUM SERPL-SCNC: 4.1 MMOL/L
PROT SERPL-MCNC: 6 G/DL
SODIUM SERPL-SCNC: 142 MMOL/L

## 2024-02-01 ENCOUNTER — OUTPATIENT (OUTPATIENT)
Dept: OUTPATIENT SERVICES | Facility: HOSPITAL | Age: 64
LOS: 1 days | Discharge: HOME | End: 2024-02-01
Payer: MEDICARE

## 2024-02-01 ENCOUNTER — NON-APPOINTMENT (OUTPATIENT)
Age: 64
End: 2024-02-01

## 2024-02-01 DIAGNOSIS — Z98.891 HISTORY OF UTERINE SCAR FROM PREVIOUS SURGERY: Chronic | ICD-10-CM

## 2024-02-02 ENCOUNTER — RESULT REVIEW (OUTPATIENT)
Age: 64
End: 2024-02-02

## 2024-02-02 ENCOUNTER — APPOINTMENT (OUTPATIENT)
Dept: INFUSION THERAPY | Facility: HOSPITAL | Age: 64
End: 2024-02-02

## 2024-02-02 ENCOUNTER — OUTPATIENT (OUTPATIENT)
Dept: OUTPATIENT SERVICES | Facility: HOSPITAL | Age: 64
LOS: 1 days | Discharge: HOME | End: 2024-02-02
Payer: MEDICARE

## 2024-02-02 DIAGNOSIS — Z98.891 HISTORY OF UTERINE SCAR FROM PREVIOUS SURGERY: Chronic | ICD-10-CM

## 2024-02-02 LAB
ALBUMIN SERPL ELPH-MCNC: 4 G/DL — SIGNIFICANT CHANGE UP (ref 3.3–5)
ALP SERPL-CCNC: 92 U/L — SIGNIFICANT CHANGE UP (ref 40–120)
ALT FLD-CCNC: 24 U/L — SIGNIFICANT CHANGE UP (ref 10–45)
ANION GAP SERPL CALC-SCNC: 10 MMOL/L — SIGNIFICANT CHANGE UP (ref 5–17)
AST SERPL-CCNC: 33 U/L — SIGNIFICANT CHANGE UP (ref 10–40)
BILIRUB SERPL-MCNC: 0.5 MG/DL — SIGNIFICANT CHANGE UP (ref 0.2–1.2)
BUN SERPL-MCNC: 14 MG/DL — SIGNIFICANT CHANGE UP (ref 7–23)
CALCIUM SERPL-MCNC: 9.4 MG/DL — SIGNIFICANT CHANGE UP (ref 8.4–10.5)
CHLORIDE SERPL-SCNC: 106 MMOL/L — SIGNIFICANT CHANGE UP (ref 96–108)
CO2 SERPL-SCNC: 23 MMOL/L — SIGNIFICANT CHANGE UP (ref 22–31)
CREAT SERPL-MCNC: 0.74 MG/DL — SIGNIFICANT CHANGE UP (ref 0.5–1.3)
EGFR: 89 ML/MIN/1.73M2 — SIGNIFICANT CHANGE UP
GLUCOSE SERPL-MCNC: 90 MG/DL — SIGNIFICANT CHANGE UP (ref 70–99)
LDH SERPL L TO P-CCNC: 211 U/L — SIGNIFICANT CHANGE UP (ref 50–242)
PHOSPHATE SERPL-MCNC: 3.4 MG/DL — SIGNIFICANT CHANGE UP (ref 2.5–4.5)
POTASSIUM SERPL-MCNC: 4 MMOL/L — SIGNIFICANT CHANGE UP (ref 3.5–5.3)
POTASSIUM SERPL-SCNC: 4 MMOL/L — SIGNIFICANT CHANGE UP (ref 3.5–5.3)
PROT SERPL-MCNC: 5.9 G/DL — LOW (ref 6–8.3)
SODIUM SERPL-SCNC: 139 MMOL/L — SIGNIFICANT CHANGE UP (ref 135–145)
URATE SERPL-MCNC: 3.9 MG/DL — SIGNIFICANT CHANGE UP (ref 2.5–7)

## 2024-02-05 NOTE — PATIENT PROFILE ADULT - FUNCTIONAL ASSESSMENT - DAILY ACTIVITY 1.
Goal Outcome Evaluation:   SET CPAP 10 UP FOR PT AS HE STATES THAT'S WHAT HE WEARS AT HOME. HE WORE IT UNTIL HE STARTED VOMITING AND HAD TO COME OFF UNIT.                                             4 = No assist / stand by assistance

## 2024-02-09 ENCOUNTER — APPOINTMENT (OUTPATIENT)
Dept: INFUSION THERAPY | Facility: HOSPITAL | Age: 64
End: 2024-02-09

## 2024-02-09 ENCOUNTER — NON-APPOINTMENT (OUTPATIENT)
Age: 64
End: 2024-02-09

## 2024-02-12 ENCOUNTER — APPOINTMENT (OUTPATIENT)
Dept: HEMATOLOGY ONCOLOGY | Facility: CLINIC | Age: 64
End: 2024-02-12
Payer: MEDICARE

## 2024-02-12 ENCOUNTER — NON-APPOINTMENT (OUTPATIENT)
Age: 64
End: 2024-02-12

## 2024-02-12 ENCOUNTER — APPOINTMENT (OUTPATIENT)
Dept: RADIATION ONCOLOGY | Facility: CLINIC | Age: 64
End: 2024-02-12
Payer: MEDICARE

## 2024-02-12 VITALS
WEIGHT: 213.18 LBS | DIASTOLIC BLOOD PRESSURE: 81 MMHG | BODY MASS INDEX: 39.23 KG/M2 | RESPIRATION RATE: 16 BRPM | HEIGHT: 62 IN | OXYGEN SATURATION: 95 % | HEART RATE: 73 BPM | SYSTOLIC BLOOD PRESSURE: 134 MMHG | TEMPERATURE: 97.3 F

## 2024-02-12 VITALS
TEMPERATURE: 98.1 F | HEART RATE: 76 BPM | OXYGEN SATURATION: 96 % | RESPIRATION RATE: 16 BRPM | SYSTOLIC BLOOD PRESSURE: 138 MMHG | DIASTOLIC BLOOD PRESSURE: 80 MMHG

## 2024-02-12 DIAGNOSIS — F41.9 ANXIETY DISORDER, UNSPECIFIED: ICD-10-CM

## 2024-02-12 DIAGNOSIS — Z80.0 FAMILY HISTORY OF MALIGNANT NEOPLASM OF DIGESTIVE ORGANS: ICD-10-CM

## 2024-02-12 DIAGNOSIS — Z86.69 PERSONAL HISTORY OF OTHER DISEASES OF THE NERVOUS SYSTEM AND SENSE ORGANS: ICD-10-CM

## 2024-02-12 DIAGNOSIS — Z92.21 PERSONAL HISTORY OF ANTINEOPLASTIC CHEMOTHERAPY: ICD-10-CM

## 2024-02-12 DIAGNOSIS — Z87.891 PERSONAL HISTORY OF NICOTINE DEPENDENCE: ICD-10-CM

## 2024-02-12 DIAGNOSIS — Z87.19 PERSONAL HISTORY OF OTHER DISEASES OF THE DIGESTIVE SYSTEM: ICD-10-CM

## 2024-02-12 DIAGNOSIS — Z23 ENCOUNTER FOR IMMUNIZATION: ICD-10-CM

## 2024-02-12 PROCEDURE — 99204 OFFICE O/P NEW MOD 45 MIN: CPT | Mod: 25

## 2024-02-12 PROCEDURE — 99213 OFFICE O/P EST LOW 20 MIN: CPT

## 2024-02-12 PROCEDURE — 77470 SPECIAL RADIATION TREATMENT: CPT | Mod: 26

## 2024-02-12 RX ORDER — NYSTATIN 100MM UNIT
POWDER (EA) MISCELLANEOUS
Qty: 30 | Refills: 0 | Status: DISCONTINUED | COMMUNITY
Start: 2023-06-19 | End: 2024-02-12

## 2024-02-12 RX ORDER — SODIUM BICARBONATE 325 MG/1
325 TABLET ORAL TWICE DAILY
Qty: 28 | Refills: 0 | Status: DISCONTINUED | COMMUNITY
Start: 2023-03-27 | End: 2024-02-12

## 2024-02-12 NOTE — VITALS
[Maximal Pain Intensity: 0/10] : 0/10 [Least Pain Intensity: 0/10] : 0/10 [NoTreatment Scheduled] : no treatment scheduled [90: Able to carry normal activity; minor signs or symptoms of disease.] : 90: Able to carry normal activity; minor signs or symptoms of disease.  [ECOG Performance Status: 1 - Restricted in physically strenuous activity but ambulatory and able to carry out work of a light or sedentary nature] : Performance Status: 1 - Restricted in physically strenuous activity but ambulatory and able to carry out work of a light or sedentary nature, e.g., light house work, office work [Date: ____________] : Patient's last distress assessment performed on [unfilled]. [1 - Distress Level] : Distress Level: 1 [Patient given social work contact information and resource sheet] : Patient was given social work contact information and resource sheet

## 2024-02-12 NOTE — REVIEW OF SYSTEMS
[Fatigue] : fatigue [Red Eyes] : red eyes [Cough] : cough [Anxiety] : anxiety [Hot Flashes] : hot flashes [Negative] : Heme/Lymph [Suicidal] : not suicidal [Depression] : no depression [FreeTextEntry3] : wears glasses  [FreeTextEntry4] : left ear with decreased hearing and tinnitus  [FreeTextEntry6] : occasional dry cough  [de-identified] : peripheral neuropathy in her feet  [de-identified] : on Lexapro

## 2024-02-12 NOTE — HISTORY OF PRESENT ILLNESS
[FreeTextEntry1] : Ms. Kaur is a 64-year-old female who presents in consultation for consideration of radiation therapy in the setting of SCT.  She is accompanied by her  for today's visit.    Diagnosis: B-Cell ALL, Cass Chromosome (-) Evaluation for radiation therapy for ASCT  HPI: Ms. Kaur originally presented to [facility] in February 2023 with complaints of left sided chest pain and feeling short of breath. Upon workup she was found to have abnormalities in her blood work concerning for leukemia.  She was transferred to [facility] for further workup.  Bone marrow biopsy done on 2/28/23 showed B-ALL, Cass Chromosome (-). She was started on systemic treatment while inpatient.  Since then, she has followed with Dr. Goldberg (hematology) and obtained a CR.  Ms. Kaur has also seen Dr. Bowles (hematology transplant team) with plan for allogeneic SCT.  She has been referred for radiation consult in preparation for transplant.    2/12/24 - presents in consultation to discuss radiation therapy options. Ms. Kaur is feeling well today, denies pain.  She looks forward to next steps in her treatment.  Plan for admission on 2/29/24 with her son being the donor for her transplant.

## 2024-02-13 NOTE — HISTORY OF PRESENT ILLNESS
[de-identified] : Reason for visit: Discuss alloBMT  Since last visit: completed cycle 3 blinta on 2/9/24  Stephanie is a 64-year-old post blinatumomab for detectable [place] negative ALL after induction therapy and consolidative therapy for her disease.    Stephanie does not spontaneously report: fever, chills, sweats, weight loss, skin rashes, petechiae, bruising, eye irritation, hearing loss, mouth sores, sore throat, cough, hemoptysis, pleuritic chest pain, dyspnea, change in vision, focal numbness/weakness, chest pains, palpitations, nausea, vomiting, diarrhea, constipation, dysuria, hematuria, bowel or bladder incontinence, sever joint pain, back pain or gait disturbance.  Examination: Articulate and in no acute distress No occiput, poster cervical, anterior cervical, submandibular, sublingual, submental, supraclavicular nor axillary adenopathy Lungs: Clear Cardiac: without rubs Abd: soft and non-tender, Traube's space is tympanitic No inguinal nor femoral adenopathy No sig peripheral edema Gait: unencumbered   Assessment: 64 year old in CR from ALL post blina x 3.  Her son is to be evaluated tomorrow for alloBMT (marrow) donation.  Today for over 25 minutes we reviewed the risks and benefits of alloBMT.

## 2024-02-14 ENCOUNTER — TRANSCRIPTION ENCOUNTER (OUTPATIENT)
Age: 64
End: 2024-02-14

## 2024-02-14 DIAGNOSIS — C91.02 ACUTE LYMPHOBLASTIC LEUKEMIA, IN RELAPSE: ICD-10-CM

## 2024-02-16 ENCOUNTER — OUTPATIENT (OUTPATIENT)
Dept: OUTPATIENT SERVICES | Facility: HOSPITAL | Age: 64
LOS: 1 days | End: 2024-02-16

## 2024-02-16 DIAGNOSIS — Z51.89 ENCOUNTER FOR OTHER SPECIFIED AFTERCARE: ICD-10-CM

## 2024-02-16 DIAGNOSIS — C91.00 ACUTE LYMPHOBLASTIC LEUKEMIA NOT HAVING ACHIEVED REMISSION: ICD-10-CM

## 2024-02-16 DIAGNOSIS — Z98.891 HISTORY OF UTERINE SCAR FROM PREVIOUS SURGERY: Chronic | ICD-10-CM

## 2024-02-22 PROCEDURE — 77290 THER RAD SIMULAJ FIELD CPLX: CPT | Mod: 26

## 2024-02-22 PROCEDURE — 77263 THER RADIOLOGY TX PLNG CPLX: CPT

## 2024-02-22 PROCEDURE — 77334 RADIATION TREATMENT AID(S): CPT | Mod: 26

## 2024-02-23 PROCEDURE — 77334 RADIATION TREATMENT AID(S): CPT | Mod: 26

## 2024-02-23 PROCEDURE — 77300 RADIATION THERAPY DOSE PLAN: CPT | Mod: 26

## 2024-02-26 ENCOUNTER — LABORATORY RESULT (OUTPATIENT)
Age: 64
End: 2024-02-26

## 2024-02-26 ENCOUNTER — APPOINTMENT (OUTPATIENT)
Dept: HEMATOLOGY ONCOLOGY | Facility: CLINIC | Age: 64
End: 2024-02-26

## 2024-02-26 ENCOUNTER — APPOINTMENT (OUTPATIENT)
Age: 64
End: 2024-02-26

## 2024-02-26 LAB
HCT VFR BLD CALC: 40.8 %
HGB BLD-MCNC: 13.8 G/DL
MCHC RBC-ENTMCNC: 33.7 PG
MCHC RBC-ENTMCNC: 33.8 GM/DL
MCV RBC AUTO: 99.8 FL
PLATELET # BLD AUTO: 123 K/UL
RBC # BLD: 4.09 M/UL
RBC # FLD: 12.5 %
WBC # FLD AUTO: 3.02 K/UL

## 2024-02-26 PROCEDURE — 85027 COMPLETE CBC AUTOMATED: CPT

## 2024-02-27 LAB
ALBUMIN SERPL ELPH-MCNC: 4.4 G/DL
ALP BLD-CCNC: 106 U/L
ALT SERPL-CCNC: 31 U/L
ANION GAP SERPL CALC-SCNC: 10 MMOL/L
APTT BLD: 32.8 SEC
AST SERPL-CCNC: 25 U/L
BILIRUB SERPL-MCNC: 0.6 MG/DL
BUN SERPL-MCNC: 17 MG/DL
CALCIUM SERPL-MCNC: 9 MG/DL
CHLORIDE SERPL-SCNC: 106 MMOL/L
CO2 SERPL-SCNC: 26 MMOL/L
CREAT SERPL-MCNC: 0.85 MG/DL
EGFR: 76 ML/MIN/1.73M2
GLUCOSE SERPL-MCNC: 96 MG/DL
INR PPP: 0.93 RATIO
LDH SERPL-CCNC: 187 U/L
MAGNESIUM SERPL-MCNC: 2.1 MG/DL
POTASSIUM SERPL-SCNC: 4.7 MMOL/L
PROT SERPL-MCNC: 6.2 G/DL
PT BLD: 10.5 SEC
SARS-COV-2 N GENE NPH QL NAA+PROBE: NOT DETECTED
SODIUM SERPL-SCNC: 142 MMOL/L

## 2024-02-29 ENCOUNTER — INPATIENT (INPATIENT)
Facility: HOSPITAL | Age: 64
LOS: 25 days | Discharge: HOME | DRG: 14 | End: 2024-03-26
Attending: INTERNAL MEDICINE | Admitting: INTERNAL MEDICINE
Payer: MEDICARE

## 2024-02-29 VITALS
WEIGHT: 213.63 LBS | HEART RATE: 69 BPM | DIASTOLIC BLOOD PRESSURE: 82 MMHG | RESPIRATION RATE: 18 BRPM | SYSTOLIC BLOOD PRESSURE: 154 MMHG | OXYGEN SATURATION: 98 % | HEIGHT: 62.6 IN | TEMPERATURE: 98 F

## 2024-02-29 DIAGNOSIS — Z98.891 HISTORY OF UTERINE SCAR FROM PREVIOUS SURGERY: Chronic | ICD-10-CM

## 2024-02-29 DIAGNOSIS — Z94.84 STEM CELLS TRANSPLANT STATUS: ICD-10-CM

## 2024-02-29 DIAGNOSIS — C91.00 ACUTE LYMPHOBLASTIC LEUKEMIA NOT HAVING ACHIEVED REMISSION: ICD-10-CM

## 2024-02-29 LAB
ALBUMIN SERPL ELPH-MCNC: 4.2 G/DL — SIGNIFICANT CHANGE UP (ref 3.3–5)
ALP SERPL-CCNC: 108 U/L — SIGNIFICANT CHANGE UP (ref 40–120)
ALT FLD-CCNC: 26 U/L — SIGNIFICANT CHANGE UP (ref 10–45)
ANION GAP SERPL CALC-SCNC: 10 MMOL/L — SIGNIFICANT CHANGE UP (ref 5–17)
APPEARANCE UR: CLEAR — SIGNIFICANT CHANGE UP
APTT BLD: 32.1 SEC — SIGNIFICANT CHANGE UP (ref 24.5–35.6)
AST SERPL-CCNC: 23 U/L — SIGNIFICANT CHANGE UP (ref 10–40)
BASOPHILS # BLD AUTO: 0 K/UL — SIGNIFICANT CHANGE UP (ref 0–0.2)
BASOPHILS NFR BLD AUTO: 0 % — SIGNIFICANT CHANGE UP (ref 0–2)
BILIRUB DIRECT SERPL-MCNC: 0.2 MG/DL — SIGNIFICANT CHANGE UP (ref 0–0.3)
BILIRUB INDIRECT FLD-MCNC: 0.4 MG/DL — SIGNIFICANT CHANGE UP (ref 0.2–1)
BILIRUB SERPL-MCNC: 0.6 MG/DL — SIGNIFICANT CHANGE UP (ref 0.2–1.2)
BILIRUB UR-MCNC: NEGATIVE — SIGNIFICANT CHANGE UP
BLD GP AB SCN SERPL QL: NEGATIVE — SIGNIFICANT CHANGE UP
BUN SERPL-MCNC: 15 MG/DL — SIGNIFICANT CHANGE UP (ref 7–23)
CALCIUM SERPL-MCNC: 9.6 MG/DL — SIGNIFICANT CHANGE UP (ref 8.4–10.5)
CHLORIDE SERPL-SCNC: 106 MMOL/L — SIGNIFICANT CHANGE UP (ref 96–108)
CO2 SERPL-SCNC: 26 MMOL/L — SIGNIFICANT CHANGE UP (ref 22–31)
COLOR SPEC: YELLOW — SIGNIFICANT CHANGE UP
CREAT SERPL-MCNC: 0.79 MG/DL — SIGNIFICANT CHANGE UP (ref 0.5–1.3)
DIFF PNL FLD: NEGATIVE — SIGNIFICANT CHANGE UP
EGFR: 82 ML/MIN/1.73M2 — SIGNIFICANT CHANGE UP
EOSINOPHIL # BLD AUTO: 0.19 K/UL — SIGNIFICANT CHANGE UP (ref 0–0.5)
EOSINOPHIL NFR BLD AUTO: 6.8 % — HIGH (ref 0–6)
FIBRINOGEN PPP-MCNC: 405 MG/DL — SIGNIFICANT CHANGE UP (ref 200–445)
GLUCOSE SERPL-MCNC: 93 MG/DL — SIGNIFICANT CHANGE UP (ref 70–99)
GLUCOSE UR QL: NEGATIVE MG/DL — SIGNIFICANT CHANGE UP
HCT VFR BLD CALC: 38.9 % — SIGNIFICANT CHANGE UP (ref 34.5–45)
HGB BLD-MCNC: 12.9 G/DL — SIGNIFICANT CHANGE UP (ref 11.5–15.5)
INR BLD: 1.02 RATIO — SIGNIFICANT CHANGE UP (ref 0.85–1.18)
KETONES UR-MCNC: NEGATIVE MG/DL — SIGNIFICANT CHANGE UP
LDH SERPL L TO P-CCNC: 168 U/L — SIGNIFICANT CHANGE UP (ref 50–242)
LEUKOCYTE ESTERASE UR-ACNC: NEGATIVE — SIGNIFICANT CHANGE UP
LYMPHOCYTES # BLD AUTO: 0.77 K/UL — LOW (ref 1–3.3)
LYMPHOCYTES # BLD AUTO: 27.9 % — SIGNIFICANT CHANGE UP (ref 13–44)
MAGNESIUM SERPL-MCNC: 2 MG/DL — SIGNIFICANT CHANGE UP (ref 1.6–2.6)
MANUAL SMEAR VERIFICATION: SIGNIFICANT CHANGE UP
MCHC RBC-ENTMCNC: 32.9 PG — SIGNIFICANT CHANGE UP (ref 27–34)
MCHC RBC-ENTMCNC: 33.2 GM/DL — SIGNIFICANT CHANGE UP (ref 32–36)
MCV RBC AUTO: 99.2 FL — SIGNIFICANT CHANGE UP (ref 80–100)
MONOCYTES # BLD AUTO: 0.68 K/UL — SIGNIFICANT CHANGE UP (ref 0–0.9)
MONOCYTES NFR BLD AUTO: 24.6 % — HIGH (ref 2–14)
NEUTROPHILS # BLD AUTO: 1.12 K/UL — LOW (ref 1.8–7.4)
NEUTROPHILS NFR BLD AUTO: 40.7 % — LOW (ref 43–77)
NITRITE UR-MCNC: NEGATIVE — SIGNIFICANT CHANGE UP
PH UR: 5.5 — SIGNIFICANT CHANGE UP (ref 5–8)
PHOSPHATE SERPL-MCNC: 3.2 MG/DL — SIGNIFICANT CHANGE UP (ref 2.5–4.5)
PLAT MORPH BLD: NORMAL — SIGNIFICANT CHANGE UP
PLATELET # BLD AUTO: 135 K/UL — LOW (ref 150–400)
POTASSIUM SERPL-MCNC: 3.9 MMOL/L — SIGNIFICANT CHANGE UP (ref 3.5–5.3)
POTASSIUM SERPL-SCNC: 3.9 MMOL/L — SIGNIFICANT CHANGE UP (ref 3.5–5.3)
PROT SERPL-MCNC: 6.4 G/DL — SIGNIFICANT CHANGE UP (ref 6–8.3)
PROT UR-MCNC: NEGATIVE MG/DL — SIGNIFICANT CHANGE UP
PROTHROM AB SERPL-ACNC: 11.2 SEC — SIGNIFICANT CHANGE UP (ref 9.5–13)
RBC # BLD: 3.92 M/UL — SIGNIFICANT CHANGE UP (ref 3.8–5.2)
RBC # BLD: 3.92 M/UL — SIGNIFICANT CHANGE UP (ref 3.8–5.2)
RBC # FLD: 12.8 % — SIGNIFICANT CHANGE UP (ref 10.3–14.5)
RBC BLD AUTO: SIGNIFICANT CHANGE UP
RETICS #: 81.5 K/UL — SIGNIFICANT CHANGE UP (ref 25–125)
RETICS/RBC NFR: 2.1 % — SIGNIFICANT CHANGE UP (ref 0.5–2.5)
RH IG SCN BLD-IMP: POSITIVE — SIGNIFICANT CHANGE UP
SODIUM SERPL-SCNC: 142 MMOL/L — SIGNIFICANT CHANGE UP (ref 135–145)
SP GR SPEC: 1.02 — SIGNIFICANT CHANGE UP (ref 1–1.03)
SP GR UR STRIP: 1.01 — SIGNIFICANT CHANGE UP (ref 1–1.03)
SP GR UR STRIP: 1.02 — SIGNIFICANT CHANGE UP (ref 1–1.03)
SP GR UR STRIP: 1.02 — SIGNIFICANT CHANGE UP (ref 1–1.03)
UROBILINOGEN FLD QL: 0.2 MG/DL — SIGNIFICANT CHANGE UP (ref 0.2–1)
WBC # BLD: 2.76 K/UL — LOW (ref 3.8–10.5)
WBC # FLD AUTO: 2.76 K/UL — LOW (ref 3.8–10.5)

## 2024-02-29 PROCEDURE — 93010 ELECTROCARDIOGRAM REPORT: CPT

## 2024-02-29 PROCEDURE — 99223 1ST HOSP IP/OBS HIGH 75: CPT

## 2024-02-29 PROCEDURE — 76937 US GUIDE VASCULAR ACCESS: CPT | Mod: 26

## 2024-02-29 PROCEDURE — 77001 FLUOROGUIDE FOR VEIN DEVICE: CPT | Mod: 26

## 2024-02-29 PROCEDURE — 36556 INSERT NON-TUNNEL CV CATH: CPT

## 2024-02-29 RX ORDER — LIDOCAINE AND PRILOCAINE CREAM 25; 25 MG/G; MG/G
1 CREAM TOPICAL DAILY
Refills: 0 | Status: DISCONTINUED | OUTPATIENT
Start: 2024-03-11 | End: 2024-03-26

## 2024-02-29 RX ORDER — SODIUM CHLORIDE 9 MG/ML
1000 INJECTION INTRAMUSCULAR; INTRAVENOUS; SUBCUTANEOUS
Refills: 0 | Status: DISCONTINUED | OUTPATIENT
Start: 2024-02-29 | End: 2024-03-26

## 2024-02-29 RX ORDER — TACROLIMUS 5 MG/1
2 CAPSULE ORAL
Refills: 0 | Status: DISCONTINUED | OUTPATIENT
Start: 2024-03-11 | End: 2024-03-18

## 2024-02-29 RX ORDER — FILGRASTIM 480MCG/1.6
480 VIAL (ML) INJECTION EVERY 24 HOURS
Refills: 0 | Status: DISCONTINUED | OUTPATIENT
Start: 2024-03-11 | End: 2024-03-26

## 2024-02-29 RX ORDER — ONDANSETRON 8 MG/1
8 TABLET, FILM COATED ORAL EVERY 8 HOURS
Refills: 0 | Status: COMPLETED | OUTPATIENT
Start: 2024-02-29 | End: 2024-03-12

## 2024-02-29 RX ORDER — FUROSEMIDE 40 MG
10 TABLET ORAL ONCE
Refills: 0 | Status: COMPLETED | OUTPATIENT
Start: 2024-02-29 | End: 2024-02-29

## 2024-02-29 RX ORDER — PANTOPRAZOLE SODIUM 20 MG/1
40 TABLET, DELAYED RELEASE ORAL
Refills: 0 | Status: DISCONTINUED | OUTPATIENT
Start: 2024-02-29 | End: 2024-03-26

## 2024-02-29 RX ORDER — SODIUM CHLORIDE 9 MG/ML
1000 INJECTION, SOLUTION INTRAVENOUS
Refills: 0 | Status: DISCONTINUED | OUTPATIENT
Start: 2024-03-08 | End: 2024-03-18

## 2024-02-29 RX ORDER — APREPITANT 80 MG/1
125 CAPSULE ORAL ONCE
Refills: 0 | Status: COMPLETED | OUTPATIENT
Start: 2024-02-29 | End: 2024-02-29

## 2024-02-29 RX ORDER — ZALEPLON 10 MG
5 CAPSULE ORAL AT BEDTIME
Refills: 0 | Status: DISCONTINUED | OUTPATIENT
Start: 2024-02-29 | End: 2024-02-29

## 2024-02-29 RX ORDER — SODIUM CHLORIDE 9 MG/ML
10 INJECTION INTRAMUSCULAR; INTRAVENOUS; SUBCUTANEOUS
Refills: 0 | Status: DISCONTINUED | OUTPATIENT
Start: 2024-02-29 | End: 2024-03-26

## 2024-02-29 RX ORDER — DIPHENHYDRAMINE HCL 50 MG
25 CAPSULE ORAL ONCE
Refills: 0 | Status: COMPLETED | OUTPATIENT
Start: 2024-02-29 | End: 2024-03-06

## 2024-02-29 RX ORDER — MYCOPHENOLATE MOFETIL 250 MG/1
1000 CAPSULE ORAL THREE TIMES A DAY
Refills: 0 | Status: DISCONTINUED | OUTPATIENT
Start: 2024-03-11 | End: 2024-03-26

## 2024-02-29 RX ORDER — MESNA 100 MG/ML
1163 INJECTION, SOLUTION INTRAVENOUS EVERY 24 HOURS
Refills: 0 | Status: COMPLETED | OUTPATIENT
Start: 2024-02-29 | End: 2024-03-01

## 2024-02-29 RX ORDER — SODIUM CHLORIDE 9 MG/ML
1000 INJECTION, SOLUTION INTRAVENOUS
Refills: 0 | Status: DISCONTINUED | OUTPATIENT
Start: 2024-03-05 | End: 2024-03-11

## 2024-02-29 RX ORDER — SODIUM BICARBONATE 1 MEQ/ML
10 SYRINGE (ML) INTRAVENOUS
Refills: 0 | Status: DISCONTINUED | OUTPATIENT
Start: 2024-02-29 | End: 2024-03-26

## 2024-02-29 RX ORDER — SODIUM CHLORIDE 9 MG/ML
1000 INJECTION INTRAMUSCULAR; INTRAVENOUS; SUBCUTANEOUS
Refills: 0 | Status: DISCONTINUED | OUTPATIENT
Start: 2024-02-29 | End: 2024-03-11

## 2024-02-29 RX ORDER — SENNA PLUS 8.6 MG/1
1 TABLET ORAL AT BEDTIME
Refills: 0 | Status: DISCONTINUED | OUTPATIENT
Start: 2024-02-29 | End: 2024-03-05

## 2024-02-29 RX ORDER — ESCITALOPRAM OXALATE 10 MG/1
10 TABLET, FILM COATED ORAL DAILY
Refills: 0 | Status: DISCONTINUED | OUTPATIENT
Start: 2024-02-29 | End: 2024-03-04

## 2024-02-29 RX ORDER — FLUDARABINE PHOSPHATE 25 MG/ML
50 INJECTION, SOLUTION INTRAVENOUS EVERY 24 HOURS
Refills: 0 | Status: COMPLETED | OUTPATIENT
Start: 2024-02-29 | End: 2024-03-04

## 2024-02-29 RX ORDER — LOSARTAN POTASSIUM 100 MG/1
100 TABLET, FILM COATED ORAL DAILY
Refills: 0 | Status: DISCONTINUED | OUTPATIENT
Start: 2024-02-29 | End: 2024-03-06

## 2024-02-29 RX ORDER — CHLORHEXIDINE GLUCONATE 213 G/1000ML
1 SOLUTION TOPICAL
Refills: 0 | Status: DISCONTINUED | OUTPATIENT
Start: 2024-02-29 | End: 2024-03-26

## 2024-02-29 RX ORDER — CYCLOPHOSPHAMIDE 100 MG
950 VIAL (EA) INTRAVENOUS EVERY 24 HOURS
Refills: 0 | Status: COMPLETED | OUTPATIENT
Start: 2024-02-29 | End: 2024-03-01

## 2024-02-29 RX ORDER — SALIVA SUBSTITUTE COMB NO.11 351 MG
5 POWDER IN PACKET (EA) MUCOUS MEMBRANE
Refills: 0 | Status: DISCONTINUED | OUTPATIENT
Start: 2024-02-29 | End: 2024-03-26

## 2024-02-29 RX ORDER — APREPITANT 80 MG/1
80 CAPSULE ORAL EVERY 24 HOURS
Refills: 0 | Status: COMPLETED | OUTPATIENT
Start: 2024-03-01 | End: 2024-03-11

## 2024-02-29 RX ORDER — ACETAMINOPHEN 500 MG
650 TABLET ORAL ONCE
Refills: 0 | Status: COMPLETED | OUTPATIENT
Start: 2024-02-29 | End: 2024-03-06

## 2024-02-29 RX ORDER — URSODIOL 250 MG/1
300 TABLET, FILM COATED ORAL
Refills: 0 | Status: DISCONTINUED | OUTPATIENT
Start: 2024-02-29 | End: 2024-02-29

## 2024-02-29 RX ADMIN — Medication 5 MILLILITER(S): at 21:04

## 2024-02-29 RX ADMIN — SODIUM CHLORIDE 20 MILLILITER(S): 9 INJECTION INTRAMUSCULAR; INTRAVENOUS; SUBCUTANEOUS at 20:21

## 2024-02-29 RX ADMIN — ONDANSETRON 108 MILLIGRAM(S): 8 TABLET, FILM COATED ORAL at 21:47

## 2024-02-29 RX ADMIN — Medication 10 MILLIGRAM(S): at 20:21

## 2024-02-29 RX ADMIN — SODIUM CHLORIDE 200 MILLILITER(S): 9 INJECTION INTRAMUSCULAR; INTRAVENOUS; SUBCUTANEOUS at 20:21

## 2024-02-29 RX ADMIN — APREPITANT 125 MILLIGRAM(S): 80 CAPSULE ORAL at 17:05

## 2024-02-29 RX ADMIN — MESNA 1163 MILLIGRAM(S): 100 INJECTION, SOLUTION INTRAVENOUS at 20:51

## 2024-02-29 RX ADMIN — Medication 10 MILLILITER(S): at 20:52

## 2024-02-29 RX ADMIN — Medication 10 MILLILITER(S): at 17:11

## 2024-02-29 RX ADMIN — Medication 5 MILLILITER(S): at 17:05

## 2024-02-29 RX ADMIN — URSODIOL 300 MILLIGRAM(S): 250 TABLET, FILM COATED ORAL at 17:38

## 2024-02-29 RX ADMIN — Medication 950 MILLIGRAM(S): at 21:12

## 2024-02-29 RX ADMIN — PANTOPRAZOLE SODIUM 40 MILLIGRAM(S): 20 TABLET, DELAYED RELEASE ORAL at 17:33

## 2024-02-29 RX ADMIN — FLUDARABINE PHOSPHATE 50 MILLIGRAM(S): 25 INJECTION, SOLUTION INTRAVENOUS at 17:35

## 2024-02-29 NOTE — H&P ADULT - NS ATTEND AMEND GEN_ALL_CORE FT
..    Primary: [place]    Vital Signs Last 24 Hrs  T(C): 36.3 (29 Feb 2024 16:54), Max: 36.7 (29 Feb 2024 13:24)  T(F): 97.3 (29 Feb 2024 16:54), Max: 98 (29 Feb 2024 13:24)  HR: 64 (29 Feb 2024 16:54) (63 - 69)  BP: 162/82 (29 Feb 2024 16:54) (138/76 - 179/82)  BP(mean): --  RR: 18 (29 Feb 2024 16:54) (18 - 18)  SpO2: 99% (29 Feb 2024 16:54) (96% - 99%)    Parameters below as of 29 Feb 2024 16:54  Patient On (Oxygen Delivery Method): room air    MEDICATIONS  (STANDING):  acetaminophen     Tablet .. 650 milliGRAM(s) Oral once  Biotene Dry Mouth Oral Rinse 5 milliLiter(s) Swish and Spit five times a day  chlorhexidine 4% Liquid 1 Application(s) Topical <User Schedule>  cyclophosphamide IVPB (eMAR) 950 milliGRAM(s) IV Intermittent every 24 hours  diphenhydrAMINE Injectable 25 milliGRAM(s) IV Push once  escitalopram 10 milliGRAM(s) Oral daily  fludarabine IVPB (eMAR) 50 milliGRAM(s) IV Intermittent every 24 hours  losartan 100 milliGRAM(s) Oral daily  mesna IVPB (eMAR) 1163 milliGRAM(s) IV Intermittent every 24 hours  ondansetron  IVPB 8 milliGRAM(s) IV Intermittent every 8 hours  pantoprazole    Tablet 40 milliGRAM(s) Oral before breakfast  sodium bicarbonate Mouth Rinse 10 milliLiter(s) Swish and Spit five times a day  sodium chloride 0.9%. 1000 milliLiter(s) (20 mL/Hr) IV Continuous <Continuous>  sodium chloride 0.9%. 1000 milliLiter(s) (200 mL/Hr) IV Continuous <Continuous>      Assessment: 64-year-old day -6 CATRACHITA alloBMT using a Flu/Cy/TBI preparative regimen for [place] negative ALL.  Course uncomplicated.    Plan:  Heme: start preparative regimen  PLT goal > 10,000  Hgb goal > 7.0g/dL  G-CSF to start on day +5  will require post ALLO BMT CNS prophylaxis -- begins after engraftment.     GVHD: PTCy days 3 and 4, Cellcept and tacro to start on day +5    ID: day 0: flu, valtrex, Levaquin  bactrim SS qd to start on day +21    Nutrition: tolerating PO    DVT prophylaxis: ambulation    Over 60 minutes were spent in direct patient care for this admission.

## 2024-02-29 NOTE — PATIENT PROFILE ADULT - FALL HARM RISK - HARM RISK INTERVENTIONS

## 2024-02-29 NOTE — H&P ADULT - SOCIAL HISTORY
0050 Returned from Ultrasound reconnected to overhead cardiopulmonary monitor, continued to monitor Yes

## 2024-02-29 NOTE — H&P ADULT - HISTORY OF PRESENT ILLNESS
64-year-old post blinatumomab for detectable [place] negative ALL being admitted for AlloBMT(son) with FLU/CY/TBI prep. She was treated with 3 cycles of blinatumomab in CR at the time of transplant.

## 2024-02-29 NOTE — H&P ADULT - ASSESSMENT
64-year-old post blinatumomab for detectable [place] negative ALL being admitted for AlloBMT(son) with FLU/CY/TBI prep

## 2024-02-29 NOTE — PRE PROCEDURE NOTE - PRE PROCEDURE EVALUATION
Interventional Radiology    HPI: 64-year-old post blinatumomab for detectable [place] negative ALL being admitted for AlloBMT(son) with FLU/CY/TBI prep. Pt here in IR today for Non Tunneled CVC.    Allergies: Zofran (Headache)  sulfa drugs (Unknown)    Medications (Abx/Cardiac/Anticoagulation/Blood Products)      Data:  159  96.9  T(C): 36.7  HR: 63  BP: 138/76  RR: 18  SpO2: 98%    Exam  General: No acute distress  Chest: Non labored breathing  Abdomen: Non-distended  Extremities: No swelling, warm    Plan: 64y Female presents for Non Tunneled CVC  -Risks/Benefits/alternatives explained with the patient and/or healthcare proxy and witnessed informed consent obtained.

## 2024-02-29 NOTE — H&P ADULT - PROBLEM SELECTOR PLAN 1
1. Admit to BMTU    2. TLC placement in IR    3. Day -6 - day + 5 - antiemetics    4. Day - 6 start CTX hydration - 1/2NS @ 200ml /hr     5. Strict I&O, daily weights, prn diuresis    6. Day -6 - day -2 - Fludarabine 30mg/m2    7. Day -6 - day - 5 - CTX 14.5mg/kg/day. CTX to start after urine SG < 1.010. Mesna  12mg / kg - hemorrhagic cystitis ppx    8. Day -1 -  cGy x 1 dose    10. Day - 1 - at 2200 begin transplant hydration : 0.45Ns + 1 amp (50mEq) sodium bicarbonate at 150ml /hr; continue transplant hydration for 24 hours post infusion    11. Day 0 – HPC transplant    12. Day + 2 at 2200 - begin CTX hydration (0.45NS + 10 mEq KCl/ @150ml /m2  continue 24 hours post last dose of CTX  13. Day + 3 - + 4 - CTX 50mg/kg.day. Begin CTX when urine SG < 1.010. EKG daily. Mesna for hemorraghic cystitis ppx.  14. Day + 5 - start Zarxio,  MMF and Tacrolimus. Check Tacrolimus levels on Monday and Thursday.    15. Anxiolytics, antinausea, antidiarrhea medications as needed    16. Lasix PRN while being aggressively hydrated to avoid VOD    17. Nutritional support, pain management   18.start antimicrobial ppx on day 0

## 2024-02-29 NOTE — PRE-OP CHECKLIST - HEIGHT IN CM
159 PICC Line Procedure Note      Indications:  IV antibiotics     Order for PICC line received and acknowledged, lab values, diagnostics, and patient medical history also reviewed.  Risks and benefits of PICC line placement were discussed with patient/family including use of Lidocaine as local anesthetic, risks of bleeding, infection, thrombosis, tissue damage, and possible arterial puncture.  Informed consent was obtained.  Stop sign was placed on patient door prior to procedure.  There was/were one additional person(leland) present during procedure who also donned gloves, hat, and mask after performing hand hygiene.       Procedure Details   # 4 Fr, Double lumen Bard PICC line was attempted in the right brachial and cephalic vein(s) with 4 poke(s), using maximum sterile technique including chloraprep, cap, gloves, gown, hand hygiene, mask and drape per hospital protocol.  Writer unable to place PICC line due to inability to acces vasculature.  The left arm was not attempted due to history of DVT.     There were no changes in vital signs and patient did tolerate attempted procedure well.    Recommendations:  Patient referred to IR.  Writer spoke with Janeth VAZQUEZ in IR regarding attempt and IR will place PICC line.  Jolanta VAZQUEZ notified of plan for PICC line to be placed in IR.        Breann Benitez RN  [facility] PICC Team  [phone]

## 2024-03-01 LAB
ALBUMIN SERPL ELPH-MCNC: 3.7 G/DL — SIGNIFICANT CHANGE UP (ref 3.3–5)
ALP SERPL-CCNC: 88 U/L — SIGNIFICANT CHANGE UP (ref 40–120)
ALT FLD-CCNC: 22 U/L — SIGNIFICANT CHANGE UP (ref 10–45)
ANION GAP SERPL CALC-SCNC: 10 MMOL/L — SIGNIFICANT CHANGE UP (ref 5–17)
AST SERPL-CCNC: 23 U/L — SIGNIFICANT CHANGE UP (ref 10–40)
BASOPHILS # BLD AUTO: 0.06 K/UL — SIGNIFICANT CHANGE UP (ref 0–0.2)
BASOPHILS NFR BLD AUTO: 3.1 % — HIGH (ref 0–2)
BILIRUB DIRECT SERPL-MCNC: 0.1 MG/DL — SIGNIFICANT CHANGE UP (ref 0–0.3)
BILIRUB INDIRECT FLD-MCNC: 0.5 MG/DL — SIGNIFICANT CHANGE UP (ref 0.2–1)
BILIRUB SERPL-MCNC: 0.6 MG/DL — SIGNIFICANT CHANGE UP (ref 0.2–1.2)
BLD GP AB SCN SERPL QL: NEGATIVE — SIGNIFICANT CHANGE UP
BUN SERPL-MCNC: 16 MG/DL — SIGNIFICANT CHANGE UP (ref 7–23)
CALCIUM SERPL-MCNC: 8.7 MG/DL — SIGNIFICANT CHANGE UP (ref 8.4–10.5)
CHLORIDE SERPL-SCNC: 108 MMOL/L — SIGNIFICANT CHANGE UP (ref 96–108)
CO2 SERPL-SCNC: 22 MMOL/L — SIGNIFICANT CHANGE UP (ref 22–31)
CREAT SERPL-MCNC: 0.83 MG/DL — SIGNIFICANT CHANGE UP (ref 0.5–1.3)
EGFR: 78 ML/MIN/1.73M2 — SIGNIFICANT CHANGE UP
ELLIPTOCYTES BLD QL SMEAR: SLIGHT — SIGNIFICANT CHANGE UP
EOSINOPHIL # BLD AUTO: 0.06 K/UL — SIGNIFICANT CHANGE UP (ref 0–0.5)
EOSINOPHIL NFR BLD AUTO: 3.1 % — SIGNIFICANT CHANGE UP (ref 0–6)
GLUCOSE SERPL-MCNC: 82 MG/DL — SIGNIFICANT CHANGE UP (ref 70–99)
HCT VFR BLD CALC: 35.4 % — SIGNIFICANT CHANGE UP (ref 34.5–45)
HGB BLD-MCNC: 11.7 G/DL — SIGNIFICANT CHANGE UP (ref 11.5–15.5)
LDH SERPL L TO P-CCNC: 171 U/L — SIGNIFICANT CHANGE UP (ref 50–242)
LYMPHOCYTES # BLD AUTO: 0.64 K/UL — LOW (ref 1–3.3)
LYMPHOCYTES # BLD AUTO: 31.3 % — SIGNIFICANT CHANGE UP (ref 13–44)
MAGNESIUM SERPL-MCNC: 1.8 MG/DL — SIGNIFICANT CHANGE UP (ref 1.6–2.6)
MANUAL SMEAR VERIFICATION: SIGNIFICANT CHANGE UP
MCHC RBC-ENTMCNC: 33.1 GM/DL — SIGNIFICANT CHANGE UP (ref 32–36)
MCHC RBC-ENTMCNC: 33.3 PG — SIGNIFICANT CHANGE UP (ref 27–34)
MCV RBC AUTO: 100.9 FL — HIGH (ref 80–100)
MONOCYTES # BLD AUTO: 0.45 K/UL — SIGNIFICANT CHANGE UP (ref 0–0.9)
MONOCYTES NFR BLD AUTO: 21.9 % — HIGH (ref 2–14)
NEUTROPHILS # BLD AUTO: 0.83 K/UL — LOW (ref 1.8–7.4)
NEUTROPHILS NFR BLD AUTO: 40.6 % — LOW (ref 43–77)
PHOSPHATE SERPL-MCNC: 3.3 MG/DL — SIGNIFICANT CHANGE UP (ref 2.5–4.5)
PLAT MORPH BLD: NORMAL — SIGNIFICANT CHANGE UP
PLATELET # BLD AUTO: 110 K/UL — LOW (ref 150–400)
POIKILOCYTOSIS BLD QL AUTO: SLIGHT — SIGNIFICANT CHANGE UP
POLYCHROMASIA BLD QL SMEAR: SLIGHT — SIGNIFICANT CHANGE UP
POTASSIUM SERPL-MCNC: 3.8 MMOL/L — SIGNIFICANT CHANGE UP (ref 3.5–5.3)
POTASSIUM SERPL-SCNC: 3.8 MMOL/L — SIGNIFICANT CHANGE UP (ref 3.5–5.3)
PROT SERPL-MCNC: 5.6 G/DL — LOW (ref 6–8.3)
RBC # BLD: 3.51 M/UL — LOW (ref 3.8–5.2)
RBC # FLD: 13 % — SIGNIFICANT CHANGE UP (ref 10.3–14.5)
RBC BLD AUTO: ABNORMAL
RH IG SCN BLD-IMP: POSITIVE — SIGNIFICANT CHANGE UP
SODIUM SERPL-SCNC: 140 MMOL/L — SIGNIFICANT CHANGE UP (ref 135–145)
SP GR UR STRIP: 1.01 — SIGNIFICANT CHANGE UP (ref 1–1.03)
WBC # BLD: 2.04 K/UL — LOW (ref 3.8–10.5)
WBC # FLD AUTO: 2.04 K/UL — LOW (ref 3.8–10.5)

## 2024-03-01 PROCEDURE — 99223 1ST HOSP IP/OBS HIGH 75: CPT

## 2024-03-01 PROCEDURE — 99233 SBSQ HOSP IP/OBS HIGH 50: CPT | Mod: FS

## 2024-03-01 RX ORDER — FUROSEMIDE 40 MG
10 TABLET ORAL ONCE
Refills: 0 | Status: COMPLETED | OUTPATIENT
Start: 2024-03-01 | End: 2024-03-01

## 2024-03-01 RX ADMIN — Medication 10 MILLILITER(S): at 08:58

## 2024-03-01 RX ADMIN — Medication 10 MILLIGRAM(S): at 06:37

## 2024-03-01 RX ADMIN — Medication 5 MILLILITER(S): at 19:55

## 2024-03-01 RX ADMIN — ONDANSETRON 108 MILLIGRAM(S): 8 TABLET, FILM COATED ORAL at 21:34

## 2024-03-01 RX ADMIN — Medication 10 MILLILITER(S): at 15:44

## 2024-03-01 RX ADMIN — PANTOPRAZOLE SODIUM 40 MILLIGRAM(S): 20 TABLET, DELAYED RELEASE ORAL at 05:02

## 2024-03-01 RX ADMIN — Medication 10 MILLILITER(S): at 13:06

## 2024-03-01 RX ADMIN — CHLORHEXIDINE GLUCONATE 1 APPLICATION(S): 213 SOLUTION TOPICAL at 08:58

## 2024-03-01 RX ADMIN — Medication 10 MILLILITER(S): at 19:56

## 2024-03-01 RX ADMIN — Medication 5 MILLILITER(S): at 15:44

## 2024-03-01 RX ADMIN — Medication 950 MILLIGRAM(S): at 21:46

## 2024-03-01 RX ADMIN — MESNA 1163 MILLIGRAM(S): 100 INJECTION, SOLUTION INTRAVENOUS at 20:43

## 2024-03-01 RX ADMIN — ONDANSETRON 108 MILLIGRAM(S): 8 TABLET, FILM COATED ORAL at 05:02

## 2024-03-01 RX ADMIN — APREPITANT 80 MILLIGRAM(S): 80 CAPSULE ORAL at 13:06

## 2024-03-01 RX ADMIN — LOSARTAN POTASSIUM 100 MILLIGRAM(S): 100 TABLET, FILM COATED ORAL at 05:02

## 2024-03-01 RX ADMIN — ONDANSETRON 108 MILLIGRAM(S): 8 TABLET, FILM COATED ORAL at 13:06

## 2024-03-01 RX ADMIN — Medication 5 MILLILITER(S): at 08:58

## 2024-03-01 RX ADMIN — Medication 5 MILLILITER(S): at 13:05

## 2024-03-01 RX ADMIN — Medication 10 MILLILITER(S): at 00:05

## 2024-03-01 RX ADMIN — ESCITALOPRAM OXALATE 10 MILLIGRAM(S): 10 TABLET, FILM COATED ORAL at 13:09

## 2024-03-01 RX ADMIN — FLUDARABINE PHOSPHATE 50 MILLIGRAM(S): 25 INJECTION, SOLUTION INTRAVENOUS at 16:38

## 2024-03-01 RX ADMIN — Medication 5 MILLILITER(S): at 00:05

## 2024-03-01 NOTE — PROGRESS NOTE ADULT - SUBJECTIVE AND OBJECTIVE BOX
[facility] Transplant Team                                                      Critical / Counseling Time Provided: 30 minutes                                                                                                                                                        Chief Complaint: AlloBMT(son) with FLU/CY/TBI prep for the treatment fo ALL    S: Patient seen and examined with [facility] Transplant Team:   OOB   tolerating PO  no complaints today     Denies mouth / tongue / throat pain, dyspnea, cough, nausea, vomiting, diarrhea, abdominal pain     O: Vitals:   Vital Signs Last 24 Hrs  T(C): 36.5 (01 Mar 2024 14:00), Max: 36.8 (01 Mar 2024 00:04)  T(F): 97.7 (01 Mar 2024 14:00), Max: 98.2 (01 Mar 2024 00:04)  HR: 64 (01 Mar 2024 14:00) (60 - 65)  BP: 150/81 (01 Mar 2024 14:00) (114/70 - 162/82)  BP(mean): --  RR: 18 (01 Mar 2024 14:00) (16 - 18)  SpO2: 97% (01 Mar 2024 14:00) (97% - 99%)    Parameters below as of 01 Mar 2024 14:00  Patient On (Oxygen Delivery Method): room air        Admit weight: 96.9 kg   Daily Weight in k.9 (01 Mar 2024 07:56)    Intake / Output:    @ : @ 07:00  --------------------------------------------------------  IN: 4816 mL / OUT: 3200 mL / NET: 1616 mL     @ 07: @ 16:39  --------------------------------------------------------  IN: 1989 mL / OUT: 2100 mL / NET: -111 mL          PE:   Oropharynx: no erythema no ulcer  Oral Mucositis:   -                                                     Grade: -  CVS: RRR  Lungs: CTA  Abdomen: soft, ND, ND +bs  Extremities: trece edema   Gastric Mucositis:      -                                            Grade: -  Intestinal Mucositis:     -                                         Grade: -  Skin:  no rash   TLC: C/D/I   Neuro: A&O x3  Pain:  no pain     Labs:   CBC Full  -  ( 01 Mar 2024 07:23 )  WBC Count : 2.04 K/uL  Hemoglobin : 11.7 g/dL  Hematocrit : 35.4 %  Platelet Count - Automated : 110 K/uL  Mean Cell Volume : 100.9 fl  Mean Cell Hemoglobin : 33.3 pg  Mean Cell Hemoglobin Concentration : 33.1 gm/dL  Auto Neutrophil # : 0.83 K/uL  Auto Lymphocyte # : 0.64 K/uL  Auto Monocyte # : 0.45 K/uL  Auto Eosinophil # : 0.06 K/uL  Auto Basophil # : 0.06 K/uL  Auto Neutrophil % : 40.6 %  Auto Lymphocyte % : 31.3 %  Auto Monocyte % : 21.9 %  Auto Eosinophil % : 3.1 %  Auto Basophil % : 3.1 %                          11.7   2.04  )-----------( 110      ( 01 Mar 2024 07:23 )             35.4     03-    140  |  108  |  16  ----------------------------<  82  3.8   |  22  |  0.83    Ca    8.7      01 Mar 2024 07:23  Phos  3.3     03-  Mg     1.8     -    TPro  5.6<L>  /  Alb  3.7  /  TBili  0.6  /  DBili  0.1  /  AST  23  /  ALT  22  /  AlkPhos  88  03-    PT/INR - ( 2024 16:16 )   PT: 11.2 sec;   INR: 1.02 ratio         PTT - ( 2024 16:16 )  PTT:32.1 sec  LIVER FUNCTIONS - ( 01 Mar 2024 07:23 )  Alb: 3.7 g/dL / Pro: 5.6 g/dL / ALK PHOS: 88 U/L / ALT: 22 U/L / AST: 23 U/L / GGT: x           Lactate Dehydrogenase, Serum: 171 U/L ( @ 07:23)      Meds:   Antimicrobials:       Heme / Onc:   cyclophosphamide IVPB (eMAR) 950 milliGRAM(s) IV Intermittent every 24 hours  fludarabine IVPB (eMAR) 50 milliGRAM(s) IV Intermittent every 24 hours  mesna IVPB (eMAR) 1163 milliGRAM(s) IV Intermittent every 24 hours      GI:  pantoprazole    Tablet 40 milliGRAM(s) Oral before breakfast  senna 1 Tablet(s) Oral at bedtime PRN  sodium bicarbonate Mouth Rinse 10 milliLiter(s) Swish and Spit five times a day      Cardiovascular:   losartan 100 milliGRAM(s) Oral daily      Immunologic:       Other medications:   acetaminophen     Tablet .. 650 milliGRAM(s) Oral once  aprepitant 80 milliGRAM(s) Oral every 24 hours  Biotene Dry Mouth Oral Rinse 5 milliLiter(s) Swish and Spit five times a day  chlorhexidine 4% Liquid 1 Application(s) Topical <User Schedule>  diphenhydrAMINE Injectable 25 milliGRAM(s) IV Push once  escitalopram 10 milliGRAM(s) Oral daily  ondansetron  IVPB 8 milliGRAM(s) IV Intermittent every 8 hours  sodium chloride 0.9%. 1000 milliLiter(s) IV Continuous <Continuous>  sodium chloride 0.9%. 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 Infectious Disease:  Antimicrobials to start on day 0    2. VOD Prophylaxis: Actigall, Glutamine,     3. Mouthcare - NS / NaHCO3 rinses,  Biotene; Skin care     4. GVHD prophylaxis:  day -1 TBI   PTCY  MMF and tacro to start day +5    6. Transfuse & replete electrolytes prn     7. IV hydration, daily weights, strict I&O, prn diuresis     8. PO intake as tolerated, nutrition follow up as needed, MVI, folic acid     9. Antiemetics, anti-diarrhea medications:    zofran regrnal prn     10. OOB as tolerated, physical therapy consult if needed     11. Monitor coags / fibrinogen 2x week, vitamin K as needed     12. Monitor closely for clinical changes, monitor for fevers     13. Emotional support provided, plan of care discussed and questions addressed     14. Patient education done regarding chemotherapy prep, plan of care, restrictions and discharge planning     15. Continue regular social work input     I have written the above note for   who performed service with me in the room.   Lucy Benson PA-C ([phone])    I have seen and examined patient with PA, I agree with above note as scribed.

## 2024-03-01 NOTE — DIETITIAN INITIAL EVALUATION ADULT - OTHER INFO
- BMT/SCT: alloBMT day -5  - Renal: NS @ 20 ml/hr   - Mouthcare: Biotene, sodium bicarbonate mouth wash

## 2024-03-01 NOTE — DIETITIAN INITIAL EVALUATION ADULT - PERTINENT LABORATORY DATA
03-01    140  |  108  |  16  ----------------------------<  82  3.8   |  22  |  0.83    Ca    8.7      01 Mar 2024 07:23  Phos  3.3     03-01  Mg     1.8     03-01    TPro  5.6<L>  /  Alb  3.7  /  TBili  0.6  /  DBili  0.1  /  AST  23  /  ALT  22  /  AlkPhos  88  03-01

## 2024-03-01 NOTE — DIETITIAN INITIAL EVALUATION ADULT - PHYSCIAL ASSESSMENT
Weight Hx Per:  - Source: patient   - UBW: 215 pounds   - Reported weight changes: Pt denies changes in weight     [facility] no [facility] available to assess     Current Admission Weights:  - Dosing weight: 213.1 pounds/96.9 kg (02/29)  - Daily weight: 97.9 kg (03/01)    Weight Change:  - none     **  Will continue to monitor weight trends as available/able.     IBW: 110 pounds   %IBW: 194%

## 2024-03-01 NOTE — CONSULT NOTE ADULT - SUBJECTIVE AND OBJECTIVE BOX
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 She was treated with 3 cycles of blinatumomab in CR at the time of transplant.   (2024 10:52)    PERTINENT PM/SXH:   HTN (hypertension)    HLD (hyperlipidemia)    B-cell acute lymphoblastic leukemia      History of       FAMILY HISTORY:  FHx: colon cancer (Father)      Family Hx substance abuse [ ]yes [ ]no  ITEMS NOT CHECKED ARE NOT PRESENT    SOCIAL HISTORY:   Significant other/partner[ ]  Children[ ]  Yazidism/Spirituality:  Substance hx:  [ ]   Tobacco hx:  [ ]   Alcohol hx: [ ]   Home Opioid hx:  [ ] I-Stop Reference No:  Living Situation: [ ]Home  [ ]Long term care  [ ]Rehab [ ]Other    ADVANCE DIRECTIVES:    DNR/MOLST  [ ]  Living Will  [ ]   DECISION MAKER(s):  [ ] Health Care Proxy(s)  [ ] Surrogate(s)  [ ] Guardian           Name(s): Phone Number(s):    BASELINE (I)ADL(s) (prior to admission):  [place]: [ ]Total  [ ] Moderate [ ]Dependent    Allergies    sulfa drugs (Unknown)    Intolerances    Zofran (Headache)  MEDICATIONS  (STANDING):  acetaminophen     Tablet .. 650 milliGRAM(s) Oral once  aprepitant 80 milliGRAM(s) Oral every 24 hours  Biotene Dry Mouth Oral Rinse 5 milliLiter(s) Swish and Spit five times a day  chlorhexidine 4% Liquid 1 Application(s) Topical <User Schedule>  diphenhydrAMINE Injectable 25 milliGRAM(s) IV Push once  escitalopram 10 milliGRAM(s) Oral daily  fludarabine IVPB (eMAR) 50 milliGRAM(s) IV Intermittent every 24 hours  losartan 100 milliGRAM(s) Oral daily  ondansetron  IVPB 8 milliGRAM(s) IV Intermittent every 8 hours  pantoprazole    Tablet 40 milliGRAM(s) Oral before breakfast  sodium bicarbonate Mouth Rinse 10 milliLiter(s) Swish and Spit five times a day  sodium chloride 0.9%. 1000 milliLiter(s) (20 mL/Hr) IV Continuous <Continuous>  sodium chloride 0.9%. 1000 milliLiter(s) (200 mL/Hr) IV Continuous <Continuous>    MEDICATIONS  (PRN):  senna 1 Tablet(s) Oral at bedtime PRN Constipation  sodium chloride 0.9% lock flush 10 milliLiter(s) IV Push every 1 hour PRN Pre/post blood products, medications, blood draw, and to maintain line patency  zaleplon 5 milliGRAM(s) Oral at bedtime PRN Insomnia    PRESENT SYMPTOMS: [ ]Unable to self-report  [ ] CPOT [ ] PAINADs [ ] RDOS  Source if other than patient:  [ ]Family   [ ]Team     Pain: [ ]yes [ ]no  QOL impact -   Location -                    Aggravating factors -  Quality -  Radiation -  Timing-  Severity (0-10 scale):  Minimal acceptable level (0-10 scale):     CPOT:    https://www.[facility].org/getattachment/[email or web address]/Critical-Care-Pain-Observation-Tool-(CPOT)    PAINAD Score: See PAINAD tool and score below     Dyspnea:                           [ ]Mild [ ]Moderate [ ]Severe    RDOS: See RDOS tool and score below   0 to 2  minimal or no respiratory distress   3  mild distress  4 to 6 moderate distress  >7 severe distress      Anxiety:                             [ ]Mild [ ]Moderate [ ]Severe  Fatigue:                             [ ]Mild [ ]Moderate [ ]Severe  Nausea:                             [ ]Mild [ ]Moderate [ ]Severe  Loss of appetite:              [ ]Mild [ ]Moderate [ ]Severe  Constipation:                    [ ]Mild [ ]Moderate [ ]Severe    PCSSQ[Palliative Care Spiritual Screening Question]   Severity (0-10):  Score of 4 or > indicate consideration of Chaplaincy referral.  Chaplaincy Referral: [ ] yes [ ] refused [ ] following [ ] Deferred     Caregiver [place]? : [ ] yes [ ] no [ ] Deferred [ ] Declined             Social work referral [ ] Patient & Family Centered Care Referral [ ]     Anticipatory Grief present?:  [ ] yes [ ] no  [ ] Deferred                  Social work referral [ ] Chaplaincy Referral [ ]    		  Other Symptoms:  [ ]All other review of systems negative     Palliative Performance Status Version 2:   See PPSv2 tool and score below          PHYSICAL EXAM:  Vital Signs Last 24 Hrs  T(C): 36 (01 Mar 2024 20:53), Max: 36.8 (01 Mar 2024 00:04)  T(F): 96.8 (01 Mar 2024 20:53), Max: 98.2 (01 Mar 2024 00:04)  HR: 60 (01 Mar 2024 20:53) (60 - 65)  BP: 129/76 (01 Mar 2024 20:53) (114/70 - 160/77)  BP(mean): --  RR: 18 (01 Mar 2024 20:53) (16 - 18)  SpO2: 97% (01 Mar 2024 20:53) (97% - 98%)    Parameters below as of 01 Mar 2024 20:53  Patient On (Oxygen Delivery Method): room air     I&O's Summary    2024 07:01  -  01 Mar 2024 07:00  --------------------------------------------------------  IN: 4816 mL / OUT: 3200 mL / NET: 1616 mL    01 Mar 2024 07:01  -  01 Mar 2024 22:12  --------------------------------------------------------  IN: 4289 mL / OUT: 3400 mL / NET: 889 mL      GENERAL: [ ]Cachexia    [ ]Alert  [ ]Oriented x   [ ]Lethargic  [ ]Unarousable  [ ]Verbal  [ ]Non-Verbal  Behavioral:   [ ] Anxiety  [ ] Delirium [ ] Agitation [ ] Other  HEENT:  [ ]Normal   [ ]Dry mouth   [ ]ET Tube/Trach  [ ]Oral lesions  PULMONARY:   [ ]Clear [ ]Tachypnea  [ ]Audible excessive secretions   [ ]Rhonchi        [ ]Right [ ]Left [ ]Bilateral  [ ]Crackles        [ ]Right [ ]Left [ ]Bilateral  [ ]Wheezing     [ ]Right [ ]Left [ ]Bilateral  [ ]Diminished breath sounds [ ]right [ ]left [ ]bilateral  CARDIOVASCULAR:    [ ]Regular [ ]Irregular [ ]Tachy  [ ]Parker [ ]Murmur [ ]Other  GASTROINTESTINAL:  [ ]Soft  [ ]Distended   [ ]+BS  [ ]Non tender [ ]Tender  [ ]Other [ ]PEG [ ]OGT/ NGT  Last BM:  GENITOURINARY:  [ ]Normal [ ] Incontinent   [ ]Oliguria/Anuria   [ ]Mehta  MUSCULOSKELETAL:   [ ]Normal   [ ]Weakness  [ ]Bed/Wheelchair bound [ ]Edema  NEUROLOGIC:   [ ]No focal deficits  [ ]Cognitive impairment  [ ]Dysphagia [ ]Dysarthria [ ]Paresis [ ]Other   SKIN:   [ ]Normal  [ ]Rash  [ ]Other  [ ]Pressure ulcer(s)       Present on admission [ ]y [ ]n    CRITICAL CARE:  [ ] Shock Present  [ ]Septic [ ]Cardiogenic [ ]Neurologic [ ]Hypovolemic  [ ]  Vasopressors [ ]  Inotropes   [ ]Respiratory failure present [ ]Mechanical ventilation [ ]Non-invasive ventilatory support [ ]High flow    [ ]Acute  [ ]Chronic [ ]Hypoxic  [ ]Hypercarbic [ ]Other  [ ]Other organ failure     LABS:                        11.7   2.04  )-----------( 110      ( 01 Mar 2024 07:23 )             35.4   03-01    140  |  108  |  16  ----------------------------<  82  3.8   |  22  |  0.83    Ca    8.7      01 Mar 2024 07:23  Phos  3.3     03-01  Mg     1.8     03-01    TPro  5.6<L>  /  Alb  3.7  /  TBili  0.6  /  DBili  0.1  /  AST  23  /  ALT  22  /  AlkPhos  88  03-01  PT/INR - ( 2024 16:16 )   PT: 11.2 sec;   INR: 1.02 ratio         PTT - ( 2024 16:16 )  PTT:32.1 sec    Urinalysis Basic - ( 01 Mar 2024 07:23 )    Color: x / Appearance: x / SG: x / pH: x  Gluc: 82 mg/dL / Ketone: x  / Bili: x / Urobili: x   Blood: x / Protein: x / Nitrite: x   Leuk Esterase: x / RBC: x / WBC x   Sq Epi: x / Non Sq Epi: x / Bacteria: x      RADIOLOGY & ADDITIONAL STUDIES:    PROTEIN CALORIE MALNUTRITION PRESENT: [ ]mild [ ]moderate [ ]severe [ ]underweight [ ]morbid obesity  https://www.andeal.org/vault/9253/web/files/ONC/Table_Clinical%20Characteristics%20to%20Document%20Malnutrition-White%20JV%20et%20al%2020.pdf    Height (cm): 159 (24 @ 10:15), 157.48 (24 @ 15:00), 158 (23 @ 09:20)  Weight (kg): 96.9 (24 @ 10:15), 96.7 (24 @ 15:00), 92.7 (23 @ 09:20)  BMI (kg/m2): 38.3 (24 @ 10:15), 39 (24 @ 15:00), 37.1 (23 @ 09:20)    [ ]PPSV2 < or = to 30% [ ]significant weight loss  [ ]poor nutritional intake  [ ]anasarca[ ]Artificial Nutrition      Other REFERRALS:  [ ]Hospice  [ ]Child Life  [ ]Social Work  [ ]Case management [ ]Holistic Therapy     Goals of Care Document:  Date of Service:24 @ 22:12  HPI:  64-year-old post blinatumomab for 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 She was treated with 3 cycles of blinatumomab in CR at the time of transplant.   (2024 10:52)    3/1. The patient was seen and examined. She denied, pain, nausea or any other relevant complaints.     PERTINENT PM/SXH:   HTN (hypertension)    HLD (hyperlipidemia)    B-cell acute lymphoblastic leukemia      History of       FAMILY HISTORY:  FHx: colon cancer (Father)      Family Hx substance abuse [ ]yes [ ]no  ITEMS NOT CHECKED ARE NOT PRESENT    SOCIAL HISTORY:   Significant other/partner[x ]   Children[x ] x 2 (44/45 yo). Has 3 grandkids  Mormonism/Spirituality: Evangelical but does not really practice it; however, she indicates that to some degree she is a spiritual person.   Substance hx:  [ ]   Tobacco hx:  [ ]   Alcohol hx: [ ]   Home Opioid hx:  [ ] I-Stop Reference No:  Living Situation: [x ]Home  [ ]Long term care  [ ]Rehab [ ]Other  She stopped working 1 year ago when se was Dx with Leukemia   ADVANCE DIRECTIVES:    DNR/MOLST  [ ]  Living Will  [ ]   DECISION MAKER(s):  [ ] Health Care Proxy(s)  [x ] Surrogate(s)  [ ] Guardian           Name(s): Phone Number(s):  ([place] [phone]). She did not want to complete a HCP form because anyway her surrogate was going to be the same person she would assign as her HCP.     BASELINE (I)ADL(s) (prior to admission):  [place]: [x ]Total  [ ] Moderate [ ]Dependent    Allergies    sulfa drugs (Unknown)    Intolerances    Zofran (Headache)  MEDICATIONS  (STANDING):  acetaminophen     Tablet .. 650 milliGRAM(s) Oral once  aprepitant 80 milliGRAM(s) Oral every 24 hours  Biotene Dry Mouth Oral Rinse 5 milliLiter(s) Swish and Spit five times a day  chlorhexidine 4% Liquid 1 Application(s) Topical <User Schedule>  diphenhydrAMINE Injectable 25 milliGRAM(s) IV Push once  escitalopram 10 milliGRAM(s) Oral daily  fludarabine IVPB (eMAR) 50 milliGRAM(s) IV Intermittent every 24 hours  losartan 100 milliGRAM(s) Oral daily  ondansetron  IVPB 8 milliGRAM(s) IV Intermittent every 8 hours  pantoprazole    Tablet 40 milliGRAM(s) Oral before breakfast  sodium bicarbonate Mouth Rinse 10 milliLiter(s) Swish and Spit five times a day  sodium chloride 0.9%. 1000 milliLiter(s) (20 mL/Hr) IV Continuous <Continuous>  sodium chloride 0.9%. 1000 milliLiter(s) (200 mL/Hr) IV Continuous <Continuous>    MEDICATIONS  (PRN):  senna 1 Tablet(s) Oral at bedtime PRN Constipation  sodium chloride 0.9% lock flush 10 milliLiter(s) IV Push every 1 hour PRN Pre/post blood products, medications, blood draw, and to maintain line patency  zaleplon 5 milliGRAM(s) Oral at bedtime PRN Insomnia    PRESENT SYMPTOMS: [ ]Unable to self-report  [ ] CPOT [ ] PAINADs [ ] RDOS  Source if other than patient:  [ ]Family   [ ]Team     Pain: [ ]yes [x ]no  QOL impact -   Location -                    Aggravating factors -  Quality -  Radiation -  Timing-  Severity (0-10 scale):  Minimal acceptable level (0-10 scale):     CPOT:    https://www.[facility].org/getattachment/[email or web address]/Critical-Care-Pain-Observation-Tool-(CPOT)    PAINAD Score: See PAINAD tool and score below     Dyspnea:                           [ ]Mild [ ]Moderate [ ]Severe    RDOS: See RDOS tool and score below   0 to 2  minimal or no respiratory distress   3  mild distress  4 to 6 moderate distress  >7 severe distress      Anxiety:                             [ ]Mild [ ]Moderate [ ]Severe  Fatigue:                             [ ]Mild [ ]Moderate [ ]Severe  Nausea:                             [ ]Mild [ ]Moderate [ ]Severe  Loss of appetite:              [ ]Mild [ ]Moderate [ ]Severe  Constipation:                    [ ]Mild [ ]Moderate [ ]Severe    PCSSQ[Palliative Care Spiritual Screening Question]   Severity (0-10):  Score of 4 or > indicate consideration of Chaplaincy referral.  Chaplaincy Referral: [ ] yes [ ] refused [ ] following [x ] Deferred     Caregiver [place]? : [ ] yes [x] no [ ] Deferred [ ] Declined             Social work referral [ ] Patient & Family Centered Care Referral [ ]     Anticipatory Grief present?:  [ ] yes [x ] no  [ ] Deferred                  Social work referral [ ] Chaplaincy Referral [ ]    		  Other Symptoms:  [x]All other review of systems negative but as per ESAS     Palliative Performance Status Version 2:   See PPSv2 tool and score below          PHYSICAL EXAM:  Vital Signs Last 24 Hrs  T(C): 36 (01 Mar 2024 20:53), Max: 36.8 (01 Mar 2024 00:04)  T(F): 96.8 (01 Mar 2024 20:53), Max: 98.2 (01 Mar 2024 00:04)  HR: 60 (01 Mar 2024 20:53) (60 - 65)  BP: 129/76 (01 Mar 2024 20:53) (114/70 - 160/77)  BP(mean): --  RR: 18 (01 Mar 2024 20:53) (16 - 18)  SpO2: 97% (01 Mar 2024 20:53) (97% - 98%)    Parameters below as of 01 Mar 2024 20:53  Patient On (Oxygen Delivery Method): room air     I&O's Summary    2024 07:  -  01 Mar 2024 07:00  --------------------------------------------------------  IN: 4816 mL / OUT: 3200 mL / NET: 1616 mL    01 Mar 2024 07:01  -  01 Mar 2024 22:12  --------------------------------------------------------  IN: 4289 mL / OUT: 3400 mL / NET: 889 mL      GENERAL: [ ]Cachexia    [x ]Alert  [x ]Oriented x 3   [ ]Lethargic  [ ]Unarousable  [ x]Verbal  [ ]Non-Verbal  Behavioral:   [ ]Anxiety  [ ]Delirium [ ]Agitation [ ]Other  HEENT:  [ x]Normal   [ ]Dry mouth   [ ]ET Tube/Trach  [ ]Oral lesions  PULMONARY:   [x]Clear [ ]Tachypnea  [ ]Audible excessive secretions   [ ]Rhonchi        [ ]Right [ ]Left [ ]Bilateral  [ ]Crackles        [ ]Right [ ]Left [ ]Bilateral  [ ]Wheezing     [ ]Right [ ]Left [ ]Bilateral  [ ]Diminished BS [ ] Right [ ]Left [ x]Bilateral  CARDIOVASCULAR:    [x ]Regular [ ]Irregular [ ]Tachy  [ ]Parker [ ]Murmur [ ]Other  GASTROINTESTINAL:  [x ]Soft  [ ]Distended   [x ]+BS  [ x]Non tender [ ]Tender  [ ]Other [ ]PEG [ ]OGT/ NGT   Last BM:   GENITOURINARY:  [x ]Normal [ ]Incontinent   [ ]Oliguria/Anuria   [ ]Mehta  MUSCULOSKELETAL:   [x ]Normal   [ ]Weakness  [ ]Bed/Wheelchair bound [ ]Edema  NEUROLOGIC:   [x ]No focal deficits  [ ] Cognitive impairment  [ ] Dysphagia [ ]Dysarthria [ ] Paresis [ ]Other   SKIN:   [x ]Normal  [ ]Rash  [ ]Other  [ ]Pressure ulcer(s) [ ]y [ ]n present on admission        CRITICAL CARE:  [ ] Shock Present  [ ]Septic [ ]Cardiogenic [ ]Neurologic [ ]Hypovolemic  [ ]  Vasopressors [ ]  Inotropes   [ ]Respiratory failure present [ ]Mechanical ventilation [ ]Non-invasive ventilatory support [ ]High flow    [ ]Acute  [ ]Chronic [ ]Hypoxic  [ ]Hypercarbic [ ]Other  [ ]Other organ failure     LABS:                        11.7   2.04  )-----------( 110      ( 01 Mar 2024 07:23 )             35.4   03-    140  |  108  |  16  ----------------------------<  82  3.8   |  22  |  0.83    Ca    8.7      01 Mar 2024 07:23  Phos  3.3     03-01  Mg     1.8     03-    TPro  5.6<L>  /  Alb  3.7  /  TBili  0.6  /  DBili  0.1  /  AST  23  /  ALT  22  /  AlkPhos  88  03-01  PT/INR - ( 2024 16:16 )   PT: 11.2 sec;   INR: 1.02 ratio         PTT - ( 2024 16:16 )  PTT:32.1 sec    Urinalysis Basic - ( 01 Mar 2024 07:23 )    Color: x / Appearance: x / SG: x / pH: x  Gluc: 82 mg/dL / Ketone: x  / Bili: x / Urobili: x   Blood: x / Protein: x / Nitrite: x   Leuk Esterase: x / RBC: x / WBC x   Sq Epi: x / Non Sq Epi: x / Bacteria: x      RADIOLOGY & ADDITIONAL STUDIES:  < from: CT Maxillofacial No Cont (24 @ 12:27) >    IMPRESSION:    Paranasal sinuses are well-aerated.    --- End of Report ---               GOSIA BLACKWELL MD; Attending Radiologist   This document has been electronically signed. 2024  9:46AM    < end of copied text >    PROTEIN CALORIE MALNUTRITION PRESENT: [ ]mild [ ]moderate [ ]severe [ ]underweight [ ]morbid obesity  https://www.andeal.org/vault/2440/web/files/ONC/Table_Clinical%20Characteristics%20to%20Document%20Malnutrition-White%20JV%20et%20al%2020.pdf    Height (cm): 159 (24 @ 10:15), 157.48 (24 @ 15:00), 158 (23 @ 09:20)  Weight (kg): 96.9 (24 @ 10:15), 96.7 (24 @ 15:00), 92.7 (23 @ 09:20)  BMI (kg/m2): 38.3 (24 @ 10:15), 39 (24 @ 15:00), 37.1 (23 @ 09:20)    [ ]PPSV2 < or = to 30% [ ]significant weight loss  [ ]poor nutritional intake  [ ]anasarca[ ]Artificial Nutrition      Other REFERRALS:  [ ]Hospice  [ ]Child Life  [ ]Social Work  [ ]Case management [ ]Holistic Therapy     Goals of Care Document:

## 2024-03-01 NOTE — CONSULT NOTE ADULT - PROBLEM SELECTOR RECOMMENDATION 5
The patient indicated she enjoys listening to music, reading, and being with her family. She indicated, currently, she has books and access to streaming apps that will allow her to be entertained during her stay.   Will ask the [facility] volunteer, Brooke Redmond, to visit the patient.     Will continue to f/u for supportive oncology.         Sotero Myles MD  Associate Chief Geriatrics and Palliative Care ([place]) [facility] Director [facility] Consult Service   , [employer] [facility]      Please contact me via Teams from Monday through Friday between 9am-5pm. If not answering, please call the palliative care pager [phone]    After 5pm and on weekends, please see the contact information below:    In the event of newly developing, evolving, or worsening symptoms, please contact the Palliative Medicine team via pager (if the patient is at [facility] #[phone] or if the patient is at [facility] #[phone]) The Geriatric and Palliative Medicine service has coverage 24 hours a day/ 7 days a week to provide medical recommendations regarding symptom management needs via telephone

## 2024-03-01 NOTE — DIETITIAN INITIAL EVALUATION ADULT - ORAL INTAKE PTA/DIET HISTORY
Pt reports having a good appetite and PO intake PTA; consuming >75% of most meals. Follows regular diet. Pt denies any known food allergies or intolerances. Of note, states she tries to avoid onions and soy sauce (causes diarrhea when consumed). Pt denies any micronutrient supplementation at home. Denies any difficulty chewing/swallowing at this time.

## 2024-03-01 NOTE — DIETITIAN INITIAL EVALUATION ADULT - REASON INDICATOR FOR ASSESSMENT
Nutrition consult warranted for: new admission on BMT unit   Information obtained from: electronic medical record and patient. Interview conducted with spouse at bedside.   Chart reviewed, events noted.

## 2024-03-01 NOTE — PROGRESS NOTE ADULT - NS ATTEND AMEND GEN_ALL_CORE FT
..    Primary: [place]    Vital Signs Last 24 Hrs  T(C): 36.3 (01 Mar 2024 05:01), Max: 36.8 (01 Mar 2024 00:04)  T(F): 97.3 (01 Mar 2024 05:01), Max: 98.2 (01 Mar 2024 00:04)  HR: 62 (01 Mar 2024 06:35) (60 - 69)  BP: 140/69 (01 Mar 2024 06:35) (114/70 - 179/82)  BP(mean): --  RR: 16 (01 Mar 2024 06:35) (16 - 18)  SpO2: 98% (01 Mar 2024 06:35) (96% - 99%)    Parameters below as of 01 Mar 2024 06:35  Patient On (Oxygen Delivery Method): room air    MEDICATIONS  (STANDING):  acetaminophen     Tablet .. 650 milliGRAM(s) Oral once  aprepitant 80 milliGRAM(s) Oral every 24 hours  Biotene Dry Mouth Oral Rinse 5 milliLiter(s) Swish and Spit five times a day  chlorhexidine 4% Liquid 1 Application(s) Topical <User Schedule>  cyclophosphamide IVPB (eMAR) 950 milliGRAM(s) IV Intermittent every 24 hours  diphenhydrAMINE Injectable 25 milliGRAM(s) IV Push once  escitalopram 10 milliGRAM(s) Oral daily  fludarabine IVPB (eMAR) 50 milliGRAM(s) IV Intermittent every 24 hours  losartan 100 milliGRAM(s) Oral daily  mesna IVPB (eMAR) 1163 milliGRAM(s) IV Intermittent every 24 hours  ondansetron  IVPB 8 milliGRAM(s) IV Intermittent every 8 hours  pantoprazole    Tablet 40 milliGRAM(s) Oral before breakfast  sodium bicarbonate Mouth Rinse 10 milliLiter(s) Swish and Spit five times a day  sodium chloride 0.9%. 1000 milliLiter(s) (200 mL/Hr) IV Continuous <Continuous>  sodium chloride 0.9%. 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  Course uncomplicated.    Plan:  Heme: start preparative regimen  PLT goal > 10,000  Hgb goal > 7.0g/dL  G-CSF to start on day +5  will require post ALLO BMT CNS prophylaxis -- begins after engraftment.     GVHD: PTCy days 3 and 4, Cellcept and tacro to start on day +5    ID: day 0: flu, valtrex, Levaquin  bactrim SS qd to start on day +21    Nutrition: tolerating PO    DVT prophylaxis: ambulation    Over 35 minutes were spent in direct patient care and care coordination.

## 2024-03-01 NOTE — DIETITIAN INITIAL EVALUATION ADULT - ADD RECOMMEND
1) Continue current diet order: regular diet   2) Pt is not amenable to receiving oral nutritional supplements at this time in setting of good appetite/PO intake   3) Monitor and encourage PO intake. Encourage use of daily menus. Honor dietary preferences as expressed as able.   4) Monitor weights, labs, hydration status, bowels, and skin integrity.

## 2024-03-01 NOTE — DIETITIAN INITIAL EVALUATION ADULT - PERTINENT MEDS FT
MEDICATIONS  (STANDING):  acetaminophen     Tablet .. 650 milliGRAM(s) Oral once  aprepitant 80 milliGRAM(s) Oral every 24 hours  Biotene Dry Mouth Oral Rinse 5 milliLiter(s) Swish and Spit five times a day  chlorhexidine 4% Liquid 1 Application(s) Topical <User Schedule>  cyclophosphamide IVPB (eMAR) 950 milliGRAM(s) IV Intermittent every 24 hours  diphenhydrAMINE Injectable 25 milliGRAM(s) IV Push once  escitalopram 10 milliGRAM(s) Oral daily  fludarabine IVPB (eMAR) 50 milliGRAM(s) IV Intermittent every 24 hours  losartan 100 milliGRAM(s) Oral daily  mesna IVPB (eMAR) 1163 milliGRAM(s) IV Intermittent every 24 hours  ondansetron  IVPB 8 milliGRAM(s) IV Intermittent every 8 hours  pantoprazole    Tablet 40 milliGRAM(s) Oral before breakfast  sodium bicarbonate Mouth Rinse 10 milliLiter(s) Swish and Spit five times a day  sodium chloride 0.9%. 1000 milliLiter(s) (20 mL/Hr) IV Continuous <Continuous>  sodium chloride 0.9%. 1000 milliLiter(s) (200 mL/Hr) IV Continuous <Continuous>    MEDICATIONS  (PRN):  senna 1 Tablet(s) Oral at bedtime PRN Constipation  sodium chloride 0.9% lock flush 10 milliLiter(s) IV Push every 1 hour PRN Pre/post blood products, medications, blood draw, and to maintain line patency  zaleplon 5 milliGRAM(s) Oral at bedtime PRN Insomnia

## 2024-03-01 NOTE — DIETITIAN INITIAL EVALUATION ADULT - ETIOLOGY
increased physiologic demand for nutrients  decreased ability to consume adequate protein-energy in setting of increased physiological demand for nutrients

## 2024-03-01 NOTE — DIETITIAN INITIAL EVALUATION ADULT - PERSON TAUGHT/METHOD
Emphasized the importance of adequate kcal and protein intake, provided recommendations to optimize nutritional intake in case of decreased appetite, recommended small frequent meals by consuming nutrient-dense snacks between meals.   Discussed food safety and transplant education. Emphasized safe food handling, disposing of food after 24 hours, avoiding raw and fresh berries and using only individually wrapped dressings and condiments. Pt made aware RD remains available to answer further questions./verbal instruction/patient instructed

## 2024-03-01 NOTE — DIETITIAN INITIAL EVALUATION ADULT - ENERGY INTAKE
- Pt reports good appetite/PO intake since admission, consuming ~% of most meals. Is not amenable to receiving oral nutrition supplements at this time. Pt made aware of menu ordering procedures in house.  Adequate (%)

## 2024-03-02 DIAGNOSIS — Z51.5 ENCOUNTER FOR PALLIATIVE CARE: ICD-10-CM

## 2024-03-02 DIAGNOSIS — R53.83 OTHER FATIGUE: ICD-10-CM

## 2024-03-02 DIAGNOSIS — F41.9 ANXIETY DISORDER, UNSPECIFIED: ICD-10-CM

## 2024-03-02 DIAGNOSIS — C91.00 ACUTE LYMPHOBLASTIC LEUKEMIA NOT HAVING ACHIEVED REMISSION: ICD-10-CM

## 2024-03-02 DIAGNOSIS — Z71.89 OTHER SPECIFIED COUNSELING: ICD-10-CM

## 2024-03-02 LAB
ALBUMIN SERPL ELPH-MCNC: 3.5 G/DL — SIGNIFICANT CHANGE UP (ref 3.3–5)
ALP SERPL-CCNC: 90 U/L — SIGNIFICANT CHANGE UP (ref 40–120)
ALT FLD-CCNC: 21 U/L — SIGNIFICANT CHANGE UP (ref 10–45)
ANION GAP SERPL CALC-SCNC: 9 MMOL/L — SIGNIFICANT CHANGE UP (ref 5–17)
AST SERPL-CCNC: 21 U/L — SIGNIFICANT CHANGE UP (ref 10–40)
BASOPHILS # BLD AUTO: 0 K/UL — SIGNIFICANT CHANGE UP (ref 0–0.2)
BASOPHILS NFR BLD AUTO: 0 % — SIGNIFICANT CHANGE UP (ref 0–2)
BILIRUB SERPL-MCNC: 0.5 MG/DL — SIGNIFICANT CHANGE UP (ref 0.2–1.2)
BUN SERPL-MCNC: 10 MG/DL — SIGNIFICANT CHANGE UP (ref 7–23)
CALCIUM SERPL-MCNC: 8.4 MG/DL — SIGNIFICANT CHANGE UP (ref 8.4–10.5)
CHLORIDE SERPL-SCNC: 111 MMOL/L — HIGH (ref 96–108)
CO2 SERPL-SCNC: 23 MMOL/L — SIGNIFICANT CHANGE UP (ref 22–31)
CREAT SERPL-MCNC: 0.75 MG/DL — SIGNIFICANT CHANGE UP (ref 0.5–1.3)
EGFR: 88 ML/MIN/1.73M2 — SIGNIFICANT CHANGE UP
EOSINOPHIL # BLD AUTO: 0.13 K/UL — SIGNIFICANT CHANGE UP (ref 0–0.5)
EOSINOPHIL NFR BLD AUTO: 12.3 % — HIGH (ref 0–6)
GLUCOSE SERPL-MCNC: 120 MG/DL — HIGH (ref 70–99)
HCT VFR BLD CALC: 34.1 % — LOW (ref 34.5–45)
HGB BLD-MCNC: 11 G/DL — LOW (ref 11.5–15.5)
LDH SERPL L TO P-CCNC: 141 U/L — SIGNIFICANT CHANGE UP (ref 50–242)
LYMPHOCYTES # BLD AUTO: 0.11 K/UL — LOW (ref 1–3.3)
LYMPHOCYTES # BLD AUTO: 9.9 % — LOW (ref 13–44)
MAGNESIUM SERPL-MCNC: 1.8 MG/DL — SIGNIFICANT CHANGE UP (ref 1.6–2.6)
MANUAL SMEAR VERIFICATION: SIGNIFICANT CHANGE UP
MCHC RBC-ENTMCNC: 32.3 GM/DL — SIGNIFICANT CHANGE UP (ref 32–36)
MCHC RBC-ENTMCNC: 32.7 PG — SIGNIFICANT CHANGE UP (ref 27–34)
MCV RBC AUTO: 101.5 FL — HIGH (ref 80–100)
MONOCYTES # BLD AUTO: 0.29 K/UL — SIGNIFICANT CHANGE UP (ref 0–0.9)
MONOCYTES NFR BLD AUTO: 27.2 % — HIGH (ref 2–14)
NEUTROPHILS # BLD AUTO: 0.55 K/UL — LOW (ref 1.8–7.4)
NEUTROPHILS NFR BLD AUTO: 49.4 % — SIGNIFICANT CHANGE UP (ref 43–77)
NEUTS BAND # BLD: 1.2 % — SIGNIFICANT CHANGE UP (ref 0–8)
NRBC # BLD: 1 /100 WBCS — HIGH (ref 0–0)
PHOSPHATE SERPL-MCNC: 2.9 MG/DL — SIGNIFICANT CHANGE UP (ref 2.5–4.5)
PLAT MORPH BLD: ABNORMAL
PLATELET # BLD AUTO: 101 K/UL — LOW (ref 150–400)
POTASSIUM SERPL-MCNC: 3.8 MMOL/L — SIGNIFICANT CHANGE UP (ref 3.5–5.3)
POTASSIUM SERPL-SCNC: 3.8 MMOL/L — SIGNIFICANT CHANGE UP (ref 3.5–5.3)
PROT SERPL-MCNC: 5.1 G/DL — LOW (ref 6–8.3)
RBC # BLD: 3.36 M/UL — LOW (ref 3.8–5.2)
RBC # FLD: 13 % — SIGNIFICANT CHANGE UP (ref 10.3–14.5)
RBC BLD AUTO: SIGNIFICANT CHANGE UP
SODIUM SERPL-SCNC: 143 MMOL/L — SIGNIFICANT CHANGE UP (ref 135–145)
WBC # BLD: 1.08 K/UL — LOW (ref 3.8–10.5)
WBC # FLD AUTO: 1.08 K/UL — LOW (ref 3.8–10.5)

## 2024-03-02 PROCEDURE — 99233 SBSQ HOSP IP/OBS HIGH 50: CPT | Mod: FS

## 2024-03-02 RX ORDER — METOCLOPRAMIDE HCL 10 MG
10 TABLET ORAL EVERY 6 HOURS
Refills: 0 | Status: DISCONTINUED | OUTPATIENT
Start: 2024-03-02 | End: 2024-03-19

## 2024-03-02 RX ORDER — FUROSEMIDE 40 MG
10 TABLET ORAL ONCE
Refills: 0 | Status: COMPLETED | OUTPATIENT
Start: 2024-03-02 | End: 2024-03-02

## 2024-03-02 RX ORDER — FUROSEMIDE 40 MG
40 TABLET ORAL ONCE
Refills: 0 | Status: COMPLETED | OUTPATIENT
Start: 2024-03-02 | End: 2024-03-02

## 2024-03-02 RX ADMIN — PANTOPRAZOLE SODIUM 40 MILLIGRAM(S): 20 TABLET, DELAYED RELEASE ORAL at 06:47

## 2024-03-02 RX ADMIN — ONDANSETRON 108 MILLIGRAM(S): 8 TABLET, FILM COATED ORAL at 21:04

## 2024-03-02 RX ADMIN — Medication 10 MILLILITER(S): at 09:03

## 2024-03-02 RX ADMIN — ONDANSETRON 108 MILLIGRAM(S): 8 TABLET, FILM COATED ORAL at 06:20

## 2024-03-02 RX ADMIN — Medication 10 MILLILITER(S): at 00:22

## 2024-03-02 RX ADMIN — CHLORHEXIDINE GLUCONATE 1 APPLICATION(S): 213 SOLUTION TOPICAL at 06:48

## 2024-03-02 RX ADMIN — Medication 5 MILLILITER(S): at 20:44

## 2024-03-02 RX ADMIN — APREPITANT 80 MILLIGRAM(S): 80 CAPSULE ORAL at 15:03

## 2024-03-02 RX ADMIN — ESCITALOPRAM OXALATE 10 MILLIGRAM(S): 10 TABLET, FILM COATED ORAL at 15:03

## 2024-03-02 RX ADMIN — Medication 40 MILLIGRAM(S): at 11:41

## 2024-03-02 RX ADMIN — Medication 10 MILLILITER(S): at 20:44

## 2024-03-02 RX ADMIN — Medication 10 MILLILITER(S): at 11:41

## 2024-03-02 RX ADMIN — Medication 10 MILLILITER(S): at 16:00

## 2024-03-02 RX ADMIN — ONDANSETRON 108 MILLIGRAM(S): 8 TABLET, FILM COATED ORAL at 15:04

## 2024-03-02 RX ADMIN — LOSARTAN POTASSIUM 100 MILLIGRAM(S): 100 TABLET, FILM COATED ORAL at 06:47

## 2024-03-02 RX ADMIN — Medication 5 MILLILITER(S): at 16:48

## 2024-03-02 RX ADMIN — FLUDARABINE PHOSPHATE 50 MILLIGRAM(S): 25 INJECTION, SOLUTION INTRAVENOUS at 15:37

## 2024-03-02 RX ADMIN — Medication 5 MILLILITER(S): at 11:40

## 2024-03-02 RX ADMIN — Medication 10 MILLIGRAM(S): at 06:47

## 2024-03-02 RX ADMIN — Medication 5 MILLILITER(S): at 09:03

## 2024-03-02 RX ADMIN — Medication 5 MILLILITER(S): at 00:22

## 2024-03-02 RX ADMIN — Medication 10 MILLIGRAM(S): at 18:08

## 2024-03-02 NOTE — CHART NOTE - NSCHARTNOTEFT_GEN_A_CORE
Notified by RN, pt specific gravity resulted 1.010.  Per RN, cytoxan to be started when specific gravity <1.010  Per heme/onc fellow Dr. Hill Notified by RN, pt specific gravity resulted 1.010.  Per RN, cytoxan to be started when specific gravity <1.010  Per heme/onc fellow gilmar Guzman to start cytoxan.   Will continue to closely assess and monitor overnight  Will endorse to day team    -Patrizia Fernando PA-C  [id number]

## 2024-03-02 NOTE — PROGRESS NOTE ADULT - NS ATTEND AMEND GEN_ALL_CORE FT
..    Primary: [place]    Vital Signs Last 24 Hrs  T(C): 36.3 (01 Mar 2024 05:01), Max: 36.8 (01 Mar 2024 00:04)  T(F): 97.3 (01 Mar 2024 05:01), Max: 98.2 (01 Mar 2024 00:04)  HR: 62 (01 Mar 2024 06:35) (60 - 69)  BP: 140/69 (01 Mar 2024 06:35) (114/70 - 179/82)  BP(mean): --  RR: 16 (01 Mar 2024 06:35) (16 - 18)  SpO2: 98% (01 Mar 2024 06:35) (96% - 99%)    Parameters below as of 01 Mar 2024 06:35  Patient On (Oxygen Delivery Method): room air    MEDICATIONS  (STANDING):  acetaminophen     Tablet .. 650 milliGRAM(s) Oral once  aprepitant 80 milliGRAM(s) Oral every 24 hours  Biotene Dry Mouth Oral Rinse 5 milliLiter(s) Swish and Spit five times a day  chlorhexidine 4% Liquid 1 Application(s) Topical <User Schedule>  cyclophosphamide IVPB (eMAR) 950 milliGRAM(s) IV Intermittent every 24 hours  diphenhydrAMINE Injectable 25 milliGRAM(s) IV Push once  escitalopram 10 milliGRAM(s) Oral daily  fludarabine IVPB (eMAR) 50 milliGRAM(s) IV Intermittent every 24 hours  losartan 100 milliGRAM(s) Oral daily  mesna IVPB (eMAR) 1163 milliGRAM(s) IV Intermittent every 24 hours  ondansetron  IVPB 8 milliGRAM(s) IV Intermittent every 8 hours  pantoprazole    Tablet 40 milliGRAM(s) Oral before breakfast  sodium bicarbonate Mouth Rinse 10 milliLiter(s) Swish and Spit five times a day  sodium chloride 0.9%. 1000 milliLiter(s) (200 mL/Hr) IV Continuous <Continuous>  sodium chloride 0.9%. 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  Course uncomplicated.    Plan:  Heme: start preparative regimen  PLT goal > 10,000  Hgb goal > 7.0g/dL  G-CSF to start on day +5  will require post ALLO BMT CNS prophylaxis -- begins after engraftment.     GVHD: PTCy days 3 and 4, Cellcept and tacro to start on day +5    ID: day 0: flu, valtrex, Levaquin  bactrim SS qd to start on day +21    Nutrition: tolerating PO    DVT prophylaxis: ambulation    Over 35 minutes were spent in direct patient care and care coordination. ..    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  Course uncomplicated.    Plan:  Heme: start preparative regimen  PLT goal > 10,000  Hgb goal > 7.0g/dL  G-CSF to start on day +5  will require post ALLO BMT CNS prophylaxis -- begins after engraftment.     GVHD: PTCy days 3 and 4, Cellcept and tacro to start on day +5    ID: day 0: flu, valtrex, Levaquin  bactrim SS qd to start on day +21    Nutrition: tolerating PO    DVT prophylaxis: ambulation    Over 35 minutes were spent in direct patient care and care coordination.

## 2024-03-02 NOTE — PROGRESS NOTE ADULT - SUBJECTIVE AND OBJECTIVE BOX
[facility] Transplant Team                                                      Critical / Counseling Time Provided: 30 minutes                                                                                                                                                        Chief Complaint: AlloBMT(son) with FLU/CY/TBI prep for the treatment fo ALL    S: Patient seen and examined with [facility] Transplant Team:   OOB   tolerating PO  no complaints today     Denies mouth / tongue / throat pain, dyspnea, cough, nausea, vomiting, diarrhea, abdominal pain     O: Vitals:   Vital Signs Last 24 Hrs  T(C): 36.1 (02 Mar 2024 06:01), Max: 36.5 (01 Mar 2024 09:15)  T(F): 97 (02 Mar 2024 06:01), Max: 97.7 (01 Mar 2024 09:15)  HR: 58 (02 Mar 2024 06:01) (58 - 65)  BP: 138/78 (02 Mar 2024 06:01) (102/56 - 160/77)  BP(mean): --  RR: 18 (02 Mar 2024 06:01) (18 - 18)  SpO2: 98% (02 Mar 2024 06:01) (97% - 98%)    Parameters below as of 01 Mar 2024 20:53  Patient On (Oxygen Delivery Method): room air          Admit weight: 96.9 kg   Daily Weight in k.9 (01 Mar 2024 07:56)      Intake / Output:   - @ 07:01  -  -02 @ 07:00  --------------------------------------------------------  IN: 6061 mL / OUT: 5200 mL / NET: 861 mL      PE:   Oropharynx: no erythema no ulcer  Oral Mucositis:   -                                                     Grade: -  CVS: RRR  Lungs: CTA  Abdomen: soft, ND, ND +bs  Extremities: trece edema   Gastric Mucositis:      -                                            Grade: -  Intestinal Mucositis:     -                                         Grade: -  Skin:  no rash   TLC: C/D/I   Neuro: A&O x3  Pain:  no pain           Labs:   CBC Full  -  ( 02 Mar 2024 06:38 )  WBC Count : 1.08 K/uL  Hemoglobin : 11.0 g/dL  Hematocrit : 34.1 %  Platelet Count - Automated : 101 K/uL  Mean Cell Volume : 101.5 fl  Mean Cell Hemoglobin : 32.7 pg  Mean Cell Hemoglobin Concentration : 32.3 gm/dL  Auto Neutrophil # : x  Auto Lymphocyte # : x  Auto Monocyte # : x  Auto Eosinophil # : x  Auto Basophil # : x  Auto Neutrophil % : x  Auto Lymphocyte % : x  Auto Monocyte % : x  Auto Eosinophil % : x  Auto Basophil % : x                          11.0   1.08  )-----------( 101      ( 02 Mar 2024 06:38 )             34.1     03-    143  |  111<H>  |  10  ----------------------------<  120<H>  3.8   |  23  |  0.75    Ca    8.4      02 Mar 2024 06:38  Phos  2.9     03-  Mg     1.8     -    TPro  5.1<L>  /  Alb  3.5  /  TBili  0.5  /  DBili  x   /  AST  21  /  ALT  21  /  AlkPhos  90  03-02    PT/INR - ( 2024 16:16 )   PT: 11.2 sec;   INR: 1.02 ratio         PTT - ( 2024 16:16 )  PTT:32.1 sec  LIVER FUNCTIONS - ( 02 Mar 2024 06:38 )  Alb: 3.5 g/dL / Pro: 5.1 g/dL / ALK PHOS: 90 U/L / ALT: 21 U/L / AST: 21 U/L / GGT: x           Lactate Dehydrogenase, Serum: 141 U/L ( @ 06:38)          Karnofsky / Lansky Scale:   GVHD:   Skin:   Liver:   Gut:   Overall Grade:       Cultures:         Radiology:       Meds:   Antimicrobials:       Heme / Onc:   fludarabine IVPB (eMAR) 50 milliGRAM(s) IV Intermittent every 24 hours      GI:  pantoprazole    Tablet 40 milliGRAM(s) Oral before breakfast  senna 1 Tablet(s) Oral at bedtime PRN  sodium bicarbonate Mouth Rinse 10 milliLiter(s) Swish and Spit five times a day      Cardiovascular:   losartan 100 milliGRAM(s) Oral daily      Immunologic:       Other medications:   acetaminophen     Tablet .. 650 milliGRAM(s) Oral once  aprepitant 80 milliGRAM(s) Oral every 24 hours  Biotene Dry Mouth Oral Rinse 5 milliLiter(s) Swish and Spit five times a day  chlorhexidine 4% Liquid 1 Application(s) Topical <User Schedule>  diphenhydrAMINE Injectable 25 milliGRAM(s) IV Push once  escitalopram 10 milliGRAM(s) Oral daily  ondansetron  IVPB 8 milliGRAM(s) IV Intermittent every 8 hours  sodium chloride 0.9%. 1000 milliLiter(s) IV Continuous <Continuous>  sodium chloride 0.9%. 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 Infectious Disease:  Antimicrobials to start on day 0    2. VOD Prophylaxis: Actigall, Glutamine,     3. Mouthcare - NS / NaHCO3 rinses,  Biotene; Skin care     4. GVHD prophylaxis:  day -1 TBI   PTCY  MMF and tacro to start day +5    6. Transfuse & replete electrolytes prn     7. IV hydration, daily weights, strict I&O, prn diuresis     8. PO intake as tolerated, nutrition follow up as needed, MVI, folic acid     9. Antiemetics, anti-diarrhea medications:    zofran regrnal prn     10. OOB as tolerated, physical therapy consult if needed     11. Monitor coags / fibrinogen 2x week, vitamin K as needed     12. Monitor closely for clinical changes, monitor for fevers     13. Emotional support provided, plan of care discussed and questions addressed     14. Patient education done regarding chemotherapy prep, plan of care, restrictions and discharge planning     15. Continue regular social work input             I have written the above note for Dr. Vaughn who performed service with me in the room.   Deuce Davis NP-C ([phone])    I have seen and examined patient with NP, I agree with above note as scribed.                    [facility] Transplant Team                                                      Critical / Counseling Time Provided: 30 minutes                                                                                                                                                        Chief Complaint: AlloBMT(son) with FLU/CY/TBI prep for the treatment fo ALL    S: Patient seen and examined with [facility] Transplant Team:   no complaints today     Denies mouth / tongue / throat pain, dyspnea, cough, nausea, vomiting, diarrhea, abdominal pain     O: Vitals:   Vital Signs Last 24 Hrs  T(C): 36.1 (02 Mar 2024 06:01), Max: 36.5 (01 Mar 2024 09:15)  T(F): 97 (02 Mar 2024 06:01), Max: 97.7 (01 Mar 2024 09:15)  HR: 58 (02 Mar 2024 06:01) (58 - 65)  BP: 138/78 (02 Mar 2024 06:01) (102/56 - 160/77)  BP(mean): --  RR: 18 (02 Mar 2024 06:01) (18 - 18)  SpO2: 98% (02 Mar 2024 06:01) (97% - 98%)    Parameters below as of 01 Mar 2024 20:53  Patient On (Oxygen Delivery Method): room air    Admit weight: 96.9 kg   Daily Weight in k.9 (01 Mar 2024 07:56)      Intake / Output:   03-01 @ 07:01  -  03-02 @ 07:00  --------------------------------------------------------  IN: 6061 mL / OUT: 5200 mL / NET: 861 mL    PE:   Oropharynx: no erythema no ulcer  Oral Mucositis:   -                                                     Grade: -  CVS: RRR  Lungs: CTA  Abdomen: soft, ND, ND +bs  Extremities: trece edema   Gastric Mucositis:      -                                            Grade: -  Intestinal Mucositis:     -                                         Grade: -  Skin:  no rash   TLC: C/D/I   Neuro: A&O x3  Pain:  no pain     Labs:   CBC Full  -  ( 02 Mar 2024 06:38 )  WBC Count : 1.08 K/uL  Hemoglobin : 11.0 g/dL  Hematocrit : 34.1 %  Platelet Count - Automated : 101 K/uL  Mean Cell Volume : 101.5 fl  Mean Cell Hemoglobin : 32.7 pg  Mean Cell Hemoglobin Concentration : 32.3 gm/dL  Auto Neutrophil # : x  Auto Lymphocyte # : x  Auto Monocyte # : x  Auto Eosinophil # : x  Auto Basophil # : x  Auto Neutrophil % : x  Auto Lymphocyte % : x  Auto Monocyte % : x  Auto Eosinophil % : x  Auto Basophil % : x                          11.0   1.08  )-----------( 101      ( 02 Mar 2024 06:38 )             34.1     03-02    143  |  111<H>  |  10  ----------------------------<  120<H>  3.8   |  23  |  0.75    Ca    8.4      02 Mar 2024 06:38  Phos  2.9     03-02  Mg     1.8     -    TPro  5.1<L>  /  Alb  3.5  /  TBili  0.5  /  DBili  x   /  AST  21  /  ALT  21  /  AlkPhos  90  03-02    PT/INR - ( 2024 16:16 )   PT: 11.2 sec;   INR: 1.02 ratio         PTT - ( 2024 16:16 )  PTT:32.1 sec  LIVER FUNCTIONS - ( 02 Mar 2024 06:38 )  Alb: 3.5 g/dL / Pro: 5.1 g/dL / ALK PHOS: 90 U/L / ALT: 21 U/L / AST: 21 U/L / GGT: x           Lactate Dehydrogenase, Serum: 141 U/L ( @ 06:38)    Cultures:   NA    Radiology:   NA    Meds:   Antimicrobials:     Heme / Onc:   fludarabine IVPB (eMAR) 50 milliGRAM(s) IV Intermittent every 24 hours    GI:  pantoprazole    Tablet 40 milliGRAM(s) Oral before breakfast  senna 1 Tablet(s) Oral at bedtime PRN  sodium bicarbonate Mouth Rinse 10 milliLiter(s) Swish and Spit five times a day    Cardiovascular:   losartan 100 milliGRAM(s) Oral daily    Other medications:   acetaminophen     Tablet .. 650 milliGRAM(s) Oral once  aprepitant 80 milliGRAM(s) Oral every 24 hours  Biotene Dry Mouth Oral Rinse 5 milliLiter(s) Swish and Spit five times a day  chlorhexidine 4% Liquid 1 Application(s) Topical <User Schedule>  diphenhydrAMINE Injectable 25 milliGRAM(s) IV Push once  escitalopram 10 milliGRAM(s) Oral daily  ondansetron  IVPB 8 milliGRAM(s) IV Intermittent every 8 hours  sodium chloride 0.9%. 1000 milliLiter(s) IV Continuous <Continuous>  sodium chloride 0.9%. 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    1. Infectious Disease:  Antimicrobials to start on day 0    2. VOD Prophylaxis: Actigall, Glutamine,     3. Mouthcare - NS / NaHCO3 rinses,  Biotene; Skin care     4. GVHD prophylaxis:  day -1 TBI   PTCY  MMF and tacro to start day +5    6. Transfuse & replete electrolytes prn     7. IV hydration, daily weights, strict I&O, prn diuresis     8. PO intake as tolerated, nutrition follow up as needed, MVI, folic acid     9. Antiemetics, anti-diarrhea medications:   ondansetron  IVPB 8 milliGRAM(s) IV Intermittent every 8 hours    10. OOB as tolerated, physical therapy consult if needed     11. Monitor coags / fibrinogen 2x week, vitamin K as needed     12. Monitor closely for clinical changes, monitor for fevers     13. Emotional support provided, plan of care discussed and questions addressed     14. Patient education done regarding chemotherapy prep, plan of care, restrictions and discharge planning     15. Continue regular social work input     I have written the above note for Dr. Vaughn who performed service with me in the room.   Deuce Davis NP-C ([phone])    I have seen and examined patient with NP, I agree with above note as scribed.                    \[facility]\"" Transplant Team                                                      Critical / Counseling Time Provided: 30 minutes                                                                                                                                                        Chief Complaint: AlloBMT(son) with FLU/CY/TBI prep for the treatment fo ALL    S: Patient seen and examined with \[facility]\"" Transplant Team:   no complaints today     Denies mouth / tongue / throat pain, dyspnea, cough, nausea, vomiting, diarrhea, abdominal pain     O: Vitals:   Vital Signs Last 24 Hrs  T(C): 36.1 (02 Mar 2024 06:01), Max: 36.5 (01 Mar 2024 09:15)  T(F): 97 (02 Mar 2024 06:01), Max: 97.7 (01 Mar 2024 09:15)  HR: 58 (02 Mar 2024 06:01) (58 - 65)  BP: 138/78 (02 Mar 2024 06:01) (102/56 - 160/77)  BP(mean): --  RR: 18 (02 Mar 2024 06:01) (18 - 18)  SpO2: 98% (02 Mar 2024 06:01) (97% - 98%)    Parameters below as of 01 Mar 2024 20:53  Patient On (Oxygen Delivery Method): room air    Admit weight: 96.9 kg   Daily Weight in k.9 (01 Mar 2024 07:56)      Intake / Output:   03-01 @ 07:01  -  03-02 @ 07:00  --------------------------------------------------------  IN: 6061 mL / OUT: 5200 mL / NET: 861 mL    PE:   Oropharynx: no erythema no ulcer  Oral Mucositis:   -                                                     Grade: -  CVS: RRR  Lungs: CTA  Abdomen: soft, ND, ND +bs  Extremities: trece edema   Gastric Mucositis:      -                                            Grade: -  Intestinal Mucositis:     -                                         Grade: -  Skin:  no rash   TLC: C/D/I   Neuro: A&O x3  Pain:  no pain     Labs:   CBC Full  -  ( 02 Mar 2024 06:38 )  WBC Count : 1.08 K/uL  Hemoglobin : 11.0 g/dL  Hematocrit : 34.1 %  Platelet Count - Automated : 101 K/uL  Mean Cell Volume : 101.5 fl  Mean Cell Hemoglobin : 32.7 pg  Mean Cell Hemoglobin Concentration : 32.3 gm/dL  Auto Neutrophil # : x  Auto Lymphocyte # : x  Auto Monocyte # : x  Auto Eosinophil # : x  Auto Basophil # : x  Auto Neutrophil % : x  Auto Lymphocyte % : x  Auto Monocyte % : x  Auto Eosinophil % : x  Auto Basophil % : x                          11.0   1.08  )-----------( 101      ( 02 Mar 2024 06:38 )             34.1     03-02    143  |  111<H>  |  10  ----------------------------<  120<H>  3.8   |  23  |  0.75    Ca    8.4      02 Mar 2024 06:38  Phos  2.9     03-02  Mg     1.8     -    TPro  5.1<L>  /  Alb  3.5  /  TBili  0.5  /  DBili  x   /  AST  21  /  ALT  21  /  AlkPhos  90  03-02    PT/INR - ( 2024 16:16 )   PT: 11.2 sec;   INR: 1.02 ratio         PTT - ( 2024 16:16 )  PTT:32.1 sec  LIVER FUNCTIONS - ( 02 Mar 2024 06:38 )  Alb: 3.5 g/dL / Pro: 5.1 g/dL / ALK PHOS: 90 U/L / ALT: 21 U/L / AST: 21 U/L / GGT: x           Lactate Dehydrogenase, Serum: 141 U/L ( @ 06:38)    Cultures:   NA    Radiology:   NA    Meds:   Antimicrobials:     Heme / Onc:   fludarabine IVPB (eMAR) 50 milliGRAM(s) IV Intermittent every 24 hours    GI:  pantoprazole    Tablet 40 milliGRAM(s) Oral before breakfast  senna 1 Tablet(s) Oral at bedtime PRN  sodium bicarbonate Mouth Rinse 10 milliLiter(s) Swish and Spit five times a day    Cardiovascular:   losartan 100 milliGRAM(s) Oral daily    Other medications:   acetaminophen     Tablet .. 650 milliGRAM(s) Oral once  aprepitant 80 milliGRAM(s) Oral every 24 hours  Biotene Dry Mouth Oral Rinse 5 milliLiter(s) Swish and Spit five times a day  chlorhexidine 4% Liquid 1 Application(s) Topical <User Schedule>  diphenhydrAMINE Injectable 25 milliGRAM(s) IV Push once  escitalopram 10 milliGRAM(s) Oral daily  ondansetron  IVPB 8 milliGRAM(s) IV Intermittent every 8 hours  sodium chloride 0.9%. 1000 milliLiter(s) IV Continuous <Continuous>  sodium chloride 0.9%. 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    1. Infectious Disease:  Antimicrobials to start on day 0    2. VOD Prophylaxis: Actigall, Glutamine,     3. Mouthcare - NS / NaHCO3 rinses,  Biotene; Skin care     4. GVHD prophylaxis:  day -1 TBI   PTCY  MMF and tacro to start day +5    6. Transfuse & replete electrolytes prn     7. IV hydration, daily weights, strict I&O, prn diuresis     8. PO intake as tolerated, nutrition follow up as needed, MVI, folic acid     9. Antiemetics, anti-diarrhea medications:   ondansetron  IVPB 8 milliGRAM(s) IV Intermittent every 8 hours    10. OOB as tolerated, physical therapy consult if needed     11. Monitor coags / fibrinogen 2x week, vitamin K as needed     12. Monitor closely for clinical changes, monitor for fevers     13. Emotional support provided, plan of care discussed and questions addressed     14. Patient education done regarding chemotherapy prep, plan of care, restrictions and discharge planning     15. Continue regular social work input     I have written the above note for Dr. Vaughn who performed service with me in the room.   Deuce Davis NP-C ([phone])    I have seen and examined patient with NP, I agree with above note as scribed.

## 2024-03-03 LAB
ALBUMIN SERPL ELPH-MCNC: 3.8 G/DL — SIGNIFICANT CHANGE UP (ref 3.3–5)
ALP SERPL-CCNC: 97 U/L — SIGNIFICANT CHANGE UP (ref 40–120)
ALT FLD-CCNC: 25 U/L — SIGNIFICANT CHANGE UP (ref 10–45)
ANION GAP SERPL CALC-SCNC: 10 MMOL/L — SIGNIFICANT CHANGE UP (ref 5–17)
AST SERPL-CCNC: 23 U/L — SIGNIFICANT CHANGE UP (ref 10–40)
BASOPHILS # BLD AUTO: 0.02 K/UL — SIGNIFICANT CHANGE UP (ref 0–0.2)
BASOPHILS NFR BLD AUTO: 2 % — SIGNIFICANT CHANGE UP (ref 0–2)
BILIRUB SERPL-MCNC: 0.7 MG/DL — SIGNIFICANT CHANGE UP (ref 0.2–1.2)
BUN SERPL-MCNC: 9 MG/DL — SIGNIFICANT CHANGE UP (ref 7–23)
CALCIUM SERPL-MCNC: 9 MG/DL — SIGNIFICANT CHANGE UP (ref 8.4–10.5)
CHLORIDE SERPL-SCNC: 108 MMOL/L — SIGNIFICANT CHANGE UP (ref 96–108)
CO2 SERPL-SCNC: 26 MMOL/L — SIGNIFICANT CHANGE UP (ref 22–31)
CREAT SERPL-MCNC: 0.72 MG/DL — SIGNIFICANT CHANGE UP (ref 0.5–1.3)
EGFR: 92 ML/MIN/1.73M2 — SIGNIFICANT CHANGE UP
EOSINOPHIL # BLD AUTO: 0.06 K/UL — SIGNIFICANT CHANGE UP (ref 0–0.5)
EOSINOPHIL NFR BLD AUTO: 8 % — HIGH (ref 0–6)
G6PD RBC-CCNC: 14.1 U/G HGB — SIGNIFICANT CHANGE UP (ref 7–20.5)
GLUCOSE SERPL-MCNC: 92 MG/DL — SIGNIFICANT CHANGE UP (ref 70–99)
HCT VFR BLD CALC: 35.3 % — SIGNIFICANT CHANGE UP (ref 34.5–45)
HGB BLD-MCNC: 11.7 G/DL — SIGNIFICANT CHANGE UP (ref 11.5–15.5)
LDH SERPL L TO P-CCNC: 149 U/L — SIGNIFICANT CHANGE UP (ref 50–242)
LYMPHOCYTES # BLD AUTO: 0.06 K/UL — LOW (ref 1–3.3)
LYMPHOCYTES # BLD AUTO: 8 % — LOW (ref 13–44)
MAGNESIUM SERPL-MCNC: 1.8 MG/DL — SIGNIFICANT CHANGE UP (ref 1.6–2.6)
MANUAL SMEAR VERIFICATION: SIGNIFICANT CHANGE UP
MCHC RBC-ENTMCNC: 33.1 GM/DL — SIGNIFICANT CHANGE UP (ref 32–36)
MCHC RBC-ENTMCNC: 33.2 PG — SIGNIFICANT CHANGE UP (ref 27–34)
MCV RBC AUTO: 100.3 FL — HIGH (ref 80–100)
MONOCYTES # BLD AUTO: 0.29 K/UL — SIGNIFICANT CHANGE UP (ref 0–0.9)
MONOCYTES NFR BLD AUTO: 36 % — HIGH (ref 2–14)
NEUTROPHILS # BLD AUTO: 0.37 K/UL — LOW (ref 1.8–7.4)
NEUTROPHILS NFR BLD AUTO: 44 % — SIGNIFICANT CHANGE UP (ref 43–77)
NEUTS BAND # BLD: 2 % — SIGNIFICANT CHANGE UP (ref 0–8)
PHOSPHATE SERPL-MCNC: 2.9 MG/DL — SIGNIFICANT CHANGE UP (ref 2.5–4.5)
PLAT MORPH BLD: NORMAL — SIGNIFICANT CHANGE UP
PLATELET # BLD AUTO: 103 K/UL — LOW (ref 150–400)
POTASSIUM SERPL-MCNC: 3.6 MMOL/L — SIGNIFICANT CHANGE UP (ref 3.5–5.3)
POTASSIUM SERPL-SCNC: 3.6 MMOL/L — SIGNIFICANT CHANGE UP (ref 3.5–5.3)
PROT SERPL-MCNC: 5.6 G/DL — LOW (ref 6–8.3)
RBC # BLD: 3.52 M/UL — LOW (ref 3.8–5.2)
RBC # FLD: 12.8 % — SIGNIFICANT CHANGE UP (ref 10.3–14.5)
RBC BLD AUTO: SIGNIFICANT CHANGE UP
SODIUM SERPL-SCNC: 144 MMOL/L — SIGNIFICANT CHANGE UP (ref 135–145)
WBC # BLD: 0.81 K/UL — CRITICAL LOW (ref 3.8–10.5)
WBC # FLD AUTO: 0.81 K/UL — CRITICAL LOW (ref 3.8–10.5)

## 2024-03-03 PROCEDURE — 99233 SBSQ HOSP IP/OBS HIGH 50: CPT | Mod: FS

## 2024-03-03 RX ORDER — LORATADINE 10 MG/1
10 TABLET ORAL DAILY
Refills: 0 | Status: DISCONTINUED | OUTPATIENT
Start: 2024-03-03 | End: 2024-03-26

## 2024-03-03 RX ORDER — FUROSEMIDE 40 MG
20 TABLET ORAL ONCE
Refills: 0 | Status: COMPLETED | OUTPATIENT
Start: 2024-03-03 | End: 2024-03-03

## 2024-03-03 RX ORDER — ACETAMINOPHEN 500 MG
650 TABLET ORAL ONCE
Refills: 0 | Status: COMPLETED | OUTPATIENT
Start: 2024-03-03 | End: 2024-03-03

## 2024-03-03 RX ADMIN — FLUDARABINE PHOSPHATE 50 MILLIGRAM(S): 25 INJECTION, SOLUTION INTRAVENOUS at 15:50

## 2024-03-03 RX ADMIN — Medication 10 MILLILITER(S): at 15:52

## 2024-03-03 RX ADMIN — CHLORHEXIDINE GLUCONATE 1 APPLICATION(S): 213 SOLUTION TOPICAL at 08:26

## 2024-03-03 RX ADMIN — Medication 5 MILLILITER(S): at 00:44

## 2024-03-03 RX ADMIN — ESCITALOPRAM OXALATE 10 MILLIGRAM(S): 10 TABLET, FILM COATED ORAL at 13:00

## 2024-03-03 RX ADMIN — Medication 10 MILLILITER(S): at 08:27

## 2024-03-03 RX ADMIN — Medication 20 MILLIGRAM(S): at 14:46

## 2024-03-03 RX ADMIN — Medication 5 MILLILITER(S): at 12:51

## 2024-03-03 RX ADMIN — ONDANSETRON 108 MILLIGRAM(S): 8 TABLET, FILM COATED ORAL at 14:46

## 2024-03-03 RX ADMIN — APREPITANT 80 MILLIGRAM(S): 80 CAPSULE ORAL at 13:00

## 2024-03-03 RX ADMIN — Medication 5 MILLILITER(S): at 08:28

## 2024-03-03 RX ADMIN — Medication 5 MILLILITER(S): at 20:44

## 2024-03-03 RX ADMIN — SODIUM CHLORIDE 20 MILLILITER(S): 9 INJECTION INTRAMUSCULAR; INTRAVENOUS; SUBCUTANEOUS at 05:43

## 2024-03-03 RX ADMIN — Medication 650 MILLIGRAM(S): at 05:30

## 2024-03-03 RX ADMIN — LOSARTAN POTASSIUM 100 MILLIGRAM(S): 100 TABLET, FILM COATED ORAL at 05:42

## 2024-03-03 RX ADMIN — ONDANSETRON 108 MILLIGRAM(S): 8 TABLET, FILM COATED ORAL at 21:23

## 2024-03-03 RX ADMIN — Medication 10 MILLIGRAM(S): at 16:45

## 2024-03-03 RX ADMIN — Medication 10 MILLILITER(S): at 12:53

## 2024-03-03 RX ADMIN — PANTOPRAZOLE SODIUM 40 MILLIGRAM(S): 20 TABLET, DELAYED RELEASE ORAL at 06:12

## 2024-03-03 RX ADMIN — SODIUM CHLORIDE 20 MILLILITER(S): 9 INJECTION INTRAMUSCULAR; INTRAVENOUS; SUBCUTANEOUS at 21:22

## 2024-03-03 RX ADMIN — Medication 10 MILLILITER(S): at 00:43

## 2024-03-03 RX ADMIN — Medication 5 MILLILITER(S): at 15:51

## 2024-03-03 RX ADMIN — Medication 650 MILLIGRAM(S): at 06:30

## 2024-03-03 RX ADMIN — Medication 10 MILLILITER(S): at 20:44

## 2024-03-03 RX ADMIN — ONDANSETRON 108 MILLIGRAM(S): 8 TABLET, FILM COATED ORAL at 05:43

## 2024-03-03 RX ADMIN — LORATADINE 10 MILLIGRAM(S): 10 TABLET ORAL at 13:16

## 2024-03-03 NOTE — PROGRESS NOTE ADULT - SUBJECTIVE AND OBJECTIVE BOX
[facility] Team                                                      Critical / Counseling Time Provided: 30 minutes                                                                                                                                                          Chief Complaint: AlloBMT(son) with FLU/CY/TBI prep for the treatment fo ALL    S: Patient seen and examined with [facility] Team:   no complaints today     Denies mouth / tongue / throat pain, dyspnea, cough, nausea, vomiting, diarrhea, abdominal pain         O: Vitals:   Vital Signs Last 24 Hrs  T(C): 36.1 (03 Mar 2024 05:43), Max: 36.8 (02 Mar 2024 21:03)  T(F): 97 (03 Mar 2024 05:43), Max: 98.2 (02 Mar 2024 21:03)  HR: 72 (03 Mar 2024 05:43) (65 - 75)  BP: 164/81 (03 Mar 2024 05:43) (121/71 - 168/78)  BP(mean): --  RR: 18 (03 Mar 2024 05:43) (18 - 18)  SpO2: 95% (03 Mar 2024 05:43) (95% - 98%)    Parameters below as of 03 Mar 2024 05:43  Patient On (Oxygen Delivery Method): room air        Admit weight:   Daily     Daily Weight in k.3 (02 Mar 2024 09:00)    Intake / Output:   -02 @ 07:01  -  -03 @ 07:00  --------------------------------------------------------  IN: 4633 mL / OUT: 6350 mL / NET: -1717 mL          PE:     Oropharynx: no erythema no ulcer  Oral Mucositis:   -                                                     Grade: -  CVS: RRR  Lungs: CTA  Abdomen: soft, ND, ND +bs  Extremities: trece edema   Gastric Mucositis:      -                                            Grade: -  Intestinal Mucositis:     -                                         Grade: -  Skin:  no rash   TLC: C/D/I   Neuro: A&O x3  Pain:  no pain       Labs:          Cultures:         Radiology:       Meds:   Antimicrobials:       Heme / Onc:   fludarabine IVPB (eMAR) 50 milliGRAM(s) IV Intermittent every 24 hours      GI:  pantoprazole    Tablet 40 milliGRAM(s) Oral before breakfast  senna 1 Tablet(s) Oral at bedtime PRN  sodium bicarbonate Mouth Rinse 10 milliLiter(s) Swish and Spit five times a day      Cardiovascular:   losartan 100 milliGRAM(s) Oral daily      Immunologic:       Other medications:   acetaminophen     Tablet .. 650 milliGRAM(s) Oral once  aprepitant 80 milliGRAM(s) Oral every 24 hours  Biotene Dry Mouth Oral Rinse 5 milliLiter(s) Swish and Spit five times a day  chlorhexidine 4% Liquid 1 Application(s) Topical <User Schedule>  diphenhydrAMINE Injectable 25 milliGRAM(s) IV Push once  escitalopram 10 milliGRAM(s) Oral daily  ondansetron  IVPB 8 milliGRAM(s) IV Intermittent every 8 hours  sodium chloride 0.9%. 1000 milliLiter(s) IV Continuous <Continuous>  sodium chloride 0.9%. 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    1. Infectious Disease:  Antimicrobials to start on day 0    2. VOD Prophylaxis: Actigall, Glutamine,     3. Mouthcare - NS / NaHCO3 rinses,  Biotene; Skin care     4. GVHD prophylaxis:  day -1 TBI   PTCY  MMF and tacro to start day +5    6. Transfuse & replete electrolytes prn     7. IV hydration, daily weights, strict I&O, prn diuresis     8. PO intake as tolerated, nutrition follow up as needed, MVI, folic acid     9. Antiemetics, anti-diarrhea medications:   ondansetron  IVPB 8 milliGRAM(s) IV Intermittent every 8 hours    10. OOB as tolerated, physical therapy consult if needed     11. Monitor coags / fibrinogen 2x week, vitamin K as needed     12. Monitor closely for clinical changes, monitor for fevers     13. Emotional support provided, plan of care discussed and questions addressed     14. Patient education done regarding chemotherapy prep, plan of care, restrictions and discharge planning     15. Continue regular social work input       I have written the above note for Dr. Vaughn who performed service with me in the room.   Deuce Davis  NP-C ([phone])    I have seen and examined patient with NP, I agree with above note as scribed.                    [facility] Team                                                      Critical / Counseling Time Provided: 30 minutes                                                                                                                                                          Chief Complaint: AlloBMT(son) with FLU/CY/TBI prep for the treatment fo ALL    S: Patient seen and examined with [facility]plant Team:   + runny nose  + cramps in muscle in am    Denies mouth / tongue / throat pain, dyspnea, cough, nausea, vomiting, diarrhea, abdominal pain         O: Vitals:   Vital Signs Last 24 Hrs  T(C): 36.1 (03 Mar 2024 05:43), Max: 36.8 (02 Mar 2024 21:03)  T(F): 97 (03 Mar 2024 05:43), Max: 98.2 (02 Mar 2024 21:03)  HR: 72 (03 Mar 2024 05:43) (65 - 75)  BP: 164/81 (03 Mar 2024 05:43) (121/71 - 168/78)  BP(mean): --  RR: 18 (03 Mar 2024 05:43) (18 - 18)  SpO2: 95% (03 Mar 2024 05:43) (95% - 98%)    Parameters below as of 03 Mar 2024 05:43  Patient On (Oxygen Delivery Method): room air        Admit weight:   Daily     Daily Weight in k.3 (02 Mar 2024 09:00)    Intake / Output:   -02 @ 07:01  -  -03 @ 07:00  --------------------------------------------------------  IN: 4633 mL / OUT: 6350 mL / NET: -1717 mL          PE:     Oropharynx: no erythema no ulcer  Oral Mucositis:   -                                                     Grade: -  CVS: RRR  Lungs: CTA  Abdomen: soft, ND, ND +bs  Extremities: trece edema   Gastric Mucositis:      -                                            Grade: -  Intestinal Mucositis:     -                                         Grade: -  Skin:  no rash   TLC: C/D/I   Neuro: A&O x3  Pain:  no pain       Labs:          Cultures:         Radiology:       Meds:   Antimicrobials:       Heme / Onc:   fludarabine IVPB (eMAR) 50 milliGRAM(s) IV Intermittent every 24 hours      GI:  pantoprazole    Tablet 40 milliGRAM(s) Oral before breakfast  senna 1 Tablet(s) Oral at bedtime PRN  sodium bicarbonate Mouth Rinse 10 milliLiter(s) Swish and Spit five times a day      Cardiovascular:   losartan 100 milliGRAM(s) Oral daily      Immunologic:       Other medications:   acetaminophen     Tablet .. 650 milliGRAM(s) Oral once  aprepitant 80 milliGRAM(s) Oral every 24 hours  Biotene Dry Mouth Oral Rinse 5 milliLiter(s) Swish and Spit five times a day  chlorhexidine 4% Liquid 1 Application(s) Topical <User Schedule>  diphenhydrAMINE Injectable 25 milliGRAM(s) IV Push once  escitalopram 10 milliGRAM(s) Oral daily  ondansetron  IVPB 8 milliGRAM(s) IV Intermittent every 8 hours  sodium chloride 0.9%. 1000 milliLiter(s) IV Continuous <Continuous>  sodium chloride 0.9%. 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    1. Infectious Disease:  Antimicrobials to start on day 0    2. VOD Prophylaxis: Actigall, Glutamine,     3. Mouthcare - NS / NaHCO3 rinses,  Biotene; Skin care     4. GVHD prophylaxis:  day -1 TBI   PTCY  MMF and tacro to start day +5    6. Transfuse & replete electrolytes prn     7. IV hydration, daily weights, strict I&O, prn diuresis     8. PO intake as tolerated, nutrition follow up as needed, MVI, folic acid     9. Antiemetics, anti-diarrhea medications:   ondansetron  IVPB 8 milliGRAM(s) IV Intermittent every 8 hours    10. OOB as tolerated, physical therapy consult if needed     11. Monitor coags / fibrinogen 2x week, vitamin K as needed     12. Monitor closely for clinical changes, monitor for fevers     13. Emotional support provided, plan of care discussed and questions addressed     14. Patient education done regarding chemotherapy prep, plan of care, restrictions and discharge planning     15. Continue regular social work input       I have written the above note for Dr. Vaughn who performed service with me in the room.   Deuce Davis  NP-C ([phone])    I have seen and examined patient with NP, I agree with above note as scribed.                    [facility] Transplant Team                                                      Critical / Counseling Time Provided: 30 minutes                                                                                                                                                          Chief Complaint: AlloBMT(son) with FLU/CY/TBI prep for the treatment fo ALL    S: Patient seen and examined with [facility]plant Team:   + runny nose  + cramps in muscle in am    Denies dyspnea, cough, nausea, vomiting, diarrhea, abdominal pain     O: Vitals:   Vital Signs Last 24 Hrs  T(C): 36.1 (03 Mar 2024 05:43), Max: 36.8 (02 Mar 2024 21:03)  T(F): 97 (03 Mar 2024 05:43), Max: 98.2 (02 Mar 2024 21:03)  HR: 72 (03 Mar 2024 05:43) (65 - 75)  BP: 164/81 (03 Mar 2024 05:43) (121/71 - 168/78)  BP(mean): --  RR: 18 (03 Mar 2024 05:43) (18 - 18)  SpO2: 95% (03 Mar 2024 05:43) (95% - 98%)    Parameters below as of 03 Mar 2024 05:43  Patient On (Oxygen Delivery Method): room air    Admit weight: 96.9 kg  Daily weight: 98.2 kg    Intake / Output:   03-02 @ 07:01  -  03-03 @ 07:00  --------------------------------------------------------  IN: 4633 mL / OUT: 6350 mL / NET: -1717 mL      PE:   Oropharynx: no erythema no ulcer  Oral Mucositis:   -                                                     Grade: -  CVS: RRR, +S1,S2  Lungs: CTA  Abdomen: soft, ND, ND +bs  Extremities: trace edema   Gastric Mucositis:      -                                            Grade: -  Intestinal Mucositis:     -                                         Grade: -  Skin:  no rash   TLC: C/D/I   Neuro: A&O x3  Pain: Denies    Labs:                         11.7   0.81  )-----------( 103      ( 03 Mar 2024 07:12 )             35.3     Mean Cell Volume : 100.3 fl  Mean Cell Hemoglobin : 33.2 pg  Mean Cell Hemoglobin Concentration : 33.1 gm/dL  Auto Neutrophil # : 0.37 K/uL  Auto Lymphocyte # : 0.06 K/uL  Auto Monocyte # : 0.29 K/uL  Auto Eosinophil # : 0.06 K/uL  Auto Basophil # : 0.02 K/uL  Auto Neutrophil % : 44.0 %  Auto Lymphocyte % : 8.0 %  Auto Monocyte % : 36.0 %  Auto Eosinophil % : 8.0 %  Auto Basophil % : 2.0 %  03-03    144  |  108  |  9   ----------------------------<  92  3.6   |  26  |  0.72    Ca    9.0      03 Mar 2024 07:12  Phos  2.9     03-03  Mg     1.8     03-03    TPro  5.6<L>  /  Alb  3.8  /  TBili  0.7  /  DBili  x   /  AST  23  /  ALT  25  /  AlkPhos  97  03-03  Mg 1.8  Phos 2.9    Uric Acid --    Cultures:   NA    Radiology:   NA    Meds:   Antimicrobials:     Heme / Onc:   fludarabine IVPB (eMAR) 50 milliGRAM(s) IV Intermittent every 24 hours    GI:  pantoprazole    Tablet 40 milliGRAM(s) Oral before breakfast  senna 1 Tablet(s) Oral at bedtime PRN  sodium bicarbonate Mouth Rinse 10 milliLiter(s) Swish and Spit five times a day    Cardiovascular:   losartan 100 milliGRAM(s) Oral daily    Other medications:   acetaminophen     Tablet .. 650 milliGRAM(s) Oral once  aprepitant 80 milliGRAM(s) Oral every 24 hours  Biotene Dry Mouth Oral Rinse 5 milliLiter(s) Swish and Spit five times a day  chlorhexidine 4% Liquid 1 Application(s) Topical <User Schedule>  diphenhydrAMINE Injectable 25 milliGRAM(s) IV Push once  escitalopram 10 milliGRAM(s) Oral daily  ondansetron  IVPB 8 milliGRAM(s) IV Intermittent every 8 hours  sodium chloride 0.9%. 1000 milliLiter(s) IV Continuous <Continuous>  sodium chloride 0.9%. 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  3/3- Lasix 20 mg x1 dose today.    1. Infectious Disease:  Neutropenic, Levaquin 500 mg PO daily.     2. VOD Prophylaxis: Actigall, Glutamine,     3. Mouthcare - NS / NaHCO3 rinses,  Biotene; Skin care     4. GVHD prophylaxis:  day -1 TBI   PTCY  MMF and tacro to start day +5    6. Transfuse & replete electrolytes prn.  Lasix 20 mg IV x1 done.     7. IV hydration, daily weights, strict I&O, prn diuresis     8. PO intake as tolerated, nutrition follow up as needed, MVI, folic acid     9. Antiemetics, anti-diarrhea medications:   ondansetron  IVPB 8 milliGRAM(s) IV Intermittent every 8 hours    10. OOB as tolerated, physical therapy consult if needed     11. Monitor coags / fibrinogen 2x week, vitamin K as needed     12. Monitor closely for clinical changes, monitor for fevers     13. Emotional support provided, plan of care discussed and questions addressed     14. Patient education done regarding chemotherapy prep, plan of care, restrictions and discharge planning     15. Continue regular social work input     I have written the above note for Dr. Vaughn who performed service with me in the room.   Deuce Davis  NP-C ([phone])    I have seen and examined patient with NP, I agree with above note as scribed.

## 2024-03-03 NOTE — PROGRESS NOTE ADULT - NS ATTEND AMEND GEN_ALL_CORE FT
..    Primary: Wilbur    Assessment: 64-year-old day -4 CATRACHITA alloBMT using a Flu/Cy/TBI preparative regimen for [place] negative ALL.  Course uncomplicated.    Plan:  Heme: start preparative regimen  PLT goal > 10,000  Hgb goal > 7.0g/dL  G-CSF to start on day +5  will require post ALLO BMT CNS prophylaxis -- begins after engraftment.     GVHD: PTCy days 3 and 4, Cellcept and tacro to start on day +5    ID: day 0: flu, valtrex, Levaquin  bactrim SS qd to start on day +21    Nutrition: tolerating PO    DVT prophylaxis: ambulation    Over 35 minutes were spent in direct patient care and care coordination. ..    Primary: Wilbur    Assessment: 64-year-old day -3 CATRACHITA alloBMT using a Flu/Cy/TBI preparative regimen for [place] negative ALL.  Course uncomplicated.    Plan:  Heme: start preparative regimen  PLT goal > 10,000  Hgb goal > 7.0g/dL  G-CSF to start on day +5  will require post ALLO BMT CNS prophylaxis -- begins after engraftment.     GVHD: PTCy days 3 and 4, Cellcept and tacro to start on day +5    ID: day 0: flu, valtrex,   Levaquin started on 3/3 for ANC < 500  bactrim SS qd to start on day +21    Nutrition: tolerating PO    DVT prophylaxis: ambulation    Over 35 minutes were spent in direct patient care and care coordination.

## 2024-03-04 LAB
ALBUMIN SERPL ELPH-MCNC: 3.8 G/DL — SIGNIFICANT CHANGE UP (ref 3.3–5)
ALP SERPL-CCNC: 95 U/L — SIGNIFICANT CHANGE UP (ref 40–120)
ALT FLD-CCNC: 26 U/L — SIGNIFICANT CHANGE UP (ref 10–45)
ANION GAP SERPL CALC-SCNC: 11 MMOL/L — SIGNIFICANT CHANGE UP (ref 5–17)
APTT BLD: 29.7 SEC — SIGNIFICANT CHANGE UP (ref 24.5–35.6)
AST SERPL-CCNC: 25 U/L — SIGNIFICANT CHANGE UP (ref 10–40)
BASOPHILS # BLD AUTO: 0 K/UL — SIGNIFICANT CHANGE UP (ref 0–0.2)
BASOPHILS NFR BLD AUTO: 0 % — SIGNIFICANT CHANGE UP (ref 0–2)
BILIRUB DIRECT SERPL-MCNC: 0.2 MG/DL — SIGNIFICANT CHANGE UP (ref 0–0.3)
BILIRUB INDIRECT FLD-MCNC: 0.6 MG/DL — SIGNIFICANT CHANGE UP (ref 0.2–1)
BILIRUB SERPL-MCNC: 0.8 MG/DL — SIGNIFICANT CHANGE UP (ref 0.2–1.2)
BUN SERPL-MCNC: 9 MG/DL — SIGNIFICANT CHANGE UP (ref 7–23)
CALCIUM SERPL-MCNC: 9.3 MG/DL — SIGNIFICANT CHANGE UP (ref 8.4–10.5)
CHLORIDE SERPL-SCNC: 105 MMOL/L — SIGNIFICANT CHANGE UP (ref 96–108)
CO2 SERPL-SCNC: 26 MMOL/L — SIGNIFICANT CHANGE UP (ref 22–31)
CREAT SERPL-MCNC: 0.8 MG/DL — SIGNIFICANT CHANGE UP (ref 0.5–1.3)
EGFR: 81 ML/MIN/1.73M2 — SIGNIFICANT CHANGE UP
EOSINOPHIL # BLD AUTO: 0.15 K/UL — SIGNIFICANT CHANGE UP (ref 0–0.5)
EOSINOPHIL NFR BLD AUTO: 27.9 % — HIGH (ref 0–6)
FIBRINOGEN PPP-MCNC: 286 MG/DL — SIGNIFICANT CHANGE UP (ref 200–445)
GLUCOSE SERPL-MCNC: 79 MG/DL — SIGNIFICANT CHANGE UP (ref 70–99)
HCT VFR BLD CALC: 33.6 % — LOW (ref 34.5–45)
HGB BLD-MCNC: 11.4 G/DL — LOW (ref 11.5–15.5)
INR BLD: 1.12 RATIO — SIGNIFICANT CHANGE UP (ref 0.85–1.18)
LDH SERPL L TO P-CCNC: 157 U/L — SIGNIFICANT CHANGE UP (ref 50–242)
LYMPHOCYTES # BLD AUTO: 0.03 K/UL — LOW (ref 1–3.3)
LYMPHOCYTES # BLD AUTO: 6.3 % — LOW (ref 13–44)
MAGNESIUM SERPL-MCNC: 1.9 MG/DL — SIGNIFICANT CHANGE UP (ref 1.6–2.6)
MANUAL SMEAR VERIFICATION: SIGNIFICANT CHANGE UP
MCHC RBC-ENTMCNC: 33.2 PG — SIGNIFICANT CHANGE UP (ref 27–34)
MCHC RBC-ENTMCNC: 33.9 GM/DL — SIGNIFICANT CHANGE UP (ref 32–36)
MCV RBC AUTO: 98 FL — SIGNIFICANT CHANGE UP (ref 80–100)
MONOCYTES # BLD AUTO: 0.1 K/UL — SIGNIFICANT CHANGE UP (ref 0–0.9)
MONOCYTES NFR BLD AUTO: 18.9 % — HIGH (ref 2–14)
NEUTROPHILS # BLD AUTO: 0.26 K/UL — LOW (ref 1.8–7.4)
NEUTROPHILS NFR BLD AUTO: 46 % — SIGNIFICANT CHANGE UP (ref 43–77)
NEUTS BAND # BLD: 0.9 % — SIGNIFICANT CHANGE UP (ref 0–8)
PHOSPHATE SERPL-MCNC: 2.7 MG/DL — SIGNIFICANT CHANGE UP (ref 2.5–4.5)
PLAT MORPH BLD: NORMAL — SIGNIFICANT CHANGE UP
PLATELET # BLD AUTO: 99 K/UL — LOW (ref 150–400)
POTASSIUM SERPL-MCNC: 3.6 MMOL/L — SIGNIFICANT CHANGE UP (ref 3.5–5.3)
POTASSIUM SERPL-SCNC: 3.6 MMOL/L — SIGNIFICANT CHANGE UP (ref 3.5–5.3)
PROT SERPL-MCNC: 5.6 G/DL — LOW (ref 6–8.3)
PROTHROM AB SERPL-ACNC: 11.7 SEC — SIGNIFICANT CHANGE UP (ref 9.5–13)
RBC # BLD: 3.43 M/UL — LOW (ref 3.8–5.2)
RBC # FLD: 12.8 % — SIGNIFICANT CHANGE UP (ref 10.3–14.5)
RBC BLD AUTO: SIGNIFICANT CHANGE UP
SODIUM SERPL-SCNC: 142 MMOL/L — SIGNIFICANT CHANGE UP (ref 135–145)
WBC # BLD: 0.55 K/UL — CRITICAL LOW (ref 3.8–10.5)
WBC # FLD AUTO: 0.55 K/UL — CRITICAL LOW (ref 3.8–10.5)

## 2024-03-04 PROCEDURE — 99233 SBSQ HOSP IP/OBS HIGH 50: CPT | Mod: FS

## 2024-03-04 RX ORDER — ACETAMINOPHEN 500 MG
650 TABLET ORAL EVERY 6 HOURS
Refills: 0 | Status: DISCONTINUED | OUTPATIENT
Start: 2024-03-04 | End: 2024-03-26

## 2024-03-04 RX ORDER — ESCITALOPRAM OXALATE 10 MG/1
20 TABLET, FILM COATED ORAL DAILY
Refills: 0 | Status: DISCONTINUED | OUTPATIENT
Start: 2024-03-04 | End: 2024-03-26

## 2024-03-04 RX ADMIN — Medication 5 MILLILITER(S): at 00:32

## 2024-03-04 RX ADMIN — ONDANSETRON 108 MILLIGRAM(S): 8 TABLET, FILM COATED ORAL at 05:18

## 2024-03-04 RX ADMIN — FLUDARABINE PHOSPHATE 50 MILLIGRAM(S): 25 INJECTION, SOLUTION INTRAVENOUS at 15:48

## 2024-03-04 RX ADMIN — LORATADINE 10 MILLIGRAM(S): 10 TABLET ORAL at 12:46

## 2024-03-04 RX ADMIN — CHLORHEXIDINE GLUCONATE 1 APPLICATION(S): 213 SOLUTION TOPICAL at 14:27

## 2024-03-04 RX ADMIN — Medication 10 MILLILITER(S): at 00:32

## 2024-03-04 RX ADMIN — Medication 650 MILLIGRAM(S): at 06:41

## 2024-03-04 RX ADMIN — Medication 5 MILLILITER(S): at 15:50

## 2024-03-04 RX ADMIN — ONDANSETRON 108 MILLIGRAM(S): 8 TABLET, FILM COATED ORAL at 14:27

## 2024-03-04 RX ADMIN — Medication 5 MILLILITER(S): at 12:44

## 2024-03-04 RX ADMIN — ONDANSETRON 108 MILLIGRAM(S): 8 TABLET, FILM COATED ORAL at 21:04

## 2024-03-04 RX ADMIN — APREPITANT 80 MILLIGRAM(S): 80 CAPSULE ORAL at 12:53

## 2024-03-04 RX ADMIN — Medication 10 MILLILITER(S): at 08:02

## 2024-03-04 RX ADMIN — SODIUM CHLORIDE 20 MILLILITER(S): 9 INJECTION INTRAMUSCULAR; INTRAVENOUS; SUBCUTANEOUS at 21:05

## 2024-03-04 RX ADMIN — LOSARTAN POTASSIUM 100 MILLIGRAM(S): 100 TABLET, FILM COATED ORAL at 05:19

## 2024-03-04 RX ADMIN — Medication 650 MILLIGRAM(S): at 06:11

## 2024-03-04 RX ADMIN — PANTOPRAZOLE SODIUM 40 MILLIGRAM(S): 20 TABLET, DELAYED RELEASE ORAL at 05:18

## 2024-03-04 RX ADMIN — Medication 10 MILLILITER(S): at 21:01

## 2024-03-04 RX ADMIN — Medication 10 MILLILITER(S): at 15:53

## 2024-03-04 RX ADMIN — Medication 10 MILLIGRAM(S): at 16:00

## 2024-03-04 RX ADMIN — Medication 10 MILLILITER(S): at 12:46

## 2024-03-04 RX ADMIN — Medication 5 MILLILITER(S): at 21:01

## 2024-03-04 RX ADMIN — Medication 5 MILLILITER(S): at 08:01

## 2024-03-04 RX ADMIN — ESCITALOPRAM OXALATE 20 MILLIGRAM(S): 10 TABLET, FILM COATED ORAL at 14:27

## 2024-03-04 NOTE — PROGRESS NOTE ADULT - NS ATTEND AMEND GEN_ALL_CORE FT
..    Primary: Wilbur    Assessment: 64-year-old day - 2 CATRACHITA alloBMT using a Flu/Cy/TBI preparative regimen for [place] negative ALL.  Course uncomplicated.    Plan:  Heme: start preparative regimen  PLT goal > 10,000  Hgb goal > 7.0g/dL  G-CSF to start on day +5  will require post ALLO BMT CNS prophylaxis -- begins after engraftment.     GVHD: PTCy days 3 and 4, Cellcept and tacro to start on day +5    ID: day 0: flu, valtrex,   Levaquin started on 3/3 for ANC < 500  bactrim SS qd to start on day +21    Nutrition: tolerating PO    DVT prophylaxis: ambulation    Over 35 minutes were spent in direct patient care and care coordination.

## 2024-03-04 NOTE — PROGRESS NOTE ADULT - SUBJECTIVE AND OBJECTIVE BOX
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    S: Patient seen and examined with HPC Transplant Team:       O: Vitals:   Vital Signs Last 24 Hrs  T(C): 36.3 (04 Mar 2024 05:17), Max: 36.3 (04 Mar 2024 05:17)  T(F): 97.3 (04 Mar 2024 05:17), Max: 97.3 (04 Mar 2024 05:17)  HR: 67 (04 Mar 2024 05:17) (60 - 73)  BP: 157/77 (04 Mar 2024 05:17) (122/66 - 172/76)  BP(mean): --  RR: 16 (04 Mar 2024 05:17) (16 - 18)  SpO2: 97% (04 Mar 2024 05:17) (96% - 99%)    Parameters below as of 04 Mar 2024 05:17  Patient On (Oxygen Delivery Method): room air      Admit weight: 96.9kg   Daily Weight in k.2 (03 Mar 2024 09:45)    Intake / Output:   03-03 @ 07:01  -  -04 @ 07:00  --------------------------------------------------------  IN: 2691 mL / OUT: 4550 mL / NET: -1859 mL      PE:   Oropharynx: no erythema no ulcer  Oral Mucositis:   -                                                     Grade: -  CVS: RRR, +S1,S2  Lungs: CTA  Abdomen: soft, ND, ND +bs  Extremities: trace edema   Gastric Mucositis:      -                                            Grade: -  Intestinal Mucositis:     -                                         Grade: -  Skin:  no rash   TLC: C/D/I   Neuro: A&O x3  Pain: Denies    Labs:   CBC Full  -  ( 04 Mar 2024 07:56 )  WBC Count : 0.55 K/uL  Hemoglobin : 11.4 g/dL  Hematocrit : 33.6 %  Platelet Count - Automated : 99 K/uL  Mean Cell Volume : 98.0 fl  Mean Cell Hemoglobin : 33.2 pg  Mean Cell Hemoglobin Concentration : 33.9 gm/dL  Auto Neutrophil # : x  Auto Lymphocyte # : x  Auto Monocyte # : x  Auto Eosinophil # : x  Auto Basophil # : x  Auto Neutrophil % : x  Auto Lymphocyte % : x  Auto Monocyte % : x  Auto Eosinophil % : x  Auto Basophil % : x                          11.4   0.55  )-----------( 99       ( 04 Mar 2024 07:56 )             33.6       PT/INR - ( 04 Mar 2024 07:56 )   PT: 11.7 sec;   INR: 1.12 ratio         PTT - ( 04 Mar 2024 07:56 )  PTT:29.7 sec  LIVER FUNCTIONS - ( 03 Mar 2024 07:12 )  Alb: 3.8 g/dL / Pro: 5.6 g/dL / ALK PHOS: 97 U/L / ALT: 25 U/L / AST: 23 U/L / GGT: x             Meds:   Antimicrobials:   levoFLOXacin  Tablet 500 milliGRAM(s) Oral every 24 hours      Heme / Onc:   fludarabine IVPB (eMAR) 50 milliGRAM(s) IV Intermittent every 24 hours      GI:  pantoprazole    Tablet 40 milliGRAM(s) Oral before breakfast  senna 1 Tablet(s) Oral at bedtime PRN  sodium bicarbonate Mouth Rinse 10 milliLiter(s) Swish and Spit five times a day      Cardiovascular:   losartan 100 milliGRAM(s) Oral daily      Immunologic:       Other medications:   acetaminophen     Tablet .. 650 milliGRAM(s) Oral once  aprepitant 80 milliGRAM(s) Oral every 24 hours  Biotene Dry Mouth Oral Rinse 5 milliLiter(s) Swish and Spit five times a day  chlorhexidine 4% Liquid 1 Application(s) Topical <User Schedule>  diphenhydrAMINE Injectable 25 milliGRAM(s) IV Push once  escitalopram 10 milliGRAM(s) Oral daily  loratadine 10 milliGRAM(s) Oral daily  ondansetron  IVPB 8 milliGRAM(s) IV Intermittent every 8 hours  sodium chloride 0.9%. 1000 milliLiter(s) IV Continuous <Continuous>  sodium chloride 0.9%. 1000 milliLiter(s) IV Continuous <Continuous>      PRN:   acetaminophen     Tablet .. 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  3/3- Lasix 20 mg x1 dose today    1. Infectious Disease:   levoFLOXacin  Tablet 500 milliGRAM(s) Oral every 24 hours    2. GI Prophylaxis:    pantoprazole    Tablet 40 milliGRAM(s) Oral before breakfast    3. Mouthcare - NS / NaHCO3 rinses, Mycelex, Biotene; Skin care     4. GVHD prophylaxis   TBI day -1   CTX days +3, +4  MMF, tacro to start on day + 5     5. Transfuse & replete electrolytes prn     6. IV hydration, daily weights, strict I&O, prn diuresis     7. PO intake as tolerated, nutrition follow up as needed, MVI, folic acid     8. Antiemetics, anti-diarrhea medications:   metoclopramide Injectable 10 milliGRAM(s) IV Push every 6 hours PRN  ondansetron  IVPB 8 milliGRAM(s) IV Intermittent every 8 hours  aprepitant 80 milliGRAM(s) Oral every 24 hours    9. OOB as tolerated, physical therapy consult if needed     10. Monitor coags / fibrinogen 2x week, vitamin K as needed     11. Monitor closely for clinical changes, monitor for fevers     12. Emotional support provided, plan of care discussed and questions addressed     13. Patient education done regarding chemotherapy prep, plan of care, restrictions and discharge planning     14. Continue regular social work input     I have written the above note for Dr. Vaughn who performed service with me in the room.   Estelita Tinsley NP-C ([phone])    I have seen and examined patient with NP, I agree with above note as scribed.                    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    S: Patient seen and examined with HPC Transplant Team:       O: Vitals:   Vital Signs Last 24 Hrs  T(C): 36.3 (04 Mar 2024 05:17), Max: 36.3 (04 Mar 2024 05:17)  T(F): 97.3 (04 Mar 2024 05:17), Max: 97.3 (04 Mar 2024 05:17)  HR: 67 (04 Mar 2024 05:17) (60 - 73)  BP: 157/77 (04 Mar 2024 05:17) (122/66 - 172/76)  BP(mean): --  RR: 16 (04 Mar 2024 05:17) (16 - 18)  SpO2: 97% (04 Mar 2024 05:17) (96% - 99%)    Parameters below as of 04 Mar 2024 05:17  Patient On (Oxygen Delivery Method): room air      Admit weight: 96.9kg   Daily Weight in k.2 (03 Mar 2024 09:45)    Intake / Output:   03-03 @ 07:01  -  -04 @ 07:00  --------------------------------------------------------  IN: 2691 mL / OUT: 4550 mL / NET: -1859 mL      PE:   Oropharynx: no erythema no ulcer  Oral Mucositis:   -                                                     Grade: -  CVS: RRR, +S1,S2  Lungs: CTA  Abdomen: soft, ND, ND +bs  Extremities: trace edema   Gastric Mucositis:      -                                            Grade: -  Intestinal Mucositis:     -                                         Grade: -  Skin:  no rash   TLC: C/D/I   Neuro: A&O x3  Pain: Denies    Labs:   CBC Full  -  ( 04 Mar 2024 07:56 )  WBC Count : 0.55 K/uL  Hemoglobin : 11.4 g/dL  Hematocrit : 33.6 %  Platelet Count - Automated : 99 K/uL  Mean Cell Volume : 98.0 fl  Mean Cell Hemoglobin : 33.2 pg  Mean Cell Hemoglobin Concentration : 33.9 gm/dL  Auto Neutrophil # : x  Auto Lymphocyte # : x  Auto Monocyte # : x  Auto Eosinophil # : x  Auto Basophil # : x  Auto Neutrophil % : x  Auto Lymphocyte % : x  Auto Monocyte % : x  Auto Eosinophil % : x  Auto Basophil % : x                          11.4   0.55  )-----------( 99       ( 04 Mar 2024 07:56 )             33.6     03-04    142  |  105  |  9   ----------------------------<  79  3.6   |  26  |  0.80    Ca    9.3      04 Mar 2024 08:05  Phos  2.7     03-04  Mg     1.8     03-03    TPro  5.6<L>  /  Alb  3.8  /  TBili  0.8  /  DBili  0.2  /  AST  25  /  ALT  26  /  AlkPhos  95  03-04    PT/INR - ( 04 Mar 2024 07:56 )   PT: 11.7 sec;   INR: 1.12 ratio         PTT - ( 04 Mar 2024 07:56 )  PTT:29.7 sec  LIVER FUNCTIONS - ( 03 Mar 2024 07:12 )  Alb: 3.8 g/dL / Pro: 5.6 g/dL / ALK PHOS: 97 U/L / ALT: 25 U/L / AST: 23 U/L / GGT: x             Meds:   Antimicrobials:   levoFLOXacin  Tablet 500 milliGRAM(s) Oral every 24 hours      Heme / Onc:   fludarabine IVPB (eMAR) 50 milliGRAM(s) IV Intermittent every 24 hours      GI:  pantoprazole    Tablet 40 milliGRAM(s) Oral before breakfast  senna 1 Tablet(s) Oral at bedtime PRN  sodium bicarbonate Mouth Rinse 10 milliLiter(s) Swish and Spit five times a day      Cardiovascular:   losartan 100 milliGRAM(s) Oral daily      Immunologic:       Other medications:   acetaminophen     Tablet .. 650 milliGRAM(s) Oral once  aprepitant 80 milliGRAM(s) Oral every 24 hours  Biotene Dry Mouth Oral Rinse 5 milliLiter(s) Swish and Spit five times a day  chlorhexidine 4% Liquid 1 Application(s) Topical <User Schedule>  diphenhydrAMINE Injectable 25 milliGRAM(s) IV Push once  escitalopram 10 milliGRAM(s) Oral daily  loratadine 10 milliGRAM(s) Oral daily  ondansetron  IVPB 8 milliGRAM(s) IV Intermittent every 8 hours  sodium chloride 0.9%. 1000 milliLiter(s) IV Continuous <Continuous>  sodium chloride 0.9%. 1000 milliLiter(s) IV Continuous <Continuous>      PRN:   acetaminophen     Tablet .. 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  3/3- Lasix 20 mg x1 dose today    1. Infectious Disease:   levoFLOXacin  Tablet 500 milliGRAM(s) Oral every 24 hours    2. GI Prophylaxis:    pantoprazole    Tablet 40 milliGRAM(s) Oral before breakfast    3. Mouthcare - NS / NaHCO3 rinses, Mycelex, Biotene; Skin care     4. GVHD prophylaxis   TBI day -1   CTX days +3, +4  MMF, tacro to start on day + 5     5. Transfuse & replete electrolytes prn     6. IV hydration, daily weights, strict I&O, prn diuresis     7. PO intake as tolerated, nutrition follow up as needed, MVI, folic acid     8. Antiemetics, anti-diarrhea medications:   metoclopramide Injectable 10 milliGRAM(s) IV Push every 6 hours PRN  ondansetron  IVPB 8 milliGRAM(s) IV Intermittent every 8 hours  aprepitant 80 milliGRAM(s) Oral every 24 hours    9. OOB as tolerated, physical therapy consult if needed     10. Monitor coags / fibrinogen 2x week, vitamin K as needed     11. Monitor closely for clinical changes, monitor for fevers     12. Emotional support provided, plan of care discussed and questions addressed     13. Patient education done regarding chemotherapy prep, plan of care, restrictions and discharge planning     14. Continue regular social work input     I have written the above note for Dr. Vaughn who performed service with me in the room.   Estelita Tinsley NP-C ([phone])    I have seen and examined patient with NP, I agree with above note as scribed.                    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    S: Patient seen and examined with HPC Transplant Team:   +intermittent nasuea    O: Rest of ROS negative    Vital Signs Last 24 Hrs  T(C): 36.3 (04 Mar 2024 05:17), Max: 36.3 (04 Mar 2024 05:17)  T(F): 97.3 (04 Mar 2024 05:17), Max: 97.3 (04 Mar 2024 05:17)  HR: 67 (04 Mar 2024 05:17) (60 - 73)  BP: 157/77 (04 Mar 2024 05:17) (122/66 - 172/76)  BP(mean): --  RR: 16 (04 Mar 2024 05:17) (16 - 18)  SpO2: 97% (04 Mar 2024 05:17) (96% - 99%)    Parameters below as of 04 Mar 2024 05:17  Patient On (Oxygen Delivery Method): room air      Admit weight: 96.9kg   Daily Weight in k.2 (04 Mar 2024 09:45)    Intake / Output:   03-03 @ 07:01  -  03-04 @ 07:00  --------------------------------------------------------  IN: 2691 mL / OUT: 4550 mL / NET: -1859 mL      PE:   Oropharynx: no erythema no ulcer  Oral Mucositis:   -                                                     Grade: -  CVS: RRR, +S1,S2  Lungs: CTA  Abdomen: soft, ND, ND +bs  Extremities: no edema   Gastric Mucositis:      -                                            Grade: -  Intestinal Mucositis:     -                                         Grade: -  Skin:  no rash   TLC: C/D/I   Neuro: A&O x3  Pain: Denies    Labs:   CBC Full  -  ( 04 Mar 2024 07:56 )  WBC Count : 0.55 K/uL  Hemoglobin : 11.4 g/dL  Hematocrit : 33.6 %  Platelet Count - Automated : 99 K/uL  Mean Cell Volume : 98.0 fl  Mean Cell Hemoglobin : 33.2 pg  Mean Cell Hemoglobin Concentration : 33.9 gm/dL  Auto Neutrophil # : x  Auto Lymphocyte # : x  Auto Monocyte # : x  Auto Eosinophil # : x  Auto Basophil # : x  Auto Neutrophil % : x  Auto Lymphocyte % : x  Auto Monocyte % : x  Auto Eosinophil % : x  Auto Basophil % : x                          11.4   0.55  )-----------( 99       ( 04 Mar 2024 07:56 )             33.6     03-04    142  |  105  |  9   ----------------------------<  79  3.6   |  26  |  0.80    Ca    9.3      04 Mar 2024 08:05  Phos  2.7     03-04  Mg     1.8     03-03    TPro  5.6<L>  /  Alb  3.8  /  TBili  0.8  /  DBili  0.2  /  AST  25  /  ALT  26  /  AlkPhos  95  03-04    PT/INR - ( 04 Mar 2024 07:56 )   PT: 11.7 sec;   INR: 1.12 ratio         PTT - ( 04 Mar 2024 07:56 )  PTT:29.7 sec  LIVER FUNCTIONS - ( 03 Mar 2024 07:12 )  Alb: 3.8 g/dL / Pro: 5.6 g/dL / ALK PHOS: 97 U/L / ALT: 25 U/L / AST: 23 U/L / GGT: x             Meds:   Antimicrobials:   levoFLOXacin  Tablet 500 milliGRAM(s) Oral every 24 hours      Heme / Onc:   fludarabine IVPB (eMAR) 50 milliGRAM(s) IV Intermittent every 24 hours      GI:  pantoprazole    Tablet 40 milliGRAM(s) Oral before breakfast  senna 1 Tablet(s) Oral at bedtime PRN  sodium bicarbonate Mouth Rinse 10 milliLiter(s) Swish and Spit five times a day      Cardiovascular:   losartan 100 milliGRAM(s) Oral daily      Immunologic:       Other medications:   acetaminophen     Tablet .. 650 milliGRAM(s) Oral once  aprepitant 80 milliGRAM(s) Oral every 24 hours  Biotene Dry Mouth Oral Rinse 5 milliLiter(s) Swish and Spit five times a day  chlorhexidine 4% Liquid 1 Application(s) Topical <User Schedule>  diphenhydrAMINE Injectable 25 milliGRAM(s) IV Push once  escitalopram 10 milliGRAM(s) Oral daily  loratadine 10 milliGRAM(s) Oral daily  ondansetron  IVPB 8 milliGRAM(s) IV Intermittent every 8 hours  sodium chloride 0.9%. 1000 milliLiter(s) IV Continuous <Continuous>  sodium chloride 0.9%. 1000 milliLiter(s) IV Continuous <Continuous>      PRN:   acetaminophen     Tablet .. 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  3/3- Lasix 20 mg x1 dose today    1. Infectious Disease:   levoFLOXacin  Tablet 500 milliGRAM(s) Oral every 24 hours    2. GI Prophylaxis:    pantoprazole    Tablet 40 milliGRAM(s) Oral before breakfast    3. Mouthcare - NS / NaHCO3 rinses, Mycelex, Biotene; Skin care     4. GVHD prophylaxis   TBI day -1   CTX days +3, +4  MMF, tacro to start on day + 5     5. Transfuse & replete electrolytes prn     6. IV hydration, daily weights, strict I&O, prn diuresis     7. PO intake as tolerated, nutrition follow up as needed, MVI, folic acid     8. Antiemetics, anti-diarrhea medications:   metoclopramide Injectable 10 milliGRAM(s) IV Push every 6 hours PRN  ondansetron  IVPB 8 milliGRAM(s) IV Intermittent every 8 hours  aprepitant 80 milliGRAM(s) Oral every 24 hours    9. OOB as tolerated, physical therapy consult if needed     10. Monitor coags / fibrinogen 2x week, vitamin K as needed     11. Monitor closely for clinical changes, monitor for fevers     12. Emotional support provided, plan of care discussed and questions addressed     13. Patient education done regarding chemotherapy prep, plan of care, restrictions and discharge planning     14. Continue regular social work input     I have written the above note for Dr. Vaughn who performed service with me in the room.   Estelita Tinsley NP-C ([phone])    I have seen and examined patient with NP, I agree with above note as scribed.

## 2024-03-05 LAB
ALBUMIN SERPL ELPH-MCNC: 3.8 G/DL — SIGNIFICANT CHANGE UP (ref 3.3–5)
ALP SERPL-CCNC: 97 U/L — SIGNIFICANT CHANGE UP (ref 40–120)
ALT FLD-CCNC: 25 U/L — SIGNIFICANT CHANGE UP (ref 10–45)
ANION GAP SERPL CALC-SCNC: 10 MMOL/L — SIGNIFICANT CHANGE UP (ref 5–17)
AST SERPL-CCNC: 24 U/L — SIGNIFICANT CHANGE UP (ref 10–40)
BILIRUB SERPL-MCNC: 0.7 MG/DL — SIGNIFICANT CHANGE UP (ref 0.2–1.2)
BUN SERPL-MCNC: 11 MG/DL — SIGNIFICANT CHANGE UP (ref 7–23)
CALCIUM SERPL-MCNC: 9.3 MG/DL — SIGNIFICANT CHANGE UP (ref 8.4–10.5)
CHLORIDE SERPL-SCNC: 106 MMOL/L — SIGNIFICANT CHANGE UP (ref 96–108)
CO2 SERPL-SCNC: 26 MMOL/L — SIGNIFICANT CHANGE UP (ref 22–31)
CREAT SERPL-MCNC: 0.81 MG/DL — SIGNIFICANT CHANGE UP (ref 0.5–1.3)
EGFR: 80 ML/MIN/1.73M2 — SIGNIFICANT CHANGE UP
GLUCOSE SERPL-MCNC: 88 MG/DL — SIGNIFICANT CHANGE UP (ref 70–99)
HCT VFR BLD CALC: 33 % — LOW (ref 34.5–45)
HGB BLD-MCNC: 11.3 G/DL — LOW (ref 11.5–15.5)
LDH SERPL L TO P-CCNC: 146 U/L — SIGNIFICANT CHANGE UP (ref 50–242)
MAGNESIUM SERPL-MCNC: 2 MG/DL — SIGNIFICANT CHANGE UP (ref 1.6–2.6)
MCHC RBC-ENTMCNC: 33.5 PG — SIGNIFICANT CHANGE UP (ref 27–34)
MCHC RBC-ENTMCNC: 34.2 GM/DL — SIGNIFICANT CHANGE UP (ref 32–36)
MCV RBC AUTO: 97.9 FL — SIGNIFICANT CHANGE UP (ref 80–100)
NRBC # BLD: 0 /100 WBCS — SIGNIFICANT CHANGE UP (ref 0–0)
PHOSPHATE SERPL-MCNC: 2.9 MG/DL — SIGNIFICANT CHANGE UP (ref 2.5–4.5)
PLATELET # BLD AUTO: 103 K/UL — LOW (ref 150–400)
POTASSIUM SERPL-MCNC: 3.8 MMOL/L — SIGNIFICANT CHANGE UP (ref 3.5–5.3)
POTASSIUM SERPL-SCNC: 3.8 MMOL/L — SIGNIFICANT CHANGE UP (ref 3.5–5.3)
PROT SERPL-MCNC: 5.6 G/DL — LOW (ref 6–8.3)
RBC # BLD: 3.37 M/UL — LOW (ref 3.8–5.2)
RBC # FLD: 12.5 % — SIGNIFICANT CHANGE UP (ref 10.3–14.5)
SODIUM SERPL-SCNC: 142 MMOL/L — SIGNIFICANT CHANGE UP (ref 135–145)
WBC # BLD: 0.41 K/UL — CRITICAL LOW (ref 3.8–10.5)
WBC # FLD AUTO: 0.41 K/UL — CRITICAL LOW (ref 3.8–10.5)

## 2024-03-05 PROCEDURE — 77331 SPECIAL RADIATION DOSIMETRY: CPT | Mod: 26

## 2024-03-05 PROCEDURE — 99233 SBSQ HOSP IP/OBS HIGH 50: CPT | Mod: FS

## 2024-03-05 RX ORDER — VALACYCLOVIR 500 MG/1
500 TABLET, FILM COATED ORAL
Refills: 0 | Status: DISCONTINUED | OUTPATIENT
Start: 2024-03-06 | End: 2024-03-26

## 2024-03-05 RX ORDER — FLUCONAZOLE 150 MG/1
400 TABLET ORAL DAILY
Refills: 0 | Status: DISCONTINUED | OUTPATIENT
Start: 2024-03-06 | End: 2024-03-11

## 2024-03-05 RX ORDER — SENNA PLUS 8.6 MG/1
2 TABLET ORAL AT BEDTIME
Refills: 0 | Status: DISCONTINUED | OUTPATIENT
Start: 2024-03-05 | End: 2024-03-11

## 2024-03-05 RX ADMIN — SENNA PLUS 2 TABLET(S): 8.6 TABLET ORAL at 21:01

## 2024-03-05 RX ADMIN — Medication 5 MILLILITER(S): at 00:30

## 2024-03-05 RX ADMIN — ESCITALOPRAM OXALATE 20 MILLIGRAM(S): 10 TABLET, FILM COATED ORAL at 12:36

## 2024-03-05 RX ADMIN — ONDANSETRON 108 MILLIGRAM(S): 8 TABLET, FILM COATED ORAL at 05:15

## 2024-03-05 RX ADMIN — Medication 5 MILLILITER(S): at 12:34

## 2024-03-05 RX ADMIN — LORATADINE 10 MILLIGRAM(S): 10 TABLET ORAL at 12:35

## 2024-03-05 RX ADMIN — SODIUM CHLORIDE 20 MILLILITER(S): 9 INJECTION INTRAMUSCULAR; INTRAVENOUS; SUBCUTANEOUS at 21:08

## 2024-03-05 RX ADMIN — Medication 5 MILLILITER(S): at 21:01

## 2024-03-05 RX ADMIN — ONDANSETRON 108 MILLIGRAM(S): 8 TABLET, FILM COATED ORAL at 21:02

## 2024-03-05 RX ADMIN — Medication 10 MILLILITER(S): at 00:30

## 2024-03-05 RX ADMIN — Medication 5 MILLILITER(S): at 16:13

## 2024-03-05 RX ADMIN — Medication 10 MILLILITER(S): at 12:35

## 2024-03-05 RX ADMIN — SODIUM CHLORIDE 150 MILLILITER(S): 9 INJECTION, SOLUTION INTRAVENOUS at 22:01

## 2024-03-05 RX ADMIN — Medication 10 MILLILITER(S): at 09:24

## 2024-03-05 RX ADMIN — Medication 10 MILLILITER(S): at 16:13

## 2024-03-05 RX ADMIN — LOSARTAN POTASSIUM 100 MILLIGRAM(S): 100 TABLET, FILM COATED ORAL at 05:15

## 2024-03-05 RX ADMIN — Medication 5 MILLILITER(S): at 09:23

## 2024-03-05 RX ADMIN — APREPITANT 80 MILLIGRAM(S): 80 CAPSULE ORAL at 12:41

## 2024-03-05 RX ADMIN — ONDANSETRON 108 MILLIGRAM(S): 8 TABLET, FILM COATED ORAL at 13:37

## 2024-03-05 RX ADMIN — CHLORHEXIDINE GLUCONATE 1 APPLICATION(S): 213 SOLUTION TOPICAL at 09:25

## 2024-03-05 RX ADMIN — PANTOPRAZOLE SODIUM 40 MILLIGRAM(S): 20 TABLET, DELAYED RELEASE ORAL at 05:14

## 2024-03-05 RX ADMIN — Medication 10 MILLILITER(S): at 21:01

## 2024-03-05 NOTE — PROGRESS NOTE ADULT - SUBJECTIVE AND OBJECTIVE BOX
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    S: Patient seen and examined with HPC Transplant Team:   +intermittent nasuea    O: Rest of ROS negative    Vital Signs Last 24 Hrs  T(C): 36.3 (04 Mar 2024 05:17), Max: 36.3 (04 Mar 2024 05:17)  T(F): 97.3 (04 Mar 2024 05:17), Max: 97.3 (04 Mar 2024 05:17)  HR: 67 (04 Mar 2024 05:17) (60 - 73)  BP: 157/77 (04 Mar 2024 05:17) (122/66 - 172/76)  BP(mean): --  RR: 16 (04 Mar 2024 05:17) (16 - 18)  SpO2: 97% (04 Mar 2024 05:17) (96% - 99%)    Parameters below as of 04 Mar 2024 05:17  Patient On (Oxygen Delivery Method): room air      Admit weight: 96.9kg   Daily Weight in k.2 (04 Mar 2024 09:45)    Intake / Output:   03-03 @ 07:01  -  03-04 @ 07:00  --------------------------------------------------------  IN: 2691 mL / OUT: 4550 mL / NET: -1859 mL      PE:   Oropharynx: no erythema no ulcer  Oral Mucositis:   -                                                     Grade: -  CVS: RRR, +S1,S2  Lungs: CTA  Abdomen: soft, ND, ND +bs  Extremities: no edema   Gastric Mucositis:      -                                            Grade: -  Intestinal Mucositis:     -                                         Grade: -  Skin:  no rash   TLC: C/D/I   Neuro: A&O x3  Pain: Denies    Labs:   CBC Full  -  ( 04 Mar 2024 07:56 )  WBC Count : 0.55 K/uL  Hemoglobin : 11.4 g/dL  Hematocrit : 33.6 %  Platelet Count - Automated : 99 K/uL  Mean Cell Volume : 98.0 fl  Mean Cell Hemoglobin : 33.2 pg  Mean Cell Hemoglobin Concentration : 33.9 gm/dL  Auto Neutrophil # : x  Auto Lymphocyte # : x  Auto Monocyte # : x  Auto Eosinophil # : x  Auto Basophil # : x  Auto Neutrophil % : x  Auto Lymphocyte % : x  Auto Monocyte % : x  Auto Eosinophil % : x  Auto Basophil % : x                          11.4   0.55  )-----------( 99       ( 04 Mar 2024 07:56 )             33.6     03-04    142  |  105  |  9   ----------------------------<  79  3.6   |  26  |  0.80    Ca    9.3      04 Mar 2024 08:05  Phos  2.7     03-04  Mg     1.8     03-03    TPro  5.6<L>  /  Alb  3.8  /  TBili  0.8  /  DBili  0.2  /  AST  25  /  ALT  26  /  AlkPhos  95  03-04    PT/INR - ( 04 Mar 2024 07:56 )   PT: 11.7 sec;   INR: 1.12 ratio         PTT - ( 04 Mar 2024 07:56 )  PTT:29.7 sec  LIVER FUNCTIONS - ( 03 Mar 2024 07:12 )  Alb: 3.8 g/dL / Pro: 5.6 g/dL / ALK PHOS: 97 U/L / ALT: 25 U/L / AST: 23 U/L / GGT: x             Meds:   Antimicrobials:   levoFLOXacin  Tablet 500 milliGRAM(s) Oral every 24 hours      Heme / Onc:   fludarabine IVPB (eMAR) 50 milliGRAM(s) IV Intermittent every 24 hours      GI:  pantoprazole    Tablet 40 milliGRAM(s) Oral before breakfast  senna 1 Tablet(s) Oral at bedtime PRN  sodium bicarbonate Mouth Rinse 10 milliLiter(s) Swish and Spit five times a day      Cardiovascular:   losartan 100 milliGRAM(s) Oral daily      Immunologic:       Other medications:   acetaminophen     Tablet .. 650 milliGRAM(s) Oral once  aprepitant 80 milliGRAM(s) Oral every 24 hours  Biotene Dry Mouth Oral Rinse 5 milliLiter(s) Swish and Spit five times a day  chlorhexidine 4% Liquid 1 Application(s) Topical <User Schedule>  diphenhydrAMINE Injectable 25 milliGRAM(s) IV Push once  escitalopram 10 milliGRAM(s) Oral daily  loratadine 10 milliGRAM(s) Oral daily  ondansetron  IVPB 8 milliGRAM(s) IV Intermittent every 8 hours  sodium chloride 0.9%. 1000 milliLiter(s) IV Continuous <Continuous>  sodium chloride 0.9%. 1000 milliLiter(s) IV Continuous <Continuous>      PRN:   acetaminophen     Tablet .. 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  3/3- Lasix 20 mg x1 dose today    1. Infectious Disease:   levoFLOXacin  Tablet 500 milliGRAM(s) Oral every 24 hours    2. GI Prophylaxis:    pantoprazole    Tablet 40 milliGRAM(s) Oral before breakfast    3. Mouthcare - NS / NaHCO3 rinses, Mycelex, Biotene; Skin care     4. GVHD prophylaxis   TBI day -1   CTX days +3, +4  MMF, tacro to start on day + 5     5. Transfuse & replete electrolytes prn     6. IV hydration, daily weights, strict I&O, prn diuresis     7. PO intake as tolerated, nutrition follow up as needed, MVI, folic acid     8. Antiemetics, anti-diarrhea medications:   metoclopramide Injectable 10 milliGRAM(s) IV Push every 6 hours PRN  ondansetron  IVPB 8 milliGRAM(s) IV Intermittent every 8 hours  aprepitant 80 milliGRAM(s) Oral every 24 hours    9. OOB as tolerated, physical therapy consult if needed     10. Monitor coags / fibrinogen 2x week, vitamin K as needed     11. Monitor closely for clinical changes, monitor for fevers     12. Emotional support provided, plan of care discussed and questions addressed     13. Patient education done regarding chemotherapy prep, plan of care, restrictions and discharge planning     14. Continue regular social work input     I have written the above note for Dr. Vaughn who performed service with me in the room.   Estelita Tinsley NP-C ([phone])    I have seen and examined patient with NP, I agree with above note as scribed.                    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    S: Patient seen and examined with HPC Transplant Team:   +intermittent nasuea    O: Rest of ROS negative    Vital Signs Last 24 Hrs  T(C): 36.4 (05 Mar 2024 05:13), Max: 36.7 (04 Mar 2024 20:59)  T(F): 97.5 (05 Mar 2024 05:13), Max: 98.1 (04 Mar 2024 20:59)  HR: 67 (05 Mar 2024 05:13) (60 - 67)  BP: 127/71 (05 Mar 2024 05:13) (118/70 - 152/75)  RR: 18 (05 Mar 2024 05:13) (18 - 18)  SpO2: 94% (05 Mar 2024 05:13) (94% - 98%)    Parameters below as of 05 Mar 2024 05:13  Patient On (Oxygen Delivery Method): room air      Admit weight: 96.9kg   Daily Weight in k.2 (04 Mar 2024 09:45)    I&O's Summary    04 Mar 2024 07:01  -  05 Mar 2024 07:00  --------------------------------------------------------  IN: 2018 mL / OUT: 1800 mL / NET: 218 mL    05 Mar 2024 07:01  -  05 Mar 2024 10:16  --------------------------------------------------------  IN: 0 mL / OUT: 50 mL / NET: -50 mL        PE:   Oropharynx: no erythema no ulcer  Oral Mucositis:   -                                                     Grade: -  CVS: RRR, +S1,S2  Lungs: CTA  Abdomen: soft, ND, ND +bs  Extremities: no edema   Gastric Mucositis:      -                                            Grade: -  Intestinal Mucositis:     -                                         Grade: -  Skin:  no rash   TLC: C/D/I   Neuro: A&O x3  Pain: Denies    LABS:    Blood Cultures:                           11.3   0.41  )-----------( 103      ( 05 Mar 2024 06:55 )             33.0         Mean Cell Volume : 97.9 fl  Mean Cell Hemoglobin : 33.5 pg  Mean Cell Hemoglobin Concentration : 34.2 gm/dL  Auto Neutrophil # : x  Auto Lymphocyte # : x  Auto Monocyte # : x  Auto Eosinophil # : x  Auto Basophil # : x  Auto Neutrophil % : x  Auto Lymphocyte % : x  Auto Monocyte % : x  Auto Eosinophil % : x  Auto Basophil % : x      03-05    142  |  106  |  11  ----------------------------<  88  3.8   |  26  |  0.81    Ca    9.3      05 Mar 2024 06:55  Phos  2.9     03-05  Mg     2.0     03-05    TPro  5.6<L>  /  Alb  3.8  /  TBili  0.7  /  DBili  x   /  AST  24  /  ALT  25  /  AlkPhos  97  03-05    PT/INR - ( 04 Mar 2024 07:56 )   PT: 11.7 sec;   INR: 1.12 ratio    PTT - ( 04 Mar 2024 07:56 )  PTT:29.7 sec      Uric Acid --      Meds:   Antimicrobials:   levoFLOXacin  Tablet 500 milliGRAM(s) Oral every 24 hours      Heme / Onc:   fludarabine IVPB (eMAR) 50 milliGRAM(s) IV Intermittent every 24 hours      GI:  pantoprazole    Tablet 40 milliGRAM(s) Oral before breakfast  senna 1 Tablet(s) Oral at bedtime PRN  sodium bicarbonate Mouth Rinse 10 milliLiter(s) Swish and Spit five times a day      Cardiovascular:   losartan 100 milliGRAM(s) Oral daily      Immunologic:       Other medications:   acetaminophen     Tablet .. 650 milliGRAM(s) Oral once  aprepitant 80 milliGRAM(s) Oral every 24 hours  Biotene Dry Mouth Oral Rinse 5 milliLiter(s) Swish and Spit five times a day  chlorhexidine 4% Liquid 1 Application(s) Topical <User Schedule>  diphenhydrAMINE Injectable 25 milliGRAM(s) IV Push once  escitalopram 10 milliGRAM(s) Oral daily  loratadine 10 milliGRAM(s) Oral daily  ondansetron  IVPB 8 milliGRAM(s) IV Intermittent every 8 hours  sodium chloride 0.9%. 1000 milliLiter(s) IV Continuous <Continuous>  sodium chloride 0.9%. 1000 milliLiter(s) IV Continuous <Continuous>      PRN:   acetaminophen     Tablet .. 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  3/3- Lasix 20 mg x1 dose today    1. Infectious Disease:   levoFLOXacin  Tablet 500 milliGRAM(s) Oral every 24 hours    2. GI Prophylaxis:    pantoprazole    Tablet 40 milliGRAM(s) Oral before breakfast    3. Mouthcare - NS / NaHCO3 rinses, Mycelex, Biotene; Skin care     4. GVHD prophylaxis   TBI day -1   CTX days +3, +4  MMF, tacro to start on day + 5     5. Transfuse & replete electrolytes prn     6. IV hydration, daily weights, strict I&O, prn diuresis     7. PO intake as tolerated, nutrition follow up as needed, MVI, folic acid     8. Antiemetics, anti-diarrhea medications:   metoclopramide Injectable 10 milliGRAM(s) IV Push every 6 hours PRN  ondansetron  IVPB 8 milliGRAM(s) IV Intermittent every 8 hours  aprepitant 80 milliGRAM(s) Oral every 24 hours    9. OOB as tolerated, physical therapy consult if needed     10. Monitor coags / fibrinogen 2x week, vitamin K as needed     11. Monitor closely for clinical changes, monitor for fevers     12. Emotional support provided, plan of care discussed and questions addressed     13. Patient education done regarding chemotherapy prep, plan of care, restrictions and discharge planning     14. Continue regular social work input     I have written the above note for Dr. Vaughn who performed service with me in the room.   Maureen COSTELLO ([phone])    I have seen and examined patient with NP, I agree with above note as scribed.                    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    S: Patient seen and examined with HPC Transplant Team:   +intermittent nasuea    O: Rest of ROS negative    Vital Signs Last 24 Hrs  T(C): 36.4 (05 Mar 2024 05:13), Max: 36.7 (04 Mar 2024 20:59)  T(F): 97.5 (05 Mar 2024 05:13), Max: 98.1 (04 Mar 2024 20:59)  HR: 67 (05 Mar 2024 05:13) (60 - 67)  BP: 127/71 (05 Mar 2024 05:13) (118/70 - 152/75)  RR: 18 (05 Mar 2024 05:13) (18 - 18)  SpO2: 94% (05 Mar 2024 05:13) (94% - 98%)    Parameters below as of 05 Mar 2024 05:13  Patient On (Oxygen Delivery Method): room air      Admit weight: 96.9kg   Daily Weight in k.2 (04 Mar 2024 09:45)    I&O's Summary    04 Mar 2024 07:01  -  05 Mar 2024 07:00  --------------------------------------------------------  IN: 2018 mL / OUT: 1800 mL / NET: 218 mL    05 Mar 2024 07:01  -  05 Mar 2024 10:16  --------------------------------------------------------  IN: 0 mL / OUT: 50 mL / NET: -50 mL        PE:   Oropharynx: no erythema no ulcer  Oral Mucositis:   -                                                     Grade: -  CVS: RRR, +S1,S2  Lungs: CTA  Abdomen: soft, ND, ND +bs  Extremities: no edema   Gastric Mucositis:      -                                            Grade: -  Intestinal Mucositis:     -                                         Grade: -  Skin:  no rash   TLC: C/D/I   Neuro: A&O x3  Pain: Denies    LABS                          11.3   0.41  )-----------( 103      ( 05 Mar 2024 06:55 )             33.0         Mean Cell Volume : 97.9 fl  Mean Cell Hemoglobin : 33.5 pg  Mean Cell Hemoglobin Concentration : 34.2 gm/dL  Auto Neutrophil # : x  Auto Lymphocyte # : x  Auto Monocyte # : x  Auto Eosinophil # : x  Auto Basophil # : x  Auto Neutrophil % : x  Auto Lymphocyte % : x  Auto Monocyte % : x  Auto Eosinophil % : x  Auto Basophil % : x      03-05    142  |  106  |  11  ----------------------------<  88  3.8   |  26  |  0.81    Ca    9.3      05 Mar 2024 06:55  Phos  2.9     03-05  Mg     2.0     03-05    TPro  5.6<L>  /  Alb  3.8  /  TBili  0.7  /  DBili  x   /  AST  24  /  ALT  25  /  AlkPhos  97  03-05    PT/INR - ( 04 Mar 2024 07:56 )   PT: 11.7 sec;   INR: 1.12 ratio    PTT - ( 04 Mar 2024 07:56 )  PTT:29.7 sec      Uric Acid --    Blood Cultures: no recent     Meds:   Antimicrobials:   levoFLOXacin  Tablet 500 milliGRAM(s) Oral every 24 hours      Heme / Onc:   fludarabine IVPB (eMAR) 50 milliGRAM(s) IV Intermittent every 24 hours      GI:  pantoprazole    Tablet 40 milliGRAM(s) Oral before breakfast  senna 1 Tablet(s) Oral at bedtime PRN  sodium bicarbonate Mouth Rinse 10 milliLiter(s) Swish and Spit five times a day      Cardiovascular:   losartan 100 milliGRAM(s) Oral daily      Immunologic:       Other medications:   acetaminophen     Tablet .. 650 milliGRAM(s) Oral once  aprepitant 80 milliGRAM(s) Oral every 24 hours  Biotene Dry Mouth Oral Rinse 5 milliLiter(s) Swish and Spit five times a day  chlorhexidine 4% Liquid 1 Application(s) Topical <User Schedule>  diphenhydrAMINE Injectable 25 milliGRAM(s) IV Push once  escitalopram 10 milliGRAM(s) Oral daily  loratadine 10 milliGRAM(s) Oral daily  ondansetron  IVPB 8 milliGRAM(s) IV Intermittent every 8 hours  sodium chloride 0.9%. 1000 milliLiter(s) IV Continuous <Continuous>  sodium chloride 0.9%. 1000 milliLiter(s) IV Continuous <Continuous>      PRN:   acetaminophen     Tablet .. 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  3/3- Lasix 20 mg x1 dose today    1. Infectious Disease:   levoFLOXacin  Tablet 500 milliGRAM(s) Oral every 24 hours    2. GI Prophylaxis:    pantoprazole    Tablet 40 milliGRAM(s) Oral before breakfast    3. Mouthcare - NS / NaHCO3 rinses, Mycelex, Biotene; Skin care     4. GVHD prophylaxis   TBI day -1   CTX days +3, +4  MMF, tacro to start on day + 5     5. Transfuse & replete electrolytes prn     6. IV hydration, daily weights, strict I&O, prn diuresis     7. PO intake as tolerated, nutrition follow up as needed, MVI, folic acid     8. Antiemetics, anti-diarrhea medications:   metoclopramide Injectable 10 milliGRAM(s) IV Push every 6 hours PRN  ondansetron  IVPB 8 milliGRAM(s) IV Intermittent every 8 hours  aprepitant 80 milliGRAM(s) Oral every 24 hours    9. OOB as tolerated, physical therapy consult if needed     10. Monitor coags / fibrinogen 2x week, vitamin K as needed     11. Monitor closely for clinical changes, monitor for fevers     12. Emotional support provided, plan of care discussed and questions addressed     13. Patient education done regarding chemotherapy prep, plan of care, restrictions and discharge planning     14. Continue regular social work input     I have written the above note for Dr. Vaughn who performed service with me in the room.   Maureen COSTELLO ([phone])    I have seen and examined patient with NP, I agree with above note as scribed.

## 2024-03-05 NOTE — PROGRESS NOTE ADULT - NS ATTEND AMEND GEN_ALL_CORE FT
..    Primary: Wilbur    Assessment: 64-year-old day - 2 CATRACHITA alloBMT using a Flu/Cy/TBI preparative regimen for [place] negative ALL.  Course uncomplicated.    Plan:  Heme: start preparative regimen  PLT goal > 10,000  Hgb goal > 7.0g/dL  G-CSF to start on day +5  will require post ALLO BMT CNS prophylaxis -- begins after engraftment.     GVHD: PTCy days 3 and 4, Cellcept and tacro to start on day +5    ID: day 0: flu, valtrex,   Levaquin started on 3/3 for ANC < 500  bactrim SS qd to start on day +21    Nutrition: tolerating PO    DVT prophylaxis: ambulation    Over 35 minutes were spent in direct patient care and care coordination.

## 2024-03-06 LAB
ALBUMIN SERPL ELPH-MCNC: 3.5 G/DL — SIGNIFICANT CHANGE UP (ref 3.3–5)
ALP SERPL-CCNC: 97 U/L — SIGNIFICANT CHANGE UP (ref 40–120)
ALT FLD-CCNC: 20 U/L — SIGNIFICANT CHANGE UP (ref 10–45)
ANION GAP SERPL CALC-SCNC: 9 MMOL/L — SIGNIFICANT CHANGE UP (ref 5–17)
AST SERPL-CCNC: 23 U/L — SIGNIFICANT CHANGE UP (ref 10–40)
BILIRUB DIRECT SERPL-MCNC: 0.2 MG/DL — SIGNIFICANT CHANGE UP (ref 0–0.3)
BILIRUB INDIRECT FLD-MCNC: 0.5 MG/DL — SIGNIFICANT CHANGE UP (ref 0.2–1)
BILIRUB SERPL-MCNC: 0.7 MG/DL — SIGNIFICANT CHANGE UP (ref 0.2–1.2)
BLD GP AB SCN SERPL QL: NEGATIVE — SIGNIFICANT CHANGE UP
BUN SERPL-MCNC: 12 MG/DL — SIGNIFICANT CHANGE UP (ref 7–23)
CALCIUM SERPL-MCNC: 8.8 MG/DL — SIGNIFICANT CHANGE UP (ref 8.4–10.5)
CHLORIDE SERPL-SCNC: 105 MMOL/L — SIGNIFICANT CHANGE UP (ref 96–108)
CO2 SERPL-SCNC: 27 MMOL/L — SIGNIFICANT CHANGE UP (ref 22–31)
CREAT SERPL-MCNC: 0.79 MG/DL — SIGNIFICANT CHANGE UP (ref 0.5–1.3)
EGFR: 82 ML/MIN/1.73M2 — SIGNIFICANT CHANGE UP
GLUCOSE SERPL-MCNC: 89 MG/DL — SIGNIFICANT CHANGE UP (ref 70–99)
HCT VFR BLD CALC: 32.2 % — LOW (ref 34.5–45)
HGB BLD-MCNC: 10.8 G/DL — LOW (ref 11.5–15.5)
LDH SERPL L TO P-CCNC: 157 U/L — SIGNIFICANT CHANGE UP (ref 50–242)
MAGNESIUM SERPL-MCNC: 2 MG/DL — SIGNIFICANT CHANGE UP (ref 1.6–2.6)
MCHC RBC-ENTMCNC: 33.3 PG — SIGNIFICANT CHANGE UP (ref 27–34)
MCHC RBC-ENTMCNC: 33.5 GM/DL — SIGNIFICANT CHANGE UP (ref 32–36)
MCV RBC AUTO: 99.4 FL — SIGNIFICANT CHANGE UP (ref 80–100)
NRBC # BLD: 0 /100 WBCS — SIGNIFICANT CHANGE UP (ref 0–0)
PHOSPHATE SERPL-MCNC: 2.9 MG/DL — SIGNIFICANT CHANGE UP (ref 2.5–4.5)
PLATELET # BLD AUTO: 90 K/UL — LOW (ref 150–400)
POTASSIUM SERPL-MCNC: 3.8 MMOL/L — SIGNIFICANT CHANGE UP (ref 3.5–5.3)
POTASSIUM SERPL-SCNC: 3.8 MMOL/L — SIGNIFICANT CHANGE UP (ref 3.5–5.3)
PROT SERPL-MCNC: 5.2 G/DL — LOW (ref 6–8.3)
RBC # BLD: 3.24 M/UL — LOW (ref 3.8–5.2)
RBC # FLD: 12.3 % — SIGNIFICANT CHANGE UP (ref 10.3–14.5)
RH IG SCN BLD-IMP: POSITIVE — SIGNIFICANT CHANGE UP
SODIUM SERPL-SCNC: 141 MMOL/L — SIGNIFICANT CHANGE UP (ref 135–145)
WBC # BLD: 0.3 K/UL — CRITICAL LOW (ref 3.8–10.5)
WBC # FLD AUTO: 0.3 K/UL — CRITICAL LOW (ref 3.8–10.5)

## 2024-03-06 PROCEDURE — 99233 SBSQ HOSP IP/OBS HIGH 50: CPT | Mod: FS

## 2024-03-06 RX ORDER — FUROSEMIDE 40 MG
40 TABLET ORAL ONCE
Refills: 0 | Status: COMPLETED | OUTPATIENT
Start: 2024-03-06 | End: 2024-03-06

## 2024-03-06 RX ORDER — LOSARTAN POTASSIUM 100 MG/1
100 TABLET, FILM COATED ORAL DAILY
Refills: 0 | Status: DISCONTINUED | OUTPATIENT
Start: 2024-03-07 | End: 2024-03-26

## 2024-03-06 RX ADMIN — Medication 10 MILLILITER(S): at 16:59

## 2024-03-06 RX ADMIN — Medication 10 MILLILITER(S): at 12:00

## 2024-03-06 RX ADMIN — PANTOPRAZOLE SODIUM 40 MILLIGRAM(S): 20 TABLET, DELAYED RELEASE ORAL at 05:26

## 2024-03-06 RX ADMIN — LOSARTAN POTASSIUM 100 MILLIGRAM(S): 100 TABLET, FILM COATED ORAL at 05:13

## 2024-03-06 RX ADMIN — FLUCONAZOLE 400 MILLIGRAM(S): 150 TABLET ORAL at 13:32

## 2024-03-06 RX ADMIN — ONDANSETRON 108 MILLIGRAM(S): 8 TABLET, FILM COATED ORAL at 05:12

## 2024-03-06 RX ADMIN — CHLORHEXIDINE GLUCONATE 1 APPLICATION(S): 213 SOLUTION TOPICAL at 09:22

## 2024-03-06 RX ADMIN — ESCITALOPRAM OXALATE 20 MILLIGRAM(S): 10 TABLET, FILM COATED ORAL at 14:54

## 2024-03-06 RX ADMIN — Medication 5 MILLILITER(S): at 08:22

## 2024-03-06 RX ADMIN — Medication 40 MILLIGRAM(S): at 16:58

## 2024-03-06 RX ADMIN — Medication 5 MILLILITER(S): at 12:32

## 2024-03-06 RX ADMIN — Medication 10 MILLILITER(S): at 21:01

## 2024-03-06 RX ADMIN — SODIUM CHLORIDE 150 MILLILITER(S): 9 INJECTION, SOLUTION INTRAVENOUS at 21:59

## 2024-03-06 RX ADMIN — Medication 650 MILLIGRAM(S): at 10:00

## 2024-03-06 RX ADMIN — Medication 5 MILLILITER(S): at 00:00

## 2024-03-06 RX ADMIN — Medication 10 MILLILITER(S): at 00:00

## 2024-03-06 RX ADMIN — ONDANSETRON 108 MILLIGRAM(S): 8 TABLET, FILM COATED ORAL at 14:54

## 2024-03-06 RX ADMIN — ONDANSETRON 108 MILLIGRAM(S): 8 TABLET, FILM COATED ORAL at 21:02

## 2024-03-06 RX ADMIN — APREPITANT 80 MILLIGRAM(S): 80 CAPSULE ORAL at 13:33

## 2024-03-06 RX ADMIN — Medication 25 MILLIGRAM(S): at 10:00

## 2024-03-06 RX ADMIN — Medication 5 MILLILITER(S): at 21:01

## 2024-03-06 RX ADMIN — VALACYCLOVIR 500 MILLIGRAM(S): 500 TABLET, FILM COATED ORAL at 18:31

## 2024-03-06 RX ADMIN — VALACYCLOVIR 500 MILLIGRAM(S): 500 TABLET, FILM COATED ORAL at 05:26

## 2024-03-06 RX ADMIN — Medication 10 MILLILITER(S): at 08:22

## 2024-03-06 RX ADMIN — LORATADINE 10 MILLIGRAM(S): 10 TABLET ORAL at 13:32

## 2024-03-06 RX ADMIN — SODIUM CHLORIDE 150 MILLILITER(S): 9 INJECTION, SOLUTION INTRAVENOUS at 05:13

## 2024-03-06 RX ADMIN — Medication 5 MILLILITER(S): at 16:59

## 2024-03-06 NOTE — PROGRESS NOTE ADULT - NS ATTEND AMEND GEN_ALL_CORE FT
..    Primary: Wilbur    Assessment: 64-year-old day 0 CATRACH[facility] alloBMT using a Flu/Cy/TBI preparative regimen for [place] negative ALL.  Course uncomplicated.    Plan:  Heme: start preparative regimen  PLT goal > 10,000  Hgb goal > 7.0g/dL  G-CSF to start on day +5  will require post ALLO BMT CNS prophylaxis -- begins after engraftment.     GVHD: PTCy days 3 and 4, Cellcept and tacro to start on day +5    ID: day 0: flu, valtrex,   Levaquin started on 3/3 for ANC < 500  bactrim SS qd to start on day +21    Nutrition: tolerating PO    DVT prophylaxis: ambulation    Over 35 minutes were spent in direct patient care and care coordination.

## 2024-03-06 NOTE — CHART NOTE - NSCHARTNOTEFT_GEN_A_CORE
Nutrition Follow Up Note  Patient seen for: Nutrition Follow up in BMT unit    Chart reviewed. Events noted.     Source: [x] Patient       [x] Medical Record        [] RN        [] Family at bedside       [] Other:    -If unable to interview patient: [] Trach/Vent/BiPAP  [] Disoriented/confused/inappropriate to interview    Diet Order:   Diet, Regular (24)    - Is current order appropriate/adequate? [] Yes  []  No: see recommendations below     - PO intake :   [] >75%  Adequate    [] 50-75%  Fair       [] <50%  Poor    - Nutrition-related concerns:      - Intake: Pt reports appetite/PO intake; reports consuming % of most meals.       - GI:  Last BM: 3/02.   Bowel Regimen? [] Yes   [] No      - Mouthcare: Biotene, sodium bicarbonate mouth wash       - Renal: IVF: NS @ 20 ml/hr.       - BMT/SCT: Pre:  Allogeneic  BMT day 0; ordered for Cellcept and Prograf     Weights:  Dosing weight:  96.9 kg (2-29)  Daily Weight in k.1 (-), Weight in k.4 (-), Weight in k.2 (-), Weight in k.2 (-), Weight in k.3 (-), Weight in k.9 (-)  ** Weight fluctuating - Weight changes likely secondary to fluid shifts. RD to continue to monitor weight trends as able.     Nutritionally Pertinent MEDICATIONS  (STANDING):  fluconAZOLE   Tablet  levoFLOXacin  Tablet  pantoprazole    Tablet  senna  sodium bicarbonate Mouth Rinse  sodium chloride 0.45%  sodium chloride 0.9%.  sodium chloride 0.9%.  valACYclovir    Pertinent Labs:  @ 07:29: Na 141, BUN 12, Cr 0.79, BG 89, K+ 3.8, Phos 2.9, Mg 2.0, Alk Phos 97, ALT/SGPT 20, AST/SGOT 23, HbA1c --      Finger Sticks:      Skin per nursing documentation: No pressure injuries noted.  Edema per nursing documentation: No peripheral edema noted per nursing flow sheets     Estimated Needs: Based on IBW 54.8 kg   Energy needs: 9482-2766 kcal/kg (35-40 kcal/kg)  Protein needs: 87.68-98.64 g/kg (1.6-1.8 g/kg)  Fluid needs: Defer to team    [x] no change since previous assessment  [] recalculated:     Previous Nutrition Diagnosis:   1. Predicted Inadequate protein-energy intake   2. Increased Nutrient Needs   3. Food & Nutrition Knowledge Deficit (not applicable)  Nutrition Diagnosis is: [x] ongoing  [] resolved [] not applicable     Nutrition Care Plan:  [x] In Progress  [] Achieved  [] Not applicable    New Nutrition Diagnosis: [] Not applicable    Nutrition Interventions:     Education Provided   [] Yes:  [] No:     Recommendations:        [X] Continue current diet order: regular diet     [X] Recommend     [X]     [X]     [X] Recommend micronutrient coverage: folic acid and multivitamin pending no contraindications     [X] Honor and obtain food preferences as able.     Monitoring and Evaluation:   Continue to monitor nutritional intake, tolerance to diet prescription, weights, labs, skin integrity    RD remains available upon request and will follow up per protocol  Love Loredo RDN CDN (TEAMS) Nutrition Follow Up Note  Patient seen for: Nutrition Follow up in BMT unit    Chart reviewed. Events noted.     Source: [x] Patient       [x] Medical Record        [x] RN        [] Family at bedside       [] Other:    -If unable to interview patient: [] Trach/Vent/BiPAP  [] Disoriented/confused/inappropriate to interview    Diet Order:   Diet, Regular (24)    - Is current order appropriate/adequate? [] Yes  []  No: see recommendations below     - PO intake :   [] >75%  Adequate    [] 50-75%  Fair       [x] <50%  Poor    - Nutrition-related concerns:      - Intake: Pt reports poor appetite/PO intake x 2 days; reports consuming <50% of most meals. Is amenable to receiving Ensure Plus HP 1x/day (vanilla flavor). Food preferences obtained (i.e. ice cream, yogurt)      - GI: No GI distress reported. Last BM: 3/02.   Bowel Regimen? [x] Yes senna  [] No      - Mouthcare: Biotene, sodium bicarbonate mouth wash       - Renal: IVF: NS @ 20 ml/hr.       - BMT/SCT: Pre:  Allogeneic  BMT day 0; ordered for Cellcept and Prograf     Weights:  Dosing weight:  96.9 kg (2-29)  Daily Weight in k.1 (-), Weight in k.4 (-), Weight in k.2 (-), Weight in k.2 (-), Weight in k.3 (-), Weight in k.9 (-)  ** Weight fluctuating - Weight changes likely secondary to fluid shifts. RD to continue to monitor weight trends as able.     Nutritionally Pertinent MEDICATIONS  (STANDING):  fluconAZOLE   Tablet  levoFLOXacin  Tablet  pantoprazole    Tablet  senna  sodium bicarbonate Mouth Rinse  sodium chloride 0.45%  sodium chloride 0.9%.  sodium chloride 0.9%.  valACYclovir    Pertinent Labs:  @ 07:29: Na 141, BUN 12, Cr 0.79, BG 89, K+ 3.8, Phos 2.9, Mg 2.0, Alk Phos 97, ALT/SGPT 20, AST/SGOT 23, HbA1c --      Finger Sticks:      Skin per nursing documentation: No pressure injuries noted.  Edema per nursing documentation: No peripheral edema noted per nursing flow sheets     Estimated Needs: Based on IBW 54.8 kg   Energy needs: 7520-2596 kcal/kg (35-40 kcal/kg)  Protein needs: 87.68-98.64 g/kg (1.6-1.8 g/kg)  Fluid needs: Defer to team    [x] no change since previous assessment  [] recalculated:     Previous Nutrition Diagnosis:   1. Predicted Inadequate protein-energy intake   2. Increased Nutrient Needs   3. Food & Nutrition Knowledge Deficit (not applicable)  Nutrition Diagnosis is: [x] ongoing  [] resolved [] not applicable     Nutrition Care Plan:  [x] In Progress  [] Achieved  [] Not applicable    New Nutrition Diagnosis: Inadequate protein-energy intake related to decreased ability to consume adequate protein-energy in setting of increased physiological demand for nutrients as evidenced by pt meeting <50% of estimated needs >/2 days.   Goal: Pt to meet >75% of estimated protein- energy needs during hospital stay.     Nutrition Interventions:     Education Provided   [x] Yes:  [] No: Emphasized the importance of adequate kcal and protein intake, provided recommendations to optimize nutritional intake in case of decreased appetite, recommended small frequent meals by consuming nutrient-dense snacks between meals, to start with protein, and sips of supplement throughout the day.     Recommendations:        [X] Continue current diet order: regular diet     [X] Recommend Ensure Plus HP 1x/day (vanilla flavor)     [X] Monitor and encourage PO intake. Encourage use of daily menus. Honor dietary preferences as expressed as able.     [X] RD to add [employer] greek yogurt 1x/day and ice cream 2x/day     [X] Recommend micronutrient coverage: folic acid and multivitamin pending no contraindications     Monitoring and Evaluation:   Continue to monitor nutritional intake, tolerance to diet prescription, weights, labs, skin integrity    RD remains available upon request and will follow up per protocol  Love Loredo RDN CDN (TEAMS)

## 2024-03-06 NOTE — CHART NOTE - NSCHARTNOTEFT_GEN_A_CORE
After pre-medication, Ms. Kaur received 490 / 599ml of fresh, allogeneic, related, bone marrow over approximately 3 hours. Cell counts as follows:  Total MNCs / TNCs (x10^8/kg) = 0.96 / 0.93  CD34 + cells (x 10^6/kg) = 0.82/ 0.74  CD3+ cells (x10^7/kg) = 0.67 / 0.63  Cell viabilty (%) = 100%  She tolerated the infusion well with no adverse reactions noted. Estelita Tinsley NP-C [phone]

## 2024-03-06 NOTE — PROGRESS NOTE ADULT - SUBJECTIVE AND OBJECTIVE BOX
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  S: Patient seen and examined with HPC Transplant Team:   +intermittent nasuea    O: Vitals:   Vital Signs Last 24 Hrs  T(C): 36.7 (06 Mar 2024 05:12), Max: 37.1 (05 Mar 2024 20:56)  T(F): 98.1 (06 Mar 2024 05:12), Max: 98.8 (05 Mar 2024 20:56)  HR: 68 (06 Mar 2024 05:12) (57 - 73)  BP: 137/79 (06 Mar 2024 05:12) (107/69 - 137/79)  RR: 16 (06 Mar 2024 05:12) (16 - 18)  SpO2: 93% (06 Mar 2024 05:12) (93% - 98%)    Parameters below as of 06 Mar 2024 05:12  Patient On (Oxygen Delivery Method): room air    Admit weight:   Daily Weight in k.1 (06 Mar 2024 08:10)    Intake / Output:   03-05 @ 07:01  -  03-06 @ 07:00  --------------------------------------------------------  IN: 2648 mL / OUT: 1700 mL / NET: 948 mL    Oropharynx: no erythema no ulcer  Oral Mucositis:   -                                                     Grade: -  CVS: RRR, +S1,S2  Lungs: CTA  Abdomen: soft, ND, ND +bs  Extremities: no edema   Gastric Mucositis:      -                                            Grade: -  Intestinal Mucositis:     -                                         Grade: -  Skin:  no rash   TLC: C/D/I   Neuro: A&O x3  Pain: Denies    Labs:   CBC Full  -  ( 06 Mar 2024 07:17 )  WBC Count : 0.30 K/uL  Hemoglobin : 10.8 g/dL  Hematocrit : 32.2 %  Platelet Count - Automated : 90 K/uL  Mean Cell Volume : 99.4 fl  Mean Cell Hemoglobin : 33.3 pg  Mean Cell Hemoglobin Concentration : 33.5 gm/dL  Auto Neutrophil # : x  Auto Lymphocyte # : x  Auto Monocyte # : x  Auto Eosinophil # : x  Auto Basophil # : x  Auto Neutrophil % : x  Auto Lymphocyte % : x  Auto Monocyte % : x  Auto Eosinophil % : x  Auto Basophil % : x                          10.8   0.30  )-----------( 90       ( 06 Mar 2024 07:17 )             32.2     03-06    141  |  105  |  12  ----------------------------<  89  3.8   |  27  |  0.79    Ca    8.8      06 Mar 2024 07:29  Phos  2.9     03-06  Mg     2.0     -06    TPro  5.2<L>  /  Alb  3.5  /  TBili  0.7  /  DBili  0.2  /  AST  23  /  ALT  20  /  AlkPhos  97  03-06      LIVER FUNCTIONS - ( 06 Mar 2024 07:29 )  Alb: 3.5 g/dL / Pro: 5.2 g/dL / ALK PHOS: 97 U/L / ALT: 20 U/L / AST: 23 U/L / GGT: x           Lactate Dehydrogenase, Serum: 157 U/L ( @ 07:29)      Cultures: no recent cultures  Radiology: no recent radiology reports     Meds:   Antimicrobials:   fluconAZOLE   Tablet 400 milliGRAM(s) Oral daily  levoFLOXacin  Tablet 500 milliGRAM(s) Oral every 24 hours  valACYclovir 500 milliGRAM(s) Oral two times a day      Heme / Onc:       GI:  pantoprazole    Tablet 40 milliGRAM(s) Oral before breakfast  senna 2 Tablet(s) Oral at bedtime  sodium bicarbonate Mouth Rinse 10 milliLiter(s) Swish and Spit five times a day      Cardiovascular:   losartan 100 milliGRAM(s) Oral daily      Immunologic:       Other medications:   acetaminophen     Tablet .. 650 milliGRAM(s) Oral once  aprepitant 80 milliGRAM(s) Oral every 24 hours  Biotene Dry Mouth Oral Rinse 5 milliLiter(s) Swish and Spit five times a day  chlorhexidine 4% Liquid 1 Application(s) Topical <User Schedule>  diphenhydrAMINE Injectable 25 milliGRAM(s) IV Push once  escitalopram 20 milliGRAM(s) Oral daily  loratadine 10 milliGRAM(s) Oral daily  ondansetron  IVPB 8 milliGRAM(s) IV Intermittent every 8 hours  sodium chloride 0.45% 1000 milliLiter(s) IV Continuous <Continuous>  sodium chloride 0.9%. 1000 milliLiter(s) IV Continuous <Continuous>  sodium chloride 0.9%. 1000 milliLiter(s) IV Continuous <Continuous>      PRN:   acetaminophen     Tablet .. 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 Infectious Disease:   levoFLOXacin  Tablet 500 milliGRAM(s) Oral every 24 hours    2. GI Prophylaxis:    pantoprazole    Tablet 40 milliGRAM(s) Oral before breakfast    3. Mouthcare - NS / NaHCO3 rinses, Mycelex, Biotene; Skin care     4. GVHD prophylaxis   TBI day -1   CTX days +3, +4  MMF, tacro to start on day + 5     5. Transfuse & replete electrolytes prn     6. IV hydration, daily weights, strict I&O, prn diuresis     7. PO intake as tolerated, nutrition follow up as needed, MVI, folic acid     8. Antiemetics, anti-diarrhea medications:   metoclopramide Injectable 10 milliGRAM(s) IV Push every 6 hours PRN  ondansetron  IVPB 8 milliGRAM(s) IV Intermittent every 8 hours  aprepitant 80 milliGRAM(s) Oral every 24 hours    9. OOB as tolerated, physical therapy consult if needed     10. Monitor coags / fibrinogen 2x week, vitamin K as needed     11. Monitor closely for clinical changes, monitor for fevers     12. Emotional support provided, plan of care discussed and questions addressed     13. Patient education done regarding chemotherapy prep, plan of care, restrictions and discharge planning     14. Continue regular social work input     I have written the above note for Dr. Vaughn who performed service with me in the room.   Maureen Gardner NP-C ([phone])    I have seen and examined patient with NP, I agree with above note as scribed.                    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  S: Patient seen and examined with HPC Transplant Team:   no acute complaints     O: Vitals:   Vital Signs Last 24 Hrs  T(C): 36.7 (06 Mar 2024 05:12), Max: 37.1 (05 Mar 2024 20:56)  T(F): 98.1 (06 Mar 2024 05:12), Max: 98.8 (05 Mar 2024 20:56)  HR: 68 (06 Mar 2024 05:12) (57 - 73)  BP: 137/79 (06 Mar 2024 05:12) (107/69 - 137/79)  RR: 16 (06 Mar 2024 05:12) (16 - 18)  SpO2: 93% (06 Mar 2024 05:12) (93% - 98%)    Parameters below as of 06 Mar 2024 05:12  Patient On (Oxygen Delivery Method): room air    Admit weight:   Daily Weight in k.1 (06 Mar 2024 08:10)    Intake / Output:   03-05 @ 07:01  -  03-06 @ 07:00  --------------------------------------------------------  IN: 2648 mL / OUT: 1700 mL / NET: 948 mL    Oropharynx: no erythema no ulcer  Oral Mucositis:   -                                                     Grade: -  CVS: RRR, +S1,S2  Lungs: CTA  Abdomen: soft, ND, ND +bs  Extremities: no edema   Gastric Mucositis:      -                                            Grade: -  Intestinal Mucositis:     -                                         Grade: -  Skin:  no rash   TLC: C/D/I   Neuro: A&O x3  Pain: Denies    Labs:   CBC Full  -  ( 06 Mar 2024 07:17 )  WBC Count : 0.30 K/uL  Hemoglobin : 10.8 g/dL  Hematocrit : 32.2 %  Platelet Count - Automated : 90 K/uL  Mean Cell Volume : 99.4 fl  Mean Cell Hemoglobin : 33.3 pg  Mean Cell Hemoglobin Concentration : 33.5 gm/dL  Auto Neutrophil # : x  Auto Lymphocyte # : x  Auto Monocyte # : x  Auto Eosinophil # : x  Auto Basophil # : x  Auto Neutrophil % : x  Auto Lymphocyte % : x  Auto Monocyte % : x  Auto Eosinophil % : x  Auto Basophil % : x                          10.8   0.30  )-----------( 90       ( 06 Mar 2024 07:17 )             32.2     03-06    141  |  105  |  12  ----------------------------<  89  3.8   |  27  |  0.79    Ca    8.8      06 Mar 2024 07:29  Phos  2.9     03-06  Mg     2.0     -    TPro  5.2<L>  /  Alb  3.5  /  TBili  0.7  /  DBili  0.2  /  AST  23  /  ALT  20  /  AlkPhos  97  -      LIVER FUNCTIONS - ( 06 Mar 2024 07:29 )  Alb: 3.5 g/dL / Pro: 5.2 g/dL / ALK PHOS: 97 U/L / ALT: 20 U/L / AST: 23 U/L / GGT: x           Lactate Dehydrogenase, Serum: 157 U/L ( @ 07:29)      Cultures: no recent cultures  Radiology: no recent radiology reports     Meds:   Antimicrobials:   fluconAZOLE   Tablet 400 milliGRAM(s) Oral daily  levoFLOXacin  Tablet 500 milliGRAM(s) Oral every 24 hours  valACYclovir 500 milliGRAM(s) Oral two times a day      Heme / Onc:       GI:  pantoprazole    Tablet 40 milliGRAM(s) Oral before breakfast  senna 2 Tablet(s) Oral at bedtime  sodium bicarbonate Mouth Rinse 10 milliLiter(s) Swish and Spit five times a day      Cardiovascular:   losartan 100 milliGRAM(s) Oral daily      Immunologic:       Other medications:   acetaminophen     Tablet .. 650 milliGRAM(s) Oral once  aprepitant 80 milliGRAM(s) Oral every 24 hours  Biotene Dry Mouth Oral Rinse 5 milliLiter(s) Swish and Spit five times a day  chlorhexidine 4% Liquid 1 Application(s) Topical <User Schedule>  diphenhydrAMINE Injectable 25 milliGRAM(s) IV Push once  escitalopram 20 milliGRAM(s) Oral daily  loratadine 10 milliGRAM(s) Oral daily  ondansetron  IVPB 8 milliGRAM(s) IV Intermittent every 8 hours  sodium chloride 0.45% 1000 milliLiter(s) IV Continuous <Continuous>  sodium chloride 0.9%. 1000 milliLiter(s) IV Continuous <Continuous>  sodium chloride 0.9%. 1000 milliLiter(s) IV Continuous <Continuous>      PRN:   acetaminophen     Tablet .. 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 Infectious Disease:   levoFLOXacin  Tablet 500 milliGRAM(s) Oral every 24 hours    2. GI Prophylaxis:    pantoprazole    Tablet 40 milliGRAM(s) Oral before breakfast    3. Mouthcare - NS / NaHCO3 rinses, Mycelex, Biotene; Skin care     4. GVHD prophylaxis   TBI day -1   CTX days +3, +4  MMF, tacro to start on day + 5     5. Transfuse & replete electrolytes prn     6. IV hydration, daily weights, strict I&O, prn diuresis     7. PO intake as tolerated, nutrition follow up as needed, MVI, folic acid     8. Antiemetics, anti-diarrhea medications:   metoclopramide Injectable 10 milliGRAM(s) IV Push every 6 hours PRN  ondansetron  IVPB 8 milliGRAM(s) IV Intermittent every 8 hours  aprepitant 80 milliGRAM(s) Oral every 24 hours    9. OOB as tolerated, physical therapy consult if needed     10. Monitor coags / fibrinogen 2x week, vitamin K as needed     11. Monitor closely for clinical changes, monitor for fevers     12. Emotional support provided, plan of care discussed and questions addressed     13. Patient education done regarding chemotherapy prep, plan of care, restrictions and discharge planning     14. Continue regular social work input     I have written the above note for Dr. Vaughn who performed service with me in the room.   Maureen Gardner NP-C ([phone])    I have seen and examined patient with NP, I agree with above note as scribed.                    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    S: Patient seen and examined with HPC Transplant Team:   no acute complaints     O: Vitals:   Vital Signs Last 24 Hrs  T(C): 36.7 (06 Mar 2024 05:12), Max: 37.1 (05 Mar 2024 20:56)  T(F): 98.1 (06 Mar 2024 05:12), Max: 98.8 (05 Mar 2024 20:56)  HR: 68 (06 Mar 2024 05:12) (57 - 73)  BP: 137/79 (06 Mar 2024 05:12) (107/69 - 137/79)  RR: 16 (06 Mar 2024 05:12) (16 - 18)  SpO2: 93% (06 Mar 2024 05:12) (93% - 98%)    Parameters below as of 06 Mar 2024 05:12  Patient On (Oxygen Delivery Method): room air    Admit weight: 96.9kg   Daily Weight in k.1 (06 Mar 2024 08:10)    Intake / Output:   03-05 @ 07:01  -  03-06 @ 07:00  --------------------------------------------------------  IN: 2648 mL / OUT: 1700 mL / NET: 948 mL    Oropharynx: no erythema no ulcerations  Oral Mucositis:   -                                                     Grade: -  CVS: RRR, +S1,S2  Lungs: CTA throughout bilaterally   Abdomen: soft, ND, ND +bs  Extremities: no edema   Gastric Mucositis:      -                                            Grade: -  Intestinal Mucositis:     -                                         Grade: -  Skin:  no rash   TLC: C/D/I   Neuro: A&O x3  Pain: Denies    Labs:   CBC Full  -  ( 06 Mar 2024 07:17 )  WBC Count : 0.30 K/uL  Hemoglobin : 10.8 g/dL  Hematocrit : 32.2 %  Platelet Count - Automated : 90 K/uL  Mean Cell Volume : 99.4 fl  Mean Cell Hemoglobin : 33.3 pg  Mean Cell Hemoglobin Concentration : 33.5 gm/dL  Auto Neutrophil # : x  Auto Lymphocyte # : x  Auto Monocyte # : x  Auto Eosinophil # : x  Auto Basophil # : x  Auto Neutrophil % : x  Auto Lymphocyte % : x  Auto Monocyte % : x  Auto Eosinophil % : x  Auto Basophil % : x                          10.8   0.30  )-----------( 90       ( 06 Mar 2024 07:17 )             32.2     -06    141  |  105  |  12  ----------------------------<  89  3.8   |  27  |  0.79    Ca    8.8      06 Mar 2024 07:29  Phos  2.9     03-06  Mg     2.0     -06    TPro  5.2<L>  /  Alb  3.5  /  TBili  0.7  /  DBili  0.2  /  AST  23  /  ALT  20  /  AlkPhos  97  -06      LIVER FUNCTIONS - ( 06 Mar 2024 07:29 )  Alb: 3.5 g/dL / Pro: 5.2 g/dL / ALK PHOS: 97 U/L / ALT: 20 U/L / AST: 23 U/L / GGT: x           Lactate Dehydrogenase, Serum: 157 U/L ( @ 07:29)      Cultures: no recent cultures  Radiology: no recent radiology reports     Meds:   Antimicrobials:   fluconAZOLE   Tablet 400 milliGRAM(s) Oral daily  levoFLOXacin  Tablet 500 milliGRAM(s) Oral every 24 hours  valACYclovir 500 milliGRAM(s) Oral two times a day      Heme / Onc:       GI:  pantoprazole    Tablet 40 milliGRAM(s) Oral before breakfast  senna 2 Tablet(s) Oral at bedtime  sodium bicarbonate Mouth Rinse 10 milliLiter(s) Swish and Spit five times a day      Cardiovascular:   losartan 100 milliGRAM(s) Oral daily      Immunologic:       Other medications:   acetaminophen     Tablet .. 650 milliGRAM(s) Oral once  aprepitant 80 milliGRAM(s) Oral every 24 hours  Biotene Dry Mouth Oral Rinse 5 milliLiter(s) Swish and Spit five times a day  chlorhexidine 4% Liquid 1 Application(s) Topical <User Schedule>  diphenhydrAMINE Injectable 25 milliGRAM(s) IV Push once  escitalopram 20 milliGRAM(s) Oral daily  loratadine 10 milliGRAM(s) Oral daily  ondansetron  IVPB 8 milliGRAM(s) IV Intermittent every 8 hours  sodium chloride 0.45% 1000 milliLiter(s) IV Continuous <Continuous>  sodium chloride 0.9%. 1000 milliLiter(s) IV Continuous <Continuous>  sodium chloride 0.9%. 1000 milliLiter(s) IV Continuous <Continuous>      PRN:   acetaminophen     Tablet .. 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 Infectious Disease:   fluconAZOLE   Tablet 400 milliGRAM(s) Oral daily  levoFLOXacin  Tablet 500 milliGRAM(s) Oral every 24 hours  valACYclovir 500 milliGRAM(s) Oral two times a day    2. GI Prophylaxis:    pantoprazole    Tablet 40 milliGRAM(s) Oral before breakfast    3. Mouthcare - NS / NaHCO3 rinses, Mycelex, Biotene; Skin care     4. GVHD prophylaxis   TBI day -1   CTX days +3, +4  MMF, tacro to start on day + 5     5. Transfuse & replete electrolytes prn     6. IV hydration, daily weights, strict I&O, prn diuresis   Lasix 40mg IV x 1 post infusion of BM     7. PO intake as tolerated, nutrition follow up as needed, MVI, folic acid     8. Antiemetics, anti-diarrhea medications:   metoclopramide Injectable 10 milliGRAM(s) IV Push every 6 hours PRN  ondansetron  IVPB 8 milliGRAM(s) IV Intermittent every 8 hours  aprepitant 80 milliGRAM(s) Oral every 24 hours    9. OOB as tolerated, physical therapy consult if needed     10. Monitor coags / fibrinogen 2x week, vitamin K as needed     11. Monitor closely for clinical changes, monitor for fevers     12. Emotional support provided, plan of care discussed and questions addressed     13. Patient education done regarding  plan of care, restrictions and discharge planning     14. Continue regular social work input     I have written the above note for Dr. Vaughn who performed service with me in the room.   Maureen Gardner NP-C ([phone])    I have seen and examined patient with NP, I agree with above note as scribed.

## 2024-03-07 LAB
ALBUMIN SERPL ELPH-MCNC: 3.8 G/DL — SIGNIFICANT CHANGE UP (ref 3.3–5)
ALP SERPL-CCNC: 102 U/L — SIGNIFICANT CHANGE UP (ref 40–120)
ALT FLD-CCNC: 25 U/L — SIGNIFICANT CHANGE UP (ref 10–45)
ANION GAP SERPL CALC-SCNC: 10 MMOL/L — SIGNIFICANT CHANGE UP (ref 5–17)
APTT BLD: 28.6 SEC — SIGNIFICANT CHANGE UP (ref 24.5–35.6)
AST SERPL-CCNC: 28 U/L — SIGNIFICANT CHANGE UP (ref 10–40)
BILIRUB SERPL-MCNC: 0.8 MG/DL — SIGNIFICANT CHANGE UP (ref 0.2–1.2)
BUN SERPL-MCNC: 9 MG/DL — SIGNIFICANT CHANGE UP (ref 7–23)
CALCIUM SERPL-MCNC: 8.9 MG/DL — SIGNIFICANT CHANGE UP (ref 8.4–10.5)
CHLORIDE SERPL-SCNC: 103 MMOL/L — SIGNIFICANT CHANGE UP (ref 96–108)
CO2 SERPL-SCNC: 28 MMOL/L — SIGNIFICANT CHANGE UP (ref 22–31)
CREAT SERPL-MCNC: 0.77 MG/DL — SIGNIFICANT CHANGE UP (ref 0.5–1.3)
EGFR: 85 ML/MIN/1.73M2 — SIGNIFICANT CHANGE UP
FIBRINOGEN PPP-MCNC: 325 MG/DL — SIGNIFICANT CHANGE UP (ref 200–445)
GLUCOSE SERPL-MCNC: 95 MG/DL — SIGNIFICANT CHANGE UP (ref 70–99)
HCT VFR BLD CALC: 37.2 % — SIGNIFICANT CHANGE UP (ref 34.5–45)
HGB BLD-MCNC: 12.1 G/DL — SIGNIFICANT CHANGE UP (ref 11.5–15.5)
INR BLD: 1.03 RATIO — SIGNIFICANT CHANGE UP (ref 0.85–1.18)
LDH SERPL L TO P-CCNC: 225 U/L — SIGNIFICANT CHANGE UP (ref 50–242)
MAGNESIUM SERPL-MCNC: 1.9 MG/DL — SIGNIFICANT CHANGE UP (ref 1.6–2.6)
MCHC RBC-ENTMCNC: 31.3 PG — SIGNIFICANT CHANGE UP (ref 27–34)
MCHC RBC-ENTMCNC: 32.5 GM/DL — SIGNIFICANT CHANGE UP (ref 32–36)
MCV RBC AUTO: 96.4 FL — SIGNIFICANT CHANGE UP (ref 80–100)
NRBC # BLD: 0 /100 WBCS — SIGNIFICANT CHANGE UP (ref 0–0)
PHOSPHATE SERPL-MCNC: 2.8 MG/DL — SIGNIFICANT CHANGE UP (ref 2.5–4.5)
PLATELET # BLD AUTO: 73 K/UL — LOW (ref 150–400)
POTASSIUM SERPL-MCNC: 3.6 MMOL/L — SIGNIFICANT CHANGE UP (ref 3.5–5.3)
POTASSIUM SERPL-SCNC: 3.6 MMOL/L — SIGNIFICANT CHANGE UP (ref 3.5–5.3)
PROT SERPL-MCNC: 5.6 G/DL — LOW (ref 6–8.3)
PROTHROM AB SERPL-ACNC: 11.3 SEC — SIGNIFICANT CHANGE UP (ref 9.5–13)
RBC # BLD: 3.86 M/UL — SIGNIFICANT CHANGE UP (ref 3.8–5.2)
RBC # FLD: 12.7 % — SIGNIFICANT CHANGE UP (ref 10.3–14.5)
SODIUM SERPL-SCNC: 141 MMOL/L — SIGNIFICANT CHANGE UP (ref 135–145)
WBC # BLD: 0.44 K/UL — CRITICAL LOW (ref 3.8–10.5)
WBC # FLD AUTO: 0.44 K/UL — CRITICAL LOW (ref 3.8–10.5)

## 2024-03-07 PROCEDURE — 99231 SBSQ HOSP IP/OBS SF/LOW 25: CPT

## 2024-03-07 PROCEDURE — 99233 SBSQ HOSP IP/OBS HIGH 50: CPT | Mod: FS

## 2024-03-07 RX ORDER — FUROSEMIDE 40 MG
40 TABLET ORAL ONCE
Refills: 0 | Status: COMPLETED | OUTPATIENT
Start: 2024-03-07 | End: 2024-03-07

## 2024-03-07 RX ADMIN — FLUCONAZOLE 400 MILLIGRAM(S): 150 TABLET ORAL at 13:13

## 2024-03-07 RX ADMIN — ONDANSETRON 108 MILLIGRAM(S): 8 TABLET, FILM COATED ORAL at 13:20

## 2024-03-07 RX ADMIN — Medication 5 MILLILITER(S): at 00:26

## 2024-03-07 RX ADMIN — Medication 10 MILLILITER(S): at 00:26

## 2024-03-07 RX ADMIN — Medication 650 MILLIGRAM(S): at 07:00

## 2024-03-07 RX ADMIN — Medication 10 MILLILITER(S): at 23:40

## 2024-03-07 RX ADMIN — Medication 650 MILLIGRAM(S): at 06:36

## 2024-03-07 RX ADMIN — CHLORHEXIDINE GLUCONATE 1 APPLICATION(S): 213 SOLUTION TOPICAL at 08:55

## 2024-03-07 RX ADMIN — Medication 5 MILLILITER(S): at 16:54

## 2024-03-07 RX ADMIN — Medication 300 UNIT(S): at 15:00

## 2024-03-07 RX ADMIN — PANTOPRAZOLE SODIUM 40 MILLIGRAM(S): 20 TABLET, DELAYED RELEASE ORAL at 05:46

## 2024-03-07 RX ADMIN — Medication 40 MILLIGRAM(S): at 13:00

## 2024-03-07 RX ADMIN — Medication 5 MILLILITER(S): at 21:07

## 2024-03-07 RX ADMIN — Medication 5 MILLILITER(S): at 08:55

## 2024-03-07 RX ADMIN — Medication 5 MILLILITER(S): at 23:40

## 2024-03-07 RX ADMIN — ONDANSETRON 108 MILLIGRAM(S): 8 TABLET, FILM COATED ORAL at 05:40

## 2024-03-07 RX ADMIN — Medication 10 MILLILITER(S): at 13:14

## 2024-03-07 RX ADMIN — Medication 10 MILLILITER(S): at 21:08

## 2024-03-07 RX ADMIN — VALACYCLOVIR 500 MILLIGRAM(S): 500 TABLET, FILM COATED ORAL at 18:00

## 2024-03-07 RX ADMIN — LORATADINE 10 MILLIGRAM(S): 10 TABLET ORAL at 13:14

## 2024-03-07 RX ADMIN — Medication 10 MILLILITER(S): at 16:55

## 2024-03-07 RX ADMIN — LOSARTAN POTASSIUM 100 MILLIGRAM(S): 100 TABLET, FILM COATED ORAL at 05:40

## 2024-03-07 RX ADMIN — ESCITALOPRAM OXALATE 20 MILLIGRAM(S): 10 TABLET, FILM COATED ORAL at 13:19

## 2024-03-07 RX ADMIN — Medication 10 MILLIGRAM(S): at 08:00

## 2024-03-07 RX ADMIN — APREPITANT 80 MILLIGRAM(S): 80 CAPSULE ORAL at 13:14

## 2024-03-07 RX ADMIN — Medication 10 MILLIGRAM(S): at 16:53

## 2024-03-07 RX ADMIN — Medication 10 MILLILITER(S): at 08:55

## 2024-03-07 RX ADMIN — Medication 5 MILLILITER(S): at 13:13

## 2024-03-07 RX ADMIN — ONDANSETRON 108 MILLIGRAM(S): 8 TABLET, FILM COATED ORAL at 21:07

## 2024-03-07 RX ADMIN — VALACYCLOVIR 500 MILLIGRAM(S): 500 TABLET, FILM COATED ORAL at 05:40

## 2024-03-07 NOTE — PROGRESS NOTE ADULT - SUBJECTIVE AND OBJECTIVE BOX
SUBJECTIVE AND OBJECTIVE  Indication for Geriatrics and Palliative Care Services/INTERVAL HPI: supportive care during BMT    OVERNIGHT EVENTS: patient seen this morning, sitting up in bed on her iPAD, in good spirits. She reports feeling fatigued with poor appetite, but otherwise ok. Pt shared about her family including 2 sons, grandchildren and her . She is doing well with their support.     DNR on chart:  Allergies    sulfa drugs (Unknown)    Intolerances    Zofran (Headache)  MEDICATIONS  (STANDING):  aprepitant 80 milliGRAM(s) Oral every 24 hours  Biotene Dry Mouth Oral Rinse 5 milliLiter(s) Swish and Spit five times a day  chlorhexidine 4% Liquid 1 Application(s) Topical <User Schedule>  escitalopram 20 milliGRAM(s) Oral daily  fluconAZOLE   Tablet 400 milliGRAM(s) Oral daily  furosemide   Injectable 40 milliGRAM(s) IV Push once  heparin  Lock Flush 100 Units/mL Injectable 300 Unit(s) IV Push once  levoFLOXacin  Tablet 500 milliGRAM(s) Oral every 24 hours  loratadine 10 milliGRAM(s) Oral daily  losartan 100 milliGRAM(s) Oral daily  ondansetron  IVPB 8 milliGRAM(s) IV Intermittent every 8 hours  pantoprazole    Tablet 40 milliGRAM(s) Oral before breakfast  senna 2 Tablet(s) Oral at bedtime  sodium bicarbonate Mouth Rinse 10 milliLiter(s) Swish and Spit five times a day  sodium chloride 0.45% 1000 milliLiter(s) (150 mL/Hr) IV Continuous <Continuous>  sodium chloride 0.9%. 1000 milliLiter(s) (20 mL/Hr) IV Continuous <Continuous>  sodium chloride 0.9%. 1000 milliLiter(s) (200 mL/Hr) IV Continuous <Continuous>  valACYclovir 500 milliGRAM(s) Oral two times a day    MEDICATIONS  (PRN):  acetaminophen     Tablet .. 650 milliGRAM(s) Oral every 6 hours PRN Mild Pain (1 - 3), Moderate Pain (4 - 6)  metoclopramide Injectable 10 milliGRAM(s) IV Push every 6 hours PRN Nausea/Vomiting  sodium chloride 0.9% lock flush 10 milliLiter(s) IV Push every 1 hour PRN Pre/post blood products, medications, blood draw, and to maintain line patency  zaleplon 5 milliGRAM(s) Oral at bedtime PRN Insomnia      ITEMS UNCHECKED ARE NOT PRESENT    PRESENT SYMPTOMS: [ ]Unable to self-report - see [ ] CPOT [ ] PAINADS [ ] RDOS  Source if other than patient:  [ ]Family   [ ]Team     Pain:  [ ]yes [x ]no  QOL impact -   Location -                    Aggravating factors -  Quality -  Radiation -  Timing-  Severity (0-10 scale):  Minimal acceptable level (0-10 scale):     Dyspnea:                           [ ]Mild [ ]Moderate [ ]Severe  Anxiety:                             [ ]Mild [ ]Moderate [ ]Severe  Fatigue:                             [ ]Mild [ ]Moderate [x ]Severe  Nausea:                             [ ]Mild [ ]Moderate [ ]Severe  Loss of appetite:              [ ]Mild [ ]Moderate [x ]Severe  Constipation:                    [ ]Mild [ ]Moderate [ ]Severe    PCSSQ[Palliative Care Spiritual Screening Question]   Severity (0-10):  Score of 4 or > indicate consideration of Chaplaincy referral.  Chaplaincy Referral: [ ] yes [ ] refused [ ] following [ x] Deferred     Caregiver [place]? : [ ] yes [x ] no [ ] Deferred [ ] Declined             Social work referral [ ] Patient & Family Centered Care Referral [ ]     Anticipatory Grief present?:  [ ] yes [ x] no  [ ] Deferred                  Social work referral [ ] Chaplaincy Referral[ ]    Other Symptoms:  [x ]All other review of systems negative     Palliative Performance Status Version 2:   70      %      http://npcrc.org/files/news/palliative_performance_scale_ppsv2.pdf    PHYSICAL EXAM:  Vital Signs Last 24 Hrs  T(C): 36.7 (07 Mar 2024 13:39), Max: 37.1 (07 Mar 2024 00:13)  T(F): 98 (07 Mar 2024 13:39), Max: 98.8 (07 Mar 2024 00:13)  HR: 57 (07 Mar 2024 13:39) (57 - 62)  BP: 152/79 (07 Mar 2024 13:39) (116/66 - 161/87)  BP(mean): --  RR: 18 (07 Mar 2024 13:39) (18 - 18)  SpO2: 97% (07 Mar 2024 13:39) (94% - 97%)    Parameters below as of 07 Mar 2024 13:39  Patient On (Oxygen Delivery Method): room air     I&O's Summary    06 Mar 2024 07:01  -  07 Mar 2024 07:00  --------------------------------------------------------  IN: 5611 mL / OUT: 5875 mL / NET: -264 mL    07 Mar 2024 07:01  -  07 Mar 2024 15:38  --------------------------------------------------------  IN: 1259 mL / OUT: 600 mL / NET: 659 mL       GENERAL: [ ]Cachexia    [ x]Alert  [x ]Oriented x3   [ ]Lethargic  [ ]Unarousable  [x ]Verbal  [ ]Non-Verbal  Behavioral:   [ ]Anxiety  [ ]Delirium [ ]Agitation [ ]Other  HEENT:  [ x]Normal   [ ]Dry mouth   [ ]ET Tube/Trach  [ ]Oral lesions  PULMONARY:   [ x]Clear [ ]Tachypnea  [ ]Audible excessive secretions   [ ]Rhonchi        [ ]Right [ ]Left [ ]Bilateral  [ ]Crackles        [ ]Right [ ]Left [ ]Bilateral  [ ]Wheezing     [ ]Right [ ]Left [ ]Bilateral  [ ]Diminished BS [ ] Right [ ]Left [ ]Bilateral  CARDIOVASCULAR:    [x ]Regular [ ]Irregular [ ]Tachy  [ ]Parker [ ]Murmur [ ]Other  GASTROINTESTINAL:  [x ]Soft  [ ]Distended   [ x]+BS  [x ]Non tender [ ]Tender  [ ]Other [ ]PEG [ ]OGT/ NGT   Last BM:   GENITOURINARY:  [ x]Normal [ ]Incontinent   [ ]Oliguria/Anuria   [ ]Mehta  MUSCULOSKELETAL:   [x ]Normal   [ ]Weakness  [ ]Bed/Wheelchair bound [ ]Edema  NEUROLOGIC:   [ x]No focal deficits  [ ] Cognitive impairment  [ ] Dysphagia [ ]Dysarthria [ ] Paresis [ ]Other   SKIN:   [ x]Normal  [ ]Rash  [ ]Other  [ ]Pressure ulcer(s) [ ]y [ ]n present on admission    CRITICAL CARE:  [ ]Shock Present  [ ]Septic [ ]Cardiogenic [ ]Neurologic [ ]Hypovolemic  [ ]Vasopressors [ ]Inotropes  [ ]Respiratory failure present [ ]Mechanical Ventilation [ ]Non-invasive ventilatory support [ ]High-Flow   [ ]Acute  [ ]Chronic [ ]Hypoxic  [ ]Hypercarbic [ ]Other  [ ]Other organ failure     LABS:                        12.1   0.44  )-----------( 73       ( 07 Mar 2024 07:20 )             37.2   03-07    141  |  103  |  9   ----------------------------<  95  3.6   |  28  |  0.77    Ca    8.9      07 Mar 2024 07:20  Phos  2.8     03-07  Mg     1.9     03-07    TPro  5.6<L>  /  Alb  3.8  /  TBili  0.8  /  DBili  x   /  AST  28  /  ALT  25  /  AlkPhos  102  03-07  PT/INR - ( 07 Mar 2024 07:20 )   PT: 11.3 sec;   INR: 1.03 ratio         PTT - ( 07 Mar 2024 07:20 )  PTT:28.6 sec    Urinalysis Basic - ( 07 Mar 2024 07:20 )    Color: x / Appearance: x / SG: x / pH: x  Gluc: 95 mg/dL / Ketone: x  / Bili: x / Urobili: x   Blood: x / Protein: x / Nitrite: x   Leuk Esterase: x / RBC: x / WBC x   Sq Epi: x / Non Sq Epi: x / Bacteria: x      RADIOLOGY & ADDITIONAL STUDIES: reviewed    Protein Calorie Malnutrition Present: [ ]mild [ ]moderate [ ]severe [ ]underweight [ ]morbid obesity  https://www.andeal.org/vault/1940/web/files/ONC/Table_Clinical%20Characteristics%20to%20Document%20Malnutrition-White%20JV%20et%20al%202012.pdf    Height (cm): 159 (02-29-24 @ 10:15), 157.48 (02-12-24 @ 15:00), 158 (12-01-23 @ 09:20)  Weight (kg): 96.9 (02-29-24 @ 10:15), 96.7 (02-12-24 @ 15:00), 92.7 (12-01-23 @ 09:20)  BMI (kg/m2): 38.3 (02-29-24 @ 10:15), 39 (02-12-24 @ 15:00), 37.1 (12-01-23 @ 09:20)    [ ]PPSV2 < or = 30%  [ ]significant weight loss [ ]poor nutritional intake [ ]anasarca[ ]Artificial Nutrition    Other REFERRALS:  [ ]Hospice  [ ]Child Life  [ ]Social Work  [ ]Case management [ ]Holistic Therapy

## 2024-03-07 NOTE — PROGRESS NOTE ADULT - SUBJECTIVE AND OBJECTIVE BOX
HPC Transplant Team                                                      Critical / Counseling Time Provided: 30 minutes                                                                                                                                                        Chief Complaint: Haplo-identical BMT with FLU/CY/TBI prep for the treatment of ALL    S: Patient seen and examined with HPC Transplant Team:       O: Vitals:   Vital Signs Last 24 Hrs  T(C): 37.1 (07 Mar 2024 05:25), Max: 37.1 (07 Mar 2024 00:13)  T(F): 98.8 (07 Mar 2024 05:25), Max: 98.8 (07 Mar 2024 00:13)  HR: 60 (07 Mar 2024 05:25) (51 - 68)  BP: 130/76 (07 Mar 2024 05:25) (116/66 - 172/76)  BP(mean): --  RR: 18 (07 Mar 2024 05:25) (18 - 18)  SpO2: 96% (07 Mar 2024 05:25) (92% - 98%)    Parameters below as of 07 Mar 2024 05:25  Patient On (Oxygen Delivery Method): room air      Admit weight: 96.9kg   Daily Weight in k.5 (07 Mar 2024 08:02)    Intake / Output:   03-06 @ 07:01  -  03-07 @ 07:00  --------------------------------------------------------  IN: 5611 mL / OUT: 5875 mL / NET: -264 mL      PE:  Oropharynx: no erythema no ulcerations  Oral Mucositis:   -                                                     Grade: -  CVS: RRR, +S1,S2  Lungs: CTA throughout bilaterally   Abdomen: soft, ND, ND +bs  Extremities: no edema   Gastric Mucositis:      -                                            Grade: -  Intestinal Mucositis:     -                                         Grade: -  Skin:  no rash   TLC: C/D/I   Neuro: A&O x3  Pain: Denies    Labs:   CBC Full  -  ( 07 Mar 2024 07:20 )  WBC Count : 0.44 K/uL  Hemoglobin : 12.1 g/dL  Hematocrit : 37.2 %  Platelet Count - Automated : 73 K/uL  Mean Cell Volume : 96.4 fl  Mean Cell Hemoglobin : 31.3 pg  Mean Cell Hemoglobin Concentration : 32.5 gm/dL  Auto Neutrophil # : x  Auto Lymphocyte # : x  Auto Monocyte # : x  Auto Eosinophil # : x  Auto Basophil # : x  Auto Neutrophil % : x  Auto Lymphocyte % : x  Auto Monocyte % : x  Auto Eosinophil % : x  Auto Basophil % : x                          12.1   0.44  )-----------( 73       ( 07 Mar 2024 07:20 )             37.2     03-    141  |  103  |  9   ----------------------------<  95  3.6   |  28  |  0.77    Ca    8.9      07 Mar 2024 07:20  Phos  2.8     03-  Mg     1.9     -    TPro  5.6<L>  /  Alb  3.8  /  TBili  0.8  /  DBili  x   /  AST  28  /  ALT  25  /  AlkPhos  102  03-07    PT/INR - ( 07 Mar 2024 07:20 )   PT: 11.3 sec;   INR: 1.03 ratio         PTT - ( 07 Mar 2024 07:20 )  PTT:28.6 sec  LIVER FUNCTIONS - ( 07 Mar 2024 07:20 )  Alb: 3.8 g/dL / Pro: 5.6 g/dL / ALK PHOS: 102 U/L / ALT: 25 U/L / AST: 28 U/L / GGT: x           Lactate Dehydrogenase, Serum: 225 U/L ( @ 07:20)      Meds:   Antimicrobials:   fluconAZOLE   Tablet 400 milliGRAM(s) Oral daily  levoFLOXacin  Tablet 500 milliGRAM(s) Oral every 24 hours  valACYclovir 500 milliGRAM(s) Oral two times a day      Heme / Onc:       GI:  pantoprazole    Tablet 40 milliGRAM(s) Oral before breakfast  senna 2 Tablet(s) Oral at bedtime  sodium bicarbonate Mouth Rinse 10 milliLiter(s) Swish and Spit five times a day      Cardiovascular:   losartan 100 milliGRAM(s) Oral daily      Immunologic:       Other medications:   aprepitant 80 milliGRAM(s) Oral every 24 hours  Biotene Dry Mouth Oral Rinse 5 milliLiter(s) Swish and Spit five times a day  chlorhexidine 4% Liquid 1 Application(s) Topical <User Schedule>  escitalopram 20 milliGRAM(s) Oral daily  loratadine 10 milliGRAM(s) Oral daily  ondansetron  IVPB 8 milliGRAM(s) IV Intermittent every 8 hours  sodium chloride 0.45% 1000 milliLiter(s) IV Continuous <Continuous>  sodium chloride 0.9%. 1000 milliLiter(s) IV Continuous <Continuous>  sodium chloride 0.9%. 1000 milliLiter(s) IV Continuous <Continuous>      PRN:   acetaminophen     Tablet .. 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 Infectious Disease:   fluconAZOLE   Tablet 400 milliGRAM(s) Oral daily  levoFLOXacin  Tablet 500 milliGRAM(s) Oral every 24 hours  valACYclovir 500 milliGRAM(s) Oral two times a day    2. GI Prophylaxis:    pantoprazole    Tablet 40 milliGRAM(s) Oral before breakfast    3. Mouthcare - NS / NaHCO3 rinses, Mycelex, Biotene; Skin care     4. GVHD prophylaxis   TBI day -1   CTX days +3, +4  MMF, tacro to start on day +5     5. Transfuse & replete electrolytes prn     6. IV hydration, daily weights, strict I&O, prn diuresis     7. PO intake as tolerated, nutrition follow up as needed, MVI, folic acid     8. Antiemetics, anti-diarrhea medications:   metoclopramide Injectable 10 milliGRAM(s) IV Push every 6 hours PRN  ondansetron  IVPB 8 milliGRAM(s) IV Intermittent every 8 hours  aprepitant 80 milliGRAM(s) Oral every 24 hours    9. OOB as tolerated, physical therapy consult if needed     10. Monitor coags / fibrinogen 2x week, vitamin K as needed     11. Monitor closely for clinical changes, monitor for fevers     12. Emotional support provided, plan of care discussed and questions addressed     13. Patient education done regarding plan of care, restrictions and discharge planning     14. Continue regular social work input     I have written the above note for Dr. Vaughn who performed service with me in the room.   Estelita COSTELLO ([phone])    I have seen and examined patient with NP, I agree with above note as scribed.                    HPC Transplant Team                                                      Critical / Counseling Time Provided: 30 minutes                                                                                                                                                        Chief Complaint: Haplo-identical BMT with FLU/CY/TBI prep for the treatment of ALL    S: Patient seen and examined with HPC Transplant Team:   +fatigue  +mild headache  +intermittent nausea       O: Vitals:   Vital Signs Last 24 Hrs  T(C): 37.1 (07 Mar 2024 05:25), Max: 37.1 (07 Mar 2024 00:13)  T(F): 98.8 (07 Mar 2024 05:25), Max: 98.8 (07 Mar 2024 00:13)  HR: 60 (07 Mar 2024 05:25) (51 - 68)  BP: 130/76 (07 Mar 2024 05:25) (116/66 - 172/76)  BP(mean): --  RR: 18 (07 Mar 2024 05:25) (18 - 18)  SpO2: 96% (07 Mar 2024 05:25) (92% - 98%)    Parameters below as of 07 Mar 2024 05:25  Patient On (Oxygen Delivery Method): room air      Admit weight: 96.9kg   Daily Weight in k.5 (07 Mar 2024 08:02)    Intake / Output:   03-06 @ 07:01  -  03-07 @ 07:00  --------------------------------------------------------  IN: 5611 mL / OUT: 5875 mL / NET: -264 mL      PE:  Oropharynx: no erythema no ulcerations  Oral Mucositis:   -                                                     Grade: -  CVS: RRR, +S1,S2  Lungs: CTA throughout bilaterally   Abdomen: soft, ND, ND +bs  Extremities: no edema   Gastric Mucositis:      -                                            Grade: -  Intestinal Mucositis:     -                                         Grade: -  Skin:  no rash   TLC: C/D/I   Neuro: A&O x3  Pain: Denies    Labs:   CBC Full  -  ( 07 Mar 2024 07:20 )  WBC Count : 0.44 K/uL  Hemoglobin : 12.1 g/dL  Hematocrit : 37.2 %  Platelet Count - Automated : 73 K/uL  Mean Cell Volume : 96.4 fl  Mean Cell Hemoglobin : 31.3 pg  Mean Cell Hemoglobin Concentration : 32.5 gm/dL  Auto Neutrophil # : x  Auto Lymphocyte # : x  Auto Monocyte # : x  Auto Eosinophil # : x  Auto Basophil # : x  Auto Neutrophil % : x  Auto Lymphocyte % : x  Auto Monocyte % : x  Auto Eosinophil % : x  Auto Basophil % : x                          12.1   0.44  )-----------( 73       ( 07 Mar 2024 07:20 )             37.2     03-07    141  |  103  |  9   ----------------------------<  95  3.6   |  28  |  0.77    Ca    8.9      07 Mar 2024 07:20  Phos  2.8     03-  Mg     1.9     -    TPro  5.6<L>  /  Alb  3.8  /  TBili  0.8  /  DBili  x   /  AST  28  /  ALT  25  /  AlkPhos  102  03-07    PT/INR - ( 07 Mar 2024 07:20 )   PT: 11.3 sec;   INR: 1.03 ratio         PTT - ( 07 Mar 2024 07:20 )  PTT:28.6 sec  LIVER FUNCTIONS - ( 07 Mar 2024 07:20 )  Alb: 3.8 g/dL / Pro: 5.6 g/dL / ALK PHOS: 102 U/L / ALT: 25 U/L / AST: 28 U/L / GGT: x           Lactate Dehydrogenase, Serum: 225 U/L ( @ 07:20)      Meds:   Antimicrobials:   fluconAZOLE   Tablet 400 milliGRAM(s) Oral daily  levoFLOXacin  Tablet 500 milliGRAM(s) Oral every 24 hours  valACYclovir 500 milliGRAM(s) Oral two times a day      Heme / Onc:       GI:  pantoprazole    Tablet 40 milliGRAM(s) Oral before breakfast  senna 2 Tablet(s) Oral at bedtime  sodium bicarbonate Mouth Rinse 10 milliLiter(s) Swish and Spit five times a day      Cardiovascular:   losartan 100 milliGRAM(s) Oral daily      Immunologic:       Other medications:   aprepitant 80 milliGRAM(s) Oral every 24 hours  Biotene Dry Mouth Oral Rinse 5 milliLiter(s) Swish and Spit five times a day  chlorhexidine 4% Liquid 1 Application(s) Topical <User Schedule>  escitalopram 20 milliGRAM(s) Oral daily  loratadine 10 milliGRAM(s) Oral daily  ondansetron  IVPB 8 milliGRAM(s) IV Intermittent every 8 hours  sodium chloride 0.45% 1000 milliLiter(s) IV Continuous <Continuous>  sodium chloride 0.9%. 1000 milliLiter(s) IV Continuous <Continuous>  sodium chloride 0.9%. 1000 milliLiter(s) IV Continuous <Continuous>      PRN:   acetaminophen     Tablet .. 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 Infectious Disease:   fluconAZOLE   Tablet 400 milliGRAM(s) Oral daily  levoFLOXacin  Tablet 500 milliGRAM(s) Oral every 24 hours  valACYclovir 500 milliGRAM(s) Oral two times a day    2. GI Prophylaxis:    pantoprazole    Tablet 40 milliGRAM(s) Oral before breakfast    3. Mouthcare - NS / NaHCO3 rinses, Mycelex, Biotene; Skin care     4. GVHD prophylaxis   TBI day -1   CTX days +3, +4  MMF, tacro to start on day +5     5. Transfuse & replete electrolytes prn     6. IV hydration, daily weights, strict I&O, prn diuresis     7. PO intake as tolerated, nutrition follow up as needed, MVI, folic acid     8. Antiemetics, anti-diarrhea medications:   metoclopramide Injectable 10 milliGRAM(s) IV Push every 6 hours PRN  ondansetron  IVPB 8 milliGRAM(s) IV Intermittent every 8 hours  aprepitant 80 milliGRAM(s) Oral every 24 hours    9. OOB as tolerated, physical therapy consult if needed     10. Monitor coags / fibrinogen 2x week, vitamin K as needed     11. Monitor closely for clinical changes, monitor for fevers     12. Emotional support provided, plan of care discussed and questions addressed     13. Patient education done regarding plan of care, restrictions and discharge planning     14. Continue regular social work input     I have written the above note for Dr. Vaughn who performed service with me in the room.   Estelita Tinsley NP-C ([phone])    I have seen and examined patient with NP, I agree with above note as scribed.

## 2024-03-07 NOTE — PROGRESS NOTE ADULT - PROBLEM SELECTOR PLAN 5
- Geriatrics and Palliative Medicine Team will continue to follow for support and assist with symptom management as needs arise    An MD Cristy  [facility] Team Consults  Please call if we can be of assistance, [phone]

## 2024-03-07 NOTE — PROGRESS NOTE ADULT - NS ATTEND AMEND GEN_ALL_CORE FT
..    Primary: Wilbur    Assessment: 64-year-old day + 1 CATRACHITA alloBMT using a Flu/Cy/TBI preparative regimen for [place] negative ALL.  Course uncomplicated.    Plan:  Heme: start preparative regimen  PLT goal > 10,000  Hgb goal > 7.0g/dL  G-CSF to start on day +5  will require post ALLO BMT CNS prophylaxis -- begins after engraftment.     GVHD: PTCy days 3 and 4, Cellcept and tacro to start on day +5    ID: day 0: flu, valtrex,   Levaquin started on 3/3 for ANC < 500  bactrim SS qd to start on day +21    Nutrition: tolerating PO    DVT prophylaxis: ambulation    Over 35 minutes were spent in direct patient care and care coordination.

## 2024-03-07 NOTE — PROGRESS NOTE ADULT - PROBLEM SELECTOR PLAN 4
- HCP not established, pt understands her  would serve as her healthcare surrogate  - Code status Full

## 2024-03-08 LAB
ALBUMIN SERPL ELPH-MCNC: 4 G/DL — SIGNIFICANT CHANGE UP (ref 3.3–5)
ALP SERPL-CCNC: 106 U/L — SIGNIFICANT CHANGE UP (ref 40–120)
ALT FLD-CCNC: 20 U/L — SIGNIFICANT CHANGE UP (ref 10–45)
ANION GAP SERPL CALC-SCNC: 10 MMOL/L — SIGNIFICANT CHANGE UP (ref 5–17)
AST SERPL-CCNC: 21 U/L — SIGNIFICANT CHANGE UP (ref 10–40)
BASOPHILS # BLD AUTO: 0 K/UL — SIGNIFICANT CHANGE UP (ref 0–0.2)
BASOPHILS NFR BLD AUTO: 0 % — SIGNIFICANT CHANGE UP (ref 0–2)
BILIRUB SERPL-MCNC: 0.8 MG/DL — SIGNIFICANT CHANGE UP (ref 0.2–1.2)
BUN SERPL-MCNC: 9 MG/DL — SIGNIFICANT CHANGE UP (ref 7–23)
CALCIUM SERPL-MCNC: 9.2 MG/DL — SIGNIFICANT CHANGE UP (ref 8.4–10.5)
CHLORIDE SERPL-SCNC: 98 MMOL/L — SIGNIFICANT CHANGE UP (ref 96–108)
CO2 SERPL-SCNC: 29 MMOL/L — SIGNIFICANT CHANGE UP (ref 22–31)
CREAT SERPL-MCNC: 0.81 MG/DL — SIGNIFICANT CHANGE UP (ref 0.5–1.3)
EGFR: 80 ML/MIN/1.73M2 — SIGNIFICANT CHANGE UP
EOSINOPHIL # BLD AUTO: 0.1 K/UL — SIGNIFICANT CHANGE UP (ref 0–0.5)
EOSINOPHIL NFR BLD AUTO: 19.5 % — HIGH (ref 0–6)
GLUCOSE SERPL-MCNC: 99 MG/DL — SIGNIFICANT CHANGE UP (ref 70–99)
HCT VFR BLD CALC: 38.4 % — SIGNIFICANT CHANGE UP (ref 34.5–45)
HGB BLD-MCNC: 12.9 G/DL — SIGNIFICANT CHANGE UP (ref 11.5–15.5)
LDH SERPL L TO P-CCNC: 205 U/L — SIGNIFICANT CHANGE UP (ref 50–242)
LYMPHOCYTES # BLD AUTO: 0.01 K/UL — LOW (ref 1–3.3)
LYMPHOCYTES # BLD AUTO: 1.7 % — LOW (ref 13–44)
MAGNESIUM SERPL-MCNC: 1.8 MG/DL — SIGNIFICANT CHANGE UP (ref 1.6–2.6)
MANUAL SMEAR VERIFICATION: SIGNIFICANT CHANGE UP
MCHC RBC-ENTMCNC: 32.1 PG — SIGNIFICANT CHANGE UP (ref 27–34)
MCHC RBC-ENTMCNC: 33.6 GM/DL — SIGNIFICANT CHANGE UP (ref 32–36)
MCV RBC AUTO: 95.5 FL — SIGNIFICANT CHANGE UP (ref 80–100)
MONOCYTES # BLD AUTO: 0.03 K/UL — SIGNIFICANT CHANGE UP (ref 0–0.9)
MONOCYTES NFR BLD AUTO: 5.9 % — SIGNIFICANT CHANGE UP (ref 2–14)
NEUTROPHILS # BLD AUTO: 0.39 K/UL — LOW (ref 1.8–7.4)
NEUTROPHILS NFR BLD AUTO: 68.7 % — SIGNIFICANT CHANGE UP (ref 43–77)
NEUTS BAND # BLD: 4.2 % — SIGNIFICANT CHANGE UP (ref 0–8)
NRBC # BLD: 1 /100 WBCS — HIGH (ref 0–0)
PHOSPHATE SERPL-MCNC: 2.7 MG/DL — SIGNIFICANT CHANGE UP (ref 2.5–4.5)
PLAT MORPH BLD: NORMAL — SIGNIFICANT CHANGE UP
PLATELET # BLD AUTO: 67 K/UL — LOW (ref 150–400)
POTASSIUM SERPL-MCNC: 3.2 MMOL/L — LOW (ref 3.5–5.3)
POTASSIUM SERPL-SCNC: 3.2 MMOL/L — LOW (ref 3.5–5.3)
PROT SERPL-MCNC: 5.9 G/DL — LOW (ref 6–8.3)
RBC # BLD: 4.02 M/UL — SIGNIFICANT CHANGE UP (ref 3.8–5.2)
RBC # FLD: 12.2 % — SIGNIFICANT CHANGE UP (ref 10.3–14.5)
RBC BLD AUTO: SIGNIFICANT CHANGE UP
SODIUM SERPL-SCNC: 137 MMOL/L — SIGNIFICANT CHANGE UP (ref 135–145)
WBC # BLD: 0.53 K/UL — CRITICAL LOW (ref 3.8–10.5)
WBC # FLD AUTO: 0.53 K/UL — CRITICAL LOW (ref 3.8–10.5)

## 2024-03-08 PROCEDURE — 99233 SBSQ HOSP IP/OBS HIGH 50: CPT | Mod: FS

## 2024-03-08 RX ORDER — POTASSIUM CHLORIDE 20 MEQ
20 PACKET (EA) ORAL
Refills: 0 | Status: COMPLETED | OUTPATIENT
Start: 2024-03-08 | End: 2024-03-08

## 2024-03-08 RX ORDER — MAGNESIUM SULFATE 500 MG/ML
2 VIAL (ML) INJECTION ONCE
Refills: 0 | Status: COMPLETED | OUTPATIENT
Start: 2024-03-08 | End: 2024-03-08

## 2024-03-08 RX ADMIN — ONDANSETRON 108 MILLIGRAM(S): 8 TABLET, FILM COATED ORAL at 15:20

## 2024-03-08 RX ADMIN — SODIUM CHLORIDE 252 MILLILITER(S): 9 INJECTION, SOLUTION INTRAVENOUS at 22:11

## 2024-03-08 RX ADMIN — APREPITANT 80 MILLIGRAM(S): 80 CAPSULE ORAL at 12:56

## 2024-03-08 RX ADMIN — LORATADINE 10 MILLIGRAM(S): 10 TABLET ORAL at 12:56

## 2024-03-08 RX ADMIN — ONDANSETRON 108 MILLIGRAM(S): 8 TABLET, FILM COATED ORAL at 05:36

## 2024-03-08 RX ADMIN — Medication 25 GRAM(S): at 08:52

## 2024-03-08 RX ADMIN — Medication 10 MILLILITER(S): at 16:43

## 2024-03-08 RX ADMIN — ONDANSETRON 108 MILLIGRAM(S): 8 TABLET, FILM COATED ORAL at 21:55

## 2024-03-08 RX ADMIN — Medication 10 MILLILITER(S): at 12:57

## 2024-03-08 RX ADMIN — LOSARTAN POTASSIUM 100 MILLIGRAM(S): 100 TABLET, FILM COATED ORAL at 05:37

## 2024-03-08 RX ADMIN — FLUCONAZOLE 400 MILLIGRAM(S): 150 TABLET ORAL at 12:56

## 2024-03-08 RX ADMIN — Medication 50 MILLIEQUIVALENT(S): at 10:21

## 2024-03-08 RX ADMIN — Medication 5 MILLILITER(S): at 16:42

## 2024-03-08 RX ADMIN — Medication 10 MILLILITER(S): at 23:21

## 2024-03-08 RX ADMIN — Medication 50 MILLIEQUIVALENT(S): at 12:56

## 2024-03-08 RX ADMIN — PANTOPRAZOLE SODIUM 40 MILLIGRAM(S): 20 TABLET, DELAYED RELEASE ORAL at 05:37

## 2024-03-08 RX ADMIN — SODIUM CHLORIDE 20 MILLILITER(S): 9 INJECTION INTRAMUSCULAR; INTRAVENOUS; SUBCUTANEOUS at 20:04

## 2024-03-08 RX ADMIN — Medication 5 MILLILITER(S): at 20:05

## 2024-03-08 RX ADMIN — CHLORHEXIDINE GLUCONATE 1 APPLICATION(S): 213 SOLUTION TOPICAL at 08:32

## 2024-03-08 RX ADMIN — VALACYCLOVIR 500 MILLIGRAM(S): 500 TABLET, FILM COATED ORAL at 05:36

## 2024-03-08 RX ADMIN — ESCITALOPRAM OXALATE 20 MILLIGRAM(S): 10 TABLET, FILM COATED ORAL at 13:01

## 2024-03-08 RX ADMIN — Medication 5 MILLILITER(S): at 23:21

## 2024-03-08 RX ADMIN — Medication 10 MILLILITER(S): at 08:32

## 2024-03-08 RX ADMIN — Medication 5 MILLILITER(S): at 12:57

## 2024-03-08 RX ADMIN — Medication 5 MILLILITER(S): at 08:31

## 2024-03-08 RX ADMIN — VALACYCLOVIR 500 MILLIGRAM(S): 500 TABLET, FILM COATED ORAL at 17:25

## 2024-03-08 RX ADMIN — Medication 10 MILLILITER(S): at 20:05

## 2024-03-08 NOTE — PROGRESS NOTE ADULT - NS ATTEND AMEND GEN_ALL_CORE FT
..    Primary: Wilbur    Assessment: 64-year-old day + 2 CATRACHITA alloBMT using a Flu/Cy/TBI preparative regimen for [place] negative ALL.  Course uncomplicated.    Plan:  Heme: start preparative regimen  PLT goal > 10,000  Hgb goal > 7.0g/dL  G-CSF to start on day +5  will require post ALLO BMT CNS prophylaxis -- begins after engraftment.     GVHD: PTCy days 3 and 4, Cellcept and tacro to start on day +5    ID: day 0: flu, valtrex,   Levaquin started on 3/3 for ANC < 500  bactrim SS qd to start on day +21    Nutrition: tolerating PO    DVT prophylaxis: ambulation    Over 35 minutes were spent in direct patient care and care coordination. ..    Primary: Wilbur    Assessment: 64-year-old day + 2 CATRACHITA alloBMT using a Flu/Cy/TBI preparative regimen for [place] negative ALL.  Course uncomplicated.    Plan:  Heme: start preparative regimen  PLT goal > 10,000  Hgb goal > 7.0g/dL  G-CSF to start on day +5  will require post ALLO BMT CNS prophylaxis -- begins after engraftment.     GVHD: PTCy days 3 and 4, Cellcept and tacro to start on day +5    ID: day 0: flu, valtrex,   Levaquin started on 3/3 for ANC < 500  bactrim SS qd to start on day +21    Nutrition: tolerating PO    DVT prophylaxis: ambulation    Over 35 minutes were spent in direct patient care and care coordination

## 2024-03-08 NOTE — PROGRESS NOTE ADULT - SUBJECTIVE AND OBJECTIVE BOX
HPC Transplant Team                                                      Critical / Counseling Time Provided: 30 minutes                                                                                                                                                        Chief Complaint: Haplo-identical BMT with FLU/CY/TBI prep for the treatment of ALL    S: Patient seen and examined with HPC Transplant Team:     O: Vitals:   Vital Signs Last 24 Hrs  T(C): 37.5 (08 Mar 2024 05:15), Max: 37.5 (08 Mar 2024 05:15)  T(F): 99.5 (08 Mar 2024 05:15), Max: 99.5 (08 Mar 2024 05:15)  HR: 75 (08 Mar 2024 05:15) (57 - 78)  BP: 135/78 (08 Mar 2024 05:15) (124/73 - 152/79)  BP(mean): --  RR: 18 (08 Mar 2024 05:15) (18 - 18)  SpO2: 96% (08 Mar 2024 05:15) (95% - 97%)    Parameters below as of 08 Mar 2024 05:15  Patient On (Oxygen Delivery Method): room air        Admit weight:       Intake / Output:   03-07 @ 07:01  -  03-08 @ 07:00  --------------------------------------------------------  IN: 2772 mL / OUT: 3875 mL / NET: -1103 mL        PE:  Oropharynx: no erythema no ulcerations  Oral Mucositis:   -                                                     Grade: -  CVS: RRR, +S1,S2  Lungs: CTA throughout bilaterally   Abdomen: soft, ND, ND +bs  Extremities: no edema   Gastric Mucositis:      -                                            Grade: -  Intestinal Mucositis:     -                                         Grade: -  Skin:  no rash   TLC: C/D/I   Neuro: A&O x3  Pain: Denies      Labs:   CBC Full  -  ( 08 Mar 2024 07:02 )  WBC Count : 0.53 K/uL  Hemoglobin : 12.9 g/dL  Hematocrit : 38.4 %  Platelet Count - Automated : 67 K/uL  Mean Cell Volume : 95.5 fl  Mean Cell Hemoglobin : 32.1 pg  Mean Cell Hemoglobin Concentration : 33.6 gm/dL  Auto Neutrophil # : x  Auto Lymphocyte # : x  Auto Monocyte # : x  Auto Eosinophil # : x  Auto Basophil # : x  Auto Neutrophil % : x  Auto Lymphocyte % : x  Auto Monocyte % : x  Auto Eosinophil % : x  Auto Basophil % : x                          12.9   0.53  )-----------( 67       ( 08 Mar 2024 07:02 )             38.4     03-08    137  |  98  |  9   ----------------------------<  99  3.2<L>   |  29  |  0.81    Ca    9.2      08 Mar 2024 07:03  Phos  2.7     03-08  Mg     1.8     03-08    TPro  5.9<L>  /  Alb  4.0  /  TBili  0.8  /  DBili  x   /  AST  21  /  ALT  20  /  AlkPhos  106  03-08    PT/INR - ( 07 Mar 2024 07:20 )   PT: 11.3 sec;   INR: 1.03 ratio         PTT - ( 07 Mar 2024 07:20 )  PTT:28.6 sec  LIVER FUNCTIONS - ( 08 Mar 2024 07:03 )  Alb: 4.0 g/dL / Pro: 5.9 g/dL / ALK PHOS: 106 U/L / ALT: 20 U/L / AST: 21 U/L / GGT: x           Lactate Dehydrogenase, Serum: 205 U/L (03-08 @ 07:03)          Meds:   Antimicrobials:   fluconAZOLE   Tablet 400 milliGRAM(s) Oral daily  levoFLOXacin  Tablet 500 milliGRAM(s) Oral every 24 hours  valACYclovir 500 milliGRAM(s) Oral two times a day      Heme / Onc:       GI:  pantoprazole    Tablet 40 milliGRAM(s) Oral before breakfast  senna 2 Tablet(s) Oral at bedtime  sodium bicarbonate Mouth Rinse 10 milliLiter(s) Swish and Spit five times a day      Cardiovascular:   losartan 100 milliGRAM(s) Oral daily      Immunologic:       Other medications:   aprepitant 80 milliGRAM(s) Oral every 24 hours  Biotene Dry Mouth Oral Rinse 5 milliLiter(s) Swish and Spit five times a day  chlorhexidine 4% Liquid 1 Application(s) Topical <User Schedule>  escitalopram 20 milliGRAM(s) Oral daily  loratadine 10 milliGRAM(s) Oral daily  ondansetron  IVPB 8 milliGRAM(s) IV Intermittent every 8 hours  sodium chloride 0.45% 1000 milliLiter(s) IV Continuous <Continuous>  sodium chloride 0.9% 1000 milliLiter(s) IV Continuous <Continuous>  sodium chloride 0.9%. 1000 milliLiter(s) IV Continuous <Continuous>  sodium chloride 0.9%. 1000 milliLiter(s) IV Continuous <Continuous>      PRN:   acetaminophen     Tablet .. 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 Infectious Disease:   fluconAZOLE   Tablet 400 milliGRAM(s) Oral daily  levoFLOXacin  Tablet 500 milliGRAM(s) Oral every 24 hours  valACYclovir 500 milliGRAM(s) Oral two times a day    2. GI Prophylaxis:    pantoprazole    Tablet 40 milliGRAM(s) Oral before breakfast    3. Mouthcare - NS / NaHCO3 rinses, Mycelex, Biotene; Skin care     4. GVHD prophylaxis   TBI day -1   CTX days +3, +4  MMF, tacro to start on day +5     5. Transfuse & replete electrolytes prn     6. IV hydration, daily weights, strict I&O, prn diuresis     7. PO intake as tolerated, nutrition follow up as needed, MVI, folic acid     8. Antiemetics, anti-diarrhea medications:   metoclopramide Injectable 10 milliGRAM(s) IV Push every 6 hours PRN  ondansetron  IVPB 8 milliGRAM(s) IV Intermittent every 8 hours  aprepitant 80 milliGRAM(s) Oral every 24 hours    9. OOB as tolerated, physical therapy consult if needed     10. Monitor coags / fibrinogen 2x week, vitamin K as needed     11. Monitor closely for clinical changes, monitor for fevers     12. Emotional support provided, plan of care discussed and questions addressed     13. Patient education done regarding plan of care, restrictions and discharge planning     14. Continue regular social work input     I have written the above note for Dr. Vaughn who performed service with me in the room.   kermit Benson PAC ([phone])    I have seen and examined patient with PA, I agree with above note as scribed.                          HPC Transplant Team                                                      Critical / Counseling Time Provided: 30 minutes                                                                                                                                                        Chief Complaint: Haplo-identical BMT with FLU/CY/TBI prep for the treatment of ALL    S: Patient seen and examined with HPC Transplant Team:   + diarrhea  Remainder of ROS is negative    O: Vitals:   Vital Signs Last 24 Hrs  T(C): 37.5 (08 Mar 2024 05:15), Max: 37.5 (08 Mar 2024 05:15)  T(F): 99.5 (08 Mar 2024 05:15), Max: 99.5 (08 Mar 2024 05:15)  HR: 75 (08 Mar 2024 05:15) (57 - 78)  BP: 135/78 (08 Mar 2024 05:15) (124/73 - 152/79)  BP(mean): --  RR: 18 (08 Mar 2024 05:15) (18 - 18)  SpO2: 96% (08 Mar 2024 05:15) (95% - 97%)    Parameters below as of 08 Mar 2024 05:15  Patient On (Oxygen Delivery Method): room air        Admit weight:       Intake / Output:   03-07 @ 07:01  -  03-08 @ 07:00  --------------------------------------------------------  IN: 2772 mL / OUT: 3875 mL / NET: -1103 mL        PE:  Oropharynx: no erythema no ulcerations  Oral Mucositis:   -                                                     Grade: -  CVS: RRR, +S1,S2  Lungs: CTA throughout bilaterally   Abdomen: soft, ND, ND +bs  Extremities: no edema   Gastric Mucositis:      -                                            Grade: -  Intestinal Mucositis:     -                                         Grade: -  Skin:  no rash   TLC: C/D/I   Neuro: A&O x3  Pain: Denies      Labs:   CBC Full  -  ( 08 Mar 2024 07:02 )  WBC Count : 0.53 K/uL  Hemoglobin : 12.9 g/dL  Hematocrit : 38.4 %  Platelet Count - Automated : 67 K/uL  Mean Cell Volume : 95.5 fl  Mean Cell Hemoglobin : 32.1 pg  Mean Cell Hemoglobin Concentration : 33.6 gm/dL  Auto Neutrophil # : x  Auto Lymphocyte # : x  Auto Monocyte # : x  Auto Eosinophil # : x  Auto Basophil # : x  Auto Neutrophil % : x  Auto Lymphocyte % : x  Auto Monocyte % : x  Auto Eosinophil % : x  Auto Basophil % : x                          12.9   0.53  )-----------( 67       ( 08 Mar 2024 07:02 )             38.4     03-08    137  |  98  |  9   ----------------------------<  99  3.2<L>   |  29  |  0.81    Ca    9.2      08 Mar 2024 07:03  Phos  2.7     03-08  Mg     1.8     03-08    TPro  5.9<L>  /  Alb  4.0  /  TBili  0.8  /  DBili  x   /  AST  21  /  ALT  20  /  AlkPhos  106  03-08    PT/INR - ( 07 Mar 2024 07:20 )   PT: 11.3 sec;   INR: 1.03 ratio         PTT - ( 07 Mar 2024 07:20 )  PTT:28.6 sec  LIVER FUNCTIONS - ( 08 Mar 2024 07:03 )  Alb: 4.0 g/dL / Pro: 5.9 g/dL / ALK PHOS: 106 U/L / ALT: 20 U/L / AST: 21 U/L / GGT: x           Lactate Dehydrogenase, Serum: 205 U/L (03-08 @ 07:03)          Meds:   Antimicrobials:   fluconAZOLE   Tablet 400 milliGRAM(s) Oral daily  levoFLOXacin  Tablet 500 milliGRAM(s) Oral every 24 hours  valACYclovir 500 milliGRAM(s) Oral two times a day      Heme / Onc:       GI:  pantoprazole    Tablet 40 milliGRAM(s) Oral before breakfast  senna 2 Tablet(s) Oral at bedtime  sodium bicarbonate Mouth Rinse 10 milliLiter(s) Swish and Spit five times a day      Cardiovascular:   losartan 100 milliGRAM(s) Oral daily      Immunologic:       Other medications:   aprepitant 80 milliGRAM(s) Oral every 24 hours  Biotene Dry Mouth Oral Rinse 5 milliLiter(s) Swish and Spit five times a day  chlorhexidine 4% Liquid 1 Application(s) Topical <User Schedule>  escitalopram 20 milliGRAM(s) Oral daily  loratadine 10 milliGRAM(s) Oral daily  ondansetron  IVPB 8 milliGRAM(s) IV Intermittent every 8 hours  sodium chloride 0.45% 1000 milliLiter(s) IV Continuous <Continuous>  sodium chloride 0.9% 1000 milliLiter(s) IV Continuous <Continuous>  sodium chloride 0.9%. 1000 milliLiter(s) IV Continuous <Continuous>  sodium chloride 0.9%. 1000 milliLiter(s) IV Continuous <Continuous>      PRN:   acetaminophen     Tablet .. 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 Infectious Disease:   fluconAZOLE   Tablet 400 milliGRAM(s) Oral daily  levoFLOXacin  Tablet 500 milliGRAM(s) Oral every 24 hours  valACYclovir 500 milliGRAM(s) Oral two times a day    2. GI Prophylaxis:    pantoprazole    Tablet 40 milliGRAM(s) Oral before breakfast    3. Mouthcare - NS / NaHCO3 rinses, Mycelex, Biotene; Skin care     4. GVHD prophylaxis   TBI day -1   CTX days +3, +4  MMF, tacro to start on day +5     5. Transfuse & replete electrolytes prn     6. IV hydration, daily weights, strict I&O, prn diuresis     7. PO intake as tolerated, nutrition follow up as needed, MVI, folic acid     8. Antiemetics, anti-diarrhea medications:   metoclopramide Injectable 10 milliGRAM(s) IV Push every 6 hours PRN  ondansetron  IVPB 8 milliGRAM(s) IV Intermittent every 8 hours  aprepitant 80 milliGRAM(s) Oral every 24 hours    9. OOB as tolerated, physical therapy consult if needed     10. Monitor coags / fibrinogen 2x week, vitamin K as needed     11. Monitor closely for clinical changes, monitor for fevers     12. Emotional support provided, plan of care discussed and questions addressed     13. Patient education done regarding plan of care, restrictions and discharge planning     14. Continue regular social work input     I have written the above note for Dr. Vaughn who performed service with me in the room.   kermit Benson PAC ([phone])    I have seen and examined patient with PA, I agree with above note as scribed.

## 2024-03-09 LAB
ALBUMIN SERPL ELPH-MCNC: 3.2 G/DL — LOW (ref 3.3–5)
ALP SERPL-CCNC: 88 U/L — SIGNIFICANT CHANGE UP (ref 40–120)
ALT FLD-CCNC: 18 U/L — SIGNIFICANT CHANGE UP (ref 10–45)
ANION GAP SERPL CALC-SCNC: 7 MMOL/L — SIGNIFICANT CHANGE UP (ref 5–17)
APPEARANCE UR: CLEAR — SIGNIFICANT CHANGE UP
AST SERPL-CCNC: 17 U/L — SIGNIFICANT CHANGE UP (ref 10–40)
BASOPHILS # BLD AUTO: 0 K/UL — SIGNIFICANT CHANGE UP (ref 0–0.2)
BASOPHILS NFR BLD AUTO: 0 % — SIGNIFICANT CHANGE UP (ref 0–2)
BILIRUB SERPL-MCNC: 0.5 MG/DL — SIGNIFICANT CHANGE UP (ref 0.2–1.2)
BILIRUB UR-MCNC: NEGATIVE — SIGNIFICANT CHANGE UP
BUN SERPL-MCNC: 9 MG/DL — SIGNIFICANT CHANGE UP (ref 7–23)
C DIFF GDH STL QL: NEGATIVE — SIGNIFICANT CHANGE UP
C DIFF GDH STL QL: SIGNIFICANT CHANGE UP
CALCIUM SERPL-MCNC: 8.1 MG/DL — LOW (ref 8.4–10.5)
CHLORIDE SERPL-SCNC: 107 MMOL/L — SIGNIFICANT CHANGE UP (ref 96–108)
CO2 SERPL-SCNC: 25 MMOL/L — SIGNIFICANT CHANGE UP (ref 22–31)
COLOR SPEC: YELLOW — SIGNIFICANT CHANGE UP
CREAT SERPL-MCNC: 0.75 MG/DL — SIGNIFICANT CHANGE UP (ref 0.5–1.3)
DIFF PNL FLD: NEGATIVE — SIGNIFICANT CHANGE UP
EGFR: 88 ML/MIN/1.73M2 — SIGNIFICANT CHANGE UP
EOSINOPHIL # BLD AUTO: 0.21 K/UL — SIGNIFICANT CHANGE UP (ref 0–0.5)
EOSINOPHIL NFR BLD AUTO: 38.9 % — HIGH (ref 0–6)
GI PCR PANEL: SIGNIFICANT CHANGE UP
GLUCOSE SERPL-MCNC: 89 MG/DL — SIGNIFICANT CHANGE UP (ref 70–99)
GLUCOSE UR QL: NEGATIVE MG/DL — SIGNIFICANT CHANGE UP
HCT VFR BLD CALC: 34.9 % — SIGNIFICANT CHANGE UP (ref 34.5–45)
HGB BLD-MCNC: 11.6 G/DL — SIGNIFICANT CHANGE UP (ref 11.5–15.5)
KETONES UR-MCNC: 40 MG/DL
LDH SERPL L TO P-CCNC: 158 U/L — SIGNIFICANT CHANGE UP (ref 50–242)
LEUKOCYTE ESTERASE UR-ACNC: NEGATIVE — SIGNIFICANT CHANGE UP
LYMPHOCYTES # BLD AUTO: 0 % — LOW (ref 13–44)
LYMPHOCYTES # BLD AUTO: 0 K/UL — LOW (ref 1–3.3)
MAGNESIUM SERPL-MCNC: 2 MG/DL — SIGNIFICANT CHANGE UP (ref 1.6–2.6)
MANUAL SMEAR VERIFICATION: SIGNIFICANT CHANGE UP
MCHC RBC-ENTMCNC: 32.2 PG — SIGNIFICANT CHANGE UP (ref 27–34)
MCHC RBC-ENTMCNC: 33.2 GM/DL — SIGNIFICANT CHANGE UP (ref 32–36)
MCV RBC AUTO: 96.9 FL — SIGNIFICANT CHANGE UP (ref 80–100)
MONOCYTES # BLD AUTO: 0.03 K/UL — SIGNIFICANT CHANGE UP (ref 0–0.9)
MONOCYTES NFR BLD AUTO: 5.6 % — SIGNIFICANT CHANGE UP (ref 2–14)
NEUTROPHILS # BLD AUTO: 0.31 K/UL — LOW (ref 1.8–7.4)
NEUTROPHILS NFR BLD AUTO: 55.5 % — SIGNIFICANT CHANGE UP (ref 43–77)
NITRITE UR-MCNC: NEGATIVE — SIGNIFICANT CHANGE UP
NRBC # BLD: 3 /100 WBCS — HIGH (ref 0–0)
PH UR: 5 — SIGNIFICANT CHANGE UP (ref 5–8)
PHOSPHATE SERPL-MCNC: 2.2 MG/DL — LOW (ref 2.5–4.5)
PLAT MORPH BLD: NORMAL — SIGNIFICANT CHANGE UP
PLATELET # BLD AUTO: 51 K/UL — LOW (ref 150–400)
POTASSIUM SERPL-MCNC: 3.9 MMOL/L — SIGNIFICANT CHANGE UP (ref 3.5–5.3)
POTASSIUM SERPL-SCNC: 3.9 MMOL/L — SIGNIFICANT CHANGE UP (ref 3.5–5.3)
PROT SERPL-MCNC: 5.1 G/DL — LOW (ref 6–8.3)
PROT UR-MCNC: NEGATIVE MG/DL — SIGNIFICANT CHANGE UP
RBC # BLD: 3.6 M/UL — LOW (ref 3.8–5.2)
RBC # FLD: 12.2 % — SIGNIFICANT CHANGE UP (ref 10.3–14.5)
RBC BLD AUTO: SIGNIFICANT CHANGE UP
SODIUM SERPL-SCNC: 139 MMOL/L — SIGNIFICANT CHANGE UP (ref 135–145)
SP GR SPEC: 1.01 — SIGNIFICANT CHANGE UP (ref 1–1.03)
SP GR UR STRIP: 1 — SIGNIFICANT CHANGE UP (ref 1–1.03)
SP GR UR STRIP: 1.01 — SIGNIFICANT CHANGE UP (ref 1–1.03)
UROBILINOGEN FLD QL: 0.2 MG/DL — SIGNIFICANT CHANGE UP (ref 0.2–1)
WBC # BLD: 0.55 K/UL — CRITICAL LOW (ref 3.8–10.5)
WBC # FLD AUTO: 0.55 K/UL — CRITICAL LOW (ref 3.8–10.5)

## 2024-03-09 PROCEDURE — 71045 X-RAY EXAM CHEST 1 VIEW: CPT | Mod: 26

## 2024-03-09 PROCEDURE — 93010 ELECTROCARDIOGRAM REPORT: CPT

## 2024-03-09 PROCEDURE — 99233 SBSQ HOSP IP/OBS HIGH 50: CPT | Mod: FS

## 2024-03-09 RX ORDER — ZINC OXIDE 200 MG/G
1 OINTMENT TOPICAL
Refills: 0 | Status: DISCONTINUED | OUTPATIENT
Start: 2024-03-09 | End: 2024-03-26

## 2024-03-09 RX ORDER — MESNA 100 MG/ML
1163 INJECTION, SOLUTION INTRAVENOUS
Refills: 0 | Status: COMPLETED | OUTPATIENT
Start: 2024-03-09 | End: 2024-03-10

## 2024-03-09 RX ORDER — FUROSEMIDE 40 MG
10 TABLET ORAL ONCE
Refills: 0 | Status: COMPLETED | OUTPATIENT
Start: 2024-03-09 | End: 2024-03-09

## 2024-03-09 RX ORDER — CEFEPIME 1 G/1
2000 INJECTION, POWDER, FOR SOLUTION INTRAMUSCULAR; INTRAVENOUS ONCE
Refills: 0 | Status: COMPLETED | OUTPATIENT
Start: 2024-03-09 | End: 2024-03-09

## 2024-03-09 RX ORDER — POTASSIUM PHOSPHATE, MONOBASIC POTASSIUM PHOSPHATE, DIBASIC 236; 224 MG/ML; MG/ML
15 INJECTION, SOLUTION INTRAVENOUS ONCE
Refills: 0 | Status: COMPLETED | OUTPATIENT
Start: 2024-03-09 | End: 2024-03-09

## 2024-03-09 RX ORDER — FUROSEMIDE 40 MG
20 TABLET ORAL EVERY 24 HOURS
Refills: 0 | Status: COMPLETED | OUTPATIENT
Start: 2024-03-09 | End: 2024-03-10

## 2024-03-09 RX ORDER — LOPERAMIDE HCL 2 MG
2 TABLET ORAL
Refills: 0 | Status: DISCONTINUED | OUTPATIENT
Start: 2024-03-09 | End: 2024-03-26

## 2024-03-09 RX ORDER — CEFEPIME 1 G/1
INJECTION, POWDER, FOR SOLUTION INTRAMUSCULAR; INTRAVENOUS
Refills: 0 | Status: DISCONTINUED | OUTPATIENT
Start: 2024-03-09 | End: 2024-03-24

## 2024-03-09 RX ORDER — CYCLOPHOSPHAMIDE 100 MG
3275 VIAL (EA) INTRAVENOUS ONCE
Refills: 0 | Status: COMPLETED | OUTPATIENT
Start: 2024-03-09 | End: 2024-03-09

## 2024-03-09 RX ORDER — CEFEPIME 1 G/1
2000 INJECTION, POWDER, FOR SOLUTION INTRAMUSCULAR; INTRAVENOUS EVERY 8 HOURS
Refills: 0 | Status: DISCONTINUED | OUTPATIENT
Start: 2024-03-10 | End: 2024-03-24

## 2024-03-09 RX ADMIN — SODIUM CHLORIDE 252 MILLILITER(S): 9 INJECTION, SOLUTION INTRAVENOUS at 23:09

## 2024-03-09 RX ADMIN — LOSARTAN POTASSIUM 100 MILLIGRAM(S): 100 TABLET, FILM COATED ORAL at 05:19

## 2024-03-09 RX ADMIN — Medication 5 MILLILITER(S): at 17:20

## 2024-03-09 RX ADMIN — Medication 2 MILLIGRAM(S): at 10:35

## 2024-03-09 RX ADMIN — Medication 5 MILLILITER(S): at 09:11

## 2024-03-09 RX ADMIN — Medication 10 MILLILITER(S): at 17:20

## 2024-03-09 RX ADMIN — Medication 5 MILLILITER(S): at 12:15

## 2024-03-09 RX ADMIN — Medication 650 MILLIGRAM(S): at 21:00

## 2024-03-09 RX ADMIN — Medication 2 MILLIGRAM(S): at 13:32

## 2024-03-09 RX ADMIN — Medication 20 MILLIGRAM(S): at 18:39

## 2024-03-09 RX ADMIN — Medication 5 MILLILITER(S): at 23:09

## 2024-03-09 RX ADMIN — CHLORHEXIDINE GLUCONATE 1 APPLICATION(S): 213 SOLUTION TOPICAL at 09:11

## 2024-03-09 RX ADMIN — MESNA 1163 MILLIGRAM(S): 100 INJECTION, SOLUTION INTRAVENOUS at 20:17

## 2024-03-09 RX ADMIN — SODIUM CHLORIDE 252 MILLILITER(S): 9 INJECTION, SOLUTION INTRAVENOUS at 13:32

## 2024-03-09 RX ADMIN — ONDANSETRON 108 MILLIGRAM(S): 8 TABLET, FILM COATED ORAL at 20:18

## 2024-03-09 RX ADMIN — Medication 10 MILLIGRAM(S): at 12:51

## 2024-03-09 RX ADMIN — ESCITALOPRAM OXALATE 20 MILLIGRAM(S): 10 TABLET, FILM COATED ORAL at 12:23

## 2024-03-09 RX ADMIN — Medication 10 MILLILITER(S): at 12:15

## 2024-03-09 RX ADMIN — FLUCONAZOLE 400 MILLIGRAM(S): 150 TABLET ORAL at 12:16

## 2024-03-09 RX ADMIN — MESNA 1163 MILLIGRAM(S): 100 INJECTION, SOLUTION INTRAVENOUS at 23:30

## 2024-03-09 RX ADMIN — Medication 650 MILLIGRAM(S): at 21:30

## 2024-03-09 RX ADMIN — APREPITANT 80 MILLIGRAM(S): 80 CAPSULE ORAL at 12:17

## 2024-03-09 RX ADMIN — POTASSIUM PHOSPHATE, MONOBASIC POTASSIUM PHOSPHATE, DIBASIC 62.5 MILLIMOLE(S): 236; 224 INJECTION, SOLUTION INTRAVENOUS at 09:57

## 2024-03-09 RX ADMIN — Medication 10 MILLILITER(S): at 09:11

## 2024-03-09 RX ADMIN — VALACYCLOVIR 500 MILLIGRAM(S): 500 TABLET, FILM COATED ORAL at 05:20

## 2024-03-09 RX ADMIN — SODIUM CHLORIDE 20 MILLILITER(S): 9 INJECTION INTRAMUSCULAR; INTRAVENOUS; SUBCUTANEOUS at 23:09

## 2024-03-09 RX ADMIN — ONDANSETRON 108 MILLIGRAM(S): 8 TABLET, FILM COATED ORAL at 05:19

## 2024-03-09 RX ADMIN — Medication 10 MILLIGRAM(S): at 14:56

## 2024-03-09 RX ADMIN — Medication 5 MILLILITER(S): at 20:15

## 2024-03-09 RX ADMIN — SODIUM CHLORIDE 20 MILLILITER(S): 9 INJECTION INTRAMUSCULAR; INTRAVENOUS; SUBCUTANEOUS at 20:16

## 2024-03-09 RX ADMIN — PANTOPRAZOLE SODIUM 40 MILLIGRAM(S): 20 TABLET, DELAYED RELEASE ORAL at 05:19

## 2024-03-09 RX ADMIN — LORATADINE 10 MILLIGRAM(S): 10 TABLET ORAL at 12:16

## 2024-03-09 RX ADMIN — Medication 10 MILLILITER(S): at 23:09

## 2024-03-09 RX ADMIN — CEFEPIME 100 MILLIGRAM(S): 1 INJECTION, POWDER, FOR SOLUTION INTRAMUSCULAR; INTRAVENOUS at 21:36

## 2024-03-09 RX ADMIN — MESNA 1163 MILLIGRAM(S): 100 INJECTION, SOLUTION INTRAVENOUS at 17:21

## 2024-03-09 RX ADMIN — Medication 10 MILLILITER(S): at 20:15

## 2024-03-09 RX ADMIN — VALACYCLOVIR 500 MILLIGRAM(S): 500 TABLET, FILM COATED ORAL at 17:21

## 2024-03-09 RX ADMIN — ONDANSETRON 108 MILLIGRAM(S): 8 TABLET, FILM COATED ORAL at 13:32

## 2024-03-09 RX ADMIN — MESNA 1163 MILLIGRAM(S): 100 INJECTION, SOLUTION INTRAVENOUS at 14:28

## 2024-03-09 RX ADMIN — Medication 3275 MILLIGRAM(S): at 14:47

## 2024-03-09 NOTE — PROGRESS NOTE ADULT - SUBJECTIVE AND OBJECTIVE BOX
HPC Transplant Team                                                      Critical / Counseling Time Provided: 30 minutes                                                                                                                                                        Chief Complaint: Haplo-identical BMT with FLU/CY/TBI prep for the treatment of ALL    S: Patient seen and examined with HPC Transplant Team:   + diarrhea overnight, watery    Remainder of ROS is negative    O: Vitals:   Vital Signs Last 24 Hrs  T(C): 37.2 (09 Mar 2024 09:14), Max: 37.3 (08 Mar 2024 13:30)  T(F): 99 (09 Mar 2024 09:14), Max: 99.1 (08 Mar 2024 13:30)  HR: 62 (09 Mar 2024 09:14) (58 - 71)  BP: 144/66 (09 Mar 2024 09:14) (120/74 - 152/83)  BP(mean): --  RR: 18 (09 Mar 2024 09:14) (18 - 19)  SpO2: 98% (09 Mar 2024 09:14) (94% - 98%)    Parameters below as of 09 Mar 2024 09:14  Patient On (Oxygen Delivery Method): room air      Admit weight: 96.9 kg  Daily Weight in k.5 (09 Mar 2024 08:18)      Intake / Output:    @ 07:  -   @ 07:00  --------------------------------------------------------  IN: 3354 mL / OUT: 1610 mL / NET: 1744 mL     @ 06:01  -   @ 10:30  --------------------------------------------------------  IN: 1353 mL / OUT: 340 mL / NET: 1013 mL        PE:   Oropharynx: no erythema no ulcerations  Oral Mucositis:   -                                                     Grade: -  CVS: RRR, +S1,S2  Lungs: CTA throughout bilaterally   Abdomen: soft, ND, ND +bs  Extremities: no edema   Gastric Mucositis:      -                                            Grade: -  Intestinal Mucositis:     -                                         Grade: -  Skin:  no rash   TLC: C/D/I   Neuro: A&O x3  Pain: Denies        Labs:   CBC Full  -  ( 09 Mar 2024 07:23 )  WBC Count : 0.55 K/uL  Hemoglobin : 11.6 g/dL  Hematocrit : 34.9 %  Platelet Count - Automated : 51 K/uL  Mean Cell Volume : 96.9 fl  Mean Cell Hemoglobin : 32.2 pg  Mean Cell Hemoglobin Concentration : 33.2 gm/dL  Auto Neutrophil # : 0.31 K/uL  Auto Lymphocyte # : 0.00 K/uL  Auto Monocyte # : 0.03 K/uL  Auto Eosinophil # : 0.21 K/uL  Auto Basophil # : 0.00 K/uL  Auto Neutrophil % : 55.5 %  Auto Lymphocyte % : 0.0 %  Auto Monocyte % : 5.6 %  Auto Eosinophil % : 38.9 %  Auto Basophil % : 0.0 %                          11.6   0.55  )-----------( 51       ( 09 Mar 2024 07:23 )             34.9     03-    139  |  107  |  9   ----------------------------<  89  3.9   |  25  |  0.75    Ca    8.1<L>      09 Mar 2024 07:23  Phos  2.2     -  Mg     2.0         TPro  5.1<L>  /  Alb  3.2<L>  /  TBili  0.5  /  DBili  x   /  AST  17  /  ALT  18  /  AlkPhos  88  03-09      LIVER FUNCTIONS - ( 09 Mar 2024 07:23 )  Alb: 3.2 g/dL / Pro: 5.1 g/dL / ALK PHOS: 88 U/L / ALT: 18 U/L / AST: 17 U/L / GGT: x           Lactate Dehydrogenase, Serum: 158 U/L ( @ 07:23)      Cultures/Micro:  C. difficile GDH &amp; toxins A/B by EIA (24 @ 08:07)    Clostridium difficile GDH Toxins A&amp;B, EIA:  Negative    GI PCR Panel Stool (24 @ 01:06)    GI PCR Panel: NotDetec:         Meds:   Antimicrobials:   fluconAZOLE   Tablet 400 milliGRAM(s) Oral daily  levoFLOXacin  Tablet 500 milliGRAM(s) Oral every 24 hours  valACYclovir 500 milliGRAM(s) Oral two times a day      Heme / Onc:   cyclophosphamide IVPB (eMAR) 3275 milliGRAM(s) IV Intermittent once  mesna IVPB (eMAR) 1163 milliGRAM(s) IV Intermittent every 3 hours      GI:  loperamide 2 milliGRAM(s) Oral every 3 hours PRN  pantoprazole    Tablet 40 milliGRAM(s) Oral before breakfast  senna 2 Tablet(s) Oral at bedtime  sodium bicarbonate Mouth Rinse 10 milliLiter(s) Swish and Spit five times a day      Cardiovascular:   furosemide   Injectable 20 milliGRAM(s) IV Push every 24 hours  losartan 100 milliGRAM(s) Oral daily      Immunologic:       Other medications:   aprepitant 80 milliGRAM(s) Oral every 24 hours  Biotene Dry Mouth Oral Rinse 5 milliLiter(s) Swish and Spit five times a day  chlorhexidine 4% Liquid 1 Application(s) Topical <User Schedule>  escitalopram 20 milliGRAM(s) Oral daily  loratadine 10 milliGRAM(s) Oral daily  ondansetron  IVPB 8 milliGRAM(s) IV Intermittent every 8 hours  sodium chloride 0.45% 1000 milliLiter(s) IV Continuous <Continuous>  sodium chloride 0.9% 1000 milliLiter(s) IV Continuous <Continuous>  sodium chloride 0.9%. 1000 milliLiter(s) IV Continuous <Continuous>  sodium chloride 0.9%. 1000 milliLiter(s) IV Continuous <Continuous>      PRN:   acetaminophen     Tablet .. 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 Infectious Disease:   fluconAZOLE   Tablet 400 milliGRAM(s) Oral daily  levoFLOXacin  Tablet 500 milliGRAM(s) Oral every 24 hours  valACYclovir 500 milliGRAM(s) Oral two times a day    2. GI Prophylaxis:    pantoprazole    Tablet 40 milliGRAM(s) Oral before breakfast    3. Mouthcare - NS / NaHCO3 rinses, Mycelex, Biotene; Skin care     4. GVHD prophylaxis   TBI day -1   CTX days +3, +4  MMF, tacro to start on day +5     5. Transfuse & replete electrolytes prn   3/9 Kphos 15 mmol IV x 1 for hypophosphatemia    6. IV hydration, daily weights, strict I&O, prn diuresis     7. PO intake as tolerated, nutrition follow up as needed, MVI, folic acid     8. Antiemetics, anti-diarrhea medications:   metoclopramide Injectable 10 milliGRAM(s) IV Push every 6 hours PRN  ondansetron  IVPB 8 milliGRAM(s) IV Intermittent every 8 hours  aprepitant 80 milliGRAM(s) Oral every 24 hours    9. OOB as tolerated, physical therapy consult if needed     10. Monitor coags / fibrinogen 2x week, vitamin K as needed     11. Monitor closely for clinical changes, monitor for fevers     12. Emotional support provided, plan of care discussed and questions addressed     13. Patient education done regarding plan of care, restrictions and discharge planning     14. Continue regular social work input       I have written the above note for Dr. Harris who performed service with me in the room.   Gabby Pena PA-C ([phone])    I have seen and examined patient with PA, I agree with above note as scribed.                    HPC Transplant Team                                                      Critical / Counseling Time Provided: 30 minutes                                                                                                                                                        Chief Complaint: Haplo-identical BMT with FLU/CY/TBI prep for the treatment of ALL    S: Patient seen and examined with HPC Transplant Team:   + diarrhea overnight, watery (C dif  neg and GI PCR neg)  + poor appetite   + intermittent anusea improved with anti emetics    Remainder of ROS is negative    O: Vitals:   Vital Signs Last 24 Hrs  T(C): 37.2 (09 Mar 2024 09:14), Max: 37.3 (08 Mar 2024 13:30)  T(F): 99 (09 Mar 2024 09:14), Max: 99.1 (08 Mar 2024 13:30)  HR: 62 (09 Mar 2024 09:14) (58 - 71)  BP: 144/66 (09 Mar 2024 09:14) (120/74 - 152/83)  BP(mean): --  RR: 18 (09 Mar 2024 09:14) (18 - 19)  SpO2: 98% (09 Mar 2024 09:14) (94% - 98%)    Parameters below as of 09 Mar 2024 09:14  Patient On (Oxygen Delivery Method): room air      Admit weight: 96.9 kg  Daily Weight in k.5 (09 Mar 2024 08:18)      Intake / Output:    @ 07:  -   @ 07:00  --------------------------------------------------------  IN: 3354 mL / OUT: 1610 mL / NET: 1744 mL     @ 06:  -   @ 10:30  --------------------------------------------------------  IN: 1353 mL / OUT: 340 mL / NET: 1013 mL        PE:   Oropharynx: no erythema no ulcerations  Oral Mucositis:   -                                                     Grade: -  CVS: RRR, +S1,S2  Lungs: CTA throughout bilaterally   Abdomen: soft, ND, ND +bs  Extremities: no edema   Gastric Mucositis:      -                                            Grade: -  Intestinal Mucositis:     -                                         Grade: -  Skin:  no rash   TLC: C/D/I   Neuro: A&O x3  Pain: Denies        Labs:   CBC Full  -  ( 09 Mar 2024 07:23 )  WBC Count : 0.55 K/uL  Hemoglobin : 11.6 g/dL  Hematocrit : 34.9 %  Platelet Count - Automated : 51 K/uL  Mean Cell Volume : 96.9 fl  Mean Cell Hemoglobin : 32.2 pg  Mean Cell Hemoglobin Concentration : 33.2 gm/dL  Auto Neutrophil # : 0.31 K/uL  Auto Lymphocyte # : 0.00 K/uL  Auto Monocyte # : 0.03 K/uL  Auto Eosinophil # : 0.21 K/uL  Auto Basophil # : 0.00 K/uL  Auto Neutrophil % : 55.5 %  Auto Lymphocyte % : 0.0 %  Auto Monocyte % : 5.6 %  Auto Eosinophil % : 38.9 %  Auto Basophil % : 0.0 %                          11.6   0.55  )-----------( 51       ( 09 Mar 2024 07:23 )             34.9         139  |  107  |  9   ----------------------------<  89  3.9   |  25  |  0.75    Ca    8.1<L>      09 Mar 2024 07:23  Phos  2.2       Mg     2.0         TPro  5.1<L>  /  Alb  3.2<L>  /  TBili  0.5  /  DBili  x   /  AST  17  /  ALT  18  /  AlkPhos  88        LIVER FUNCTIONS - ( 09 Mar 2024 07:23 )  Alb: 3.2 g/dL / Pro: 5.1 g/dL / ALK PHOS: 88 U/L / ALT: 18 U/L / AST: 17 U/L / GGT: x           Lactate Dehydrogenase, Serum: 158 U/L ( @ 07:23)      Cultures/Micro:  C. difficile GDH &amp; toxins A/B by EIA (24 @ 08:07)    Clostridium difficile GDH Toxins A&amp;B, EIA:  Negative    GI PCR Panel Stool (24 @ 01:06)    GI PCR Panel: NotDetec:         Meds:   Antimicrobials:   fluconAZOLE   Tablet 400 milliGRAM(s) Oral daily  levoFLOXacin  Tablet 500 milliGRAM(s) Oral every 24 hours  valACYclovir 500 milliGRAM(s) Oral two times a day      Heme / Onc:   cyclophosphamide IVPB (eMAR) 3275 milliGRAM(s) IV Intermittent once  mesna IVPB (eMAR) 1163 milliGRAM(s) IV Intermittent every 3 hours      GI:  loperamide 2 milliGRAM(s) Oral every 3 hours PRN  pantoprazole    Tablet 40 milliGRAM(s) Oral before breakfast  senna 2 Tablet(s) Oral at bedtime  sodium bicarbonate Mouth Rinse 10 milliLiter(s) Swish and Spit five times a day      Cardiovascular:   furosemide   Injectable 20 milliGRAM(s) IV Push every 24 hours  losartan 100 milliGRAM(s) Oral daily      Immunologic:       Other medications:   aprepitant 80 milliGRAM(s) Oral every 24 hours  Biotene Dry Mouth Oral Rinse 5 milliLiter(s) Swish and Spit five times a day  chlorhexidine 4% Liquid 1 Application(s) Topical <User Schedule>  escitalopram 20 milliGRAM(s) Oral daily  loratadine 10 milliGRAM(s) Oral daily  ondansetron  IVPB 8 milliGRAM(s) IV Intermittent every 8 hours  sodium chloride 0.45% 1000 milliLiter(s) IV Continuous <Continuous>  sodium chloride 0.9% 1000 milliLiter(s) IV Continuous <Continuous>  sodium chloride 0.9%. 1000 milliLiter(s) IV Continuous <Continuous>  sodium chloride 0.9%. 1000 milliLiter(s) IV Continuous <Continuous>      PRN:   acetaminophen     Tablet .. 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 Infectious Disease:   fluconAZOLE   Tablet 400 milliGRAM(s) Oral daily  levoFLOXacin  Tablet 500 milliGRAM(s) Oral every 24 hours  valACYclovir 500 milliGRAM(s) Oral two times a day    2. GI Prophylaxis:    pantoprazole    Tablet 40 milliGRAM(s) Oral before breakfast    3. Mouthcare - NS / NaHCO3 rinses, Mycelex, Biotene; Skin care     4. GVHD prophylaxis   TBI day -1   CTX days +3, +4  MMF, tacro to start on day +5     5. Transfuse & replete electrolytes prn   3/9 Kphos 15 mmol IV x 1 for hypophosphatemia    6. IV hydration, daily weights, strict I&O, prn diuresis     7. PO intake as tolerated, nutrition follow up as needed, MVI, folic acid     8. Antiemetics, anti-diarrhea medications:   metoclopramide Injectable 10 milliGRAM(s) IV Push every 6 hours PRN  ondansetron  IVPB 8 milliGRAM(s) IV Intermittent every 8 hours  aprepitant 80 milliGRAM(s) Oral every 24 hours    9. OOB as tolerated, physical therapy consult if needed     10. Monitor coags / fibrinogen 2x week, vitamin K as needed     11. Monitor closely for clinical changes, monitor for fevers     12. Emotional support provided, plan of care discussed and questions addressed     13. Patient education done regarding plan of care, restrictions and discharge planning     14. Continue regular social work input       I have written the above note for Dr. Harris who performed service with me in the room.   Gabby Pena PA-C ([phone])    I have seen and examined patient with PA, I agree with above note as scribed.

## 2024-03-09 NOTE — PROGRESS NOTE ADULT - NS ATTEND AMEND GEN_ALL_CORE FT
..    Primary: Wilbur    Assessment: 64-year-old day + 2 CATRACHITA alloBMT using a Flu/Cy/TBI preparative regimen for [place] negative ALL.  Course uncomplicated.    Plan:  Heme: start preparative regimen  PLT goal > 10,000  Hgb goal > 7.0g/dL  G-CSF to start on day +5  will require post ALLO BMT CNS prophylaxis -- begins after engraftment.     GVHD: PTCy days 3 and 4, Cellcept and tacro to start on day +5    ID: day 0: flu, valtrex,   Levaquin started on 3/3 for ANC < 500  bactrim SS qd to start on day +21    Nutrition: tolerating PO    DVT prophylaxis: ambulation    Over 35 minutes were spent in direct patient care and care coordination. Primary: Wilbur    Assessment: 64-year-old day + 3 CATRACHITA alloBMT using a Flu/Cy/TBI preparative regimen for [place] negative ALL.  Course uncomplicated.    Plan:  Heme: start preparative regimen  PLT goal > 10,000  Hgb goal > 7.0g/dL  G-CSF to start on day +5  will require post ALLO BMT CNS prophylaxis -- begins after engraftment.     GVHD: PTCy days 3 and 4, Cellcept and tacro to start on day +5    ID: day 0: flu, valtrex,   Levaquin started on 3/3 for ANC < 500  bactrim SS qd to start on day +21  having diarrhea, Cdiff negative    Nutrition: tolerating PO    DVT prophylaxis: ambulation    Over 35 minutes were spent in direct patient care and care coordination.

## 2024-03-10 LAB
ALBUMIN SERPL ELPH-MCNC: 3.1 G/DL — LOW (ref 3.3–5)
ALP SERPL-CCNC: 97 U/L — SIGNIFICANT CHANGE UP (ref 40–120)
ALT FLD-CCNC: 38 U/L — SIGNIFICANT CHANGE UP (ref 10–45)
ANION GAP SERPL CALC-SCNC: 9 MMOL/L — SIGNIFICANT CHANGE UP (ref 5–17)
AST SERPL-CCNC: 35 U/L — SIGNIFICANT CHANGE UP (ref 10–40)
BILIRUB SERPL-MCNC: 0.7 MG/DL — SIGNIFICANT CHANGE UP (ref 0.2–1.2)
BUN SERPL-MCNC: 6 MG/DL — LOW (ref 7–23)
CALCIUM SERPL-MCNC: 8.2 MG/DL — LOW (ref 8.4–10.5)
CHLORIDE SERPL-SCNC: 106 MMOL/L — SIGNIFICANT CHANGE UP (ref 96–108)
CO2 SERPL-SCNC: 23 MMOL/L — SIGNIFICANT CHANGE UP (ref 22–31)
CREAT SERPL-MCNC: 0.7 MG/DL — SIGNIFICANT CHANGE UP (ref 0.5–1.3)
CULTURE RESULTS: SIGNIFICANT CHANGE UP
EGFR: 95 ML/MIN/1.73M2 — SIGNIFICANT CHANGE UP
GLUCOSE SERPL-MCNC: 110 MG/DL — HIGH (ref 70–99)
HCT VFR BLD CALC: 31 % — LOW (ref 34.5–45)
HGB BLD-MCNC: 11 G/DL — LOW (ref 11.5–15.5)
LDH SERPL L TO P-CCNC: 155 U/L — SIGNIFICANT CHANGE UP (ref 50–242)
MAGNESIUM SERPL-MCNC: 1.6 MG/DL — SIGNIFICANT CHANGE UP (ref 1.6–2.6)
MCHC RBC-ENTMCNC: 33.4 PG — SIGNIFICANT CHANGE UP (ref 27–34)
MCHC RBC-ENTMCNC: 35.5 GM/DL — SIGNIFICANT CHANGE UP (ref 32–36)
MCV RBC AUTO: 94.2 FL — SIGNIFICANT CHANGE UP (ref 80–100)
NRBC # BLD: 0 /100 WBCS — SIGNIFICANT CHANGE UP (ref 0–0)
PHOSPHATE SERPL-MCNC: 2.6 MG/DL — SIGNIFICANT CHANGE UP (ref 2.5–4.5)
PLATELET # BLD AUTO: 40 K/UL — LOW (ref 150–400)
POTASSIUM SERPL-MCNC: 3.6 MMOL/L — SIGNIFICANT CHANGE UP (ref 3.5–5.3)
POTASSIUM SERPL-SCNC: 3.6 MMOL/L — SIGNIFICANT CHANGE UP (ref 3.5–5.3)
PROT SERPL-MCNC: 5 G/DL — LOW (ref 6–8.3)
RBC # BLD: 3.29 M/UL — LOW (ref 3.8–5.2)
RBC # FLD: 12.1 % — SIGNIFICANT CHANGE UP (ref 10.3–14.5)
SODIUM SERPL-SCNC: 138 MMOL/L — SIGNIFICANT CHANGE UP (ref 135–145)
SP GR UR STRIP: 1 — SIGNIFICANT CHANGE UP (ref 1–1.03)
SPECIMEN SOURCE: SIGNIFICANT CHANGE UP
WBC # BLD: 0.33 K/UL — CRITICAL LOW (ref 3.8–10.5)
WBC # FLD AUTO: 0.33 K/UL — CRITICAL LOW (ref 3.8–10.5)

## 2024-03-10 PROCEDURE — 99233 SBSQ HOSP IP/OBS HIGH 50: CPT | Mod: FS

## 2024-03-10 PROCEDURE — 93010 ELECTROCARDIOGRAM REPORT: CPT

## 2024-03-10 RX ORDER — CYCLOPHOSPHAMIDE 100 MG
3275 VIAL (EA) INTRAVENOUS ONCE
Refills: 0 | Status: COMPLETED | OUTPATIENT
Start: 2024-03-10 | End: 2024-03-10

## 2024-03-10 RX ORDER — MESNA 100 MG/ML
1163 INJECTION, SOLUTION INTRAVENOUS
Refills: 0 | Status: COMPLETED | OUTPATIENT
Start: 2024-03-10 | End: 2024-03-11

## 2024-03-10 RX ORDER — FUROSEMIDE 40 MG
20 TABLET ORAL ONCE
Refills: 0 | Status: COMPLETED | OUTPATIENT
Start: 2024-03-10 | End: 2024-03-10

## 2024-03-10 RX ORDER — CALCIUM CARBONATE 500(1250)
1 TABLET ORAL THREE TIMES A DAY
Refills: 0 | Status: DISCONTINUED | OUTPATIENT
Start: 2024-03-10 | End: 2024-03-26

## 2024-03-10 RX ORDER — CALCIUM CARBONATE 500(1250)
1 TABLET ORAL ONCE
Refills: 0 | Status: COMPLETED | OUTPATIENT
Start: 2024-03-10 | End: 2024-03-10

## 2024-03-10 RX ADMIN — LOSARTAN POTASSIUM 100 MILLIGRAM(S): 100 TABLET, FILM COATED ORAL at 05:12

## 2024-03-10 RX ADMIN — Medication 5 MILLILITER(S): at 12:13

## 2024-03-10 RX ADMIN — CEFEPIME 100 MILLIGRAM(S): 1 INJECTION, POWDER, FOR SOLUTION INTRAMUSCULAR; INTRAVENOUS at 05:10

## 2024-03-10 RX ADMIN — Medication 2 MILLIGRAM(S): at 19:00

## 2024-03-10 RX ADMIN — MESNA 1163 MILLIGRAM(S): 100 INJECTION, SOLUTION INTRAVENOUS at 17:00

## 2024-03-10 RX ADMIN — Medication 5 MILLILITER(S): at 16:05

## 2024-03-10 RX ADMIN — ONDANSETRON 108 MILLIGRAM(S): 8 TABLET, FILM COATED ORAL at 05:11

## 2024-03-10 RX ADMIN — VALACYCLOVIR 500 MILLIGRAM(S): 500 TABLET, FILM COATED ORAL at 17:19

## 2024-03-10 RX ADMIN — Medication 1 TABLET(S): at 05:45

## 2024-03-10 RX ADMIN — Medication 5 MILLILITER(S): at 20:08

## 2024-03-10 RX ADMIN — SODIUM CHLORIDE 20 MILLILITER(S): 9 INJECTION INTRAMUSCULAR; INTRAVENOUS; SUBCUTANEOUS at 20:09

## 2024-03-10 RX ADMIN — PANTOPRAZOLE SODIUM 40 MILLIGRAM(S): 20 TABLET, DELAYED RELEASE ORAL at 05:11

## 2024-03-10 RX ADMIN — Medication 10 MILLILITER(S): at 16:05

## 2024-03-10 RX ADMIN — Medication 3275 MILLIGRAM(S): at 14:30

## 2024-03-10 RX ADMIN — FLUCONAZOLE 400 MILLIGRAM(S): 150 TABLET ORAL at 12:06

## 2024-03-10 RX ADMIN — Medication 20 MILLIGRAM(S): at 18:28

## 2024-03-10 RX ADMIN — Medication 5 MILLILITER(S): at 08:13

## 2024-03-10 RX ADMIN — CEFEPIME 100 MILLIGRAM(S): 1 INJECTION, POWDER, FOR SOLUTION INTRAMUSCULAR; INTRAVENOUS at 21:51

## 2024-03-10 RX ADMIN — Medication 10 MILLILITER(S): at 08:12

## 2024-03-10 RX ADMIN — Medication 10 MILLILITER(S): at 20:09

## 2024-03-10 RX ADMIN — CHLORHEXIDINE GLUCONATE 1 APPLICATION(S): 213 SOLUTION TOPICAL at 08:13

## 2024-03-10 RX ADMIN — Medication 10 MILLIGRAM(S): at 14:41

## 2024-03-10 RX ADMIN — ESCITALOPRAM OXALATE 20 MILLIGRAM(S): 10 TABLET, FILM COATED ORAL at 12:11

## 2024-03-10 RX ADMIN — Medication 650 MILLIGRAM(S): at 03:30

## 2024-03-10 RX ADMIN — APREPITANT 80 MILLIGRAM(S): 80 CAPSULE ORAL at 12:06

## 2024-03-10 RX ADMIN — Medication 10 MILLILITER(S): at 12:06

## 2024-03-10 RX ADMIN — SODIUM CHLORIDE 252 MILLILITER(S): 9 INJECTION, SOLUTION INTRAVENOUS at 20:09

## 2024-03-10 RX ADMIN — CEFEPIME 100 MILLIGRAM(S): 1 INJECTION, POWDER, FOR SOLUTION INTRAMUSCULAR; INTRAVENOUS at 12:12

## 2024-03-10 RX ADMIN — ONDANSETRON 108 MILLIGRAM(S): 8 TABLET, FILM COATED ORAL at 21:51

## 2024-03-10 RX ADMIN — MESNA 1163 MILLIGRAM(S): 100 INJECTION, SOLUTION INTRAVENOUS at 02:30

## 2024-03-10 RX ADMIN — LORATADINE 10 MILLIGRAM(S): 10 TABLET ORAL at 12:06

## 2024-03-10 RX ADMIN — ONDANSETRON 108 MILLIGRAM(S): 8 TABLET, FILM COATED ORAL at 12:12

## 2024-03-10 RX ADMIN — MESNA 1163 MILLIGRAM(S): 100 INJECTION, SOLUTION INTRAVENOUS at 14:00

## 2024-03-10 RX ADMIN — VALACYCLOVIR 500 MILLIGRAM(S): 500 TABLET, FILM COATED ORAL at 05:11

## 2024-03-10 RX ADMIN — MESNA 1163 MILLIGRAM(S): 100 INJECTION, SOLUTION INTRAVENOUS at 20:00

## 2024-03-10 RX ADMIN — MESNA 1163 MILLIGRAM(S): 100 INJECTION, SOLUTION INTRAVENOUS at 23:01

## 2024-03-10 RX ADMIN — Medication 20 MILLIGRAM(S): at 10:24

## 2024-03-10 RX ADMIN — Medication 650 MILLIGRAM(S): at 09:30

## 2024-03-10 NOTE — PROGRESS NOTE ADULT - NS ATTEND AMEND GEN_ALL_CORE FT
Primary: Wilbur    Assessment: 64-year-old day + 3 CATRACHITA alloBMT using a Flu/Cy/TBI preparative regimen for [place] negative ALL.  Course uncomplicated.    Plan:  Heme: start preparative regimen  PLT goal > 10,000  Hgb goal > 7.0g/dL  G-CSF to start on day +5  will require post ALLO BMT CNS prophylaxis -- begins after engraftment.     GVHD: PTCy days 3 and 4, Cellcept and tacro to start on day +5    ID: day 0: flu, valtrex,   Levaquin started on 3/3 for ANC < 500  bactrim SS qd to start on day +21  having diarrhea, Cdiff negative    Nutrition: tolerating PO    DVT prophylaxis: ambulation    Over 35 minutes were spent in direct patient care and care coordination. Primary: [place]    Assessment: 64-year-old day + 4 CATRACHITA alloBMT using a Flu/Cy/TBI preparative regimen for [place] negative ALL.  Course uncomplicated.    Plan:  Heme: start preparative regimen  PLT goal > 10,000  Hgb goal > 7.0g/dL  G-CSF to start on day +5  will require post ALLO BMT CNS prophylaxis -- begins after engraftment.     GVHD: PTCy days 3 and 4, Cellcept and tacro to start on day +5    ID: day 0: flu, valtrex,   Levaquin started on 3/3 for ANC < 500  bactrim SS qd to start on day +21  having diarrhea, Cdiff negative    Nutrition: tolerating PO    DVT prophylaxis: ambulation    Over 35 minutes were spent in direct patient care and care coordination.

## 2024-03-10 NOTE — PROGRESS NOTE ADULT - SUBJECTIVE AND OBJECTIVE BOX
O: Vitals:   Vital Signs Last 24 Hrs  T(C): 38.3 (10 Mar 2024 09:05), Max: 38.3 (09 Mar 2024 20:56)  T(F): 101 (10 Mar 2024 09:05), Max: 101 (10 Mar 2024 09:05)  HR: 76 (10 Mar 2024 09:05) (61 - 76)  BP: 152/79 (10 Mar 2024 09:05) (120/70 - 152/79)  BP(mean): --  RR: 18 (10 Mar 2024 09:05) (18 - 20)  SpO2: 97% (10 Mar 2024 09:05) (93% - 98%)    Parameters below as of 10 Mar 2024 09:05  Patient On (Oxygen Delivery Method): room air        Daily Weight in k.8 (10 Mar 2024 08:40)    Intake / Output:    @ 06:01  -  10 @ 07:00  --------------------------------------------------------  IN: 7238 mL / OUT: 5550 mL / NET: 1688 mL    03-10 @ 07:10 @ 10:22  --------------------------------------------------------  IN: 1234 mL / OUT: 200 mL / NET: 1034 mL          PE:                 Labs:   CBC Full  -  ( 10 Mar 2024 06:55 )  WBC Count : 0.33 K/uL  Hemoglobin : 11.0 g/dL  Hematocrit : 31.0 %  Platelet Count - Automated : 40 K/uL  Mean Cell Volume : 94.2 fl  Mean Cell Hemoglobin : 33.4 pg  Mean Cell Hemoglobin Concentration : 35.5 gm/dL  Auto Neutrophil # : x  Auto Lymphocyte # : x  Auto Monocyte # : x  Auto Eosinophil # : x  Auto Basophil # : x  Auto Neutrophil % : x  Auto Lymphocyte % : x  Auto Monocyte % : x  Auto Eosinophil % : x  Auto Basophil % : x                          11.0   0.33  )-----------( 40       ( 10 Mar 2024 06:55 )             31.0     03-10    138  |  106  |  6<L>  ----------------------------<  110<H>  3.6   |  23  |  0.70    Ca    8.2<L>      10 Mar 2024 06:55  Phos  2.6     03-10  Mg     1.6     -10    TPro  5.0<L>  /  Alb  3.1<L>  /  TBili  0.7  /  DBili  x   /  AST  35  /  ALT  38  /  AlkPhos  97  03-10      LIVER FUNCTIONS - ( 10 Mar 2024 06:55 )  Alb: 3.1 g/dL / Pro: 5.0 g/dL / ALK PHOS: 97 U/L / ALT: 38 U/L / AST: 35 U/L / GGT: x           Lactate Dehydrogenase, Serum: 155 U/L (03-10 @ 06:55)              Meds:   Antimicrobials:   cefepime   IVPB      cefepime   IVPB 2000 milliGRAM(s) IV Intermittent every 8 hours  fluconAZOLE   Tablet 400 milliGRAM(s) Oral daily  valACYclovir 500 milliGRAM(s) Oral two times a day      Heme / Onc:   cyclophosphamide IVPB (eMAR) 3275 milliGRAM(s) IV Intermittent once  mesna IVPB (eMAR) 1163 milliGRAM(s) IV Intermittent every 3 hours      GI:  loperamide 2 milliGRAM(s) Oral every 3 hours PRN  pantoprazole    Tablet 40 milliGRAM(s) Oral before breakfast  senna 2 Tablet(s) Oral at bedtime  sodium bicarbonate Mouth Rinse 10 milliLiter(s) Swish and Spit five times a day      Cardiovascular:   furosemide   Injectable 20 milliGRAM(s) IV Push once  furosemide   Injectable 20 milliGRAM(s) IV Push every 24 hours  losartan 100 milliGRAM(s) Oral daily      Immunologic:       Other medications:   aprepitant 80 milliGRAM(s) Oral every 24 hours  Biotene Dry Mouth Oral Rinse 5 milliLiter(s) Swish and Spit five times a day  chlorhexidine 4% Liquid 1 Application(s) Topical <User Schedule>  escitalopram 20 milliGRAM(s) Oral daily  loratadine 10 milliGRAM(s) Oral daily  ondansetron  IVPB 8 milliGRAM(s) IV Intermittent every 8 hours  sodium chloride 0.45% 1000 milliLiter(s) IV Continuous <Continuous>  sodium chloride 0.9% 1000 milliLiter(s) IV Continuous <Continuous>  sodium chloride 0.9%. 1000 milliLiter(s) IV Continuous <Continuous>  sodium chloride 0.9%. 1000 milliLiter(s) IV Continuous <Continuous>      PRN:   acetaminophen     Tablet .. 650 milliGRAM(s) Oral every 6 hours PRN  loperamide 2 milliGRAM(s) Oral every 3 hours PRN  metoclopramide Injectable 10 milliGRAM(s) IV Push every 6 hours PRN  sodium chloride 0.9% lock flush 10 milliLiter(s) IV Push every 1 hour PRN  zinc oxide 40% Paste 1 Application(s) Topical two times a day PRN                     [facility] Transplant Team                                                      Critical / Counseling Time Provided: 30 minutes                                                                                                                                                        Chief Complaint: Haplo-identical BMT with FLU/CY/TBI prep for the treatment of ALL    S: Patient seen and examined with \[facility]\"" Transplant Team:   + few loose stools over night but improves with imodium, no abdominal pain  + poor appetite   + intermittent nausea improved with anti emetics  + febrile overnight    Remainder of ROS is negative      O: Vitals:   Vital Signs Last 24 Hrs  T(C): 38.3 (10 Mar 2024 09:05), Max: 38.3 (09 Mar 2024 20:56)  T(F): 101 (10 Mar 2024 09:05), Max: 101 (10 Mar 2024 09:05)  HR: 76 (10 Mar 2024 09:05) (61 - 76)  BP: 152/79 (10 Mar 2024 09:05) (120/70 - 152/79)  BP(mean): --  RR: 18 (10 Mar 2024 09:05) (18 - 20)  SpO2: 97% (10 Mar 2024 09:05) (93% - 98%)    Parameters below as of 10 Mar 2024 09:05  Patient On (Oxygen Delivery Method): room air        Admit weight: 96.9 kg  Daily Weight in k.8 (10 Mar 2024 08:40)        Intake / Output:    @ 06:  -  03-10 @ 07:00  --------------------------------------------------------  IN: 7238 mL / OUT: 5550 mL / NET: 1688 mL    03-10 @ 07:  -  03-10 @ 10:22  --------------------------------------------------------  IN: 1234 mL / OUT: 200 mL / NET: 1034 mL          PE:   Oropharynx: no erythema no ulcerations  Oral Mucositis:   -                                                     Grade: -  CVS: RRR, +S1,S2  Lungs: CTA throughout bilaterally   Abdomen: soft, ND, ND +bs  Extremities: no edema   Gastric Mucositis:      -                                            Grade: -  Intestinal Mucositis:     -                                         Grade: -  Skin:  no rash   TLC: C/D/I   Neuro: A&O x3  Pain: Denies          Labs:   CBC Full  -  ( 10 Mar 2024 06:55 )  WBC Count : 0.33 K/uL  Hemoglobin : 11.0 g/dL  Hematocrit : 31.0 %  Platelet Count - Automated : 40 K/uL  Mean Cell Volume : 94.2 fl  Mean Cell Hemoglobin : 33.4 pg  Mean Cell Hemoglobin Concentration : 35.5 gm/dL  Auto Neutrophil # : x  Auto Lymphocyte # : x  Auto Monocyte # : x  Auto Eosinophil # : x  Auto Basophil # : x  Auto Neutrophil % : x  Auto Lymphocyte % : x  Auto Monocyte % : x  Auto Eosinophil % : x  Auto Basophil % : x                          11.0   0.33  )-----------( 40       ( 10 Mar 2024 06:55 )             31.0     03-10    138  |  106  |  6<L>  ----------------------------<  110<H>  3.6   |  23  |  0.70    Ca    8.2<L>      10 Mar 2024 06:55  Phos  2.6     03-10  Mg     1.6     03-10    TPro  5.0<L>  /  Alb  3.1<L>  /  TBili  0.7  /  DBili  x   /  AST  35  /  ALT  38  /  AlkPhos  97  03-10      LIVER FUNCTIONS - ( 10 Mar 2024 06:55 )  Alb: 3.1 g/dL / Pro: 5.0 g/dL / ALK PHOS: 97 U/L / ALT: 38 U/L / AST: 35 U/L / GGT: x           Lactate Dehydrogenase, Serum: 155 U/L (03-10 @ 06:55)          Cultures/Micro:  C. difficile GDH &amp; toxins A/B by EIA (24 @ 08:07)    Clostridium difficile GDH Toxins A&amp;B, EIA:  Negative    GI PCR Panel Stool (24 @ 01:06)    GI PCR Panel: NotDetec:             Meds:   Antimicrobials:   cefepime   IVPB      cefepime   IVPB 2000 milliGRAM(s) IV Intermittent every 8 hours  fluconAZOLE   Tablet 400 milliGRAM(s) Oral daily  valACYclovir 500 milliGRAM(s) Oral two times a day      Heme / Onc:   cyclophosphamide IVPB (eMAR) 3275 milliGRAM(s) IV Intermittent once  mesna IVPB (eMAR) 1163 milliGRAM(s) IV Intermittent every 3 hours      GI:  loperamide 2 milliGRAM(s) Oral every 3 hours PRN  pantoprazole    Tablet 40 milliGRAM(s) Oral before breakfast  senna 2 Tablet(s) Oral at bedtime  sodium bicarbonate Mouth Rinse 10 milliLiter(s) Swish and Spit five times a day      Cardiovascular:   furosemide   Injectable 20 milliGRAM(s) IV Push once  furosemide   Injectable 20 milliGRAM(s) IV Push every 24 hours  losartan 100 milliGRAM(s) Oral daily      Immunologic:       Other medications:   aprepitant 80 milliGRAM(s) Oral every 24 hours  Biotene Dry Mouth Oral Rinse 5 milliLiter(s) Swish and Spit five times a day  chlorhexidine 4% Liquid 1 Application(s) Topical <User Schedule>  escitalopram 20 milliGRAM(s) Oral daily  loratadine 10 milliGRAM(s) Oral daily  ondansetron  IVPB 8 milliGRAM(s) IV Intermittent every 8 hours  sodium chloride 0.45% 1000 milliLiter(s) IV Continuous <Continuous>  sodium chloride 0.9% 1000 milliLiter(s) IV Continuous <Continuous>  sodium chloride 0.9%. 1000 milliLiter(s) IV Continuous <Continuous>  sodium chloride 0.9%. 1000 milliLiter(s) IV Continuous <Continuous>      PRN:   acetaminophen     Tablet .. 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 Infectious Disease:   fluconAZOLE   Tablet 400 milliGRAM(s) Oral daily  cefepime   IVPB 2000 milliGRAM(s) IV Intermittent every 8 hours  valACYclovir 500 milliGRAM(s) Oral two times a day    2. GI Prophylaxis:    pantoprazole    Tablet 40 milliGRAM(s) Oral before breakfast    3. Mouthcare - NS / NaHCO3 rinses, Mycelex, Biotene; Skin care     4. GVHD prophylaxis   TBI day -1   CTX days +3, +4  MMF, tacro to start on day +5     5. Transfuse & replete electrolytes prn     6. IV hydration, daily weights, strict I&O, prn diuresis   Lasix 20 mg IV x 1 for weight gain/elevated SG    7. PO intake as tolerated, nutrition follow up as needed, MVI, folic acid     8. Antiemetics, anti-diarrhea medications:   metoclopramide Injectable 10 milliGRAM(s) IV Push every 6 hours PRN  ondansetron  IVPB 8 milliGRAM(s) IV Intermittent every 8 hours  aprepitant 80 milliGRAM(s) Oral every 24 hours    9. OOB as tolerated, physical therapy consult if needed     10. Monitor coags / fibrinogen 2x week, vitamin K as needed     11. Monitor closely for clinical changes, monitor for fevers     12. Emotional support provided, plan of care discussed and questions addressed     13. Patient education done regarding plan of care, restrictions and discharge planning     14. Continue regular social work input     I have written the above note for Dr. Harris who performed service with me in the room.   Gabby Pena PA-C ([phone])    I have seen and examined patient with PA, I agree with above note as scribed.                [facility] Transplant Team                                                      Critical / Counseling Time Provided: 30 minutes                                                                                                                                                        Chief Complaint: Haplo-identical BMT with FLU/CY/TBI prep for the treatment of ALL    S: Patient seen and examined with [facility] Transplant Team:   + few loose stools over night but improves with imodium, no abdominal pain  + poor appetite   + intermittent nausea improved with anti emetics  + febrile overnight    Remainder of ROS is negative      O: Vitals:   Vital Signs Last 24 Hrs  T(C): 38.3 (10 Mar 2024 09:05), Max: 38.3 (09 Mar 2024 20:56)  T(F): 101 (10 Mar 2024 09:05), Max: 101 (10 Mar 2024 09:05)  HR: 76 (10 Mar 2024 09:05) (61 - 76)  BP: 152/79 (10 Mar 2024 09:05) (120/70 - 152/79)  BP(mean): --  RR: 18 (10 Mar 2024 09:05) (18 - 20)  SpO2: 97% (10 Mar 2024 09:05) (93% - 98%)    Parameters below as of 10 Mar 2024 09:05  Patient On (Oxygen Delivery Method): room air        Admit weight: 96.9 kg  Daily Weight in k.8 (10 Mar 2024 08:40)        Intake / Output:    @ 06:  -  03-10 @ 07:00  --------------------------------------------------------  IN: 7238 mL / OUT: 5550 mL / NET: 1688 mL    03-10 @ 07:  -  03-10 @ 10:22  --------------------------------------------------------  IN: 1234 mL / OUT: 200 mL / NET: 1034 mL          PE:   Oropharynx: no erythema no ulcerations  Oral Mucositis:   -                                                     Grade: -  CVS: RRR, +S1,S2  Lungs: CTA throughout bilaterally   Abdomen: soft, ND, ND +bs  Extremities: no edema   Gastric Mucositis:      -                                            Grade: -  Intestinal Mucositis:     -                                         Grade: -  Skin:  no rash   TLC: C/D/I   Neuro: A&O x3  Pain: Denies          Labs:   CBC Full  -  ( 10 Mar 2024 06:55 )  WBC Count : 0.33 K/uL  Hemoglobin : 11.0 g/dL  Hematocrit : 31.0 %  Platelet Count - Automated : 40 K/uL  Mean Cell Volume : 94.2 fl  Mean Cell Hemoglobin : 33.4 pg  Mean Cell Hemoglobin Concentration : 35.5 gm/dL  Auto Neutrophil # : x  Auto Lymphocyte # : x  Auto Monocyte # : x  Auto Eosinophil # : x  Auto Basophil # : x  Auto Neutrophil % : x  Auto Lymphocyte % : x  Auto Monocyte % : x  Auto Eosinophil % : x  Auto Basophil % : x                          11.0   0.33  )-----------( 40       ( 10 Mar 2024 06:55 )             31.0     03-10    138  |  106  |  6<L>  ----------------------------<  110<H>  3.6   |  23  |  0.70    Ca    8.2<L>      10 Mar 2024 06:55  Phos  2.6     03-10  Mg     1.6     03-10    TPro  5.0<L>  /  Alb  3.1<L>  /  TBili  0.7  /  DBili  x   /  AST  35  /  ALT  38  /  AlkPhos  97  03-10      LIVER FUNCTIONS - ( 10 Mar 2024 06:55 )  Alb: 3.1 g/dL / Pro: 5.0 g/dL / ALK PHOS: 97 U/L / ALT: 38 U/L / AST: 35 U/L / GGT: x           Lactate Dehydrogenase, Serum: 155 U/L (03-10 @ 06:55)          Cultures/Micro:  C. difficile GDH &amp; toxins A/B by EIA (24 @ 08:07)    Clostridium difficile GDH Toxins A&amp;B, EIA:  Negative    GI PCR Panel Stool (24 @ 01:06)    GI PCR Panel: NotDetec:       Radiology:  Xray Chest 1 View- PORTABLE-Urgent (Xray Chest 1 View- PORTABLE-Urgent .) (24 @ 23:44)   IMPRESSION:  Small left pleural effusion/linear atelectasis.  Mild, central pulmonary venous congestion, new          Meds:   Antimicrobials:   cefepime   IVPB      cefepime   IVPB 2000 milliGRAM(s) IV Intermittent every 8 hours  fluconAZOLE   Tablet 400 milliGRAM(s) Oral daily  valACYclovir 500 milliGRAM(s) Oral two times a day      Heme / Onc:   cyclophosphamide IVPB (eMAR) 3275 milliGRAM(s) IV Intermittent once  mesna IVPB (eMAR) 1163 milliGRAM(s) IV Intermittent every 3 hours      GI:  loperamide 2 milliGRAM(s) Oral every 3 hours PRN  pantoprazole    Tablet 40 milliGRAM(s) Oral before breakfast  senna 2 Tablet(s) Oral at bedtime  sodium bicarbonate Mouth Rinse 10 milliLiter(s) Swish and Spit five times a day      Cardiovascular:   furosemide   Injectable 20 milliGRAM(s) IV Push once  furosemide   Injectable 20 milliGRAM(s) IV Push every 24 hours  losartan 100 milliGRAM(s) Oral daily      Immunologic:       Other medications:   aprepitant 80 milliGRAM(s) Oral every 24 hours  Biotene Dry Mouth Oral Rinse 5 milliLiter(s) Swish and Spit five times a day  chlorhexidine 4% Liquid 1 Application(s) Topical <User Schedule>  escitalopram 20 milliGRAM(s) Oral daily  loratadine 10 milliGRAM(s) Oral daily  ondansetron  IVPB 8 milliGRAM(s) IV Intermittent every 8 hours  sodium chloride 0.45% 1000 milliLiter(s) IV Continuous <Continuous>  sodium chloride 0.9% 1000 milliLiter(s) IV Continuous <Continuous>  sodium chloride 0.9%. 1000 milliLiter(s) IV Continuous <Continuous>  sodium chloride 0.9%. 1000 milliLiter(s) IV Continuous <Continuous>      PRN:   acetaminophen     Tablet .. 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 Mild, central pulmonary venous congestion, new.  3/9 BCx and UCx, follow up    1. Infectious Disease:   fluconAZOLE   Tablet 400 milliGRAM(s) Oral daily  cefepime   IVPB 2000 milliGRAM(s) IV Intermittent every 8 hours  valACYclovir 500 milliGRAM(s) Oral two times a day    2. GI Prophylaxis:    pantoprazole    Tablet 40 milliGRAM(s) Oral before breakfast    3. Mouthcare - NS / NaHCO3 rinses, Mycelex, Biotene; Skin care     4. GVHD prophylaxis   TBI day -1   CTX days +3, +4  MMF, tacro to start on day +5     5. Transfuse & replete electrolytes prn     6. IV hydration, daily weights, strict I&O, prn diuresis   3/10 Lasix 20 mg IV x 1 for weight gain/new pulm congestion on CXR    7. PO intake as tolerated, nutrition follow up as needed, MVI, folic acid     8. Antiemetics, anti-diarrhea medications:   metoclopramide Injectable 10 milliGRAM(s) IV Push every 6 hours PRN  ondansetron  IVPB 8 milliGRAM(s) IV Intermittent every 8 hours  aprepitant 80 milliGRAM(s) Oral every 24 hours    9. OOB as tolerated, physical therapy consult if needed     10. Monitor coags / fibrinogen 2x week, vitamin K as needed     11. Monitor closely for clinical changes, monitor for fevers     12. Emotional support provided, plan of care discussed and questions addressed     13. Patient education done regarding plan of care, restrictions and discharge planning     14. Continue regular social work input     I have written the above note for Dr. Harris who performed service with me in the room.   Gabby Pena PA-C ([phone])    I have seen and examined patient with PA, I agree with above note as scribed.                [facility] Transplant Team                                                      Critical / Counseling Time Provided: 30 minutes                                                                                                                                                        Chief Complaint: Haplo-identical BMT with FLU/CY/TBI prep for the treatment of ALL    S: Patient seen and examined with [facility] Transplant Team:   + few loose stools over night but improves with imodium, no abdominal pain  + poor appetite   + intermittent nausea improved with anti emetics  + febrile overnight    Remainder of ROS is negative      O: Vitals:   Vital Signs Last 24 Hrs  T(C): 38.3 (10 Mar 2024 09:05), Max: 38.3 (09 Mar 2024 20:56)  T(F): 101 (10 Mar 2024 09:05), Max: 101 (10 Mar 2024 09:05)  HR: 76 (10 Mar 2024 09:05) (61 - 76)  BP: 152/79 (10 Mar 2024 09:05) (120/70 - 152/79)  BP(mean): --  RR: 18 (10 Mar 2024 09:05) (18 - 20)  SpO2: 97% (10 Mar 2024 09:05) (93% - 98%)    Parameters below as of 10 Mar 2024 09:05  Patient On (Oxygen Delivery Method): room air        Admit weight: 96.9 kg  Daily Weight in k.8 (10 Mar 2024 08:40)        Intake / Output:    @ 06:  -  03-10 @ 07:00  --------------------------------------------------------  IN: 7238 mL / OUT: 5550 mL / NET: 1688 mL    03-10 @ 07:  -  03-10 @ 10:22  --------------------------------------------------------  IN: 1234 mL / OUT: 200 mL / NET: 1034 mL          PE:   Oropharynx: no erythema no ulcerations  Oral Mucositis:   -                                                     Grade: -  CVS: RRR, +S1,S2  Lungs: CTA throughout bilaterally   Abdomen: soft, ND, ND +bs  Extremities: no edema   Gastric Mucositis:      -                                            Grade: -  Intestinal Mucositis:     -                                         Grade: -  Skin: small area of petechiae on the medial aspect of the left knee and diffusely over the left medial ankle and anterior shin  TLC: C/D/I   Neuro: A&O x3  Pain: Denies          Labs:   CBC Full  -  ( 10 Mar 2024 06:55 )  WBC Count : 0.33 K/uL  Hemoglobin : 11.0 g/dL  Hematocrit : 31.0 %  Platelet Count - Automated : 40 K/uL  Mean Cell Volume : 94.2 fl  Mean Cell Hemoglobin : 33.4 pg  Mean Cell Hemoglobin Concentration : 35.5 gm/dL  Auto Neutrophil # : x  Auto Lymphocyte # : x  Auto Monocyte # : x  Auto Eosinophil # : x  Auto Basophil # : x  Auto Neutrophil % : x  Auto Lymphocyte % : x  Auto Monocyte % : x  Auto Eosinophil % : x  Auto Basophil % : x                          11.0   0.33  )-----------( 40       ( 10 Mar 2024 06:55 )             31.0     03-10    138  |  106  |  6<L>  ----------------------------<  110<H>  3.6   |  23  |  0.70    Ca    8.2<L>      10 Mar 2024 06:55  Phos  2.6     -10  Mg     1.6     10    TPro  5.0<L>  /  Alb  3.1<L>  /  TBili  0.7  /  DBili  x   /  AST  35  /  ALT  38  /  AlkPhos  97  03-10      LIVER FUNCTIONS - ( 10 Mar 2024 06:55 )  Alb: 3.1 g/dL / Pro: 5.0 g/dL / ALK PHOS: 97 U/L / ALT: 38 U/L / AST: 35 U/L / GGT: x           Lactate Dehydrogenase, Serum: 155 U/L (03-10 @ 06:55)          Cultures/Micro:  C. difficile GDH &amp; toxins A/B by EIA (24 @ 08:07)    Clostridium difficile GDH Toxins A&amp;B, EIA:  Negative    GI PCR Panel Stool (24 @ 01:06)    GI PCR Panel: NotDetec:       Radiology:  Xray Chest 1 View- PORTABLE-Urgent (Xray Chest 1 View- PORTABLE-Urgent .) (24 @ 23:44)   IMPRESSION:  Small left pleural effusion/linear atelectasis.  Mild, central pulmonary venous congestion, new          Meds:   Antimicrobials:   cefepime   IVPB      cefepime   IVPB 2000 milliGRAM(s) IV Intermittent every 8 hours  fluconAZOLE   Tablet 400 milliGRAM(s) Oral daily  valACYclovir 500 milliGRAM(s) Oral two times a day      Heme / Onc:   cyclophosphamide IVPB (eMAR) 3275 milliGRAM(s) IV Intermittent once  mesna IVPB (eMAR) 1163 milliGRAM(s) IV Intermittent every 3 hours      GI:  loperamide 2 milliGRAM(s) Oral every 3 hours PRN  pantoprazole    Tablet 40 milliGRAM(s) Oral before breakfast  senna 2 Tablet(s) Oral at bedtime  sodium bicarbonate Mouth Rinse 10 milliLiter(s) Swish and Spit five times a day      Cardiovascular:   furosemide   Injectable 20 milliGRAM(s) IV Push once  furosemide   Injectable 20 milliGRAM(s) IV Push every 24 hours  losartan 100 milliGRAM(s) Oral daily      Immunologic:       Other medications:   aprepitant 80 milliGRAM(s) Oral every 24 hours  Biotene Dry Mouth Oral Rinse 5 milliLiter(s) Swish and Spit five times a day  chlorhexidine 4% Liquid 1 Application(s) Topical <User Schedule>  escitalopram 20 milliGRAM(s) Oral daily  loratadine 10 milliGRAM(s) Oral daily  ondansetron  IVPB 8 milliGRAM(s) IV Intermittent every 8 hours  sodium chloride 0.45% 1000 milliLiter(s) IV Continuous <Continuous>  sodium chloride 0.9% 1000 milliLiter(s) IV Continuous <Continuous>  sodium chloride 0.9%. 1000 milliLiter(s) IV Continuous <Continuous>  sodium chloride 0.9%. 1000 milliLiter(s) IV Continuous <Continuous>      PRN:   acetaminophen     Tablet .. 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 Mild, central pulmonary venous congestion, new.  3/9 BCx and UCx, follow up    1. Infectious Disease:   fluconAZOLE   Tablet 400 milliGRAM(s) Oral daily  cefepime   IVPB 2000 milliGRAM(s) IV Intermittent every 8 hours  valACYclovir 500 milliGRAM(s) Oral two times a day    2. GI Prophylaxis:    pantoprazole    Tablet 40 milliGRAM(s) Oral before breakfast    3. Mouthcare - NS / NaHCO3 rinses, Mycelex, Biotene; Skin care     4. GVHD prophylaxis   TBI day -1   CTX days +3, +4  MMF, tacro to start on day +5     5. Transfuse & replete electrolytes prn     6. IV hydration, daily weights, strict I&O, prn diuresis   3/10 Lasix 20 mg IV x 1 for weight gain/new pulm congestion on CXR    7. PO intake as tolerated, nutrition follow up as needed, MVI, folic acid     8. Antiemetics, anti-diarrhea medications:   metoclopramide Injectable 10 milliGRAM(s) IV Push every 6 hours PRN  ondansetron  IVPB 8 milliGRAM(s) IV Intermittent every 8 hours  aprepitant 80 milliGRAM(s) Oral every 24 hours    9. OOB as tolerated, physical therapy consult if needed     10. Monitor coags / fibrinogen 2x week, vitamin K as needed     11. Monitor closely for clinical changes, monitor for fevers     12. Emotional support provided, plan of care discussed and questions addressed     13. Patient education done regarding plan of care, restrictions and discharge planning     14. Continue regular social work input     I have written the above note for Dr. Harris who performed service with me in the room.   Gabby Pena PA-C ([phone])    I have seen and examined patient with PA, I agree with above note as scribed.

## 2024-03-11 LAB
ADD ON TEST-SPECIMEN IN LAB: SIGNIFICANT CHANGE UP
ALBUMIN SERPL ELPH-MCNC: 3.1 G/DL — LOW (ref 3.3–5)
ALP SERPL-CCNC: 134 U/L — HIGH (ref 40–120)
ALT FLD-CCNC: 136 U/L — HIGH (ref 10–45)
ANION GAP SERPL CALC-SCNC: 11 MMOL/L — SIGNIFICANT CHANGE UP (ref 5–17)
APTT BLD: 26.7 SEC — SIGNIFICANT CHANGE UP (ref 24.5–35.6)
AST SERPL-CCNC: 143 U/L — HIGH (ref 10–40)
BILIRUB DIRECT SERPL-MCNC: 1.3 MG/DL — HIGH (ref 0–0.3)
BILIRUB SERPL-MCNC: 1.8 MG/DL — HIGH (ref 0.2–1.2)
BUN SERPL-MCNC: 7 MG/DL — SIGNIFICANT CHANGE UP (ref 7–23)
CALCIUM SERPL-MCNC: 8.3 MG/DL — LOW (ref 8.4–10.5)
CHLORIDE SERPL-SCNC: 108 MMOL/L — SIGNIFICANT CHANGE UP (ref 96–108)
CMV DNA CSF QL NAA+PROBE: SIGNIFICANT CHANGE UP IU/ML
CMV DNA SPEC NAA+PROBE-LOG#: SIGNIFICANT CHANGE UP LOG10IU/ML
CO2 SERPL-SCNC: 22 MMOL/L — SIGNIFICANT CHANGE UP (ref 22–31)
CREAT SERPL-MCNC: 0.65 MG/DL — SIGNIFICANT CHANGE UP (ref 0.5–1.3)
EGFR: 97 ML/MIN/1.73M2 — SIGNIFICANT CHANGE UP
FIBRINOGEN PPP-MCNC: 380 MG/DL — SIGNIFICANT CHANGE UP (ref 200–445)
GLUCOSE SERPL-MCNC: 86 MG/DL — SIGNIFICANT CHANGE UP (ref 70–99)
HCT VFR BLD CALC: 30.5 % — LOW (ref 34.5–45)
HGB BLD-MCNC: 10.6 G/DL — LOW (ref 11.5–15.5)
INR BLD: 1.28 RATIO — HIGH (ref 0.85–1.18)
LDH SERPL L TO P-CCNC: 222 U/L — SIGNIFICANT CHANGE UP (ref 50–242)
MAGNESIUM SERPL-MCNC: 1.6 MG/DL — SIGNIFICANT CHANGE UP (ref 1.6–2.6)
MCHC RBC-ENTMCNC: 32.8 PG — SIGNIFICANT CHANGE UP (ref 27–34)
MCHC RBC-ENTMCNC: 34.8 GM/DL — SIGNIFICANT CHANGE UP (ref 32–36)
MCV RBC AUTO: 94.4 FL — SIGNIFICANT CHANGE UP (ref 80–100)
NRBC # BLD: 0 /100 WBCS — SIGNIFICANT CHANGE UP (ref 0–0)
PHOSPHATE SERPL-MCNC: 2.3 MG/DL — LOW (ref 2.5–4.5)
PLATELET # BLD AUTO: 34 K/UL — LOW (ref 150–400)
POTASSIUM SERPL-MCNC: 3.6 MMOL/L — SIGNIFICANT CHANGE UP (ref 3.5–5.3)
POTASSIUM SERPL-SCNC: 3.6 MMOL/L — SIGNIFICANT CHANGE UP (ref 3.5–5.3)
PROT SERPL-MCNC: 5 G/DL — LOW (ref 6–8.3)
PROTHROM AB SERPL-ACNC: 13.3 SEC — HIGH (ref 9.5–13)
RBC # BLD: 3.23 M/UL — LOW (ref 3.8–5.2)
RBC # FLD: 12.5 % — SIGNIFICANT CHANGE UP (ref 10.3–14.5)
SODIUM SERPL-SCNC: 141 MMOL/L — SIGNIFICANT CHANGE UP (ref 135–145)
WBC # BLD: 0.25 K/UL — CRITICAL LOW (ref 3.8–10.5)
WBC # FLD AUTO: 0.25 K/UL — CRITICAL LOW (ref 3.8–10.5)

## 2024-03-11 PROCEDURE — 99233 SBSQ HOSP IP/OBS HIGH 50: CPT | Mod: FS

## 2024-03-11 RX ORDER — POTASSIUM CHLORIDE 20 MEQ
20 PACKET (EA) ORAL
Refills: 0 | Status: COMPLETED | OUTPATIENT
Start: 2024-03-11 | End: 2024-03-11

## 2024-03-11 RX ORDER — FUROSEMIDE 40 MG
40 TABLET ORAL ONCE
Refills: 0 | Status: COMPLETED | OUTPATIENT
Start: 2024-03-11 | End: 2024-03-11

## 2024-03-11 RX ORDER — POTASSIUM PHOSPHATE, MONOBASIC POTASSIUM PHOSPHATE, DIBASIC 236; 224 MG/ML; MG/ML
15 INJECTION, SOLUTION INTRAVENOUS ONCE
Refills: 0 | Status: COMPLETED | OUTPATIENT
Start: 2024-03-11 | End: 2024-03-11

## 2024-03-11 RX ADMIN — Medication 10 MILLILITER(S): at 20:13

## 2024-03-11 RX ADMIN — Medication 50 MILLIEQUIVALENT(S): at 12:04

## 2024-03-11 RX ADMIN — Medication 5 MILLILITER(S): at 12:08

## 2024-03-11 RX ADMIN — TACROLIMUS 2 MILLIGRAM(S): 5 CAPSULE ORAL at 17:09

## 2024-03-11 RX ADMIN — Medication 650 MILLIGRAM(S): at 01:00

## 2024-03-11 RX ADMIN — MYCOPHENOLATE MOFETIL 1000 MILLIGRAM(S): 250 CAPSULE ORAL at 13:57

## 2024-03-11 RX ADMIN — Medication 10 MILLILITER(S): at 00:14

## 2024-03-11 RX ADMIN — Medication 5 MILLILITER(S): at 08:12

## 2024-03-11 RX ADMIN — PANTOPRAZOLE SODIUM 40 MILLIGRAM(S): 20 TABLET, DELAYED RELEASE ORAL at 05:26

## 2024-03-11 RX ADMIN — ONDANSETRON 108 MILLIGRAM(S): 8 TABLET, FILM COATED ORAL at 05:23

## 2024-03-11 RX ADMIN — Medication 40 MILLIGRAM(S): at 08:18

## 2024-03-11 RX ADMIN — Medication 650 MILLIGRAM(S): at 01:30

## 2024-03-11 RX ADMIN — VALACYCLOVIR 500 MILLIGRAM(S): 500 TABLET, FILM COATED ORAL at 05:24

## 2024-03-11 RX ADMIN — MYCOPHENOLATE MOFETIL 1000 MILLIGRAM(S): 250 CAPSULE ORAL at 05:24

## 2024-03-11 RX ADMIN — Medication 650 MILLIGRAM(S): at 21:13

## 2024-03-11 RX ADMIN — CEFEPIME 100 MILLIGRAM(S): 1 INJECTION, POWDER, FOR SOLUTION INTRAMUSCULAR; INTRAVENOUS at 22:18

## 2024-03-11 RX ADMIN — Medication 1 TABLET(S): at 08:12

## 2024-03-11 RX ADMIN — MYCOPHENOLATE MOFETIL 1000 MILLIGRAM(S): 250 CAPSULE ORAL at 22:19

## 2024-03-11 RX ADMIN — Medication 10 MILLILITER(S): at 16:09

## 2024-03-11 RX ADMIN — Medication 5 MILLILITER(S): at 20:13

## 2024-03-11 RX ADMIN — APREPITANT 80 MILLIGRAM(S): 80 CAPSULE ORAL at 12:07

## 2024-03-11 RX ADMIN — VALACYCLOVIR 500 MILLIGRAM(S): 500 TABLET, FILM COATED ORAL at 17:09

## 2024-03-11 RX ADMIN — Medication 50 MILLIEQUIVALENT(S): at 08:24

## 2024-03-11 RX ADMIN — TACROLIMUS 2 MILLIGRAM(S): 5 CAPSULE ORAL at 05:24

## 2024-03-11 RX ADMIN — CEFEPIME 100 MILLIGRAM(S): 1 INJECTION, POWDER, FOR SOLUTION INTRAMUSCULAR; INTRAVENOUS at 13:58

## 2024-03-11 RX ADMIN — Medication 30 MILLILITER(S): at 17:28

## 2024-03-11 RX ADMIN — Medication 480 MICROGRAM(S): at 12:11

## 2024-03-11 RX ADMIN — Medication 1 TABLET(S): at 05:36

## 2024-03-11 RX ADMIN — MESNA 1163 MILLIGRAM(S): 100 INJECTION, SOLUTION INTRAVENOUS at 02:00

## 2024-03-11 RX ADMIN — ONDANSETRON 108 MILLIGRAM(S): 8 TABLET, FILM COATED ORAL at 22:40

## 2024-03-11 RX ADMIN — Medication 50 MILLIEQUIVALENT(S): at 13:58

## 2024-03-11 RX ADMIN — POTASSIUM PHOSPHATE, MONOBASIC POTASSIUM PHOSPHATE, DIBASIC 62.5 MILLIMOLE(S): 236; 224 INJECTION, SOLUTION INTRAVENOUS at 08:22

## 2024-03-11 RX ADMIN — Medication 10 MILLILITER(S): at 12:08

## 2024-03-11 RX ADMIN — ESCITALOPRAM OXALATE 20 MILLIGRAM(S): 10 TABLET, FILM COATED ORAL at 12:10

## 2024-03-11 RX ADMIN — CEFEPIME 100 MILLIGRAM(S): 1 INJECTION, POWDER, FOR SOLUTION INTRAMUSCULAR; INTRAVENOUS at 05:23

## 2024-03-11 RX ADMIN — Medication 5 MILLILITER(S): at 16:09

## 2024-03-11 RX ADMIN — Medication 650 MILLIGRAM(S): at 20:13

## 2024-03-11 RX ADMIN — Medication 10 MILLILITER(S): at 08:12

## 2024-03-11 RX ADMIN — LOSARTAN POTASSIUM 100 MILLIGRAM(S): 100 TABLET, FILM COATED ORAL at 05:24

## 2024-03-11 RX ADMIN — Medication 5 MILLILITER(S): at 00:15

## 2024-03-11 RX ADMIN — ONDANSETRON 108 MILLIGRAM(S): 8 TABLET, FILM COATED ORAL at 13:58

## 2024-03-11 RX ADMIN — LORATADINE 10 MILLIGRAM(S): 10 TABLET ORAL at 12:07

## 2024-03-11 RX ADMIN — CHLORHEXIDINE GLUCONATE 1 APPLICATION(S): 213 SOLUTION TOPICAL at 08:13

## 2024-03-11 RX ADMIN — LIDOCAINE AND PRILOCAINE CREAM 1 APPLICATION(S): 25; 25 CREAM TOPICAL at 12:11

## 2024-03-11 NOTE — PROVIDER CONTACT NOTE (OTHER) - RECOMMENDATIONS
notify provider, and JUAN Power ordered Tums, and pt. was seen and examined by provider.
Lasix
notify provider, give tylenol 650mgs po, draw 2 sets of bld. cultures, chest  X- ray, UA, Urine C&S and start IV antibiotics.as ordered by  JUAN Power
notify provider , give po tylenol 650mgs

## 2024-03-11 NOTE — PROVIDER CONTACT NOTE (OTHER) - SITUATION
Decreased urine output
pt. complained of abd. discomfort , like an acid reflux
pt. had a temperature 100.9
pt. had a temperature 100.4 , last bld culture was 3/9/24 at 2100H and pt. is on cefepime

## 2024-03-11 NOTE — PROVIDER CONTACT NOTE (OTHER) - REASON
temp. 100.4
pt. complained of abd. discomfort like an acid reflux
decreased urine output
temperature 100.9

## 2024-03-11 NOTE — PROGRESS NOTE ADULT - NS ATTEND AMEND GEN_ALL_CORE FT
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  Course uncomplicated.    Plan:  Heme: start preparative regimen  PLT goal > 10,000  Hgb goal > 7.0g/dL  G-CSF to start on day +5  will require post ALLO BMT CNS prophylaxis -- begins after engraftment.     GVHD: PTCy days 3 and 4, Cellcept and tacro to start on day +5    ID: day 0: flu, valtrex,   Levaquin started on 3/3 for ANC < 500  bactrim SS qd to start on day +21  having diarrhea, Cdiff negative    Nutrition: tolerating PO    DVT prophylaxis: ambulation    Over 35 minutes were spent in direct patient care and care coordination. 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  Course uncomplicated thus far.    Plan:  Heme: completed preparative regimen  PLT goal > 10,000  Hgb goal > 7.0g/dL  G-CSF to start on day +5  will require post ALLO BMT CNS prophylaxis -- begins after engraftment.     GVHD: PTCy days 3 and 4, Cellcept and tacro to start on day +5    ID: day 0: flu, valtrex, flu held b/c of increased bili  Levaquin started on 3/3 for ANC < 500..switched to cefapime for merna fever..f/u cx's  bm product A0 cx positive for stapg caprae...skin lauro  bactrim SS qd to start on day +21  having diarrhea, Cdiff negative..immodium prn    replete lytes prn, follow i/o, lasix prn    Nutrition: tolerating PO    DVT prophylaxis: ambulation    Over 35 minutes were spent in direct patient care and care coordination.

## 2024-03-11 NOTE — PROVIDER CONTACT NOTE (OTHER) - BACKGROUND
S/ P Cytoxan , S/p haplo son
Day -5 Haplo stem cell transplant
S/p haplo transplant
S/p cytoxan, S/p haplo transplant

## 2024-03-11 NOTE — PROVIDER CONTACT NOTE (OTHER) - ACTION/TREATMENT ORDERED:
tylenol 650mgs po given, and continue antibiotics
tylenol 650mgs given po, bld. cultures from white and brown port, UA, C&S done. cefepime 2gms. IV was started. still waiting for chest X-ray.
Lasix 10MG IV X 1 dose.
Tums given to pt. x1 dose

## 2024-03-11 NOTE — PROGRESS NOTE ADULT - SUBJECTIVE AND OBJECTIVE BOX
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) with FLU/CY/TBI prep for the treatment for ALL    S: Patient seen and examined with HPC Transplant Team:       O: Vitals:   Vital Signs Last 24 Hrs  T(C): 37 (11 Mar 2024 05:00), Max: 38.3 (10 Mar 2024 09:05)  T(F): 98.6 (11 Mar 2024 05:00), Max: 101 (10 Mar 2024 09:05)  HR: 82 (11 Mar 2024 05:00) (60 - 85)  BP: 146/78 (11 Mar 2024 05:00) (118/83 - 152/79)  BP(mean): --  RR: 18 (11 Mar 2024 05:00) (18 - 20)  SpO2: 94% (11 Mar 2024 05:00) (94% - 97%)    Parameters below as of 11 Mar 2024 05:00  Patient On (Oxygen Delivery Method): room air      Admit weight: 96.9kg   Daily Weight in k.8 (10 Mar 2024 08:40)    Intake / Output:   03-10 @ 07: @ 07:00  --------------------------------------------------------  IN: 7687 mL / OUT: 6330 mL / NET: 1357 mL     @ 07:11 @ 08:13  --------------------------------------------------------  IN: 436 mL / OUT: 0 mL / NET: 436 mL      PE:   Oropharynx: no erythema no ulcerations  Oral Mucositis:   -                                                     Grade: -  CVS: RRR, +S1,S2  Lungs: CTA throughout bilaterally   Abdomen: soft, ND, ND +bs  Extremities: no edema   Gastric Mucositis:      -                                            Grade: -  Intestinal Mucositis:     -                                         Grade: -  Skin: small area of petechiae on the medial aspect of the left knee and diffusely over the left medial ankle and anterior shin  TLC: C/D/I   Neuro: A&O x3  Pain: Denies    Labs:   CBC Full  -  ( 11 Mar 2024 06:58 )  WBC Count : 0.25 K/uL  Hemoglobin : 10.6 g/dL  Hematocrit : 30.5 %  Platelet Count - Automated : 34 K/uL  Mean Cell Volume : 94.4 fl  Mean Cell Hemoglobin : 32.8 pg  Mean Cell Hemoglobin Concentration : 34.8 gm/dL  Auto Neutrophil # : x  Auto Lymphocyte # : x  Auto Monocyte # : x  Auto Eosinophil # : x  Auto Basophil # : x  Auto Neutrophil % : x  Auto Lymphocyte % : x  Auto Monocyte % : x  Auto Eosinophil % : x  Auto Basophil % : x                          10.6   0.25  )-----------( 34       ( 11 Mar 2024 06:58 )             30.5         141  |  108  |  7   ----------------------------<  86  3.6   |  22  |  0.65    Ca    8.3<L>      11 Mar 2024 06:56  Phos  2.3       Mg     1.6         TPro  5.0<L>  /  Alb  3.1<L>  /  TBili  1.8<H>  /  DBili  x   /  AST  143<H>  /  ALT  136<H>  /  AlkPhos  134<H>      PT/INR - ( 11 Mar 2024 06:58 )   PT: 13.3 sec;   INR: 1.28 ratio         PTT - ( 11 Mar 2024 06:58 )  PTT:26.7 sec  LIVER FUNCTIONS - ( 11 Mar 2024 06:56 )  Alb: 3.1 g/dL / Pro: 5.0 g/dL / ALK PHOS: 134 U/L / ALT: 136 U/L / AST: 143 U/L / GGT: x           Lactate Dehydrogenase, Serum: 222 U/L ( @ 06:56)    Cultures:   Culture Results:   <10,000 CFU/mL Normal Urogenital Argelia (24 @ 21:24)    Culture Results:   No growth at 24 hours (24 @ 21:24)    Culture Results:   No growth at 24 hours (24 @ 21:24)      Radiology:   Xray Chest 1 View- PORTABLE-Urgent (Xray Chest 1 View- PORTABLE-Urgent .) (24 @ 23:44)   IMPRESSION:  Small left pleural effusion/linear atelectasis.  Mild, central pulmonary venous congestion, new    Meds:   Antimicrobials:   cefepime   IVPB 2000 milliGRAM(s) IV Intermittent every 8 hours  cefepime   IVPB      valACYclovir 500 milliGRAM(s) Oral two times a day      Heme / Onc:       GI:  calcium carbonate    500 mG (Tums) Chewable 1 Tablet(s) Chew three times a day PRN  loperamide 2 milliGRAM(s) Oral every 3 hours PRN  pantoprazole    Tablet 40 milliGRAM(s) Oral before breakfast  senna 2 Tablet(s) Oral at bedtime  sodium bicarbonate Mouth Rinse 10 milliLiter(s) Swish and Spit five times a day      Cardiovascular:   furosemide   Injectable 40 milliGRAM(s) IV Push once  losartan 100 milliGRAM(s) Oral daily      Immunologic:   filgrastim-sndz (ZARXIO) Injectable 480 MICROGram(s) SubCutaneous every 24 hours  mycophenolate mofetil 1000 milliGRAM(s) Oral three times a day  tacrolimus 2 milliGRAM(s) Oral two times a day      Other medications:   aprepitant 80 milliGRAM(s) Oral every 24 hours  Biotene Dry Mouth Oral Rinse 5 milliLiter(s) Swish and Spit five times a day  chlorhexidine 4% Liquid 1 Application(s) Topical <User Schedule>  escitalopram 20 milliGRAM(s) Oral daily  lidocaine/prilocaine Cream 1 Application(s) Topical daily  loratadine 10 milliGRAM(s) Oral daily  ondansetron  IVPB 8 milliGRAM(s) IV Intermittent every 8 hours  potassium chloride  20 mEq/100 mL IVPB 20 milliEquivalent(s) IV Intermittent every 2 hours  potassium phosphate IVPB 15 milliMole(s) IV Intermittent once  sodium chloride 0.9% 1000 milliLiter(s) IV Continuous <Continuous>  sodium chloride 0.9%. 1000 milliLiter(s) IV Continuous <Continuous>      PRN:   acetaminophen     Tablet .. 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 Mild, central pulmonary venous congestion, new.  3/9 BCx and UCx, follow up  3/11- transaminitis, hold fluconazole. Tbili increased to 1.8. Added on direct and indirect bili.     1. Infectious Disease:   cefepime   IVPB 2000 milliGRAM(s) IV Intermittent every 8 hours  cefepime   IVPB      valACYclovir 500 milliGRAM(s) Oral two times a day    2. GI Prophylaxis:    pantoprazole    Tablet 40 milliGRAM(s) Oral before breakfast    3. Mouthcare - NS / NaHCO3 rinses, Mycelex, Biotene; Skin care     4. GVHD prophylaxis   TBI day -1   CTX days +3, +4   mycophenolate mofetil 1000 milliGRAM(s) Oral three times a day  tacrolimus 2 milliGRAM(s) Oral two times a day    5. Transfuse & replete electrolytes prn   potassium chloride  20 mEq/100 mL IVPB 20 milliEquivalent(s) IV Intermittent every 2 hours  potassium phosphate IVPB 15 milliMole(s) IV Intermittent once    6. IV hydration, daily weights, strict I&O, prn diuresis   Lasix 40mg IV x 1     7. PO intake as tolerated, nutrition follow up as needed, MVI, folic acid     8. Antiemetics, anti-diarrhea medications:   loperamide 2 milliGRAM(s) Oral every 3 hours PRN  metoclopramide Injectable 10 milliGRAM(s) IV Push every 6 hours PRN  ondansetron  IVPB 8 milliGRAM(s) IV Intermittent every 8 hours  aprepitant 80 milliGRAM(s) Oral every 24 hours    9. OOB as tolerated, physical therapy consult if needed     10. Monitor coags / fibrinogen 2x week, vitamin K as needed     11. Monitor closely for clinical changes, monitor for fevers     12. Emotional support provided, plan of care discussed and questions addressed     13. Patient education done regarding chemotherapy prep, plan of care, restrictions and discharge planning     14. Continue regular social work input     I have written the above note for Dr. Olivier who performed service with me in the room.   Estelita Tinsley NP-C ([phone])    I have seen and examined patient with NP, I agree with above note as scribed.

## 2024-03-12 LAB
ALBUMIN SERPL ELPH-MCNC: 3.2 G/DL — LOW (ref 3.3–5)
ALP SERPL-CCNC: 138 U/L — HIGH (ref 40–120)
ALT FLD-CCNC: 101 U/L — HIGH (ref 10–45)
ANION GAP SERPL CALC-SCNC: 8 MMOL/L — SIGNIFICANT CHANGE UP (ref 5–17)
AST SERPL-CCNC: 60 U/L — HIGH (ref 10–40)
BILIRUB SERPL-MCNC: 0.8 MG/DL — SIGNIFICANT CHANGE UP (ref 0.2–1.2)
BUN SERPL-MCNC: 8 MG/DL — SIGNIFICANT CHANGE UP (ref 7–23)
CALCIUM SERPL-MCNC: 8.4 MG/DL — SIGNIFICANT CHANGE UP (ref 8.4–10.5)
CHLORIDE SERPL-SCNC: 106 MMOL/L — SIGNIFICANT CHANGE UP (ref 96–108)
CO2 SERPL-SCNC: 23 MMOL/L — SIGNIFICANT CHANGE UP (ref 22–31)
CREAT SERPL-MCNC: 0.61 MG/DL — SIGNIFICANT CHANGE UP (ref 0.5–1.3)
EGFR: 99 ML/MIN/1.73M2 — SIGNIFICANT CHANGE UP
GLUCOSE SERPL-MCNC: 79 MG/DL — SIGNIFICANT CHANGE UP (ref 70–99)
HCT VFR BLD CALC: 32.2 % — LOW (ref 34.5–45)
HGB BLD-MCNC: 10.7 G/DL — LOW (ref 11.5–15.5)
LDH SERPL L TO P-CCNC: 153 U/L — SIGNIFICANT CHANGE UP (ref 50–242)
MAGNESIUM SERPL-MCNC: 1.6 MG/DL — SIGNIFICANT CHANGE UP (ref 1.6–2.6)
MCHC RBC-ENTMCNC: 32.2 PG — SIGNIFICANT CHANGE UP (ref 27–34)
MCHC RBC-ENTMCNC: 33.2 GM/DL — SIGNIFICANT CHANGE UP (ref 32–36)
MCV RBC AUTO: 97 FL — SIGNIFICANT CHANGE UP (ref 80–100)
NRBC # BLD: 0 /100 WBCS — SIGNIFICANT CHANGE UP (ref 0–0)
PHOSPHATE SERPL-MCNC: 1.9 MG/DL — LOW (ref 2.5–4.5)
PLATELET # BLD AUTO: 23 K/UL — LOW (ref 150–400)
POTASSIUM SERPL-MCNC: 4 MMOL/L — SIGNIFICANT CHANGE UP (ref 3.5–5.3)
POTASSIUM SERPL-SCNC: 4 MMOL/L — SIGNIFICANT CHANGE UP (ref 3.5–5.3)
PROT SERPL-MCNC: 5 G/DL — LOW (ref 6–8.3)
RBC # BLD: 3.32 M/UL — LOW (ref 3.8–5.2)
RBC # FLD: 12.6 % — SIGNIFICANT CHANGE UP (ref 10.3–14.5)
SODIUM SERPL-SCNC: 137 MMOL/L — SIGNIFICANT CHANGE UP (ref 135–145)
WBC # BLD: 0.47 K/UL — CRITICAL LOW (ref 3.8–10.5)
WBC # FLD AUTO: 0.47 K/UL — CRITICAL LOW (ref 3.8–10.5)

## 2024-03-12 PROCEDURE — 99233 SBSQ HOSP IP/OBS HIGH 50: CPT | Mod: FS

## 2024-03-12 RX ORDER — POTASSIUM PHOSPHATE, MONOBASIC POTASSIUM PHOSPHATE, DIBASIC 236; 224 MG/ML; MG/ML
30 INJECTION, SOLUTION INTRAVENOUS EVERY 6 HOURS
Refills: 0 | Status: COMPLETED | OUTPATIENT
Start: 2024-03-12 | End: 2024-03-12

## 2024-03-12 RX ADMIN — Medication 10 MILLILITER(S): at 00:13

## 2024-03-12 RX ADMIN — MYCOPHENOLATE MOFETIL 1000 MILLIGRAM(S): 250 CAPSULE ORAL at 13:05

## 2024-03-12 RX ADMIN — TACROLIMUS 2 MILLIGRAM(S): 5 CAPSULE ORAL at 05:31

## 2024-03-12 RX ADMIN — ONDANSETRON 108 MILLIGRAM(S): 8 TABLET, FILM COATED ORAL at 05:30

## 2024-03-12 RX ADMIN — VALACYCLOVIR 500 MILLIGRAM(S): 500 TABLET, FILM COATED ORAL at 05:31

## 2024-03-12 RX ADMIN — Medication 480 MICROGRAM(S): at 11:26

## 2024-03-12 RX ADMIN — POTASSIUM PHOSPHATE, MONOBASIC POTASSIUM PHOSPHATE, DIBASIC 83.33 MILLIMOLE(S): 236; 224 INJECTION, SOLUTION INTRAVENOUS at 17:32

## 2024-03-12 RX ADMIN — Medication 5 MILLILITER(S): at 11:31

## 2024-03-12 RX ADMIN — PANTOPRAZOLE SODIUM 40 MILLIGRAM(S): 20 TABLET, DELAYED RELEASE ORAL at 06:22

## 2024-03-12 RX ADMIN — TACROLIMUS 2 MILLIGRAM(S): 5 CAPSULE ORAL at 17:32

## 2024-03-12 RX ADMIN — Medication 10 MILLILITER(S): at 11:31

## 2024-03-12 RX ADMIN — Medication 10 MILLILITER(S): at 15:21

## 2024-03-12 RX ADMIN — Medication 5 MILLILITER(S): at 10:00

## 2024-03-12 RX ADMIN — CEFEPIME 100 MILLIGRAM(S): 1 INJECTION, POWDER, FOR SOLUTION INTRAMUSCULAR; INTRAVENOUS at 20:59

## 2024-03-12 RX ADMIN — Medication 10 MILLILITER(S): at 23:12

## 2024-03-12 RX ADMIN — ESCITALOPRAM OXALATE 20 MILLIGRAM(S): 10 TABLET, FILM COATED ORAL at 11:26

## 2024-03-12 RX ADMIN — MYCOPHENOLATE MOFETIL 1000 MILLIGRAM(S): 250 CAPSULE ORAL at 20:59

## 2024-03-12 RX ADMIN — SODIUM CHLORIDE 20 MILLILITER(S): 9 INJECTION INTRAMUSCULAR; INTRAVENOUS; SUBCUTANEOUS at 19:55

## 2024-03-12 RX ADMIN — Medication 5 MILLILITER(S): at 00:13

## 2024-03-12 RX ADMIN — Medication 5 MILLILITER(S): at 15:21

## 2024-03-12 RX ADMIN — ONDANSETRON 108 MILLIGRAM(S): 8 TABLET, FILM COATED ORAL at 13:04

## 2024-03-12 RX ADMIN — Medication 10 MILLILITER(S): at 10:00

## 2024-03-12 RX ADMIN — CEFEPIME 100 MILLIGRAM(S): 1 INJECTION, POWDER, FOR SOLUTION INTRAMUSCULAR; INTRAVENOUS at 13:05

## 2024-03-12 RX ADMIN — POTASSIUM PHOSPHATE, MONOBASIC POTASSIUM PHOSPHATE, DIBASIC 83.33 MILLIMOLE(S): 236; 224 INJECTION, SOLUTION INTRAVENOUS at 10:30

## 2024-03-12 RX ADMIN — MYCOPHENOLATE MOFETIL 1000 MILLIGRAM(S): 250 CAPSULE ORAL at 05:30

## 2024-03-12 RX ADMIN — SODIUM CHLORIDE 20 MILLILITER(S): 9 INJECTION INTRAMUSCULAR; INTRAVENOUS; SUBCUTANEOUS at 05:31

## 2024-03-12 RX ADMIN — CEFEPIME 100 MILLIGRAM(S): 1 INJECTION, POWDER, FOR SOLUTION INTRAMUSCULAR; INTRAVENOUS at 06:22

## 2024-03-12 RX ADMIN — Medication 10 MILLILITER(S): at 19:55

## 2024-03-12 RX ADMIN — LOSARTAN POTASSIUM 100 MILLIGRAM(S): 100 TABLET, FILM COATED ORAL at 05:30

## 2024-03-12 RX ADMIN — VALACYCLOVIR 500 MILLIGRAM(S): 500 TABLET, FILM COATED ORAL at 17:32

## 2024-03-12 RX ADMIN — CHLORHEXIDINE GLUCONATE 1 APPLICATION(S): 213 SOLUTION TOPICAL at 10:01

## 2024-03-12 RX ADMIN — LIDOCAINE AND PRILOCAINE CREAM 1 APPLICATION(S): 25; 25 CREAM TOPICAL at 11:32

## 2024-03-12 RX ADMIN — LORATADINE 10 MILLIGRAM(S): 10 TABLET ORAL at 11:25

## 2024-03-12 RX ADMIN — Medication 5 MILLILITER(S): at 19:54

## 2024-03-12 RX ADMIN — Medication 5 MILLILITER(S): at 23:12

## 2024-03-12 NOTE — CHART NOTE - NSCHARTNOTEFT_GEN_A_CORE
Nutrition Follow Up Note  Patient seen for: Nutrition Follow up in BMT unit    Chart reviewed. Events noted.     Source: [x] Patient       [x] Medical Record        [x] RN        [] Family at bedside       [] Other:    -If unable to interview patient: [] Trach/Vent/BiPAP  [] Disoriented/confused/inappropriate to interview    Diet Order:   Diet, Regular (24)    - Is current order appropriate/adequate? [] Yes  []  No: see recommendations below     - PO intake :   [] >75%  Adequate    [] 50-75%  Fair       [x] <50%  Poor    - Nutrition-related concerns:      - Intake: Pt reports poor appetite/PO intake x 2 days; reports consuming <50% of most meals. Is amenable to receiving Ensure Plus HP 1x/day (vanilla flavor). Food preferences obtained (i.e. ice cream, yogurt)      - GI: No GI distress reported. Last BM: 3/02.   Bowel Regimen? [x] Yes senna  [] No      - Mouthcare: Biotene, sodium bicarbonate mouth wash       - Renal: IVF: NS @ 20 ml/hr.       - BMT/SCT: Pre:  Allogeneic  BMT day 0; ordered for Cellcept and Prograf     Weights:  Dosing weight:  96.9 kg (2-29)  Daily Weight in k.1 (-), Weight in k.4 (-), Weight in k.2 (-), Weight in k.2 (-), Weight in k.3 (-), Weight in k.9 (-)  ** Weight fluctuating - Weight changes likely secondary to fluid shifts. RD to continue to monitor weight trends as able.     Nutritionally Pertinent MEDICATIONS  (STANDING):  fluconAZOLE   Tablet  levoFLOXacin  Tablet  pantoprazole    Tablet  senna  sodium bicarbonate Mouth Rinse  sodium chloride 0.45%  sodium chloride 0.9%.  sodium chloride 0.9%.  valACYclovir    Pertinent Labs:  @ 07:29: Na 141, BUN 12, Cr 0.79, BG 89, K+ 3.8, Phos 2.9, Mg 2.0, Alk Phos 97, ALT/SGPT 20, AST/SGOT 23, HbA1c --      Finger Sticks:      Skin per nursing documentation: No pressure injuries noted.  Edema per nursing documentation: No peripheral edema noted per nursing flow sheets     Estimated Needs: Based on IBW 54.8 kg   Energy needs: 7145-2938 kcal/kg (35-40 kcal/kg)  Protein needs: 87.68-98.64 g/kg (1.6-1.8 g/kg)  Fluid needs: Defer to team    [x] no change since previous assessment  [] recalculated:     Previous Nutrition Diagnosis:   1. Predicted Inadequate protein-energy intake   2. Increased Nutrient Needs   3. Food & Nutrition Knowledge Deficit (not applicable)  Nutrition Diagnosis is: [x] ongoing  [] resolved [] not applicable     Nutrition Care Plan:  [x] In Progress  [] Achieved  [] Not applicable    New Nutrition Diagnosis: Inadequate protein-energy intake related to decreased ability to consume adequate protein-energy in setting of increased physiological demand for nutrients as evidenced by pt meeting <50% of estimated needs >/2 days.   Goal: Pt to meet >75% of estimated protein- energy needs during hospital stay.     Nutrition Interventions:     Education Provided   [x] Yes:  [] No: Emphasized the importance of adequate kcal and protein intake, provided recommendations to optimize nutritional intake in case of decreased appetite, recommended small frequent meals by consuming nutrient-dense snacks between meals, to start with protein, and sips of supplement throughout the day.     Recommendations:        [X] Continue current diet order: regular diet     [X] Recommend Ensure Plus HP 1x/day (vanilla flavor)     [X] Monitor and encourage PO intake. Encourage use of daily menus. Honor dietary preferences as expressed as able.     [X] RD to add [employer] greek yogurt 1x/day and ice cream 2x/day     [X] Recommend micronutrient coverage: folic acid and multivitamin pending no contraindications     Monitoring and Evaluation:   Continue to monitor nutritional intake, tolerance to diet prescription, weights, labs, skin integrity    RD remains available upon request and will follow up per protocol  Love Loredo RDN CDN (TEAMS) Nutrition Follow Up Note  Patient seen for: Nutrition Follow up in BMT unit    Chart reviewed. Events noted.     Source: [x] Patient       [x] Medical Record        [x] RN        [] Family at bedside       [] Other:    -If unable to interview patient: [] Trach/Vent/BiPAP  [] Disoriented/confused/inappropriate to interview    Diet Order:   Diet, Regular:   GlutaSolve(Glutamine) 15gm pkg     Qty per Day:  1  Supplement Feeding Modality:  Oral  Ensure Plus High Protein Cans or Servings Per Day:  1       Frequency:  Daily (24 @ 12:36) [Active]    - Is current order appropriate/adequate? [] Yes  []  No: see recommendations below     - PO intake :   [] >75%  Adequate    [] 50-75%  Fair       [x] <50%  Poor    - Nutrition-related concerns:      - Intake: Pt reports poor appetite/PO intake x 2 days; reports consuming <50% of most meals. Is amenable to receiving Ensure Plus HP 1x/day (vanilla flavor). Food preferences obtained (i.e. ice cream, yogurt)      - GI: No GI distress reported. Last BM: 3/02.   Bowel Regimen? [x] Yes Imodium  [] No      - Mouthcare: Biotene, sodium bicarbonate mouth wash       - Renal: Noted hypophosphatemic; s/p repletion. IVF: NS @ 20 ml/hr.       - BMT/SCT: Post: Allogeneic  BMT day + 6; ordered for Cellcept and Prograf     Weights:  Dosing weight:  96.9 kg (2-29)  Daily Weight in k.1 (-), Weight in k.4 (), Weight in k.2 (-), Weight in k.2 (-), Weight in k.3 (-), Weight in k.9 ()  ** Weight fluctuating - Weight changes likely secondary to fluid shifts. RD to continue to monitor weight trends as able.     Nutritionally Pertinent MEDICATIONS  (STANDING):  fluconAZOLE   Tablet  levoFLOXacin  Tablet  pantoprazole    Tablet  senna  sodium bicarbonate Mouth Rinse  sodium chloride 0.45%  sodium chloride 0.9%.  sodium chloride 0.9%.  valACYclovir    Pertinent Labs:  @ 07:29: Na 141, BUN 12, Cr 0.79, BG 89, K+ 3.8, Phos 2.9, Mg 2.0, Alk Phos 97, ALT/SGPT 20, AST/SGOT 23, HbA1c --      Finger Sticks:      Skin per nursing documentation: No pressure injuries noted.  Edema per nursing documentation: No peripheral edema noted per nursing flow sheets     Estimated Needs: Based on IBW 54.8 kg   Energy needs: 4447-0143 kcal/kg (35-40 kcal/kg)  Protein needs: 87.68-98.64 g/kg (1.6-1.8 g/kg)  Fluid needs: Defer to team    [x] no change since previous assessment  [] recalculated:     Previous Nutrition Diagnosis:   1. Inadequate protein-energy intake   2. Increased Nutrient Needs   Nutrition Diagnosis is: [x] ongoing  [] resolved [] not applicable     Nutrition Care Plan:  [x] In Progress  [] Achieved  [] Not applicable    New Nutrition Diagnosis: Inadequate protein-energy intake related to decreased ability to consume adequate protein-energy in setting of increased physiological demand for nutrients as evidenced by pt meeting <50% of estimated needs >/2 days.   Goal: Pt to meet >75% of estimated protein- energy needs during hospital stay.     Nutrition Interventions:     Education Provided   [x] Yes:  [] No: Emphasized the importance of adequate kcal and protein intake, provided recommendations to optimize nutritional intake in case of decreased appetite, recommended small frequent meals by consuming nutrient-dense snacks between meals, to start with protein, and sips of supplement throughout the day.     Recommendations:        [X] Continue current diet order: regular diet     [X] Recommend Ensure Plus HP 1x/day (vanilla flavor)     [X] Monitor and encourage PO intake. Encourage use of daily menus. Honor dietary preferences as expressed as able.     [X] Continue Chobani greek yogurt 1x/day and ice cream 2x/day     [X] Recommend micronutrient coverage: folic acid and multivitamin pending no contraindications     Monitoring and Evaluation:   Continue to monitor nutritional intake, tolerance to diet prescription, weights, labs, skin integrity    RD remains available upon request and will follow up per protocol  Love Loredo RDN CDN (TEAMS) Nutrition Follow Up Note  Patient seen for: Nutrition Follow up in BMT unit    Chart reviewed. Events noted.     Source: [x] Patient       [x] Medical Record        [x] RN        [x] Spouse at bedside       [] Other:    -If unable to interview patient: [] Trach/Vent/BiPAP  [] Disoriented/confused/inappropriate to interview    Diet Order:   Diet, Regular:   GlutaSolve(Glutamine) 15gm pkg     Qty per Day:  1  Supplement Feeding Modality:  Oral  Ensure Plus High Protein Cans or Servings Per Day:  1       Frequency:  Daily (24 @ 12:36) [Active]    - Is current order appropriate/adequate? [] Yes  []  No: see recommendations below     - PO intake :   [] >75%  Adequate    [] 50-75%  Fair       [x] <50%  Poor    - Nutrition-related concerns:      - Intake: Pt reports poor appetite/PO intake x >/5 days; reports consuming <50% of most meals. Dislikes Ensure Plus HP 1x/day (vanilla flavor); would like to discontinue at this time. Is amenable to trial [employer] standard 1.0 1x/day and protein-fortified smoothie daily. Food preferences obtained (i.e. apple sauce, mighty shakes 1x/day, yogurt).        - GI: No GI distress reported. Last BM: 3/11.   Bowel Regimen? [x] Yes Imodium  [] No      - Mouthcare: Biotene, sodium bicarbonate mouth wash       - Renal: Noted hypophosphatemic; s/p repletion. IVF: NS @ 20 ml/hr.       - BMT/SCT: Post: Allogeneic  BMT day + 6; ordered for Cellcept and Prograf     Weights:  Dosing weight:  96.9 kg (2-29)  Daily Weight in k.1 (-), Weight in k.4 (-05), Weight in k.2 (-), Weight in k.2 (-), Weight in k.3 (-), Weight in k.9 (-)  ** Weight fluctuating - Weight changes likely secondary to fluid shifts. RD to continue to monitor weight trends as able.     Visual Nutrition focused physical exam conducted:  Subcutaneous fat loss: [mild ] Orbital fat pads region, [mild ]Buccal fat region, [ ]Triceps region,  [ ]Ribs region.  Muscle wasting: [mild ]Temples region, [ ]Clavicle region, [ ]Shoulder region, [ ]Scapula region, [ ]Interosseous region,  [ ]thigh region, [ ]Calf region    Nutritionally Pertinent MEDICATIONS  (STANDING):  fluconAZOLE   Tablet  levoFLOXacin  Tablet  pantoprazole    Tablet  senna  sodium bicarbonate Mouth Rinse  sodium chloride 0.45%  sodium chloride 0.9%.  sodium chloride 0.9%.  valACYclovir    Pertinent Labs:  @ 07:29: Na 141, BUN 12, Cr 0.79, BG 89, K+ 3.8, Phos 2.9, Mg 2.0, Alk Phos 97, ALT/SGPT 20, AST/SGOT 23, HbA1c --      Finger Sticks:      Skin per nursing documentation: No pressure injuries noted.  Edema per nursing documentation: 1+ generalized     Estimated Needs: Based on IBW 54.8 kg   Energy needs: 5389-4619 kcal/kg (35-40 kcal/kg)  Protein needs: 87.68-98.64 g/kg (1.6-1.8 g/kg)  Fluid needs: Defer to team    [x] no change since previous assessment  [] recalculated:     Previous Nutrition Diagnosis:   1. Inadequate protein-energy intake   2. Increased Nutrient Needs   Nutrition Diagnosis is: [x] ongoing  [] resolved [] not applicable     Nutrition Care Plan:  [x] In Progress  [] Achieved  [] Not applicable    New Nutrition Diagnosis: Moderate acute malnutrition related to decreased ability to consume adequate protein-energy in setting of increased physiological demand for nutrients as evidenced by pt meeting <50% of estimated needs >/5 days, mild muscle/fat loss, mild edema.   Goal: Pt to meet >75% of estimated protein- energy needs during hospital stay.     Nutrition Interventions:     Education Provided   [x] Yes:  [] No: Emphasized the importance of adequate kcal and protein intake, provided recommendations to optimize nutritional intake in case of decreased appetite, recommended small frequent meals by consuming nutrient-dense snacks between meals, to start with protein, and sips of supplement throughout the day.     Recommendations:        [X] Continue current diet order: regular diet     [X] Discontinue Ensure Plus HP 1x/day (vanilla flavor)     [X] RD will provide protein-fortified smoothie of day 1x/day (Monday-Friday, 300 edison 18 gm protein)     [X] Pt to trial plant-based supplement [employer] standard 1.0; RD to follow up for acceptability.     [X] Monitor and encourage PO intake. Encourage use of daily menus. Honor dietary preferences as expressed as able.     [X] Continue Chobani greek yogurt 1x/day and mighty shakes 1x/day    [X] Recommend micronutrient coverage: folic acid and multivitamin pending no contraindications     Monitoring and Evaluation:   Continue to monitor nutritional intake, tolerance to diet prescription, weights, labs, skin integrity    RD remains available upon request and will follow up per protocol  Love Loredo RDN CDN (TEAMS)

## 2024-03-12 NOTE — PROGRESS NOTE ADULT - NS ATTEND AMEND GEN_ALL_CORE FT
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  Course uncomplicated thus far.    Plan:  Heme: completed preparative regimen  PLT goal > 10,000  Hgb goal > 7.0g/dL  G-CSF to start on day +5  will require post ALLO BMT CNS prophylaxis -- begins after engraftment.     GVHD: PTCy days 3 and 4, Cellcept and tacro to start on day +5    ID: day 0: flu, valtrex, flu held b/c of increased bili  Levaquin started on 3/3 for ANC < 500..switched to cefapime for merna fever..f/u cx's  bm product A0 cx positive for stapg caprae...skin lauro  bactrim SS qd to start on day +21  having diarrhea, Cdiff negative..immodium prn    replete lytes prn, follow i/o, lasix prn    Nutrition: tolerating PO    DVT prophylaxis: ambulation    Over 35 minutes were spent in direct patient care and care coordination. 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  Course uncomplicated thus far.    Plan:  Heme: completed preparative regimen  PLT goal > 10,000  Hgb goal > 7.0g/dL  G-CSF to start on day +5  will require post ALLO BMT CNS prophylaxis and tki maintenance -- begins after engraftment.     GVHD: PTCy days 3 and 4, Cellcept and tacro to start on day +5    ID: day 0: flu, valtrex, flu held b/c of increased bili  Levaquin started on 3/3 for ANC < 500..switched to cefapime for merna fever..f/u cx's  bm product A0 cx positive for stapg caprae...skin lauro..pt blood cx negative  bactrim SS qd to start on day +21  having diarrhea, Cdiff negative..immodium prn    replete lytes prn, follow i/o, lasix prn    Nutrition: tolerating PO    DVT prophylaxis: ambulation    Over 35 minutes were spent in direct patient care and care coordination.

## 2024-03-12 NOTE — PROGRESS NOTE ADULT - SUBJECTIVE AND OBJECTIVE BOX
HPC Transplant Team                                                      Critical / Counseling Time Provided: 30 minutes                                                                                                                                                        Chief Complaint: Haplo-idential BMT (son) with FLU/CY/TBI prep for the treatment fo ALL    S: Patient seen and examined with HPC Transplant Team:       O: Vitals:   Vital Signs Last 24 Hrs  T(C): 36.3 (12 Mar 2024 05:28), Max: 36.8 (11 Mar 2024 09:10)  T(F): 97.3 (12 Mar 2024 05:28), Max: 98.3 (11 Mar 2024 09:10)  HR: 71 (12 Mar 2024 05:28) (71 - 81)  BP: 166/81 (12 Mar 2024 05:28) (130/78 - 166/81)  BP(mean): --  RR: 18 (12 Mar 2024 05:) (18 - 18)  SpO2: 94% (12 Mar 2024 05:28) (93% - 95%)    Parameters below as of 12 Mar 2024 05:28  Patient On (Oxygen Delivery Method): room air        Admit weight: 96.9kg   Daily Weight in k.5 (11 Mar 2024 08:00)    Intake / Output:   03-11 @ 07:01  -  03-12 @ 07:00  --------------------------------------------------------  IN: 3392 mL / OUT: 4225 mL / NET: -833 mL      PE:   Oropharynx: no erythema no ulcerations  Oral Mucositis:   -                                                     Grade: -  CVS: RRR, +S1,S2  Lungs: CTA throughout bilaterally   Abdomen: soft, ND, ND +bs  Extremities: no edema   Gastric Mucositis:      -                                            Grade: -  Intestinal Mucositis:     -                                         Grade: -  Skin: small area of petechiae on the medial aspect of the left knee and diffusely over the left medial ankle and anterior shin  TLC: C/D/I   Neuro: A&O x3  Pain: Denies    Labs:     Cultures:   Culture Results:   <10,000 CFU/mL Normal Urogenital Argelia (24 @ 21:24)    Culture Results:   No growth at 24 hours (24 @ 21:24)    Culture Results:   No growth at 24 hours (24 @ 21:24)      Radiology:   Xray Chest 1 View- PORTABLE-Urgent (Xray Chest 1 View- PORTABLE-Urgent .) (24 @ 23:44)   IMPRESSION:  Small left pleural effusion/linear atelectasis.  Mild, central pulmonary venous congestion, new    Meds:   Antimicrobials:   cefepime   IVPB      cefepime   IVPB 2000 milliGRAM(s) IV Intermittent every 8 hours  valACYclovir 500 milliGRAM(s) Oral two times a day      Heme / Onc:       GI:  aluminum hydroxide/magnesium hydroxide/simethicone Suspension 30 milliLiter(s) Oral every 6 hours PRN  calcium carbonate    500 mG (Tums) Chewable 1 Tablet(s) Chew three times a day PRN  loperamide 2 milliGRAM(s) Oral every 3 hours PRN  pantoprazole    Tablet 40 milliGRAM(s) Oral before breakfast  sodium bicarbonate Mouth Rinse 10 milliLiter(s) Swish and Spit five times a day      Cardiovascular:   losartan 100 milliGRAM(s) Oral daily      Immunologic:   filgrastim-sndz (ZARXIO) Injectable 480 MICROGram(s) SubCutaneous every 24 hours  mycophenolate mofetil 1000 milliGRAM(s) Oral three times a day  tacrolimus 2 milliGRAM(s) Oral two times a day      Other medications:   Biotene Dry Mouth Oral Rinse 5 milliLiter(s) Swish and Spit five times a day  chlorhexidine 4% Liquid 1 Application(s) Topical <User Schedule>  escitalopram 20 milliGRAM(s) Oral daily  lidocaine/prilocaine Cream 1 Application(s) Topical daily  loratadine 10 milliGRAM(s) Oral daily  ondansetron  IVPB 8 milliGRAM(s) IV Intermittent every 8 hours  sodium chloride 0.9% 1000 milliLiter(s) IV Continuous <Continuous>  sodium chloride 0.9%. 1000 milliLiter(s) IV Continuous <Continuous>      PRN:   acetaminophen     Tablet .. 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 Mild, central pulmonary venous congestion, new.  3/9 BCx and UCx, follow up  3/11- transaminitis, hold fluconazole. Tbili increased to 1.8. Added on direct and indirect bili.   3/12- direct bili 1.3 3/11/24     1. Infectious Disease:   cefepime   IVPB      cefepime   IVPB 2000 milliGRAM(s) IV Intermittent every 8 hours  valACYclovir 500 milliGRAM(s) Oral two times a day    2. GI Prophylaxis:    pantoprazole    Tablet 40 milliGRAM(s) Oral before breakfast    3. Mouthcare - NS / NaHCO3 rinses, Mycelex, Biotene; Skin care     4. GVHD prophylaxis   TBI day -1   CTX days +3, +4  mycophenolate mofetil 1000 milliGRAM(s) Oral three times a day  tacrolimus 2 milliGRAM(s) Oral two times a day    5. Transfuse & replete electrolytes prn     6. IV hydration, daily weights, strict I&O, prn diuresis     7. PO intake as tolerated, nutrition follow up as needed, MVI, folic acid     8. Antiemetics, anti-diarrhea medications:   loperamide 2 milliGRAM(s) Oral every 3 hours PRN  metoclopramide Injectable 10 milliGRAM(s) IV Push every 6 hours PRN    9. OOB as tolerated, physical therapy consult if needed     10. Monitor coags / fibrinogen 2x week, vitamin K as needed     11. Monitor closely for clinical changes, monitor for fevers     12. Emotional support provided, plan of care discussed and questions addressed     13. Patient education done regarding chemotherapy prep, plan of care, restrictions and discharge planning     14. Continue regular social work input     I have written the above note for Dr. Olivier who performed service with me in the room.   Estelita Tinsley NP-C ([phone])    I have seen and examined patient with NP, I agree with above note as scribed.                    HPC Transplant Team                                                      Critical / Counseling Time Provided: 30 minutes                                                                                                                                                        Chief Complaint: Haplo-idential BMT (son) with FLU/CY/TBI prep for the treatment fo ALL    S: Patient seen and examined with \[facility]\"" Transplant Team:       O: Vitals:   Vital Signs Last 24 Hrs  T(C): 36.3 (12 Mar 2024 05:28), Max: 36.8 (11 Mar 2024 09:10)  T(F): 97.3 (12 Mar 2024 05:28), Max: 98.3 (11 Mar 2024 09:10)  HR: 71 (12 Mar 2024 05:28) (71 - 81)  BP: 166/81 (12 Mar 2024 05:28) (130/78 - 166/81)  BP(mean): --  RR: 18 (12 Mar 2024 05:28) (18 - 18)  SpO2: 94% (12 Mar 2024 05:28) (93% - 95%)    Parameters below as of 12 Mar 2024 05:28  Patient On (Oxygen Delivery Method): room air        Admit weight: 96.9kg   Daily Weight in k.5 (11 Mar 2024 08:00)    Intake / Output:   03-11 @ 07:01  -  03-12 @ 07:00  --------------------------------------------------------  IN: 3392 mL / OUT: 4225 mL / NET: -833 mL      PE:   Oropharynx: no erythema no ulcerations  Oral Mucositis:   -                                                     Grade: -  CVS: RRR, +S1,S2  Lungs: CTA throughout bilaterally   Abdomen: soft, ND, ND +bs  Extremities: no edema   Gastric Mucositis:      -                                            Grade: -  Intestinal Mucositis:     -                                         Grade: -  Skin: small area of petechiae on the medial aspect of the left knee and diffusely over the left medial ankle and anterior shin  TLC: C/D/I   Neuro: A&O x3  Pain: Denies    Labs:                         10.7   0.47  )-----------( 23       ( 12 Mar 2024 07:24 )             32.2     03-12    137  |  106  |  8   ----------------------------<  79  4.0   |  23  |  0.61    Ca    8.4      12 Mar 2024 07:27  Phos  1.9       Mg     1.6         TPro  5.0<L>  /  Alb  3.2<L>  /  TBili  0.8  /  DBili  x   /  AST  60<H>  /  ALT  101<H>  /  AlkPhos  138<H>        Cultures:   Culture Results:   <10,000 CFU/mL Normal Urogenital Argelia (24 @ 21:24)    Culture Results:   No growth at 24 hours (24 @ 21:24)    Culture Results:   No growth at 24 hours (24 @ 21:24)      Radiology:   Xray Chest 1 View- PORTABLE-Urgent (Xray Chest 1 View- PORTABLE-Urgent .) (24 @ 23:44)   IMPRESSION:  Small left pleural effusion/linear atelectasis.  Mild, central pulmonary venous congestion, new    Meds:   Antimicrobials:   cefepime   IVPB      cefepime   IVPB 2000 milliGRAM(s) IV Intermittent every 8 hours  valACYclovir 500 milliGRAM(s) Oral two times a day      Heme / Onc:       GI:  aluminum hydroxide/magnesium hydroxide/simethicone Suspension 30 milliLiter(s) Oral every 6 hours PRN  calcium carbonate    500 mG (Tums) Chewable 1 Tablet(s) Chew three times a day PRN  loperamide 2 milliGRAM(s) Oral every 3 hours PRN  pantoprazole    Tablet 40 milliGRAM(s) Oral before breakfast  sodium bicarbonate Mouth Rinse 10 milliLiter(s) Swish and Spit five times a day      Cardiovascular:   losartan 100 milliGRAM(s) Oral daily      Immunologic:   filgrastim-sndz (ZARXIO) Injectable 480 MICROGram(s) SubCutaneous every 24 hours  mycophenolate mofetil 1000 milliGRAM(s) Oral three times a day  tacrolimus 2 milliGRAM(s) Oral two times a day      Other medications:   Biotene Dry Mouth Oral Rinse 5 milliLiter(s) Swish and Spit five times a day  chlorhexidine 4% Liquid 1 Application(s) Topical <User Schedule>  escitalopram 20 milliGRAM(s) Oral daily  lidocaine/prilocaine Cream 1 Application(s) Topical daily  loratadine 10 milliGRAM(s) Oral daily  ondansetron  IVPB 8 milliGRAM(s) IV Intermittent every 8 hours  sodium chloride 0.9% 1000 milliLiter(s) IV Continuous <Continuous>  sodium chloride 0.9%. 1000 milliLiter(s) IV Continuous <Continuous>      PRN:   acetaminophen     Tablet .. 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 Mild, central pulmonary venous congestion, new.  3/9 BCx and UCx, follow up  3/11- transaminitis, hold fluconazole. Tbili increased to 1.8. Added on direct and indirect bili.   3/12- direct bili 1.3 3/11/24     1. Infectious Disease:   cefepime   IVPB      cefepime   IVPB 2000 milliGRAM(s) IV Intermittent every 8 hours  valACYclovir 500 milliGRAM(s) Oral two times a day    2. GI Prophylaxis:    pantoprazole    Tablet 40 milliGRAM(s) Oral before breakfast    3. Mouthcare - NS / NaHCO3 rinses, Mycelex, Biotene; Skin care     4. GVHD prophylaxis   TBI day -1   CTX days +3, +4  mycophenolate mofetil 1000 milliGRAM(s) Oral three times a day  tacrolimus 2 milliGRAM(s) Oral two times a day    5. Transfuse & replete electrolytes prn   Kphos 30mmol IV q 6 hours x 2 doses     6. IV hydration, daily weights, strict I&O, prn diuresis     7. PO intake as tolerated, nutrition follow up as needed, MVI, folic acid     8. Antiemetics, anti-diarrhea medications:   loperamide 2 milliGRAM(s) Oral every 3 hours PRN  metoclopramide Injectable 10 milliGRAM(s) IV Push every 6 hours PRN    9. OOB as tolerated, physical therapy consult if needed     10. Monitor coags / fibrinogen 2x week, vitamin K as needed     11. Monitor closely for clinical changes, monitor for fevers     12. Emotional support provided, plan of care discussed and questions addressed     13. Patient education done regarding chemotherapy prep, plan of care, restrictions and discharge planning     14. Continue regular social work input     I have written the above note for Dr. Olivier who performed service with me in the room.   Estelita Tinsley NP-C ([phone])    I have seen and examined patient with NP, I agree with above note as scribed.

## 2024-03-12 NOTE — DIETITIAN NUTRITION RISK NOTIFICATION - TREATMENT: THE FOLLOWING DIET HAS BEEN RECOMMENDED
Diet, Regular:   GlutaSolve(Glutamine) 15gm pkg     Qty per Day:  1  Supplement Feeding Modality:  Oral  Ensure Plus High Protein Cans or Servings Per Day:  1       Frequency:  Daily (03-06-24 @ 12:36) [Active]

## 2024-03-13 LAB
ALBUMIN SERPL ELPH-MCNC: 3 G/DL — LOW (ref 3.3–5)
ALP SERPL-CCNC: 127 U/L — HIGH (ref 40–120)
ALT FLD-CCNC: 65 U/L — HIGH (ref 10–45)
ANION GAP SERPL CALC-SCNC: 10 MMOL/L — SIGNIFICANT CHANGE UP (ref 5–17)
AST SERPL-CCNC: 27 U/L — SIGNIFICANT CHANGE UP (ref 10–40)
BILIRUB SERPL-MCNC: 0.8 MG/DL — SIGNIFICANT CHANGE UP (ref 0.2–1.2)
BLD GP AB SCN SERPL QL: NEGATIVE — SIGNIFICANT CHANGE UP
BUN SERPL-MCNC: 8 MG/DL — SIGNIFICANT CHANGE UP (ref 7–23)
CALCIUM SERPL-MCNC: 8.6 MG/DL — SIGNIFICANT CHANGE UP (ref 8.4–10.5)
CHLORIDE SERPL-SCNC: 107 MMOL/L — SIGNIFICANT CHANGE UP (ref 96–108)
CO2 SERPL-SCNC: 23 MMOL/L — SIGNIFICANT CHANGE UP (ref 22–31)
CREAT SERPL-MCNC: 0.58 MG/DL — SIGNIFICANT CHANGE UP (ref 0.5–1.3)
EGFR: 100 ML/MIN/1.73M2 — SIGNIFICANT CHANGE UP
GLUCOSE SERPL-MCNC: 83 MG/DL — SIGNIFICANT CHANGE UP (ref 70–99)
HCT VFR BLD CALC: 29.7 % — LOW (ref 34.5–45)
HGB BLD-MCNC: 10.2 G/DL — LOW (ref 11.5–15.5)
LDH SERPL L TO P-CCNC: 137 U/L — SIGNIFICANT CHANGE UP (ref 50–242)
MAGNESIUM SERPL-MCNC: 1.7 MG/DL — SIGNIFICANT CHANGE UP (ref 1.6–2.6)
MCHC RBC-ENTMCNC: 32.5 PG — SIGNIFICANT CHANGE UP (ref 27–34)
MCHC RBC-ENTMCNC: 34.3 GM/DL — SIGNIFICANT CHANGE UP (ref 32–36)
MCV RBC AUTO: 94.6 FL — SIGNIFICANT CHANGE UP (ref 80–100)
NRBC # BLD: 0 /100 WBCS — SIGNIFICANT CHANGE UP (ref 0–0)
PHOSPHATE SERPL-MCNC: 2.7 MG/DL — SIGNIFICANT CHANGE UP (ref 2.5–4.5)
PLATELET # BLD AUTO: 15 K/UL — CRITICAL LOW (ref 150–400)
POTASSIUM SERPL-MCNC: 4.3 MMOL/L — SIGNIFICANT CHANGE UP (ref 3.5–5.3)
POTASSIUM SERPL-SCNC: 4.3 MMOL/L — SIGNIFICANT CHANGE UP (ref 3.5–5.3)
PROT SERPL-MCNC: 4.8 G/DL — LOW (ref 6–8.3)
RBC # BLD: 3.14 M/UL — LOW (ref 3.8–5.2)
RBC # FLD: 12.4 % — SIGNIFICANT CHANGE UP (ref 10.3–14.5)
RH IG SCN BLD-IMP: POSITIVE — SIGNIFICANT CHANGE UP
SODIUM SERPL-SCNC: 140 MMOL/L — SIGNIFICANT CHANGE UP (ref 135–145)
WBC # BLD: 0.15 K/UL — CRITICAL LOW (ref 3.8–10.5)
WBC # FLD AUTO: 0.15 K/UL — CRITICAL LOW (ref 3.8–10.5)

## 2024-03-13 PROCEDURE — 99233 SBSQ HOSP IP/OBS HIGH 50: CPT | Mod: FS

## 2024-03-13 RX ORDER — FLUCONAZOLE 150 MG/1
200 TABLET ORAL DAILY
Refills: 0 | Status: DISCONTINUED | OUTPATIENT
Start: 2024-03-13 | End: 2024-03-18

## 2024-03-13 RX ADMIN — Medication 1 TABLET(S): at 05:30

## 2024-03-13 RX ADMIN — VALACYCLOVIR 500 MILLIGRAM(S): 500 TABLET, FILM COATED ORAL at 05:04

## 2024-03-13 RX ADMIN — SODIUM CHLORIDE 20 MILLILITER(S): 9 INJECTION INTRAMUSCULAR; INTRAVENOUS; SUBCUTANEOUS at 23:33

## 2024-03-13 RX ADMIN — LORATADINE 10 MILLIGRAM(S): 10 TABLET ORAL at 12:36

## 2024-03-13 RX ADMIN — MYCOPHENOLATE MOFETIL 1000 MILLIGRAM(S): 250 CAPSULE ORAL at 21:10

## 2024-03-13 RX ADMIN — Medication 650 MILLIGRAM(S): at 06:00

## 2024-03-13 RX ADMIN — CHLORHEXIDINE GLUCONATE 1 APPLICATION(S): 213 SOLUTION TOPICAL at 11:07

## 2024-03-13 RX ADMIN — ESCITALOPRAM OXALATE 20 MILLIGRAM(S): 10 TABLET, FILM COATED ORAL at 13:04

## 2024-03-13 RX ADMIN — Medication 5 MILLILITER(S): at 08:06

## 2024-03-13 RX ADMIN — Medication 650 MILLIGRAM(S): at 05:30

## 2024-03-13 RX ADMIN — Medication 10 MILLILITER(S): at 08:05

## 2024-03-13 RX ADMIN — Medication 5 MILLILITER(S): at 12:31

## 2024-03-13 RX ADMIN — LOSARTAN POTASSIUM 100 MILLIGRAM(S): 100 TABLET, FILM COATED ORAL at 05:03

## 2024-03-13 RX ADMIN — FLUCONAZOLE 200 MILLIGRAM(S): 150 TABLET ORAL at 12:31

## 2024-03-13 RX ADMIN — VALACYCLOVIR 500 MILLIGRAM(S): 500 TABLET, FILM COATED ORAL at 18:48

## 2024-03-13 RX ADMIN — Medication 10 MILLILITER(S): at 19:54

## 2024-03-13 RX ADMIN — Medication 2 MILLIGRAM(S): at 00:00

## 2024-03-13 RX ADMIN — Medication 10 MILLILITER(S): at 23:33

## 2024-03-13 RX ADMIN — MYCOPHENOLATE MOFETIL 1000 MILLIGRAM(S): 250 CAPSULE ORAL at 05:03

## 2024-03-13 RX ADMIN — Medication 10 MILLILITER(S): at 16:54

## 2024-03-13 RX ADMIN — Medication 5 MILLILITER(S): at 23:33

## 2024-03-13 RX ADMIN — CEFEPIME 100 MILLIGRAM(S): 1 INJECTION, POWDER, FOR SOLUTION INTRAMUSCULAR; INTRAVENOUS at 21:09

## 2024-03-13 RX ADMIN — PANTOPRAZOLE SODIUM 40 MILLIGRAM(S): 20 TABLET, DELAYED RELEASE ORAL at 05:04

## 2024-03-13 RX ADMIN — Medication 5 MILLILITER(S): at 16:52

## 2024-03-13 RX ADMIN — CEFEPIME 100 MILLIGRAM(S): 1 INJECTION, POWDER, FOR SOLUTION INTRAMUSCULAR; INTRAVENOUS at 05:03

## 2024-03-13 RX ADMIN — TACROLIMUS 2 MILLIGRAM(S): 5 CAPSULE ORAL at 05:05

## 2024-03-13 RX ADMIN — Medication 480 MICROGRAM(S): at 13:07

## 2024-03-13 RX ADMIN — Medication 10 MILLILITER(S): at 12:36

## 2024-03-13 RX ADMIN — Medication 2 MILLIGRAM(S): at 23:33

## 2024-03-13 RX ADMIN — CEFEPIME 100 MILLIGRAM(S): 1 INJECTION, POWDER, FOR SOLUTION INTRAMUSCULAR; INTRAVENOUS at 13:07

## 2024-03-13 RX ADMIN — MYCOPHENOLATE MOFETIL 1000 MILLIGRAM(S): 250 CAPSULE ORAL at 13:05

## 2024-03-13 RX ADMIN — TACROLIMUS 2 MILLIGRAM(S): 5 CAPSULE ORAL at 18:51

## 2024-03-13 RX ADMIN — Medication 2 MILLIGRAM(S): at 11:00

## 2024-03-13 RX ADMIN — Medication 5 MILLILITER(S): at 19:54

## 2024-03-13 NOTE — PROGRESS NOTE ADULT - NS ATTEND AMEND GEN_ALL_CORE FT
Assessment: 64-year-old day + 7 CATRACHITA alloBMT using a Flu/Cy/TBI preparative regimen for [place] negative ALL.  Course uncomplicated thus far.    Plan:  Heme: completed preparative regimen  PLT goal > 10,000  Hgb goal > 7.0g/dL  G-CSF to start on day +5  will require post ALLO BMT CNS prophylaxis and tki maintenance -- begins after engraftment.     GVHD: PTCy days 3 and 4, Cellcept and tacro to start on day +5    ID: day 0: flu, valtrex, flu held b/c of increased bili  Levaquin started on 3/3 for ANC < 500..switched to cefapime for merna fever..f/u cx's  bm product A0 cx positive for stapg caprae...skin lauro..pt blood cx negative  bactrim SS qd to start on day +21  having diarrhea, Cdiff negative..immodium prn    replete lytes prn, follow i/o, lasix prn    Nutrition: tolerating PO    DVT prophylaxis: ambulation    Over 35 minutes were spent in direct patient care and care coordination. Assessment: 64-year-old day + 7 CATRACHITA alloBMT using a Flu/Cy/TBI preparative regimen for [place] negative ALL.  Course uncomplicated thus far.    Plan:  Heme: completed preparative regimen  PLT goal > 10,000  Hgb goal > 7.0g/dL  G-CSF to start on day +5  will require post ALLO BMT CNS prophylaxis and tki maintenance -- begins after engraftment.     GVHD: PTCy days 3 and 4, Cellcept and tacro to start on day +5    ID: day 0: flu, valtrex, flu held b/c of increased bili,,3/13 restarted at 200 mg  Levaquin started on 3/3 for ANC < 500..switched to cefapime for merna fever..f/u cx's  bm product A0 cx positive for staph caprae...skin lauro..pt blood cx negative  bactrim SS qd to start on day +21  having diarrhea, Cdiff negative..immodium prn    replete lytes prn, follow i/o, lasix prn    Nutrition: tolerating PO    DVT prophylaxis: ambulation    Over 35 minutes were spent in direct patient care and care coordination.

## 2024-03-13 NOTE — PROGRESS NOTE ADULT - SUBJECTIVE AND OBJECTIVE BOX
HPC Transplant Team                                                      Critical / Counseling Time Provided: 30 minutes                                                                                                                                                        Chief Complaint: Haplo-idential BMT (son) with FLU/CY/TBI prep for the treatment fo ALL      S: Patient seen and examined with [facility] Transplant Team:     O: Vitals:   Vital Signs Last 24 Hrs  T(C): 36.7 (13 Mar 2024 05:00), Max: 36.7 (13 Mar 2024 05:00)  T(F): 98.1 (13 Mar 2024 05:00), Max: 98.1 (13 Mar 2024 05:00)  HR: 68 (13 Mar 2024 05:00) (65 - 71)  BP: 154/85 (13 Mar 2024 05:00) (128/79 - 157/79)  BP(mean): --  RR: 19 (13 Mar 2024 05:00) (18 - 19)  SpO2: 94% (13 Mar 2024 05:00) (93% - 98%)    Parameters below as of 13 Mar 2024 05:00  Patient On (Oxygen Delivery Method): room air      Admit weight: 96.9kg   Daily Weight in k.9 (12 Mar 2024 08:30)    Intake / Output:    @ 07:01  -  03-13 @ 07:00  --------------------------------------------------------  IN: 2424 mL / OUT: 2970 mL / NET: -546 mL      PE:   Oropharynx: no erythema no ulcerations  Oral Mucositis:   -                                                     Grade: -  CVS: RRR, +S1,S2  Lungs: CTA throughout bilaterally   Abdomen: soft, ND, ND +bs  Extremities: no edema   Gastric Mucositis:      -                                            Grade: -  Intestinal Mucositis:     -                                         Grade: -  Skin: small area of petechiae on the medial aspect of the left knee and diffusely over the left medial ankle and anterior shin  TLC: C/D/I   Neuro: A&O x3  Pain: Denies    Labs:         140  |  107  |  8   ----------------------------<  83  4.3   |  23  |  0.58    Ca    8.6      13 Mar 2024 06:54  Phos  2.7       Mg     1.7         TPro  4.8<L>  /  Alb  3.0<L>  /  TBili  0.8  /  DBili  x   /  AST  27  /  ALT  65<H>  /  AlkPhos  127<H>        LIVER FUNCTIONS - ( 13 Mar 2024 06:54 )  Alb: 3.0 g/dL / Pro: 4.8 g/dL / ALK PHOS: 127 U/L / ALT: 65 U/L / AST: 27 U/L / GGT: x           Lactate Dehydrogenase, Serum: 137 U/L ( @ 06:54)      Cultures:   Culture Results:   <10,000 CFU/mL Normal Urogenital Argelia (24 @ 21:24)    Culture Results:   No growth at 24 hours (24 @ 21:24)    Culture Results:   No growth at 24 hours (24 @ 21:24)      Radiology:   Xray Chest 1 View- PORTABLE-Urgent (Xray Chest 1 View- PORTABLE-Urgent .) (24 @ 23:44)   IMPRESSION:  Small left pleural effusion/linear atelectasis.  Mild, central pulmonary venous congestion, new      Meds:   Antimicrobials:   cefepime   IVPB 2000 milliGRAM(s) IV Intermittent every 8 hours  cefepime   IVPB      fluconAZOLE   Tablet 200 milliGRAM(s) Oral daily  valACYclovir 500 milliGRAM(s) Oral two times a day      Heme / Onc:       GI:  aluminum hydroxide/magnesium hydroxide/simethicone Suspension 30 milliLiter(s) Oral every 6 hours PRN  calcium carbonate    500 mG (Tums) Chewable 1 Tablet(s) Chew three times a day PRN  loperamide 2 milliGRAM(s) Oral every 3 hours PRN  pantoprazole    Tablet 40 milliGRAM(s) Oral before breakfast  sodium bicarbonate Mouth Rinse 10 milliLiter(s) Swish and Spit five times a day      Cardiovascular:   losartan 100 milliGRAM(s) Oral daily      Immunologic:   filgrastim-sndz (ZARXIO) Injectable 480 MICROGram(s) SubCutaneous every 24 hours  mycophenolate mofetil 1000 milliGRAM(s) Oral three times a day  tacrolimus 2 milliGRAM(s) Oral two times a day      Other medications:   Biotene Dry Mouth Oral Rinse 5 milliLiter(s) Swish and Spit five times a day  chlorhexidine 4% Liquid 1 Application(s) Topical <User Schedule>  escitalopram 20 milliGRAM(s) Oral daily  lidocaine/prilocaine Cream 1 Application(s) Topical daily  loratadine 10 milliGRAM(s) Oral daily  sodium chloride 0.9% 1000 milliLiter(s) IV Continuous <Continuous>  sodium chloride 0.9%. 1000 milliLiter(s) IV Continuous <Continuous>      PRN:   acetaminophen     Tablet .. 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 Mild, central pulmonary venous congestion, new.  3/9 BCx and UCx, follow up  3/11- transaminitis, hold fluconazole. Tbili increased to 1.8. Added on direct and indirect bili.   3/12- direct bili 1.3 3/11/24     1. Infectious Disease:   cefepime   IVPB 2000 milliGRAM(s) IV Intermittent every 8 hours  cefepime   IVPB      fluconAZOLE   Tablet 200 milliGRAM(s) Oral daily  valACYclovir 500 milliGRAM(s) Oral two times a day    2. GI Prophylaxis:   pantoprazole    Tablet 40 milliGRAM(s) Oral before breakfast    3. Mouthcare - NS / NaHCO3 rinses, Mycelex, Biotene; Skin care     4. GVHD prophylaxis   TBI day -1   CTX days +3, +4   mycophenolate mofetil 1000 milliGRAM(s) Oral three times a day  tacrolimus 2 milliGRAM(s) Oral two times a day    5. Transfuse & replete electrolytes prn     6. IV hydration, daily weights, strict I&O, prn diuresis     7. PO intake as tolerated, nutrition follow up as needed, MVI, folic acid     8. Antiemetics, anti-diarrhea medications:   loperamide 2 milliGRAM(s) Oral every 3 hours PRN  metoclopramide Injectable 10 milliGRAM(s) IV Push every 6 hours PRN    9. OOB as tolerated, physical therapy consult if needed     10. Monitor coags / fibrinogen 2x week, vitamin K as needed     11. Monitor closely for clinical changes, monitor for fevers     12. Emotional support provided, plan of care discussed and questions addressed     13. Patient education done regarding plan of care, restrictions and discharge planning     14. Continue regular social work input     I have written the above note for Dr. Olivier who performed service with me in the room.   Estelita Tinsley NP-C ([phone])    I have seen and examined patient with NP, I agree with above note as scribed.                    HPC Transplant Team                                                      Critical / Counseling Time Provided: 30 minutes                                                                                                                                                        Chief Complaint: Haplo-idential BMT (son) with FLU/CY/TBI prep for the treatment fo ALL      S: Patient seen and examined with [facility] Transplant Team:     O: Vitals:   Vital Signs Last 24 Hrs  T(C): 36.7 (13 Mar 2024 05:00), Max: 36.7 (13 Mar 2024 05:00)  T(F): 98.1 (13 Mar 2024 05:00), Max: 98.1 (13 Mar 2024 05:00)  HR: 68 (13 Mar 2024 05:00) (65 - 71)  BP: 154/85 (13 Mar 2024 05:00) (128/79 - 157/79)  BP(mean): --  RR: 19 (13 Mar 2024 05:00) (18 - 19)  SpO2: 94% (13 Mar 2024 05:00) (93% - 98%)    Parameters below as of 13 Mar 2024 05:00  Patient On (Oxygen Delivery Method): room air      Admit weight: 96.9kg   Daily Weight in k.9 (12 Mar 2024 08:30)    Intake / Output:   03-12 @ 07:01  -  03-13 @ 07:00  --------------------------------------------------------  IN: 2424 mL / OUT: 2970 mL / NET: -546 mL      PE:   Oropharynx: no erythema no ulcerations  Oral Mucositis:   -                                                     Grade: -  CVS: RRR, +S1,S2  Lungs: CTA throughout bilaterally   Abdomen: soft, ND, ND +bs  Extremities: no edema   Gastric Mucositis:      -                                            Grade: -  Intestinal Mucositis:     -                                         Grade: -  Skin: small area of petechiae on the medial aspect of the left knee and diffusely over the left medial ankle and anterior shin  TLC: C/D/I   Neuro: A&O x3  Pain: Denies    Labs:                         10.2   0.15  )-----------( 15       ( 13 Mar 2024 06:55 )             29.7     03-13    140  |  107  |  8   ----------------------------<  83  4.3   |  23  |  0.58    Ca    8.6      13 Mar 2024 06:54  Phos  2.7       Mg     1.7         TPro  4.8<L>  /  Alb  3.0<L>  /  TBili  0.8  /  DBili  x   /  AST  27  /  ALT  65<H>  /  AlkPhos  127<H>        LIVER FUNCTIONS - ( 13 Mar 2024 06:54 )  Alb: 3.0 g/dL / Pro: 4.8 g/dL / ALK PHOS: 127 U/L / ALT: 65 U/L / AST: 27 U/L / GGT: x           Lactate Dehydrogenase, Serum: 137 U/L ( @ 06:54)      Cultures:   Culture Results:   <10,000 CFU/mL Normal Urogenital Argelia (24 @ 21:24)    Culture Results:   No growth at 24 hours (24 @ 21:24)    Culture Results:   No growth at 24 hours (24 @ 21:24)      Radiology:   Xray Chest 1 View- PORTABLE-Urgent (Xray Chest 1 View- PORTABLE-Urgent .) (24 @ 23:44)   IMPRESSION:  Small left pleural effusion/linear atelectasis.  Mild, central pulmonary venous congestion, new      Meds:   Antimicrobials:   cefepime   IVPB 2000 milliGRAM(s) IV Intermittent every 8 hours  cefepime   IVPB      fluconAZOLE   Tablet 200 milliGRAM(s) Oral daily  valACYclovir 500 milliGRAM(s) Oral two times a day      Heme / Onc:       GI:  aluminum hydroxide/magnesium hydroxide/simethicone Suspension 30 milliLiter(s) Oral every 6 hours PRN  calcium carbonate    500 mG (Tums) Chewable 1 Tablet(s) Chew three times a day PRN  loperamide 2 milliGRAM(s) Oral every 3 hours PRN  pantoprazole    Tablet 40 milliGRAM(s) Oral before breakfast  sodium bicarbonate Mouth Rinse 10 milliLiter(s) Swish and Spit five times a day      Cardiovascular:   losartan 100 milliGRAM(s) Oral daily      Immunologic:   filgrastim-sndz (ZARXIO) Injectable 480 MICROGram(s) SubCutaneous every 24 hours  mycophenolate mofetil 1000 milliGRAM(s) Oral three times a day  tacrolimus 2 milliGRAM(s) Oral two times a day      Other medications:   Biotene Dry Mouth Oral Rinse 5 milliLiter(s) Swish and Spit five times a day  chlorhexidine 4% Liquid 1 Application(s) Topical <User Schedule>  escitalopram 20 milliGRAM(s) Oral daily  lidocaine/prilocaine Cream 1 Application(s) Topical daily  loratadine 10 milliGRAM(s) Oral daily  sodium chloride 0.9% 1000 milliLiter(s) IV Continuous <Continuous>  sodium chloride 0.9%. 1000 milliLiter(s) IV Continuous <Continuous>      PRN:   acetaminophen     Tablet .. 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 Mild, central pulmonary venous congestion, new.  3/9 BCx and UCx, follow up  3/11- transaminitis, hold fluconazole. Tbili increased to 1.8. Added on direct and indirect bili.   3/12- direct bili 1.3 3/11/24     1. Infectious Disease:   cefepime   IVPB 2000 milliGRAM(s) IV Intermittent every 8 hours  cefepime   IVPB      fluconAZOLE   Tablet 200 milliGRAM(s) Oral daily  valACYclovir 500 milliGRAM(s) Oral two times a day    2. GI Prophylaxis:   pantoprazole    Tablet 40 milliGRAM(s) Oral before breakfast    3. Mouthcare - NS / NaHCO3 rinses, Mycelex, Biotene; Skin care     4. GVHD prophylaxis   TBI day -1   CTX days +3, +4   mycophenolate mofetil 1000 milliGRAM(s) Oral three times a day  tacrolimus 2 milliGRAM(s) Oral two times a day    5. Transfuse & replete electrolytes prn     6. IV hydration, daily weights, strict I&O, prn diuresis     7. PO intake as tolerated, nutrition follow up as needed, MVI, folic acid     8. Antiemetics, anti-diarrhea medications:   loperamide 2 milliGRAM(s) Oral every 3 hours PRN  metoclopramide Injectable 10 milliGRAM(s) IV Push every 6 hours PRN    9. OOB as tolerated, physical therapy consult if needed     10. Monitor coags / fibrinogen 2x week, vitamin K as needed     11. Monitor closely for clinical changes, monitor for fevers     12. Emotional support provided, plan of care discussed and questions addressed     13. Patient education done regarding plan of care, restrictions and discharge planning     14. Continue regular social work input     I have written the above note for Dr. Olivier who performed service with me in the room.   Estelita Tinsley NP-C ([phone])    I have seen and examined patient with NP, I agree with above note as scribed.

## 2024-03-14 LAB
ALBUMIN SERPL ELPH-MCNC: 3.2 G/DL — LOW (ref 3.3–5)
ALP SERPL-CCNC: 133 U/L — HIGH (ref 40–120)
ALT FLD-CCNC: 48 U/L — HIGH (ref 10–45)
ANION GAP SERPL CALC-SCNC: 11 MMOL/L — SIGNIFICANT CHANGE UP (ref 5–17)
APTT BLD: 30 SEC — SIGNIFICANT CHANGE UP (ref 24.5–35.6)
AST SERPL-CCNC: 22 U/L — SIGNIFICANT CHANGE UP (ref 10–40)
BILIRUB SERPL-MCNC: 0.8 MG/DL — SIGNIFICANT CHANGE UP (ref 0.2–1.2)
BUN SERPL-MCNC: 7 MG/DL — SIGNIFICANT CHANGE UP (ref 7–23)
CALCIUM SERPL-MCNC: 9 MG/DL — SIGNIFICANT CHANGE UP (ref 8.4–10.5)
CHLORIDE SERPL-SCNC: 106 MMOL/L — SIGNIFICANT CHANGE UP (ref 96–108)
CO2 SERPL-SCNC: 23 MMOL/L — SIGNIFICANT CHANGE UP (ref 22–31)
CREAT SERPL-MCNC: 0.55 MG/DL — SIGNIFICANT CHANGE UP (ref 0.5–1.3)
EGFR: 101 ML/MIN/1.73M2 — SIGNIFICANT CHANGE UP
FIBRINOGEN PPP-MCNC: 433 MG/DL — SIGNIFICANT CHANGE UP (ref 200–445)
GLUCOSE SERPL-MCNC: 86 MG/DL — SIGNIFICANT CHANGE UP (ref 70–99)
HCT VFR BLD CALC: 30.5 % — LOW (ref 34.5–45)
HGB BLD-MCNC: 10.6 G/DL — LOW (ref 11.5–15.5)
INR BLD: 1.04 RATIO — SIGNIFICANT CHANGE UP (ref 0.85–1.18)
LDH SERPL L TO P-CCNC: 138 U/L — SIGNIFICANT CHANGE UP (ref 50–242)
MAGNESIUM SERPL-MCNC: 1.6 MG/DL — SIGNIFICANT CHANGE UP (ref 1.6–2.6)
MCHC RBC-ENTMCNC: 32.1 PG — SIGNIFICANT CHANGE UP (ref 27–34)
MCHC RBC-ENTMCNC: 34.8 GM/DL — SIGNIFICANT CHANGE UP (ref 32–36)
MCV RBC AUTO: 92.4 FL — SIGNIFICANT CHANGE UP (ref 80–100)
NRBC # BLD: 0 /100 WBCS — SIGNIFICANT CHANGE UP (ref 0–0)
PHOSPHATE SERPL-MCNC: 2.5 MG/DL — SIGNIFICANT CHANGE UP (ref 2.5–4.5)
PLATELET # BLD AUTO: 10 K/UL — CRITICAL LOW (ref 150–400)
POTASSIUM SERPL-MCNC: 4.2 MMOL/L — SIGNIFICANT CHANGE UP (ref 3.5–5.3)
POTASSIUM SERPL-SCNC: 4.2 MMOL/L — SIGNIFICANT CHANGE UP (ref 3.5–5.3)
PROT SERPL-MCNC: 5.3 G/DL — LOW (ref 6–8.3)
PROTHROM AB SERPL-ACNC: 10.9 SEC — SIGNIFICANT CHANGE UP (ref 9.5–13)
RBC # BLD: 3.3 M/UL — LOW (ref 3.8–5.2)
RBC # FLD: 12.1 % — SIGNIFICANT CHANGE UP (ref 10.3–14.5)
SODIUM SERPL-SCNC: 140 MMOL/L — SIGNIFICANT CHANGE UP (ref 135–145)
TACROLIMUS SERPL-MCNC: 8.1 NG/ML — SIGNIFICANT CHANGE UP
WBC # BLD: 0.05 K/UL — CRITICAL LOW (ref 3.8–10.5)
WBC # FLD AUTO: 0.05 K/UL — CRITICAL LOW (ref 3.8–10.5)

## 2024-03-14 PROCEDURE — 99233 SBSQ HOSP IP/OBS HIGH 50: CPT | Mod: FS

## 2024-03-14 RX ORDER — MAGNESIUM SULFATE 500 MG/ML
2 VIAL (ML) INJECTION ONCE
Refills: 0 | Status: COMPLETED | OUTPATIENT
Start: 2024-03-14 | End: 2024-03-14

## 2024-03-14 RX ADMIN — MYCOPHENOLATE MOFETIL 1000 MILLIGRAM(S): 250 CAPSULE ORAL at 22:00

## 2024-03-14 RX ADMIN — Medication 10 MILLILITER(S): at 20:07

## 2024-03-14 RX ADMIN — Medication 5 MILLILITER(S): at 15:35

## 2024-03-14 RX ADMIN — CEFEPIME 100 MILLIGRAM(S): 1 INJECTION, POWDER, FOR SOLUTION INTRAMUSCULAR; INTRAVENOUS at 22:00

## 2024-03-14 RX ADMIN — MYCOPHENOLATE MOFETIL 1000 MILLIGRAM(S): 250 CAPSULE ORAL at 05:27

## 2024-03-14 RX ADMIN — Medication 10 MILLILITER(S): at 08:28

## 2024-03-14 RX ADMIN — TACROLIMUS 2 MILLIGRAM(S): 5 CAPSULE ORAL at 05:27

## 2024-03-14 RX ADMIN — Medication 5 MILLILITER(S): at 08:28

## 2024-03-14 RX ADMIN — VALACYCLOVIR 500 MILLIGRAM(S): 500 TABLET, FILM COATED ORAL at 05:28

## 2024-03-14 RX ADMIN — CHLORHEXIDINE GLUCONATE 1 APPLICATION(S): 213 SOLUTION TOPICAL at 08:29

## 2024-03-14 RX ADMIN — CEFEPIME 100 MILLIGRAM(S): 1 INJECTION, POWDER, FOR SOLUTION INTRAMUSCULAR; INTRAVENOUS at 05:27

## 2024-03-14 RX ADMIN — Medication 5 MILLILITER(S): at 20:07

## 2024-03-14 RX ADMIN — VALACYCLOVIR 500 MILLIGRAM(S): 500 TABLET, FILM COATED ORAL at 17:29

## 2024-03-14 RX ADMIN — Medication 10 MILLILITER(S): at 15:34

## 2024-03-14 RX ADMIN — LOSARTAN POTASSIUM 100 MILLIGRAM(S): 100 TABLET, FILM COATED ORAL at 05:28

## 2024-03-14 RX ADMIN — TACROLIMUS 2 MILLIGRAM(S): 5 CAPSULE ORAL at 17:28

## 2024-03-14 RX ADMIN — PANTOPRAZOLE SODIUM 40 MILLIGRAM(S): 20 TABLET, DELAYED RELEASE ORAL at 05:28

## 2024-03-14 RX ADMIN — Medication 25 GRAM(S): at 08:29

## 2024-03-14 NOTE — PROGRESS NOTE ADULT - SUBJECTIVE AND OBJECTIVE BOX
HPC Transplant Team                                                      Critical / Counseling Time Provided: 30 minutes                                                                                                                                                        Chief Complaint: Haplo-idential BMT (son) with FLU/CY/TBI prep for the treatment fo ALL    S: Patient seen and examined with HPC Transplant Team:       O: Vitals:   Vital Signs Last 24 Hrs  T(C): 36 (14 Mar 2024 05:35), Max: 36.9 (13 Mar 2024 20:50)  T(F): 96.8 (14 Mar 2024 05:35), Max: 98.4 (13 Mar 2024 20:50)  HR: 66 (14 Mar 2024 05:35) (61 - 74)  BP: 132/79 (14 Mar 2024 05:35) (119/64 - 165/93)  BP(mean): --  RR: 18 (14 Mar 2024 05:35) (18 - 18)  SpO2: 95% (14 Mar 2024 05:35) (94% - 98%)    Parameters below as of 14 Mar 2024 05:35  Patient On (Oxygen Delivery Method): room air        Admit weight: 96.9kg   Daily Weight in k (14 Mar 2024 08:08)    Intake / Output:    @ 07: @ 07:00  --------------------------------------------------------  IN: 1340 mL / OUT: 2275 mL / NET: -935 mL     @ 07: @ 08:16  --------------------------------------------------------  IN: 0 mL / OUT: 125 mL / NET: -125 mL      PE:   Oropharynx: no erythema no ulcerations  Oral Mucositis:   -                                                     Grade: -  CVS: RRR, +S1,S2  Lungs: CTA throughout bilaterally   Abdomen: soft, ND, ND +bs  Extremities: no edema   Gastric Mucositis:      -                                            Grade: -  Intestinal Mucositis:     -                                         Grade: -  Skin: small area of petechiae on the medial aspect of the left knee and diffusely over the left medial ankle and anterior shin  TLC: C/D/I   Neuro: A&O x3  Pain: Denies      Labs:   CBC Full  -  ( 14 Mar 2024 07:03 )  WBC Count : 0.05 K/uL  Hemoglobin : 10.6 g/dL  Hematocrit : 30.5 %  Platelet Count - Automated : 10 K/uL  Mean Cell Volume : 92.4 fl  Mean Cell Hemoglobin : 32.1 pg  Mean Cell Hemoglobin Concentration : 34.8 gm/dL  Auto Neutrophil # : x  Auto Lymphocyte # : x  Auto Monocyte # : x  Auto Eosinophil # : x  Auto Basophil # : x  Auto Neutrophil % : x  Auto Lymphocyte % : x  Auto Monocyte % : x  Auto Eosinophil % : x  Auto Basophil % : x                          10.6   0.05  )-----------( 10       ( 14 Mar 2024 07:03 )             30.5     -    140  |  106  |  7   ----------------------------<  86  4.2   |  23  |  0.55    Ca    9.0      14 Mar 2024 07:03  Phos  2.5     -14  Mg     1.6     -    TPro  5.3<L>  /  Alb  3.2<L>  /  TBili  0.8  /  DBili  x   /  AST  22  /  ALT  48<H>  /  AlkPhos  133<H>  -14    PT/INR - ( 14 Mar 2024 07:03 )   PT: 10.9 sec;   INR: 1.04 ratio         PTT - ( 14 Mar 2024 07:03 )  PTT:30.0 sec  LIVER FUNCTIONS - ( 14 Mar 2024 07:03 )  Alb: 3.2 g/dL / Pro: 5.3 g/dL / ALK PHOS: 133 U/L / ALT: 48 U/L / AST: 22 U/L / GGT: x           Lactate Dehydrogenase, Serum: 138 U/L ( @ 07:03)        Cultures:   Culture Results:   <10,000 CFU/mL Normal Urogenital Argelia (24 @ 21:24)    Culture Results:   No growth at 24 hours (24 @ 21:24)    Culture Results:   No growth at 24 hours (24 @ 21:24)      Radiology:   Xray Chest 1 View- PORTABLE-Urgent (Xray Chest 1 View- PORTABLE-Urgent .) (24 @ 23:44)   IMPRESSION:  Small left pleural effusion/linear atelectasis.  Mild, central pulmonary venous congestion, new      Meds:   Antimicrobials:   cefepime   IVPB 2000 milliGRAM(s) IV Intermittent every 8 hours  cefepime   IVPB      fluconAZOLE   Tablet 200 milliGRAM(s) Oral daily  valACYclovir 500 milliGRAM(s) Oral two times a day      Heme / Onc:       GI:  aluminum hydroxide/magnesium hydroxide/simethicone Suspension 30 milliLiter(s) Oral every 6 hours PRN  calcium carbonate    500 mG (Tums) Chewable 1 Tablet(s) Chew three times a day PRN  loperamide 2 milliGRAM(s) Oral every 3 hours PRN  pantoprazole    Tablet 40 milliGRAM(s) Oral before breakfast  sodium bicarbonate Mouth Rinse 10 milliLiter(s) Swish and Spit five times a day      Cardiovascular:   losartan 100 milliGRAM(s) Oral daily      Immunologic:   filgrastim-sndz (ZARXIO) Injectable 480 MICROGram(s) SubCutaneous every 24 hours  mycophenolate mofetil 1000 milliGRAM(s) Oral three times a day  tacrolimus 2 milliGRAM(s) Oral two times a day      Other medications:   Biotene Dry Mouth Oral Rinse 5 milliLiter(s) Swish and Spit five times a day  chlorhexidine 4% Liquid 1 Application(s) Topical <User Schedule>  escitalopram 20 milliGRAM(s) Oral daily  lidocaine/prilocaine Cream 1 Application(s) Topical daily  loratadine 10 milliGRAM(s) Oral daily  magnesium sulfate  IVPB 2 Gram(s) IV Intermittent once  sodium chloride 0.9% 1000 milliLiter(s) IV Continuous <Continuous>  sodium chloride 0.9%. 1000 milliLiter(s) IV Continuous <Continuous>      PRN:   acetaminophen     Tablet .. 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 Mild, central pulmonary venous congestion, new.  3/9 BCx and UCx, follow up  3/11- transaminitis, hold fluconazole. Tbili increased to 1.8. Added on direct and indirect bili.   3/12- direct bili 1.3 3/11/24     1. Infectious Disease:   cefepime   IVPB 2000 milliGRAM(s) IV Intermittent every 8 hours  cefepime   IVPB      fluconAZOLE   Tablet 200 milliGRAM(s) Oral daily  valACYclovir 500 milliGRAM(s) Oral two times a day    2. GI Prophylaxis:   pantoprazole    Tablet 40 milliGRAM(s) Oral before breakfast    3. Mouthcare - NS / NaHCO3 rinses, Mycelex, Biotene; Skin care     4. GVHD prophylaxis   TBI day -1   CTX days +3, +4   mycophenolate mofetil 1000 milliGRAM(s) Oral three times a day  tacrolimus 2 milliGRAM(s) Oral two times a day    6. Transfuse & replete electrolytes prn   magnesium sulfate  IVPB 2 Gram(s) IV Intermittent once    7. IV hydration, daily weights, strict I&O, prn diuresis     8. PO intake as tolerated, nutrition follow up as needed, MVI, folic acid     9. Antiemetics, anti-diarrhea medications:   loperamide 2 milliGRAM(s) Oral every 3 hours PRN  metoclopramide Injectable 10 milliGRAM(s) IV Push every 6 hours PRN    10. OOB as tolerated, physical therapy consult if needed     11. Monitor coags / fibrinogen 2x week, vitamin K as needed     12. Monitor closely for clinical changes, monitor for fevers     13. Emotional support provided, plan of care discussed and questions addressed     14. Patient education done regarding plan of care, restrictions and discharge planning     15. Continue regular social work input     I have written the above note for Dr. Olivier who performed service with me in the room.   Estelita Tinsley NP-C ([phone])    I have seen and examined patient with NP, I agree with above note as scribed.                    HPC Transplant Team                                                      Critical / Counseling Time Provided: 30 minutes                                                                                                                                                        Chief Complaint: Haplo-idential BMT (son) with FLU/CY/TBI prep for the treatment fo ALL    S: Patient seen and examined with HPC Transplant Team:   + fatigue  + intermittent nausea    O: Vitals:   Vital Signs Last 24 Hrs  T(C): 36 (14 Mar 2024 05:35), Max: 36.9 (13 Mar 2024 20:50)  T(F): 96.8 (14 Mar 2024 05:35), Max: 98.4 (13 Mar 2024 20:50)  HR: 66 (14 Mar 2024 05:35) (61 - 74)  BP: 132/79 (14 Mar 2024 05:35) (119/64 - 165/93)  RR: 18 (14 Mar 2024 05:35) (18 - 18)  SpO2: 95% (14 Mar 2024 05:35) (94% - 98%)    Parameters below as of 14 Mar 2024 05:35  Patient On (Oxygen Delivery Method): room air    Admit weight: 96.9kg   Daily Weight in k (14 Mar 2024 08:08)    Intake / Output:    @ 07: @ 07:00  --------------------------------------------------------  IN: 1340 mL / OUT: 2275 mL / NET: -935 mL     @ 07: @ 08:16  --------------------------------------------------------  IN: 0 mL / OUT: 125 mL / NET: -125 mL      PE:   Oropharynx: no erythema no ulcerations  Oral Mucositis:   -                                                     Grade: -  CVS: RRR, +S1,S2  Lungs: CTA throughout bilaterally   Abdomen: soft, ND, ND +bs  Extremities: no edema   Gastric Mucositis:      -                                            Grade: -  Intestinal Mucositis:     -                                         Grade: -  Skin: small area of petechiae on the medial aspect of the left knee and diffusely over the left medial ankle and anterior shin  TLC: C/D/I   Neuro: A&O x3  Pain: Denies      Labs:   CBC Full  -  ( 14 Mar 2024 07:03 )  WBC Count : 0.05 K/uL  Hemoglobin : 10.6 g/dL  Hematocrit : 30.5 %  Platelet Count - Automated : 10 K/uL  Mean Cell Volume : 92.4 fl  Mean Cell Hemoglobin : 32.1 pg  Mean Cell Hemoglobin Concentration : 34.8 gm/dL  Auto Neutrophil # : x  Auto Lymphocyte # : x  Auto Monocyte # : x  Auto Eosinophil # : x  Auto Basophil # : x  Auto Neutrophil % : x  Auto Lymphocyte % : x  Auto Monocyte % : x  Auto Eosinophil % : x  Auto Basophil % : x                          10.6   0.05  )-----------( 10       ( 14 Mar 2024 07:03 )             30.5     -    140  |  106  |  7   ----------------------------<  86  4.2   |  23  |  0.55    Ca    9.0      14 Mar 2024 07:03  Phos  2.5     -  Mg     1.6         TPro  5.3<L>  /  Alb  3.2<L>  /  TBili  0.8  /  DBili  x   /  AST  22  /  ALT  48<H>  /  AlkPhos  133<H>      PT/INR - ( 14 Mar 2024 07:03 )   PT: 10.9 sec;   INR: 1.04 ratio    PTT - ( 14 Mar 2024 07:03 )  PTT:30.0 sec    LIVER FUNCTIONS - ( 14 Mar 2024 07:03 )  Alb: 3.2 g/dL / Pro: 5.3 g/dL / ALK PHOS: 133 U/L / ALT: 48 U/L / AST: 22 U/L / GGT: x           Lactate Dehydrogenase, Serum: 138 U/L ( @ 07:03)    Cultures:   Culture Results:   <10,000 CFU/mL Normal Urogenital Argelia (24 @ 21:24)    Culture Results:   No growth at 24 hours (24 @ 21:24)    Culture Results:   No growth at 24 hours (24 @ 21:24)      Radiology:   Xray Chest 1 View- PORTABLE-Urgent (Xray Chest 1 View- PORTABLE-Urgent .) (24 @ 23:44)   IMPRESSION:  Small left pleural effusion/linear atelectasis.  Mild, central pulmonary venous congestion, new      Meds:   Antimicrobials:   cefepime   IVPB 2000 milliGRAM(s) IV Intermittent every 8 hours  cefepime   IVPB      fluconAZOLE   Tablet 200 milliGRAM(s) Oral daily  valACYclovir 500 milliGRAM(s) Oral two times a day      GI:  aluminum hydroxide/magnesium hydroxide/simethicone Suspension 30 milliLiter(s) Oral every 6 hours PRN  calcium carbonate    500 mG (Tums) Chewable 1 Tablet(s) Chew three times a day PRN  loperamide 2 milliGRAM(s) Oral every 3 hours PRN  pantoprazole    Tablet 40 milliGRAM(s) Oral before breakfast  sodium bicarbonate Mouth Rinse 10 milliLiter(s) Swish and Spit five times a day      Cardiovascular:   losartan 100 milliGRAM(s) Oral daily      Immunologic:   filgrastim-sndz (ZARXIO) Injectable 480 MICROGram(s) SubCutaneous every 24 hours  mycophenolate mofetil 1000 milliGRAM(s) Oral three times a day  tacrolimus 2 milliGRAM(s) Oral two times a day      Other medications:   Biotene Dry Mouth Oral Rinse 5 milliLiter(s) Swish and Spit five times a day  chlorhexidine 4% Liquid 1 Application(s) Topical <User Schedule>  escitalopram 20 milliGRAM(s) Oral daily  lidocaine/prilocaine Cream 1 Application(s) Topical daily  loratadine 10 milliGRAM(s) Oral daily  magnesium sulfate  IVPB 2 Gram(s) IV Intermittent once  sodium chloride 0.9% 1000 milliLiter(s) IV Continuous <Continuous>  sodium chloride 0.9%. 1000 milliLiter(s) IV Continuous <Continuous>      PRN:   acetaminophen     Tablet .. 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 Mild, central pulmonary venous congestion, new.  3/9 BCx and UCx, follow up  3/11- transaminitis, hold fluconazole. Tbili increased to 1.8. Added on direct and indirect bili.   3/12- direct bili 1.3 3/11/24     1. Infectious Disease:   cefepime   IVPB 2000 milliGRAM(s) IV Intermittent every 8 hours   fluconAZOLE   Tablet 200 milliGRAM(s) Oral daily  valACYclovir 500 milliGRAM(s) Oral two times a day    2. GI Prophylaxis:   pantoprazole    Tablet 40 milliGRAM(s) Oral before breakfast    3. Mouthcare - NS / NaHCO3 rinses, Mycelex, Biotene; Skin care     4. GVHD prophylaxis   TBI day -1   CTX days +3, +4   mycophenolate mofetil 1000 milliGRAM(s) Oral three times a day  tacrolimus 2 milliGRAM(s) Oral two times a day    6. Transfuse & replete electrolytes prn   magnesium sulfate  IVPB 2 Gram(s) IV Intermittent once    7. IV hydration, daily weights, strict I&O, prn diuresis     8. PO intake as tolerated, nutrition follow up as needed, MVI, folic acid     9. Antiemetics, anti-diarrhea medications:   loperamide 2 milliGRAM(s) Oral every 3 hours PRN  metoclopramide Injectable 10 milliGRAM(s) IV Push every 6 hours PRN    10. OOB as tolerated, physical therapy consult if needed     11. Monitor coags / fibrinogen 2x week, vitamin K as needed     12. Monitor closely for clinical changes, monitor for fevers     13. Emotional support provided, plan of care discussed and questions addressed     14. Patient education done regarding plan of care, restrictions and discharge planning     15. Continue regular social work input     I have written the above note for Dr. Olivier who performed service with me in the room.   Estelita Tinsley NP-C ([phone])    I have seen and examined patient with NP, I agree with above note as scribed.

## 2024-03-14 NOTE — PROGRESS NOTE ADULT - NS ATTEND AMEND GEN_ALL_CORE FT
Assessment: 64-year-old day + 8 CATRACHITA alloBMT using a Flu/Cy/TBI preparative regimen for [place] negative ALL.  Course uncomplicated thus far.    Plan:  Heme: completed preparative regimen  PLT goal > 10,000  Hgb goal > 7.0g/dL  G-CSF to start on day +5  will require post ALLO BMT CNS prophylaxis and tki maintenance -- begins after engraftment.     GVHD: PTCy days 3 and 4, Cellcept and tacro to start on day +5    ID: day 0: flu, valtrex, flu held b/c of increased bili,,3/13 restarted at 200 mg  Levaquin started on 3/3 for ANC < 500..switched to cefapime for merna fever..f/u cx's  bm product A0 cx positive for staph caprae...skin lauro..pt blood cx negative  bactrim SS qd to start on day +21  having diarrhea, Cdiff negative..immodium prn    replete lytes prn, follow i/o, lasix prn    Nutrition: tolerating PO    DVT prophylaxis: ambulation    Over 35 minutes were spent in direct patient care and care coordination. Assessment: 64-year-old day + 8 CATRACHITA alloBMT using a Flu/Cy/TBI preparative regimen for [place] negative ALL.  Course uncomplicated thus far.    Plan:  Heme: completed preparative regimen  PLT goal > 10,000  Hgb goal > 7.0g/dL  G-CSF to start on day +5  will require post ALLO BMT CNS prophylaxis and tki maintenance -- begins after engraftment.     GVHD: PTCy days 3 and 4, Cellcept and tacro to started on day +5....tacro level mon and thurs    ID: day 0: flu, valtrex, flu held b/c of increased bili,,3/13 restarted at 200 mg  Levaquin started on 3/3 for ANC < 500..switched to cefapime for merna fever..f/u cx's  bm product A0 cx positive for staph caprae...skin lauro..pt blood cx negative  bactrim SS qd to start on day +21  having diarrhea, Cdiff negative..immodium prn    replete lytes prn, follow i/o, lasix prn    Nutrition: tolerating PO    DVT prophylaxis: ambulation    Over 35 minutes were spent in direct patient care and care coordination.

## 2024-03-15 LAB
ALBUMIN SERPL ELPH-MCNC: 3.2 G/DL — LOW (ref 3.3–5)
ALP SERPL-CCNC: 124 U/L — HIGH (ref 40–120)
ALT FLD-CCNC: 37 U/L — SIGNIFICANT CHANGE UP (ref 10–45)
ANION GAP SERPL CALC-SCNC: 11 MMOL/L — SIGNIFICANT CHANGE UP (ref 5–17)
AST SERPL-CCNC: 16 U/L — SIGNIFICANT CHANGE UP (ref 10–40)
BILIRUB DIRECT SERPL-MCNC: 0.2 MG/DL — SIGNIFICANT CHANGE UP (ref 0–0.3)
BILIRUB INDIRECT FLD-MCNC: 0.6 MG/DL — SIGNIFICANT CHANGE UP (ref 0.2–1)
BILIRUB SERPL-MCNC: 0.8 MG/DL — SIGNIFICANT CHANGE UP (ref 0.2–1.2)
BLD GP AB SCN SERPL QL: NEGATIVE — SIGNIFICANT CHANGE UP
BUN SERPL-MCNC: 8 MG/DL — SIGNIFICANT CHANGE UP (ref 7–23)
CALCIUM SERPL-MCNC: 8.9 MG/DL — SIGNIFICANT CHANGE UP (ref 8.4–10.5)
CHLORIDE SERPL-SCNC: 106 MMOL/L — SIGNIFICANT CHANGE UP (ref 96–108)
CO2 SERPL-SCNC: 26 MMOL/L — SIGNIFICANT CHANGE UP (ref 22–31)
CREAT SERPL-MCNC: 0.51 MG/DL — SIGNIFICANT CHANGE UP (ref 0.5–1.3)
CULTURE RESULTS: SIGNIFICANT CHANGE UP
CULTURE RESULTS: SIGNIFICANT CHANGE UP
EGFR: 103 ML/MIN/1.73M2 — SIGNIFICANT CHANGE UP
GLUCOSE SERPL-MCNC: 97 MG/DL — SIGNIFICANT CHANGE UP (ref 70–99)
HCT VFR BLD CALC: 29.3 % — LOW (ref 34.5–45)
HGB BLD-MCNC: 10.2 G/DL — LOW (ref 11.5–15.5)
LDH SERPL L TO P-CCNC: 121 U/L — SIGNIFICANT CHANGE UP (ref 50–242)
MAGNESIUM SERPL-MCNC: 1.9 MG/DL — SIGNIFICANT CHANGE UP (ref 1.6–2.6)
MCHC RBC-ENTMCNC: 32 PG — SIGNIFICANT CHANGE UP (ref 27–34)
MCHC RBC-ENTMCNC: 34.8 GM/DL — SIGNIFICANT CHANGE UP (ref 32–36)
MCV RBC AUTO: 91.8 FL — SIGNIFICANT CHANGE UP (ref 80–100)
NRBC # BLD: 0 /100 WBCS — SIGNIFICANT CHANGE UP (ref 0–0)
PHOSPHATE SERPL-MCNC: 2.8 MG/DL — SIGNIFICANT CHANGE UP (ref 2.5–4.5)
PLATELET # BLD AUTO: 4 K/UL — CRITICAL LOW (ref 150–400)
POTASSIUM SERPL-MCNC: 4.3 MMOL/L — SIGNIFICANT CHANGE UP (ref 3.5–5.3)
POTASSIUM SERPL-SCNC: 4.3 MMOL/L — SIGNIFICANT CHANGE UP (ref 3.5–5.3)
PROT SERPL-MCNC: 5.2 G/DL — LOW (ref 6–8.3)
RBC # BLD: 3.19 M/UL — LOW (ref 3.8–5.2)
RBC # FLD: 11.9 % — SIGNIFICANT CHANGE UP (ref 10.3–14.5)
RH IG SCN BLD-IMP: POSITIVE — SIGNIFICANT CHANGE UP
SODIUM SERPL-SCNC: 143 MMOL/L — SIGNIFICANT CHANGE UP (ref 135–145)
SPECIMEN SOURCE: SIGNIFICANT CHANGE UP
SPECIMEN SOURCE: SIGNIFICANT CHANGE UP
WBC # BLD: 0.02 K/UL — CRITICAL LOW (ref 3.8–10.5)
WBC # FLD AUTO: 0.02 K/UL — CRITICAL LOW (ref 3.8–10.5)

## 2024-03-15 PROCEDURE — 76536 US EXAM OF HEAD AND NECK: CPT | Mod: 26

## 2024-03-15 PROCEDURE — 99233 SBSQ HOSP IP/OBS HIGH 50: CPT | Mod: FS

## 2024-03-15 RX ORDER — DIPHENHYDRAMINE HYDROCHLORIDE AND LIDOCAINE HYDROCHLORIDE AND ALUMINUM HYDROXIDE AND MAGNESIUM HYDRO
10 KIT ONCE
Refills: 0 | Status: COMPLETED | OUTPATIENT
Start: 2024-03-15 | End: 2024-03-15

## 2024-03-15 RX ADMIN — DIPHENHYDRAMINE HYDROCHLORIDE AND LIDOCAINE HYDROCHLORIDE AND ALUMINUM HYDROXIDE AND MAGNESIUM HYDRO 10 MILLILITER(S): KIT at 05:50

## 2024-03-15 RX ADMIN — VALACYCLOVIR 500 MILLIGRAM(S): 500 TABLET, FILM COATED ORAL at 17:12

## 2024-03-15 RX ADMIN — Medication 5 MILLILITER(S): at 19:58

## 2024-03-15 RX ADMIN — Medication 10 MILLILITER(S): at 19:58

## 2024-03-15 RX ADMIN — FLUCONAZOLE 200 MILLIGRAM(S): 150 TABLET ORAL at 11:23

## 2024-03-15 RX ADMIN — LOSARTAN POTASSIUM 100 MILLIGRAM(S): 100 TABLET, FILM COATED ORAL at 05:52

## 2024-03-15 RX ADMIN — Medication 5 MILLILITER(S): at 07:38

## 2024-03-15 RX ADMIN — SODIUM CHLORIDE 20 MILLILITER(S): 9 INJECTION INTRAMUSCULAR; INTRAVENOUS; SUBCUTANEOUS at 06:05

## 2024-03-15 RX ADMIN — Medication 5 MILLILITER(S): at 00:10

## 2024-03-15 RX ADMIN — CEFEPIME 100 MILLIGRAM(S): 1 INJECTION, POWDER, FOR SOLUTION INTRAMUSCULAR; INTRAVENOUS at 13:04

## 2024-03-15 RX ADMIN — CEFEPIME 100 MILLIGRAM(S): 1 INJECTION, POWDER, FOR SOLUTION INTRAMUSCULAR; INTRAVENOUS at 05:49

## 2024-03-15 RX ADMIN — Medication 5 MILLILITER(S): at 15:57

## 2024-03-15 RX ADMIN — TACROLIMUS 2 MILLIGRAM(S): 5 CAPSULE ORAL at 05:51

## 2024-03-15 RX ADMIN — PANTOPRAZOLE SODIUM 40 MILLIGRAM(S): 20 TABLET, DELAYED RELEASE ORAL at 06:02

## 2024-03-15 RX ADMIN — Medication 10 MILLILITER(S): at 00:10

## 2024-03-15 RX ADMIN — VALACYCLOVIR 500 MILLIGRAM(S): 500 TABLET, FILM COATED ORAL at 05:51

## 2024-03-15 RX ADMIN — Medication 10 MILLILITER(S): at 11:23

## 2024-03-15 RX ADMIN — ESCITALOPRAM OXALATE 20 MILLIGRAM(S): 10 TABLET, FILM COATED ORAL at 11:23

## 2024-03-15 RX ADMIN — MYCOPHENOLATE MOFETIL 1000 MILLIGRAM(S): 250 CAPSULE ORAL at 13:05

## 2024-03-15 RX ADMIN — LORATADINE 10 MILLIGRAM(S): 10 TABLET ORAL at 11:23

## 2024-03-15 RX ADMIN — CEFEPIME 100 MILLIGRAM(S): 1 INJECTION, POWDER, FOR SOLUTION INTRAMUSCULAR; INTRAVENOUS at 21:12

## 2024-03-15 RX ADMIN — SODIUM CHLORIDE 20 MILLILITER(S): 9 INJECTION INTRAMUSCULAR; INTRAVENOUS; SUBCUTANEOUS at 07:40

## 2024-03-15 RX ADMIN — CHLORHEXIDINE GLUCONATE 1 APPLICATION(S): 213 SOLUTION TOPICAL at 08:23

## 2024-03-15 RX ADMIN — Medication 5 MILLILITER(S): at 11:23

## 2024-03-15 RX ADMIN — Medication 10 MILLILITER(S): at 15:57

## 2024-03-15 RX ADMIN — Medication 480 MICROGRAM(S): at 11:23

## 2024-03-15 RX ADMIN — MYCOPHENOLATE MOFETIL 1000 MILLIGRAM(S): 250 CAPSULE ORAL at 21:12

## 2024-03-15 RX ADMIN — MYCOPHENOLATE MOFETIL 1000 MILLIGRAM(S): 250 CAPSULE ORAL at 05:52

## 2024-03-15 RX ADMIN — Medication 10 MILLILITER(S): at 07:38

## 2024-03-15 RX ADMIN — TACROLIMUS 2 MILLIGRAM(S): 5 CAPSULE ORAL at 17:12

## 2024-03-15 NOTE — PROGRESS NOTE ADULT - NS ATTEND AMEND GEN_ALL_CORE FT
Assessment: 64-year-old day + 9 CATRACHITA alloBMT using a Flu/Cy/TBI preparative regimen for [place] negative ALL.  Course uncomplicated thus far.    Plan:  Heme: completed preparative regimen  PLT goal > 10,000  Hgb goal > 7.0g/dL  G-CSF to start on day +5  will require post ALLO BMT CNS prophylaxis and tki maintenance -- begins after engraftment.     GVHD: PTCy days 3 and 4, Cellcept and tacro to started on day +5....tacro level mon and thurs    ID: day 0: flu, valtrex, flu held b/c of increased bili,,3/13 restarted at 200 mg  Levaquin started on 3/3 for ANC < 500..switched to cefapime for merna fever..f/u cx's  bm product A0 cx positive for staph caprae...skin lauro..pt blood cx negative  bactrim SS qd to start on day +21  having diarrhea, Cdiff negative..immodium prn    replete lytes prn, follow i/o, lasix prn    Nutrition: tolerating PO    DVT prophylaxis: ambulation    Over 35 minutes were spent in direct patient care and care coordination. Assessment: 64-year-old day + 9 CATRACHITA alloBMT using a Flu/Cy/TBI preparative regimen for [place] negative ALL.  Course uncomplicated thus far.  3/15 blood blister noted, minor swelling of left cheek which waxes and wanes..low threshold for US  Plan:  Heme: completed preparative regimen  PLT goal > 10,000..if issue with blood blister persists may use 20 as a cutoff  Hgb goal > 7.0g/dL  G-CSF to start on day +5  will require post ALLO BMT CNS prophylaxis and tki maintenance -- begins after engraftment.     GVHD: PTCy days 3 and 4, Cellcept and tacro to started on day +5....tacro level mon and thurs..last level 3/14 8.6    ID: day 0: flu, valtrex, flu held b/c of increased bili,,3/13 restarted at 200 mg..if lft's remain stable can increase to 400 mg next week  Levaquin started on 3/3 for ANC < 500..switched to cefapime for merna fever..f/u cx's  bm product A0 cx positive for staph caprae...skin lauro..pt blood cx negative  bactrim SS qd to start on day +21  having diarrhea, Cdiff negative..immodium prn    replete lytes prn, follow i/o, lasix prn    Nutrition: tolerating PO    DVT prophylaxis: ambulation    Over 35 minutes were spent in direct patient care and care coordination.

## 2024-03-15 NOTE — PROGRESS NOTE ADULT - SUBJECTIVE AND OBJECTIVE BOX
HPC Transplant Team                                                      Critical / Counseling Time Provided: 30 minutes                                                                                                                                                        Chief Complaint: Haplo-idential BMT (son) with FLU/CY/TBI prep for the treatment fo ALL    S: Patient seen and examined with HPC Transplant Team:   + fatigue  + intermittent nausea  + L cheek pain ( + blood blister)     O: Vitals:   Vital Signs Last 24 Hrs  T(C): 36.2 (15 Mar 2024 05:30), Max: 36.5 (14 Mar 2024 09:21)  T(F): 97.2 (15 Mar 2024 05:30), Max: 97.7 (14 Mar 2024 09:21)  HR: 61 (15 Mar 2024 05:30) (58 - 68)  BP: 149/79 (15 Mar 2024 05:30) (125/72 - 159/84)  BP(mean): --  RR: 18 (15 Mar 2024 05:30) (18 - 18)  SpO2: 95% (15 Mar 2024 05:30) (95% - 98%)    Parameters below as of 15 Mar 2024 05:30  Patient On (Oxygen Delivery Method): room air        Admit weight:   Daily     Daily Weight in k (14 Mar 2024 08:08)    Intake / Output:   03-14 @ 07:01  -  03-15 @ 07:00  --------------------------------------------------------  IN: 1262 mL / OUT: 1825 mL / NET: -563 mL      PE:   Oropharynx: no erythema no ulcerations + L cheek  blood blister   Oral Mucositis:   -                                                     Grade: -  CVS: RRR, +S1,S2  Lungs: CTA throughout bilaterally   Abdomen: soft, ND, ND +bs  Extremities: no edema   Gastric Mucositis:      -                                            Grade: -  Intestinal Mucositis:     -                                         Grade: -  Skin: small area of petechiae on the medial aspect of the left knee and diffusely over the left medial ankle and anterior shin  TLC: C/D/I   Neuro: A&O x3  Pain: Denies            Labs:   CBC Full  -  ( 14 Mar 2024 07:03 )  WBC Count : 0.05 K/uL  Hemoglobin : 10.6 g/dL  Hematocrit : 30.5 %  Platelet Count - Automated : 10 K/uL  Mean Cell Volume : 92.4 fl  Mean Cell Hemoglobin : 32.1 pg  Mean Cell Hemoglobin Concentration : 34.8 gm/dL  Auto Neutrophil # : x  Auto Lymphocyte # : x  Auto Monocyte # : x  Auto Eosinophil # : x  Auto Basophil # : x  Auto Neutrophil % : x  Auto Lymphocyte % : x  Auto Monocyte % : x  Auto Eosinophil % : x  Auto Basophil % : x                          10.6   0.05  )-----------( 10       ( 14 Mar 2024 07:03 )             30.5     03-15    143  |  106  |  8   ----------------------------<  97  4.3   |  26  |  0.51    Ca    8.9      15 Mar 2024 06:44  Phos  2.8     03-15  Mg     1.9     03-15    TPro  5.2<L>  /  Alb  3.2<L>  /  TBili  0.8  /  DBili  0.2  /  AST  16  /  ALT  37  /  AlkPhos  124<H>  03-15    PT/INR - ( 14 Mar 2024 07:03 )   PT: 10.9 sec;   INR: 1.04 ratio         PTT - ( 14 Mar 2024 07:03 )  PTT:30.0 sec  LIVER FUNCTIONS - ( 15 Mar 2024 06:44 )  Alb: 3.2 g/dL / Pro: 5.2 g/dL / ALK PHOS: 124 U/L / ALT: 37 U/L / AST: 16 U/L / GGT: x           Lactate Dehydrogenase, Serum: 121 U/L (03-15 @ 06:44)    Cultures:   Culture Results:   <10,000 CFU/mL Normal Urogenital Argelia (24 @ 21:24)    Culture Results:   No growth at 24 hours (24 @ 21:24)    Culture Results:   No growth at 24 hours (24 @ 21:24)      Radiology:   Xray Chest 1 View- PORTABLE-Urgent (Xray Chest 1 View- PORTABLE-Urgent .) (24 @ 23:44)   IMPRESSION:  Small left pleural effusion/linear atelectasis.  Mild, central pulmonary venous congestion, new      Meds:   Antimicrobials:   cefepime   IVPB      cefepime   IVPB 2000 milliGRAM(s) IV Intermittent every 8 hours  fluconAZOLE   Tablet 200 milliGRAM(s) Oral daily  valACYclovir 500 milliGRAM(s) Oral two times a day      Heme / Onc:       GI:  aluminum hydroxide/magnesium hydroxide/simethicone Suspension 30 milliLiter(s) Oral every 6 hours PRN  calcium carbonate    500 mG (Tums) Chewable 1 Tablet(s) Chew three times a day PRN  loperamide 2 milliGRAM(s) Oral every 3 hours PRN  pantoprazole    Tablet 40 milliGRAM(s) Oral before breakfast  sodium bicarbonate Mouth Rinse 10 milliLiter(s) Swish and Spit five times a day      Cardiovascular:   losartan 100 milliGRAM(s) Oral daily      Immunologic:   filgrastim-sndz (ZARXIO) Injectable 480 MICROGram(s) SubCutaneous every 24 hours  mycophenolate mofetil 1000 milliGRAM(s) Oral three times a day  tacrolimus 2 milliGRAM(s) Oral two times a day      Other medications:   Biotene Dry Mouth Oral Rinse 5 milliLiter(s) Swish and Spit five times a day  chlorhexidine 4% Liquid 1 Application(s) Topical <User Schedule>  escitalopram 20 milliGRAM(s) Oral daily  lidocaine/prilocaine Cream 1 Application(s) Topical daily  loratadine 10 milliGRAM(s) Oral daily  sodium chloride 0.9% 1000 milliLiter(s) IV Continuous <Continuous>  sodium chloride 0.9%. 1000 milliLiter(s) IV Continuous <Continuous>      PRN:   acetaminophen     Tablet .. 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 Mild, central pulmonary venous congestion, new.  3/9 BCx and UCx, follow up  3/11- transaminitis, hold fluconazole. Tbili increased to 1.8. Added on direct and indirect bili.   3/12- direct bili 1.3 3/11/24     1. Infectious Disease:   cefepime   IVPB 2000 milliGRAM(s) IV Intermittent every 8 hours   fluconAZOLE   Tablet 200 milliGRAM(s) Oral daily  valACYclovir 500 milliGRAM(s) Oral two times a day    2. GI Prophylaxis:   pantoprazole    Tablet 40 milliGRAM(s) Oral before breakfast    3. Mouthcare - NS / NaHCO3 rinses, Mycelex, Biotene; Skin care     4. GVHD prophylaxis   TBI day -1   CTX days +3, +4   mycophenolate mofetil 1000 milliGRAM(s) Oral three times a day  tacrolimus 2 milliGRAM(s) Oral two times a day    6. Transfuse & replete electrolytes prn   magnesium sulfate  IVPB 2 Gram(s) IV Intermittent once    7. IV hydration, daily weights, strict I&O, prn diuresis     8. PO intake as tolerated, nutrition follow up as needed, MVI, folic acid     9. Antiemetics, anti-diarrhea medications:   loperamide 2 milliGRAM(s) Oral every 3 hours PRN  metoclopramide Injectable 10 milliGRAM(s) IV Push every 6 hours PRN    10. OOB as tolerated, physical therapy consult if needed     11. Monitor coags / fibrinogen 2x week, vitamin K as needed     12. Monitor closely for clinical changes, monitor for fevers     13. Emotional support provided, plan of care discussed and questions addressed     14. Patient education done regarding plan of care, restrictions and discharge planning     15. Continue regular social work input     I have written the above note for Dr. Olivier who performed service with me in the room.   Cintia Benson PA-C ([phone])    I have seen and examined patient with PA, I agree with above note as scribed.                HPC Transplant Team                                                      Critical / Counseling Time Provided: 30 minutes                                                                                                                                                        Chief Complaint: Haplo-idential BMT (son) with FLU/CY/TBI prep for the treatment fo ALL    S: Patient seen and examined with [facility] Transplant Team:   + fatigue  + intermittent nausea  + L cheek pain ( + blood blister)   +lip pain     O: Vitals:   Vital Signs Last 24 Hrs  T(C): 36.2 (15 Mar 2024 05:30), Max: 36.5 (14 Mar 2024 09:21)  T(F): 97.2 (15 Mar 2024 05:30), Max: 97.7 (14 Mar 2024 09:21)  HR: 61 (15 Mar 2024 05:30) (58 - 68)  BP: 149/79 (15 Mar 2024 05:30) (125/72 - 159/84)  BP(mean): --  RR: 18 (15 Mar 2024 05:30) (18 - 18)  SpO2: 95% (15 Mar 2024 05:30) (95% - 98%)    Parameters below as of 15 Mar 2024 05:30  Patient On (Oxygen Delivery Method): room air        Admit weight:   Daily     Daily Weight in k (14 Mar 2024 08:08)    Intake / Output:   03-14 @ 07:01  -  03-15 @ 07:00  --------------------------------------------------------  IN: 1262 mL / OUT: 1825 mL / NET: -563 mL      PE:   Oropharynx: + patchy erythema B/L buccal mucosa , + ulcerations lower lip + L cheek  blood blister   Oral Mucositis:   +                                                 stGstrstastdstest:st st1st CVS: RRR, +S1,S2  Lungs: CTA throughout bilaterally   Abdomen: soft, ND, ND +bs  Extremities: no edema   Gastric Mucositis:      -                                            Grade: -  Intestinal Mucositis:     -                                         Grade: -  Skin: small area of petechiae on the medial aspect of the left knee and diffusely over the left medial ankle and anterior shin  TLC: C/D/I   Neuro: A&O x3  Pain: Denies            Labs:   CBC Full  -  ( 14 Mar 2024 07:03 )  WBC Count : 0.05 K/uL  Hemoglobin : 10.6 g/dL  Hematocrit : 30.5 %  Platelet Count - Automated : 10 K/uL  Mean Cell Volume : 92.4 fl  Mean Cell Hemoglobin : 32.1 pg  Mean Cell Hemoglobin Concentration : 34.8 gm/dL  Auto Neutrophil # : x  Auto Lymphocyte # : x  Auto Monocyte # : x  Auto Eosinophil # : x  Auto Basophil # : x  Auto Neutrophil % : x  Auto Lymphocyte % : x  Auto Monocyte % : x  Auto Eosinophil % : x  Auto Basophil % : x                          10.6   0.05  )-----------( 10       ( 14 Mar 2024 07:03 )             30.5     03-15    143  |  106  |  8   ----------------------------<  97  4.3   |  26  |  0.51    Ca    8.9      15 Mar 2024 06:44  Phos  2.8     -15  Mg     1.9     -15    TPro  5.2<L>  /  Alb  3.2<L>  /  TBili  0.8  /  DBili  0.2  /  AST  16  /  ALT  37  /  AlkPhos  124<H>  03-15    PT/INR - ( 14 Mar 2024 07:03 )   PT: 10.9 sec;   INR: 1.04 ratio         PTT - ( 14 Mar 2024 07:03 )  PTT:30.0 sec  LIVER FUNCTIONS - ( 15 Mar 2024 06:44 )  Alb: 3.2 g/dL / Pro: 5.2 g/dL / ALK PHOS: 124 U/L / ALT: 37 U/L / AST: 16 U/L / GGT: x           Lactate Dehydrogenase, Serum: 121 U/L (03-15 @ 06:44)    Cultures:   Culture Results:   <10,000 CFU/mL Normal Urogenital Argelia (24 @ 21:24)    Culture Results:   No growth at 24 hours (24 @ 21:24)    Culture Results:   No growth at 24 hours (24 @ 21:24)      Radiology:   Xray Chest 1 View- PORTABLE-Urgent (Xray Chest 1 View- PORTABLE-Urgent .) (24 @ 23:44)   IMPRESSION:  Small left pleural effusion/linear atelectasis.  Mild, central pulmonary venous congestion, new      Meds:   Antimicrobials:   cefepime   IVPB      cefepime   IVPB 2000 milliGRAM(s) IV Intermittent every 8 hours  fluconAZOLE   Tablet 200 milliGRAM(s) Oral daily  valACYclovir 500 milliGRAM(s) Oral two times a day      Heme / Onc:       GI:  aluminum hydroxide/magnesium hydroxide/simethicone Suspension 30 milliLiter(s) Oral every 6 hours PRN  calcium carbonate    500 mG (Tums) Chewable 1 Tablet(s) Chew three times a day PRN  loperamide 2 milliGRAM(s) Oral every 3 hours PRN  pantoprazole    Tablet 40 milliGRAM(s) Oral before breakfast  sodium bicarbonate Mouth Rinse 10 milliLiter(s) Swish and Spit five times a day      Cardiovascular:   losartan 100 milliGRAM(s) Oral daily      Immunologic:   filgrastim-sndz (ZARXIO) Injectable 480 MICROGram(s) SubCutaneous every 24 hours  mycophenolate mofetil 1000 milliGRAM(s) Oral three times a day  tacrolimus 2 milliGRAM(s) Oral two times a day      Other medications:   Biotene Dry Mouth Oral Rinse 5 milliLiter(s) Swish and Spit five times a day  chlorhexidine 4% Liquid 1 Application(s) Topical <User Schedule>  escitalopram 20 milliGRAM(s) Oral daily  lidocaine/prilocaine Cream 1 Application(s) Topical daily  loratadine 10 milliGRAM(s) Oral daily  sodium chloride 0.9% 1000 milliLiter(s) IV Continuous <Continuous>  sodium chloride 0.9%. 1000 milliLiter(s) IV Continuous <Continuous>      PRN:   acetaminophen     Tablet .. 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 Mild, central pulmonary venous congestion, new.  3/9 BCx and UCx, follow up  3/11- transaminitis, hold fluconazole. Tbili increased to 1.8. Added on direct and indirect bili.   3/12- direct bili 1.3 3/11/24   3/15- Chemo induced grade 2 oral mucositis: continue supportive care     1. Infectious Disease:   cefepime   IVPB 2000 milliGRAM(s) IV Intermittent every 8 hours   fluconAZOLE   Tablet 200 milliGRAM(s) Oral daily  valACYclovir 500 milliGRAM(s) Oral two times a day    2. GI Prophylaxis:   pantoprazole    Tablet 40 milliGRAM(s) Oral before breakfast    3. Mouthcare - NS / NaHCO3 rinses, Mycelex, Biotene; Skin care     4. GVHD prophylaxis   TBI day -1   CTX days +3, +4   mycophenolate mofetil 1000 milliGRAM(s) Oral three times a day  tacrolimus 2 milliGRAM(s) Oral two times a day    6. Transfuse & replete electrolytes prn   magnesium sulfate  IVPB 2 Gram(s) IV Intermittent once    7. IV hydration, daily weights, strict I&O, prn diuresis     8. PO intake as tolerated, nutrition follow up as needed, MVI, folic acid     9. Antiemetics, anti-diarrhea medications:   loperamide 2 milliGRAM(s) Oral every 3 hours PRN  metoclopramide Injectable 10 milliGRAM(s) IV Push every 6 hours PRN    10. OOB as tolerated, physical therapy consult if needed     11. Monitor coags / fibrinogen 2x week, vitamin K as needed     12. Monitor closely for clinical changes, monitor for fevers     13. Emotional support provided, plan of care discussed and questions addressed     14. Patient education done regarding plan of care, restrictions and discharge planning     15. Continue regular social work input     I have written the above note for Dr. Olivier who performed service with me in the room.   Cintia Benson PA-C ([phone])    I have seen and examined patient with PA, I agree with above note as scribed.                HPC Transplant Team                                                      Critical / Counseling Time Provided: 30 minutes                                                                                                                                                        Chief Complaint: Haplo-idential BMT (son) with FLU/CY/TBI prep for the treatment fo ALL    S: Patient seen and examined with [facility] Transplant Team:   + fatigue  + intermittent nausea  + L cheek pain ( + blood blister)   +lip pain     O: Vitals:   Vital Signs Last 24 Hrs  T(C): 36.2 (15 Mar 2024 05:30), Max: 36.5 (14 Mar 2024 09:21)  T(F): 97.2 (15 Mar 2024 05:30), Max: 97.7 (14 Mar 2024 09:21)  HR: 61 (15 Mar 2024 05:30) (58 - 68)  BP: 149/79 (15 Mar 2024 05:30) (125/72 - 159/84)  BP(mean): --  RR: 18 (15 Mar 2024 05:30) (18 - 18)  SpO2: 95% (15 Mar 2024 05:30) (95% - 98%)    Parameters below as of 15 Mar 2024 05:30  Patient On (Oxygen Delivery Method): room air        Admit weight:   Daily     Daily Weight in k (14 Mar 2024 08:08)    Intake / Output:   03-14 @ 07:01  -  03-15 @ 07:00  --------------------------------------------------------  IN: 1262 mL / OUT: 1825 mL / NET: -563 mL      PE:   Oropharynx: + patchy erythema B/L buccal mucosa , + ulcerations lower lip + L cheek  blood blister   Oral Mucositis:   +                                                 stGstrstastdstest:st st1st CVS: RRR, +S1,S2  Lungs: CTA throughout bilaterally   Abdomen: soft, ND, ND +bs  Extremities: no edema   Gastric Mucositis:      -                                            Grade: -  Intestinal Mucositis:     -                                         Grade: -  Skin: small area of petechiae on the medial aspect of the left knee and diffusely over the left medial ankle and anterior shin  TLC: C/D/I   Neuro: A&O x3  Pain: Denies            Labs:   CBC Full  -  ( 14 Mar 2024 07:03 )  WBC Count : 0.05 K/uL  Hemoglobin : 10.6 g/dL  Hematocrit : 30.5 %  Platelet Count - Automated : 10 K/uL  Mean Cell Volume : 92.4 fl  Mean Cell Hemoglobin : 32.1 pg  Mean Cell Hemoglobin Concentration : 34.8 gm/dL  Auto Neutrophil # : x  Auto Lymphocyte # : x  Auto Monocyte # : x  Auto Eosinophil # : x  Auto Basophil # : x  Auto Neutrophil % : x  Auto Lymphocyte % : x  Auto Monocyte % : x  Auto Eosinophil % : x  Auto Basophil % : x                          10.6   0.05  )-----------( 10       ( 14 Mar 2024 07:03 )             30.5     03-15    143  |  106  |  8   ----------------------------<  97  4.3   |  26  |  0.51    Ca    8.9      15 Mar 2024 06:44  Phos  2.8     -15  Mg     1.9     -15    TPro  5.2<L>  /  Alb  3.2<L>  /  TBili  0.8  /  DBili  0.2  /  AST  16  /  ALT  37  /  AlkPhos  124<H>  03-15    PT/INR - ( 14 Mar 2024 07:03 )   PT: 10.9 sec;   INR: 1.04 ratio         PTT - ( 14 Mar 2024 07:03 )  PTT:30.0 sec  LIVER FUNCTIONS - ( 15 Mar 2024 06:44 )  Alb: 3.2 g/dL / Pro: 5.2 g/dL / ALK PHOS: 124 U/L / ALT: 37 U/L / AST: 16 U/L / GGT: x           Lactate Dehydrogenase, Serum: 121 U/L (03-15 @ 06:44)    Cultures:   Culture Results:   <10,000 CFU/mL Normal Urogenital Argelia (24 @ 21:24)    Culture Results:   No growth at 24 hours (24 @ 21:24)    Culture Results:   No growth at 24 hours (24 @ 21:24)      Radiology:   Xray Chest 1 View- PORTABLE-Urgent (Xray Chest 1 View- PORTABLE-Urgent .) (24 @ 23:44)   IMPRESSION:  Small left pleural effusion/linear atelectasis.  Mild, central pulmonary venous congestion, new      Meds:   Antimicrobials:   cefepime   IVPB      cefepime   IVPB 2000 milliGRAM(s) IV Intermittent every 8 hours  fluconAZOLE   Tablet 200 milliGRAM(s) Oral daily  valACYclovir 500 milliGRAM(s) Oral two times a day      Heme / Onc:       GI:  aluminum hydroxide/magnesium hydroxide/simethicone Suspension 30 milliLiter(s) Oral every 6 hours PRN  calcium carbonate    500 mG (Tums) Chewable 1 Tablet(s) Chew three times a day PRN  loperamide 2 milliGRAM(s) Oral every 3 hours PRN  pantoprazole    Tablet 40 milliGRAM(s) Oral before breakfast  sodium bicarbonate Mouth Rinse 10 milliLiter(s) Swish and Spit five times a day      Cardiovascular:   losartan 100 milliGRAM(s) Oral daily      Immunologic:   filgrastim-sndz (ZARXIO) Injectable 480 MICROGram(s) SubCutaneous every 24 hours  mycophenolate mofetil 1000 milliGRAM(s) Oral three times a day  tacrolimus 2 milliGRAM(s) Oral two times a day      Other medications:   Biotene Dry Mouth Oral Rinse 5 milliLiter(s) Swish and Spit five times a day  chlorhexidine 4% Liquid 1 Application(s) Topical <User Schedule>  escitalopram 20 milliGRAM(s) Oral daily  lidocaine/prilocaine Cream 1 Application(s) Topical daily  loratadine 10 milliGRAM(s) Oral daily  sodium chloride 0.9% 1000 milliLiter(s) IV Continuous <Continuous>  sodium chloride 0.9%. 1000 milliLiter(s) IV Continuous <Continuous>      PRN:   acetaminophen     Tablet .. 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 Mild, central pulmonary venous congestion, new.  3/9 BCx and UCx, follow up  3/11- transaminitis, hold fluconazole. Tbili increased to 1.8. Added on direct and indirect bili.   3/12- direct bili 1.3 3/11/24   3/15- Chemo induced grade 2 oral mucositis: continue supportive care   3/15 parotid gland ( L) edema with PO intake stove vs bleeding vs truma f/u US     1. Infectious Disease:   cefepime   IVPB 2000 milliGRAM(s) IV Intermittent every 8 hours   fluconAZOLE   Tablet 200 milliGRAM(s) Oral daily  valACYclovir 500 milliGRAM(s) Oral two times a day    2. GI Prophylaxis:   pantoprazole    Tablet 40 milliGRAM(s) Oral before breakfast    3. Mouthcare - NS / NaHCO3 rinses, Mycelex, Biotene; Skin care     4. GVHD prophylaxis   TBI day -1   CTX days +3, +4   mycophenolate mofetil 1000 milliGRAM(s) Oral three times a day  tacrolimus 2 milliGRAM(s) Oral two times a day    6. Transfuse & replete electrolytes prn   magnesium sulfate  IVPB 2 Gram(s) IV Intermittent once    7. IV hydration, daily weights, strict I&O, prn diuresis     8. PO intake as tolerated, nutrition follow up as needed, MVI, folic acid     9. Antiemetics, anti-diarrhea medications:   loperamide 2 milliGRAM(s) Oral every 3 hours PRN  metoclopramide Injectable 10 milliGRAM(s) IV Push every 6 hours PRN    10. OOB as tolerated, physical therapy consult if needed     11. Monitor coags / fibrinogen 2x week, vitamin K as needed     12. Monitor closely for clinical changes, monitor for fevers     13. Emotional support provided, plan of care discussed and questions addressed     14. Patient education done regarding plan of care, restrictions and discharge planning     15. Continue regular social work input     I have written the above note for Dr. Olivier who performed service with me in the room.   Cintia Bensno PA-C ([phone])    I have seen and examined patient with PA, I agree with above note as scribed.

## 2024-03-16 LAB
ALBUMIN SERPL ELPH-MCNC: 3.4 G/DL — SIGNIFICANT CHANGE UP (ref 3.3–5)
ALP SERPL-CCNC: 122 U/L — HIGH (ref 40–120)
ALT FLD-CCNC: 28 U/L — SIGNIFICANT CHANGE UP (ref 10–45)
ANION GAP SERPL CALC-SCNC: 8 MMOL/L — SIGNIFICANT CHANGE UP (ref 5–17)
AST SERPL-CCNC: 15 U/L — SIGNIFICANT CHANGE UP (ref 10–40)
BILIRUB SERPL-MCNC: 0.8 MG/DL — SIGNIFICANT CHANGE UP (ref 0.2–1.2)
BUN SERPL-MCNC: 10 MG/DL — SIGNIFICANT CHANGE UP (ref 7–23)
CALCIUM SERPL-MCNC: 9 MG/DL — SIGNIFICANT CHANGE UP (ref 8.4–10.5)
CHLORIDE SERPL-SCNC: 105 MMOL/L — SIGNIFICANT CHANGE UP (ref 96–108)
CO2 SERPL-SCNC: 26 MMOL/L — SIGNIFICANT CHANGE UP (ref 22–31)
CREAT SERPL-MCNC: 0.55 MG/DL — SIGNIFICANT CHANGE UP (ref 0.5–1.3)
EGFR: 101 ML/MIN/1.73M2 — SIGNIFICANT CHANGE UP
GLUCOSE SERPL-MCNC: 98 MG/DL — SIGNIFICANT CHANGE UP (ref 70–99)
HCT VFR BLD CALC: 27.3 % — LOW (ref 34.5–45)
HGB BLD-MCNC: 9.7 G/DL — LOW (ref 11.5–15.5)
LDH SERPL L TO P-CCNC: 121 U/L — SIGNIFICANT CHANGE UP (ref 50–242)
MAGNESIUM SERPL-MCNC: 1.6 MG/DL — SIGNIFICANT CHANGE UP (ref 1.6–2.6)
MCHC RBC-ENTMCNC: 32.3 PG — SIGNIFICANT CHANGE UP (ref 27–34)
MCHC RBC-ENTMCNC: 35.5 GM/DL — SIGNIFICANT CHANGE UP (ref 32–36)
MCV RBC AUTO: 91 FL — SIGNIFICANT CHANGE UP (ref 80–100)
NRBC # BLD: 0 /100 WBCS — SIGNIFICANT CHANGE UP (ref 0–0)
PHOSPHATE SERPL-MCNC: 3.1 MG/DL — SIGNIFICANT CHANGE UP (ref 2.5–4.5)
PLATELET # BLD AUTO: 14 K/UL — CRITICAL LOW (ref 150–400)
POTASSIUM SERPL-MCNC: 4.4 MMOL/L — SIGNIFICANT CHANGE UP (ref 3.5–5.3)
POTASSIUM SERPL-SCNC: 4.4 MMOL/L — SIGNIFICANT CHANGE UP (ref 3.5–5.3)
PROT SERPL-MCNC: 5.4 G/DL — LOW (ref 6–8.3)
RBC # BLD: 3 M/UL — LOW (ref 3.8–5.2)
RBC # FLD: 11.7 % — SIGNIFICANT CHANGE UP (ref 10.3–14.5)
SODIUM SERPL-SCNC: 139 MMOL/L — SIGNIFICANT CHANGE UP (ref 135–145)
WBC # BLD: 0.01 K/UL — CRITICAL LOW (ref 3.8–10.5)
WBC # FLD AUTO: 0.01 K/UL — CRITICAL LOW (ref 3.8–10.5)

## 2024-03-16 PROCEDURE — 99233 SBSQ HOSP IP/OBS HIGH 50: CPT | Mod: FS

## 2024-03-16 RX ORDER — MAGNESIUM SULFATE 500 MG/ML
1 VIAL (ML) INJECTION ONCE
Refills: 0 | Status: COMPLETED | OUTPATIENT
Start: 2024-03-16 | End: 2024-03-16

## 2024-03-16 RX ADMIN — VALACYCLOVIR 500 MILLIGRAM(S): 500 TABLET, FILM COATED ORAL at 05:32

## 2024-03-16 RX ADMIN — Medication 5 MILLILITER(S): at 23:57

## 2024-03-16 RX ADMIN — Medication 10 MILLILITER(S): at 20:50

## 2024-03-16 RX ADMIN — Medication 5 MILLILITER(S): at 20:51

## 2024-03-16 RX ADMIN — Medication 5 MILLILITER(S): at 15:17

## 2024-03-16 RX ADMIN — Medication 10 MILLILITER(S): at 01:06

## 2024-03-16 RX ADMIN — LIDOCAINE AND PRILOCAINE CREAM 1 APPLICATION(S): 25; 25 CREAM TOPICAL at 11:23

## 2024-03-16 RX ADMIN — MYCOPHENOLATE MOFETIL 1000 MILLIGRAM(S): 250 CAPSULE ORAL at 13:33

## 2024-03-16 RX ADMIN — Medication 480 MICROGRAM(S): at 11:22

## 2024-03-16 RX ADMIN — CHLORHEXIDINE GLUCONATE 1 APPLICATION(S): 213 SOLUTION TOPICAL at 10:19

## 2024-03-16 RX ADMIN — MYCOPHENOLATE MOFETIL 1000 MILLIGRAM(S): 250 CAPSULE ORAL at 05:32

## 2024-03-16 RX ADMIN — CEFEPIME 100 MILLIGRAM(S): 1 INJECTION, POWDER, FOR SOLUTION INTRAMUSCULAR; INTRAVENOUS at 13:33

## 2024-03-16 RX ADMIN — PANTOPRAZOLE SODIUM 40 MILLIGRAM(S): 20 TABLET, DELAYED RELEASE ORAL at 05:32

## 2024-03-16 RX ADMIN — LOSARTAN POTASSIUM 100 MILLIGRAM(S): 100 TABLET, FILM COATED ORAL at 05:32

## 2024-03-16 RX ADMIN — Medication 10 MILLILITER(S): at 11:09

## 2024-03-16 RX ADMIN — Medication 5 MILLILITER(S): at 11:09

## 2024-03-16 RX ADMIN — CEFEPIME 100 MILLIGRAM(S): 1 INJECTION, POWDER, FOR SOLUTION INTRAMUSCULAR; INTRAVENOUS at 21:51

## 2024-03-16 RX ADMIN — Medication 10 MILLILITER(S): at 10:19

## 2024-03-16 RX ADMIN — Medication 2 MILLIGRAM(S): at 10:50

## 2024-03-16 RX ADMIN — LORATADINE 10 MILLIGRAM(S): 10 TABLET ORAL at 11:08

## 2024-03-16 RX ADMIN — Medication 10 MILLILITER(S): at 23:58

## 2024-03-16 RX ADMIN — ESCITALOPRAM OXALATE 20 MILLIGRAM(S): 10 TABLET, FILM COATED ORAL at 11:11

## 2024-03-16 RX ADMIN — Medication 10 MILLILITER(S): at 15:17

## 2024-03-16 RX ADMIN — CEFEPIME 100 MILLIGRAM(S): 1 INJECTION, POWDER, FOR SOLUTION INTRAMUSCULAR; INTRAVENOUS at 05:26

## 2024-03-16 RX ADMIN — TACROLIMUS 2 MILLIGRAM(S): 5 CAPSULE ORAL at 05:32

## 2024-03-16 RX ADMIN — Medication 100 GRAM(S): at 12:23

## 2024-03-16 RX ADMIN — MYCOPHENOLATE MOFETIL 1000 MILLIGRAM(S): 250 CAPSULE ORAL at 21:51

## 2024-03-16 RX ADMIN — Medication 5 MILLILITER(S): at 10:19

## 2024-03-16 RX ADMIN — TACROLIMUS 2 MILLIGRAM(S): 5 CAPSULE ORAL at 17:56

## 2024-03-16 RX ADMIN — FLUCONAZOLE 200 MILLIGRAM(S): 150 TABLET ORAL at 11:08

## 2024-03-16 RX ADMIN — VALACYCLOVIR 500 MILLIGRAM(S): 500 TABLET, FILM COATED ORAL at 17:55

## 2024-03-16 RX ADMIN — Medication 5 MILLILITER(S): at 01:06

## 2024-03-16 NOTE — PROGRESS NOTE ADULT - SUBJECTIVE AND OBJECTIVE BOX
O: Vitals:   Vital Signs Last 24 Hrs  T(C): 36.3 (16 Mar 2024 05:15), Max: 36.7 (15 Mar 2024 09:05)  T(F): 97.3 (16 Mar 2024 05:15), Max: 98 (15 Mar 2024 09:05)  HR: 65 (16 Mar 2024 05:15) (60 - 76)  BP: 145/78 (16 Mar 2024 05:15) (136/78 - 157/91)  BP(mean): --  RR: 18 (16 Mar 2024 05:15) (18 - 18)  SpO2: 96% (16 Mar 2024 05:15) (96% - 97%)    Parameters below as of 16 Mar 2024 05:15  Patient On (Oxygen Delivery Method): room air        Admit weight:   Daily     Daily Weight in k.8 (15 Mar 2024 08:26)    Intake / Output:   -15 @ 07:01  -  03-16 @ 07:00  --------------------------------------------------------  IN: 1244.1 mL / OUT: 1250 mL / NET: -5.9 mL              Labs:   CBC Full  -  ( 15 Mar 2024 06:44 )  WBC Count : 0.02 K/uL  Hemoglobin : 10.2 g/dL  Hematocrit : 29.3 %  Platelet Count - Automated : 4 K/uL  Mean Cell Volume : 91.8 fl  Mean Cell Hemoglobin : 32.0 pg  Mean Cell Hemoglobin Concentration : 34.8 gm/dL  Auto Neutrophil # : x  Auto Lymphocyte # : x  Auto Monocyte # : x  Auto Eosinophil # : x  Auto Basophil # : x  Auto Neutrophil % : x  Auto Lymphocyte % : x  Auto Monocyte % : x  Auto Eosinophil % : x  Auto Basophil % : x                          10.2   0.02  )-----------( 4        ( 15 Mar 2024 06:44 )             29.3     03-15    143  |  106  |  8   ----------------------------<  97  4.3   |  26  |  0.51    Ca    8.9      15 Mar 2024 06:44  Phos  2.8     03-15  Mg     1.9     03-15    TPro  5.2<L>  /  Alb  3.2<L>  /  TBili  0.8  /  DBili  0.2  /  AST  16  /  ALT  37  /  AlkPhos  124<H>  03-15    PT/INR - ( 14 Mar 2024 07:03 )   PT: 10.9 sec;   INR: 1.04 ratio         PTT - ( 14 Mar 2024 07:03 )  PTT:30.0 sec  LIVER FUNCTIONS - ( 15 Mar 2024 06:44 )  Alb: 3.2 g/dL / Pro: 5.2 g/dL / ALK PHOS: 124 U/L / ALT: 37 U/L / AST: 16 U/L / GGT: x                       Cultures:         Radiology:       Meds:   Antimicrobials:   cefepime   IVPB 2000 milliGRAM(s) IV Intermittent every 8 hours  cefepime   IVPB      fluconAZOLE   Tablet 200 milliGRAM(s) Oral daily  valACYclovir 500 milliGRAM(s) Oral two times a day      Heme / Onc:       GI:  aluminum hydroxide/magnesium hydroxide/simethicone Suspension 30 milliLiter(s) Oral every 6 hours PRN  calcium carbonate    500 mG (Tums) Chewable 1 Tablet(s) Chew three times a day PRN  loperamide 2 milliGRAM(s) Oral every 3 hours PRN  pantoprazole    Tablet 40 milliGRAM(s) Oral before breakfast  sodium bicarbonate Mouth Rinse 10 milliLiter(s) Swish and Spit five times a day      Cardiovascular:   losartan 100 milliGRAM(s) Oral daily      Immunologic:   filgrastim-sndz (ZARXIO) Injectable 480 MICROGram(s) SubCutaneous every 24 hours  mycophenolate mofetil 1000 milliGRAM(s) Oral three times a day  tacrolimus 2 milliGRAM(s) Oral two times a day      Other medications:   Biotene Dry Mouth Oral Rinse 5 milliLiter(s) Swish and Spit five times a day  chlorhexidine 4% Liquid 1 Application(s) Topical <User Schedule>  escitalopram 20 milliGRAM(s) Oral daily  lidocaine/prilocaine Cream 1 Application(s) Topical daily  loratadine 10 milliGRAM(s) Oral daily  sodium chloride 0.9% 1000 milliLiter(s) IV Continuous <Continuous>  sodium chloride 0.9%. 1000 milliLiter(s) IV Continuous <Continuous>      PRN:   acetaminophen     Tablet .. 650 milliGRAM(s) Oral every 6 hours PRN  aluminum hydroxide/magnesium hydroxide/simethicone Suspension 30 milliLiter(s) Oral every 6 hours PRN  calcium carbonate    500 mG (Tums) Chewable 1 Tablet(s) Chew three times a day PRN  loperamide 2 milliGRAM(s) Oral every 3 hours PRN  metoclopramide Injectable 10 milliGRAM(s) IV Push every 6 hours PRN  sodium chloride 0.9% lock flush 10 milliLiter(s) IV Push every 1 hour PRN  zinc oxide 40% Paste 1 Application(s) Topical two times a day PRN                     HPC Transplant Team                                                      Critical / Counseling Time Provided: 30 minutes                                                                                                                                                        Chief Complaint: Haplo-idential BMT (son) with FLU/CY/TBI prep for the treatment fo ALL    S: Patient seen and examined with HPC Transplant Team:   + fatigue  + intermittent nausea  + L cheek pain ( + blood blister)   +lip pain   +loose BM this am    Other ROS(-)      O: Vitals:   Vital Signs Last 24 Hrs  T(C): 36.3 (16 Mar 2024 05:15), Max: 36.7 (15 Mar 2024 09:05)  T(F): 97.3 (16 Mar 2024 05:15), Max: 98 (15 Mar 2024 09:05)  HR: 65 (16 Mar 2024 05:15) (60 - 76)  BP: 145/78 (16 Mar 2024 05:15) (136/78 - 157/91)  BP(mean): --  RR: 18 (16 Mar 2024 05:15) (18 - 18)  SpO2: 96% (16 Mar 2024 05:15) (96% - 97%)    Parameters below as of 16 Mar 2024 05:15  Patient On (Oxygen Delivery Method): room air        Admit weight: 96.9 kg  Daily   93.8 today   Daily Weight in k.8 (15 Mar 2024 08:26)    Intake / Output:   -15 @ 07:01  -  03-16 @ 07:00  --------------------------------------------------------  IN: 1244.1 mL / OUT: 1250 mL / NET: -5.9 mL          PE:   Oropharynx: + patchy erythema B/L buccal mucosa , + ulcerations lower lip + L cheek  blood blister   Oral Mucositis:   +                                                 rdGrdrrdarddrderd:rd rd3rd CVS: RRR, +S1,S2  Lungs: CTA throughout bilaterally   Abdomen: soft, ND, ND +bs  Extremities: no edema   Gastric Mucositis:      -                                            Grade: -  Intestinal Mucositis:     -                                         Grade: -  Skin: small area of petechiae on the medial aspect of the left knee and diffusely over the left medial ankle and anterior shin  TLC: C/D/I   Neuro: A&O x3  Pain: Denies      LABS:                          9.7    0.01  )-----------( 14       ( 16 Mar 2024 06:49 )             27.3         Mean Cell Volume : 91.0 fl  Mean Cell Hemoglobin : 32.3 pg  Mean Cell Hemoglobin Concentration : 35.5 gm/dL  Auto Neutrophil # : x  Auto Lymphocyte # : x  Auto Monocyte # : x  Auto Eosinophil # : x  Auto Basophil # : x  Auto Neutrophil % : x  Auto Lymphocyte % : x  Auto Monocyte % : x  Auto Eosinophil % : x  Auto Basophil % : x          139  |  105  |  10  ----------------------------<  98  4.4   |  26  |  0.55    Ca    9.0      16 Mar 2024 06:49  Phos  3.1       Mg     1.6         TPro  5.4<L>  /  Alb  3.4  /  TBili  0.8  /  DBili  x   /  AST  15  /  ALT  28  /  AlkPhos  122<H>          Uric Acid --        Cultures:     Culture - Urine (24 @ 21:24)    Specimen Source: Clean Catch Clean Catch (Midstream)   Culture Results:   <10,000 CFU/mL Normal Urogenital Argelia    Culture - Blood (24 @ 21:24)    Specimen Source: .Blood Triple Lumen BROWN   Culture Results:   No growth at 5 days    C. difficile GDH &amp; toxins A/B by EIA (24 @ 08:07)    Clostridium difficile GDH Toxins A&amp;B, EIA:   Negative   Clostridium difficile GDH Interpretation: Negative for toxigenic C. Difficile.  This specimen is negative for C.  Difficile glutamate dehydrogenase (GDH) antigen and negative for C.  Difficile Toxins A & B, by EIA.  GDH is a highly sensitive screening  marker for C. Difficile that is produced in large amounts by all C.  Difficile strains, both toxigenic and nontoxigenic.  This assay has not  been validated as a test of cure.  Repeat testing during the same episode  of diarrhea is of limited value and is discouraged.  The results of this  assay should always be interpreted in conjunction with patient's clinical  history.        Radiology:     < from: Xray Chest 1 View- PORTABLE-Urgent (Xray Chest 1 View- PORTABLE-Urgent .) (24 @ 23:44) >  IMPRESSION:  Small left pleural effusion/linear atelectasis.  Mild, central pulmonary venous congestion, new        Meds:   Antimicrobials:   cefepime   IVPB 2000 milliGRAM(s) IV Intermittent every 8 hours  cefepime   IVPB      fluconAZOLE   Tablet 200 milliGRAM(s) Oral daily  valACYclovir 500 milliGRAM(s) Oral two times a day      Heme / Onc:       GI:  aluminum hydroxide/magnesium hydroxide/simethicone Suspension 30 milliLiter(s) Oral every 6 hours PRN  calcium carbonate    500 mG (Tums) Chewable 1 Tablet(s) Chew three times a day PRN  loperamide 2 milliGRAM(s) Oral every 3 hours PRN  pantoprazole    Tablet 40 milliGRAM(s) Oral before breakfast  sodium bicarbonate Mouth Rinse 10 milliLiter(s) Swish and Spit five times a day      Cardiovascular:   losartan 100 milliGRAM(s) Oral daily      Immunologic:   filgrastim-sndz (ZARXIO) Injectable 480 MICROGram(s) SubCutaneous every 24 hours  mycophenolate mofetil 1000 milliGRAM(s) Oral three times a day  tacrolimus 2 milliGRAM(s) Oral two times a day      Other medications:   Biotene Dry Mouth Oral Rinse 5 milliLiter(s) Swish and Spit five times a day  chlorhexidine 4% Liquid 1 Application(s) Topical <User Schedule>  escitalopram 20 milliGRAM(s) Oral daily  lidocaine/prilocaine Cream 1 Application(s) Topical daily  loratadine 10 milliGRAM(s) Oral daily  sodium chloride 0.9% 1000 milliLiter(s) IV Continuous <Continuous>  sodium chloride 0.9%. 1000 milliLiter(s) IV Continuous <Continuous>      PRN:   acetaminophen     Tablet .. 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 Mild, central pulmonary venous congestion, new.  3/9 BCx and UCx, follow up  3/11- transaminitis, hold fluconazole. Tbili increased to 1.8. Added on direct and indirect bili.   3/12- direct bili 1.3 3/11/24   3/15- Chemo induced grade 2 oral mucositis: continue supportive care   3/15 parotid gland ( L) edema with PO intake stove vs bleeding vs truma f/u US   3/16 US prelim small collection <1cm, likely cyst, d/w Dr Batres,  final reading     1. Infectious Disease:   cefepime   IVPB 2000 milliGRAM(s) IV Intermittent every 8 hours   fluconAZOLE   Tablet 200 milliGRAM(s) Oral daily  valACYclovir 500 milliGRAM(s) Oral two times a day    2. GI Prophylaxis:   pantoprazole    Tablet 40 milliGRAM(s) Oral before breakfast    3. Mouthcare - NS / NaHCO3 rinses, Mycelex, Biotene; Skin care     4. GVHD prophylaxis   TBI day -1   CTX days +3, +4   mycophenolate mofetil 1000 milliGRAM(s) Oral three times a day  tacrolimus 2 milliGRAM(s) Oral two times a day    6. Transfuse & replete electrolytes prn   Thrombocytopenia with blood blister in mouth, PLT to keep PLT goal 20K  Hypomagnesemia: magnesium sulfate  IVPB 1 Gram(s) IV Intermittent once    7. IV hydration, daily weights, strict I&O, prn diuresis     8. PO intake as tolerated, nutrition follow up as needed, MVI, folic acid     9. Antiemetics, anti-diarrhea medications:   loperamide 2 milliGRAM(s) Oral every 3 hours PRN  metoclopramide Injectable 10 milliGRAM(s) IV Push every 6 hours PRN    10. OOB as tolerated, physical therapy consult if needed     11. Monitor coags / fibrinogen 2x week, vitamin K as needed     12. Monitor closely for clinical changes, monitor for fevers     13. Emotional support provided, plan of care discussed and questions addressed     14. Patient education done regarding plan of care, restrictions and discharge planning     15. Continue regular social work input     I have written the above note for Dr. Penn who performed service with me in the room.   Gildardo Ayala NP   ([phone])    I have seen and examined patient with NP, I agree with above note as scribed.                          HPC Transplant Team                                                      Critical / Counseling Time Provided: 30 minutes                                                                                                                                                        Chief Complaint: Haplo-idential BMT (son) with FLU/CY/TBI prep for the treatment fo ALL    S: Patient seen and examined with HPC Transplant Team:   + fatigue  + intermittent nausea  + L cheek pain ( + blood blister)   +lip pain   +loose BM this am    Other ROS(-)      O: Vitals:   Vital Signs Last 24 Hrs  T(C): 36.3 (16 Mar 2024 05:15), Max: 36.7 (15 Mar 2024 09:05)  T(F): 97.3 (16 Mar 2024 05:15), Max: 98 (15 Mar 2024 09:05)  HR: 65 (16 Mar 2024 05:15) (60 - 76)  BP: 145/78 (16 Mar 2024 05:15) (136/78 - 157/91)  BP(mean): --  RR: 18 (16 Mar 2024 05:15) (18 - 18)  SpO2: 96% (16 Mar 2024 05:15) (96% - 97%)    Parameters below as of 16 Mar 2024 05:15  Patient On (Oxygen Delivery Method): room air        Admit weight: 96.9 kg  Daily   93.8 today   Daily Weight in k.8 (15 Mar 2024 08:26)    Intake / Output:   -15 @ 07:01  -  03-16 @ 07:00  --------------------------------------------------------  IN: 1244.1 mL / OUT: 1250 mL / NET: -5.9 mL          PE:   Oropharynx: + patchy erythema B/L buccal mucosa , + ulcerations lower lip + L cheek  blood blister   Oral Mucositis:   +                                                 rdGrdrrdarddrderd:rd rd3rd CVS: RRR, +S1,S2  Lungs: CTA throughout bilaterally   Abdomen: soft, ND, ND +bs  Extremities: no edema   Gastric Mucositis:      -                                            Grade: -  Intestinal Mucositis:     -                                         Grade: -  Skin: small area of petechiae on the medial aspect of the left knee and diffusely over the left medial ankle and anterior shin  TLC: C/D/I   Neuro: A&O x3  Pain: Denies      LABS:                          9.7    0.01  )-----------( 14       ( 16 Mar 2024 06:49 )             27.3         Mean Cell Volume : 91.0 fl  Mean Cell Hemoglobin : 32.3 pg  Mean Cell Hemoglobin Concentration : 35.5 gm/dL  Auto Neutrophil # : x  Auto Lymphocyte # : x  Auto Monocyte # : x  Auto Eosinophil # : x  Auto Basophil # : x  Auto Neutrophil % : x  Auto Lymphocyte % : x  Auto Monocyte % : x  Auto Eosinophil % : x  Auto Basophil % : x          139  |  105  |  10  ----------------------------<  98  4.4   |  26  |  0.55    Ca    9.0      16 Mar 2024 06:49  Phos  3.1       Mg     1.6         TPro  5.4<L>  /  Alb  3.4  /  TBili  0.8  /  DBili  x   /  AST  15  /  ALT  28  /  AlkPhos  122<H>          Uric Acid --        Cultures:     Culture - Urine (24 @ 21:24)    Specimen Source: Clean Catch Clean Catch (Midstream)   Culture Results:   <10,000 CFU/mL Normal Urogenital Argelia    Culture - Blood (24 @ 21:24)    Specimen Source: .Blood Triple Lumen BROWN   Culture Results:   No growth at 5 days    C. difficile GDH &amp; toxins A/B by EIA (24 @ 08:07)    Clostridium difficile GDH Toxins A&amp;B, EIA:   Negative   Clostridium difficile GDH Interpretation: Negative for toxigenic C. Difficile.  This specimen is negative for C.  Difficile glutamate dehydrogenase (GDH) antigen and negative for C.  Difficile Toxins A & B, by EIA.  GDH is a highly sensitive screening  marker for C. Difficile that is produced in large amounts by all C.  Difficile strains, both toxigenic and nontoxigenic.  This assay has not  been validated as a test of cure.  Repeat testing during the same episode  of diarrhea is of limited value and is discouraged.  The results of this  assay should always be interpreted in conjunction with patient's clinical  history.        Radiology:     < from: Xray Chest 1 View- PORTABLE-Urgent (Xray Chest 1 View- PORTABLE-Urgent .) (24 @ 23:44) >  IMPRESSION:  Small left pleural effusion/linear atelectasis.  Mild, central pulmonary venous congestion, new        Meds:   Antimicrobials:   cefepime   IVPB 2000 milliGRAM(s) IV Intermittent every 8 hours  cefepime   IVPB      fluconAZOLE   Tablet 200 milliGRAM(s) Oral daily  valACYclovir 500 milliGRAM(s) Oral two times a day      Heme / Onc:       GI:  aluminum hydroxide/magnesium hydroxide/simethicone Suspension 30 milliLiter(s) Oral every 6 hours PRN  calcium carbonate    500 mG (Tums) Chewable 1 Tablet(s) Chew three times a day PRN  loperamide 2 milliGRAM(s) Oral every 3 hours PRN  pantoprazole    Tablet 40 milliGRAM(s) Oral before breakfast  sodium bicarbonate Mouth Rinse 10 milliLiter(s) Swish and Spit five times a day      Cardiovascular:   losartan 100 milliGRAM(s) Oral daily      Immunologic:   filgrastim-sndz (ZARXIO) Injectable 480 MICROGram(s) SubCutaneous every 24 hours  mycophenolate mofetil 1000 milliGRAM(s) Oral three times a day  tacrolimus 2 milliGRAM(s) Oral two times a day      Other medications:   Biotene Dry Mouth Oral Rinse 5 milliLiter(s) Swish and Spit five times a day  chlorhexidine 4% Liquid 1 Application(s) Topical <User Schedule>  escitalopram 20 milliGRAM(s) Oral daily  lidocaine/prilocaine Cream 1 Application(s) Topical daily  loratadine 10 milliGRAM(s) Oral daily  sodium chloride 0.9% 1000 milliLiter(s) IV Continuous <Continuous>  sodium chloride 0.9%. 1000 milliLiter(s) IV Continuous <Continuous>      PRN:   acetaminophen     Tablet .. 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 Mild, central pulmonary venous congestion, new.  3/9 BCx and UCx, follow up  3/11- transaminitis, hold fluconazole. Tbili increased to 1.8. Added on direct and indirect bili.   3/12- direct bili 1.3 3/11/24   3/15- Chemo induced grade 2 oral mucositis: continue supportive care   3/15 parotid gland ( L) edema with PO intake stove vs bleeding vs truma f/u US   3/16 US prelim small collection <1cm, likely cyst, d/w Dr Batres,  final reading     1. Infectious Disease:   cefepime   IVPB 2000 milliGRAM(s) IV Intermittent every 8 hours   fluconAZOLE   Tablet 200 milliGRAM(s) Oral daily  valACYclovir 500 milliGRAM(s) Oral two times a day    2. GI Prophylaxis:   pantoprazole    Tablet 40 milliGRAM(s) Oral before breakfast    3. Mouthcare - NS / NaHCO3 rinses, Mycelex, Biotene; Skin care     4. GVHD prophylaxis   TBI day -1   CTX days +3, +4   mycophenolate mofetil 1000 milliGRAM(s) Oral three times a day  tacrolimus 2 milliGRAM(s) Oral two times a day    6. Transfuse & replete electrolytes prn   Thrombocytopenia with blood blister in mouth, PLT to keep PLT goal 20K  Hypomagnesemia: magnesium sulfate  IVPB 1 Gram(s) IV Intermittent once    7. IV hydration, daily weights, strict I&O, prn diuresis     8. PO intake as tolerated, nutrition follow up as needed, MVI, folic acid     9. Antiemetics, anti-diarrhea medications:   loperamide 2 milliGRAM(s) Oral every 3 hours PRN  metoclopramide Injectable 10 milliGRAM(s) IV Push every 6 hours PRN    10. OOB as tolerated, physical therapy consult if needed     11. Monitor coags / fibrinogen 2x week, vitamin K as needed     12. Monitor closely for clinical changes, monitor for fevers     13. Emotional support provided, plan of care discussed and questions addressed     14. Patient education done regarding plan of care, restrictions and discharge planning     15. Continue regular social work input     I have written the above note for Dr. Penn who performed service with me in the room.   Gildardo Ayala NP   ([phone])    I have seen and examined patient with NP, I agree with above note as scribed.

## 2024-03-16 NOTE — PROGRESS NOTE ADULT - NS ATTEND AMEND GEN_ALL_CORE FT
Assessment: 64-year-old day + 9 CATRACHITA alloBMT using a Flu/Cy/TBI preparative regimen for [place] negative ALL.  Course uncomplicated thus far.  3/15 blood blister noted, minor swelling of left cheek which waxes and wanes..low threshold for US  Plan:  Heme: completed preparative regimen  PLT goal > 10,000..if issue with blood blister persists may use 20 as a cutoff  Hgb goal > 7.0g/dL  G-CSF to start on day +5  will require post ALLO BMT CNS prophylaxis and tki maintenance -- begins after engraftment.     GVHD: PTCy days 3 and 4, Cellcept and tacro to started on day +5....tacro level mon and thurs..last level 3/14 8.6    ID: day 0: flu, valtrex, flu held b/c of increased bili,,3/13 restarted at 200 mg..if lft's remain stable can increase to 400 mg next week  Levaquin started on 3/3 for ANC < 500..switched to cefapime for merna fever..f/u cx's  bm product A0 cx positive for staph caprae...skin lauro..pt blood cx negative  bactrim SS qd to start on day +21  having diarrhea, Cdiff negative..immodium prn    replete lytes prn, follow i/o, lasix prn    Nutrition: tolerating PO    DVT prophylaxis: ambulation    Over 35 minutes were spent in direct patient care and care coordination. Assessment: 64-year-old day + 10 CATRACHITA alloBMT using a Flu/Cy/TBI preparative regimen for [place] negative ALL.      Plan:    Heme:   - Trend CBC with differential daily. Continue supportive transfusions as needed to maintain Hgb > 7 and plt > 10  (> 20 if febrile, > 50 if bleeding).   - G-CSF (started on day +5): continue through engraftment  - Will require post ALLO BMT CNS prophylaxis and tki maintenance -- begins after engraftment.     GVHD:   - PTCy days 3 and 4  - Cellcept and tacrolimus (started on day +5). Check tacrolimus level Mon/Thurs, last 8.6 (3/14/24)    ID:   - Prophylaxis: fluconazole (200 mg daily, can uptitrate to 400 mg next week if LFTs are stable), Valtrex  - Neutropenic fever: cefepime. Infectious work up negative (diarrhea but C.diff negative).  - Bactrim SS daily to start on day +21  - BM product A0 cx positive for staph caprae (skin lauro)    Nutrition: tolerating PO    DVT prophylaxis: ambulation

## 2024-03-17 LAB
ALBUMIN SERPL ELPH-MCNC: 3.5 G/DL — SIGNIFICANT CHANGE UP (ref 3.3–5)
ALP SERPL-CCNC: 126 U/L — HIGH (ref 40–120)
ALT FLD-CCNC: 23 U/L — SIGNIFICANT CHANGE UP (ref 10–45)
ANION GAP SERPL CALC-SCNC: 10 MMOL/L — SIGNIFICANT CHANGE UP (ref 5–17)
AST SERPL-CCNC: 15 U/L — SIGNIFICANT CHANGE UP (ref 10–40)
BILIRUB SERPL-MCNC: 0.9 MG/DL — SIGNIFICANT CHANGE UP (ref 0.2–1.2)
BUN SERPL-MCNC: 12 MG/DL — SIGNIFICANT CHANGE UP (ref 7–23)
CALCIUM SERPL-MCNC: 9.2 MG/DL — SIGNIFICANT CHANGE UP (ref 8.4–10.5)
CHLORIDE SERPL-SCNC: 104 MMOL/L — SIGNIFICANT CHANGE UP (ref 96–108)
CO2 SERPL-SCNC: 24 MMOL/L — SIGNIFICANT CHANGE UP (ref 22–31)
CREAT SERPL-MCNC: 0.58 MG/DL — SIGNIFICANT CHANGE UP (ref 0.5–1.3)
EGFR: 100 ML/MIN/1.73M2 — SIGNIFICANT CHANGE UP
GLUCOSE SERPL-MCNC: 106 MG/DL — HIGH (ref 70–99)
HCT VFR BLD CALC: 26.1 % — LOW (ref 34.5–45)
HGB BLD-MCNC: 9.2 G/DL — LOW (ref 11.5–15.5)
LDH SERPL L TO P-CCNC: 127 U/L — SIGNIFICANT CHANGE UP (ref 50–242)
MAGNESIUM SERPL-MCNC: 1.7 MG/DL — SIGNIFICANT CHANGE UP (ref 1.6–2.6)
MCHC RBC-ENTMCNC: 31.6 PG — SIGNIFICANT CHANGE UP (ref 27–34)
MCHC RBC-ENTMCNC: 35.2 GM/DL — SIGNIFICANT CHANGE UP (ref 32–36)
MCV RBC AUTO: 89.7 FL — SIGNIFICANT CHANGE UP (ref 80–100)
NRBC # BLD: 0 /100 WBCS — SIGNIFICANT CHANGE UP (ref 0–0)
PHOSPHATE SERPL-MCNC: 3 MG/DL — SIGNIFICANT CHANGE UP (ref 2.5–4.5)
PLATELET # BLD AUTO: 19 K/UL — CRITICAL LOW (ref 150–400)
POTASSIUM SERPL-MCNC: 4.4 MMOL/L — SIGNIFICANT CHANGE UP (ref 3.5–5.3)
POTASSIUM SERPL-SCNC: 4.4 MMOL/L — SIGNIFICANT CHANGE UP (ref 3.5–5.3)
PROT SERPL-MCNC: 5.6 G/DL — LOW (ref 6–8.3)
RBC # BLD: 2.91 M/UL — LOW (ref 3.8–5.2)
RBC # FLD: 11.4 % — SIGNIFICANT CHANGE UP (ref 10.3–14.5)
SODIUM SERPL-SCNC: 138 MMOL/L — SIGNIFICANT CHANGE UP (ref 135–145)
WBC # BLD: 0.01 K/UL — CRITICAL LOW (ref 3.8–10.5)
WBC # FLD AUTO: 0.01 K/UL — CRITICAL LOW (ref 3.8–10.5)

## 2024-03-17 PROCEDURE — 99233 SBSQ HOSP IP/OBS HIGH 50: CPT | Mod: FS

## 2024-03-17 RX ADMIN — PANTOPRAZOLE SODIUM 40 MILLIGRAM(S): 20 TABLET, DELAYED RELEASE ORAL at 06:12

## 2024-03-17 RX ADMIN — LORATADINE 10 MILLIGRAM(S): 10 TABLET ORAL at 11:25

## 2024-03-17 RX ADMIN — TACROLIMUS 2 MILLIGRAM(S): 5 CAPSULE ORAL at 17:21

## 2024-03-17 RX ADMIN — Medication 5 MILLILITER(S): at 23:36

## 2024-03-17 RX ADMIN — TACROLIMUS 2 MILLIGRAM(S): 5 CAPSULE ORAL at 06:12

## 2024-03-17 RX ADMIN — MYCOPHENOLATE MOFETIL 1000 MILLIGRAM(S): 250 CAPSULE ORAL at 13:29

## 2024-03-17 RX ADMIN — Medication 480 MICROGRAM(S): at 11:25

## 2024-03-17 RX ADMIN — Medication 10 MILLILITER(S): at 11:24

## 2024-03-17 RX ADMIN — CEFEPIME 100 MILLIGRAM(S): 1 INJECTION, POWDER, FOR SOLUTION INTRAMUSCULAR; INTRAVENOUS at 06:12

## 2024-03-17 RX ADMIN — MYCOPHENOLATE MOFETIL 1000 MILLIGRAM(S): 250 CAPSULE ORAL at 21:26

## 2024-03-17 RX ADMIN — Medication 10 MILLILITER(S): at 08:15

## 2024-03-17 RX ADMIN — MYCOPHENOLATE MOFETIL 1000 MILLIGRAM(S): 250 CAPSULE ORAL at 06:12

## 2024-03-17 RX ADMIN — Medication 5 MILLILITER(S): at 20:21

## 2024-03-17 RX ADMIN — Medication 10 MILLIGRAM(S): at 20:21

## 2024-03-17 RX ADMIN — LOSARTAN POTASSIUM 100 MILLIGRAM(S): 100 TABLET, FILM COATED ORAL at 06:11

## 2024-03-17 RX ADMIN — VALACYCLOVIR 500 MILLIGRAM(S): 500 TABLET, FILM COATED ORAL at 17:21

## 2024-03-17 RX ADMIN — CHLORHEXIDINE GLUCONATE 1 APPLICATION(S): 213 SOLUTION TOPICAL at 06:13

## 2024-03-17 RX ADMIN — LIDOCAINE AND PRILOCAINE CREAM 1 APPLICATION(S): 25; 25 CREAM TOPICAL at 11:24

## 2024-03-17 RX ADMIN — Medication 5 MILLILITER(S): at 16:43

## 2024-03-17 RX ADMIN — VALACYCLOVIR 500 MILLIGRAM(S): 500 TABLET, FILM COATED ORAL at 06:11

## 2024-03-17 RX ADMIN — ESCITALOPRAM OXALATE 20 MILLIGRAM(S): 10 TABLET, FILM COATED ORAL at 11:28

## 2024-03-17 RX ADMIN — Medication 5 MILLILITER(S): at 08:15

## 2024-03-17 RX ADMIN — CEFEPIME 100 MILLIGRAM(S): 1 INJECTION, POWDER, FOR SOLUTION INTRAMUSCULAR; INTRAVENOUS at 13:28

## 2024-03-17 RX ADMIN — Medication 10 MILLILITER(S): at 20:21

## 2024-03-17 RX ADMIN — Medication 5 MILLILITER(S): at 11:23

## 2024-03-17 RX ADMIN — FLUCONAZOLE 200 MILLIGRAM(S): 150 TABLET ORAL at 11:25

## 2024-03-17 RX ADMIN — CEFEPIME 100 MILLIGRAM(S): 1 INJECTION, POWDER, FOR SOLUTION INTRAMUSCULAR; INTRAVENOUS at 21:26

## 2024-03-17 RX ADMIN — Medication 2 MILLIGRAM(S): at 23:35

## 2024-03-17 RX ADMIN — Medication 10 MILLILITER(S): at 23:35

## 2024-03-17 RX ADMIN — Medication 10 MILLILITER(S): at 16:43

## 2024-03-17 NOTE — PROGRESS NOTE ADULT - SUBJECTIVE AND OBJECTIVE BOX
HPC Transplant Team                                                      Critical / Counseling Time Provided: 30 minutes                                                                                                                                                        Chief Complaint: Haplo-idential BMT (son) with FLU/CY/TBI prep for the treatment fo ALL    S: Patient seen and examined with HPC Transplant Team:   + fatigue  + intermittent nausea  + L cheek pain ( + blood blister)   +lip pain   +loose BM this am    Other ROS(-)    O: Vitals:   Vital Signs Last 24 Hrs  T(C): 36.4 (17 Mar 2024 06:00), Max: 36.7 (16 Mar 2024 14:01)  T(F): 97.5 (17 Mar 2024 06:00), Max: 98.1 (16 Mar 2024 14:01)  HR: 58 (17 Mar 2024 06:00) (55 - 68)  BP: 129/76 (17 Mar 2024 06:00) (123/74 - 170/93)  BP(mean): --  RR: 18 (17 Mar 2024 06:00) (18 - 18)  SpO2: 97% (17 Mar 2024 06:00) (96% - 97%)    Parameters below as of 16 Mar 2024 20:55  Patient On (Oxygen Delivery Method): room air    Admit weight: 96.9 kg   Daily Weight in k.8 (16 Mar 2024 09:00)  Today's weight: pending     Intake / Output:    @ 07:01  -  - @ 07:00  --------------------------------------------------------  IN: 1327 mL / OUT: 1525 mL / NET: -198 mL    PE:   Oropharynx: + patchy erythema B/L buccal mucosa , + ulcerations lower lip + L cheek  blood blister   Oral Mucositis:   +                                                 rdGrdrrdarddrderd:rd rd3rd CVS: RRR, +S1,S2  Lungs: CTA throughout bilaterally   Abdomen: soft, ND, ND +bs  Extremities: no edema   Gastric Mucositis:      -                                            Grade: -  Intestinal Mucositis:     -                                         Grade: -  Skin: small area of petechiae on the medial aspect of the left knee and diffusely over the left medial ankle and anterior shin  TLC: C/D/I   Neuro: A&O x3  Pain: Denies      Labs:   CBC Full  -  ( 16 Mar 2024 06:49 )  WBC Count : 0.01 K/uL  Hemoglobin : 9.7 g/dL  Hematocrit : 27.3 %  Platelet Count - Automated : 14 K/uL  Mean Cell Volume : 91.0 fl  Mean Cell Hemoglobin : 32.3 pg  Mean Cell Hemoglobin Concentration : 35.5 gm/dL  Auto Neutrophil # : x  Auto Lymphocyte # : x  Auto Monocyte # : x  Auto Eosinophil # : x  Auto Basophil # : x  Auto Neutrophil % : x  Auto Lymphocyte % : x  Auto Monocyte % : x  Auto Eosinophil % : x  Auto Basophil % : x                          9.7    0.01  )-----------( 14       ( 16 Mar 2024 06:49 )             27.3     03-17    138  |  104  |  12  ----------------------------<  106<H>  4.4   |  24  |  0.58    Ca    9.2      17 Mar 2024 07:23  Phos  3.0       Mg     1.7         TPro  5.6<L>  /  Alb  3.5  /  TBili  0.9  /  DBili  x   /  AST  15  /  ALT  23  /  AlkPhos  126<H>  03-17      LIVER FUNCTIONS - ( 17 Mar 2024 07:23 )  Alb: 3.5 g/dL / Pro: 5.6 g/dL / ALK PHOS: 126 U/L / ALT: 23 U/L / AST: 15 U/L / GGT: x           Lactate Dehydrogenase, Serum: 127 U/L ( @ 07:23)    Cultures:   Culture - Urine (24 @ 21:24)    Specimen Source: Clean Catch Clean Catch (Midstream)   Culture Results:   <10,000 CFU/mL Normal Urogenital Argelia    Radiology:   < from: Xray Chest 1 View- PORTABLE-Urgent (Xray Chest 1 View- PORTABLE-Urgent .) (24 @ 23:44) >  IMPRESSION:  Small left pleural effusion/linear atelectasis.  Mild, central pulmonary venous congestion, new    Meds:   Antimicrobials:   cefepime   IVPB      cefepime   IVPB 2000 milliGRAM(s) IV Intermittent every 8 hours  fluconAZOLE   Tablet 200 milliGRAM(s) Oral daily  valACYclovir 500 milliGRAM(s) Oral two times a day    GI:  aluminum hydroxide/magnesium hydroxide/simethicone Suspension 30 milliLiter(s) Oral every 6 hours PRN  calcium carbonate    500 mG (Tums) Chewable 1 Tablet(s) Chew three times a day PRN  loperamide 2 milliGRAM(s) Oral every 3 hours PRN  pantoprazole    Tablet 40 milliGRAM(s) Oral before breakfast  sodium bicarbonate Mouth Rinse 10 milliLiter(s) Swish and Spit five times a day    Cardiovascular:   losartan 100 milliGRAM(s) Oral daily    Immunologic:   filgrastim-sndz (ZARXIO) Injectable 480 MICROGram(s) SubCutaneous every 24 hours  mycophenolate mofetil 1000 milliGRAM(s) Oral three times a day  tacrolimus 2 milliGRAM(s) Oral two times a day    Other medications:   Biotene Dry Mouth Oral Rinse 5 milliLiter(s) Swish and Spit five times a day  chlorhexidine 4% Liquid 1 Application(s) Topical <User Schedule>  escitalopram 20 milliGRAM(s) Oral daily  lidocaine/prilocaine Cream 1 Application(s) Topical daily  loratadine 10 milliGRAM(s) Oral daily  sodium chloride 0.9% 1000 milliLiter(s) IV Continuous <Continuous>  sodium chloride 0.9%. 1000 milliLiter(s) IV Continuous <Continuous>      PRN:   acetaminophen     Tablet .. 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 Mild, central pulmonary venous congestion, new.  3/9 BCx and UCx, follow up  3/11- transaminitis, hold fluconazole. Tbili increased to 1.8. Added on direct and indirect bili.   3/12- direct bili 1.3 3/11/24   3/15- Chemo induced grade 2 oral mucositis: continue supportive care   3/15 parotid gland ( L) edema with PO intake stove vs bleeding vs truma f/u US   3/16 US prelim small collection <1cm, likely cyst, d/w Dr Batres,  final reading       1. Infectious Disease:   cefepime   IVPB      cefepime   IVPB 2000 milliGRAM(s) IV Intermittent every 8 hours  fluconAZOLE   Tablet 200 milliGRAM(s) Oral daily  valACYclovir 500 milliGRAM(s) Oral two times a day    2. GI Prophylaxis:   pantoprazole    Tablet 40 milliGRAM(s) Oral before breakfast    3. Mouthcare - NS / NaHCO3 rinses, Mycelex, Biotene; Skin care     4. GVHD prophylaxis   TBI day -1   CTX days +3, +4   mycophenolate mofetil 1000 milliGRAM(s) Oral three times a day  tacrolimus 2 milliGRAM(s) Oral two times a day    6. Transfuse & replete electrolytes prn   Thrombocytopenia with blood blister in mouth, PLT to keep PLT goal 20K  Hypomagnesemia: magnesium sulfate  IVPB 1 Gram(s) IV Intermittent once    7. IV hydration, daily weights, strict I&O, prn diuresis     8. PO intake as tolerated, nutrition follow up as needed, MVI, folic acid     9. Antiemetics, anti-diarrhea medications:   loperamide 2 milliGRAM(s) Oral every 3 hours PRN  metoclopramide Injectable 10 milliGRAM(s) IV Push every 6 hours PRN    10. OOB as tolerated, physical therapy consult if needed     11. Monitor coags / fibrinogen 2x week, vitamin K as needed     12. Monitor closely for clinical changes, monitor for fevers     13. Emotional support provided, plan of care discussed and questions addressed     14. Patient education done regarding plan of care, restrictions and discharge planning     15. Continue regular social work input       I have written the above note for Dr. Penn who performed service with me in the room.   Rebecca Gilbert NP-C ([phone])    I have seen and examined patient with NP, I agree with above note as scribed.        HPC Transplant Team                                                      Critical / Counseling Time Provided: 30 minutes                                                                                                                                                        Chief Complaint: Haplo-idential BMT (son) with FLU/CY/TBI prep for the treatment fo ALL    S: Patient seen and examined with [facility] Transplant Team:   + fatigue  + intermittent nausea  + L cheek pain ( + blood blister)   + poor appetite     Other ROS(-)    O: Vitals:   Vital Signs Last 24 Hrs  T(C): 36.4 (17 Mar 2024 06:00), Max: 36.7 (16 Mar 2024 14:01)  T(F): 97.5 (17 Mar 2024 06:00), Max: 98.1 (16 Mar 2024 14:01)  HR: 58 (17 Mar 2024 06:00) (55 - 68)  BP: 129/76 (17 Mar 2024 06:00) (123/74 - 170/93)  BP(mean): --  RR: 18 (17 Mar 2024 06:00) (18 - 18)  SpO2: 97% (17 Mar 2024 06:00) (96% - 97%)    Parameters below as of 16 Mar 2024 20:55  Patient On (Oxygen Delivery Method): room air    Admit weight: 96.9 kg   Daily Weight in k.8 (16 Mar 2024 09:00)  Today's weight: pending     Intake / Output:   16 @ 07:01  -  - @ 07:00  --------------------------------------------------------  IN: 1327 mL / OUT: 1525 mL / NET: -198 mL    PE:   Oropharynx: + patchy erythema B/L buccal mucosa , + ulcerations lower lip + L cheek  blood blister   Oral Mucositis:   +                                                 rdGrdrrdarddrderd:rd rd3rd CVS: RRR, +S1,S2  Lungs: CTA throughout bilaterally   Abdomen: soft, ND, ND +bs  Extremities: no edema   Gastric Mucositis:      -                                            Grade: -  Intestinal Mucositis:     -                                         Grade: -  Skin: small area of petechiae on the medial aspect of the left knee and diffusely over the left medial ankle and anterior shin  TLC: C/D/I   Neuro: A&O x3  Pain: Denies      Labs:   CBC Full  -  ( 16 Mar 2024 06:49 )  WBC Count : 0.01 K/uL  Hemoglobin : 9.7 g/dL  Hematocrit : 27.3 %  Platelet Count - Automated : 14 K/uL  Mean Cell Volume : 91.0 fl  Mean Cell Hemoglobin : 32.3 pg  Mean Cell Hemoglobin Concentration : 35.5 gm/dL  Auto Neutrophil # : x  Auto Lymphocyte # : x  Auto Monocyte # : x  Auto Eosinophil # : x  Auto Basophil # : x  Auto Neutrophil % : x  Auto Lymphocyte % : x  Auto Monocyte % : x  Auto Eosinophil % : x  Auto Basophil % : x                          9.7    0.01  )-----------( 14       ( 16 Mar 2024 06:49 )             27.3     -17    138  |  104  |  12  ----------------------------<  106<H>  4.4   |  24  |  0.58    Ca    9.2      17 Mar 2024 07:23  Phos  3.0       Mg     1.7         TPro  5.6<L>  /  Alb  3.5  /  TBili  0.9  /  DBili  x   /  AST  15  /  ALT  23  /  AlkPhos  126<H>  -17      LIVER FUNCTIONS - ( 17 Mar 2024 07:23 )  Alb: 3.5 g/dL / Pro: 5.6 g/dL / ALK PHOS: 126 U/L / ALT: 23 U/L / AST: 15 U/L / GGT: x           Lactate Dehydrogenase, Serum: 127 U/L ( @ 07:23)    Cultures:   Culture - Urine (24 @ 21:24)    Specimen Source: Clean Catch Clean Catch (Midstream)   Culture Results:   <10,000 CFU/mL Normal Urogenital Argelia    Radiology:   < from: Xray Chest 1 View- PORTABLE-Urgent (Xray Chest 1 View- PORTABLE-Urgent .) (24 @ 23:44) >  IMPRESSION:  Small left pleural effusion/linear atelectasis.  Mild, central pulmonary venous congestion, new    Meds:   Antimicrobials:   cefepime   IVPB      cefepime   IVPB 2000 milliGRAM(s) IV Intermittent every 8 hours  fluconAZOLE   Tablet 200 milliGRAM(s) Oral daily  valACYclovir 500 milliGRAM(s) Oral two times a day    GI:  aluminum hydroxide/magnesium hydroxide/simethicone Suspension 30 milliLiter(s) Oral every 6 hours PRN  calcium carbonate    500 mG (Tums) Chewable 1 Tablet(s) Chew three times a day PRN  loperamide 2 milliGRAM(s) Oral every 3 hours PRN  pantoprazole    Tablet 40 milliGRAM(s) Oral before breakfast  sodium bicarbonate Mouth Rinse 10 milliLiter(s) Swish and Spit five times a day    Cardiovascular:   losartan 100 milliGRAM(s) Oral daily    Immunologic:   filgrastim-sndz (ZARXIO) Injectable 480 MICROGram(s) SubCutaneous every 24 hours  mycophenolate mofetil 1000 milliGRAM(s) Oral three times a day  tacrolimus 2 milliGRAM(s) Oral two times a day    Other medications:   Biotene Dry Mouth Oral Rinse 5 milliLiter(s) Swish and Spit five times a day  chlorhexidine 4% Liquid 1 Application(s) Topical <User Schedule>  escitalopram 20 milliGRAM(s) Oral daily  lidocaine/prilocaine Cream 1 Application(s) Topical daily  loratadine 10 milliGRAM(s) Oral daily  sodium chloride 0.9% 1000 milliLiter(s) IV Continuous <Continuous>  sodium chloride 0.9%. 1000 milliLiter(s) IV Continuous <Continuous>      PRN:   acetaminophen     Tablet .. 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 Mild, central pulmonary venous congestion, new.  3/9 BCx and UCx, follow up  3/11- transaminitis, hold fluconazole. Tbili increased to 1.8. Added on direct and indirect bili.   3/12- direct bili 1.3 3/11/24   3/15- Chemo induced grade 2 oral mucositis: continue supportive care   3/15 parotid gland ( L) edema with PO intake stove vs bleeding vs truma f/u US   3/16 US prelim small collection <1cm, likely cyst, d/w Dr Batres,  final reading       1. Infectious Disease:   cefepime   IVPB      cefepime   IVPB 2000 milliGRAM(s) IV Intermittent every 8 hours  fluconAZOLE   Tablet 200 milliGRAM(s) Oral daily  valACYclovir 500 milliGRAM(s) Oral two times a day    2. GI Prophylaxis:   pantoprazole    Tablet 40 milliGRAM(s) Oral before breakfast    3. Mouthcare - NS / NaHCO3 rinses, Mycelex, Biotene; Skin care     4. GVHD prophylaxis   TBI day -1   CTX days +3, +4   mycophenolate mofetil 1000 milliGRAM(s) Oral three times a day  tacrolimus 2 milliGRAM(s) Oral two times a day    6. Transfuse & replete electrolytes prn   Thrombocytopenia with blood blister in mouth, PLT to keep PLT goal 20K  Hypomagnesemia: magnesium sulfate  IVPB 1 Gram(s) IV Intermittent once    7. IV hydration, daily weights, strict I&O, prn diuresis     8. PO intake as tolerated, nutrition follow up as needed, MVI, folic acid     9. Antiemetics, anti-diarrhea medications:   loperamide 2 milliGRAM(s) Oral every 3 hours PRN  metoclopramide Injectable 10 milliGRAM(s) IV Push every 6 hours PRN    10. OOB as tolerated, physical therapy consult if needed     11. Monitor coags / fibrinogen 2x week, vitamin K as needed     12. Monitor closely for clinical changes, monitor for fevers     13. Emotional support provided, plan of care discussed and questions addressed     14. Patient education done regarding plan of care, restrictions and discharge planning     15. Continue regular social work input       I have written the above note for Dr. Penn who performed service with me in the room.   Rebecca COSTELLO ([phone])    I have seen and examined patient with NP, I agree with above note as scribed.        HPC Transplant Team                                                      Critical / Counseling Time Provided: 30 minutes                                                                                                                                                        Chief Complaint: Haplo-idential BMT (son) with FLU/CY/TBI prep for the treatment fo ALL    S: Patient seen and examined with [facility] Transplant Team:   + fatigue  + intermittent nausea  + L cheek pain ( + blood blister)   + poor appetite     Other ROS(-)    O: Vitals:   Vital Signs Last 24 Hrs  T(C): 36.4 (17 Mar 2024 06:00), Max: 36.7 (16 Mar 2024 14:01)  T(F): 97.5 (17 Mar 2024 06:00), Max: 98.1 (16 Mar 2024 14:01)  HR: 58 (17 Mar 2024 06:00) (55 - 68)  BP: 129/76 (17 Mar 2024 06:00) (123/74 - 170/93)  BP(mean): --  RR: 18 (17 Mar 2024 06:00) (18 - 18)  SpO2: 97% (17 Mar 2024 06:00) (96% - 97%)    Parameters below as of 16 Mar 2024 20:55  Patient On (Oxygen Delivery Method): room air    Admit weight: 96.9 kg   Daily Weight in k.8 (16 Mar 2024 09:00)  Today's weight: 93.5 kg     Intake / Output:   03-16 @ 07:01  -  03-17 @ 07:00  --------------------------------------------------------  IN: 1327 mL / OUT: 1525 mL / NET: -198 mL    PE:   Oropharynx: + patchy erythema B/L buccal mucosa , + ulcerations lower lip + L cheek  blood blister   Oral Mucositis:   +                                                 rdGrdrrdarddrderd:rd rd3rd CVS: RRR, +S1,S2  Lungs: CTA throughout bilaterally   Abdomen: soft, ND, ND +bs  Extremities: no edema   Gastric Mucositis:      -                                            Grade: -  Intestinal Mucositis:     -                                         Grade: -  Skin: small area of petechiae on the medial aspect of the left knee and diffusely over the left medial ankle and anterior shin  TLC: C/D/I   Neuro: A&O x3  Pain: Denies      Labs:   CBC Full  -  ( 17 Mar 2024 07:23 )  WBC Count : 0.01 K/uL  Hemoglobin : 9.2 g/dL  Hematocrit : 26.1 %  Platelet Count - Automated : 19 K/uL  Mean Cell Volume : 89.7 fl  Mean Cell Hemoglobin : 31.6 pg  Mean Cell Hemoglobin Concentration : 35.2 gm/dL  Auto Neutrophil # : x  Auto Lymphocyte # : x  Auto Monocyte # : x  Auto Eosinophil # : x  Auto Basophil # : x  Auto Neutrophil % : x  Auto Lymphocyte % : x  Auto Monocyte % : x  Auto Eosinophil % : x  Auto Basophil % : x      17 Mar 2024 07:23    138    |  104    |  12     ----------------------------<  106    4.4     |  24     |  0.58     Ca    9.2        17 Mar 2024 07:23  Phos  3.0       17 Mar 2024 07:23  Mg     1.7       17 Mar 2024 07:23    TPro  5.6    /  Alb  3.5    /  TBili  0.9    /  DBili  x      /  AST  15     /  ALT  23     /  AlkPhos  126    17 Mar 2024 07:23      Cultures:   Culture - Urine (24 @ 21:24)    Specimen Source: Clean Catch Clean Catch (Midstream)   Culture Results:   <10,000 CFU/mL Normal Urogenital Argelia    Radiology:   < from: Xray Chest 1 View- PORTABLE-Urgent (Xray Chest 1 View- PORTABLE-Urgent .) (24 @ 23:44) >  IMPRESSION:  Small left pleural effusion/linear atelectasis.  Mild, central pulmonary venous congestion, new    Meds:   Antimicrobials:   cefepime   IVPB      cefepime   IVPB 2000 milliGRAM(s) IV Intermittent every 8 hours  fluconAZOLE   Tablet 200 milliGRAM(s) Oral daily  valACYclovir 500 milliGRAM(s) Oral two times a day    GI:  aluminum hydroxide/magnesium hydroxide/simethicone Suspension 30 milliLiter(s) Oral every 6 hours PRN  calcium carbonate    500 mG (Tums) Chewable 1 Tablet(s) Chew three times a day PRN  loperamide 2 milliGRAM(s) Oral every 3 hours PRN  pantoprazole    Tablet 40 milliGRAM(s) Oral before breakfast  sodium bicarbonate Mouth Rinse 10 milliLiter(s) Swish and Spit five times a day    Cardiovascular:   losartan 100 milliGRAM(s) Oral daily    Immunologic:   filgrastim-sndz (ZARXIO) Injectable 480 MICROGram(s) SubCutaneous every 24 hours  mycophenolate mofetil 1000 milliGRAM(s) Oral three times a day  tacrolimus 2 milliGRAM(s) Oral two times a day    Other medications:   Biotene Dry Mouth Oral Rinse 5 milliLiter(s) Swish and Spit five times a day  chlorhexidine 4% Liquid 1 Application(s) Topical <User Schedule>  escitalopram 20 milliGRAM(s) Oral daily  lidocaine/prilocaine Cream 1 Application(s) Topical daily  loratadine 10 milliGRAM(s) Oral daily  sodium chloride 0.9% 1000 milliLiter(s) IV Continuous <Continuous>  sodium chloride 0.9%. 1000 milliLiter(s) IV Continuous <Continuous>      PRN:   acetaminophen     Tablet .. 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 Mild, central pulmonary venous congestion, new.  3/9 BCx and UCx, follow up  3/11- transaminitis, hold fluconazole. Tbili increased to 1.8. Added on direct and indirect bili.   3/12- direct bili 1.3 3/11/24   3/15- Chemo induced grade 2 oral mucositis: continue supportive care   3/15 parotid gland ( L) edema with PO intake stove vs bleeding vs truma f/u US   3/16 US prelim small collection <1cm, likely cyst, d/w Dr Batres, YOANNA final reading       1. Infectious Disease:   cefepime   IVPB      cefepime   IVPB 2000 milliGRAM(s) IV Intermittent every 8 hours  fluconAZOLE   Tablet 200 milliGRAM(s) Oral daily  valACYclovir 500 milliGRAM(s) Oral two times a day    2. GI Prophylaxis:   pantoprazole    Tablet 40 milliGRAM(s) Oral before breakfast    3. Mouthcare - NS / NaHCO3 rinses, Mycelex, Biotene; Skin care     4. GVHD prophylaxis   TBI day -1   CTX days +3, +4   mycophenolate mofetil 1000 milliGRAM(s) Oral three times a day  tacrolimus 2 milliGRAM(s) Oral two times a day    6. Transfuse & replete electrolytes prn   Thrombocytopenia with blood blister in mouth, PLT to keep PLT goal 20K  Hypomagnesemia: magnesium sulfate  IVPB 1 Gram(s) IV Intermittent once    7. IV hydration, daily weights, strict I&O, prn diuresis     8. PO intake as tolerated, nutrition follow up as needed, MVI, folic acid     9. Antiemetics, anti-diarrhea medications:   loperamide 2 milliGRAM(s) Oral every 3 hours PRN  metoclopramide Injectable 10 milliGRAM(s) IV Push every 6 hours PRN    10. OOB as tolerated, physical therapy consult if needed     11. Monitor coags / fibrinogen 2x week, vitamin K as needed     12. Monitor closely for clinical changes, monitor for fevers     13. Emotional support provided, plan of care discussed and questions addressed     14. Patient education done regarding plan of care, restrictions and discharge planning     15. Continue regular social work input       I have written the above note for Dr. Penn who performed service with me in the room.   Rebecca Gilbert NP-C ([phone])    I have seen and examined patient with NP, I agree with above note as scribed.

## 2024-03-17 NOTE — PROGRESS NOTE ADULT - NS ATTEND AMEND GEN_ALL_CORE FT
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      Plan:    Heme:   - Trend CBC with differential daily. Continue supportive transfusions as needed to maintain Hgb > 7 and plt > 10  (> 20 if febrile, > 50 if bleeding).   - G-CSF (started on day +5): continue through engraftment  - Will require post ALLO BMT CNS prophylaxis and tki maintenance -- begins after engraftment.     GVHD:   - PTCy days 3 and 4  - Cellcept and tacrolimus (started on day +5). Check tacrolimus level Mon/Thurs, last 8.6 (3/14/24)    ID:   - Prophylaxis: fluconazole (200 mg daily, can uptitrate to 400 mg next week if LFTs are stable), Valtrex  - Neutropenic fever: cefepime. Infectious work up negative (diarrhea but C.diff negative).  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 Today is day +11.    Plan:    Heme:   - Trend CBC with differential daily. Continue supportive transfusions as needed to maintain Hgb > 7 and plt > 10  (> 20 if febrile, > 50 if bleeding).   - G-CSF (started on day +5): continue through engraftment  - Will require post ALLO BMT CNS prophylaxis and TKI maintenance -- begins after engraftment.     GVHD:   - PTCy days 3 and 4  - Cellcept and tacrolimus (started on day +5). Check tacrolimus level Mon/Thurs, last 8.6 (3/14/24)    ID:   - Prophylaxis: fluconazole (200 mg daily, can uptitrate to 400 mg next week if LFTs are stable), Valtrex  - Neutropenic fever: cefepime. Infectious work up negative (diarrhea but C.diff negative).  - Bactrim SS daily to start on day +21  - BM product A0 cx positive for Staph caprae (skin lauro)    Nutrition: tolerating PO    DVT prophylaxis: ambulation

## 2024-03-18 DIAGNOSIS — R19.7 DIARRHEA, UNSPECIFIED: ICD-10-CM

## 2024-03-18 DIAGNOSIS — R63.0 ANOREXIA: ICD-10-CM

## 2024-03-18 DIAGNOSIS — R11.0 NAUSEA: ICD-10-CM

## 2024-03-18 LAB
ALBUMIN SERPL ELPH-MCNC: 3.4 G/DL — SIGNIFICANT CHANGE UP (ref 3.3–5)
ALP SERPL-CCNC: 124 U/L — HIGH (ref 40–120)
ALT FLD-CCNC: 21 U/L — SIGNIFICANT CHANGE UP (ref 10–45)
ANION GAP SERPL CALC-SCNC: 12 MMOL/L — SIGNIFICANT CHANGE UP (ref 5–17)
APTT BLD: 28.8 SEC — SIGNIFICANT CHANGE UP (ref 24.5–35.6)
AST SERPL-CCNC: 17 U/L — SIGNIFICANT CHANGE UP (ref 10–40)
BILIRUB DIRECT SERPL-MCNC: 0.3 MG/DL — SIGNIFICANT CHANGE UP (ref 0–0.3)
BILIRUB INDIRECT FLD-MCNC: 0.6 MG/DL — SIGNIFICANT CHANGE UP (ref 0.2–1)
BILIRUB SERPL-MCNC: 0.9 MG/DL — SIGNIFICANT CHANGE UP (ref 0.2–1.2)
BLD GP AB SCN SERPL QL: NEGATIVE — SIGNIFICANT CHANGE UP
BUN SERPL-MCNC: 12 MG/DL — SIGNIFICANT CHANGE UP (ref 7–23)
CALCIUM SERPL-MCNC: 9.2 MG/DL — SIGNIFICANT CHANGE UP (ref 8.4–10.5)
CHLORIDE SERPL-SCNC: 103 MMOL/L — SIGNIFICANT CHANGE UP (ref 96–108)
CMV DNA CSF QL NAA+PROBE: SIGNIFICANT CHANGE UP IU/ML
CMV DNA SPEC NAA+PROBE-LOG#: SIGNIFICANT CHANGE UP LOG10IU/ML
CO2 SERPL-SCNC: 22 MMOL/L — SIGNIFICANT CHANGE UP (ref 22–31)
CREAT SERPL-MCNC: 0.57 MG/DL — SIGNIFICANT CHANGE UP (ref 0.5–1.3)
EGFR: 100 ML/MIN/1.73M2 — SIGNIFICANT CHANGE UP
FIBRINOGEN PPP-MCNC: 394 MG/DL — SIGNIFICANT CHANGE UP (ref 200–445)
GLUCOSE SERPL-MCNC: 106 MG/DL — HIGH (ref 70–99)
HCT VFR BLD CALC: 25.2 % — LOW (ref 34.5–45)
HGB BLD-MCNC: 9 G/DL — LOW (ref 11.5–15.5)
INR BLD: 1.12 RATIO — SIGNIFICANT CHANGE UP (ref 0.85–1.18)
LDH SERPL L TO P-CCNC: 116 U/L — SIGNIFICANT CHANGE UP (ref 50–242)
MAGNESIUM SERPL-MCNC: 1.5 MG/DL — LOW (ref 1.6–2.6)
MCHC RBC-ENTMCNC: 31.7 PG — SIGNIFICANT CHANGE UP (ref 27–34)
MCHC RBC-ENTMCNC: 35.7 GM/DL — SIGNIFICANT CHANGE UP (ref 32–36)
MCV RBC AUTO: 88.7 FL — SIGNIFICANT CHANGE UP (ref 80–100)
NRBC # BLD: 0 /100 WBCS — SIGNIFICANT CHANGE UP (ref 0–0)
PHOSPHATE SERPL-MCNC: 3 MG/DL — SIGNIFICANT CHANGE UP (ref 2.5–4.5)
PLATELET # BLD AUTO: 28 K/UL — LOW (ref 150–400)
POTASSIUM SERPL-MCNC: 4.3 MMOL/L — SIGNIFICANT CHANGE UP (ref 3.5–5.3)
POTASSIUM SERPL-SCNC: 4.3 MMOL/L — SIGNIFICANT CHANGE UP (ref 3.5–5.3)
PROT SERPL-MCNC: 5.7 G/DL — LOW (ref 6–8.3)
PROTHROM AB SERPL-ACNC: 11.7 SEC — SIGNIFICANT CHANGE UP (ref 9.5–13)
RBC # BLD: 2.84 M/UL — LOW (ref 3.8–5.2)
RBC # FLD: 11.1 % — SIGNIFICANT CHANGE UP (ref 10.3–14.5)
RH IG SCN BLD-IMP: POSITIVE — SIGNIFICANT CHANGE UP
SODIUM SERPL-SCNC: 137 MMOL/L — SIGNIFICANT CHANGE UP (ref 135–145)
TACROLIMUS SERPL-MCNC: 7 NG/ML — SIGNIFICANT CHANGE UP
WBC # BLD: 0.01 K/UL — CRITICAL LOW (ref 3.8–10.5)
WBC # FLD AUTO: 0.01 K/UL — CRITICAL LOW (ref 3.8–10.5)

## 2024-03-18 PROCEDURE — 99233 SBSQ HOSP IP/OBS HIGH 50: CPT

## 2024-03-18 PROCEDURE — 99233 SBSQ HOSP IP/OBS HIGH 50: CPT | Mod: FS

## 2024-03-18 RX ORDER — FLUCONAZOLE 150 MG/1
400 TABLET ORAL DAILY
Refills: 0 | Status: DISCONTINUED | OUTPATIENT
Start: 2024-03-18 | End: 2024-03-24

## 2024-03-18 RX ORDER — MAGNESIUM SULFATE 500 MG/ML
2 VIAL (ML) INJECTION ONCE
Refills: 0 | Status: COMPLETED | OUTPATIENT
Start: 2024-03-18 | End: 2024-03-18

## 2024-03-18 RX ORDER — TACROLIMUS 5 MG/1
2.5 CAPSULE ORAL
Refills: 0 | Status: DISCONTINUED | OUTPATIENT
Start: 2024-03-18 | End: 2024-03-25

## 2024-03-18 RX ORDER — ONDANSETRON 8 MG/1
8 TABLET, FILM COATED ORAL EVERY 8 HOURS
Refills: 0 | Status: DISCONTINUED | OUTPATIENT
Start: 2024-03-18 | End: 2024-03-19

## 2024-03-18 RX ADMIN — VALACYCLOVIR 500 MILLIGRAM(S): 500 TABLET, FILM COATED ORAL at 06:46

## 2024-03-18 RX ADMIN — Medication 5 MILLILITER(S): at 20:00

## 2024-03-18 RX ADMIN — TACROLIMUS 2.5 MILLIGRAM(S): 5 CAPSULE ORAL at 18:05

## 2024-03-18 RX ADMIN — CHLORHEXIDINE GLUCONATE 1 APPLICATION(S): 213 SOLUTION TOPICAL at 06:48

## 2024-03-18 RX ADMIN — FLUCONAZOLE 400 MILLIGRAM(S): 150 TABLET ORAL at 11:55

## 2024-03-18 RX ADMIN — Medication 5 MILLILITER(S): at 11:55

## 2024-03-18 RX ADMIN — TACROLIMUS 2 MILLIGRAM(S): 5 CAPSULE ORAL at 06:46

## 2024-03-18 RX ADMIN — ESCITALOPRAM OXALATE 20 MILLIGRAM(S): 10 TABLET, FILM COATED ORAL at 11:55

## 2024-03-18 RX ADMIN — LORATADINE 10 MILLIGRAM(S): 10 TABLET ORAL at 11:56

## 2024-03-18 RX ADMIN — MYCOPHENOLATE MOFETIL 1000 MILLIGRAM(S): 250 CAPSULE ORAL at 06:46

## 2024-03-18 RX ADMIN — CEFEPIME 100 MILLIGRAM(S): 1 INJECTION, POWDER, FOR SOLUTION INTRAMUSCULAR; INTRAVENOUS at 14:28

## 2024-03-18 RX ADMIN — Medication 10 MILLILITER(S): at 11:55

## 2024-03-18 RX ADMIN — MYCOPHENOLATE MOFETIL 1000 MILLIGRAM(S): 250 CAPSULE ORAL at 22:34

## 2024-03-18 RX ADMIN — Medication 25 GRAM(S): at 12:45

## 2024-03-18 RX ADMIN — LIDOCAINE AND PRILOCAINE CREAM 1 APPLICATION(S): 25; 25 CREAM TOPICAL at 12:49

## 2024-03-18 RX ADMIN — Medication 10 MILLILITER(S): at 15:30

## 2024-03-18 RX ADMIN — Medication 10 MILLILITER(S): at 08:04

## 2024-03-18 RX ADMIN — Medication 480 MICROGRAM(S): at 12:46

## 2024-03-18 RX ADMIN — CEFEPIME 100 MILLIGRAM(S): 1 INJECTION, POWDER, FOR SOLUTION INTRAMUSCULAR; INTRAVENOUS at 22:35

## 2024-03-18 RX ADMIN — MYCOPHENOLATE MOFETIL 1000 MILLIGRAM(S): 250 CAPSULE ORAL at 14:28

## 2024-03-18 RX ADMIN — VALACYCLOVIR 500 MILLIGRAM(S): 500 TABLET, FILM COATED ORAL at 18:05

## 2024-03-18 RX ADMIN — Medication 2 MILLIGRAM(S): at 11:15

## 2024-03-18 RX ADMIN — Medication 5 MILLILITER(S): at 15:31

## 2024-03-18 RX ADMIN — LOSARTAN POTASSIUM 100 MILLIGRAM(S): 100 TABLET, FILM COATED ORAL at 07:02

## 2024-03-18 RX ADMIN — Medication 10 MILLIGRAM(S): at 12:46

## 2024-03-18 RX ADMIN — Medication 10 MILLILITER(S): at 20:00

## 2024-03-18 RX ADMIN — Medication 5 MILLILITER(S): at 08:04

## 2024-03-18 RX ADMIN — PANTOPRAZOLE SODIUM 40 MILLIGRAM(S): 20 TABLET, DELAYED RELEASE ORAL at 06:46

## 2024-03-18 RX ADMIN — CEFEPIME 100 MILLIGRAM(S): 1 INJECTION, POWDER, FOR SOLUTION INTRAMUSCULAR; INTRAVENOUS at 06:46

## 2024-03-18 NOTE — CONSULT NOTE ADULT - TIME BILLING
Total time spent including the following  [x] Physical chart review and documentation   An extensive review of the physical chart, electronic health record, and documentation was conducted to obtain collateral information including but not limited to:   - Current inpatient records (primary team)   - Inpatient values/results (CBC, CMP)   - Current or proposed treatment plans   - Pharmacotherapy review (including I-STOP if applicable)  [x]discussion with the patient and the patient's    [x]Physical Exam and/or review of systems   [x]Formulation of assessment and plan         The ______ minutes spent in ACP (                 ) were independent to the time spent in time based billing

## 2024-03-18 NOTE — CHART NOTE - NSCHARTNOTEFT_GEN_A_CORE
Nutrition Follow Up Note  Patient seen for: BMT Nutrition Follow up    Chart reviewed. Events noted.     Source: [x] Patient       [x] Medical Record        [x] RN        [x] Spouse at bedside       [] Other:    -If unable to interview patient: [] Trach/Vent/BiPAP  [] Disoriented/confused/inappropriate to interview    Diet Order:   Diet, Regular:   GlutaSolve(Glutamine) 15gm pkg     Qty per Day:  1 (24 @ 15:25) [Active]    - Is current order appropriate/adequate? [] Yes  []  No: see recommendations below     - PO intake :   [] >75%  Adequate    [] 50-75%  Fair       [x] <50%  Poor    - Nutrition-related concerns:      - Intake: Pt reports  appetite/PO intake; reports consuming <50% of most meals. Ogone standard 1.0 1x/day and protein-fortified smoothie daily. Food preferences obtained (i.e. apple sauce, mighty shakes 1x/day, yogurt).        - GI: No GI distress reported. Last BM: .   Bowel Regimen? [x] Yes Imodium  [] No      - Grade 2 oral mucositis (03-15)      - Mouthcare: Biotene, sodium bicarbonate mouth wash       - Renal: Noted hypomagnesemic; s/p repletion. IVF: NS @ 20 ml/hr.       - BMT/SCT: Post: Allogeneic  BMT day + 12; ordered for Cellcept and Prograf     Weights:  Dosing weight:  96.9 kg (2-29)   Daily Weight in k.7 (18 Mar 2024 08:36), 97.1 (-), Weight in k.4 (-), Weight in k.2 (-), Weight in k.2 (-), Weight in k.3 (-), Weight in k.9 ()  ** Weight fluctuating - Weight changes likely secondary to fluid shifts. RD to continue to monitor weight trends as able.       Nutritionally Pertinent MEDICATIONS  (STANDING):  Biotene Dry Mouth Oral Rinse 5 milliLiter(s) Swish and Spit five times a day  cefepime   IVPB      cefepime   IVPB 2000 milliGRAM(s) IV Intermittent every 8 hours  chlorhexidine 4% Liquid 1 Application(s) Topical <User Schedule>  escitalopram 20 milliGRAM(s) Oral daily  filgrastim-sndz (ZARXIO) Injectable 480 MICROGram(s) SubCutaneous every 24 hours  fluconAZOLE   Tablet 200 milliGRAM(s) Oral daily  lidocaine/prilocaine Cream 1 Application(s) Topical daily  loratadine 10 milliGRAM(s) Oral daily  losartan 100 milliGRAM(s) Oral daily  mycophenolate mofetil 1000 milliGRAM(s) Oral three times a day  pantoprazole    Tablet 40 milliGRAM(s) Oral before breakfast  sodium bicarbonate Mouth Rinse 10 milliLiter(s) Swish and Spit five times a day  sodium chloride 0.9% 1000 milliLiter(s) (252 mL/Hr) IV Continuous <Continuous>  sodium chloride 0.9%. 1000 milliLiter(s) (20 mL/Hr) IV Continuous <Continuous>  tacrolimus 2 milliGRAM(s) Oral two times a day  valACYclovir 500 milliGRAM(s) Oral two times a day    MEDICATIONS  (PRN):  acetaminophen     Tablet .. 650 milliGRAM(s) Oral every 6 hours PRN Mild Pain (1 - 3), Moderate Pain (4 - 6)  aluminum hydroxide/magnesium hydroxide/simethicone Suspension 30 milliLiter(s) Oral every 6 hours PRN Dyspepsia  calcium carbonate    500 mG (Tums) Chewable 1 Tablet(s) Chew three times a day PRN Dyspepsia  loperamide 2 milliGRAM(s) Oral every 3 hours PRN Diarrhea  metoclopramide Injectable 10 milliGRAM(s) IV Push every 6 hours PRN Nausea/Vomiting  sodium chloride 0.9% lock flush 10 milliLiter(s) IV Push every 1 hour PRN Pre/post blood products, medications, blood draw, and to maintain line patency  zinc oxide 40% Paste 1 Application(s) Topical two times a day PRN discomfort      Pertinent Labs:  @ 07:49: Na 137, BUN 12, Cr 0.57, <H>, K+ 4.3, Phos 3.0, Mg 1.5<L>, Alk Phos 124<H>, ALT/SGPT 21, AST/SGOT 17, HbA1c --      Finger Sticks:      Skin per nursing documentation: No pressure injuries noted.  Edema per nursing documentation: 1+ generalized     Estimated Needs: Based on IBW 54.8 kg   Energy needs: 3106-3617 kcal/kg (35-40 kcal/kg)  Protein needs: 87.68-98.64 g/kg (1.6-1.8 g/kg)  Fluid needs: Defer to team    [x] no change since previous assessment  [] recalculated:     Previous Nutrition Diagnosis:   1. Moderate Acute Malnutrition   2. Increased Nutrient Needs   Nutrition Diagnosis is: [x] ongoing  [] resolved [] not applicable     Nutrition Care Plan:  [x] In Progress  [] Achieved  [] Not applicable    New Nutrition Diagnosis:     Nutrition Interventions:     Education Provided   [x] Yes:  [] No: Emphasized the importance of adequate kcal and protein intake, provided recommendations to optimize nutritional intake in case of decreased appetite, recommended small frequent meals by consuming nutrient-dense snacks between meals, to start with protein, and sips of supplement throughout the day.     Recommendations:        [X] Continue current diet order: regular diet + Glutasolve 1x/day     [X] RD will provide protein-fortified smoothie of day 1x/day (Monday-Friday, 300 edison 18 gm protein)     [X] Pt to trial plant-based supplement Ogone standard 1.0; RD to follow up for acceptability.     [X] Monitor and encourage PO intake. Encourage use of daily menus. Honor dietary preferences as expressed as able.     [X] Continue Chobani greek yogurt 1x/day and mighty shakes 1x/day    [X] Recommend micronutrient coverage: folic acid and multivitamin pending no contraindications     Monitoring and Evaluation:   Continue to monitor nutritional intake, tolerance to diet prescription, weights, labs, skin integrity    RD remains available upon request and will follow up per protocol  Love Loredo RDN CDN (TEAMS) Nutrition Follow Up Note  Patient seen for: BMT Nutrition Follow up    Chart reviewed. Events noted.     Source: [] Patient       [x] Medical Record        [x] RN        [] Spouse at bedside       [] Other:    -If unable to interview patient: [] Trach/Vent/BiPAP  [] Disoriented/confused/inappropriate to interview    Diet Order:   Diet, Regular:   GlutaSolve(Glutamine) 15gm pkg     Qty per Day:  1 (24 @ 15:25) [Active]    - Is current order appropriate/adequate? [] Yes  []  No: see recommendations below     - PO intake :   [] >75%  Adequate    [] 50-75%  Fair       [x] <50%  Poor    - Nutrition-related concerns:      - Intake: Attempted to visit multiple times however pt asleep during RD visits. Per RN, pt with poor appetite/PO intake; consuming <50% of most meals. RD to order Fixber standard 1.0 1x/day and protein-fortified smoothie daily.       - GI: No GI distress reported. Last BM: .   Bowel Regimen? [x] Yes Imodium  [] No      - Grade 2 oral mucositis (03-15)      - Mouthcare: Biotene, sodium bicarbonate mouth wash       - Renal: Noted hypomagnesemic; s/p repletion. IVF: NS @ 20 ml/hr.       - BMT/SCT: Post: Allogeneic  BMT day + 12; ordered for Cellcept and Prograf     Weights:  Dosing weight:  96.9 kg (2-29)   Daily Weight in k.7 (18 Mar 2024 08:36), 97.1 (-), Weight in k.4 (), Weight in k.2 (-), Weight in k.2 (-), Weight in k.3 (-), Weight in k.9 ()  ** Weight fluctuating - Weight changes likely secondary to fluid shifts. RD to continue to monitor weight trends as able.       Nutritionally Pertinent MEDICATIONS  (STANDING):  Biotene Dry Mouth Oral Rinse 5 milliLiter(s) Swish and Spit five times a day  cefepime   IVPB      cefepime   IVPB 2000 milliGRAM(s) IV Intermittent every 8 hours  chlorhexidine 4% Liquid 1 Application(s) Topical <User Schedule>  escitalopram 20 milliGRAM(s) Oral daily  filgrastim-sndz (ZARXIO) Injectable 480 MICROGram(s) SubCutaneous every 24 hours  fluconAZOLE   Tablet 200 milliGRAM(s) Oral daily  lidocaine/prilocaine Cream 1 Application(s) Topical daily  loratadine 10 milliGRAM(s) Oral daily  losartan 100 milliGRAM(s) Oral daily  mycophenolate mofetil 1000 milliGRAM(s) Oral three times a day  pantoprazole    Tablet 40 milliGRAM(s) Oral before breakfast  sodium bicarbonate Mouth Rinse 10 milliLiter(s) Swish and Spit five times a day  sodium chloride 0.9% 1000 milliLiter(s) (252 mL/Hr) IV Continuous <Continuous>  sodium chloride 0.9%. 1000 milliLiter(s) (20 mL/Hr) IV Continuous <Continuous>  tacrolimus 2 milliGRAM(s) Oral two times a day  valACYclovir 500 milliGRAM(s) Oral two times a day    MEDICATIONS  (PRN):  acetaminophen     Tablet .. 650 milliGRAM(s) Oral every 6 hours PRN Mild Pain (1 - 3), Moderate Pain (4 - 6)  aluminum hydroxide/magnesium hydroxide/simethicone Suspension 30 milliLiter(s) Oral every 6 hours PRN Dyspepsia  calcium carbonate    500 mG (Tums) Chewable 1 Tablet(s) Chew three times a day PRN Dyspepsia  loperamide 2 milliGRAM(s) Oral every 3 hours PRN Diarrhea  metoclopramide Injectable 10 milliGRAM(s) IV Push every 6 hours PRN Nausea/Vomiting  sodium chloride 0.9% lock flush 10 milliLiter(s) IV Push every 1 hour PRN Pre/post blood products, medications, blood draw, and to maintain line patency  zinc oxide 40% Paste 1 Application(s) Topical two times a day PRN discomfort      Pertinent Labs:  @ 07:49: Na 137, BUN 12, Cr 0.57, <H>, K+ 4.3, Phos 3.0, Mg 1.5<L>, Alk Phos 124<H>, ALT/SGPT 21, AST/SGOT 17, HbA1c --      Finger Sticks:      Skin per nursing documentation: No pressure injuries noted.  Edema per nursing documentation: 1+ generalized     Estimated Needs: Based on IBW 54.8 kg   Energy needs: 6629-3909 kcal/kg (35-40 kcal/kg)  Protein needs: 87.68-98.64 g/kg (1.6-1.8 g/kg)  Fluid needs: Defer to team    [x] no change since previous assessment  [] recalculated:     Previous Nutrition Diagnosis:   1. Moderate Acute Malnutrition   2. Increased Nutrient Needs   Nutrition Diagnosis is: [x] ongoing  [] resolved [] not applicable     Nutrition Care Plan:  [x] In Progress  [] Achieved  [] Not applicable    New Nutrition Diagnosis:     Nutrition Interventions:     Education Provided   [x] Yes:  [] No: Emphasized the importance of adequate kcal and protein intake, provided recommendations to optimize nutritional intake in case of decreased appetite, recommended small frequent meals by consuming nutrient-dense snacks between meals, to start with protein, and sips of supplement throughout the day.     Recommendations:        [X] Continue current diet order: regular diet + Glutasolve 1x/day     [X] Continue protein-fortified smoothie of day 1x/day (Monday-Friday, 300 edison 18 gm protein)     [X] Recommend Fixber standard 1.0; RD to follow up for acceptability.     [X] Monitor and encourage PO intake. Encourage use of daily menus. Honor dietary preferences as expressed as able.     [X] Continue Chobani greek yogurt 1x/day and mighty shakes 1x/day    [X] Recommend micronutrient coverage: folic acid and multivitamin pending no contraindications     Monitoring and Evaluation:   Continue to monitor nutritional intake, tolerance to diet prescription, weights, labs, skin integrity    RD remains available upon request and will follow up per protocol  Love Loredo RDN CDN (TEAMS)

## 2024-03-18 NOTE — CONSULT NOTE ADULT - PROBLEM SELECTOR RECOMMENDATION 4
(- Recommend appetite stimulant)  (- Encourage intake of food from home that she enjoys) -Start Marinol 2.5mg before lunch and dinner   -The patient indicated she does not like the hospital's food. Hence d/w the patient and her  about bringing food from home.   -Dietary is already f/u.

## 2024-03-18 NOTE — PROGRESS NOTE ADULT - SUBJECTIVE AND OBJECTIVE BOX
HPC Transplant Team                                                      Critical / Counseling Time Provided: 30 minutes                                                                                                                                                        Chief Complaint: Haplo-idential BMT (son) with FLU/CY/TBI prep for the treatment fo ALL    S: Patient seen and examined with [facility] Transplant Team:   + fatigue  + intermittent nausea  + L cheek pain ( + blood blister)   + poor appetite     Other ROS(-)    O: Vitals:   Vital Signs Last 24 Hrs  T(C): 36.6 (18 Mar 2024 06:00), Max: 37.1 (17 Mar 2024 20:50)  T(F): 97.9 (18 Mar 2024 06:00), Max: 98.8 (17 Mar 2024 20:50)  HR: 60 (18 Mar 2024 06:00) (56 - 65)  BP: 131/78 (18 Mar 2024 06:00) (131/78 - 159/75)  RR: 18 (18 Mar 2024 06:00) (18 - 18)  SpO2: 96% (18 Mar 2024 06:00) (94% - 98%)    Parameters below as of 17 Mar 2024 20:50  Patient On (Oxygen Delivery Method): room air    Admit weight: 96.9 kg  Daily Weight in k.5 (17 Mar 2024 09:45)    Intake / Output:   - @ 07:01  -  -18 @ 07:00  --------------------------------------------------------  IN: 1222 mL / OUT: 1400 mL / NET: -178 mL      PE:   Oropharynx: + patchy erythema B/L buccal mucosa , + ulcerations lower lip + L cheek  blood blister   Oral Mucositis:   +                                                 rdGrdrrdarddrderd:rd rd3rd CVS: RRR, +S1,S2  Lungs: CTA throughout bilaterally   Abdomen: soft, ND, ND +bs  Extremities: no edema   Gastric Mucositis:      -                                            Grade: -  Intestinal Mucositis:     -                                         Grade: -  Skin: small area of petechiae on the medial aspect of the left knee and diffusely over the left medial ankle and anterior shin  TLC: C/D/I   Neuro: A&O x3  Pain: Denies    Labs:           Cultures:   Culture - Urine (24 @ 21:24)    Specimen Source: Clean Catch Clean Catch (Midstream)   Culture Results:   <10,000 CFU/mL Normal Urogenital Argelia    Culture - Blood (24 @ 21:24)    Specimen Source: .Blood Triple Lumen BROWN   Culture Results:   No growth at 5 days    Culture - Blood (24 @ 21:24)    Specimen Source: .Blood Triple Lumen BLUE   Culture Results:   No growth at 5 days    Radiology:   from: US Head + Neck Soft Tissue (03.15.24 @ 19:05)   FINDINGS:  The left parotid gland is prominent in size and heterogeneous in   echotexture with linear hypoechoic bands suggestive of parotid gland   edema. There is no discrete mass or fluid collection. No stonesare   identified.    The right parotid gland was not well imaged.    IMPRESSION:  Left parotid gland edema. No fluid collection is identified.  Limited visualization of the right parotid gland.      Meds:   Antimicrobials:   cefepime   IVPB 2000 milliGRAM(s) IV Intermittent every 8 hours  fluconAZOLE   Tablet 200 milliGRAM(s) Oral daily  valACYclovir 500 milliGRAM(s) Oral two times a day      GI:  aluminum hydroxide/magnesium hydroxide/simethicone Suspension 30 milliLiter(s) Oral every 6 hours PRN  calcium carbonate    500 mG (Tums) Chewable 1 Tablet(s) Chew three times a day PRN  loperamide 2 milliGRAM(s) Oral every 3 hours PRN  pantoprazole    Tablet 40 milliGRAM(s) Oral before breakfast  sodium bicarbonate Mouth Rinse 10 milliLiter(s) Swish and Spit five times a day      Cardiovascular:   losartan 100 milliGRAM(s) Oral daily      Immunologic:   filgrastim-sndz (ZARXIO) Injectable 480 MICROGram(s) SubCutaneous every 24 hours  mycophenolate mofetil 1000 milliGRAM(s) Oral three times a day  tacrolimus 2 milliGRAM(s) Oral two times a day      Other medications:   Biotene Dry Mouth Oral Rinse 5 milliLiter(s) Swish and Spit five times a day  chlorhexidine 4% Liquid 1 Application(s) Topical <User Schedule>  escitalopram 20 milliGRAM(s) Oral daily  lidocaine/prilocaine Cream 1 Application(s) Topical daily  loratadine 10 milliGRAM(s) Oral daily  sodium chloride 0.9% 1000 milliLiter(s) IV Continuous <Continuous>  sodium chloride 0.9%. 1000 milliLiter(s) IV Continuous <Continuous>      PRN:   acetaminophen     Tablet .. 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 Mild, central pulmonary venous congestion, new.  3/9 BCx and UCx, follow up  3/11- transaminitis, hold fluconazole. Tbili increased to 1.8. Added on direct and indirect bili.   3/12- direct bili 1.3 3/11/24   3/15- Chemo induced grade 2 oral mucositis: continue supportive care   3/15 parotid gland ( L) edema with PO intake stove vs bleeding vs truma f/u US   3/16 US prelim small collection <1cm, likely cyst, d/w Dr Batres,  final reading       1. Infectious Disease:   cefepime   IVPB      cefepime   IVPB 2000 milliGRAM(s) IV Intermittent every 8 hours  fluconAZOLE   Tablet 200 milliGRAM(s) Oral daily  valACYclovir 500 milliGRAM(s) Oral two times a day    2. GI Prophylaxis:   pantoprazole    Tablet 40 milliGRAM(s) Oral before breakfast    3. Mouthcare - NS / NaHCO3 rinses, Mycelex, Biotene; Skin care     4. GVHD prophylaxis   TBI day -1   CTX days +3, +4   mycophenolate mofetil 1000 milliGRAM(s) Oral three times a day  tacrolimus 2 milliGRAM(s) Oral two times a day    6. Transfuse & replete electrolytes prn   Thrombocytopenia with blood blister in mouth, PLT to keep PLT goal 20K  Hypomagnesemia: magnesium sulfate  IVPB 1 Gram(s) IV Intermittent once    7. IV hydration, daily weights, strict I&O, prn diuresis     8. PO intake as tolerated, nutrition follow up as needed, MVI, folic acid     9. Antiemetics, anti-diarrhea medications:   loperamide 2 milliGRAM(s) Oral every 3 hours PRN  metoclopramide Injectable 10 milliGRAM(s) IV Push every 6 hours PRN    10. OOB as tolerated, physical therapy consult if needed     11. Monitor coags / fibrinogen 2x week, vitamin K as needed     12. Monitor closely for clinical changes, monitor for fevers     13. Emotional support provided, plan of care discussed and questions addressed     14. Patient education done regarding plan of care, restrictions and discharge planning     15. Continue regular social work input     I have written the above note for Dr. Olivier who performed service with me in the room.   Estelita Acevedo NP-C ([phone])    I have seen and examined patient with NP, I agree with above note as scribed.                    HPC Transplant Team                                                      Critical / Counseling Time Provided: 30 minutes                                                                                                                                                        Chief Complaint: Haplo-idential BMT (son) with FLU/CY/TBI prep for the treatment fo ALL    S: Patient seen and examined with [facility] Transplant Team:   + loose stool  + decreased appetite  + intermittent nausea    Other ROS(-)    O: Vitals:   Vital Signs Last 24 Hrs  T(C): 36.6 (18 Mar 2024 06:00), Max: 37.1 (17 Mar 2024 20:50)  T(F): 97.9 (18 Mar 2024 06:00), Max: 98.8 (17 Mar 2024 20:50)  HR: 60 (18 Mar 2024 06:00) (56 - 65)  BP: 131/78 (18 Mar 2024 06:00) (131/78 - 159/75)  RR: 18 (18 Mar 2024 06:00) (18 - 18)  SpO2: 96% (18 Mar 2024 06:00) (94% - 98%)    Parameters below as of 17 Mar 2024 20:50  Patient On (Oxygen Delivery Method): room air    Admit weight: 96.9 kg  Daily Weight in k.5 (17 Mar 2024 09:45)  Daily Weight in k.7 (18 Mar 2024 08:36)    Intake / Output:   03-17 @ 07:01  -  -18 @ 07:00  --------------------------------------------------------  IN: 1222 mL / OUT: 1400 mL / NET: -178 mL      PE:   Oropharynx: + patchy erythema B/L buccal mucosa , + ulcerations lower lip + L cheek  blood blister   Oral Mucositis:   +                                                 rdGrdrrdarddrderd:rd rd3rd CVS: RRR, +S1,S2  Lungs: CTA throughout bilaterally   Abdomen: soft, ND, ND +bs  Extremities: no edema   Gastric Mucositis:      -                                            Grade: -  Intestinal Mucositis:     -                                         Grade: -  Skin: small area of petechiae on the medial aspect of the left knee and diffusely over the left medial ankle and anterior shin  TLC: C/D/I   Neuro: A&O x3  Pain: Denies    Labs:                       9.0    0.01  )-----------( 28       ( 18 Mar 2024 07:50 )             25.2     18 Mar 2024 07:49    137    |  103    |  12     ----------------------------<  106    4.3     |  22     |  0.57     Ca    9.2        18 Mar 2024 07:49  Phos  3.0       18 Mar 2024 07:49  Mg     1.5       18 Mar 2024 07:49    TPro  5.7    /  Alb  3.4    /  TBili  0.9    /  DBili  0.3    /  AST  17     /  ALT  21     /  AlkPhos  124    18 Mar 2024 07:49    PT/INR - ( 18 Mar 2024 07:50 )   PT: 11.7 sec;   INR: 1.12 ratio    PTT - ( 18 Mar 2024 07:50 )  PTT:28.8 sec    LIVER FUNCTIONS - ( 18 Mar 2024 07:49 )  Alb: 3.4 g/dL / Pro: 5.7 g/dL / ALK PHOS: 124 U/L / ALT: 21 U/L / AST: 17 U/L / GGT: x           Urinalysis Basic - ( 18 Mar 2024 07:49 )    Color: x / Appearance: x / SG: x / pH: x  Gluc: 106 mg/dL / Ketone: x  / Bili: x / Urobili: x   Blood: x / Protein: x / Nitrite: x   Leuk Esterase: x / RBC: x / WBC x   Sq Epi: x / Non Sq Epi: x / Bacteria: x    Cultures:   Culture - Urine (24 @ 21:24)    Specimen Source: Clean Catch Clean Catch (Midstream)   Culture Results:   <10,000 CFU/mL Normal Urogenital Argelia    Culture - Blood (24 @ 21:24)    Specimen Source: .Blood Triple Lumen BROWN   Culture Results:   No growth at 5 days    Culture - Blood (24 @ 21:24)    Specimen Source: .Blood Triple Lumen BLUE   Culture Results:   No growth at 5 days    Radiology:   from: US Head + Neck Soft Tissue (03.15.24 @ 19:05)   FINDINGS:  The left parotid gland is prominent in size and heterogeneous in   echotexture with linear hypoechoic bands suggestive of parotid gland   edema. There is no discrete mass or fluid collection. No stonesare   identified.    The right parotid gland was not well imaged.    IMPRESSION:  Left parotid gland edema. No fluid collection is identified.  Limited visualization of the right parotid gland.      Meds:   Antimicrobials:   cefepime   IVPB 2000 milliGRAM(s) IV Intermittent every 8 hours  fluconAZOLE   Tablet 200 milliGRAM(s) Oral daily  valACYclovir 500 milliGRAM(s) Oral two times a day      GI:  aluminum hydroxide/magnesium hydroxide/simethicone Suspension 30 milliLiter(s) Oral every 6 hours PRN  calcium carbonate    500 mG (Tums) Chewable 1 Tablet(s) Chew three times a day PRN  loperamide 2 milliGRAM(s) Oral every 3 hours PRN  pantoprazole    Tablet 40 milliGRAM(s) Oral before breakfast  sodium bicarbonate Mouth Rinse 10 milliLiter(s) Swish and Spit five times a day      Cardiovascular:   losartan 100 milliGRAM(s) Oral daily      Immunologic:   filgrastim-sndz (ZARXIO) Injectable 480 MICROGram(s) SubCutaneous every 24 hours  mycophenolate mofetil 1000 milliGRAM(s) Oral three times a day  tacrolimus 2 milliGRAM(s) Oral two times a day      Other medications:   Biotene Dry Mouth Oral Rinse 5 milliLiter(s) Swish and Spit five times a day  chlorhexidine 4% Liquid 1 Application(s) Topical <User Schedule>  escitalopram 20 milliGRAM(s) Oral daily  lidocaine/prilocaine Cream 1 Application(s) Topical daily  loratadine 10 milliGRAM(s) Oral daily  sodium chloride 0.9% 1000 milliLiter(s) IV Continuous <Continuous>  sodium chloride 0.9%. 1000 milliLiter(s) IV Continuous <Continuous>      PRN:   acetaminophen     Tablet .. 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 Mild, central pulmonary venous congestion, new.  3/9 BCx and UCx, follow up  3/11- transaminitis, hold fluconazole. Tbili increased to 1.8. Added on direct and indirect bili.   3/12- direct bili 1.3 3/11/24   3/15- Chemo induced grade 2 oral mucositis: continue supportive care   3/15 parotid gland ( L) edema with PO intake stove vs bleeding vs truma f/u US   3/16 US prelim small collection <1cm, likely cyst, d/w Dr Batres, FU final reading   3/18 increasing tacro to 2.5 BID (level =7). re-check level next on Thurs 3/21. increase Fluconazole to 400 daily (LFTs normalized). Zofran prn for n/v.      1. Infectious Disease:     cefepime   IVPB 2000 milliGRAM(s) IV Intermittent every 8 hours  fluconAZOLE   Tablet 200 milliGRAM(s) Oral daily  valACYclovir 500 milliGRAM(s) Oral two times a day    2. GI Prophylaxis:   pantoprazole    Tablet 40 milliGRAM(s) Oral before breakfast    3. Mouthcare - NS / NaHCO3 rinses, Mycelex, Biotene; Skin care     4. GVHD prophylaxis   TBI day -1   CTX days +3, +4   mycophenolate mofetil 1000 milliGRAM(s) Oral three times a day  tacrolimus 2 milliGRAM(s) Oral two times a day    6. Transfuse & replete electrolytes prn   Thrombocytopenia with blood blister in mouth, PLT to keep PLT goal 20K  Hypomagnesemia: magnesium sulfate  IVPB 1 Gram(s) IV Intermittent once    7. IV hydration, daily weights, strict I&O, prn diuresis     8. PO intake as tolerated, nutrition follow up as needed, MVI, folic acid     9. Antiemetics, anti-diarrhea medications:   loperamide 2 milliGRAM(s) Oral every 3 hours PRN  metoclopramide Injectable 10 milliGRAM(s) IV Push every 6 hours PRN    10. OOB as tolerated, physical therapy consult if needed     11. Monitor coags / fibrinogen 2x week, vitamin K as needed     12. Monitor closely for clinical changes, monitor for fevers     13. Emotional support provided, plan of care discussed and questions addressed     14. Patient education done regarding plan of care, restrictions and discharge planning     15. Continue regular social work input     I have written the above note for Dr. Olivier who performed service with me in the room.   Estelita Acevedo NP-C ([phone])    I have seen and examined patient with NP, I agree with above note as scribed.                    HPC Transplant Team                                                      Critical / Counseling Time Provided: 30 minutes                                                                                                                                                        Chief Complaint: Haplo-idential BMT (son) with FLU/CY/TBI prep for the treatment fo ALL    S: Patient seen and examined with [facility] Transplant Team:   + loose stool  + decreased appetite  + intermittent nausea    Other ROS(-)    O: Vitals:   Vital Signs Last 24 Hrs  T(C): 36.6 (18 Mar 2024 06:00), Max: 37.1 (17 Mar 2024 20:50)  T(F): 97.9 (18 Mar 2024 06:00), Max: 98.8 (17 Mar 2024 20:50)  HR: 60 (18 Mar 2024 06:00) (56 - 65)  BP: 131/78 (18 Mar 2024 06:00) (131/78 - 159/75)  RR: 18 (18 Mar 2024 06:00) (18 - 18)  SpO2: 96% (18 Mar 2024 06:00) (94% - 98%)    Parameters below as of 17 Mar 2024 20:50  Patient On (Oxygen Delivery Method): room air    Admit weight: 96.9 kg  Daily Weight in k.5 (17 Mar 2024 09:45)  Daily Weight in k.7 (18 Mar 2024 08:36)    Intake / Output:   03-17 @ 07:01  -  -18 @ 07:00  --------------------------------------------------------  IN: 1222 mL / OUT: 1400 mL / NET: -178 mL      PE:   Oropharynx: + patchy erythema B/L buccal mucosa , + ulcerations lower lip + L cheek  blood blister   Oral Mucositis:   +                                                 stGstrstastdstest:st st1st CVS: RRR, +S1,S2  Lungs: CTA throughout bilaterally   Abdomen: soft, ND, ND +bs  Extremities: no edema   Gastric Mucositis:      -                                            Grade: -  Intestinal Mucositis:     -                                         Grade: -  Skin: small area of petechiae on the medial aspect of the left knee and diffusely over the left medial ankle and anterior shin  TLC: C/D/I   Neuro: A&O x3  Pain: Denies    Labs:                       9.0    0.01  )-----------( 28       ( 18 Mar 2024 07:50 )             25.2     18 Mar 2024 07:49    137    |  103    |  12     ----------------------------<  106    4.3     |  22     |  0.57     Ca    9.2        18 Mar 2024 07:49  Phos  3.0       18 Mar 2024 07:49  Mg     1.5       18 Mar 2024 07:49    TPro  5.7    /  Alb  3.4    /  TBili  0.9    /  DBili  0.3    /  AST  17     /  ALT  21     /  AlkPhos  124    18 Mar 2024 07:49    PT/INR - ( 18 Mar 2024 07:50 )   PT: 11.7 sec;   INR: 1.12 ratio    PTT - ( 18 Mar 2024 07:50 )  PTT:28.8 sec    LIVER FUNCTIONS - ( 18 Mar 2024 07:49 )  Alb: 3.4 g/dL / Pro: 5.7 g/dL / ALK PHOS: 124 U/L / ALT: 21 U/L / AST: 17 U/L / GGT: x           Urinalysis Basic - ( 18 Mar 2024 07:49 )    Color: x / Appearance: x / SG: x / pH: x  Gluc: 106 mg/dL / Ketone: x  / Bili: x / Urobili: x   Blood: x / Protein: x / Nitrite: x   Leuk Esterase: x / RBC: x / WBC x   Sq Epi: x / Non Sq Epi: x / Bacteria: x    Cultures:   Culture - Urine (24 @ 21:24)    Specimen Source: Clean Catch Clean Catch (Midstream)   Culture Results:   <10,000 CFU/mL Normal Urogenital Argelia    Culture - Blood (24 @ 21:24)    Specimen Source: .Blood Triple Lumen BROWN   Culture Results:   No growth at 5 days    Culture - Blood (24 @ 21:24)    Specimen Source: .Blood Triple Lumen BLUE   Culture Results:   No growth at 5 days    Radiology:   from: US Head + Neck Soft Tissue (03.15.24 @ 19:05)   FINDINGS:  The left parotid gland is prominent in size and heterogeneous in   echotexture with linear hypoechoic bands suggestive of parotid gland   edema. There is no discrete mass or fluid collection. No stonesare   identified.    The right parotid gland was not well imaged.    IMPRESSION:  Left parotid gland edema. No fluid collection is identified.  Limited visualization of the right parotid gland.      Meds:   Antimicrobials:   cefepime   IVPB 2000 milliGRAM(s) IV Intermittent every 8 hours  fluconAZOLE   Tablet 200 milliGRAM(s) Oral daily  valACYclovir 500 milliGRAM(s) Oral two times a day      GI:  aluminum hydroxide/magnesium hydroxide/simethicone Suspension 30 milliLiter(s) Oral every 6 hours PRN  calcium carbonate    500 mG (Tums) Chewable 1 Tablet(s) Chew three times a day PRN  loperamide 2 milliGRAM(s) Oral every 3 hours PRN  pantoprazole    Tablet 40 milliGRAM(s) Oral before breakfast  sodium bicarbonate Mouth Rinse 10 milliLiter(s) Swish and Spit five times a day      Cardiovascular:   losartan 100 milliGRAM(s) Oral daily      Immunologic:   filgrastim-sndz (ZARXIO) Injectable 480 MICROGram(s) SubCutaneous every 24 hours  mycophenolate mofetil 1000 milliGRAM(s) Oral three times a day  tacrolimus 2 milliGRAM(s) Oral two times a day      Other medications:   Biotene Dry Mouth Oral Rinse 5 milliLiter(s) Swish and Spit five times a day  chlorhexidine 4% Liquid 1 Application(s) Topical <User Schedule>  escitalopram 20 milliGRAM(s) Oral daily  lidocaine/prilocaine Cream 1 Application(s) Topical daily  loratadine 10 milliGRAM(s) Oral daily  sodium chloride 0.9% 1000 milliLiter(s) IV Continuous <Continuous>  sodium chloride 0.9%. 1000 milliLiter(s) IV Continuous <Continuous>      PRN:   acetaminophen     Tablet .. 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 Mild, central pulmonary venous congestion, new.  3/9 BCx and UCx, follow up  3/11- transaminitis, hold fluconazole. Tbili increased to 1.8. Added on direct and indirect bili.   3/12- direct bili 1.3 3/11/24   3/15- Chemo induced grade 2 oral mucositis: continue supportive care   3/15 parotid gland ( L) edema with PO intake stove vs bleeding vs truma f/u US   3/16 US prelim small collection <1cm, likely cyst, d/w Dr Batres, FU final reading   3/18 increasing tacro to 2.5 BID (level =7). re-check level next on Thurs 3/21. increase Fluconazole to 400 daily (LFTs normalized). Zofran prn for n/v.      1. Infectious Disease:     cefepime   IVPB 2000 milliGRAM(s) IV Intermittent every 8 hours  fluconAZOLE   Tablet 200 milliGRAM(s) Oral daily  valACYclovir 500 milliGRAM(s) Oral two times a day    2. GI Prophylaxis:   pantoprazole    Tablet 40 milliGRAM(s) Oral before breakfast    3. Mouthcare - NS / NaHCO3 rinses, Mycelex, Biotene; Skin care     4. GVHD prophylaxis   TBI day -1   CTX days +3, +4   mycophenolate mofetil 1000 milliGRAM(s) Oral three times a day  tacrolimus 2 milliGRAM(s) Oral two times a day    6. Transfuse & replete electrolytes prn   Thrombocytopenia with blood blister in mouth, PLT to keep PLT goal 20K  Hypomagnesemia: magnesium sulfate  IVPB 1 Gram(s) IV Intermittent once    7. IV hydration, daily weights, strict I&O, prn diuresis     8. PO intake as tolerated, nutrition follow up as needed, MVI, folic acid     9. Antiemetics, anti-diarrhea medications:   loperamide 2 milliGRAM(s) Oral every 3 hours PRN  metoclopramide Injectable 10 milliGRAM(s) IV Push every 6 hours PRN    10. OOB as tolerated, physical therapy consult if needed     11. Monitor coags / fibrinogen 2x week, vitamin K as needed     12. Monitor closely for clinical changes, monitor for fevers     13. Emotional support provided, plan of care discussed and questions addressed     14. Patient education done regarding plan of care, restrictions and discharge planning     15. Continue regular social work input     I have written the above note for Dr. Olivier who performed service with me in the room.   Malcom Segundo PA-C ([phone])    I have seen and examined patient with PA, I agree with above note as scribed.

## 2024-03-18 NOTE — CONSULT NOTE ADULT - REASON FOR ADMISSION
AlloBMT(son) with FLU/CY/TBI prep for the treatment fo ALL
AlloBMT(son) with FLU/CY/TBI prep for the treatment fo ALL

## 2024-03-18 NOTE — CONSULT NOTE ADULT - PROBLEM SELECTOR RECOMMENDATION 5
- Will continue to follow for support and assistance with symptom management - Will continue to follow for support and assistance with symptom management        Sotero Myles MD  Associate Chief Geriatrics and Palliative Care (GaP) [facility]   GaP Consult Service   , [employer] at [facility]      Please contact me via Teams from Monday through Friday between 9am-5pm. If not answering, please call the palliative care pager [phone]    After 5pm and on weekends, please see the contact information below:    In the event of newly developing, evolving, or worsening symptoms, please contact the Palliative Medicine team via pager (if the patient is at [facility] #[phone] or if the patient is at [facility] #[phone]) The Geriatric and Palliative Medicine service has coverage 24 hours a day/ 7 days a week to provide medical recommendations regarding symptom management needs via telephone

## 2024-03-18 NOTE — CONSULT NOTE ADULT - ASSESSMENT
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 Geriatrics and Palliative Medicine (GaP) Team is seen for supportive oncology during BMT. 
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 Geriatrics and Palliative Medicine (GaP) Team is seen for supportive oncology during BMT.

## 2024-03-18 NOTE — CONSULT NOTE ADULT - SUBJECTIVE AND OBJECTIVE BOX
SUBJECTIVE AND OBJECTIVE:  Indication for Geriatrics and Palliative Care Services/INTERVAL HPI: Supportive care during BMT    OVERNIGHT EVENTS: Patient was seen and examined at beside.  was present in the room. Patient states that she continues to have no appetite. She reports feeling especially nauseous today and started to dry heave. She also has been having intermittent episodes of diarrhea for the past week that is well-controlled with medication.     DNR on chart: No    Allergies    sulfa drugs (Unknown)    Intolerances    Zofran (Headache)  MEDICATIONS  (STANDING):  Biotene Dry Mouth Oral Rinse 5 milliLiter(s) Swish and Spit five times a day  cefepime   IVPB 2000 milliGRAM(s) IV Intermittent every 8 hours  cefepime   IVPB      chlorhexidine 4% Liquid 1 Application(s) Topical <User Schedule>  escitalopram 20 milliGRAM(s) Oral daily  filgrastim-sndz (ZARXIO) Injectable 480 MICROGram(s) SubCutaneous every 24 hours  fluconAZOLE   Tablet 400 milliGRAM(s) Oral daily  lidocaine/prilocaine Cream 1 Application(s) Topical daily  loratadine 10 milliGRAM(s) Oral daily  losartan 100 milliGRAM(s) Oral daily  mycophenolate mofetil 1000 milliGRAM(s) Oral three times a day  pantoprazole    Tablet 40 milliGRAM(s) Oral before breakfast  sodium bicarbonate Mouth Rinse 10 milliLiter(s) Swish and Spit five times a day  sodium chloride 0.9%. 1000 milliLiter(s) (20 mL/Hr) IV Continuous <Continuous>  tacrolimus 2.5 milliGRAM(s) Oral two times a day  valACYclovir 500 milliGRAM(s) Oral two times a day    MEDICATIONS  (PRN):  acetaminophen     Tablet .. 650 milliGRAM(s) Oral every 6 hours PRN Mild Pain (1 - 3), Moderate Pain (4 - 6)  aluminum hydroxide/magnesium hydroxide/simethicone Suspension 30 milliLiter(s) Oral every 6 hours PRN Dyspepsia  calcium carbonate    500 mG (Tums) Chewable 1 Tablet(s) Chew three times a day PRN Dyspepsia  loperamide 2 milliGRAM(s) Oral every 3 hours PRN Diarrhea  metoclopramide Injectable 10 milliGRAM(s) IV Push every 6 hours PRN Nausea/Vomiting  ondansetron  IVPB 8 milliGRAM(s) IV Intermittent every 8 hours PRN Nausea and/or Vomiting  sodium chloride 0.9% lock flush 10 milliLiter(s) IV Push every 1 hour PRN Pre/post blood products, medications, blood draw, and to maintain line patency  zinc oxide 40% Paste 1 Application(s) Topical two times a day PRN discomfort      ITEMS UNCHECKED ARE NOT PRESENT    PRESENT SYMPTOMS: [ ]Unable to self-report - see  CPOT, PAINADS, RDOS below  Source if other than patient:  [ ]Family   [ ]Team     Pain:  [ ]yes [X]no  QOL impact -   Location -                    Aggravating factors -  Quality -  Radiation -  Timing-  Severity (0-10 scale):  Minimal acceptable level (0-10 scale):     Dyspnea:                           [ ]Mild [ ]Moderate [ ]Severe  Anxiety:                             [ ]Mild [ ]Moderate [ ]Severe  Fatigue:                             [ ]Mild [ ]Moderate [ ]Severe  Nausea:                             [ ]Mild [ ]Moderate [ ]Severe  Loss of appetite:              [ ]Mild [ ]Moderate [ ]Severe  Constipation:                    [ ]Mild [ ]Moderate [ ]Severe    PCSSQ[Palliative Care Spiritual Screening Question]   Severity (0-10):  Score of 4 or > indicate consideration of Chaplaincy referral.  Chaplaincy Referral: [ ] yes [ ] refused [ ] following    Caregiver [place]? : [ ] yes [ ] no  [X] deferred:  Social work referral [ ] Patient & Family Centered Care Referral [ ]     Anticipatory Grief present?:  [ ] yes [ ] no  [X] deferred: Social work referral [ ] Patient & Family Centered Care Referral [ ]      Other Symptoms:  [ ]All other review of systems negative     PHYSICAL EXAM:  Vital Signs Last 24 Hrs  T(C): 36.5 (18 Mar 2024 13:49), Max: 37.1 (17 Mar 2024 20:50)  T(F): 97.7 (18 Mar 2024 13:49), Max: 98.8 (17 Mar 2024 20:50)  HR: 66 (18 Mar 2024 13:49) (60 - 66)  BP: 139/77 (18 Mar 2024 13:49) (131/78 - 148/79)  BP(mean): --  RR: 18 (18 Mar 2024 13:49) (18 - 18)  SpO2: 97% (18 Mar 2024 13:49) (94% - 97%)    Parameters below as of 18 Mar 2024 13:49  Patient On (Oxygen Delivery Method): room air     I&O's Summary    17 Mar 2024 07:01  -  18 Mar 2024 07:00  --------------------------------------------------------  IN: 1222 mL / OUT: 1400 mL / NET: -178 mL    18 Mar 2024 07:01  -  18 Mar 2024 16:57  --------------------------------------------------------  IN: 480 mL / OUT: 150 mL / NET: 330 mL       GENERAL: [ ]Cachexia    [X]Alert  [X]Oriented x 3  [ ]Lethargic  [ ]Unarousable  [X]Verbal  [ ]Non-Verbal  Behavioral:   [ ]Anxiety  [ ]Delirium [ ]Agitation [ ]Other  HEENT:  [X]Normal   [ ]Dry mouth   [ ]ET Tube/Trach  [ ]Oral lesions  PULMONARY:   [ ]Clear [ ]Tachypnea  [ ]Audible excessive secretions   [ ]Rhonchi        [ ]Right [ ]Left [ ]Bilateral  [ ]Crackles        [ ]Right [ ]Left [ ]Bilateral  [ ]Wheezing     [ ]Right [ ]Left [ ]Bilateral  [ ]Diminished BS [ ] Right [ ]Left [ ]Bilateral  CARDIOVASCULAR:    [X]Regular [ ]Irregular [ ]Tachy  [ ]Parker [ ]Murmur [ ]Other  GASTROINTESTINAL:  [X]Soft  [ ]Distended   [ ]+BS  [X]Non tender [ ]Tender  [ ]Other [ ]PEG [ ]OGT/ NGT   Last BM:   GENITOURINARY:  [X]Normal [ ]Incontinent   [ ]Oliguria/Anuria   [ ]Mehta  MUSCULOSKELETAL:   [ ]Normal   [ ]Weakness  [ ]Bed/Wheelchair bound [ ]Edema  NEUROLOGIC:   [X]No focal deficits  [ ] Cognitive impairment  [ ] Dysphagia [ ]Dysarthria [ ] Paresis [ ]Other   SKIN:   [X]Normal  [ ]Rash  [ ]Other  [ ]Pressure ulcer(s) [ ]y [ ]n present on admission    CRITICAL CARE:  [ ]Shock Present  [ ]Septic [ ]Cardiogenic [ ]Neurologic [ ]Hypovolemic  [ ]Vasopressors [ ]Inotropes  [ ]Respiratory failure present [ ]Mechanical Ventilation [ ]Non-invasive ventilatory support [ ]High-Flow   [ ]Acute  [ ]Chronic [ ]Hypoxic  [ ]Hypercarbic [ ]Other  [ ]Other organ failure     LABS:                        9.0    0.01  )-----------( 28       ( 18 Mar 2024 07:50 )             25.2   03-18    137  |  103  |  12  ----------------------------<  106<H>  4.3   |  22  |  0.57    Ca    9.2      18 Mar 2024 07:49  Phos  3.0     03-18  Mg     1.5     03-18    TPro  5.7<L>  /  Alb  3.4  /  TBili  0.9  /  DBili  0.3  /  AST  17  /  ALT  21  /  AlkPhos  124<H>  03-18  PT/INR - ( 18 Mar 2024 07:50 )   PT: 11.7 sec;   INR: 1.12 ratio         PTT - ( 18 Mar 2024 07:50 )  PTT:28.8 sec    Urinalysis Basic - ( 18 Mar 2024 07:49 )    Color: x / Appearance: x / SG: x / pH: x  Gluc: 106 mg/dL / Ketone: x  / Bili: x / Urobili: x   Blood: x / Protein: x / Nitrite: x   Leuk Esterase: x / RBC: x / WBC x   Sq Epi: x / Non Sq Epi: x / Bacteria: x      RADIOLOGY & ADDITIONAL STUDIES: None new     Protein Calorie Malnutrition Present: [ ]mild [ ]moderate [ ]severe [ ]underweight [ ]morbid obesity  https://www.andeal.org/vault/2440/web/files/ONC/Table_Clinical%20Characteristics%20to%20Document%20Malnutrition-White%20JV%20et%20al%202012.pdf    Height (cm): 159 (02-29-24 @ 10:15), 157.48 (02-12-24 @ 15:00), 158 (12-01-23 @ 09:20)  Weight (kg): 96.9 (02-29-24 @ 10:15), 96.7 (02-12-24 @ 15:00), 92.7 (12-01-23 @ 09:20)  BMI (kg/m2): 38.3 (02-29-24 @ 10:15), 39 (02-12-24 @ 15:00), 37.1 (12-01-23 @ 09:20)    [ ]PPSV2 < or = 30%  [ ]significant weight loss [ ]poor nutritional intake [ ]anasarca[ ]Artificial Nutrition    Other REFERRALS:  [ ]Hospice  [ ]Child Life  [ ]Social Work  [ ]Case management [ ]Holistic Therapy     Palliative Performance Scale:  http://npcrc.org/files/news/palliative_performance_scale_ppsv2.pdf  (Ctrl +  left click to view)  Respiratory Distress Observation Tool:  https://homecareinformation.net/handouts/hen/Respiratory_Distress_Observation_Scale.pdf (Ctrl +  left click to view)  PAINAD Score:  http://geriatrictoolkit.[place].[facility]/cog/painad.pdf (Ctrl +  left click to view)       Date of Service: 03-18-24 @ 13:00  This is a progress note and not an initial consult note.   SUBJECTIVE AND OBJECTIVE:  Patient was seen and examined at beside.  was present in the room. Patient states that she continues to have no appetite. She reports feeling especially nauseous today and started to dry heave. She also has been having intermittent episodes of diarrhea for the past week that is well-controlled with medication.   Indication for Geriatrics and Palliative Care Services/INTERVAL HPI: Supportive care during BMT.     OVERNIGHT EVENTS: With recurrent nausea and vomiting.    DNR on chart: No    Allergies    sulfa drugs (Unknown)    Intolerances    Zofran (Headache)  MEDICATIONS  (STANDING):  Biotene Dry Mouth Oral Rinse 5 milliLiter(s) Swish and Spit five times a day  cefepime   IVPB 2000 milliGRAM(s) IV Intermittent every 8 hours  cefepime   IVPB      chlorhexidine 4% Liquid 1 Application(s) Topical <User Schedule>  escitalopram 20 milliGRAM(s) Oral daily  filgrastim-sndz (ZARXIO) Injectable 480 MICROGram(s) SubCutaneous every 24 hours  fluconAZOLE   Tablet 400 milliGRAM(s) Oral daily  lidocaine/prilocaine Cream 1 Application(s) Topical daily  loratadine 10 milliGRAM(s) Oral daily  losartan 100 milliGRAM(s) Oral daily  mycophenolate mofetil 1000 milliGRAM(s) Oral three times a day  pantoprazole    Tablet 40 milliGRAM(s) Oral before breakfast  sodium bicarbonate Mouth Rinse 10 milliLiter(s) Swish and Spit five times a day  sodium chloride 0.9%. 1000 milliLiter(s) (20 mL/Hr) IV Continuous <Continuous>  tacrolimus 2.5 milliGRAM(s) Oral two times a day  valACYclovir 500 milliGRAM(s) Oral two times a day    MEDICATIONS  (PRN):  acetaminophen     Tablet .. 650 milliGRAM(s) Oral every 6 hours PRN Mild Pain (1 - 3), Moderate Pain (4 - 6)  aluminum hydroxide/magnesium hydroxide/simethicone Suspension 30 milliLiter(s) Oral every 6 hours PRN Dyspepsia  calcium carbonate    500 mG (Tums) Chewable 1 Tablet(s) Chew three times a day PRN Dyspepsia  loperamide 2 milliGRAM(s) Oral every 3 hours PRN Diarrhea  metoclopramide Injectable 10 milliGRAM(s) IV Push every 6 hours PRN Nausea/Vomiting  ondansetron  IVPB 8 milliGRAM(s) IV Intermittent every 8 hours PRN Nausea and/or Vomiting  sodium chloride 0.9% lock flush 10 milliLiter(s) IV Push every 1 hour PRN Pre/post blood products, medications, blood draw, and to maintain line patency  zinc oxide 40% Paste 1 Application(s) Topical two times a day PRN discomfort      ITEMS UNCHECKED ARE NOT PRESENT    PRESENT SYMPTOMS: [ ]Unable to self-report - see  CPOT, PAINADS, RDOS below  Source if other than patient:  [ ]Family   [ ]Team     Pain:  [ ]yes [X]no  QOL impact -   Location -                    Aggravating factors -  Quality -  Radiation -  Timing-  Severity (0-10 scale):  Minimal acceptable level (0-10 scale):     Dyspnea:                           [ ]Mild [ ]Moderate [ ]Severe  Anxiety:                             [ ]Mild [ ]Moderate [ ]Severe  Fatigue:                             [ ]Mild [ ]Moderate [ ]Severe  Nausea:                             [ ]Mild [ ]Moderate [ ]Severe  Loss of appetite:              [ ]Mild [ ]Moderate [ ]Severe  Constipation:                    [ ]Mild [ ]Moderate [ ]Severe    PCSSQ[Palliative Care Spiritual Screening Question]   Severity (0-10):  Score of 4 or > indicate consideration of Chaplaincy referral.  Chaplaincy Referral: [ ] yes [ x] refused [ ] following    Caregiver [place]? : [ ] yes [ ] no  [X] deferred:  Social work referral [ ] Patient & Family Centered Care Referral [ ]     Anticipatory Grief present?:  [ ] yes [ ] no  [X] deferred: Social work referral [ ] Patient & Family Centered Care Referral [ ]      Other Symptoms:  [x ]All other review of systems negative but as per subjective and objective     PHYSICAL EXAM:  Vital Signs Last 24 Hrs  T(C): 36.5 (18 Mar 2024 13:49), Max: 37.1 (17 Mar 2024 20:50)  T(F): 97.7 (18 Mar 2024 13:49), Max: 98.8 (17 Mar 2024 20:50)  HR: 66 (18 Mar 2024 13:49) (60 - 66)  BP: 139/77 (18 Mar 2024 13:49) (131/78 - 148/79)  BP(mean): --  RR: 18 (18 Mar 2024 13:49) (18 - 18)  SpO2: 97% (18 Mar 2024 13:49) (94% - 97%)    Parameters below as of 18 Mar 2024 13:49  Patient On (Oxygen Delivery Method): room air     I&O's Summary    17 Mar 2024 07:01  -  18 Mar 2024 07:00  --------------------------------------------------------  IN: 1222 mL / OUT: 1400 mL / NET: -178 mL    18 Mar 2024 07:01  -  18 Mar 2024 16:57  --------------------------------------------------------  IN: 480 mL / OUT: 150 mL / NET: 330 mL       GENERAL: [ ]Cachexia    [X]Alert  [X]Oriented x 3  [ ]Lethargic  [ ]Unarousable  [X]Verbal  [ ]Non-Verbal  Behavioral:   [ ]Anxiety  [ ]Delirium [ ]Agitation [ ]Other  HEENT:  [X]Normal   [ ]Dry mouth   [ ]ET Tube/Trach  [ ]Oral lesions  PULMONARY:   [ ]Clear [ ]Tachypnea  [ ]Audible excessive secretions   [ ]Rhonchi        [ ]Right [ ]Left [ ]Bilateral  [ ]Crackles        [ ]Right [ ]Left [ ]Bilateral  [ ]Wheezing     [ ]Right [ ]Left [ ]Bilateral  [ ]Diminished BS [ ] Right [ ]Left [ ]Bilateral  CARDIOVASCULAR:    [X]Regular [ ]Irregular [ ]Tachy  [ ]Parker [ ]Murmur [ ]Other  GASTROINTESTINAL:  [X]Soft  [ ]Distended   [ ]+BS  [X]Non tender [ ]Tender  [ ]Other [ ]PEG [ ]OGT/ NGT   Last BM: 3/18  GENITOURINARY:  [X]Normal [ ]Incontinent   [ ]Oliguria/Anuria   [ ]Mehta  MUSCULOSKELETAL:   [ ]Normal   [x ]Weakness  [ ]Bed/Wheelchair bound [ ]Edema  NEUROLOGIC:   [X]No focal deficits  [ ] Cognitive impairment  [ ] Dysphagia [ ]Dysarthria [ ] Paresis [ ]Other   SKIN:   [X]Normal  [ ]Rash  [ ]Other  [ ]Pressure ulcer(s) [ ]y [ ]n present on admission    CRITICAL CARE:  [ ]Shock Present  [ ]Septic [ ]Cardiogenic [ ]Neurologic [ ]Hypovolemic  [ ]Vasopressors [ ]Inotropes  [ ]Respiratory failure present [ ]Mechanical Ventilation [ ]Non-invasive ventilatory support [ ]High-Flow   [ ]Acute  [ ]Chronic [ ]Hypoxic  [ ]Hypercarbic [ ]Other  [ ]Other organ failure     LABS:                        9.0    0.01  )-----------( 28       ( 18 Mar 2024 07:50 )             25.2   03-18    137  |  103  |  12  ----------------------------<  106<H>  4.3   |  22  |  0.57    Ca    9.2      18 Mar 2024 07:49  Phos  3.0     03-18  Mg     1.5     03-18    TPro  5.7<L>  /  Alb  3.4  /  TBili  0.9  /  DBili  0.3  /  AST  17  /  ALT  21  /  AlkPhos  124<H>  03-18  PT/INR - ( 18 Mar 2024 07:50 )   PT: 11.7 sec;   INR: 1.12 ratio         PTT - ( 18 Mar 2024 07:50 )  PTT:28.8 sec    Urinalysis Basic - ( 18 Mar 2024 07:49 )    Color: x / Appearance: x / SG: x / pH: x  Gluc: 106 mg/dL / Ketone: x  / Bili: x / Urobili: x   Blood: x / Protein: x / Nitrite: x   Leuk Esterase: x / RBC: x / WBC x   Sq Epi: x / Non Sq Epi: x / Bacteria: x      RADIOLOGY & ADDITIONAL STUDIES: None new   No new studies.   Protein Calorie Malnutrition Present: [ ]mild [ ]moderate [ ]severe [ ]underweight [ ]morbid obesity  https://www.andeal.org/vault/2440/web/files/ONC/Table_Clinical%20Characteristics%20to%20Document%20Malnutrition-White%20JV%20et%20al%202012.pdf    Height (cm): 159 (02-29-24 @ 10:15), 157.48 (02-12-24 @ 15:00), 158 (12-01-23 @ 09:20)  Weight (kg): 96.9 (02-29-24 @ 10:15), 96.7 (02-12-24 @ 15:00), 92.7 (12-01-23 @ 09:20)  BMI (kg/m2): 38.3 (02-29-24 @ 10:15), 39 (02-12-24 @ 15:00), 37.1 (12-01-23 @ 09:20)    [ ]PPSV2 < or = 30%  [ ]significant weight loss [ ]poor nutritional intake [ ]anasarca[ ]Artificial Nutrition    Other REFERRALS:  [ ]Hospice  [ ]Child Life  [ ]Social Work  [ ]Case management [ ]Holistic Therapy     Palliative Performance Scale:  http://npcrc.org/files/news/palliative_performance_scale_ppsv2.pdf  (Ctrl +  left click to view)  Respiratory Distress Observation Tool:  https://homecareinformation.net/handouts/hen/Respiratory_Distress_Observation_Scale.pdf (Ctrl +  left click to view)  PAINAD Score:  http://[email or web address].[email or web address].[facility]/cog/painad.pdf (Ctrl +  left click to view)

## 2024-03-18 NOTE — PROGRESS NOTE ADULT - NS ATTEND AMEND GEN_ALL_CORE FT
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 Today is day +11.    Plan:    Heme:   - Trend CBC with differential daily. Continue supportive transfusions as needed to maintain Hgb > 7 and plt > 10  (> 20 if febrile, > 50 if bleeding).   - G-CSF (started on day +5): continue through engraftment  - Will require post ALLO BMT CNS prophylaxis and TKI maintenance -- begins after engraftment.     GVHD:   - PTCy days 3 and 4  - Cellcept and tacrolimus (started on day +5). Check tacrolimus level Mon/Thurs, last 8.6 (3/14/24)    ID:   - Prophylaxis: fluconazole (200 mg daily, can uptitrate to 400 mg next week if LFTs are stable), Valtrex  - Neutropenic fever: cefepime. Infectious work up negative (diarrhea but C.diff negative).  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     Plan:    Heme:   - Trend CBC with differential daily. Continue supportive transfusions as needed to maintain Hgb > 7 and plt > 10  (> 20 if febrile, > 50 if bleeding).   - G-CSF (started on day +5): continue through engraftment  - Will require post ALLO BMT CNS prophylaxis and TKI maintenance -- begins after engraftment.     GVHD:   - PTCy days 3 and 4  - Cellcept and tacrolimus (started on day +5). Check tacrolimus level Mon/Thurs, last 7 (3/18/24) will increase to 2.5 mg    ID:   - Prophylaxis: fluconazole (200 mg daily, can uptitrate to 400 mg next week if LFTs are stable), Valtrex  - Neutropenic fever: cefepime. Infectious work up negative (diarrhea but C.diff negative).  - Bactrim SS daily to start on day +21  - BM product A0 cx positive for Staph caprae (skin lauro)  - face US with parotid edema, sx of swelling improved    Nutrition: tolerating PO    DVT prophylaxis: ambulation

## 2024-03-18 NOTE — CONSULT NOTE ADULT - ATTENDING COMMENTS
Though this note was initially enter by PA Student Ted, the entire note was edited and the assessment and plan was completely done by me.

## 2024-03-18 NOTE — CONSULT NOTE ADULT - PROBLEM SELECTOR RECOMMENDATION 9
- Management as per primary team - [facility]plo-idential BMT (son) with FLU/CY/TBI prep for the treatment fo ALL  - Post:  Allogeneic  BMT day + 12

## 2024-03-18 NOTE — CONSULT NOTE ADULT - PROBLEM SELECTOR RECOMMENDATION 2
(- Recommend scheduled Reglan IV for the next 24-48 hrs as discussed with the patient) Intractable  -Start Reglan 5mg IV q 6 ATC   -Zofran 8mg IV q 8PRN

## 2024-03-19 LAB
ALBUMIN SERPL ELPH-MCNC: 3.6 G/DL — SIGNIFICANT CHANGE UP (ref 3.3–5)
ALP SERPL-CCNC: 127 U/L — HIGH (ref 40–120)
ALT FLD-CCNC: 22 U/L — SIGNIFICANT CHANGE UP (ref 10–45)
ANION GAP SERPL CALC-SCNC: 9 MMOL/L — SIGNIFICANT CHANGE UP (ref 5–17)
AST SERPL-CCNC: 20 U/L — SIGNIFICANT CHANGE UP (ref 10–40)
BILIRUB SERPL-MCNC: 0.9 MG/DL — SIGNIFICANT CHANGE UP (ref 0.2–1.2)
BUN SERPL-MCNC: 16 MG/DL — SIGNIFICANT CHANGE UP (ref 7–23)
CALCIUM SERPL-MCNC: 9.2 MG/DL — SIGNIFICANT CHANGE UP (ref 8.4–10.5)
CHLORIDE SERPL-SCNC: 104 MMOL/L — SIGNIFICANT CHANGE UP (ref 96–108)
CO2 SERPL-SCNC: 24 MMOL/L — SIGNIFICANT CHANGE UP (ref 22–31)
CREAT SERPL-MCNC: 0.66 MG/DL — SIGNIFICANT CHANGE UP (ref 0.5–1.3)
EGFR: 97 ML/MIN/1.73M2 — SIGNIFICANT CHANGE UP
GLUCOSE SERPL-MCNC: 116 MG/DL — HIGH (ref 70–99)
HCT VFR BLD CALC: 24.6 % — LOW (ref 34.5–45)
HGB BLD-MCNC: 8.5 G/DL — LOW (ref 11.5–15.5)
LDH SERPL L TO P-CCNC: 117 U/L — SIGNIFICANT CHANGE UP (ref 50–242)
MAGNESIUM SERPL-MCNC: 1.7 MG/DL — SIGNIFICANT CHANGE UP (ref 1.6–2.6)
MCHC RBC-ENTMCNC: 30.8 PG — SIGNIFICANT CHANGE UP (ref 27–34)
MCHC RBC-ENTMCNC: 34.6 GM/DL — SIGNIFICANT CHANGE UP (ref 32–36)
MCV RBC AUTO: 89.1 FL — SIGNIFICANT CHANGE UP (ref 80–100)
NRBC # BLD: SIGNIFICANT CHANGE UP (ref 0–0)
PHOSPHATE SERPL-MCNC: 3.2 MG/DL — SIGNIFICANT CHANGE UP (ref 2.5–4.5)
PLATELET # BLD AUTO: 17 K/UL — CRITICAL LOW (ref 150–400)
POTASSIUM SERPL-MCNC: 4.2 MMOL/L — SIGNIFICANT CHANGE UP (ref 3.5–5.3)
POTASSIUM SERPL-SCNC: 4.2 MMOL/L — SIGNIFICANT CHANGE UP (ref 3.5–5.3)
PROT SERPL-MCNC: 5.8 G/DL — LOW (ref 6–8.3)
RBC # BLD: 2.76 M/UL — LOW (ref 3.8–5.2)
RBC # FLD: 11 % — SIGNIFICANT CHANGE UP (ref 10.3–14.5)
SODIUM SERPL-SCNC: 137 MMOL/L — SIGNIFICANT CHANGE UP (ref 135–145)
WBC # BLD: <0.1 K/UL — CRITICAL LOW (ref 3.8–10.5)
WBC # FLD AUTO: <0.1 K/UL — CRITICAL LOW (ref 3.8–10.5)

## 2024-03-19 PROCEDURE — 99233 SBSQ HOSP IP/OBS HIGH 50: CPT | Mod: FS

## 2024-03-19 RX ORDER — METOCLOPRAMIDE HCL 10 MG
5 TABLET ORAL EVERY 6 HOURS
Refills: 0 | Status: DISCONTINUED | OUTPATIENT
Start: 2024-03-19 | End: 2024-03-25

## 2024-03-19 RX ORDER — DRONABINOL 2.5 MG
2.5 CAPSULE ORAL
Refills: 0 | Status: DISCONTINUED | OUTPATIENT
Start: 2024-03-19 | End: 2024-03-20

## 2024-03-19 RX ORDER — ONDANSETRON 8 MG/1
8 TABLET, FILM COATED ORAL EVERY 8 HOURS
Refills: 0 | Status: DISCONTINUED | OUTPATIENT
Start: 2024-03-19 | End: 2024-03-26

## 2024-03-19 RX ADMIN — LOSARTAN POTASSIUM 100 MILLIGRAM(S): 100 TABLET, FILM COATED ORAL at 06:17

## 2024-03-19 RX ADMIN — TACROLIMUS 2.5 MILLIGRAM(S): 5 CAPSULE ORAL at 17:17

## 2024-03-19 RX ADMIN — FLUCONAZOLE 400 MILLIGRAM(S): 150 TABLET ORAL at 11:54

## 2024-03-19 RX ADMIN — Medication 650 MILLIGRAM(S): at 08:14

## 2024-03-19 RX ADMIN — LORATADINE 10 MILLIGRAM(S): 10 TABLET ORAL at 11:54

## 2024-03-19 RX ADMIN — SODIUM CHLORIDE 20 MILLILITER(S): 9 INJECTION INTRAMUSCULAR; INTRAVENOUS; SUBCUTANEOUS at 20:38

## 2024-03-19 RX ADMIN — CHLORHEXIDINE GLUCONATE 1 APPLICATION(S): 213 SOLUTION TOPICAL at 07:41

## 2024-03-19 RX ADMIN — Medication 10 MILLILITER(S): at 11:53

## 2024-03-19 RX ADMIN — Medication 650 MILLIGRAM(S): at 21:15

## 2024-03-19 RX ADMIN — Medication 5 MILLILITER(S): at 11:53

## 2024-03-19 RX ADMIN — Medication 5 MILLILITER(S): at 20:37

## 2024-03-19 RX ADMIN — MYCOPHENOLATE MOFETIL 1000 MILLIGRAM(S): 250 CAPSULE ORAL at 13:19

## 2024-03-19 RX ADMIN — LIDOCAINE AND PRILOCAINE CREAM 1 APPLICATION(S): 25; 25 CREAM TOPICAL at 11:56

## 2024-03-19 RX ADMIN — VALACYCLOVIR 500 MILLIGRAM(S): 500 TABLET, FILM COATED ORAL at 06:17

## 2024-03-19 RX ADMIN — ONDANSETRON 108 MILLIGRAM(S): 8 TABLET, FILM COATED ORAL at 21:00

## 2024-03-19 RX ADMIN — MYCOPHENOLATE MOFETIL 1000 MILLIGRAM(S): 250 CAPSULE ORAL at 20:42

## 2024-03-19 RX ADMIN — Medication 5 MILLILITER(S): at 07:40

## 2024-03-19 RX ADMIN — VALACYCLOVIR 500 MILLIGRAM(S): 500 TABLET, FILM COATED ORAL at 17:17

## 2024-03-19 RX ADMIN — CEFEPIME 100 MILLIGRAM(S): 1 INJECTION, POWDER, FOR SOLUTION INTRAMUSCULAR; INTRAVENOUS at 13:19

## 2024-03-19 RX ADMIN — Medication 10 MILLILITER(S): at 20:38

## 2024-03-19 RX ADMIN — Medication 5 MILLIGRAM(S): at 11:55

## 2024-03-19 RX ADMIN — Medication 650 MILLIGRAM(S): at 01:50

## 2024-03-19 RX ADMIN — Medication 480 MICROGRAM(S): at 11:53

## 2024-03-19 RX ADMIN — MYCOPHENOLATE MOFETIL 1000 MILLIGRAM(S): 250 CAPSULE ORAL at 06:16

## 2024-03-19 RX ADMIN — Medication 650 MILLIGRAM(S): at 01:00

## 2024-03-19 RX ADMIN — CEFEPIME 100 MILLIGRAM(S): 1 INJECTION, POWDER, FOR SOLUTION INTRAMUSCULAR; INTRAVENOUS at 06:17

## 2024-03-19 RX ADMIN — Medication 10 MILLILITER(S): at 07:39

## 2024-03-19 RX ADMIN — ESCITALOPRAM OXALATE 20 MILLIGRAM(S): 10 TABLET, FILM COATED ORAL at 11:54

## 2024-03-19 RX ADMIN — PANTOPRAZOLE SODIUM 40 MILLIGRAM(S): 20 TABLET, DELAYED RELEASE ORAL at 06:17

## 2024-03-19 RX ADMIN — Medication 2 MILLIGRAM(S): at 08:26

## 2024-03-19 RX ADMIN — Medication 10 MILLILITER(S): at 16:57

## 2024-03-19 RX ADMIN — Medication 5 MILLILITER(S): at 00:31

## 2024-03-19 RX ADMIN — Medication 650 MILLIGRAM(S): at 22:00

## 2024-03-19 RX ADMIN — CEFEPIME 100 MILLIGRAM(S): 1 INJECTION, POWDER, FOR SOLUTION INTRAMUSCULAR; INTRAVENOUS at 20:42

## 2024-03-19 RX ADMIN — Medication 2.5 MILLIGRAM(S): at 11:55

## 2024-03-19 RX ADMIN — Medication 5 MILLILITER(S): at 16:58

## 2024-03-19 RX ADMIN — Medication 2.5 MILLIGRAM(S): at 17:17

## 2024-03-19 RX ADMIN — Medication 10 MILLILITER(S): at 00:31

## 2024-03-19 RX ADMIN — TACROLIMUS 2.5 MILLIGRAM(S): 5 CAPSULE ORAL at 06:16

## 2024-03-19 RX ADMIN — Medication 650 MILLIGRAM(S): at 07:44

## 2024-03-19 RX ADMIN — Medication 5 MILLIGRAM(S): at 17:18

## 2024-03-19 NOTE — PROGRESS NOTE ADULT - NS ATTEND AMEND GEN_ALL_CORE FT
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     Plan:    Heme:   - Trend CBC with differential daily. Continue supportive transfusions as needed to maintain Hgb > 7 and plt > 10  (> 20 if febrile, > 50 if bleeding).   - G-CSF (started on day +5): continue through engraftment  - Will require post ALLO BMT CNS prophylaxis and TKI maintenance -- begins after engraftment.     GVHD:   - PTCy days 3 and 4  - Cellcept and tacrolimus (started on day +5). Check tacrolimus level Mon/Thurs, last 7 (3/18/24) will increase to 2.5 mg    ID:   - Prophylaxis: fluconazole (200 mg daily, can uptitrate to 400 mg next week if LFTs are stable), Valtrex  - Neutropenic fever: cefepime. Infectious work up negative (diarrhea but C.diff negative).  - Bactrim SS daily to start on day +21  - BM product A0 cx positive for Staph caprae (skin lauro)  - face US with parotid edema, sx of swelling improved    Nutrition: tolerating PO    DVT prophylaxis: ambulation Assessment: 64-year-old day + 13  CATRACHITA alloBMT using a Flu/Cy/TBI preparative regimen for [place] negative ALL.     Plan:  Heme:   - Trend CBC with differential daily. Continue supportive transfusions as needed to maintain Hgb > 7 and plt > 20 given possible bleeding into / from parotid   (> 20 if febrile, > 50 if bleeding).   - G-CSF (started on day +5): continue through engraftment  - Will require post ALLO BMT CNS prophylaxis and TKI maintenance -- begins after engraftment.     GVHD:   - PTCy days 3 and 4  - Cellcept and tacrolimus (started on day +5). Check tacrolimus level Mon/Thurs, last 7 (3/18/24) will increase to 2.5 mg    ID:   - Prophylaxis: fluconazole (200 mg daily, can increase to 400 LFTs are stable), Valtrex  - Neutropenic fever: cefepime. Infectious work up negative (diarrhea but C.diff negative).  - Bactrim SS daily to start on day +21  - BM product A0 cx positive for Staph caprae (skin lauro)  - face US with parotid edema, sx of swelling improved    Nutrition: tolerating PO    DVT prophylaxis: ambulation

## 2024-03-19 NOTE — PROGRESS NOTE ADULT - SUBJECTIVE AND OBJECTIVE BOX
HPC Transplant Team                                                      Critical / Counseling Time Provided: 30 minutes                                                                                                                                                        Chief Complaint: Haplo-idential BMT (son) with FLU/CY/TBI prep for the treatment fo ALL    S: Patient seen and examined with [facility] Transplant Team:   + loose stool  + decreased appetite  + intermittent nausea    Other ROS(-)    O: Vitals:   Vital Signs Last 24 Hrs  T(C): 36.6 (19 Mar 2024 06:13), Max: 36.8 (19 Mar 2024 00:43)  T(F): 97.9 (19 Mar 2024 06:13), Max: 98.2 (19 Mar 2024 00:43)  HR: 70 (19 Mar 2024 06:13) (60 - 76)  BP: 124/76 (19 Mar 2024 06:13) (111/74 - 154/82)  RR: 18 (19 Mar 2024 06:13) (18 - 18)  SpO2: 95% (19 Mar 2024 06:13) (95% - 97%)    Parameters below as of 19 Mar 2024 06:13  Patient On (Oxygen Delivery Method): room air        Admit weight: 96.9 kg  Daily Weight in k.5 (17 Mar 2024 09:45)  Daily Weight in k.7 (18 Mar 2024 08:36)    Intake / Output:     18 Mar 2024 07:01  -  19 Mar 2024 07:00  --------------------------------------------------------  IN: 1477 mL / OUT: 1050 mL / NET: 427 mL    19 Mar 2024 07:01  -  19 Mar 2024 07:54  --------------------------------------------------------  IN: 68 mL / OUT: 0 mL / NET: 68 mL    PE:   Oropharynx: + patchy erythema B/L buccal mucosa , + ulcerations lower lip + L cheek  blood blister   Oral Mucositis:   +                                                 stGstrstastdstest:st st1st CVS: RRR, +S1,S2  Lungs: CTA throughout bilaterally   Abdomen: soft, ND, ND +bs  Extremities: no edema   Gastric Mucositis:      -                                            Grade: -  Intestinal Mucositis:     -                                         Grade: -  Skin: small area of petechiae on the medial aspect of the left knee and diffusely over the left medial ankle and anterior shin  TLC: C/D/I   Neuro: A&O x3  Pain: Denies    Labs:                            9.0    0.01  )-----------( 28       ( 18 Mar 2024 07:50 )             25.2         Mean Cell Volume : 88.7 fl  Mean Cell Hemoglobin : 31.7 pg  Mean Cell Hemoglobin Concentration : 35.7 gm/dL  Auto Neutrophil # : x  Auto Lymphocyte # : x  Auto Monocyte # : x  Auto Eosinophil # : x  Auto Basophil # : x  Auto Neutrophil % : x  Auto Lymphocyte % : x  Auto Monocyte % : x  Auto Eosinophil % : x  Auto Basophil % : x        137  |  103  |  12  ----------------------------<  106<H>  4.3   |  22  |  0.57    Ca    9.2      18 Mar 2024 07:49  Phos  3.0       Mg     1.5         TPro  5.7<L>  /  Alb  3.4  /  TBili  0.9  /  DBili  0.3  /  AST  17  /  ALT  21  /  AlkPhos  124<H>  -18    PT/INR - ( 18 Mar 2024 07:50 )   PT: 11.7 sec;   INR: 1.12 ratio         PTT - ( 18 Mar 2024 07:50 )  PTT:28.8 sec      LIVER FUNCTIONS - ( 18 Mar 2024 07:49 )  Alb: 3.4 g/dL / Pro: 5.7 g/dL / ALK PHOS: 124 U/L / ALT: 21 U/L / AST: 17 U/L / GGT: x           Urinalysis Basic - ( 18 Mar 2024 07:49 )    Color: x / Appearance: x / SG: x / pH: x  Gluc: 106 mg/dL / Ketone: x  / Bili: x / Urobili: x   Blood: x / Protein: x / Nitrite: x   Leuk Esterase: x / RBC: x / WBC x   Sq Epi: x / Non Sq Epi: x / Bacteria: x    Cultures:   Culture - Urine (24 @ 21:24)    Specimen Source: Clean Catch Clean Catch (Midstream)   Culture Results:   <10,000 CFU/mL Normal Urogenital Argelia    Culture - Blood (24 @ 21:24)    Specimen Source: .Blood Triple Lumen BROWN   Culture Results:   No growth at 5 days    Culture - Blood (24 @ 21:24)    Specimen Source: .Blood Triple Lumen BLUE   Culture Results:   No growth at 5 days    Radiology:   US Head + Neck Soft Tissue (03.15.24 @ 19:05)   FINDINGS:  The left parotid gland is prominent in size and heterogeneous in   echotexture with linear hypoechoic bands suggestive of parotid gland   edema. There is no discrete mass or fluid collection. No stonesare   identified.    The right parotid gland was not well imaged.    IMPRESSION:  Left parotid gland edema. No fluid collection is identified.  Limited visualization of the right parotid gland.      Meds:   Antimicrobials:   cefepime   IVPB 2000 milliGRAM(s) IV Intermittent every 8 hours  fluconAZOLE   Tablet 400 milliGRAM(s) Oral daily  valACYclovir 500 milliGRAM(s) Oral two times a day      GI:  aluminum hydroxide/magnesium hydroxide/simethicone Suspension 30 milliLiter(s) Oral every 6 hours PRN  calcium carbonate    500 mG (Tums) Chewable 1 Tablet(s) Chew three times a day PRN  loperamide 2 milliGRAM(s) Oral every 3 hours PRN  pantoprazole    Tablet 40 milliGRAM(s) Oral before breakfast  sodium bicarbonate Mouth Rinse 10 milliLiter(s) Swish and Spit five times a day      Cardiovascular:   losartan 100 milliGRAM(s) Oral daily      Immunologic:   filgrastim-sndz (ZARXIO) Injectable 480 MICROGram(s) SubCutaneous every 24 hours  mycophenolate mofetil 1000 milliGRAM(s) Oral three times a day  tacrolimus 2.5 milliGRAM(s) Oral two times a day      Other medications:   Biotene Dry Mouth Oral Rinse 5 milliLiter(s) Swish and Spit five times a day  chlorhexidine 4% Liquid 1 Application(s) Topical <User Schedule>  escitalopram 20 milliGRAM(s) Oral daily  lidocaine/prilocaine Cream 1 Application(s) Topical daily  loratadine 10 milliGRAM(s) Oral daily  sodium chloride 0.9% 1000 milliLiter(s) IV Continuous <Continuous>  sodium chloride 0.9%. 1000 milliLiter(s) IV Continuous <Continuous>      PRN:   acetaminophen     Tablet .. 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 Mild, central pulmonary venous congestion, new.  3/9 BCx and UCx, follow up  3/11- transaminitis, hold fluconazole. Tbili increased to 1.8. Added on direct and indirect bili.   3/12- direct bili 1.3 3/11/24   3/15- Chemo induced grade 2 oral mucositis: continue supportive care   3/15 parotid gland ( L) edema with PO intake stove vs bleeding vs truma f/u US   3/16 US prelim small collection <1cm, likely cyst, d/w Dr Batres,  final reading   3/18 increasing tacro to 2.5 BID (level =7). re-check level next on Thurs 3/21. increase Fluconazole to 400 daily (LFTs normalized). Zofran prn for n/v.      1. Infectious Disease:     cefepime   IVPB 2000 milliGRAM(s) IV Intermittent every 8 hours  fluconAZOLE   Tablet 400 milliGRAM(s) Oral daily  valACYclovir 500 milliGRAM(s) Oral two times a day    2. GI Prophylaxis:   pantoprazole    Tablet 40 milliGRAM(s) Oral before breakfast    3. Mouthcare - NS / NaHCO3 rinses, Mycelex, Biotene; Skin care     4. GVHD prophylaxis   TBI day -1   CTX days +3, +4   mycophenolate mofetil 1000 milliGRAM(s) Oral three times a day  tacrolimus 2.5   milliGRAM(s) Oral two times a day    6. Transfuse & replete electrolytes prn   Thrombocytopenia with blood blister in mouth, PLT to keep PLT goal 20K  Hypomagnesemia: magnesium sulfate  IVPB 1 Gram(s) IV Intermittent once    7. IV hydration, daily weights, strict I&O, prn diuresis     8. PO intake as tolerated, nutrition follow up as needed, MVI, folic acid     9. Antiemetics, anti-diarrhea medications:   loperamide 2 milliGRAM(s) Oral every 3 hours PRN  metoclopramide Injectable 10 milliGRAM(s) IV Push every 6 hours PRN    10. OOB as tolerated, physical therapy consult if needed     11. Monitor coags / fibrinogen 2x week, vitamin K as needed     12. Monitor closely for clinical changes, monitor for fevers     13. Emotional support provided, plan of care discussed and questions addressed     14. Patient education done regarding plan of care, restrictions and discharge planning     15. Continue regular social work input     I have written the above note for Dr. Olivier who performed service with me in the room.   Maureen Gardner NP-C ([phone])    I have seen and examined patient with NP. I agree with above note as scribed.                    HPC Transplant Team                                                      Critical / Counseling Time Provided: 30 minutes                                                                                                                                                        Chief Complaint: Haplo-idential BMT (son) with FLU/CY/TBI prep for the treatment fo ALL    S: Patient seen and examined with [facility] Transplant Team:   + loose stool  + intermittent nausea    Other ROS(-)    O: Vitals:   Vital Signs Last 24 Hrs  T(C): 36.6 (19 Mar 2024 06:13), Max: 36.8 (19 Mar 2024 00:43)  T(F): 97.9 (19 Mar 2024 06:13), Max: 98.2 (19 Mar 2024 00:43)  HR: 70 (19 Mar 2024 06:13) (60 - 76)  BP: 124/76 (19 Mar 2024 06:13) (111/74 - 154/82)  RR: 18 (19 Mar 2024 06:13) (18 - 18)  SpO2: 95% (19 Mar 2024 06:13) (95% - 97%)    Parameters below as of 19 Mar 2024 06:13  Patient On (Oxygen Delivery Method): room air      Admit weight: 96.9 kg  Daily Weight in k.5 (17 Mar 2024 09:45)  Daily Weight in k.7 (18 Mar 2024 08:36)    Intake / Output:     18 Mar 2024 07:01  -  19 Mar 2024 07:00  --------------------------------------------------------  IN: 1477 mL / OUT: 1050 mL / NET: 427 mL    19 Mar 2024 07:01  -  19 Mar 2024 07:54  --------------------------------------------------------  IN: 68 mL / OUT: 0 mL / NET: 68 mL    PE:   Oropharynx: + patchy erythema B/L buccal mucosa , + ulcerations lower lip + L cheek  blood blister   Oral Mucositis:   +                                                 rdGrdrrdarddrderd:rd rd3rd CVS: RRR, +S1,S2  Lungs: CTA throughout bilaterally   Abdomen: soft, ND, ND +bs  Extremities: no edema   Gastric Mucositis:      -                                            Grade: -  Intestinal Mucositis:     -                                         Grade: -  Skin: small area of petechiae on the medial aspect of the left knee and diffusely over the left medial ankle and anterior shin  TLC: C/D/I   Neuro: A&O x3  Pain: Denies    Labs:                                     8.5    <0.1  )-----------( 17       ( 19 Mar 2024 07:16 )             24.6     Mean Cell Volume : 89.1 fl  Mean Cell Hemoglobin : 30.8 pg  Mean Cell Hemoglobin Concentration : 34.6 gm/dL  Auto Neutrophil # : x  Auto Lymphocyte # : x  Auto Monocyte # : x  Auto Eosinophil # : x  Auto Basophil # : x  Auto Neutrophil % : x  Auto Lymphocyte % : x  Auto Monocyte % : x  Auto Eosinophil % : x  Auto Basophil % : x          137  |  104  |  16  ----------------------------<  116<H>  4.2   |  24  |  0.66    Ca    9.2      19 Mar 2024 07:15  Phos  3.2       Mg     1.7         TPro  5.8<L>  /  Alb  3.6  /  TBili  0.9  /  DBili  x   /  AST  20  /  ALT  22  /  AlkPhos  127<H>      PT/INR - ( 18 Mar 2024 07:50 )   PT: 11.7 sec;   INR: 1.12 ratio    PTT - ( 18 Mar 2024 07:50 )  PTT:28.8 sec      Uric Acid --    LIVER FUNCTIONS - ( 18 Mar 2024 07:49 )  Alb: 3.4 g/dL / Pro: 5.7 g/dL / ALK PHOS: 124 U/L / ALT: 21 U/L / AST: 17 U/L / GGT: x           Urinalysis Basic - ( 18 Mar 2024 07:49 )    Color: x / Appearance: x / SG: x / pH: x  Gluc: 106 mg/dL / Ketone: x  / Bili: x / Urobili: x   Blood: x / Protein: x / Nitrite: x   Leuk Esterase: x / RBC: x / WBC x   Sq Epi: x / Non Sq Epi: x / Bacteria: x    Cultures:   Culture - Urine (24 @ 21:24)    Specimen Source: Clean Catch Clean Catch (Midstream)   Culture Results:   <10,000 CFU/mL Normal Urogenital Argelia    Culture - Blood (24 @ 21:24)    Specimen Source: .Blood Triple Lumen BROWN   Culture Results:   No growth at 5 days    Culture - Blood (24 @ 21:24)    Specimen Source: .Blood Triple Lumen BLUE   Culture Results:   No growth at 5 days    Radiology:   US Head + Neck Soft Tissue (03.15.24 @ 19:05)   FINDINGS:  The left parotid gland is prominent in size and heterogeneous in   echotexture with linear hypoechoic bands suggestive of parotid gland   edema. There is no discrete mass or fluid collection. No stonesare   identified.    The right parotid gland was not well imaged.    IMPRESSION:  Left parotid gland edema. No fluid collection is identified.  Limited visualization of the right parotid gland.      Meds:   Antimicrobials:   cefepime   IVPB 2000 milliGRAM(s) IV Intermittent every 8 hours  fluconAZOLE   Tablet 400 milliGRAM(s) Oral daily  valACYclovir 500 milliGRAM(s) Oral two times a day      GI:  aluminum hydroxide/magnesium hydroxide/simethicone Suspension 30 milliLiter(s) Oral every 6 hours PRN  calcium carbonate    500 mG (Tums) Chewable 1 Tablet(s) Chew three times a day PRN  loperamide 2 milliGRAM(s) Oral every 3 hours PRN  pantoprazole    Tablet 40 milliGRAM(s) Oral before breakfast  sodium bicarbonate Mouth Rinse 10 milliLiter(s) Swish and Spit five times a day      Cardiovascular:   losartan 100 milliGRAM(s) Oral daily      Immunologic:   filgrastim-sndz (ZARXIO) Injectable 480 MICROGram(s) SubCutaneous every 24 hours  mycophenolate mofetil 1000 milliGRAM(s) Oral three times a day  tacrolimus 2.5 milliGRAM(s) Oral two times a day    Other medications:   Biotene Dry Mouth Oral Rinse 5 milliLiter(s) Swish and Spit five times a day  chlorhexidine 4% Liquid 1 Application(s) Topical <User Schedule>  escitalopram 20 milliGRAM(s) Oral daily  lidocaine/prilocaine Cream 1 Application(s) Topical daily  loratadine 10 milliGRAM(s) Oral daily  sodium chloride 0.9% 1000 milliLiter(s) IV Continuous <Continuous>  sodium chloride 0.9%. 1000 milliLiter(s) IV Continuous <Continuous>      PRN:   acetaminophen     Tablet .. 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 Mild, central pulmonary venous congestion, new.  3/9 BCx and UCx, follow up  3/11- transaminitis, hold fluconazole. Tbili increased to 1.8. Added on direct and indirect bili.   3/12- direct bili 1.3 3/11/24   3/15- Chemo induced grade 2 oral mucositis: continue supportive care   3/15 parotid gland ( L) edema with PO intake stove vs bleeding vs truma f/u US   3/16 US prelim small collection <1cm, likely cyst, d/w Dr Batres,  final reading   3/18 increasing tacro to 2.5 BID (level =7). re-check level next on Thurs 3/21. increase Fluconazole to 400 daily (LFTs normalized). Zofran prn for n/v.      1. Infectious Disease:     cefepime   IVPB 2000 milliGRAM(s) IV Intermittent every 8 hours  fluconAZOLE   Tablet 400 milliGRAM(s) Oral daily  valACYclovir 500 milliGRAM(s) Oral two times a day    2. GI Prophylaxis:   pantoprazole    Tablet 40 milliGRAM(s) Oral before breakfast    3. Mouthcare - NS / NaHCO3 rinses, Mycelex, Biotene; Skin care     4. GVHD prophylaxis   TBI day -1   CTX days +3, +4   mycophenolate mofetil 1000 milliGRAM(s) Oral three times a day  tacrolimus 2.5   milliGRAM(s) Oral two times a day    6. Transfuse & replete electrolytes prn   Thrombocytopenia with blood blister in mouth, PLT to keep PLT goal 20K    7. IV hydration, daily weights, strict I&O, prn diuresis     8. PO intake as tolerated, nutrition follow up as needed, MVI, folic acid     9. Antiemetics, anti-diarrhea medications:   loperamide 2 milliGRAM(s) Oral every 3 hours PRN  metoclopramide Injectable 10 milliGRAM(s) IV Push every 6 hours PRN    10. OOB as tolerated, physical therapy consult if needed     11. Monitor coags / fibrinogen 2x week, vitamin K as needed     12. Monitor closely for clinical changes, monitor for fevers     13. Emotional support provided, plan of care discussed and questions addressed     14. Patient education done regarding plan of care, restrictions and discharge planning     15. Continue regular social work input     I have written the above note for Dr. Olivier who performed service with me in the room.   Maureen Gardner NP-C ([phone])    I have seen and examined patient with NP. I agree with above note as scribed.                    HPC Transplant Team                                                      Critical / Counseling Time Provided: 30 minutes                                                                                                                                                        Chief Complaint: Haplo-idential BMT (son) with FLU/CY/TBI prep for the treatment fo ALL    S: Patient seen and examined with [facility] Transplant Team:   + loose stool  + intermittent nausea    Other ROS(-)    O: Vitals:   Vital Signs Last 24 Hrs  T(C): 36.6 (19 Mar 2024 06:13), Max: 36.8 (19 Mar 2024 00:43)  T(F): 97.9 (19 Mar 2024 06:13), Max: 98.2 (19 Mar 2024 00:43)  HR: 70 (19 Mar 2024 06:13) (60 - 76)  BP: 124/76 (19 Mar 2024 06:13) (111/74 - 154/82)  RR: 18 (19 Mar 2024 06:13) (18 - 18)  SpO2: 95% (19 Mar 2024 06:13) (95% - 97%)    Parameters below as of 19 Mar 2024 06:13  Patient On (Oxygen Delivery Method): room air      Admit weight: 96.9 kg  Daily Weight in k.5 (17 Mar 2024 09:45)  Daily Weight in k.7 (18 Mar 2024 08:36)\     Daily Weight in k.4 (19 Mar 2024 08:09)    Intake / Output:     18 Mar 2024 07:01  -  19 Mar 2024 07:00  --------------------------------------------------------  IN: 1477 mL / OUT: 1050 mL / NET: 427 mL    19 Mar 2024 07:01  -  19 Mar 2024 07:54  --------------------------------------------------------  IN: 68 mL / OUT: 0 mL / NET: 68 mL    PE:   Oropharynx: + patchy erythema B/L buccal mucosa , + ulcerations lower lip + L cheek  blood blister   Oral Mucositis:   +                                                 rdGrdrrdarddrderd:rd rd3rd CVS: RRR, +S1,S2  Lungs: CTA throughout bilaterally   Abdomen: soft, ND, ND +bs  Extremities: no edema   Gastric Mucositis:      -                                            Grade: -  Intestinal Mucositis:     -                                         Grade: -  Skin: small area of petechiae on the medial aspect of the left knee and diffusely over the left medial ankle and anterior shin  TLC: C/D/I   Neuro: A&O x3  Pain: Denies    Labs:                                     8.5    <0.1  )-----------( 17       ( 19 Mar 2024 07:16 )             24.6     Mean Cell Volume : 89.1 fl  Mean Cell Hemoglobin : 30.8 pg  Mean Cell Hemoglobin Concentration : 34.6 gm/dL  Auto Neutrophil # : x  Auto Lymphocyte # : x  Auto Monocyte # : x  Auto Eosinophil # : x  Auto Basophil # : x  Auto Neutrophil % : x  Auto Lymphocyte % : x  Auto Monocyte % : x  Auto Eosinophil % : x  Auto Basophil % : x      03-19    137  |  104  |  16  ----------------------------<  116<H>  4.2   |  24  |  0.66    Ca    9.2      19 Mar 2024 07:15  Phos  3.2     03-19  Mg     1.7     03-19    TPro  5.8<L>  /  Alb  3.6  /  TBili  0.9  /  DBili  x   /  AST  20  /  ALT  22  /  AlkPhos  127<H>  03-19    PT/INR - ( 18 Mar 2024 07:50 )   PT: 11.7 sec;   INR: 1.12 ratio    PTT - ( 18 Mar 2024 07:50 )  PTT:28.8 sec      Uric Acid --    LIVER FUNCTIONS - ( 18 Mar 2024 07:49 )  Alb: 3.4 g/dL / Pro: 5.7 g/dL / ALK PHOS: 124 U/L / ALT: 21 U/L / AST: 17 U/L / GGT: x           Urinalysis Basic - ( 18 Mar 2024 07:49 )    Color: x / Appearance: x / SG: x / pH: x  Gluc: 106 mg/dL / Ketone: x  / Bili: x / Urobili: x   Blood: x / Protein: x / Nitrite: x   Leuk Esterase: x / RBC: x / WBC x   Sq Epi: x / Non Sq Epi: x / Bacteria: x    Cultures:   Culture - Urine (24 @ 21:24)    Specimen Source: Clean Catch Clean Catch (Midstream)   Culture Results:   <10,000 CFU/mL Normal Urogenital Argelia    Culture - Blood (24 @ 21:24)    Specimen Source: .Blood Triple Lumen BROWN   Culture Results:   No growth at 5 days    Culture - Blood (24 @ 21:24)    Specimen Source: .Blood Triple Lumen BLUE   Culture Results:   No growth at 5 days    Radiology:   US Head + Neck Soft Tissue (03.15.24 @ 19:05)   FINDINGS:  The left parotid gland is prominent in size and heterogeneous in   echotexture with linear hypoechoic bands suggestive of parotid gland   edema. There is no discrete mass or fluid collection. No stonesare   identified.    The right parotid gland was not well imaged.    IMPRESSION:  Left parotid gland edema. No fluid collection is identified.  Limited visualization of the right parotid gland.      Meds:   Antimicrobials:   cefepime   IVPB 2000 milliGRAM(s) IV Intermittent every 8 hours  fluconAZOLE   Tablet 400 milliGRAM(s) Oral daily  valACYclovir 500 milliGRAM(s) Oral two times a day      GI:  aluminum hydroxide/magnesium hydroxide/simethicone Suspension 30 milliLiter(s) Oral every 6 hours PRN  calcium carbonate    500 mG (Tums) Chewable 1 Tablet(s) Chew three times a day PRN  loperamide 2 milliGRAM(s) Oral every 3 hours PRN  pantoprazole    Tablet 40 milliGRAM(s) Oral before breakfast  sodium bicarbonate Mouth Rinse 10 milliLiter(s) Swish and Spit five times a day    Cardiovascular:   losartan 100 milliGRAM(s) Oral daily    Immunologic:   filgrastim-sndz (ZARXIO) Injectable 480 MICROGram(s) SubCutaneous every 24 hours  mycophenolate mofetil 1000 milliGRAM(s) Oral three times a day  tacrolimus 2.5 milliGRAM(s) Oral two times a day    Other medications:   Biotene Dry Mouth Oral Rinse 5 milliLiter(s) Swish and Spit five times a day  chlorhexidine 4% Liquid 1 Application(s) Topical <User Schedule>  escitalopram 20 milliGRAM(s) Oral daily  lidocaine/prilocaine Cream 1 Application(s) Topical daily  loratadine 10 milliGRAM(s) Oral daily  sodium chloride 0.9% 1000 milliLiter(s) IV Continuous <Continuous>  sodium chloride 0.9%. 1000 milliLiter(s) IV Continuous <Continuous>      PRN:   acetaminophen     Tablet .. 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 Mild, central pulmonary venous congestion, new.  3/9 BCx and UCx, follow up  3/11- transaminitis, hold fluconazole. Tbili increased to 1.8. Added on direct and indirect bili.   3/12- direct bili 1.3 3/11/24   3/15- Chemo induced grade 2 oral mucositis: continue supportive care   3/15 parotid gland ( L) edema with PO intake stove vs bleeding vs truma f/u US   3/16 US prelim small collection <1cm, likely cyst, d/w Dr Batres,  final reading   3/18 increasing tacro to 2.5 BID (level =7). re-check level next on Thurs 3/21. increase Fluconazole to 400 daily (LFTs normalized). Zofran prn for n/v.      1. Infectious Disease:     cefepime   IVPB 2000 milliGRAM(s) IV Intermittent every 8 hours  fluconAZOLE   Tablet 400 milliGRAM(s) Oral daily  valACYclovir 500 milliGRAM(s) Oral two times a day    2. GI Prophylaxis:   pantoprazole    Tablet 40 milliGRAM(s) Oral before breakfast    3. Mouthcare - NS / NaHCO3 rinses, Mycelex, Biotene; Skin care     4. GVHD prophylaxis   TBI day -1   CTX days +3, +4   mycophenolate mofetil 1000 milliGRAM(s) Oral three times a day  tacrolimus 2.5   milliGRAM(s) Oral two times a day    6. Transfuse & replete electrolytes prn   Thrombocytopenia with blood blister in mouth, PLT to keep PLT goal 20K    7. IV hydration, daily weights, strict I&O, prn diuresis     8. PO intake as tolerated, nutrition follow up as needed, MVI, folic acid     9. Antiemetics, anti-diarrhea medications:   loperamide 2 milliGRAM(s) Oral every 3 hours PRN  metoclopramide Injectable 10 milliGRAM(s) IV Push every 6 hours PRN    10. OOB as tolerated, physical therapy consult if needed     11. Monitor coags / fibrinogen 2x week, vitamin K as needed     12. Monitor closely for clinical changes, monitor for fevers     13. Emotional support provided, plan of care discussed and questions addressed     14. Patient education done regarding plan of care, restrictions and discharge planning     15. Continue regular social work input     I have written the above note for Dr. Olivier who performed service with me in the room.   Maureen Gardner NP-C ([phone])    I have seen and examined patient with NP. I agree with above note as scribed.

## 2024-03-20 LAB
ALBUMIN SERPL ELPH-MCNC: 3.6 G/DL — SIGNIFICANT CHANGE UP (ref 3.3–5)
ALP SERPL-CCNC: 129 U/L — HIGH (ref 40–120)
ALT FLD-CCNC: 23 U/L — SIGNIFICANT CHANGE UP (ref 10–45)
ANION GAP SERPL CALC-SCNC: 7 MMOL/L — SIGNIFICANT CHANGE UP (ref 5–17)
AST SERPL-CCNC: 21 U/L — SIGNIFICANT CHANGE UP (ref 10–40)
BILIRUB SERPL-MCNC: 0.8 MG/DL — SIGNIFICANT CHANGE UP (ref 0.2–1.2)
BUN SERPL-MCNC: 19 MG/DL — SIGNIFICANT CHANGE UP (ref 7–23)
CALCIUM SERPL-MCNC: 9.5 MG/DL — SIGNIFICANT CHANGE UP (ref 8.4–10.5)
CHLORIDE SERPL-SCNC: 105 MMOL/L — SIGNIFICANT CHANGE UP (ref 96–108)
CO2 SERPL-SCNC: 24 MMOL/L — SIGNIFICANT CHANGE UP (ref 22–31)
CREAT SERPL-MCNC: 0.74 MG/DL — SIGNIFICANT CHANGE UP (ref 0.5–1.3)
EGFR: 89 ML/MIN/1.73M2 — SIGNIFICANT CHANGE UP
GLUCOSE SERPL-MCNC: 102 MG/DL — HIGH (ref 70–99)
HCT VFR BLD CALC: 22.7 % — LOW (ref 34.5–45)
HGB BLD-MCNC: 8 G/DL — LOW (ref 11.5–15.5)
LDH SERPL L TO P-CCNC: 125 U/L — SIGNIFICANT CHANGE UP (ref 50–242)
MAGNESIUM SERPL-MCNC: 1.6 MG/DL — SIGNIFICANT CHANGE UP (ref 1.6–2.6)
MCHC RBC-ENTMCNC: 31.3 PG — SIGNIFICANT CHANGE UP (ref 27–34)
MCHC RBC-ENTMCNC: 35.2 GM/DL — SIGNIFICANT CHANGE UP (ref 32–36)
MCV RBC AUTO: 88.7 FL — SIGNIFICANT CHANGE UP (ref 80–100)
NRBC # BLD: 0 /100 WBCS — SIGNIFICANT CHANGE UP (ref 0–0)
PHOSPHATE SERPL-MCNC: 3.3 MG/DL — SIGNIFICANT CHANGE UP (ref 2.5–4.5)
PLATELET # BLD AUTO: 23 K/UL — LOW (ref 150–400)
POTASSIUM SERPL-MCNC: 4.3 MMOL/L — SIGNIFICANT CHANGE UP (ref 3.5–5.3)
POTASSIUM SERPL-SCNC: 4.3 MMOL/L — SIGNIFICANT CHANGE UP (ref 3.5–5.3)
PROT SERPL-MCNC: 5.8 G/DL — LOW (ref 6–8.3)
RBC # BLD: 2.56 M/UL — LOW (ref 3.8–5.2)
RBC # FLD: 10.8 % — SIGNIFICANT CHANGE UP (ref 10.3–14.5)
SODIUM SERPL-SCNC: 136 MMOL/L — SIGNIFICANT CHANGE UP (ref 135–145)
WBC # BLD: 0.01 K/UL — CRITICAL LOW (ref 3.8–10.5)
WBC # FLD AUTO: 0.01 K/UL — CRITICAL LOW (ref 3.8–10.5)

## 2024-03-20 PROCEDURE — 99232 SBSQ HOSP IP/OBS MODERATE 35: CPT | Mod: GC

## 2024-03-20 PROCEDURE — 99233 SBSQ HOSP IP/OBS HIGH 50: CPT | Mod: FS

## 2024-03-20 RX ADMIN — MYCOPHENOLATE MOFETIL 1000 MILLIGRAM(S): 250 CAPSULE ORAL at 21:43

## 2024-03-20 RX ADMIN — LOSARTAN POTASSIUM 100 MILLIGRAM(S): 100 TABLET, FILM COATED ORAL at 05:09

## 2024-03-20 RX ADMIN — Medication 10 MILLILITER(S): at 16:46

## 2024-03-20 RX ADMIN — Medication 5 MILLILITER(S): at 13:03

## 2024-03-20 RX ADMIN — TACROLIMUS 2.5 MILLIGRAM(S): 5 CAPSULE ORAL at 17:49

## 2024-03-20 RX ADMIN — VALACYCLOVIR 500 MILLIGRAM(S): 500 TABLET, FILM COATED ORAL at 17:49

## 2024-03-20 RX ADMIN — Medication 10 MILLILITER(S): at 00:32

## 2024-03-20 RX ADMIN — Medication 5 MILLILITER(S): at 00:32

## 2024-03-20 RX ADMIN — FLUCONAZOLE 400 MILLIGRAM(S): 150 TABLET ORAL at 13:03

## 2024-03-20 RX ADMIN — ESCITALOPRAM OXALATE 20 MILLIGRAM(S): 10 TABLET, FILM COATED ORAL at 17:49

## 2024-03-20 RX ADMIN — CEFEPIME 100 MILLIGRAM(S): 1 INJECTION, POWDER, FOR SOLUTION INTRAMUSCULAR; INTRAVENOUS at 13:04

## 2024-03-20 RX ADMIN — Medication 10 MILLILITER(S): at 13:03

## 2024-03-20 RX ADMIN — Medication 10 MILLILITER(S): at 08:20

## 2024-03-20 RX ADMIN — Medication 5 MILLILITER(S): at 08:20

## 2024-03-20 RX ADMIN — VALACYCLOVIR 500 MILLIGRAM(S): 500 TABLET, FILM COATED ORAL at 05:11

## 2024-03-20 RX ADMIN — Medication 5 MILLIGRAM(S): at 13:04

## 2024-03-20 RX ADMIN — Medication 5 MILLIGRAM(S): at 05:10

## 2024-03-20 RX ADMIN — TACROLIMUS 2.5 MILLIGRAM(S): 5 CAPSULE ORAL at 05:10

## 2024-03-20 RX ADMIN — Medication 5 MILLIGRAM(S): at 17:50

## 2024-03-20 RX ADMIN — MYCOPHENOLATE MOFETIL 1000 MILLIGRAM(S): 250 CAPSULE ORAL at 13:04

## 2024-03-20 RX ADMIN — CEFEPIME 100 MILLIGRAM(S): 1 INJECTION, POWDER, FOR SOLUTION INTRAMUSCULAR; INTRAVENOUS at 21:42

## 2024-03-20 RX ADMIN — Medication 480 MICROGRAM(S): at 13:05

## 2024-03-20 RX ADMIN — Medication 5 MILLIGRAM(S): at 00:32

## 2024-03-20 RX ADMIN — CEFEPIME 100 MILLIGRAM(S): 1 INJECTION, POWDER, FOR SOLUTION INTRAMUSCULAR; INTRAVENOUS at 05:09

## 2024-03-20 RX ADMIN — PANTOPRAZOLE SODIUM 40 MILLIGRAM(S): 20 TABLET, DELAYED RELEASE ORAL at 05:10

## 2024-03-20 RX ADMIN — CHLORHEXIDINE GLUCONATE 1 APPLICATION(S): 213 SOLUTION TOPICAL at 09:24

## 2024-03-20 RX ADMIN — Medication 2.5 MILLIGRAM(S): at 13:02

## 2024-03-20 RX ADMIN — Medication 5 MILLILITER(S): at 20:08

## 2024-03-20 RX ADMIN — LORATADINE 10 MILLIGRAM(S): 10 TABLET ORAL at 13:03

## 2024-03-20 RX ADMIN — MYCOPHENOLATE MOFETIL 1000 MILLIGRAM(S): 250 CAPSULE ORAL at 05:10

## 2024-03-20 RX ADMIN — Medication 10 MILLILITER(S): at 20:09

## 2024-03-20 RX ADMIN — Medication 5 MILLILITER(S): at 16:46

## 2024-03-20 NOTE — PROGRESS NOTE ADULT - SUBJECTIVE AND OBJECTIVE BOX
Indication for Geriatrics and Palliative Care Services/INTERVAL HPI: Supportive care during BMT     SUBJECTIVE AND OBJECTIVE: Patient was seen and examined at beside. Patient states that she continues to have no appetite despite trying foods from home. She does report improvement with her nausea. She still has loose stools x 1 in the mornings that is well-controlled with medication.     OVERNIGHT EVENTS: Patient required 2 doses of PRN medications in the last 24 hours (Loperamide PO x1, Ondansetron IV x1).     DNR on chart: No    Allergies    sulfa drugs (Unknown)    Intolerances    Zofran (Headache)  MEDICATIONS  (STANDING):  Biotene Dry Mouth Oral Rinse 5 milliLiter(s) Swish and Spit five times a day  cefepime   IVPB      cefepime   IVPB 2000 milliGRAM(s) IV Intermittent every 8 hours  chlorhexidine 4% Liquid 1 Application(s) Topical <User Schedule>  dronabinol 2.5 milliGRAM(s) Oral <User Schedule>  escitalopram 20 milliGRAM(s) Oral daily  filgrastim-sndz (ZARXIO) Injectable 480 MICROGram(s) SubCutaneous every 24 hours  fluconAZOLE   Tablet 400 milliGRAM(s) Oral daily  lidocaine/prilocaine Cream 1 Application(s) Topical daily  loratadine 10 milliGRAM(s) Oral daily  losartan 100 milliGRAM(s) Oral daily  metoclopramide Injectable 5 milliGRAM(s) IV Push every 6 hours  mycophenolate mofetil 1000 milliGRAM(s) Oral three times a day  pantoprazole    Tablet 40 milliGRAM(s) Oral before breakfast  sodium bicarbonate Mouth Rinse 10 milliLiter(s) Swish and Spit five times a day  sodium chloride 0.9%. 1000 milliLiter(s) (20 mL/Hr) IV Continuous <Continuous>  tacrolimus 2.5 milliGRAM(s) Oral two times a day  valACYclovir 500 milliGRAM(s) Oral two times a day    MEDICATIONS  (PRN):  acetaminophen     Tablet .. 650 milliGRAM(s) Oral every 6 hours PRN Mild Pain (1 - 3), Moderate Pain (4 - 6)  aluminum hydroxide/magnesium hydroxide/simethicone Suspension 30 milliLiter(s) Oral every 6 hours PRN Dyspepsia  calcium carbonate    500 mG (Tums) Chewable 1 Tablet(s) Chew three times a day PRN Dyspepsia  loperamide 2 milliGRAM(s) Oral every 3 hours PRN Diarrhea  ondansetron  IVPB 8 milliGRAM(s) IV Intermittent every 8 hours PRN Nausea and/or Vomiting  sodium chloride 0.9% lock flush 10 milliLiter(s) IV Push every 1 hour PRN Pre/post blood products, medications, blood draw, and to maintain line patency  zinc oxide 40% Paste 1 Application(s) Topical two times a day PRN discomfort      ITEMS UNCHECKED ARE NOT PRESENT    PRESENT SYMPTOMS: [ ]Unable to self-report - see [ ] CPOT [ ] PAINADS [ ] RDOS  Source if other than patient:  [ ]Family   [ ]Team     Pain:  [ ]yes [X]no  QOL impact -   Location -                    Aggravating factors -  Quality -  Radiation -  Timing-  Severity (0-10 scale):  Minimal acceptable level (0-10 scale):     CPOT:    https://www.[facility].org/getattachment/[email or web address]/Critical-Care-Pain-Observation-Tool-(CPOT)    Dyspnea:                           [ ]Mild [ ]Moderate [ ]Severe  Anxiety:                             [ ]Mild [ ]Moderate [ ]Severe  Fatigue:                             [ ]Mild [ ]Moderate [ ]Severe  Nausea:                             [ ]Mild [ ]Moderate [ ]Severe  Loss of appetite:              [ ]Mild [ ]Moderate [ ]Severe  Constipation:                    [ ]Mild [ ]Moderate [ ]Severe    PCSSQ[Palliative Care Spiritual Screening Question]   Severity (0-10):  Score of 4 or > indicate consideration of Chaplaincy referral.  Chaplaincy Referral: [ ] yes [ ] refused [ ] following [X] Deferred     Caregiver [place]? : [ ] yes [ ] no [X] Deferred [ ] Declined             Social work referral [ ] Patient & Family Centered Care Referral [ ]     Anticipatory Grief present?:  [ ] yes [ ] no  [X] Deferred                  Social work referral [ ] Chaplaincy Referral[ ]      Other Symptoms:  [ ]All other review of systems negative   [ ]Unable to obtain due to poor mentation    Palliative Performance Status Version 2:         %      http://npcrc.org/files/news/palliative_performance_scale_ppsv2.pdf  PHYSICAL EXAM:  Vital Signs Last 24 Hrs  T(C): 36.9 (20 Mar 2024 09:05), Max: 36.9 (20 Mar 2024 09:05)  T(F): 98.4 (20 Mar 2024 09:05), Max: 98.4 (20 Mar 2024 09:05)  HR: 65 (20 Mar 2024 09:05) (65 - 86)  BP: 118/72 (20 Mar 2024 09:05) (104/62 - 151/79)  BP(mean): --  RR: 18 (20 Mar 2024 09:05) (18 - 18)  SpO2: 97% (20 Mar 2024 09:05) (93% - 98%)    Parameters below as of 20 Mar 2024 09:05  Patient On (Oxygen Delivery Method): room air     I&O's Summary    19 Mar 2024 07:01  -  20 Mar 2024 07:00  --------------------------------------------------------  IN: 1567 mL / OUT: 1350 mL / NET: 217 mL    20 Mar 2024 07:01  -  20 Mar 2024 13:14  --------------------------------------------------------  IN: 0 mL / OUT: 100 mL / NET: -100 mL       GENERAL: [ ]Cachexia    [X]Alert  [X]Oriented x 3   [ ]Lethargic  [ ]Unarousable  [X]Verbal  [ ]Non-Verbal  Behavioral:   [ ]Anxiety  [ ]Delirium [ ]Agitation [ ]Other  HEENT:  [X]Normal   [ ]Dry mouth   [ ]ET Tube/Trach  [ ]Oral lesions  PULMONARY:   [X]Clear [ ]Tachypnea  [ ]Audible excessive secretions   [ ]Rhonchi        [ ]Right [ ]Left [ ]Bilateral  [ ]Crackles        [ ]Right [ ]Left [ ]Bilateral  [ ]Wheezing     [ ]Right [ ]Left [ ]Bilateral  [ ]Diminished BS [ ] Right [ ]Left [ ]Bilateral  CARDIOVASCULAR:    [X]Regular [ ]Irregular [ ]Tachy  [ ]Parker [ ]Murmur [ ]Other  GASTROINTESTINAL:  [X]Soft  [ ]Distended   [ ]+BS  [X]Non tender [ ]Tender  [ ]Other [ ]PEG [ ]OGT/ NGT   Last BM:   GENITOURINARY:  [X]Normal [ ]Incontinent   [ ]Oliguria/Anuria   [ ]Mehta  MUSCULOSKELETAL:   [ ]Normal   [X]Weakness  [ ]Bed/Wheelchair bound [ ]Edema  NEUROLOGIC:   [X]No focal deficits  [ ] Cognitive impairment  [ ] Dysphagia [ ]Dysarthria [ ] Paresis [ ]Other   SKIN:   [X]Normal  [ ]Rash  [ ]Other  [ ]Pressure ulcer(s) [ ]y [ ]n present on admission    CRITICAL CARE:  [ ]Shock Present  [ ]Septic [ ]Cardiogenic [ ]Neurologic [ ]Hypovolemic  [ ]Vasopressors [ ]Inotropes  [ ]Respiratory failure present [ ]Mechanical Ventilation [ ]Non-invasive ventilatory support [ ]High-Flow   [ ]Acute  [ ]Chronic [ ]Hypoxic  [ ]Hypercarbic [ ]Other  [ ]Other organ failure     LABS:                        8.0    0.01  )-----------( 23       ( 20 Mar 2024 06:49 )             22.7   03-20    136  |  105  |  19  ----------------------------<  102<H>  4.3   |  24  |  0.74    Ca    9.5      20 Mar 2024 06:50  Phos  3.3     03-20  Mg     1.6     03-20    TPro  5.8<L>  /  Alb  3.6  /  TBili  0.8  /  DBili  x   /  AST  21  /  ALT  23  /  AlkPhos  129<H>  03-20      Urinalysis Basic - ( 20 Mar 2024 06:50 )    Color: x / Appearance: x / SG: x / pH: x  Gluc: 102 mg/dL / Ketone: x  / Bili: x / Urobili: x   Blood: x / Protein: x / Nitrite: x   Leuk Esterase: x / RBC: x / WBC x   Sq Epi: x / Non Sq Epi: x / Bacteria: x      RADIOLOGY & ADDITIONAL STUDIES: None now     Protein Calorie Malnutrition Present: [ ]mild [ ]moderate [ ]severe [ ]underweight [ ]morbid obesity  https://www.andeal.org/vault/2062/web/files/ONC/Table_Clinical%20Characteristics%20to%20Document%20Malnutrition-White%20JV%20et%20al%325832.pdf    Height (cm): 159 (02-29-24 @ 10:15), 157.48 (02-12-24 @ 15:00), 158 (12-01-23 @ 09:20)  Weight (kg): 96.9 (02-29-24 @ 10:15), 96.7 (02-12-24 @ 15:00), 92.7 (12-01-23 @ 09:20)  BMI (kg/m2): 38.3 (02-29-24 @ 10:15), 39 (02-12-24 @ 15:00), 37.1 (12-01-23 @ 09:20)    [ ]PPSV2 < or = 30%  [ ]significant weight loss [ ]poor nutritional intake [ ]anasarca[ ]Artificial Nutrition    Other REFERRALS:  [ ]Hospice  [ ]Child Life  [ ]Social Work  [ ]Case management [ ]Holistic Therapy     Goals of Care Document: Indication for Geriatrics and Palliative Care Services/INTERVAL HPI: Supportive care during BMT     SUBJECTIVE AND OBJECTIVE: Patient was seen and examined at beside. Patient states that she continues to have no appetite despite trying foods from home. She does report improvement with her nausea. She still has loose stools x 1 in the mornings that is well-controlled with medication.     OVERNIGHT EVENTS: Patient required 2 doses of PRN medications in the last 24 hours (Loperamide PO x1, Ondansetron IV x1).     DNR on chart: No    Allergies    sulfa drugs (Unknown)    Intolerances    Zofran (Headache)  MEDICATIONS  (STANDING):  Biotene Dry Mouth Oral Rinse 5 milliLiter(s) Swish and Spit five times a day  cefepime   IVPB      cefepime   IVPB 2000 milliGRAM(s) IV Intermittent every 8 hours  chlorhexidine 4% Liquid 1 Application(s) Topical <User Schedule>  dronabinol 2.5 milliGRAM(s) Oral <User Schedule>  escitalopram 20 milliGRAM(s) Oral daily  filgrastim-sndz (ZARXIO) Injectable 480 MICROGram(s) SubCutaneous every 24 hours  fluconAZOLE   Tablet 400 milliGRAM(s) Oral daily  lidocaine/prilocaine Cream 1 Application(s) Topical daily  loratadine 10 milliGRAM(s) Oral daily  losartan 100 milliGRAM(s) Oral daily  metoclopramide Injectable 5 milliGRAM(s) IV Push every 6 hours  mycophenolate mofetil 1000 milliGRAM(s) Oral three times a day  pantoprazole    Tablet 40 milliGRAM(s) Oral before breakfast  sodium bicarbonate Mouth Rinse 10 milliLiter(s) Swish and Spit five times a day  sodium chloride 0.9%. 1000 milliLiter(s) (20 mL/Hr) IV Continuous <Continuous>  tacrolimus 2.5 milliGRAM(s) Oral two times a day  valACYclovir 500 milliGRAM(s) Oral two times a day    MEDICATIONS  (PRN):  acetaminophen     Tablet .. 650 milliGRAM(s) Oral every 6 hours PRN Mild Pain (1 - 3), Moderate Pain (4 - 6)  aluminum hydroxide/magnesium hydroxide/simethicone Suspension 30 milliLiter(s) Oral every 6 hours PRN Dyspepsia  calcium carbonate    500 mG (Tums) Chewable 1 Tablet(s) Chew three times a day PRN Dyspepsia  loperamide 2 milliGRAM(s) Oral every 3 hours PRN Diarrhea  ondansetron  IVPB 8 milliGRAM(s) IV Intermittent every 8 hours PRN Nausea and/or Vomiting  sodium chloride 0.9% lock flush 10 milliLiter(s) IV Push every 1 hour PRN Pre/post blood products, medications, blood draw, and to maintain line patency  zinc oxide 40% Paste 1 Application(s) Topical two times a day PRN discomfort      ITEMS UNCHECKED ARE NOT PRESENT    PRESENT SYMPTOMS: [ ]Unable to self-report - see [ ] CPOT [ ] PAINADS [ ] RDOS  Source if other than patient:  [ ]Family   [ ]Team     Pain:  [ ]yes [X]no  QOL impact -   Location -                    Aggravating factors -  Quality -  Radiation -  Timing-  Severity (0-10 scale):  Minimal acceptable level (0-10 scale):     CPOT:    https://www.[facility].org/getattachment/[email or web address]/Critical-Care-Pain-Observation-Tool-(CPOT)    Dyspnea:                           [ ]Mild [ ]Moderate [ ]Severe  Anxiety:                             [ ]Mild [ ]Moderate [ ]Severe  Fatigue:                             [ ]Mild [ ]Moderate [ ]Severe  Nausea:                             [ ]Mild [ ]Moderate [ ]Severe  Loss of appetite:              [ ]Mild [ ]Moderate [ ]Severe  Constipation:                    [ ]Mild [ ]Moderate [ ]Severe    PCSSQ[Palliative Care Spiritual Screening Question]   Severity (0-10):  Score of 4 or > indicate consideration of Chaplaincy referral.  Chaplaincy Referral: [ ] yes [ ] refused [ ] following [X] Deferred     Caregiver [place]? : [ ] yes [ ] no [X] Deferred [ ] Declined             Social work referral [ ] Patient & Family Centered Care Referral [ ]     Anticipatory Grief present?:  [ ] yes [ ] no  [X] Deferred                  Social work referral [ ] Chaplaincy Referral[ ]      Other Symptoms:  [ x]All other review of systems negative   [ ]Unable to obtain due to poor mentation    Palliative Performance Status Version 2:   50      %      http://npcrc.org/files/news/palliative_performance_scale_ppsv2.pdf  PHYSICAL EXAM:  Vital Signs Last 24 Hrs  T(C): 36.9 (20 Mar 2024 09:05), Max: 36.9 (20 Mar 2024 09:05)  T(F): 98.4 (20 Mar 2024 09:05), Max: 98.4 (20 Mar 2024 09:05)  HR: 65 (20 Mar 2024 09:05) (65 - 86)  BP: 118/72 (20 Mar 2024 09:05) (104/62 - 151/79)  BP(mean): --  RR: 18 (20 Mar 2024 09:05) (18 - 18)  SpO2: 97% (20 Mar 2024 09:05) (93% - 98%)    Parameters below as of 20 Mar 2024 09:05  Patient On (Oxygen Delivery Method): room air     I&O's Summary    19 Mar 2024 07:01  -  20 Mar 2024 07:00  --------------------------------------------------------  IN: 1567 mL / OUT: 1350 mL / NET: 217 mL    20 Mar 2024 07:01  -  20 Mar 2024 13:14  --------------------------------------------------------  IN: 0 mL / OUT: 100 mL / NET: -100 mL       GENERAL: [ ]Cachexia    [X]Alert  [X]Oriented x 3   [ ]Lethargic  [ ]Unarousable  [X]Verbal  [ ]Non-Verbal  Behavioral:   [ ]Anxiety  [ ]Delirium [ ]Agitation [ ]Other  HEENT:  [X]Normal   [ ]Dry mouth   [ ]ET Tube/Trach  [ ]Oral lesions  PULMONARY:   [X]Clear [ ]Tachypnea  [ ]Audible excessive secretions   [ ]Rhonchi        [ ]Right [ ]Left [ ]Bilateral  [ ]Crackles        [ ]Right [ ]Left [ ]Bilateral  [ ]Wheezing     [ ]Right [ ]Left [ ]Bilateral  [ ]Diminished BS [ ] Right [ ]Left [ ]Bilateral  CARDIOVASCULAR:    [X]Regular [ ]Irregular [ ]Tachy  [ ]Parker [ ]Murmur [ ]Other  GASTROINTESTINAL:  [X]Soft  [ ]Distended   [ ]+BS  [X]Non tender [ ]Tender  [ ]Other [ ]PEG [ ]OGT/ NGT   Last BM: 3/20  GENITOURINARY:  [X]Normal [ ]Incontinent   [ ]Oliguria/Anuria   [ ]Mehta  MUSCULOSKELETAL:   [ ]Normal   [X]Weakness  [ ]Bed/Wheelchair bound [ ]Edema  NEUROLOGIC:   [X]No focal deficits  [ ] Cognitive impairment  [ ] Dysphagia [ ]Dysarthria [ ] Paresis [ ]Other   SKIN:   [X]Normal  [ ]Rash  [ ]Other  [ ]Pressure ulcer(s) [ ]y [ ]n present on admission    CRITICAL CARE:  [ ]Shock Present  [ ]Septic [ ]Cardiogenic [ ]Neurologic [ ]Hypovolemic  [ ]Vasopressors [ ]Inotropes  [ ]Respiratory failure present [ ]Mechanical Ventilation [ ]Non-invasive ventilatory support [ ]High-Flow   [ ]Acute  [ ]Chronic [ ]Hypoxic  [ ]Hypercarbic [ ]Other  [ ]Other organ failure     LABS:                        8.0    0.01  )-----------( 23       ( 20 Mar 2024 06:49 )             22.7   03-20    136  |  105  |  19  ----------------------------<  102<H>  4.3   |  24  |  0.74    Ca    9.5      20 Mar 2024 06:50  Phos  3.3     03-20  Mg     1.6     03-20    TPro  5.8<L>  /  Alb  3.6  /  TBili  0.8  /  DBili  x   /  AST  21  /  ALT  23  /  AlkPhos  129<H>  03-20      Urinalysis Basic - ( 20 Mar 2024 06:50 )    Color: x / Appearance: x / SG: x / pH: x  Gluc: 102 mg/dL / Ketone: x  / Bili: x / Urobili: x   Blood: x / Protein: x / Nitrite: x   Leuk Esterase: x / RBC: x / WBC x   Sq Epi: x / Non Sq Epi: x / Bacteria: x      RADIOLOGY & ADDITIONAL STUDIES: None now   No new studies.   Protein Calorie Malnutrition Present: [ ]mild [ ]moderate [ ]severe [ ]underweight [ ]morbid obesity  https://www.andeal.org/vault/4249/web/files/ONC/Table_Clinical%20Characteristics%20to%20Document%20Malnutrition-White%20JV%20et%20al%904204.pdf    Height (cm): 159 (02-29-24 @ 10:15), 157.48 (02-12-24 @ 15:00), 158 (12-01-23 @ 09:20)  Weight (kg): 96.9 (02-29-24 @ 10:15), 96.7 (02-12-24 @ 15:00), 92.7 (12-01-23 @ 09:20)  BMI (kg/m2): 38.3 (02-29-24 @ 10:15), 39 (02-12-24 @ 15:00), 37.1 (12-01-23 @ 09:20)    [ ]PPSV2 < or = 30%  [ ]significant weight loss [ ]poor nutritional intake [ ]anasarca[ ]Artificial Nutrition    Other REFERRALS:  [ ]Hospice  [ ]Child Life  [ ]Social Work  [ ]Case management [ ]Holistic Therapy     Goals of Care Document:

## 2024-03-20 NOTE — PROGRESS NOTE ADULT - PROBLEM SELECTOR PLAN 1
- Haplo-idential BMT (son) with FLU/CY/TBI prep for the treatment fo ALL  - Post:  Allogeneic  BMT day + 14 - Haplo-idential BMT (son) with FLU/CY/TBI prep for the treatment fo ALL  - Post:  Allogeneic  BMT day + 14  - Work up and Rx as per the primary team.

## 2024-03-20 NOTE — PROGRESS NOTE ADULT - PROBLEM SELECTOR PLAN 2
- Continue with Reglan 5mg IV Q6h ATC   - Continue with Zofran 8mg IV Q8h PRN Improved   - Continue with Reglan 5mg IV Q6h ATC   - Continue with Zofran 8mg IV Q8h PRN

## 2024-03-20 NOTE — PROGRESS NOTE ADULT - PROBLEM SELECTOR PLAN 4
- Start Marinol ___mg before lunch and dinner. Discussed with patient about increasing the dose given that she continues to have no appetite  - Dietary is already f/u - On Marinol 2.5 mg before lunch and dinner. Discussed with patient about increasing the dose given that she continues to have no appetite  - Dietary is already f/u

## 2024-03-20 NOTE — PROGRESS NOTE ADULT - NS ATTEND AMEND GEN_ALL_CORE FT
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     Plan:  Heme:   - Trend CBC with differential daily. Continue supportive transfusions as needed to maintain Hgb > 7 and plt > 20 given possible bleeding into / from parotid   (> 20 if febrile, > 50 if bleeding).   - G-CSF (started on day +5): continue through engraftment  - Will require post ALLO BMT CNS prophylaxis and TKI maintenance -- begins after engraftment.     GVHD:   - PTCy days 3 and 4  - Cellcept and tacrolimus (started on day +5). Check tacrolimus level Mon/Thurs, last 7 (3/18/24) will increase to 2.5 mg    ID:   - Prophylaxis: fluconazole (200 mg daily, can increase to 400 LFTs are stable), Valtrex  - Neutropenic fever: cefepime. Infectious work up negative (diarrhea but C.diff negative).  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     Plan:  Heme:   - Trend CBC with differential daily. Continue supportive transfusions as needed to maintain Hgb > 7 and plt > 20 given possible bleeding into / from parotid   (> 20 if febrile, > 50 if bleeding).   - G-CSF (started on day +5): continue through engraftment  - Will require post ALLO BMT CNS prophylaxis and TKI maintenance -- begins after engraftment.     GVHD:   - PTCy days 3 and 4  - Cellcept and tacrolimus (started on day +5). Check tacrolimus level Mon/Thurs, last 7 (3/18/24) will increase to 2.5 mg..recheck level thurs    ID:   - Prophylaxis: fluconazole (200 mg daily, can increase to 400 LFTs are stable), Valtrex  - Neutropenic fever: cefepime. Infectious work up negative (diarrhea but C.diff negative).  - Bactrim SS daily to start on day +21  - BM product A0 cx positive for Staph caprae (skin lauro)  - face US with parotid edema, sx of swelling improved    Nutrition: tolerating PO    DVT prophylaxis: ambulation

## 2024-03-20 NOTE — PROGRESS NOTE ADULT - SUBJECTIVE AND OBJECTIVE BOX
HPC Transplant Team                                                      Critical / Counseling Time Provided: 30 minutes                                                                                                                                                        Chief Complaint: Haplo-idential BMT (son) with FLU/CY/TBI prep for the treatment fo ALL    S: Patient seen and examined with \[facility]\"" Transplant Team:   + loose stool  + intermittent nausea    Other ROS(-)    O: Vitals:   Vital Signs Last 24 Hrs  T(C): 36.8 (20 Mar 2024 05:00), Max: 36.8 (20 Mar 2024 05:00)  T(F): 98.2 (20 Mar 2024 05:00), Max: 98.2 (20 Mar 2024 05:00)  HR: 74 (20 Mar 2024 05:00) (63 - 86)  BP: 148/71 (20 Mar 2024 05:00) (104/62 - 151/79)  RR: 18 (20 Mar 2024 05:00) (18 - 18)  SpO2: 93% (20 Mar 2024 05:00) (93% - 98%)    Parameters below as of 20 Mar 2024 05:00  Patient On (Oxygen Delivery Method): room air    Admit weight: 96.9 kg  Daily Weight in k.7 (18 Mar 2024 08:36)     Daily Weight in k.4 (19 Mar 2024 08:09)      Intake / Output:   I&O's Summary  19 Mar 2024 07:01  -  20 Mar 2024 07:00  --------------------------------------------------------  IN: 1567 mL / OUT: 1350 mL / NET: 217 mL    PE:   Oropharynx: + patchy erythema B/L buccal mucosa , + ulcerations lower lip + L cheek  blood blister   Oral Mucositis:   +                                                 rdGrdrrdarddrderd:rd rd3rd CVS: RRR, +S1,S2  Lungs: CTA throughout bilaterally   Abdomen: soft, ND, ND +bs  Extremities: no edema   Gastric Mucositis:      -                                            Grade: -  Intestinal Mucositis:     -                                         Grade: -  Skin: small area of petechiae on the medial aspect of the left knee and diffusely over the left medial ankle and anterior shin  TLC: C/D/I   Neuro: A&O x3  Pain: Denies    Labs:                                     8.0    0.01  )-----------( 23       ( 20 Mar 2024 06:49 )             22.7     Mean Cell Volume : 88.7 fl  Mean Cell Hemoglobin : 31.3 pg  Mean Cell Hemoglobin Concentration : 35.2 gm/dL  Auto Neutrophil # : x  Auto Lymphocyte # : x  Auto Monocyte # : x  Auto Eosinophil # : x  Auto Basophil # : x  Auto Neutrophil % : x  Auto Lymphocyte % : x  Auto Monocyte % : x  Auto Eosinophil % : x  Auto Basophil % : x    03    136  |  105  |  19  ----------------------------<  102<H>  4.3   |  24  |  0.74    Ca    9.5      20 Mar 2024 06:50  Phos  3.3     -  Mg     1.6     -    TPro  5.8<L>  /  Alb  3.6  /  TBili  0.8  /  DBili  x   /  AST  21  /  ALT  23  /  AlkPhos  129<H>  -      Uric Acid --    LIVER FUNCTIONS - ( 18 Mar 2024 07:49 )  Alb: 3.4 g/dL / Pro: 5.7 g/dL / ALK PHOS: 124 U/L / ALT: 21 U/L / AST: 17 U/L / GGT: x           Urinalysis Basic - ( 18 Mar 2024 07:49 )    Color: x / Appearance: x / SG: x / pH: x  Gluc: 106 mg/dL / Ketone: x  / Bili: x / Urobili: x   Blood: x / Protein: x / Nitrite: x   Leuk Esterase: x / RBC: x / WBC x   Sq Epi: x / Non Sq Epi: x / Bacteria: x    Cultures:   Culture - Urine (24 @ 21:24)    Specimen Source: Clean Catch Clean Catch (Midstream)   Culture Results:   <10,000 CFU/mL Normal Urogenital Argelia    Culture - Blood (24 @ 21:24)    Specimen Source: .Blood Triple Lumen BROWN   Culture Results:   No growth at 5 days    Culture - Blood (24 @ 21:24)    Specimen Source: .Blood Triple Lumen BLUE   Culture Results:   No growth at 5 days    Radiology:   US Head + Neck Soft Tissue (03.15.24 @ 19:05)   FINDINGS:  The left parotid gland is prominent in size and heterogeneous in   echotexture with linear hypoechoic bands suggestive of parotid gland   edema. There is no discrete mass or fluid collection. No stonesare   identified.    The right parotid gland was not well imaged.    IMPRESSION:  Left parotid gland edema. No fluid collection is identified.  Limited visualization of the right parotid gland.      Meds:   Antimicrobials:   cefepime   IVPB 2000 milliGRAM(s) IV Intermittent every 8 hours  fluconAZOLE   Tablet 400 milliGRAM(s) Oral daily  valACYclovir 500 milliGRAM(s) Oral two times a day      GI:  aluminum hydroxide/magnesium hydroxide/simethicone Suspension 30 milliLiter(s) Oral every 6 hours PRN  calcium carbonate    500 mG (Tums) Chewable 1 Tablet(s) Chew three times a day PRN  loperamide 2 milliGRAM(s) Oral every 3 hours PRN  pantoprazole    Tablet 40 milliGRAM(s) Oral before breakfast  sodium bicarbonate Mouth Rinse 10 milliLiter(s) Swish and Spit five times a day    Cardiovascular:   losartan 100 milliGRAM(s) Oral daily    Immunologic:   filgrastim-sndz (ZARXIO) Injectable 480 MICROGram(s) SubCutaneous every 24 hours  mycophenolate mofetil 1000 milliGRAM(s) Oral three times a day  tacrolimus 2.5 milliGRAM(s) Oral two times a day    Other medications:   Biotene Dry Mouth Oral Rinse 5 milliLiter(s) Swish and Spit five times a day  chlorhexidine 4% Liquid 1 Application(s) Topical <User Schedule>  escitalopram 20 milliGRAM(s) Oral daily  lidocaine/prilocaine Cream 1 Application(s) Topical daily  loratadine 10 milliGRAM(s) Oral daily  sodium chloride 0.9% 1000 milliLiter(s) IV Continuous <Continuous>  sodium chloride 0.9%. 1000 milliLiter(s) IV Continuous <Continuous>      PRN:   acetaminophen     Tablet .. 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 Mild, central pulmonary venous congestion, new.  3/9 BCx and UCx, follow up  3/11- transaminitis, hold fluconazole. Tbili increased to 1.8. Added on direct and indirect bili.   3/12- direct bili 1.3 3/11/24   3/15- Chemo induced grade 2 oral mucositis: continue supportive care   3/15 parotid gland ( L) edema with PO intake stove vs bleeding vs truma f/u US   3/16 US prelim small collection <1cm, likely cyst, d/w Dr Batres,  final reading   3/18 increasing tacro to 2.5 BID (level =7). re-check level next on Thurs 3/21. increase Fluconazole to 400 daily (LFTs normalized). Zofran prn for n/v.    1. Infectious Disease:     cefepime   IVPB 2000 milliGRAM(s) IV Intermittent every 8 hours  fluconAZOLE   Tablet 400 milliGRAM(s) Oral daily  valACYclovir 500 milliGRAM(s) Oral two times a day    2. GI Prophylaxis:   pantoprazole    Tablet 40 milliGRAM(s) Oral before breakfast    3. Mouthcare - NS / NaHCO3 rinses, Mycelex, Biotene; Skin care     4. GVHD prophylaxis   TBI day -1   CTX days +3, +4   mycophenolate mofetil 1000 milliGRAM(s) Oral three times a day  tacrolimus 2.5   milliGRAM(s) Oral two times a day    6. Transfuse & replete electrolytes prn   Thrombocytopenia with blood blister in mouth, PLT to keep PLT goal 20K    7. IV hydration, daily weights, strict I&O, prn diuresis     8. PO intake as tolerated, nutrition follow up as needed, MVI, folic acid     9. Antiemetics, anti-diarrhea medications:   loperamide 2 milliGRAM(s) Oral every 3 hours PRN  metoclopramide Injectable 10 milliGRAM(s) IV Push every 6 hours PRN    10. OOB as tolerated, physical therapy consult if needed     11. Monitor coags / fibrinogen 2x week, vitamin K as needed     12. Monitor closely for clinical changes, monitor for fevers     13. Emotional support provided, plan of care discussed and questions addressed     14. Patient education done regarding plan of care, restrictions and discharge planning     15. Continue regular social work input     I have written the above note for Dr. Olivier who performed service with me in the room.   Maureen Gardner NP-C ([phone])    I have seen and examined patient with NP. I agree with above note as scribed.                    HPC Transplant Team                                                      Critical / Counseling Time Provided: 30 minutes                                                                                                                                                        Chief Complaint: Haplo-idential BMT (son) with FLU/CY/TBI prep for the treatment fo ALL    S: Patient seen and examined with HPC Transplant Team:   + loose stool  + intermittent nausea    Other ROS(-)    O: Vitals:   Vital Signs Last 24 Hrs  T(C): 36.8 (20 Mar 2024 05:00), Max: 36.8 (20 Mar 2024 05:00)  T(F): 98.2 (20 Mar 2024 05:00), Max: 98.2 (20 Mar 2024 05:00)  HR: 74 (20 Mar 2024 05:00) (63 - 86)  BP: 148/71 (20 Mar 2024 05:00) (104/62 - 151/79)  RR: 18 (20 Mar 2024 05:00) (18 - 18)  SpO2: 93% (20 Mar 2024 05:00) (93% - 98%)    Parameters below as of 20 Mar 2024 05:00  Patient On (Oxygen Delivery Method): room air    Admit weight: 96.9 kg  Daily Weight in k.7 (18 Mar 2024 08:36)     Daily Weight in k.4 (19 Mar 2024 08:09)  Daily Weight in k.3 (20 Mar 2024 08:36)      Intake / Output:   I&O's Summary  19 Mar 2024 07:01  -  20 Mar 2024 07:00  --------------------------------------------------------  IN: 1567 mL / OUT: 1350 mL / NET: 217 mL    PE:   Oropharynx: + patchy erythema B/L buccal mucosa , + ulcerations lower lip + L cheek  blood blister   Oral Mucositis:   +                                                 stGstrstastdstest:st st1st CVS: RRR, +S1,S2  Lungs: CTA throughout bilaterally   Abdomen: soft, ND, ND +bs  Extremities: no edema   Gastric Mucositis:      -                                            Grade: -  Intestinal Mucositis:     -                                         Grade: -  Skin: small area of petechiae on the medial aspect of the left knee and diffusely over the left medial ankle and anterior shin  TLC: C/D/I   Neuro: A&O x3  Pain: Denies    Labs:                                     8.0    0.01  )-----------(        ( 20 Mar 2024 06:49 )             22.7     Mean Cell Volume : 88.7 fl  Mean Cell Hemoglobin : 31.3 pg  Mean Cell Hemoglobin Concentration : 35.2 gm/dL  Auto Neutrophil # : x  Auto Lymphocyte # : x  Auto Monocyte # : x  Auto Eosinophil # : x  Auto Basophil # : x  Auto Neutrophil % : x  Auto Lymphocyte % : x  Auto Monocyte % : x  Auto Eosinophil % : x  Auto Basophil % : x        136  |  105  |  19  ----------------------------<  102<H>  4.3   |  24  |  0.74    Ca    9.5      20 Mar 2024 06:50  Phos  3.3       Mg     1.6         TPro  5.8<L>  /  Alb  3.6  /  TBili  0.8  /  DBili  x   /  AST  21  /  ALT  23  /  AlkPhos  129<H>        Uric Acid --    LIVER FUNCTIONS - ( 18 Mar 2024 07:49 )  Alb: 3.4 g/dL / Pro: 5.7 g/dL / ALK PHOS: 124 U/L / ALT: 21 U/L / AST: 17 U/L / GGT: x           Urinalysis Basic - ( 18 Mar 2024 07:49 )    Color: x / Appearance: x / SG: x / pH: x  Gluc: 106 mg/dL / Ketone: x  / Bili: x / Urobili: x   Blood: x / Protein: x / Nitrite: x   Leuk Esterase: x / RBC: x / WBC x   Sq Epi: x / Non Sq Epi: x / Bacteria: x    Cultures:   Culture - Urine (24 @ 21:24)    Specimen Source: Clean Catch Clean Catch (Midstream)   Culture Results:   <10,000 CFU/mL Normal Urogenital Argelia    Culture - Blood (24 @ 21:24)    Specimen Source: .Blood Triple Lumen BROWN   Culture Results:   No growth at 5 days    Culture - Blood (24 @ 21:24)    Specimen Source: .Blood Triple Lumen BLUE   Culture Results:   No growth at 5 days    Radiology:   US Head + Neck Soft Tissue (03.15.24 @ 19:05)   FINDINGS:  The left parotid gland is prominent in size and heterogeneous in   echotexture with linear hypoechoic bands suggestive of parotid gland   edema. There is no discrete mass or fluid collection. No stonesare   identified.    The right parotid gland was not well imaged.    IMPRESSION:  Left parotid gland edema. No fluid collection is identified.  Limited visualization of the right parotid gland.    Meds:   Antimicrobials:   cefepime   IVPB 2000 milliGRAM(s) IV Intermittent every 8 hours  fluconAZOLE   Tablet 400 milliGRAM(s) Oral daily  valACYclovir 500 milliGRAM(s) Oral two times a day    GI:  aluminum hydroxide/magnesium hydroxide/simethicone Suspension 30 milliLiter(s) Oral every 6 hours PRN  calcium carbonate    500 mG (Tums) Chewable 1 Tablet(s) Chew three times a day PRN  loperamide 2 milliGRAM(s) Oral every 3 hours PRN  pantoprazole    Tablet 40 milliGRAM(s) Oral before breakfast  sodium bicarbonate Mouth Rinse 10 milliLiter(s) Swish and Spit five times a day    Cardiovascular:   losartan 100 milliGRAM(s) Oral daily    Immunologic:   filgrastim-sndz (ZARXIO) Injectable 480 MICROGram(s) SubCutaneous every 24 hours  mycophenolate mofetil 1000 milliGRAM(s) Oral three times a day  tacrolimus 2.5 milliGRAM(s) Oral two times a day    Other medications:   Biotene Dry Mouth Oral Rinse 5 milliLiter(s) Swish and Spit five times a day  chlorhexidine 4% Liquid 1 Application(s) Topical <User Schedule>  escitalopram 20 milliGRAM(s) Oral daily  lidocaine/prilocaine Cream 1 Application(s) Topical daily  loratadine 10 milliGRAM(s) Oral daily  sodium chloride 0.9% 1000 milliLiter(s) IV Continuous <Continuous>  sodium chloride 0.9%. 1000 milliLiter(s) IV Continuous <Continuous>      PRN:   acetaminophen     Tablet .. 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 Mild, central pulmonary venous congestion, new.  3/9 BCx and UCx, follow up  3/11- transaminitis, hold fluconazole. Tbili increased to 1.8. Added on direct and indirect bili.   3/12- direct bili 1.3 3/11/24   3/15- Chemo induced grade 2 oral mucositis: continue supportive care   3/15 parotid gland ( L) edema with PO intake stove vs bleeding vs truma f/u US   3/16 US prelim small collection <1cm, likely cyst, d/w Dr Batres,  final reading   3/18 increasing tacro to 2.5 BID (level =7). re-check level next on Thurs 3/21. increase Fluconazole to 400 daily (LFTs normalized). Zofran prn for n/v.    1. Infectious Disease:     cefepime   IVPB 2000 milliGRAM(s) IV Intermittent every 8 hours  fluconAZOLE   Tablet 400 milliGRAM(s) Oral daily  valACYclovir 500 milliGRAM(s) Oral two times a day    2. GI Prophylaxis:   pantoprazole    Tablet 40 milliGRAM(s) Oral before breakfast    3. Mouthcare - NS / NaHCO3 rinses, Mycelex, Biotene; Skin care     4. GVHD prophylaxis   TBI day -1   CTX days +3, +4   mycophenolate mofetil 1000 milliGRAM(s) Oral three times a day  tacrolimus 2.5   milliGRAM(s) Oral two times a day    6. Transfuse & replete electrolytes prn   Thrombocytopenia with blood blister in mouth, PLT to keep PLT goal 20K    7. IV hydration, daily weights, strict I&O, prn diuresis     8. PO intake as tolerated, nutrition follow up as needed, MVI, folic acid     9. Antiemetics, anti-diarrhea medications:   loperamide 2 milliGRAM(s) Oral every 3 hours PRN  metoclopramide Injectable 10 milliGRAM(s) IV Push every 6 hours PRN    10. OOB as tolerated, physical therapy consult if needed     11. Monitor coags / fibrinogen 2x week, vitamin K as needed     12. Monitor closely for clinical changes, monitor for fevers     13. Emotional support provided, plan of care discussed and questions addressed     14. Patient education done regarding plan of care, restrictions and discharge planning     15. Continue regular social work input     I have written the above note for Dr. Olivier who performed service with me in the room.   Maureen Gardner NP-C ([phone])    I have seen and examined patient with NP. I agree with above note as scribed.

## 2024-03-21 LAB
ALBUMIN SERPL ELPH-MCNC: 3.5 G/DL — SIGNIFICANT CHANGE UP (ref 3.3–5)
ALP SERPL-CCNC: 128 U/L — HIGH (ref 40–120)
ALT FLD-CCNC: 20 U/L — SIGNIFICANT CHANGE UP (ref 10–45)
ANION GAP SERPL CALC-SCNC: 10 MMOL/L — SIGNIFICANT CHANGE UP (ref 5–17)
APTT BLD: 28.4 SEC — SIGNIFICANT CHANGE UP (ref 24.5–35.6)
AST SERPL-CCNC: 17 U/L — SIGNIFICANT CHANGE UP (ref 10–40)
BILIRUB SERPL-MCNC: 0.8 MG/DL — SIGNIFICANT CHANGE UP (ref 0.2–1.2)
BUN SERPL-MCNC: 19 MG/DL — SIGNIFICANT CHANGE UP (ref 7–23)
CALCIUM SERPL-MCNC: 9.4 MG/DL — SIGNIFICANT CHANGE UP (ref 8.4–10.5)
CHLORIDE SERPL-SCNC: 105 MMOL/L — SIGNIFICANT CHANGE UP (ref 96–108)
CO2 SERPL-SCNC: 23 MMOL/L — SIGNIFICANT CHANGE UP (ref 22–31)
CREAT SERPL-MCNC: 0.79 MG/DL — SIGNIFICANT CHANGE UP (ref 0.5–1.3)
EGFR: 82 ML/MIN/1.73M2 — SIGNIFICANT CHANGE UP
FIBRINOGEN PPP-MCNC: 427 MG/DL — SIGNIFICANT CHANGE UP (ref 200–445)
GLUCOSE SERPL-MCNC: 99 MG/DL — SIGNIFICANT CHANGE UP (ref 70–99)
HCT VFR BLD CALC: 21 % — CRITICAL LOW (ref 34.5–45)
HGB BLD-MCNC: 7.5 G/DL — LOW (ref 11.5–15.5)
INR BLD: 1.07 RATIO — SIGNIFICANT CHANGE UP (ref 0.85–1.18)
LDH SERPL L TO P-CCNC: 108 U/L — SIGNIFICANT CHANGE UP (ref 50–242)
MAGNESIUM SERPL-MCNC: 1.3 MG/DL — LOW (ref 1.6–2.6)
MCHC RBC-ENTMCNC: 31 PG — SIGNIFICANT CHANGE UP (ref 27–34)
MCHC RBC-ENTMCNC: 35.7 GM/DL — SIGNIFICANT CHANGE UP (ref 32–36)
MCV RBC AUTO: 86.8 FL — SIGNIFICANT CHANGE UP (ref 80–100)
NRBC # BLD: 0 /100 WBCS — SIGNIFICANT CHANGE UP (ref 0–0)
PHOSPHATE SERPL-MCNC: 2.9 MG/DL — SIGNIFICANT CHANGE UP (ref 2.5–4.5)
PLATELET # BLD AUTO: 14 K/UL — CRITICAL LOW (ref 150–400)
POTASSIUM SERPL-MCNC: 4.4 MMOL/L — SIGNIFICANT CHANGE UP (ref 3.5–5.3)
POTASSIUM SERPL-SCNC: 4.4 MMOL/L — SIGNIFICANT CHANGE UP (ref 3.5–5.3)
PROT SERPL-MCNC: 5.5 G/DL — LOW (ref 6–8.3)
PROTHROM AB SERPL-ACNC: 11.8 SEC — SIGNIFICANT CHANGE UP (ref 9.5–13)
RBC # BLD: 2.42 M/UL — LOW (ref 3.8–5.2)
RBC # FLD: 10.6 % — SIGNIFICANT CHANGE UP (ref 10.3–14.5)
SODIUM SERPL-SCNC: 138 MMOL/L — SIGNIFICANT CHANGE UP (ref 135–145)
TACROLIMUS SERPL-MCNC: 9.9 NG/ML — SIGNIFICANT CHANGE UP
WBC # BLD: 0.01 K/UL — CRITICAL LOW (ref 3.8–10.5)
WBC # FLD AUTO: 0.01 K/UL — CRITICAL LOW (ref 3.8–10.5)

## 2024-03-21 PROCEDURE — 99233 SBSQ HOSP IP/OBS HIGH 50: CPT | Mod: FS

## 2024-03-21 RX ORDER — MAGNESIUM SULFATE 500 MG/ML
2 VIAL (ML) INJECTION ONCE
Refills: 0 | Status: COMPLETED | OUTPATIENT
Start: 2024-03-21 | End: 2024-03-21

## 2024-03-21 RX ADMIN — SODIUM CHLORIDE 20 MILLILITER(S): 9 INJECTION INTRAMUSCULAR; INTRAVENOUS; SUBCUTANEOUS at 05:59

## 2024-03-21 RX ADMIN — Medication 5 MILLIGRAM(S): at 00:21

## 2024-03-21 RX ADMIN — VALACYCLOVIR 500 MILLIGRAM(S): 500 TABLET, FILM COATED ORAL at 05:58

## 2024-03-21 RX ADMIN — Medication 5 MILLIGRAM(S): at 06:12

## 2024-03-21 RX ADMIN — CEFEPIME 100 MILLIGRAM(S): 1 INJECTION, POWDER, FOR SOLUTION INTRAMUSCULAR; INTRAVENOUS at 05:59

## 2024-03-21 RX ADMIN — Medication 10 MILLILITER(S): at 00:21

## 2024-03-21 RX ADMIN — MYCOPHENOLATE MOFETIL 1000 MILLIGRAM(S): 250 CAPSULE ORAL at 14:10

## 2024-03-21 RX ADMIN — LOSARTAN POTASSIUM 100 MILLIGRAM(S): 100 TABLET, FILM COATED ORAL at 06:16

## 2024-03-21 RX ADMIN — Medication 10 MILLILITER(S): at 08:28

## 2024-03-21 RX ADMIN — Medication 5 MILLILITER(S): at 11:31

## 2024-03-21 RX ADMIN — Medication 5 MILLILITER(S): at 08:25

## 2024-03-21 RX ADMIN — Medication 10 MILLILITER(S): at 20:41

## 2024-03-21 RX ADMIN — Medication 5 MILLILITER(S): at 16:15

## 2024-03-21 RX ADMIN — Medication 10 MILLILITER(S): at 16:14

## 2024-03-21 RX ADMIN — ESCITALOPRAM OXALATE 20 MILLIGRAM(S): 10 TABLET, FILM COATED ORAL at 11:38

## 2024-03-21 RX ADMIN — Medication 5 MILLIGRAM(S): at 18:15

## 2024-03-21 RX ADMIN — MYCOPHENOLATE MOFETIL 1000 MILLIGRAM(S): 250 CAPSULE ORAL at 22:00

## 2024-03-21 RX ADMIN — Medication 650 MILLIGRAM(S): at 14:11

## 2024-03-21 RX ADMIN — Medication 10 MILLILITER(S): at 11:31

## 2024-03-21 RX ADMIN — Medication 5 MILLILITER(S): at 20:41

## 2024-03-21 RX ADMIN — Medication 480 MICROGRAM(S): at 11:35

## 2024-03-21 RX ADMIN — Medication 650 MILLIGRAM(S): at 22:17

## 2024-03-21 RX ADMIN — Medication 5 MILLIGRAM(S): at 11:31

## 2024-03-21 RX ADMIN — TACROLIMUS 2.5 MILLIGRAM(S): 5 CAPSULE ORAL at 18:27

## 2024-03-21 RX ADMIN — MYCOPHENOLATE MOFETIL 1000 MILLIGRAM(S): 250 CAPSULE ORAL at 05:58

## 2024-03-21 RX ADMIN — Medication 25 GRAM(S): at 08:28

## 2024-03-21 RX ADMIN — LORATADINE 10 MILLIGRAM(S): 10 TABLET ORAL at 11:33

## 2024-03-21 RX ADMIN — VALACYCLOVIR 500 MILLIGRAM(S): 500 TABLET, FILM COATED ORAL at 18:14

## 2024-03-21 RX ADMIN — PANTOPRAZOLE SODIUM 40 MILLIGRAM(S): 20 TABLET, DELAYED RELEASE ORAL at 05:58

## 2024-03-21 RX ADMIN — CEFEPIME 100 MILLIGRAM(S): 1 INJECTION, POWDER, FOR SOLUTION INTRAMUSCULAR; INTRAVENOUS at 21:59

## 2024-03-21 RX ADMIN — TACROLIMUS 2.5 MILLIGRAM(S): 5 CAPSULE ORAL at 06:11

## 2024-03-21 RX ADMIN — Medication 650 MILLIGRAM(S): at 23:17

## 2024-03-21 RX ADMIN — FLUCONAZOLE 400 MILLIGRAM(S): 150 TABLET ORAL at 11:38

## 2024-03-21 RX ADMIN — Medication 5 MILLILITER(S): at 00:21

## 2024-03-21 RX ADMIN — CEFEPIME 100 MILLIGRAM(S): 1 INJECTION, POWDER, FOR SOLUTION INTRAMUSCULAR; INTRAVENOUS at 14:10

## 2024-03-21 RX ADMIN — CHLORHEXIDINE GLUCONATE 1 APPLICATION(S): 213 SOLUTION TOPICAL at 06:00

## 2024-03-21 NOTE — PROGRESS NOTE ADULT - NS ATTEND AMEND GEN_ALL_CORE FT
Assessment: 64-year-old day + 14  CATRACHITA alloBMT using a Flu/Cy/TBI preparative regimen for [place] negative ALL.     Plan:  Heme:   - Trend CBC with differential daily. Continue supportive transfusions as needed to maintain Hgb > 7 and plt > 20 given possible bleeding into / from parotid   (> 20 if febrile, > 50 if bleeding).   - G-CSF (started on day +5): continue through engraftment  - Will require post ALLO BMT CNS prophylaxis and TKI maintenance -- begins after engraftment.     GVHD:   - PTCy days 3 and 4  - Cellcept and tacrolimus (started on day +5). Check tacrolimus level Mon/Thurs, last 7 (3/18/24) will increase to 2.5 mg..recheck level thurs    ID:   - Prophylaxis: fluconazole (200 mg daily, can increase to 400 LFTs are stable), Valtrex  - Neutropenic fever: cefepime. Infectious work up negative (diarrhea but C.diff negative).  - Bactrim SS daily to start on day +21  - BM product A0 cx positive for Staph caprae (skin lauro)  - face US with parotid edema, sx of swelling improved    Nutrition: tolerating PO    DVT prophylaxis: ambulation Assessment: 64-year-old day + 15  CATRACHITA alloBMT using a Flu/Cy/TBI preparative regimen for [place] negative ALL.     Plan:  Heme:   - Trend CBC with differential daily. Continue supportive transfusions as needed to maintain Hgb > 7 and plt > 20 given possible bleeding into / from parotid   (> 20 if febrile, > 50 if bleeding).   - G-CSF (started on day +5): continue through engraftment  - Will require post ALLO BMT CNS prophylaxis and TKI maintenance -- begins after engraftment.     GVHD:   - PTCy days 3 and 4 complete  - Cellcept and tacrolimus (started on day +5). Check tacrolimus level Mon/Thurs, last 7 (3/18/24) will increase to 2.5 mg..recheck level thurs    ID:   - Prophylaxis: fluconazole (200 mg daily, can increase to 400 LFTs are stable), Valtrex  - Neutropenic fever: cefepime. Infectious work up negative (diarrhea but C.diff negative). consider de escalation  - Bactrim SS daily to start on day +21  - BM product A0 cx positive for Staph caprae (skin lauro)  - face US with parotid edema, sx of swelling improved    Nutrition: tolerating PO    DVT prophylaxis: ambulation

## 2024-03-21 NOTE — PROGRESS NOTE ADULT - SUBJECTIVE AND OBJECTIVE BOX
HPC Transplant Team                                                      Critical / Counseling Time Provided: 30 minutes                                                                                                                                                        Chief Complaint: Haplo-idential BMT (son) with FLU/CY/TBI prep for the treatment fo ALL    S: Patient seen and examined with [facility] Transplant Team:   + loose stool  + intermittent nausea    Other ROS(-)    O: Vitals:   Vital Signs Last 24 Hrs  T(C): 36.5 (21 Mar 2024 06:00), Max: 36.9 (20 Mar 2024 09:05)  T(F): 97.7 (21 Mar 2024 06:00), Max: 98.4 (20 Mar 2024 09:05)  HR: 67 (21 Mar 2024 06:00) (62 - 78)  BP: 146/78 (21 Mar 2024 06:00) (114/73 - 146/78)  RR: 18 (21 Mar 2024 06:00) (18 - 18)  SpO2: 96% (21 Mar 2024 06:00) (96% - 98%)    Parameters below as of 21 Mar 2024 06:00  Patient On (Oxygen Delivery Method): room air        Admit weight: 96.9 kg  Daily Weight in k.4 (19 Mar 2024 08:09)  Daily Weight in k.3 (20 Mar 2024 08:36)      Intake / Output:   I&O's Summary  20 Mar 2024 07:01  -  21 Mar 2024 07:00  --------------------------------------------------------  IN: 1301 mL / OUT: 1900 mL / NET: -599 mL        PE:   Oropharynx: + patchy erythema B/L buccal mucosa , + ulcerations lower lip + L cheek  blood blister   Oral Mucositis:   +                                                 stGstrstastdstest:st st1st CVS: RRR, +S1,S2  Lungs: CTA throughout bilaterally   Abdomen: soft, ND, ND +bs  Extremities: no edema   Gastric Mucositis:      -                                            Grade: -  Intestinal Mucositis:     -                                         Grade: -  Skin: small area of petechiae on the medial aspect of the left knee and diffusely over the left medial ankle and anterior shin  TLC: C/D/I   Neuro: A&O x3  Pain: Denies        LABS:                  7.5    0.01  )-----------( 14       ( 21 Mar 2024 06:51 )             21.0       Mean Cell Volume : 86.8 fl  Mean Cell Hemoglobin : 31.0 pg  Mean Cell Hemoglobin Concentration : 35.7 gm/dL  Auto Neutrophil # : x  Auto Lymphocyte # : x  Auto Monocyte # : x  Auto Eosinophil # : x  Auto Basophil # : x  Auto Neutrophil % : x  Auto Lymphocyte % : x  Auto Monocyte % : x  Auto Eosinophil % : x  Auto Basophil % : x        138  |  105  |  19  ----------------------------<  99  4.4   |  23  |  0.79    Ca    9.4      21 Mar 2024 06:47  Phos  2.9       Mg     1.3         TPro  5.5<L>  /  Alb  3.5  /  TBili  0.8  /  DBili  x   /  AST  17  /  ALT  20  /  AlkPhos  128<H>        Uric Acid --    LIVER FUNCTIONS - ( 21 Mar 2024 06:47 )  Alb: 3.5 g/dL / Pro: 5.5 g/dL / ALK PHOS: 128 U/L / ALT: 20 U/L / AST: 17 U/L / GGT: x           Urinalysis Basic - ( 18 Mar 2024 07:49 )  Color: x / Appearance: x / SG: x / pH: x  Gluc: 106 mg/dL / Ketone: x  / Bili: x / Urobili: x   Blood: x / Protein: x / Nitrite: x   Leuk Esterase: x / RBC: x / WBC x   Sq Epi: x / Non Sq Epi: x / Bacteria: x    Cultures:   Culture - Urine (24 @ 21:24)    Specimen Source: Clean Catch Clean Catch (Midstream)   Culture Results:   <10,000 CFU/mL Normal Urogenital Argelia    Culture - Blood (24 @ 21:24)    Specimen Source: .Blood Triple Lumen BROWN   Culture Results:   No growth at 5 days    Culture - Blood (24 @ 21:24)    Specimen Source: .Blood Triple Lumen BLUE   Culture Results:   No growth at 5 days    Radiology:   US Head + Neck Soft Tissue (03.15.24 @ 19:05)   FINDINGS:  The left parotid gland is prominent in size and heterogeneous in   echotexture with linear hypoechoic bands suggestive of parotid gland   edema. There is no discrete mass or fluid collection. No stonesare   identified.    The right parotid gland was not well imaged.    IMPRESSION:  Left parotid gland edema. No fluid collection is identified.  Limited visualization of the right parotid gland.    Meds:   Antimicrobials:   cefepime   IVPB 2000 milliGRAM(s) IV Intermittent every 8 hours  fluconAZOLE   Tablet 400 milliGRAM(s) Oral daily  valACYclovir 500 milliGRAM(s) Oral two times a day    GI:  aluminum hydroxide/magnesium hydroxide/simethicone Suspension 30 milliLiter(s) Oral every 6 hours PRN  calcium carbonate    500 mG (Tums) Chewable 1 Tablet(s) Chew three times a day PRN  loperamide 2 milliGRAM(s) Oral every 3 hours PRN  pantoprazole    Tablet 40 milliGRAM(s) Oral before breakfast  sodium bicarbonate Mouth Rinse 10 milliLiter(s) Swish and Spit five times a day    Cardiovascular:   losartan 100 milliGRAM(s) Oral daily    Immunologic:   filgrastim-sndz (ZARXIO) Injectable 480 MICROGram(s) SubCutaneous every 24 hours  mycophenolate mofetil 1000 milliGRAM(s) Oral three times a day  tacrolimus 2.5 milliGRAM(s) Oral two times a day    Other medications:   Biotene Dry Mouth Oral Rinse 5 milliLiter(s) Swish and Spit five times a day  chlorhexidine 4% Liquid 1 Application(s) Topical <User Schedule>  escitalopram 20 milliGRAM(s) Oral daily  lidocaine/prilocaine Cream 1 Application(s) Topical daily  loratadine 10 milliGRAM(s) Oral daily  sodium chloride 0.9% 1000 milliLiter(s) IV Continuous <Continuous>  sodium chloride 0.9%. 1000 milliLiter(s) IV Continuous <Continuous>      PRN:   acetaminophen     Tablet .. 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 Mild, central pulmonary venous congestion, new.  3/9 BCx and UCx, follow up  3/11- transaminitis, hold fluconazole. Tbili increased to 1.8. Added on direct and indirect bili.   3/12- direct bili 1.3 3/11/24   3/15- Chemo induced grade 2 oral mucositis: continue supportive care   3/15 parotid gland ( L) edema with PO intake stove vs bleeding vs truma f/u US   3/16 US prelim small collection <1cm, likely cyst, d/w Dr Batres,  final reading   3/18 increasing tacro to 2.5 BID (level =7). re-check level next on Thurs 3/21. increase Fluconazole to 400 daily (LFTs normalized). Zofran prn for n/v.    1. Infectious Disease:     cefepime   IVPB 2000 milliGRAM(s) IV Intermittent every 8 hours  fluconAZOLE   Tablet 400 milliGRAM(s) Oral daily  valACYclovir 500 milliGRAM(s) Oral two times a day    2. GI Prophylaxis:   pantoprazole    Tablet 40 milliGRAM(s) Oral before breakfast    3. Mouthcare - NS / NaHCO3 rinses, Mycelex, Biotene; Skin care     4. GVHD prophylaxis   TBI day -1   CTX days +3, +4   mycophenolate mofetil 1000 milliGRAM(s) Oral three times a day  tacrolimus 2.5   milliGRAM(s) Oral two times a day    6. Transfuse & replete electrolytes prn   Thrombocytopenia with blood blister in mouth, PLT to keep PLT goal 20K    7. IV hydration, daily weights, strict I&O, prn diuresis     8. PO intake as tolerated, nutrition follow up as needed, MVI, folic acid     9. Antiemetics, anti-diarrhea medications:   loperamide 2 milliGRAM(s) Oral every 3 hours PRN  metoclopramide Injectable 10 milliGRAM(s) IV Push every 6 hours PRN    10. OOB as tolerated, physical therapy consult if needed     11. Monitor coags / fibrinogen 2x week, vitamin K as needed     12. Monitor closely for clinical changes, monitor for fevers     13. Emotional support provided, plan of care discussed and questions addressed     14. Patient education done regarding plan of care, restrictions and discharge planning     15. Continue regular social work input     I have written the above note for Dr. Olivier who performed service with me in the room.   Maureen Gardner NP-C ([phone])    I have seen and examined patient with NP. I agree with above note as scribed.                    HPC Transplant Team                                                      Critical / Counseling Time Provided: 30 minutes                                                                                                                                                        Chief Complaint: Haplo-idential BMT (son) with FLU/CY/TBI prep for the treatment fo ALL    S: Patient seen and examined with HPC Transplant Team:   no scute complaints overnight     Other ROS(-)    O: Vitals:   Vital Signs Last 24 Hrs  T(C): 36.5 (21 Mar 2024 06:00), Max: 36.9 (20 Mar 2024 09:05)  T(F): 97.7 (21 Mar 2024 06:00), Max: 98.4 (20 Mar 2024 09:05)  HR: 67 (21 Mar 2024 06:00) (62 - 78)  BP: 146/78 (21 Mar 2024 06:00) (114/73 - 146/78)  RR: 18 (21 Mar 2024 06:00) (18 - 18)  SpO2: 96% (21 Mar 2024 06:00) (96% - 98%)    Parameters below as of 21 Mar 2024 06:00  Patient On (Oxygen Delivery Method): room air      Admit weight: 96.9 kg  Daily Weight in k.4 (19 Mar 2024 08:09)  Daily Weight in k.3 (20 Mar 2024 08:36)  Daily Weight in k.4 (21 Mar 2024 08:40)    Intake / Output:   I&O's Summary  20 Mar 2024 07:01  -  21 Mar 2024 07:00  --------------------------------------------------------  IN: 1301 mL / OUT: 1900 mL / NET: -599 mL        PE:   Oropharynx: + patchy erythema B/L buccal mucosa , + ulcerations lower lip + L cheek  blood blister   Oral Mucositis:   +                                                 stGstrstastdstest:st st1st CVS: RRR, +S1,S2  Lungs: CTA throughout bilaterally   Abdomen: soft, ND, ND +bs  Extremities: no edema   Gastric Mucositis:      -                                            Grade: -  Intestinal Mucositis:     -                                         Grade: -  Skin: small area of petechiae on the medial aspect of the left knee and diffusely over the left medial ankle and anterior shin  TLC: C/D/I   Neuro: A&O x3  Pain: Denies        LABS:                  7.5    0.01  )-----------( 14       ( 21 Mar 2024 06:51 )             21.0       Mean Cell Volume : 86.8 fl  Mean Cell Hemoglobin : 31.0 pg  Mean Cell Hemoglobin Concentration : 35.7 gm/dL  Auto Neutrophil # : x  Auto Lymphocyte # : x  Auto Monocyte # : x  Auto Eosinophil # : x  Auto Basophil # : x  Auto Neutrophil % : x  Auto Lymphocyte % : x  Auto Monocyte % : x  Auto Eosinophil % : x  Auto Basophil % : x        138  |  105  |  19  ----------------------------<  99  4.4   |  23  |  0.79    Ca    9.4      21 Mar 2024 06:47  Phos  2.9       Mg     1.3         TPro  5.5<L>  /  Alb  3.5  /  TBili  0.8  /  DBili  x   /  AST  17  /  ALT  20  /  AlkPhos  128<H>        Uric Acid --    LIVER FUNCTIONS - ( 21 Mar 2024 06:47 )  Alb: 3.5 g/dL / Pro: 5.5 g/dL / ALK PHOS: 128 U/L / ALT: 20 U/L / AST: 17 U/L / GGT: x           Urinalysis Basic - ( 18 Mar 2024 07:49 )  Color: x / Appearance: x / SG: x / pH: x  Gluc: 106 mg/dL / Ketone: x  / Bili: x / Urobili: x   Blood: x / Protein: x / Nitrite: x   Leuk Esterase: x / RBC: x / WBC x   Sq Epi: x / Non Sq Epi: x / Bacteria: x    Cultures:   Culture - Urine (24 @ 21:24)    Specimen Source: Clean Catch Clean Catch (Midstream)   Culture Results:   <10,000 CFU/mL Normal Urogenital Argelia    Culture - Blood (24 @ 21:24)    Specimen Source: .Blood Triple Lumen BROWN   Culture Results:   No growth at 5 days    Culture - Blood (24 @ 21:24)    Specimen Source: .Blood Triple Lumen BLUE   Culture Results:   No growth at 5 days    Radiology:   US Head + Neck Soft Tissue (03.15.24 @ 19:05)   FINDINGS:  The left parotid gland is prominent in size and heterogeneous in   echotexture with linear hypoechoic bands suggestive of parotid gland   edema. There is no discrete mass or fluid collection. No stonesare   identified.    The right parotid gland was not well imaged.    IMPRESSION:  Left parotid gland edema. No fluid collection is identified.  Limited visualization of the right parotid gland.    Meds:   Antimicrobials:   cefepime   IVPB 2000 milliGRAM(s) IV Intermittent every 8 hours  fluconAZOLE   Tablet 400 milliGRAM(s) Oral daily  valACYclovir 500 milliGRAM(s) Oral two times a day    GI:  aluminum hydroxide/magnesium hydroxide/simethicone Suspension 30 milliLiter(s) Oral every 6 hours PRN  calcium carbonate    500 mG (Tums) Chewable 1 Tablet(s) Chew three times a day PRN  loperamide 2 milliGRAM(s) Oral every 3 hours PRN  pantoprazole    Tablet 40 milliGRAM(s) Oral before breakfast  sodium bicarbonate Mouth Rinse 10 milliLiter(s) Swish and Spit five times a day    Cardiovascular:   losartan 100 milliGRAM(s) Oral daily    Immunologic:   filgrastim-sndz (ZARXIO) Injectable 480 MICROGram(s) SubCutaneous every 24 hours  mycophenolate mofetil 1000 milliGRAM(s) Oral three times a day  tacrolimus 2.5 milliGRAM(s) Oral two times a day    Other medications:   Biotene Dry Mouth Oral Rinse 5 milliLiter(s) Swish and Spit five times a day  chlorhexidine 4% Liquid 1 Application(s) Topical <User Schedule>  escitalopram 20 milliGRAM(s) Oral daily  lidocaine/prilocaine Cream 1 Application(s) Topical daily  loratadine 10 milliGRAM(s) Oral daily  sodium chloride 0.9% 1000 milliLiter(s) IV Continuous <Continuous>  sodium chloride 0.9%. 1000 milliLiter(s) IV Continuous <Continuous>      PRN:   acetaminophen     Tablet .. 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 Mild, central pulmonary venous congestion, new.  3/9 BCx and UCx, follow up  3/11- transaminitis, hold fluconazole. Tbili increased to 1.8. Added on direct and indirect bili.   3/12- direct bili 1.3 3/11/24   3/15- Chemo induced grade 2 oral mucositis: continue supportive care   3/15 parotid gland ( L) edema with PO intake stove vs bleeding vs truma f/u US   3/16 US prelim small collection <1cm, likely cyst, d/w Dr Batres,  final reading   3/18 increasing tacro to 2.5 BID (level =7). re-check level next on Thurs 3/21. increase Fluconazole to 400 daily (LFTs normalized). Zofran prn for n/v.    1. Infectious Disease:     cefepime   IVPB 2000 milliGRAM(s) IV Intermittent every 8 hours  fluconAZOLE   Tablet 400 milliGRAM(s) Oral daily  valACYclovir 500 milliGRAM(s) Oral two times a day    2. GI Prophylaxis:   pantoprazole    Tablet 40 milliGRAM(s) Oral before breakfast    3. Mouthcare - NS / NaHCO3 rinses, Mycelex, Biotene; Skin care     4. GVHD prophylaxis   TBI day -1   CTX days +3, +4   mycophenolate mofetil 1000 milliGRAM(s) Oral three times a day  tacrolimus 2.5   milliGRAM(s) Oral two times a day    6. Transfuse & replete electrolytes prn   Thrombocytopenia with blood blister in mouth, PLT to keep PLT goal 20K    7. IV hydration, daily weights, strict I&O, prn diuresis     8. PO intake as tolerated, nutrition follow up as needed, MVI, folic acid     9. Antiemetics, anti-diarrhea medications:   loperamide 2 milliGRAM(s) Oral every 3 hours PRN  metoclopramide Injectable 10 milliGRAM(s) IV Push every 6 hours PRN    10. OOB as tolerated, physical therapy consult if needed     11. Monitor coags / fibrinogen 2x week, vitamin K as needed     12. Monitor closely for clinical changes, monitor for fevers     13. Emotional support provided, plan of care discussed and questions addressed     14. Patient education done regarding plan of care, restrictions and discharge planning     15. Continue regular social work input     I have written the above note for Dr. Olivier who performed service with me in the room.   Maureen Gardner NP-C ([phone])    I have seen and examined patient with NP. I agree with above note as scribed.

## 2024-03-22 LAB
ALBUMIN SERPL ELPH-MCNC: 3.4 G/DL — SIGNIFICANT CHANGE UP (ref 3.3–5)
ALP SERPL-CCNC: 128 U/L — HIGH (ref 40–120)
ALT FLD-CCNC: 21 U/L — SIGNIFICANT CHANGE UP (ref 10–45)
ANION GAP SERPL CALC-SCNC: 10 MMOL/L — SIGNIFICANT CHANGE UP (ref 5–17)
AST SERPL-CCNC: 17 U/L — SIGNIFICANT CHANGE UP (ref 10–40)
BILIRUB DIRECT SERPL-MCNC: 0.2 MG/DL — SIGNIFICANT CHANGE UP (ref 0–0.3)
BILIRUB INDIRECT FLD-MCNC: 0.6 MG/DL — SIGNIFICANT CHANGE UP (ref 0.2–1)
BILIRUB SERPL-MCNC: 0.8 MG/DL — SIGNIFICANT CHANGE UP (ref 0.2–1.2)
BLD GP AB SCN SERPL QL: NEGATIVE — SIGNIFICANT CHANGE UP
BUN SERPL-MCNC: 18 MG/DL — SIGNIFICANT CHANGE UP (ref 7–23)
CALCIUM SERPL-MCNC: 9.6 MG/DL — SIGNIFICANT CHANGE UP (ref 8.4–10.5)
CHLORIDE SERPL-SCNC: 107 MMOL/L — SIGNIFICANT CHANGE UP (ref 96–108)
CO2 SERPL-SCNC: 23 MMOL/L — SIGNIFICANT CHANGE UP (ref 22–31)
CREAT SERPL-MCNC: 0.72 MG/DL — SIGNIFICANT CHANGE UP (ref 0.5–1.3)
EGFR: 92 ML/MIN/1.73M2 — SIGNIFICANT CHANGE UP
GLUCOSE SERPL-MCNC: 98 MG/DL — SIGNIFICANT CHANGE UP (ref 70–99)
HCT VFR BLD CALC: 19.5 % — CRITICAL LOW (ref 34.5–45)
HGB BLD-MCNC: 7.2 G/DL — LOW (ref 11.5–15.5)
LDH SERPL L TO P-CCNC: 110 U/L — SIGNIFICANT CHANGE UP (ref 50–242)
MAGNESIUM SERPL-MCNC: 1.7 MG/DL — SIGNIFICANT CHANGE UP (ref 1.6–2.6)
MCHC RBC-ENTMCNC: 31.6 PG — SIGNIFICANT CHANGE UP (ref 27–34)
MCHC RBC-ENTMCNC: 36.9 GM/DL — HIGH (ref 32–36)
MCV RBC AUTO: 85.5 FL — SIGNIFICANT CHANGE UP (ref 80–100)
NRBC # BLD: 0 /100 WBCS — SIGNIFICANT CHANGE UP (ref 0–0)
PHOSPHATE SERPL-MCNC: 2.8 MG/DL — SIGNIFICANT CHANGE UP (ref 2.5–4.5)
PLATELET # BLD AUTO: 29 K/UL — LOW (ref 150–400)
POTASSIUM SERPL-MCNC: 4.4 MMOL/L — SIGNIFICANT CHANGE UP (ref 3.5–5.3)
POTASSIUM SERPL-SCNC: 4.4 MMOL/L — SIGNIFICANT CHANGE UP (ref 3.5–5.3)
PROT SERPL-MCNC: 5.7 G/DL — LOW (ref 6–8.3)
RBC # BLD: 2.28 M/UL — LOW (ref 3.8–5.2)
RBC # FLD: 10.6 % — SIGNIFICANT CHANGE UP (ref 10.3–14.5)
RH IG SCN BLD-IMP: POSITIVE — SIGNIFICANT CHANGE UP
SODIUM SERPL-SCNC: 140 MMOL/L — SIGNIFICANT CHANGE UP (ref 135–145)
WBC # BLD: 0.07 K/UL — CRITICAL LOW (ref 3.8–10.5)
WBC # FLD AUTO: 0.07 K/UL — CRITICAL LOW (ref 3.8–10.5)

## 2024-03-22 PROCEDURE — 99233 SBSQ HOSP IP/OBS HIGH 50: CPT | Mod: FS

## 2024-03-22 PROCEDURE — 99232 SBSQ HOSP IP/OBS MODERATE 35: CPT | Mod: GC

## 2024-03-22 RX ADMIN — MYCOPHENOLATE MOFETIL 1000 MILLIGRAM(S): 250 CAPSULE ORAL at 14:52

## 2024-03-22 RX ADMIN — Medication 10 MILLILITER(S): at 16:43

## 2024-03-22 RX ADMIN — CEFEPIME 100 MILLIGRAM(S): 1 INJECTION, POWDER, FOR SOLUTION INTRAMUSCULAR; INTRAVENOUS at 06:06

## 2024-03-22 RX ADMIN — Medication 5 MILLIGRAM(S): at 12:59

## 2024-03-22 RX ADMIN — Medication 5 MILLIGRAM(S): at 00:34

## 2024-03-22 RX ADMIN — CEFEPIME 100 MILLIGRAM(S): 1 INJECTION, POWDER, FOR SOLUTION INTRAMUSCULAR; INTRAVENOUS at 14:52

## 2024-03-22 RX ADMIN — PANTOPRAZOLE SODIUM 40 MILLIGRAM(S): 20 TABLET, DELAYED RELEASE ORAL at 06:09

## 2024-03-22 RX ADMIN — Medication 5 MILLILITER(S): at 16:43

## 2024-03-22 RX ADMIN — CEFEPIME 100 MILLIGRAM(S): 1 INJECTION, POWDER, FOR SOLUTION INTRAMUSCULAR; INTRAVENOUS at 21:05

## 2024-03-22 RX ADMIN — Medication 5 MILLIGRAM(S): at 18:42

## 2024-03-22 RX ADMIN — Medication 5 MILLIGRAM(S): at 23:56

## 2024-03-22 RX ADMIN — Medication 5 MILLILITER(S): at 23:55

## 2024-03-22 RX ADMIN — LOSARTAN POTASSIUM 100 MILLIGRAM(S): 100 TABLET, FILM COATED ORAL at 05:26

## 2024-03-22 RX ADMIN — VALACYCLOVIR 500 MILLIGRAM(S): 500 TABLET, FILM COATED ORAL at 05:25

## 2024-03-22 RX ADMIN — Medication 10 MILLILITER(S): at 23:55

## 2024-03-22 RX ADMIN — Medication 5 MILLILITER(S): at 08:59

## 2024-03-22 RX ADMIN — SODIUM CHLORIDE 20 MILLILITER(S): 9 INJECTION INTRAMUSCULAR; INTRAVENOUS; SUBCUTANEOUS at 05:26

## 2024-03-22 RX ADMIN — Medication 5 MILLILITER(S): at 20:23

## 2024-03-22 RX ADMIN — CHLORHEXIDINE GLUCONATE 1 APPLICATION(S): 213 SOLUTION TOPICAL at 14:52

## 2024-03-22 RX ADMIN — Medication 10 MILLILITER(S): at 08:59

## 2024-03-22 RX ADMIN — MYCOPHENOLATE MOFETIL 1000 MILLIGRAM(S): 250 CAPSULE ORAL at 21:05

## 2024-03-22 RX ADMIN — Medication 5 MILLILITER(S): at 12:58

## 2024-03-22 RX ADMIN — MYCOPHENOLATE MOFETIL 1000 MILLIGRAM(S): 250 CAPSULE ORAL at 06:09

## 2024-03-22 RX ADMIN — TACROLIMUS 2.5 MILLIGRAM(S): 5 CAPSULE ORAL at 18:41

## 2024-03-22 RX ADMIN — Medication 10 MILLILITER(S): at 20:22

## 2024-03-22 RX ADMIN — VALACYCLOVIR 500 MILLIGRAM(S): 500 TABLET, FILM COATED ORAL at 18:42

## 2024-03-22 RX ADMIN — Medication 10 MILLILITER(S): at 12:58

## 2024-03-22 RX ADMIN — TACROLIMUS 2.5 MILLIGRAM(S): 5 CAPSULE ORAL at 05:25

## 2024-03-22 RX ADMIN — Medication 10 MILLILITER(S): at 00:34

## 2024-03-22 RX ADMIN — Medication 5 MILLILITER(S): at 00:34

## 2024-03-22 RX ADMIN — ESCITALOPRAM OXALATE 20 MILLIGRAM(S): 10 TABLET, FILM COATED ORAL at 13:11

## 2024-03-22 RX ADMIN — Medication 480 MICROGRAM(S): at 12:59

## 2024-03-22 RX ADMIN — Medication 5 MILLIGRAM(S): at 05:25

## 2024-03-22 RX ADMIN — LORATADINE 10 MILLIGRAM(S): 10 TABLET ORAL at 12:59

## 2024-03-22 RX ADMIN — FLUCONAZOLE 400 MILLIGRAM(S): 150 TABLET ORAL at 13:00

## 2024-03-22 NOTE — PROGRESS NOTE ADULT - ASSESSMENT
64-year-old F post blinatumomab for detectable [place] negative ALL being admitted for AlloBMT(son) with FLU/CY/TBI prep. [facility] Team is seen for supportive oncology during BMT. 
64-year-old F post blinatumomab for detectable [place] negative ALL being admitted for AlloBMT(son) with FLU/CY/TBI prep. [facility] Team is seen for supportive oncology during BMT.           
64-year-old F post blinatumomab for detectable [place] negative ALL being admitted for AlloBMT(son) with FLU/CY/TBI prep. [facility] ([facility] Team is seen for supportive oncology during BMT.

## 2024-03-22 NOTE — PROGRESS NOTE ADULT - SUBJECTIVE AND OBJECTIVE BOX
HPC Transplant Team                                                      Critical / Counseling Time Provided: 30 minutes                                                                                                                                                        Chief Complaint: Haplo-idential BMT (son) with FLU/CY/TBI prep for the treatment fo ALL    S: Patient seen and examined with HPC Transplant Team:   no scute complaints overnight         O: Vitals:   Vital Signs Last 24 Hrs  T(C): 36.9 (22 Mar 2024 05:24), Max: 36.9 (22 Mar 2024 05:24)  T(F): 98.4 (22 Mar 2024 05:24), Max: 98.4 (22 Mar 2024 05:24)  HR: 77 (22 Mar 2024 05:24) (64 - 101)  BP: 146/80 (22 Mar 2024 05:24) (127/72 - 155/84)  BP(mean): --  RR: 18 (22 Mar 2024 05:24) (18 - 18)  SpO2: 96% (22 Mar 2024 05:24) (94% - 98%)    Parameters below as of 22 Mar 2024 05:24  Patient On (Oxygen Delivery Method): room air        Admit weight: 96.9 kg     Daily Weight in k.4 (21 Mar 2024 08:40)    Intake / Output:   03- @ 07:01  -  03-22 @ 07:00  --------------------------------------------------------  IN: 1362 mL / OUT: 1300 mL / NET: 62 mL      PE:   Oropharynx: + patchy erythema B/L buccal mucosa , + ulcerations lower lip + L cheek  blood blister   Oral Mucositis:   +                                                 stGstrstastdstest:st st1st CVS: RRR, +S1,S2  Lungs: CTA throughout bilaterally   Abdomen: soft, ND, ND +bs  Extremities: no edema   Gastric Mucositis:      -                                            Grade: -  Intestinal Mucositis:     -                                         Grade: -  Skin: small area of petechiae on the medial aspect of the left knee and diffusely over the left medial ankle and anterior shin  TLC: C/D/I   Neuro: A&O x3  Pain: Denies          Labs:   CBC Full  -  ( 21 Mar 2024 06:51 )  WBC Count : 0.01 K/uL  Hemoglobin : 7.5 g/dL  Hematocrit : 21.0 %  Platelet Count - Automated : 14 K/uL  Mean Cell Volume : 86.8 fl  Mean Cell Hemoglobin : 31.0 pg  Mean Cell Hemoglobin Concentration : 35.7 gm/dL  Auto Neutrophil # : x  Auto Lymphocyte # : x  Auto Monocyte # : x  Auto Eosinophil # : x  Auto Basophil # : x  Auto Neutrophil % : x  Auto Lymphocyte % : x  Auto Monocyte % : x  Auto Eosinophil % : x  Auto Basophil % : x                          7.5    0.01  )-----------( 14       ( 21 Mar 2024 06:51 )             21.0     03-21    138  |  105  |  19  ----------------------------<  99  4.4   |  23  |  0.79    Ca    9.4      21 Mar 2024 06:47  Phos  2.9     03-  Mg     1.3     -    TPro  5.5<L>  /  Alb  3.5  /  TBili  0.8  /  DBili  x   /  AST  17  /  ALT  20  /  AlkPhos  128<H>  -    PT/INR - ( 21 Mar 2024 06:51 )   PT: 11.8 sec;   INR: 1.07 ratio         PTT - ( 21 Mar 2024 06:51 )  PTT:28.4 sec  LIVER FUNCTIONS - ( 21 Mar 2024 06:47 )  Alb: 3.5 g/dL / Pro: 5.5 g/dL / ALK PHOS: 128 U/L / ALT: 20 U/L / AST: 17 U/L / GGT: x             Radiology:   US Head + Neck Soft Tissue (03.15.24 @ 19:05)   FINDINGS:  The left parotid gland is prominent in size and heterogeneous in   echotexture with linear hypoechoic bands suggestive of parotid gland   edema. There is no discrete mass or fluid collection. No stonesare   identified.    The right parotid gland was not well imaged.    IMPRESSION:  Left parotid gland edema. No fluid collection is identified.  Limited visualization of the right parotid gland.        Meds:   Antimicrobials:   cefepime   IVPB      cefepime   IVPB 2000 milliGRAM(s) IV Intermittent every 8 hours  fluconAZOLE   Tablet 400 milliGRAM(s) Oral daily  valACYclovir 500 milliGRAM(s) Oral two times a day      Heme / Onc:       GI:  aluminum hydroxide/magnesium hydroxide/simethicone Suspension 30 milliLiter(s) Oral every 6 hours PRN  calcium carbonate    500 mG (Tums) Chewable 1 Tablet(s) Chew three times a day PRN  loperamide 2 milliGRAM(s) Oral every 3 hours PRN  pantoprazole    Tablet 40 milliGRAM(s) Oral before breakfast  sodium bicarbonate Mouth Rinse 10 milliLiter(s) Swish and Spit five times a day      Cardiovascular:   losartan 100 milliGRAM(s) Oral daily      Immunologic:   filgrastim-sndz (ZARXIO) Injectable 480 MICROGram(s) SubCutaneous every 24 hours  mycophenolate mofetil 1000 milliGRAM(s) Oral three times a day  tacrolimus 2.5 milliGRAM(s) Oral two times a day      Other medications:   Biotene Dry Mouth Oral Rinse 5 milliLiter(s) Swish and Spit five times a day  chlorhexidine 4% Liquid 1 Application(s) Topical <User Schedule>  escitalopram 20 milliGRAM(s) Oral daily  lidocaine/prilocaine Cream 1 Application(s) Topical daily  loratadine 10 milliGRAM(s) Oral daily  metoclopramide Injectable 5 milliGRAM(s) IV Push every 6 hours  sodium chloride 0.9%. 1000 milliLiter(s) IV Continuous <Continuous>      PRN:   acetaminophen     Tablet .. 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 Mild, central pulmonary venous congestion, new.  3/9 BCx and UCx, follow up  3/11- transaminitis, hold fluconazole. Tbili increased to 1.8. Added on direct and indirect bili.   3/12- direct bili 1.3 3/11/24   3/15- Chemo induced grade 2 oral mucositis: continue supportive care   3/15 parotid gland ( L) edema with PO intake stove vs bleeding vs truma f/u US   3/16 US prelim small collection <1cm, likely cyst, d/w Dr Batres,  final reading   3/18 increasing tacro to 2.5 BID (level =7). re-check level next on Thurs 3/21. increase Fluconazole to 400 daily (LFTs normalized). Zofran prn for n/v.    1. Infectious Disease:     cefepime   IVPB 2000 milliGRAM(s) IV Intermittent every 8 hours  fluconAZOLE   Tablet 400 milliGRAM(s) Oral daily  valACYclovir 500 milliGRAM(s) Oral two times a day    2. GI Prophylaxis:   pantoprazole    Tablet 40 milliGRAM(s) Oral before breakfast    3. Mouthcare - NS / NaHCO3 rinses, Mycelex, Biotene; Skin care     4. GVHD prophylaxis   TBI day -1   CTX days +3, +4   mycophenolate mofetil 1000 milliGRAM(s) Oral three times a day  tacrolimus 2.5   milliGRAM(s) Oral two times a day    6. Transfuse & replete electrolytes prn   Thrombocytopenia with blood blister in mouth, PLT to keep PLT goal 20K    7. IV hydration, daily weights, strict I&O, prn diuresis     8. PO intake as tolerated, nutrition follow up as needed, MVI, folic acid     9. Antiemetics, anti-diarrhea medications:   loperamide 2 milliGRAM(s) Oral every 3 hours PRN  metoclopramide Injectable 10 milliGRAM(s) IV Push every 6 hours PRN    10. OOB as tolerated, physical therapy consult if needed     11. Monitor coags / fibrinogen 2x week, vitamin K as needed     12. Monitor closely for clinical changes, monitor for fevers     13. Emotional support provided, plan of care discussed and questions addressed     14. Patient education done regarding plan of care, restrictions and discharge planning     15. Continue regular social work input     I have written the above note for Dr. Olivier who performed service with me in the room.   Linda Rock NP-C ([phone])    I have seen and examined patient with NP. I agree with above note as scribed.                            HPC Transplant Team                                                      Critical / Counseling Time Provided: 30 minutes                                                                                                                                                        Chief Complaint: Haplo-idential BMT (son) with FLU/CY/TBI prep for the treatment fo ALL    S: Patient seen and examined with HPC Transplant Team:   no scute complaints overnight         O: Vitals:   Vital Signs Last 24 Hrs  T(C): 36.9 (22 Mar 2024 05:24), Max: 36.9 (22 Mar 2024 05:24)  T(F): 98.4 (22 Mar 2024 05:24), Max: 98.4 (22 Mar 2024 05:24)  HR: 77 (22 Mar 2024 05:24) (64 - 101)  BP: 146/80 (22 Mar 2024 05:24) (127/72 - 155/84)  BP(mean): --  RR: 18 (22 Mar 2024 05:24) (18 - 18)  SpO2: 96% (22 Mar 2024 05:24) (94% - 98%)    Parameters below as of 22 Mar 2024 05:24  Patient On (Oxygen Delivery Method): room air        Admit weight: 96.9 kg     Daily Weight in k.5 (22 Mar 2024 08:40)    Intake / Output:   03-21 @ 07:01  -  03-22 @ 07:00  --------------------------------------------------------  IN: 1362 mL / OUT: 1300 mL / NET: 62 mL      PE:   Oropharynx: + patchy erythema B/L buccal mucosa , + ulcerations lower lip + L cheek  blood blister, + erythema behind frond tooth   Oral Mucositis:   +                                                 stGstrstastdstest:st st1st CVS: RRR, +S1,S2  Lungs: CTA throughout bilaterally   Abdomen: soft, ND, ND +bs  Extremities: no edema   Gastric Mucositis:      -                                            Grade: -  Intestinal Mucositis:     -                                         Grade: -  Skin: small area of petechiae on the medial aspect of the left knee and diffusely over the left medial ankle and anterior shin  TLC: C/D/I   Neuro: A&O x3  Pain: Denies    LABS:                          7.2    0.07  )-----------( 29       ( 22 Mar 2024 07:12 )             19.5         Mean Cell Volume : 85.5 fl  Mean Cell Hemoglobin : 31.6 pg  Mean Cell Hemoglobin Concentration : 36.9 gm/dL  Auto Neutrophil # : x  Auto Lymphocyte # : x  Auto Monocyte # : x  Auto Eosinophil # : x  Auto Basophil # : x  Auto Neutrophil % : x  Auto Lymphocyte % : x  Auto Monocyte % : x  Auto Eosinophil % : x  Auto Basophil % : x      03-22    140  |  107  |  18  ----------------------------<  98  4.4   |  23  |  0.72    Ca    9.6      22 Mar 2024 07:14  Phos  2.8     03-22  Mg     1.7     03-22    TPro  5.7<L>  /  Alb  3.4  /  TBili  0.8  /  DBili  0.2  /  AST  17  /  ALT  21  /  AlkPhos  128<H>  03-22      Mg 1.7  Phos 2.8      PT/INR - ( 21 Mar 2024 06:51 )   PT: 11.8 sec;   INR: 1.07 ratio         PTT - ( 21 Mar 2024 06:51 )  PTT:28.4 sec      Uric Acid --            Radiology:   US Head + Neck Soft Tissue (031524 @ 19:05)   FINDINGS:  The left parotid gland is prominent in size and heterogeneous in   echotexture with linear hypoechoic bands suggestive of parotid gland   edema. There is no discrete mass or fluid collection. No stonesare   identified.    The right parotid gland was not well imaged.    IMPRESSION:  Left parotid gland edema. No fluid collection is identified.  Limited visualization of the right parotid gland.        Meds:   Antimicrobials:   cefepime   IVPB      cefepime   IVPB 2000 milliGRAM(s) IV Intermittent every 8 hours  fluconAZOLE   Tablet 400 milliGRAM(s) Oral daily  valACYclovir 500 milliGRAM(s) Oral two times a day      Heme / Onc:       GI:  aluminum hydroxide/magnesium hydroxide/simethicone Suspension 30 milliLiter(s) Oral every 6 hours PRN  calcium carbonate    500 mG (Tums) Chewable 1 Tablet(s) Chew three times a day PRN  loperamide 2 milliGRAM(s) Oral every 3 hours PRN  pantoprazole    Tablet 40 milliGRAM(s) Oral before breakfast  sodium bicarbonate Mouth Rinse 10 milliLiter(s) Swish and Spit five times a day      Cardiovascular:   losartan 100 milliGRAM(s) Oral daily      Immunologic:   filgrastim-sndz (ZARXIO) Injectable 480 MICROGram(s) SubCutaneous every 24 hours  mycophenolate mofetil 1000 milliGRAM(s) Oral three times a day  tacrolimus 2.5 milliGRAM(s) Oral two times a day      Other medications:   Biotene Dry Mouth Oral Rinse 5 milliLiter(s) Swish and Spit five times a day  chlorhexidine 4% Liquid 1 Application(s) Topical <User Schedule>  escitalopram 20 milliGRAM(s) Oral daily  lidocaine/prilocaine Cream 1 Application(s) Topical daily  loratadine 10 milliGRAM(s) Oral daily  metoclopramide Injectable 5 milliGRAM(s) IV Push every 6 hours  sodium chloride 0.9%. 1000 milliLiter(s) IV Continuous <Continuous>      PRN:   acetaminophen     Tablet .. 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 Mild, central pulmonary venous congestion, new.  3/9 BCx and UCx, follow up  3/11- transaminitis, hold fluconazole. Tbili increased to 1.8. Added on direct and indirect bili.   3/12- direct bili 1.3 3/11/24   3/15- Chemo induced grade 2 oral mucositis: continue supportive care   3/15 parotid gland ( L) edema with PO intake stove vs bleeding vs truma f/u US   3/16 US prelim small collection <1cm, likely cyst, d/w Dr Batres,  final reading   3/18 increasing tacro to 2.5 BID (level =7). re-check level next on Thurs 3/21-WNL,  increase Fluconazole to 400 daily (LFTs normalized). Zofran prn for n/v.    1. Infectious Disease:     cefepime   IVPB 2000 milliGRAM(s) IV Intermittent every 8 hours  fluconAZOLE   Tablet 400 milliGRAM(s) Oral daily  valACYclovir 500 milliGRAM(s) Oral two times a day    2. GI Prophylaxis:   pantoprazole    Tablet 40 milliGRAM(s) Oral before breakfast    3. Mouthcare - NS / NaHCO3 rinses, Mycelex, Biotene; Skin care     4. GVHD prophylaxis   TBI day -1   CTX days +3, +4   mycophenolate mofetil 1000 milliGRAM(s) Oral three times a day  tacrolimus 2.5   milliGRAM(s) Oral two times a day    6. Transfuse & replete electrolytes prn   Thrombocytopenia with blood blister in mouth, PLT to keep PLT goal 20K    7. IV hydration, daily weights, strict I&O, prn diuresis     8. PO intake as tolerated, nutrition follow up as needed, MVI, folic acid     9. Antiemetics, anti-diarrhea medications:   loperamide 2 milliGRAM(s) Oral every 3 hours PRN  metoclopramide Injectable 10 milliGRAM(s) IV Push every 6 hours PRN    10. OOB as tolerated, physical therapy consult if needed     11. Monitor coags / fibrinogen 2x week, vitamin K as needed     12. Monitor closely for clinical changes, monitor for fevers     13. Emotional support provided, plan of care discussed and questions addressed     14. Patient education done regarding plan of care, restrictions and discharge planning     15. Continue regular social work input     I have written the above note for Dr. Olivier who performed service with me in the room.   Linda Rock NP-C ([phone])    I have seen and examined patient with NP. I agree with above note as scribed.

## 2024-03-22 NOTE — PROGRESS NOTE ADULT - NS ATTEND AMEND GEN_ALL_CORE FT
Assessment: 64-year-old day + 15  CATRACHITA alloBMT using a Flu/Cy/TBI preparative regimen for [place] negative ALL.     Plan:  Heme:   - Trend CBC with differential daily. Continue supportive transfusions as needed to maintain Hgb > 7 and plt > 20 given possible bleeding into / from parotid   (> 20 if febrile, > 50 if bleeding).   - G-CSF (started on day +5): continue through engraftment  - Will require post ALLO BMT CNS prophylaxis and TKI maintenance -- begins after engraftment.     GVHD:   - PTCy days 3 and 4 complete  - Cellcept and tacrolimus (started on day +5). Check tacrolimus level Mon/Thurs, last 7 (3/18/24) will increase to 2.5 mg..recheck level thurs    ID:   - Prophylaxis: fluconazole (200 mg daily, can increase to 400 LFTs are stable), Valtrex  - Neutropenic fever: cefepime. Infectious work up negative (diarrhea but C.diff negative). consider de escalation  - Bactrim SS daily to start on day +21  - BM product A0 cx positive for Staph caprae (skin lauro)  - face US with parotid edema, sx of swelling improved    Nutrition: tolerating PO    DVT prophylaxis: ambulation Assessment: 64-year-old day + 16  CATRACHITA alloBMT using a Flu/Cy/TBI preparative regimen for [place] negative ALL.     Plan:  Heme:   - Trend CBC with differential daily. Continue supportive transfusions as needed to maintain Hgb > 7 and plt > 20 given possible bleeding into / from parotid   (> 20 if febrile, > 50 if bleeding).   - G-CSF (started on day +5): continue through engraftment  - Will require post ALLO BMT CNS prophylaxis and TKI maintenance -- begins after engraftment.     GVHD:   - PTCy days 3 and 4 complete  - Cellcept and tacrolimus (started on day +5). Check tacrolimus level Mon/Thurs, last 7 (3/18/24) will increase to 2.5 mg..recheck level thurs..3/21 9.9    ID:   - Prophylaxis: fluconazole (200 mg daily, can increase to 400 LFTs are stable), Valtrex  - Neutropenic fever: cefepime. Infectious work up negative (diarrhea but C.diff negative). consider de escalation  - Bactrim SS daily to start on day +21  - BM product A0 cx positive for Staph caprae (skin lauro)  - face US with parotid edema, sx of swelling improved    Nutrition: tolerating PO    DVT prophylaxis: ambulation

## 2024-03-22 NOTE — PROGRESS NOTE ADULT - ATTENDING COMMENTS
64-year-old F post blinatumomab for detectable [place] negative ALL being admitted for AlloBMT(son) with FLU/CY/TBI prep. Geriatrics and Palliative Medicine ([facility]) Team is seen for supportive oncology during BMT.     1) Stem cells transplant status.   - Haplo-idential BMT (son) with FLU/CY/TBI prep for the treatment fo ALL  - Post:  Allogeneic  BMT day + 14.    2) Nausea.   Controlled   - Continue with Reglan 5mg IV Q6h ATC.   - Continue with Zofran 8mg IV Q8h PRN.    3) Diarrhea.   Improved   - Continue with Imodium 2 mg PO Q3h PRN.    4) Decreased appetite.   - Marinol DC as the patient was getting vivid dreams and strange thoughts.   - She believes that part of the issues is just being in the hospital and the food provided here and that the only solution to her limited appetite is to be back home.   - Dietary is already f/u.    5) Palliative care encounter.   - Will sign off. Thank you for allowing us to consult on this patient and be part of her care during BMT.         Sotero Myles MD  Associate Chief Geriatrics and Palliative Care ([facility]) [facility]   [facility] Consult Service   , [employer] at [facility]      Please contact me via Teams from Monday through Friday between 9am-5pm. If not answering, please call the palliative care pager [phone]    After 5pm and on weekends, please see the contact information below:    In the event of newly developing, evolving, or worsening symptoms, please contact the Palliative Medicine team via pager (if the patient is at [facility] #[phone] or if the patient is at [facility] #[phone]) The Geriatric and Palliative Medicine service has coverage 24 hours a day/ 7 days a week to provide medical recommendations regarding symptom management needs via telephone
64-year-old F post blinatumomab for detectable [place] negative ALL being admitted for AlloBMT(son) with FLU/CY/TBI prep. Geriatrics and Palliative Medicine ([facility]) Team is seen for supportive oncology during BMT.     1) Stem cells transplant status.   - Haplo-idential BMT (son) with FLU/CY/TBI prep for the treatment fo ALL  - Post:  Allogeneic  BMT day + 14.    2) Nausea.   - Continue with Reglan 5mg IV Q6h ATC   - Continue with Zofran 8mg IV Q8h PRN.    3) Diarrhea.   ·  Plan: - Continue with Imodium 2 mg PO Q3h PRN.    4) Decreased appetite.   - Marinol 2.5_mg before lunch and dinner. Discussed with patient about increasing the dose given that she continues to have no appetite  - Dietary is already f/u.    5) Palliative care encounter.   - Will continue to follow for support and assistance with symptom management.        Sotero Myles MD  Associate Chief Geriatrics and Palliative Care ([facility]) [facility]   [facility] Consult Service   , [employer] at [facility]      Please contact me via Teams from Monday through Friday between 9am-5pm. If not answering, please call the palliative care pager [phone]    After 5pm and on weekends, please see the contact information below:    In the event of newly developing, evolving, or worsening symptoms, please contact the Palliative Medicine team via pager (if the patient is at [facility] #[phone] or if the patient is at [facility] #[phone]) The Geriatric and Palliative Medicine service has coverage 24 hours a day/ 7 days a week to provide medical recommendations regarding symptom management needs via telephone

## 2024-03-22 NOTE — PROGRESS NOTE ADULT - TIME BILLING
Symptom assessment and management, supportive counseling, coordination of care
Total time spent including the following  [x] Physical chart review and documentation   An extensive review of the physical chart, electronic health record, and documentation was conducted to obtain collateral information including but not limited to:   - Current inpatient records (primary team)   - Inpatient values/results (CBC, CMP)   - Current or proposed treatment plans   - Pharmacotherapy review  [x]discussion with floor staff (Patient's RN)   [x]discussion with the patient  [x]Physical Exam and/or review of systems   [x]Formulation of assessment and plan
Total time spent including the following  [x] Physical chart review and documentation   An extensive review of the physical chart, electronic health record, and documentation was conducted to obtain collateral information including but not limited to:   - Current inpatient records (primary team)   - Inpatient values/results (CBC, CMP)   - Current or proposed treatment plans   - Pharmacotherapy review  [x]discussion with floor staff Patient's RN)   [x]discussion with the patient  [x]Physical Exam and/or review of systems   [x]Formulation of assessment and plan

## 2024-03-22 NOTE — PROGRESS NOTE ADULT - PROBLEM SELECTOR PLAN 3
Ctm, Transfuse PRN.
- Continue with Imodium 2 mg PO Q3h PRN.
- Continue with Imodium 2 mg PO Q3h PRN.

## 2024-03-22 NOTE — PROGRESS NOTE ADULT - SUBJECTIVE AND OBJECTIVE BOX
Indication for Geriatrics and Palliative Care Services/INTERVAL HPI: Supportive care during BMT     SUBJECTIVE AND OBJECTIVE: Patient was seen and examined at beside. Patient states that she continues to have no appetite. She does report that the nausea has significantly improved with last episode a couple days ago. She still has loose stools x 1 in the mornings that is well-controlled with medication.     OVERNIGHT EVENTS: Patient did not require PRN medications in the last 24 hours.     DNR on chart: No    Allergies    sulfa drugs (Unknown)    Intolerances    Zofran (Headache)    MEDICATIONS  (STANDING):  Biotene Dry Mouth Oral Rinse 5 milliLiter(s) Swish and Spit five times a day  cefepime   IVPB      cefepime   IVPB 2000 milliGRAM(s) IV Intermittent every 8 hours  chlorhexidine 4% Liquid 1 Application(s) Topical <User Schedule>  escitalopram 20 milliGRAM(s) Oral daily  filgrastim-sndz (ZARXIO) Injectable 480 MICROGram(s) SubCutaneous every 24 hours  fluconAZOLE   Tablet 400 milliGRAM(s) Oral daily  lidocaine/prilocaine Cream 1 Application(s) Topical daily  loratadine 10 milliGRAM(s) Oral daily  losartan 100 milliGRAM(s) Oral daily  metoclopramide Injectable 5 milliGRAM(s) IV Push every 6 hours  mycophenolate mofetil 1000 milliGRAM(s) Oral three times a day  pantoprazole    Tablet 40 milliGRAM(s) Oral before breakfast  sodium bicarbonate Mouth Rinse 10 milliLiter(s) Swish and Spit five times a day  sodium chloride 0.9%. 1000 milliLiter(s) (20 mL/Hr) IV Continuous <Continuous>  tacrolimus 2.5 milliGRAM(s) Oral two times a day  valACYclovir 500 milliGRAM(s) Oral two times a day    MEDICATIONS  (PRN):  acetaminophen     Tablet .. 650 milliGRAM(s) Oral every 6 hours PRN Mild Pain (1 - 3), Moderate Pain (4 - 6)  aluminum hydroxide/magnesium hydroxide/simethicone Suspension 30 milliLiter(s) Oral every 6 hours PRN Dyspepsia  calcium carbonate    500 mG (Tums) Chewable 1 Tablet(s) Chew three times a day PRN Dyspepsia  loperamide 2 milliGRAM(s) Oral every 3 hours PRN Diarrhea  ondansetron  IVPB 8 milliGRAM(s) IV Intermittent every 8 hours PRN Nausea and/or Vomiting  sodium chloride 0.9% lock flush 10 milliLiter(s) IV Push every 1 hour PRN Pre/post blood products, medications, blood draw, and to maintain line patency  zinc oxide 40% Paste 1 Application(s) Topical two times a day PRN discomfort      ITEMS UNCHECKED ARE NOT PRESENT    PRESENT SYMPTOMS: [ ]Unable to self-report - see [ ] CPOT [ ] PAINADS [ ] RDOS  Source if other than patient:  [ ]Family   [ ]Team     Pain:  [ ]yes [X]no  QOL impact -   Location -                    Aggravating factors -  Quality -  Radiation -  Timing-  Severity (0-10 scale):  Minimal acceptable level (0-10 scale):     CPOT:    https://www.[facility].org/getattachment/[email or web address]/Critical-Care-Pain-Observation-Tool-(CPOT)    Dyspnea:                           [ ]Mild [ ]Moderate [ ]Severe  Anxiety:                             [ ]Mild [ ]Moderate [ ]Severe  Fatigue:                             [ ]Mild [ ]Moderate [ ]Severe  Nausea:                             [ ]Mild [ ]Moderate [ ]Severe  Loss of appetite:              [ ]Mild [ ]Moderate [ ]Severe  Constipation:                    [ ]Mild [ ]Moderate [ ]Severe    PCSSQ[Palliative Care Spiritual Screening Question]   Severity (0-10):  Score of 4 or > indicate consideration of Chaplaincy referral.  Chaplaincy Referral: [ ] yes [ ] refused [ ] following [X] Deferred     Caregiver [place]? : [ ] yes [ ] no [X] Deferred [ ] Declined             Social work referral [ ] Patient & Family Centered Care Referral [ ]     Anticipatory Grief present?:  [ ] yes [ ] no  [X] Deferred                  Social work referral [ ] Chaplaincy Referral[ ]      Other Symptoms:  [X]All other review of systems negative   [ ]Unable to obtain due to poor mentation    Palliative Performance Status Version 2:   50      %      http://npcrc.org/files/news/palliative_performance_scale_ppsv2.pdf  PHYSICAL EXAM:  Vital Signs Last 24 Hrs  T(C): 36.3 (22 Mar 2024 13:05), Max: 36.9 (22 Mar 2024 05:24)  T(F): 97.4 (22 Mar 2024 13:05), Max: 98.4 (22 Mar 2024 05:24)  HR: 74 (22 Mar 2024 13:05) (65 - 101)  BP: 138/75 (22 Mar 2024 13:05) (108/68 - 155/84)  BP(mean): --  RR: 18 (22 Mar 2024 13:05) (18 - 18)  SpO2: 97% (22 Mar 2024 13:05) (94% - 97%)    Parameters below as of 22 Mar 2024 13:05  Patient On (Oxygen Delivery Method): room air    I&O's Summary    21 Mar 2024 07:01  -  22 Mar 2024 07:00  --------------------------------------------------------  IN: 1362 mL / OUT: 1300 mL / NET: 62 mL    22 Mar 2024 07:01  -  22 Mar 2024 14:37  --------------------------------------------------------  IN: 400 mL / OUT: 400 mL / NET: 0 mL       GENERAL: [ ]Cachexia    [X]Alert  [X]Oriented x 3   [ ]Lethargic  [ ]Unarousable  [X]Verbal  [ ]Non-Verbal  Behavioral:   [ ]Anxiety  [ ]Delirium [ ]Agitation [ ]Other  HEENT:  [X]Normal   [ ]Dry mouth   [ ]ET Tube/Trach  [ ]Oral lesions  PULMONARY:   [X]Clear [ ]Tachypnea  [ ]Audible excessive secretions   [ ]Rhonchi        [ ]Right [ ]Left [ ]Bilateral  [ ]Crackles        [ ]Right [ ]Left [ ]Bilateral  [ ]Wheezing     [ ]Right [ ]Left [ ]Bilateral  [ ]Diminished BS [ ] Right [ ]Left [ ]Bilateral  CARDIOVASCULAR:    [X]Regular [ ]Irregular [ ]Tachy  [ ]Parker [ ]Murmur [ ]Other  GASTROINTESTINAL:  [X]Soft  [ ]Distended   [ ]+BS  [X]Non tender [ ]Tender  [ ]Other [ ]PEG [ ]OGT/ NGT   Last BM: 3/20  GENITOURINARY:  [X]Normal [ ]Incontinent   [ ]Oliguria/Anuria   [ ]Mehta  MUSCULOSKELETAL:   [ ]Normal   [X]Weakness  [ ]Bed/Wheelchair bound [ ]Edema  NEUROLOGIC:   [X]No focal deficits  [ ] Cognitive impairment  [ ] Dysphagia [ ]Dysarthria [ ] Paresis [ ]Other   SKIN:   [X]Normal  [ ]Rash  [ ]Other  [ ]Pressure ulcer(s) [ ]y [ ]n present on admission    CRITICAL CARE:  [ ]Shock Present  [ ]Septic [ ]Cardiogenic [ ]Neurologic [ ]Hypovolemic  [ ]Vasopressors [ ]Inotropes  [ ]Respiratory failure present [ ]Mechanical Ventilation [ ]Non-invasive ventilatory support [ ]High-Flow   [ ]Acute  [ ]Chronic [ ]Hypoxic  [ ]Hypercarbic [ ]Other  [ ]Other organ failure     LABS:                          7.2    0.07  )-----------( 29       ( 22 Mar 2024 07:12 )             19.5      03-22    140  |  107  |  18  ----------------------------<  98  4.4   |  23  |  0.72    Ca    9.6      22 Mar 2024 07:14  Phos  2.8     03-22  Mg     1.7     03-22    TPro  5.7<L>  /  Alb  3.4  /  TBili  0.8  /  DBili  0.2  /  AST  17  /  ALT  21  /  AlkPhos  128<H>  03-22           Urinalysis Basic - ( 22 Mar 2024 07:14 )    Color: x / Appearance: x / SG: x / pH: x  Gluc: 98 mg/dL / Ketone: x  / Bili: x / Urobili: x   Blood: x / Protein: x / Nitrite: x   Leuk Esterase: x / RBC: x / WBC x   Sq Epi: x / Non Sq Epi: x / Bacteria: x      RADIOLOGY & ADDITIONAL STUDIES:   No new studies.     Protein Calorie Malnutrition Present: [ ]mild [ ]moderate [ ]severe [ ]underweight [ ]morbid obesity  https://www.andeal.org/vault/2440/web/files/ONC/Table_Clinical%20Characteristics%20to%20Document%20Malnutrition-White%20JV%20et%20al%202012.pdf    Height (cm): 159 (02-29-24 @ 10:15), 157.48 (02-12-24 @ 15:00), 158 (12-01-23 @ 09:20)  Weight (kg): 96.9 (02-29-24 @ 10:15), 96.7 (02-12-24 @ 15:00), 92.7 (12-01-23 @ 09:20)  BMI (kg/m2): 38.3 (02-29-24 @ 10:15), 39 (02-12-24 @ 15:00), 37.1 (12-01-23 @ 09:20)    [ ]PPSV2 < or = 30%  [ ]significant weight loss [ ]poor nutritional intake [ ]anasarca[ ]Artificial Nutrition    Other REFERRALS:  [ ]Hospice  [ ]Child Life  [ ]Social Work  [ ]Case management [ ]Holistic Therapy  Indication for Geriatrics and Palliative Care Services/INTERVAL HPI: Supportive care during BMT     SUBJECTIVE AND OBJECTIVE: Patient was seen and examined at beside. Patient states that she continues to have no appetite. She does report that the nausea has significantly improved with last episode a couple days ago. She still has loose stools x 1 in the mornings that is well-controlled with medication.     OVERNIGHT EVENTS: Patient did not require PRN medications in the last 24 hours.     DNR on chart: No    Allergies    sulfa drugs (Unknown)    Intolerances    Zofran (Headache)    MEDICATIONS  (STANDING):  Biotene Dry Mouth Oral Rinse 5 milliLiter(s) Swish and Spit five times a day  cefepime   IVPB      cefepime   IVPB 2000 milliGRAM(s) IV Intermittent every 8 hours  chlorhexidine 4% Liquid 1 Application(s) Topical <User Schedule>  escitalopram 20 milliGRAM(s) Oral daily  filgrastim-sndz (ZARXIO) Injectable 480 MICROGram(s) SubCutaneous every 24 hours  fluconAZOLE   Tablet 400 milliGRAM(s) Oral daily  lidocaine/prilocaine Cream 1 Application(s) Topical daily  loratadine 10 milliGRAM(s) Oral daily  losartan 100 milliGRAM(s) Oral daily  metoclopramide Injectable 5 milliGRAM(s) IV Push every 6 hours  mycophenolate mofetil 1000 milliGRAM(s) Oral three times a day  pantoprazole    Tablet 40 milliGRAM(s) Oral before breakfast  sodium bicarbonate Mouth Rinse 10 milliLiter(s) Swish and Spit five times a day  sodium chloride 0.9%. 1000 milliLiter(s) (20 mL/Hr) IV Continuous <Continuous>  tacrolimus 2.5 milliGRAM(s) Oral two times a day  valACYclovir 500 milliGRAM(s) Oral two times a day    MEDICATIONS  (PRN):  acetaminophen     Tablet .. 650 milliGRAM(s) Oral every 6 hours PRN Mild Pain (1 - 3), Moderate Pain (4 - 6)  aluminum hydroxide/magnesium hydroxide/simethicone Suspension 30 milliLiter(s) Oral every 6 hours PRN Dyspepsia  calcium carbonate    500 mG (Tums) Chewable 1 Tablet(s) Chew three times a day PRN Dyspepsia  loperamide 2 milliGRAM(s) Oral every 3 hours PRN Diarrhea  ondansetron  IVPB 8 milliGRAM(s) IV Intermittent every 8 hours PRN Nausea and/or Vomiting  sodium chloride 0.9% lock flush 10 milliLiter(s) IV Push every 1 hour PRN Pre/post blood products, medications, blood draw, and to maintain line patency  zinc oxide 40% Paste 1 Application(s) Topical two times a day PRN discomfort      ITEMS UNCHECKED ARE NOT PRESENT    PRESENT SYMPTOMS: [ ]Unable to self-report - see [ ] CPOT [ ] PAINADS [ ] RDOS  Source if other than patient:  [ ]Family   [ ]Team     Pain:  [ ]yes [X]no  QOL impact -   Location -                    Aggravating factors -  Quality -  Radiation -  Timing-  Severity (0-10 scale):  Minimal acceptable level (0-10 scale):     CPOT:    https://www.[facility].org/getattachment/[email or web address]/Critical-Care-Pain-Observation-Tool-(CPOT)    Dyspnea:                           [ ]Mild [ ]Moderate [ ]Severe  Anxiety:                             [ ]Mild [ ]Moderate [ ]Severe  Fatigue:                             [ ]Mild [ ]Moderate [ ]Severe  Nausea:                             [ ]Mild [ ]Moderate [ ]Severe  Loss of appetite:              [ ]Mild [ ]Moderate [ ]Severe  Constipation:                    [ ]Mild [ ]Moderate [ ]Severe    PCSSQ[Palliative Care Spiritual Screening Question]   Severity (0-10):  Score of 4 or > indicate consideration of Chaplaincy referral.  Chaplaincy Referral: [ ] yes [ ] refused [ ] following [X] Deferred     Caregiver [place]? : [ ] yes [ ] no [X] Deferred [ ] Declined             Social work referral [ ] Patient & Family Centered Care Referral [ ]     Anticipatory Grief present?:  [ ] yes [ ] no  [X] Deferred                  Social work referral [ ] Chaplaincy Referral[ ]      Other Symptoms:  [X]All other review of systems negative   [ ]Unable to obtain due to poor mentation    Palliative Performance Status Version 2:   50      %      http://npcrc.org/files/news/palliative_performance_scale_ppsv2.pdf  PHYSICAL EXAM:  Vital Signs Last 24 Hrs  T(C): 36.3 (22 Mar 2024 13:05), Max: 36.9 (22 Mar 2024 05:24)  T(F): 97.4 (22 Mar 2024 13:05), Max: 98.4 (22 Mar 2024 05:24)  HR: 74 (22 Mar 2024 13:05) (65 - 101)  BP: 138/75 (22 Mar 2024 13:05) (108/68 - 155/84)  BP(mean): --  RR: 18 (22 Mar 2024 13:05) (18 - 18)  SpO2: 97% (22 Mar 2024 13:05) (94% - 97%)    Parameters below as of 22 Mar 2024 13:05  Patient On (Oxygen Delivery Method): room air    I&O's Summary    21 Mar 2024 07:01  -  22 Mar 2024 07:00  --------------------------------------------------------  IN: 1362 mL / OUT: 1300 mL / NET: 62 mL    22 Mar 2024 07:01  -  22 Mar 2024 14:37  --------------------------------------------------------  IN: 400 mL / OUT: 400 mL / NET: 0 mL       GENERAL: [ ]Cachexia    [X]Alert  [X]Oriented x 3   [ ]Lethargic  [ ]Unarousable  [X]Verbal  [ ]Non-Verbal  Behavioral:   [ ]Anxiety  [ ]Delirium [ ]Agitation [ ]Other  HEENT:  [X]Normal   [ ]Dry mouth   [ ]ET Tube/Trach  [ ]Oral lesions  PULMONARY:   [X]Clear [ ]Tachypnea  [ ]Audible excessive secretions   [ ]Rhonchi        [ ]Right [ ]Left [ ]Bilateral  [ ]Crackles        [ ]Right [ ]Left [ ]Bilateral  [ ]Wheezing     [ ]Right [ ]Left [ ]Bilateral  [ ]Diminished BS [ ] Right [ ]Left [ ]Bilateral  CARDIOVASCULAR:    [X]Regular [ ]Irregular [ ]Tachy  [ ]Parker [ ]Murmur [ ]Other  GASTROINTESTINAL:  [X]Soft  [ ]Distended   [ ]+BS  [X]Non tender [ ]Tender  [ ]Other [ ]PEG [ ]OGT/ NGT   Last BM: 3/22  GENITOURINARY:  [X]Normal [ ]Incontinent   [ ]Oliguria/Anuria   [ ]Mehta  MUSCULOSKELETAL:   [ ]Normal   [X]Weakness  [ ]Bed/Wheelchair bound [ ]Edema  NEUROLOGIC:   [X]No focal deficits  [ ] Cognitive impairment  [ ] Dysphagia [ ]Dysarthria [ ] Paresis [ ]Other   SKIN:   [X]Normal  [ ]Rash  [ ]Other  [ ]Pressure ulcer(s) [ ]y [ ]n present on admission    CRITICAL CARE:  [ ]Shock Present  [ ]Septic [ ]Cardiogenic [ ]Neurologic [ ]Hypovolemic  [ ]Vasopressors [ ]Inotropes  [ ]Respiratory failure present [ ]Mechanical Ventilation [ ]Non-invasive ventilatory support [ ]High-Flow   [ ]Acute  [ ]Chronic [ ]Hypoxic  [ ]Hypercarbic [ ]Other  [ ]Other organ failure     LABS:                          7.2    0.07  )-----------( 29       ( 22 Mar 2024 07:12 )             19.5      03-22    140  |  107  |  18  ----------------------------<  98  4.4   |  23  |  0.72    Ca    9.6      22 Mar 2024 07:14  Phos  2.8     03-22  Mg     1.7     03-22    TPro  5.7<L>  /  Alb  3.4  /  TBili  0.8  /  DBili  0.2  /  AST  17  /  ALT  21  /  AlkPhos  128<H>  03-22           Urinalysis Basic - ( 22 Mar 2024 07:14 )    Color: x / Appearance: x / SG: x / pH: x  Gluc: 98 mg/dL / Ketone: x  / Bili: x / Urobili: x   Blood: x / Protein: x / Nitrite: x   Leuk Esterase: x / RBC: x / WBC x   Sq Epi: x / Non Sq Epi: x / Bacteria: x      RADIOLOGY & ADDITIONAL STUDIES:   No new studies.     Protein Calorie Malnutrition Present: [ ]mild [ ]moderate [ ]severe [ ]underweight [ ]morbid obesity  https://www.andeal.org/vault/2440/web/files/ONC/Table_Clinical%20Characteristics%20to%20Document%20Malnutrition-White%20JV%20et%20al%202012.pdf    Height (cm): 159 (02-29-24 @ 10:15), 157.48 (02-12-24 @ 15:00), 158 (12-01-23 @ 09:20)  Weight (kg): 96.9 (02-29-24 @ 10:15), 96.7 (02-12-24 @ 15:00), 92.7 (12-01-23 @ 09:20)  BMI (kg/m2): 38.3 (02-29-24 @ 10:15), 39 (02-12-24 @ 15:00), 37.1 (12-01-23 @ 09:20)    [ ]PPSV2 < or = 30%  [ ]significant weight loss [ ]poor nutritional intake [ ]anasarca[ ]Artificial Nutrition    Other REFERRALS:  [ ]Hospice  [ ]Child Life  [ ]Social Work  [ ]Case management [ ]Holistic Therapy

## 2024-03-22 NOTE — PROGRESS NOTE ADULT - PROBLEM SELECTOR PLAN 5
- Will continue to follow for support and assistance with symptom management - Will sign off.   - Call us back as needed for symptoms management, goals of care, or psychosocial support.

## 2024-03-22 NOTE — PROGRESS NOTE ADULT - NS ATTEST RISK PROBLEM GEN_ALL_CORE FT
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

## 2024-03-22 NOTE — PROGRESS NOTE ADULT - PROBLEM SELECTOR PLAN 1
- Haplo-idential BMT (son) with FLU/CY/TBI prep for the treatment fo ALL  - Post:  Allogeneic  BMT day + 16  - Work up and Rx as per the primary team.

## 2024-03-23 LAB
ALBUMIN SERPL ELPH-MCNC: 3.4 G/DL — SIGNIFICANT CHANGE UP (ref 3.3–5)
ALP SERPL-CCNC: 131 U/L — HIGH (ref 40–120)
ALT FLD-CCNC: 20 U/L — SIGNIFICANT CHANGE UP (ref 10–45)
ANION GAP SERPL CALC-SCNC: 13 MMOL/L — SIGNIFICANT CHANGE UP (ref 5–17)
AST SERPL-CCNC: 16 U/L — SIGNIFICANT CHANGE UP (ref 10–40)
BILIRUB SERPL-MCNC: 0.8 MG/DL — SIGNIFICANT CHANGE UP (ref 0.2–1.2)
BUN SERPL-MCNC: 19 MG/DL — SIGNIFICANT CHANGE UP (ref 7–23)
CALCIUM SERPL-MCNC: 9.6 MG/DL — SIGNIFICANT CHANGE UP (ref 8.4–10.5)
CHLORIDE SERPL-SCNC: 106 MMOL/L — SIGNIFICANT CHANGE UP (ref 96–108)
CO2 SERPL-SCNC: 20 MMOL/L — LOW (ref 22–31)
CREAT SERPL-MCNC: 0.76 MG/DL — SIGNIFICANT CHANGE UP (ref 0.5–1.3)
EGFR: 86 ML/MIN/1.73M2 — SIGNIFICANT CHANGE UP
GLUCOSE SERPL-MCNC: 94 MG/DL — SIGNIFICANT CHANGE UP (ref 70–99)
HCT VFR BLD CALC: 19 % — CRITICAL LOW (ref 34.5–45)
HGB BLD-MCNC: 6.6 G/DL — CRITICAL LOW (ref 11.5–15.5)
LDH SERPL L TO P-CCNC: 117 U/L — SIGNIFICANT CHANGE UP (ref 50–242)
MAGNESIUM SERPL-MCNC: 1.4 MG/DL — LOW (ref 1.6–2.6)
MCHC RBC-ENTMCNC: 30.1 PG — SIGNIFICANT CHANGE UP (ref 27–34)
MCHC RBC-ENTMCNC: 34.7 GM/DL — SIGNIFICANT CHANGE UP (ref 32–36)
MCV RBC AUTO: 86.8 FL — SIGNIFICANT CHANGE UP (ref 80–100)
NRBC # BLD: 0 /100 WBCS — SIGNIFICANT CHANGE UP (ref 0–0)
PHOSPHATE SERPL-MCNC: 2.7 MG/DL — SIGNIFICANT CHANGE UP (ref 2.5–4.5)
PLATELET # BLD AUTO: 21 K/UL — LOW (ref 150–400)
POTASSIUM SERPL-MCNC: 4.3 MMOL/L — SIGNIFICANT CHANGE UP (ref 3.5–5.3)
POTASSIUM SERPL-SCNC: 4.3 MMOL/L — SIGNIFICANT CHANGE UP (ref 3.5–5.3)
PROT SERPL-MCNC: 5.6 G/DL — LOW (ref 6–8.3)
RBC # BLD: 2.19 M/UL — LOW (ref 3.8–5.2)
RBC # FLD: 10.7 % — SIGNIFICANT CHANGE UP (ref 10.3–14.5)
SODIUM SERPL-SCNC: 139 MMOL/L — SIGNIFICANT CHANGE UP (ref 135–145)
WBC # BLD: 0.39 K/UL — CRITICAL LOW (ref 3.8–10.5)
WBC # FLD AUTO: 0.39 K/UL — CRITICAL LOW (ref 3.8–10.5)

## 2024-03-23 PROCEDURE — 99232 SBSQ HOSP IP/OBS MODERATE 35: CPT | Mod: FS

## 2024-03-23 RX ORDER — MAGNESIUM SULFATE 500 MG/ML
2 VIAL (ML) INJECTION ONCE
Refills: 0 | Status: COMPLETED | OUTPATIENT
Start: 2024-03-23 | End: 2024-03-23

## 2024-03-23 RX ADMIN — ESCITALOPRAM OXALATE 20 MILLIGRAM(S): 10 TABLET, FILM COATED ORAL at 11:30

## 2024-03-23 RX ADMIN — CEFEPIME 100 MILLIGRAM(S): 1 INJECTION, POWDER, FOR SOLUTION INTRAMUSCULAR; INTRAVENOUS at 05:22

## 2024-03-23 RX ADMIN — Medication 10 MILLILITER(S): at 19:25

## 2024-03-23 RX ADMIN — FLUCONAZOLE 400 MILLIGRAM(S): 150 TABLET ORAL at 11:31

## 2024-03-23 RX ADMIN — Medication 25 GRAM(S): at 10:28

## 2024-03-23 RX ADMIN — CEFEPIME 100 MILLIGRAM(S): 1 INJECTION, POWDER, FOR SOLUTION INTRAMUSCULAR; INTRAVENOUS at 21:14

## 2024-03-23 RX ADMIN — PANTOPRAZOLE SODIUM 40 MILLIGRAM(S): 20 TABLET, DELAYED RELEASE ORAL at 05:50

## 2024-03-23 RX ADMIN — Medication 5 MILLILITER(S): at 08:32

## 2024-03-23 RX ADMIN — Medication 650 MILLIGRAM(S): at 09:10

## 2024-03-23 RX ADMIN — Medication 10 MILLILITER(S): at 08:33

## 2024-03-23 RX ADMIN — Medication 5 MILLILITER(S): at 23:40

## 2024-03-23 RX ADMIN — Medication 5 MILLIGRAM(S): at 05:21

## 2024-03-23 RX ADMIN — Medication 10 MILLILITER(S): at 23:39

## 2024-03-23 RX ADMIN — Medication 10 MILLILITER(S): at 11:27

## 2024-03-23 RX ADMIN — CHLORHEXIDINE GLUCONATE 1 APPLICATION(S): 213 SOLUTION TOPICAL at 08:33

## 2024-03-23 RX ADMIN — Medication 10 MILLILITER(S): at 16:40

## 2024-03-23 RX ADMIN — VALACYCLOVIR 500 MILLIGRAM(S): 500 TABLET, FILM COATED ORAL at 17:03

## 2024-03-23 RX ADMIN — Medication 5 MILLILITER(S): at 16:40

## 2024-03-23 RX ADMIN — Medication 2 MILLIGRAM(S): at 19:25

## 2024-03-23 RX ADMIN — Medication 5 MILLIGRAM(S): at 11:27

## 2024-03-23 RX ADMIN — Medication 5 MILLIGRAM(S): at 17:02

## 2024-03-23 RX ADMIN — TACROLIMUS 2.5 MILLIGRAM(S): 5 CAPSULE ORAL at 17:03

## 2024-03-23 RX ADMIN — TACROLIMUS 2.5 MILLIGRAM(S): 5 CAPSULE ORAL at 05:22

## 2024-03-23 RX ADMIN — CEFEPIME 100 MILLIGRAM(S): 1 INJECTION, POWDER, FOR SOLUTION INTRAMUSCULAR; INTRAVENOUS at 13:10

## 2024-03-23 RX ADMIN — LORATADINE 10 MILLIGRAM(S): 10 TABLET ORAL at 11:30

## 2024-03-23 RX ADMIN — Medication 480 MICROGRAM(S): at 11:31

## 2024-03-23 RX ADMIN — MYCOPHENOLATE MOFETIL 1000 MILLIGRAM(S): 250 CAPSULE ORAL at 13:09

## 2024-03-23 RX ADMIN — VALACYCLOVIR 500 MILLIGRAM(S): 500 TABLET, FILM COATED ORAL at 05:21

## 2024-03-23 RX ADMIN — Medication 5 MILLIGRAM(S): at 23:40

## 2024-03-23 RX ADMIN — MYCOPHENOLATE MOFETIL 1000 MILLIGRAM(S): 250 CAPSULE ORAL at 05:21

## 2024-03-23 RX ADMIN — MYCOPHENOLATE MOFETIL 1000 MILLIGRAM(S): 250 CAPSULE ORAL at 21:14

## 2024-03-23 RX ADMIN — LOSARTAN POTASSIUM 100 MILLIGRAM(S): 100 TABLET, FILM COATED ORAL at 05:21

## 2024-03-23 RX ADMIN — Medication 5 MILLILITER(S): at 19:25

## 2024-03-23 RX ADMIN — Medication 650 MILLIGRAM(S): at 08:33

## 2024-03-23 RX ADMIN — Medication 5 MILLILITER(S): at 11:27

## 2024-03-23 RX ADMIN — LIDOCAINE AND PRILOCAINE CREAM 1 APPLICATION(S): 25; 25 CREAM TOPICAL at 11:28

## 2024-03-23 NOTE — PROGRESS NOTE ADULT - NS ATTEND AMEND GEN_ALL_CORE FT
.  Primary: [place]    Assessment: 64-year-old day + 17  CATRACHITA alloBMT using a Flu/Cy/TBI preparative regimen for [place] negative ALL. Course complicated by     Plan:  Heme:   PLT goal > 10,000; Hgb goal > 7.0g/dL  Continue G-CSF  - Will require post ALLO BMT CNS prophylaxis and TKI maintenance -- begins after engraftment.     GVHD:   - PTCy days 3 and 4 complete  - Cellcept and tacrolimus (started on day +5). Check tacrolimus level Mon/Thurs, last 7 (3/18/24) will increase to 2.5 mg..recheck level thurs..3/21 9.9    ID:   - Prophylaxis: fluconazole (200 mg daily, can increase to 400 LFTs are stable), Valtrex  - Neutropenic fever: cefepime. Infectious work up negative (diarrhea but C.diff negative). consider de escalation  - Bactrim SS daily to start on day +21  - BM product A0 cx positive for Staph caprae (skin lauro)  - face US with parotid edema, sx of swelling improved    Nutrition: tolerating PO    DVT prophylaxis: ambulation    Over 35 minutes were spent in direct patient care and care coordination. .  Primary: [place]    Vital Signs Last 24 Hrs  T(C): 36.4 (23 Mar 2024 11:30), Max: 37.1 (22 Mar 2024 16:43)  T(F): 97.5 (23 Mar 2024 11:30), Max: 98.8 (22 Mar 2024 16:43)  HR: 67 (23 Mar 2024 11:30) (67 - 77)  BP: 136/77 (23 Mar 2024 11:30) (127/71 - 156/80)  BP(mean): --  RR: 18 (23 Mar 2024 11:30) (18 - 18)  SpO2: 98% (23 Mar 2024 11:30) (95% - 98%)    Parameters below as of 23 Mar 2024 11:30  Patient On (Oxygen Delivery Method): room air    MEDICATIONS  (STANDING):  Biotene Dry Mouth Oral Rinse 5 milliLiter(s) Swish and Spit five times a day  cefepime   IVPB      cefepime   IVPB 2000 milliGRAM(s) IV Intermittent every 8 hours  chlorhexidine 4% Liquid 1 Application(s) Topical <User Schedule>  escitalopram 20 milliGRAM(s) Oral daily  filgrastim-sndz (ZARXIO) Injectable 480 MICROGram(s) SubCutaneous every 24 hours  fluconAZOLE   Tablet 400 milliGRAM(s) Oral daily  lidocaine/prilocaine Cream 1 Application(s) Topical daily  loratadine 10 milliGRAM(s) Oral daily  losartan 100 milliGRAM(s) Oral daily  metoclopramide Injectable 5 milliGRAM(s) IV Push every 6 hours  mycophenolate mofetil 1000 milliGRAM(s) Oral three times a day  pantoprazole    Tablet 40 milliGRAM(s) Oral before breakfast  sodium bicarbonate Mouth Rinse 10 milliLiter(s) Swish and Spit five times a day  sodium chloride 0.9%. 1000 milliLiter(s) (20 mL/Hr) IV Continuous <Continuous>  tacrolimus 2.5 milliGRAM(s) Oral two times a day  valACYclovir 500 milliGRAM(s) Oral two times a day      Assessment: 64-year-old day + 17  CATRACHITA alloBMT using a Flu/Cy/TBI preparative regimen for [place] negative ALL. Course complicated by     Plan:  Heme:   PLT goal > 10,000; Hgb goal > 7.0g/dL  Continue G-CSF; evidence of count recovery   - Will require post ALLO BMT CNS prophylaxis -- begins after engraftment.   Bactrim to start day +21    GVHD:   - PTCy completed  - Cellcept and tacrolimus (3/21/24): 9.9    ID valtrex, flu, cef    Nutrition: tolerating PO    DVT prophylaxis: ambulation    Over 35 minutes were spent in direct patient care and care coordination.

## 2024-03-23 NOTE — PROGRESS NOTE ADULT - SUBJECTIVE AND OBJECTIVE BOX
HPC Transplant Team                                                      Critical / Counseling Time Provided: 30 minutes                                                                                                                                                                                                                                                                                                            Chief Complaint: Haplo-idential BMT (son) with FLU/CY/TBI prep for the treatment fo ALL    S: Patient seen and examined with HPC Transplant Team:   no scute complaints overnight     O: Vitals:   Vital Signs Last 24 Hrs  T(C): 36.4 (23 Mar 2024 14:22), Max: 37 (23 Mar 2024 00:05)  T(F): 97.5 (23 Mar 2024 14:22), Max: 98.6 (23 Mar 2024 00:05)  HR: 69 (23 Mar 2024 14:22) (67 - 74)  BP: 128/74 (23 Mar 2024 14:) (127/71 - 148/78)  BP(mean): --  RR: 18 (23 Mar 2024 14:) (18 - 18)  SpO2: 97% (23 Mar 2024 14:) (95% - 98%)    Parameters below as of 23 Mar 2024 14:22  Patient On (Oxygen Delivery Method): room air    Admit weight: 96.9 kg  Daily Weight in k.3 (23 Mar 2024 08:18)    Intake / Output:    @ 07:  -   @ 07:00  --------------------------------------------------------  IN: 1433 mL / OUT: 1550 mL / NET: -117 mL     @ 07: @ 16:45  --------------------------------------------------------  IN: 1051 mL / OUT: 300 mL / NET: 751 mL      PE:   Oropharynx: + patchy erythema B/L buccal mucosa , + ulcerations lower lip + L cheek  blood blister, + erythema behind frond tooth   Oral Mucositis:   +                                                 rdGrdrrdarddrderd:rd rd3rd CVS: RRR, +S1,S2  Lungs: CTA throughout bilaterally   Abdomen: soft, ND, ND +bs  Extremities: no edema   Gastric Mucositis:      -                                            Grade: -  Intestinal Mucositis:     -                                         Grade: -  Skin: small area of petechiae on the medial aspect of the left knee and diffusely over the left medial ankle and anterior shin  TLC: C/D/I   Neuro: A&O x3  Pain: Denies    Labs:   CBC Full  -  ( 23 Mar 2024 07:18 )  WBC Count : 0.39 K/uL  Hemoglobin : 6.6 g/dL  Hematocrit : 19.0 %  Platelet Count - Automated : 21 K/uL  Mean Cell Volume : 86.8 fl  Mean Cell Hemoglobin : 30.1 pg  Mean Cell Hemoglobin Concentration : 34.7 gm/dL  Auto Neutrophil # : x  Auto Lymphocyte # : x  Auto Monocyte # : x  Auto Eosinophil # : x  Auto Basophil # : x  Auto Neutrophil % : x  Auto Lymphocyte % : x  Auto Monocyte % : x  Auto Eosinophil % : x  Auto Basophil % : x                          6.6    0.39  )-----------(        ( 23 Mar 2024 07:18 )             19.0         139  |  106  |  19  ----------------------------<  94  4.3   |  20<L>  |  0.76    Ca    9.6      23 Mar 2024 07:18  Phos  2.7       Mg     1.4         TPro  5.6<L>  /  Alb  3.4  /  TBili  0.8  /  DBili  x   /  AST  16  /  ALT  20  /  AlkPhos  131<H>  23      LIVER FUNCTIONS - ( 23 Mar 2024 07:18 )  Alb: 3.4 g/dL / Pro: 5.6 g/dL / ALK PHOS: 131 U/L / ALT: 20 U/L / AST: 16 U/L / GGT: x           Lactate Dehydrogenase, Serum: 117 U/L ( @ 07:18)    Cultures:   Culture - Blood (24 @ 21:24)    Specimen Source: .Blood Triple Lumen BROWN   Culture Results:   No growth at 5 days    Radiology:   US Head + Neck Soft Tissue (03.15.24 @ 19:05)  Left parotid gland edema. No fluid collection is identified.  Limited visualization of the right parotid gland.    Meds:   Antimicrobials:   cefepime   IVPB      cefepime   IVPB 2000 milliGRAM(s) IV Intermittent every 8 hours  fluconAZOLE   Tablet 400 milliGRAM(s) Oral daily  valACYclovir 500 milliGRAM(s) Oral two times a day    GI:  aluminum hydroxide/magnesium hydroxide/simethicone Suspension 30 milliLiter(s) Oral every 6 hours PRN  calcium carbonate    500 mG (Tums) Chewable 1 Tablet(s) Chew three times a day PRN  loperamide 2 milliGRAM(s) Oral every 3 hours PRN  pantoprazole    Tablet 40 milliGRAM(s) Oral before breakfast  sodium bicarbonate Mouth Rinse 10 milliLiter(s) Swish and Spit five times a day    Cardiovascular:   losartan 100 milliGRAM(s) Oral daily    Immunologic:   filgrastim-sndz (ZARXIO) Injectable 480 MICROGram(s) SubCutaneous every 24 hours  mycophenolate mofetil 1000 milliGRAM(s) Oral three times a day  tacrolimus 2.5 milliGRAM(s) Oral two times a day    Other medications:   Biotene Dry Mouth Oral Rinse 5 milliLiter(s) Swish and Spit five times a day  chlorhexidine 4% Liquid 1 Application(s) Topical <User Schedule>  escitalopram 20 milliGRAM(s) Oral daily  lidocaine/prilocaine Cream 1 Application(s) Topical daily  loratadine 10 milliGRAM(s) Oral daily  metoclopramide Injectable 5 milliGRAM(s) IV Push every 6 hours  sodium chloride 0.9%. 1000 milliLiter(s) IV Continuous <Continuous>    PRN:   acetaminophen     Tablet .. 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 Mild, central pulmonary venous congestion, new.  3/9 BCx and UCx, follow up  3/11- transaminitis, hold fluconazole. Tbili increased to 1.8. Added on direct and indirect bili.   3/12- direct bili 1.3 3/11/24   3/15- Chemo induced grade 2 oral mucositis: continue supportive care   3/15 parotid gland ( L) edema with PO intake stove vs bleeding vs truma f/u US   3/16 US prelim small collection <1cm, likely cyst, d/w Dr Batres,  final reading   3/18 increasing tacro to 2.5 BID (level =7). re-check level next on Thurs 3/21-WNL,  increase Fluconazole to 400 daily (LFTs normalized). Zofran prn for n/v.    1. Infectious Disease:     cefepime   IVPB 2000 milliGRAM(s) IV Intermittent every 8 hours  fluconAZOLE   Tablet 400 milliGRAM(s) Oral daily  valACYclovir 500 milliGRAM(s) Oral two times a day    2. GI Prophylaxis:   pantoprazole    Tablet 40 milliGRAM(s) Oral before breakfast    3. Mouthcare - NS / NaHCO3 rinses, Mycelex, Biotene; Skin care     4. GVHD prophylaxis   TBI day -1   CTX days +3, +4   mycophenolate mofetil 1000 milliGRAM(s) Oral three times a day  tacrolimus 2.5   milliGRAM(s) Oral two times a day    6. Transfuse & replete electrolytes prn   Thrombocytopenia with blood blister in mouth, PLT to keep PLT goal 20K.  3/23- Transfuse PRBC one unit today.    7. IV hydration, daily weights, strict I&O, prn diuresis     8. PO intake as tolerated, nutrition follow up as needed, MVI, folic acid     9. Antiemetics, anti-diarrhea medications:   loperamide 2 milliGRAM(s) Oral every 3 hours PRN  metoclopramide Injectable 10 milliGRAM(s) IV Push every 6 hours PRN    10. OOB as tolerated, physical therapy consult if needed     11. Monitor coags / fibrinogen 2x week, vitamin K as needed     12. Monitor closely for clinical changes, monitor for fevers     13. Emotional support provided, plan of care discussed and questions addressed     14. Patient education done regarding plan of care, restrictions and discharge planning     15. Continue regular social work input     I have written the above note for Dr. Bowles who performed service with me in the room.   Deuce Davis  NP-C ([phone])    I have seen and examined patient with NP, I agree with above note as scribed.

## 2024-03-24 LAB
ALBUMIN SERPL ELPH-MCNC: 3.4 G/DL — SIGNIFICANT CHANGE UP (ref 3.3–5)
ALP SERPL-CCNC: 136 U/L — HIGH (ref 40–120)
ALT FLD-CCNC: 18 U/L — SIGNIFICANT CHANGE UP (ref 10–45)
ANION GAP SERPL CALC-SCNC: 14 MMOL/L — SIGNIFICANT CHANGE UP (ref 5–17)
AST SERPL-CCNC: 16 U/L — SIGNIFICANT CHANGE UP (ref 10–40)
BASOPHILS # BLD AUTO: 0 K/UL — SIGNIFICANT CHANGE UP (ref 0–0.2)
BASOPHILS NFR BLD AUTO: 0 % — SIGNIFICANT CHANGE UP (ref 0–2)
BILIRUB SERPL-MCNC: 0.8 MG/DL — SIGNIFICANT CHANGE UP (ref 0.2–1.2)
BUN SERPL-MCNC: 17 MG/DL — SIGNIFICANT CHANGE UP (ref 7–23)
CALCIUM SERPL-MCNC: 9.5 MG/DL — SIGNIFICANT CHANGE UP (ref 8.4–10.5)
CHLORIDE SERPL-SCNC: 105 MMOL/L — SIGNIFICANT CHANGE UP (ref 96–108)
CO2 SERPL-SCNC: 20 MMOL/L — LOW (ref 22–31)
CREAT SERPL-MCNC: 0.7 MG/DL — SIGNIFICANT CHANGE UP (ref 0.5–1.3)
EGFR: 95 ML/MIN/1.73M2 — SIGNIFICANT CHANGE UP
EOSINOPHIL # BLD AUTO: 0 K/UL — SIGNIFICANT CHANGE UP (ref 0–0.5)
EOSINOPHIL NFR BLD AUTO: 0 % — SIGNIFICANT CHANGE UP (ref 0–6)
GLUCOSE SERPL-MCNC: 97 MG/DL — SIGNIFICANT CHANGE UP (ref 70–99)
HCT VFR BLD CALC: 20.4 % — CRITICAL LOW (ref 34.5–45)
HGB BLD-MCNC: 7.4 G/DL — LOW (ref 11.5–15.5)
LDH SERPL L TO P-CCNC: 135 U/L — SIGNIFICANT CHANGE UP (ref 50–242)
LYMPHOCYTES # BLD AUTO: 0 % — LOW (ref 13–44)
LYMPHOCYTES # BLD AUTO: 0 K/UL — LOW (ref 1–3.3)
MAGNESIUM SERPL-MCNC: 1.4 MG/DL — LOW (ref 1.6–2.6)
MANUAL SMEAR VERIFICATION: SIGNIFICANT CHANGE UP
MCHC RBC-ENTMCNC: 30.1 PG — SIGNIFICANT CHANGE UP (ref 27–34)
MCHC RBC-ENTMCNC: 36.3 GM/DL — HIGH (ref 32–36)
MCV RBC AUTO: 82.9 FL — SIGNIFICANT CHANGE UP (ref 80–100)
MONOCYTES # BLD AUTO: 0.11 K/UL — SIGNIFICANT CHANGE UP (ref 0–0.9)
MONOCYTES NFR BLD AUTO: 10 % — SIGNIFICANT CHANGE UP (ref 2–14)
NEUTROPHILS # BLD AUTO: 0.97 K/UL — LOW (ref 1.8–7.4)
NEUTROPHILS NFR BLD AUTO: 84 % — HIGH (ref 43–77)
NEUTS BAND # BLD: 6 % — SIGNIFICANT CHANGE UP (ref 0–8)
NRBC # BLD: 0 /100 WBCS — SIGNIFICANT CHANGE UP (ref 0–0)
PHOSPHATE SERPL-MCNC: 2.4 MG/DL — LOW (ref 2.5–4.5)
PLAT MORPH BLD: NORMAL — SIGNIFICANT CHANGE UP
PLATELET # BLD AUTO: 18 K/UL — CRITICAL LOW (ref 150–400)
POTASSIUM SERPL-MCNC: 4.2 MMOL/L — SIGNIFICANT CHANGE UP (ref 3.5–5.3)
POTASSIUM SERPL-SCNC: 4.2 MMOL/L — SIGNIFICANT CHANGE UP (ref 3.5–5.3)
PROT SERPL-MCNC: 5.5 G/DL — LOW (ref 6–8.3)
RBC # BLD: 2.46 M/UL — LOW (ref 3.8–5.2)
RBC # FLD: 11.9 % — SIGNIFICANT CHANGE UP (ref 10.3–14.5)
RBC BLD AUTO: SIGNIFICANT CHANGE UP
SODIUM SERPL-SCNC: 139 MMOL/L — SIGNIFICANT CHANGE UP (ref 135–145)
WBC # BLD: 1.08 K/UL — LOW (ref 3.8–10.5)
WBC # FLD AUTO: 1.08 K/UL — LOW (ref 3.8–10.5)

## 2024-03-24 PROCEDURE — 99233 SBSQ HOSP IP/OBS HIGH 50: CPT | Mod: FS

## 2024-03-24 RX ORDER — MAGNESIUM SULFATE 500 MG/ML
2 VIAL (ML) INJECTION ONCE
Refills: 0 | Status: COMPLETED | OUTPATIENT
Start: 2024-03-24 | End: 2024-03-24

## 2024-03-24 RX ORDER — POTASSIUM PHOSPHATE, MONOBASIC POTASSIUM PHOSPHATE, DIBASIC 236; 224 MG/ML; MG/ML
15 INJECTION, SOLUTION INTRAVENOUS ONCE
Refills: 0 | Status: COMPLETED | OUTPATIENT
Start: 2024-03-24 | End: 2024-03-24

## 2024-03-24 RX ADMIN — LOSARTAN POTASSIUM 100 MILLIGRAM(S): 100 TABLET, FILM COATED ORAL at 05:18

## 2024-03-24 RX ADMIN — LORATADINE 10 MILLIGRAM(S): 10 TABLET ORAL at 12:39

## 2024-03-24 RX ADMIN — Medication 10 MILLILITER(S): at 08:29

## 2024-03-24 RX ADMIN — TACROLIMUS 2.5 MILLIGRAM(S): 5 CAPSULE ORAL at 17:14

## 2024-03-24 RX ADMIN — Medication 480 MICROGRAM(S): at 12:15

## 2024-03-24 RX ADMIN — CEFEPIME 100 MILLIGRAM(S): 1 INJECTION, POWDER, FOR SOLUTION INTRAMUSCULAR; INTRAVENOUS at 05:18

## 2024-03-24 RX ADMIN — ESCITALOPRAM OXALATE 20 MILLIGRAM(S): 10 TABLET, FILM COATED ORAL at 12:39

## 2024-03-24 RX ADMIN — Medication 5 MILLIGRAM(S): at 17:13

## 2024-03-24 RX ADMIN — Medication 5 MILLILITER(S): at 08:28

## 2024-03-24 RX ADMIN — VALACYCLOVIR 500 MILLIGRAM(S): 500 TABLET, FILM COATED ORAL at 05:19

## 2024-03-24 RX ADMIN — LIDOCAINE AND PRILOCAINE CREAM 1 APPLICATION(S): 25; 25 CREAM TOPICAL at 12:14

## 2024-03-24 RX ADMIN — MYCOPHENOLATE MOFETIL 1000 MILLIGRAM(S): 250 CAPSULE ORAL at 22:00

## 2024-03-24 RX ADMIN — TACROLIMUS 2.5 MILLIGRAM(S): 5 CAPSULE ORAL at 05:19

## 2024-03-24 RX ADMIN — Medication 1 TABLET(S): at 09:14

## 2024-03-24 RX ADMIN — Medication 25 GRAM(S): at 08:28

## 2024-03-24 RX ADMIN — VALACYCLOVIR 500 MILLIGRAM(S): 500 TABLET, FILM COATED ORAL at 17:14

## 2024-03-24 RX ADMIN — Medication 10 MILLILITER(S): at 12:14

## 2024-03-24 RX ADMIN — MYCOPHENOLATE MOFETIL 1000 MILLIGRAM(S): 250 CAPSULE ORAL at 05:18

## 2024-03-24 RX ADMIN — Medication 5 MILLILITER(S): at 12:13

## 2024-03-24 RX ADMIN — Medication 10 MILLILITER(S): at 16:20

## 2024-03-24 RX ADMIN — Medication 10 MILLILITER(S): at 20:38

## 2024-03-24 RX ADMIN — PANTOPRAZOLE SODIUM 40 MILLIGRAM(S): 20 TABLET, DELAYED RELEASE ORAL at 05:18

## 2024-03-24 RX ADMIN — Medication 5 MILLIGRAM(S): at 12:34

## 2024-03-24 RX ADMIN — Medication 5 MILLILITER(S): at 16:20

## 2024-03-24 RX ADMIN — POTASSIUM PHOSPHATE, MONOBASIC POTASSIUM PHOSPHATE, DIBASIC 62.5 MILLIMOLE(S): 236; 224 INJECTION, SOLUTION INTRAVENOUS at 08:28

## 2024-03-24 RX ADMIN — CHLORHEXIDINE GLUCONATE 1 APPLICATION(S): 213 SOLUTION TOPICAL at 08:29

## 2024-03-24 RX ADMIN — Medication 5 MILLILITER(S): at 20:38

## 2024-03-24 RX ADMIN — Medication 5 MILLIGRAM(S): at 05:18

## 2024-03-24 RX ADMIN — MYCOPHENOLATE MOFETIL 1000 MILLIGRAM(S): 250 CAPSULE ORAL at 13:03

## 2024-03-24 NOTE — PROGRESS NOTE ADULT - SUBJECTIVE AND OBJECTIVE BOX
HPC Transplant Team                                                      Critical / Counseling Time Provided: 30 minutes                                                                                                                                                        Chief Complaint: Haplo-idential BMT (son) with FLU/CY/TBI prep for the treatment fo ALL    S: Patient seen and examined with HPC Transplant Team:   no scute complaints overnight     O: Vitals:   Vital Signs Last 24 Hrs  T(C): 36.7 (24 Mar 2024 10:30), Max: 37.2 (23 Mar 2024 21:22)  T(F): 98.1 (24 Mar 2024 10:30), Max: 98.9 (23 Mar 2024 21:22)  HR: 71 (24 Mar 2024 10:30) (66 - 76)  BP: 133/79 (24 Mar 2024 10:30) (126/74 - 152/78)  BP(mean): --  RR: 18 (24 Mar 2024 10:30) (18 - 18)  SpO2: 96% (24 Mar 2024 10:30) (95% - 98%)    Parameters below as of 24 Mar 2024 10:30  Patient On (Oxygen Delivery Method): room air    Admit weight: 96.9 kg   Daily Weight in k.5 (24 Mar 2024 09:37)    Intake / Output:    @ 07: @ 07:00  --------------------------------------------------------  IN: 1843 mL / OUT: 1250 mL / NET: 593 mL     @ 07: @ 10:46  --------------------------------------------------------  IN: 676 mL / OUT: 100 mL / NET: 576 mL    PE:   Oropharynx: + patchy erythema B/L buccal mucosa , + ulcerations lower lip + L cheek  blood blister, + erythema behind frond tooth   Oral Mucositis:   +                                                 rdGrdrrdarddrderd:rd rd3rd CVS: RRR, +S1,S2  Lungs: CTA throughout bilaterally   Abdomen: soft, ND, ND +bs  Extremities: no edema   Gastric Mucositis:      -                                            Grade: -  Intestinal Mucositis:     -                                         Grade: -  Skin: small area of petechiae on the medial aspect of the left knee and diffusely over the left medial ankle and anterior shin  TLC: C/D/I   Neuro: A&O x3  Pain: Denies      Labs:   CBC Full  -  ( 24 Mar 2024 07:01 )  WBC Count : 1.08 K/uL  Hemoglobin : 7.4 g/dL  Hematocrit : 20.4 %  Platelet Count - Automated : 18 K/uL  Mean Cell Volume : 82.9 fl  Mean Cell Hemoglobin : 30.1 pg  Mean Cell Hemoglobin Concentration : 36.3 gm/dL  Auto Neutrophil # : 0.97 K/uL  Auto Lymphocyte # : 0.00 K/uL  Auto Monocyte # : 0.11 K/uL  Auto Eosinophil # : 0.00 K/uL  Auto Basophil # : 0.00 K/uL  Auto Neutrophil % : 84.0 %  Auto Lymphocyte % : 0.0 %  Auto Monocyte % : 10.0 %  Auto Eosinophil % : 0.0 %  Auto Basophil % : 0.0 %                          7.4    1.08  )-----------( 18       ( 24 Mar 2024 07:01 )             20.4     -    139  |  105  |  17  ----------------------------<  97  4.2   |  20<L>  |  0.70    Ca    9.5      24 Mar 2024 07:01  Phos  2.4     03-24  Mg     1.4     -    TPro  5.5<L>  /  Alb  3.4  /  TBili  0.8  /  DBili  x   /  AST  16  /  ALT  18  /  AlkPhos  136<H>  -24      LIVER FUNCTIONS - ( 24 Mar 2024 07:01 )  Alb: 3.4 g/dL / Pro: 5.5 g/dL / ALK PHOS: 136 U/L / ALT: 18 U/L / AST: 16 U/L / GGT: x           Lactate Dehydrogenase, Serum: 135 U/L ( @ 07:01)    Meds:   Antimicrobials:   cefepime   IVPB 2000 milliGRAM(s) IV Intermittent every 8 hours  fluconAZOLE   Tablet 400 milliGRAM(s) Oral daily  valACYclovir 500 milliGRAM(s) Oral two times a day    GI:  aluminum hydroxide/magnesium hydroxide/simethicone Suspension 30 milliLiter(s) Oral every 6 hours PRN  calcium carbonate    500 mG (Tums) Chewable 1 Tablet(s) Chew three times a day PRN  loperamide 2 milliGRAM(s) Oral every 3 hours PRN  pantoprazole    Tablet 40 milliGRAM(s) Oral before breakfast  sodium bicarbonate Mouth Rinse 10 milliLiter(s) Swish and Spit five times a day    Cardiovascular:   losartan 100 milliGRAM(s) Oral daily    Immunologic:   filgrastim-sndz (ZARXIO) Injectable 480 MICROGram(s) SubCutaneous every 24 hours  mycophenolate mofetil 1000 milliGRAM(s) Oral three times a day  tacrolimus 2.5 milliGRAM(s) Oral two times a day    Other medications:   Biotene Dry Mouth Oral Rinse 5 milliLiter(s) Swish and Spit five times a day  chlorhexidine 4% Liquid 1 Application(s) Topical <User Schedule>  escitalopram 20 milliGRAM(s) Oral daily  lidocaine/prilocaine Cream 1 Application(s) Topical daily  loratadine 10 milliGRAM(s) Oral daily  metoclopramide Injectable 5 milliGRAM(s) IV Push every 6 hours  sodium chloride 0.9%. 1000 milliLiter(s) IV Continuous <Continuous>    PRN:   acetaminophen     Tablet .. 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 Mild, central pulmonary venous congestion, new.  3/9 BCx and UCx, follow up  3/11- transaminitis, hold fluconazole. Tbili increased to 1.8. Added on direct and indirect bili.   3/12- direct bili 1.3 3/11/24   3/15- Chemo induced grade 2 oral mucositis: continue supportive care   3/15 parotid gland ( L) edema with PO intake stove vs bleeding vs truma f/u US   3/16 US prelim small collection <1cm, likely cyst, d/w Dr Batres,  final reading   3/18 increasing tacro to 2.5 BID (level =7). re-check level next on Thurs 3/21-WNL,  increase Fluconazole to 400 daily (LFTs normalized). Zofran prn for n/v.      1. Infectious Disease:   cefepime   IVPB 2000 milliGRAM(s) IV Intermittent every 8 hours  fluconAZOLE   Tablet 400 milliGRAM(s) Oral daily  valACYclovir 500 milliGRAM(s) Oral two times a day      2. GI Prophylaxis:   pantoprazole    Tablet 40 milliGRAM(s) Oral before breakfast    3. Mouthcare - NS / NaHCO3 rinses, Mycelex, Biotene; Skin care     4. GVHD prophylaxis   TBI day -1   CTX days +3, +4   mycophenolate mofetil 1000 milliGRAM(s) Oral three times a day  tacrolimus 2.5   milliGRAM(s) Oral two times a day    6. Transfuse & replete electrolytes prn   Thrombocytopenia with blood blister in mouth, PLT to keep PLT goal 20K.  3/23- Transfuse PRBC one unit today.    7. IV hydration, daily weights, strict I&O, prn diuresis     8. PO intake as tolerated, nutrition follow up as needed, MVI, folic acid     9. Antiemetics, anti-diarrhea medications:   loperamide 2 milliGRAM(s) Oral every 3 hours PRN  metoclopramide Injectable 10 milliGRAM(s) IV Push every 6 hours PRN    10. OOB as tolerated, physical therapy consult if needed     11. Monitor coags / fibrinogen 2x week, vitamin K as needed     12. Monitor closely for clinical changes, monitor for fevers     13. Emotional support provided, plan of care discussed and questions addressed     14. Patient education done regarding plan of care, restrictions and discharge planning     15. Continue regular social work input     I have written the above note for Dr. Bowles who performed service with me in the room.   Rebecca Gilbert NP-C ([phone])    I have seen and examined patient with NP, I agree with above note as scribed.        HPC Transplant Team                                                      Critical / Counseling Time Provided: 30 minutes                                                                                                                                                        Chief Complaint: Haplo-idential BMT (son) with FLU/CY/TBI prep for the treatment fo ALL    S: Patient seen and examined with HPC Transplant Team:   no acute complaints overnight     O: Vitals:   Vital Signs Last 24 Hrs  T(C): 36.7 (24 Mar 2024 10:30), Max: 37.2 (23 Mar 2024 21:22)  T(F): 98.1 (24 Mar 2024 10:30), Max: 98.9 (23 Mar 2024 21:22)  HR: 71 (24 Mar 2024 10:30) (66 - 76)  BP: 133/79 (24 Mar 2024 10:30) (126/74 - 152/78)  BP(mean): --  RR: 18 (24 Mar 2024 10:30) (18 - 18)  SpO2: 96% (24 Mar 2024 10:30) (95% - 98%)    Parameters below as of 24 Mar 2024 10:30  Patient On (Oxygen Delivery Method): room air    Admit weight: 96.9 kg   Daily Weight in k.5 (24 Mar 2024 09:37)    Intake / Output:    @ 07: @ 07:00  --------------------------------------------------------  IN: 1843 mL / OUT: 1250 mL / NET: 593 mL     @ 07: @ 10:46  --------------------------------------------------------  IN: 676 mL / OUT: 100 mL / NET: 576 mL    PE:   Oropharynx: + patchy erythema B/L buccal mucosa , + ulcerations lower lip + L cheek  blood blister, + erythema behind frond tooth   Oral Mucositis:   +                                                 stGstrstastdstest:st st1st CVS: RRR, +S1,S2  Lungs: CTA throughout bilaterally   Abdomen: soft, ND, ND +bs  Extremities: no edema   Gastric Mucositis:      -                                            Grade: -  Intestinal Mucositis:     -                                         Grade: -  Skin: small area of petechiae on the medial aspect of the left knee and diffusely over the left medial ankle and anterior shin  TLC: C/D/I   Neuro: A&O x3  Pain: Denies      Labs:   CBC Full  -  ( 24 Mar 2024 07:01 )  WBC Count : 1.08 K/uL  Hemoglobin : 7.4 g/dL  Hematocrit : 20.4 %  Platelet Count - Automated : 18 K/uL  Mean Cell Volume : 82.9 fl  Mean Cell Hemoglobin : 30.1 pg  Mean Cell Hemoglobin Concentration : 36.3 gm/dL  Auto Neutrophil # : 0.97 K/uL  Auto Lymphocyte # : 0.00 K/uL  Auto Monocyte # : 0.11 K/uL  Auto Eosinophil # : 0.00 K/uL  Auto Basophil # : 0.00 K/uL  Auto Neutrophil % : 84.0 %  Auto Lymphocyte % : 0.0 %  Auto Monocyte % : 10.0 %  Auto Eosinophil % : 0.0 %  Auto Basophil % : 0.0 %                          7.4    1.08  )-----------( 18       ( 24 Mar 2024 07:01 )             20.4     -    139  |  105  |  17  ----------------------------<  97  4.2   |  20<L>  |  0.70    Ca    9.5      24 Mar 2024 07:01  Phos  2.4     03-24  Mg     1.4     -    TPro  5.5<L>  /  Alb  3.4  /  TBili  0.8  /  DBili  x   /  AST  16  /  ALT  18  /  AlkPhos  136<H>  -24      LIVER FUNCTIONS - ( 24 Mar 2024 07:01 )  Alb: 3.4 g/dL / Pro: 5.5 g/dL / ALK PHOS: 136 U/L / ALT: 18 U/L / AST: 16 U/L / GGT: x           Lactate Dehydrogenase, Serum: 135 U/L ( @ 07:01)    Meds:   Antimicrobials:   cefepime   IVPB 2000 milliGRAM(s) IV Intermittent every 8 hours  fluconAZOLE   Tablet 400 milliGRAM(s) Oral daily  valACYclovir 500 milliGRAM(s) Oral two times a day    GI:  aluminum hydroxide/magnesium hydroxide/simethicone Suspension 30 milliLiter(s) Oral every 6 hours PRN  calcium carbonate    500 mG (Tums) Chewable 1 Tablet(s) Chew three times a day PRN  loperamide 2 milliGRAM(s) Oral every 3 hours PRN  pantoprazole    Tablet 40 milliGRAM(s) Oral before breakfast  sodium bicarbonate Mouth Rinse 10 milliLiter(s) Swish and Spit five times a day    Cardiovascular:   losartan 100 milliGRAM(s) Oral daily    Immunologic:   filgrastim-sndz (ZARXIO) Injectable 480 MICROGram(s) SubCutaneous every 24 hours  mycophenolate mofetil 1000 milliGRAM(s) Oral three times a day  tacrolimus 2.5 milliGRAM(s) Oral two times a day    Other medications:   Biotene Dry Mouth Oral Rinse 5 milliLiter(s) Swish and Spit five times a day  chlorhexidine 4% Liquid 1 Application(s) Topical <User Schedule>  escitalopram 20 milliGRAM(s) Oral daily  lidocaine/prilocaine Cream 1 Application(s) Topical daily  loratadine 10 milliGRAM(s) Oral daily  metoclopramide Injectable 5 milliGRAM(s) IV Push every 6 hours  sodium chloride 0.9%. 1000 milliLiter(s) IV Continuous <Continuous>    PRN:   acetaminophen     Tablet .. 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 Mild, central pulmonary venous congestion, new.  3/9 BCx and UCx, follow up  3/11- transaminitis, hold fluconazole. Tbili increased to 1.8. Added on direct and indirect bili.   3/12- direct bili 1.3 3/11/24   3/15- Chemo induced grade 2 oral mucositis: continue supportive care   3/15 parotid gland ( L) edema with PO intake stove vs bleeding vs truma f/u US   3/16 US prelim small collection <1cm, likely cyst, d/w Dr Batres,  final reading   3/18 increasing tacro to 2.5 BID (level =7). re-check level next on Thurs 3/21-WNL,  increase Fluconazole to 400 daily (LFTs normalized). Zofran prn for n/v.      1. Infectious Disease:   cefepime   IVPB 2000 milliGRAM(s) IV Intermittent every 8 hours  fluconAZOLE   Tablet 400 milliGRAM(s) Oral daily  valACYclovir 500 milliGRAM(s) Oral two times a day      2. GI Prophylaxis:   pantoprazole    Tablet 40 milliGRAM(s) Oral before breakfast    3. Mouthcare - NS / NaHCO3 rinses, Mycelex, Biotene; Skin care     4. GVHD prophylaxis   TBI day -1   CTX days +3, +4   mycophenolate mofetil 1000 milliGRAM(s) Oral three times a day  tacrolimus 2.5   milliGRAM(s) Oral two times a day    6. Transfuse & replete electrolytes prn   Thrombocytopenia with blood blister in mouth, PLT to keep PLT goal 20K.  3/23- Transfuse PRBC one unit today.    7. IV hydration, daily weights, strict I&O, prn diuresis     8. PO intake as tolerated, nutrition follow up as needed, MVI, folic acid     9. Antiemetics, anti-diarrhea medications:   loperamide 2 milliGRAM(s) Oral every 3 hours PRN  metoclopramide Injectable 10 milliGRAM(s) IV Push every 6 hours PRN    10. OOB as tolerated, physical therapy consult if needed     11. Monitor coags / fibrinogen 2x week, vitamin K as needed     12. Monitor closely for clinical changes, monitor for fevers     13. Emotional support provided, plan of care discussed and questions addressed     14. Patient education done regarding plan of care, restrictions and discharge planning     15. Continue regular social work input     I have written the above note for Dr. Bowles who performed service with me in the room.   Rebecca Gilbert NP-C ([phone])    I have seen and examined patient with NP, I agree with above note as scribed.

## 2024-03-24 NOTE — PROGRESS NOTE ADULT - NS ATTEND AMEND GEN_ALL_CORE FT
.  Primary: [place]    Vital Signs Last 24 Hrs  T(C): 36.5 (24 Mar 2024 05:00), Max: 37.2 (23 Mar 2024 21:22)  T(F): 97.7 (24 Mar 2024 05:00), Max: 98.9 (23 Mar 2024 21:22)  HR: 66 (24 Mar 2024 05:00) (66 - 76)  BP: 129/78 (24 Mar 2024 05:00) (126/74 - 152/78)  BP(mean): --  RR: 18 (24 Mar 2024 05:00) (18 - 18)  SpO2: 96% (24 Mar 2024 05:00) (95% - 98%)    Parameters below as of 24 Mar 2024 05:00  Patient On (Oxygen Delivery Method): room air    MEDICATIONS  (STANDING):  Biotene Dry Mouth Oral Rinse 5 milliLiter(s) Swish and Spit five times a day  cefepime   IVPB      cefepime   IVPB 2000 milliGRAM(s) IV Intermittent every 8 hours  chlorhexidine 4% Liquid 1 Application(s) Topical <User Schedule>  escitalopram 20 milliGRAM(s) Oral daily  filgrastim-sndz (ZARXIO) Injectable 480 MICROGram(s) SubCutaneous every 24 hours  fluconAZOLE   Tablet 400 milliGRAM(s) Oral daily  lidocaine/prilocaine Cream 1 Application(s) Topical daily  loratadine 10 milliGRAM(s) Oral daily  losartan 100 milliGRAM(s) Oral daily  metoclopramide Injectable 5 milliGRAM(s) IV Push every 6 hours  mycophenolate mofetil 1000 milliGRAM(s) Oral three times a day  pantoprazole    Tablet 40 milliGRAM(s) Oral before breakfast  sodium bicarbonate Mouth Rinse 10 milliLiter(s) Swish and Spit five times a day  sodium chloride 0.9%. 1000 milliLiter(s) (20 mL/Hr) IV Continuous <Continuous>  tacrolimus 2.5 milliGRAM(s) Oral two times a day  valACYclovir 500 milliGRAM(s) Oral two times a day      Assessment: 64-year-old day + 18  CATRACHITA alloBMT using a Flu/Cy/TBI preparative regimen for [place] negative ALL. Course complicated by     Plan:  Heme:   PLT goal > 10,000; Hgb goal > 7.0g/dL  Continue G-CSF; evidence of count recovery   - Will require post ALLO BMT CNS prophylaxis -- begins after engraftment.   Bactrim to start day +21    GVHD:   - PTCy completed  - Cellcept and tacrolimus (3/21/24): 9.9    ID valtrex, flu, cef    Nutrition: tolerating PO    DVT prophylaxis: ambulation    Over 35 minutes were spent in direct patient care and care coordination. .  Primary: [place]    Vital Signs Last 24 Hrs  T(C): 36.5 (24 Mar 2024 05:00), Max: 37.2 (23 Mar 2024 21:22)  T(F): 97.7 (24 Mar 2024 05:00), Max: 98.9 (23 Mar 2024 21:22)  HR: 66 (24 Mar 2024 05:00) (66 - 76)  BP: 129/78 (24 Mar 2024 05:00) (126/74 - 152/78)  BP(mean): --  RR: 18 (24 Mar 2024 05:00) (18 - 18)  SpO2: 96% (24 Mar 2024 05:00) (95% - 98%)    Parameters below as of 24 Mar 2024 05:00  Patient On (Oxygen Delivery Method): room air    MEDICATIONS  (STANDING):  Biotene Dry Mouth Oral Rinse 5 milliLiter(s) Swish and Spit five times a day  cefepime   IVPB      cefepime   IVPB 2000 milliGRAM(s) IV Intermittent every 8 hours  chlorhexidine 4% Liquid 1 Application(s) Topical <User Schedule>  escitalopram 20 milliGRAM(s) Oral daily  filgrastim-sndz (ZARXIO) Injectable 480 MICROGram(s) SubCutaneous every 24 hours  fluconAZOLE   Tablet 400 milliGRAM(s) Oral daily  lidocaine/prilocaine Cream 1 Application(s) Topical daily  loratadine 10 milliGRAM(s) Oral daily  losartan 100 milliGRAM(s) Oral daily  metoclopramide Injectable 5 milliGRAM(s) IV Push every 6 hours  mycophenolate mofetil 1000 milliGRAM(s) Oral three times a day  pantoprazole    Tablet 40 milliGRAM(s) Oral before breakfast  sodium bicarbonate Mouth Rinse 10 milliLiter(s) Swish and Spit five times a day  sodium chloride 0.9%. 1000 milliLiter(s) (20 mL/Hr) IV Continuous <Continuous>  tacrolimus 2.5 milliGRAM(s) Oral two times a day  valACYclovir 500 milliGRAM(s) Oral two times a day      Assessment: 64-year-old day + 18  CATRACHITA alloBMT using a Flu/Cy/TBI preparative regimen for [place] negative ALL. Course complicated by neutropenic fever (resolved)    Plan:  Heme:   PLT goal > 10,000; Hgb goal > 7.0g/dL  Continue G-CSF until ANC 10,000;    - Will require post ALLO BMT CNS prophylaxis -- begins after engraftment.   Bactrim to start day +21    GVHD:   - PTCy completed  - Cellcept and tacrolimus (3/21/24): 9.9    ID valtrex, D/C flu and D/C cef    Nutrition: tolerating PO    DVT prophylaxis: ambulation    Over 35 minutes were spent in direct patient care and care coordination.  Anticipated discharge Mon/Tuesday.

## 2024-03-25 LAB
ALBUMIN SERPL ELPH-MCNC: 3.5 G/DL — SIGNIFICANT CHANGE UP (ref 3.3–5)
ALP SERPL-CCNC: 139 U/L — HIGH (ref 40–120)
ALT FLD-CCNC: 18 U/L — SIGNIFICANT CHANGE UP (ref 10–45)
ANION GAP SERPL CALC-SCNC: 14 MMOL/L — SIGNIFICANT CHANGE UP (ref 5–17)
APTT BLD: 27 SEC — SIGNIFICANT CHANGE UP (ref 24.5–35.6)
AST SERPL-CCNC: 19 U/L — SIGNIFICANT CHANGE UP (ref 10–40)
BASOPHILS # BLD AUTO: 0 K/UL — SIGNIFICANT CHANGE UP (ref 0–0.2)
BASOPHILS NFR BLD AUTO: 0 % — SIGNIFICANT CHANGE UP (ref 0–2)
BILIRUB DIRECT SERPL-MCNC: 0.2 MG/DL — SIGNIFICANT CHANGE UP (ref 0–0.3)
BILIRUB INDIRECT FLD-MCNC: 0.4 MG/DL — SIGNIFICANT CHANGE UP (ref 0.2–1)
BILIRUB SERPL-MCNC: 0.6 MG/DL — SIGNIFICANT CHANGE UP (ref 0.2–1.2)
BLD GP AB SCN SERPL QL: NEGATIVE — SIGNIFICANT CHANGE UP
BUN SERPL-MCNC: 15 MG/DL — SIGNIFICANT CHANGE UP (ref 7–23)
CALCIUM SERPL-MCNC: 9.4 MG/DL — SIGNIFICANT CHANGE UP (ref 8.4–10.5)
CHLORIDE SERPL-SCNC: 104 MMOL/L — SIGNIFICANT CHANGE UP (ref 96–108)
CO2 SERPL-SCNC: 20 MMOL/L — LOW (ref 22–31)
CREAT SERPL-MCNC: 0.69 MG/DL — SIGNIFICANT CHANGE UP (ref 0.5–1.3)
EGFR: 96 ML/MIN/1.73M2 — SIGNIFICANT CHANGE UP
EOSINOPHIL # BLD AUTO: 0 K/UL — SIGNIFICANT CHANGE UP (ref 0–0.5)
EOSINOPHIL NFR BLD AUTO: 0 % — SIGNIFICANT CHANGE UP (ref 0–6)
FIBRINOGEN PPP-MCNC: 405 MG/DL — SIGNIFICANT CHANGE UP (ref 200–445)
GLUCOSE SERPL-MCNC: 89 MG/DL — SIGNIFICANT CHANGE UP (ref 70–99)
HCT VFR BLD CALC: 19.8 % — CRITICAL LOW (ref 34.5–45)
HGB BLD-MCNC: 7 G/DL — CRITICAL LOW (ref 11.5–15.5)
INR BLD: 1.1 RATIO — SIGNIFICANT CHANGE UP (ref 0.85–1.18)
LDH SERPL L TO P-CCNC: 144 U/L — SIGNIFICANT CHANGE UP (ref 50–242)
LYMPHOCYTES # BLD AUTO: 0 % — LOW (ref 13–44)
LYMPHOCYTES # BLD AUTO: 0 K/UL — LOW (ref 1–3.3)
MAGNESIUM SERPL-MCNC: 1.6 MG/DL — SIGNIFICANT CHANGE UP (ref 1.6–2.6)
MANUAL SMEAR VERIFICATION: SIGNIFICANT CHANGE UP
MCHC RBC-ENTMCNC: 30 PG — SIGNIFICANT CHANGE UP (ref 27–34)
MCHC RBC-ENTMCNC: 35.4 GM/DL — SIGNIFICANT CHANGE UP (ref 32–36)
MCV RBC AUTO: 85 FL — SIGNIFICANT CHANGE UP (ref 80–100)
MONOCYTES # BLD AUTO: 0.25 K/UL — SIGNIFICANT CHANGE UP (ref 0–0.9)
MONOCYTES NFR BLD AUTO: 9.9 % — SIGNIFICANT CHANGE UP (ref 2–14)
MYELOCYTES NFR BLD: 0.9 % — HIGH (ref 0–0)
NEUTROPHILS # BLD AUTO: 2.27 K/UL — SIGNIFICANT CHANGE UP (ref 1.8–7.4)
NEUTROPHILS NFR BLD AUTO: 85.6 % — HIGH (ref 43–77)
NEUTS BAND # BLD: 3.6 % — SIGNIFICANT CHANGE UP (ref 0–8)
PHOSPHATE SERPL-MCNC: 2.8 MG/DL — SIGNIFICANT CHANGE UP (ref 2.5–4.5)
PLAT MORPH BLD: NORMAL — SIGNIFICANT CHANGE UP
PLATELET # BLD AUTO: 25 K/UL — LOW (ref 150–400)
POTASSIUM SERPL-MCNC: 4.3 MMOL/L — SIGNIFICANT CHANGE UP (ref 3.5–5.3)
POTASSIUM SERPL-SCNC: 4.3 MMOL/L — SIGNIFICANT CHANGE UP (ref 3.5–5.3)
PROT SERPL-MCNC: 5.5 G/DL — LOW (ref 6–8.3)
PROTHROM AB SERPL-ACNC: 12.1 SEC — SIGNIFICANT CHANGE UP (ref 9.5–13)
RBC # BLD: 2.33 M/UL — LOW (ref 3.8–5.2)
RBC # FLD: 11.9 % — SIGNIFICANT CHANGE UP (ref 10.3–14.5)
RBC BLD AUTO: SIGNIFICANT CHANGE UP
RH IG SCN BLD-IMP: POSITIVE — SIGNIFICANT CHANGE UP
SODIUM SERPL-SCNC: 138 MMOL/L — SIGNIFICANT CHANGE UP (ref 135–145)
TACROLIMUS SERPL-MCNC: 7.8 NG/ML — SIGNIFICANT CHANGE UP
TOXIC GRANULES BLD QL SMEAR: PRESENT — SIGNIFICANT CHANGE UP
WBC # BLD: 2.54 K/UL — LOW (ref 3.8–10.5)
WBC # FLD AUTO: 2.54 K/UL — LOW (ref 3.8–10.5)

## 2024-03-25 PROCEDURE — 99233 SBSQ HOSP IP/OBS HIGH 50: CPT | Mod: FS

## 2024-03-25 RX ORDER — ATOVAQUONE 750 MG/5ML
10 SUSPENSION ORAL
Qty: 300 | Refills: 0
Start: 2024-03-25 | End: 2024-04-23

## 2024-03-25 RX ORDER — OMEPRAZOLE 10 MG/1
1 CAPSULE, DELAYED RELEASE ORAL
Qty: 30 | Refills: 0
Start: 2024-03-25 | End: 2024-04-23

## 2024-03-25 RX ORDER — LOSARTAN POTASSIUM 100 MG/1
1 TABLET, FILM COATED ORAL
Qty: 0 | Refills: 0 | DISCHARGE
Start: 2024-03-25

## 2024-03-25 RX ORDER — ESCITALOPRAM OXALATE 10 MG/1
1 TABLET, FILM COATED ORAL
Qty: 0 | Refills: 0 | DISCHARGE
Start: 2024-03-25

## 2024-03-25 RX ORDER — METOCLOPRAMIDE HCL 10 MG
5 TABLET ORAL EVERY 6 HOURS
Refills: 0 | Status: DISCONTINUED | OUTPATIENT
Start: 2024-03-25 | End: 2024-03-26

## 2024-03-25 RX ORDER — LOSARTAN POTASSIUM 100 MG/1
1 TABLET, FILM COATED ORAL
Qty: 0 | Refills: 0 | DISCHARGE

## 2024-03-25 RX ORDER — OMEPRAZOLE 10 MG/1
1 CAPSULE, DELAYED RELEASE ORAL
Qty: 0 | Refills: 0 | DISCHARGE

## 2024-03-25 RX ORDER — VALACYCLOVIR 500 MG/1
1 TABLET, FILM COATED ORAL
Qty: 60 | Refills: 0
Start: 2024-03-25 | End: 2024-04-23

## 2024-03-25 RX ORDER — TACROLIMUS 5 MG/1
2 CAPSULE ORAL
Qty: 120 | Refills: 0
Start: 2024-03-25 | End: 2024-04-23

## 2024-03-25 RX ORDER — TACROLIMUS 5 MG/1
3.5 CAPSULE ORAL
Refills: 0 | Status: DISCONTINUED | OUTPATIENT
Start: 2024-03-25 | End: 2024-03-26

## 2024-03-25 RX ORDER — ONDANSETRON 8 MG/1
1 TABLET, FILM COATED ORAL
Qty: 90 | Refills: 0
Start: 2024-03-25 | End: 2024-04-23

## 2024-03-25 RX ORDER — MYCOPHENOLATE MOFETIL 250 MG/1
2 CAPSULE ORAL
Qty: 84 | Refills: 0
Start: 2024-03-25 | End: 2024-04-07

## 2024-03-25 RX ORDER — ESCITALOPRAM OXALATE 10 MG/1
1 TABLET, FILM COATED ORAL
Qty: 0 | Refills: 0 | DISCHARGE

## 2024-03-25 RX ORDER — ATOVAQUONE 750 MG/5ML
10 SUSPENSION ORAL
Refills: 0 | DISCHARGE

## 2024-03-25 RX ORDER — CHLORHEXIDINE GLUCONATE 213 G/1000ML
15 SOLUTION TOPICAL
Refills: 0 | DISCHARGE

## 2024-03-25 RX ORDER — TACROLIMUS 5 MG/1
1 CAPSULE ORAL
Qty: 60 | Refills: 0
Start: 2024-03-25 | End: 2024-04-23

## 2024-03-25 RX ADMIN — Medication 1 TABLET(S): at 14:50

## 2024-03-25 RX ADMIN — CHLORHEXIDINE GLUCONATE 1 APPLICATION(S): 213 SOLUTION TOPICAL at 08:24

## 2024-03-25 RX ADMIN — Medication 5 MILLILITER(S): at 20:42

## 2024-03-25 RX ADMIN — Medication 5 MILLILITER(S): at 23:28

## 2024-03-25 RX ADMIN — ESCITALOPRAM OXALATE 20 MILLIGRAM(S): 10 TABLET, FILM COATED ORAL at 11:53

## 2024-03-25 RX ADMIN — MYCOPHENOLATE MOFETIL 1000 MILLIGRAM(S): 250 CAPSULE ORAL at 14:35

## 2024-03-25 RX ADMIN — VALACYCLOVIR 500 MILLIGRAM(S): 500 TABLET, FILM COATED ORAL at 06:15

## 2024-03-25 RX ADMIN — Medication 5 MILLILITER(S): at 08:24

## 2024-03-25 RX ADMIN — LIDOCAINE AND PRILOCAINE CREAM 1 APPLICATION(S): 25; 25 CREAM TOPICAL at 11:54

## 2024-03-25 RX ADMIN — Medication 5 MILLILITER(S): at 15:46

## 2024-03-25 RX ADMIN — Medication 5 MILLIGRAM(S): at 06:14

## 2024-03-25 RX ADMIN — Medication 10 MILLILITER(S): at 08:24

## 2024-03-25 RX ADMIN — Medication 10 MILLILITER(S): at 20:42

## 2024-03-25 RX ADMIN — Medication 2 MILLIGRAM(S): at 07:50

## 2024-03-25 RX ADMIN — TACROLIMUS 3.5 MILLIGRAM(S): 5 CAPSULE ORAL at 17:59

## 2024-03-25 RX ADMIN — Medication 10 MILLILITER(S): at 23:29

## 2024-03-25 RX ADMIN — Medication 5 MILLILITER(S): at 11:53

## 2024-03-25 RX ADMIN — Medication 10 MILLILITER(S): at 11:53

## 2024-03-25 RX ADMIN — Medication 5 MILLILITER(S): at 00:56

## 2024-03-25 RX ADMIN — Medication 480 MICROGRAM(S): at 11:53

## 2024-03-25 RX ADMIN — LORATADINE 10 MILLIGRAM(S): 10 TABLET ORAL at 11:53

## 2024-03-25 RX ADMIN — Medication 5 MILLIGRAM(S): at 00:56

## 2024-03-25 RX ADMIN — Medication 10 MILLILITER(S): at 15:46

## 2024-03-25 RX ADMIN — VALACYCLOVIR 500 MILLIGRAM(S): 500 TABLET, FILM COATED ORAL at 17:58

## 2024-03-25 RX ADMIN — LOSARTAN POTASSIUM 100 MILLIGRAM(S): 100 TABLET, FILM COATED ORAL at 06:15

## 2024-03-25 RX ADMIN — Medication 2 MILLIGRAM(S): at 23:22

## 2024-03-25 RX ADMIN — Medication 10 MILLILITER(S): at 00:56

## 2024-03-25 RX ADMIN — PANTOPRAZOLE SODIUM 40 MILLIGRAM(S): 20 TABLET, DELAYED RELEASE ORAL at 06:15

## 2024-03-25 RX ADMIN — SODIUM CHLORIDE 20 MILLILITER(S): 9 INJECTION INTRAMUSCULAR; INTRAVENOUS; SUBCUTANEOUS at 06:15

## 2024-03-25 RX ADMIN — MYCOPHENOLATE MOFETIL 1000 MILLIGRAM(S): 250 CAPSULE ORAL at 21:52

## 2024-03-25 RX ADMIN — MYCOPHENOLATE MOFETIL 1000 MILLIGRAM(S): 250 CAPSULE ORAL at 06:14

## 2024-03-25 RX ADMIN — Medication 5 MILLIGRAM(S): at 11:54

## 2024-03-25 RX ADMIN — TACROLIMUS 2.5 MILLIGRAM(S): 5 CAPSULE ORAL at 06:14

## 2024-03-25 NOTE — PHARMACOTHERAPY INTERVENTION NOTE - INTERVENTION TYPE RECOOMEND
Pharmacokinetics Evaluation
Therapy Recommended - Additional therapy
Dose Optimization/Non-renal Dose Adjustment
Pharmacokinetics Evaluation

## 2024-03-25 NOTE — PROGRESS NOTE ADULT - NS ATTEND AMEND GEN_ALL_CORE FT
.  Primary: [place]    Vital Signs Last 24 Hrs  T(C): 36.5 (24 Mar 2024 05:00), Max: 37.2 (23 Mar 2024 21:22)  T(F): 97.7 (24 Mar 2024 05:00), Max: 98.9 (23 Mar 2024 21:22)  HR: 66 (24 Mar 2024 05:00) (66 - 76)  BP: 129/78 (24 Mar 2024 05:00) (126/74 - 152/78)  BP(mean): --  RR: 18 (24 Mar 2024 05:00) (18 - 18)  SpO2: 96% (24 Mar 2024 05:00) (95% - 98%)    Parameters below as of 24 Mar 2024 05:00  Patient On (Oxygen Delivery Method): room air    MEDICATIONS  (STANDING):  Biotene Dry Mouth Oral Rinse 5 milliLiter(s) Swish and Spit five times a day  cefepime   IVPB      cefepime   IVPB 2000 milliGRAM(s) IV Intermittent every 8 hours  chlorhexidine 4% Liquid 1 Application(s) Topical <User Schedule>  escitalopram 20 milliGRAM(s) Oral daily  filgrastim-sndz (ZARXIO) Injectable 480 MICROGram(s) SubCutaneous every 24 hours  fluconAZOLE   Tablet 400 milliGRAM(s) Oral daily  lidocaine/prilocaine Cream 1 Application(s) Topical daily  loratadine 10 milliGRAM(s) Oral daily  losartan 100 milliGRAM(s) Oral daily  metoclopramide Injectable 5 milliGRAM(s) IV Push every 6 hours  mycophenolate mofetil 1000 milliGRAM(s) Oral three times a day  pantoprazole    Tablet 40 milliGRAM(s) Oral before breakfast  sodium bicarbonate Mouth Rinse 10 milliLiter(s) Swish and Spit five times a day  sodium chloride 0.9%. 1000 milliLiter(s) (20 mL/Hr) IV Continuous <Continuous>  tacrolimus 2.5 milliGRAM(s) Oral two times a day  valACYclovir 500 milliGRAM(s) Oral two times a day      Assessment: 64-year-old day + 19  CATRACHITA alloBMT using a Flu/Cy/TBI preparative regimen for [place] negative ALL. Course complicated by neutropenic fever (resolved)    Plan:  Heme:   PLT goal > 10,000; Hgb goal > 7.0g/dL  Continue G-CSF until ANC 10,000;    - Will require post ALLO BMT CNS prophylaxis -- begins after engraftment.   Bactrim to start day +21    GVHD:   - PTCy completed  - Cellcept and tacrolimus (3/21/24): 9.9    ID valtrex, D/C flu and D/C cef    Nutrition: tolerating PO    DVT prophylaxis: ambulation    Over 35 minutes were spent in direct patient care and care coordination.  Anticipated discharge Mon/Tuesday. .  MEDICATIONS  (STANDING):  Biotene Dry Mouth Oral Rinse 5 milliLiter(s) Swish and Spit five times a day  cefepime   IVPB 2000 milliGRAM(s) IV Intermittent every 8 hours off  chlorhexidine 4% Liquid 1 Application(s) Topical <User Schedule>  escitalopram 20 milliGRAM(s) Oral daily  filgrastim-sndz (ZARXIO) Injectable 480 MICROGram(s) SubCutaneous every 24 hours  fluconAZOLE   Tablet 400 milliGRAM(s) Oral daily..stopped 3/24  lidocaine/prilocaine Cream 1 Application(s) Topical daily  loratadine 10 milliGRAM(s) Oral daily  losartan 100 milliGRAM(s) Oral daily  metoclopramide Injectable 5 milliGRAM(s) IV Push every 6 hours  mycophenolate mofetil 1000 milliGRAM(s) Oral three times a day  pantoprazole    Tablet 40 milliGRAM(s) Oral before breakfast  sodium bicarbonate Mouth Rinse 10 milliLiter(s) Swish and Spit five times a day  sodium chloride 0.9%. 1000 milliLiter(s) (20 mL/Hr) IV Continuous <Continuous>  tacrolimus 2.5 milliGRAM(s) Oral two times a day  valACYclovir 500 milliGRAM(s) Oral two times a day      Assessment: 64-year-old day + 19  CATRACHITA alloBMT using a Flu/Cy/TBI preparative regimen for [place] negative ALL. Course complicated by neutropenic fever (resolved)    Plan:  Heme:   PLT goal > 10,000; Hgb goal > 7.0g/dL  Continue G-CSF until ANC 5,000;    - Will require post ALLO BMT CNS prophylaxis -- begins after engraftment.   Bactrim to start day +21    GVHD:   - PTCy completed  - Cellcept and tacrolimus (3/21/24): 9.9    ID valtrex, D/C'd  flu and cef 3/24..mepron for pcp prophylaxis    Nutrition: tolerating PO    DVT prophylaxis: ambulation    d/c planning for tues    instruction started    Over 35 minutes were spent in direct patient care and care coordination.  Anticipated discharge Mon/Tuesday.

## 2024-03-25 NOTE — PROVIDER CONTACT NOTE (CRITICAL VALUE NOTIFICATION) - ASSESSMENT
stable
stable
A&O X4, VS STABLE AND AFEBRILE, NO BLEEDING NOTED
Stable and afebrile.
asymptomatic
Stable and afebrile.
A&Ox4, vitals stable, afebrile. Patient has blood blister inside left facial cheek that has been slowly bleeding for the last few hours. Not painful.
Stable and afebrile.
stable
Stable and afebrile. No active bleeding.

## 2024-03-25 NOTE — PROVIDER CONTACT NOTE (CRITICAL VALUE NOTIFICATION) - SITUATION
Platelets = 14
Plt: 4
Day + 13
platelet count 19
HGB 6.6  HCT 19
hgb 7.0, hct 19.8
plt=10
hgb 7.4  hct 20.4  plts 18
low cbc
hct 19.5

## 2024-03-25 NOTE — PROVIDER CONTACT NOTE (CRITICAL VALUE NOTIFICATION) - ACTION/TREATMENT ORDERED:
transfusions administered as ordered
1 unit of platelets to be given
NP aware and notified. No transfusion at this time. Monitoring for s/s of hypotension, dizziness, SOB is ongoing.
NP aware and notified. No transfusion ordered at this time. Monitoring for s/s of SOB, dizziness and low b/p is ongoing.
transfusion ordered
NP aware and notified. No transfusion at this time. No active bleeding at this time. Monitoring for s/s of bleeding is ongoing.
NP aware and notified. One unit platelets ordered and given and tolerated well. Monitoring for s/s of bleeding is ongoing.
platelet transfusion ordered
no transfusion at this time
none

## 2024-03-25 NOTE — CHART NOTE - NSCHARTNOTEFT_GEN_A_CORE
Nutrition Follow Up Note  Patient seen for: BMT Nutrition Follow up    Chart reviewed. Events noted.     Source: [] Patient       [x] Medical Record        [x] RN        [] Spouse at bedside       [] Other:    -If unable to interview patient: [] Trach/Vent/BiPAP  [] Disoriented/confused/inappropriate to interview    Diet Order:   Diet, Regular:   GlutaSolve(Glutamine) 15gm pkg     Qty per Day:  1 (24 @ 15:25) [Active]  Innov[place] standard 1.0 1x/day     - Is current order appropriate/adequate? [] Yes  []  No: see recommendations below     - PO intake :   [] >75%  Adequate    [] 50-75%  Fair       [x] <50%  Poor    - Nutrition-related concerns:      - Intake: Pt reports appetite/PO intake; consuming % of most meals. Endorses enjoying Innovolt standard 1.0 1x/day, protein-fortified smoothie, mighty shakes and Chobani greek yogurt.       - GI: No GI distress reported. Last BM: .   Bowel Regimen? [x] Yes Imodium  [] No      - Grade 2 oral mucositis (03-15)      - Mouthcare: Biotene, sodium bicarbonate mouth wash       - Renal: IVF: NS @ 20 ml/hr.       - BMT/SCT: Post: Allogeneic  BMT day + 19; ordered for Cellcept and Prograf       - 3/24: Engrafted     Weights:  Dosing weight:  96.9 kg (2-29)   Daily Weight in k.6 (25 Mar 2024 07:45), 92.7 (18 Mar 2024 08:36), 97.1 (-), Weight in k.4 (), Weight in k.2 (-), Weight in k.2 (-), Weight in k.3 (-), Weight in k.9 ()  ** Weight fluctuating - Weight changes likely secondary to fluid shifts. RD to continue to monitor weight trends as able.       Nutritionally Pertinent MEDICATIONS  (STANDING):  Biotene Dry Mouth Oral Rinse 5 milliLiter(s) Swish and Spit five times a day  chlorhexidine 4% Liquid 1 Application(s) Topical <User Schedule>  escitalopram 20 milliGRAM(s) Oral daily  filgrastim-sndz (ZARXIO) Injectable 480 MICROGram(s) SubCutaneous every 24 hours  lidocaine/prilocaine Cream 1 Application(s) Topical daily  loratadine 10 milliGRAM(s) Oral daily  losartan 100 milliGRAM(s) Oral daily  metoclopramide Injectable 5 milliGRAM(s) IV Push every 6 hours  mycophenolate mofetil 1000 milliGRAM(s) Oral three times a day  pantoprazole    Tablet 40 milliGRAM(s) Oral before breakfast  sodium bicarbonate Mouth Rinse 10 milliLiter(s) Swish and Spit five times a day  sodium chloride 0.9%. 1000 milliLiter(s) (20 mL/Hr) IV Continuous <Continuous>  tacrolimus 2.5 milliGRAM(s) Oral two times a day  valACYclovir 500 milliGRAM(s) Oral two times a day    MEDICATIONS  (PRN):  acetaminophen     Tablet .. 650 milliGRAM(s) Oral every 6 hours PRN Mild Pain (1 - 3), Moderate Pain (4 - 6)  aluminum hydroxide/magnesium hydroxide/simethicone Suspension 30 milliLiter(s) Oral every 6 hours PRN Dyspepsia  calcium carbonate    500 mG (Tums) Chewable 1 Tablet(s) Chew three times a day PRN Dyspepsia  loperamide 2 milliGRAM(s) Oral every 3 hours PRN Diarrhea  ondansetron  IVPB 8 milliGRAM(s) IV Intermittent every 8 hours PRN Nausea and/or Vomiting  sodium chloride 0.9% lock flush 10 milliLiter(s) IV Push every 1 hour PRN Pre/post blood products, medications, blood draw, and to maintain line patency  zinc oxide 40% Paste 1 Application(s) Topical two times a day PRN discomfort      Pertinent Labs:  @ 07:08: Na 138, BUN 15, Cr 0.69, BG 89, K+ 4.3, Phos 2.8, Mg 1.6, Alk Phos 139<H>, ALT/SGPT 18, AST/SGOT 19, HbA1c --    Finger Sticks:      Skin per nursing documentation: No pressure injuries noted.  Edema per nursing documentation: No peripheral edema noted per nursing flow sheets     Estimated Needs: Based on IBW 54.8 kg   Energy needs: 6461-3320 kcal/kg (35-40 kcal/kg)  Protein needs: 87.68-98.64 g/kg (1.6-1.8 g/kg)  Fluid needs: Defer to team    [x] no change since previous assessment  [] recalculated:     Previous Nutrition Diagnosis:   1. Moderate Acute Malnutrition   2. Increased Nutrient Needs   Nutrition Diagnosis is: [x] ongoing  [] resolved [] not applicable     Nutrition Care Plan:  [x] In Progress  [] Achieved  [] Not applicable    New Nutrition Diagnosis:     Nutrition Interventions:     Education Provided   [x] Yes:  [] No: Emphasized the importance of adequate kcal and protein intake, provided recommendations to optimize nutritional intake in case of decreased appetite, recommended small frequent meals by consuming nutrient-dense snacks between meals, to start with protein, and sips of supplement throughout the day.   Reviewed transplant food safety precautions and answered all questions as able. Discussed avoiding any take out/outside foods (including no bakery/deli items), emphasis on consuming home cooked or frozen meals. Discussed consuming bottled or filtered water. Discussed importance of adequate intake when home, including nutrient dense foods as part of diet, consuming small, frequent meals to optimize overall intake.     Recommendations:        [X] Continue current diet order: regular diet + Glutasolve 1x/day     [X] Continue protein-fortified smoothie of day 1x/day (Monday-Friday, 300 edison 18 gm protein)     [X] Continue Innovolt standard 1.0; RD to follow up for acceptability.     [X] Monitor and encourage PO intake. Encourage use of daily menus. Honor dietary preferences as expressed as able.     [X] Continue Chobani greek yogurt 1x/day and mighty shakes 1x/day.     [X] Reinforce food safety education as able/needed.     Monitoring and Evaluation:   Continue to monitor nutritional intake, tolerance to diet prescription, weights, labs, skin integrity    RD remains available upon request and will follow up per protocol  Love Loredo RDN CDN (TEAMS) Nutrition Follow Up Note  Patient seen for: BMT Nutrition Follow up    Chart reviewed. Events noted.     Source: [] Patient       [x] Medical Record        [x] RN        [] Spouse at bedside       [] Other:    -If unable to interview patient: [] Trach/Vent/BiPAP  [] Disoriented/confused/inappropriate to interview    Diet Order:   Diet, Regular:   GlutaSolve(Glutamine) 15gm pkg     Qty per Day:  1 (24 @ 15:25) [Active]  Trubates standard 1.0 1x/day     - Is current order appropriate/adequate? [] Yes  []  No: see recommendations below     - PO intake :   [] >75%  Adequate    [] 50-75%  Fair       [x] <50%  Poor    - Nutrition-related concerns:      - Intake: Pt reports poor appetite/PO intake; consuming <50% of most meals. Endorses enjoying Trubates standard 1.0 1x/day. States she is trying to drink shake for breakfast, apple sauce for lunch and a small snack for dinner. States she feels that her appetite will improve once she is at home.       - GI: No GI distress reported. Last BM: .   Bowel Regimen? [x] Yes Imodium  [] No      - Grade 2 oral mucositis (03-15)      - Mouthcare: Biotene, sodium bicarbonate mouth wash       - Renal: IVF: NS @ 20 ml/hr.       - BMT/SCT: Post: Allogeneic  BMT day + 19; ordered for Cellcept and Prograf       - 3/24: Engrafted     Weights:  Dosing weight:  96.9 kg (2-29)   Daily Weight in k.6 (25 Mar 2024 07:45), 92.7 (18 Mar 2024 08:36), 97.1 (-), Weight in k.4 (-), Weight in k.2 (-), Weight in k.2 (-), Weight in k.3 (-), Weight in k.9 ()  ** Weight fluctuating - Weight changes likely secondary to fluid shifts. RD to continue to monitor weight trends as able.       Nutritionally Pertinent MEDICATIONS  (STANDING):  Biotene Dry Mouth Oral Rinse 5 milliLiter(s) Swish and Spit five times a day  chlorhexidine 4% Liquid 1 Application(s) Topical <User Schedule>  escitalopram 20 milliGRAM(s) Oral daily  filgrastim-sndz (ZARXIO) Injectable 480 MICROGram(s) SubCutaneous every 24 hours  lidocaine/prilocaine Cream 1 Application(s) Topical daily  loratadine 10 milliGRAM(s) Oral daily  losartan 100 milliGRAM(s) Oral daily  metoclopramide Injectable 5 milliGRAM(s) IV Push every 6 hours  mycophenolate mofetil 1000 milliGRAM(s) Oral three times a day  pantoprazole    Tablet 40 milliGRAM(s) Oral before breakfast  sodium bicarbonate Mouth Rinse 10 milliLiter(s) Swish and Spit five times a day  sodium chloride 0.9%. 1000 milliLiter(s) (20 mL/Hr) IV Continuous <Continuous>  tacrolimus 2.5 milliGRAM(s) Oral two times a day  valACYclovir 500 milliGRAM(s) Oral two times a day    MEDICATIONS  (PRN):  acetaminophen     Tablet .. 650 milliGRAM(s) Oral every 6 hours PRN Mild Pain (1 - 3), Moderate Pain (4 - 6)  aluminum hydroxide/magnesium hydroxide/simethicone Suspension 30 milliLiter(s) Oral every 6 hours PRN Dyspepsia  calcium carbonate    500 mG (Tums) Chewable 1 Tablet(s) Chew three times a day PRN Dyspepsia  loperamide 2 milliGRAM(s) Oral every 3 hours PRN Diarrhea  ondansetron  IVPB 8 milliGRAM(s) IV Intermittent every 8 hours PRN Nausea and/or Vomiting  sodium chloride 0.9% lock flush 10 milliLiter(s) IV Push every 1 hour PRN Pre/post blood products, medications, blood draw, and to maintain line patency  zinc oxide 40% Paste 1 Application(s) Topical two times a day PRN discomfort      Pertinent Labs:  @ 07:08: Na 138, BUN 15, Cr 0.69, BG 89, K+ 4.3, Phos 2.8, Mg 1.6, Alk Phos 139<H>, ALT/SGPT 18, AST/SGOT 19, HbA1c --    Finger Sticks:      Skin per nursing documentation: No pressure injuries noted.  Edema per nursing documentation: No peripheral edema noted per nursing flow sheets     Estimated Needs: Based on IBW 54.8 kg   Energy needs: 7438-9445 kcal/kg (35-40 kcal/kg)  Protein needs: 87.68-98.64 g/kg (1.6-1.8 g/kg)  Fluid needs: Defer to team    [x] no change since previous assessment  [] recalculated:     Previous Nutrition Diagnosis:   1. Moderate Acute Malnutrition   2. Increased Nutrient Needs   Nutrition Diagnosis is: [x] ongoing  [] resolved [] not applicable     Nutrition Care Plan:  [x] In Progress  [] Achieved  [] Not applicable    New Nutrition Diagnosis:     Nutrition Interventions:     Education Provided   [x] Yes:  [] No: Emphasized the importance of adequate kcal and protein intake, provided recommendations to optimize nutritional intake in case of decreased appetite, recommended small frequent meals by consuming nutrient-dense snacks between meals, to start with protein, and sips of supplement throughout the day.   Reviewed transplant food safety precautions and answered all questions as able. Discussed avoiding any take out/outside foods (including no bakery/deli items), emphasis on consuming home cooked or frozen meals. Discussed consuming bottled or filtered water. Discussed importance of adequate intake when home, including nutrient dense foods as part of diet, consuming small, frequent meals to optimize overall intake.     Recommendations:        [X] Continue current diet order: regular diet + Glutasolve 1x/day     [X] Continue protein-fortified smoothie of day 1x/day (Monday-Friday, 300 edison 18 gm protein)     [X] Continue Trubates standard 1.0; RD to follow up for acceptability.     [X] Monitor and encourage PO intake. Encourage use of daily menus. Honor dietary preferences as expressed as able.     [X] Continue Chobani greek yogurt 1x/day and mighty shakes 1x/day.     [X] Reinforce food safety education as able/needed.     Monitoring and Evaluation:   Continue to monitor nutritional intake, tolerance to diet prescription, weights, labs, skin integrity    RD remains available upon request and will follow up per protocol  Love Loredo RDN CDN (TEAMS)

## 2024-03-25 NOTE — PROVIDER CONTACT NOTE (CRITICAL VALUE NOTIFICATION) - NAME OF MD/NP/PA/DO NOTIFIED:
elsie perkins
elsie branch
ABIDA Gilbert NP
ABIDA Gilbert NP
DENNIS Armenta
Nuno MARTINEZ
DENNIS Das
Deuce Davis NP
NP Estelita Tinsley
DENNIS Tinsley

## 2024-03-25 NOTE — PROVIDER CONTACT NOTE (CRITICAL VALUE NOTIFICATION) - PERSON GIVING RESULT:
Jessica
Jessica
Kristen Bautista
Francis, Lab
brad boyce
Armin nagy)
Jessica (randee)
sony (randee)
Francis
valery

## 2024-03-25 NOTE — PROGRESS NOTE ADULT - NUTRITIONAL ASSESSMENT
This patient has been assessed with a concern for Malnutrition and has been determined to have a diagnosis/diagnoses of Moderate protein-calorie malnutrition.    This patient is being managed with:   Diet Regular-  GlutaSolve(Glutamine) 15gm pkg     Qty per Day:  1  Entered: Mar 12 2024  3:24PM  

## 2024-03-25 NOTE — PROVIDER CONTACT NOTE (CRITICAL VALUE NOTIFICATION) - RECOMMENDATIONS
transfusion
Notify NP.
Notify NP. Monitor for s/s of bleeding.
transfuse
Notify NP. Monitor for s/s of low b/p, dizziness, SOB.
no transfusion at this time
none
Notify NP.
transfusion

## 2024-03-25 NOTE — PROVIDER CONTACT NOTE (CRITICAL VALUE NOTIFICATION) - BACKGROUND
ALL s/p haplo stem cell transplant
s/p stem cells
ALL s/p stem cell transplant
post chemo
post chemo
post chemo, count recovery
day + 7 for haplo transplant from son for ALL
ALL - Day + 10 s/p stem cell transplant
ALL day +19 s/p haplo stem cell transplant
Hx: day +9 haplo transplant for ALL

## 2024-03-25 NOTE — CHART NOTE - NSCHARTNOTESELECT_GEN_ALL_CORE
Nutrition Services
BMTU NP Infusion Note After/Event Note
Event Note

## 2024-03-25 NOTE — PROGRESS NOTE ADULT - SUBJECTIVE AND OBJECTIVE BOX
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) with FLU/CY/TBI prep for the treatment fo ALL    S: Patient seen and examined with HPC Transplant Team:       O: Vitals:   Vital Signs Last 24 Hrs  T(C): 36.4 (25 Mar 2024 06:12), Max: 37.1 (24 Mar 2024 09:37)  T(F): 97.5 (25 Mar 2024 06:12), Max: 98.8 (24 Mar 2024 09:37)  HR: 66 (25 Mar 2024 06:12) (66 - 76)  BP: 135/73 (25 Mar 2024 06:12) (119/68 - 147/79)  BP(mean): --  RR: 18 (25 Mar 2024 06:12) (18 - 18)  SpO2: 96% (25 Mar 2024 06:12) (95% - 98%)    Parameters below as of 25 Mar 2024 06:12  Patient On (Oxygen Delivery Method): room air      Admit weight: 96.9kg   Daily Weight in k.6 (25 Mar 2024 07:45)    Intake / Output:    @ 07:  -   @ 07:00  --------------------------------------------------------  IN: 1911 mL / OUT: 770 mL / NET: 1141 mL     @ 07: @ 08:20  --------------------------------------------------------  IN: 77 mL / OUT: 100 mL / NET: -23 mL      PE:   Oropharynx: no erythema or ulcerations   Oral Mucositis:   -                                               Grade: n/a   CVS: RRR, +S1,S2  Lungs: CTA throughout bilaterally   Abdomen: soft, ND, ND +BS x 4   Extremities: no edema   Gastric Mucositis:      -                                            Grade: -  Intestinal Mucositis:     -                                         Grade: -  Skin: no rash   TLC: C/D/I   Neuro: A&O x3  Pain: Denies      Labs:   CBC Full  -  ( 25 Mar 2024 07:08 )  WBC Count : 2.54 K/uL  Hemoglobin : 7.0 g/dL  Hematocrit : 19.8 %  Platelet Count - Automated : 25 K/uL  Mean Cell Volume : 85.0 fl  Mean Cell Hemoglobin : 30.0 pg  Mean Cell Hemoglobin Concentration : 35.4 gm/dL  Auto Neutrophil # : x  Auto Lymphocyte # : x  Auto Monocyte # : x  Auto Eosinophil # : x  Auto Basophil # : x  Auto Neutrophil % : x  Auto Lymphocyte % : x  Auto Monocyte % : x  Auto Eosinophil % : x  Auto Basophil % : x                          7.0    2.54  )-----------(        ( 25 Mar 2024 07:08 )             19.8         138  |  104  |  15  ----------------------------<  89  4.3   |  20<L>  |  0.69    Ca    9.4      25 Mar 2024 07:08  Phos  2.8       Mg     1.6         TPro  5.5<L>  /  Alb  3.5  /  TBili  0.6  /  DBili  0.2  /  AST  19  /  ALT  18  /  AlkPhos  139<H>        LIVER FUNCTIONS - ( 25 Mar 2024 07:08 )  Alb: 3.5 g/dL / Pro: 5.5 g/dL / ALK PHOS: 139 U/L / ALT: 18 U/L / AST: 19 U/L / GGT: x           Lactate Dehydrogenase, Serum: 144 U/L ( @ 07:08)      Cultures:   Culture Results:   <10,000 CFU/mL Normal Urogenital Argelia (24 @ 21:24)    Culture Results:   No growth at 5 days (24 @ 21:24)    Culture Results:   No growth at 5 days (24 @ 21:24)    Radiology:   ACC: [id number] EXAM:  US HEAD NECK SOFT TISSUE   ORDERED BY: PILY HER   PROCEDURE DATE:  03/15/2024    IMPRESSION:  Left parotid gland edema. No fluid collection is identified.  Limited visualization of the right parotid gland.    ACC: [id number] EXAM:  XR CHEST PORTABLE URGENT 1V   ORDERED BY: ROBER TMA   PROCEDURE DATE:  2024    IMPRESSION:  Small left pleural effusion/linear atelectasis.  Mild, central pulmonary venous congestion, new    Meds:   Antimicrobials:   valACYclovir 500 milliGRAM(s) Oral two times a day      Heme / Onc:       GI:  aluminum hydroxide/magnesium hydroxide/simethicone Suspension 30 milliLiter(s) Oral every 6 hours PRN  calcium carbonate    500 mG (Tums) Chewable 1 Tablet(s) Chew three times a day PRN  loperamide 2 milliGRAM(s) Oral every 3 hours PRN  pantoprazole    Tablet 40 milliGRAM(s) Oral before breakfast  sodium bicarbonate Mouth Rinse 10 milliLiter(s) Swish and Spit five times a day      Cardiovascular:   losartan 100 milliGRAM(s) Oral daily      Immunologic:   filgrastim-sndz (ZARXIO) Injectable 480 MICROGram(s) SubCutaneous every 24 hours  mycophenolate mofetil 1000 milliGRAM(s) Oral three times a day  tacrolimus 2.5 milliGRAM(s) Oral two times a day      Other medications:   Biotene Dry Mouth Oral Rinse 5 milliLiter(s) Swish and Spit five times a day  chlorhexidine 4% Liquid 1 Application(s) Topical <User Schedule>  escitalopram 20 milliGRAM(s) Oral daily  lidocaine/prilocaine Cream 1 Application(s) Topical daily  loratadine 10 milliGRAM(s) Oral daily  metoclopramide Injectable 5 milliGRAM(s) IV Push every 6 hours  sodium chloride 0.9%. 1000 milliLiter(s) IV Continuous <Continuous>      PRN:   acetaminophen     Tablet .. 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 Mild, central pulmonary venous congestion, new.  3/9 BCx and UCx, follow up  3/11- transaminitis, hold fluconazole. Tbili increased to 1.8. Added on direct and indirect bili.   3/12- direct bili 1.3 3/11/24   3/15- Chemo induced grade 2 oral mucositis: continue supportive care   3/15 parotid gland ( L) edema with PO intake stove vs bleeding vs truma f/u US   3/16 US prelim small collection <1cm, likely cyst, d/w Dr Batres,  final reading   3/18 increasing tacro to 2.5 BID (level =7). re-check level next on Thurs 3/21-WNL,  increase Fluconazole to 400 daily (LFTs normalized). Zofran prn for n/v.  3/25- engrafted 3/24/24, d/c cefepime, fluconazole. CMV has been negative. Will check for FISH for Y 3/26/24.     1. Infectious Disease:   valACYclovir 500 milliGRAM(s) Oral two times a day    2. GI Prophylaxis:   pantoprazole    Tablet 40 milliGRAM(s) Oral before breakfast    3. Mouthcare - NS / NaHCO3 rinses, Mycelex, Biotene; Skin care     4. GVHD prophylaxis   TBI day -1   CTX days +3, +4   mycophenolate mofetil 1000 milliGRAM(s) Oral three times a day  tacrolimus 2.5 milliGRAM(s) Oral two times a day    5. Transfuse & replete electrolytes prn     6. IV hydration, daily weights, strict I&O, prn diuresis     7. PO intake as tolerated, nutrition follow up as needed, MVI, folic acid     8. Antiemetics, anti-diarrhea medications:   loperamide 2 milliGRAM(s) Oral every 3 hours PRN  ondansetron  IVPB 8 milliGRAM(s) IV Intermittent every 8 hours PRN    9. OOB as tolerated, physical therapy consult if needed     10. Monitor coags / fibrinogen 2x week, vitamin K as needed     11. Monitor closely for clinical changes, monitor for fevers     12. Emotional support provided, plan of care discussed and questions addressed     13. Patient education done regarding plan of care, restrictions and discharge planning     14. Continue regular social work input     I have written the above note for Dr. Olivier who performed service with me in the room.   Estelita Tinsley NP-C ([phone])    I have seen and examined patient with NP, I agree with above note as scribed.                    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) with FLU/CY/TBI prep for the treatment fo ALL    S: Patient seen and examined with HPC Transplant Team:       O: Vitals:   Vital Signs Last 24 Hrs  T(C): 36.4 (25 Mar 2024 06:12), Max: 37.1 (24 Mar 2024 09:37)  T(F): 97.5 (25 Mar 2024 06:12), Max: 98.8 (24 Mar 2024 09:37)  HR: 66 (25 Mar 2024 06:12) (66 - 76)  BP: 135/73 (25 Mar 2024 06:12) (119/68 - 147/79)  BP(mean): --  RR: 18 (25 Mar 2024 06:12) (18 - 18)  SpO2: 96% (25 Mar 2024 06:12) (95% - 98%)    Parameters below as of 25 Mar 2024 06:12  Patient On (Oxygen Delivery Method): room air      Admit weight: 96.9kg   Daily Weight in k.6 (25 Mar 2024 07:45)    Intake / Output:    @ 07:  -   @ 07:00  --------------------------------------------------------  IN: 1911 mL / OUT: 770 mL / NET: 1141 mL     @ 07: @ 08:20  --------------------------------------------------------  IN: 77 mL / OUT: 100 mL / NET: -23 mL      PE:   Oropharynx: no erythema or ulcerations   Oral Mucositis:   -                                               Grade: n/a   CVS: RRR, +S1,S2  Lungs: CTA throughout bilaterally   Abdomen: soft, ND, ND +BS x 4   Extremities: no edema   Gastric Mucositis:      -                                            Grade: -  Intestinal Mucositis:     -                                         Grade: -  Skin: no rash   TLC: C/D/I   Neuro: A&O x3  Pain: Denies      Labs:   CBC Full  -  ( 25 Mar 2024 07:08 )  WBC Count : 2.54 K/uL  Hemoglobin : 7.0 g/dL  Hematocrit : 19.8 %  Platelet Count - Automated : 25 K/uL  Mean Cell Volume : 85.0 fl  Mean Cell Hemoglobin : 30.0 pg  Mean Cell Hemoglobin Concentration : 35.4 gm/dL  Auto Neutrophil # : x  Auto Lymphocyte # : x  Auto Monocyte # : x  Auto Eosinophil # : x  Auto Basophil # : x  Auto Neutrophil % : x  Auto Lymphocyte % : x  Auto Monocyte % : x  Auto Eosinophil % : x  Auto Basophil % : x                          7.0    2.54  )-----------(        ( 25 Mar 2024 07:08 )             19.8         138  |  104  |  15  ----------------------------<  89  4.3   |  20<L>  |  0.69    Ca    9.4      25 Mar 2024 07:08  Phos  2.8       Mg     1.6         TPro  5.5<L>  /  Alb  3.5  /  TBili  0.6  /  DBili  0.2  /  AST  19  /  ALT  18  /  AlkPhos  139<H>        LIVER FUNCTIONS - ( 25 Mar 2024 07:08 )  Alb: 3.5 g/dL / Pro: 5.5 g/dL / ALK PHOS: 139 U/L / ALT: 18 U/L / AST: 19 U/L / GGT: x           Lactate Dehydrogenase, Serum: 144 U/L ( @ 07:08)      Cultures:   Culture Results:   <10,000 CFU/mL Normal Urogenital Argelia (24 @ 21:24)    Culture Results:   No growth at 5 days (24 @ 21:24)    Culture Results:   No growth at 5 days (24 @ 21:24)    Radiology:   ACC: [id number] EXAM:  US HEAD NECK SOFT TISSUE   ORDERED BY: PILY HER   PROCEDURE DATE:  03/15/2024    IMPRESSION:  Left parotid gland edema. No fluid collection is identified.  Limited visualization of the right parotid gland.    ACC: [id number] EXAM:  XR CHEST PORTABLE URGENT 1V   ORDERED BY: ROBER TAM   PROCEDURE DATE:  2024    IMPRESSION:  Small left pleural effusion/linear atelectasis.  Mild, central pulmonary venous congestion, new    Meds:   Antimicrobials:   valACYclovir 500 milliGRAM(s) Oral two times a day      Heme / Onc:       GI:  aluminum hydroxide/magnesium hydroxide/simethicone Suspension 30 milliLiter(s) Oral every 6 hours PRN  calcium carbonate    500 mG (Tums) Chewable 1 Tablet(s) Chew three times a day PRN  loperamide 2 milliGRAM(s) Oral every 3 hours PRN  pantoprazole    Tablet 40 milliGRAM(s) Oral before breakfast  sodium bicarbonate Mouth Rinse 10 milliLiter(s) Swish and Spit five times a day      Cardiovascular:   losartan 100 milliGRAM(s) Oral daily      Immunologic:   filgrastim-sndz (ZARXIO) Injectable 480 MICROGram(s) SubCutaneous every 24 hours  mycophenolate mofetil 1000 milliGRAM(s) Oral three times a day  tacrolimus 2.5 milliGRAM(s) Oral two times a day      Other medications:   Biotene Dry Mouth Oral Rinse 5 milliLiter(s) Swish and Spit five times a day  chlorhexidine 4% Liquid 1 Application(s) Topical <User Schedule>  escitalopram 20 milliGRAM(s) Oral daily  lidocaine/prilocaine Cream 1 Application(s) Topical daily  loratadine 10 milliGRAM(s) Oral daily  metoclopramide Injectable 5 milliGRAM(s) IV Push every 6 hours  sodium chloride 0.9%. 1000 milliLiter(s) IV Continuous <Continuous>      PRN:   acetaminophen     Tablet .. 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 Mild, central pulmonary venous congestion, new.  3/9 BCx and UCx, follow up  3/11- transaminitis, hold fluconazole. Tbili increased to 1.8. Added on direct and indirect bili.   3/12- direct bili 1.3 3/11/24   3/15- Chemo induced grade 2 oral mucositis: continue supportive care   3/15 parotid gland ( L) edema with PO intake stove vs bleeding vs truma f/u US   3/16 US prelim small collection <1cm, likely cyst, d/w Dr Batres, FU final reading   3/18 increasing tacro to 2.5 BID (level =7). re-check level next on Thurs 3/21-WNL,  increase Fluconazole to 400 daily (LFTs normalized). Zofran prn for n/v.  3/25- engrafted 3/24/24, d/c cefepime, fluconazole. CMV has been negative. Will check for FISH for Y 3/26/24.     1. Infectious Disease:   valACYclovir 500 milliGRAM(s) Oral two times a day    2. GI Prophylaxis:   pantoprazole    Tablet 40 milliGRAM(s) Oral before breakfast    3. Mouthcare - NS / NaHCO3 rinses, Mycelex, Biotene; Skin care     4. GVHD prophylaxis   TBI day -1   CTX days +3, +4   mycophenolate mofetil 1000 milliGRAM(s) Oral three times a day  tacrolimus 2.5 milliGRAM(s) Oral two times a day    5. Transfuse & replete electrolytes prn   1 unit PRBC     6. IV hydration, daily weights, strict I&O, prn diuresis     7. PO intake as tolerated, nutrition follow up as needed, MVI, folic acid     8. Antiemetics, anti-diarrhea medications:   loperamide 2 milliGRAM(s) Oral every 3 hours PRN  ondansetron  IVPB 8 milliGRAM(s) IV Intermittent every 8 hours PRN    9. OOB as tolerated, physical therapy consult if needed     10. Monitor coags / fibrinogen 2x week, vitamin K as needed     11. Monitor closely for clinical changes, monitor for fevers     12. Emotional support provided, plan of care discussed and questions addressed     13. Patient education done regarding plan of care, restrictions and discharge planning     14. Continue regular social work input     I have written the above note for Dr. Olivier who performed service with me in the room.   Estelita Tinsley NP-C ([phone])    I have seen and examined patient with NP, I agree with above note as scribed.

## 2024-03-26 ENCOUNTER — TRANSCRIPTION ENCOUNTER (OUTPATIENT)
Age: 64
End: 2024-03-26

## 2024-03-26 VITALS
HEART RATE: 74 BPM | SYSTOLIC BLOOD PRESSURE: 166 MMHG | RESPIRATION RATE: 18 BRPM | DIASTOLIC BLOOD PRESSURE: 79 MMHG | TEMPERATURE: 98 F | OXYGEN SATURATION: 97 %

## 2024-03-26 DIAGNOSIS — Z01.818 ENCOUNTER FOR OTHER PREPROCEDURAL EXAMINATION: ICD-10-CM

## 2024-03-26 LAB
ALBUMIN SERPL ELPH-MCNC: 3.4 G/DL — SIGNIFICANT CHANGE UP (ref 3.3–5)
ALP SERPL-CCNC: 149 U/L — HIGH (ref 40–120)
ALT FLD-CCNC: 18 U/L — SIGNIFICANT CHANGE UP (ref 10–45)
ANION GAP SERPL CALC-SCNC: 16 MMOL/L — SIGNIFICANT CHANGE UP (ref 5–17)
AST SERPL-CCNC: 21 U/L — SIGNIFICANT CHANGE UP (ref 10–40)
BASOPHILS # BLD AUTO: 0.04 K/UL — SIGNIFICANT CHANGE UP (ref 0–0.2)
BASOPHILS NFR BLD AUTO: 0.8 % — SIGNIFICANT CHANGE UP (ref 0–2)
BILIRUB SERPL-MCNC: 0.7 MG/DL — SIGNIFICANT CHANGE UP (ref 0.2–1.2)
BUN SERPL-MCNC: 12 MG/DL — SIGNIFICANT CHANGE UP (ref 7–23)
CALCIUM SERPL-MCNC: 9.3 MG/DL — SIGNIFICANT CHANGE UP (ref 8.4–10.5)
CHLORIDE SERPL-SCNC: 104 MMOL/L — SIGNIFICANT CHANGE UP (ref 96–108)
CMV DNA CSF QL NAA+PROBE: SIGNIFICANT CHANGE UP IU/ML
CMV DNA SPEC NAA+PROBE-LOG#: SIGNIFICANT CHANGE UP LOG10IU/ML
CO2 SERPL-SCNC: 19 MMOL/L — LOW (ref 22–31)
CREAT SERPL-MCNC: 0.68 MG/DL — SIGNIFICANT CHANGE UP (ref 0.5–1.3)
EGFR: 96 ML/MIN/1.73M2 — SIGNIFICANT CHANGE UP
EOSINOPHIL # BLD AUTO: 0 K/UL — SIGNIFICANT CHANGE UP (ref 0–0.5)
EOSINOPHIL NFR BLD AUTO: 0 % — SIGNIFICANT CHANGE UP (ref 0–6)
GLUCOSE SERPL-MCNC: 85 MG/DL — SIGNIFICANT CHANGE UP (ref 70–99)
HCT VFR BLD CALC: 22.2 % — LOW (ref 34.5–45)
HGB BLD-MCNC: 7.9 G/DL — LOW (ref 11.5–15.5)
IMM GRANULOCYTES NFR BLD AUTO: 10.7 % — HIGH (ref 0–0.9)
LDH SERPL L TO P-CCNC: 185 U/L — SIGNIFICANT CHANGE UP (ref 50–242)
LYMPHOCYTES # BLD AUTO: 0.01 K/UL — LOW (ref 1–3.3)
LYMPHOCYTES # BLD AUTO: 0.2 % — LOW (ref 13–44)
MAGNESIUM SERPL-MCNC: 1.1 MG/DL — LOW (ref 1.6–2.6)
MCHC RBC-ENTMCNC: 29.6 PG — SIGNIFICANT CHANGE UP (ref 27–34)
MCHC RBC-ENTMCNC: 35.6 GM/DL — SIGNIFICANT CHANGE UP (ref 32–36)
MCV RBC AUTO: 83.1 FL — SIGNIFICANT CHANGE UP (ref 80–100)
MONOCYTES # BLD AUTO: 0.78 K/UL — SIGNIFICANT CHANGE UP (ref 0–0.9)
MONOCYTES NFR BLD AUTO: 14.9 % — HIGH (ref 2–14)
NEUTROPHILS # BLD AUTO: 3.84 K/UL — SIGNIFICANT CHANGE UP (ref 1.8–7.4)
NEUTROPHILS NFR BLD AUTO: 73.4 % — SIGNIFICANT CHANGE UP (ref 43–77)
NRBC # BLD: 0 /100 WBCS — SIGNIFICANT CHANGE UP (ref 0–0)
PHOSPHATE SERPL-MCNC: 2.6 MG/DL — SIGNIFICANT CHANGE UP (ref 2.5–4.5)
PLATELET # BLD AUTO: 22 K/UL — LOW (ref 150–400)
POTASSIUM SERPL-MCNC: 4.2 MMOL/L — SIGNIFICANT CHANGE UP (ref 3.5–5.3)
POTASSIUM SERPL-SCNC: 4.2 MMOL/L — SIGNIFICANT CHANGE UP (ref 3.5–5.3)
PROT SERPL-MCNC: 5.5 G/DL — LOW (ref 6–8.3)
RBC # BLD: 2.67 M/UL — LOW (ref 3.8–5.2)
RBC # FLD: 11.9 % — SIGNIFICANT CHANGE UP (ref 10.3–14.5)
SODIUM SERPL-SCNC: 139 MMOL/L — SIGNIFICANT CHANGE UP (ref 135–145)
WBC # BLD: 5.23 K/UL — SIGNIFICANT CHANGE UP (ref 3.8–10.5)
WBC # FLD AUTO: 5.23 K/UL — SIGNIFICANT CHANGE UP (ref 3.8–10.5)

## 2024-03-26 PROCEDURE — 86901 BLOOD TYPING SEROLOGIC RH(D): CPT

## 2024-03-26 PROCEDURE — 87040 BLOOD CULTURE FOR BACTERIA: CPT

## 2024-03-26 PROCEDURE — 86850 RBC ANTIBODY SCREEN: CPT

## 2024-03-26 PROCEDURE — 86900 BLOOD TYPING SEROLOGIC ABO: CPT

## 2024-03-26 PROCEDURE — 85610 PROTHROMBIN TIME: CPT

## 2024-03-26 PROCEDURE — 87324 CLOSTRIDIUM AG IA: CPT

## 2024-03-26 PROCEDURE — 81003 URINALYSIS AUTO W/O SCOPE: CPT

## 2024-03-26 PROCEDURE — 99239 HOSP IP/OBS DSCHRG MGMT >30: CPT

## 2024-03-26 PROCEDURE — 82248 BILIRUBIN DIRECT: CPT

## 2024-03-26 PROCEDURE — 36430 TRANSFUSION BLD/BLD COMPNT: CPT

## 2024-03-26 PROCEDURE — P9040: CPT

## 2024-03-26 PROCEDURE — 93005 ELECTROCARDIOGRAM TRACING: CPT

## 2024-03-26 PROCEDURE — 77001 FLUOROGUIDE FOR VEIN DEVICE: CPT

## 2024-03-26 PROCEDURE — 84100 ASSAY OF PHOSPHORUS: CPT

## 2024-03-26 PROCEDURE — 87086 URINE CULTURE/COLONY COUNT: CPT

## 2024-03-26 PROCEDURE — 36415 COLL VENOUS BLD VENIPUNCTURE: CPT

## 2024-03-26 PROCEDURE — 80197 ASSAY OF TACROLIMUS: CPT

## 2024-03-26 PROCEDURE — C1894: CPT

## 2024-03-26 PROCEDURE — 88275 CYTOGENETICS 100-300: CPT

## 2024-03-26 PROCEDURE — 36556 INSERT NON-TUNNEL CV CATH: CPT

## 2024-03-26 PROCEDURE — 85025 COMPLETE CBC W/AUTO DIFF WBC: CPT

## 2024-03-26 PROCEDURE — 87507 IADNA-DNA/RNA PROBE TQ 12-25: CPT

## 2024-03-26 PROCEDURE — 85730 THROMBOPLASTIN TIME PARTIAL: CPT

## 2024-03-26 PROCEDURE — 38207 CRYOPRESERVE STEM CELLS: CPT

## 2024-03-26 PROCEDURE — 88291 CYTO/MOLECULAR REPORT: CPT

## 2024-03-26 PROCEDURE — C1751: CPT

## 2024-03-26 PROCEDURE — 81005 URINALYSIS: CPT

## 2024-03-26 PROCEDURE — 71045 X-RAY EXAM CHEST 1 VIEW: CPT

## 2024-03-26 PROCEDURE — 76937 US GUIDE VASCULAR ACCESS: CPT

## 2024-03-26 PROCEDURE — 88271 CYTOGENETICS DNA PROBE: CPT

## 2024-03-26 PROCEDURE — P9100: CPT

## 2024-03-26 PROCEDURE — 85045 AUTOMATED RETICULOCYTE COUNT: CPT

## 2024-03-26 PROCEDURE — 80053 COMPREHEN METABOLIC PANEL: CPT

## 2024-03-26 PROCEDURE — 83615 LACTATE (LD) (LDH) ENZYME: CPT

## 2024-03-26 PROCEDURE — 87449 NOS EACH ORGANISM AG IA: CPT

## 2024-03-26 PROCEDURE — 88237 TISSUE CULTURE BONE MARROW: CPT

## 2024-03-26 PROCEDURE — C1769: CPT

## 2024-03-26 PROCEDURE — P9037: CPT

## 2024-03-26 PROCEDURE — 82955 ASSAY OF G6PD ENZYME: CPT

## 2024-03-26 PROCEDURE — 83735 ASSAY OF MAGNESIUM: CPT

## 2024-03-26 PROCEDURE — 85384 FIBRINOGEN ACTIVITY: CPT

## 2024-03-26 PROCEDURE — 76536 US EXAM OF HEAD AND NECK: CPT

## 2024-03-26 PROCEDURE — 86923 COMPATIBILITY TEST ELECTRIC: CPT

## 2024-03-26 RX ORDER — TACROLIMUS 5 MG/1
3 CAPSULE ORAL
Qty: 180 | Refills: 0
Start: 2024-03-26 | End: 2024-04-24

## 2024-03-26 RX ORDER — MAGNESIUM SULFATE 500 MG/ML
2 VIAL (ML) INJECTION ONCE
Refills: 0 | Status: COMPLETED | OUTPATIENT
Start: 2024-03-26 | End: 2024-03-26

## 2024-03-26 RX ORDER — MAGNESIUM OXIDE/MAG AA CHELATE 133 MG
3 TABLET ORAL
Qty: 90 | Refills: 0
Start: 2024-03-26 | End: 2024-04-24

## 2024-03-26 RX ADMIN — MYCOPHENOLATE MOFETIL 1000 MILLIGRAM(S): 250 CAPSULE ORAL at 05:33

## 2024-03-26 RX ADMIN — Medication 25 GRAM(S): at 09:40

## 2024-03-26 RX ADMIN — LORATADINE 10 MILLIGRAM(S): 10 TABLET ORAL at 11:22

## 2024-03-26 RX ADMIN — TACROLIMUS 3.5 MILLIGRAM(S): 5 CAPSULE ORAL at 16:50

## 2024-03-26 RX ADMIN — LIDOCAINE AND PRILOCAINE CREAM 1 APPLICATION(S): 25; 25 CREAM TOPICAL at 11:23

## 2024-03-26 RX ADMIN — ONDANSETRON 108 MILLIGRAM(S): 8 TABLET, FILM COATED ORAL at 08:22

## 2024-03-26 RX ADMIN — TACROLIMUS 3.5 MILLIGRAM(S): 5 CAPSULE ORAL at 05:33

## 2024-03-26 RX ADMIN — MYCOPHENOLATE MOFETIL 1000 MILLIGRAM(S): 250 CAPSULE ORAL at 13:05

## 2024-03-26 RX ADMIN — SODIUM CHLORIDE 20 MILLILITER(S): 9 INJECTION INTRAMUSCULAR; INTRAVENOUS; SUBCUTANEOUS at 05:38

## 2024-03-26 RX ADMIN — Medication 5 MILLILITER(S): at 15:59

## 2024-03-26 RX ADMIN — CHLORHEXIDINE GLUCONATE 1 APPLICATION(S): 213 SOLUTION TOPICAL at 08:23

## 2024-03-26 RX ADMIN — ESCITALOPRAM OXALATE 20 MILLIGRAM(S): 10 TABLET, FILM COATED ORAL at 11:22

## 2024-03-26 RX ADMIN — Medication 10 MILLILITER(S): at 15:59

## 2024-03-26 RX ADMIN — Medication 480 MICROGRAM(S): at 11:23

## 2024-03-26 RX ADMIN — Medication 650 MILLIGRAM(S): at 13:00

## 2024-03-26 RX ADMIN — PANTOPRAZOLE SODIUM 40 MILLIGRAM(S): 20 TABLET, DELAYED RELEASE ORAL at 06:08

## 2024-03-26 RX ADMIN — VALACYCLOVIR 500 MILLIGRAM(S): 500 TABLET, FILM COATED ORAL at 05:33

## 2024-03-26 RX ADMIN — Medication 25 GRAM(S): at 12:29

## 2024-03-26 RX ADMIN — Medication 5 MILLILITER(S): at 08:23

## 2024-03-26 RX ADMIN — Medication 10 MILLILITER(S): at 08:23

## 2024-03-26 RX ADMIN — Medication 10 MILLILITER(S): at 11:22

## 2024-03-26 RX ADMIN — LOSARTAN POTASSIUM 100 MILLIGRAM(S): 100 TABLET, FILM COATED ORAL at 05:33

## 2024-03-26 RX ADMIN — Medication 650 MILLIGRAM(S): at 12:30

## 2024-03-26 RX ADMIN — VALACYCLOVIR 500 MILLIGRAM(S): 500 TABLET, FILM COATED ORAL at 16:50

## 2024-03-26 RX ADMIN — Medication 5 MILLIGRAM(S): at 05:32

## 2024-03-26 RX ADMIN — Medication 5 MILLILITER(S): at 11:24

## 2024-03-26 NOTE — DISCHARGE NOTE NURSING/CASE MANAGEMENT/SOCIAL WORK - NSDCPEFALRISK_GEN_ALL_CORE
For information on Fall & Injury Prevention, visit: [email or web address] OR  [email or web address] OR  https://www.cdc.gov/steadi/patient.html

## 2024-03-26 NOTE — PROGRESS NOTE ADULT - PROVIDER SPECIALTY LIST ADULT
BMT/SCT
Heme/Onc
Heme/Onc
BMT/SCT
Heme/Onc
BMT/SCT
BMT/SCT
Palliative Care
BMT/SCT
Palliative Care
Palliative Care

## 2024-03-26 NOTE — PROGRESS NOTE ADULT - NS ATTEND OPT1 GEN_ALL_CORE
I attest my time as attending is greater than 50% of the total combined time spent on qualifying patient care activities by the PA/NP and attending.

## 2024-03-26 NOTE — DISCHARGE NOTE PROVIDER - CARE PROVIDER_API CALL
Tye Bowles  Medical Oncology  [address] [place] [id number]  Phone: [phone]  Fax: [phone]  Follow Up Time:

## 2024-03-26 NOTE — PROGRESS NOTE ADULT - SUBJECTIVE AND OBJECTIVE BOX
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) with FLU/CY/TBI prep for the treatment fo ALL    S: Patient seen and examined with HPC Transplant Team:     O: Vitals:   Vital Signs Last 24 Hrs  T(C): 36.7 (26 Mar 2024 05:39), Max: 37.2 (25 Mar 2024 16:48)  T(F): 98.1 (26 Mar 2024 05:39), Max: 99 (25 Mar 2024 16:48)  HR: 69 (26 Mar 2024 05:39) (69 - 88)  BP: 156/82 (26 Mar 2024 05:39) (133/67 - 158/79)  BP(mean): --  RR: 18 (26 Mar 2024 05:39) (18 - 18)  SpO2: 97% (26 Mar 2024 05:39) (95% - 98%)    Parameters below as of 26 Mar 2024 05:39  Patient On (Oxygen Delivery Method): room air        Admit weight: 96.9 kg  Daily     Daily Weight in k.6 (25 Mar 2024 07:45)    Intake / Output:    @ 07:01  -  -26 @ 07:00  --------------------------------------------------------  IN: 1180 mL / OUT: 1000 mL / NET: 180 mL    PE:   Oropharynx: no erythema or ulcerations   Oral Mucositis:   -                                               Grade: n/a   CVS: RRR, +S1,S2  Lungs: CTA throughout bilaterally   Abdomen: soft, ND, ND +BS x 4   Extremities: no edema   Gastric Mucositis:      -                                            Grade: -  Intestinal Mucositis:     -                                         Grade: -  Skin: no rash   TLC: C/D/I   Neuro: A&O x3  Pain: Denies      Labs:   CBC Full  -  ( 25 Mar 2024 07:08 )  WBC Count : 2.54 K/uL  Hemoglobin : 7.0 g/dL  Hematocrit : 19.8 %  Platelet Count - Automated : 25 K/uL  Mean Cell Volume : 85.0 fl  Mean Cell Hemoglobin : 30.0 pg  Mean Cell Hemoglobin Concentration : 35.4 gm/dL  Auto Neutrophil # : 2.27 K/uL  Auto Lymphocyte # : 0.00 K/uL  Auto Monocyte # : 0.25 K/uL  Auto Eosinophil # : 0.00 K/uL  Auto Basophil # : 0.00 K/uL  Auto Neutrophil % : 85.6 %  Auto Lymphocyte % : 0.0 %  Auto Monocyte % : 9.9 %  Auto Eosinophil % : 0.0 %  Auto Basophil % : 0.0 %                          7.0    2.54  )-----------(        ( 25 Mar 2024 07:08 )             19.8         138  |  104  |  15  ----------------------------<  89  4.3   |  20<L>  |  0.69    Ca    9.4      25 Mar 2024 07:08  Phos  2.8       Mg     1.6         TPro  5.5<L>  /  Alb  3.5  /  TBili  0.6  /  DBili  0.2  /  AST  19  /  ALT  18  /  AlkPhos  139<H>      PT/INR - ( 25 Mar 2024 07:08 )   PT: 12.1 sec;   INR: 1.10 ratio         PTT - ( 25 Mar 2024 07:08 )  PTT:27.0 sec  LIVER FUNCTIONS - ( 25 Mar 2024 07:08 )  Alb: 3.5 g/dL / Pro: 5.5 g/dL / ALK PHOS: 139 U/L / ALT: 18 U/L / AST: 19 U/L / GGT: x           Cultures:   Culture - Urine (24 @ 21:24)    Specimen Source: Clean Catch Clean Catch (Midstream)   Culture Results:   <10,000 CFU/mL Normal Urogenital Argelia    Culture - Blood (24 @ 21:24)    Specimen Source: .Blood Triple Lumen BROWN   Culture Results:   No growth at 5 days    Culture - Blood (24 @ 21:24)    Specimen Source: .Blood Triple Lumen BLUE   Culture Results:   No growth at 5 days    Radiology:   < from: US Head + Neck Soft Tissue (03.15.24 @ 19:05) >  IMPRESSION:  Left parotid gland edema. No fluid collection is identified.    Limited visualization of the right parotid gland.    < end of copied text >    < from: Xray Chest 1 View- PORTABLE-Urgent (Xray Chest 1 View- PORTABLE-Urgent .) (24 @ 23:44) >  IMPRESSION:  Small left pleural effusion/linear atelectasis.    Mild, central pulmonary venous congestion, new    < end of copied text >      Meds:   Antimicrobials:   valACYclovir 500 milliGRAM(s) Oral two times a day      GI:  aluminum hydroxide/magnesium hydroxide/simethicone Suspension 30 milliLiter(s) Oral every 6 hours PRN  calcium carbonate    500 mG (Tums) Chewable 1 Tablet(s) Chew three times a day PRN  loperamide 2 milliGRAM(s) Oral every 3 hours PRN  pantoprazole    Tablet 40 milliGRAM(s) Oral before breakfast  sodium bicarbonate Mouth Rinse 10 milliLiter(s) Swish and Spit five times a day      Cardiovascular:   losartan 100 milliGRAM(s) Oral daily      Immunologic:   filgrastim-sndz (ZARXIO) Injectable 480 MICROGram(s) SubCutaneous every 24 hours  mycophenolate mofetil 1000 milliGRAM(s) Oral three times a day  tacrolimus 3.5 milliGRAM(s) Oral two times a day      Other medications:   Biotene Dry Mouth Oral Rinse 5 milliLiter(s) Swish and Spit five times a day  chlorhexidine 4% Liquid 1 Application(s) Topical <User Schedule>  escitalopram 20 milliGRAM(s) Oral daily  lidocaine/prilocaine Cream 1 Application(s) Topical daily  loratadine 10 milliGRAM(s) Oral daily  sodium chloride 0.9%. 1000 milliLiter(s) IV Continuous <Continuous>      PRN:   acetaminophen     Tablet .. 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 Mild, central pulmonary venous congestion, new.  3/9 BCx and UCx, follow up  3/11- transaminitis, hold fluconazole. Tbili increased to 1.8. Added on direct and indirect bili.   3/12- direct bili 1.3 3/11/24   3/15- Chemo induced grade 2 oral mucositis: continue supportive care   3/15 parotid gland ( L) edema with PO intake stove vs bleeding vs truma f/u US   3/16 US prelim small collection <1cm, likely cyst, d/w Dr Batres, FU final reading   3/18 increasing tacro to 2.5 BID (level =7). re-check level next on Thurs 3/21-WNL,  increase Fluconazole to 400 daily (LFTs normalized). Zofran prn for n/v.  3/25- engrafted 3/24/24, d/c cefepime, fluconazole. CMV has been negative. Will check for FISH for Y 3/26/24.     1. Infectious Disease:   valACYclovir 500 milliGRAM(s) Oral two times a day    2. GI Prophylaxis:   pantoprazole    Tablet 40 milliGRAM(s) Oral before breakfast    3. Mouthcare - NS / NaHCO3 rinses, Mycelex, Biotene; Skin care     4. GVHD prophylaxis   TBI day -1   CTX days +3, +4   mycophenolate mofetil 1000 milliGRAM(s) Oral three times a day  tacrolimus 2.5 milliGRAM(s) Oral two times a day    5. Transfuse & replete electrolytes prn   1 unit PRBC     6. IV hydration, daily weights, strict I&O, prn diuresis     7. PO intake as tolerated, nutrition follow up as needed, MVI, folic acid     8. Antiemetics, anti-diarrhea medications:   loperamide 2 milliGRAM(s) Oral every 3 hours PRN  ondansetron  IVPB 8 milliGRAM(s) IV Intermittent every 8 hours PRN    9. OOB as tolerated, physical therapy consult if needed     10. Monitor coags / fibrinogen 2x week, vitamin K as needed     11. Monitor closely for clinical changes, monitor for fevers     12. Emotional support provided, plan of care discussed and questions addressed     13. Patient education done regarding plan of care, restrictions and discharge planning     14. Continue regular social work input     I have written the above note for Dr. Olivier who performed service with me in the room.   Alvarado Angel PA-C ([phone])    I have seen and examined patient with PA, I agree with above note as scribed.                  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) with FLU/CY/TBI prep for the treatment fo ALL    S: Patient seen and examined with HPC Transplant Team:     O: Vitals:   Vital Signs Last 24 Hrs  T(C): 36.7 (26 Mar 2024 05:39), Max: 37.2 (25 Mar 2024 16:48)  T(F): 98.1 (26 Mar 2024 05:39), Max: 99 (25 Mar 2024 16:48)  HR: 69 (26 Mar 2024 05:39) (69 - 88)  BP: 156/82 (26 Mar 2024 05:39) (133/67 - 158/79)  BP(mean): --  RR: 18 (26 Mar 2024 05:39) (18 - 18)  SpO2: 97% (26 Mar 2024 05:39) (95% - 98%)    Parameters below as of 26 Mar 2024 05:39  Patient On (Oxygen Delivery Method): room air        Admit weight: 96.9 kg  Daily     Daily Weight in k.6 (25 Mar 2024 07:45)    Intake / Output:    @ 07:01  -  -26 @ 07:00  --------------------------------------------------------  IN: 1180 mL / OUT: 1000 mL / NET: 180 mL    PE:   Oropharynx: no erythema or ulcerations   Oral Mucositis:   -                                               Grade: n/a   CVS: RRR, +S1,S2  Lungs: CTA throughout bilaterally   Abdomen: soft, ND, ND +BS x 4   Extremities: no edema   Gastric Mucositis:      -                                            Grade: -  Intestinal Mucositis:     -                                         Grade: -  Skin: no rash   TLC: C/D/I   Neuro: A&O x3  Pain: Denies      Labs:   CBC Full  -  ( 25 Mar 2024 07:08 )  WBC Count : 2.54 K/uL  Hemoglobin : 7.0 g/dL  Hematocrit : 19.8 %  Platelet Count - Automated : 25 K/uL  Mean Cell Volume : 85.0 fl  Mean Cell Hemoglobin : 30.0 pg  Mean Cell Hemoglobin Concentration : 35.4 gm/dL  Auto Neutrophil # : 2.27 K/uL  Auto Lymphocyte # : 0.00 K/uL  Auto Monocyte # : 0.25 K/uL  Auto Eosinophil # : 0.00 K/uL  Auto Basophil # : 0.00 K/uL  Auto Neutrophil % : 85.6 %  Auto Lymphocyte % : 0.0 %  Auto Monocyte % : 9.9 %  Auto Eosinophil % : 0.0 %  Auto Basophil % : 0.0 %                          7.0    2.54  )-----------(        ( 25 Mar 2024 07:08 )             19.8         138  |  104  |  15  ----------------------------<  89  4.3   |  20<L>  |  0.69    Ca    9.4      25 Mar 2024 07:08  Phos  2.8       Mg     1.6         TPro  5.5<L>  /  Alb  3.5  /  TBili  0.6  /  DBili  0.2  /  AST  19  /  ALT  18  /  AlkPhos  139<H>      PT/INR - ( 25 Mar 2024 07:08 )   PT: 12.1 sec;   INR: 1.10 ratio         PTT - ( 25 Mar 2024 07:08 )  PTT:27.0 sec  LIVER FUNCTIONS - ( 25 Mar 2024 07:08 )  Alb: 3.5 g/dL / Pro: 5.5 g/dL / ALK PHOS: 139 U/L / ALT: 18 U/L / AST: 19 U/L / GGT: x           Cultures:   Culture - Urine (24 @ 21:24)    Specimen Source: Clean Catch Clean Catch (Midstream)   Culture Results:   <10,000 CFU/mL Normal Urogenital Argelia    Culture - Blood (24 @ 21:24)    Specimen Source: .Blood Triple Lumen BROWN   Culture Results:   No growth at 5 days    Culture - Blood (24 @ 21:24)    Specimen Source: .Blood Triple Lumen BLUE   Culture Results:   No growth at 5 days    Radiology:   < from: US Head + Neck Soft Tissue (03.15.24 @ 19:05) >  IMPRESSION:  Left parotid gland edema. No fluid collection is identified.    Limited visualization of the right parotid gland.    < end of copied text >    < from: Xray Chest 1 View- PORTABLE-Urgent (Xray Chest 1 View- PORTABLE-Urgent .) (24 @ 23:44) >  IMPRESSION:  Small left pleural effusion/linear atelectasis.    Mild, central pulmonary venous congestion, new    < end of copied text >      Meds:   Antimicrobials:   valACYclovir 500 milliGRAM(s) Oral two times a day      GI:  aluminum hydroxide/magnesium hydroxide/simethicone Suspension 30 milliLiter(s) Oral every 6 hours PRN  calcium carbonate    500 mG (Tums) Chewable 1 Tablet(s) Chew three times a day PRN  loperamide 2 milliGRAM(s) Oral every 3 hours PRN  pantoprazole    Tablet 40 milliGRAM(s) Oral before breakfast  sodium bicarbonate Mouth Rinse 10 milliLiter(s) Swish and Spit five times a day      Cardiovascular:   losartan 100 milliGRAM(s) Oral daily      Immunologic:   filgrastim-sndz (ZARXIO) Injectable 480 MICROGram(s) SubCutaneous every 24 hours  mycophenolate mofetil 1000 milliGRAM(s) Oral three times a day  tacrolimus 3.5 milliGRAM(s) Oral two times a day      Other medications:   Biotene Dry Mouth Oral Rinse 5 milliLiter(s) Swish and Spit five times a day  chlorhexidine 4% Liquid 1 Application(s) Topical <User Schedule>  escitalopram 20 milliGRAM(s) Oral daily  lidocaine/prilocaine Cream 1 Application(s) Topical daily  loratadine 10 milliGRAM(s) Oral daily  sodium chloride 0.9%. 1000 milliLiter(s) IV Continuous <Continuous>      PRN:   acetaminophen     Tablet .. 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 Mild, central pulmonary venous congestion, new.  3/9 BCx and UCx, follow up  3/11- transaminitis, hold fluconazole. Tbili increased to 1.8. Added on direct and indirect bili.   3/12- direct bili 1.3 3/11/24   3/15- Chemo induced grade 2 oral mucositis: continue supportive care   3/15 parotid gland ( L) edema with PO intake stove vs bleeding vs truma f/u US   3/16 US prelim small collection <1cm, likely cyst, d/w Dr Batres, FU final reading   3/18 increasing tacro to 2.5 BID (level =7). re-check level next on Thurs 3/21-WNL,  increase Fluconazole to 400 daily (LFTs normalized). Zofran prn for n/v.  3/25- engrafted 3/24/24, d/c cefepime, fluconazole. CMV has been negative. Will check for FISH for Y 3/26/24.   3/26 1u pRBC and 1u platelets to optimize for discharge today    1. Infectious Disease:   valACYclovir 500 milliGRAM(s) Oral two times a day    2. GI Prophylaxis:   pantoprazole    Tablet 40 milliGRAM(s) Oral before breakfast    3. Mouthcare - NS / NaHCO3 rinses, Mycelex, Biotene; Skin care     4. GVHD prophylaxis   TBI day -1   CTX days +3, +4   mycophenolate mofetil 1000 milliGRAM(s) Oral three times a day  tacrolimus 2.5 milliGRAM(s) Oral two times a day    5. Transfuse & replete electrolytes prn   1 unit PRBC     6. IV hydration, daily weights, strict I&O, prn diuresis     7. PO intake as tolerated, nutrition follow up as needed, MVI, folic acid     8. Antiemetics, anti-diarrhea medications:   loperamide 2 milliGRAM(s) Oral every 3 hours PRN  ondansetron  IVPB 8 milliGRAM(s) IV Intermittent every 8 hours PRN    9. OOB as tolerated, physical therapy consult if needed     10. Monitor coags / fibrinogen 2x week, vitamin K as needed     11. Monitor closely for clinical changes, monitor for fevers     12. Emotional support provided, plan of care discussed and questions addressed     13. Patient education done regarding plan of care, restrictions and discharge planning     14. Continue regular social work input     I have written the above note for Dr. Olivier who performed service with me in the room.   Alvarado Angel PA-C ([phone])    I have seen and examined patient with PA, I agree with above note as scribed.                  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) with FLU/CY/TBI prep for the treatment fo ALL    S: Patient seen and examined with HPC Transplant Team:     O: Vitals:   Vital Signs Last 24 Hrs  T(C): 36.7 (26 Mar 2024 05:39), Max: 37.2 (25 Mar 2024 16:48)  T(F): 98.1 (26 Mar 2024 05:39), Max: 99 (25 Mar 2024 16:48)  HR: 69 (26 Mar 2024 05:39) (69 - 88)  BP: 156/82 (26 Mar 2024 05:39) (133/67 - 158/79)  BP(mean): --  RR: 18 (26 Mar 2024 05:39) (18 - 18)  SpO2: 97% (26 Mar 2024 05:39) (95% - 98%)    Parameters below as of 26 Mar 2024 05:39  Patient On (Oxygen Delivery Method): room air    Admit weight: 96.9 kg  Daily     Daily Weight in k.6 (25 Mar 2024 07:45)    Intake / Output:    @ 07:01  -  -26 @ 07:00  --------------------------------------------------------  IN: 1180 mL / OUT: 1000 mL / NET: 180 mL    PE:   Oropharynx: no erythema or ulcerations   Oral Mucositis:   -                                               Grade: n/a   CVS: RRR, +S1,S2  Lungs: CTA throughout bilaterally   Abdomen: soft, ND, ND +BS x 4   Extremities: no edema   Gastric Mucositis:      -                                            Grade: -  Intestinal Mucositis:     -                                         Grade: -  Skin: no rash   TLC: C/D/I   Neuro: A&O x3  Pain: Denies      Labs:   CBC Full  -  ( 25 Mar 2024 07:08 )  WBC Count : 2.54 K/uL  Hemoglobin : 7.0 g/dL  Hematocrit : 19.8 %  Platelet Count - Automated : 25 K/uL  Mean Cell Volume : 85.0 fl  Mean Cell Hemoglobin : 30.0 pg  Mean Cell Hemoglobin Concentration : 35.4 gm/dL  Auto Neutrophil # : 2.27 K/uL  Auto Lymphocyte # : 0.00 K/uL  Auto Monocyte # : 0.25 K/uL  Auto Eosinophil # : 0.00 K/uL  Auto Basophil # : 0.00 K/uL  Auto Neutrophil % : 85.6 %  Auto Lymphocyte % : 0.0 %  Auto Monocyte % : 9.9 %  Auto Eosinophil % : 0.0 %  Auto Basophil % : 0.0 %                          7.0    2.54  )-----------(        ( 25 Mar 2024 07:08 )             19.8         138  |  104  |  15  ----------------------------<  89  4.3   |  20<L>  |  0.69    Ca    9.4      25 Mar 2024 07:08  Phos  2.8       Mg     1.6         TPro  5.5<L>  /  Alb  3.5  /  TBili  0.6  /  DBili  0.2  /  AST  19  /  ALT  18  /  AlkPhos  139<H>      PT/INR - ( 25 Mar 2024 07:08 )   PT: 12.1 sec;   INR: 1.10 ratio         PTT - ( 25 Mar 2024 07:08 )  PTT:27.0 sec  LIVER FUNCTIONS - ( 25 Mar 2024 07:08 )  Alb: 3.5 g/dL / Pro: 5.5 g/dL / ALK PHOS: 139 U/L / ALT: 18 U/L / AST: 19 U/L / GGT: x           Cultures:   Culture - Urine (24 @ 21:24)    Specimen Source: Clean Catch Clean Catch (Midstream)   Culture Results:   <10,000 CFU/mL Normal Urogenital Argelia    Culture - Blood (24 @ 21:24)    Specimen Source: .Blood Triple Lumen BROWN   Culture Results:   No growth at 5 days    Culture - Blood (24 @ 21:24)    Specimen Source: .Blood Triple Lumen BLUE   Culture Results:   No growth at 5 days    Radiology:   < from: US Head + Neck Soft Tissue (03.15.24 @ 19:05) >  IMPRESSION:  Left parotid gland edema. No fluid collection is identified.    Limited visualization of the right parotid gland.    < end of copied text >    < from: Xray Chest 1 View- PORTABLE-Urgent (Xray Chest 1 View- PORTABLE-Urgent .) (24 @ 23:44) >  IMPRESSION:  Small left pleural effusion/linear atelectasis.    Mild, central pulmonary venous congestion, new    < end of copied text >      Meds:   Antimicrobials:   valACYclovir 500 milliGRAM(s) Oral two times a day      GI:  aluminum hydroxide/magnesium hydroxide/simethicone Suspension 30 milliLiter(s) Oral every 6 hours PRN  calcium carbonate    500 mG (Tums) Chewable 1 Tablet(s) Chew three times a day PRN  loperamide 2 milliGRAM(s) Oral every 3 hours PRN  pantoprazole    Tablet 40 milliGRAM(s) Oral before breakfast  sodium bicarbonate Mouth Rinse 10 milliLiter(s) Swish and Spit five times a day      Cardiovascular:   losartan 100 milliGRAM(s) Oral daily      Immunologic:   filgrastim-sndz (ZARXIO) Injectable 480 MICROGram(s) SubCutaneous every 24 hours  mycophenolate mofetil 1000 milliGRAM(s) Oral three times a day  tacrolimus 3.5 milliGRAM(s) Oral two times a day      Other medications:   Biotene Dry Mouth Oral Rinse 5 milliLiter(s) Swish and Spit five times a day  chlorhexidine 4% Liquid 1 Application(s) Topical <User Schedule>  escitalopram 20 milliGRAM(s) Oral daily  lidocaine/prilocaine Cream 1 Application(s) Topical daily  loratadine 10 milliGRAM(s) Oral daily  sodium chloride 0.9%. 1000 milliLiter(s) IV Continuous <Continuous>      PRN:   acetaminophen     Tablet .. 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 Mild, central pulmonary venous congestion, new.  3/9 BCx and UCx, follow up  3/11- transaminitis, hold fluconazole. Tbili increased to 1.8. Added on direct and indirect bili.   3/12- direct bili 1.3 3/11/24   3/15- Chemo induced grade 2 oral mucositis: continue supportive care   3/15 parotid gland ( L) edema with PO intake stove vs bleeding vs truma f/u US   3/16 US prelim small collection <1cm, likely cyst, d/w Dr Batres, FU final reading   3/18 increasing tacro to 2.5 BID (level =7). re-check level next on Thurs 3/21-WNL,  increase Fluconazole to 400 daily (LFTs normalized). Zofran prn for n/v.  3/25- engrafted 3/24/24, d/c cefepime, fluconazole. CMV has been negative. Will check for FISH for Y 3/26/24.     1. Infectious Disease:   valACYclovir 500 milliGRAM(s) Oral two times a day    2. GI Prophylaxis:   pantoprazole    Tablet 40 milliGRAM(s) Oral before breakfast    3. Mouthcare - NS / NaHCO3 rinses, Mycelex, Biotene; Skin care     4. GVHD prophylaxis   TBI day -1   CTX days +3, +4   mycophenolate mofetil 1000 milliGRAM(s) Oral three times a day  tacrolimus 2.5 milliGRAM(s) Oral two times a day    5. Transfuse & replete electrolytes prn   3/26 1u pRBC and 1u platelets to optimize for discharge today; Mg sulfate 2g IV x2 for hypomagnesemia    6. IV hydration, daily weights, strict I&O, prn diuresis     7. PO intake as tolerated, nutrition follow up as needed, MVI, folic acid     8. Antiemetics, anti-diarrhea medications:   loperamide 2 milliGRAM(s) Oral every 3 hours PRN  ondansetron  IVPB 8 milliGRAM(s) IV Intermittent every 8 hours PRN    9. OOB as tolerated, physical therapy consult if needed     10. Monitor coags / fibrinogen 2x week, vitamin K as needed     11. Monitor closely for clinical changes, monitor for fevers     12. Emotional support provided, plan of care discussed and questions addressed     13. Patient education done regarding plan of care, restrictions and discharge planning     14. Continue regular social work input     I have written the above note for Dr. Olivier who performed service with me in the room.   Alvarado Angel PA-C ([phone])    I have seen and examined patient with PA, I agree with above note as scribed.                  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) with FLU/CY/TBI prep for the treatment fo ALL    S: Patient seen and examined with HPC Transplant Team:   no complaints    O: Vitals:   Vital Signs Last 24 Hrs  T(C): 36.7 (26 Mar 2024 05:39), Max: 37.2 (25 Mar 2024 16:48)  T(F): 98.1 (26 Mar 2024 05:39), Max: 99 (25 Mar 2024 16:48)  HR: 69 (26 Mar 2024 05:39) (69 - 88)  BP: 156/82 (26 Mar 2024 05:39) (133/67 - 158/79)  RR: 18 (26 Mar 2024 05:39) (18 - 18)  SpO2: 97% (26 Mar 2024 05:39) (95% - 98%)    Parameters below as of 26 Mar 2024 05:39  Patient On (Oxygen Delivery Method): room air    Admit weight: 96.9 kg  Daily     Daily Weight in k.6 (25 Mar 2024 07:45)    Intake / Output:    @ 07:01  -  - @ 07:00  --------------------------------------------------------  IN: 1180 mL / OUT: 1000 mL / NET: 180 mL    PE:   Oropharynx: no erythema or ulcerations   Oral Mucositis:   -                                               Grade: n/a   CVS: RRR, +S1,S2  Lungs: CTA throughout bilaterally   Abdomen: soft, ND, ND +BS x 4   Extremities: no edema   Gastric Mucositis:      -                                            Grade: -  Intestinal Mucositis:     -                                         Grade: -  Skin: no rash   TLC: C/D/I   Neuro: A&O x3  Pain: Denies    LABS:                          7.9    5.23  )-----------( 22       ( 26 Mar 2024 07:17 )             22.2         Mean Cell Volume : 83.1 fl  Mean Cell Hemoglobin : 29.6 pg  Mean Cell Hemoglobin Concentration : 35.6 gm/dL  Auto Neutrophil # : 3.84 K/uL  Auto Lymphocyte # : 0.01 K/uL  Auto Monocyte # : 0.78 K/uL  Auto Eosinophil # : 0.00 K/uL  Auto Basophil # : 0.04 K/uL  Auto Neutrophil % : 73.4 %  Auto Lymphocyte % : 0.2 %  Auto Monocyte % : 14.9 %  Auto Eosinophil % : 0.0 %  Auto Basophil % : 0.8 %          139  |  104  |  12  ----------------------------<  85  4.2   |  19<L>  |  0.68    Ca    9.3      26 Mar 2024 07:16  Phos  2.6       Mg     1.1         TPro  5.5<L>  /  Alb  3.4  /  TBili  0.7  /  DBili  x   /  AST  21  /  ALT  18  /  AlkPhos  149<H>      PT/INR - ( 25 Mar 2024 07:08 )   PT: 12.1 sec;   INR: 1.10 ratio         PTT - ( 25 Mar 2024 07:08 )  PTT:27.0 sec        Cultures:   Culture - Urine (24 @ 21:24)    Specimen Source: Clean Catch Clean Catch (Midstream)   Culture Results:   <10,000 CFU/mL Normal Urogenital Argelia    Culture - Blood (24 @ 21:24)    Specimen Source: .Blood Triple Lumen BROWN   Culture Results:   No growth at 5 days    Culture - Blood (24 @ 21:24)    Specimen Source: .Blood Triple Lumen BLUE   Culture Results:   No growth at 5 days    Radiology:   < from: US Head + Neck Soft Tissue (03.15.24 @ 19:05) >  IMPRESSION:  Left parotid gland edema. No fluid collection is identified.    Limited visualization of the right parotid gland.    < end of copied text >    < from: Xray Chest 1 View- PORTABLE-Urgent (Xray Chest 1 View- PORTABLE-Urgent .) (24 @ 23:44) >  IMPRESSION:  Small left pleural effusion/linear atelectasis.    Mild, central pulmonary venous congestion, new    < end of copied text >      Meds:   Antimicrobials:   valACYclovir 500 milliGRAM(s) Oral two times a day      GI:  aluminum hydroxide/magnesium hydroxide/simethicone Suspension 30 milliLiter(s) Oral every 6 hours PRN  calcium carbonate    500 mG (Tums) Chewable 1 Tablet(s) Chew three times a day PRN  loperamide 2 milliGRAM(s) Oral every 3 hours PRN  pantoprazole    Tablet 40 milliGRAM(s) Oral before breakfast  sodium bicarbonate Mouth Rinse 10 milliLiter(s) Swish and Spit five times a day      Cardiovascular:   losartan 100 milliGRAM(s) Oral daily      Immunologic:   filgrastim-sndz (ZARXIO) Injectable 480 MICROGram(s) SubCutaneous every 24 hours  mycophenolate mofetil 1000 milliGRAM(s) Oral three times a day  tacrolimus 3.5 milliGRAM(s) Oral two times a day      Other medications:   Biotene Dry Mouth Oral Rinse 5 milliLiter(s) Swish and Spit five times a day  chlorhexidine 4% Liquid 1 Application(s) Topical <User Schedule>  escitalopram 20 milliGRAM(s) Oral daily  lidocaine/prilocaine Cream 1 Application(s) Topical daily  loratadine 10 milliGRAM(s) Oral daily  sodium chloride 0.9%. 1000 milliLiter(s) IV Continuous <Continuous>      PRN:   acetaminophen     Tablet .. 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 Mild, central pulmonary venous congestion, new.  3/9 BCx and UCx, follow up  3/11- transaminitis, hold fluconazole. Tbili increased to 1.8. Added on direct and indirect bili.   3/12- direct bili 1.3 3/11/24   3/15- Chemo induced grade 2 oral mucositis: continue supportive care   3/15 parotid gland ( L) edema with PO intake stove vs bleeding vs truma f/u US   3/16 US prelim small collection <1cm, likely cyst, d/w Dr Batres,  final reading   3/18 increasing tacro to 2.5 BID (level =7). re-check level next on Thurs 3/21-WNL,  increase Fluconazole to 400 daily (LFTs normalized). Zofran prn for n/v.  3/25- engrafted 3/24/24, d/c cefepime, fluconazole. CMV has been negative. Will check for FISH for Y 3/26/24.     1. Infectious Disease:   valACYclovir 500 milliGRAM(s) Oral two times a day    2. GI Prophylaxis:   pantoprazole    Tablet 40 milliGRAM(s) Oral before breakfast    3. Mouthcare - NS / NaHCO3 rinses, Mycelex, Biotene; Skin care     4. GVHD prophylaxis   TBI day -1   CTX days +3, +4   mycophenolate mofetil 1000 milliGRAM(s) Oral three times a day  tacrolimus 2.5 milliGRAM(s) Oral two times a day    5. Transfuse & replete electrolytes prn   3/26 1u pRBC and 1u platelets to optimize for discharge today; Mg sulfate 2g IV x2 for hypomagnesemia    6. IV hydration, daily weights, strict I&O, prn diuresis     7. PO intake as tolerated, nutrition follow up as needed, MVI, folic acid     8. Antiemetics, anti-diarrhea medications:   loperamide 2 milliGRAM(s) Oral every 3 hours PRN  ondansetron  IVPB 8 milliGRAM(s) IV Intermittent every 8 hours PRN    9. OOB as tolerated, physical therapy consult if needed     10. Monitor coags / fibrinogen 2x week, vitamin K as needed     11. Monitor closely for clinical changes, monitor for fevers     12. Emotional support provided, plan of care discussed and questions addressed     13. Patient education done regarding plan of care, restrictions and discharge planning     14. Continue regular social work input     I have written the above note for Dr. Olivier who performed service with me in the room.   Alvarado Angel PA-C ([phone])    I have seen and examined patient with PA, I agree with above note as scribed.                  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) with FLU/CY/TBI prep for the treatment fo ALL    S: Patient seen and examined with HPC Transplant Team:   no complaints    O: Vitals:   Vital Signs Last 24 Hrs  T(C): 36.7 (26 Mar 2024 05:39), Max: 37.2 (25 Mar 2024 16:48)  T(F): 98.1 (26 Mar 2024 05:39), Max: 99 (25 Mar 2024 16:48)  HR: 69 (26 Mar 2024 05:39) (69 - 88)  BP: 156/82 (26 Mar 2024 05:39) (133/67 - 158/79)  RR: 18 (26 Mar 2024 05:39) (18 - 18)  SpO2: 97% (26 Mar 2024 05:39) (95% - 98%)    Parameters below as of 26 Mar 2024 05:39  Patient On (Oxygen Delivery Method): room air    Admit weight: 96.9 kg  Daily Weight in k.3 (26 Mar 2024 09:17)    Intake / Output:   - @ 07:01  -  - @ 07:00  --------------------------------------------------------  IN: 1180 mL / OUT: 1000 mL / NET: 180 mL    PE:   Oropharynx: no erythema or ulcerations   Oral Mucositis:   -                                               Grade: n/a   CVS: RRR, +S1,S2  Lungs: CTA throughout bilaterally   Abdomen: soft, ND, ND +BS x 4   Extremities: no edema   Gastric Mucositis:      -                                            Grade: -  Intestinal Mucositis:     -                                         Grade: -  Skin: no rash   TLC: C/D/I   Neuro: A&O x3  Pain: Denies    LABS:                          7.9    5.23  )-----------( 22       ( 26 Mar 2024 07:17 )             22.2         Mean Cell Volume : 83.1 fl  Mean Cell Hemoglobin : 29.6 pg  Mean Cell Hemoglobin Concentration : 35.6 gm/dL  Auto Neutrophil # : 3.84 K/uL  Auto Lymphocyte # : 0.01 K/uL  Auto Monocyte # : 0.78 K/uL  Auto Eosinophil # : 0.00 K/uL  Auto Basophil # : 0.04 K/uL  Auto Neutrophil % : 73.4 %  Auto Lymphocyte % : 0.2 %  Auto Monocyte % : 14.9 %  Auto Eosinophil % : 0.0 %  Auto Basophil % : 0.8 %          139  |  104  |  12  ----------------------------<  85  4.2   |  19<L>  |  0.68    Ca    9.3      26 Mar 2024 07:16  Phos  2.6       Mg     1.1         TPro  5.5<L>  /  Alb  3.4  /  TBili  0.7  /  DBili  x   /  AST  21  /  ALT  18  /  AlkPhos  149<H>      PT/INR - ( 25 Mar 2024 07:08 )   PT: 12.1 sec;   INR: 1.10 ratio         PTT - ( 25 Mar 2024 07:08 )  PTT:27.0 sec        Cultures:   Culture - Urine (24 @ 21:24)    Specimen Source: Clean Catch Clean Catch (Midstream)   Culture Results:   <10,000 CFU/mL Normal Urogenital Argelia    Culture - Blood (24 @ 21:24)    Specimen Source: .Blood Triple Lumen BROWN   Culture Results:   No growth at 5 days    Culture - Blood (24 @ 21:24)    Specimen Source: .Blood Triple Lumen BLUE   Culture Results:   No growth at 5 days    Radiology:   < from: US Head + Neck Soft Tissue (03.15.24 @ 19:05) >  IMPRESSION:  Left parotid gland edema. No fluid collection is identified.    Limited visualization of the right parotid gland.    < end of copied text >    < from: Xray Chest 1 View- PORTABLE-Urgent (Xray Chest 1 View- PORTABLE-Urgent .) (24 @ 23:44) >  IMPRESSION:  Small left pleural effusion/linear atelectasis.    Mild, central pulmonary venous congestion, new    < end of copied text >      Meds:   Antimicrobials:   valACYclovir 500 milliGRAM(s) Oral two times a day      GI:  aluminum hydroxide/magnesium hydroxide/simethicone Suspension 30 milliLiter(s) Oral every 6 hours PRN  calcium carbonate    500 mG (Tums) Chewable 1 Tablet(s) Chew three times a day PRN  loperamide 2 milliGRAM(s) Oral every 3 hours PRN  pantoprazole    Tablet 40 milliGRAM(s) Oral before breakfast  sodium bicarbonate Mouth Rinse 10 milliLiter(s) Swish and Spit five times a day      Cardiovascular:   losartan 100 milliGRAM(s) Oral daily      Immunologic:   filgrastim-sndz (ZARXIO) Injectable 480 MICROGram(s) SubCutaneous every 24 hours  mycophenolate mofetil 1000 milliGRAM(s) Oral three times a day  tacrolimus 3.5 milliGRAM(s) Oral two times a day      Other medications:   Biotene Dry Mouth Oral Rinse 5 milliLiter(s) Swish and Spit five times a day  chlorhexidine 4% Liquid 1 Application(s) Topical <User Schedule>  escitalopram 20 milliGRAM(s) Oral daily  lidocaine/prilocaine Cream 1 Application(s) Topical daily  loratadine 10 milliGRAM(s) Oral daily  sodium chloride 0.9%. 1000 milliLiter(s) IV Continuous <Continuous>      PRN:   acetaminophen     Tablet .. 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 Mild, central pulmonary venous congestion, new.  3/9 BCx and UCx, follow up  3/11- transaminitis, hold fluconazole. Tbili increased to 1.8. Added on direct and indirect bili.   3/12- direct bili 1.3 3/11/24   3/15- Chemo induced grade 2 oral mucositis: continue supportive care   3/15 parotid gland ( L) edema with PO intake stove vs bleeding vs truma f/u US   3/16 US prelim small collection <1cm, likely cyst, d/w Dr Batres,  final reading   3/18 increasing tacro to 2.5 BID (level =7). re-check level next on Thurs 3/21-WNL,  increase Fluconazole to 400 daily (LFTs normalized). Zofran prn for n/v.  3/25- engrafted 3/24/24, d/c cefepime, fluconazole. CMV has been negative. Will check for FISH for Y 3/26/24.     1. Infectious Disease:   valACYclovir 500 milliGRAM(s) Oral two times a day    2. GI Prophylaxis:   pantoprazole    Tablet 40 milliGRAM(s) Oral before breakfast    3. Mouthcare - NS / NaHCO3 rinses, Mycelex, Biotene; Skin care     4. GVHD prophylaxis   TBI day -1   CTX days +3, +4   mycophenolate mofetil 1000 milliGRAM(s) Oral three times a day  tacrolimus 2.5 milliGRAM(s) Oral two times a day    5. Transfuse & replete electrolytes prn   3/26 1u pRBC and 1u platelets to optimize for discharge today; Mg sulfate 2g IV x2 for hypomagnesemia    6. IV hydration, daily weights, strict I&O, prn diuresis     7. PO intake as tolerated, nutrition follow up as needed, MVI, folic acid     8. Antiemetics, anti-diarrhea medications:   loperamide 2 milliGRAM(s) Oral every 3 hours PRN  ondansetron  IVPB 8 milliGRAM(s) IV Intermittent every 8 hours PRN    9. OOB as tolerated, physical therapy consult if needed     10. Monitor coags / fibrinogen 2x week, vitamin K as needed     11. Monitor closely for clinical changes, monitor for fevers     12. Emotional support provided, plan of care discussed and questions addressed     13. Patient education done regarding plan of care, restrictions and discharge planning     14. Continue regular social work input     I have written the above note for Dr. Olivier who performed service with me in the room.   Alvarado Angel PA-C ([phone])    I have seen and examined patient with PA, I agree with above note as scribed.

## 2024-03-26 NOTE — PHARMACOTHERAPY INTERVENTION NOTE - COMMENTS
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 Today is day +1.    Patient will be starting tacrolimus on 3/11 (day +5). Please edit tacrolimus trough orders QMon/Thurs to start on 3/14, as patient will be around steady state then and level can be more accurately assessed.    Katherin Berry, PharmD, BCOP  Stem Cell Transplant Clinical Pharmacy Specialist  Available via Microsoft Teams
Allergies    sulfa drugs (Unknown)    Intolerances    Zofran (Headache)      MEDICATIONS  (STANDING):  Biotene Dry Mouth Oral Rinse 5 milliLiter(s) Swish and Spit five times a day  chlorhexidine 4% Liquid 1 Application(s) Topical <User Schedule>  escitalopram 20 milliGRAM(s) Oral daily  filgrastim-sndz (ZARXIO) Injectable 480 MICROGram(s) SubCutaneous every 24 hours  lidocaine/prilocaine Cream 1 Application(s) Topical daily  loratadine 10 milliGRAM(s) Oral daily  losartan 100 milliGRAM(s) Oral daily  mycophenolate mofetil 1000 milliGRAM(s) Oral three times a day  pantoprazole    Tablet 40 milliGRAM(s) Oral before breakfast  sodium bicarbonate Mouth Rinse 10 milliLiter(s) Swish and Spit five times a day  sodium chloride 0.9%. 1000 milliLiter(s) (20 mL/Hr) IV Continuous <Continuous>  tacrolimus 3.5 milliGRAM(s) Oral two times a day  valACYclovir 500 milliGRAM(s) Oral two times a day    MEDICATIONS  (PRN):  acetaminophen     Tablet .. 650 milliGRAM(s) Oral every 6 hours PRN Mild Pain (1 - 3), Moderate Pain (4 - 6)  aluminum hydroxide/magnesium hydroxide/simethicone Suspension 30 milliLiter(s) Oral every 6 hours PRN Dyspepsia  calcium carbonate    500 mG (Tums) Chewable 1 Tablet(s) Chew three times a day PRN Dyspepsia  loperamide 2 milliGRAM(s) Oral every 3 hours PRN Diarrhea  metoclopramide Injectable 5 milliGRAM(s) IV Push every 6 hours PRN nausea/vomiting  ondansetron  IVPB 8 milliGRAM(s) IV Intermittent every 8 hours PRN Nausea and/or Vomiting  sodium chloride 0.9% lock flush 10 milliLiter(s) IV Push every 1 hour PRN Pre/post blood products, medications, blood draw, and to maintain line patency  zinc oxide 40% Paste 1 Application(s) Topical two times a day PRN discomfort    Recipient of Education:  [X]Patient  [X]Family, please specify: Yaw, spouse  [  ]Other, please specify ____________    Readiness to Learn:  [X]Ready  [  ]Not ready: cognitive  [  ]Not ready: physical  [  ]Not ready: emotional  Comment(s):    Barriers to Information Exhange:  [X]None identified  [  ]Vision  [  ]Hearing  [  ]Language  [  ]Literacy  [  ]Memory and Learning  [  ]Culture/Holiness  [  ]Other, please specify ____________  Comment(s):    Patient Education Topics:  [  ]Anticoagulation  [  ]Heart Failure (HF)  [  ]Chronic Obstructive Pulmonary Disease (COPD)  [  ]Diabetes Mellitus (DM)  [  ]Stroke  [X]Other, please specify: Post AlloSCT discharge counseling  Comment(s):    Medication Counseling Points:  [X]Medications Reviewed  [X]Medication Schedule  [X]Precautions  [X]Side Effects  [X]Indication for Use  [X]Drug/Drug Interactions  [X]Drug/Food Interactions  [  ]Other, please specify ____________  Comment(s):    Teaching Method:  [X]Verbal Instruction  [X]Written Material, please specify: Personalized medication chart  [  ]Skill Demonstration  [X]Teach Back (Patient repeats in own words)  [  ]Ask Me 3  [  ]Computer/Internet  [  ]Other, please specify ____________  Comment(s):    Educational Evaluation:  [X]Meets goals/outcomes  [  ]Partially meets - needs review/practice  [  ]Unable to meet - needs instruction  [  ]Reinforced previously met goal  [  ]Patient declines instruction  [  ]Not applicable  Comment(s):    64-year-old female with [place] 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 Today is day +20. Hospital course has been complicated by haplostorm and a transaminitis.    Counseled patient today for discharge on new transplant medications including immunosuppression, anti-infectives, vitamins, PPI, and PRN nausea meds. Reviewed doses, indications, efficacy and safety monitoring parameters, storage, and medication timeline.  Also counseled patient on the importance of adherence. Advised patient to: wear sunblock SPF>15 secondary to immunosuppressive regimen increasing risk of skin cancer; avoid grapefruit juice and pomegranate juice as they may increase tacrolimus levels; avoid over-the-counter NSAIDs, herbal, or dietary supplements as they may be harmful to the kidney; contact the clinic before initiating any new OTC or RX medications; hold immunosuppression dose before any clinic visits and bring medication to take in clinic after blood draws.    Patient expressed understanding and teach back method was used to confirm. All questions were answered to the patient's satisfaction and medication sheet was provided with medication name, strength, schedule, indication, and special instructions.        Time spent: 30 minutes    Katherin Berry PharmD, BCOP  Stem Cell Transplant Clinical Pharmacy Specialist  Available via Microsoft Teams
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 Today is day +8. Hospital course has been complicated by haplostorm and a transaminitis.    Patient has a transaminitis on 3/11, so fluconazole was held. Transaminitis has downtrended, so fluconazole was restarted on 3/13 at 200 mg PO QD. Recommend increasing dose to 400 mg PO QD since LFTs are <3x ULN and are downtrending, since tacrolimus level will less likely be altered by later dose increase.     Tacro level resulted today, 3/14 (after 4 doses, not yet at steady state) at 8.1 ng/mL. Recommend keeping dose as is at 2 mg PO BID. Goal is 10-15 ng/mL. Will continue to monitor. Next level is on Monday, 3/18.    Date   Day         Level           Action      Day -6      N/A             Started aprepitant (CY inhibitor)  3/6      Day 0       N/A             Started fluconazole 400 mg QD (CY inhibitor)  3/11    Day +5     N/A             Stopped aprepitant (CY inhibitor)  3/11    Day +5     N/A             Started PO tacrolimus (2 mg PO Q12H)  3/11    Day +5     N/A             Holding fluconazole due to transaminitis  3/13    Day +7     N/A             Restarted fluconazole at 200 PO QD  3/14    Day +8    8.1 ng/mL   No change in tacro dose    Katherin Berry, PharmD, BCOP  Stem Cell Transplant Clinical Pharmacy Specialist  Available via Microsoft Teams
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 Today is day +14. Hospital course has been complicated by haplostorm and a transaminitis.    Cefepime was started on 3/9 for a fever of 100.9 @ 20:56 secondary to haplostorm. Her BCx from 3/9 has been NGTD and CXR showed some pulmonary congestion. Patient has been afebrile since 3/10. Would consider de-escalating cefepime to levofloxacin 500 mg PO QD.    Ondansetron ordered as 8 mg IVPB PRN instead of IV push which is in the pyxis. Patient has history of intolerance due to headache - unclear if order was placed as IVPB due to this concern. Patient tolerated IVP standing as part of antiemetic regimen during conditioning. Would recommend switching to IVP so patient can receive medication quicker.    Date   Day         Level           Action      Day -6      N/A             Started aprepitant (CY inhibitor)  3/6      Day 0       N/A             Started fluconazole 400 mg QD (CY inhibitor)  3/11    Day +5     N/A             Stopped aprepitant (CY inhibitor)  3/11    Day +5     N/A             Started PO tacrolimus (2 mg PO Q12H)  3/11    Day +5     N/A             Holding fluconazole due to transaminitis  3/13    Day +7     N/A             Restarted fluconazole at 200 PO QD  3/14    Day +8    8.1 ng/mL     No change in tacro dose  3/18    Day +12  N/A              Increase fluconazole to 400 mg PO QD  3/18    Day +12  7.0 ng/mL     Increased PO tacrolimus from 2 mg to 2.5 mg PO BID      Katherin Berry, PharmD, BCOP  Stem Cell Transplant Clinical Pharmacy Specialist  Available via Microsoft Teams
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 Today is day 0.    Patient will be getting stem cells today and is at risk for CRS secondary to stem cells. Can become hypotensive. Please include hold parameter for losartan, SBP <100 mmHg and DBP <60 mmHg.    Katherin Berry, PharmD, BCOP  Stem Cell Transplant Clinical Pharmacy Specialist  Available via Microsoft Teams
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 Today is day +5. Hospital course has been complicated by haplostorm and a transaminitis.    Patient has a transaminitis today (3/11), so fluconazole was held. Will continue to monitor tacrolimus level now that fluconazole is being held - may require a 40% dose increase depending on level that results on 3/14. Goal is 10-15 ng/mL. Will continue to monitor.    Date   Day         Level           Action      Day -6      N/A             Started aprepitant (CY inhibitor)  3/6      Day 0       N/A             Started fluconazole 400 mg QD (CY inhibitor)  3/11    Day +5     N/A             Stopped aprepitant (CY inhibitor)  3/11    Day +5     N/A             Started PO tacrolimus (2 mg PO Q12H)  3/11    Day +5     N/A             Holding fluconazole due to transaminitis    Katherin Berry, PharmD, BCOP  Stem Cell Transplant Clinical Pharmacy Specialist  Available via Microsoft Teams
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 Today is day +19. Hospital course has been complicated by haplostorm and a transaminitis.    Patient is being discharged either Tuesday (day +20) or Wednesday (day +21) and being sent home with 14 days of MMF with plan to continue till day +35. If patient is discharged early, will need to send additional one day of MMF so patient does not miss day of GVHD prevention meds.    Tacro level resulted today, 3/25 (at steady state) at 7.8 ng/mL. Recommend increasing dose by 40% from 2.5 mg PO BID to 3.5 mg PO BID due to removal of DDI with fluconazole ~48 hours ago. Goal is 10-15 ng/mL. Will continue to monitor. [facility]    Date   Day         Level           Action      Day -6      N/A             Started aprepitant (CY inhibitor)  3/6      Day 0       N/A             Started fluconazole 400 mg QD (CY inhibitor)  3/11    Day +5     N/A             Stopped aprepitant (CY inhibitor)  3/11    Day +5     N/A             Started PO tacrolimus (2 mg PO Q12H)  3/11    Day +5     N/A             Holding fluconazole due to transaminitis  3/13    Day +7     N/A             Restarted fluconazole at 200 PO QD  3/14    Day +8    8.1 ng/mL     No change in tacro dose  3/18    Day +12  N/A              Increase fluconazole to 400 mg PO QD  3/18    Day +12  7.0 ng/mL     Increased PO tacrolimus from 2 mg to 2.5 mg PO BID  3/21    Day +15  9.9 ng/mL     Keep tacro dose at 2.5 mg PO BID  3/23    Day +17  N/A              Final dose of fluconazole (CY inhibitor)  3/25    Day +19  7.8 ng/mL   Increase tacro dose from 2.5 mg to 3.5 mg PO BID (40% increase)        Katherin Berry, PharmD, BCOP  Stem Cell Transplant Clinical Pharmacy Specialist  Available via Microsoft Teams
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 Today is day -1.    Please put in following antimicrobials to start tomorrow (day 0):  Fluconazole 400 mg PO QD  Valacyclovir 500 mg PO BID     Katherin Berry, PharmD, BCOP  Stem Cell Transplant Clinical Pharmacy Specialist  Available via Microsoft Teams
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 Today is day +15. Hospital course has been complicated by haplostorm and a transaminitis.    Cefepime was started on 3/9 for a fever of 100.9 @ 20:56 secondary to haplostorm. Her BCx from 3/9 has been NGTD and CXR showed some pulmonary congestion. Patient has been afebrile since 3/10. Would consider de-escalating cefepime to levofloxacin 500 mg PO QD.    Ondansetron ordered as 8 mg IVPB PRN instead of IV push which is in the pyxis. Patient has history of intolerance due to headache - unclear if order was placed as IVPB due to this concern. Patient tolerated IVP standing as part of antiemetic regimen during conditioning. Would recommend switching to IVP so patient can receive medication quicker.    Tacro level resulted today, 3/21 (at steady state) at 9.9 ng/mL. Recommend keeping dose as is at 2.5 mg PO BID. Goal is 10-15 ng/mL. Will continue to monitor. Next level is on Monday, 3/25.    Date   Day         Level           Action      Day -6      N/A             Started aprepitant (CY inhibitor)  3/6      Day 0       N/A             Started fluconazole 400 mg QD (CY inhibitor)  3/11    Day +5     N/A             Stopped aprepitant (CY inhibitor)  3/11    Day +5     N/A             Started PO tacrolimus (2 mg PO Q12H)  3/11    Day +5     N/A             Holding fluconazole due to transaminitis  3/13    Day +7     N/A             Restarted fluconazole at 200 PO QD  3/14    Day +8    8.1 ng/mL     No change in tacro dose  3/18    Day +12  N/A              Increase fluconazole to 400 mg PO QD  3/18    Day +12  7.0 ng/mL     Increased PO tacrolimus from 2 mg to 2.5 mg PO BID  3/20   Day +15 9.9 ng/mL     Keep tacro dose at 2.5 mg PO BID      Katherin Berry, PharmD, BCOP  Stem Cell Transplant Clinical Pharmacy Specialist  Available via Microsoft Teams
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 Today is day +7. Hospital course has been complicated by haplostorm and a transaminitis.    Patient has a transaminitis on 3/11, so fluconazole was held. Transaminitis has downtrended, so fluconazole was restarted on 3/13 at 200 mg PO QD. Recommend increasing dose to 400 mg PO QD since LFTs are <3x ULN and are downtrending, since tacrolimus level will less likely be altered by later dose increase. Plan is to assess tacro level tomorrow (3/14) and then decide. Will continue to monitor tacrolimus level now that fluconazole is being held - may require a 20% dose increase depending on level that results on 3/14. Goal is 10-15 ng/mL. Will continue to monitor.    Date   Day         Level           Action      Day -6      N/A             Started aprepitant (CY inhibitor)  3/6      Day 0       N/A             Started fluconazole 400 mg QD (CY inhibitor)  3/11    Day +5     N/A             Stopped aprepitant (CY inhibitor)  3/11    Day +5     N/A             Started PO tacrolimus (2 mg PO Q12H)  3/11    Day +5     N/A             Holding fluconazole due to transaminitis  3/13    Day +7     N/A             Restarted fluconazole at 200 PO QD    Katherin Berry, PharmD, BCOP  Stem Cell Transplant Clinical Pharmacy Specialist  Available via [employer] Teams

## 2024-03-26 NOTE — DISCHARGE NOTE PROVIDER - NSDCFUADDAPPT_GEN_ALL_CORE_FT
You have the following appointments at the [facility]:   Thursday, 3/28/24  9:30am - lab   10am - Francisca Martin NP   3:30pm -possible platelets, if needed     Monday, 4/1/24  10:30am - lab   11am- Francisca Martin NP   3pm - possible platelets, if needed

## 2024-03-26 NOTE — DISCHARGE NOTE NURSING/CASE MANAGEMENT/SOCIAL WORK - NSDCFUADDAPPT_GEN_ALL_CORE_FT
You have the following appointments at the [facility]:   Thursday, 3/28/24  9:30am - lab   10am - Francisca Martin NP   3:30pm -possible platelets, if needed     Monday, 4/1/24  10:30am - lab   11am- Francisca Martin NP   3pm - possible platelets, if needed

## 2024-03-26 NOTE — DISCHARGE NOTE PROVIDER - NSDCFUSCHEDAPPT_GEN_ALL_CORE_FT
Jackie Disla  [facility]  OP[facility]THALM [address]  Scheduled Appointment: 05/14/2024

## 2024-03-26 NOTE — DISCHARGE NOTE PROVIDER - NSDCMRMEDTOKEN_GEN_ALL_CORE_FT
atovaquone 750 mg/5 mL oral suspension: 10 milliliter(s) orally once a day  Cozaar 100 mg oral tablet: 1 tab(s) orally once a day  Lexapro 20 mg oral tablet: 1 tab(s) orally once a day  mycophenolate mofetil 500 mg oral tablet: 2 tab(s) orally 3 times a day  omeprazole 20 mg oral delayed release capsule: 1 cap(s) orally once a day  ondansetron 8 mg oral tablet: 1 tab(s) orally every 8 hours as needed for  nausea  tacrolimus 0.5 mg oral capsule: 1 cap(s) orally 2 times a day  tacrolimus 1 mg oral capsule: 2 cap(s) orally 2 times a day  valACYclovir 500 mg oral tablet: 1 tab(s) orally 2 times a day   atovaquone 750 mg/5 mL oral suspension: 10 milliliter(s) orally once a day  Cozaar 100 mg oral tablet: 1 tab(s) orally once a day  Lexapro 20 mg oral tablet: 1 tab(s) orally once a day  MG Plus Protein 133 mg oral tablet: 3 tab(s) orally once a day  mycophenolate mofetil 500 mg oral tablet: 2 tab(s) orally 3 times a day  omeprazole 20 mg oral delayed release capsule: 1 cap(s) orally once a day  ondansetron 8 mg oral tablet: 1 tab(s) orally every 8 hours as needed for  nausea  tacrolimus 0.5 mg oral capsule: 1 cap(s) orally 2 times a day  valACYclovir 500 mg oral tablet: 1 tab(s) orally 2 times a day

## 2024-03-26 NOTE — DISCHARGE NOTE PROVIDER - REASON FOR ADMISSION
Haplo-identical BMT (son) with FLU/CY/TBI prep for the treatment fo ALL AlloBMT(son) with FLU/CY/TBI prep for the treatment fo ALL

## 2024-03-26 NOTE — DISCHARGE NOTE PROVIDER - NSDCCPCAREPLAN_GEN_ALL_CORE_FT
PRINCIPAL DISCHARGE DIAGNOSIS  Diagnosis: S/P bone marrow transplant  Assessment and Plan of Treatment: 1. Diet and activities as per [facility] discharge guidelines and safe food handling guidelines. NO RESTAURANT OR TAKE OUT FOOD AT THIS TIME, ONLY HOME COOKED PREPARED/FROZEN FOODS. You are allowed to have fresh baked pizza right out of the oven. This is the ONLY takeout food at this time.  2. Notify your physician if bleeding; swelling; persistent nausea and vomiting; unable to urinate; pain not relieved by medications; fever; numbness, tingling; excessive diarrhea, inability to tolerate liquids or foods; increased irritability or sluggishness, rash  3. On the day you have an appointment at the [facility], do NOT take your Tacrolimus morning dose. Instead, bring it with you to the [facility]. After you have your bloods drawn you may take your morning dose of Tacrolimus.        SECONDARY DISCHARGE DIAGNOSES  Diagnosis: Need for prophylactic measure  Assessment and Plan of Treatment: 1. Continue your prophylactic medications as directed by your physician

## 2024-03-26 NOTE — PROGRESS NOTE ADULT - NS ATTEND AMEND GEN_ALL_CORE FT
.  MEDICATIONS  (STANDING):  Biotene Dry Mouth Oral Rinse 5 milliLiter(s) Swish and Spit five times a day  cefepime   IVPB 2000 milliGRAM(s) IV Intermittent every 8 hours off  chlorhexidine 4% Liquid 1 Application(s) Topical <User Schedule>  escitalopram 20 milliGRAM(s) Oral daily  filgrastim-sndz (ZARXIO) Injectable 480 MICROGram(s) SubCutaneous every 24 hours  fluconAZOLE   Tablet 400 milliGRAM(s) Oral daily..stopped 3/24  lidocaine/prilocaine Cream 1 Application(s) Topical daily  loratadine 10 milliGRAM(s) Oral daily  losartan 100 milliGRAM(s) Oral daily  metoclopramide Injectable 5 milliGRAM(s) IV Push every 6 hours  mycophenolate mofetil 1000 milliGRAM(s) Oral three times a day  pantoprazole    Tablet 40 milliGRAM(s) Oral before breakfast  sodium bicarbonate Mouth Rinse 10 milliLiter(s) Swish and Spit five times a day  sodium chloride 0.9%. 1000 milliLiter(s) (20 mL/Hr) IV Continuous <Continuous>  tacrolimus 2.5 milliGRAM(s) Oral two times a day  valACYclovir 500 milliGRAM(s) Oral two times a day      Assessment: 64-year-old day + 19  CATRACHITA alloBMT using a Flu/Cy/TBI preparative regimen for [place] negative ALL. Course complicated by neutropenic fever (resolved)    Plan:  Heme:   PLT goal > 10,000; Hgb goal > 7.0g/dL  Continue G-CSF until ANC 5,000;    - Will require post ALLO BMT CNS prophylaxis -- begins after engraftment.   Bactrim to start day +21    GVHD:   - PTCy completed  - Cellcept and tacrolimus (3/21/24): 9.9    ID valtrex, D/C'd  flu and cef 3/24..mepron for pcp prophylaxis    Nutrition: tolerating PO    DVT prophylaxis: ambulation    d/c planning for tues    instruction started    Over 35 minutes were spent in direct patient care and care coordination.  Anticipated discharge Mon/Tuesday. MEDICATIONS  (STANDING):  Biotene Dry Mouth Oral Rinse 5 milliLiter(s) Swish and Spit five times a day  cefepime   IVPB 2000 milliGRAM(s) IV Intermittent every 8 hours off  chlorhexidine 4% Liquid 1 Application(s) Topical <User Schedule>  escitalopram 20 milliGRAM(s) Oral daily  filgrastim-sndz (ZARXIO) Injectable 480 MICROGram(s) SubCutaneous every 24 hours  fluconAZOLE   Tablet 400 milliGRAM(s) Oral daily..stopped 3/24  lidocaine/prilocaine Cream 1 Application(s) Topical daily  loratadine 10 milliGRAM(s) Oral daily  losartan 100 milliGRAM(s) Oral daily  metoclopramide Injectable 5 milliGRAM(s) IV Push every 6 hours  mycophenolate mofetil 1000 milliGRAM(s) Oral three times a day  pantoprazole    Tablet 40 milliGRAM(s) Oral before breakfast  sodium bicarbonate Mouth Rinse 10 milliLiter(s) Swish and Spit five times a day  sodium chloride 0.9%. 1000 milliLiter(s) (20 mL/Hr) IV Continuous <Continuous>  tacrolimus 2.5 milliGRAM(s) Oral two times a day  valACYclovir 500 milliGRAM(s) Oral two times a day      Assessment: 64-year-old day + 20  CATRACHITA alloBMT using a Flu/Cy/TBI preparative regimen for [place] negative ALL. Course complicated by neutropenic fever (resolved)    Plan:  Heme:   PLT goal > 10,000; Hgb goal > 7.0g/dL..has not had plts in several days..biw appts for poss plts as outpt  Continue G-CSF until ANC 5,000;    - Will require post ALLO BMT CNS prophylaxis -- begins after engraftment.   Bactrim to start day +21    GVHD:   - PTCy completed  - Cellcept and tacrolimus (3/21/24): 9.9    ID valtrex, D/C'd  flu and cef 3/24..mepron for pcp prophylaxis    Nutrition: tolerating PO    DVT prophylaxis: ambulation    d/c planning for tues..d/c home    d/c instruction completed..d/c with po magnesium  advised prn immodium and zofran    Over 35 minutes were spent in direct patient care and care coordination.

## 2024-03-26 NOTE — PROGRESS NOTE ADULT - REASON FOR ADMISSION
AlloBMT(son) with FLU/CY/TBI prep for the treatment fo ALL
Allogeneic BMT (son) with FLU/CY/TBI prep for the treatment of ALL
Haplo-idential BMT (son) with FLU/CY/TBI prep for the treatment fo ALL
Haplo-identical BMT (son) with FLU/CY/TBI prep for the treatment for ALL
Haplo-identical BMT with FLU/CY/TBI prep for the treatment of ALL
AlloBMT(son) with FLU/CY/TBI prep for the treatment fo ALL
Haplo-identical BMT (son) with FLU/CY/TBI prep for the treatment fo ALL
AlloBMT(son) with FLU/CY/TBI prep for the treatment fo ALL
AlloBMT(son) with FLU/CY/TBI prep for the treatment fo ALL
Haplo-idential BMT (son) with FLU/CY/TBI prep for the treatment fo ALL
Haplo-idential BMT (son) with FLU/CY/TBI prep for the treatment fo ALL
AlloBMT(son) with FLU/CY/TBI prep for the treatment fo ALL

## 2024-03-26 NOTE — DISCHARGE NOTE PROVIDER - HOSPITAL COURSE
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 Her disease status at time of transplant was CR. 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     Upon admission, a TLC was placed in IR. Ms. Kaur received IV hydration, pain management, nutritional support and antibacterial, antiviral, antifungal, PCP and GI prophylaxis. Labs were monitored on a daily basis and she received electrolyte repletion and transfusional support as needed. Of note, Ms. Kaur has a sulfa allergy, and was maintained previous as an outpatient on atovaquone for PCP prophylaxis, which was continued.     While admitted, Ms. Kaur experienced pancytopenia related to the high dose chemotherapy prep regimen. CBC was monitored daily, and garcia her ANC dropped below 500, she was started on prophylactic levaquin. She also had neutropenic fevers. When she became febrile, blood and urine cultures were sent, a CXR completed and the levaquin changed to cefepime. Infectious work up was negative. Ms. Kaur also had chemotherapy induced oral and intestinal mucositis. C. difficile and GI PCR were negative, she was treated with supportive care and prn imodium.  On 3/11/24, Ms. Kaur had transaminitis and hyperbilirubinemia. Her tbili increased to 1.8, with direct bili of 1.3. Fluconazole was held with resolution of symptoms. She also had some edema in the area of the left parotid gland. An ultrasound showed edema which later resolved.     On 3/6/24, after pre-medication, Ms. Kaur received 490 / 599ml of fresh, allogeneic, related, bone marrow over approximately 3 hours. Cell counts as follows:  Total MNCs / TNCs (x10^8/kg) = 0.96 / 0.93  CD34 + cells (x 10^6/kg) = 0.82/ 0.74  CD3+ cells (x10^7/kg) = 0.67 / 0.63  Cell viabilty (%) = 100%  She tolerated the infusion well with no adverse reactions noted.     Engraftment was noted on 3/24/24. Post engraftment, cefepime and zarxio were discontinued. CMV monitoring has been negative. A FISH for Y for chimerism was sent, results are pending.     Currently, Ms. Kaur is stable for discharge home with outpatient follow up at the [facility].

## 2024-03-26 NOTE — DISCHARGE NOTE NURSING/CASE MANAGEMENT/SOCIAL WORK - PATIENT PORTAL LINK FT
You can access the FollowMyHealth Patient Portal offered by [facility] by registering at the following website: [email or web address]. By joining [employer]’s FollowMyHealth portal, you will also be able to view your health information using other applications (apps) compatible with our system.

## 2024-03-26 NOTE — DISCHARGE NOTE PROVIDER - INSTRUCTIONS
Diet and activities as per [facility] discharge guidelines and safe food handling guidelines. NO RESTAURANT OR TAKE OUT FOOD AT THIS TIME, ONLY HOME COOKED PREPARED/FROZEN FOODS. You are allowed to have fresh baked pizza right out of the oven. This is the ONLY takeout food at this time.

## 2024-03-27 ENCOUNTER — OUTPATIENT (OUTPATIENT)
Dept: OUTPATIENT SERVICES | Facility: HOSPITAL | Age: 64
LOS: 1 days | Discharge: HOME | End: 2024-03-27

## 2024-03-27 DIAGNOSIS — Z98.891 HISTORY OF UTERINE SCAR FROM PREVIOUS SURGERY: Chronic | ICD-10-CM

## 2024-03-27 DIAGNOSIS — C95.90 LEUKEMIA, UNSPECIFIED NOT HAVING ACHIEVED REMISSION: ICD-10-CM

## 2024-03-28 ENCOUNTER — RESULT REVIEW (OUTPATIENT)
Age: 64
End: 2024-03-28

## 2024-03-28 ENCOUNTER — APPOINTMENT (OUTPATIENT)
Dept: HEMATOLOGY ONCOLOGY | Facility: CLINIC | Age: 64
End: 2024-03-28
Payer: MEDICARE

## 2024-03-28 ENCOUNTER — APPOINTMENT (OUTPATIENT)
Dept: HEMATOLOGY ONCOLOGY | Facility: CLINIC | Age: 64
End: 2024-03-28

## 2024-03-28 ENCOUNTER — APPOINTMENT (OUTPATIENT)
Dept: INFUSION THERAPY | Facility: HOSPITAL | Age: 64
End: 2024-03-28

## 2024-03-28 VITALS
BODY MASS INDEX: 37.31 KG/M2 | OXYGEN SATURATION: 97 % | WEIGHT: 203.99 LBS | RESPIRATION RATE: 16 BRPM | DIASTOLIC BLOOD PRESSURE: 81 MMHG | SYSTOLIC BLOOD PRESSURE: 130 MMHG | TEMPERATURE: 98.2 F | HEART RATE: 73 BPM

## 2024-03-28 LAB
ALBUMIN SERPL ELPH-MCNC: 3.9 G/DL
ALP BLD-CCNC: 169 U/L
ALT SERPL-CCNC: 25 U/L
ANION GAP SERPL CALC-SCNC: 10 MMOL/L
AST SERPL-CCNC: 27 U/L
BASOPHILS # BLD AUTO: 0 K/UL — SIGNIFICANT CHANGE UP (ref 0–0.2)
BASOPHILS NFR BLD AUTO: 0 % — SIGNIFICANT CHANGE UP (ref 0–2)
BILIRUB SERPL-MCNC: 0.6 MG/DL
BUN SERPL-MCNC: 12 MG/DL
CALCIUM SERPL-MCNC: 9.5 MG/DL
CHLORIDE SERPL-SCNC: 103 MMOL/L
CHROM ANALY INTERPHASE BLD FISH-IMP: SIGNIFICANT CHANGE UP
CO2 SERPL-SCNC: 26 MMOL/L
CREAT SERPL-MCNC: 0.89 MG/DL
EGFR: 71 ML/MIN/1.73M2
ELLIPTOCYTES BLD QL SMEAR: SLIGHT — SIGNIFICANT CHANGE UP
EOSINOPHIL # BLD AUTO: 0 K/UL — SIGNIFICANT CHANGE UP (ref 0–0.5)
EOSINOPHIL NFR BLD AUTO: 0 % — SIGNIFICANT CHANGE UP (ref 0–6)
GLUCOSE SERPL-MCNC: 112 MG/DL
HCT VFR BLD CALC: 26.9 % — LOW (ref 34.5–45)
HGB BLD-MCNC: 9.6 G/DL — LOW (ref 11.5–15.5)
LYMPHOCYTES # BLD AUTO: 0.08 K/UL — LOW (ref 1–3.3)
LYMPHOCYTES # BLD AUTO: 1 % — LOW (ref 13–44)
MAGNESIUM SERPL-MCNC: 1.2 MG/DL
MCHC RBC-ENTMCNC: 29.8 PG — SIGNIFICANT CHANGE UP (ref 27–34)
MCHC RBC-ENTMCNC: 35.8 G/DL — SIGNIFICANT CHANGE UP (ref 32–36)
MCV RBC AUTO: 83.3 FL — SIGNIFICANT CHANGE UP (ref 80–100)
METAMYELOCYTES # FLD: 3 % — HIGH (ref 0–0)
MONOCYTES # BLD AUTO: 1.81 K/UL — HIGH (ref 0–0.9)
MONOCYTES NFR BLD AUTO: 22 % — HIGH (ref 2–14)
MYELOCYTES NFR BLD: 4 % — HIGH (ref 0–0)
NEUTROPHILS # BLD AUTO: 5.68 K/UL — SIGNIFICANT CHANGE UP (ref 1.8–7.4)
NEUTROPHILS NFR BLD AUTO: 69 % — SIGNIFICANT CHANGE UP (ref 43–77)
NRBC # BLD: 0 /100 WBCS — SIGNIFICANT CHANGE UP (ref 0–0)
NRBC # BLD: SIGNIFICANT CHANGE UP /100 WBCS (ref 0–0)
PLAT MORPH BLD: NORMAL — SIGNIFICANT CHANGE UP
PLATELET # BLD AUTO: 36 K/UL — LOW (ref 150–400)
POIKILOCYTOSIS BLD QL AUTO: SLIGHT — SIGNIFICANT CHANGE UP
POTASSIUM SERPL-SCNC: 4.5 MMOL/L
PROMYELOCYTES # FLD: 1 % — HIGH (ref 0–0)
PROT SERPL-MCNC: 5.5 G/DL
RBC # BLD: 3.22 M/UL — LOW (ref 3.8–5.2)
RBC # FLD: 12.3 % — SIGNIFICANT CHANGE UP (ref 10.3–14.5)
RBC BLD AUTO: ABNORMAL
SODIUM SERPL-SCNC: 139 MMOL/L
TACROLIMUS SERPL-MCNC: 9.2 NG/ML
WBC # BLD: 8.23 K/UL — SIGNIFICANT CHANGE UP (ref 3.8–10.5)
WBC # FLD AUTO: 8.23 K/UL — SIGNIFICANT CHANGE UP (ref 3.8–10.5)

## 2024-03-28 PROCEDURE — 99215 OFFICE O/P EST HI 40 MIN: CPT

## 2024-03-28 RX ORDER — ONDANSETRON 8 MG/1
8 TABLET ORAL
Qty: 45 | Refills: 1 | Status: ACTIVE | COMMUNITY
Start: 2024-03-28

## 2024-03-28 RX ORDER — CHLORHEXIDINE GLUCONATE 1.2 MG/ML
0.12 RINSE ORAL
Qty: 1 | Refills: 2 | Status: DISCONTINUED | COMMUNITY
Start: 2023-08-10 | End: 2024-03-28

## 2024-03-28 RX ORDER — SIMVASTATIN 80 MG/1
80 TABLET, FILM COATED ORAL
Qty: 30 | Refills: 0 | Status: DISCONTINUED | COMMUNITY
Start: 2024-01-16 | End: 2024-03-28

## 2024-03-28 RX ORDER — LOSARTAN POTASSIUM 100 MG/1
100 TABLET, FILM COATED ORAL DAILY
Qty: 1 | Refills: 3 | Status: ACTIVE | COMMUNITY

## 2024-03-28 RX ORDER — OMEPRAZOLE 20 MG/1
20 TABLET, DELAYED RELEASE ORAL
Qty: 30 | Refills: 0 | Status: ACTIVE | COMMUNITY

## 2024-03-28 RX ORDER — ACYCLOVIR 400 MG/1
400 TABLET ORAL
Qty: 60 | Refills: 1 | Status: DISCONTINUED | COMMUNITY
Start: 2023-03-27 | End: 2024-03-28

## 2024-03-28 RX ORDER — ACETAMINOPHEN 325 MG/1
TABLET, FILM COATED ORAL
Refills: 0 | Status: DISCONTINUED | COMMUNITY
End: 2024-03-28

## 2024-03-28 NOTE — REASON FOR VISIT
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

## 2024-03-28 NOTE — HISTORY OF PRESENT ILLNESS
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 Her  disease status at time of transplant was CR. 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 Status post haplo identical BMT from her son on 3/6/24.  [de-identified] : Reason for visit: Follow up status post haplo identical BMT from her son on 3/6/24.   Since last visit: Patient is overall fatigued. On 3/27/24, had diarrhea twice and vomited three times. Admits only took one magnesium tablet yesterday. Today denies fever, nausea, vomiting, diarrhea, rash, mouth sores, dysuria or any signs of active bleeding. Denies SOB or chest pain.    Medications: Tacrolimus 3.5 mg BID Cellcept 1000 mg TID-Take through 4/10/24 then stop.  valacyclovir 500 mg BID Mepron 750mg/5ml- Take 10 ml daily MG plus protein 133 mg- 3 tablets daily.  Losartan potassium 100 mg daily Lexapro 20 mg daily Omeprazole 20 mg daily Zofran 8 mg every 8 hours prn  Examination: Articulate and in no acute distress No occiput, poster cervical, anterior cervical, submandibular, sublingual, submental, supraclavicular nor axillary adenopathy Lungs: Clear Cardiac: without rubs Abd: soft and non-tender. No inguinal nor femoral adenopathy No sig peripheral edema Gait: unencumbered

## 2024-03-28 NOTE — ASSESSMENT
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 Her disease status at time of transplant was CR. 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 Status post haplo identical BMT from her son on 3/6/24.  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 -Fish for Y sent on 3/28/24  2) Heme Counts stable. No indication for transfusions today. Canceled today's possible platelet appointment. WBC 8.23  H/H 9.6/26.9  PLT 36  ANC 5.68  3) ID Continue ppx: Valacyclovir 500 mg BID Mepron 750mg/5ml- take 10 ml daily  4) GVHD Skin 0 Liver 0 GI 0 - overall grade 0 On tacrolimus 3.5 mg BID- pending today's level Cellcept 1000 mg TID- Take through 4/10/24 then stop Reviewed signs and symptoms of GVHD with patient.  Pending CMV PCR results on 3/28/24. Check weekly.  5) GI Continue ppx: Omeprazole 20 mg daily Zofran 8 mg every 8 hours prn Imodium prn. Encouraged patient to eat small frequent meals and drink two liters of fluids daily.  6) Other Continue: MG Plus Protein 133 mg- take 3 tablets daily. Reinstated the importance of taking magnesium tablets.  Losartan Potassium 100 mg daily Lexapro 20 mg daily Continue post transplant diet and crowd restrictions. No take out food at this time except an uncut cheese pizza pie.  Questions and concerns addressed. Reassurance provided. Follow up weekly provider appointments until day 100 post transplant. Follow up with DENNIS Espinosa on 4/1/24. Scheduled for possible platelets on 4/1/24.

## 2024-03-28 NOTE — PHYSICAL EXAM
[Yes] : Yes [] :  [Cellcept] : Cellcept [No active (erythematous_ GVHD rash)] : Skin: No active (erythematous_ GVHD rash) [No or intermittent] : Upper GI: No or intermittent nausea, vomiting or anorexia [< 2 mg/dl] : Liver: < 2 mg/dl [<500 ml/day or < 3 episodes/day] : Lower GI (stool output/day): <500 ml/day or <3 episodes/day [No] : No [FreeTextEntry1] : 3/6/24

## 2024-03-29 LAB
CMV DNA SPEC QL NAA+PROBE: NOT DETECTED IU/ML
CMVPCR LOG: NOT DETECTED LOG10IU/ML

## 2024-04-01 ENCOUNTER — RESULT REVIEW (OUTPATIENT)
Age: 64
End: 2024-04-01

## 2024-04-01 ENCOUNTER — APPOINTMENT (OUTPATIENT)
Dept: INFUSION THERAPY | Facility: HOSPITAL | Age: 64
End: 2024-04-01

## 2024-04-01 ENCOUNTER — APPOINTMENT (OUTPATIENT)
Dept: HEMATOLOGY ONCOLOGY | Facility: CLINIC | Age: 64
End: 2024-04-01

## 2024-04-01 ENCOUNTER — APPOINTMENT (OUTPATIENT)
Dept: HEMATOLOGY ONCOLOGY | Facility: CLINIC | Age: 64
End: 2024-04-01
Payer: MEDICARE

## 2024-04-01 VITALS
RESPIRATION RATE: 16 BRPM | SYSTOLIC BLOOD PRESSURE: 117 MMHG | HEART RATE: 79 BPM | DIASTOLIC BLOOD PRESSURE: 80 MMHG | WEIGHT: 197.31 LBS | OXYGEN SATURATION: 98 % | BODY MASS INDEX: 36.09 KG/M2 | TEMPERATURE: 98 F

## 2024-04-01 LAB
ALBUMIN SERPL ELPH-MCNC: 4 G/DL
ALP BLD-CCNC: 133 U/L
ALT SERPL-CCNC: 23 U/L
ANION GAP SERPL CALC-SCNC: 11 MMOL/L
AST SERPL-CCNC: 30 U/L
BASOPHILS # BLD AUTO: 0 K/UL — SIGNIFICANT CHANGE UP (ref 0–0.2)
BASOPHILS NFR BLD AUTO: 0 % — SIGNIFICANT CHANGE UP (ref 0–2)
BILIRUB SERPL-MCNC: 0.6 MG/DL
BUN SERPL-MCNC: 14 MG/DL
CALCIUM SERPL-MCNC: 9.6 MG/DL
CHLORIDE SERPL-SCNC: 102 MMOL/L
CO2 SERPL-SCNC: 26 MMOL/L
CREAT SERPL-MCNC: 0.92 MG/DL
EGFR: 69 ML/MIN/1.73M2
ELLIPTOCYTES BLD QL SMEAR: SLIGHT — SIGNIFICANT CHANGE UP
EOSINOPHIL # BLD AUTO: 0 K/UL — SIGNIFICANT CHANGE UP (ref 0–0.5)
EOSINOPHIL NFR BLD AUTO: 0 % — SIGNIFICANT CHANGE UP (ref 0–6)
GLUCOSE SERPL-MCNC: 109 MG/DL
HCT VFR BLD CALC: 28.3 % — LOW (ref 34.5–45)
HGB BLD-MCNC: 10 G/DL — LOW (ref 11.5–15.5)
LYMPHOCYTES # BLD AUTO: 0.09 K/UL — LOW (ref 1–3.3)
LYMPHOCYTES # BLD AUTO: 2 % — LOW (ref 13–44)
MAGNESIUM SERPL-MCNC: 1.5 MG/DL
MCHC RBC-ENTMCNC: 29.9 PG — SIGNIFICANT CHANGE UP (ref 27–34)
MCHC RBC-ENTMCNC: 35.3 G/DL — SIGNIFICANT CHANGE UP (ref 32–36)
MCV RBC AUTO: 84.7 FL — SIGNIFICANT CHANGE UP (ref 80–100)
MONOCYTES # BLD AUTO: 1.37 K/UL — HIGH (ref 0–0.9)
MONOCYTES NFR BLD AUTO: 31 % — HIGH (ref 2–14)
MYELOCYTES NFR BLD: 2 % — HIGH (ref 0–0)
NEUTROPHILS # BLD AUTO: 2.87 K/UL — SIGNIFICANT CHANGE UP (ref 1.8–7.4)
NEUTROPHILS NFR BLD AUTO: 65 % — SIGNIFICANT CHANGE UP (ref 43–77)
NRBC # BLD: 0 /100 WBCS — SIGNIFICANT CHANGE UP (ref 0–0)
NRBC # BLD: SIGNIFICANT CHANGE UP /100 WBCS (ref 0–0)
PLAT MORPH BLD: NORMAL — SIGNIFICANT CHANGE UP
PLATELET # BLD AUTO: 46 K/UL — LOW (ref 150–400)
POIKILOCYTOSIS BLD QL AUTO: SLIGHT — SIGNIFICANT CHANGE UP
POTASSIUM SERPL-SCNC: 4.9 MMOL/L
PROT SERPL-MCNC: 6.3 G/DL
RBC # BLD: 3.34 M/UL — LOW (ref 3.8–5.2)
RBC # FLD: 11.6 % — SIGNIFICANT CHANGE UP (ref 10.3–14.5)
RBC BLD AUTO: ABNORMAL
SODIUM SERPL-SCNC: 138 MMOL/L
TOXIC GRANULES BLD QL SMEAR: PRESENT — SIGNIFICANT CHANGE UP
WBC # BLD: 4.42 K/UL — SIGNIFICANT CHANGE UP (ref 3.8–10.5)
WBC # FLD AUTO: 4.42 K/UL — SIGNIFICANT CHANGE UP (ref 3.8–10.5)

## 2024-04-01 PROCEDURE — 99215 OFFICE O/P EST HI 40 MIN: CPT

## 2024-04-02 LAB
CMV DNA SPEC QL NAA+PROBE: NOT DETECTED IU/ML
CMVPCR LOG: NOT DETECTED LOG10IU/ML
TACROLIMUS SERPL-MCNC: 12.2 NG/ML

## 2024-04-02 NOTE — ASSESSMENT
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 Her disease status at time of transplant was CR. 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 Status post haplo identical BMT from her son on 3/6/24.  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  - Fish for Y sent on 3/28/24= 99.5% donor   2) Heme Counts stable. No indication for transfusions today. Canceled today's possible platelet appointment. WBC 4.42  H/H 10/28.3  PLT 46  ANC 2.87  3) ID Continue ppx: Valacyclovir 500 mg BID Mepron 750mg/5ml- take 10 ml daily  4) GVHD Skin 0 Liver 0 GI 0 - overall grade 0 On tacrolimus 3.5 mg BID- pending today's level Cellcept 1000 mg TID- Take through 4/10/24 then stop Reviewed signs and symptoms of GVHD with patient.  3/28/24 CMV PCR negative. Check weekly.  5) GI Continue ppx: Omeprazole 20 mg daily Zofran 8 mg every 8 hours prn Imodium prn. Encouraged patient to eat small frequent meals and drink two liters of fluids daily.  6) Other Continue: MG Plus Protein 133 mg- take 3 tablets daily.  Losartan Potassium 100 mg daily Lexapro 20 mg daily Continue post transplant diet and crowd restrictions. No take out food at this time except an uncut cheese pizza pie.  Questions and concerns addressed. Reassurance provided. Follow up weekly provider appointments until day 100 post transplant. Follow up with DENNIS Espinosa in one week.

## 2024-04-02 NOTE — PHYSICAL EXAM
[Yes] : Yes [Cellcept] : Cellcept [] :  [No active (erythematous_ GVHD rash)] : Skin: No active (erythematous_ GVHD rash) [< 2 mg/dl] : Liver: < 2 mg/dl [No or intermittent] : Upper GI: No or intermittent nausea, vomiting or anorexia [<500 ml/day or < 3 episodes/day] : Lower GI (stool output/day): <500 ml/day or <3 episodes/day [No] : No [FreeTextEntry1] : 3/6/24

## 2024-04-02 NOTE — HISTORY OF PRESENT ILLNESS
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 Her  disease status at time of transplant was CR. 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 Status post haplo identical BMT from her son on 3/6/24.  [de-identified] : Last office visit: 3/28/24 Primary Oncologist: Dr Bowles  Reason for visit: Follow up status post haplo identical BMT from her son on 3/6/24. Day +26 today.   Since last visit: Patient is fatigued. Denies fever, chills, nausea, vomiting, diarrhea, rash, mouth sores or any signs of active bleeding. Denies SOB or chest pain.   Medications: Tacrolimus 3.5 mg BID Cellcept 1000 mg TID-Take through 4/10/24 then stop.  valacyclovir 500 mg BID Mepron 750mg/5ml- Take 10 ml daily MG plus protein 133 mg- 3 tablets daily.  Losartan potassium 100 mg daily Lexapro 20 mg daily Omeprazole 20 mg daily Zofran 8 mg every 8 hours prn  Examination: Articulate and in no acute distress No occiput, poster cervical, anterior cervical, submandibular, sublingual, submental, supraclavicular nor axillary adenopathy Lungs: Clear Cardiac: without rubs Abd: soft and non-tender. No inguinal nor femoral adenopathy No sig peripheral edema Gait: unencumbered

## 2024-04-04 ENCOUNTER — LABORATORY RESULT (OUTPATIENT)
Age: 64
End: 2024-04-04

## 2024-04-04 ENCOUNTER — APPOINTMENT (OUTPATIENT)
Dept: HEMATOLOGY ONCOLOGY | Facility: CLINIC | Age: 64
End: 2024-04-04
Payer: MEDICARE

## 2024-04-04 ENCOUNTER — RESULT REVIEW (OUTPATIENT)
Age: 64
End: 2024-04-04

## 2024-04-04 ENCOUNTER — APPOINTMENT (OUTPATIENT)
Dept: HEMATOLOGY ONCOLOGY | Facility: CLINIC | Age: 64
End: 2024-04-04

## 2024-04-04 VITALS
HEIGHT: 62.01 IN | OXYGEN SATURATION: 98 % | HEART RATE: 81 BPM | SYSTOLIC BLOOD PRESSURE: 131 MMHG | WEIGHT: 194.45 LBS | BODY MASS INDEX: 35.33 KG/M2 | RESPIRATION RATE: 16 BRPM | TEMPERATURE: 97 F | DIASTOLIC BLOOD PRESSURE: 82 MMHG

## 2024-04-04 LAB
ANISOCYTOSIS BLD QL: SLIGHT — SIGNIFICANT CHANGE UP
BASOPHILS # BLD AUTO: 0.05 K/UL — SIGNIFICANT CHANGE UP (ref 0–0.2)
BASOPHILS NFR BLD AUTO: 1 % — SIGNIFICANT CHANGE UP (ref 0–2)
BLASTS # FLD: 1 % — HIGH (ref 0–0)
DACRYOCYTES BLD QL SMEAR: SLIGHT — SIGNIFICANT CHANGE UP
ELLIPTOCYTES BLD QL SMEAR: SLIGHT — SIGNIFICANT CHANGE UP
EOSINOPHIL # BLD AUTO: 0.05 K/UL — SIGNIFICANT CHANGE UP (ref 0–0.5)
EOSINOPHIL NFR BLD AUTO: 1 % — SIGNIFICANT CHANGE UP (ref 0–6)
HCT VFR BLD CALC: 24.8 % — LOW (ref 34.5–45)
HGB BLD-MCNC: 9.1 G/DL — LOW (ref 11.5–15.5)
LYMPHOCYTES # BLD AUTO: 0.05 K/UL — LOW (ref 1–3.3)
LYMPHOCYTES # BLD AUTO: 1 % — LOW (ref 13–44)
MCHC RBC-ENTMCNC: 30.1 PG — SIGNIFICANT CHANGE UP (ref 27–34)
MCHC RBC-ENTMCNC: 36.7 G/DL — HIGH (ref 32–36)
MCV RBC AUTO: 82.1 FL — SIGNIFICANT CHANGE UP (ref 80–100)
MONOCYTES # BLD AUTO: 1.42 K/UL — HIGH (ref 0–0.9)
MONOCYTES NFR BLD AUTO: 29 % — HIGH (ref 2–14)
MYELOCYTES NFR BLD: 2 % — HIGH (ref 0–0)
NEUTROPHILS # BLD AUTO: 3.19 K/UL — SIGNIFICANT CHANGE UP (ref 1.8–7.4)
NEUTROPHILS NFR BLD AUTO: 65 % — SIGNIFICANT CHANGE UP (ref 43–77)
NRBC # BLD: 0 /100 WBCS — SIGNIFICANT CHANGE UP (ref 0–0)
NRBC # BLD: SIGNIFICANT CHANGE UP /100 WBCS (ref 0–0)
PLAT MORPH BLD: NORMAL — SIGNIFICANT CHANGE UP
PLATELET # BLD AUTO: 71 K/UL — LOW (ref 150–400)
POIKILOCYTOSIS BLD QL AUTO: SLIGHT — SIGNIFICANT CHANGE UP
POLYCHROMASIA BLD QL SMEAR: SLIGHT — SIGNIFICANT CHANGE UP
RBC # BLD: 3.02 M/UL — LOW (ref 3.8–5.2)
RBC # FLD: 11.8 % — SIGNIFICANT CHANGE UP (ref 10.3–14.5)
RBC BLD AUTO: ABNORMAL
TOXIC GRANULES BLD QL SMEAR: PRESENT — SIGNIFICANT CHANGE UP
WBC # BLD: 4.91 K/UL — SIGNIFICANT CHANGE UP (ref 3.8–10.5)
WBC # FLD AUTO: 4.91 K/UL — SIGNIFICANT CHANGE UP (ref 3.8–10.5)

## 2024-04-04 PROCEDURE — 38221 DX BONE MARROW BIOPSIES: CPT | Mod: LT

## 2024-04-04 NOTE — PROCEDURE
[Bone Marrow Biopsy] : bone marrow biopsy [Bone Marrow Aspiration] : bone marrow aspiration  [Patient] : the patient [Verbal Consent Obtained] : verbal consent was obtained prior to the procedure [Patient identification verified] : patient identification verified [Procedure verified and consent obtained] : procedure verified and consent obtained [Laterality verified and correct site marked] : laterality verified and correct site marked [Left] : site: left [Correct positioning] : correct positioning [Correct implant and/ or special equipment obtained] : correct impact and/ or special equipment obtained [Prone] : prone [Superior iliac spine was identified] : the superior iliac spine was identified. [The left posterior iliac crest was prepped with betadine and draped, using sterile technique.] : The left posterior iliac crest was prepped with betadine and draped, using sterile technique. [Lidocaine was injected and into the periosteum overlying the site.] : Lidocaine was injected and into the periosteum overlying the site. [Aspirate] : aspirate [Cytogenetics] : cytogenetics [FISH] : FISH [Other ___] : [unfilled] [Biopsy] : biopsy [Flow Cytometry] : flow cytometry [] : The patient was instructed to remove the bandage the following AM. The patient may bathe. Acetaminophen may be taken for discomfort, as per package directions.If there are any other problems, the patient was instructed to call the office. The patient verbalized understanding, and is aware of the office contact numbers. [FreeTextEntry1] : ALL s/p alloSCT 3/6/24 [FreeTextEntry2] :   12 cc of  1% lidocaine was used for the procedure.   WBC: 4.91  K/uL Hgb:  9.1  g/dL Hct:   24.8  % Plts:   71  K/uL   Bone marrow aspiration and biopsy were done. ALL panel requested. BCR/ABL Quantitative, Fish for Y sent. [employer] Sent TRACKING NUMBER [id number]

## 2024-04-04 NOTE — REASON FOR VISIT
[Bone Marrow Biopsy] : bone marrow biopsy [Bone Marrow Aspiration] : bone marrow aspiration [Spouse] : spouse [FreeTextEntry2] : ALL s/p alloSCT 3/6/24

## 2024-04-08 ENCOUNTER — APPOINTMENT (OUTPATIENT)
Dept: HEMATOLOGY ONCOLOGY | Facility: CLINIC | Age: 64
End: 2024-04-08

## 2024-04-08 ENCOUNTER — RESULT REVIEW (OUTPATIENT)
Age: 64
End: 2024-04-08

## 2024-04-08 ENCOUNTER — APPOINTMENT (OUTPATIENT)
Dept: HEMATOLOGY ONCOLOGY | Facility: CLINIC | Age: 64
End: 2024-04-08
Payer: MEDICARE

## 2024-04-08 VITALS
RESPIRATION RATE: 16 BRPM | OXYGEN SATURATION: 100 % | HEART RATE: 83 BPM | TEMPERATURE: 98.1 F | DIASTOLIC BLOOD PRESSURE: 77 MMHG | SYSTOLIC BLOOD PRESSURE: 113 MMHG | WEIGHT: 192.88 LBS | BODY MASS INDEX: 35.27 KG/M2

## 2024-04-08 LAB
ALBUMIN SERPL ELPH-MCNC: 4.2 G/DL
ALP BLD-CCNC: 114 U/L
ALT SERPL-CCNC: 40 U/L
ANION GAP SERPL CALC-SCNC: 13 MMOL/L
AST SERPL-CCNC: 46 U/L
BASOPHILS # BLD AUTO: 0.07 K/UL — SIGNIFICANT CHANGE UP (ref 0–0.2)
BASOPHILS NFR BLD AUTO: 1.7 % — SIGNIFICANT CHANGE UP (ref 0–2)
BILIRUB SERPL-MCNC: 0.6 MG/DL
BUN SERPL-MCNC: 15 MG/DL
CALCIUM SERPL-MCNC: 9.4 MG/DL
CHLORIDE SERPL-SCNC: 100 MMOL/L
CO2 SERPL-SCNC: 25 MMOL/L
CREAT SERPL-MCNC: 1.26 MG/DL
EGFR: 47 ML/MIN/1.73M2
EOSINOPHIL # BLD AUTO: 0.02 K/UL — SIGNIFICANT CHANGE UP (ref 0–0.5)
EOSINOPHIL NFR BLD AUTO: 0.5 % — SIGNIFICANT CHANGE UP (ref 0–6)
GLUCOSE SERPL-MCNC: 112 MG/DL
HCT VFR BLD CALC: 28.1 % — LOW (ref 34.5–45)
HGB BLD-MCNC: 9.5 G/DL — LOW (ref 11.5–15.5)
IMM GRANULOCYTES NFR BLD AUTO: 3.7 % — HIGH (ref 0–0.9)
LYMPHOCYTES # BLD AUTO: 0.13 K/UL — LOW (ref 1–3.3)
LYMPHOCYTES # BLD AUTO: 3.2 % — LOW (ref 13–44)
MAGNESIUM SERPL-MCNC: 1.7 MG/DL
MCHC RBC-ENTMCNC: 29.9 PG — SIGNIFICANT CHANGE UP (ref 27–34)
MCHC RBC-ENTMCNC: 33.8 G/DL — SIGNIFICANT CHANGE UP (ref 32–36)
MCV RBC AUTO: 88.4 FL — SIGNIFICANT CHANGE UP (ref 80–100)
MONOCYTES # BLD AUTO: 1.66 K/UL — HIGH (ref 0–0.9)
MONOCYTES NFR BLD AUTO: 41.2 % — HIGH (ref 2–14)
NEUTROPHILS # BLD AUTO: 2 K/UL — SIGNIFICANT CHANGE UP (ref 1.8–7.4)
NEUTROPHILS NFR BLD AUTO: 49.7 % — SIGNIFICANT CHANGE UP (ref 43–77)
NRBC # BLD: 0 /100 WBCS — SIGNIFICANT CHANGE UP (ref 0–0)
PLATELET # BLD AUTO: 94 K/UL — LOW (ref 150–400)
POTASSIUM SERPL-SCNC: 4.5 MMOL/L
PROT SERPL-MCNC: 5.8 G/DL
RBC # BLD: 3.18 M/UL — LOW (ref 3.8–5.2)
RBC # FLD: 15.4 % — HIGH (ref 10.3–14.5)
SODIUM SERPL-SCNC: 137 MMOL/L
TACROLIMUS SERPL-MCNC: 14.8 NG/ML
WBC # BLD: 4.03 K/UL — SIGNIFICANT CHANGE UP (ref 3.8–10.5)
WBC # FLD AUTO: 4.03 K/UL — SIGNIFICANT CHANGE UP (ref 3.8–10.5)

## 2024-04-08 PROCEDURE — 99215 OFFICE O/P EST HI 40 MIN: CPT

## 2024-04-09 NOTE — ASSESSMENT
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 Her disease status at time of transplant was CR. 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 Status post haplo identical BMT from her son on 3/6/24.  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 Results pending.  - Fish for Y sent on 3/28/24= 99.5% donor  - Fish for Y sent on 4/1/24= 99.5.% donor  2) Heme Counts stable. No indication for transfusions today. WBC 4.03  H/H 9.5/28.1  PLT 94  ANC 2.0  3) ID Continue ppx: Valacyclovir 500 mg BID Mepron 750mg/5ml- take 10 ml daily  4) GVHD Skin 0 Liver 0 GI 0 - overall grade 0 On tacrolimus 3.5 mg BID. On 4/9/24, decreased tacrolimus to 3 mg BID.  Cellcept 1000 mg TID- Take through 4/10/24 then stop Reviewed signs and symptoms of GVHD with patient.  4/1/24 CMV PCR negative. Check weekly.  5) GI Continue ppx: Omeprazole 20 mg daily Zofran 8 mg every 8 hours prn Imodium prn. Encouraged patient to eat small frequent meals and drink two liters of fluids daily.  6) Other Continue: MG Plus Protein 133 mg- take 3 tablets daily.  Losartan Potassium 100 mg daily Lexapro 20 mg daily Continue post transplant diet and crowd restrictions. No take out food at this time except an uncut cheese pizza pie.  Questions and concerns addressed. Reassurance provided. Follow up weekly provider appointments until day 100 post transplant. Follow up with DENNIS Espinosa in one week.

## 2024-04-09 NOTE — HISTORY OF PRESENT ILLNESS
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 Her  disease status at time of transplant was CR. 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 Status post haplo identical BMT from her son on 3/6/24.  [de-identified] : Last office visit: 4/1/24 Primary Oncologist: Dr Bowles  Reason for visit: Follow up status post haplo identical BMT from her son on 3/6/24. Day +33 today.   Since last visit: Patient is fatigued. Denies fever, chills, nausea, vomiting, diarrhea, rash, mouth sores or any signs of active bleeding. Denies SOB or chest pain.   Medications: Tacrolimus 3.5 mg BID Cellcept 1000 mg TID-Take through 4/10/24 then stop.  valacyclovir 500 mg BID Mepron 750mg/5ml- Take 10 ml daily MG plus protein 133 mg- 3 tablets daily.  Losartan potassium 100 mg daily Lexapro 20 mg daily Omeprazole 20 mg daily Zofran 8 mg every 8 hours prn  Examination: Articulate and in no acute distress No occiput, poster cervical, anterior cervical, submandibular, sublingual, submental, supraclavicular nor axillary adenopathy Lungs: Clear Cardiac: without rubs Abd: soft and non-tender. No inguinal nor femoral adenopathy No sig peripheral edema

## 2024-04-10 LAB
CMV DNA SPEC QL NAA+PROBE: NOT DETECTED IU/ML
CMVPCR LOG: NOT DETECTED LOG10IU/ML

## 2024-04-16 ENCOUNTER — RESULT REVIEW (OUTPATIENT)
Age: 64
End: 2024-04-16

## 2024-04-16 ENCOUNTER — APPOINTMENT (OUTPATIENT)
Dept: HEMATOLOGY ONCOLOGY | Facility: CLINIC | Age: 64
End: 2024-04-16
Payer: MEDICARE

## 2024-04-16 ENCOUNTER — APPOINTMENT (OUTPATIENT)
Dept: INFUSION THERAPY | Facility: HOSPITAL | Age: 64
End: 2024-04-16

## 2024-04-16 VITALS
BODY MASS INDEX: 34.63 KG/M2 | SYSTOLIC BLOOD PRESSURE: 119 MMHG | HEART RATE: 81 BPM | RESPIRATION RATE: 16 BRPM | TEMPERATURE: 98 F | OXYGEN SATURATION: 100 % | WEIGHT: 189.36 LBS | DIASTOLIC BLOOD PRESSURE: 82 MMHG

## 2024-04-16 LAB
ALBUMIN SERPL ELPH-MCNC: 3.6 G/DL — SIGNIFICANT CHANGE UP (ref 3.3–5)
ALBUMIN SERPL ELPH-MCNC: 4.2 G/DL
ALP BLD-CCNC: 110 U/L
ALP SERPL-CCNC: 90 U/L — SIGNIFICANT CHANGE UP (ref 40–120)
ALT FLD-CCNC: 41 U/L — SIGNIFICANT CHANGE UP (ref 10–45)
ALT SERPL-CCNC: 55 U/L
ANION GAP SERPL CALC-SCNC: 11 MMOL/L — SIGNIFICANT CHANGE UP (ref 5–17)
ANION GAP SERPL CALC-SCNC: 12 MMOL/L
ANISOCYTOSIS BLD QL: SLIGHT — SIGNIFICANT CHANGE UP
AST SERPL-CCNC: 45 U/L — HIGH (ref 10–40)
AST SERPL-CCNC: 58 U/L
BASOPHILS # BLD AUTO: 0 K/UL — SIGNIFICANT CHANGE UP (ref 0–0.2)
BASOPHILS NFR BLD AUTO: 0 % — SIGNIFICANT CHANGE UP (ref 0–2)
BILIRUB SERPL-MCNC: 0.5 MG/DL — SIGNIFICANT CHANGE UP (ref 0.2–1.2)
BILIRUB SERPL-MCNC: 0.6 MG/DL
BUN SERPL-MCNC: 20 MG/DL — SIGNIFICANT CHANGE UP (ref 7–23)
BUN SERPL-MCNC: 21 MG/DL
CALCIUM SERPL-MCNC: 8.6 MG/DL — SIGNIFICANT CHANGE UP (ref 8.4–10.5)
CALCIUM SERPL-MCNC: 9.6 MG/DL
CHLORIDE SERPL-SCNC: 101 MMOL/L
CHLORIDE SERPL-SCNC: 104 MMOL/L — SIGNIFICANT CHANGE UP (ref 96–108)
CO2 SERPL-SCNC: 22 MMOL/L — SIGNIFICANT CHANGE UP (ref 22–31)
CO2 SERPL-SCNC: 26 MMOL/L
CREAT SERPL-MCNC: 1.95 MG/DL — HIGH (ref 0.5–1.3)
CREAT SERPL-MCNC: 2.35 MG/DL
DACRYOCYTES BLD QL SMEAR: SLIGHT — SIGNIFICANT CHANGE UP
EGFR: 22 ML/MIN/1.73M2
EGFR: 28 ML/MIN/1.73M2 — LOW
EOSINOPHIL # BLD AUTO: 0.24 K/UL — SIGNIFICANT CHANGE UP (ref 0–0.5)
EOSINOPHIL NFR BLD AUTO: 5 % — SIGNIFICANT CHANGE UP (ref 0–6)
GLUCOSE SERPL-MCNC: 117 MG/DL
GLUCOSE SERPL-MCNC: 94 MG/DL — SIGNIFICANT CHANGE UP (ref 70–99)
HCT VFR BLD CALC: 28.3 % — LOW (ref 34.5–45)
HGB BLD-MCNC: 9.4 G/DL — LOW (ref 11.5–15.5)
LYMPHOCYTES # BLD AUTO: 0.82 K/UL — LOW (ref 1–3.3)
LYMPHOCYTES # BLD AUTO: 17 % — SIGNIFICANT CHANGE UP (ref 13–44)
MAGNESIUM SERPL-MCNC: 2.3 MG/DL
MCHC RBC-ENTMCNC: 30.4 PG — SIGNIFICANT CHANGE UP (ref 27–34)
MCHC RBC-ENTMCNC: 33.2 G/DL — SIGNIFICANT CHANGE UP (ref 32–36)
MCV RBC AUTO: 91.6 FL — SIGNIFICANT CHANGE UP (ref 80–100)
MONOCYTES # BLD AUTO: 0.87 K/UL — SIGNIFICANT CHANGE UP (ref 0–0.9)
MONOCYTES NFR BLD AUTO: 18 % — HIGH (ref 2–14)
MYELOCYTES NFR BLD: 4 % — HIGH (ref 0–0)
NEUTROPHILS # BLD AUTO: 2.7 K/UL — SIGNIFICANT CHANGE UP (ref 1.8–7.4)
NEUTROPHILS NFR BLD AUTO: 56 % — SIGNIFICANT CHANGE UP (ref 43–77)
NRBC # BLD: 1 /100 WBCS — HIGH (ref 0–0)
NRBC # BLD: SIGNIFICANT CHANGE UP /100 WBCS (ref 0–0)
PLAT MORPH BLD: NORMAL — SIGNIFICANT CHANGE UP
PLATELET # BLD AUTO: 111 K/UL — LOW (ref 150–400)
POIKILOCYTOSIS BLD QL AUTO: SLIGHT — SIGNIFICANT CHANGE UP
POLYCHROMASIA BLD QL SMEAR: SLIGHT — SIGNIFICANT CHANGE UP
POTASSIUM SERPL-MCNC: 4.3 MMOL/L — SIGNIFICANT CHANGE UP (ref 3.5–5.3)
POTASSIUM SERPL-SCNC: 4.3 MMOL/L — SIGNIFICANT CHANGE UP (ref 3.5–5.3)
POTASSIUM SERPL-SCNC: 4.8 MMOL/L
PROT SERPL-MCNC: 5.2 G/DL — LOW (ref 6–8.3)
PROT SERPL-MCNC: 5.9 G/DL
RBC # BLD: 3.09 M/UL — LOW (ref 3.8–5.2)
RBC # FLD: 21.4 % — HIGH (ref 10.3–14.5)
RBC BLD AUTO: ABNORMAL
SCHISTOCYTES BLD QL AUTO: SLIGHT — SIGNIFICANT CHANGE UP
SODIUM SERPL-SCNC: 138 MMOL/L — SIGNIFICANT CHANGE UP (ref 135–145)
SODIUM SERPL-SCNC: 139 MMOL/L
TACROLIMUS SERPL-MCNC: 14.3 NG/ML
WBC # BLD: 4.82 K/UL — SIGNIFICANT CHANGE UP (ref 3.8–10.5)
WBC # FLD AUTO: 4.82 K/UL — SIGNIFICANT CHANGE UP (ref 3.8–10.5)

## 2024-04-16 PROCEDURE — 99215 OFFICE O/P EST HI 40 MIN: CPT

## 2024-04-16 NOTE — REVIEW OF SYSTEMS
[Fatigue] : fatigue [Recent Change In Weight] : ~T recent weight change [Negative] : Heme/Lymph [Fever] : no fever [Chills] : no chills [Night Sweats] : no night sweats [Abdominal Pain] : no abdominal pain [Vomiting] : no vomiting [Constipation] : no constipation [FreeTextEntry2] : Weight loss [FreeTextEntry7] : Occasional nausea relieved with Zofran

## 2024-04-16 NOTE — HISTORY OF PRESENT ILLNESS
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 Her  disease status at time of transplant was CR. 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 Status post haplo identical BMT from her son on 3/6/24.  [de-identified] : Last office visit: 4/8/24 Primary Oncologist: Dr Bowles  Reason for visit: Follow up status post haplo identical BMT from her son on 3/6/24. Day +41 today.   Since last visit: Patient feels better compared to previous week. Denies fever, chills,vomiting, diarrhea, rash, mouth sores or any signs of active bleeding. Denies SOB or chest pain. Her appetite is not great but she eats atleast 3 meals/day.She has occasional nausea relieved with Zofran.  Medications: Tacrolimus 3 mg BID Cellcept 1000 mg TID-Took through 4/10/24 then stopped  Valacyclovir 500 mg BID Mepron 750mg/5ml- Take 10 ml daily MG plus protein 133 mg- 3 tablets daily Losartan potassium 100 mg daily Lexapro 20 mg daily Omeprazole 20 mg daily Zofran 8 mg every 8 hours prn  Examination: Articulate and in no acute distress No occiput, poster cervical, anterior cervical, submandibular, sublingual, submental, supraclavicular nor axillary adenopathy Lungs: Clear Cardiac: without rubs Abd: soft and non-tender. No inguinal nor femoral adenopathy No sig peripheral edema Skin: No rash, intact

## 2024-04-16 NOTE — ASSESSMENT
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 Her disease status at time of transplant was CR. 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 Status post haplo identical BMT from her son on 3/6/24.  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 No indication for transfusions today. WBC 4.82  H/H9.4/28.3    ANC 2.70  3) ID Continue ppx: Valacyclovir 500 mg BID Mepron 750mg/5ml- take 10 ml daily  4) GVHD Skin 0 Liver 0 GI 0 - overall grade 0 On tacrolimus 3 mg BID...will hold until level from today is resulted secondary to rise in creatinine...will adjust dose based on level. Tacrolimus level-14.3,will decrease Tacrolimus to 2.5mg 2 x day starting today's evening dose. Cellcept 1000 mg TID- Took through 4/10/24 then stopped Reviewed signs and symptoms of GVHD with patient.  4/1/24,4/8/24 CMV PCR negative. Check weekly.  5) GI Continue ppx: Omeprazole 20 mg daily Zofran 8 mg every 8 hours prn Imodium prn. Encouraged patient to eat small frequent meals and drink two liters of fluids daily. Monitor liver enzymes: AST-58, ALT-55  6) Other Continue: MG Plus Protein 133 mg- take 3 tablets daily.  Losartan Potassium 100 mg daily Lexapro 20 mg daily 1 Liter of IVF today (4/16/24) and repeat CMP post hydration ...repeat CMP showed Creatinine to be 1.95...will repeat CBC,CMP,Tacrolimus level on 4/19/24 at [place] Continue post transplant diet and crowd restrictions. No take out food at this time except an uncut cheese pizza pie.  Questions and concerns addressed. Reassurance provided. Follow up weekly provider appointments until day 100 post-transplant Port flush every 6-8 weeks... will be flushed today (4/16/24) Follow up with me in one week  I examined patient under 's supervision and Dr. Bowles agrees to plan of care as listed above

## 2024-04-16 NOTE — PHYSICAL EXAM
[Yes] : Yes [] :  [No active (erythematous_ GVHD rash)] : Skin: No active (erythematous_ GVHD rash) [< 2 mg/dl] : Liver: < 2 mg/dl [No or intermittent] : Upper GI: No or intermittent nausea, vomiting or anorexia [<500 ml/day or < 3 episodes/day] : Lower GI (stool output/day): <500 ml/day or <3 episodes/day [No] : No [Restricted in physically strenuous activity but ambulatory and able to carry out work of a light or sedentary nature] : Status 1- Restricted in physically strenuous activity but ambulatory and able to carry out work of a light or sedentary nature, e.g., light house work, office work [Normal] : affect appropriate [de-identified] : Chest wall Mediport [FreeTextEntry1] : 3/6/24

## 2024-04-17 ENCOUNTER — NON-APPOINTMENT (OUTPATIENT)
Age: 64
End: 2024-04-17

## 2024-04-17 ENCOUNTER — OUTPATIENT (OUTPATIENT)
Dept: OUTPATIENT SERVICES | Facility: HOSPITAL | Age: 64
LOS: 1 days | End: 2024-04-17

## 2024-04-17 DIAGNOSIS — C91.00 ACUTE LYMPHOBLASTIC LEUKEMIA NOT HAVING ACHIEVED REMISSION: ICD-10-CM

## 2024-04-17 DIAGNOSIS — Z98.891 HISTORY OF UTERINE SCAR FROM PREVIOUS SURGERY: Chronic | ICD-10-CM

## 2024-04-17 DIAGNOSIS — E86.0 DEHYDRATION: ICD-10-CM

## 2024-04-17 DIAGNOSIS — Z51.89 ENCOUNTER FOR OTHER SPECIFIED AFTERCARE: ICD-10-CM

## 2024-04-17 LAB
CMV DNA SPEC QL NAA+PROBE: NOT DETECTED IU/ML
CMVPCR LOG: NOT DETECTED LOG10IU/ML

## 2024-04-18 ENCOUNTER — APPOINTMENT (OUTPATIENT)
Dept: HEMATOLOGY ONCOLOGY | Facility: CLINIC | Age: 64
End: 2024-04-18

## 2024-04-19 ENCOUNTER — RESULT REVIEW (OUTPATIENT)
Age: 64
End: 2024-04-19

## 2024-04-19 ENCOUNTER — APPOINTMENT (OUTPATIENT)
Age: 64
End: 2024-04-19

## 2024-04-19 ENCOUNTER — LABORATORY RESULT (OUTPATIENT)
Age: 64
End: 2024-04-19

## 2024-04-19 LAB
BASOPHILS # BLD AUTO: 0.07 K/UL — SIGNIFICANT CHANGE UP (ref 0–0.2)
BASOPHILS NFR BLD AUTO: 1.2 % — SIGNIFICANT CHANGE UP (ref 0–2)
EOSINOPHIL # BLD AUTO: 0.4 K/UL — SIGNIFICANT CHANGE UP (ref 0–0.5)
EOSINOPHIL NFR BLD AUTO: 6.8 % — HIGH (ref 0–6)
HCT VFR BLD CALC: 30.9 % — LOW (ref 34.5–45)
HGB BLD-MCNC: 10.2 G/DL — LOW (ref 11.5–15.5)
IMM GRANULOCYTES NFR BLD AUTO: 4.5 % — HIGH (ref 0–0.9)
LYMPHOCYTES # BLD AUTO: 1.14 K/UL — SIGNIFICANT CHANGE UP (ref 1–3.3)
LYMPHOCYTES # BLD AUTO: 19.5 % — SIGNIFICANT CHANGE UP (ref 13–44)
MCHC RBC-ENTMCNC: 30.7 PG — SIGNIFICANT CHANGE UP (ref 27–34)
MCHC RBC-ENTMCNC: 33 GM/DL — SIGNIFICANT CHANGE UP (ref 32–36)
MCV RBC AUTO: 93.1 FL — SIGNIFICANT CHANGE UP (ref 80–100)
MONOCYTES # BLD AUTO: 1.12 K/UL — HIGH (ref 0–0.9)
MONOCYTES NFR BLD AUTO: 19.2 % — HIGH (ref 2–14)
NEUTROPHILS # BLD AUTO: 2.85 K/UL — SIGNIFICANT CHANGE UP (ref 1.8–7.4)
NEUTROPHILS NFR BLD AUTO: 48.8 % — SIGNIFICANT CHANGE UP (ref 43–77)
NRBC # BLD: 0 /100 WBCS — SIGNIFICANT CHANGE UP (ref 0–0)
PLATELET # BLD AUTO: 152 K/UL — SIGNIFICANT CHANGE UP (ref 150–400)
RBC # BLD: 3.32 M/UL — LOW (ref 3.8–5.2)
RBC # FLD: 21.5 % — HIGH (ref 10.3–14.5)
WBC # BLD: 5.84 K/UL — SIGNIFICANT CHANGE UP (ref 3.8–10.5)
WBC # FLD AUTO: 5.84 K/UL — SIGNIFICANT CHANGE UP (ref 3.8–10.5)

## 2024-04-19 RX ORDER — VALACYCLOVIR 500 MG/1
500 TABLET, FILM COATED ORAL TWICE DAILY
Qty: 60 | Refills: 3 | Status: ACTIVE | COMMUNITY
Start: 2024-03-28 | End: 1900-01-01

## 2024-04-21 LAB
ALBUMIN SERPL ELPH-MCNC: 3.9 G/DL
ALP BLD-CCNC: 105 U/L
ALT SERPL-CCNC: 51 U/L
ANION GAP SERPL CALC-SCNC: 14 MMOL/L
AST SERPL-CCNC: 54 U/L
BILIRUB SERPL-MCNC: 0.6 MG/DL
BUN SERPL-MCNC: 10 MG/DL
CALCIUM SERPL-MCNC: 9.1 MG/DL
CHLORIDE SERPL-SCNC: 105 MMOL/L
CO2 SERPL-SCNC: 22 MMOL/L
CREAT SERPL-MCNC: 1.58 MG/DL
EGFR: 36 ML/MIN/1.73M2
GLUCOSE SERPL-MCNC: 115 MG/DL
POTASSIUM SERPL-SCNC: 4.2 MMOL/L
PROT SERPL-MCNC: 5.5 G/DL
SODIUM SERPL-SCNC: 141 MMOL/L
TACROLIMUS SERPL-MCNC: 10.6 NG/ML

## 2024-04-23 ENCOUNTER — RESULT REVIEW (OUTPATIENT)
Age: 64
End: 2024-04-23

## 2024-04-23 ENCOUNTER — APPOINTMENT (OUTPATIENT)
Dept: HEMATOLOGY ONCOLOGY | Facility: CLINIC | Age: 64
End: 2024-04-23

## 2024-04-23 ENCOUNTER — APPOINTMENT (OUTPATIENT)
Dept: HEMATOLOGY ONCOLOGY | Facility: CLINIC | Age: 64
End: 2024-04-23
Payer: MEDICARE

## 2024-04-23 ENCOUNTER — APPOINTMENT (OUTPATIENT)
Dept: INFUSION THERAPY | Facility: HOSPITAL | Age: 64
End: 2024-04-23

## 2024-04-23 VITALS
HEART RATE: 75 BPM | TEMPERATURE: 97.7 F | RESPIRATION RATE: 15 BRPM | DIASTOLIC BLOOD PRESSURE: 84 MMHG | OXYGEN SATURATION: 100 % | WEIGHT: 188.03 LBS | SYSTOLIC BLOOD PRESSURE: 126 MMHG | BODY MASS INDEX: 34.39 KG/M2

## 2024-04-23 LAB
BASOPHILS # BLD AUTO: 0.11 K/UL — SIGNIFICANT CHANGE UP (ref 0–0.2)
BASOPHILS NFR BLD AUTO: 1.7 % — SIGNIFICANT CHANGE UP (ref 0–2)
EOSINOPHIL # BLD AUTO: 0.34 K/UL — SIGNIFICANT CHANGE UP (ref 0–0.5)
EOSINOPHIL NFR BLD AUTO: 5.4 % — SIGNIFICANT CHANGE UP (ref 0–6)
HCT VFR BLD CALC: 30.7 % — LOW (ref 34.5–45)
HGB BLD-MCNC: 10.1 G/DL — LOW (ref 11.5–15.5)
IMM GRANULOCYTES NFR BLD AUTO: 1.7 % — HIGH (ref 0–0.9)
LYMPHOCYTES # BLD AUTO: 0.82 K/UL — LOW (ref 1–3.3)
LYMPHOCYTES # BLD AUTO: 13 % — SIGNIFICANT CHANGE UP (ref 13–44)
MCHC RBC-ENTMCNC: 30.9 PG — SIGNIFICANT CHANGE UP (ref 27–34)
MCHC RBC-ENTMCNC: 32.9 G/DL — SIGNIFICANT CHANGE UP (ref 32–36)
MCV RBC AUTO: 93.9 FL — SIGNIFICANT CHANGE UP (ref 80–100)
MONOCYTES # BLD AUTO: 0.95 K/UL — HIGH (ref 0–0.9)
MONOCYTES NFR BLD AUTO: 15.1 % — HIGH (ref 2–14)
NEUTROPHILS # BLD AUTO: 3.97 K/UL — SIGNIFICANT CHANGE UP (ref 1.8–7.4)
NEUTROPHILS NFR BLD AUTO: 63.1 % — SIGNIFICANT CHANGE UP (ref 43–77)
NRBC # BLD: 0 /100 WBCS — SIGNIFICANT CHANGE UP (ref 0–0)
PLATELET # BLD AUTO: 127 K/UL — LOW (ref 150–400)
RBC # BLD: 3.27 M/UL — LOW (ref 3.8–5.2)
RBC # FLD: 21.2 % — HIGH (ref 10.3–14.5)
WBC # BLD: 6.3 K/UL — SIGNIFICANT CHANGE UP (ref 3.8–10.5)
WBC # FLD AUTO: 6.3 K/UL — SIGNIFICANT CHANGE UP (ref 3.8–10.5)

## 2024-04-23 PROCEDURE — 99215 OFFICE O/P EST HI 40 MIN: CPT

## 2024-04-23 NOTE — HISTORY OF PRESENT ILLNESS
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 Her  disease status at time of transplant was CR. 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 Status post haplo identical BMT from her son on 3/6/24.  [de-identified] : Last office visit: 4/16/24 Primary Oncologist: Dr Bowles  Reason for visit: Follow up status post haplo identical BMT from her son on 3/6/24. Day +48 today.   Since last visit: Patient feels better compared to previous week. Denies fever, chills,vomiting, diarrhea, rash, mouth sores or any signs of active bleeding. Denies SOB or chest pain. Her appetite is not great but she eats atleast 3 meals/day.She had 1 episode of Vertigo which resolved on its own.  Medications: Tacrolimus 2 mg BID Cellcept 1000 mg TID-Took through 4/10/24 then stopped  Valacyclovir 500 mg BID Mepron 750mg/5ml- Take 10 ml daily MG plus protein 133 mg- 3 tablets daily Losartan potassium 100 mg daily Lexapro 20 mg daily Omeprazole 20 mg daily Zofran 8 mg every 8 hours prn  Examination: Articulate and in no acute distress No occiput, poster cervical, anterior cervical, submandibular, sublingual, submental, supraclavicular nor axillary adenopathy Lungs: Clear Cardiac: without rubs Abd: soft and non-tender. No inguinal nor femoral adenopathy No sig peripheral edema Skin: No rash, intact

## 2024-04-23 NOTE — ASSESSMENT
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 Her disease status at time of transplant was CR. 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 Status post haplo identical BMT from her son on 3/6/24.  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 No indication for transfusions today. 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  4/1/24,4/8/24,4/16/24: CMV PCR negative. Check weekly.  5) GI Continue ppx: Omeprazole 20 mg daily Zofran 8 mg every 8 hours prn Imodium prn. Encouraged patient to eat small frequent meals and drink two liters of fluids daily. Monitor liver enzymes: AST-64, ALT-56  6) Other Continue: MG Plus Protein 133 mg- take 3 tablets daily.  Losartan Potassium 100 mg daily Lexapro 20 mg daily 1 Liter of IVF today (4/23/24)for Creatinine level -1.44...will repeat CBC,CMP,Tacrolimus level on 4/26/24 at [place] Continue post transplant diet and crowd restrictions. No take out food at this time except an uncut cheese pizza pie.  Questions and concerns addressed. Reassurance provided. Follow up weekly provider appointments until day 100 post-transplant Port flush every 6-8 weeks... will be flushed today (4/23/24)...next due 6/4/24 Follow up with DENNIS Espinosa in one week  I examined patient under 's supervision and  agrees to plan of care as listed above

## 2024-04-23 NOTE — PHYSICAL EXAM
[Restricted in physically strenuous activity but ambulatory and able to carry out work of a light or sedentary nature] : Status 1- Restricted in physically strenuous activity but ambulatory and able to carry out work of a light or sedentary nature, e.g., light house work, office work [Normal] : affect appropriate [Yes] : Yes [] :  [No active (erythematous_ GVHD rash)] : Skin: No active (erythematous_ GVHD rash) [< 2 mg/dl] : Liver: < 2 mg/dl [No or intermittent] : Upper GI: No or intermittent nausea, vomiting or anorexia [<500 ml/day or < 3 episodes/day] : Lower GI (stool output/day): <500 ml/day or <3 episodes/day [No] : No [de-identified] : Chest wall Mediport [FreeTextEntry1] : 3/6/24

## 2024-04-23 NOTE — REVIEW OF SYSTEMS
[Fatigue] : fatigue [Recent Change In Weight] : ~T recent weight change [Muscle Weakness] : muscle weakness [Negative] : Heme/Lymph [Fever] : no fever [Chills] : no chills [Night Sweats] : no night sweats [Abdominal Pain] : no abdominal pain [Vomiting] : no vomiting [Constipation] : no constipation [Joint Pain] : no joint pain [Joint Stiffness] : no joint stiffness [Muscle Pain] : no muscle pain [FreeTextEntry2] : Weight loss [FreeTextEntry7] : Occasional nausea relieved with Zofran

## 2024-04-25 LAB
ALBUMIN SERPL ELPH-MCNC: 4 G/DL
ALP BLD-CCNC: 99 U/L
ALT SERPL-CCNC: 56 U/L
ANION GAP SERPL CALC-SCNC: 12 MMOL/L
AST SERPL-CCNC: 64 U/L
BILIRUB SERPL-MCNC: 0.6 MG/DL
BUN SERPL-MCNC: 11 MG/DL
CALCIUM SERPL-MCNC: 9.7 MG/DL
CHLORIDE SERPL-SCNC: 102 MMOL/L
CMV DNA SPEC QL NAA+PROBE: NOT DETECTED IU/ML
CMVPCR LOG: NOT DETECTED LOG10IU/ML
CO2 SERPL-SCNC: 26 MMOL/L
CREAT SERPL-MCNC: 1.44 MG/DL
EGFR: 40 ML/MIN/1.73M2
GLUCOSE SERPL-MCNC: 117 MG/DL
MAGNESIUM SERPL-MCNC: 1.9 MG/DL
POTASSIUM SERPL-SCNC: 4.2 MMOL/L
PROT SERPL-MCNC: 6 G/DL
SODIUM SERPL-SCNC: 140 MMOL/L
TACROLIMUS SERPL-MCNC: 10.6 NG/ML

## 2024-04-26 ENCOUNTER — RESULT REVIEW (OUTPATIENT)
Age: 64
End: 2024-04-26

## 2024-04-26 ENCOUNTER — APPOINTMENT (OUTPATIENT)
Dept: HEMATOLOGY ONCOLOGY | Facility: CLINIC | Age: 64
End: 2024-04-26

## 2024-04-26 LAB
BASOPHILS # BLD AUTO: 0.11 K/UL — SIGNIFICANT CHANGE UP (ref 0–0.2)
BASOPHILS NFR BLD AUTO: 1.8 % — SIGNIFICANT CHANGE UP (ref 0–2)
EOSINOPHIL # BLD AUTO: 0.23 K/UL — SIGNIFICANT CHANGE UP (ref 0–0.5)
EOSINOPHIL NFR BLD AUTO: 3.7 % — SIGNIFICANT CHANGE UP (ref 0–6)
HCT VFR BLD CALC: 31.3 % — LOW (ref 34.5–45)
HGB BLD-MCNC: 10.3 G/DL — LOW (ref 11.5–15.5)
IMM GRANULOCYTES NFR BLD AUTO: 1 % — HIGH (ref 0–0.9)
LYMPHOCYTES # BLD AUTO: 0.96 K/UL — LOW (ref 1–3.3)
LYMPHOCYTES # BLD AUTO: 15.6 % — SIGNIFICANT CHANGE UP (ref 13–44)
MCHC RBC-ENTMCNC: 31.3 PG — SIGNIFICANT CHANGE UP (ref 27–34)
MCHC RBC-ENTMCNC: 32.9 GM/DL — SIGNIFICANT CHANGE UP (ref 32–36)
MCV RBC AUTO: 95.1 FL — SIGNIFICANT CHANGE UP (ref 80–100)
MONOCYTES # BLD AUTO: 1.09 K/UL — HIGH (ref 0–0.9)
MONOCYTES NFR BLD AUTO: 17.7 % — HIGH (ref 2–14)
NEUTROPHILS # BLD AUTO: 3.7 K/UL — SIGNIFICANT CHANGE UP (ref 1.8–7.4)
NEUTROPHILS NFR BLD AUTO: 60.2 % — SIGNIFICANT CHANGE UP (ref 43–77)
NRBC # BLD: 0 /100 WBCS — SIGNIFICANT CHANGE UP (ref 0–0)
PLATELET # BLD AUTO: 105 K/UL — LOW (ref 150–400)
RBC # BLD: 3.29 M/UL — LOW (ref 3.8–5.2)
RBC # FLD: 20.5 % — HIGH (ref 10.3–14.5)
WBC # BLD: 6.15 K/UL — SIGNIFICANT CHANGE UP (ref 3.8–10.5)
WBC # FLD AUTO: 6.15 K/UL — SIGNIFICANT CHANGE UP (ref 3.8–10.5)

## 2024-04-26 PROCEDURE — 85027 COMPLETE CBC AUTOMATED: CPT

## 2024-04-27 ENCOUNTER — LABORATORY RESULT (OUTPATIENT)
Age: 64
End: 2024-04-27

## 2024-04-27 DIAGNOSIS — R30.0 DYSURIA: ICD-10-CM

## 2024-04-27 DIAGNOSIS — N39.0 URINARY TRACT INFECTION, SITE NOT SPECIFIED: ICD-10-CM

## 2024-04-28 LAB
ALBUMIN SERPL ELPH-MCNC: 4 G/DL
ALP BLD-CCNC: 98 U/L
ALT SERPL-CCNC: 49 U/L
ANION GAP SERPL CALC-SCNC: 13 MMOL/L
APPEARANCE: CLEAR
AST SERPL-CCNC: 59 U/L
BILIRUB SERPL-MCNC: 0.6 MG/DL
BILIRUBIN URINE: NEGATIVE
BLOOD URINE: NEGATIVE
BUN SERPL-MCNC: 8 MG/DL
CALCIUM SERPL-MCNC: 9.3 MG/DL
CHLORIDE SERPL-SCNC: 103 MMOL/L
CO2 SERPL-SCNC: 22 MMOL/L
COLOR: ABNORMAL
CREAT SERPL-MCNC: 1.4 MG/DL
EGFR: 41 ML/MIN/1.73M2
GLUCOSE QUALITATIVE U: NEGATIVE MG/DL
GLUCOSE SERPL-MCNC: 121 MG/DL
KETONES URINE: NEGATIVE MG/DL
LEUKOCYTE ESTERASE URINE: ABNORMAL
NITRITE URINE: NEGATIVE
PH URINE: 6
POTASSIUM SERPL-SCNC: 4.2 MMOL/L
PROT SERPL-MCNC: 5.7 G/DL
PROTEIN URINE: NORMAL MG/DL
SODIUM SERPL-SCNC: 139 MMOL/L
SPECIFIC GRAVITY URINE: 1.01
TACROLIMUS SERPL-MCNC: 10.8 NG/ML
UROBILINOGEN URINE: 0.2 MG/DL

## 2024-04-29 LAB — BACTERIA UR CULT: NORMAL

## 2024-04-30 ENCOUNTER — NON-APPOINTMENT (OUTPATIENT)
Age: 64
End: 2024-04-30

## 2024-05-02 ENCOUNTER — APPOINTMENT (OUTPATIENT)
Dept: HEMATOLOGY ONCOLOGY | Facility: CLINIC | Age: 64
End: 2024-05-02

## 2024-05-02 ENCOUNTER — RESULT REVIEW (OUTPATIENT)
Age: 64
End: 2024-05-02

## 2024-05-02 ENCOUNTER — APPOINTMENT (OUTPATIENT)
Dept: HEMATOLOGY ONCOLOGY | Facility: CLINIC | Age: 64
End: 2024-05-02
Payer: MEDICARE

## 2024-05-02 VITALS
BODY MASS INDEX: 34.27 KG/M2 | SYSTOLIC BLOOD PRESSURE: 126 MMHG | HEART RATE: 71 BPM | WEIGHT: 187.39 LBS | TEMPERATURE: 97.9 F | OXYGEN SATURATION: 100 % | RESPIRATION RATE: 16 BRPM | DIASTOLIC BLOOD PRESSURE: 86 MMHG

## 2024-05-02 LAB
ALBUMIN SERPL ELPH-MCNC: 4.1 G/DL
ALP BLD-CCNC: 93 U/L
ALT SERPL-CCNC: 40 U/L
ANION GAP SERPL CALC-SCNC: 9 MMOL/L
AST SERPL-CCNC: 51 U/L
BASOPHILS # BLD AUTO: 0.07 K/UL — SIGNIFICANT CHANGE UP (ref 0–0.2)
BASOPHILS NFR BLD AUTO: 1.1 % — SIGNIFICANT CHANGE UP (ref 0–2)
BILIRUB SERPL-MCNC: 0.6 MG/DL
BUN SERPL-MCNC: 11 MG/DL
CALCIUM SERPL-MCNC: 9.8 MG/DL
CHLORIDE SERPL-SCNC: 105 MMOL/L
CO2 SERPL-SCNC: 26 MMOL/L
CREAT SERPL-MCNC: 1.7 MG/DL
EGFR: 33 ML/MIN/1.73M2
EOSINOPHIL # BLD AUTO: 0.3 K/UL — SIGNIFICANT CHANGE UP (ref 0–0.5)
EOSINOPHIL NFR BLD AUTO: 4.7 % — SIGNIFICANT CHANGE UP (ref 0–6)
GLUCOSE SERPL-MCNC: 127 MG/DL
HCT VFR BLD CALC: 31.4 % — LOW (ref 34.5–45)
HGB BLD-MCNC: 10.2 G/DL — LOW (ref 11.5–15.5)
IMM GRANULOCYTES NFR BLD AUTO: 0.5 % — SIGNIFICANT CHANGE UP (ref 0–0.9)
LYMPHOCYTES # BLD AUTO: 0.69 K/UL — LOW (ref 1–3.3)
LYMPHOCYTES # BLD AUTO: 10.7 % — LOW (ref 13–44)
MAGNESIUM SERPL-MCNC: 1.8 MG/DL
MCHC RBC-ENTMCNC: 32 PG — SIGNIFICANT CHANGE UP (ref 27–34)
MCHC RBC-ENTMCNC: 32.5 G/DL — SIGNIFICANT CHANGE UP (ref 32–36)
MCV RBC AUTO: 98.4 FL — SIGNIFICANT CHANGE UP (ref 80–100)
MONOCYTES # BLD AUTO: 0.6 K/UL — SIGNIFICANT CHANGE UP (ref 0–0.9)
MONOCYTES NFR BLD AUTO: 9.3 % — SIGNIFICANT CHANGE UP (ref 2–14)
NEUTROPHILS # BLD AUTO: 4.75 K/UL — SIGNIFICANT CHANGE UP (ref 1.8–7.4)
NEUTROPHILS NFR BLD AUTO: 73.7 % — SIGNIFICANT CHANGE UP (ref 43–77)
NRBC # BLD: 0 /100 WBCS — SIGNIFICANT CHANGE UP (ref 0–0)
PLATELET # BLD AUTO: 94 K/UL — LOW (ref 150–400)
POTASSIUM SERPL-SCNC: 4.8 MMOL/L
PROT SERPL-MCNC: 5.5 G/DL
RBC # BLD: 3.19 M/UL — LOW (ref 3.8–5.2)
RBC # FLD: 19.7 % — HIGH (ref 10.3–14.5)
SODIUM SERPL-SCNC: 140 MMOL/L
TACROLIMUS SERPL-MCNC: 11.2 NG/ML
WBC # BLD: 6.44 K/UL — SIGNIFICANT CHANGE UP (ref 3.8–10.5)
WBC # FLD AUTO: 6.44 K/UL — SIGNIFICANT CHANGE UP (ref 3.8–10.5)

## 2024-05-02 PROCEDURE — 99215 OFFICE O/P EST HI 40 MIN: CPT

## 2024-05-02 RX ORDER — FLUOCINONIDE 0.5 MG/G
0.05 CREAM TOPICAL TWICE DAILY
Qty: 1 | Refills: 0 | Status: ACTIVE | COMMUNITY
Start: 2024-05-02 | End: 1900-01-01

## 2024-05-02 RX ORDER — AA/PROT/LYSINE/METHIO/VIT C/B6 50-12.5 MG
133 TABLET ORAL
Qty: 90 | Refills: 2 | Status: ACTIVE | COMMUNITY
Start: 2024-03-28 | End: 1900-01-01

## 2024-05-02 RX ORDER — MYCOPHENOLATE MOFETIL 500 MG/1
500 TABLET ORAL
Qty: 3 | Refills: 0 | Status: DISCONTINUED | COMMUNITY
Start: 2024-03-28 | End: 2024-05-02

## 2024-05-02 RX ORDER — TACROLIMUS 0.5 MG/1
0.5 CAPSULE ORAL
Qty: 120 | Refills: 0 | Status: ACTIVE | COMMUNITY
Start: 2024-03-28

## 2024-05-02 RX ORDER — TACROLIMUS 1 MG/1
1 CAPSULE ORAL
Qty: 180 | Refills: 0 | Status: ACTIVE | COMMUNITY
Start: 2024-03-28

## 2024-05-02 NOTE — ASSESSMENT
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 Her disease status at time of transplant was CR. 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 Status post haplo identical BMT from her son on 3/6/24.  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 No indication for transfusions today. WBC 6.44  H/H 10.2/31.4  PLT 94  ANC 4.75  3) ID Continue ppx: Valacyclovir 500 mg BID Mepron 750mg/5ml- take 10 ml daily  4) GVHD Skin 1 Liver 0 GI 0 - Overall grade 1 Date of onset of acute GVHD 5/2/24.  Tacrolimus 2 mg BID. HELD 5/2/24 AM DOSE and resumed 5/2/24 PM dose.  Discontinued hydrocortisone cream and prescribed lidex cream BID on 5/2/24.  Cellcept 1000 mg TID- Took through 4/10/24 then stopped. DISCONTINUED Reviewed signs and symptoms of GVHD with patient.  4/23/24: CMV PCR negative. Check weekly.  5) GI Continue ppx: Omeprazole 20 mg daily Zofran 8 mg every 8 hours prn Imodium prn. Encouraged patient to eat small frequent meals and drink two liters of fluids daily. Reminded patient to drink two liters of fluid daily on 5/2/24. Patient did not want IV fluids on 5/2/24. Repeat labs on 5/6/24.  Transaminitis: Continue to monitor  6) Other Continue: MG Plus Protein 133 mg- take 3 tablets daily.  Losartan Potassium 100 mg daily Lexapro 20 mg daily 1 Liter of IVF administered on (4/23/24) for Creatinine level -1.44. Repeated labs at [place] on 4/26/24 (Creatinine 1.40). Creatinine on 5/2/24 was 1.70. Patient does not want to receive IV fluids on 5/2/24 but will drink two liters of water as instructed. HELD tacrolimus AM dose on 5/2/24 and resumed 5/2/24 PM dose.  Continue post transplant diet and crowd restrictions. No take out food at this time except an uncut cheese pizza pie.  Questions and concerns addressed. Reassurance provided. Follow up weekly provider appointments until day 100 post-transplant Port flush every 6-8 weeks... will be flushed today (4/23/24)...next due 6/4/24 Follow up on 5/6/24 with NP Francisca AVILA examined patient under 's supervision and  agrees to plan of care as listed above

## 2024-05-02 NOTE — REVIEW OF SYSTEMS
[Fatigue] : fatigue [Muscle Weakness] : muscle weakness [Skin Rash] : skin rash [Negative] : Gastrointestinal [Fever] : no fever [Chills] : no chills [Night Sweats] : no night sweats [Recent Change In Weight] : ~T no recent weight change [Abdominal Pain] : no abdominal pain [Vomiting] : no vomiting [Constipation] : no constipation [Joint Pain] : no joint pain [Joint Stiffness] : no joint stiffness [Muscle Pain] : no muscle pain [Skin Wound] : no skin wound [de-identified] : Mild erythematous rash of face, scalp and posterior neck.

## 2024-05-02 NOTE — HISTORY OF PRESENT ILLNESS
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 Her  disease status at time of transplant was CR. 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 Status post haplo identical BMT from her son on 3/6/24.  [de-identified] : Last office visit: 4/23/24 Primary Oncologist: Dr Bowles  Reason for visit: Follow up status post haplo identical BMT from her son on 3/6/24. Day +57 today.   Since last visit: Dysuria is improving after finishing course of levaquin. Mild erythematous rash of face, scalp, posterior neck. Patient has been applying hydrocortisone cream BID. Denies fever, chills, vomiting, diarrhea, mouth sores or any signs of active bleeding. Denies SOB or chest pain.   Medications: Tacrolimus 2 mg BID Cellcept 1000 mg TID-Took through 4/10/24 then stopped  Valacyclovir 500 mg BID Mepron 750mg/5ml- Take 10 ml daily MG plus protein 133 mg- 3 tablets daily Losartan potassium 100 mg daily Lexapro 20 mg daily Omeprazole 20 mg daily Zofran 8 mg every 8 hours prn  Examination: Articulate and in no acute distress No occiput, poster cervical, anterior cervical, submandibular, sublingual, submental, supraclavicular nor axillary adenopathy Lungs: Clear Cardiac: without rubs Abd: soft and non-tender. No inguinal nor femoral adenopathy No sig peripheral edema Skin: Mild erythematous rash of face, scalp and posterior neck.

## 2024-05-02 NOTE — PHYSICAL EXAM
[Restricted in physically strenuous activity but ambulatory and able to carry out work of a light or sedentary nature] : Status 1- Restricted in physically strenuous activity but ambulatory and able to carry out work of a light or sedentary nature, e.g., light house work, office work [Normal] : affect appropriate [] :  [< 2 mg/dl] : Liver: < 2 mg/dl [No or intermittent] : Upper GI: No or intermittent nausea, vomiting or anorexia [<500 ml/day or < 3 episodes/day] : Lower GI (stool output/day): <500 ml/day or <3 episodes/day [No] : No [Yes] : Yes [Maculopapular rash < 25% BSA] : Skin: Maculopapular rash < 25% BSA [I] : I [de-identified] : Chest wall Mediport [de-identified] : Mild erythematous rash of face, scalp and posterior neck [FreeTextEntry1] : 3/6/24 [OnsetofaGVHD] : 05/02/2024 [Acute_GVHD] : 05/02/2024

## 2024-05-03 LAB
CMV DNA SPEC QL NAA+PROBE: NOT DETECTED IU/ML
CMVPCR LOG: NOT DETECTED LOG10IU/ML

## 2024-05-05 ENCOUNTER — NON-APPOINTMENT (OUTPATIENT)
Age: 64
End: 2024-05-05

## 2024-05-06 ENCOUNTER — APPOINTMENT (OUTPATIENT)
Dept: HEMATOLOGY ONCOLOGY | Facility: CLINIC | Age: 64
End: 2024-05-06
Payer: MEDICARE

## 2024-05-06 ENCOUNTER — RESULT REVIEW (OUTPATIENT)
Age: 64
End: 2024-05-06

## 2024-05-06 ENCOUNTER — APPOINTMENT (OUTPATIENT)
Dept: HEMATOLOGY ONCOLOGY | Facility: CLINIC | Age: 64
End: 2024-05-06

## 2024-05-06 VITALS
OXYGEN SATURATION: 96 % | HEIGHT: 62.01 IN | TEMPERATURE: 98.3 F | DIASTOLIC BLOOD PRESSURE: 85 MMHG | RESPIRATION RATE: 16 BRPM | HEART RATE: 84 BPM | WEIGHT: 186.29 LBS | BODY MASS INDEX: 33.85 KG/M2 | SYSTOLIC BLOOD PRESSURE: 140 MMHG

## 2024-05-06 DIAGNOSIS — C91.00 ACUTE LYMPHOBLASTIC LEUKEMIA NOT HAVING ACHIEVED REMISSION: ICD-10-CM

## 2024-05-06 LAB
ALBUMIN SERPL ELPH-MCNC: 3.9 G/DL
ALP BLD-CCNC: 92 U/L
ALT SERPL-CCNC: 40 U/L
ANION GAP SERPL CALC-SCNC: 9 MMOL/L
AST SERPL-CCNC: 52 U/L
BASOPHILS # BLD AUTO: 0.08 K/UL — SIGNIFICANT CHANGE UP (ref 0–0.2)
BASOPHILS NFR BLD AUTO: 1.6 % — SIGNIFICANT CHANGE UP (ref 0–2)
BILIRUB SERPL-MCNC: 0.5 MG/DL
BUN SERPL-MCNC: 11 MG/DL
CALCIUM SERPL-MCNC: 9.7 MG/DL
CHLORIDE SERPL-SCNC: 104 MMOL/L
CHOLEST SERPL-MCNC: 315 MG/DL
CO2 SERPL-SCNC: 25 MMOL/L
CREAT SERPL-MCNC: 1.36 MG/DL
EGFR: 43 ML/MIN/1.73M2
EOSINOPHIL # BLD AUTO: 0.39 K/UL — SIGNIFICANT CHANGE UP (ref 0–0.5)
EOSINOPHIL NFR BLD AUTO: 8 % — HIGH (ref 0–6)
GLUCOSE SERPL-MCNC: 124 MG/DL
HCT VFR BLD CALC: 30.9 % — LOW (ref 34.5–45)
HDLC SERPL-MCNC: 33 MG/DL
HGB BLD-MCNC: 10.2 G/DL — LOW (ref 11.5–15.5)
IMM GRANULOCYTES NFR BLD AUTO: 0.4 % — SIGNIFICANT CHANGE UP (ref 0–0.9)
LDLC SERPL CALC-MCNC: 211 MG/DL
LYMPHOCYTES # BLD AUTO: 0.67 K/UL — LOW (ref 1–3.3)
LYMPHOCYTES # BLD AUTO: 13.8 % — SIGNIFICANT CHANGE UP (ref 13–44)
MAGNESIUM SERPL-MCNC: 1.8 MG/DL
MCHC RBC-ENTMCNC: 32.3 PG — SIGNIFICANT CHANGE UP (ref 27–34)
MCHC RBC-ENTMCNC: 33 G/DL — SIGNIFICANT CHANGE UP (ref 32–36)
MCV RBC AUTO: 97.8 FL — SIGNIFICANT CHANGE UP (ref 80–100)
MONOCYTES # BLD AUTO: 0.68 K/UL — SIGNIFICANT CHANGE UP (ref 0–0.9)
MONOCYTES NFR BLD AUTO: 14 % — SIGNIFICANT CHANGE UP (ref 2–14)
NEUTROPHILS # BLD AUTO: 3.02 K/UL — SIGNIFICANT CHANGE UP (ref 1.8–7.4)
NEUTROPHILS NFR BLD AUTO: 62.2 % — SIGNIFICANT CHANGE UP (ref 43–77)
NONHDLC SERPL-MCNC: 282 MG/DL
NRBC # BLD: 0 /100 WBCS — SIGNIFICANT CHANGE UP (ref 0–0)
PLATELET # BLD AUTO: 95 K/UL — LOW (ref 150–400)
POTASSIUM SERPL-SCNC: 4.3 MMOL/L
PROT SERPL-MCNC: 5.9 G/DL
RBC # BLD: 3.16 M/UL — LOW (ref 3.8–5.2)
RBC # FLD: 19.5 % — HIGH (ref 10.3–14.5)
SODIUM SERPL-SCNC: 137 MMOL/L
TACROLIMUS SERPL-MCNC: 10 NG/ML
TRIGL SERPL-MCNC: 344 MG/DL
WBC # BLD: 4.86 K/UL — SIGNIFICANT CHANGE UP (ref 3.8–10.5)
WBC # FLD AUTO: 4.86 K/UL — SIGNIFICANT CHANGE UP (ref 3.8–10.5)

## 2024-05-06 PROCEDURE — 99215 OFFICE O/P EST HI 40 MIN: CPT

## 2024-05-07 LAB
CMV DNA SPEC QL NAA+PROBE: NOT DETECTED IU/ML
CMVPCR LOG: NOT DETECTED LOG10IU/ML

## 2024-05-07 RX ORDER — SIMVASTATIN 80 MG/1
80 TABLET, FILM COATED ORAL
Qty: 30 | Refills: 0 | Status: ACTIVE | COMMUNITY

## 2024-05-07 NOTE — REVIEW OF SYSTEMS
[Fatigue] : fatigue [Muscle Weakness] : muscle weakness [Skin Rash] : skin rash [Negative] : Heme/Lymph [Fever] : no fever [Chills] : no chills [Night Sweats] : no night sweats [Recent Change In Weight] : ~T no recent weight change [Abdominal Pain] : no abdominal pain [Vomiting] : no vomiting [Constipation] : no constipation [Joint Pain] : no joint pain [Joint Stiffness] : no joint stiffness [Muscle Pain] : no muscle pain [Skin Wound] : no skin wound [de-identified] : Mild erythematous rash of face, scalp and posterior neck improving.

## 2024-05-07 NOTE — PHYSICAL EXAM
[Restricted in physically strenuous activity but ambulatory and able to carry out work of a light or sedentary nature] : Status 1- Restricted in physically strenuous activity but ambulatory and able to carry out work of a light or sedentary nature, e.g., light house work, office work [Normal] : affect appropriate [Yes] : Yes [] :  [Maculopapular rash < 25% BSA] : Skin: Maculopapular rash < 25% BSA [< 2 mg/dl] : Liver: < 2 mg/dl [No or intermittent] : Upper GI: No or intermittent nausea, vomiting or anorexia [<500 ml/day or < 3 episodes/day] : Lower GI (stool output/day): <500 ml/day or <3 episodes/day [I] : I [No] : No [de-identified] : Chest wall Mediport [de-identified] : Mild erythematous rash of face, scalp and posterior neck improving  [FreeTextEntry1] : 3/6/24 [OnsetofaGVHD] : 05/02/2024 [Acute_GVHD] : 05/02/2024

## 2024-05-07 NOTE — ASSESSMENT
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 Her disease status at time of transplant was CR. 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 Status post haplo identical BMT from her son on 3/6/24.  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 No indication for transfusions today. WBC 4.86  H/H 10.2/30.9  PLT 95  ANC 3.02  3) ID Continue ppx: Valacyclovir 500 mg BID Mepron 750mg/5ml- take 10 ml daily  4) GVHD Skin 1 Liver 0 GI 0 - Overall grade 1 Date of onset of acute GVHD 5/2/24.  Tacrolimus 2 mg BID. HELD 5/2/24 AM DOSE and resumed 5/2/24 PM dose. Continue tacrolimus 2 mg BID. Discontinued hydrocortisone cream and prescribed lidex cream BID on 5/2/24. Continue lidex cream BID.  Cellcept 1000 mg TID- Took through 4/10/24 then stopped. DISCONTINUED Reviewed signs and symptoms of GVHD with patient.  5/2/24: CMV PCR negative. Check weekly.  5) GI Continue ppx: Omeprazole 20 mg daily Zofran 8 mg every 8 hours prn Imodium prn. Encouraged patient to eat small frequent meals and drink two liters of fluids daily.  Transaminitis: Continue to monitor HLD- Resumed simvastatin 80 mg daily on 5/7/24.  6) Other Continue: MG Plus Protein 133 mg- take 3 tablets daily.  Losartan Potassium 100 mg daily Lexapro 20 mg daily 1 Liter of IVF administered on (4/23/24) for Creatinine level -1.44.  Continue post transplant diet and crowd restrictions. No take out food at this time except an uncut cheese pizza pie.  Questions and concerns addressed. Reassurance provided. Follow up weekly provider appointments until day 100 post-transplant Port flush every 6-8 weeks... will be flushed today (4/23/24)...next due 6/4/24 Follow up in one week with NP Francisca   I examined patient under 's supervision and  agrees to plan of care as listed above

## 2024-05-07 NOTE — HISTORY OF PRESENT ILLNESS
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 Her  disease status at time of transplant was CR. 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 Status post haplo identical BMT from her son on 3/6/24.  [de-identified] : Last office visit: 5/2/24 Primary Oncologist: Dr Bowles  Reason for visit: Follow up status post haplo identical BMT from her son on 3/6/24. Day +61 today.   Since last visit: Mild erythematous rash of face, scalp, posterior neck improving with lidex cream. Denies fever, chills, vomiting, diarrhea, mouth sores or any signs of active bleeding. Denies SOB or chest pain.   Medications: Tacrolimus 2 mg BID Cellcept 1000 mg TID-Took through 4/10/24 then stopped. DISCONTINUED. Valacyclovir 500 mg BID Mepron 750mg/5ml- Take 10 ml daily MG plus protein 133 mg- 3 tablets daily Losartan potassium 100 mg daily Lexapro 20 mg daily Omeprazole 20 mg daily Zofran 8 mg every 8 hours prn  Examination: Articulate and in no acute distress No occiput, poster cervical, anterior cervical, submandibular, sublingual, submental, supraclavicular nor axillary adenopathy Lungs: Clear Cardiac: without rubs Abd: soft and non-tender. No inguinal nor femoral adenopathy No sig peripheral edema Skin: Mild erythematous rash of face, scalp and posterior neck improving.

## 2024-05-13 ENCOUNTER — RESULT REVIEW (OUTPATIENT)
Age: 64
End: 2024-05-13

## 2024-05-13 ENCOUNTER — APPOINTMENT (OUTPATIENT)
Dept: HEMATOLOGY ONCOLOGY | Facility: CLINIC | Age: 64
End: 2024-05-13

## 2024-05-13 ENCOUNTER — APPOINTMENT (OUTPATIENT)
Dept: HEMATOLOGY ONCOLOGY | Facility: CLINIC | Age: 64
End: 2024-05-13
Payer: MEDICARE

## 2024-05-13 VITALS
BODY MASS INDEX: 34.06 KG/M2 | OXYGEN SATURATION: 99 % | HEART RATE: 67 BPM | RESPIRATION RATE: 14 BRPM | DIASTOLIC BLOOD PRESSURE: 82 MMHG | WEIGHT: 186.29 LBS | SYSTOLIC BLOOD PRESSURE: 139 MMHG | TEMPERATURE: 97.7 F

## 2024-05-13 LAB
ALBUMIN SERPL ELPH-MCNC: 3.9 G/DL
ALP BLD-CCNC: 90 U/L
ALT SERPL-CCNC: 32 U/L
ANION GAP SERPL CALC-SCNC: 10 MMOL/L
AST SERPL-CCNC: 40 U/L
BASOPHILS # BLD AUTO: 0.04 K/UL — SIGNIFICANT CHANGE UP (ref 0–0.2)
BASOPHILS NFR BLD AUTO: 0.7 % — SIGNIFICANT CHANGE UP (ref 0–2)
BILIRUB SERPL-MCNC: 0.6 MG/DL
BUN SERPL-MCNC: 13 MG/DL
CALCIUM SERPL-MCNC: 9.7 MG/DL
CHLORIDE SERPL-SCNC: 106 MMOL/L
CO2 SERPL-SCNC: 24 MMOL/L
CREAT SERPL-MCNC: 1.23 MG/DL
EGFR: 48 ML/MIN/1.73M2
EOSINOPHIL # BLD AUTO: 0.3 K/UL — SIGNIFICANT CHANGE UP (ref 0–0.5)
EOSINOPHIL NFR BLD AUTO: 5 % — SIGNIFICANT CHANGE UP (ref 0–6)
GLUCOSE SERPL-MCNC: 118 MG/DL
HCT VFR BLD CALC: 29.6 % — LOW (ref 34.5–45)
HGB BLD-MCNC: 9.8 G/DL — LOW (ref 11.5–15.5)
IMM GRANULOCYTES NFR BLD AUTO: 0.5 % — SIGNIFICANT CHANGE UP (ref 0–0.9)
LYMPHOCYTES # BLD AUTO: 1 K/UL — SIGNIFICANT CHANGE UP (ref 1–3.3)
LYMPHOCYTES # BLD AUTO: 16.7 % — SIGNIFICANT CHANGE UP (ref 13–44)
MAGNESIUM SERPL-MCNC: 1.6 MG/DL
MCHC RBC-ENTMCNC: 33.1 G/DL — SIGNIFICANT CHANGE UP (ref 32–36)
MCHC RBC-ENTMCNC: 33.2 PG — SIGNIFICANT CHANGE UP (ref 27–34)
MCV RBC AUTO: 100.3 FL — HIGH (ref 80–100)
MONOCYTES # BLD AUTO: 0.47 K/UL — SIGNIFICANT CHANGE UP (ref 0–0.9)
MONOCYTES NFR BLD AUTO: 7.9 % — SIGNIFICANT CHANGE UP (ref 2–14)
NEUTROPHILS # BLD AUTO: 4.14 K/UL — SIGNIFICANT CHANGE UP (ref 1.8–7.4)
NEUTROPHILS NFR BLD AUTO: 69.2 % — SIGNIFICANT CHANGE UP (ref 43–77)
NRBC # BLD: 0 /100 WBCS — SIGNIFICANT CHANGE UP (ref 0–0)
PLATELET # BLD AUTO: 69 K/UL — LOW (ref 150–400)
POTASSIUM SERPL-SCNC: 4.8 MMOL/L
PROT SERPL-MCNC: 5.8 G/DL
RBC # BLD: 2.95 M/UL — LOW (ref 3.8–5.2)
RBC # FLD: 18.4 % — HIGH (ref 10.3–14.5)
SODIUM SERPL-SCNC: 139 MMOL/L
WBC # BLD: 5.98 K/UL — SIGNIFICANT CHANGE UP (ref 3.8–10.5)
WBC # FLD AUTO: 5.98 K/UL — SIGNIFICANT CHANGE UP (ref 3.8–10.5)

## 2024-05-13 PROCEDURE — 99214 OFFICE O/P EST MOD 30 MIN: CPT

## 2024-05-13 PROCEDURE — G2211 COMPLEX E/M VISIT ADD ON: CPT

## 2024-05-13 RX ORDER — LEVOFLOXACIN 250 MG/1
250 TABLET, FILM COATED ORAL
Qty: 2 | Refills: 0 | Status: COMPLETED | COMMUNITY
Start: 2024-04-27 | End: 2024-05-02

## 2024-05-13 NOTE — ASSESSMENT
[FreeTextEntry1] : Assessment: 64-year-old day 68 (3/6/24) post CATRACHITA haplo Ph - ALL.  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  Plan: Heme:  Counts stable  ID Valacyclovir 500 mg BID Mepron 750mg/5ml- take 10 ml daily  GVHD: no actvie dermal GVHD. tacro (5/6/24): 10.0 MG Plus Protein 133 mg- take 3 tablets daily. lidex cream BID on 5/2/24. Continue lidex cream BID start tacro taper 1mg bid x 10 days then stop.   repeat bone marrow between days 90 -100.   GI Continue ppx: Omeprazole 20 mg daily Zofran 8 mg every 8 hours prn Imodium prn. simvastatin 80 mg daily on 5/7/24.  HTN: Losartan Potassium 100 mg daily  Psych: Lexapro 20 mg daily  Over 35 minutes were spent in direct patient care with greater than 50% discussing her GVHD and follow-up.

## 2024-05-13 NOTE — HISTORY OF PRESENT ILLNESS
[de-identified] : This is Stephanie first visit from BMT ACP team [de-identified] : Reason for visit: aGVHD evaluation  Since last visit: feeling well.    Medications: Tacrolimus 2 mg BID Valacyclovir 500 mg BID Mepron 750mg/5ml- Take 10 ml daily MG plus protein 133 mg- 3 tablets daily Losartan potassium 100 mg daily Lexapro 20 mg daily Omeprazole 20 mg daily simvastatin 80mg qd  Zofran 8 mg every 8 hours prn  Examination: Articulate and in no acute distress No occiput, poster cervical, anterior cervical, submandibular, sublingual, submental, supraclavicular nor axillary adenopathy Lungs: Clear Cardiac: without rubs Abd: soft and non-tender. No inguinal nor femoral adenopathy No sig peripheral edema Skin: No evidence of dermal aGVHD.   Data: Acute GVHD Staging and Grading Worksheet   Transplant Date (MM/DD/YY): 3/6/24 Acute GVHD: Yes Onset of aGVHD Date 05/02/2024;  Maximum Grade Date  05/02/2024, Max Grade: I On Immunosuppression: Yes, Taper: No Current aGVHD Treatment:   Acute GVHD Staging and Grading - MAGIC Criteria Skin: Maculopapular rash < 25% BSA Liver: < 2 mg/dl Upper GI: No or intermittent nausea, vomiting or anorexia Lower GI (stool output/day): <500 ml/day or <3 episodes/day Overall clinical grade (based upon most severe target organ involvement): I  Evidence of Chronic GVHD symptoms? No

## 2024-05-14 ENCOUNTER — APPOINTMENT (OUTPATIENT)
Dept: OPHTHALMOLOGY | Facility: CLINIC | Age: 64
End: 2024-05-14

## 2024-05-14 LAB
CMV DNA SPEC QL NAA+PROBE: NOT DETECTED IU/ML
CMVPCR LOG: NOT DETECTED LOG10IU/ML
TACROLIMUS SERPL-MCNC: 8.1 NG/ML

## 2024-05-20 ENCOUNTER — NON-APPOINTMENT (OUTPATIENT)
Age: 64
End: 2024-05-20

## 2024-05-20 RX ORDER — PREDNISONE 20 MG/1
20 TABLET ORAL DAILY
Qty: 12 | Refills: 0 | Status: ACTIVE | COMMUNITY
Start: 2024-05-20 | End: 1900-01-01

## 2024-05-20 RX ORDER — RUXOLITINIB 5 MG/1
5 TABLET ORAL TWICE DAILY
Qty: 6 | Refills: 0 | Status: ACTIVE | COMMUNITY
Start: 2024-05-20 | End: 1900-01-01

## 2024-05-21 ENCOUNTER — RESULT REVIEW (OUTPATIENT)
Age: 64
End: 2024-05-21

## 2024-05-21 ENCOUNTER — APPOINTMENT (OUTPATIENT)
Dept: HEMATOLOGY ONCOLOGY | Facility: CLINIC | Age: 64
End: 2024-05-21
Payer: MEDICARE

## 2024-05-21 ENCOUNTER — APPOINTMENT (OUTPATIENT)
Dept: HEMATOLOGY ONCOLOGY | Facility: CLINIC | Age: 64
End: 2024-05-21

## 2024-05-21 ENCOUNTER — NON-APPOINTMENT (OUTPATIENT)
Age: 64
End: 2024-05-21

## 2024-05-21 VITALS
TEMPERATURE: 97.7 F | BODY MASS INDEX: 34.79 KG/M2 | WEIGHT: 190.26 LBS | HEART RATE: 69 BPM | SYSTOLIC BLOOD PRESSURE: 138 MMHG | RESPIRATION RATE: 16 BRPM | OXYGEN SATURATION: 98 % | DIASTOLIC BLOOD PRESSURE: 83 MMHG

## 2024-05-21 LAB
ALBUMIN SERPL ELPH-MCNC: 3.8 G/DL
ALP BLD-CCNC: 96 U/L
ALT SERPL-CCNC: 15 U/L
ANION GAP SERPL CALC-SCNC: 8 MMOL/L
AST SERPL-CCNC: 19 U/L
BASOPHILS # BLD AUTO: 0.03 K/UL — SIGNIFICANT CHANGE UP (ref 0–0.2)
BASOPHILS NFR BLD AUTO: 0.5 % — SIGNIFICANT CHANGE UP (ref 0–2)
BILIRUB SERPL-MCNC: 0.5 MG/DL
BUN SERPL-MCNC: 13 MG/DL
CALCIUM SERPL-MCNC: 9.1 MG/DL
CHLORIDE SERPL-SCNC: 105 MMOL/L
CO2 SERPL-SCNC: 25 MMOL/L
CREAT SERPL-MCNC: 1.04 MG/DL
EGFR: 59 ML/MIN/1.73M2
EOSINOPHIL # BLD AUTO: 0.58 K/UL — HIGH (ref 0–0.5)
EOSINOPHIL NFR BLD AUTO: 9.9 % — HIGH (ref 0–6)
GLUCOSE SERPL-MCNC: 106 MG/DL
HCT VFR BLD CALC: 28.3 % — LOW (ref 34.5–45)
HGB BLD-MCNC: 9.5 G/DL — LOW (ref 11.5–15.5)
IMM GRANULOCYTES NFR BLD AUTO: 0.7 % — SIGNIFICANT CHANGE UP (ref 0–0.9)
LYMPHOCYTES # BLD AUTO: 0.67 K/UL — LOW (ref 1–3.3)
LYMPHOCYTES # BLD AUTO: 11.4 % — LOW (ref 13–44)
MCHC RBC-ENTMCNC: 33.2 PG — SIGNIFICANT CHANGE UP (ref 27–34)
MCHC RBC-ENTMCNC: 33.6 G/DL — SIGNIFICANT CHANGE UP (ref 32–36)
MCV RBC AUTO: 99 FL — SIGNIFICANT CHANGE UP (ref 80–100)
MONOCYTES # BLD AUTO: 0.47 K/UL — SIGNIFICANT CHANGE UP (ref 0–0.9)
MONOCYTES NFR BLD AUTO: 8 % — SIGNIFICANT CHANGE UP (ref 2–14)
NEUTROPHILS # BLD AUTO: 4.08 K/UL — SIGNIFICANT CHANGE UP (ref 1.8–7.4)
NEUTROPHILS NFR BLD AUTO: 69.5 % — SIGNIFICANT CHANGE UP (ref 43–77)
NRBC # BLD: 0 /100 WBCS — SIGNIFICANT CHANGE UP (ref 0–0)
PLATELET # BLD AUTO: 53 K/UL — LOW (ref 150–400)
POTASSIUM SERPL-SCNC: 4.6 MMOL/L
PROT SERPL-MCNC: 5.3 G/DL
RBC # BLD: 2.86 M/UL — LOW (ref 3.8–5.2)
RBC # FLD: 17 % — HIGH (ref 10.3–14.5)
SODIUM SERPL-SCNC: 139 MMOL/L
WBC # BLD: 5.87 K/UL — SIGNIFICANT CHANGE UP (ref 3.8–10.5)
WBC # FLD AUTO: 5.87 K/UL — SIGNIFICANT CHANGE UP (ref 3.8–10.5)

## 2024-05-21 PROCEDURE — 99214 OFFICE O/P EST MOD 30 MIN: CPT

## 2024-05-21 PROCEDURE — G2211 COMPLEX E/M VISIT ADD ON: CPT

## 2024-05-21 RX ORDER — PREDNISONE 20 MG/1
20 TABLET ORAL
Qty: 36 | Refills: 0 | Status: ACTIVE | COMMUNITY
Start: 2024-05-21 | End: 1900-01-01

## 2024-05-21 RX ORDER — RUXOLITINIB 5 MG/1
5 TABLET ORAL TWICE DAILY
Qty: 10 | Refills: 0 | Status: ACTIVE | COMMUNITY
Start: 2024-05-21 | End: 1900-01-01

## 2024-05-24 ENCOUNTER — APPOINTMENT (OUTPATIENT)
Dept: HEMATOLOGY ONCOLOGY | Facility: CLINIC | Age: 64
End: 2024-05-24

## 2024-05-24 ENCOUNTER — OUTPATIENT (OUTPATIENT)
Dept: OUTPATIENT SERVICES | Facility: HOSPITAL | Age: 64
LOS: 1 days | Discharge: HOME | End: 2024-05-24

## 2024-05-24 DIAGNOSIS — Z98.891 HISTORY OF UTERINE SCAR FROM PREVIOUS SURGERY: Chronic | ICD-10-CM

## 2024-05-24 DIAGNOSIS — C95.90 LEUKEMIA, UNSPECIFIED NOT HAVING ACHIEVED REMISSION: ICD-10-CM

## 2024-05-27 ENCOUNTER — NON-APPOINTMENT (OUTPATIENT)
Age: 64
End: 2024-05-27

## 2024-05-28 ENCOUNTER — RESULT REVIEW (OUTPATIENT)
Age: 64
End: 2024-05-28

## 2024-05-28 ENCOUNTER — APPOINTMENT (OUTPATIENT)
Dept: HEMATOLOGY ONCOLOGY | Facility: CLINIC | Age: 64
End: 2024-05-28

## 2024-05-28 ENCOUNTER — APPOINTMENT (OUTPATIENT)
Dept: HEMATOLOGY ONCOLOGY | Facility: CLINIC | Age: 64
End: 2024-05-28
Payer: MEDICARE

## 2024-05-28 PROCEDURE — 85027 COMPLETE CBC AUTOMATED: CPT

## 2024-05-28 PROCEDURE — G2211 COMPLEX E/M VISIT ADD ON: CPT

## 2024-05-28 PROCEDURE — 99202 OFFICE O/P NEW SF 15 MIN: CPT

## 2024-05-28 RX ORDER — SULFAMETHOXAZOLE AND TRIMETHOPRIM 400; 80 MG/1; MG/1
400-80 TABLET ORAL DAILY
Qty: 30 | Refills: 6 | Status: ACTIVE | COMMUNITY
Start: 2024-05-28 | End: 1900-01-01

## 2024-05-28 NOTE — ASSESSMENT
[FreeTextEntry1] : Assessment: 64-year-old day 76 (3/6/24) post CATRACHITA haplo Ph - ALL.  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  Plan: Heme: mild thrombocytopenia, mild anemia  Repeat CBC requested for 24 repeat bone marrow between days 90 -100.  ID Valacyclovir 500 mg BID Mepron 750mg/5ml- take 10 ml daily  GVHD:  1.5mg/kg prednisone (86kg) == 130mg prednisone then 1.0mg/kg x 3 days then 0.5mg/kg x 3 days.  start jakfii 5mg bid through   24 when PLT count will be repeat locally.  tacro 2mg bid - contiinue current dose during transition MG Plus Protein 133 mg- take 3 tablets daily.  GI: Omeprazole 20 mg daily  HLD: simvastatin 80 mg daily  HTN: Losartan 100 mg daily  Psych: Lexapro 20 mg daily  Over 35 minutes were spent in direct patient care with greater than 50% discussing her GVHD and follow-up.     Addendum I: Jakafi basics Starting dose: Jakafi is 5 mg bid. Increasing to 10 mg bid after at least 3 days of treatment if the ANC and platelet counts are not decreased by 50% or more relative to the first day of dosing with Jakafi.  Tapering of Jakafi after 6 months of treatment in patients with response who have discontinued therapeutic doses of corticosteroids. Taper Jakafi by one dose level approximately every 8 weeks (10 mg twice daily to 5 mg twice daily to 5 mg once daily).  Modify the Jakafi dosage when co-administered with strong CY inhibitors and fluconazole doses of less than or equal to 200 mg  Treatment with Jakafi can increases total cholesterol, low-density lipoprotein (LDL) cholesterol, and triglycerides - consider lipid analysis q 12 weeks Mild bruising, HA and dizziness are common.  Slight increase in zoster.

## 2024-05-28 NOTE — ASSESSMENT
[FreeTextEntry1] : Assessment: 64-year-old day 83 (3/6/24) post CATRACHITA haplo Ph - ALL.  Course complicated by dermal only aGVHD: Grade 3 skin overall stage 2 aGVHD; no cGVHD.   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  Plan: Heme: mild thrombocytopenia, mild anemia  repeat bone marrow between days 90 -100.  ID Valacyclovir 500 mg BID Change Mepron 750mg/5ml- take 10 ml daily to Bactrim SS daily -- she is not allergic to sulfa Vaccinations start at six months.  GVHD:  Day 2 of 3 of 0.5mg/kg prednisone (40mg), then 20mg for three days and then 10mg for three days. Jakfii 10 mg bid  D/C tacro and Mg .  GI: Omeprazole 20 mg daily  HLD: simvastatin 80 mg daily  HTN: Losartan 100 mg daily  Psych: Lexapro 20 mg daily  Over 25 minutes were spent in direct patient care with greater than 50% discussing her GVHD and follow-up.     Addendum I: Jakafi basics Starting dose: Jakafi is 5 mg bid. Increasing to 10 mg bid after at least 3 days of treatment if the ANC and platelet counts are not decreased by 50% or more relative to the first day of dosing with Jakafi.  Tapering of Jakafi after 6 months of treatment in patients with response who have discontinued therapeutic doses of corticosteroids. Taper Jakafi by one dose level approximately every 8 weeks (10 mg twice daily to 5 mg twice daily to 5 mg once daily).  Modify the Jakafi dosage when co-administered with strong CY inhibitors and fluconazole doses of less than or equal to 200 mg  Treatment with Jakafi can increases total cholesterol, low-density lipoprotein (LDL) cholesterol, and triglycerides - consider lipid analysis q 12 weeks Mild bruising, HA and dizziness are common.  Slight increase in zoster.

## 2024-05-28 NOTE — HISTORY OF PRESENT ILLNESS
[de-identified] : Stephanie was last seen: 5/21/24 This is telehealth visit Stephanie is at home. I am in [facility] This is a telehealth visit.   [de-identified] : Reason for visit: aGVHD evaluation  Since last visit: continued rash total body: but less intense  Medications: Tacrolimus 2 mg BID Valacyclovir 500 mg BID Mepron 750mg/5ml- Take 10 ml daily MG plus protein 133 mg- 3 tablets daily Losartan potassium 100 mg daily Lexapro 20 mg daily Omeprazole 20 mg daily simvastatin 80mg qd  Zofran 8 mg every 8 hours prn  Examination: Articulate and in no acute distress skin: total body rash c/w GVHD No occiput, poster cervical, anterior cervical, submandibular, sublingual, submental, supraclavicular nor axillary adenopathy Lungs: Clear Cardiac: without rubs Abd: soft and non-tender. No inguinal nor femoral adenopathy No sig peripheral edema  No evidence of Chronic GVHD  CBC 05/21/2024: WBC   5.9, Hgb   9.5, PLT 53,000 05/28/2024: WBC 13.9, Hgb 10.8, PLT 74,000

## 2024-05-28 NOTE — HISTORY OF PRESENT ILLNESS
[de-identified] : Stephanie was last seen: 5/13/24 [de-identified] : Reason for visit: aGVHD evaluation  Since last visit: progressive rash total body  Medications: Tacrolimus 2 mg BID Valacyclovir 500 mg BID Mepron 750mg/5ml- Take 10 ml daily MG plus protein 133 mg- 3 tablets daily Losartan potassium 100 mg daily Lexapro 20 mg daily Omeprazole 20 mg daily simvastatin 80mg qd  Zofran 8 mg every 8 hours prn  Examination: Articulate and in no acute distress skin: total body rash c/w GVHD No occiput, poster cervical, anterior cervical, submandibular, sublingual, submental, supraclavicular nor axillary adenopathy Lungs: Clear Cardiac: without rubs Abd: soft and non-tender. No inguinal nor femoral adenopathy No sig peripheral edema  Evidence of Chronic GVHD symptoms? No

## 2024-05-29 LAB
ALBUMIN SERPL ELPH-MCNC: 3.7 G/DL
ALP BLD-CCNC: 77 U/L
ALT SERPL-CCNC: 43 U/L
ANION GAP SERPL CALC-SCNC: 13 MMOL/L
AST SERPL-CCNC: 41 U/L
BILIRUB SERPL-MCNC: 0.3 MG/DL
BUN SERPL-MCNC: 31 MG/DL
CALCIUM SERPL-MCNC: 8.4 MG/DL
CHLORIDE SERPL-SCNC: 103 MMOL/L
CO2 SERPL-SCNC: 23 MMOL/L
CREAT SERPL-MCNC: 1.16 MG/DL
EGFR: 52 ML/MIN/1.73M2
GLUCOSE SERPL-MCNC: 129 MG/DL
POTASSIUM SERPL-SCNC: 4.1 MMOL/L
PROT SERPL-MCNC: 5.2 G/DL
SODIUM SERPL-SCNC: 138 MMOL/L

## 2024-05-29 NOTE — PRE-ANESTHESIA EVALUATION ADULT - HEART RATE (BEATS/MIN)
Date:  2024  Time:  10:53 AM    CHF Clinic at [facility]    Office: [phone] Fax: [phone]    Re:  Mickey Berg   Patient : 1959    Vital Signs: /62   Pulse 57   Resp 20   Wt 104.4 kg (230 lb 3.2 oz)   SpO2 96%   BMI 38.31 kg/m²                       O2 Device: None (Room air)                           No results for input(s): \"CBC\", \"HGB\", \"HCT\", \"WBC\", \"PLATELET\", \"NA\", \"K\", \"CL\", \"CO2\", \"BUN\", \"CREATININE\", \"GLUCOSE\", \"BNP\", \"INR\" in the last 72 hours.     Respiratory:         Breath Sounds  Right Upper Lobe: Clear  Right Middle Lobe: Clear  Right Lower Lobe: Clear  Left Upper Lobe: Clear  Left Lower Lobe: Clear    Cough/Sputum  Cough: Dry  Frequency: Occasional         Peripheral Vascular  RLE Edema: +1, Non-pitting  LLE Edema: +1, Non-pitting    Future Appointments   Date Time Provider Department Center   2024 12:15 PM Donte Rader DPM [facility] Podiatry [facility]   2024  2:00 PM Giorgio Morrison Jr., MD [facility]   2024  8:30 AM [facility]  [facility] [place]   2024  8:30 AM Alec Tejada MD [facility] med[facility]ha [facility]      Complaints: no new complaints    Physician Orders       Per Zenia Stark CNP 1) Refill for Bumex 2 mg tablet take one tablet BID escript sent to [facility] with 2 refills ordered   2) Check a BMP prior to next CHF Clinic visit 3) follow low 2 gm sodium diet and fluid restriction closely    Comment : Pt arrived per ambulation with cane from registration to the CHF Clinic. His wt is up 2.6 lbs in one month. VS and assessment as above. He states he ate some saurkraut and mashed potatoes. Also states he needs a refill on his Bumex 2 mg tablet takes one tab twice daily but has not run out of the Bumex yet.  Reinforced importance of follow low 2 gm sodium diet and fluid restriction of 2 liters daily. He has slight increased in swelling to left calf otherwise measurements are down from last visit. He  59

## 2024-05-30 ENCOUNTER — APPOINTMENT (OUTPATIENT)
Dept: HEMATOLOGY ONCOLOGY | Facility: CLINIC | Age: 64
End: 2024-05-30

## 2024-05-30 ENCOUNTER — APPOINTMENT (OUTPATIENT)
Dept: INFUSION THERAPY | Facility: HOSPITAL | Age: 64
End: 2024-05-30

## 2024-05-31 NOTE — PATIENT PROFILE ADULT - DEAF OR HARD OF HEARING?
Shared MYRA-ED Attending Visit.  CC: No            [facility] EMERGENCY DEPARTMENT  EMERGENCY DEPARTMENT ENCOUNTER        Pt Name: Gallito Chambers Jr.  MRN: [id number]  Birthdate 1995  Date of evaluation: 5/31/2024  Provider: ALISE Alanis - CNP  PCP: No primary care provider on file.  Note Started: 12:34 PM EDT 5/31/24    CHIEF COMPLAINT       Chief Complaint   Patient presents with    Abdominal Pain     Epigastric pain since last night.  Hx of pancreatitis       HISTORY OF PRESENT ILLNESS: 1 or more Elements   History From: pt & pt medical record        Gallito Chambers Jr. is a 28 y.o. male with past medical history of chronic pancreatitis, transaminitis, elevated LFTs, PTSD, alcohol abuse, who presents to the ED for evaluation of abdominal pain which started around 2330 last night.  Pain is located in the epigastric area as well as the right upper quadrant.  Constant, sharp. pain is worsened with palpation.  Relieved by nothing in particular. History of chronic pancreatitis in the past.reports last exacerbation was in April 2024 for which he was admitted for.  Patient additionally complaining of nausea and vomiting, reports 6 episodes today. Complains of diarrhea as well. Denies bilious vomiting, hematemesis or coffee ground emesis.  Patient reports decreased p.o. intake secondary to symptoms. Denies fever or chills.  Denies alcohol use currently. Denies drug use.  Denies chest pain or shortness of breath.  Denies back or flank pain. Patient does follow with GI Dr. Kramer.          Nursing Notes were all reviewed and agreed with or any disagreements were addressed in the HPI.      REVIEW OF EXTERNAL NOTE :    Inpatient admission reviewed 4/24/2024 for chronic pancreatitis  [place] reviewed - pt has received multiple scripts over the last several months for narcotics in [place] and [place].     REVIEW OF SYSTEMS :           Positives and Pertinent negatives as per HPI.     SURGICAL 
yes

## 2024-06-04 ENCOUNTER — RESULT REVIEW (OUTPATIENT)
Age: 64
End: 2024-06-04

## 2024-06-04 ENCOUNTER — LABORATORY RESULT (OUTPATIENT)
Age: 64
End: 2024-06-04

## 2024-06-04 ENCOUNTER — APPOINTMENT (OUTPATIENT)
Dept: HEMATOLOGY ONCOLOGY | Facility: CLINIC | Age: 64
End: 2024-06-04
Payer: MEDICARE

## 2024-06-04 ENCOUNTER — APPOINTMENT (OUTPATIENT)
Dept: INFUSION THERAPY | Facility: HOSPITAL | Age: 64
End: 2024-06-04

## 2024-06-04 VITALS
RESPIRATION RATE: 16 BRPM | TEMPERATURE: 98 F | WEIGHT: 189.58 LBS | BODY MASS INDEX: 34.66 KG/M2 | HEART RATE: 73 BPM | SYSTOLIC BLOOD PRESSURE: 137 MMHG | OXYGEN SATURATION: 98 % | DIASTOLIC BLOOD PRESSURE: 80 MMHG

## 2024-06-04 LAB
ALBUMIN SERPL ELPH-MCNC: 4.7 G/DL — SIGNIFICANT CHANGE UP (ref 3.3–5)
ALP SERPL-CCNC: 66 U/L — SIGNIFICANT CHANGE UP (ref 40–120)
ALT FLD-CCNC: 48 U/L — HIGH (ref 10–45)
ANION GAP SERPL CALC-SCNC: 13 MMOL/L — SIGNIFICANT CHANGE UP (ref 5–17)
AST SERPL-CCNC: 32 U/L — SIGNIFICANT CHANGE UP (ref 10–40)
BASOPHILS # BLD AUTO: 0.04 K/UL — SIGNIFICANT CHANGE UP (ref 0–0.2)
BASOPHILS NFR BLD AUTO: 0.3 % — SIGNIFICANT CHANGE UP (ref 0–2)
BILIRUB SERPL-MCNC: 0.3 MG/DL — SIGNIFICANT CHANGE UP (ref 0.2–1.2)
BUN SERPL-MCNC: 18 MG/DL — SIGNIFICANT CHANGE UP (ref 7–23)
CALCIUM SERPL-MCNC: 9.2 MG/DL — SIGNIFICANT CHANGE UP (ref 8.4–10.5)
CHLORIDE SERPL-SCNC: 103 MMOL/L — SIGNIFICANT CHANGE UP (ref 96–108)
CO2 SERPL-SCNC: 22 MMOL/L — SIGNIFICANT CHANGE UP (ref 22–31)
CREAT SERPL-MCNC: 1.18 MG/DL — SIGNIFICANT CHANGE UP (ref 0.5–1.3)
EGFR: 51 ML/MIN/1.73M2 — LOW
EOSINOPHIL # BLD AUTO: 0.21 K/UL — SIGNIFICANT CHANGE UP (ref 0–0.5)
EOSINOPHIL NFR BLD AUTO: 1.6 % — SIGNIFICANT CHANGE UP (ref 0–6)
GLUCOSE SERPL-MCNC: 106 MG/DL — HIGH (ref 70–99)
HCT VFR BLD CALC: 33.6 % — LOW (ref 34.5–45)
HGB BLD-MCNC: 10.8 G/DL — LOW (ref 11.5–15.5)
IMM GRANULOCYTES NFR BLD AUTO: 0.8 % — SIGNIFICANT CHANGE UP (ref 0–0.9)
LYMPHOCYTES # BLD AUTO: 0.39 K/UL — LOW (ref 1–3.3)
LYMPHOCYTES # BLD AUTO: 3.1 % — LOW (ref 13–44)
MCHC RBC-ENTMCNC: 32.1 G/DL — SIGNIFICANT CHANGE UP (ref 32–36)
MCHC RBC-ENTMCNC: 34.8 PG — HIGH (ref 27–34)
MCV RBC AUTO: 108.1 FL — HIGH (ref 80–100)
MONOCYTES # BLD AUTO: 0.37 K/UL — SIGNIFICANT CHANGE UP (ref 0–0.9)
MONOCYTES NFR BLD AUTO: 2.9 % — SIGNIFICANT CHANGE UP (ref 2–14)
NEUTROPHILS # BLD AUTO: 11.66 K/UL — HIGH (ref 1.8–7.4)
NEUTROPHILS NFR BLD AUTO: 91.3 % — HIGH (ref 43–77)
NRBC # BLD: 0 /100 WBCS — SIGNIFICANT CHANGE UP (ref 0–0)
PLATELET # BLD AUTO: 85 K/UL — LOW (ref 150–400)
POTASSIUM SERPL-MCNC: 5 MMOL/L — SIGNIFICANT CHANGE UP (ref 3.5–5.3)
POTASSIUM SERPL-SCNC: 5 MMOL/L — SIGNIFICANT CHANGE UP (ref 3.5–5.3)
PROT SERPL-MCNC: 5.7 G/DL — LOW (ref 6–8.3)
RBC # BLD: 3.1 M/UL — LOW (ref 3.8–5.2)
RBC # FLD: 17.6 % — HIGH (ref 10.3–14.5)
SODIUM SERPL-SCNC: 139 MMOL/L — SIGNIFICANT CHANGE UP (ref 135–145)
WBC # BLD: 12.77 K/UL — HIGH (ref 3.8–10.5)
WBC # FLD AUTO: 12.77 K/UL — HIGH (ref 3.8–10.5)

## 2024-06-04 PROCEDURE — G2211 COMPLEX E/M VISIT ADD ON: CPT

## 2024-06-04 PROCEDURE — 99214 OFFICE O/P EST MOD 30 MIN: CPT

## 2024-06-04 PROCEDURE — 38221 DX BONE MARROW BIOPSIES: CPT | Mod: LT

## 2024-06-04 NOTE — REASON FOR VISIT
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

## 2024-06-04 NOTE — PROCEDURE
[Bone Marrow Biopsy] : bone marrow biopsy [Bone Marrow Aspiration] : bone marrow aspiration  [Patient] : the patient [Verbal Consent Obtained] : verbal consent was obtained prior to the procedure [Patient identification verified] : patient identification verified [Procedure verified and consent obtained] : procedure verified and consent obtained [Laterality verified and correct site marked] : laterality verified and correct site marked [Left] : site: left [Correct positioning] : correct positioning [Prone] : prone [Superior iliac spine was identified] : the superior iliac spine was identified. [The left posterior iliac crest was prepped with betadine and draped, using sterile technique.] : The left posterior iliac crest was prepped with betadine and draped, using sterile technique. [Lidocaine was injected and into the periosteum overlying the site.] : Lidocaine was injected and into the periosteum overlying the site. [Aspirate] : aspirate [Cytogenetics] : cytogenetics [FISH] : FISH [Other ___] : [unfilled] [Biopsy] : biopsy [Flow Cytometry] : flow cytometry [] : The patient was instructed to remove the bandage the following AM. The patient may bathe. Acetaminophen may be taken for discomfort, as per package directions.If there are any other problems, the patient was instructed to call the office. The patient verbalized understanding, and is aware of the office contact numbers. 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

## 2024-06-04 NOTE — ASSESSMENT
[FreeTextEntry1] : Assessment: 64-year-old day 90 (3/6/24) post CATRACHITA haplo Ph - ALL.  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  Plan: Heme: mild thrombocytopenia, mild anemia  repeat bone marrow today  ID Valacyclovir 500 mg BID bactrim ss qd Vaccinations start at six months.  GVHD: Continue Jakfii 10 mg bid  moderate dry mouth  GI: Omeprazole 20 mg daily  HLD: simvastatin 80 mg daily  HTN: Losartan 100 mg daily  Psych: Lexapro 20 mg daily  Over 35 minutes were spent in direct patient care with greater than 50% discussing her GVHD and follow-up.     Addendum I: Jakafi basics Starting dose: Jakafi is 5 mg bid. Increasing to 10 mg bid after at least 3 days of treatment if the ANC and platelet counts are not decreased by 50% or more relative to the first day of dosing with Jakafi.  Tapering of Jakafi after 6 months of treatment in patients with response who have discontinued therapeutic doses of corticosteroids. Taper Jakafi by one dose level approximately every 8 weeks (10 mg twice daily to 5 mg twice daily to 5 mg once daily).  Modify the Jakafi dosage when co-administered with strong CY inhibitors and fluconazole doses of less than or equal to 200 mg  Treatment with Jakafi can increases total cholesterol, low-density lipoprotein (LDL) cholesterol, and triglycerides - consider lipid analysis q 12 weeks Mild bruising, HA and dizziness are common.  Slight increase in zoster.

## 2024-06-04 NOTE — HISTORY OF PRESENT ILLNESS
[de-identified] : Stephanie was last seen: 5/28/24   [de-identified] : Reason for visit: aGVHD evaluation; bone marrow biopsy  Since last visit: rash continues  Medications: Jakfii 10 mg bid (5/21/24 - ) Valacyclovir 500 mg BID bactrim SS qd Losartan potassium 100 mg daily Lexapro 20 mg daily Omeprazole 20 mg daily simvastatin 80mg qd    Examination: Articulate and in no acute distress skin: bright red rash > 75% body surface areas; photos taken No occiput, poster cervical, anterior cervical, submandibular, sublingual, submental, supraclavicular nor axillary adenopathy Lungs: Clear Cardiac: without rubs Abd: soft and non-tender. No inguinal nor femoral adenopathy No sig peripheral edema  No evidence of Chronic GVHD  CBC 05/21/2024: WBC   5.9, Hgb   9.5, PLT 53,000 05/28/2024: WBC 13.9, Hgb 10.8, PLT 74,000 06/04/2024: WBC 12.8, Hgb 10.8, PLT 85,000; .1

## 2024-06-05 DIAGNOSIS — C91.00 ACUTE LYMPHOBLASTIC LEUKEMIA NOT HAVING ACHIEVED REMISSION: ICD-10-CM

## 2024-06-10 ENCOUNTER — APPOINTMENT (OUTPATIENT)
Dept: HEMATOLOGY ONCOLOGY | Facility: CLINIC | Age: 64
End: 2024-06-10

## 2024-06-10 ENCOUNTER — RESULT REVIEW (OUTPATIENT)
Age: 64
End: 2024-06-10

## 2024-06-10 ENCOUNTER — APPOINTMENT (OUTPATIENT)
Dept: HEMATOLOGY ONCOLOGY | Facility: CLINIC | Age: 64
End: 2024-06-10
Payer: MEDICARE

## 2024-06-10 VITALS
WEIGHT: 197.95 LBS | BODY MASS INDEX: 36.2 KG/M2 | RESPIRATION RATE: 16 BRPM | SYSTOLIC BLOOD PRESSURE: 159 MMHG | TEMPERATURE: 98.3 F | HEART RATE: 76 BPM | OXYGEN SATURATION: 99 % | DIASTOLIC BLOOD PRESSURE: 84 MMHG

## 2024-06-10 DIAGNOSIS — Z94.81 BONE MARROW TRANSPLANT STATUS: ICD-10-CM

## 2024-06-10 LAB
ALBUMIN SERPL ELPH-MCNC: 4.1 G/DL
ALP BLD-CCNC: 78 U/L
ALT SERPL-CCNC: 46 U/L
ANION GAP SERPL CALC-SCNC: 9 MMOL/L
AST SERPL-CCNC: 37 U/L
BASOPHILS # BLD AUTO: 0.02 K/UL — SIGNIFICANT CHANGE UP (ref 0–0.2)
BASOPHILS NFR BLD AUTO: 0.4 % — SIGNIFICANT CHANGE UP (ref 0–2)
BILIRUB SERPL-MCNC: 0.2 MG/DL
BUN SERPL-MCNC: 18 MG/DL
CALCIUM SERPL-MCNC: 9.2 MG/DL
CHLORIDE SERPL-SCNC: 105 MMOL/L
CO2 SERPL-SCNC: 25 MMOL/L
CREAT SERPL-MCNC: 1.04 MG/DL
EGFR: 59 ML/MIN/1.73M2
EOSINOPHIL # BLD AUTO: 0.25 K/UL — SIGNIFICANT CHANGE UP (ref 0–0.5)
EOSINOPHIL NFR BLD AUTO: 5 % — SIGNIFICANT CHANGE UP (ref 0–6)
GLUCOSE SERPL-MCNC: 98 MG/DL
HCT VFR BLD CALC: 31.2 % — LOW (ref 34.5–45)
HGB BLD-MCNC: 10 G/DL — LOW (ref 11.5–15.5)
IMM GRANULOCYTES NFR BLD AUTO: 0.4 % — SIGNIFICANT CHANGE UP (ref 0–0.9)
LYMPHOCYTES # BLD AUTO: 0.51 K/UL — LOW (ref 1–3.3)
LYMPHOCYTES # BLD AUTO: 10.1 % — LOW (ref 13–44)
MCHC RBC-ENTMCNC: 32.1 G/DL — SIGNIFICANT CHANGE UP (ref 32–36)
MCHC RBC-ENTMCNC: 35 PG — HIGH (ref 27–34)
MCV RBC AUTO: 109.1 FL — HIGH (ref 80–100)
MONOCYTES # BLD AUTO: 0.42 K/UL — SIGNIFICANT CHANGE UP (ref 0–0.9)
MONOCYTES NFR BLD AUTO: 8.3 % — SIGNIFICANT CHANGE UP (ref 2–14)
NEUTROPHILS # BLD AUTO: 3.82 K/UL — SIGNIFICANT CHANGE UP (ref 1.8–7.4)
NEUTROPHILS NFR BLD AUTO: 75.8 % — SIGNIFICANT CHANGE UP (ref 43–77)
NRBC # BLD: 0 /100 WBCS — SIGNIFICANT CHANGE UP (ref 0–0)
PLATELET # BLD AUTO: 49 K/UL — LOW (ref 150–400)
POTASSIUM SERPL-SCNC: 5.2 MMOL/L
PROT SERPL-MCNC: 5.9 G/DL
RBC # BLD: 2.86 M/UL — LOW (ref 3.8–5.2)
RBC # FLD: 16.3 % — HIGH (ref 10.3–14.5)
SODIUM SERPL-SCNC: 140 MMOL/L
WBC # BLD: 5.04 K/UL — SIGNIFICANT CHANGE UP (ref 3.8–10.5)
WBC # FLD AUTO: 5.04 K/UL — SIGNIFICANT CHANGE UP (ref 3.8–10.5)

## 2024-06-10 PROCEDURE — G2211 COMPLEX E/M VISIT ADD ON: CPT

## 2024-06-10 PROCEDURE — 99215 OFFICE O/P EST HI 40 MIN: CPT

## 2024-06-10 RX ORDER — CYCLOSPORINE 100 MG/ML
100 SOLUTION ORAL
Qty: 450 | Refills: 0 | Status: ACTIVE | COMMUNITY
Start: 2024-06-10 | End: 1900-01-01

## 2024-06-10 RX ORDER — ATOVAQUONE 750 MG/5ML
750 SUSPENSION ORAL
Qty: 210 | Refills: 3 | Status: ACTIVE | COMMUNITY
Start: 2024-06-10 | End: 1900-01-01

## 2024-06-10 NOTE — ASSESSMENT
[FreeTextEntry1] : Assessment: 64-year-old day 96 (3/6/24) post CATRACHITA haplo Ph - ALL.  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  Plan: Heme: moderate thrombocytopenia, moderate anemia  bone marrow (24: day +90): 46 XY; Marrow without evidence of disease; clonoseq: pending  ID Valacyclovir 500 mg BID bactrim ss qd - concerns for bactrim induced xerstomia change back to atovaquone  Vaccinations start at six months.  GVHD: Continue Jakfii 10 mg bid  moderate dry mouth (may represent cGVHD): start CsA rinse tid.  GI: Omeprazole 20 mg daily  HLD: simvastatin 80 mg daily  HTN: Losartan 100 mg daily  Psych: Lexapro 20 mg daily  Over 45 minutes were spent in direct patient care with greater than 50% discussing her GVHD and follow-up.  Follow-up in 7 days.    Addendum I: Jakafi basics Starting dose: Jakafi is 5 mg bid. Increasing to 10 mg bid after at least 3 days of treatment if the ANC and platelet counts are not decreased by 50% or more relative to the first day of dosing with Jakafi.  Tapering of Jakafi after 6 months of treatment in patients with response who have discontinued therapeutic doses of corticosteroids. Taper Jakafi by one dose level approximately every 8 weeks (10 mg twice daily to 5 mg twice daily to 5 mg once daily).  Modify the Jakafi dosage when co-administered with strong CY inhibitors and fluconazole doses of less than or equal to 200 mg  Treatment with Jakafi can increases total cholesterol, low-density lipoprotein (LDL) cholesterol, and triglycerides - consider lipid analysis q 12 weeks Mild bruising, HA and dizziness are common.  Slight increase in zoster.

## 2024-06-10 NOTE — HISTORY OF PRESENT ILLNESS
[de-identified] : Stephanie was last seen:6/4/24   [de-identified] : Reason for visit: aGVHD evaluation.  Since last visit: rash continues improvement on head, arms, back and thighs.  Calfs with mild/moderate involvement.   Feeling well. Mouth is dry; eyes are fine.   Medications: Jakfii 10 mg bid (5/21/24 - ) Valacyclovir 500 mg BID Bactrim SS qd Losartan potassium 100 mg daily Lexapro 20 mg daily Omeprazole 20 mg daily simvastatin 80mg qd    Examination: Articulate and in no acute distress skin: bright red rash > 75% body surface areas; photos taken at last visit) geographic central tongue lesion.  mouth dry.   No occiput, poster cervical, anterior cervical, submandibular, sublingual, submental, supraclavicular nor axillary adenopathy Lungs: Clear Cardiac: without rubs Abd: soft and non-tender. No inguinal nor femoral adenopathy No sig peripheral edema   CBC 05/21/2024: WBC   5.9, Hgb   9.5, PLT   53,000 05/28/2024: WBC 13.9, Hgb 10.8, PLT   74,000 06/04/2024: WBC 12.8, Hgb 10.8, PLT   85,000; .1 06/10/2024: WBC   5.0, Hgb 10.0, PLT   49,000; .1

## 2024-06-17 RX ORDER — RUXOLITINIB 10 MG/1
10 TABLET ORAL TWICE DAILY
Qty: 120 | Refills: 0 | Status: ACTIVE | COMMUNITY
Start: 2024-05-20 | End: 1900-01-01

## 2024-06-17 RX ORDER — TRIAMCINOLONE ACETONIDE 0.25 MG/G
0.03 CREAM TOPICAL 3 TIMES DAILY
Qty: 1 | Refills: 0 | Status: ACTIVE | COMMUNITY
Start: 2024-06-17 | End: 1900-01-01

## 2024-06-19 ENCOUNTER — NON-APPOINTMENT (OUTPATIENT)
Age: 64
End: 2024-06-19

## 2024-06-24 ENCOUNTER — APPOINTMENT (OUTPATIENT)
Dept: HEMATOLOGY ONCOLOGY | Facility: CLINIC | Age: 64
End: 2024-06-24
Payer: MEDICARE

## 2024-06-24 ENCOUNTER — RESULT REVIEW (OUTPATIENT)
Age: 64
End: 2024-06-24

## 2024-06-24 VITALS
RESPIRATION RATE: 16 BRPM | WEIGHT: 202.8 LBS | BODY MASS INDEX: 37.08 KG/M2 | SYSTOLIC BLOOD PRESSURE: 167 MMHG | DIASTOLIC BLOOD PRESSURE: 81 MMHG | HEART RATE: 63 BPM | TEMPERATURE: 97.2 F | OXYGEN SATURATION: 97 %

## 2024-06-24 DIAGNOSIS — D89.810 ACUTE GRAFT-VERSUS-HOST DISEASE: ICD-10-CM

## 2024-06-24 LAB
ALBUMIN SERPL ELPH-MCNC: 4.1 G/DL
ALP BLD-CCNC: 73 U/L
ALT SERPL-CCNC: 19 U/L
ANION GAP SERPL CALC-SCNC: 8 MMOL/L
AST SERPL-CCNC: 25 U/L
BASOPHILS # BLD AUTO: 0.01 K/UL — SIGNIFICANT CHANGE UP (ref 0–0.2)
BASOPHILS NFR BLD AUTO: 0.3 % — SIGNIFICANT CHANGE UP (ref 0–2)
BILIRUB SERPL-MCNC: 0.4 MG/DL
BUN SERPL-MCNC: 18 MG/DL
CALCIUM SERPL-MCNC: 9.2 MG/DL
CHLORIDE SERPL-SCNC: 108 MMOL/L
CO2 SERPL-SCNC: 26 MMOL/L
CREAT SERPL-MCNC: 0.97 MG/DL
EGFR: 64 ML/MIN/1.73M2
EOSINOPHIL # BLD AUTO: 0.1 K/UL — SIGNIFICANT CHANGE UP (ref 0–0.5)
EOSINOPHIL NFR BLD AUTO: 2.8 % — SIGNIFICANT CHANGE UP (ref 0–6)
GLUCOSE SERPL-MCNC: 97 MG/DL
HCT VFR BLD CALC: 28.5 % — LOW (ref 34.5–45)
HGB BLD-MCNC: 9.4 G/DL — LOW (ref 11.5–15.5)
IMM GRANULOCYTES NFR BLD AUTO: 0.9 % — SIGNIFICANT CHANGE UP (ref 0–0.9)
LYMPHOCYTES # BLD AUTO: 0.53 K/UL — LOW (ref 1–3.3)
LYMPHOCYTES # BLD AUTO: 15.1 % — SIGNIFICANT CHANGE UP (ref 13–44)
MCHC RBC-ENTMCNC: 33 G/DL — SIGNIFICANT CHANGE UP (ref 32–36)
MCHC RBC-ENTMCNC: 35.5 PG — HIGH (ref 27–34)
MCV RBC AUTO: 107.5 FL — HIGH (ref 80–100)
MONOCYTES # BLD AUTO: 0.54 K/UL — SIGNIFICANT CHANGE UP (ref 0–0.9)
MONOCYTES NFR BLD AUTO: 15.4 % — HIGH (ref 2–14)
NEUTROPHILS # BLD AUTO: 2.3 K/UL — SIGNIFICANT CHANGE UP (ref 1.8–7.4)
NEUTROPHILS NFR BLD AUTO: 65.5 % — SIGNIFICANT CHANGE UP (ref 43–77)
NRBC # BLD: 0 /100 WBCS — SIGNIFICANT CHANGE UP (ref 0–0)
PLATELET # BLD AUTO: 64 K/UL — LOW (ref 150–400)
POTASSIUM SERPL-SCNC: 4.9 MMOL/L
PROT SERPL-MCNC: 5.5 G/DL
RBC # BLD: 2.65 M/UL — LOW (ref 3.8–5.2)
RBC # FLD: 15.2 % — HIGH (ref 10.3–14.5)
SODIUM SERPL-SCNC: 143 MMOL/L
WBC # BLD: 3.51 K/UL — LOW (ref 3.8–10.5)
WBC # FLD AUTO: 3.51 K/UL — LOW (ref 3.8–10.5)

## 2024-06-24 PROCEDURE — 99214 OFFICE O/P EST MOD 30 MIN: CPT

## 2024-06-24 PROCEDURE — G2211 COMPLEX E/M VISIT ADD ON: CPT

## 2024-06-24 NOTE — BEGINNING OF VISIT
[0] : 2) Feeling down, depressed, or hopeless: Not at all (0) [[id number]] : 0 [Pain Scale: ___] : On a scale of 1-10, today the patient's pain is a(n) [unfilled]. [Former] : Former [0-4] : 0-4 [Date Discussed (MM/DD/YY): ___] : Discussed: [unfilled] [With Patient/Caregiver] : with Patient/Caregiver

## 2024-06-24 NOTE — BEGINNING OF VISIT
[0] : 2) Feeling down, depressed, or hopeless: Not at all (0) [[id number]] : 0 [Pain Scale: ___] : On a scale of 1-10, today the patient's pain is a(n) [unfilled]. [Former] : Former [0-4] : 0-4 [Date Discussed (MM/DD/YY): ___] : Discussed: [unfilled] [With Patient/Caregiver] : with Patient/Caregiver

## 2024-06-25 NOTE — HISTORY OF PRESENT ILLNESS
[de-identified] : Stephanie was last seen:6/10/24   [de-identified] : Reason for visit: aGVHD evaluation.  Since last visit: rash nearly completely resolved. Feeling well. Mouth is less dry (1/10); eyes are fine.   Medications: Jakfii 10 mg bid (5/21/24 - )  CsA oral rinse (6/11/24 - ) Valacyclovir 500 mg BID atovaquone  Losartan potassium 100 mg daily Lexapro 20 mg daily Omeprazole 20 mg daily simvastatin 80mg qd    Examination: Articulate and in no acute distress skin: bright red rash > 75% body surface areas; photos taken at last visit) geographic central tongue lesion.  mouth dry.   No occiput, poster cervical, anterior cervical, submandibular, sublingual, submental, supraclavicular nor axillary adenopathy Lungs: Clear Cardiac: without rubs Abd: soft and non-tender. No inguinal nor femoral adenopathy No sig peripheral edema   CBC 05/21/2024: WBC   5.9, Hgb   9.5, PLT   53,000 05/28/2024: WBC 13.9, Hgb 10.8, PLT   74,000 06/04/2024: WBC 12.8, Hgb 10.8, PLT   85,000; .1 06/10/2024: WBC   5.0, Hgb 10.0, PLT   49,000; .1

## 2024-06-25 NOTE — PHYSICAL EXAM
[Jakafi] : Jakafi [Maculopapular rash > 50% BSA] : Skin: Maculopapular rash > 50% BSA [< 2 mg/dl] : Liver: < 2 mg/dl [No or intermittent] : Upper GI: No or intermittent nausea, vomiting or anorexia [<500 ml/day or < 3 episodes/day] : Lower GI (stool output/day): <500 ml/day or <3 episodes/day [I] : I [Mild symptoms with disease signs but not limiting oral intake significantly] : Score 1 [Mild] : Mild [Limitied] : Limited [No] : No [Yes] : Yes [No active (erythematous_ GVHD rash)] : Skin: No active (erythematous_ GVHD rash) [Total % ____] : Total: [unfilled]% [OnsetofaGVHD] : 5/02/24 [FreeTextEntry1] : 03/06/24 [FreeTextEntry2] : 06/10/24 [Chronic_GVHD] : 06/10/24

## 2024-06-25 NOTE — ASSESSMENT
[FreeTextEntry1] : Assessment: 64-year-old day 111 (3/6/24) post CATRACHITA haplo Ph - ALL, in CR. 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  Plan: Heme: moderate thrombocytopenia, moderate anemia  bone marrow (24: day +90): 46 XY; Marrow without evidence of disease;  clonoseq (24: marrow): 0 sequences < 1e-6  ID Valacyclovir 500 mg BID atovaquone being used for potential bactrim induced dry mouth - this is unclear and may change back to Bactrim on her own volution.   Vaccinations start at six months.  GVHD: Continue Jakfii 10 mg bid  moderate dry mouth (may represent cGVHD): will hold CsA rinse tid.  GI: Omeprazole 20 mg daily  HLD: simvastatin 80 mg daily  HTN: Losartan 100 mg daily  Psych: Lexapro 20 mg daily  Over 35 minutes were spent in direct patient care with greater than 50% discussing her GVHD and follow-up.  Follow-up in 14 days.    Addendum I: Jakafi basics Starting dose: Jakafi is 5 mg bid. Increasing to 10 mg bid after at least 3 days of treatment if the ANC and platelet counts are not decreased by 50% or more relative to the first day of dosing with Jakafi.  Tapering of Jakafi after 6 months of treatment in patients with response who have discontinued therapeutic doses of corticosteroids. Taper Jakafi by one dose level approximately every 8 weeks (10 mg twice daily to 5 mg twice daily to 5 mg once daily).  Modify the Jakafi dosage when co-administered with strong CY inhibitors and fluconazole doses of less than or equal to 200 mg  Treatment with Jakafi can increases total cholesterol, low-density lipoprotein (LDL) cholesterol, and triglycerides - consider lipid analysis q 12 weeks Mild bruising, HA and dizziness are common.  Slight increase in zoster.

## 2024-06-25 NOTE — HISTORY OF PRESENT ILLNESS
[de-identified] : Stephanie was last seen:6/10/24   [de-identified] : Reason for visit: aGVHD evaluation.  Since last visit: rash nearly completely resolved. Feeling well. Mouth is less dry (1/10); eyes are fine.   Medications: Jakfii 10 mg bid (5/21/24 - )  CsA oral rinse (6/11/24 - ) Valacyclovir 500 mg BID atovaquone  Losartan potassium 100 mg daily Lexapro 20 mg daily Omeprazole 20 mg daily simvastatin 80mg qd    Examination: Articulate and in no acute distress skin: bright red rash > 75% body surface areas; photos taken at last visit) geographic central tongue lesion.  mouth dry.   No occiput, poster cervical, anterior cervical, submandibular, sublingual, submental, supraclavicular nor axillary adenopathy Lungs: Clear Cardiac: without rubs Abd: soft and non-tender. No inguinal nor femoral adenopathy No sig peripheral edema   CBC 05/21/2024: WBC   5.9, Hgb   9.5, PLT   53,000 05/28/2024: WBC 13.9, Hgb 10.8, PLT   74,000 06/04/2024: WBC 12.8, Hgb 10.8, PLT   85,000; .1 06/10/2024: WBC   5.0, Hgb 10.0, PLT   49,000; .1

## 2024-07-08 ENCOUNTER — RESULT REVIEW (OUTPATIENT)
Age: 64
End: 2024-07-08

## 2024-07-08 ENCOUNTER — APPOINTMENT (OUTPATIENT)
Dept: HEMATOLOGY ONCOLOGY | Facility: CLINIC | Age: 64
End: 2024-07-08

## 2024-07-08 VITALS
WEIGHT: 202.16 LBS | SYSTOLIC BLOOD PRESSURE: 149 MMHG | RESPIRATION RATE: 16 BRPM | DIASTOLIC BLOOD PRESSURE: 77 MMHG | OXYGEN SATURATION: 97 % | HEART RATE: 68 BPM | BODY MASS INDEX: 36.96 KG/M2 | TEMPERATURE: 98.1 F

## 2024-07-08 LAB
ALBUMIN SERPL ELPH-MCNC: 4.4 G/DL
ALP BLD-CCNC: 78 U/L
ALT SERPL-CCNC: 24 U/L
ANION GAP SERPL CALC-SCNC: 9 MMOL/L
AST SERPL-CCNC: 35 U/L
BASOPHILS # BLD AUTO: 0.01 K/UL — SIGNIFICANT CHANGE UP (ref 0–0.2)
BASOPHILS NFR BLD AUTO: 0.3 % — SIGNIFICANT CHANGE UP (ref 0–2)
BILIRUB SERPL-MCNC: 0.5 MG/DL
BUN SERPL-MCNC: 21 MG/DL
CALCIUM SERPL-MCNC: 9.6 MG/DL
CHLORIDE SERPL-SCNC: 108 MMOL/L
CO2 SERPL-SCNC: 25 MMOL/L
CREAT SERPL-MCNC: 0.99 MG/DL
EOSINOPHIL # BLD AUTO: 0.04 K/UL — SIGNIFICANT CHANGE UP (ref 0–0.5)
EOSINOPHIL NFR BLD AUTO: 1.2 % — SIGNIFICANT CHANGE UP (ref 0–6)
GLUCOSE SERPL-MCNC: 98 MG/DL
HCT VFR BLD CALC: 29.5 % — LOW (ref 34.5–45)
HGB BLD-MCNC: 10 G/DL — LOW (ref 11.5–15.5)
IMM GRANULOCYTES NFR BLD AUTO: 0.6 % — SIGNIFICANT CHANGE UP (ref 0–0.9)
LYMPHOCYTES # BLD AUTO: 0.58 K/UL — LOW (ref 1–3.3)
LYMPHOCYTES # BLD AUTO: 17.1 % — SIGNIFICANT CHANGE UP (ref 13–44)
MCHC RBC-ENTMCNC: 33.9 G/DL — SIGNIFICANT CHANGE UP (ref 32–36)
MCHC RBC-ENTMCNC: 35.7 PG — HIGH (ref 27–34)
MCV RBC AUTO: 105.4 FL — HIGH (ref 80–100)
MONOCYTES # BLD AUTO: 0.5 K/UL — SIGNIFICANT CHANGE UP (ref 0–0.9)
MONOCYTES NFR BLD AUTO: 14.7 % — HIGH (ref 2–14)
NEUTROPHILS # BLD AUTO: 2.24 K/UL — SIGNIFICANT CHANGE UP (ref 1.8–7.4)
NEUTROPHILS NFR BLD AUTO: 66.1 % — SIGNIFICANT CHANGE UP (ref 43–77)
NRBC # BLD: 0 /100 WBCS — SIGNIFICANT CHANGE UP (ref 0–0)
PLATELET # BLD AUTO: 70 K/UL — LOW (ref 150–400)
PROT SERPL-MCNC: 6.1 G/DL
RBC # BLD: 2.8 M/UL — LOW (ref 3.8–5.2)
RBC # FLD: 14.7 % — HIGH (ref 10.3–14.5)
SODIUM SERPL-SCNC: 142 MMOL/L
WBC # BLD: 3.39 K/UL — LOW (ref 3.8–10.5)
WBC # FLD AUTO: 3.39 K/UL — LOW (ref 3.8–10.5)

## 2024-07-08 PROCEDURE — 99214 OFFICE O/P EST MOD 30 MIN: CPT

## 2024-07-08 PROCEDURE — G2211 COMPLEX E/M VISIT ADD ON: CPT

## 2024-07-25 ENCOUNTER — OUTPATIENT (OUTPATIENT)
Dept: OUTPATIENT SERVICES | Facility: HOSPITAL | Age: 64
LOS: 1 days | Discharge: HOME | End: 2024-07-25

## 2024-07-25 DIAGNOSIS — Z98.891 HISTORY OF UTERINE SCAR FROM PREVIOUS SURGERY: Chronic | ICD-10-CM

## 2024-07-25 DIAGNOSIS — C95.90 LEUKEMIA, UNSPECIFIED NOT HAVING ACHIEVED REMISSION: ICD-10-CM

## 2024-08-01 ENCOUNTER — APPOINTMENT (OUTPATIENT)
Dept: INFUSION THERAPY | Facility: HOSPITAL | Age: 64
End: 2024-08-01

## 2024-08-01 ENCOUNTER — RESULT REVIEW (OUTPATIENT)
Age: 64
End: 2024-08-01

## 2024-08-01 ENCOUNTER — APPOINTMENT (OUTPATIENT)
Dept: HEMATOLOGY ONCOLOGY | Facility: CLINIC | Age: 64
End: 2024-08-01
Payer: MEDICARE

## 2024-08-01 VITALS
WEIGHT: 205.91 LBS | HEART RATE: 60 BPM | SYSTOLIC BLOOD PRESSURE: 131 MMHG | BODY MASS INDEX: 37.65 KG/M2 | TEMPERATURE: 97.5 F | DIASTOLIC BLOOD PRESSURE: 84 MMHG | RESPIRATION RATE: 16 BRPM | OXYGEN SATURATION: 98 %

## 2024-08-01 DIAGNOSIS — Z94.81 BONE MARROW TRANSPLANT STATUS: ICD-10-CM

## 2024-08-01 DIAGNOSIS — D89.810 ACUTE GRAFT-VERSUS-HOST DISEASE: ICD-10-CM

## 2024-08-01 LAB
ALBUMIN SERPL ELPH-MCNC: 4.5 G/DL — SIGNIFICANT CHANGE UP (ref 3.3–5)
ALP SERPL-CCNC: 78 U/L — SIGNIFICANT CHANGE UP (ref 40–120)
ALT FLD-CCNC: 24 U/L — SIGNIFICANT CHANGE UP (ref 10–45)
ANION GAP SERPL CALC-SCNC: 10 MMOL/L — SIGNIFICANT CHANGE UP (ref 5–17)
AST SERPL-CCNC: 33 U/L — SIGNIFICANT CHANGE UP (ref 10–40)
BILIRUB SERPL-MCNC: 0.6 MG/DL — SIGNIFICANT CHANGE UP (ref 0.2–1.2)
BUN SERPL-MCNC: 18 MG/DL — SIGNIFICANT CHANGE UP (ref 7–23)
CALCIUM SERPL-MCNC: 9.6 MG/DL — SIGNIFICANT CHANGE UP (ref 8.4–10.5)
CHLORIDE SERPL-SCNC: 106 MMOL/L — SIGNIFICANT CHANGE UP (ref 96–108)
CHOLEST SERPL-MCNC: 176 MG/DL — SIGNIFICANT CHANGE UP
CO2 SERPL-SCNC: 24 MMOL/L — SIGNIFICANT CHANGE UP (ref 22–31)
CREAT SERPL-MCNC: 0.97 MG/DL — SIGNIFICANT CHANGE UP (ref 0.5–1.3)
EGFR: 64 ML/MIN/1.73M2 — SIGNIFICANT CHANGE UP
GLUCOSE SERPL-MCNC: 90 MG/DL — SIGNIFICANT CHANGE UP (ref 70–99)
HDLC SERPL-MCNC: 58 MG/DL — SIGNIFICANT CHANGE UP
LIPID PNL WITH DIRECT LDL SERPL: 101 MG/DL — HIGH
NON HDL CHOLESTEROL: 118 MG/DL — SIGNIFICANT CHANGE UP
POTASSIUM SERPL-MCNC: 4.2 MMOL/L — SIGNIFICANT CHANGE UP (ref 3.5–5.3)
POTASSIUM SERPL-SCNC: 4.2 MMOL/L — SIGNIFICANT CHANGE UP (ref 3.5–5.3)
PROT SERPL-MCNC: 6.2 G/DL — SIGNIFICANT CHANGE UP (ref 6–8.3)
SODIUM SERPL-SCNC: 140 MMOL/L — SIGNIFICANT CHANGE UP (ref 135–145)
TRIGL SERPL-MCNC: 93 MG/DL — SIGNIFICANT CHANGE UP

## 2024-08-01 PROCEDURE — 99214 OFFICE O/P EST MOD 30 MIN: CPT

## 2024-08-01 PROCEDURE — G2211 COMPLEX E/M VISIT ADD ON: CPT

## 2024-08-01 NOTE — PHYSICAL EXAM
[I] : I [Jakafi] : Jakafi [No active (erythematous_ GVHD rash)] : Skin: No active (erythematous_ GVHD rash) [< 2 mg/dl] : Liver: < 2 mg/dl [No or intermittent] : Upper GI: No or intermittent nausea, vomiting or anorexia [<500 ml/day or < 3 episodes/day] : Lower GI (stool output/day): <500 ml/day or <3 episodes/day [No symptoms] : Score 0 [Mild] : Mild [Limitied] : Limited [No] : No [Yes] : Yes [Total % ____] : Total: [unfilled]% [FreeTextEntry1] : 03/06/24 [OnsetofaGVHD] : 5/02/24 [FreeTextEntry2] : 06/10/24 [Chronic_GVHD] : 06/10/24

## 2024-08-01 NOTE — HISTORY OF PRESENT ILLNESS
[de-identified] : Stephanie was last seen: 7/8/24   [de-identified] : Reason for visit: aGVHD evaluation.  Since last visit: rash nearly completely resolved. Feeling well. eyes and mouth are fine  Medications: Jakfii 10 mg bid (5/21/24 - )  CsA oral rinse daily (6/11/24 - 7/8/24) Valacyclovir 500 mg BID atovaquone  Losartan potassium 100 mg daily Lexapro 20 mg daily Omeprazole 20 mg daily simvastatin 80mg qd    Examination: Articulate and in no acute distress skin: no rashes geographic central tongue lesion.  mouth dry.   No occiput, poster cervical, anterior cervical, submandibular, sublingual, submental, supraclavicular nor axillary adenopathy Lungs: Clear Cardiac: without rubs Abd: soft and non-tender. No inguinal nor femoral adenopathy No sig peripheral edema   CBC 05/21/2024: WBC   5.9, Hgb   9.5, PLT   53,000 05/28/2024: WBC 13.9, Hgb 10.8, PLT   74,000 06/04/2024: WBC 12.8, Hgb 10.8, PLT   85,000; .1 06/10/2024: WBC   5.0, Hgb 10.0, PLT   49,000; .1 08/01/2024: WBC   3.0, Hgb  9.4,  PLT   90,000; MCV; ANC 1.9

## 2024-08-01 NOTE — ASSESSMENT
[FreeTextEntry1] : Assessment: 64-year-old day 147 (3/6/24) post CATRACHITA haplo Ph - ALL, in CR. 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  Plan: Heme: moderate thrombocytopenia, moderate anemia  bone marrow (24: day +90): 46 XY; Marrow without evidence of disease;  clonoseq (24: marrow): 0 sequences < 1e-6  ID Valacyclovir 500 mg BID atovaquone being used for potential bactrim induced dry mouth - this is unclear and may change back to Bactrim on her own volution.   Vaccinations start at six months.  GVHD: Continue Jakfii 10 mg bid  moderate dry mouth (may represent cGVHD):   GI: Omeprazole 20 mg daily  HLD: simvastatin 80 mg daily  HTN: Losartan 100 mg daily  Psych: Lexapro 20 mg daily  Over 35 minutes were spent in direct patient care with greater than 50% discussing her GVHD and follow-up.  Follow-up in 14 days.    Addendum I: Jakafi basics Starting dose: Jakafi is 5 mg bid. Increasing to 10 mg bid after at least 3 days of treatment if the ANC and platelet counts are not decreased by 50% or more relative to the first day of dosing with Jakafi.  Tapering of Jakafi after 6 months of treatment in patients with response who have discontinued therapeutic doses of corticosteroids. Taper Jakafi by one dose level approximately every 8 weeks (10 mg twice daily to 5 mg twice daily to 5 mg once daily).  Modify the Jakafi dosage when co-administered with strong CY inhibitors and fluconazole doses of less than or equal to 200 mg  Treatment with Jakafi can increases total cholesterol, low-density lipoprotein (LDL) cholesterol, and triglycerides - consider lipid analysis q 12 weeks Mild bruising, HA and dizziness are common.  Slight increase in zoster.

## 2024-08-01 NOTE — HISTORY OF PRESENT ILLNESS
[de-identified] : Stephanie was last seen: 7/8/24   [de-identified] : Reason for visit: aGVHD evaluation.  Since last visit: rash nearly completely resolved. Feeling well. eyes and mouth are fine  Medications: Jakfii 10 mg bid (5/21/24 - )  CsA oral rinse daily (6/11/24 - 7/8/24) Valacyclovir 500 mg BID atovaquone  Losartan potassium 100 mg daily Lexapro 20 mg daily Omeprazole 20 mg daily simvastatin 80mg qd    Examination: Articulate and in no acute distress skin: no rashes geographic central tongue lesion.  mouth dry.   No occiput, poster cervical, anterior cervical, submandibular, sublingual, submental, supraclavicular nor axillary adenopathy Lungs: Clear Cardiac: without rubs Abd: soft and non-tender. No inguinal nor femoral adenopathy No sig peripheral edema   CBC 05/21/2024: WBC   5.9, Hgb   9.5, PLT   53,000 05/28/2024: WBC 13.9, Hgb 10.8, PLT   74,000 06/04/2024: WBC 12.8, Hgb 10.8, PLT   85,000; .1 06/10/2024: WBC   5.0, Hgb 10.0, PLT   49,000; .1 08/01/2024: WBC   3.0, Hgb  9.4,  PLT   90,000; MCV; ANC 1.9

## 2024-08-02 DIAGNOSIS — C91.00 ACUTE LYMPHOBLASTIC LEUKEMIA NOT HAVING ACHIEVED REMISSION: ICD-10-CM

## 2024-08-13 ENCOUNTER — RX RENEWAL (OUTPATIENT)
Age: 64
End: 2024-08-13

## 2024-08-15 ENCOUNTER — APPOINTMENT (OUTPATIENT)
Dept: HEMATOLOGY ONCOLOGY | Facility: CLINIC | Age: 64
End: 2024-08-15
Payer: MEDICARE

## 2024-08-15 ENCOUNTER — RESULT REVIEW (OUTPATIENT)
Age: 64
End: 2024-08-15

## 2024-08-15 VITALS
WEIGHT: 208.98 LBS | BODY MASS INDEX: 38.21 KG/M2 | OXYGEN SATURATION: 99 % | SYSTOLIC BLOOD PRESSURE: 148 MMHG | DIASTOLIC BLOOD PRESSURE: 78 MMHG | HEART RATE: 64 BPM | RESPIRATION RATE: 16 BRPM | TEMPERATURE: 97.6 F

## 2024-08-15 DIAGNOSIS — D89.810 ACUTE GRAFT-VERSUS-HOST DISEASE: ICD-10-CM

## 2024-08-15 LAB
ALBUMIN SERPL ELPH-MCNC: 4.3 G/DL
ALP BLD-CCNC: 80 U/L
ALT SERPL-CCNC: 25 U/L
ANION GAP SERPL CALC-SCNC: 10 MMOL/L
AST SERPL-CCNC: 33 U/L
BASOPHILS # BLD AUTO: 0.02 K/UL — SIGNIFICANT CHANGE UP (ref 0–0.2)
BASOPHILS NFR BLD AUTO: 0.7 % — SIGNIFICANT CHANGE UP (ref 0–2)
BILIRUB SERPL-MCNC: 0.5 MG/DL
BUN SERPL-MCNC: 17 MG/DL
CALCIUM SERPL-MCNC: 9.5 MG/DL
CHLORIDE SERPL-SCNC: 107 MMOL/L
CO2 SERPL-SCNC: 24 MMOL/L
CREAT SERPL-MCNC: 1.08 MG/DL
EGFR: 57 ML/MIN/1.73M2
EOSINOPHIL # BLD AUTO: 0.1 K/UL — SIGNIFICANT CHANGE UP (ref 0–0.5)
EOSINOPHIL NFR BLD AUTO: 3.3 % — SIGNIFICANT CHANGE UP (ref 0–6)
GLUCOSE SERPL-MCNC: 98 MG/DL
HCT VFR BLD CALC: 28.5 % — LOW (ref 34.5–45)
HGB BLD-MCNC: 9.5 G/DL — LOW (ref 11.5–15.5)
IMM GRANULOCYTES NFR BLD AUTO: 0.3 % — SIGNIFICANT CHANGE UP (ref 0–0.9)
LYMPHOCYTES # BLD AUTO: 0.65 K/UL — LOW (ref 1–3.3)
LYMPHOCYTES # BLD AUTO: 21.7 % — SIGNIFICANT CHANGE UP (ref 13–44)
MCHC RBC-ENTMCNC: 33.3 G/DL — SIGNIFICANT CHANGE UP (ref 32–36)
MCHC RBC-ENTMCNC: 36.3 PG — HIGH (ref 27–34)
MCV RBC AUTO: 108.8 FL — HIGH (ref 80–100)
MONOCYTES # BLD AUTO: 0.53 K/UL — SIGNIFICANT CHANGE UP (ref 0–0.9)
MONOCYTES NFR BLD AUTO: 17.7 % — HIGH (ref 2–14)
NEUTROPHILS # BLD AUTO: 1.68 K/UL — LOW (ref 1.8–7.4)
NEUTROPHILS NFR BLD AUTO: 56.3 % — SIGNIFICANT CHANGE UP (ref 43–77)
NRBC # BLD: 0 /100 WBCS — SIGNIFICANT CHANGE UP (ref 0–0)
PLATELET # BLD AUTO: 82 K/UL — LOW (ref 150–400)
POTASSIUM SERPL-SCNC: 4.9 MMOL/L
PROT SERPL-MCNC: 5.9 G/DL
RBC # BLD: 2.62 M/UL — LOW (ref 3.8–5.2)
RBC # FLD: 15.5 % — HIGH (ref 10.3–14.5)
SODIUM SERPL-SCNC: 141 MMOL/L
WBC # BLD: 2.99 K/UL — LOW (ref 3.8–10.5)
WBC # FLD AUTO: 2.99 K/UL — LOW (ref 3.8–10.5)

## 2024-08-15 PROCEDURE — G2211 COMPLEX E/M VISIT ADD ON: CPT

## 2024-08-15 PROCEDURE — 99214 OFFICE O/P EST MOD 30 MIN: CPT

## 2024-08-15 NOTE — ASSESSMENT
[FreeTextEntry1] : Assessment: 64-year-old day 161 (3/6/24) post CATRACHITA haplo Ph - ALL, in CR. 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  Plan: Heme: moderate thrombocytopenia, moderate anemia  bone marrow (24: day +90): 46 XY; Marrow without evidence of disease;  clonoseq (24: marrow): 0 sequences < 1e-6  ID Valacyclovir 500 mg BID Bactrim SS qd Vaccinations start at six months.  GVHD: Decrease Jakfii 5 mg bid  moderate dry mouth (may represent cGVHD):   GI: Omeprazole 20 mg daily  HLD: simvastatin 80 mg daily  HTN: Losartan 100 mg daily  Psych: Lexapro 20 mg daily  Over 35 minutes were spent in direct patient care with greater than 50% discussing her GVHD and follow-up.  Follow-up in 21 days.    Addendum I: Jakafi basics Starting dose: Jakafi is 5 mg bid. Increasing to 10 mg bid after at least 3 days of treatment if the ANC and platelet counts are not decreased by 50% or more relative to the first day of dosing with Jakafi.  Tapering of Jakafi after 6 months of treatment in patients with response who have discontinued therapeutic doses of corticosteroids. Taper Jakafi by one dose level approximately every 8 weeks (10 mg twice daily to 5 mg twice daily to 5 mg once daily).  Modify the Jakafi dosage when co-administered with strong CY inhibitors and fluconazole doses of less than or equal to 200 mg  Treatment with Jakafi can increases total cholesterol, low-density lipoprotein (LDL) cholesterol, and triglycerides - consider lipid analysis q 12 weeks Mild bruising, HA and dizziness are common.  Slight increase in zoster.

## 2024-08-15 NOTE — PHYSICAL EXAM
[I] : I [Jakafi] : Jakafi [No active (erythematous_ GVHD rash)] : Skin: No active (erythematous_ GVHD rash) [< 2 mg/dl] : Liver: < 2 mg/dl [No or intermittent] : Upper GI: No or intermittent nausea, vomiting or anorexia [<500 ml/day or < 3 episodes/day] : Lower GI (stool output/day): <500 ml/day or <3 episodes/day [Total % ____] : Total: [unfilled]% [Mild] : Mild [No symptoms] : Score 0 [Yes] : Yes [No] : No [OnsetofaGVHD] : 5/02/24 [FreeTextEntry1] : 03/06/24 [FreeTextEntry2] : 06/10/24 [Chronic_GVHD] : 06/10/24

## 2024-08-15 NOTE — HISTORY OF PRESENT ILLNESS
[de-identified] : Stephanie was last seen: 8/1/24   [de-identified] : Reason for visit: aGVHD evaluation.  Since last visit: rash nearly completely resolved. Feeling well. eyes and mouth are fine  Medications: Jakfii 10 mg bid (5/21/24 - )  CsA oral rinse daily (6/11/24 - 7/8/24) Valacyclovir 500 mg BID atovaquone  Losartan potassium 100 mg daily Lexapro 20 mg daily Omeprazole 20 mg daily simvastatin 80mg qd    Examination: Articulate and in no acute distress skin: no rashes no tongue lesions; mucosa moist No occiput, poster cervical, anterior cervical, submandibular, sublingual, submental, supraclavicular nor axillary adenopathy Lungs: Clear Cardiac: without rubs Abd: soft and non-tender. No inguinal nor femoral adenopathy No sig peripheral edema   CBC 05/21/2024: WBC   5.9, Hgb   9.5, PLT   53,000 05/28/2024: WBC 13.9, Hgb 10.8, PLT   74,000 06/04/2024: WBC 12.8, Hgb 10.8, PLT   85,000; .1 06/10/2024: WBC   5.0, Hgb 10.0, PLT   49,000; .1 08/01/2024: WBC   3.0, Hgb  9.4,  PLT   90,000; MCV; ANC 1.9  08/15/2024: WBC   3.0, Hgb 9.5, PLT 82,000; ANC 1.7

## 2024-08-22 DIAGNOSIS — Z94.81 BONE MARROW TRANSPLANT STATUS: ICD-10-CM

## 2024-09-05 ENCOUNTER — RESULT REVIEW (OUTPATIENT)
Age: 64
End: 2024-09-05

## 2024-09-05 ENCOUNTER — APPOINTMENT (OUTPATIENT)
Dept: HEMATOLOGY ONCOLOGY | Facility: CLINIC | Age: 64
End: 2024-09-05
Payer: MEDICARE

## 2024-09-05 ENCOUNTER — MED ADMIN CHARGE (OUTPATIENT)
Age: 64
End: 2024-09-05

## 2024-09-05 VITALS
DIASTOLIC BLOOD PRESSURE: 78 MMHG | OXYGEN SATURATION: 98 % | HEIGHT: 62.01 IN | BODY MASS INDEX: 38.65 KG/M2 | TEMPERATURE: 97 F | HEART RATE: 71 BPM | WEIGHT: 212.72 LBS | SYSTOLIC BLOOD PRESSURE: 140 MMHG | RESPIRATION RATE: 18 BRPM

## 2024-09-05 DIAGNOSIS — Z94.81 BONE MARROW TRANSPLANT STATUS: ICD-10-CM

## 2024-09-05 LAB
ALBUMIN SERPL ELPH-MCNC: 4.2 G/DL
ALP BLD-CCNC: 80 U/L
ALT SERPL-CCNC: 27 U/L
ANION GAP SERPL CALC-SCNC: 9 MMOL/L
AST SERPL-CCNC: 47 U/L
BASOPHILS # BLD AUTO: 0.03 K/UL — SIGNIFICANT CHANGE UP (ref 0–0.2)
BASOPHILS NFR BLD AUTO: 0.7 % — SIGNIFICANT CHANGE UP (ref 0–2)
BILIRUB SERPL-MCNC: 0.4 MG/DL
BUN SERPL-MCNC: 19 MG/DL
CALCIUM SERPL-MCNC: 9.6 MG/DL
CHLORIDE SERPL-SCNC: 106 MMOL/L
CO2 SERPL-SCNC: 25 MMOL/L
CREAT SERPL-MCNC: 1.05 MG/DL
EGFR: 59 ML/MIN/1.73M2
EOSINOPHIL # BLD AUTO: 0.09 K/UL — SIGNIFICANT CHANGE UP (ref 0–0.5)
EOSINOPHIL NFR BLD AUTO: 2.2 % — SIGNIFICANT CHANGE UP (ref 0–6)
GLUCOSE SERPL-MCNC: 102 MG/DL
HCT VFR BLD CALC: 31.2 % — LOW (ref 34.5–45)
HGB BLD-MCNC: 10.1 G/DL — LOW (ref 11.5–15.5)
IMM GRANULOCYTES NFR BLD AUTO: 0.5 % — SIGNIFICANT CHANGE UP (ref 0–0.9)
LYMPHOCYTES # BLD AUTO: 0.53 K/UL — LOW (ref 1–3.3)
LYMPHOCYTES # BLD AUTO: 12.9 % — LOW (ref 13–44)
MCHC RBC-ENTMCNC: 32.4 G/DL — SIGNIFICANT CHANGE UP (ref 32–36)
MCHC RBC-ENTMCNC: 36.5 PG — HIGH (ref 27–34)
MCV RBC AUTO: 112.6 FL — HIGH (ref 80–100)
MONOCYTES # BLD AUTO: 0.48 K/UL — SIGNIFICANT CHANGE UP (ref 0–0.9)
MONOCYTES NFR BLD AUTO: 11.7 % — SIGNIFICANT CHANGE UP (ref 2–14)
NEUTROPHILS # BLD AUTO: 2.97 K/UL — SIGNIFICANT CHANGE UP (ref 1.8–7.4)
NEUTROPHILS NFR BLD AUTO: 72 % — SIGNIFICANT CHANGE UP (ref 43–77)
NRBC # BLD: 0 /100 WBCS — SIGNIFICANT CHANGE UP (ref 0–0)
PLATELET # BLD AUTO: 88 K/UL — LOW (ref 150–400)
POTASSIUM SERPL-SCNC: 6 MMOL/L
PROT SERPL-MCNC: 6.7 G/DL
RBC # BLD: 2.77 M/UL — LOW (ref 3.8–5.2)
RBC # FLD: 14.7 % — HIGH (ref 10.3–14.5)
SODIUM SERPL-SCNC: 140 MMOL/L
WBC # BLD: 4.12 K/UL — SIGNIFICANT CHANGE UP (ref 3.8–10.5)
WBC # FLD AUTO: 4.12 K/UL — SIGNIFICANT CHANGE UP (ref 3.8–10.5)

## 2024-09-05 PROCEDURE — 90739 HEPB VACC 2/4 DOSE ADULT IM: CPT

## 2024-09-05 PROCEDURE — G0010: CPT

## 2024-09-05 PROCEDURE — 90472 IMMUNIZATION ADMIN EACH ADD: CPT

## 2024-09-05 PROCEDURE — 99214 OFFICE O/P EST MOD 30 MIN: CPT | Mod: 25

## 2024-09-05 PROCEDURE — G2211 COMPLEX E/M VISIT ADD ON: CPT

## 2024-09-05 PROCEDURE — 90698 DTAP-IPV/HIB VACCINE IM: CPT

## 2024-09-05 PROCEDURE — 99214 OFFICE O/P EST MOD 30 MIN: CPT

## 2024-09-05 RX ORDER — RUXOLITINIB 5 MG/1
5 TABLET ORAL
Qty: 30 | Refills: 0 | Status: ACTIVE | COMMUNITY
Start: 2024-09-05 | End: 1900-01-01

## 2024-09-05 NOTE — HISTORY OF PRESENT ILLNESS
[de-identified] : Stephanie was last seen: 8/15/24   [de-identified] : Reason for visit: aGVHD evaluation.  Since last visit: rash completely resolved. Feeling well. eyes and mouth are fine  Medications: Jakfii 10 mg bid (5/21/24 - 8/1/24 ) , 5mg bid (8/1/24 - ) CsA oral rinse daily (6/11/24 - 7/8/24) Valacyclovir 500 mg BID Bactrim SS qd Losartan potassium 100 mg daily Lexapro 20 mg daily Omeprazole 20 mg daily simvastatin 80mg qd    Examination: Articulate and in no acute distress skin: no rashes no tongue lesions; mucosa moist No occiput, poster cervical, anterior cervical, submandibular, sublingual, submental, supraclavicular nor axillary adenopathy Lungs: Clear Cardiac: without rubs, II/VI systolic  Abd: soft and non-tender. No inguinal nor femoral adenopathy No sig peripheral edema   CBC 05/21/2024: WBC   5.9, Hgb   9.5, PLT   53,000 05/28/2024: WBC 13.9, Hgb 10.8, PLT   74,000 06/04/2024: WBC 12.8, Hgb 10.8, PLT   85,000; .1 06/10/2024: WBC   5.0, Hgb 10.0, PLT   49,000; .1 08/01/2024: WBC   3.0, Hgb  9.4,  PLT   90,000; MCV; ANC 1.9  08/15/2024: WBC   3.0, Hgb 9.5,  PLT      82,000; ANC 1.7 09/05/2024: WBC   41, Hgb 10.1, PLT    88,000;   ANC 3.0

## 2024-09-05 NOTE — PHYSICAL EXAM
[I] : I [Jakafi] : Jakafi [No active (erythematous_ GVHD rash)] : Skin: No active (erythematous_ GVHD rash) [< 2 mg/dl] : Liver: < 2 mg/dl [No or intermittent] : Upper GI: No or intermittent nausea, vomiting or anorexia [<500 ml/day or < 3 episodes/day] : Lower GI (stool output/day): <500 ml/day or <3 episodes/day [Total % ____] : Total: [unfilled]% [Mild] : Mild [No symptoms] : Score 0 [No] : No [Yes] : Yes [OnsetofaGVHD] : 5/02/24 [FreeTextEntry1] : 03/06/24 [FreeTextEntry2] : 06/10/24 [Chronic_GVHD] : 06/10/24

## 2024-09-05 NOTE — ASSESSMENT
[FreeTextEntry1] : Assessment: 64-year-old day 183 (3/6/24) post CATRACHITA haplo Ph - ALL, in CR. 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  Plan: Heme: moderate thrombocytopenia, moderate anemia  bone marrow (24: day +90): 46 XY; Marrow without evidence of disease;  clonoseq (24: marrow): 0 sequences < 1e-6 bone marrow biopsy at next visit  ID Valacyclovir 500 mg BID Bactrim SS qd Vaccinations start today and next on 24  GVHD: Decrease Jakfii 5 mg qd x 30 days then stop. no cGVHD  GI: Omeprazole 20 mg daily  HLD: simvastatin 80 mg daily  HTN: Losartan 100 mg daily  Psych: Lexapro 20 mg daily  Over 35 minutes were spent in direct patient care with greater than 50% discussing her GVHD and follow-up.  Follow-up in 6 weeks    Addendum I: Jakafi basics Starting dose: Jakafi is 5 mg bid. Increasing to 10 mg bid after at least 3 days of treatment if the ANC and platelet counts are not decreased by 50% or more relative to the first day of dosing with Jakafi.  Tapering of Jakafi after 6 months of treatment in patients with response who have discontinued therapeutic doses of corticosteroids. Taper Jakafi by one dose level approximately every 8 weeks (10 mg twice daily to 5 mg twice daily to 5 mg once daily).  Modify the Jakafi dosage when co-administered with strong CY inhibitors and fluconazole doses of less than or equal to 200 mg  Treatment with Jakafi can increases total cholesterol, low-density lipoprotein (LDL) cholesterol, and triglycerides - consider lipid analysis q 12 weeks Mild bruising, HA and dizziness are common.  Slight increase in zoster.

## 2024-09-19 ENCOUNTER — RESULT REVIEW (OUTPATIENT)
Age: 64
End: 2024-09-19

## 2024-09-19 ENCOUNTER — APPOINTMENT (OUTPATIENT)
Dept: INFUSION THERAPY | Facility: HOSPITAL | Age: 64
End: 2024-09-19

## 2024-09-19 ENCOUNTER — APPOINTMENT (OUTPATIENT)
Dept: HEMATOLOGY ONCOLOGY | Facility: CLINIC | Age: 64
End: 2024-09-19
Payer: MEDICARE

## 2024-09-19 ENCOUNTER — LABORATORY RESULT (OUTPATIENT)
Age: 64
End: 2024-09-19

## 2024-09-19 ENCOUNTER — OUTPATIENT (OUTPATIENT)
Dept: OUTPATIENT SERVICES | Facility: HOSPITAL | Age: 64
LOS: 1 days | Discharge: HOME | End: 2024-09-19

## 2024-09-19 VITALS
HEART RATE: 64 BPM | SYSTOLIC BLOOD PRESSURE: 131 MMHG | BODY MASS INDEX: 38.76 KG/M2 | WEIGHT: 212 LBS | DIASTOLIC BLOOD PRESSURE: 82 MMHG | RESPIRATION RATE: 17 BRPM | TEMPERATURE: 97.8 F | OXYGEN SATURATION: 99 %

## 2024-09-19 DIAGNOSIS — C91.00 ACUTE LYMPHOBLASTIC LEUKEMIA NOT HAVING ACHIEVED REMISSION: ICD-10-CM

## 2024-09-19 DIAGNOSIS — Z98.891 HISTORY OF UTERINE SCAR FROM PREVIOUS SURGERY: Chronic | ICD-10-CM

## 2024-09-19 LAB
ALBUMIN SERPL ELPH-MCNC: 4 G/DL
ALBUMIN SERPL ELPH-MCNC: 4.2 G/DL — SIGNIFICANT CHANGE UP (ref 3.3–5)
ALP BLD-CCNC: 80 U/L
ALP SERPL-CCNC: 80 U/L — SIGNIFICANT CHANGE UP (ref 40–120)
ALT FLD-CCNC: 22 U/L — SIGNIFICANT CHANGE UP (ref 10–45)
ALT SERPL-CCNC: 20 U/L
ANION GAP SERPL CALC-SCNC: 14 MMOL/L — SIGNIFICANT CHANGE UP (ref 5–17)
ANION GAP SERPL CALC-SCNC: 9 MMOL/L
AST SERPL-CCNC: 27 U/L
AST SERPL-CCNC: 29 U/L — SIGNIFICANT CHANGE UP (ref 10–40)
BASOPHILS # BLD AUTO: 0.02 K/UL — SIGNIFICANT CHANGE UP (ref 0–0.2)
BASOPHILS # BLD AUTO: 0.02 K/UL — SIGNIFICANT CHANGE UP (ref 0–0.2)
BASOPHILS NFR BLD AUTO: 0.4 % — SIGNIFICANT CHANGE UP (ref 0–2)
BASOPHILS NFR BLD AUTO: 0.5 % — SIGNIFICANT CHANGE UP (ref 0–2)
BILIRUB SERPL-MCNC: 0.2 MG/DL
BILIRUB SERPL-MCNC: 0.2 MG/DL — SIGNIFICANT CHANGE UP (ref 0.2–1.2)
BUN SERPL-MCNC: 19 MG/DL — SIGNIFICANT CHANGE UP (ref 7–23)
BUN SERPL-MCNC: 20 MG/DL
CALCIUM SERPL-MCNC: 9.4 MG/DL
CALCIUM SERPL-MCNC: 9.4 MG/DL — SIGNIFICANT CHANGE UP (ref 8.4–10.5)
CHLORIDE SERPL-SCNC: 108 MMOL/L — SIGNIFICANT CHANGE UP (ref 96–108)
CHLORIDE SERPL-SCNC: 110 MMOL/L
CO2 SERPL-SCNC: 20 MMOL/L — LOW (ref 22–31)
CO2 SERPL-SCNC: 24 MMOL/L
CREAT SERPL-MCNC: 1.02 MG/DL — SIGNIFICANT CHANGE UP (ref 0.5–1.3)
CREAT SERPL-MCNC: 1.06 MG/DL
EGFR: 58 ML/MIN/1.73M2
EGFR: 61 ML/MIN/1.73M2 — SIGNIFICANT CHANGE UP
EOSINOPHIL # BLD AUTO: 0.1 K/UL — SIGNIFICANT CHANGE UP (ref 0–0.5)
EOSINOPHIL # BLD AUTO: 0.14 K/UL — SIGNIFICANT CHANGE UP (ref 0–0.5)
EOSINOPHIL NFR BLD AUTO: 2.5 % — SIGNIFICANT CHANGE UP (ref 0–6)
EOSINOPHIL NFR BLD AUTO: 2.9 % — SIGNIFICANT CHANGE UP (ref 0–6)
GLUCOSE SERPL-MCNC: 93 MG/DL — SIGNIFICANT CHANGE UP (ref 70–99)
GLUCOSE SERPL-MCNC: 98 MG/DL
HCT VFR BLD CALC: 29 % — LOW (ref 34.5–45)
HCT VFR BLD CALC: 29.2 % — LOW (ref 34.5–45)
HGB BLD-MCNC: 9.5 G/DL — LOW (ref 11.5–15.5)
HGB BLD-MCNC: 9.6 G/DL — LOW (ref 11.5–15.5)
IMM GRANULOCYTES NFR BLD AUTO: 0.3 % — SIGNIFICANT CHANGE UP (ref 0–0.9)
IMM GRANULOCYTES NFR BLD AUTO: 0.4 % — SIGNIFICANT CHANGE UP (ref 0–0.9)
LYMPHOCYTES # BLD AUTO: 0.55 K/UL — LOW (ref 1–3.3)
LYMPHOCYTES # BLD AUTO: 0.64 K/UL — LOW (ref 1–3.3)
LYMPHOCYTES # BLD AUTO: 11.4 % — LOW (ref 13–44)
LYMPHOCYTES # BLD AUTO: 16.2 % — SIGNIFICANT CHANGE UP (ref 13–44)
MCHC RBC-ENTMCNC: 32.8 G/DL — SIGNIFICANT CHANGE UP (ref 32–36)
MCHC RBC-ENTMCNC: 32.9 G/DL — SIGNIFICANT CHANGE UP (ref 32–36)
MCHC RBC-ENTMCNC: 37.3 PG — HIGH (ref 27–34)
MCHC RBC-ENTMCNC: 37.6 PG — HIGH (ref 27–34)
MCV RBC AUTO: 113.7 FL — HIGH (ref 80–100)
MCV RBC AUTO: 114.5 FL — HIGH (ref 80–100)
MONOCYTES # BLD AUTO: 0.4 K/UL — SIGNIFICANT CHANGE UP (ref 0–0.9)
MONOCYTES # BLD AUTO: 0.57 K/UL — SIGNIFICANT CHANGE UP (ref 0–0.9)
MONOCYTES NFR BLD AUTO: 10.1 % — SIGNIFICANT CHANGE UP (ref 2–14)
MONOCYTES NFR BLD AUTO: 11.8 % — SIGNIFICANT CHANGE UP (ref 2–14)
NEUTROPHILS # BLD AUTO: 2.79 K/UL — SIGNIFICANT CHANGE UP (ref 1.8–7.4)
NEUTROPHILS # BLD AUTO: 3.53 K/UL — SIGNIFICANT CHANGE UP (ref 1.8–7.4)
NEUTROPHILS NFR BLD AUTO: 70.4 % — SIGNIFICANT CHANGE UP (ref 43–77)
NEUTROPHILS NFR BLD AUTO: 73.1 % — SIGNIFICANT CHANGE UP (ref 43–77)
NRBC # BLD: 0 /100 WBCS — SIGNIFICANT CHANGE UP (ref 0–0)
NRBC # BLD: 0 /100 WBCS — SIGNIFICANT CHANGE UP (ref 0–0)
PLATELET # BLD AUTO: 89 K/UL — LOW (ref 150–400)
PLATELET # BLD AUTO: 91 K/UL — LOW (ref 150–400)
POTASSIUM SERPL-MCNC: 4.4 MMOL/L — SIGNIFICANT CHANGE UP (ref 3.5–5.3)
POTASSIUM SERPL-SCNC: 4.4 MMOL/L — SIGNIFICANT CHANGE UP (ref 3.5–5.3)
POTASSIUM SERPL-SCNC: 4.6 MMOL/L
PROT SERPL-MCNC: 6.2 G/DL
PROT SERPL-MCNC: 6.4 G/DL — SIGNIFICANT CHANGE UP (ref 6–8.3)
RBC # BLD: 2.55 M/UL — LOW (ref 3.8–5.2)
RBC # BLD: 2.55 M/UL — LOW (ref 3.8–5.2)
RBC # FLD: 14.1 % — SIGNIFICANT CHANGE UP (ref 10.3–14.5)
RBC # FLD: 14.2 % — SIGNIFICANT CHANGE UP (ref 10.3–14.5)
SODIUM SERPL-SCNC: 142 MMOL/L
SODIUM SERPL-SCNC: 142 MMOL/L — SIGNIFICANT CHANGE UP (ref 135–145)
WBC # BLD: 3.96 K/UL — SIGNIFICANT CHANGE UP (ref 3.8–10.5)
WBC # BLD: 4.83 K/UL — SIGNIFICANT CHANGE UP (ref 3.8–10.5)
WBC # FLD AUTO: 3.96 K/UL — SIGNIFICANT CHANGE UP (ref 3.8–10.5)
WBC # FLD AUTO: 4.83 K/UL — SIGNIFICANT CHANGE UP (ref 3.8–10.5)

## 2024-09-19 PROCEDURE — 38222 DX BONE MARROW BX & ASPIR: CPT | Mod: RT

## 2024-09-19 NOTE — PROCEDURE
[Bone Marrow Biopsy] : bone marrow biopsy [Bone Marrow Aspiration] : bone marrow aspiration  [Patient] : the patient [Patient identification verified] : patient identification verified [Procedure verified and consent obtained] : procedure verified and consent obtained [Correct positioning] : correct positioning [Prone] : prone [The right posterior iliac crest was prepped with betadine and draped, using sterile technique.] : The right posterior iliac crest was prepped with betadine and draped, using sterile technique. [Lidocaine was injected and into the periosteum overlying the site.] : Lidocaine was injected and into the periosteum overlying the site. [Aspirate] : aspirate [Cytogenetics] : cytogenetics [FISH] : FISH [Other ___] : [unfilled] [Biopsy] : biopsy [Flow Cytometry] : flow cytometry [] : The patient was instructed to remove the bandage the following AM. The patient may bathe. Acetaminophen may be taken for discomfort, as per package directions.If there are any other problems, the patient was instructed to call the office. The patient verbalized understanding, and is aware of the office contact numbers. [FreeTextEntry1] : 63 yo female post CATRACHITA haplo Ph - ALL, in CR, 6 month f/u bmbx  [FreeTextEntry2] : CBC prior to procedure WBC 4.83 Hgb  9.6 Hct 29.2 Plt 91k BM Bx and aspiration was performed by JUAN Pastrana. 2 lavender + 2 green top tubes of BM aspirate and 1 cassette of BM core specimen sent to lab (1 Laverder top tube dropped to BMT coordinator office JAYLYN Layne). 10ml 1% Lidocaine injected at the biopsy site.  [employer] tracking #: [id number]

## 2024-09-19 NOTE — REASON FOR VISIT
[Bone Marrow Biopsy] : bone marrow biopsy [Bone Marrow Aspiration] : bone marrow aspiration [FreeTextEntry2] : 65 yo female post CATRACHITA haplo Ph - ALL, in CR, 6 month f/u bmbx

## 2024-09-20 ENCOUNTER — OUTPATIENT (OUTPATIENT)
Dept: OUTPATIENT SERVICES | Facility: HOSPITAL | Age: 64
LOS: 1 days | Discharge: HOME | End: 2024-09-20

## 2024-09-20 DIAGNOSIS — Z23 ENCOUNTER FOR IMMUNIZATION: ICD-10-CM

## 2024-09-20 DIAGNOSIS — C95.90 LEUKEMIA, UNSPECIFIED NOT HAVING ACHIEVED REMISSION: ICD-10-CM

## 2024-09-24 ENCOUNTER — APPOINTMENT (OUTPATIENT)
Dept: INFUSION THERAPY | Facility: HOSPITAL | Age: 64
End: 2024-09-24

## 2024-09-24 DIAGNOSIS — D64.9 ANEMIA, UNSPECIFIED: ICD-10-CM

## 2024-10-10 DIAGNOSIS — Z23 ENCOUNTER FOR IMMUNIZATION: ICD-10-CM

## 2024-10-18 ENCOUNTER — APPOINTMENT (OUTPATIENT)
Dept: HEMATOLOGY ONCOLOGY | Facility: CLINIC | Age: 64
End: 2024-10-18

## 2024-10-18 ENCOUNTER — RESULT REVIEW (OUTPATIENT)
Age: 64
End: 2024-10-18

## 2024-10-18 ENCOUNTER — APPOINTMENT (OUTPATIENT)
Dept: HEMATOLOGY ONCOLOGY | Facility: CLINIC | Age: 64
End: 2024-10-18
Payer: MEDICARE

## 2024-10-18 ENCOUNTER — MED ADMIN CHARGE (OUTPATIENT)
Age: 64
End: 2024-10-18

## 2024-10-18 VITALS
WEIGHT: 218.24 LBS | DIASTOLIC BLOOD PRESSURE: 93 MMHG | OXYGEN SATURATION: 98 % | HEART RATE: 61 BPM | TEMPERATURE: 97.3 F | SYSTOLIC BLOOD PRESSURE: 138 MMHG | BODY MASS INDEX: 39.9 KG/M2 | RESPIRATION RATE: 16 BRPM

## 2024-10-18 DIAGNOSIS — Z94.81 BONE MARROW TRANSPLANT STATUS: ICD-10-CM

## 2024-10-18 LAB
BASOPHILS # BLD AUTO: 0.02 K/UL — SIGNIFICANT CHANGE UP (ref 0–0.2)
BASOPHILS NFR BLD AUTO: 0.4 % — SIGNIFICANT CHANGE UP (ref 0–2)
EOSINOPHIL # BLD AUTO: 0.15 K/UL — SIGNIFICANT CHANGE UP (ref 0–0.5)
EOSINOPHIL NFR BLD AUTO: 3.2 % — SIGNIFICANT CHANGE UP (ref 0–6)
HCT VFR BLD CALC: 34.4 % — LOW (ref 34.5–45)
HGB BLD-MCNC: 11.2 G/DL — LOW (ref 11.5–15.5)
IMM GRANULOCYTES NFR BLD AUTO: 0.4 % — SIGNIFICANT CHANGE UP (ref 0–0.9)
LYMPHOCYTES # BLD AUTO: 0.42 K/UL — LOW (ref 1–3.3)
LYMPHOCYTES # BLD AUTO: 8.8 % — LOW (ref 13–44)
MCHC RBC-ENTMCNC: 32.6 G/DL — SIGNIFICANT CHANGE UP (ref 32–36)
MCHC RBC-ENTMCNC: 37 PG — HIGH (ref 27–34)
MCV RBC AUTO: 113.5 FL — HIGH (ref 80–100)
MONOCYTES # BLD AUTO: 0.65 K/UL — SIGNIFICANT CHANGE UP (ref 0–0.9)
MONOCYTES NFR BLD AUTO: 13.7 % — SIGNIFICANT CHANGE UP (ref 2–14)
NEUTROPHILS # BLD AUTO: 3.5 K/UL — SIGNIFICANT CHANGE UP (ref 1.8–7.4)
NEUTROPHILS NFR BLD AUTO: 73.5 % — SIGNIFICANT CHANGE UP (ref 43–77)
NRBC # BLD: 0 /100 WBCS — SIGNIFICANT CHANGE UP (ref 0–0)
PLATELET # BLD AUTO: 97 K/UL — LOW (ref 150–400)
RBC # BLD: 3.03 M/UL — LOW (ref 3.8–5.2)
RBC # FLD: 12.9 % — SIGNIFICANT CHANGE UP (ref 10.3–14.5)
WBC # BLD: 4.76 K/UL — SIGNIFICANT CHANGE UP (ref 3.8–10.5)
WBC # FLD AUTO: 4.76 K/UL — SIGNIFICANT CHANGE UP (ref 3.8–10.5)

## 2024-10-18 PROCEDURE — 90698 DTAP-IPV/HIB VACCINE IM: CPT

## 2024-10-18 PROCEDURE — 99214 OFFICE O/P EST MOD 30 MIN: CPT | Mod: 25

## 2024-10-18 PROCEDURE — 90739 HEPB VACC 2/4 DOSE ADULT IM: CPT

## 2024-10-18 PROCEDURE — 90472 IMMUNIZATION ADMIN EACH ADD: CPT

## 2024-10-18 PROCEDURE — G0010: CPT

## 2024-10-18 PROCEDURE — ZZZZZ: CPT

## 2024-10-21 LAB
ALBUMIN SERPL ELPH-MCNC: 4.1 G/DL
ALP BLD-CCNC: 85 U/L
ALT SERPL-CCNC: 26 U/L
ANION GAP SERPL CALC-SCNC: 10 MMOL/L
AST SERPL-CCNC: 30 U/L
BILIRUB SERPL-MCNC: 0.3 MG/DL
BUN SERPL-MCNC: 18 MG/DL
CALCIUM SERPL-MCNC: 9.6 MG/DL
CHLORIDE SERPL-SCNC: 106 MMOL/L
CMV DNA SPEC QL NAA+PROBE: NOT DETECTED IU/ML
CMVPCR LOG: NOT DETECTED LOG10IU/ML
CO2 SERPL-SCNC: 26 MMOL/L
CREAT SERPL-MCNC: 1.05 MG/DL
EGFR: 59 ML/MIN/1.73M2
GLUCOSE SERPL-MCNC: 96 MG/DL
POTASSIUM SERPL-SCNC: 4.7 MMOL/L
PROT SERPL-MCNC: 6.4 G/DL
SODIUM SERPL-SCNC: 141 MMOL/L

## 2024-11-21 ENCOUNTER — APPOINTMENT (OUTPATIENT)
Dept: RADIOLOGY | Facility: CLINIC | Age: 64
End: 2024-11-21
Payer: MEDICARE

## 2024-11-21 PROCEDURE — 73522 X-RAY EXAM HIPS BI 3-4 VIEWS: CPT

## 2024-12-01 ENCOUNTER — OUTPATIENT (OUTPATIENT)
Dept: OUTPATIENT SERVICES | Facility: HOSPITAL | Age: 64
LOS: 1 days | Discharge: HOME | End: 2024-12-01

## 2024-12-01 DIAGNOSIS — Z98.891 HISTORY OF UTERINE SCAR FROM PREVIOUS SURGERY: Chronic | ICD-10-CM

## 2024-12-01 DIAGNOSIS — C95.90 LEUKEMIA, UNSPECIFIED NOT HAVING ACHIEVED REMISSION: ICD-10-CM

## 2024-12-05 ENCOUNTER — APPOINTMENT (OUTPATIENT)
Dept: INFUSION THERAPY | Facility: HOSPITAL | Age: 64
End: 2024-12-05

## 2024-12-05 ENCOUNTER — APPOINTMENT (OUTPATIENT)
Dept: HEMATOLOGY ONCOLOGY | Facility: CLINIC | Age: 64
End: 2024-12-05
Payer: MEDICARE

## 2024-12-05 ENCOUNTER — RESULT REVIEW (OUTPATIENT)
Age: 64
End: 2024-12-05

## 2024-12-05 ENCOUNTER — MED ADMIN CHARGE (OUTPATIENT)
Age: 64
End: 2024-12-05

## 2024-12-05 VITALS
WEIGHT: 225.53 LBS | TEMPERATURE: 97.5 F | DIASTOLIC BLOOD PRESSURE: 83 MMHG | SYSTOLIC BLOOD PRESSURE: 141 MMHG | HEART RATE: 67 BPM | RESPIRATION RATE: 16 BRPM | BODY MASS INDEX: 41.23 KG/M2 | OXYGEN SATURATION: 98 %

## 2024-12-05 DIAGNOSIS — Z94.81 BONE MARROW TRANSPLANT STATUS: ICD-10-CM

## 2024-12-05 LAB
ALBUMIN SERPL ELPH-MCNC: 4 G/DL — SIGNIFICANT CHANGE UP (ref 3.3–5)
ALP SERPL-CCNC: 89 U/L — SIGNIFICANT CHANGE UP (ref 40–120)
ALT FLD-CCNC: 29 U/L — SIGNIFICANT CHANGE UP (ref 10–45)
ANION GAP SERPL CALC-SCNC: 13 MMOL/L — SIGNIFICANT CHANGE UP (ref 5–17)
AST SERPL-CCNC: 35 U/L — SIGNIFICANT CHANGE UP (ref 10–40)
BASOPHILS # BLD AUTO: 0.03 K/UL — SIGNIFICANT CHANGE UP (ref 0–0.2)
BASOPHILS NFR BLD AUTO: 0.6 % — SIGNIFICANT CHANGE UP (ref 0–2)
BILIRUB SERPL-MCNC: 0.4 MG/DL — SIGNIFICANT CHANGE UP (ref 0.2–1.2)
BUN SERPL-MCNC: 21 MG/DL — SIGNIFICANT CHANGE UP (ref 7–23)
CALCIUM SERPL-MCNC: 9.6 MG/DL — SIGNIFICANT CHANGE UP (ref 8.4–10.5)
CHLORIDE SERPL-SCNC: 102 MMOL/L — SIGNIFICANT CHANGE UP (ref 96–108)
CO2 SERPL-SCNC: 24 MMOL/L — SIGNIFICANT CHANGE UP (ref 22–31)
CREAT SERPL-MCNC: 1.04 MG/DL — SIGNIFICANT CHANGE UP (ref 0.5–1.3)
EGFR: 59 ML/MIN/1.73M2 — LOW
EOSINOPHIL # BLD AUTO: 0.33 K/UL — SIGNIFICANT CHANGE UP (ref 0–0.5)
EOSINOPHIL NFR BLD AUTO: 7.1 % — HIGH (ref 0–6)
GLUCOSE SERPL-MCNC: 93 MG/DL — SIGNIFICANT CHANGE UP (ref 70–99)
HCT VFR BLD CALC: 35.5 % — SIGNIFICANT CHANGE UP (ref 34.5–45)
HGB BLD-MCNC: 11.8 G/DL — SIGNIFICANT CHANGE UP (ref 11.5–15.5)
IMM GRANULOCYTES NFR BLD AUTO: 0.4 % — SIGNIFICANT CHANGE UP (ref 0–0.9)
LYMPHOCYTES # BLD AUTO: 0.63 K/UL — LOW (ref 1–3.3)
LYMPHOCYTES # BLD AUTO: 13.6 % — SIGNIFICANT CHANGE UP (ref 13–44)
MCHC RBC-ENTMCNC: 33.2 G/DL — SIGNIFICANT CHANGE UP (ref 32–36)
MCHC RBC-ENTMCNC: 36.1 PG — HIGH (ref 27–34)
MCV RBC AUTO: 108.6 FL — HIGH (ref 80–100)
MONOCYTES # BLD AUTO: 0.59 K/UL — SIGNIFICANT CHANGE UP (ref 0–0.9)
MONOCYTES NFR BLD AUTO: 12.7 % — SIGNIFICANT CHANGE UP (ref 2–14)
NEUTROPHILS # BLD AUTO: 3.04 K/UL — SIGNIFICANT CHANGE UP (ref 1.8–7.4)
NEUTROPHILS NFR BLD AUTO: 65.6 % — SIGNIFICANT CHANGE UP (ref 43–77)
NRBC # BLD: 0 /100 WBCS — SIGNIFICANT CHANGE UP (ref 0–0)
NRBC BLD-RTO: 0 /100 WBCS — SIGNIFICANT CHANGE UP (ref 0–0)
PLATELET # BLD AUTO: 111 K/UL — LOW (ref 150–400)
POTASSIUM SERPL-MCNC: 4.3 MMOL/L — SIGNIFICANT CHANGE UP (ref 3.5–5.3)
POTASSIUM SERPL-SCNC: 4.3 MMOL/L — SIGNIFICANT CHANGE UP (ref 3.5–5.3)
PROT SERPL-MCNC: 6.5 G/DL — SIGNIFICANT CHANGE UP (ref 6–8.3)
RBC # BLD: 3.27 M/UL — LOW (ref 3.8–5.2)
RBC # FLD: 12.7 % — SIGNIFICANT CHANGE UP (ref 10.3–14.5)
SODIUM SERPL-SCNC: 139 MMOL/L — SIGNIFICANT CHANGE UP (ref 135–145)
WBC # BLD: 4.64 K/UL — SIGNIFICANT CHANGE UP (ref 3.8–10.5)
WBC # FLD AUTO: 4.64 K/UL — SIGNIFICANT CHANGE UP (ref 3.8–10.5)

## 2024-12-05 PROCEDURE — 90472 IMMUNIZATION ADMIN EACH ADD: CPT

## 2024-12-05 PROCEDURE — 99215 OFFICE O/P EST HI 40 MIN: CPT | Mod: 25

## 2024-12-05 PROCEDURE — 90698 DTAP-IPV/HIB VACCINE IM: CPT

## 2024-12-05 PROCEDURE — 90471 IMMUNIZATION ADMIN: CPT

## 2024-12-05 PROCEDURE — 90750 HZV VACC RECOMBINANT IM: CPT

## 2024-12-06 NOTE — H&P ADULT - NSICDXPASTMEDICALHX_GEN_ALL_CORE_FT
No
PAST MEDICAL HISTORY:  B-cell acute lymphoblastic leukemia     HLD (hyperlipidemia)     HTN (hypertension)

## 2024-12-16 ENCOUNTER — RX RENEWAL (OUTPATIENT)
Age: 64
End: 2024-12-16

## 2024-12-17 ENCOUNTER — APPOINTMENT (OUTPATIENT)
Dept: OPHTHALMOLOGY | Facility: CLINIC | Age: 64
End: 2024-12-17
Payer: MEDICARE

## 2024-12-17 ENCOUNTER — NON-APPOINTMENT (OUTPATIENT)
Age: 64
End: 2024-12-17

## 2024-12-17 PROCEDURE — 92014 COMPRE OPH EXAM EST PT 1/>: CPT

## 2025-01-03 NOTE — PROGRESS NOTE ADULT - PROBLEM SELECTOR PROBLEM 2
Infectious disease
There are no Wet Read(s) to document.
Infectious disease

## 2025-02-20 ENCOUNTER — RESULT REVIEW (OUTPATIENT)
Age: 65
End: 2025-02-20

## 2025-02-20 ENCOUNTER — LABORATORY RESULT (OUTPATIENT)
Age: 65
End: 2025-02-20

## 2025-02-20 ENCOUNTER — NON-APPOINTMENT (OUTPATIENT)
Age: 65
End: 2025-02-20

## 2025-02-20 ENCOUNTER — APPOINTMENT (OUTPATIENT)
Dept: HEMATOLOGY ONCOLOGY | Facility: CLINIC | Age: 65
End: 2025-02-20
Payer: MEDICARE

## 2025-02-20 VITALS
HEIGHT: 62.6 IN | HEART RATE: 73 BPM | BODY MASS INDEX: 39.56 KG/M2 | TEMPERATURE: 98 F | RESPIRATION RATE: 16 BRPM | OXYGEN SATURATION: 93 % | DIASTOLIC BLOOD PRESSURE: 79 MMHG | SYSTOLIC BLOOD PRESSURE: 124 MMHG | WEIGHT: 220.46 LBS

## 2025-02-20 DIAGNOSIS — Z94.81 BONE MARROW TRANSPLANT STATUS: ICD-10-CM

## 2025-02-20 LAB
ALBUMIN SERPL ELPH-MCNC: 4.3 G/DL
ALP BLD-CCNC: 93 U/L
ALT SERPL-CCNC: 55 U/L
ANION GAP SERPL CALC-SCNC: 9 MMOL/L
AST SERPL-CCNC: 50 U/L
BILIRUB SERPL-MCNC: 0.3 MG/DL
BUN SERPL-MCNC: 20 MG/DL
CALCIUM SERPL-MCNC: 9.5 MG/DL
CHLORIDE SERPL-SCNC: 106 MMOL/L
CO2 SERPL-SCNC: 25 MMOL/L
CREAT SERPL-MCNC: 1.05 MG/DL
EGFR: 58 ML/MIN/1.73M2
GLUCOSE SERPL-MCNC: 99 MG/DL
POTASSIUM SERPL-SCNC: 4.7 MMOL/L
PROT SERPL-MCNC: 6.7 G/DL
SODIUM SERPL-SCNC: 139 MMOL/L

## 2025-02-20 PROCEDURE — 38221 DX BONE MARROW BIOPSIES: CPT | Mod: LT

## 2025-03-06 ENCOUNTER — APPOINTMENT (OUTPATIENT)
Dept: HEMATOLOGY ONCOLOGY | Facility: CLINIC | Age: 65
End: 2025-03-06

## 2025-03-06 ENCOUNTER — MED ADMIN CHARGE (OUTPATIENT)
Age: 65
End: 2025-03-06

## 2025-03-06 ENCOUNTER — RESULT REVIEW (OUTPATIENT)
Age: 65
End: 2025-03-06

## 2025-03-06 VITALS
SYSTOLIC BLOOD PRESSURE: 123 MMHG | WEIGHT: 224.87 LBS | TEMPERATURE: 97.5 F | RESPIRATION RATE: 16 BRPM | DIASTOLIC BLOOD PRESSURE: 82 MMHG | HEART RATE: 75 BPM | BODY MASS INDEX: 40.35 KG/M2 | OXYGEN SATURATION: 97 %

## 2025-03-06 DIAGNOSIS — Z94.81 BONE MARROW TRANSPLANT STATUS: ICD-10-CM

## 2025-03-06 LAB
ALBUMIN SERPL ELPH-MCNC: 4.3 G/DL
ALP BLD-CCNC: 97 U/L
ALT SERPL-CCNC: 55 U/L
ANION GAP SERPL CALC-SCNC: 7 MMOL/L
APTT BLD: 32.4 SEC
AST SERPL-CCNC: 48 U/L
BILIRUB SERPL-MCNC: 0.4 MG/DL
BUN SERPL-MCNC: 17 MG/DL
CALCIUM SERPL-MCNC: 9.3 MG/DL
CHLORIDE SERPL-SCNC: 106 MMOL/L
CO2 SERPL-SCNC: 27 MMOL/L
CREAT SERPL-MCNC: 1.06 MG/DL
EGFRCR SERPLBLD CKD-EPI 2021: 58 ML/MIN/1.73M2
GLUCOSE SERPL-MCNC: 104 MG/DL
INR PPP: 0.93 RATIO
POTASSIUM SERPL-SCNC: 4.5 MMOL/L
PROT SERPL-MCNC: 6.7 G/DL
PT BLD: 11 SEC
SODIUM SERPL-SCNC: 140 MMOL/L

## 2025-03-06 PROCEDURE — 90750 HZV VACC RECOMBINANT IM: CPT

## 2025-03-06 PROCEDURE — 90471 IMMUNIZATION ADMIN: CPT

## 2025-03-06 RX ORDER — AMLODIPINE BESYLATE 5 MG/1
5 TABLET ORAL DAILY
Refills: 0 | Status: ACTIVE | COMMUNITY
Start: 2025-03-06

## 2025-03-19 ENCOUNTER — RESULT REVIEW (OUTPATIENT)
Age: 65
End: 2025-03-19

## 2025-03-19 ENCOUNTER — TRANSCRIPTION ENCOUNTER (OUTPATIENT)
Age: 65
End: 2025-03-19

## 2025-06-14 NOTE — PROGRESS NOTE ADULT - PROBLEM SELECTOR PROBLEM 1
Acute lymphocytic leukemia
General